# Patient Record
Sex: FEMALE | Race: BLACK OR AFRICAN AMERICAN | NOT HISPANIC OR LATINO | Employment: OTHER | URBAN - METROPOLITAN AREA
[De-identification: names, ages, dates, MRNs, and addresses within clinical notes are randomized per-mention and may not be internally consistent; named-entity substitution may affect disease eponyms.]

---

## 2017-01-05 ENCOUNTER — LAB VISIT (OUTPATIENT)
Dept: LAB | Facility: HOSPITAL | Age: 62
End: 2017-01-05
Attending: UROLOGY
Payer: MEDICARE

## 2017-01-05 ENCOUNTER — TELEPHONE (OUTPATIENT)
Dept: UROLOGY | Facility: CLINIC | Age: 62
End: 2017-01-05

## 2017-01-05 DIAGNOSIS — N31.9 NEUROGENIC BLADDER: ICD-10-CM

## 2017-01-05 DIAGNOSIS — R82.71 BACTERIURIA: ICD-10-CM

## 2017-01-05 LAB
BACTERIA #/AREA URNS HPF: ABNORMAL /HPF
BILIRUB UR QL STRIP: NEGATIVE
CLARITY UR: ABNORMAL
COLOR UR: ABNORMAL
GLUCOSE UR QL STRIP: NEGATIVE
HGB UR QL STRIP: ABNORMAL
HYALINE CASTS #/AREA URNS LPF: 0 /LPF
KETONES UR QL STRIP: NEGATIVE
LEUKOCYTE ESTERASE UR QL STRIP: ABNORMAL
MICROSCOPIC COMMENT: ABNORMAL
NITRITE UR QL STRIP: POSITIVE
PH UR STRIP: 7 [PH] (ref 5–8)
PROT UR QL STRIP: ABNORMAL
RBC #/AREA URNS HPF: 6 /HPF (ref 0–4)
SP GR UR STRIP: 1.02 (ref 1–1.03)
TRI-PHOS CRY URNS QL MICRO: ABNORMAL
URN SPEC COLLECT METH UR: ABNORMAL
UROBILINOGEN UR STRIP-ACNC: ABNORMAL EU/DL
WBC #/AREA URNS HPF: >100 /HPF (ref 0–5)

## 2017-01-05 PROCEDURE — 87077 CULTURE AEROBIC IDENTIFY: CPT | Mod: 59

## 2017-01-05 PROCEDURE — 87088 URINE BACTERIA CULTURE: CPT

## 2017-01-05 PROCEDURE — 81000 URINALYSIS NONAUTO W/SCOPE: CPT

## 2017-01-05 PROCEDURE — 87186 SC STD MICRODIL/AGAR DIL: CPT

## 2017-01-05 PROCEDURE — 87086 URINE CULTURE/COLONY COUNT: CPT

## 2017-01-05 RX ORDER — SULFAMETHOXAZOLE AND TRIMETHOPRIM 800; 160 MG/1; MG/1
1 TABLET ORAL 2 TIMES DAILY
Qty: 14 TABLET | Refills: 0 | Status: SHIPPED | OUTPATIENT
Start: 2017-01-05 | End: 2017-01-12

## 2017-01-05 NOTE — TELEPHONE ENCOUNTER
----- Message from Amol Quiroz sent at 1/5/2017  1:04 PM CST -----  Contact: self/[phone]  Patient is requesting her lab results. Thank you.

## 2017-01-05 NOTE — TELEPHONE ENCOUNTER
UA is concerning for UTI    Empiric abx (Bactrim) sent to pharmacy. May need to adjust when UCx returns - may not be until Monday.

## 2017-01-06 ENCOUNTER — TELEPHONE (OUTPATIENT)
Dept: UROLOGY | Facility: CLINIC | Age: 62
End: 2017-01-06

## 2017-01-09 ENCOUNTER — TELEPHONE (OUTPATIENT)
Dept: UROLOGY | Facility: CLINIC | Age: 62
End: 2017-01-09

## 2017-01-09 LAB
BACTERIA UR CULT: NORMAL

## 2017-01-09 RX ORDER — CIPROFLOXACIN 500 MG/1
500 TABLET ORAL 2 TIMES DAILY
Qty: 14 TABLET | Refills: 0 | Status: SHIPPED | OUTPATIENT
Start: 2017-01-09 | End: 2017-10-31

## 2017-01-09 NOTE — TELEPHONE ENCOUNTER
Please inform patient of urinary tract infection on recent urine culture. Appropriate antibiotics (Cipro) were sent in to the pharmacy.    May need to add or adjust abx as some aspects of the culture are still in process.

## 2017-02-13 ENCOUNTER — TELEPHONE (OUTPATIENT)
Dept: UROLOGY | Facility: CLINIC | Age: 62
End: 2017-02-13

## 2017-02-13 DIAGNOSIS — R30.0 DYSURIA: Primary | ICD-10-CM

## 2017-02-13 NOTE — TELEPHONE ENCOUNTER
Spoke to pt she states urine looks like it has slime in it. She not sure if its infection. Should pt bring a urine sample in for culture. Please advise/yanelis

## 2017-02-20 ENCOUNTER — OFFICE VISIT (OUTPATIENT)
Dept: UROLOGY | Facility: CLINIC | Age: 62
End: 2017-02-20
Payer: MEDICARE

## 2017-02-20 VITALS — BODY MASS INDEX: 41.3 KG/M2 | HEIGHT: 66 IN | WEIGHT: 257 LBS

## 2017-02-20 DIAGNOSIS — R33.9 INCOMPLETE EMPTYING OF BLADDER: ICD-10-CM

## 2017-02-20 DIAGNOSIS — N31.9 NEUROGENIC BLADDER: Primary | ICD-10-CM

## 2017-02-20 PROCEDURE — 99213 OFFICE O/P EST LOW 20 MIN: CPT | Mod: PBBFAC | Performed by: UROLOGY

## 2017-02-20 PROCEDURE — 99214 OFFICE O/P EST MOD 30 MIN: CPT | Mod: S$PBB,,, | Performed by: UROLOGY

## 2017-02-20 PROCEDURE — 99999 PR PBB SHADOW E&M-EST. PATIENT-LVL III: CPT | Mod: PBBFAC,,, | Performed by: UROLOGY

## 2017-02-20 NOTE — MR AVS SNAPSHOT
[facility]  [address]  [address]  [place] [address]  Phone: [phone]                  Mary Ellen Fajardo   2017 4:15 PM   Office Visit    Description:  Female : 1955   Provider:  RAMA Tinoco MD   Department:  [facility]           Reason for Visit     Follow-up           Diagnoses this Visit        Comments    Neurogenic bladder    -  Primary     Incomplete emptying of bladder                To Do List           Future Appointments        Provider Department Dept Phone    2017 4:15 PM RAMA Tinoco MD [facility] [phone]      Goals (5 Years of Data)     None      Follow-Up and Disposition     Return in about 6 months (around 2017) for Review X-ray, Follow up.      [facility]     [facility] -  Assistance  Registered nurses in the [facility] provide clinical advisement, health education, appointment booking, and other advisory services.  Call for this free service at [phone].             Medications           Message regarding Medications     Verify the changes and/or additions to your medication regime listed below are the same as discussed with your clinician today.  If any of these changes or additions are incorrect, please notify your healthcare provider.             Verify that the below list of medications is an accurate representation of the medications you are currently taking.  If none reported, the list may be blank. If incorrect, please contact your healthcare provider. Carry this list with you in case of emergency.           Current Medications     albuterol-ipratropium 2.5mg-0.5mg/3mL (DUO-NEB) 0.5 mg-3 mg(2.5 mg base)/3 mL nebulizer solution     amlodipine (NORVASC) 10 MG tablet Take 10 mg by mouth once daily.     baclofen (LIORESAL) 20 MG tablet 10 mg 4 (four) times daily.     bumetanide (BUMEX) 1 MG tablet 1 mg once daily.     catheter 18 Fr Misc Change every 20 days     "ciprofloxacin HCl (CIPRO) 500 MG tablet Take 1 tablet (500 mg total) by mouth 2 (two) times daily.    gabapentin (NEURONTIN) 300 MG capsule 600 mg 3 (three) times daily.     hydrocodone-acetaminophen 10-325mg (NORCO)  mg Tab TK 1 T PO  TID PRN    oxybutynin (DITROPAN-XL) 5 MG TR24 TAKE 1 TABLET(5 MG) BY MOUTH EVERY DAY    ranitidine (ZANTAC) 150 MG tablet     sertraline (ZOLOFT) 50 MG tablet TK 1 T PO QD    trazodone (DESYREL) 100 MG tablet 100 mg.            Clinical Reference Information           Your Vitals Were     Height Weight BMI          5' 6" (1.676 m) 116.6 kg (257 lb) 41.48 kg/m2        Allergies as of 2/20/2017     Rocephin [Ceftriaxone]      Immunizations Administered on Date of Encounter - 2/20/2017     None      Orders Placed During Today's Visit     Future Labs/Procedures Expected by Expires    US Retroperitoneal Complete (Kidney and  2/20/2017 2/20/2018      Language Assistance Services     ATTENTION: Language assistance services are available, free of charge. Please call [phone].      ATENCIÓN: Si habla español, tiene a torrez disposición servicios gratuitos de asistencia lingüística. Llame al [phone].     MIKE Ý: N?u b?n nói Ti?ng Vi?t, có các d?ch v? h? tr? ngôn ng? mi?n phí dành cho b?n. G?i s? [phone].         [facility] complies with applicable Federal civil rights laws and does not discriminate on the basis of race, color, national origin, age, disability, or sex.        "

## 2017-02-20 NOTE — PROGRESS NOTES
Subjective:       Patient ID: Mary Ellen Fajardo is a 61 y.o. female who was last seen in this office Visit date not found    Chief Complaint:   Chief Complaint   Patient presents with    Follow-up     6 month f/u pt states urine may have infection       History of Present Illness  Neurogenic Bladder  Her bladder is managed with a suprapubic catheter. She has had one since March 2011. Her  changes her 18 Fr catheter every 20 days. She has been having problems with sedimentation and urgency incontinence. She has also been having pain with catheter removal and insertion.    She noted some discoloration of her urine recently.    ACTIVE MEDICAL ISSUES:  Patient Active Problem List   Diagnosis    Paraparesis    Gait disorder    Neurogenic bladder    Knee pain, bilateral    S/P knee replacement    OA (osteoarthritis) of knee    Primary osteoarthritis of left knee    Poor motor control of trunk    Decreased range of motion of lower extremity    Bilateral leg weakness    Chronic knee pain       ALLERGIES AND MEDICATIONS: updated and reviewed.  Review of patient's allergies indicates:   Allergen Reactions    Rocephin [ceftriaxone] Rash     Current Outpatient Prescriptions   Medication Sig    albuterol-ipratropium 2.5mg-0.5mg/3mL (DUO-NEB) 0.5 mg-3 mg(2.5 mg base)/3 mL nebulizer solution     amlodipine (NORVASC) 10 MG tablet Take 10 mg by mouth once daily.     baclofen (LIORESAL) 20 MG tablet 10 mg 4 (four) times daily.     bumetanide (BUMEX) 1 MG tablet 1 mg once daily.     catheter 18 Fr Misc Change every 20 days    ciprofloxacin HCl (CIPRO) 500 MG tablet Take 1 tablet (500 mg total) by mouth 2 (two) times daily.    gabapentin (NEURONTIN) 300 MG capsule 600 mg 3 (three) times daily.     hydrocodone-acetaminophen 10-325mg (NORCO)  mg Tab TK 1 T PO  TID PRN    oxybutynin (DITROPAN-XL) 5 MG TR24 TAKE 1 TABLET(5 MG) BY MOUTH EVERY DAY    ranitidine (ZANTAC) 150 MG tablet     sertraline  "(ZOLOFT) 50 MG tablet TK 1 T PO QD    trazodone (DESYREL) 100 MG tablet 100 mg.      No current facility-administered medications for this visit.        Review of Systems   Constitutional: Negative for activity change, fatigue, fever and unexpected weight change.   Eyes: Negative for redness and visual disturbance.   Respiratory: Negative for chest tightness and shortness of breath.    Cardiovascular: Negative for chest pain and leg swelling.   Gastrointestinal: Negative for abdominal distention, abdominal pain, constipation, diarrhea, nausea and vomiting.   Genitourinary: Negative for difficulty urinating, dysuria, flank pain, frequency, hematuria, pelvic pain, urgency and vaginal bleeding.   Musculoskeletal: Negative for arthralgias and joint swelling.   Neurological: Negative for dizziness, weakness and headaches.   Psychiatric/Behavioral: Negative for confusion. The patient is not nervous/anxious.    All other systems reviewed and are negative.      Objective:      Vitals:    02/20/17 1553   Weight: 116.6 kg (257 lb)   Height: 5' 6" (1.676 m)     Physical Exam   Nursing note and vitals reviewed.  Constitutional: She is oriented to person, place, and time. She appears well-developed.   HENT:   Head: Normocephalic.   Eyes: Conjunctivae are normal.   Neck: Normal range of motion. No tracheal deviation present. No thyromegaly present.   Cardiovascular: Normal rate, normal heart sounds and normal pulses.    Pulmonary/Chest: Effort normal and breath sounds normal. No respiratory distress. She has no wheezes.   Abdominal: Soft. She exhibits no distension and no mass. There is no hepatosplenomegaly. There is no tenderness. There is no rebound, no guarding and no CVA tenderness. No hernia.   Genitourinary:   Genitourinary Comments: 18 Fr SP tube in good position  Tubing and Bag stained blue   Musculoskeletal: Normal range of motion. She exhibits no edema or tenderness.   Lymphadenopathy:     She has no cervical " adenopathy.   Neurological: She is alert and oriented to person, place, and time.   Skin: Skin is warm and dry. No rash noted. No erythema.     Psychiatric: She has a normal mood and affect. Her behavior is normal. Judgment and thought content normal.           Assessment:       1. Neurogenic bladder    2. Incomplete emptying of bladder          Plan:       1. Neurogenic bladder  Her  will bring in a urine sample for culture next week when he changes it.  - US Retroperitoneal Complete (Kidney and; Future    2. Incomplete emptying of bladder              Return in about 6 months (around 8/20/2017) for Review X-ray, Follow up.

## 2017-03-02 ENCOUNTER — LAB VISIT (OUTPATIENT)
Dept: LAB | Facility: HOSPITAL | Age: 62
End: 2017-03-02
Attending: UROLOGY
Payer: MEDICARE

## 2017-03-02 DIAGNOSIS — R30.0 DYSURIA: ICD-10-CM

## 2017-03-02 PROCEDURE — 87088 URINE BACTERIA CULTURE: CPT

## 2017-03-02 PROCEDURE — 87077 CULTURE AEROBIC IDENTIFY: CPT

## 2017-03-02 PROCEDURE — 87086 URINE CULTURE/COLONY COUNT: CPT

## 2017-03-02 PROCEDURE — 87186 SC STD MICRODIL/AGAR DIL: CPT

## 2017-03-04 DIAGNOSIS — N30.00 ACUTE CYSTITIS WITHOUT HEMATURIA: Primary | ICD-10-CM

## 2017-03-04 LAB — BACTERIA UR CULT: NORMAL

## 2017-03-04 RX ORDER — AMOXICILLIN AND CLAVULANATE POTASSIUM 875; 125 MG/1; MG/1
1 TABLET, FILM COATED ORAL 2 TIMES DAILY
Qty: 14 TABLET | Refills: 0 | Status: SHIPPED | OUTPATIENT
Start: 2017-03-04 | End: 2017-03-11

## 2017-03-06 ENCOUNTER — TELEPHONE (OUTPATIENT)
Dept: UROLOGY | Facility: CLINIC | Age: 62
End: 2017-03-06

## 2017-03-08 ENCOUNTER — TELEPHONE (OUTPATIENT)
Dept: UROLOGY | Facility: CLINIC | Age: 62
End: 2017-03-08

## 2017-03-08 NOTE — TELEPHONE ENCOUNTER
Patient would like to ask provider if there is any contraindication btwn the abt Augmentin and Hep C. Patient states that she has been recently dx.

## 2017-03-08 NOTE — TELEPHONE ENCOUNTER
----- Message from Bryan Perdue sent at 3/8/2017  1:19 PM CST -----  Contact: Self  Pt has questions regarding medication. Pt can be reached @ [phone].

## 2017-05-11 ENCOUNTER — TELEPHONE (OUTPATIENT)
Dept: UROLOGY | Facility: CLINIC | Age: 62
End: 2017-05-11

## 2017-05-11 NOTE — TELEPHONE ENCOUNTER
----- Message from Rashmi Colin sent at 5/11/2017  3:43 PM CDT -----  Contact: [phone] Micheline   [facility] providers sent over a request for more catheters and they are following up on the request Please call pt at your earliest convenience.  Thanks !

## 2017-06-30 DIAGNOSIS — N31.9 NEUROGENIC BLADDER: ICD-10-CM

## 2017-07-05 RX ORDER — OXYBUTYNIN CHLORIDE 5 MG/1
TABLET, EXTENDED RELEASE ORAL
Qty: 90 TABLET | Refills: 0 | Status: SHIPPED | OUTPATIENT
Start: 2017-07-05 | End: 2018-03-17 | Stop reason: SDUPTHER

## 2017-10-20 ENCOUNTER — HOSPITAL ENCOUNTER (OUTPATIENT)
Dept: RADIOLOGY | Facility: HOSPITAL | Age: 62
Discharge: HOME | End: 2017-10-20
Attending: UROLOGY
Payer: MEDICARE

## 2017-10-20 DIAGNOSIS — N31.9 NEUROGENIC BLADDER: ICD-10-CM

## 2017-10-20 PROCEDURE — 76770 US EXAM ABDO BACK WALL COMP: CPT | Mod: 26,,, | Performed by: RADIOLOGY

## 2017-10-20 PROCEDURE — 76770 US EXAM ABDO BACK WALL COMP: CPT | Mod: TC

## 2017-10-31 ENCOUNTER — OFFICE VISIT (OUTPATIENT)
Dept: UROLOGY | Facility: CLINIC | Age: 62
End: 2017-10-31
Payer: MEDICARE

## 2017-10-31 VITALS — HEIGHT: 66 IN | BODY MASS INDEX: 41.3 KG/M2 | WEIGHT: 257 LBS

## 2017-10-31 DIAGNOSIS — N31.9 NEUROGENIC BLADDER: Primary | ICD-10-CM

## 2017-10-31 DIAGNOSIS — R33.9 INCOMPLETE EMPTYING OF BLADDER: ICD-10-CM

## 2017-10-31 DIAGNOSIS — N20.0 KIDNEY STONES: ICD-10-CM

## 2017-10-31 PROCEDURE — 99214 OFFICE O/P EST MOD 30 MIN: CPT | Mod: S$PBB,,, | Performed by: UROLOGY

## 2017-10-31 PROCEDURE — 99213 OFFICE O/P EST LOW 20 MIN: CPT | Mod: PBBFAC | Performed by: UROLOGY

## 2017-10-31 PROCEDURE — 99999 PR PBB SHADOW E&M-EST. PATIENT-LVL III: CPT | Mod: PBBFAC,,, | Performed by: UROLOGY

## 2017-10-31 RX ORDER — POTASSIUM CHLORIDE 750 MG/1
TABLET, EXTENDED RELEASE ORAL
COMMUNITY
Start: 2017-09-27 | End: 2019-09-20 | Stop reason: SDUPTHER

## 2017-10-31 RX ORDER — ALBUTEROL SULFATE 90 UG/1
AEROSOL, METERED RESPIRATORY (INHALATION)
COMMUNITY
Start: 2017-10-23 | End: 2022-09-28

## 2017-10-31 NOTE — PROGRESS NOTES
Subjective:       Patient ID: Mary Ellen Fajardo is a 61 y.o. female who was last seen in this office Visit date not found    Chief Complaint:   Chief Complaint   Patient presents with    Follow-up     6 month f/u ultrasound        History of Present Illness  Neurogenic Bladder  Her bladder is managed with a suprapubic catheter. She has had one since March 2011. Her  changes her 18 Fr catheter every 20 days. She has been having problems with sedimentation and urgency incontinence. She has also been having pain with catheter removal and insertion.  She has noted a dark color and more sediment.    ACTIVE MEDICAL ISSUES:  Patient Active Problem List   Diagnosis    Paraparesis    Gait disorder    Neurogenic bladder    Knee pain, bilateral    S/P knee replacement    OA (osteoarthritis) of knee    Primary osteoarthritis of left knee    Poor motor control of trunk    Decreased range of motion of lower extremity    Bilateral leg weakness    Chronic knee pain       ALLERGIES AND MEDICATIONS: updated and reviewed.  Review of patient's allergies indicates:   Allergen Reactions    Rocephin [ceftriaxone] Rash     Current Outpatient Prescriptions   Medication Sig    albuterol-ipratropium 2.5mg-0.5mg/3mL (DUO-NEB) 0.5 mg-3 mg(2.5 mg base)/3 mL nebulizer solution     amlodipine (NORVASC) 10 MG tablet Take 10 mg by mouth once daily.     baclofen (LIORESAL) 20 MG tablet 10 mg 4 (four) times daily.     bumetanide (BUMEX) 1 MG tablet 1 mg once daily.     catheter 18 Fr Misc Change every 20 days    gabapentin (NEURONTIN) 300 MG capsule 600 mg 3 (three) times daily.     hydrocodone-acetaminophen 10-325mg (NORCO)  mg Tab TK 1 T PO  TID PRN    oxybutynin (DITROPAN-XL) 5 MG TR24 TAKE 1 TABLET BY MOUTH EVERY DAY    potassium chloride (KLOR-CON) 10 MEQ TbSR     ranitidine (ZANTAC) 150 MG tablet     sertraline (ZOLOFT) 50 MG tablet TK 1 T PO QD    trazodone (DESYREL) 100 MG tablet 100 mg.     VENTOLIN  "HFA 90 mcg/actuation inhaler      No current facility-administered medications for this visit.        Review of Systems   Constitutional: Negative for activity change, fatigue, fever and unexpected weight change.   Eyes: Negative for redness and visual disturbance.   Respiratory: Negative for chest tightness and shortness of breath.    Cardiovascular: Negative for chest pain and leg swelling.   Gastrointestinal: Negative for abdominal distention, abdominal pain, constipation, diarrhea, nausea and vomiting.   Genitourinary: Negative for difficulty urinating, dysuria, flank pain, frequency, hematuria, pelvic pain, urgency and vaginal bleeding.   Musculoskeletal: Negative for arthralgias and joint swelling.   Neurological: Negative for dizziness, weakness and headaches.   Psychiatric/Behavioral: Negative for confusion. The patient is not nervous/anxious.    All other systems reviewed and are negative.      Objective:      Vitals:    10/31/17 1459   Weight: 116.6 kg (257 lb)   Height: 5' 6" (1.676 m)     Physical Exam   Nursing note and vitals reviewed.  Constitutional: She is oriented to person, place, and time. She appears well-developed.   HENT:   Head: Normocephalic.   Eyes: Conjunctivae are normal.   Neck: Normal range of motion. No tracheal deviation present. No thyromegaly present.   Cardiovascular: Normal rate, normal heart sounds and normal pulses.    Pulmonary/Chest: Effort normal and breath sounds normal. No respiratory distress. She has no wheezes.   Abdominal: Soft. She exhibits no distension and no mass. There is no hepatosplenomegaly. There is no tenderness. There is no rebound, no guarding and no CVA tenderness. No hernia.   Musculoskeletal: Normal range of motion. She exhibits no edema or tenderness.   Wheel chair   Lymphadenopathy:     She has no cervical adenopathy.   Neurological: She is alert and oriented to person, place, and time.   Skin: Skin is warm and dry. No rash noted. No erythema. "     Psychiatric: She has a normal mood and affect. Her behavior is normal. Judgment and thought content normal.             US Retroperitoneal Complete (Kidney and   Status: Final result   MyChart Results Release     [email or web address] Status: Active Results Release   PACS Images     Show images for US Retroperitoneal Complete (Kidney and   Reviewed By Arnaud Tinoco MD on 10/24/2017 07:54   External Result Report     External Result Report   Narrative     Retroperitoneal ultrasound.    10/20/17 10:22:38    Accession# [id number]    CLINICAL INDICATION: 61 year old F with neurogenic bladder, urinary tract infection    TECHNIQUE: Renal ultrasound with duplex and color flow doppler analysis.    COMPARISON: No priors    FINDINGS:     Large shadowing echogenic structure within the right renal collecting system concerning for a staghorn calculus. Mild prominence of the calyces without overt hydronephrosis. No left renal stones. No solid renal mass identified on either kidney. The right kidney measures 10.7x 5.9 x 5.5 cm, the left kidney measures 11.0 x 4.7 x 5.2 cm..     The bladder is decompressed, with suprapubic catheter in place..   Impression           Prominence of right renal collecting system with probable staghorn calculus. Correlation with CT recommended.    Left kidney appears unremarkable.    Epic notification system activated.       Electronically signed by: ZEUS JASON MD  Date: 10/20/17  Time: 11:21           Assessment:       1. Neurogenic bladder    2. Kidney stones    3. Incomplete emptying of bladder          Plan:       1. Neurogenic bladder  Continue with catheter exchanges  Start daily irrigation with sterile water, she has the supplies    2. Kidney stones  CT first she will need either PCNL or ureteroscopy likely    3. Incomplete emptying of bladder  Stay with SP tube            Return in about 4 weeks (around 11/28/2017) for Follow up, Review X-ray.

## 2017-11-29 ENCOUNTER — HOSPITAL ENCOUNTER (OUTPATIENT)
Dept: RADIOLOGY | Facility: HOSPITAL | Age: 62
Discharge: HOME | End: 2017-11-29
Attending: UROLOGY
Payer: MEDICARE

## 2017-11-29 DIAGNOSIS — N20.0 KIDNEY STONES: ICD-10-CM

## 2017-11-29 PROCEDURE — 74176 CT ABD & PELVIS W/O CONTRAST: CPT | Mod: TC

## 2017-11-29 PROCEDURE — 74176 CT ABD & PELVIS W/O CONTRAST: CPT | Mod: 26,,, | Performed by: RADIOLOGY

## 2017-12-06 ENCOUNTER — OFFICE VISIT (OUTPATIENT)
Dept: UROLOGY | Facility: CLINIC | Age: 62
End: 2017-12-06
Payer: MEDICARE

## 2017-12-06 VITALS — WEIGHT: 257 LBS | BODY MASS INDEX: 41.3 KG/M2 | HEIGHT: 66 IN

## 2017-12-06 DIAGNOSIS — N21.0 BLADDER STONE: Primary | ICD-10-CM

## 2017-12-06 DIAGNOSIS — N31.9 NEUROGENIC BLADDER: ICD-10-CM

## 2017-12-06 DIAGNOSIS — N20.0 STAGHORN CALCULUS: ICD-10-CM

## 2017-12-06 DIAGNOSIS — R33.9 INCOMPLETE EMPTYING OF BLADDER: ICD-10-CM

## 2017-12-06 PROCEDURE — 87088 URINE BACTERIA CULTURE: CPT

## 2017-12-06 PROCEDURE — 87086 URINE CULTURE/COLONY COUNT: CPT

## 2017-12-06 PROCEDURE — 87186 SC STD MICRODIL/AGAR DIL: CPT

## 2017-12-06 PROCEDURE — 99213 OFFICE O/P EST LOW 20 MIN: CPT | Mod: PBBFAC | Performed by: UROLOGY

## 2017-12-06 PROCEDURE — 87077 CULTURE AEROBIC IDENTIFY: CPT | Mod: 59

## 2017-12-06 PROCEDURE — 99999 PR PBB SHADOW E&M-EST. PATIENT-LVL III: CPT | Mod: PBBFAC,,, | Performed by: UROLOGY

## 2017-12-06 PROCEDURE — 99214 OFFICE O/P EST MOD 30 MIN: CPT | Mod: S$PBB,,, | Performed by: UROLOGY

## 2017-12-06 RX ORDER — CIPROFLOXACIN 2 MG/ML
400 INJECTION, SOLUTION INTRAVENOUS
Status: CANCELLED | OUTPATIENT
Start: 2017-12-06

## 2017-12-06 NOTE — PROGRESS NOTES
Subjective:       Patient ID: Mary Ellen Fajardo is a 61 y.o. female who was referred by No ref. provider found    Chief Complaint:   Chief Complaint   Patient presents with    Follow-up     4 week f/u with ct scan        History of Present Illness  Neurogenic Bladder  Her bladder is managed with a suprapubic catheter. She has had one since March 2011. Her  changes her 18 Fr catheter every 20 days. She has been having problems with sedimentation and urgency incontinence. She has also been having pain with catheter removal and insertion.  She has noted a dark color and more sediment at times.  She had a routine ALBERTINA last visit suggesting a right kidney stone.  She is back with a CT scan.      ACTIVE MEDICAL ISSUES:  Patient Active Problem List   Diagnosis    Paraparesis    Gait disorder    Neurogenic bladder    Knee pain, bilateral    S/P knee replacement    OA (osteoarthritis) of knee    Primary osteoarthritis of left knee    Poor motor control of trunk    Decreased range of motion of lower extremity    Bilateral leg weakness    Chronic knee pain       PAST MEDICAL HISTORY  Past Medical History:   Diagnosis Date    Asthma     Hypertension     Paraplegia     Paraplegic spinal paralysis     SCI (spinal cord injury)        PAST SURGICAL HISTORY:  Past Surgical History:   Procedure Laterality Date    BACK SURGERY      bilateral knee replacement      BREAST BIOPSY      JOINT REPLACEMENT         SOCIAL HISTORY:  Social History   Substance Use Topics    Smoking status: Former Smoker    Smokeless tobacco: Never Used    Alcohol use No       FAMILY HISTORY:  History reviewed. No pertinent family history.    ALLERGIES AND MEDICATIONS: updated and reviewed.  Review of patient's allergies indicates:   Allergen Reactions    Rocephin [ceftriaxone] Rash     Current Outpatient Prescriptions   Medication Sig    albuterol-ipratropium 2.5mg-0.5mg/3mL (DUO-NEB) 0.5 mg-3 mg(2.5 mg base)/3 mL nebulizer  "solution     amlodipine (NORVASC) 10 MG tablet Take 10 mg by mouth once daily.     baclofen (LIORESAL) 20 MG tablet 10 mg 4 (four) times daily.     bumetanide (BUMEX) 1 MG tablet 1 mg once daily.     catheter 18 Fr Misc Change every 20 days    gabapentin (NEURONTIN) 300 MG capsule 600 mg 3 (three) times daily.     hydrocodone-acetaminophen 10-325mg (NORCO)  mg Tab TK 1 T PO  TID PRN    oxybutynin (DITROPAN-XL) 5 MG TR24 TAKE 1 TABLET BY MOUTH EVERY DAY    potassium chloride (KLOR-CON) 10 MEQ TbSR     ranitidine (ZANTAC) 150 MG tablet     sertraline (ZOLOFT) 50 MG tablet TK 1 T PO QD    trazodone (DESYREL) 100 MG tablet 100 mg.     VENTOLIN HFA 90 mcg/actuation inhaler      No current facility-administered medications for this visit.        Review of Systems   Constitutional: Negative for activity change, fatigue, fever and unexpected weight change.   Eyes: Negative for redness and visual disturbance.   Respiratory: Negative for chest tightness and shortness of breath.    Cardiovascular: Negative for chest pain and leg swelling.   Gastrointestinal: Negative for abdominal distention, abdominal pain, constipation, diarrhea, nausea and vomiting.   Genitourinary: Negative for difficulty urinating, dysuria, flank pain, frequency, hematuria, pelvic pain, urgency and vaginal bleeding.   Musculoskeletal: Negative for arthralgias and joint swelling.   Neurological: Negative for dizziness, weakness and headaches.   Psychiatric/Behavioral: Negative for confusion. The patient is not nervous/anxious.    All other systems reviewed and are negative.      Objective:      Vitals:    12/06/17 1531   Weight: 116.6 kg (257 lb)   Height: 5' 6" (1.676 m)     Physical Exam   Nursing note and vitals reviewed.  Constitutional: She is oriented to person, place, and time. She appears well-developed.   HENT:   Head: Normocephalic.   Eyes: Conjunctivae are normal.   Neck: Normal range of motion. No tracheal deviation present. " No thyromegaly present.   Cardiovascular: Normal rate, normal heart sounds and normal pulses.    Pulmonary/Chest: Effort normal and breath sounds normal. No respiratory distress. She has no wheezes.   Abdominal: Soft. She exhibits no distension and no mass. There is no hepatosplenomegaly. There is no tenderness. There is no rebound, no guarding and no CVA tenderness. No hernia.   Genitourinary:   Genitourinary Comments: SP tube in place   Musculoskeletal: Normal range of motion. She exhibits no edema or tenderness.   Lymphadenopathy:     She has no cervical adenopathy.   Neurological: She is alert and oriented to person, place, and time.   Skin: Skin is warm and dry. No rash noted. No erythema.     Psychiatric: She has a normal mood and affect. Her behavior is normal. Judgment and thought content normal.           Assessment:       1. Bladder stone    2. Staghorn calculus    3. Neurogenic bladder    4. Incomplete emptying of bladder          Plan:       1. Bladder stone  Plan for a cystolitholapaxy on Wednesday 12/20/2017    - Urine culture    2. Staghorn calculus  Plan for a right stent placement on 12/20/2017 in preparation for a future PCNL    3. Neurogenic bladder  SP tube    4. Incomplete emptying of bladder  Irrigate bladder with sterile water            Return in about 4 weeks (around 1/3/2018) for Follow up.

## 2017-12-11 ENCOUNTER — TELEPHONE (OUTPATIENT)
Dept: UROLOGY | Facility: CLINIC | Age: 62
End: 2017-12-11

## 2017-12-11 DIAGNOSIS — R30.0 DYSURIA: Primary | ICD-10-CM

## 2017-12-11 LAB
BACTERIA UR CULT: NORMAL
BACTERIA UR CULT: NORMAL

## 2017-12-11 RX ORDER — CIPROFLOXACIN 500 MG/1
500 TABLET ORAL 2 TIMES DAILY
Qty: 28 TABLET | Refills: 0 | Status: SHIPPED | OUTPATIENT
Start: 2017-12-11 | End: 2017-12-25

## 2017-12-15 ENCOUNTER — HOSPITAL ENCOUNTER (OUTPATIENT)
Dept: PREADMISSION TESTING | Facility: HOSPITAL | Age: 62
Discharge: HOME | End: 2017-12-15
Attending: UROLOGY
Payer: MEDICARE

## 2017-12-15 VITALS
HEART RATE: 50 BPM | HEIGHT: 65 IN | BODY MASS INDEX: 39.99 KG/M2 | WEIGHT: 240 LBS | DIASTOLIC BLOOD PRESSURE: 68 MMHG | RESPIRATION RATE: 17 BRPM | OXYGEN SATURATION: 96 % | SYSTOLIC BLOOD PRESSURE: 119 MMHG | TEMPERATURE: 97 F

## 2017-12-15 DIAGNOSIS — N20.0 STAGHORN CALCULUS: ICD-10-CM

## 2017-12-15 DIAGNOSIS — N21.0 BLADDER STONE: ICD-10-CM

## 2017-12-15 DIAGNOSIS — Z01.818 PRE-OP TESTING: Primary | ICD-10-CM

## 2017-12-15 LAB
ANION GAP SERPL CALC-SCNC: 7 MMOL/L
BASOPHILS # BLD AUTO: 0.02 K/UL
BASOPHILS NFR BLD: 0.4 %
BUN SERPL-MCNC: 9 MG/DL
CALCIUM SERPL-MCNC: 9.3 MG/DL
CHLORIDE SERPL-SCNC: 100 MMOL/L
CO2 SERPL-SCNC: 33 MMOL/L
CREAT SERPL-MCNC: 0.8 MG/DL
DIFFERENTIAL METHOD: ABNORMAL
EOSINOPHIL # BLD AUTO: 0.2 K/UL
EOSINOPHIL NFR BLD: 3.7 %
ERYTHROCYTE [DISTWIDTH] IN BLOOD BY AUTOMATED COUNT: 15 %
EST. GFR  (AFRICAN AMERICAN): >60 ML/MIN/1.73 M^2
EST. GFR  (NON AFRICAN AMERICAN): >60 ML/MIN/1.73 M^2
GLUCOSE SERPL-MCNC: 96 MG/DL
HCT VFR BLD AUTO: 36 %
HGB BLD-MCNC: 11.3 G/DL
LYMPHOCYTES # BLD AUTO: 1.7 K/UL
LYMPHOCYTES NFR BLD: 29.4 %
MCH RBC QN AUTO: 24.9 PG
MCHC RBC AUTO-ENTMCNC: 31.4 G/DL
MCV RBC AUTO: 80 FL
MONOCYTES # BLD AUTO: 0.4 K/UL
MONOCYTES NFR BLD: 6.5 %
NEUTROPHILS # BLD AUTO: 3.4 K/UL
NEUTROPHILS NFR BLD: 60 %
PLATELET # BLD AUTO: 292 K/UL
PMV BLD AUTO: 8.6 FL
POTASSIUM SERPL-SCNC: 3.5 MMOL/L
RBC # BLD AUTO: 4.53 M/UL
SODIUM SERPL-SCNC: 140 MMOL/L
WBC # BLD AUTO: 5.68 K/UL

## 2017-12-15 PROCEDURE — 80048 BASIC METABOLIC PNL TOTAL CA: CPT

## 2017-12-15 PROCEDURE — 36415 COLL VENOUS BLD VENIPUNCTURE: CPT

## 2017-12-15 PROCEDURE — 85025 COMPLETE CBC W/AUTO DIFF WBC: CPT

## 2017-12-15 PROCEDURE — 93010 ELECTROCARDIOGRAM REPORT: CPT | Mod: ,,, | Performed by: INTERNAL MEDICINE

## 2017-12-15 PROCEDURE — 93005 ELECTROCARDIOGRAM TRACING: CPT

## 2017-12-15 RX ORDER — OXYCODONE AND ACETAMINOPHEN 10; 325 MG/1; MG/1
1 TABLET ORAL EVERY 8 HOURS PRN
Status: ON HOLD | COMMUNITY
End: 2017-12-20

## 2017-12-20 ENCOUNTER — ANESTHESIA (OUTPATIENT)
Dept: SURGERY | Facility: HOSPITAL | Age: 62
End: 2017-12-20
Payer: MEDICARE

## 2017-12-20 ENCOUNTER — HOSPITAL ENCOUNTER (OUTPATIENT)
Facility: HOSPITAL | Age: 62
Discharge: HOME | End: 2017-12-20
Attending: UROLOGY | Admitting: UROLOGY
Payer: MEDICARE

## 2017-12-20 ENCOUNTER — ANESTHESIA EVENT (OUTPATIENT)
Dept: SURGERY | Facility: HOSPITAL | Age: 62
End: 2017-12-20
Payer: MEDICARE

## 2017-12-20 ENCOUNTER — SURGERY (OUTPATIENT)
Age: 62
End: 2017-12-20

## 2017-12-20 VITALS
TEMPERATURE: 98 F | DIASTOLIC BLOOD PRESSURE: 74 MMHG | RESPIRATION RATE: 16 BRPM | BODY MASS INDEX: 39.99 KG/M2 | HEIGHT: 65 IN | SYSTOLIC BLOOD PRESSURE: 113 MMHG | WEIGHT: 240 LBS | OXYGEN SATURATION: 95 % | HEART RATE: 58 BPM

## 2017-12-20 DIAGNOSIS — N21.0 BLADDER STONE: Primary | ICD-10-CM

## 2017-12-20 DIAGNOSIS — N20.0 STAGHORN CALCULUS: ICD-10-CM

## 2017-12-20 PROCEDURE — C1758 CATHETER, URETERAL: HCPCS | Performed by: UROLOGY

## 2017-12-20 PROCEDURE — 82365 CALCULUS SPECTROSCOPY: CPT

## 2017-12-20 PROCEDURE — 37000008 HC ANESTHESIA 1ST 15 MINUTES: Performed by: UROLOGY

## 2017-12-20 PROCEDURE — 63600175 PHARM REV CODE 636 W HCPCS: Performed by: NURSE ANESTHETIST, CERTIFIED REGISTERED

## 2017-12-20 PROCEDURE — 25000003 PHARM REV CODE 250: Performed by: ANESTHESIOLOGY

## 2017-12-20 PROCEDURE — 88300 SURGICAL PATH GROSS: CPT | Mod: 26,,, | Performed by: PATHOLOGY

## 2017-12-20 PROCEDURE — 36000707: Performed by: UROLOGY

## 2017-12-20 PROCEDURE — D9220A PRA ANESTHESIA: Mod: ANES,,, | Performed by: ANESTHESIOLOGY

## 2017-12-20 PROCEDURE — A4217 STERILE WATER/SALINE, 500 ML: HCPCS | Performed by: UROLOGY

## 2017-12-20 PROCEDURE — 76000 FLUOROSCOPY <1 HR PHYS/QHP: CPT | Mod: 26,59,, | Performed by: UROLOGY

## 2017-12-20 PROCEDURE — 25000003 PHARM REV CODE 250: Performed by: UROLOGY

## 2017-12-20 PROCEDURE — 71000016 HC POSTOP RECOV ADDL HR: Performed by: UROLOGY

## 2017-12-20 PROCEDURE — 71000033 HC RECOVERY, INTIAL HOUR: Performed by: UROLOGY

## 2017-12-20 PROCEDURE — 37000009 HC ANESTHESIA EA ADD 15 MINS: Performed by: UROLOGY

## 2017-12-20 PROCEDURE — 71000015 HC POSTOP RECOV 1ST HR: Performed by: UROLOGY

## 2017-12-20 PROCEDURE — C1769 GUIDE WIRE: HCPCS | Performed by: UROLOGY

## 2017-12-20 PROCEDURE — 52318 REMOVE BLADDER STONE: CPT | Mod: ,,, | Performed by: UROLOGY

## 2017-12-20 PROCEDURE — 36000706: Performed by: UROLOGY

## 2017-12-20 PROCEDURE — D9220A PRA ANESTHESIA: Mod: CRNA,,, | Performed by: NURSE ANESTHETIST, CERTIFIED REGISTERED

## 2017-12-20 PROCEDURE — 52332 CYSTOSCOPY AND TREATMENT: CPT | Mod: 51,RT,, | Performed by: UROLOGY

## 2017-12-20 PROCEDURE — 25000242 PHARM REV CODE 250 ALT 637 W/ HCPCS: Performed by: NURSE ANESTHETIST, CERTIFIED REGISTERED

## 2017-12-20 PROCEDURE — 88300 SURGICAL PATH GROSS: CPT | Performed by: PATHOLOGY

## 2017-12-20 PROCEDURE — 71000039 HC RECOVERY, EACH ADD'L HOUR: Performed by: UROLOGY

## 2017-12-20 PROCEDURE — 25000003 PHARM REV CODE 250: Performed by: NURSE ANESTHETIST, CERTIFIED REGISTERED

## 2017-12-20 PROCEDURE — 63600175 PHARM REV CODE 636 W HCPCS: Performed by: UROLOGY

## 2017-12-20 PROCEDURE — C2617 STENT, NON-COR, TEM W/O DEL: HCPCS | Performed by: UROLOGY

## 2017-12-20 PROCEDURE — 27201423 OPTIME MED/SURG SUP & DEVICES STERILE SUPPLY: Performed by: UROLOGY

## 2017-12-20 PROCEDURE — 51705 CHANGE OF BLADDER TUBE: CPT | Mod: 51,,, | Performed by: UROLOGY

## 2017-12-20 DEVICE — STENT URET PERCUFLEX 6FR 24CM: Type: IMPLANTABLE DEVICE | Site: URETER | Status: FUNCTIONAL

## 2017-12-20 RX ORDER — OXYCODONE HYDROCHLORIDE 5 MG/1
5 TABLET ORAL EVERY 4 HOURS PRN
Status: DISCONTINUED | OUTPATIENT
Start: 2017-12-20 | End: 2017-12-20 | Stop reason: HOSPADM

## 2017-12-20 RX ORDER — SODIUM CHLORIDE, SODIUM LACTATE, POTASSIUM CHLORIDE, CALCIUM CHLORIDE 600; 310; 30; 20 MG/100ML; MG/100ML; MG/100ML; MG/100ML
INJECTION, SOLUTION INTRAVENOUS CONTINUOUS
Status: DISCONTINUED | OUTPATIENT
Start: 2017-12-20 | End: 2017-12-20 | Stop reason: HOSPADM

## 2017-12-20 RX ORDER — MIDAZOLAM HYDROCHLORIDE 1 MG/ML
INJECTION, SOLUTION INTRAMUSCULAR; INTRAVENOUS
Status: DISCONTINUED | OUTPATIENT
Start: 2017-12-20 | End: 2017-12-20

## 2017-12-20 RX ORDER — GLYCOPYRROLATE 0.2 MG/ML
INJECTION INTRAMUSCULAR; INTRAVENOUS
Status: DISCONTINUED | OUTPATIENT
Start: 2017-12-20 | End: 2017-12-20

## 2017-12-20 RX ORDER — ALBUTEROL SULFATE 90 UG/1
AEROSOL, METERED RESPIRATORY (INHALATION)
Status: DISCONTINUED | OUTPATIENT
Start: 2017-12-20 | End: 2017-12-20

## 2017-12-20 RX ORDER — OXYCODONE AND ACETAMINOPHEN 10; 325 MG/1; MG/1
1 TABLET ORAL EVERY 8 HOURS PRN
Qty: 30 TABLET | Refills: 0 | Status: SHIPPED | OUTPATIENT
Start: 2017-12-20 | End: 2018-01-22 | Stop reason: SDUPTHER

## 2017-12-20 RX ORDER — LIDOCAINE HCL/PF 100 MG/5ML
SYRINGE (ML) INTRAVENOUS
Status: DISCONTINUED | OUTPATIENT
Start: 2017-12-20 | End: 2017-12-20

## 2017-12-20 RX ORDER — WATER 1 ML/ML
IRRIGANT IRRIGATION
Status: DISCONTINUED | OUTPATIENT
Start: 2017-12-20 | End: 2017-12-20 | Stop reason: HOSPADM

## 2017-12-20 RX ORDER — SODIUM CHLORIDE 0.9 G/100ML
IRRIGANT IRRIGATION
Status: DISCONTINUED | OUTPATIENT
Start: 2017-12-20 | End: 2017-12-20 | Stop reason: HOSPADM

## 2017-12-20 RX ORDER — PROPOFOL 10 MG/ML
VIAL (ML) INTRAVENOUS
Status: DISCONTINUED | OUTPATIENT
Start: 2017-12-20 | End: 2017-12-20

## 2017-12-20 RX ORDER — FENTANYL CITRATE 50 UG/ML
INJECTION, SOLUTION INTRAMUSCULAR; INTRAVENOUS
Status: DISCONTINUED | OUTPATIENT
Start: 2017-12-20 | End: 2017-12-20

## 2017-12-20 RX ORDER — ONDANSETRON 2 MG/ML
INJECTION INTRAMUSCULAR; INTRAVENOUS
Status: DISCONTINUED | OUTPATIENT
Start: 2017-12-20 | End: 2017-12-20

## 2017-12-20 RX ORDER — LIDOCAINE HYDROCHLORIDE 10 MG/ML
1 INJECTION, SOLUTION EPIDURAL; INFILTRATION; INTRACAUDAL; PERINEURAL ONCE
Status: DISCONTINUED | OUTPATIENT
Start: 2017-12-20 | End: 2017-12-20 | Stop reason: HOSPADM

## 2017-12-20 RX ORDER — CIPROFLOXACIN 2 MG/ML
400 INJECTION, SOLUTION INTRAVENOUS
Status: COMPLETED | OUTPATIENT
Start: 2017-12-20 | End: 2017-12-20

## 2017-12-20 RX ORDER — PHENAZOPYRIDINE HYDROCHLORIDE 100 MG/1
200 TABLET, FILM COATED ORAL ONCE
Status: COMPLETED | OUTPATIENT
Start: 2017-12-20 | End: 2017-12-20

## 2017-12-20 RX ADMIN — GLYCOPYRROLATE 0.2 MG: 0.2 INJECTION, SOLUTION INTRAMUSCULAR; INTRAVENOUS at 10:12

## 2017-12-20 RX ADMIN — SODIUM CHLORIDE, SODIUM LACTATE, POTASSIUM CHLORIDE, AND CALCIUM CHLORIDE 10 ML/HR: 600; 310; 30; 20 INJECTION, SOLUTION INTRAVENOUS at 09:12

## 2017-12-20 RX ADMIN — FENTANYL CITRATE 100 MCG: 50 INJECTION INTRAMUSCULAR; INTRAVENOUS at 10:12

## 2017-12-20 RX ADMIN — ALBUTEROL SULFATE 2 PUFF: 90 AEROSOL, METERED RESPIRATORY (INHALATION) at 10:12

## 2017-12-20 RX ADMIN — LIDOCAINE HYDROCHLORIDE 100 MG: 20 INJECTION, SOLUTION INTRAVENOUS at 10:12

## 2017-12-20 RX ADMIN — WATER 500 ML: 1 IRRIGANT IRRIGATION at 10:12

## 2017-12-20 RX ADMIN — ONDANSETRON 4 MG: 2 INJECTION, SOLUTION INTRAMUSCULAR; INTRAVENOUS at 10:12

## 2017-12-20 RX ADMIN — PROPOFOL 125 MG: 10 INJECTION, EMULSION INTRAVENOUS at 10:12

## 2017-12-20 RX ADMIN — ALBUTEROL SULFATE 2 PUFF: 90 AEROSOL, METERED RESPIRATORY (INHALATION) at 11:12

## 2017-12-20 RX ADMIN — SODIUM CHLORIDE 9000 ML: 0.9 IRRIGANT IRRIGATION at 10:12

## 2017-12-20 RX ADMIN — SODIUM CHLORIDE 2000 ML: 0.9 IRRIGANT IRRIGATION at 10:12

## 2017-12-20 RX ADMIN — PHENAZOPYRIDINE HYDROCHLORIDE 200 MG: 100 TABLET ORAL at 11:12

## 2017-12-20 RX ADMIN — CIPROFLOXACIN 400 MG: 2 INJECTION, SOLUTION INTRAVENOUS at 10:12

## 2017-12-20 RX ADMIN — MIDAZOLAM HYDROCHLORIDE 2 MG: 1 INJECTION, SOLUTION INTRAMUSCULAR; INTRAVENOUS at 10:12

## 2017-12-20 NOTE — TRANSFER OF CARE
"Anesthesia Transfer of Care Note    Patient: Mary Ellen Fajardo    Procedure(s) Performed: Procedure(s) (LRB):  CYSTOLITHOLOPAXY (REMOVE BLADDER STONE) (N/A)  CYSTOSCOPY/ RETROGRADE PYELOGRAM/ WITH JJ URETERAL STENT PLACEMENT RIGHT (Right)    Patient location: PACU    Anesthesia Type: general    Transport from OR: Transported from OR on room air with adequate spontaneous ventilation    Post pain: adequate analgesia    Post assessment: no apparent anesthetic complications and tolerated procedure well    Post vital signs: stable    Level of consciousness: awake, alert and oriented    Nausea/Vomiting: no nausea/vomiting    Complications: none    Transfer of care protocol was followed      Last vitals:   Visit Vitals  /68   Pulse 76   Temp 36.4 °C (97.5 °F) (Oral)   Resp 16   Ht 5' 5" (1.651 m)   Wt 108.9 kg (240 lb)   SpO2 95%   Breastfeeding? No   BMI 39.94 kg/m²     "

## 2017-12-20 NOTE — OP NOTE
DATE OF PROCEDURE:  12/20/2017.    PREOPERATIVE DIAGNOSES:  Bladder stone and right staghorn calculus.    POSTOPERATIVE DIAGNOSES:  Bladder stone and right staghorn calculus.    PROCEDURES PERFORMED:  Cystoscopy with right ureteral stent placement,   cystolitholapaxy greater than 2 cm, suprapubic tube exchange.    PRIMARY SURGEON:  Jacques Tinoco M.D.    ANESTHESIA:  General.    ESTIMATED BLOOD LOSS:  Minimal.    DRAINS:  An #18-Fijian suprapubic tube catheter and 6-Fijian 24 cm double-J   stent, no string.    SPECIMENS REMOVED:  Bladder stone.    COMPLICATIONS:  None.    INDICATIONS:  Mary Ellen Fajardo is a 62-year-old woman with a history of neurogenic   bladder and recurrent infections.  She has a large bladder stone as well as a   right-sided staghorn calculus.  She is here today for clearance of the bladder   stone and stent placement and preparation for future PCNL.    Mary Ellen Fajardo was taken to the operating room where she was positively   identified by sosa.  She was placed supine on the operating room table.    Following induction of adequate general anesthesia, she was placed in the dorsal   lithotomy position and her external genitalia and lower abdomen were prepped   and draped in the usual sterile fashion.    A Preoperative timeout was performed as well as confirmation of preoperative   antibiotics and preoperative marking on the right side.    I then passed a 21-Fijian rigid cystoscope per urethra into the bladder under   direct vision.  Her suprapubic tube catheter was clamped off.    The bladder was inspected.  A largest stone approximately 3-4 cm in size was   noted.    Both ureteral orifices were identified.  They were seen to be in their   orthotopic position.    I then passed a 0.035 inch Bentson wire through the scope, intubated the right   ureteral orifice.  The wire was passed up the ureter up to the level of the   right kidney where it was seen to go alongside the staghorn  calculus.    I then passed a 6-Lao 24 cm double-J stent over the wire, which passed up   easily up to the level of the renal collecting system and deploying a coil   within the right kidney and a coil within the bladder confirmed on fluoroscopy   and visually respectively.    I then passed a 550 micron holmium laser fiber into the scope.  I then began to   fragment the stone into smaller pieces.    Once the stone was completely fragmented, the stone pieces were evacuated using   an [employer] evacuator.    The bladder was inspected.  There were no other stone fragments seen within the   bladder.    The scope was then withdrawn.    Her suprapubic tube catheter balloon was then deflated and the suprapubic tube   was removed.  A new #18-Lao suprapubic tube catheter was then passed through   her SP tube site into the bladder.  The balloon was inflated and the catheter   was left to gravity drainage.    The scope was introduced one last time to confirm that there was no other stone   fragment seen.  The stent was seen to be in proper place and the suprapubic tube   was seen to be in the proper place.    The scope was then withdrawn.    Her anesthesia was reversed.  She was taken to Recovery Room in stable   condition.      ARIADNE  dd: 12/20/2017 11:30:54 (CST)  td: 12/20/2017 12:14:15 (CST)  Doc ID   #[id number]  Job ID #[id number]    CC:

## 2017-12-20 NOTE — BRIEF OP NOTE
[facility]  Brief Operative Note     SUMMARY     Surgery Date: 12/20/2017     Surgeon(s) and Role:     * RAMA Tinoco MD - Primary    Assisting Surgeon: None    Pre-op Diagnosis:  Bladder stone [N21.0]  Staghorn calculus [N20.0]    Post-op Diagnosis:  Post-Op Diagnosis Codes:     * Bladder stone [N21.0]     * Staghorn calculus [N20.0]    Procedure(s) (LRB):  CYSTOLITHOLOPAXY (REMOVE BLADDER STONE) (N/A)  CYSTOSCOPY/ RETROGRADE PYELOGRAM/ WITH JJ URETERAL STENT PLACEMENT RIGHT (Right)    Anesthesia: General    Description of the findings of the procedure: Right Stent placed.  Large Bladder Stone Fragmented.  New SP tube placed    Findings/Key Components: as above    Estimated Blood Loss: * No values recorded between 12/20/2017 10:38 AM and 12/20/2017 11:26 AM *         Specimens:   Specimen (12h ago through future)    Start     Ordered    12/20/17 1125  Specimen to Pathology - Surgery  Once     Comments:  Bladder stones      12/20/17 1125          Discharge Note    SUMMARY     Admit Date: 12/20/2017    Discharge Date and Time:  12/20/2017 11:26 AM    Hospital Course (synopsis of major diagnoses, care, treatment, and services provided during the course of the hospital stay): Patient was admitted for an outpatient procedure and tolerated the procedure well with no complications.       Final Diagnosis: Post-Op Diagnosis Codes:     * Bladder stone [N21.0]     * Staghorn calculus [N20.0]    Disposition: Home or Self Care    Follow Up/Patient Instructions:     Medications:  Reconciled Home Medications:   Current Discharge Medication List      CONTINUE these medications which have CHANGED    Details   oxyCODONE-acetaminophen (PERCOCET)  mg per tablet Take 1 tablet by mouth every 8 (eight) hours as needed for Pain.  Qty: 30 tablet, Refills: 0    Associated Diagnoses: Bladder stone; Staghorn calculus         CONTINUE these medications which have NOT CHANGED    Details   albuterol-ipratropium  2.5mg-0.5mg/3mL (DUO-NEB) 0.5 mg-3 mg(2.5 mg base)/3 mL nebulizer solution Refills: 0      amlodipine (NORVASC) 10 MG tablet Take 10 mg by mouth once daily.   Refills: 1      baclofen (LIORESAL) 20 MG tablet 10 mg 4 (four) times daily.   Refills: 5      bumetanide (BUMEX) 1 MG tablet 1 mg once daily.       catheter 18 Fr Misc Change every 20 days  Qty: 10 each, Refills: 1    Associated Diagnoses: Neurogenic bladder      ciprofloxacin HCl (CIPRO) 500 MG tablet Take 1 tablet (500 mg total) by mouth 2 (two) times daily.  Qty: 28 tablet, Refills: 0    Associated Diagnoses: Dysuria      gabapentin (NEURONTIN) 300 MG capsule 600 mg 3 (three) times daily.       oxybutynin (DITROPAN-XL) 5 MG TR24 TAKE 1 TABLET BY MOUTH EVERY DAY  Qty: 90 tablet, Refills: 0    Associated Diagnoses: Neurogenic bladder      potassium chloride (KLOR-CON) 10 MEQ TbSR       sertraline (ZOLOFT) 50 MG tablet TK 1 T PO QD  Refills: 5      trazodone (DESYREL) 100 MG tablet 100 mg.   Refills: 5      VENTOLIN HFA 90 mcg/actuation inhaler       ranitidine (ZANTAC) 150 MG tablet Refills: 0             Discharge Procedure Orders  Diet general     Activity as tolerated     Call MD for:   Order Comments: Significant Hematuria       Follow-up Information     W Carlos Tinoco MD. Schedule an appointment as soon as possible for a visit in 1 week.    Specialty:  Urology  Why:  Post op Check  Contact information:  [address] [place] [id number] [phone]

## 2017-12-20 NOTE — DISCHARGE INSTRUCTIONS
ACTIVITY LEVEL: If you have received sedation or an anesthetic, you may feel sleepy for several hours. Rest until you are more awake. Gradually resume your normal activities.       DIET: You may resume your home diet. If nausea is present, increase your diet gradually with fluids and bland foods.      Medications: Pain medication should be taken only if needed and as directed. If antibiotics are prescribed, the medication should be taken until completed. You will be given an updated list of you medications.  ? No driving, alcoholic beverages or signing legal documents for next 24 hours or while taking pain medication        CALL THE DOCTOR:       · Fever over 101°F  · Severe pain that doesnt go away with medication.  · Upset stomach and vomiting that is persistent.  · Problems urinating-unable to urinate or heavy bleeding (with or without clots)      Discharge Instructions: Caring for Your Suprapubic Catheter  You are going home with a suprapubic catheter in place. This tube is placed directly into the bladder through your abdomen to drain urine from your bladder. You were shown how to care for your catheter in the hospital. This sheet will help remind you of those steps and guidelines when you are at home.  Home care  · Shower as necessary.   · Change your dressing every day. Change the dressing more often if it falls off, becomes dirty, or has absorbed a lot of drainage.    Remove the dressing and check for problems  · Wash your hands thoroughly before and after all catheter care.  · Gently remove the old dressing if you have one.  ¨ Dont pull on the tube.  ¨ Check the dressing for drainage. Notice whether anything looks unusual or smells bad.  ¨ Place your dressing in the plastic bag and throw it away in the wastebasket.  · Now look at the place where the catheter leaves your body (exit site).  ¨ Note any swelling, bleeding, irritation, unusual or smelly drainage.  ¨ Also check for any sores next to the exit  "site. Sores form around the exit site if there is too much pressure from the tube on the skin.  Clean the area  · Wash around the shield gently with soap and water.  · Use a povidone-iodine swab stick to clean under the shield.  ¨ Clean around the exit site of the catheter.  ¨ Start at the exit site and clean outward in a circular motion, about 3 to 4 inches from the site.Dont clean back toward the tube.  ¨ Throw away the used swab stick and repeat the cleaning procedure with a new one.  ¨ Let your skin dry completely.  · Place a split 4" x 4" sponge around the catheter. Tape it in place.  · Smear a thin layer of povidone-iodine ointment around the catheter with a cotton swab.  Follow-up care  Make a follow-up appointment or as advised.  When to call your healthcare provider  Call your health care provider right away if you have any of the following:  · Catheter that falls out, or is clogged or feels clogged  · Stitches that fall out  · Urine leaking around catheter  · Urine that is cloudy, bloody, or smells bad  · No urine drainage  · Bladder that feels full or painful  · Rash, itching, redness, swelling, or drainage at the catheter site  · Fever above 100.4°F (38.°C) or shaking chills          Fall Prevention  Millions of people fall every year and injure themselves. You may have had anesthesia or sedation which may increase your risk of falling. You may have health issues that put you at an increased risk of falling.     Here are ways to reduce your risk of falling.  ·   · Make your home safe by keeping walkways clear of objects you may trip over.  · Use non-slip pads under rugs. Do not use area rugs or small throw rugs.  · Use non-slip mats in bathtubs and showers.  · Install handrails and lights on staircases.  · Do not walk in poorly lit areas.  · Do not stand on chairs or wobbly ladders.  · Use caution when reaching overhead or looking upward. This position can cause a loss of balance.  · Be sure your shoes " fit properly, have non-slip bottoms and are in good condition.   · Wear shoes both inside and out. Avoid going barefoot or wearing slippers.  · Be cautious when going up and down stairs, curbs, and when walking on uneven sidewalks.  · If your balance is poor, consider using a cane or walker.  · If your fall was related to alcohol use, stop or limit alcohol intake.   · If your fall was related to use of sleeping medicines, talk to your doctor about this. You may need to reduce your dosage at bedtime if you awaken during the night to go to the bathroom.    · To reduce the need for nighttime bathroom trips:  ¨ Avoid drinking fluids for several hours before going to bed  ¨ Empty your bladder before going to bed  ¨ Men can keep a urinal at the bedside  · Stay as active as you can. Balance, flexibility, strength, and endurance all come from exercise. They all play a role in preventing falls. Ask your healthcare provider which types of activity are right for you.  · Get your vision checked on a regular basis.  · If you have pets, know where they are before you stand up or walk so you don't trip over them.  Use night lights.

## 2017-12-20 NOTE — PLAN OF CARE
Problem: Patient Care Overview  Goal: Plan of Care Review  Outcome: Ongoing (interventions implemented as appropriate)   12/20/17 1259   Coping/Psychosocial   Plan Of Care Reviewed With patient     Teresita score 10/10. AAOX4. VSS per flow sheet. Denies pain. No n/v noted. Suprapubic catheter intact with leg bag. See chart for full assessment. Hand off report to TRUDY Cerda RN SDS.    Problem: Surgery Nonspecified (Adult)  Goal: Signs and Symptoms of Listed Potential Problems Will be Absent, Minimized or Managed (Surgery Nonspecified)  Signs and symptoms of listed potential problems will be absent, minimized or managed by discharge/transition of care (reference Surgery Nonspecified (Adult) CPG).   Outcome: Outcome(s) achieved Date Met: 12/20/17 12/20/17 1259   Surgery Nonspecified   Problems Assessed (Surgery) bleeding/anemia;pain;postoperative nausea and vomiting;respiratory compromise;situational response   Problems Present (Surgery) none     Goal: Anesthesia/Sedation Recovery  Outcome: Outcome(s) achieved Date Met: 12/20/17 12/20/17 1259   Goal/Outcome Evaluation   Anesthesia/Sedation Recovery recovered to baseline;criteria met for transfer

## 2017-12-21 NOTE — ANESTHESIA POSTPROCEDURE EVALUATION
"Anesthesia Post Evaluation    Patient: Mary Ellen Fajardo    Procedure(s) Performed: Procedure(s) (LRB):  CYSTOLITHOLOPAXY (REMOVE BLADDER STONE) (N/A)  CYSTOSCOPY/ RETROGRADE PYELOGRAM/ WITH JJ URETERAL STENT PLACEMENT RIGHT (Right)    Final Anesthesia Type: general  Patient location during evaluation: PACU  Patient participation: Yes- Able to Participate  Level of consciousness: awake and alert and oriented  Post-procedure vital signs: reviewed and stable  Pain management: adequate  Airway patency: patent  PONV status at discharge: No PONV  Anesthetic complications: no      Cardiovascular status: blood pressure returned to baseline and hemodynamically stable  Respiratory status: unassisted and spontaneous ventilation  Hydration status: euvolemic  Follow-up not needed.        Visit Vitals  /74   Pulse (!) 58   Temp 36.4 °C (97.5 °F) (Oral)   Resp 16   Ht 5' 5" (1.651 m)   Wt 108.9 kg (240 lb)   SpO2 95%   Breastfeeding? No   BMI 39.94 kg/m²       Pain/Teresita Score: Pain Assessment Performed: Yes (12/20/2017  1:04 PM)  Presence of Pain: denies (12/20/2017  2:00 PM)  Teresita Score: 9 (12/20/2017 12:59 PM)  Modified Teresita Score: 19 (12/20/2017  2:00 PM)      "

## 2017-12-21 NOTE — ANESTHESIA PREPROCEDURE EVALUATION
12/21/2017  Mary Ellen Fajardo is a 62 y.o., female.    Anesthesia Evaluation     I have reviewed the Nursing Notes.      Review of Systems  Anesthesia Hx:  No problems with previous Anesthesia   Social:  Former Smoker    Cardiovascular:   Exercise tolerance: poor Denies Pacemaker. Hypertension  Denies Valvular problems/Murmurs.  Denies MI.  Denies CAD.    Denies CABG/stent.  Denies Dysrhythmias.   Denies Angina.        Pulmonary:   Denies Pneumonia Denies COPD. Asthma  Denies Shortness of breath.  Denies Recent URI.    Renal/:   renal calculi Neurogenic bladder (suprapubic catheter)   Hepatic/GI:   Denies PUD. GERD Denies Hepatitis.    Musculoskeletal:   Arthritis     Neurological:   Denies CVA. Denies Seizures. 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 She is able to move both legs but not enough to walk   Endocrine:  Endocrine Normal        Physical Exam  General:  Morbid Obesity    Airway/Jaw/Neck:  AIRWAY FINDINGS: Normal           Mental Status:  Mental Status Findings: Normal        Anesthesia Plan  Type of Anesthesia, risks & benefits discussed:  Anesthesia Type:  general  Patient's Preference:   Intra-op Monitoring Plan: standard ASA monitors  Intra-op Monitoring Plan Comments:   Post Op Pain Control Plan:   Post Op Pain Control Plan Comments:   Induction:   IV  Beta Blocker:  Patient is not currently on a Beta-Blocker (No further documentation required).       Informed Consent: Patient understands risks and agrees with Anesthesia plan.  Questions answered. Anesthesia consent signed with patient.  ASA Score: 3     Day of Surgery Review of History & Physical:    H&P update referred to the surgeon.         Ready For Surgery From Anesthesia Perspective.

## 2017-12-26 LAB
ANNOTATION COMMENT IMP: NORMAL
COMPN STONE: NORMAL
SPECIMEN SOURCE: NORMAL
STONE ANALYSIS IR-IMP: NORMAL

## 2018-01-04 ENCOUNTER — OFFICE VISIT (OUTPATIENT)
Dept: UROLOGY | Facility: CLINIC | Age: 63
End: 2018-01-04
Payer: MEDICARE

## 2018-01-04 VITALS — BODY MASS INDEX: 39.99 KG/M2 | HEIGHT: 65 IN | WEIGHT: 240 LBS

## 2018-01-04 DIAGNOSIS — N21.0 BLADDER STONE: ICD-10-CM

## 2018-01-04 DIAGNOSIS — N20.0 KIDNEY STONES: Primary | ICD-10-CM

## 2018-01-04 DIAGNOSIS — N31.9 NEUROGENIC BLADDER: ICD-10-CM

## 2018-01-04 PROCEDURE — 99213 OFFICE O/P EST LOW 20 MIN: CPT | Mod: PBBFAC | Performed by: UROLOGY

## 2018-01-04 PROCEDURE — 87077 CULTURE AEROBIC IDENTIFY: CPT

## 2018-01-04 PROCEDURE — 87088 URINE BACTERIA CULTURE: CPT

## 2018-01-04 PROCEDURE — 87186 SC STD MICRODIL/AGAR DIL: CPT

## 2018-01-04 PROCEDURE — 99024 POSTOP FOLLOW-UP VISIT: CPT | Mod: ,,, | Performed by: UROLOGY

## 2018-01-04 PROCEDURE — 99999 PR PBB SHADOW E&M-EST. PATIENT-LVL III: CPT | Mod: PBBFAC,,, | Performed by: UROLOGY

## 2018-01-04 PROCEDURE — 87086 URINE CULTURE/COLONY COUNT: CPT

## 2018-01-04 RX ORDER — CIPROFLOXACIN 2 MG/ML
400 INJECTION, SOLUTION INTRAVENOUS
Status: CANCELLED | OUTPATIENT
Start: 2018-01-04

## 2018-01-04 NOTE — PROGRESS NOTES
Subjective:       Patient ID: Mary Ellen Fajardo is a 62 y.o. female who was referred by No ref. provider found    Chief Complaint:   Chief Complaint   Patient presents with    Post-op Evaluation     post op from procedure        History of Present Illness  Neurogenic Bladder  Her bladder is managed with a suprapubic catheter. She has had one since March 2011. Her  changes her 18 Fr catheter every 20 days. She has been having problems with sedimentation and urgency incontinence. She has also been having pain with catheter removal and insertion.  She has noted a dark color and more sediment at times.  She had a routine ALBERTINA suggesting a right kidney stone.  Follow up CT showed a right staghorn calculus and large bladder stone.    Stones  She had a cystolitholapaxy on 12/20/2017 with right stent placement.  She is doing well and denies flank pain, fever, or hematuria.  She is here to set up her PCNL.      ACTIVE MEDICAL ISSUES:  Patient Active Problem List   Diagnosis    Paraparesis    Gait disorder    Neurogenic bladder    Knee pain, bilateral    S/P knee replacement    OA (osteoarthritis) of knee    Primary osteoarthritis of left knee    Poor motor control of trunk    Decreased range of motion of lower extremity    Bilateral leg weakness    Chronic knee pain    Bladder stone    Staghorn calculus       PAST MEDICAL HISTORY  Past Medical History:   Diagnosis Date    Asthma     Bladder stones     Hypertension     Paraplegia     Paraplegic spinal paralysis     SCI (spinal cord injury)     incomplete       PAST SURGICAL HISTORY:  Past Surgical History:   Procedure Laterality Date    BACK SURGERY      bilateral knee replacement      BREAST BIOPSY      cysto/lithopaxy 2017      JOINT REPLACEMENT      bilateral knee       SOCIAL HISTORY:  Social History   Substance Use Topics    Smoking status: Former Smoker     Quit date: 2002    Smokeless tobacco: Never Used    Alcohol use No        FAMILY HISTORY:  History reviewed. No pertinent family history.    ALLERGIES AND MEDICATIONS: updated and reviewed.  Review of patient's allergies indicates:   Allergen Reactions    Rocephin [ceftriaxone] Rash     Current Outpatient Prescriptions   Medication Sig    albuterol-ipratropium 2.5mg-0.5mg/3mL (DUO-NEB) 0.5 mg-3 mg(2.5 mg base)/3 mL nebulizer solution     amlodipine (NORVASC) 10 MG tablet Take 10 mg by mouth once daily.     baclofen (LIORESAL) 20 MG tablet 10 mg 4 (four) times daily.     bumetanide (BUMEX) 1 MG tablet 1 mg once daily.     catheter 18 Fr Misc Change every 20 days    gabapentin (NEURONTIN) 300 MG capsule 600 mg 3 (three) times daily.     oxybutynin (DITROPAN-XL) 5 MG TR24 TAKE 1 TABLET BY MOUTH EVERY DAY    oxyCODONE-acetaminophen (PERCOCET)  mg per tablet Take 1 tablet by mouth every 8 (eight) hours as needed for Pain.    potassium chloride (KLOR-CON) 10 MEQ TbSR     ranitidine (ZANTAC) 150 MG tablet     sertraline (ZOLOFT) 50 MG tablet TK 1 T PO QD    trazodone (DESYREL) 100 MG tablet 100 mg.     VENTOLIN HFA 90 mcg/actuation inhaler      No current facility-administered medications for this visit.        Review of Systems   Constitutional: Negative for activity change, fatigue, fever and unexpected weight change.   Eyes: Negative for redness and visual disturbance.   Respiratory: Negative for chest tightness and shortness of breath.    Cardiovascular: Negative for chest pain and leg swelling.   Gastrointestinal: Negative for abdominal distention, abdominal pain, constipation, diarrhea, nausea and vomiting.   Genitourinary: Negative for difficulty urinating, dysuria, flank pain, frequency, hematuria, pelvic pain, urgency and vaginal bleeding.   Musculoskeletal: Negative for arthralgias and joint swelling.   Neurological: Negative for dizziness, weakness and headaches.   Psychiatric/Behavioral: Negative for confusion. The patient is not nervous/anxious.    All  "other systems reviewed and are negative.      Objective:      Vitals:    01/04/18 1114   Weight: 108.9 kg (240 lb)   Height: 5' 5" (1.651 m)     Physical Exam   Nursing note and vitals reviewed.  Constitutional: She is oriented to person, place, and time. She appears well-developed.   HENT:   Head: Normocephalic.   Eyes: Conjunctivae are normal.   Neck: Normal range of motion. No tracheal deviation present. No thyromegaly present.   Cardiovascular: Normal rate, normal heart sounds and normal pulses.    Pulmonary/Chest: Effort normal and breath sounds normal. No respiratory distress. She has no wheezes.   Abdominal: Soft. She exhibits no distension and no mass. There is no hepatosplenomegaly. There is no tenderness. There is no rebound, no guarding and no CVA tenderness. No hernia.   Musculoskeletal: Normal range of motion. She exhibits no edema or tenderness.   Lymphadenopathy:     She has no cervical adenopathy.   Neurological: She is alert and oriented to person, place, and time.   Skin: Skin is warm and dry. No rash noted. No erythema.     Psychiatric: She has a normal mood and affect. Her behavior is normal. Judgment and thought content normal.           CT reviewed  Right Staghorn calculus    Stone analysis: Struvite with Calcium Phos/Ox    Assessment:       1. Kidney stones    2. Bladder stone    3. Neurogenic bladder          Plan:       1. Kidney stones  Plan for Right PCNL on Friday 1/19/2018    - Urine culture    2. Bladder stone  S/p cystolitholapaxy    3. Neurogenic bladder  Stable  SP tube  - Urine culture            Return in about 4 weeks (around 2/1/2018) for Follow up.  "

## 2018-01-06 DIAGNOSIS — N20.0 KIDNEY STONE: Primary | ICD-10-CM

## 2018-01-06 LAB — BACTERIA UR CULT: NORMAL

## 2018-01-06 RX ORDER — AMOXICILLIN AND CLAVULANATE POTASSIUM 500; 125 MG/1; MG/1
1 TABLET, FILM COATED ORAL 2 TIMES DAILY
Qty: 28 TABLET | Refills: 0 | Status: ON HOLD | OUTPATIENT
Start: 2018-01-06 | End: 2018-01-20 | Stop reason: HOSPADM

## 2018-01-08 ENCOUNTER — TELEPHONE (OUTPATIENT)
Dept: UROLOGY | Facility: CLINIC | Age: 63
End: 2018-01-08

## 2018-01-15 ENCOUNTER — HOSPITAL ENCOUNTER (EMERGENCY)
Facility: HOSPITAL | Age: 63
Discharge: HOME | End: 2018-01-16
Attending: EMERGENCY MEDICINE
Payer: MEDICARE

## 2018-01-15 ENCOUNTER — TELEPHONE (OUTPATIENT)
Dept: UROLOGY | Facility: CLINIC | Age: 63
End: 2018-01-15

## 2018-01-15 DIAGNOSIS — R34 DECREASED URINE OUTPUT: Primary | ICD-10-CM

## 2018-01-15 DIAGNOSIS — M25.571 ANKLE PAIN, RIGHT: ICD-10-CM

## 2018-01-15 DIAGNOSIS — S90.01XA CONTUSION OF RIGHT ANKLE, INITIAL ENCOUNTER: ICD-10-CM

## 2018-01-15 LAB
ALBUMIN SERPL BCP-MCNC: 3.7 G/DL
ALP SERPL-CCNC: 100 U/L
ALT SERPL W/O P-5'-P-CCNC: 8 U/L
ANION GAP SERPL CALC-SCNC: 12 MMOL/L
AST SERPL-CCNC: 18 U/L
BACTERIA #/AREA URNS HPF: ABNORMAL /HPF
BASOPHILS # BLD AUTO: 0.03 K/UL
BASOPHILS NFR BLD: 0.5 %
BILIRUB SERPL-MCNC: 0.4 MG/DL
BILIRUB UR QL STRIP: NEGATIVE
BUN SERPL-MCNC: 12 MG/DL
CALCIUM SERPL-MCNC: 9.7 MG/DL
CAOX CRY URNS QL MICRO: ABNORMAL
CHLORIDE SERPL-SCNC: 98 MMOL/L
CLARITY UR: ABNORMAL
CO2 SERPL-SCNC: 30 MMOL/L
COLOR UR: YELLOW
CREAT SERPL-MCNC: 0.8 MG/DL
DIFFERENTIAL METHOD: ABNORMAL
EOSINOPHIL # BLD AUTO: 0.4 K/UL
EOSINOPHIL NFR BLD: 5.4 %
ERYTHROCYTE [DISTWIDTH] IN BLOOD BY AUTOMATED COUNT: 15.5 %
EST. GFR  (AFRICAN AMERICAN): >60 ML/MIN/1.73 M^2
EST. GFR  (NON AFRICAN AMERICAN): >60 ML/MIN/1.73 M^2
GLUCOSE SERPL-MCNC: 85 MG/DL
GLUCOSE UR QL STRIP: NEGATIVE
HCT VFR BLD AUTO: 37.3 %
HGB BLD-MCNC: 12.2 G/DL
HGB UR QL STRIP: ABNORMAL
HYALINE CASTS #/AREA URNS LPF: ABNORMAL /LPF
KETONES UR QL STRIP: NEGATIVE
LEUKOCYTE ESTERASE UR QL STRIP: ABNORMAL
LIPASE SERPL-CCNC: 8 U/L
LYMPHOCYTES # BLD AUTO: 2.6 K/UL
LYMPHOCYTES NFR BLD: 38.7 %
MCH RBC QN AUTO: 25.9 PG
MCHC RBC AUTO-ENTMCNC: 32.7 G/DL
MCV RBC AUTO: 79 FL
MICROSCOPIC COMMENT: ABNORMAL
MONOCYTES # BLD AUTO: 0.4 K/UL
MONOCYTES NFR BLD: 6 %
NEUTROPHILS # BLD AUTO: 3.3 K/UL
NEUTROPHILS NFR BLD: 49.4 %
NITRITE UR QL STRIP: NEGATIVE
PH UR STRIP: 5 [PH] (ref 5–8)
PLATELET # BLD AUTO: 213 K/UL
PMV BLD AUTO: 9.4 FL
POTASSIUM SERPL-SCNC: 3.9 MMOL/L
PROT SERPL-MCNC: 9.2 G/DL
PROT UR QL STRIP: ABNORMAL
RBC # BLD AUTO: 4.71 M/UL
RBC #/AREA URNS HPF: >100 /HPF (ref 0–4)
SODIUM SERPL-SCNC: 140 MMOL/L
SP GR UR STRIP: 1.02 (ref 1–1.03)
SQUAMOUS #/AREA URNS HPF: 6 /HPF
URN SPEC COLLECT METH UR: ABNORMAL
UROBILINOGEN UR STRIP-ACNC: NEGATIVE EU/DL
WBC # BLD AUTO: 6.66 K/UL
WBC #/AREA URNS HPF: >100 /HPF (ref 0–5)

## 2018-01-15 PROCEDURE — 96375 TX/PRO/DX INJ NEW DRUG ADDON: CPT

## 2018-01-15 PROCEDURE — 80053 COMPREHEN METABOLIC PANEL: CPT

## 2018-01-15 PROCEDURE — 83690 ASSAY OF LIPASE: CPT

## 2018-01-15 PROCEDURE — 99285 EMERGENCY DEPT VISIT HI MDM: CPT | Mod: 25

## 2018-01-15 PROCEDURE — 25000003 PHARM REV CODE 250: Performed by: EMERGENCY MEDICINE

## 2018-01-15 PROCEDURE — 63600175 PHARM REV CODE 636 W HCPCS: Performed by: EMERGENCY MEDICINE

## 2018-01-15 PROCEDURE — 93005 ELECTROCARDIOGRAM TRACING: CPT

## 2018-01-15 PROCEDURE — 96374 THER/PROPH/DIAG INJ IV PUSH: CPT

## 2018-01-15 PROCEDURE — 85025 COMPLETE CBC W/AUTO DIFF WBC: CPT

## 2018-01-15 PROCEDURE — 93010 ELECTROCARDIOGRAM REPORT: CPT | Mod: ,,, | Performed by: INTERNAL MEDICINE

## 2018-01-15 PROCEDURE — 81000 URINALYSIS NONAUTO W/SCOPE: CPT

## 2018-01-15 RX ORDER — MORPHINE SULFATE 10 MG/ML
5 INJECTION INTRAMUSCULAR; INTRAVENOUS; SUBCUTANEOUS
Status: COMPLETED | OUTPATIENT
Start: 2018-01-15 | End: 2018-01-15

## 2018-01-15 RX ORDER — ONDANSETRON 2 MG/ML
4 INJECTION INTRAMUSCULAR; INTRAVENOUS
Status: COMPLETED | OUTPATIENT
Start: 2018-01-15 | End: 2018-01-15

## 2018-01-15 RX ORDER — OXYCODONE AND ACETAMINOPHEN 10; 325 MG/1; MG/1
1 TABLET ORAL
Status: COMPLETED | OUTPATIENT
Start: 2018-01-15 | End: 2018-01-15

## 2018-01-15 RX ORDER — LEDIPASVIR AND SOFOSBUVIR 90; 400 MG/1; MG/1
TABLET, FILM COATED ORAL
COMMUNITY
End: 2018-01-15

## 2018-01-15 RX ORDER — ONDANSETRON 4 MG/1
8 TABLET, ORALLY DISINTEGRATING ORAL
COMMUNITY
End: 2018-01-18

## 2018-01-15 RX ADMIN — ONDANSETRON 4 MG: 2 INJECTION INTRAMUSCULAR; INTRAVENOUS at 08:01

## 2018-01-15 RX ADMIN — MORPHINE SULFATE 5 MG: 10 INJECTION INTRAVENOUS at 08:01

## 2018-01-15 RX ADMIN — SODIUM CHLORIDE 500 ML: 900 INJECTION, SOLUTION INTRAVENOUS at 08:01

## 2018-01-15 RX ADMIN — OXYCODONE HYDROCHLORIDE AND ACETAMINOPHEN 1 TABLET: 10; 325 TABLET ORAL at 10:01

## 2018-01-15 NOTE — TELEPHONE ENCOUNTER
Patient reports: Confusion/dizziness reported- decreased urine output w/appropriate urine intake - no fever- retaining fluid in bilateral legs. Pt has preop at 11AM- Advised to keep that apt as transportation has already be scheduled for her. Can be evaluated be ED if appropriate. Please advised on this. Thank you.

## 2018-01-15 NOTE — TELEPHONE ENCOUNTER
Patient advised that she should be evaluated in the ED for the s/s reported. Follow up in clinic as advised.

## 2018-01-15 NOTE — TELEPHONE ENCOUNTER
----- Message from Ce Saeed sent at 1/15/2018  8:31 AM CST -----  Contact: self  Patient called stating that after taking amoxicillin-clavulanate 500-125mg (AUGMENTIN) 500-125 mg Tab with potassium she is feeling weakness, confusion and decreased in urination with dark urine. Please contact her at [phone].    Thanks!

## 2018-01-16 VITALS
BODY MASS INDEX: 39.99 KG/M2 | TEMPERATURE: 98 F | RESPIRATION RATE: 18 BRPM | WEIGHT: 240 LBS | SYSTOLIC BLOOD PRESSURE: 123 MMHG | HEART RATE: 61 BPM | OXYGEN SATURATION: 96 % | DIASTOLIC BLOOD PRESSURE: 72 MMHG | HEIGHT: 65 IN

## 2018-01-16 NOTE — DISCHARGE INSTRUCTIONS
Return to emergency department if he develops fever higher than 100.4°, vomiting, severe pain, or for any new or worsening medical concerns.

## 2018-01-16 NOTE — ED TRIAGE NOTES
Confusion started this am; was supposed to come to get pre op for surgery Friday and got all times confused; yesterday started with blurred vision but states it is better with her glasses on today; patient states she is scheduled to have stone removed and have supra pubic cath changed on Friday by Dr. Tinoco; EMS brought patient in per patient, and her right foot got caught when being transferred to w/c; c/o severe pain to right foot and this is more of a problem than the reason she is here;

## 2018-01-16 NOTE — ED PROVIDER NOTES
Encounter Date: 1/15/2018    SCRIBE #1 NOTE: I, Damion Collier, am scribing for, and in the presence of,  Sundeep Guevara III, MD. I have scribed the following portions of the note - Other sections scribed: HPI, ROS.       History     Chief Complaint   Patient presents with    Concentrated urine     Pt reports increased urine concentration, has pre-op appointment today for procedure with Dr. Tinoco.     CC: Concentrated Urine    HPI: This 62 y.o. Female with hepatitis C, HTN and paraplegia presents to the ED c/o concentrated urine, double vision, blurry vision, dizziness, unable to stay awake, sinus problems, confusion, nausea and bilateral foot swelling which began yesterday morning at [employer]. Pt has bilateral foot swelling and pain from foot incident. She complies with Augmentin and potassium medications. Pt denies fever, chills or emesis. She had a pre-op appointment today for procedure with Dr. Tinoco but did not go. Pt is allergic to rocephin and surgical hx includes bilateral knee replacement.    PCP: Dr. Any Tran      The history is provided by the patient. No  was used.     Review of patient's allergies indicates:   Allergen Reactions    Rocephin [ceftriaxone] Rash     Past Medical History:   Diagnosis Date    Asthma     Bladder stones     Hepatitis C     Hypertension     Paraplegia     Paraplegic spinal paralysis     SCI (spinal cord injury)     incomplete    Suprapubic catheter      Past Surgical History:   Procedure Laterality Date    BACK SURGERY      bilateral knee replacement      BREAST BIOPSY      cysto/lithopaxy 2017      JOINT REPLACEMENT      bilateral knee     No family history on file.  Social History   Substance Use Topics    Smoking status: Former Smoker     Quit date: 2002    Smokeless tobacco: Never Used    Alcohol use Yes      Comment: occasional     Review of Systems   Constitutional: Negative for chills and fever.        (+) difficulty  staying awake   HENT: Positive for sinus pressure. Negative for ear pain, rhinorrhea and sore throat.    Eyes: Negative for redness.        (+) double vision, blurry vision   Respiratory: Negative for shortness of breath.    Cardiovascular: Positive for leg swelling (bilateral foot). Negative for chest pain.   Gastrointestinal: Positive for nausea. Negative for abdominal pain, diarrhea and vomiting.   Genitourinary: Negative for dysuria and hematuria.        (+) concentrated urine   Musculoskeletal: Negative for back pain and neck pain.   Skin: Negative for rash.   Neurological: Positive for dizziness. Negative for weakness, numbness and headaches.   Hematological: Does not bruise/bleed easily.   Psychiatric/Behavioral: Positive for confusion. The patient is not nervous/anxious.        Physical Exam     Initial Vitals [01/15/18 1213]   BP Pulse Resp Temp SpO2   (!) 166/88 88 18 97.9 °F (36.6 °C) 97 %      MAP       114         Physical Exam    Nursing note and vitals reviewed.  Constitutional: She appears well-developed and well-nourished. She is not diaphoretic. No distress.   HENT:   Head: Normocephalic and atraumatic.   Nose: Nose normal.   Mouth/Throat: Oropharynx is clear and moist. No oropharyngeal exudate.   Eyes: Conjunctivae and EOM are normal. Pupils are equal, round, and reactive to light. No scleral icterus.   Neck: Normal range of motion. Neck supple. No thyromegaly present. No tracheal deviation present.   Cardiovascular: Normal rate, regular rhythm and normal heart sounds. Exam reveals no gallop and no friction rub.    No murmur heard.  Pulmonary/Chest: Breath sounds normal. No respiratory distress. She has no wheezes. She has no rhonchi. She has no rales.   Abdominal: Soft. Bowel sounds are normal. She exhibits no distension and no mass. There is no tenderness. There is no rebound and no guarding.   Musculoskeletal: Normal range of motion. She exhibits edema (BLE). She exhibits no tenderness.  "  Lymphadenopathy:     She has no cervical adenopathy.   Neurological: She is alert and oriented to person, place, and time. No cranial nerve deficit.   Skin: Skin is warm and dry. No rash noted. No erythema. No pallor.   Psychiatric: She has a normal mood and affect. Her behavior is normal. Thought content normal.         ED Course   Procedures  Labs Reviewed   URINALYSIS - Abnormal; Notable for the following:        Result Value    Appearance, UA Cloudy (*)     Protein, UA 2+ (*)     Occult Blood UA 3+ (*)     Leukocytes, UA 3+ (*)     All other components within normal limits   URINALYSIS MICROSCOPIC - Abnormal; Notable for the following:     RBC, UA >100 (*)     WBC, UA >100 (*)     Bacteria, UA Few (*)     All other components within normal limits   CBC W/ AUTO DIFFERENTIAL   COMPREHENSIVE METABOLIC PANEL   LIPASE             Medical Decision Making:   Initial Assessment:   62-year-old female with a history of paraplegia and suprapubic catheter presents complaining of dark urine, difficulty concentrating, dizziness, nausea that began yesterday.  She does report that she is taking Augmentin for urinary tract infection and has also been "suffering with sinus" recently, but she denies any other significant associated symptoms or medication changes.  She has a secondary complaint of right ankle pain and swelling after her foot was temporarily caught on the wheelchair while being transferred in the emergency department.  Right ankle is somewhat swollen, though not more than the left, and with no erythema, deformity, or ecchymosis.  Differential Diagnosis:   Will screen for bony injury of the right ankle.  We'll also screen for renal insufficiency, electrolyte abnormality, dehydration, anemia.   Independently Interpreted Test(s):   I have ordered and independently interpreted X-rays - see summary below.       <> Summary of X-Ray Reading(s): Chest x-ray: No acute abnormality    Right ankle x-ray: No acute " abnormality  ED Management:  Patient's workup is unremarkable, although her urine does show chronic inflammatory changes, as expected.  She continues to be well-appearing, with stable vital signs.  Patient counseled regarding need for follow-up for repeat x-ray should her ankle pain not improve over the next week. Patient counseled regarding test results, recommendations for supportive care, and need for follow-up.  Return precautions given.              Scribe Attestation:   Scribe #1: I performed the above scribed service and the documentation accurately describes the services I performed. I attest to the accuracy of the note.    Attending Attestation:           Physician Attestation for Scribe:  Physician Attestation Statement for Scribe #1: I, Sundeep Guevara III, MD, reviewed documentation, as scribed by Damion Collier in my presence, and it is both accurate and complete.                 ED Course      Clinical Impression:   The primary encounter diagnosis was Decreased urine output. Diagnoses of Ankle pain, right and Contusion of right ankle, initial encounter were also pertinent to this visit.                           Sundeep Guevara III, MD  01/15/18 9824

## 2018-01-19 ENCOUNTER — ANESTHESIA EVENT (OUTPATIENT)
Dept: SURGERY | Facility: HOSPITAL | Age: 63
End: 2018-01-19
Payer: MEDICARE

## 2018-01-19 ENCOUNTER — ANESTHESIA (OUTPATIENT)
Dept: SURGERY | Facility: HOSPITAL | Age: 63
End: 2018-01-19
Payer: MEDICARE

## 2018-01-19 ENCOUNTER — HOSPITAL ENCOUNTER (OUTPATIENT)
Facility: HOSPITAL | Age: 63
Discharge: HOME | End: 2018-01-20
Attending: UROLOGY | Admitting: UROLOGY
Payer: MEDICARE

## 2018-01-19 DIAGNOSIS — N20.0 KIDNEY STONES: ICD-10-CM

## 2018-01-19 DIAGNOSIS — G82.20 PARAPARESIS: ICD-10-CM

## 2018-01-19 DIAGNOSIS — N20.0 STAGHORN CALCULUS: Primary | ICD-10-CM

## 2018-01-19 DIAGNOSIS — N21.0 BLADDER STONE: ICD-10-CM

## 2018-01-19 DIAGNOSIS — N31.9 NEUROGENIC BLADDER: ICD-10-CM

## 2018-01-19 PROBLEM — F33.41 RECURRENT MAJOR DEPRESSIVE DISORDER, IN PARTIAL REMISSION: Chronic | Status: ACTIVE | Noted: 2018-01-19

## 2018-01-19 PROBLEM — I10 ESSENTIAL HYPERTENSION: Chronic | Status: ACTIVE | Noted: 2018-01-19

## 2018-01-19 PROBLEM — G47.33 OBSTRUCTIVE SLEEP APNEA: Chronic | Status: ACTIVE | Noted: 2018-01-19

## 2018-01-19 PROBLEM — G62.9 NEUROPATHY: Chronic | Status: ACTIVE | Noted: 2018-01-19

## 2018-01-19 PROBLEM — F32.9 MAJOR DEPRESSIVE DISORDER WITH CURRENT ACTIVE EPISODE: Chronic | Status: ACTIVE | Noted: 2018-01-19

## 2018-01-19 PROBLEM — F51.01 PRIMARY INSOMNIA: Chronic | Status: ACTIVE | Noted: 2018-01-19

## 2018-01-19 LAB
ALLENS TEST: ABNORMAL
ALLENS TEST: ABNORMAL
ANION GAP SERPL CALC-SCNC: 13 MMOL/L
BUN SERPL-MCNC: 10 MG/DL
CALCIUM SERPL-MCNC: 8.6 MG/DL
CHLORIDE SERPL-SCNC: 105 MMOL/L
CO2 SERPL-SCNC: 21 MMOL/L
CREAT SERPL-MCNC: 0.8 MG/DL
DELSYS: ABNORMAL
DELSYS: ABNORMAL
EP: 5
EP: 5
EST. GFR  (AFRICAN AMERICAN): >60 ML/MIN/1.73 M^2
EST. GFR  (NON AFRICAN AMERICAN): >60 ML/MIN/1.73 M^2
FIO2: 70
GLUCOSE SERPL-MCNC: 82 MG/DL
HCO3 UR-SCNC: 26.8 MMOL/L (ref 24–28)
HCO3 UR-SCNC: 28.2 MMOL/L (ref 24–28)
HCT VFR BLD AUTO: 34.5 %
HGB BLD-MCNC: 10.6 G/DL
IP: 15
IP: 15
MODE: ABNORMAL
MODE: ABNORMAL
PCO2 BLDA: 57.6 MMHG (ref 35–45)
PCO2 BLDA: 61.4 MMHG (ref 35–45)
PH SMN: 7.25 [PH] (ref 7.35–7.45)
PH SMN: 7.3 [PH] (ref 7.35–7.45)
PO2 BLDA: 107 MMHG (ref 80–100)
PO2 BLDA: 119 MMHG (ref 80–100)
POC BE: -1 MMOL/L
POC BE: 1 MMOL/L
POC SATURATED O2: 97 % (ref 95–100)
POC SATURATED O2: 98 % (ref 95–100)
POC TCO2: 29 MMOL/L (ref 23–27)
POC TCO2: 30 MMOL/L (ref 23–27)
POTASSIUM SERPL-SCNC: 3.3 MMOL/L
SAMPLE: ABNORMAL
SAMPLE: ABNORMAL
SITE: ABNORMAL
SITE: ABNORMAL
SODIUM SERPL-SCNC: 139 MMOL/L

## 2018-01-19 PROCEDURE — 99900035 HC TECH TIME PER 15 MIN (STAT)

## 2018-01-19 PROCEDURE — 50433 PLMT NEPHROURETERAL CATHETER: CPT | Mod: 51,RT,, | Performed by: UROLOGY

## 2018-01-19 PROCEDURE — 63600175 PHARM REV CODE 636 W HCPCS: Performed by: ANESTHESIOLOGY

## 2018-01-19 PROCEDURE — 85018 HEMOGLOBIN: CPT

## 2018-01-19 PROCEDURE — 82803 BLOOD GASES ANY COMBINATION: CPT | Mod: 91

## 2018-01-19 PROCEDURE — C1773 RET DEV, INSERTABLE: HCPCS | Performed by: UROLOGY

## 2018-01-19 PROCEDURE — 88300 SURGICAL PATH GROSS: CPT | Mod: 59 | Performed by: PATHOLOGY

## 2018-01-19 PROCEDURE — C1726 CATH, BAL DIL, NON-VASCULAR: HCPCS | Performed by: UROLOGY

## 2018-01-19 PROCEDURE — 25000003 PHARM REV CODE 250: Performed by: UROLOGY

## 2018-01-19 PROCEDURE — 25000003 PHARM REV CODE 250: Performed by: REGISTERED NURSE

## 2018-01-19 PROCEDURE — 80048 BASIC METABOLIC PNL TOTAL CA: CPT

## 2018-01-19 PROCEDURE — 51705 CHANGE OF BLADDER TUBE: CPT | Mod: 51,,, | Performed by: UROLOGY

## 2018-01-19 PROCEDURE — 36600 WITHDRAWAL OF ARTERIAL BLOOD: CPT

## 2018-01-19 PROCEDURE — 37000009 HC ANESTHESIA EA ADD 15 MINS: Performed by: UROLOGY

## 2018-01-19 PROCEDURE — A4217 STERILE WATER/SALINE, 500 ML: HCPCS | Performed by: UROLOGY

## 2018-01-19 PROCEDURE — C2628 CATHETER, OCCLUSION: HCPCS | Performed by: UROLOGY

## 2018-01-19 PROCEDURE — 27201423 OPTIME MED/SURG SUP & DEVICES STERILE SUPPLY: Performed by: UROLOGY

## 2018-01-19 PROCEDURE — 36000709 HC OR TIME LEV III EA ADD 15 MIN: Performed by: UROLOGY

## 2018-01-19 PROCEDURE — 25000003 PHARM REV CODE 250: Performed by: HOSPITALIST

## 2018-01-19 PROCEDURE — 63600175 PHARM REV CODE 636 W HCPCS: Performed by: UROLOGY

## 2018-01-19 PROCEDURE — G0378 HOSPITAL OBSERVATION PER HR: HCPCS

## 2018-01-19 PROCEDURE — 71000033 HC RECOVERY, INTIAL HOUR: Performed by: UROLOGY

## 2018-01-19 PROCEDURE — 94660 CPAP INITIATION&MGMT: CPT

## 2018-01-19 PROCEDURE — C1769 GUIDE WIRE: HCPCS | Performed by: UROLOGY

## 2018-01-19 PROCEDURE — 94640 AIRWAY INHALATION TREATMENT: CPT

## 2018-01-19 PROCEDURE — 74420 UROGRAPHY RTRGR +-KUB: CPT | Mod: 26,,, | Performed by: UROLOGY

## 2018-01-19 PROCEDURE — D9220A PRA ANESTHESIA: Mod: CRNA,,, | Performed by: REGISTERED NURSE

## 2018-01-19 PROCEDURE — 88300 SURGICAL PATH GROSS: CPT | Mod: 26,,, | Performed by: PATHOLOGY

## 2018-01-19 PROCEDURE — 36000708 HC OR TIME LEV III 1ST 15 MIN: Performed by: UROLOGY

## 2018-01-19 PROCEDURE — 27100019 HC AMBU BAG ADULT/PED: Performed by: REGISTERED NURSE

## 2018-01-19 PROCEDURE — 63600175 PHARM REV CODE 636 W HCPCS: Performed by: REGISTERED NURSE

## 2018-01-19 PROCEDURE — 25000242 PHARM REV CODE 250 ALT 637 W/ HCPCS: Performed by: ANESTHESIOLOGY

## 2018-01-19 PROCEDURE — 94002 VENT MGMT INPAT INIT DAY: CPT

## 2018-01-19 PROCEDURE — 85014 HEMATOCRIT: CPT

## 2018-01-19 PROCEDURE — 25500020 PHARM REV CODE 255: Performed by: UROLOGY

## 2018-01-19 PROCEDURE — C1758 CATHETER, URETERAL: HCPCS | Performed by: UROLOGY

## 2018-01-19 PROCEDURE — C1894 INTRO/SHEATH, NON-LASER: HCPCS | Performed by: UROLOGY

## 2018-01-19 PROCEDURE — 71000039 HC RECOVERY, EACH ADD'L HOUR: Performed by: UROLOGY

## 2018-01-19 PROCEDURE — 50081 PERQ NL/PL LITHOTRP CPLX>2CM: CPT | Mod: RT,,, | Performed by: UROLOGY

## 2018-01-19 PROCEDURE — 37000008 HC ANESTHESIA 1ST 15 MINUTES: Performed by: UROLOGY

## 2018-01-19 PROCEDURE — 36415 COLL VENOUS BLD VENIPUNCTURE: CPT

## 2018-01-19 PROCEDURE — 27000221 HC OXYGEN, UP TO 24 HOURS

## 2018-01-19 PROCEDURE — C2627 CATH, SUPRAPUBIC/CYSTOSCOPIC: HCPCS | Performed by: UROLOGY

## 2018-01-19 PROCEDURE — 82365 CALCULUS SPECTROSCOPY: CPT

## 2018-01-19 PROCEDURE — D9220A PRA ANESTHESIA: Mod: ANES,,, | Performed by: ANESTHESIOLOGY

## 2018-01-19 PROCEDURE — 52005 CYSTO W/URTRL CATHJ: CPT | Mod: 51,,, | Performed by: UROLOGY

## 2018-01-19 RX ORDER — DEXTROSE, SODIUM CHLORIDE, SODIUM LACTATE, POTASSIUM CHLORIDE, AND CALCIUM CHLORIDE 5; .6; .31; .03; .02 G/100ML; G/100ML; G/100ML; G/100ML; G/100ML
INJECTION, SOLUTION INTRAVENOUS CONTINUOUS
Status: DISCONTINUED | OUTPATIENT
Start: 2018-01-19 | End: 2018-01-20

## 2018-01-19 RX ORDER — SODIUM CHLORIDE 0.9 % (FLUSH) 0.9 %
3 SYRINGE (ML) INJECTION
Status: DISCONTINUED | OUTPATIENT
Start: 2018-01-19 | End: 2018-01-19

## 2018-01-19 RX ORDER — GLYCOPYRROLATE 0.2 MG/ML
INJECTION INTRAMUSCULAR; INTRAVENOUS
Status: DISCONTINUED | OUTPATIENT
Start: 2018-01-19 | End: 2018-01-19

## 2018-01-19 RX ORDER — GENTAMICIN SULFATE 80 MG/100ML
INJECTION, SOLUTION INTRAVENOUS
Status: DISCONTINUED | OUTPATIENT
Start: 2018-01-19 | End: 2018-01-19

## 2018-01-19 RX ORDER — TRAZODONE HYDROCHLORIDE 50 MG/1
100 TABLET ORAL NIGHTLY PRN
Status: DISCONTINUED | OUTPATIENT
Start: 2018-01-20 | End: 2018-01-21 | Stop reason: HOSPADM

## 2018-01-19 RX ORDER — AMOXICILLIN AND CLAVULANATE POTASSIUM 500; 125 MG/1; MG/1
1 TABLET, FILM COATED ORAL 2 TIMES DAILY
Status: DISCONTINUED | OUTPATIENT
Start: 2018-01-19 | End: 2018-01-21 | Stop reason: HOSPADM

## 2018-01-19 RX ORDER — SODIUM CHLORIDE, SODIUM LACTATE, POTASSIUM CHLORIDE, CALCIUM CHLORIDE 600; 310; 30; 20 MG/100ML; MG/100ML; MG/100ML; MG/100ML
INJECTION, SOLUTION INTRAVENOUS CONTINUOUS PRN
Status: DISCONTINUED | OUTPATIENT
Start: 2018-01-19 | End: 2018-01-19

## 2018-01-19 RX ORDER — BUMETANIDE 1 MG/1
1 TABLET ORAL DAILY
Status: DISCONTINUED | OUTPATIENT
Start: 2018-01-20 | End: 2018-01-21 | Stop reason: HOSPADM

## 2018-01-19 RX ORDER — NEOSTIGMINE METHYLSULFATE 1 MG/ML
INJECTION, SOLUTION INTRAVENOUS
Status: DISCONTINUED | OUTPATIENT
Start: 2018-01-19 | End: 2018-01-19

## 2018-01-19 RX ORDER — TRAZODONE HYDROCHLORIDE 50 MG/1
100 TABLET ORAL NIGHTLY
Status: DISCONTINUED | OUTPATIENT
Start: 2018-01-20 | End: 2018-01-21 | Stop reason: HOSPADM

## 2018-01-19 RX ORDER — SODIUM CHLORIDE 0.9 G/100ML
IRRIGANT IRRIGATION
Status: DISCONTINUED | OUTPATIENT
Start: 2018-01-19 | End: 2018-01-19 | Stop reason: HOSPADM

## 2018-01-19 RX ORDER — MEPERIDINE HYDROCHLORIDE 50 MG/ML
12.5 INJECTION INTRAMUSCULAR; INTRAVENOUS; SUBCUTANEOUS ONCE AS NEEDED
Status: DISCONTINUED | OUTPATIENT
Start: 2018-01-19 | End: 2018-01-19 | Stop reason: HOSPADM

## 2018-01-19 RX ORDER — METOCLOPRAMIDE HYDROCHLORIDE 5 MG/ML
INJECTION INTRAMUSCULAR; INTRAVENOUS
Status: DISCONTINUED | OUTPATIENT
Start: 2018-01-19 | End: 2018-01-19

## 2018-01-19 RX ORDER — PROPOFOL 10 MG/ML
VIAL (ML) INTRAVENOUS
Status: DISCONTINUED | OUTPATIENT
Start: 2018-01-19 | End: 2018-01-19

## 2018-01-19 RX ORDER — ONDANSETRON 2 MG/ML
8 INJECTION INTRAMUSCULAR; INTRAVENOUS EVERY 8 HOURS PRN
Status: DISCONTINUED | OUTPATIENT
Start: 2018-01-19 | End: 2018-01-21 | Stop reason: HOSPADM

## 2018-01-19 RX ORDER — OXYCODONE AND ACETAMINOPHEN 5; 325 MG/1; MG/1
1 TABLET ORAL
Status: DISCONTINUED | OUTPATIENT
Start: 2018-01-19 | End: 2018-01-19 | Stop reason: HOSPADM

## 2018-01-19 RX ORDER — ROCURONIUM BROMIDE 10 MG/ML
INJECTION, SOLUTION INTRAVENOUS
Status: DISCONTINUED | OUTPATIENT
Start: 2018-01-19 | End: 2018-01-19

## 2018-01-19 RX ORDER — OXYCODONE AND ACETAMINOPHEN 10; 325 MG/1; MG/1
1 TABLET ORAL EVERY 8 HOURS PRN
Status: DISCONTINUED | OUTPATIENT
Start: 2018-01-19 | End: 2018-01-21 | Stop reason: HOSPADM

## 2018-01-19 RX ORDER — HYDROCODONE BITARTRATE AND ACETAMINOPHEN 5; 325 MG/1; MG/1
1 TABLET ORAL EVERY 4 HOURS PRN
Status: DISCONTINUED | OUTPATIENT
Start: 2018-01-19 | End: 2018-01-21 | Stop reason: HOSPADM

## 2018-01-19 RX ORDER — SERTRALINE HYDROCHLORIDE 50 MG/1
50 TABLET, FILM COATED ORAL DAILY
Status: DISCONTINUED | OUTPATIENT
Start: 2018-01-20 | End: 2018-01-21 | Stop reason: HOSPADM

## 2018-01-19 RX ORDER — HYDROMORPHONE HYDROCHLORIDE 2 MG/ML
0.2 INJECTION, SOLUTION INTRAMUSCULAR; INTRAVENOUS; SUBCUTANEOUS EVERY 5 MIN PRN
Status: DISCONTINUED | OUTPATIENT
Start: 2018-01-19 | End: 2018-01-19 | Stop reason: HOSPADM

## 2018-01-19 RX ORDER — PHENYLEPHRINE HYDROCHLORIDE 10 MG/ML
INJECTION INTRAVENOUS
Status: DISCONTINUED | OUTPATIENT
Start: 2018-01-19 | End: 2018-01-19

## 2018-01-19 RX ORDER — ONDANSETRON 2 MG/ML
4 INJECTION INTRAMUSCULAR; INTRAVENOUS EVERY 12 HOURS PRN
Status: DISCONTINUED | OUTPATIENT
Start: 2018-01-19 | End: 2018-01-19

## 2018-01-19 RX ORDER — RAMELTEON 8 MG/1
8 TABLET ORAL NIGHTLY PRN
Status: DISCONTINUED | OUTPATIENT
Start: 2018-01-19 | End: 2018-01-21 | Stop reason: HOSPADM

## 2018-01-19 RX ORDER — FENTANYL CITRATE 50 UG/ML
INJECTION, SOLUTION INTRAMUSCULAR; INTRAVENOUS
Status: DISCONTINUED | OUTPATIENT
Start: 2018-01-19 | End: 2018-01-19

## 2018-01-19 RX ORDER — ALBUTEROL SULFATE 90 UG/1
2 AEROSOL, METERED RESPIRATORY (INHALATION) EVERY 6 HOURS PRN
Status: DISCONTINUED | OUTPATIENT
Start: 2018-01-19 | End: 2018-01-21 | Stop reason: HOSPADM

## 2018-01-19 RX ORDER — GABAPENTIN 300 MG/1
600 CAPSULE ORAL 3 TIMES DAILY
Status: DISCONTINUED | OUTPATIENT
Start: 2018-01-19 | End: 2018-01-21 | Stop reason: HOSPADM

## 2018-01-19 RX ORDER — DEXTROSE MONOHYDRATE AND SODIUM CHLORIDE 5; .9 G/100ML; G/100ML
INJECTION, SOLUTION INTRAVENOUS CONTINUOUS
Status: DISCONTINUED | OUTPATIENT
Start: 2018-01-19 | End: 2018-01-19

## 2018-01-19 RX ORDER — TRAZODONE HYDROCHLORIDE 50 MG/1
100 TABLET ORAL NIGHTLY PRN
Status: DISCONTINUED | OUTPATIENT
Start: 2018-01-19 | End: 2018-01-19

## 2018-01-19 RX ORDER — ALBUTEROL SULFATE 2.5 MG/.5ML
2.5 SOLUTION RESPIRATORY (INHALATION) EVERY 4 HOURS
Status: DISCONTINUED | OUTPATIENT
Start: 2018-01-19 | End: 2018-01-21 | Stop reason: HOSPADM

## 2018-01-19 RX ORDER — WATER 1 ML/ML
IRRIGANT IRRIGATION
Status: DISCONTINUED | OUTPATIENT
Start: 2018-01-19 | End: 2018-01-19 | Stop reason: HOSPADM

## 2018-01-19 RX ORDER — SUCCINYLCHOLINE CHLORIDE 20 MG/ML
INJECTION INTRAMUSCULAR; INTRAVENOUS
Status: DISCONTINUED | OUTPATIENT
Start: 2018-01-19 | End: 2018-01-19

## 2018-01-19 RX ORDER — ALBUTEROL SULFATE 2.5 MG/.5ML
2.5 SOLUTION RESPIRATORY (INHALATION) ONCE
Status: COMPLETED | OUTPATIENT
Start: 2018-01-19 | End: 2018-01-19

## 2018-01-19 RX ORDER — LIDOCAINE HCL/PF 100 MG/5ML
SYRINGE (ML) INTRAVENOUS
Status: DISCONTINUED | OUTPATIENT
Start: 2018-01-19 | End: 2018-01-19

## 2018-01-19 RX ORDER — ONDANSETRON 2 MG/ML
INJECTION INTRAMUSCULAR; INTRAVENOUS
Status: DISCONTINUED | OUTPATIENT
Start: 2018-01-19 | End: 2018-01-19

## 2018-01-19 RX ORDER — CIPROFLOXACIN 2 MG/ML
400 INJECTION, SOLUTION INTRAVENOUS
Status: COMPLETED | OUTPATIENT
Start: 2018-01-19 | End: 2018-01-19

## 2018-01-19 RX ORDER — HYDROMORPHONE HYDROCHLORIDE 2 MG/ML
1 INJECTION, SOLUTION INTRAMUSCULAR; INTRAVENOUS; SUBCUTANEOUS EVERY 4 HOURS PRN
Status: DISCONTINUED | OUTPATIENT
Start: 2018-01-19 | End: 2018-01-21 | Stop reason: HOSPADM

## 2018-01-19 RX ORDER — MIDAZOLAM HYDROCHLORIDE 1 MG/ML
INJECTION, SOLUTION INTRAMUSCULAR; INTRAVENOUS
Status: DISCONTINUED | OUTPATIENT
Start: 2018-01-19 | End: 2018-01-19

## 2018-01-19 RX ORDER — METOCLOPRAMIDE HYDROCHLORIDE 5 MG/ML
10 INJECTION INTRAMUSCULAR; INTRAVENOUS EVERY 10 MIN PRN
Status: DISCONTINUED | OUTPATIENT
Start: 2018-01-19 | End: 2018-01-19 | Stop reason: HOSPADM

## 2018-01-19 RX ORDER — BACLOFEN 10 MG/1
20 TABLET ORAL 4 TIMES DAILY
Status: DISCONTINUED | OUTPATIENT
Start: 2018-01-20 | End: 2018-01-21 | Stop reason: HOSPADM

## 2018-01-19 RX ORDER — POTASSIUM CHLORIDE 750 MG/1
10 TABLET, EXTENDED RELEASE ORAL 2 TIMES DAILY
Status: DISCONTINUED | OUTPATIENT
Start: 2018-01-19 | End: 2018-01-21 | Stop reason: HOSPADM

## 2018-01-19 RX ORDER — AMLODIPINE BESYLATE 5 MG/1
10 TABLET ORAL DAILY
Status: DISCONTINUED | OUTPATIENT
Start: 2018-01-20 | End: 2018-01-20

## 2018-01-19 RX ORDER — OXYBUTYNIN CHLORIDE 5 MG/1
5 TABLET, EXTENDED RELEASE ORAL DAILY
Status: DISCONTINUED | OUTPATIENT
Start: 2018-01-20 | End: 2018-01-21 | Stop reason: HOSPADM

## 2018-01-19 RX ORDER — BUMETANIDE 1 MG/1
1 TABLET ORAL DAILY
Status: DISCONTINUED | OUTPATIENT
Start: 2018-01-20 | End: 2018-01-19

## 2018-01-19 RX ADMIN — ROCURONIUM BROMIDE 10 MG: 10 INJECTION, SOLUTION INTRAVENOUS at 10:01

## 2018-01-19 RX ADMIN — ROCURONIUM BROMIDE 10 MG: 10 INJECTION, SOLUTION INTRAVENOUS at 07:01

## 2018-01-19 RX ADMIN — LIDOCAINE HYDROCHLORIDE 100 MG: 20 INJECTION, SOLUTION INTRAVENOUS at 07:01

## 2018-01-19 RX ADMIN — PHENYLEPHRINE HYDROCHLORIDE 100 MCG: 10 INJECTION INTRAVENOUS at 07:01

## 2018-01-19 RX ADMIN — PHENYLEPHRINE HYDROCHLORIDE 100 MCG: 10 INJECTION INTRAVENOUS at 10:01

## 2018-01-19 RX ADMIN — ROCURONIUM BROMIDE 10 MG: 10 INJECTION, SOLUTION INTRAVENOUS at 11:01

## 2018-01-19 RX ADMIN — PROPOFOL 160 MG: 10 INJECTION, EMULSION INTRAVENOUS at 07:01

## 2018-01-19 RX ADMIN — EPHEDRINE SULFATE 10 MG: 50 INJECTION, SOLUTION INTRAMUSCULAR; INTRAVENOUS; SUBCUTANEOUS at 07:01

## 2018-01-19 RX ADMIN — METOCLOPRAMIDE 10 MG: 5 INJECTION, SOLUTION INTRAMUSCULAR; INTRAVENOUS at 09:01

## 2018-01-19 RX ADMIN — ALBUTEROL SULFATE 2.5 MG: 2.5 SOLUTION RESPIRATORY (INHALATION) at 08:01

## 2018-01-19 RX ADMIN — NEOSTIGMINE METHYLSULFATE 5 MG: 1 INJECTION INTRAVENOUS at 11:01

## 2018-01-19 RX ADMIN — RACEPINEPHRINE HYDROCHLORIDE 0.5 ML: 11.25 SOLUTION RESPIRATORY (INHALATION) at 12:01

## 2018-01-19 RX ADMIN — AMOXICILLIN AND CLAVULANATE POTASSIUM 500 MG: 500; 125 TABLET, FILM COATED ORAL at 09:01

## 2018-01-19 RX ADMIN — FENTANYL CITRATE 50 MCG: 50 INJECTION INTRAMUSCULAR; INTRAVENOUS at 10:01

## 2018-01-19 RX ADMIN — EPHEDRINE SULFATE 15 MG: 50 INJECTION, SOLUTION INTRAMUSCULAR; INTRAVENOUS; SUBCUTANEOUS at 08:01

## 2018-01-19 RX ADMIN — ONDANSETRON 4 MG: 2 INJECTION, SOLUTION INTRAMUSCULAR; INTRAVENOUS at 11:01

## 2018-01-19 RX ADMIN — SODIUM CHLORIDE, SODIUM LACTATE, POTASSIUM CHLORIDE, AND CALCIUM CHLORIDE: .6; .31; .03; .02 INJECTION, SOLUTION INTRAVENOUS at 07:01

## 2018-01-19 RX ADMIN — ROCURONIUM BROMIDE 10 MG: 10 INJECTION, SOLUTION INTRAVENOUS at 09:01

## 2018-01-19 RX ADMIN — ALBUTEROL SULFATE 2.5 MG: 2.5 SOLUTION RESPIRATORY (INHALATION) at 12:01

## 2018-01-19 RX ADMIN — EPHEDRINE SULFATE 5 MG: 50 INJECTION, SOLUTION INTRAMUSCULAR; INTRAVENOUS; SUBCUTANEOUS at 07:01

## 2018-01-19 RX ADMIN — GLYCOPYRROLATE 0.6 MG: 0.2 INJECTION, SOLUTION INTRAMUSCULAR; INTRAVENOUS at 11:01

## 2018-01-19 RX ADMIN — SUCCINYLCHOLINE CHLORIDE 120 MG: 20 INJECTION, SOLUTION INTRAMUSCULAR; INTRAVENOUS at 07:01

## 2018-01-19 RX ADMIN — GABAPENTIN 600 MG: 300 CAPSULE ORAL at 09:01

## 2018-01-19 RX ADMIN — SODIUM CHLORIDE, SODIUM LACTATE, POTASSIUM CHLORIDE, AND CALCIUM CHLORIDE: .6; .31; .03; .02 INJECTION, SOLUTION INTRAVENOUS at 11:01

## 2018-01-19 RX ADMIN — MIDAZOLAM HYDROCHLORIDE 2 MG: 1 INJECTION, SOLUTION INTRAMUSCULAR; INTRAVENOUS at 07:01

## 2018-01-19 RX ADMIN — ROCURONIUM BROMIDE 10 MG: 10 INJECTION, SOLUTION INTRAVENOUS at 08:01

## 2018-01-19 RX ADMIN — POTASSIUM CHLORIDE 10 MEQ: 750 TABLET, EXTENDED RELEASE ORAL at 09:01

## 2018-01-19 RX ADMIN — ALBUTEROL SULFATE 2.5 MG: 2.5 SOLUTION RESPIRATORY (INHALATION) at 02:01

## 2018-01-19 RX ADMIN — CIPROFLOXACIN 400 MG: 2 INJECTION, SOLUTION INTRAVENOUS at 07:01

## 2018-01-19 RX ADMIN — PROPOFOL 30 MG: 10 INJECTION, EMULSION INTRAVENOUS at 08:01

## 2018-01-19 RX ADMIN — GENTAMICIN SULFATE 80 MG: 80 INJECTION, SOLUTION INTRAVENOUS at 07:01

## 2018-01-19 RX ADMIN — HYDROMORPHONE HYDROCHLORIDE 0.2 MG: 2 INJECTION INTRAMUSCULAR; INTRAVENOUS; SUBCUTANEOUS at 05:01

## 2018-01-19 RX ADMIN — SODIUM CHLORIDE, SODIUM LACTATE, POTASSIUM CHLORIDE, AND CALCIUM CHLORIDE: .6; .31; .03; .02 INJECTION, SOLUTION INTRAVENOUS at 08:01

## 2018-01-19 RX ADMIN — SODIUM CHLORIDE 1000 ML: 0.9 INJECTION, SOLUTION INTRAVENOUS at 08:01

## 2018-01-19 RX ADMIN — FENTANYL CITRATE 100 MCG: 50 INJECTION INTRAMUSCULAR; INTRAVENOUS at 07:01

## 2018-01-19 NOTE — OR NURSING
Pt comfortable, respirations easy, full, Dr Dubon at bedside, released pt to go to med-surg from anesthesia standpoint

## 2018-01-19 NOTE — OR NURSING
1430 dr crenshaw at bedside, pt sleeps unless disturbed,  Respiratory does new abgs.  1435 pt off bipap on ventimask.. Tolerates change well

## 2018-01-19 NOTE — TRANSFER OF CARE
Anesthesia Transfer of Care Note    Patient: Mary Ellen Fajardo    Procedure(s) Performed: Procedure(s) (LRB):  NEPHROLITHOTOMY-PERCUTANEOUS (Right)  PLACEMENT  EXCHANGE CATHETER-SUPRAPUBIC  URETEROSCOPY (Right)  CYSTOSCOPY,  OCCLUSION BALLOON PLACEMENT, PERC ACCESS  PYELOGRAM-ANTEGRADE  PYELOGRAM-RETROGRADE  LITHOTRIPSY-LASER (Right)  PLACEMENT-RENAL ACCESS (Right)    Patient location: PACU    Anesthesia Type: general    Transport from OR: Transported from OR intubated on 100% O2 by AMBU with assisted ventilation    Post pain: adequate analgesia    Post assessment: no apparent anesthetic complications and tolerated procedure well    Post vital signs: stable    Level of consciousness: responds to stimulation    Nausea/Vomiting: no nausea/vomiting    Complications: none    Transfer of care protocol was followed      Last vitals:   Visit Vitals  /87   Pulse 96   Temp 36.5 °C (97.7 °F) (Oral)   Resp 18   SpO2 95%   Breastfeeding? No

## 2018-01-19 NOTE — INTERVAL H&P NOTE
The patient has been examined and the H&P has been reviewed:    I concur with the findings and no changes have occurred since H&P was written.    Anesthesia/Surgery risks, benefits and alternative options discussed and understood by patient/family.          Active Hospital Problems    Diagnosis  POA    Kidney stones [N20.0]  Yes      Resolved Hospital Problems    Diagnosis Date Resolved POA   No resolved problems to display.

## 2018-01-19 NOTE — OP NOTE
DATE OF PROCEDURE:  01/19/2018.    PREOPERATIVE DIAGNOSIS:  Right staghorn calculus.    POSTOPERATIVE DIAGNOSIS:  Right staghorn calculus.    PROCEDURES PERFORMED:  Cystoscopy with right retrograde pyelogram, right   ureteral occlusion balloon catheter placement, right ureteral stent removal,   right percutaneous nephrostomy tube access.  Right percutaneous antegrade   pyelogram, right percutaneous nephrostomy tract dilation, right percutaneous   nephrolithotomy greater than 2.5 cm, right percutaneous nephrostomy tube   placement, antegrade pyelogram, fluoroscopy.    PRIMARY SURGEON:  Jacques Tinoco M.D.    ANESTHESIA:  General.    ESTIMATED BLOOD LOSS:  150 mL    DRAINS:  24-Gambian ureteral reentry sheath, an #18-Gambian suprapubic tube   catheter.    COMPLICATIONS:  None.    INDICATIONS:  Mary Ellen Fajardo is a 62-year-old woman with history of a staghorn   calculus.  She did previously undergone treatment for a bladder stone.  She is   here today for treatment of her staghorn calculus.    Mary Ellen Fajardo was taken to the Operating Room where she was positively   identified by sosa.  She was placed supine on the operating room table.    Following induction of adequate general anesthesia, she was placed in the dorsal   lithotomy position and her external genitalia were prepped and draped in the   usual sterile fashion.    A Preoperative timeout was performed as well as confirmation of preoperative   antibiotics.    I then passed a 21-Gambian rigid cystoscope per urethra into the bladder under   direct vision.  Her previously placed right-sided stent was visualized, grasped   and brought urethral meatus.    I then passed a 0.035 inch Bentson wire through the stent up to the level of the   kidney and the stent was withdrawn.    I then passed an 8-Gambian occlusion balloon catheter over the wire up to the   proximal ureter, a retrograde pyelogram was performed and then the occlusion   balloon catheter balloon was  then inflated under live fluoroscopy 1 mL of   contrast was instilled into the balloon.    The Wolfe catheter was then placed per urethra and the occlusion balloon   catheter was then secured to the Wolfe catheter.    Her suprapubic tube was then prepped and then removed and then a new fresh   suprapubic tube 18-Luxembourger was then passed through this site without difficulty.        Next, she was then transferred to the open Operating Room where she was then   placed in the prone position.  Pressure points were padded.    Her right flank was then prepped and draped in the usual sterile fashion.    We then performed a repeat timeout.    I then used fluoroscopy to visualize the stone.  I then first attempted access   into an upper pole dwight.  I used an 11 blade scalpel and then 18-gauge spinal   needle to attempt to get access.  However, I could not be negotiated the wire   pass the stone.  Therefore, I then switched to attempt access to lower pole   dwight.    I then used an 11 blade scalpel to incise the skin.    I then used an 18-gauge spinal needle under live fluoroscopy in both the   sagittal and AP planes.  I then gained access into the lower pole dwight.    I then with some difficulty and negotiated pass the staghorn calculus and gain   access with a zip wire down the ureter.    I then passed a hockey stick shaped catheter over the wire down into the ureter.    I then exchanged the wire for a Super Stiff wire.  The Super Stiff wire was   passed into the bladder under live fluoroscopy.    I then dilated the nephrostomy tract with serial dilators from 6-Luxembourger to   12-Luxembourger.    I then advanced a 10-Luxembourger dual lumen catheter; however, would not negotiate   pass the bend in the lower pole, therefore, I switched to an 8/10 dilators set.        Next, the 8/10 dilator set was then able to go into the proximal ureter.  I then   withdrew the 8-Luxembourger catheter, leaving the 10-Luxembourger sheath in place.    I then advanced a  second wire.  The Bentson wire in through the sheath down into   the ureter.  This was passed down into the bladder, confirmed on fluoroscopy.    I then secured the sensor wire to the drapes.    I then passed a NephroMax balloon catheter over the Super Stiff wire up into the   lower pole dwight.    I then under live fluoroscopy dilated the nephrostomy tract.  I then passed the   sheath over the balloon into the lower pole.    I then switched the nephroscope.    I then began fragmenting and evacuated the stone using a lithoclast device.    The upper pole dwight was not able to be achieved without significant tension on   the kidney.  Therefore, I switched to a flexible cystoscope.      Next, the flexible cystoscope was then passed into the upper pole dwight where I   then used a 200 micron holmium laser fiber to fragment the stone into smaller   pieces.    The upper branch of the dwight which could not be accessed using the cystoscope   and there was another dwight in the mid pole, which could not be accessed with   the cystoscope with the laser fiber in place.  Therefore, I chose to switch an   ureteroscope.    I switched to the ureteroscope.  However, visualization was very poor due to   some bleeding noted.  At this point, the vast majority of stone had been   evacuated, I felt as though she would require a second look, therefore, I   decided to conclude this procedure at this point.    I then using fluoroscopy, I then passed a 24-Samoan ureteral re-entry sheath   over the Super Stiff Once we got into the lower pole dwight the nephrostomy tube   did go into the upper pole as opposed to down the ureter.  There was some   bleeding noted at this point removed the access sheath.  I then passed a   24-Samoan Te-Moak-tip catheter and inflated the balloon to 3 mL left this   tamponade for 5 minutes.    I deflated the balloon.  There was no evidence of any major bleeding at this   point, therefore, I then switched back to the  24-Sami reentry sheath and   passed this over the Super Stiff wire.  At this time, it passed with the   ureteral tail going down the ureter.  I deployed the phalanges within the renal   pelvis.  I performed an antegrade pyelogram to confirm proper placement of the   nephrostomy tube within the renal pelvis.    I then withdrew the wires and sheaths and secured the nephrostomy tube to the   skin with a 0 silk suture.      Next, the nephrostomy tube was left to gravity drainage.    The puncture sites from attempted access were then closed with Dermabond   solution.    Sponge counts, needle counts and instrument counts were reported as correct at   the end of the case anesthesia report that she remained hemodynamically stable   throughout the entire case.    She was then placed in the supine position.  She was then transferred to the   Recovery Room in stable condition.      RODERICK/IN  dd: 01/19/2018 12:04:41 (CST)  td: 01/19/2018 15:06:03 (CST)  Doc ID   #[id number]  Job ID #[id number]    CC:

## 2018-01-19 NOTE — PROGRESS NOTES
[facility]  Urology  Progress Note    Patient Name: Mary Ellen Fajardo  MRN: [id number]  Admission Date: 1/19/2018  Hospital Length of Stay: 0 days  Code Status: No Order   Attending Provider: RAMA Tinoco MD   Primary Care Physician: Any Tran MD    Subjective:     HPI:  No notes on file    Interval History: She had some breathing difficulties post op.   She was made ICU status, but is now improving.  She is only on a ventimask.    She denies flank pain.    Review of Systems   Constitutional: Negative.    HENT: Negative.    Eyes: Negative.    Respiratory: Negative for cough, chest tightness and shortness of breath.    Cardiovascular: Negative for chest pain.   Gastrointestinal: Negative.  Negative for constipation, diarrhea and nausea.   Genitourinary: Positive for hematuria. Negative for flank pain.   Musculoskeletal: Negative.    Neurological: Negative.    Psychiatric/Behavioral: Negative.      Objective:     Temp:  [97.7 °F (36.5 °C)-98.5 °F (36.9 °C)] 97.7 °F (36.5 °C)  Pulse:  [] 88  Resp:  [13-44] 19  SpO2:  [81 %-100 %] 100 %  BP: ()/(45-87) 111/65     There is no height or weight on file to calculate BMI.      Date 01/19/18 0700 - 01/20/18 0659   Shift 6066-8580 1780-8483 2403-7663 24 Hour Total   I  N  T  A  K  E   I.V. 2000 2000    Shift Total 2000 2000   O  U  T  P  U  T   Blood 150   150    Shift Total 150   150   Weight (kg)              Drains     Drain                 Ureteral Drain/Stent 12/20/17 1040 Right ureter 6 Fr. 30 days         Nephrostomy 01/19/18 1133 Right 24 Fr. less than 1 day         Suprapubic Catheter 01/19/18 0850 latex 18 Fr. less than 1 day         Urethral Catheter 01/19/18 0745 Non-latex 16 Fr. less than 1 day                Physical Exam   Nursing note and vitals reviewed.  Constitutional: She is oriented to person, place, and time. She appears well-developed.   HENT:   Head: Normocephalic.   Eyes: Conjunctivae are normal.    Neck: Normal range of motion. No tracheal deviation present. No thyromegaly present.   Cardiovascular: Normal rate, normal heart sounds and normal pulses.    Pulmonary/Chest: Effort normal and breath sounds normal. No respiratory distress. She has no wheezes.   Abdominal: Soft. She exhibits no distension and no mass. There is no hepatosplenomegaly. There is no tenderness. There is no rebound, no guarding and no CVA tenderness. No hernia.   Genitourinary:   Genitourinary Comments: PCN with minimal output  SP tube and Wolfe with thin red urine   Musculoskeletal: Normal range of motion. She exhibits no edema or tenderness.   Lymphadenopathy:     She has no cervical adenopathy.   Neurological: She is alert and oriented to person, place, and time.   Skin: Skin is warm and dry. No rash noted. No erythema.     Psychiatric: She has a normal mood and affect. Her behavior is normal. Judgment and thought content normal.       Significant Labs:    BMP:    Recent Labs  Lab 01/15/18  2000 01/19/18  1226    139   K 3.9 3.3*   CL 98 105   CO2 30* 21*   BUN 12 10   CREATININE 0.8 0.8   CALCIUM 9.7 8.6*       CBC:     Recent Labs  Lab 01/15/18  2000 01/19/18  1226   WBC 6.66  --    HGB 12.2 10.6*   HCT 37.3 34.5*     --        Blood Culture: No results for input(s): LABBLOO in the last 168 hours.  Urine Culture: No results for input(s): LABURIN in the last 168 hours.     CXR: no pneumothorax    Significant Imaging:                    Assessment/Plan:     Staghorn calculus    To Floor per anesthesia (not ICU)  Will consult Hospital Medicine for medical management  K Dur  Switch to D5 LR    Plan to d/c home tomorrow if stable  Follow up early next week to set up second look PCNL            VTE Risk Mitigation         Ordered     Medium Risk of VTE  Once      01/19/18 0620     Place sequential compression device  Until discontinued      01/19/18 0620          OMA Tinoco MD  Urology  [facility]

## 2018-01-19 NOTE — OR NURSING
1315 pt restless, entire chest heaves with each breath, breathing 25+ per miniute. Chest xray done Dr Lovell at bedside  1335 abgs drawn bipap rate increased to 26.    Emotional support provided, pt calms.   1340 dr weiner advised of status.   1415 pt sleeps, tolerating bi pap well Brother All andres at bedside.

## 2018-01-19 NOTE — ASSESSMENT & PLAN NOTE
To Floor per anesthesia (not ICU)  Will consult Hospital Medicine for medical management  K Dur  Switch to D5 LR    Plan to d/c home tomorrow if stable  Follow up early next week to set up second look PCNL

## 2018-01-19 NOTE — ANESTHESIA PREPROCEDURE EVALUATION
01/19/2018  Mary Ellen Fajardo is a 62 y.o., female.    Anesthesia Evaluation     I have reviewed the Nursing Notes.      Review of Systems  Anesthesia Hx:  No problems with previous Anesthesia   Social:  Former Smoker    Cardiovascular:   Exercise tolerance: poor Denies Pacemaker. Hypertension  Denies Valvular problems/Murmurs.  Denies MI.  Denies CAD.    Denies CABG/stent.  Denies Dysrhythmias.   Denies Angina.        Pulmonary:   Denies Pneumonia Denies COPD. Asthma  Denies Shortness of breath.  Denies Recent URI.    Renal/:   Chronic Renal Disease renal calculi Neurogenic bladder (suprapubic catheter)   Hepatic/GI:   Denies PUD. GERD Liver Disease, Denies Hepatitis.    Musculoskeletal:   Arthritis     Neurological:   Denies CVA. Denies Seizures. 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 She is able to move both legs but not enough to walk   Endocrine:  Endocrine Normal        Physical Exam  General:  Well nourished    Airway/Jaw/Neck:  Airway Findings: Mouth Opening: Normal Tongue: Normal  General Airway Assessment: Adult  Mallampati: II  TM Distance: Normal, at least 6 cm  Jaw/Neck Findings:  Neck ROM: Normal ROM      Dental:  Dental Findings: In tact   Chest/Lungs:  Chest/Lungs Findings: Clear to auscultation, Normal Respiratory Rate     Heart/Vascular:  Heart Findings: Rate: Normal        Mental Status:  Mental Status Findings:  Cooperative, Alert and Oriented         Anesthesia Plan  Type of Anesthesia, risks & benefits discussed:  Anesthesia Type:  general  Patient's Preference:   Intra-op Monitoring Plan: standard ASA monitors  Intra-op Monitoring Plan Comments:   Post Op Pain Control Plan: multimodal analgesia, IV/PO Opioids PRN and per primary service following discharge from PACU  Post Op Pain Control Plan Comments:   Induction:   IV  Beta Blocker:  Patient  is not currently on a Beta-Blocker (No further documentation required).       Informed Consent: Patient understands risks and agrees with Anesthesia plan.  Questions answered. Anesthesia consent signed with patient.  ASA Score: 3     Day of Surgery Review of History & Physical:    H&P update referred to the surgeon.         Ready For Surgery From Anesthesia Perspective.

## 2018-01-19 NOTE — PROGRESS NOTES
Patient was extubated to Simple face mask at 28%.  Patient work of breathing increase and saturation decrease into the 80's. Patient was given Albutero 2.5 mg and Racemic with no relief.  Patient was then placed on Bipap IPAP 15, EPAP 5, @70%.per Dr. Lovell orders. Will continue to monitor.

## 2018-01-19 NOTE — BRIEF OP NOTE
[facility]  Brief Operative Note    SUMMARY     Surgery Date: 1/19/2018     Surgeon(s) and Role:     * RAMA Tinoco MD - Primary    Assisting Surgeon: None    Pre-op Diagnosis:  Bladder stone [N21.0]  Kidney stones [N20.0]    Post-op Diagnosis:  Post-Op Diagnosis Codes:     * Bladder stone [N21.0]     * Kidney stones [N20.0]    Procedure(s) (LRB):  NEPHROLITHOTOMY-PERCUTANEOUS (Right)  PLACEMENT  EXCHANGE CATHETER-SUPRAPUBIC    Anesthesia: General    Description of Procedure: Staghorn calculus    Description of the findings of the procedure: Attempted upper pole access, successful lower pole access.  Bleeding noted upon PCN placement, subsided.      Estimated Blood Loss: 150 mL         Specimens:   Specimen (12h ago through future)    None

## 2018-01-19 NOTE — PROGRESS NOTES
Amber was taken off Bipap per Dr. Lovell orders and placed on Venti mask @50%. Another Albuterol treatment was given. Will continue to monitor .

## 2018-01-20 VITALS
BODY MASS INDEX: 39.67 KG/M2 | SYSTOLIC BLOOD PRESSURE: 103 MMHG | WEIGHT: 238.13 LBS | HEIGHT: 65 IN | HEART RATE: 88 BPM | DIASTOLIC BLOOD PRESSURE: 58 MMHG | OXYGEN SATURATION: 92 % | RESPIRATION RATE: 18 BRPM | TEMPERATURE: 99 F

## 2018-01-20 LAB
ALBUMIN SERPL BCP-MCNC: 2.4 G/DL
ALP SERPL-CCNC: 66 U/L
ALT SERPL W/O P-5'-P-CCNC: 8 U/L
ANION GAP SERPL CALC-SCNC: 6 MMOL/L
AST SERPL-CCNC: 18 U/L
BASOPHILS # BLD AUTO: 0.03 K/UL
BASOPHILS NFR BLD: 0.2 %
BILIRUB SERPL-MCNC: 0.3 MG/DL
BUN SERPL-MCNC: 13 MG/DL
CALCIUM SERPL-MCNC: 8.3 MG/DL
CHLORIDE SERPL-SCNC: 104 MMOL/L
CO2 SERPL-SCNC: 29 MMOL/L
CREAT SERPL-MCNC: 1 MG/DL
DIFFERENTIAL METHOD: ABNORMAL
EOSINOPHIL # BLD AUTO: 0 K/UL
EOSINOPHIL NFR BLD: 0.1 %
ERYTHROCYTE [DISTWIDTH] IN BLOOD BY AUTOMATED COUNT: 16.3 %
EST. GFR  (AFRICAN AMERICAN): >60 ML/MIN/1.73 M^2
EST. GFR  (NON AFRICAN AMERICAN): >60 ML/MIN/1.73 M^2
GLUCOSE SERPL-MCNC: 97 MG/DL
HCT VFR BLD AUTO: 28 %
HGB BLD-MCNC: 8.5 G/DL
LYMPHOCYTES # BLD AUTO: 1.2 K/UL
LYMPHOCYTES NFR BLD: 7.1 %
MCH RBC QN AUTO: 24.9 PG
MCHC RBC AUTO-ENTMCNC: 30.4 G/DL
MCV RBC AUTO: 82 FL
MONOCYTES # BLD AUTO: 1.4 K/UL
MONOCYTES NFR BLD: 8.7 %
NEUTROPHILS # BLD AUTO: 13.8 K/UL
NEUTROPHILS NFR BLD: 83.7 %
PLATELET # BLD AUTO: 208 K/UL
PMV BLD AUTO: 10.3 FL
POTASSIUM SERPL-SCNC: 3.9 MMOL/L
PROT SERPL-MCNC: 6.3 G/DL
RBC # BLD AUTO: 3.41 M/UL
SODIUM SERPL-SCNC: 139 MMOL/L
WBC # BLD AUTO: 16.47 K/UL

## 2018-01-20 PROCEDURE — G0378 HOSPITAL OBSERVATION PER HR: HCPCS

## 2018-01-20 PROCEDURE — 80053 COMPREHEN METABOLIC PANEL: CPT

## 2018-01-20 PROCEDURE — 25000242 PHARM REV CODE 250 ALT 637 W/ HCPCS: Performed by: ANESTHESIOLOGY

## 2018-01-20 PROCEDURE — 94640 AIRWAY INHALATION TREATMENT: CPT

## 2018-01-20 PROCEDURE — 25000003 PHARM REV CODE 250: Performed by: UROLOGY

## 2018-01-20 PROCEDURE — 36415 COLL VENOUS BLD VENIPUNCTURE: CPT

## 2018-01-20 PROCEDURE — 85025 COMPLETE CBC W/AUTO DIFF WBC: CPT

## 2018-01-20 PROCEDURE — 27000221 HC OXYGEN, UP TO 24 HOURS

## 2018-01-20 RX ORDER — AMOXICILLIN AND CLAVULANATE POTASSIUM 500; 125 MG/1; MG/1
1 TABLET, FILM COATED ORAL 2 TIMES DAILY
Qty: 14 TABLET | Refills: 1 | Status: SHIPPED | OUTPATIENT
Start: 2018-01-20 | End: 2018-02-28 | Stop reason: ALTCHOICE

## 2018-01-20 RX ADMIN — AMOXICILLIN AND CLAVULANATE POTASSIUM 500 MG: 500; 125 TABLET, FILM COATED ORAL at 09:01

## 2018-01-20 RX ADMIN — BACLOFEN 20 MG: 10 TABLET ORAL at 11:01

## 2018-01-20 RX ADMIN — GABAPENTIN 600 MG: 300 CAPSULE ORAL at 05:01

## 2018-01-20 RX ADMIN — POTASSIUM CHLORIDE 10 MEQ: 750 TABLET, EXTENDED RELEASE ORAL at 09:01

## 2018-01-20 RX ADMIN — ALBUTEROL SULFATE 2.5 MG: 2.5 SOLUTION RESPIRATORY (INHALATION) at 07:01

## 2018-01-20 RX ADMIN — BACLOFEN 20 MG: 10 TABLET ORAL at 05:01

## 2018-01-20 RX ADMIN — OXYCODONE HYDROCHLORIDE AND ACETAMINOPHEN 1 TABLET: 10; 325 TABLET ORAL at 09:01

## 2018-01-20 RX ADMIN — SERTRALINE HYDROCHLORIDE 50 MG: 50 TABLET ORAL at 09:01

## 2018-01-20 RX ADMIN — ALBUTEROL SULFATE 2.5 MG: 2.5 SOLUTION RESPIRATORY (INHALATION) at 03:01

## 2018-01-20 RX ADMIN — ALBUTEROL SULFATE 2.5 MG: 2.5 SOLUTION RESPIRATORY (INHALATION) at 04:01

## 2018-01-20 RX ADMIN — ALBUTEROL SULFATE 2.5 MG: 2.5 SOLUTION RESPIRATORY (INHALATION) at 12:01

## 2018-01-20 RX ADMIN — OXYBUTYNIN CHLORIDE 5 MG: 5 TABLET, EXTENDED RELEASE ORAL at 09:01

## 2018-01-20 RX ADMIN — ALBUTEROL SULFATE 2.5 MG: 2.5 SOLUTION RESPIRATORY (INHALATION) at 11:01

## 2018-01-20 RX ADMIN — GABAPENTIN 600 MG: 300 CAPSULE ORAL at 02:01

## 2018-01-20 RX ADMIN — BACLOFEN 20 MG: 10 TABLET ORAL at 12:01

## 2018-01-20 RX ADMIN — DEXTROSE, SODIUM CHLORIDE, SODIUM LACTATE, POTASSIUM CHLORIDE, AND CALCIUM CHLORIDE: 5; .6; .31; .03; .02 INJECTION, SOLUTION INTRAVENOUS at 06:01

## 2018-01-20 NOTE — NURSING
Spoke with Dr. Bass regarding O2 sat of 92-91% on RA. Ok to d/c home from hospitalist point of view.

## 2018-01-20 NOTE — PLAN OF CARE
01/20/18 1640   Final Note   Assessment Type Final Discharge Note   Discharge Disposition Home   What phone number can be called within the next 1-3 days to see how you are doing after discharge? [phone]   Hospital Follow Up  Appt(s) scheduled? Yes   Discharge plans and expectations educations in teach back method with documentation complete? Yes   Right Care Referral Info   Post Acute Recommendation No Care

## 2018-01-20 NOTE — ASSESSMENT & PLAN NOTE
She had a low blood pressure reading while on the floor and received a 1L bolus of normal saline, will hold home amlodipine and bumex and monitor blood pressures.  Adjust antihypertensives as needed.

## 2018-01-20 NOTE — DISCHARGE SUMMARY
[facility]  Urology  Discharge Summary      Patient Name: Mary Ellen Fajardo  MRN: [id number]  Admission Date: 1/19/2018  Hospital Length of Stay: 0 days  Discharge Date and Time:  01/20/2018 3:14 PM  Attending Physician: RAMA Tinoco MD   Discharging Provider: Tom Mccain MD  Primary Care Physician: Any Tran MD    HPI:   Feels great   Eager to go home  BP is stable; Dr Bass recommends cont same BP meds at home    Urine is clear light pink  No active bleeding    Procedure(s) (LRB):  NEPHROLITHOTOMY-PERCUTANEOUS (Right)  PLACEMENT  EXCHANGE CATHETER-SUPRAPUBIC  URETEROSCOPY (Right)  CYSTOSCOPY,  OCCLUSION BALLOON PLACEMENT, PERC ACCESS  PYELOGRAM-ANTEGRADE  PYELOGRAM-RETROGRADE  LITHOTRIPSY-LASER (Right)  PLACEMENT-RENAL ACCESS (Right)     Indwelling Lines/Drains at time of discharge:   Lines/Drains/Airways     Drain                 Ureteral Drain/Stent 12/20/17 1040 Right ureter 6 Fr. 31 days         Nephrostomy 01/19/18 1133 Right 24 Fr. 1 day         Suprapubic Catheter 01/19/18 0850 latex 18 Fr. 1 day                Hospital Course (synopsis of major diagnoses, care, treatment, and services provided during the course of the hospital stay):     See progress notes  pod1   Eager to go home  Ab benign  Urine light pink  BP stable    Consults:   Consults         Status Ordering Provider     Inpatient consult to Hospitalist  Once     Provider:  Indigo Simms MD    Acknowledged RAMA TINOCO          Significant Diagnostic Studies:   Appropriate decrease in HH post op  No bleeding at time of discharge    Pending Diagnostic Studies:     None          Final Active Diagnoses:    Diagnosis Date Noted POA    PRINCIPAL PROBLEM:  Staghorn calculus [N20.0] 12/20/2017 Yes    Kidney stones [N20.0] 01/19/2018 Yes    Essential hypertension [I10] 01/19/2018 Yes     Chronic    Recurrent major depressive disorder, in partial remission [F33.41] 01/19/2018 Yes     Chronic     Neuropathy [G62.9] 01/19/2018 Yes     Chronic    Primary insomnia [F51.01] 01/19/2018 Yes     Chronic    Obstructive sleep apnea [G47.33] 01/19/2018 Yes     Chronic    Primary osteoarthritis of left knee [M17.12] 05/27/2015 Yes    Paraparesis [G82.20] 10/03/2012 Yes    Gait disorder [R26.9] 10/03/2012 Yes      Problems Resolved During this Admission:    Diagnosis Date Noted Date Resolved POA         Discharged Condition: good    Disposition: home    Diet: regular  Activity: as tolerated    Follow Up:  Follow-up Information     W Carlos Tinoco MD On 1/22/2018.    Specialty:  Urology  Why:  PT WILL GO HOME WITH NEPHROSTOMY TUBE AND DORSEY  Contact information:  [address] [place], [place] [id number] [phone]                 Patient Instructions:   No discharge procedures on file.  Medications:  Reconciled Home Medications:   Current Discharge Medication List      CONTINUE these medications which have CHANGED    Details   amoxicillin-clavulanate 500-125mg (AUGMENTIN) 500-125 mg Tab Take 1 tablet (500 mg total) by mouth 2 (two) times daily.  Qty: 14 tablet, Refills: 1    Associated Diagnoses: Kidney stones; Paraparesis; Neurogenic bladder; Staghorn calculus         CONTINUE these medications which have NOT CHANGED    Details   amlodipine (NORVASC) 10 MG tablet Take 10 mg by mouth once daily.   Refills: 1      baclofen (LIORESAL) 20 MG tablet 10 mg 4 (four) times daily.   Refills: 5      bumetanide (BUMEX) 1 MG tablet 1 mg once daily.       catheter 18 Fr Misc Change every 20 days  Qty: 10 each, Refills: 1    Associated Diagnoses: Neurogenic bladder      gabapentin (NEURONTIN) 300 MG capsule 600 mg 3 (three) times daily.       oxybutynin (DITROPAN-XL) 5 MG TR24 TAKE 1 TABLET BY MOUTH EVERY DAY  Qty: 90 tablet, Refills: 0    Associated Diagnoses: Neurogenic bladder      oxyCODONE-acetaminophen (PERCOCET)  mg per tablet Take 1 tablet by mouth every 8 (eight) hours as needed for Pain.  Qty: 30  tablet, Refills: 0    Associated Diagnoses: Bladder stone; Staghorn calculus      potassium chloride (KLOR-CON) 10 MEQ TbSR       sertraline (ZOLOFT) 50 MG tablet TK 1 T PO QD  Refills: 5      trazodone (DESYREL) 100 MG tablet 100 mg.   Refills: 5      VENTOLIN HFA 90 mcg/actuation inhaler              Time spent on the discharge of patient: 45 minutes    Tom Mccain MD  Urology  [facility]

## 2018-01-20 NOTE — HPI
62 y.o. female with hypertension, neuropathy, depression, insomnia, ADEEL, neurogenic bladder with suprapubic catheter in place, right staghorn calculus, and chronic back and knee pain admitted following Urological procedure including right ureteral stent removal and right nephrostomy tube placement.  She became dyspneic, hypoxic, and tachypneic in recovery but improved quickly with BPAP support and has now been weaned to nasal canula.  She currently complains of her usual chronic back pain and some acute right flank discomfort in the area of the nephrostomy tube.  She otherwise feels well and denies fever, chills, cough, SOB, chest pain, abdominal pain, nausea, vomiting, or diarrhea.  Hospital medicine has been consulted for medical management.

## 2018-01-20 NOTE — SUBJECTIVE & OBJECTIVE
Interval History: No new issues. States no further SOB. At her baseline.     Review of Systems   Constitutional: Negative for activity change.   Respiratory: Negative for chest tightness and shortness of breath.    Cardiovascular: Negative for chest pain.   Gastrointestinal: Negative for abdominal pain.     Objective:     Vital Signs (Most Recent):  Temp: 100 °F (37.8 °C) (01/20/18 0737)  Pulse: 87 (01/20/18 0737)  Resp: 18 (01/20/18 0737)  BP: 108/63 (01/20/18 0737)  SpO2: 96 % (01/20/18 0737) Vital Signs (24h Range):  Temp:  [97.7 °F (36.5 °C)-100.1 °F (37.8 °C)] 100 °F (37.8 °C)  Pulse:  [] 87  Resp:  [13-44] 18  SpO2:  [81 %-100 %] 96 %  BP: ()/(45-87) 108/63     Weight: 108 kg (238 lb 1.6 oz)  Body mass index is 39.62 kg/m².    Intake/Output Summary (Last 24 hours) at 01/20/18 0744  Last data filed at 01/20/18 0610   Gross per 24 hour   Intake             2360 ml   Output             1175 ml   Net             1185 ml      Physical Exam   Constitutional: She is oriented to person, place, and time. She appears well-developed and well-nourished.   HENT:   Head: Normocephalic and atraumatic.   Cardiovascular: Normal rate.    Pulmonary/Chest: Effort normal and breath sounds normal. No respiratory distress. She has no wheezes. She has no rales.   Neurological: She is alert and oriented to person, place, and time.   Skin: Skin is warm and dry.       Significant Labs:   CBC:   Recent Labs  Lab 01/19/18  1226 01/20/18  0353   WBC  --  16.47*   HGB 10.6* 8.5*   HCT 34.5* 28.0*   PLT  --  208     CMP:   Recent Labs  Lab 01/19/18  1226      K 3.3*      CO2 21*   GLU 82   BUN 10   CREATININE 0.8   CALCIUM 8.6*   ANIONGAP 13   EGFRNONAA >60       Significant Imaging:

## 2018-01-20 NOTE — ASSESSMENT & PLAN NOTE
She had a low blood pressure reading while on the floor and received a 1L bolus of normal saline, will hold home amlodipine and bumex and monitor blood pressures.  Adjust antihypertensives as needed.  Resolved problem. Send home on home meds.

## 2018-01-20 NOTE — PLAN OF CARE
01/20/18 1540   Discharge Assessment   Assessment Type Discharge Planning Assessment   Confirmed/corrected address and phone number on facesheet? Yes   Assessment information obtained from? Patient   Prior to hospitilization cognitive status: Alert/Oriented   Prior to hospitalization functional status: Assistive Equipment;Needs Assistance   Current cognitive status: Alert/Oriented   Current Functional Status: Assistive Equipment;Needs Assistance   Lives With spouse   Able to Return to Prior Arrangements yes   Is patient able to care for self after discharge? Yes   Patient's perception of discharge disposition home or selfcare   Readmission Within The Last 30 Days no previous admission in last 30 days   Equipment Currently Used at Home wheelchair;shower chair   Do you have any problems affording any of your prescribed medications? No   Is the patient taking medications as prescribed? yes   Does the patient have transportation home? Yes   Transportation Available family or friend will provide   Discharge Plan A Home   Discharge Plan B Home   Patient/Family In Agreement With Plan yes

## 2018-01-20 NOTE — PROGRESS NOTES
[facility] Hospital Medicine  Progress Note    Patient Name: Mary Ellen Fajardo  MRN: [id number]  Patient Class: OP- Observation   Admission Date: 1/19/2018  Length of Stay: 0 days  Attending Physician: RAMA Tinoco MD  Primary Care Provider: Any Tran MD        Subjective:     Principal Problem:Staghorn calculus    HPI:  62 y.o. female with hypertension, neuropathy, depression, insomnia, ADEEL, neurogenic bladder with suprapubic catheter in place, right staghorn calculus, and chronic back and knee pain admitted following Urological procedure including right ureteral stent removal and right nephrostomy tube placement.  She became dyspneic, hypoxic, and tachypneic in recovery but improved quickly with BPAP support and has now been weaned to nasal canula.  She currently complains of her usual chronic back pain and some acute right flank discomfort in the area of the nephrostomy tube.  She otherwise feels well and denies fever, chills, cough, SOB, chest pain, abdominal pain, nausea, vomiting, or diarrhea.  Hospital medicine has been consulted for medical management.    Hospital Course:  No notes on file    Interval History: No new issues. States no further SOB. At her baseline.     Review of Systems   Constitutional: Negative for activity change.   Respiratory: Negative for chest tightness and shortness of breath.    Cardiovascular: Negative for chest pain.   Gastrointestinal: Negative for abdominal pain.     Objective:     Vital Signs (Most Recent):  Temp: 100 °F (37.8 °C) (01/20/18 0737)  Pulse: 87 (01/20/18 0737)  Resp: 18 (01/20/18 0737)  BP: 108/63 (01/20/18 0737)  SpO2: 96 % (01/20/18 0737) Vital Signs (24h Range):  Temp:  [97.7 °F (36.5 °C)-100.1 °F (37.8 °C)] 100 °F (37.8 °C)  Pulse:  [] 87  Resp:  [13-44] 18  SpO2:  [81 %-100 %] 96 %  BP: ()/(45-87) 108/63     Weight: 108 kg (238 lb 1.6 oz)  Body mass index is 39.62 kg/m².    Intake/Output Summary (Last 24 hours) at  "01/20/18 0744  Last data filed at 01/20/18 0610   Gross per 24 hour   Intake             2360 ml   Output             1175 ml   Net             1185 ml      Physical Exam   Constitutional: She is oriented to person, place, and time. She appears well-developed and well-nourished.   HENT:   Head: Normocephalic and atraumatic.   Cardiovascular: Normal rate.    Pulmonary/Chest: Effort normal and breath sounds normal. No respiratory distress. She has no wheezes. She has no rales.   Neurological: She is alert and oriented to person, place, and time.   Skin: Skin is warm and dry.       Significant Labs:   CBC:   Recent Labs  Lab 01/19/18  1226 01/20/18  0353   WBC  --  16.47*   HGB 10.6* 8.5*   HCT 34.5* 28.0*   PLT  --  208     CMP:   Recent Labs  Lab 01/19/18  1226      K 3.3*      CO2 21*   GLU 82   BUN 10   CREATININE 0.8   CALCIUM 8.6*   ANIONGAP 13   EGFRNONAA >60       Significant Imaging:    Assessment/Plan:      * Staghorn calculus    Per primary team        Obstructive sleep apnea    She reports seldom wearing her "mask" at home and states that her ADEEL has improved recently after losing some weight.  She does not recall her home settings.  -PRN BPAP 10/5  Resume Bipap at home.         Primary insomnia    Stable, no acute issues, continue home trazodone.        Neuropathy    Stable, no acute issues, continue home gabapentin.        Recurrent major depressive disorder, in partial remission    Stable, no acute issues, continue home sertraline.        Essential hypertension    She had a low blood pressure reading while on the floor and received a 1L bolus of normal saline, will hold home amlodipine and bumex and monitor blood pressures.  Adjust antihypertensives as needed.  Resolved problem. Send home on home meds.         Kidney stones    Per primary team- leukocytosis likely reactive from procedure.         Primary osteoarthritis of left knee    Stable, no acute issues.        Gait disorder    Stable, " no acute issues.        Paraparesis    Stable, no acute issues.        obesity Body mass index is 39.62 kg/m².  Weight loss as out patient. This is patient's primary medical issue     VTE Risk Mitigation         Ordered     Medium Risk of VTE  Once      01/19/18 1957     Place SARAH hose  Until discontinued      01/19/18 1957     Place sequential compression device  Until discontinued      01/19/18 1957        Will d/c 02.    No new med issues.   Likely will be discharged today.        Nicolás Hunt MD  Department of Hospital Medicine   [facility]

## 2018-01-20 NOTE — HPI
Feels great   Eager to go home  BP is stable; Dr Bass recommends cont same BP meds at home    Urine is clear light pink  No active bleeding

## 2018-01-20 NOTE — ANESTHESIA POSTPROCEDURE EVALUATION
"Anesthesia Post Evaluation    Patient: Mary Ellen Fajardo    Procedure(s) Performed: Procedure(s) (LRB):  NEPHROLITHOTOMY-PERCUTANEOUS (Right)  PLACEMENT  EXCHANGE CATHETER-SUPRAPUBIC  URETEROSCOPY (Right)  CYSTOSCOPY,  OCCLUSION BALLOON PLACEMENT, PERC ACCESS  PYELOGRAM-ANTEGRADE  PYELOGRAM-RETROGRADE  LITHOTRIPSY-LASER (Right)  PLACEMENT-RENAL ACCESS (Right)    Final Anesthesia Type: general  Patient location during evaluation: PACU  Patient participation: Yes- Able to Participate  Level of consciousness: awake and alert and oriented  Post-procedure vital signs: reviewed and stable  Pain management: adequate  Airway patency: patent  PONV status at discharge: No PONV  Anesthetic complications: no      Cardiovascular status: blood pressure returned to baseline and hemodynamically stable  Respiratory status: unassisted, spontaneous ventilation and room air  Hydration status: euvolemic  Follow-up not needed.        Visit Vitals  BP (!) 100/57   Pulse 85   Temp 37.5 °C (99.5 °F) (Oral)   Resp 18   Ht 5' 5" (1.651 m)   Wt 108 kg (238 lb 1.6 oz)   SpO2 (!) 94%   Breastfeeding? No   BMI 39.62 kg/m²       Pain/Teresita Score: Pain Assessment Performed: Yes (1/20/2018  8:00 AM)  Presence of Pain: complains of pain/discomfort (1/20/2018  8:00 AM)  Pain Rating Prior to Med Admin: 9 (1/20/2018  9:50 AM)  Pain Rating Post Med Admin: 2 (1/19/2018  5:30 PM)  Teresita Score: 9 (1/19/2018  5:30 PM)      "

## 2018-01-20 NOTE — SUBJECTIVE & OBJECTIVE
Past Medical History:   Diagnosis Date    Asthma     Bladder stones     Hepatitis C     Hypertension     Paraplegia     Paraplegic spinal paralysis     SCI (spinal cord injury)     incomplete    Suprapubic catheter        Past Surgical History:   Procedure Laterality Date    BACK SURGERY      bilateral knee replacement      BREAST BIOPSY      cysto/lithopaxy 2017      JOINT REPLACEMENT      bilateral knee       Review of patient's allergies indicates:   Allergen Reactions    Rocephin [ceftriaxone] Rash       No current facility-administered medications on file prior to encounter.      Current Outpatient Prescriptions on File Prior to Encounter   Medication Sig    amlodipine (NORVASC) 10 MG tablet Take 10 mg by mouth once daily.     baclofen (LIORESAL) 20 MG tablet 10 mg 4 (four) times daily.     bumetanide (BUMEX) 1 MG tablet 1 mg once daily.     catheter 18 Fr Misc Change every 20 days    gabapentin (NEURONTIN) 300 MG capsule 600 mg 3 (three) times daily.     oxybutynin (DITROPAN-XL) 5 MG TR24 TAKE 1 TABLET BY MOUTH EVERY DAY    oxyCODONE-acetaminophen (PERCOCET)  mg per tablet Take 1 tablet by mouth every 8 (eight) hours as needed for Pain.    potassium chloride (KLOR-CON) 10 MEQ TbSR     sertraline (ZOLOFT) 50 MG tablet TK 1 T PO QD    trazodone (DESYREL) 100 MG tablet 100 mg.     VENTOLIN HFA 90 mcg/actuation inhaler      Family History     None        Social History Main Topics    Smoking status: Former Smoker     Quit date: 2002    Smokeless tobacco: Never Used    Alcohol use Yes      Comment: occasional    Drug use: No    Sexual activity: No     Review of Systems   Constitutional: Negative for chills, fatigue and fever.   Eyes: Negative for photophobia and visual disturbance.   Respiratory: Negative for cough and shortness of breath.    Cardiovascular: Negative for chest pain, palpitations and leg swelling.   Gastrointestinal: Negative for abdominal pain, diarrhea, nausea  and vomiting.   Genitourinary: Positive for flank pain (right). Negative for dysuria, frequency and urgency.   Musculoskeletal: Positive for back pain (chronic) and gait problem (chronic).   Skin: Negative for pallor, rash and wound.   Neurological: Negative for light-headedness and headaches.   Psychiatric/Behavioral: Negative for confusion and decreased concentration.     Objective:     Vital Signs (Most Recent):  Temp: 100 °F (37.8 °C) (01/19/18 1940)  Pulse: 95 (01/19/18 2036)  Resp: 20 (01/19/18 2036)  BP: (!) 98/51 (01/19/18 2036)  SpO2: 99 % (01/19/18 2036) Vital Signs (24h Range):  Temp:  [97.7 °F (36.5 °C)-100 °F (37.8 °C)] 100 °F (37.8 °C)  Pulse:  [] 95  Resp:  [13-44] 20  SpO2:  [81 %-100 %] 99 %  BP: ()/(45-87) 98/51        There is no height or weight on file to calculate BMI.    Physical Exam   Constitutional: She is oriented to person, place, and time. She appears well-developed and well-nourished. No distress.   HENT:   Head: Normocephalic and atraumatic.   Right Ear: External ear normal.   Left Ear: External ear normal.   Nose: Nose normal.   Mouth/Throat: Oropharynx is clear and moist.   Eyes: Conjunctivae and EOM are normal. Pupils are equal, round, and reactive to light.   Neck: Normal range of motion. Neck supple.   Cardiovascular: Normal rate, regular rhythm and intact distal pulses.    Pulmonary/Chest: Effort normal. No respiratory distress. She has no wheezes. She has rhonchi.   Abdominal: Soft. Bowel sounds are normal. She exhibits no distension. There is no tenderness.   No palpable hepatomegaly or splenomegaly    Genitourinary:   Genitourinary Comments: Nephrostomy tube secured to right flank draining small amount of red urine, suprapubic catheter in place without obvious sign of complication or dysfunction.   Musculoskeletal: Normal range of motion. She exhibits no edema or tenderness.   Neurological: She is alert and oriented to person, place, and time.   Skin: Skin is warm  and dry.   Psychiatric: Thought content normal. Her mood appears anxious.   Nursing note and vitals reviewed.      Significant Labs: All pertinent labs within the past 24 hours have been reviewed.    Significant Imaging: I have reviewed and interpreted all pertinent imaging results/findings within the past 24 hours.

## 2018-01-20 NOTE — ASSESSMENT & PLAN NOTE
"She reports seldom wearing her "mask" at home and states that her ADEEL has improved recently after losing some weight.  She does not recall her home settings.  -PRN BPAP 10/5  Resume Bipap at home.   "

## 2018-01-20 NOTE — PLAN OF CARE
Problem: Patient Care Overview  Goal: Plan of Care Review  Outcome: Ongoing (interventions implemented as appropriate)  Pt free from falls, injury or any further trauma throughout shift. Pt AAOx4. Continued medications as ordered. Complaints of pain to back during shift, controlled with medications. Sundance boots applied due to foot drop. Suprapubic catheter and nephrostomy tube in place, monitoring output. Pt in no distress. Will cont to monitor.    01/20/18 2939   Coping/Psychosocial   Plan Of Care Reviewed With patient

## 2018-01-20 NOTE — PLAN OF CARE
Problem: Patient Care Overview  Goal: Plan of Care Review  Outcome: Ongoing (interventions implemented as appropriate)  Patient AAOX4.  Patient c/o pain; unable to administer due to low blood pressure.  Blood pressure low post-op; advised Dr. Rice and was provided with orders to give 1 liter bolus and re-evaluate.  Dr. Rice advised to hold b/p meds and not to administered trazodone until blood pressure is within normal limits.  Patient incision sites intact and both suprapubic catheter and nephro tube are in place draining bloody drainage.  Other than incision sites skin intact with dry scaly areas on areas surrounding the knee bilaterally.  Patient was turned every 2 hours.  Patient tolerated PO meds and IV fluids.  Labs were drawn.  Hourly rounding conducted to ensure safety and assist with personal care needs.  No acute distress noted throughout the night.  Will continue to monitor.

## 2018-01-20 NOTE — CONSULTS
[facility] Hospital Medicine  Consult Note    Patient Name: Mary Ellen Fajardo  MRN: [id number]  Admission Date: 1/19/2018  Hospital Length of Stay: 0 days  Attending Physician: RAMA Tinoco MD   Primary Care Provider: Any Tran MD           Patient information was obtained from patient, past medical records and ER records.     Consults  Subjective:     Principal Problem: Staghorn calculus    Chief Complaint: No chief complaint on file.       HPI: 62 y.o. female with hypertension, neuropathy, depression, insomnia, ADEEL, neurogenic bladder with suprapubic catheter in place, right staghorn calculus, and chronic back and knee pain admitted following Urological procedure including right ureteral stent removal and right nephrostomy tube placement.  She became dyspneic, hypoxic, and tachypneic in recovery but improved quickly with BPAP support and has now been weaned to nasal canula.  She currently complains of her usual chronic back pain and some acute right flank discomfort in the area of the nephrostomy tube.  She otherwise feels well and denies fever, chills, cough, SOB, chest pain, abdominal pain, nausea, vomiting, or diarrhea.  Hospital medicine has been consulted for medical management.    Past Medical History:   Diagnosis Date    Asthma     Bladder stones     Hepatitis C     Hypertension     Paraplegia     Paraplegic spinal paralysis     SCI (spinal cord injury)     incomplete    Suprapubic catheter        Past Surgical History:   Procedure Laterality Date    BACK SURGERY      bilateral knee replacement      BREAST BIOPSY      cysto/lithopaxy 2017      JOINT REPLACEMENT      bilateral knee       Review of patient's allergies indicates:   Allergen Reactions    Rocephin [ceftriaxone] Rash       No current facility-administered medications on file prior to encounter.      Current Outpatient Prescriptions on File Prior to Encounter   Medication Sig    amlodipine  (NORVASC) 10 MG tablet Take 10 mg by mouth once daily.     baclofen (LIORESAL) 20 MG tablet 10 mg 4 (four) times daily.     bumetanide (BUMEX) 1 MG tablet 1 mg once daily.     catheter 18 Fr Misc Change every 20 days    gabapentin (NEURONTIN) 300 MG capsule 600 mg 3 (three) times daily.     oxybutynin (DITROPAN-XL) 5 MG TR24 TAKE 1 TABLET BY MOUTH EVERY DAY    oxyCODONE-acetaminophen (PERCOCET)  mg per tablet Take 1 tablet by mouth every 8 (eight) hours as needed for Pain.    potassium chloride (KLOR-CON) 10 MEQ TbSR     sertraline (ZOLOFT) 50 MG tablet TK 1 T PO QD    trazodone (DESYREL) 100 MG tablet 100 mg.     VENTOLIN HFA 90 mcg/actuation inhaler      Family History     None        Social History Main Topics    Smoking status: Former Smoker     Quit date: 2002    Smokeless tobacco: Never Used    Alcohol use Yes      Comment: occasional    Drug use: No    Sexual activity: No     Review of Systems   Constitutional: Negative for chills, fatigue and fever.   Eyes: Negative for photophobia and visual disturbance.   Respiratory: Negative for cough and shortness of breath.    Cardiovascular: Negative for chest pain, palpitations and leg swelling.   Gastrointestinal: Negative for abdominal pain, diarrhea, nausea and vomiting.   Genitourinary: Positive for flank pain (right). Negative for dysuria, frequency and urgency.   Musculoskeletal: Positive for back pain (chronic) and gait problem (chronic).   Skin: Negative for pallor, rash and wound.   Neurological: Negative for light-headedness and headaches.   Psychiatric/Behavioral: Negative for confusion and decreased concentration.     Objective:     Vital Signs (Most Recent):  Temp: 100 °F (37.8 °C) (01/19/18 1940)  Pulse: 95 (01/19/18 2036)  Resp: 20 (01/19/18 2036)  BP: (!) 98/51 (01/19/18 2036)  SpO2: 99 % (01/19/18 2036) Vital Signs (24h Range):  Temp:  [97.7 °F (36.5 °C)-100 °F (37.8 °C)] 100 °F (37.8 °C)  Pulse:  [] 95  Resp:  [13-44]  "20  SpO2:  [81 %-100 %] 99 %  BP: ()/(45-87) 98/51        There is no height or weight on file to calculate BMI.    Physical Exam   Constitutional: She is oriented to person, place, and time. She appears well-developed and well-nourished. No distress.   HENT:   Head: Normocephalic and atraumatic.   Right Ear: External ear normal.   Left Ear: External ear normal.   Nose: Nose normal.   Mouth/Throat: Oropharynx is clear and moist.   Eyes: Conjunctivae and EOM are normal. Pupils are equal, round, and reactive to light.   Neck: Normal range of motion. Neck supple.   Cardiovascular: Normal rate, regular rhythm and intact distal pulses.    Pulmonary/Chest: Effort normal. No respiratory distress. She has no wheezes. She has rhonchi.   Abdominal: Soft. Bowel sounds are normal. She exhibits no distension. There is no tenderness.   No palpable hepatomegaly or splenomegaly    Genitourinary:   Genitourinary Comments: Nephrostomy tube secured to right flank draining small amount of red urine, suprapubic catheter in place without obvious sign of complication or dysfunction.   Musculoskeletal: Normal range of motion. She exhibits no edema or tenderness.   Neurological: She is alert and oriented to person, place, and time.   Skin: Skin is warm and dry.   Psychiatric: Thought content normal. Her mood appears anxious.   Nursing note and vitals reviewed.      Significant Labs: All pertinent labs within the past 24 hours have been reviewed.    Significant Imaging: I have reviewed and interpreted all pertinent imaging results/findings within the past 24 hours.    Assessment/Plan:     * Staghorn calculus    Per primary team        Kidney stones    Per primary team        Obstructive sleep apnea    She reports seldom wearing her "mask" at home and states that her ADEEL has improved recently after losing some weight.  She does not recall her home settings.  -PRN BPAP 10/5        Primary insomnia    Stable, no acute issues, continue " home trazodone.        Neuropathy    Stable, no acute issues, continue home gabapentin.        Recurrent major depressive disorder, in partial remission    Stable, no acute issues, continue home sertraline.        Essential hypertension    She had a low blood pressure reading while on the floor and received a 1L bolus of normal saline, will hold home amlodipine and bumex and monitor blood pressures.  Adjust antihypertensives as needed.        Primary osteoarthritis of left knee    Stable, no acute issues.        Gait disorder    Stable, no acute issues.        Paraparesis    Stable, no acute issues.          VTE Risk Mitigation         Ordered     Medium Risk of VTE  Once      01/19/18 1957     Place SARAH hose  Until discontinued      01/19/18 1957     Place sequential compression device  Until discontinued      01/19/18 1957              Thank you for your consult. We will follow-up with patient. Please contact us if you have any additional questions.    Maurice Calle Jr., APRN, AGACNP-BC  Hospitalist - Department of Hospital Medicine  [facility] [address] [place] [address]  Office #: [phone]; Pager #: [phone]

## 2018-01-20 NOTE — ASSESSMENT & PLAN NOTE
"She reports seldom wearing her "mask" at home and states that her ADEEL has improved recently after losing some weight.  She does not recall her home settings.  -PRN BPAP 10/5  "

## 2018-01-21 NOTE — NURSING
D/C instructions given to pt and so, stated understanding of follow up appt information and medications to take. Pt stated she has enough augmentin to get throughout to Monday and will  meds than. Pt in no distress. Wolfe and nephrostomy tube in place. Pt understands she is going home with tubes. IV d/c'd, cath tip intact, pt tolerated well. Son at bedside, is putting pt in wheelchair.

## 2018-01-22 ENCOUNTER — OFFICE VISIT (OUTPATIENT)
Dept: UROLOGY | Facility: CLINIC | Age: 63
End: 2018-01-22
Payer: MEDICARE

## 2018-01-22 VITALS — HEIGHT: 65 IN | WEIGHT: 238 LBS | BODY MASS INDEX: 39.65 KG/M2

## 2018-01-22 DIAGNOSIS — N21.0 BLADDER STONE: ICD-10-CM

## 2018-01-22 DIAGNOSIS — N20.0 STAGHORN CALCULUS: Primary | ICD-10-CM

## 2018-01-22 DIAGNOSIS — N31.9 NEUROGENIC BLADDER: ICD-10-CM

## 2018-01-22 PROCEDURE — 99213 OFFICE O/P EST LOW 20 MIN: CPT | Mod: PBBFAC | Performed by: UROLOGY

## 2018-01-22 PROCEDURE — 99024 POSTOP FOLLOW-UP VISIT: CPT | Mod: POP,,, | Performed by: UROLOGY

## 2018-01-22 PROCEDURE — 99999 PR PBB SHADOW E&M-EST. PATIENT-LVL III: CPT | Mod: PBBFAC,,, | Performed by: UROLOGY

## 2018-01-22 RX ORDER — OXYCODONE AND ACETAMINOPHEN 10; 325 MG/1; MG/1
1 TABLET ORAL EVERY 8 HOURS PRN
Qty: 30 TABLET | Refills: 0 | Status: SHIPPED | OUTPATIENT
Start: 2018-01-22 | End: 2018-04-05

## 2018-01-22 NOTE — PROGRESS NOTES
Subjective:       Patient ID: Mary Ellen Fajardo is a 62 y.o. female who was referred by No ref. provider found    Chief Complaint:   Chief Complaint   Patient presents with    Post-op Evaluation     post op from pcnl     History of Present Illness  Neurogenic Bladder  Her bladder is managed with a suprapubic catheter. She has had one since March 2011. Her  changes her 18 Fr catheter every 20 days. She has been having problems with sedimentation and urgency incontinence. She has also been having pain with catheter removal and insertion.  She has noted a dark color and more sediment at times.  She had a routine ALBERTINA suggesting a right kidney stone.  Follow up CT showed a right staghorn calculus and large bladder stone.    Stones  She had a cystolitholapaxy on 12/20/2017 with right stent placement.  She had a Right PCNL on Friday 1/19/2018.  She was discharged home the next day and is here to set up a second look.      ACTIVE MEDICAL ISSUES:  Patient Active Problem List   Diagnosis    Paraparesis    Gait disorder    Neurogenic bladder    Knee pain, bilateral    S/P knee replacement    OA (osteoarthritis) of knee    Primary osteoarthritis of left knee    Poor motor control of trunk    Decreased range of motion of lower extremity    Bilateral leg weakness    Chronic knee pain    Bladder stone    Staghorn calculus    Kidney stones    Essential hypertension    Recurrent major depressive disorder, in partial remission    Neuropathy    Primary insomnia    Obstructive sleep apnea       PAST MEDICAL HISTORY  Past Medical History:   Diagnosis Date    Asthma     Bladder stones     Hepatitis C     Hypertension     Paraplegia     Paraplegic spinal paralysis     SCI (spinal cord injury)     incomplete    Suprapubic catheter        PAST SURGICAL HISTORY:  Past Surgical History:   Procedure Laterality Date    BACK SURGERY      bilateral knee replacement      BREAST BIOPSY       cysto/lithopaxy 2017      JOINT REPLACEMENT      bilateral knee       SOCIAL HISTORY:  Social History   Substance Use Topics    Smoking status: Former Smoker     Quit date: 2002    Smokeless tobacco: Never Used    Alcohol use Yes      Comment: occasional       FAMILY HISTORY:  History reviewed. No pertinent family history.    ALLERGIES AND MEDICATIONS: updated and reviewed.  Review of patient's allergies indicates:   Allergen Reactions    Rocephin [ceftriaxone] Rash     Current Outpatient Prescriptions   Medication Sig    amlodipine (NORVASC) 10 MG tablet Take 10 mg by mouth once daily.     amoxicillin-clavulanate 500-125mg (AUGMENTIN) 500-125 mg Tab Take 1 tablet (500 mg total) by mouth 2 (two) times daily.    baclofen (LIORESAL) 20 MG tablet 10 mg 4 (four) times daily.     bumetanide (BUMEX) 1 MG tablet 1 mg once daily.     catheter 18 Fr Misc Change every 20 days    gabapentin (NEURONTIN) 300 MG capsule 600 mg 3 (three) times daily.     oxybutynin (DITROPAN-XL) 5 MG TR24 TAKE 1 TABLET BY MOUTH EVERY DAY    oxyCODONE-acetaminophen (PERCOCET)  mg per tablet Take 1 tablet by mouth every 8 (eight) hours as needed for Pain.    potassium chloride (KLOR-CON) 10 MEQ TbSR     sertraline (ZOLOFT) 50 MG tablet TK 1 T PO QD    trazodone (DESYREL) 100 MG tablet 100 mg.     VENTOLIN HFA 90 mcg/actuation inhaler      No current facility-administered medications for this visit.        Review of Systems   Constitutional: Negative for activity change, fatigue, fever and unexpected weight change.   Eyes: Negative for redness and visual disturbance.   Respiratory: Negative for chest tightness and shortness of breath.    Cardiovascular: Negative for chest pain and leg swelling.   Gastrointestinal: Negative for abdominal distention, abdominal pain, constipation, diarrhea, nausea and vomiting.   Genitourinary: Positive for hematuria. Negative for difficulty urinating, dysuria, flank pain, frequency, pelvic pain,  "urgency and vaginal bleeding.   Musculoskeletal: Negative for arthralgias and joint swelling.   Neurological: Negative for dizziness, weakness and headaches.   Psychiatric/Behavioral: Negative for confusion. The patient is not nervous/anxious.    All other systems reviewed and are negative.      Objective:      Vitals:    01/22/18 1133   Weight: 108 kg (238 lb)   Height: 5' 5" (1.651 m)     Physical Exam   Nursing note and vitals reviewed.  Constitutional: She is oriented to person, place, and time. She appears well-developed.   HENT:   Head: Normocephalic.   Eyes: Conjunctivae are normal.   Neck: Normal range of motion. No tracheal deviation present. No thyromegaly present.   Cardiovascular: Normal rate, normal heart sounds and normal pulses.    Pulmonary/Chest: Effort normal and breath sounds normal. No respiratory distress. She has no wheezes.   Abdominal: Soft. She exhibits no distension and no mass. There is no hepatosplenomegaly. There is no tenderness. There is no rebound, no guarding and no CVA tenderness. No hernia.   Genitourinary:   Genitourinary Comments: Right PCN in place with thin red urine   Musculoskeletal: Normal range of motion. She exhibits no edema or tenderness.   Lymphadenopathy:     She has no cervical adenopathy.   Neurological: She is alert and oriented to person, place, and time.   Skin: Skin is warm and dry. No rash noted. No erythema.     Psychiatric: She has a normal mood and affect. Her behavior is normal. Judgment and thought content normal.           Assessment:       1. Staghorn calculus    2. Neurogenic bladder    3. Bladder stone          Plan:       1. Staghorn calculus  Plan for a second look on Monday 1/29/2018  - oxyCODONE-acetaminophen (PERCOCET)  mg per tablet; Take 1 tablet by mouth every 8 (eight) hours as needed for Pain.  Dispense: 30 tablet; Refill: 0    2. Neurogenic bladder      3. Bladder stone    - oxyCODONE-acetaminophen (PERCOCET)  mg per tablet; Take " 1 tablet by mouth every 8 (eight) hours as needed for Pain.  Dispense: 30 tablet; Refill: 0            Follow-up in about 2 weeks (around 2/5/2018) for Follow up.

## 2018-01-25 ENCOUNTER — TELEPHONE (OUTPATIENT)
Dept: UROLOGY | Facility: CLINIC | Age: 63
End: 2018-01-25

## 2018-01-25 NOTE — TELEPHONE ENCOUNTER
Pt called stating her perc.tube was leaking per  have pt come in tomorrow to see  just to make sure everything is okay. I have tried several times to reach patient just got voice mail./yanelis

## 2018-01-26 NOTE — TELEPHONE ENCOUNTER
Spoke with pt this am she advised me that she did speak with  after hours to advise him her perc.tube was twisted which caused the leakage. Last night once perc.tube was untwisted the flow went back to normal an as of this morning  states still flowing great. Her procedure is on Monday 01/29/18./yanelis

## 2018-01-29 ENCOUNTER — ANESTHESIA EVENT (OUTPATIENT)
Dept: SURGERY | Facility: HOSPITAL | Age: 63
End: 2018-01-29
Payer: MEDICARE

## 2018-01-29 ENCOUNTER — SURGERY (OUTPATIENT)
Age: 63
End: 2018-01-29

## 2018-01-29 ENCOUNTER — HOSPITAL ENCOUNTER (OUTPATIENT)
Facility: HOSPITAL | Age: 63
Discharge: HOME | End: 2018-01-29
Attending: UROLOGY | Admitting: UROLOGY
Payer: MEDICARE

## 2018-01-29 ENCOUNTER — ANESTHESIA (OUTPATIENT)
Dept: SURGERY | Facility: HOSPITAL | Age: 63
End: 2018-01-29
Payer: MEDICARE

## 2018-01-29 VITALS
SYSTOLIC BLOOD PRESSURE: 122 MMHG | HEIGHT: 65 IN | OXYGEN SATURATION: 92 % | RESPIRATION RATE: 20 BRPM | HEART RATE: 69 BPM | BODY MASS INDEX: 39.65 KG/M2 | DIASTOLIC BLOOD PRESSURE: 63 MMHG | TEMPERATURE: 98 F | WEIGHT: 238 LBS

## 2018-01-29 DIAGNOSIS — N20.0 STAGHORN CALCULUS: Primary | ICD-10-CM

## 2018-01-29 LAB
BASOPHILS # BLD AUTO: 0.05 K/UL
BASOPHILS NFR BLD: 0.7 %
DIFFERENTIAL METHOD: ABNORMAL
EOSINOPHIL # BLD AUTO: 0.4 K/UL
EOSINOPHIL NFR BLD: 5.5 %
ERYTHROCYTE [DISTWIDTH] IN BLOOD BY AUTOMATED COUNT: 15.9 %
HCT VFR BLD AUTO: 30.7 %
HGB BLD-MCNC: 9.7 G/DL
LYMPHOCYTES # BLD AUTO: 2.4 K/UL
LYMPHOCYTES NFR BLD: 32.5 %
MCH RBC QN AUTO: 24.9 PG
MCHC RBC AUTO-ENTMCNC: 31.6 G/DL
MCV RBC AUTO: 79 FL
MONOCYTES # BLD AUTO: 0.5 K/UL
MONOCYTES NFR BLD: 6.6 %
NEUTROPHILS # BLD AUTO: 4 K/UL
NEUTROPHILS NFR BLD: 53.9 %
PLATELET # BLD AUTO: 476 K/UL
PMV BLD AUTO: 8.5 FL
RBC # BLD AUTO: 3.89 M/UL
WBC # BLD AUTO: 7.42 K/UL

## 2018-01-29 PROCEDURE — C1769 GUIDE WIRE: HCPCS | Performed by: UROLOGY

## 2018-01-29 PROCEDURE — 88300 SURGICAL PATH GROSS: CPT | Performed by: PATHOLOGY

## 2018-01-29 PROCEDURE — 36000709 HC OR TIME LEV III EA ADD 15 MIN: Performed by: UROLOGY

## 2018-01-29 PROCEDURE — 71000039 HC RECOVERY, EACH ADD'L HOUR: Performed by: UROLOGY

## 2018-01-29 PROCEDURE — 25000003 PHARM REV CODE 250: Performed by: REGISTERED NURSE

## 2018-01-29 PROCEDURE — D9220A PRA ANESTHESIA: Mod: ANES,,, | Performed by: ANESTHESIOLOGY

## 2018-01-29 PROCEDURE — 63600175 PHARM REV CODE 636 W HCPCS: Performed by: REGISTERED NURSE

## 2018-01-29 PROCEDURE — 25000003 PHARM REV CODE 250: Performed by: UROLOGY

## 2018-01-29 PROCEDURE — 85025 COMPLETE CBC W/AUTO DIFF WBC: CPT

## 2018-01-29 PROCEDURE — 36415 COLL VENOUS BLD VENIPUNCTURE: CPT

## 2018-01-29 PROCEDURE — 27100019 HC AMBU BAG ADULT/PED: Performed by: REGISTERED NURSE

## 2018-01-29 PROCEDURE — C2627 CATH, SUPRAPUBIC/CYSTOSCOPIC: HCPCS | Performed by: UROLOGY

## 2018-01-29 PROCEDURE — 94002 VENT MGMT INPAT INIT DAY: CPT

## 2018-01-29 PROCEDURE — 94640 AIRWAY INHALATION TREATMENT: CPT

## 2018-01-29 PROCEDURE — 25500020 PHARM REV CODE 255: Performed by: UROLOGY

## 2018-01-29 PROCEDURE — 37000008 HC ANESTHESIA 1ST 15 MINUTES: Performed by: UROLOGY

## 2018-01-29 PROCEDURE — 50080 PERQ NL/PL LITHOTRP SMPL<2CM: CPT | Mod: 58,RT,, | Performed by: UROLOGY

## 2018-01-29 PROCEDURE — C2617 STENT, NON-COR, TEM W/O DEL: HCPCS | Performed by: UROLOGY

## 2018-01-29 PROCEDURE — 25000242 PHARM REV CODE 250 ALT 637 W/ HCPCS: Performed by: ANESTHESIOLOGY

## 2018-01-29 PROCEDURE — 63600175 PHARM REV CODE 636 W HCPCS: Performed by: ANESTHESIOLOGY

## 2018-01-29 PROCEDURE — 37000009 HC ANESTHESIA EA ADD 15 MINS: Performed by: UROLOGY

## 2018-01-29 PROCEDURE — D9220A PRA ANESTHESIA: Mod: CRNA,,, | Performed by: REGISTERED NURSE

## 2018-01-29 PROCEDURE — 71000016 HC POSTOP RECOV ADDL HR: Performed by: UROLOGY

## 2018-01-29 PROCEDURE — C1758 CATHETER, URETERAL: HCPCS | Performed by: UROLOGY

## 2018-01-29 PROCEDURE — 36000708 HC OR TIME LEV III 1ST 15 MIN: Performed by: UROLOGY

## 2018-01-29 PROCEDURE — 71000015 HC POSTOP RECOV 1ST HR: Performed by: UROLOGY

## 2018-01-29 PROCEDURE — 63600175 PHARM REV CODE 636 W HCPCS: Performed by: UROLOGY

## 2018-01-29 PROCEDURE — 88300 SURGICAL PATH GROSS: CPT | Mod: 26,,, | Performed by: PATHOLOGY

## 2018-01-29 PROCEDURE — 71000033 HC RECOVERY, INTIAL HOUR: Performed by: UROLOGY

## 2018-01-29 PROCEDURE — 25000242 PHARM REV CODE 250 ALT 637 W/ HCPCS: Performed by: REGISTERED NURSE

## 2018-01-29 PROCEDURE — 50435 EXCHANGE NEPHROSTOMY CATH: CPT | Mod: 58,51,RT, | Performed by: UROLOGY

## 2018-01-29 PROCEDURE — 25000003 PHARM REV CODE 250: Performed by: ANESTHESIOLOGY

## 2018-01-29 PROCEDURE — 27201423 OPTIME MED/SURG SUP & DEVICES STERILE SUPPLY: Performed by: UROLOGY

## 2018-01-29 PROCEDURE — 82365 CALCULUS SPECTROSCOPY: CPT

## 2018-01-29 DEVICE — STENT URET PERCUFLEX 6FR 24CM: Type: IMPLANTABLE DEVICE | Site: KIDNEY | Status: FUNCTIONAL

## 2018-01-29 RX ORDER — PHENAZOPYRIDINE HYDROCHLORIDE 100 MG/1
200 TABLET, FILM COATED ORAL ONCE
Status: COMPLETED | OUTPATIENT
Start: 2018-01-29 | End: 2018-01-29

## 2018-01-29 RX ORDER — MIDAZOLAM HYDROCHLORIDE 1 MG/ML
INJECTION, SOLUTION INTRAMUSCULAR; INTRAVENOUS
Status: DISCONTINUED | OUTPATIENT
Start: 2018-01-29 | End: 2018-01-29

## 2018-01-29 RX ORDER — IPRATROPIUM BROMIDE AND ALBUTEROL SULFATE 2.5; .5 MG/3ML; MG/3ML
3 SOLUTION RESPIRATORY (INHALATION) ONCE
Status: COMPLETED | OUTPATIENT
Start: 2018-01-29 | End: 2018-01-29

## 2018-01-29 RX ORDER — FENTANYL CITRATE 50 UG/ML
INJECTION, SOLUTION INTRAMUSCULAR; INTRAVENOUS
Status: DISCONTINUED | OUTPATIENT
Start: 2018-01-29 | End: 2018-01-29

## 2018-01-29 RX ORDER — LIDOCAINE HYDROCHLORIDE 10 MG/ML
1 INJECTION, SOLUTION EPIDURAL; INFILTRATION; INTRACAUDAL; PERINEURAL ONCE
Status: DISCONTINUED | OUTPATIENT
Start: 2018-01-29 | End: 2018-01-29 | Stop reason: HOSPADM

## 2018-01-29 RX ORDER — LIDOCAINE HCL/PF 100 MG/5ML
SYRINGE (ML) INTRAVENOUS
Status: DISCONTINUED | OUTPATIENT
Start: 2018-01-29 | End: 2018-01-29

## 2018-01-29 RX ORDER — SODIUM CHLORIDE, SODIUM LACTATE, POTASSIUM CHLORIDE, CALCIUM CHLORIDE 600; 310; 30; 20 MG/100ML; MG/100ML; MG/100ML; MG/100ML
INJECTION, SOLUTION INTRAVENOUS CONTINUOUS
Status: DISCONTINUED | OUTPATIENT
Start: 2018-01-29 | End: 2018-01-29 | Stop reason: HOSPADM

## 2018-01-29 RX ORDER — HYDROCODONE BITARTRATE AND ACETAMINOPHEN 5; 325 MG/1; MG/1
1 TABLET ORAL EVERY 6 HOURS PRN
Qty: 30 TABLET | Refills: 0 | Status: SHIPPED | OUTPATIENT
Start: 2018-01-29 | End: 2018-02-28

## 2018-01-29 RX ORDER — ROCURONIUM BROMIDE 10 MG/ML
INJECTION, SOLUTION INTRAVENOUS
Status: DISCONTINUED | OUTPATIENT
Start: 2018-01-29 | End: 2018-01-29

## 2018-01-29 RX ORDER — PROPOFOL 10 MG/ML
VIAL (ML) INTRAVENOUS
Status: DISCONTINUED | OUTPATIENT
Start: 2018-01-29 | End: 2018-01-29

## 2018-01-29 RX ORDER — FENTANYL CITRATE 50 UG/ML
25 INJECTION, SOLUTION INTRAMUSCULAR; INTRAVENOUS EVERY 5 MIN PRN
Status: DISCONTINUED | OUTPATIENT
Start: 2018-01-29 | End: 2018-01-29 | Stop reason: HOSPADM

## 2018-01-29 RX ORDER — SODIUM CHLORIDE 0.9 G/100ML
IRRIGANT IRRIGATION
Status: DISCONTINUED | OUTPATIENT
Start: 2018-01-29 | End: 2018-01-29 | Stop reason: HOSPADM

## 2018-01-29 RX ORDER — NEOSTIGMINE METHYLSULFATE 1 MG/ML
INJECTION, SOLUTION INTRAVENOUS
Status: DISCONTINUED | OUTPATIENT
Start: 2018-01-29 | End: 2018-01-29

## 2018-01-29 RX ORDER — HYDROCODONE BITARTRATE AND ACETAMINOPHEN 5; 325 MG/1; MG/1
1 TABLET ORAL EVERY 4 HOURS PRN
Status: DISCONTINUED | OUTPATIENT
Start: 2018-01-29 | End: 2018-01-29 | Stop reason: HOSPADM

## 2018-01-29 RX ORDER — SODIUM CHLORIDE 0.9 % (FLUSH) 0.9 %
3 SYRINGE (ML) INJECTION
Status: DISCONTINUED | OUTPATIENT
Start: 2018-01-29 | End: 2018-01-29 | Stop reason: HOSPADM

## 2018-01-29 RX ORDER — SUCCINYLCHOLINE CHLORIDE 20 MG/ML
INJECTION INTRAMUSCULAR; INTRAVENOUS
Status: DISCONTINUED | OUTPATIENT
Start: 2018-01-29 | End: 2018-01-29

## 2018-01-29 RX ORDER — ALBUTEROL SULFATE 90 UG/1
AEROSOL, METERED RESPIRATORY (INHALATION)
Status: DISCONTINUED | OUTPATIENT
Start: 2018-01-29 | End: 2018-01-29

## 2018-01-29 RX ORDER — METOCLOPRAMIDE HYDROCHLORIDE 5 MG/ML
INJECTION INTRAMUSCULAR; INTRAVENOUS
Status: DISCONTINUED | OUTPATIENT
Start: 2018-01-29 | End: 2018-01-29

## 2018-01-29 RX ORDER — ONDANSETRON 2 MG/ML
4 INJECTION INTRAMUSCULAR; INTRAVENOUS ONCE AS NEEDED
Status: DISCONTINUED | OUTPATIENT
Start: 2018-01-29 | End: 2018-01-29 | Stop reason: HOSPADM

## 2018-01-29 RX ORDER — GLYCOPYRROLATE 0.2 MG/ML
INJECTION INTRAMUSCULAR; INTRAVENOUS
Status: DISCONTINUED | OUTPATIENT
Start: 2018-01-29 | End: 2018-01-29

## 2018-01-29 RX ORDER — ONDANSETRON 2 MG/ML
INJECTION INTRAMUSCULAR; INTRAVENOUS
Status: DISCONTINUED | OUTPATIENT
Start: 2018-01-29 | End: 2018-01-29

## 2018-01-29 RX ADMIN — MIDAZOLAM HYDROCHLORIDE 2 MG: 1 INJECTION, SOLUTION INTRAMUSCULAR; INTRAVENOUS at 08:01

## 2018-01-29 RX ADMIN — PROPOFOL 50 MG: 10 INJECTION, EMULSION INTRAVENOUS at 10:01

## 2018-01-29 RX ADMIN — PROPOFOL 150 MG: 10 INJECTION, EMULSION INTRAVENOUS at 08:01

## 2018-01-29 RX ADMIN — FENTANYL CITRATE 25 MCG: 50 INJECTION, SOLUTION INTRAMUSCULAR; INTRAVENOUS at 01:01

## 2018-01-29 RX ADMIN — SODIUM CHLORIDE, SODIUM LACTATE, POTASSIUM CHLORIDE, AND CALCIUM CHLORIDE: .6; .31; .03; .02 INJECTION, SOLUTION INTRAVENOUS at 07:01

## 2018-01-29 RX ADMIN — EPHEDRINE SULFATE 10 MG: 50 INJECTION, SOLUTION INTRAMUSCULAR; INTRAVENOUS; SUBCUTANEOUS at 10:01

## 2018-01-29 RX ADMIN — ROCURONIUM BROMIDE 10 MG: 10 INJECTION, SOLUTION INTRAVENOUS at 09:01

## 2018-01-29 RX ADMIN — GLYCOPYRROLATE 0.6 MG: 0.2 INJECTION, SOLUTION INTRAMUSCULAR; INTRAVENOUS at 10:01

## 2018-01-29 RX ADMIN — LIDOCAINE HYDROCHLORIDE 100 MG: 20 INJECTION, SOLUTION INTRAVENOUS at 08:01

## 2018-01-29 RX ADMIN — ALBUTEROL SULFATE 2 PUFF: 90 AEROSOL, METERED RESPIRATORY (INHALATION) at 10:01

## 2018-01-29 RX ADMIN — EPHEDRINE SULFATE 5 MG: 50 INJECTION, SOLUTION INTRAMUSCULAR; INTRAVENOUS; SUBCUTANEOUS at 08:01

## 2018-01-29 RX ADMIN — SODIUM CHLORIDE, SODIUM LACTATE, POTASSIUM CHLORIDE, AND CALCIUM CHLORIDE: .6; .31; .03; .02 INJECTION, SOLUTION INTRAVENOUS at 09:01

## 2018-01-29 RX ADMIN — EPHEDRINE SULFATE 10 MG: 50 INJECTION, SOLUTION INTRAMUSCULAR; INTRAVENOUS; SUBCUTANEOUS at 08:01

## 2018-01-29 RX ADMIN — EPHEDRINE SULFATE 20 MG: 50 INJECTION, SOLUTION INTRAMUSCULAR; INTRAVENOUS; SUBCUTANEOUS at 08:01

## 2018-01-29 RX ADMIN — EPHEDRINE SULFATE 15 MG: 50 INJECTION, SOLUTION INTRAMUSCULAR; INTRAVENOUS; SUBCUTANEOUS at 08:01

## 2018-01-29 RX ADMIN — PHENAZOPYRIDINE HYDROCHLORIDE 200 MG: 100 TABLET ORAL at 01:01

## 2018-01-29 RX ADMIN — EPHEDRINE SULFATE 5 MG: 50 INJECTION, SOLUTION INTRAMUSCULAR; INTRAVENOUS; SUBCUTANEOUS at 09:01

## 2018-01-29 RX ADMIN — MIDAZOLAM HYDROCHLORIDE 2 MG: 1 INJECTION, SOLUTION INTRAMUSCULAR; INTRAVENOUS at 10:01

## 2018-01-29 RX ADMIN — FENTANYL CITRATE 100 MCG: 50 INJECTION INTRAMUSCULAR; INTRAVENOUS at 10:01

## 2018-01-29 RX ADMIN — ROCURONIUM BROMIDE 10 MG: 10 INJECTION, SOLUTION INTRAVENOUS at 08:01

## 2018-01-29 RX ADMIN — GENTAMICIN SULFATE 116 MG: 40 INJECTION, SOLUTION INTRAMUSCULAR; INTRAVENOUS at 08:01

## 2018-01-29 RX ADMIN — SUCCINYLCHOLINE CHLORIDE 120 MG: 20 INJECTION, SOLUTION INTRAMUSCULAR; INTRAVENOUS at 08:01

## 2018-01-29 RX ADMIN — METOCLOPRAMIDE 10 MG: 5 INJECTION, SOLUTION INTRAMUSCULAR; INTRAVENOUS at 09:01

## 2018-01-29 RX ADMIN — EPHEDRINE SULFATE 5 MG: 50 INJECTION, SOLUTION INTRAMUSCULAR; INTRAVENOUS; SUBCUTANEOUS at 10:01

## 2018-01-29 RX ADMIN — ONDANSETRON 4 MG: 2 INJECTION, SOLUTION INTRAMUSCULAR; INTRAVENOUS at 10:01

## 2018-01-29 RX ADMIN — IOHEXOL 100 ML: 300 INJECTION, SOLUTION INTRAVENOUS at 09:01

## 2018-01-29 RX ADMIN — IPRATROPIUM BROMIDE AND ALBUTEROL SULFATE 3 ML: .5; 3 SOLUTION RESPIRATORY (INHALATION) at 07:01

## 2018-01-29 RX ADMIN — FENTANYL CITRATE 100 MCG: 50 INJECTION INTRAMUSCULAR; INTRAVENOUS at 08:01

## 2018-01-29 RX ADMIN — NEOSTIGMINE METHYLSULFATE 5 MG: 1 INJECTION INTRAVENOUS at 10:01

## 2018-01-29 RX ADMIN — SODIUM CHLORIDE 15000 ML: 0.9 IRRIGANT IRRIGATION at 09:01

## 2018-01-29 NOTE — DISCHARGE INSTRUCTIONS
BATHING/DRESSING:  ? Ok to shower tomorrow    ACTIVITY LEVEL: If you have received sedation or an anesthetic, you may feel sleepy for several hours. Rest until you are more awake. Gradually resume your normal activities.    No heavy lifting.  DIET: You may resume your home diet. If nausea is present, increase your diet gradually with fluids and bland foods.    Medications: Pain medication should be taken only if needed and as directed. If antibiotics are prescribed, the medication should be taken until completed. You will be given an updated list of you medications.    Last dose of Pyridium 1:02 pm    ? No driving, alcoholic beverages or signing legal documents for next 24 hours or while taking pain medication    CALL THE DOCTOR:    For any obvious bleeding (some dried blood over the incision is normal).    Some blood in your urine is normal.     Redness, swelling, foul smell around incision or fever over 101.   Shortness of breath, Coughing Up Bloody Sputum, or Pains or Swelling in your Calves..   Persistent pain or nausea not relieved by medication.   Problems urinating - unable to urinate or heavy bleeding (with our without clots) in urine.    If any unusual problems or difficulties occur contact your doctor. If you cannot contact your doctor but feel your signs and symptoms warrant a physicians attention return to the emergency room.    Fall Prevention  Millions of people fall every year and injure themselves. You may have had anesthesia or sedation which may increase your risk of falling. You may have health issues that put you at an increased risk of falling.     Here are ways to reduce your risk of falling.  ·   · Make your home safe by keeping walkways clear of objects you may trip over.  · Use non-slip pads under rugs. Do not use area rugs or small throw rugs.  · Use non-slip mats in bathtubs and showers.  · Install handrails and lights on staircases.  · Do not walk in poorly lit areas.  · Do not  stand on chairs or wobbly ladders.  · Use caution when reaching overhead or looking upward. This position can cause a loss of balance.  · Be sure your shoes fit properly, have non-slip bottoms and are in good condition.   · Wear shoes both inside and out. Avoid going barefoot or wearing slippers.  · Be cautious when going up and down stairs, curbs, and when walking on uneven sidewalks.  · If your balance is poor, consider using a cane or walker.  · If your fall was related to alcohol use, stop or limit alcohol intake.   · If your fall was related to use of sleeping medicines, talk to your doctor about this. You may need to reduce your dosage at bedtime if you awaken during the night to go to the bathroom.    · To reduce the need for nighttime bathroom trips:  ¨ Avoid drinking fluids for several hours before going to bed  ¨ Empty your bladder before going to bed  ¨ Men can keep a urinal at the bedside  · Stay as active as you can. Balance, flexibility, strength, and endurance all come from exercise. They all play a role in preventing falls. Ask your healthcare provider which types of activity are right for you.  · Get your vision checked on a regular basis.  · If you have pets, know where they are before you stand up or walk so you don't trip over them.  · Use night lights.

## 2018-01-29 NOTE — ANESTHESIA POSTPROCEDURE EVALUATION
"Anesthesia Post Evaluation    Patient: Mary Ellen Fajardo    Procedure(s) Performed: Procedure(s) (LRB):  NEPHROSTOLITHOTOMY-PERCUTANEOUS (Right)  LITHOTRIPSY-LASER (Right)  URETEROSCOPY (Right)  NEPHROSTOMY (Right)  PLACEMENT-STENT (Right)  PYELOGRAM-ANTEGRADE (Right)    Final Anesthesia Type: general  Patient location during evaluation: PACU  Patient participation: Yes- Able to Participate  Level of consciousness: awake and alert and oriented  Post-procedure vital signs: reviewed and stable  Pain management: adequate  Airway patency: patent  PONV status at discharge: No PONV  Anesthetic complications: yes  Perioperative Events: prolonged PACU stay - patient condition  Yasmine-operative Events Comments: Patient was difficult to wean from ventilator due to pre-existing pulmonary disease. We facilitated a slow wake up on the ventilator in PACU, then weaned to pressure support before extubating. She did well otherwise  Cardiovascular status: blood pressure returned to baseline and hemodynamically stable  Respiratory status: unassisted, spontaneous ventilation and room air  Hydration status: euvolemic  Follow-up not needed.        Visit Vitals  BP (!) 93/52   Pulse 70   Temp 36.4 °C (97.5 °F) (Oral)   Resp 14   Ht 5' 5" (1.651 m)   Wt 108 kg (238 lb)   SpO2 100%   Breastfeeding? No   BMI 39.61 kg/m²       Pain/Teresita Score: Pain Assessment Performed: Yes (1/29/2018 10:36 AM)  Presence of Pain: complains of pain/discomfort (1/29/2018  7:31 AM)  Pain Rating Prior to Med Admin: 0 (1/29/2018 10:36 AM)  Teresita Score: 8 (1/29/2018 11:06 AM)      "

## 2018-01-29 NOTE — OP NOTE
DATE OF PROCEDURE:  01/29/2018    PREOPERATIVE DIAGNOSIS:  Right staghorn calculus.    POSTOPERATIVE DIAGNOSIS:  Right staghorn calculus.    PROCEDURE PERFORMED:   Right percutaneous nephrolithotomy.  Right antegrade   pyelogram, right nephrostomy tube exchange, fluoroscopy.      PRIMARY SURGEON:  Jacques Tinoco M.D.    ANESTHESIA:  General.    ESTIMATED BLOOD LOSS:  Minimal.    DRAINS:  A 6-Zambian 24 cm double-J stent, no string and an 18-Zambian   percutaneous nephrostomy tube.    COMPLICATIONS:  None.    SPECIMENS REMOVED:  Right kidney stone.    INDICATIONS:  Mary Ellen Fajardo is a 62-year-old woman with history of right-sided   staghorn calculus.  She underwent a PCNL on 01/19/2018 she is here today for a   second look.      Scotty was taken to the Operating Room where she was positively identified by   sosa.  She was then administered general anesthesia and placed in the prone   position where her pressure points were padded.  She had a suprapubic tube   previously placed that was left to gravity drainage.    Her nephrostomy tube was then prepped and draped in the usual sterile fashion.     film was taken using fluoroscopy.      Next antegrade pyelogram was performed to delineate the pyelocalyceal structure.    There was a stone seen within the upper pole as well as the mid pole calices.    I then withdrew the nephrostomy tube under live fluoroscopy until the phalanges   were at the skin level.      Next, I then passed a 0.035 inch Bentson wire through the ureteral tail of the   access sheath and down the ureter to the level of the bladder, confirmed on   fluoroscopy.  The nephrostomy tube was then withdrawn, leaving the wire in   place.    I then advanced a 10-Zambian dual lumen catheter over the wire and into the   proximal ureter and then passed a Super Stiff wire in the second port down to   the level of bladder.  I then withdrew the dual lumen catheter.    I was then secured the Bentson wire to  the drapes for safety.    I then advanced a 15 cm NephroMax balloon catheter over the Super Stiff wire   into the lower pole dwight where the balloon was then inflated I then passed the   sheath over the balloon under live fluoroscopy into the lower pole dwight.      Next, I deflated the balloon and withdrew the balloon catheter, leaving the wire   in place.    I then advanced Nephroscope through the sheath into the lower pole of the   kidney.  I was able to advance this to the renal pelvis where there was no stone   seen.  However, I had to switch flexible cystoscope where I was able to see the   entry point of the mid pole dwight where a large segment stone was located.  In   the upper pole the flexible cystoscope, the edge of the stone in the upper pole;   however, due to deflection of the scope.  I was not able to get up to the upper   pole dwight.    I then switched to digital flexible ureteroscope using a 200 micron holmium   laser fiber, I was able to fragment the upper pole stone into smaller pieces.    I then attempted to gain access into the mid pole dwight where the larger portion   of stone was located.  Without the laser fiber, I was able to get into the mid   pole dwight; however, it was very difficult to negotiate the laser fiber and the   ureteroscope into the mid pole dwight due to deflection of the scope.  I was able   to do small portion of fragmentation of the stone.    I then determined that she would be best served by doing retrograde ureteroscopy   at a future date, this would be in order to gain access into the mid pole   dwight.    I then advanced the nephroscope over the wire and passed a 6-Cypriot 24 cm   double-J stent over the wire, deploying a coil within the bladder, confirmed on   fluoroscopy and a coil within the kidney confirmed visually.    I then withdrew the scope and sheath leaving the Bentson wire in place.      Next, I then attempted to pass 24 a Cypriot [place] tip catheter; however,  it did   not negotiate through the track very easily; therefore, I passed the NephroMax   balloon catheter over the Bentson wire to the lower pole dwight.  I then inflated   again passed the sheath one last time switched down to an 18-Libyan [place] tip   catheter I was able to pass this over the wire into the lower pole dwight.  A   small portion of contrast was placed in the balloon and total of 3 mL of sterile   water was passed into the balloon to inflate the balloon and then withdrew the   sheath, and wire leaving the catheter in place.    Sponge counts, needle counts and instrument counts were reported correct at the   end of the case.    The catheter was secured to the skin with 0 silk suture.    The catheter was left to gravity drainage.    Her anesthesia was then reversed.  She was placed in the supine position.  She   was then transferred to the Recovery Room in stable condition.      RODERICK/IN  dd: 01/29/2018 10:18:41 (CST)  td: 01/29/2018 14:23:49 (CST)  Doc ID   #[id number]  Job ID #[id number]    CC:

## 2018-01-29 NOTE — ANESTHESIA PREPROCEDURE EVALUATION
01/29/2018  Mary Ellen Fajardo is a 62 y.o., female.    Pre-op Assessment    I have reviewed the Patient Summary Reports.      I have reviewed the Medications.     Review of Systems  Anesthesia Hx:  No problems with previous Anesthesia  History of prior surgery of interest to airway management or planning: Personal Hx of Anesthesia complications Slow To Awaken/Delayed Emergence and significant; extubation delayed; prolonged PACU stay   Social:  Former Smoker    Cardiovascular:   Exercise tolerance: poor Denies Pacemaker. Hypertension  Denies Valvular problems/Murmurs.  Denies MI.  Denies CAD.    Denies CABG/stent.  Denies Dysrhythmias.   Denies Angina.        Pulmonary:   Denies Pneumonia Denies COPD. Asthma  Denies Shortness of breath.  Denies Recent URI. Sleep Apnea    Renal/:   Chronic Renal Disease renal calculi Neurogenic bladder (suprapubic catheter)   Hepatic/GI:   Denies PUD. GERD Liver Disease, Denies Hepatitis.    Musculoskeletal:   Arthritis     Neurological:   Denies CVA. Denies Seizures. 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 She is able to move both legs but not enough to walk   Endocrine:  Endocrine Normal    Psych:   depression          Physical Exam  General:  Well nourished    Airway/Jaw/Neck:  Airway Findings: Mouth Opening: Normal Tongue: Normal  General Airway Assessment: Adult  Mallampati: II  TM Distance: Normal, at least 6 cm  Jaw/Neck Findings:  Neck ROM: Normal ROM      Dental:  Dental Findings: In tact   Chest/Lungs:  Chest/Lungs Findings: Clear to auscultation, Normal Respiratory Rate     Heart/Vascular:  Heart Findings: Rate: Normal        Mental Status:  Mental Status Findings:  Cooperative, Alert and Oriented         Anesthesia Plan  Type of Anesthesia, risks & benefits discussed:  Anesthesia Type:  general  Patient's Preference:    Intra-op Monitoring Plan: standard ASA monitors  Intra-op Monitoring Plan Comments:   Post Op Pain Control Plan: multimodal analgesia, IV/PO Opioids PRN and per primary service following discharge from PACU  Post Op Pain Control Plan Comments:   Induction:   IV  Beta Blocker:  Patient is not currently on a Beta-Blocker (No further documentation required).       Informed Consent: Patient understands risks and agrees with Anesthesia plan.  Questions answered. Anesthesia consent signed with patient.  ASA Score: 3     Day of Surgery Review of History & Physical:    H&P update referred to the surgeon.         Ready For Surgery From Anesthesia Perspective.

## 2018-01-29 NOTE — BRIEF OP NOTE
[facility]  Brief Operative Note     SUMMARY     Surgery Date: 1/29/2018     Surgeon(s) and Role:     * RAMA Tinoco MD - Primary    Assisting Surgeon: None    Pre-op Diagnosis:  Staghorn calculus [N20.0]    Post-op Diagnosis:  Post-Op Diagnosis Codes:     * Staghorn calculus [N20.0]    Procedure(s) (LRB):  NEPHROSTOLITHOTOMY-PERCUTANEOUS (Right)    Anesthesia: General    Description of the findings of the procedure: Right 2nd look PCNL    Findings/Key Components: upper pole stone fragmented.  Difficult to access midpole stone    Estimated Blood Loss: * No values recorded between 1/29/2018  8:41 AM and 1/29/2018 10:10 AM *         Specimens:   Specimen (12h ago through future)    None          Discharge Note    SUMMARY     Admit Date: 1/29/2018    Discharge Date and Time:  01/29/2018 10:11 AM    Hospital Course (synopsis of major diagnoses, care, treatment, and services provided during the course of the hospital stay): Patient was admitted for an outpatient procedure and tolerated the procedure well with no complications.      Final Diagnosis: Post-Op Diagnosis Codes:     * Staghorn calculus [N20.0]    Disposition: Home or Self Care    Follow Up/Patient Instructions:     Medications:  Reconciled Home Medications:   Current Discharge Medication List      START taking these medications    Details   hydrocodone-acetaminophen 5-325mg (NORCO) 5-325 mg per tablet Take 1 tablet by mouth every 6 (six) hours as needed for Pain.  Qty: 30 tablet, Refills: 0    Associated Diagnoses: Staghorn calculus         CONTINUE these medications which have NOT CHANGED    Details   amlodipine (NORVASC) 10 MG tablet Take 10 mg by mouth once daily.   Refills: 1      amoxicillin-clavulanate 500-125mg (AUGMENTIN) 500-125 mg Tab Take 1 tablet (500 mg total) by mouth 2 (two) times daily.  Qty: 14 tablet, Refills: 1    Associated Diagnoses: Kidney stones; Paraparesis; Neurogenic bladder; Staghorn calculus      baclofen  (LIORESAL) 20 MG tablet 10 mg 4 (four) times daily.   Refills: 5      bumetanide (BUMEX) 1 MG tablet 1 mg once daily.       catheter 18 Fr Misc Change every 20 days  Qty: 10 each, Refills: 1    Associated Diagnoses: Neurogenic bladder      gabapentin (NEURONTIN) 300 MG capsule 600 mg 3 (three) times daily.       oxybutynin (DITROPAN-XL) 5 MG TR24 TAKE 1 TABLET BY MOUTH EVERY DAY  Qty: 90 tablet, Refills: 0    Associated Diagnoses: Neurogenic bladder      oxyCODONE-acetaminophen (PERCOCET)  mg per tablet Take 1 tablet by mouth every 8 (eight) hours as needed for Pain.  Qty: 30 tablet, Refills: 0    Associated Diagnoses: Bladder stone; Staghorn calculus      potassium chloride (KLOR-CON) 10 MEQ TbSR       sertraline (ZOLOFT) 50 MG tablet TK 1 T PO QD  Refills: 5      trazodone (DESYREL) 100 MG tablet 100 mg.   Refills: 5      VENTOLIN HFA 90 mcg/actuation inhaler              Discharge Procedure Orders  Diet general     Activity as tolerated     Call MD for:   Order Comments: Significant Hematuria       Follow-up Information     W Carlos Tinoco MD. Schedule an appointment as soon as possible for a visit on 2/2/2018.    Specialty:  Urology  Why:  Nephrostomy tube removal  Contact information:  [address] [place] [id number] [phone]

## 2018-01-29 NOTE — TRANSFER OF CARE
"Anesthesia Transfer of Care Note    Patient: Mary Ellen Fajardo    Procedure(s) Performed: Procedure(s) (LRB):  NEPHROSTOLITHOTOMY-PERCUTANEOUS (Right)  LITHOTRIPSY-LASER (Right)  URETEROSCOPY (Right)  NEPHROSTOMY (Right)  PLACEMENT-STENT (Right)  PYELOGRAM-ANTEGRADE (Right)    Patient location: PACU    Anesthesia Type: general    Transport from OR: Transported from OR intubated on 100% O2 by AMBU with assisted ventilation. Upon arrival to PACU/ICU, patient attached to ventilator and auscultated to confirm bilateral breath sounds and adequate TV    Post pain: adequate analgesia    Post assessment: no apparent anesthetic complications and tolerated procedure well    Post vital signs: stable    Level of consciousness: sedated    Nausea/Vomiting: no nausea/vomiting    Complications: none    Transfer of care protocol was followed      Last vitals:   Visit Vitals  BP (!) 91/51   Pulse 68   Temp 36.4 °C (97.5 °F) (Oral)   Resp 14   Ht 5' 5" (1.651 m)   Wt 108 kg (238 lb)   SpO2 100%   Breastfeeding? No   BMI 39.61 kg/m²     "

## 2018-01-29 NOTE — OR NURSING
"Patient wakes to name called, nods "no" when asked if hurting.  Curently on CPAP. Nephrostomy tube draining blood tinged. VSS.  "

## 2018-01-29 NOTE — OR NURSING
At  1054 pt awakens, looks around, coughing thru et tube, anxious. Dr rajan at bedside, resp therapist at bedside.1100 Vent changed to cpap. Emotional support provided pt settles

## 2018-02-21 ENCOUNTER — TELEPHONE (OUTPATIENT)
Dept: UROLOGY | Facility: CLINIC | Age: 63
End: 2018-02-21

## 2018-02-21 NOTE — TELEPHONE ENCOUNTER
----- Message from Micheline Mosley sent at 2/21/2018 12:34 PM CST -----  Contact: self  Pt calling to schedule Post Op. Soonest was 3/14 and pt states she has a tube to be removed. Please call [phone].

## 2018-02-21 NOTE — TELEPHONE ENCOUNTER
Patient is calling for apt- next available is mid-march- patient cancelled last two apts- can we schedule or place orders for PCNL exchange or do we need to bring patient in for an apt first. Please advise

## 2018-02-26 ENCOUNTER — OFFICE VISIT (OUTPATIENT)
Dept: UROLOGY | Facility: CLINIC | Age: 63
End: 2018-02-26
Payer: MEDICARE

## 2018-02-26 VITALS — BODY MASS INDEX: 39.65 KG/M2 | WEIGHT: 238 LBS | HEIGHT: 65 IN

## 2018-02-26 DIAGNOSIS — N31.9 NEUROGENIC BLADDER: ICD-10-CM

## 2018-02-26 DIAGNOSIS — R35.1 NOCTURIA MORE THAN TWICE PER NIGHT: ICD-10-CM

## 2018-02-26 DIAGNOSIS — N20.0 KIDNEY STONES: Primary | ICD-10-CM

## 2018-02-26 PROCEDURE — 99999 PR PBB SHADOW E&M-EST. PATIENT-LVL III: CPT | Mod: PBBFAC,,, | Performed by: UROLOGY

## 2018-02-26 PROCEDURE — 99024 POSTOP FOLLOW-UP VISIT: CPT | Mod: POP,,, | Performed by: UROLOGY

## 2018-02-26 PROCEDURE — 99213 OFFICE O/P EST LOW 20 MIN: CPT | Mod: PBBFAC | Performed by: UROLOGY

## 2018-02-26 NOTE — PROGRESS NOTES
Subjective:       Patient ID: Mary Ellen Fajardo is a 62 y.o. female who was referred by No ref. provider found    Chief Complaint:   Chief Complaint   Patient presents with    Nephrolithiasis     pt here to set up procedure       History of Present Illness  Neurogenic Bladder  Her bladder is managed with a suprapubic catheter. She has had one since March 2011. Her  changes her 18 Fr catheter every 20 days. She has been having problems with sedimentation and urgency incontinence. She has also been having pain with catheter removal and insertion.  She has noted a dark color and more sediment at times.  She had a routine ALBERTINA suggesting a right kidney stone.  Follow up CT showed a right staghorn calculus and large bladder stone.    Stones  She had a cystolitholapaxy on 12/20/2017 with right stent placement.  She had a Right PCNL on Friday 1/19/2018.  She then had a second look PCNL on Monday 1/29/2018.  She still has some stone but it cannot be accessed from that perc access.    ACTIVE MEDICAL ISSUES:  Patient Active Problem List   Diagnosis    Paraparesis    Gait disorder    Neurogenic bladder    Knee pain, bilateral    S/P knee replacement    OA (osteoarthritis) of knee    Primary osteoarthritis of left knee    Poor motor control of trunk    Decreased range of motion of lower extremity    Bilateral leg weakness    Chronic knee pain    Bladder stone    Staghorn calculus    Kidney stones    Essential hypertension    Recurrent major depressive disorder, in partial remission    Neuropathy    Primary insomnia    Obstructive sleep apnea       PAST MEDICAL HISTORY  Past Medical History:   Diagnosis Date    Asthma     Bladder stones     Hepatitis C     Hypertension     Paraplegia     Paraplegic spinal paralysis     SCI (spinal cord injury)     incomplete    Suprapubic catheter        PAST SURGICAL HISTORY:  Past Surgical History:   Procedure Laterality Date    BACK SURGERY       bilateral knee replacement      BREAST BIOPSY      cysto/lithopaxy 2017      CYSTOSCOPY W/ LASER LITHOTRIPSY      JOINT REPLACEMENT      bilateral knee       SOCIAL HISTORY:  Social History   Substance Use Topics    Smoking status: Former Smoker     Quit date: 2002    Smokeless tobacco: Never Used    Alcohol use Yes      Comment: occasional       FAMILY HISTORY:  History reviewed. No pertinent family history.    ALLERGIES AND MEDICATIONS: updated and reviewed.  Review of patient's allergies indicates:   Allergen Reactions    Rocephin [ceftriaxone] Rash     Current Outpatient Prescriptions   Medication Sig    amlodipine (NORVASC) 10 MG tablet Take 10 mg by mouth once daily.     amoxicillin-clavulanate 500-125mg (AUGMENTIN) 500-125 mg Tab Take 1 tablet (500 mg total) by mouth 2 (two) times daily.    baclofen (LIORESAL) 20 MG tablet 10 mg 4 (four) times daily.     bumetanide (BUMEX) 1 MG tablet 1 mg once daily.     catheter 18 Fr Misc Change every 20 days    gabapentin (NEURONTIN) 300 MG capsule 600 mg 3 (three) times daily.     hydrocodone-acetaminophen 5-325mg (NORCO) 5-325 mg per tablet Take 1 tablet by mouth every 6 (six) hours as needed for Pain.    oxybutynin (DITROPAN-XL) 5 MG TR24 TAKE 1 TABLET BY MOUTH EVERY DAY    oxyCODONE-acetaminophen (PERCOCET)  mg per tablet Take 1 tablet by mouth every 8 (eight) hours as needed for Pain.    potassium chloride (KLOR-CON) 10 MEQ TbSR     sertraline (ZOLOFT) 50 MG tablet TK 1 T PO QD    trazodone (DESYREL) 100 MG tablet 100 mg.     VENTOLIN HFA 90 mcg/actuation inhaler      No current facility-administered medications for this visit.        Review of Systems   Constitutional: Negative for activity change, fatigue, fever and unexpected weight change.   Eyes: Negative for redness and visual disturbance.   Respiratory: Negative for chest tightness and shortness of breath.    Cardiovascular: Negative for chest pain and leg swelling.  "  Gastrointestinal: Negative for abdominal distention, abdominal pain, constipation, diarrhea, nausea and vomiting.   Genitourinary: Negative for difficulty urinating, dysuria, flank pain, frequency, hematuria, pelvic pain, urgency and vaginal bleeding.   Musculoskeletal: Negative for arthralgias and joint swelling.   Neurological: Negative for dizziness, weakness and headaches.   Psychiatric/Behavioral: Negative for confusion. The patient is not nervous/anxious.    All other systems reviewed and are negative.      Objective:      Vitals:    02/26/18 1537   Weight: 108 kg (238 lb)   Height: 5' 5" (1.651 m)     Physical Exam   Nursing note and vitals reviewed.  Constitutional: She is oriented to person, place, and time. She appears well-developed.   HENT:   Head: Normocephalic.   Eyes: Conjunctivae are normal.   Neck: Normal range of motion. No tracheal deviation present. No thyromegaly present.   Cardiovascular: Normal rate, normal heart sounds and normal pulses.    Pulmonary/Chest: Effort normal and breath sounds normal. No respiratory distress. She has no wheezes.   Abdominal: Soft. She exhibits no distension and no mass. There is no hepatosplenomegaly. There is no tenderness. There is no rebound, no guarding and no CVA tenderness. No hernia.   Genitourinary:   Genitourinary Comments: Nephrostomy tube removed today intact without issues.   Musculoskeletal: Normal range of motion. She exhibits no edema or tenderness.   Lymphadenopathy:     She has no cervical adenopathy.   Neurological: She is alert and oriented to person, place, and time.   Skin: Skin is warm and dry. No rash noted. No erythema.     Psychiatric: She has a normal mood and affect. Her behavior is normal. Judgment and thought content normal.           Assessment:       1. Kidney stones    2. Neurogenic bladder    3. Nocturia more than twice per night          Plan:       1. Kidney stones  Right ureteroscopy on Monday 3/5/2018    2. Neurogenic " bladder  SP tube    3. Nocturia more than twice per night  stable            Follow-up in about 4 weeks (around 3/26/2018) for Follow up.

## 2018-02-28 NOTE — PRE ADMISSION SCREENING
RN Phone pre op done.  Patient instructed to remain NPO after midnight prior to surgery except to taken Gabapentin and pain pill (if needed) am of surgery with swallow of water.  Labs will be drawn on admit.  Expressed understanding of instructions.  Will call for arrival time prior to surgery.

## 2018-03-04 ENCOUNTER — ANESTHESIA EVENT (OUTPATIENT)
Dept: SURGERY | Facility: HOSPITAL | Age: 63
End: 2018-03-04
Payer: MEDICARE

## 2018-03-05 ENCOUNTER — TELEPHONE (OUTPATIENT)
Dept: UROLOGY | Facility: CLINIC | Age: 63
End: 2018-03-05

## 2018-03-05 ENCOUNTER — SURGERY (OUTPATIENT)
Age: 63
End: 2018-03-05

## 2018-03-05 ENCOUNTER — ANESTHESIA (OUTPATIENT)
Dept: SURGERY | Facility: HOSPITAL | Age: 63
End: 2018-03-05
Payer: MEDICARE

## 2018-03-05 ENCOUNTER — HOSPITAL ENCOUNTER (OUTPATIENT)
Facility: HOSPITAL | Age: 63
Discharge: HOME | End: 2018-03-05
Attending: UROLOGY | Admitting: UROLOGY
Payer: MEDICARE

## 2018-03-05 VITALS
RESPIRATION RATE: 18 BRPM | TEMPERATURE: 98 F | DIASTOLIC BLOOD PRESSURE: 71 MMHG | SYSTOLIC BLOOD PRESSURE: 141 MMHG | OXYGEN SATURATION: 94 % | WEIGHT: 238 LBS | HEIGHT: 65 IN | BODY MASS INDEX: 39.65 KG/M2 | HEART RATE: 76 BPM

## 2018-03-05 DIAGNOSIS — N20.0 KIDNEY STONE: Primary | ICD-10-CM

## 2018-03-05 DIAGNOSIS — N20.0 KIDNEY STONES: Primary | ICD-10-CM

## 2018-03-05 LAB
ANION GAP SERPL CALC-SCNC: 10 MMOL/L
BASOPHILS # BLD AUTO: 0.03 K/UL
BASOPHILS NFR BLD: 0.5 %
BUN SERPL-MCNC: 13 MG/DL
CALCIUM SERPL-MCNC: 9.5 MG/DL
CHLORIDE SERPL-SCNC: 102 MMOL/L
CO2 SERPL-SCNC: 29 MMOL/L
CREAT SERPL-MCNC: 0.9 MG/DL
DIFFERENTIAL METHOD: ABNORMAL
EOSINOPHIL # BLD AUTO: 0.3 K/UL
EOSINOPHIL NFR BLD: 5.1 %
ERYTHROCYTE [DISTWIDTH] IN BLOOD BY AUTOMATED COUNT: 16.5 %
EST. GFR  (AFRICAN AMERICAN): >60 ML/MIN/1.73 M^2
EST. GFR  (NON AFRICAN AMERICAN): >60 ML/MIN/1.73 M^2
GLUCOSE SERPL-MCNC: 92 MG/DL
HCT VFR BLD AUTO: 36.4 %
HGB BLD-MCNC: 11.6 G/DL
LYMPHOCYTES # BLD AUTO: 2.9 K/UL
LYMPHOCYTES NFR BLD: 44.9 %
MCH RBC QN AUTO: 25.4 PG
MCHC RBC AUTO-ENTMCNC: 31.9 G/DL
MCV RBC AUTO: 80 FL
MONOCYTES # BLD AUTO: 0.4 K/UL
MONOCYTES NFR BLD: 6.6 %
NEUTROPHILS # BLD AUTO: 2.8 K/UL
NEUTROPHILS NFR BLD: 42.6 %
PLATELET # BLD AUTO: 355 K/UL
PMV BLD AUTO: 9.1 FL
POTASSIUM SERPL-SCNC: 3.9 MMOL/L
RBC # BLD AUTO: 4.57 M/UL
SODIUM SERPL-SCNC: 141 MMOL/L
WBC # BLD AUTO: 6.48 K/UL

## 2018-03-05 PROCEDURE — 25000003 PHARM REV CODE 250: Performed by: UROLOGY

## 2018-03-05 PROCEDURE — 80048 BASIC METABOLIC PNL TOTAL CA: CPT

## 2018-03-05 PROCEDURE — 71000015 HC POSTOP RECOV 1ST HR: Performed by: UROLOGY

## 2018-03-05 PROCEDURE — D9220A PRA ANESTHESIA: Mod: ANES,,, | Performed by: ANESTHESIOLOGY

## 2018-03-05 PROCEDURE — C2617 STENT, NON-COR, TEM W/O DEL: HCPCS | Performed by: UROLOGY

## 2018-03-05 PROCEDURE — C1773 RET DEV, INSERTABLE: HCPCS | Performed by: UROLOGY

## 2018-03-05 PROCEDURE — 25000242 PHARM REV CODE 250 ALT 637 W/ HCPCS: Performed by: ANESTHESIOLOGY

## 2018-03-05 PROCEDURE — 88300 SURGICAL PATH GROSS: CPT | Mod: 26,,, | Performed by: PATHOLOGY

## 2018-03-05 PROCEDURE — 63600175 PHARM REV CODE 636 W HCPCS: Performed by: REGISTERED NURSE

## 2018-03-05 PROCEDURE — 25500020 PHARM REV CODE 255: Performed by: UROLOGY

## 2018-03-05 PROCEDURE — 71000033 HC RECOVERY, INTIAL HOUR: Performed by: UROLOGY

## 2018-03-05 PROCEDURE — 36000706: Performed by: UROLOGY

## 2018-03-05 PROCEDURE — 27201423 OPTIME MED/SURG SUP & DEVICES STERILE SUPPLY: Performed by: UROLOGY

## 2018-03-05 PROCEDURE — A4217 STERILE WATER/SALINE, 500 ML: HCPCS | Performed by: UROLOGY

## 2018-03-05 PROCEDURE — 36000707: Performed by: UROLOGY

## 2018-03-05 PROCEDURE — C1758 CATHETER, URETERAL: HCPCS | Performed by: UROLOGY

## 2018-03-05 PROCEDURE — 52356 CYSTO/URETERO W/LITHOTRIPSY: CPT | Mod: 58,RT,, | Performed by: UROLOGY

## 2018-03-05 PROCEDURE — 88300 SURGICAL PATH GROSS: CPT | Performed by: PATHOLOGY

## 2018-03-05 PROCEDURE — 25000003 PHARM REV CODE 250: Performed by: REGISTERED NURSE

## 2018-03-05 PROCEDURE — 85025 COMPLETE CBC W/AUTO DIFF WBC: CPT

## 2018-03-05 PROCEDURE — C1769 GUIDE WIRE: HCPCS | Performed by: UROLOGY

## 2018-03-05 PROCEDURE — 71000016 HC POSTOP RECOV ADDL HR: Performed by: UROLOGY

## 2018-03-05 PROCEDURE — 76000 FLUOROSCOPY <1 HR PHYS/QHP: CPT | Mod: 26,59,, | Performed by: UROLOGY

## 2018-03-05 PROCEDURE — 37000008 HC ANESTHESIA 1ST 15 MINUTES: Performed by: UROLOGY

## 2018-03-05 PROCEDURE — C1894 INTRO/SHEATH, NON-LASER: HCPCS | Performed by: UROLOGY

## 2018-03-05 PROCEDURE — 82365 CALCULUS SPECTROSCOPY: CPT

## 2018-03-05 PROCEDURE — 63600175 PHARM REV CODE 636 W HCPCS: Performed by: UROLOGY

## 2018-03-05 PROCEDURE — 25000003 PHARM REV CODE 250: Performed by: ANESTHESIOLOGY

## 2018-03-05 PROCEDURE — 74420 UROGRAPHY RTRGR +-KUB: CPT | Mod: 26,,, | Performed by: UROLOGY

## 2018-03-05 PROCEDURE — 63600175 PHARM REV CODE 636 W HCPCS: Performed by: ANESTHESIOLOGY

## 2018-03-05 PROCEDURE — 27200651 HC AIRWAY, LMA: Performed by: REGISTERED NURSE

## 2018-03-05 PROCEDURE — 51705 CHANGE OF BLADDER TUBE: CPT | Mod: 51,58,, | Performed by: UROLOGY

## 2018-03-05 PROCEDURE — 37000009 HC ANESTHESIA EA ADD 15 MINS: Performed by: UROLOGY

## 2018-03-05 PROCEDURE — 36415 COLL VENOUS BLD VENIPUNCTURE: CPT

## 2018-03-05 PROCEDURE — D9220A PRA ANESTHESIA: Mod: CRNA,,, | Performed by: REGISTERED NURSE

## 2018-03-05 DEVICE — STENT URET PERCUFLEX 6FR 24CM: Type: IMPLANTABLE DEVICE | Site: URETER | Status: FUNCTIONAL

## 2018-03-05 RX ORDER — SODIUM CHLORIDE, SODIUM LACTATE, POTASSIUM CHLORIDE, CALCIUM CHLORIDE 600; 310; 30; 20 MG/100ML; MG/100ML; MG/100ML; MG/100ML
INJECTION, SOLUTION INTRAVENOUS CONTINUOUS
Status: DISCONTINUED | OUTPATIENT
Start: 2018-03-05 | End: 2018-03-05 | Stop reason: HOSPADM

## 2018-03-05 RX ORDER — HYDROMORPHONE HYDROCHLORIDE 2 MG/ML
0.2 INJECTION, SOLUTION INTRAMUSCULAR; INTRAVENOUS; SUBCUTANEOUS EVERY 5 MIN PRN
Status: DISCONTINUED | OUTPATIENT
Start: 2018-03-05 | End: 2018-03-05 | Stop reason: HOSPADM

## 2018-03-05 RX ORDER — ACETAMINOPHEN 10 MG/ML
1000 INJECTION, SOLUTION INTRAVENOUS ONCE
Status: COMPLETED | OUTPATIENT
Start: 2018-03-05 | End: 2018-03-05

## 2018-03-05 RX ORDER — PROPOFOL 10 MG/ML
VIAL (ML) INTRAVENOUS
Status: DISCONTINUED | OUTPATIENT
Start: 2018-03-05 | End: 2018-03-05

## 2018-03-05 RX ORDER — MEPERIDINE HYDROCHLORIDE 50 MG/ML
12.5 INJECTION INTRAMUSCULAR; INTRAVENOUS; SUBCUTANEOUS ONCE AS NEEDED
Status: DISCONTINUED | OUTPATIENT
Start: 2018-03-05 | End: 2018-03-05 | Stop reason: HOSPADM

## 2018-03-05 RX ORDER — SODIUM CHLORIDE 0.9 G/100ML
IRRIGANT IRRIGATION
Status: DISCONTINUED | OUTPATIENT
Start: 2018-03-05 | End: 2018-03-05 | Stop reason: HOSPADM

## 2018-03-05 RX ORDER — LIDOCAINE HCL/PF 100 MG/5ML
SYRINGE (ML) INTRAVENOUS
Status: DISCONTINUED | OUTPATIENT
Start: 2018-03-05 | End: 2018-03-05

## 2018-03-05 RX ORDER — DIPHENHYDRAMINE HYDROCHLORIDE 50 MG/ML
25 INJECTION INTRAMUSCULAR; INTRAVENOUS EVERY 6 HOURS PRN
Status: DISCONTINUED | OUTPATIENT
Start: 2018-03-05 | End: 2018-03-05 | Stop reason: HOSPADM

## 2018-03-05 RX ORDER — FENTANYL CITRATE 50 UG/ML
INJECTION, SOLUTION INTRAMUSCULAR; INTRAVENOUS
Status: DISCONTINUED | OUTPATIENT
Start: 2018-03-05 | End: 2018-03-05

## 2018-03-05 RX ORDER — LIDOCAINE HYDROCHLORIDE 10 MG/ML
1 INJECTION, SOLUTION EPIDURAL; INFILTRATION; INTRACAUDAL; PERINEURAL ONCE
Status: DISCONTINUED | OUTPATIENT
Start: 2018-03-05 | End: 2018-03-05 | Stop reason: HOSPADM

## 2018-03-05 RX ORDER — IPRATROPIUM BROMIDE AND ALBUTEROL SULFATE 2.5; .5 MG/3ML; MG/3ML
3 SOLUTION RESPIRATORY (INHALATION)
Status: COMPLETED | OUTPATIENT
Start: 2018-03-05 | End: 2018-03-05

## 2018-03-05 RX ORDER — ONDANSETRON 2 MG/ML
INJECTION INTRAMUSCULAR; INTRAVENOUS
Status: DISCONTINUED | OUTPATIENT
Start: 2018-03-05 | End: 2018-03-05

## 2018-03-05 RX ORDER — METOCLOPRAMIDE HYDROCHLORIDE 5 MG/ML
10 INJECTION INTRAMUSCULAR; INTRAVENOUS EVERY 10 MIN PRN
Status: DISCONTINUED | OUTPATIENT
Start: 2018-03-05 | End: 2018-03-05 | Stop reason: HOSPADM

## 2018-03-05 RX ORDER — MIDAZOLAM HYDROCHLORIDE 1 MG/ML
INJECTION, SOLUTION INTRAMUSCULAR; INTRAVENOUS
Status: DISCONTINUED | OUTPATIENT
Start: 2018-03-05 | End: 2018-03-05

## 2018-03-05 RX ORDER — HYDROCODONE BITARTRATE AND ACETAMINOPHEN 5; 325 MG/1; MG/1
1 TABLET ORAL EVERY 4 HOURS PRN
Status: DISCONTINUED | OUTPATIENT
Start: 2018-03-05 | End: 2018-03-05 | Stop reason: HOSPADM

## 2018-03-05 RX ORDER — SODIUM CHLORIDE 0.9 % (FLUSH) 0.9 %
3 SYRINGE (ML) INJECTION
Status: DISCONTINUED | OUTPATIENT
Start: 2018-03-05 | End: 2018-03-05 | Stop reason: HOSPADM

## 2018-03-05 RX ORDER — HYDROCODONE BITARTRATE AND ACETAMINOPHEN 5; 325 MG/1; MG/1
1 TABLET ORAL EVERY 6 HOURS PRN
Qty: 30 TABLET | Refills: 0 | Status: SHIPPED | OUTPATIENT
Start: 2018-03-05 | End: 2018-09-27 | Stop reason: DRUGHIGH

## 2018-03-05 RX ORDER — PHENAZOPYRIDINE HYDROCHLORIDE 200 MG/1
200 TABLET, FILM COATED ORAL 3 TIMES DAILY PRN
Qty: 21 TABLET | Refills: 0 | Status: SHIPPED | OUTPATIENT
Start: 2018-03-05 | End: 2018-04-05

## 2018-03-05 RX ORDER — PHENAZOPYRIDINE HYDROCHLORIDE 100 MG/1
200 TABLET, FILM COATED ORAL ONCE
Status: COMPLETED | OUTPATIENT
Start: 2018-03-05 | End: 2018-03-05

## 2018-03-05 RX ORDER — GLYCOPYRROLATE 0.2 MG/ML
INJECTION INTRAMUSCULAR; INTRAVENOUS
Status: DISCONTINUED | OUTPATIENT
Start: 2018-03-05 | End: 2018-03-05

## 2018-03-05 RX ORDER — IPRATROPIUM BROMIDE AND ALBUTEROL SULFATE 2.5; .5 MG/3ML; MG/3ML
SOLUTION RESPIRATORY (INHALATION)
Status: DISCONTINUED
Start: 2018-03-05 | End: 2018-03-05 | Stop reason: HOSPADM

## 2018-03-05 RX ORDER — WATER 1 ML/ML
IRRIGANT IRRIGATION
Status: DISCONTINUED | OUTPATIENT
Start: 2018-03-05 | End: 2018-03-05 | Stop reason: HOSPADM

## 2018-03-05 RX ADMIN — MIDAZOLAM HYDROCHLORIDE 2 MG: 1 INJECTION, SOLUTION INTRAMUSCULAR; INTRAVENOUS at 07:03

## 2018-03-05 RX ADMIN — ONDANSETRON 4 MG: 2 INJECTION, SOLUTION INTRAMUSCULAR; INTRAVENOUS at 09:03

## 2018-03-05 RX ADMIN — HYDROCORTISONE SODIUM SUCCINATE 100 MG: 100 INJECTION, POWDER, FOR SOLUTION INTRAMUSCULAR; INTRAVENOUS at 07:03

## 2018-03-05 RX ADMIN — GENTAMICIN SULFATE 116 MG: 40 INJECTION, SOLUTION INTRAMUSCULAR; INTRAVENOUS at 07:03

## 2018-03-05 RX ADMIN — LIDOCAINE HYDROCHLORIDE 100 MG: 20 INJECTION, SOLUTION INTRAVENOUS at 07:03

## 2018-03-05 RX ADMIN — SODIUM CHLORIDE 6000 ML: 0.9 IRRIGANT IRRIGATION at 07:03

## 2018-03-05 RX ADMIN — HYDROCODONE BITARTRATE AND ACETAMINOPHEN 1 TABLET: 5; 325 TABLET ORAL at 10:03

## 2018-03-05 RX ADMIN — FENTANYL CITRATE 25 MCG: 50 INJECTION INTRAMUSCULAR; INTRAVENOUS at 08:03

## 2018-03-05 RX ADMIN — SODIUM CHLORIDE, SODIUM LACTATE, POTASSIUM CHLORIDE, AND CALCIUM CHLORIDE: .6; .31; .03; .02 INJECTION, SOLUTION INTRAVENOUS at 08:03

## 2018-03-05 RX ADMIN — IPRATROPIUM BROMIDE AND ALBUTEROL SULFATE 3 ML: .5; 3 SOLUTION RESPIRATORY (INHALATION) at 07:03

## 2018-03-05 RX ADMIN — FENTANYL CITRATE 25 MCG: 50 INJECTION INTRAMUSCULAR; INTRAVENOUS at 07:03

## 2018-03-05 RX ADMIN — WATER 500 ML: 1 IRRIGANT IRRIGATION at 08:03

## 2018-03-05 RX ADMIN — ACETAMINOPHEN 1000 MG: 10 INJECTION, SOLUTION INTRAVENOUS at 09:03

## 2018-03-05 RX ADMIN — GLYCOPYRROLATE 0.2 MG: 0.2 INJECTION, SOLUTION INTRAMUSCULAR; INTRAVENOUS at 07:03

## 2018-03-05 RX ADMIN — PROPOFOL 120 MG: 10 INJECTION, EMULSION INTRAVENOUS at 07:03

## 2018-03-05 RX ADMIN — SODIUM CHLORIDE, SODIUM LACTATE, POTASSIUM CHLORIDE, AND CALCIUM CHLORIDE: .6; .31; .03; .02 INJECTION, SOLUTION INTRAVENOUS at 06:03

## 2018-03-05 RX ADMIN — PHENAZOPYRIDINE HYDROCHLORIDE 200 MG: 100 TABLET ORAL at 09:03

## 2018-03-05 RX ADMIN — IOHEXOL 100 ML: 300 INJECTION, SOLUTION INTRAVENOUS at 07:03

## 2018-03-05 NOTE — ANESTHESIA POSTPROCEDURE EVALUATION
"Anesthesia Post Evaluation    Patient: Mary Ellen Fajardo    Procedure(s) Performed: Procedure(s) (LRB):  CYSTOSCOPY, RETROGRADE, URETEROSCOPY, STENT PLACEMENT (Right)  EXTRACTION-STONE-URETEROSCOPY (Right)  LITHOTRIPSY-LASER (Right)  REMOVAL-STENT-URETERAL    [facility] Anesthesia Post Op Evaluation    Visit Vitals  BP (!) 141/71 (BP Location: Right arm, Patient Position: Lying)   Pulse 76   Temp 36.4 °C (97.6 °F) (Oral)   Resp 18   Ht 5' 5" (1.651 m)   Wt 108 kg (238 lb)   SpO2 (!) 94%   Breastfeeding? No   BMI 39.61 kg/m²       Pain/Teresita Score: Pain Assessment Performed: Yes (3/5/2018 11:15 AM)  Presence of Pain: complains of pain/discomfort (3/5/2018 11:15 AM)  Pain Rating Prior to Med Admin: 6 (3/5/2018 11:15 AM)  Pain Rating Post Med Admin: 4 (3/5/2018 11:15 AM)  Teresita Score: 10 (3/5/2018 11:15 AM)  Modified Teresita Score: 19 (3/5/2018 11:15 AM)      "

## 2018-03-05 NOTE — TRANSFER OF CARE
"Anesthesia Transfer of Care Note    Patient: Mary Ellen Fajardo    Procedure(s) Performed: Procedure(s) (LRB):  CYSTOSCOPY, RETROGRADE, URETEROSCOPY, STENT PLACEMENT (Right)    Patient location: PACU    Anesthesia Type: general    Transport from OR: Transported from OR on room air with adequate spontaneous ventilation    Post pain: adequate analgesia    Post assessment: no apparent anesthetic complications and tolerated procedure well    Post vital signs: stable    Level of consciousness: awake and alert    Nausea/Vomiting: no nausea/vomiting    Complications: none    Transfer of care protocol was followed      Last vitals:   Visit Vitals  /84   Pulse 79   Temp 36.6 °C (97.9 °F) (Oral)   Resp 18   Ht 5' 5" (1.651 m)   Wt 108 kg (238 lb)   SpO2 99%   Breastfeeding? No   BMI 39.61 kg/m²     "

## 2018-03-05 NOTE — DISCHARGE SUMMARY
[facility]  Discharge Note  Short Stay    Admit Date: 3/5/2018    Discharge Date and Time: 03/05/2018 9:05 AM      Attending Physician: RAMA Tinoco MD     Discharge Provider: [facility] Tinoco    Diagnoses:  Active Hospital Problems    Diagnosis  POA    *Kidney stones [N20.0]  Yes      Resolved Hospital Problems    Diagnosis Date Resolved POA   No resolved problems to display.       Discharged Condition: stable    Hospital Course: Patient was admitted for an outpatient procedure and tolerated the procedure well with no complications.    Final Diagnoses: Same as principal problem.    Disposition: Home or Self Care    Follow up/Patient Instructions:    Medications:  Reconciled Home Medications:   Current Discharge Medication List      START taking these medications    Details   hydrocodone-acetaminophen 5-325mg (NORCO) 5-325 mg per tablet Take 1 tablet by mouth every 6 (six) hours as needed for Pain.  Qty: 30 tablet, Refills: 0    Associated Diagnoses: Kidney stones      phenazopyridine (PYRIDIUM) 200 MG tablet Take 1 tablet (200 mg total) by mouth 3 (three) times daily as needed for Pain (Burning).  Qty: 21 tablet, Refills: 0    Associated Diagnoses: Kidney stones         CONTINUE these medications which have NOT CHANGED    Details   amlodipine (NORVASC) 10 MG tablet Take 10 mg by mouth once daily.   Refills: 1      baclofen (LIORESAL) 20 MG tablet 10 mg 4 (four) times daily.   Refills: 5      bumetanide (BUMEX) 1 MG tablet 1 mg once daily.       catheter 18 Fr Misc Change every 20 days  Qty: 10 each, Refills: 1    Associated Diagnoses: Neurogenic bladder      gabapentin (NEURONTIN) 300 MG capsule 600 mg 3 (three) times daily.       oxybutynin (DITROPAN-XL) 5 MG TR24 TAKE 1 TABLET BY MOUTH EVERY DAY  Qty: 90 tablet, Refills: 0    Associated Diagnoses: Neurogenic bladder      oxyCODONE-acetaminophen (PERCOCET)  mg per tablet Take 1 tablet by mouth every 8 (eight) hours as needed for Pain.  Qty:  30 tablet, Refills: 0    Associated Diagnoses: Bladder stone; Staghorn calculus      potassium chloride (KLOR-CON) 10 MEQ TbSR       sertraline (ZOLOFT) 50 MG tablet TK 1 T PO QD  Refills: 5      trazodone (DESYREL) 100 MG tablet 100 mg.   Refills: 5      VENTOLIN HFA 90 mcg/actuation inhaler              Discharge Procedure Orders  Diet general     Activity as tolerated     Call MD for:   Order Comments: Significant Hematuria       Follow-up Information     W Carlos Tinoco MD In 3 weeks.    Specialty:  Urology  Why:  Post op Check  Contact information:  [address] [place] [address] [phone]                   Discharge Procedure Orders (must include Diet, Follow-up, Activity):    Discharge Procedure Orders (must include Diet, Follow-up, Activity)  Diet general     Activity as tolerated     Call MD for:   Order Comments: Significant Hematuria

## 2018-03-05 NOTE — ANESTHESIA POSTPROCEDURE EVALUATION
"Anesthesia Post Evaluation    Patient: Mary Ellen Fajardo    Procedure(s) Performed: Procedure(s) (LRB):  CYSTOSCOPY, RETROGRADE, URETEROSCOPY, STENT PLACEMENT (Right)  EXTRACTION-STONE-URETEROSCOPY (Right)  LITHOTRIPSY-LASER (Right)  REMOVAL-STENT-URETERAL    Final Anesthesia Type: general  Patient location during evaluation: PACU  Patient participation: Yes- Able to Participate  Level of consciousness: awake and alert, oriented and awake  Post-procedure vital signs: reviewed and stable  Pain management: adequate  Airway patency: patent  PONV status at discharge: No PONV  Anesthetic complications: no      Cardiovascular status: blood pressure returned to baseline  Respiratory status: unassisted and spontaneous ventilation  Hydration status: euvolemic  Follow-up not needed.        Visit Vitals  BP (!) 141/71 (BP Location: Right arm, Patient Position: Lying)   Pulse 76   Temp 36.4 °C (97.6 °F) (Oral)   Resp 18   Ht 5' 5" (1.651 m)   Wt 108 kg (238 lb)   SpO2 (!) 94%   Breastfeeding? No   BMI 39.61 kg/m²       Pain/Teresita Score: Pain Assessment Performed: Yes (3/5/2018 11:15 AM)  Presence of Pain: complains of pain/discomfort (3/5/2018 11:15 AM)  Pain Rating Prior to Med Admin: 6 (3/5/2018 11:15 AM)  Pain Rating Post Med Admin: 4 (3/5/2018 11:15 AM)  Teresita Score: 10 (3/5/2018 11:15 AM)  Modified Teresita Score: 19 (3/5/2018 11:15 AM)      "

## 2018-03-05 NOTE — OP NOTE
DATE OF PROCEDURE:  03/05/2018.    PREOPERATIVE DIAGNOSIS:  Right kidney stone.    POSTOPERATIVE DIAGNOSIS:  Right kidney stone.    PROCEDURE PERFORMED:  Cystoscopy with right retrograde pyelogram, right   ureteroscopy, laser lithotripsy, stone manipulation and extraction, right   ureteral stent removal, right ureteral stent placement, fluoroscopy and   suprapubic tube change.    PRIMARY SURGEON:  Jacques Tinoco M.D.    ANESTHESIA:  General.    ESTIMATED BLOOD LOSS:  Minimal.    DRAINS:  A 6-Nauruan 24 cm double-J stent, no string and an 18-Nauruan suprapubic   tube catheter.    SPECIMENS REMOVED:  Right kidney stone.    COMPLICATIONS:  None.    INDICATIONS:  Mary Ellen Fajardo is a 62-year-old woman with a history of a staghorn   calculus.  She has undergone a PCNL.  She had stone fragments remaining which   were inaccessible from her percutaneous access.  Therefore, she is here today   for ureteroscopy.    Mary Ellen Fajardo was taken to the Operating Room where she was positively   identified by armband.  She was placed supine on the operating room table.    Following induction of adequate general anesthesia, she was placed in the dorsal   lithotomy position and her external genitalia were prepped and draped in the   usual sterile fashion.    A preoperative timeout was performed as well as confirmation of preoperative   antibiotics and preoperative marking on the right side.    A  film was taken using fluoroscopy.  The stone was seen within the kidney   as well as the previously placed stent was seen on fluoroscopy.    I then passed a 21-Nauruan rigid cystoscope per urethra into the bladder under   direct vision.  The previously placed stent was visualized, grasped and brought   out to the urethral meatus.    I then passed a 0.035 inch Bentson wire through the stent up to the level of the   kidney.  The old stent was withdrawn and the wire was snapped to the drapes for   safety.    I then reintroduced the  cystoscope and passed the second Bentson wire alongside   the first up to the level of the kidney.    I then advanced a 12/14-Scottish 36 cm ureteral access sheath over the wire under   live fluoroscopy, which passed up easily up to the level of proximal ureter.    The wire and obturator were withdrawn leaving the sheath in place.    I then advanced a digital flexible ureteroscope through the sheath into the   proximal ureter.  I then performed a retrograde pyelogram to delineate the   pyelocalyceal structures.    I then passed the scope further into the kidney.  There were some stone noted   within the upper pole calices as well as the mid pole calices.    I then used a 200 micron holmium laser fiber, fragmented the stone into smaller   pieces.  The stones were turned to dust.    I then used a ZeroTip nitinol basket to retrieve a few stone pieces for   analysis.    At this point in the procedure, all major stone fragments had been fragmented.    There was a large amount of stone debris; therefore, I chose to leave the stent   with no string.    I then withdrew the scope under direct vision along with the sheath.  There is   no evidence of any injury to the ureter.    I then advanced a 6-Scottish 24 cm double-J stent over the wire, deploying a coil   within the right kidney and a coil within the bladder confirmed on fluoroscopy.    I then removed her all suprapubic tube and passed a new 18-Scottish suprapubic   tube easily into the bladder.  This is left to gravity drainage.    Her anesthesia was then reversed.  She was taken to Recovery Room in stable   condition.      ARIADNE  dd: 03/05/2018 09:09:53 (CST)  td: 03/05/2018 11:04:33 (CST)  Doc ID   #[id number]  Job ID #[id number]    CC:

## 2018-03-05 NOTE — DISCHARGE INSTRUCTIONS
ACTIVITY LEVEL: If you have received sedation or an anesthetic, you may feel sleepy for several hours. Rest until you are more awake. Gradually resume your normal activities.       DIET: You may resume your home diet. If nausea is present, increase your diet gradually with fluids and bland foods.      Medications: Pain medication should be taken only if needed and as directed. If antibiotics are prescribed, the medication should be taken until completed. You will be given an updated list of you medications.  ? No driving, alcoholic beverages or signing legal documents for next 24 hours or while taking pain medication        CALL THE DOCTOR:       · Fever over 101°F  · Severe pain that doesnt go away with medication.  · Upset stomach and vomiting that is persistent.  · Problems urinating-unable to urinate or heavy bleeding (with or without clots)    Fall Prevention  Millions of people fall every year and injure themselves. You may have had anesthesia or sedation which may increase your risk of falling. You may have health issues that put you at an increased risk of falling.     Here are ways to reduce your risk of falling.  ·   · Make your home safe by keeping walkways clear of objects you may trip over.  · Use non-slip pads under rugs. Do not use area rugs or small throw rugs.  · Use non-slip mats in bathtubs and showers.  · Install handrails and lights on staircases.  · Do not walk in poorly lit areas.  · Do not stand on chairs or wobbly ladders.  · Use caution when reaching overhead or looking upward. This position can cause a loss of balance.  · Be sure your shoes fit properly, have non-slip bottoms and are in good condition.   · Wear shoes both inside and out. Avoid going barefoot or wearing slippers.  · Be cautious when going up and down stairs, curbs, and when walking on uneven sidewalks.  · If your balance is poor, consider using a cane or walker.  · If your fall was related to alcohol use, stop or limit  alcohol intake.   · If your fall was related to use of sleeping medicines, talk to your doctor about this. You may need to reduce your dosage at bedtime if you awaken during the night to go to the bathroom.    · To reduce the need for nighttime bathroom trips:  ¨ Avoid drinking fluids for several hours before going to bed  ¨ Empty your bladder before going to bed  ¨ Men can keep a urinal at the bedside  · Stay as active as you can. Balance, flexibility, strength, and endurance all come from exercise. They all play a role in preventing falls. Ask your healthcare provider which types of activity are right for you.  · Get your vision checked on a regular basis.  · If you have pets, know where they are before you stand up or walk so you don't trip over them.  · Use night lights.

## 2018-03-05 NOTE — ANESTHESIA PREPROCEDURE EVALUATION
03/05/2018  Mary Ellen Fajardo is a 62 y.o., female.    Anesthesia Evaluation    I have reviewed the Patient Summary Reports.     I have reviewed the Medications.     Review of Systems  Anesthesia Hx:  No problems with previous Anesthesia   Denies Personal Hx of Anesthesia complications.   Hematology/Oncology:  Hematology Normal   Oncology Normal     EENT/Dental:EENT/Dental Normal   Cardiovascular:   Exercise tolerance: good Hypertension    Pulmonary:   Pneumonia Asthma Sleep Apnea    Renal/:   Chronic Renal Disease    Hepatic/GI:   Liver Disease, Hepatitis    Musculoskeletal:   Arthritis     Neurological:  Neurology Normal    Endocrine:  Endocrine Normal    Dermatological:  Skin Normal    Psych:   Psychiatric History          Physical Exam  General:  Well nourished, Obesity    Airway/Jaw/Neck:  Airway Findings: Mouth Opening: Normal Tongue: Normal  Mallampati: II      Dental:  Dental Findings: In tact   Chest/Lungs:  Chest/Lungs Clear    Heart/Vascular:  Heart Findings: Normal Heart murmur: negative       Mental Status:  Mental Status Findings:  Cooperative, Alert and Oriented         Anesthesia Plan  Type of Anesthesia, risks & benefits discussed:  Anesthesia Type:  general, MAC, regional  Patient's Preference:   Intra-op Monitoring Plan: standard ASA monitors  Intra-op Monitoring Plan Comments:   Post Op Pain Control Plan: multimodal analgesia  Post Op Pain Control Plan Comments:   Induction:   IV  Beta Blocker:  Patient is not currently on a Beta-Blocker (No further documentation required).       Informed Consent: Patient understands risks and agrees with Anesthesia plan.  Questions answered. Anesthesia consent signed with patient.  ASA Score: 3     Day of Surgery Review of History & Physical:            Ready For Surgery From Anesthesia Perspective.

## 2018-03-05 NOTE — BRIEF OP NOTE
[facility]  Brief Operative Note     SUMMARY     Surgery Date: 3/5/2018     Surgeon(s) and Role:     * RAMA Tinoco MD - Primary    Assisting Surgeon: None    Pre-op Diagnosis:  Kidney stones [N20.0]    Post-op Diagnosis:  Post-Op Diagnosis Codes:     * Kidney stones [N20.0]    Procedure(s) (LRB):  CYSTOSCOPY, RETROGRADE, URETEROSCOPY, STENT PLACEMENT (Right)    Anesthesia: General    Description of the findings of the procedure: Stone dusted    Findings/Key Components: as above    Estimated Blood Loss: * No values recorded between 3/5/2018  7:44 AM and 3/5/2018  9:04 AM *         Specimens:   Specimen (12h ago through future)    None          Discharge Note    SUMMARY     Admit Date: 3/5/2018    Discharge Date and Time:  03/05/2018 9:05 AM    Hospital Course (synopsis of major diagnoses, care, treatment, and services provided during the course of the hospital stay): Patient was admitted for an outpatient procedure and tolerated the procedure well with no complications.      Final Diagnosis: Post-Op Diagnosis Codes:     * Kidney stones [N20.0]    Disposition: Home or Self Care    Follow Up/Patient Instructions:     Medications:  Reconciled Home Medications:   Current Discharge Medication List      START taking these medications    Details   hydrocodone-acetaminophen 5-325mg (NORCO) 5-325 mg per tablet Take 1 tablet by mouth every 6 (six) hours as needed for Pain.  Qty: 30 tablet, Refills: 0    Associated Diagnoses: Kidney stones      phenazopyridine (PYRIDIUM) 200 MG tablet Take 1 tablet (200 mg total) by mouth 3 (three) times daily as needed for Pain (Burning).  Qty: 21 tablet, Refills: 0    Associated Diagnoses: Kidney stones         CONTINUE these medications which have NOT CHANGED    Details   amlodipine (NORVASC) 10 MG tablet Take 10 mg by mouth once daily.   Refills: 1      baclofen (LIORESAL) 20 MG tablet 10 mg 4 (four) times daily.   Refills: 5      bumetanide (BUMEX) 1 MG tablet 1 mg  once daily.       catheter 18 Fr Misc Change every 20 days  Qty: 10 each, Refills: 1    Associated Diagnoses: Neurogenic bladder      gabapentin (NEURONTIN) 300 MG capsule 600 mg 3 (three) times daily.       oxybutynin (DITROPAN-XL) 5 MG TR24 TAKE 1 TABLET BY MOUTH EVERY DAY  Qty: 90 tablet, Refills: 0    Associated Diagnoses: Neurogenic bladder      oxyCODONE-acetaminophen (PERCOCET)  mg per tablet Take 1 tablet by mouth every 8 (eight) hours as needed for Pain.  Qty: 30 tablet, Refills: 0    Associated Diagnoses: Bladder stone; Staghorn calculus      potassium chloride (KLOR-CON) 10 MEQ TbSR       sertraline (ZOLOFT) 50 MG tablet TK 1 T PO QD  Refills: 5      trazodone (DESYREL) 100 MG tablet 100 mg.   Refills: 5      VENTOLIN HFA 90 mcg/actuation inhaler              Discharge Procedure Orders  Diet general     Activity as tolerated     Call MD for:   Order Comments: Significant Hematuria       Follow-up Information     W Carlos Tinoco MD In 3 weeks.    Specialty:  Urology  Why:  Post op Check  Contact information:  [address] [place] [id number] [phone]

## 2018-03-14 LAB
HPV, HIGH-RISK: NOT DETECTED
PAP SMEAR: NORMAL

## 2018-03-17 DIAGNOSIS — N31.9 NEUROGENIC BLADDER: ICD-10-CM

## 2018-03-19 RX ORDER — OXYBUTYNIN CHLORIDE 5 MG/1
TABLET, EXTENDED RELEASE ORAL
Qty: 90 TABLET | Refills: 0 | Status: SHIPPED | OUTPATIENT
Start: 2018-03-19 | End: 2018-06-21 | Stop reason: SDUPTHER

## 2018-03-22 ENCOUNTER — TELEPHONE (OUTPATIENT)
Dept: UROLOGY | Facility: CLINIC | Age: 63
End: 2018-03-22

## 2018-03-22 NOTE — TELEPHONE ENCOUNTER
Spoke to pt about  having procedure in the office in what it details. pt states she doesn't have anyone to come with her and she thinks it will be easier to have in hospital. Pt will keep appt to see  on Monday to get date for hospital./yanelis

## 2018-03-26 ENCOUNTER — OFFICE VISIT (OUTPATIENT)
Dept: UROLOGY | Facility: CLINIC | Age: 63
End: 2018-03-26
Payer: MEDICARE

## 2018-03-26 VITALS
WEIGHT: 238 LBS | DIASTOLIC BLOOD PRESSURE: 78 MMHG | HEIGHT: 65 IN | HEART RATE: 68 BPM | SYSTOLIC BLOOD PRESSURE: 118 MMHG | BODY MASS INDEX: 39.65 KG/M2

## 2018-03-26 DIAGNOSIS — N20.0 STAGHORN CALCULUS: ICD-10-CM

## 2018-03-26 DIAGNOSIS — N20.0 KIDNEY STONE: Primary | ICD-10-CM

## 2018-03-26 PROCEDURE — 99999 PR PBB SHADOW E&M-EST. PATIENT-LVL III: CPT | Mod: PBBFAC,,, | Performed by: UROLOGY

## 2018-03-26 PROCEDURE — 99213 OFFICE O/P EST LOW 20 MIN: CPT | Mod: PBBFAC | Performed by: UROLOGY

## 2018-03-26 PROCEDURE — 99024 POSTOP FOLLOW-UP VISIT: CPT | Mod: S$PBB,,, | Performed by: UROLOGY

## 2018-03-26 RX ORDER — CIPROFLOXACIN 2 MG/ML
400 INJECTION, SOLUTION INTRAVENOUS
Status: CANCELLED | OUTPATIENT
Start: 2018-03-26

## 2018-03-26 NOTE — H&P
Subjective:       Patient ID: Mary Ellen Fajardo is a 62 y.o. female who was last seen in this office 2/26/2018    Chief Complaint:   Chief Complaint   Patient presents with    Nephrolithiasis     f/u pt here today to set up cysto/stent removal          History of Present Illness  Neurogenic Bladder  Her bladder is managed with a suprapubic catheter. She has had one since March 2011. Her  changes her 18 Fr catheter every 20 days. She has been having problems with sedimentation and urgency incontinence. She has also been having pain with catheter removal and insertion.  She has noted a dark color and more sediment at times.  She had a routine ALBERTINA suggesting a right kidney stone.  Follow up CT showed a right staghorn calculus and large bladder stone.    Stones  She had a cystolitholapaxy on 12/20/2017 with right stent placement.  She had a Right PCNL on Friday 1/19/2018.  She then had a second look PCNL on Monday 1/29/2018.  She then had a right ureteroscopy and laser lithotripsy on 3/5/2018.  She was to come in today for an office cystoscopy and stent removal, but she cannot safely get onto the procedure table.    ACTIVE MEDICAL ISSUES:  Patient Active Problem List   Diagnosis    Paraparesis    Gait disorder    Neurogenic bladder    Knee pain, bilateral    S/P knee replacement    OA (osteoarthritis) of knee    Primary osteoarthritis of left knee    Poor motor control of trunk    Decreased range of motion of lower extremity    Bilateral leg weakness    Chronic knee pain    Bladder stone    Staghorn calculus    Kidney stones    Essential hypertension    Recurrent major depressive disorder, in partial remission    Neuropathy    Primary insomnia    Obstructive sleep apnea       ALLERGIES AND MEDICATIONS: updated and reviewed.  Review of patient's allergies indicates:   Allergen Reactions    Rocephin [ceftriaxone] Rash     Current Outpatient Prescriptions   Medication Sig    amlodipine  "(NORVASC) 10 MG tablet Take 10 mg by mouth once daily.     baclofen (LIORESAL) 20 MG tablet 10 mg 4 (four) times daily.     bumetanide (BUMEX) 1 MG tablet 1 mg once daily.     catheter 18 Fr Misc Change every 20 days    gabapentin (NEURONTIN) 300 MG capsule 600 mg 3 (three) times daily.     hydrocodone-acetaminophen 5-325mg (NORCO) 5-325 mg per tablet Take 1 tablet by mouth every 6 (six) hours as needed for Pain.    oxybutynin (DITROPAN-XL) 5 MG TR24 TAKE 1 TABLET BY MOUTH EVERY DAY    oxyCODONE-acetaminophen (PERCOCET)  mg per tablet Take 1 tablet by mouth every 8 (eight) hours as needed for Pain.    phenazopyridine (PYRIDIUM) 200 MG tablet Take 1 tablet (200 mg total) by mouth 3 (three) times daily as needed for Pain (Burning).    potassium chloride (KLOR-CON) 10 MEQ TbSR     sertraline (ZOLOFT) 50 MG tablet TK 1 T PO QD    trazodone (DESYREL) 100 MG tablet 100 mg.     VENTOLIN HFA 90 mcg/actuation inhaler      No current facility-administered medications for this visit.        Review of Systems   Constitutional: Negative for activity change, fatigue, fever and unexpected weight change.   Eyes: Negative for redness and visual disturbance.   Respiratory: Negative for chest tightness and shortness of breath.    Cardiovascular: Negative for chest pain and leg swelling.   Gastrointestinal: Negative for abdominal distention, abdominal pain, constipation, diarrhea, nausea and vomiting.   Genitourinary: Negative for difficulty urinating, dysuria, flank pain, frequency, hematuria, pelvic pain, urgency and vaginal bleeding.   Musculoskeletal: Negative for arthralgias and joint swelling.   Neurological: Negative for dizziness, weakness and headaches.   Psychiatric/Behavioral: Negative for confusion. The patient is not nervous/anxious.    All other systems reviewed and are negative.      Objective:      Vitals:    03/26/18 1438   BP: 118/78   Pulse: 68   Weight: 108 kg (238 lb)   Height: 5' 5" (1.651 m) "     Physical Exam   Nursing note and vitals reviewed.  Constitutional: She is oriented to person, place, and time. She appears well-developed.   HENT:   Head: Normocephalic.   Eyes: Conjunctivae are normal.   Neck: Normal range of motion. No tracheal deviation present. No thyromegaly present.   Cardiovascular: Normal rate, normal heart sounds and normal pulses.    Pulmonary/Chest: Effort normal and breath sounds normal. No respiratory distress. She has no wheezes.   Abdominal: Soft. She exhibits no distension and no mass. There is no hepatosplenomegaly. There is no tenderness. There is no rebound, no guarding and no CVA tenderness. No hernia.   Genitourinary:   Genitourinary Comments: PCN site closed   Musculoskeletal: Normal range of motion. She exhibits no edema or tenderness.   Lymphadenopathy:     She has no cervical adenopathy.   Neurological: She is alert and oriented to person, place, and time.   Skin: Skin is warm and dry. No rash noted. No erythema.     Psychiatric: She has a normal mood and affect. Her behavior is normal. Judgment and thought content normal.           Assessment:       1. Staghorn calculus    2. Kidney stone          Plan:       1. Kidney stone  Plan for Cystoscopy with right stent removal on Monday 4/9/2018    2. Staghorn calculus  S/p PCNL and ureteroscopy            Follow-up in about 6 weeks (around 5/7/2018) for Follow up.

## 2018-04-05 ENCOUNTER — HOSPITAL ENCOUNTER (OUTPATIENT)
Dept: PREADMISSION TESTING | Facility: HOSPITAL | Age: 63
Discharge: HOME | End: 2018-04-05
Attending: UROLOGY
Payer: MEDICARE

## 2018-04-05 VITALS
HEART RATE: 53 BPM | OXYGEN SATURATION: 96 % | BODY MASS INDEX: 39.15 KG/M2 | WEIGHT: 235 LBS | TEMPERATURE: 97 F | RESPIRATION RATE: 18 BRPM | HEIGHT: 65 IN | SYSTOLIC BLOOD PRESSURE: 129 MMHG | DIASTOLIC BLOOD PRESSURE: 75 MMHG

## 2018-04-05 NOTE — DISCHARGE INSTRUCTIONS
Your surgery is scheduled for___4/9/2018______________.    Call [phone] between 2 pm and 5 pm ___4/6/2018_________ to find out your arrival time for the day of surgery.    Report to SAME DAY SURGERY UNIT at _______am on the 2nd floor of the hospital.  Use the front entrance of the hospital before 6 am.  If you need wheelchair assistance, call [phone] from your cell phone,  or call 0 from the courtesy phone in the hospital lobby.    Important instructions:   Do not eat or drink after 12 midnight, including water.  It is okay to brush your teeth.  Do not have gum, candy or mints.     Take only these medications with a small swallow of water on the morning of your surgery.__amlodipine, baclofen, gabapentin, sertraline, ventolion___________    Stop taking Aspirin, Ibuprofen, Motrin and Aleve , Fish oil, and Vitamin E for at least 7 days before your surgery. You may use Tylenol unless otherwise instructed by your doctor.           Please shower the night before and the morning of your surgery.       Do not wear make- up, including mascara.     You may wear deodorant only.      Do not wear powder, body lotion or cologne.     Do not wear any jewelry or have any metal on your body.     Please bring any documents given to you by your doctor.     If you are going home on the same day of surgery, you must have arrangements for a ride home.  You will not be able to drive home if you were given anesthesia or sedation.     Wear loose fitting clothes allowing for bandages.     Please leave money and valuables home.       You may bring your cell phone.     Call the doctor if fever or illness should occur before your surgery.    Call [phone] to contact us here at [facility] if needed.

## 2018-04-08 ENCOUNTER — ANESTHESIA EVENT (OUTPATIENT)
Dept: SURGERY | Facility: HOSPITAL | Age: 63
End: 2018-04-08
Payer: MEDICARE

## 2018-04-09 ENCOUNTER — HOSPITAL ENCOUNTER (OUTPATIENT)
Facility: HOSPITAL | Age: 63
Discharge: HOME | End: 2018-04-09
Attending: UROLOGY | Admitting: UROLOGY
Payer: MEDICARE

## 2018-04-09 ENCOUNTER — SURGERY (OUTPATIENT)
Age: 63
End: 2018-04-09

## 2018-04-09 ENCOUNTER — ANESTHESIA (OUTPATIENT)
Dept: SURGERY | Facility: HOSPITAL | Age: 63
End: 2018-04-09
Payer: MEDICARE

## 2018-04-09 VITALS
RESPIRATION RATE: 16 BRPM | HEART RATE: 58 BPM | HEIGHT: 65 IN | TEMPERATURE: 98 F | SYSTOLIC BLOOD PRESSURE: 159 MMHG | OXYGEN SATURATION: 94 % | BODY MASS INDEX: 39.12 KG/M2 | DIASTOLIC BLOOD PRESSURE: 76 MMHG | WEIGHT: 234.81 LBS

## 2018-04-09 DIAGNOSIS — N31.9 NEUROGENIC BLADDER: ICD-10-CM

## 2018-04-09 DIAGNOSIS — N20.0 STAGHORN CALCULUS: Primary | ICD-10-CM

## 2018-04-09 DIAGNOSIS — N20.0 KIDNEY STONE: ICD-10-CM

## 2018-04-09 PROCEDURE — 25000003 PHARM REV CODE 250: Performed by: ANESTHESIOLOGY

## 2018-04-09 PROCEDURE — 36000707: Performed by: UROLOGY

## 2018-04-09 PROCEDURE — D9220A PRA ANESTHESIA: Mod: CRNA,,, | Performed by: NURSE ANESTHETIST, CERTIFIED REGISTERED

## 2018-04-09 PROCEDURE — 71000015 HC POSTOP RECOV 1ST HR: Performed by: UROLOGY

## 2018-04-09 PROCEDURE — 36000706: Performed by: UROLOGY

## 2018-04-09 PROCEDURE — 51705 CHANGE OF BLADDER TUBE: CPT | Mod: 58,51,, | Performed by: UROLOGY

## 2018-04-09 PROCEDURE — C1758 CATHETER, URETERAL: HCPCS | Performed by: UROLOGY

## 2018-04-09 PROCEDURE — D9220A PRA ANESTHESIA: Mod: ANES,,, | Performed by: ANESTHESIOLOGY

## 2018-04-09 PROCEDURE — 52310 CYSTOSCOPY AND TREATMENT: CPT | Mod: 58,,, | Performed by: UROLOGY

## 2018-04-09 PROCEDURE — 88300 SURGICAL PATH GROSS: CPT | Mod: 26,,, | Performed by: PATHOLOGY

## 2018-04-09 PROCEDURE — 88300 SURGICAL PATH GROSS: CPT | Performed by: PATHOLOGY

## 2018-04-09 PROCEDURE — 63600175 PHARM REV CODE 636 W HCPCS: Performed by: UROLOGY

## 2018-04-09 PROCEDURE — 63600175 PHARM REV CODE 636 W HCPCS: Performed by: NURSE ANESTHETIST, CERTIFIED REGISTERED

## 2018-04-09 PROCEDURE — 37000009 HC ANESTHESIA EA ADD 15 MINS: Performed by: UROLOGY

## 2018-04-09 PROCEDURE — 37000008 HC ANESTHESIA 1ST 15 MINUTES: Performed by: UROLOGY

## 2018-04-09 PROCEDURE — 71000016 HC POSTOP RECOV ADDL HR: Performed by: UROLOGY

## 2018-04-09 RX ORDER — SODIUM CHLORIDE 0.9 % (FLUSH) 0.9 %
3 SYRINGE (ML) INJECTION
Status: DISCONTINUED | OUTPATIENT
Start: 2018-04-09 | End: 2018-04-09 | Stop reason: HOSPADM

## 2018-04-09 RX ORDER — SODIUM CHLORIDE, SODIUM LACTATE, POTASSIUM CHLORIDE, CALCIUM CHLORIDE 600; 310; 30; 20 MG/100ML; MG/100ML; MG/100ML; MG/100ML
INJECTION, SOLUTION INTRAVENOUS CONTINUOUS
Status: DISCONTINUED | OUTPATIENT
Start: 2018-04-09 | End: 2018-04-09 | Stop reason: HOSPADM

## 2018-04-09 RX ORDER — MIDAZOLAM HYDROCHLORIDE 1 MG/ML
INJECTION, SOLUTION INTRAMUSCULAR; INTRAVENOUS
Status: DISCONTINUED | OUTPATIENT
Start: 2018-04-09 | End: 2018-04-09

## 2018-04-09 RX ORDER — PHENAZOPYRIDINE HYDROCHLORIDE 100 MG/1
200 TABLET, FILM COATED ORAL ONCE
Status: DISCONTINUED | OUTPATIENT
Start: 2018-04-09 | End: 2018-04-09 | Stop reason: HOSPADM

## 2018-04-09 RX ORDER — LIDOCAINE HCL/PF 100 MG/5ML
SYRINGE (ML) INTRAVENOUS
Status: DISCONTINUED | OUTPATIENT
Start: 2018-04-09 | End: 2018-04-09

## 2018-04-09 RX ORDER — PROPOFOL 10 MG/ML
VIAL (ML) INTRAVENOUS
Status: DISCONTINUED | OUTPATIENT
Start: 2018-04-09 | End: 2018-04-09

## 2018-04-09 RX ORDER — LIDOCAINE HYDROCHLORIDE 10 MG/ML
1 INJECTION, SOLUTION EPIDURAL; INFILTRATION; INTRACAUDAL; PERINEURAL ONCE
Status: DISCONTINUED | OUTPATIENT
Start: 2018-04-09 | End: 2018-04-09 | Stop reason: HOSPADM

## 2018-04-09 RX ORDER — OXYCODONE AND ACETAMINOPHEN 5; 325 MG/1; MG/1
1 TABLET ORAL ONCE
Status: COMPLETED | OUTPATIENT
Start: 2018-04-09 | End: 2018-04-09

## 2018-04-09 RX ORDER — PROPOFOL 10 MG/ML
VIAL (ML) INTRAVENOUS CONTINUOUS PRN
Status: DISCONTINUED | OUTPATIENT
Start: 2018-04-09 | End: 2018-04-09

## 2018-04-09 RX ORDER — CIPROFLOXACIN 2 MG/ML
400 INJECTION, SOLUTION INTRAVENOUS
Status: COMPLETED | OUTPATIENT
Start: 2018-04-09 | End: 2018-04-09

## 2018-04-09 RX ORDER — MORPHINE SULFATE 10 MG/ML
2 INJECTION INTRAMUSCULAR; INTRAVENOUS; SUBCUTANEOUS EVERY 5 MIN PRN
Status: DISCONTINUED | OUTPATIENT
Start: 2018-04-09 | End: 2018-04-09 | Stop reason: HOSPADM

## 2018-04-09 RX ADMIN — PROPOFOL 140 MCG/KG/MIN: 10 INJECTION, EMULSION INTRAVENOUS at 10:04

## 2018-04-09 RX ADMIN — MIDAZOLAM HYDROCHLORIDE 2 MG: 1 INJECTION, SOLUTION INTRAMUSCULAR; INTRAVENOUS at 10:04

## 2018-04-09 RX ADMIN — OXYCODONE HYDROCHLORIDE AND ACETAMINOPHEN 1 TABLET: 5; 325 TABLET ORAL at 11:04

## 2018-04-09 RX ADMIN — LIDOCAINE HYDROCHLORIDE 100 MG: 20 INJECTION, SOLUTION INTRAVENOUS at 10:04

## 2018-04-09 RX ADMIN — PROPOFOL 100 MG: 10 INJECTION, EMULSION INTRAVENOUS at 10:04

## 2018-04-09 RX ADMIN — SODIUM CHLORIDE, SODIUM LACTATE, POTASSIUM CHLORIDE, AND CALCIUM CHLORIDE 10 ML/HR: .6; .31; .03; .02 INJECTION, SOLUTION INTRAVENOUS at 08:04

## 2018-04-09 RX ADMIN — CIPROFLOXACIN 400 MG: 2 INJECTION, SOLUTION INTRAVENOUS at 10:04

## 2018-04-09 NOTE — BRIEF OP NOTE
[facility]  Brief Operative Note     SUMMARY     Surgery Date: 4/9/2018     Surgeon(s) and Role:     * RAMA Tinoco MD - Primary    Assisting Surgeon: None    Pre-op Diagnosis:  Kidney stone [N20.0]  Staghorn calculus [N20.0]    Post-op Diagnosis:  Post-Op Diagnosis Codes:     * Kidney stone [N20.0]     * Staghorn calculus [N20.0]    Procedure(s) (LRB):  REMOVAL-STENT-URETERAL (Cystoscopy) (Right)    Anesthesia: General/MAC    Description of the findings of the procedure: cystoscopy, right stent removal.  SP tube change    Findings/Key Components: as above    Estimated Blood Loss: * No values recorded between 4/9/2018 11:07 AM and 4/9/2018 11:16 AM *         Specimens:   Specimen (12h ago through future)    Start     Ordered    04/09/18 1111  Specimen to Pathology - Surgery  Once     Comments:  STENT      04/09/18 1110    04/09/18 1042  Specimen to Pathology - Surgery  Once,   Status:  Canceled     Comments:  STENT      04/09/18 1042          Discharge Note    SUMMARY     Admit Date: 4/9/2018    Discharge Date and Time:  04/09/2018 11:16 AM    Hospital Course (synopsis of major diagnoses, care, treatment, and services provided during the course of the hospital stay): Patient was admitted for an outpatient procedure and tolerated the procedure well with no complications.      Final Diagnosis: Post-Op Diagnosis Codes:     * Kidney stone [N20.0]     * Staghorn calculus [N20.0]    Disposition: Home or Self Care    Follow Up/Patient Instructions:     Medications:  Reconciled Home Medications:      Medication List      CONTINUE taking these medications    amLODIPine 10 MG tablet  Commonly known as:  NORVASC     baclofen 20 MG tablet  Commonly known as:  LIORESAL     bumetanide 1 MG tablet  Commonly known as:  BUMEX     catheter 18 Fr Misc  Change every 20 days     gabapentin 300 MG capsule  Commonly known as:  NEURONTIN     hydrocodone-acetaminophen 5-325mg 5-325 mg per tablet  Commonly known as:   NORCO  Take 1 tablet by mouth every 6 (six) hours as needed for Pain.     oxybutynin 5 MG Tr24  Commonly known as:  DITROPAN-XL  TAKE 1 TABLET BY MOUTH EVERY DAY     potassium chloride 10 MEQ Tbsr  Commonly known as:  KLOR-CON     sertraline 50 MG tablet  Commonly known as:  ZOLOFT     traZODone 100 MG tablet  Commonly known as:  DESYREL     VENTOLIN HFA 90 mcg/actuation inhaler  Generic drug:  albuterol            Discharge Procedure Orders  Diet general     Activity as tolerated     Call MD for:   Order Comments: Significant Hematuria       Follow-up Information     W Carlos Tinoco MD. Schedule an appointment as soon as possible for a visit in 3 weeks.    Specialty:  Urology  Why:  Post op Check  Contact information:  [address] [place] [id number] [phone]

## 2018-04-09 NOTE — OP NOTE
DATE OF PROCEDURE:  04/09/2018.    PREOPERATIVE DIAGNOSIS:  Right staghorn calculus, neurogenic bladder.    POSTOPERATIVE DIAGNOSIS:  Right staghorn calculus, neurogenic bladder.    PROCEDURE PERFORMED:  Cystoscopy with right ureteral stent removal and   suprapubic tube change.    PRIMARY SURGEON:  Jacques Tinoco M.D.    ANESTHESIA:  MAC.    ESTIMATED BLOOD LOSS:  Minimal.    DRAINS:  An 18-Egyptian suprapubic tube catheter.    SPECIMENS REMOVED:  Right ureteral stent.    COMPLICATIONS:  None.    INDICATIONS:  Mary Ellen Fajardo is a 62-year-old woman with a history of a staghorn   calculus.  She has undergone PCNL.  She is here today for stent removal.  She   has a neurogenic bladder, managed with a suprapubic tube.  She agreed to have   her suprapubic tube change as well.    Mary Ellen Fajardo was taken to the Operating Room where she was positively   identified by sosa.  She was placed supine on the operating room table.    Following induction of adequate general anesthesia, she was placed in the dorsal   lithotomy position and her external genitalia were prepped and draped in the   usual sterile fashion.    A preoperative timeout was performed as well as confirmation of preoperative   antibiotics.    A 21-Egyptian rigid cystoscope was then passed per urethra into the bladder under   direct vision.  Her previously placed stent was visualized and grasped and   brought out to the urethral meatus.    The stent was then further withdrawn without difficulty.  There was mild   encrustation noted along the stent.    Her suprapubic tube had been prepped as well.  The balloon was taken down and   the catheter was withdrawn without difficulty.  A new 18-Egyptian suprapubic tube   catheter was then passed into the bladder without difficulty through the SP tube   site.  Efflux was seen draining from the catheter.  The catheter balloon was   then inflated and the catheter was then left to gravity drainage.    Her anesthesia was  then reversed.  She was taken to Recovery Room in stable   condition.      ARIADNE  dd: 04/09/2018 11:19:37 (CDT)  td: 04/09/2018 13:12:53 (CDT)  Doc ID   #[id number]  Job ID #[id number]    CC:

## 2018-04-09 NOTE — DISCHARGE SUMMARY
[facility]  Discharge Note  Short Stay    Admit Date: 4/9/2018    Discharge Date and Time: 04/09/2018 11:16 AM      Attending Physician: RAMA Tinoco MD     Discharge Provider: OMA Tinoco    Diagnoses:  Active Hospital Problems    Diagnosis  POA    *Staghorn calculus [N20.0]  Yes      Resolved Hospital Problems    Diagnosis Date Resolved POA   No resolved problems to display.       Discharged Condition: stable    Hospital Course: Patient was admitted for an outpatient procedure and tolerated the procedure well with no complications.    Final Diagnoses: Same as principal problem.    Disposition: Home or Self Care    Follow up/Patient Instructions:    Medications:  Reconciled Home Medications:      Medication List      CONTINUE taking these medications    amLODIPine 10 MG tablet  Commonly known as:  NORVASC     baclofen 20 MG tablet  Commonly known as:  LIORESAL     bumetanide 1 MG tablet  Commonly known as:  BUMEX     catheter 18 Fr Misc  Change every 20 days     gabapentin 300 MG capsule  Commonly known as:  NEURONTIN     hydrocodone-acetaminophen 5-325mg 5-325 mg per tablet  Commonly known as:  NORCO  Take 1 tablet by mouth every 6 (six) hours as needed for Pain.     oxybutynin 5 MG Tr24  Commonly known as:  DITROPAN-XL  TAKE 1 TABLET BY MOUTH EVERY DAY     potassium chloride 10 MEQ Tbsr  Commonly known as:  KLOR-CON     sertraline 50 MG tablet  Commonly known as:  ZOLOFT     traZODone 100 MG tablet  Commonly known as:  DESYREL     VENTOLIN HFA 90 mcg/actuation inhaler  Generic drug:  albuterol            Discharge Procedure Orders  Diet general     Activity as tolerated     Call MD for:   Order Comments: Significant Hematuria       Follow-up Information     OMA Tinoco MD. Schedule an appointment as soon as possible for a visit in 3 weeks.    Specialty:  Urology  Why:  Post op Check  Contact information:  [address] [place] [id number] [phone]                    Discharge Procedure Orders (must include Diet, Follow-up, Activity):    Discharge Procedure Orders (must include Diet, Follow-up, Activity)  Diet general     Activity as tolerated     Call MD for:   Order Comments: Significant Hematuria

## 2018-04-09 NOTE — ANESTHESIA POSTPROCEDURE EVALUATION
"Anesthesia Post Evaluation    Patient: Mary Ellen Fajardo    Procedure(s) Performed: Procedure(s) (LRB):  REMOVAL-STENT-URETERAL (Cystoscopy) (Right)    Final Anesthesia Type: general  Patient location during evaluation: PACU  Patient participation: Yes- Able to Participate  Level of consciousness: awake and alert, oriented and awake  Post-procedure vital signs: reviewed and stable  Pain management: adequate  Airway patency: patent  PONV status at discharge: No PONV  Anesthetic complications: no      Cardiovascular status: blood pressure returned to baseline, hemodynamically stable and stable  Respiratory status: unassisted and spontaneous ventilation  Hydration status: euvolemic  Follow-up not needed.        Visit Vitals  /77   Pulse 64   Temp 36.9 °C (98.4 °F) (Oral)   Resp 16   Ht 5' 5" (1.651 m)   Wt 106.5 kg (234 lb 12.6 oz)   SpO2 97%   Breastfeeding? No   BMI 39.07 kg/m²       Pain/Teresita Score: Pain Assessment Performed: Yes (4/9/2018  7:50 AM)  Presence of Pain: denies (4/9/2018  7:50 AM)  Pain Rating Prior to Med Admin: 6 (4/9/2018 11:47 AM)      "

## 2018-04-09 NOTE — ANESTHESIA PREPROCEDURE EVALUATION
04/09/2018  Mary Ellen Fajardo is a 62 y.o., female.    Anesthesia Evaluation    I have reviewed the Patient Summary Reports.    I have reviewed the Nursing Notes.   I have reviewed the Medications.     Review of Systems  Anesthesia Hx:  No previous Anesthesia  Denies Family Hx of Anesthesia complications.   Denies Personal Hx of Anesthesia complications.   Social:  Non-Smoker    Hematology/Oncology:  Hematology Normal   Oncology Normal     EENT/Dental:EENT/Dental Normal   Cardiovascular:   Hypertension    Pulmonary:  Pulmonary Normal    Renal/:   Chronic Renal Disease    Hepatic/GI:   Liver Disease, Hepatitis    Musculoskeletal:  Musculoskeletal Normal    Neurological:  Neurology Normal    Endocrine:  Endocrine Normal    Dermatological:  Skin Normal    Psych:  Psychiatric Normal           Physical Exam  General:  Well nourished    Airway/Jaw/Neck:  Airway Findings: Mouth Opening: Normal General Airway Assessment: Adult  Mallampati: II  TM Distance: 4 - 6 cm      Dental:  Dental Findings: Upper Dentures    Chest/Lungs:  Chest/Lungs Findings: Clear to auscultation     Heart/Vascular:  Heart Findings: Rate: Normal        Mental Status:  Mental Status Findings:  Cooperative, Alert and Oriented         Anesthesia Plan  Type of Anesthesia, risks & benefits discussed:  Anesthesia Type:  general  Patient's Preference:   Intra-op Monitoring Plan: standard ASA monitors  Intra-op Monitoring Plan Comments:   Post Op Pain Control Plan: multimodal analgesia, IV/PO Opioids PRN and per primary service following discharge from PACU  Post Op Pain Control Plan Comments:   Induction:    Beta Blocker:  Patient is not currently on a Beta-Blocker (No further documentation required).       Informed Consent: Patient understands risks and agrees with Anesthesia plan.  Questions answered. Anesthesia consent signed with  patient.  ASA Score: 2     Day of Surgery Review of History & Physical:    H&P update referred to the provider.  H&P completed by Anesthesiologist.   Anesthesia Plan Notes: Npo after mn        Ready For Surgery From Anesthesia Perspective.

## 2018-04-09 NOTE — TRANSFER OF CARE
"Anesthesia Transfer of Care Note    Patient: Mary Ellen Fajardo    Procedure(s) Performed: Procedure(s) (LRB):  REMOVAL-STENT-URETERAL (Cystoscopy) (Right)    Patient location: OPS    Anesthesia Type: MAC    Transport from OR: Transported from OR on room air with adequate spontaneous ventilation    Post pain: adequate analgesia    Post assessment: no apparent anesthetic complications    Post vital signs: stable    Level of consciousness: awake, alert and oriented    Nausea/Vomiting: no nausea/vomiting    Complications: none    Transfer of care protocol was followed      Last vitals:   Visit Vitals  /77   Pulse 64   Temp 36.9 °C (98.4 °F) (Oral)   Resp 16   Ht 5' 5" (1.651 m)   Wt 106.5 kg (234 lb 12.6 oz)   SpO2 97%   Breastfeeding? No   BMI 39.07 kg/m²     "

## 2018-05-07 ENCOUNTER — OFFICE VISIT (OUTPATIENT)
Dept: UROLOGY | Facility: CLINIC | Age: 63
End: 2018-05-07
Payer: MEDICARE

## 2018-05-07 VITALS
HEIGHT: 65 IN | HEART RATE: 62 BPM | WEIGHT: 234 LBS | DIASTOLIC BLOOD PRESSURE: 70 MMHG | SYSTOLIC BLOOD PRESSURE: 110 MMHG | BODY MASS INDEX: 38.99 KG/M2

## 2018-05-07 DIAGNOSIS — N20.0 KIDNEY STONES: Primary | ICD-10-CM

## 2018-05-07 DIAGNOSIS — R35.1 NOCTURIA MORE THAN TWICE PER NIGHT: ICD-10-CM

## 2018-05-07 DIAGNOSIS — R33.9 INCOMPLETE EMPTYING OF BLADDER: ICD-10-CM

## 2018-05-07 PROCEDURE — 99214 OFFICE O/P EST MOD 30 MIN: CPT | Mod: S$PBB,,, | Performed by: UROLOGY

## 2018-05-07 PROCEDURE — 99213 OFFICE O/P EST LOW 20 MIN: CPT | Mod: PBBFAC | Performed by: UROLOGY

## 2018-05-07 PROCEDURE — 99999 PR PBB SHADOW E&M-EST. PATIENT-LVL III: CPT | Mod: PBBFAC,,, | Performed by: UROLOGY

## 2018-05-07 NOTE — PROGRESS NOTES
Subjective:       Patient ID: Mary Ellen Fajardo is a 62 y.o. female who was last seen in this office 3/26/2018    Chief Complaint:   Chief Complaint   Patient presents with    Nephrolithiasis     pt coming in from procedure     History of Present Illness  Neurogenic Bladder  Her bladder is managed with a suprapubic catheter. She has had one since March 2011. Her  changes her 18 Fr catheter every 20 days. She has been having problems with sedimentation and urgency incontinence. She has also been having pain with catheter removal and insertion.  She has noted a dark color and more sediment at times.  She had a routine ALBERTINA suggesting a right kidney stone.  Follow up CT showed a right staghorn calculus and large bladder stone.    Stones  She had a cystolitholapaxy on 12/20/2017 with right stent placement.  She had a Right PCNL on Friday 1/19/2018.  She then had a second look PCNL on Monday 1/29/2018.  She then had a right ureteroscopy and laser lithotripsy on 3/5/2018.  Her stent was removed without difficulty on 4/9/2018.  She denies hematuria or flank pain.    ACTIVE MEDICAL ISSUES:  Patient Active Problem List   Diagnosis    Paraparesis    Gait disorder    Neurogenic bladder    Knee pain, bilateral    S/P knee replacement    OA (osteoarthritis) of knee    Primary osteoarthritis of left knee    Poor motor control of trunk    Decreased range of motion of lower extremity    Bilateral leg weakness    Chronic knee pain    Bladder stone    Staghorn calculus    Kidney stones    Essential hypertension    Recurrent major depressive disorder, in partial remission    Neuropathy    Primary insomnia    Obstructive sleep apnea       ALLERGIES AND MEDICATIONS: updated and reviewed.  Review of patient's allergies indicates:   Allergen Reactions    Rocephin [ceftriaxone] Rash     Current Outpatient Prescriptions   Medication Sig    amlodipine (NORVASC) 10 MG tablet Take 10 mg by mouth once daily.   "   baclofen (LIORESAL) 20 MG tablet 10 mg 4 (four) times daily.     bumetanide (BUMEX) 1 MG tablet 1 mg once daily.     catheter 18 Fr Misc Change every 20 days    gabapentin (NEURONTIN) 300 MG capsule 600 mg 3 (three) times daily.     hydrocodone-acetaminophen 5-325mg (NORCO) 5-325 mg per tablet Take 1 tablet by mouth every 6 (six) hours as needed for Pain.    oxybutynin (DITROPAN-XL) 5 MG TR24 TAKE 1 TABLET BY MOUTH EVERY DAY    potassium chloride (KLOR-CON) 10 MEQ TbSR     sertraline (ZOLOFT) 50 MG tablet TK 1 T PO QD    trazodone (DESYREL) 100 MG tablet 100 mg every evening.     VENTOLIN HFA 90 mcg/actuation inhaler      No current facility-administered medications for this visit.        Review of Systems   Constitutional: Negative for activity change, fatigue, fever and unexpected weight change.   Eyes: Negative for redness and visual disturbance.   Respiratory: Negative for chest tightness and shortness of breath.    Cardiovascular: Negative for chest pain and leg swelling.   Gastrointestinal: Negative for abdominal distention, abdominal pain, constipation, diarrhea, nausea and vomiting.   Genitourinary: Negative for difficulty urinating, dysuria, flank pain, frequency, hematuria, pelvic pain, urgency and vaginal bleeding.   Musculoskeletal: Negative for arthralgias and joint swelling.   Neurological: Negative for dizziness, weakness and headaches.   Psychiatric/Behavioral: Negative for confusion. The patient is not nervous/anxious.    All other systems reviewed and are negative.      Objective:      Vitals:    05/07/18 1349   BP: 110/70   Pulse: 62   Weight: 106.1 kg (234 lb)   Height: 5' 5" (1.651 m)     Physical Exam   Nursing note and vitals reviewed.  Constitutional: She is oriented to person, place, and time. She appears well-developed.   HENT:   Head: Normocephalic.   Eyes: Conjunctivae are normal.   Neck: Normal range of motion. No tracheal deviation present. No thyromegaly present. "   Cardiovascular: Normal rate, normal heart sounds and normal pulses.    Pulmonary/Chest: Effort normal and breath sounds normal. No respiratory distress. She has no wheezes.   Abdominal: Soft. She exhibits no distension and no mass. There is no hepatosplenomegaly. There is no tenderness. There is no rebound, no guarding and no CVA tenderness. No hernia.   Musculoskeletal: Normal range of motion. She exhibits no edema or tenderness.   Lymphadenopathy:     She has no cervical adenopathy.   Neurological: She is alert and oriented to person, place, and time.   Skin: Skin is warm and dry. No rash noted. No erythema.     Psychiatric: She has a normal mood and affect. Her behavior is normal. Judgment and thought content normal.           Assessment:       1. Kidney stones    2. Incomplete emptying of bladder    3. Nocturia more than twice per night          Plan:       1. Kidney stones    - US Retroperitoneal Complete (Kidney and; Future    2. Incomplete emptying of bladder  Stable, SP tube    3. Nocturia more than twice per night  stable            Follow-up in about 6 months (around 11/7/2018) for Follow up, Review X-ray.

## 2018-06-21 DIAGNOSIS — N31.9 NEUROGENIC BLADDER: ICD-10-CM

## 2018-06-21 RX ORDER — OXYBUTYNIN CHLORIDE 5 MG/1
TABLET, EXTENDED RELEASE ORAL
Qty: 90 TABLET | Refills: 0 | Status: SHIPPED | OUTPATIENT
Start: 2018-06-21 | End: 2018-09-22 | Stop reason: SDUPTHER

## 2018-08-15 ENCOUNTER — TELEPHONE (OUTPATIENT)
Dept: UROLOGY | Facility: CLINIC | Age: 63
End: 2018-08-15

## 2018-08-15 DIAGNOSIS — F33.9 RECURRENT MAJOR DEPRESSIVE DISORDER, REMISSION STATUS UNSPECIFIED: Primary | ICD-10-CM

## 2018-08-15 NOTE — TELEPHONE ENCOUNTER
----- Message from Josefina Kim sent at 8/15/2018  3:44 PM CDT -----  Contact: self  [phone]  Pt is requesting to you regarding a referral to DR. Young. Pls call pt [phone]. Thanks.......Ailyn

## 2018-08-15 NOTE — TELEPHONE ENCOUNTER
----- Message from Josefina Kim sent at 8/15/2018  3:44 PM CDT -----  Contact: self  [phone]  Pt is requesting to you regarding a referral to DR. Young. Pls call pt [phone]. Thanks.......Ailyn

## 2018-08-15 NOTE — TELEPHONE ENCOUNTER
Patient states she has had depression for a long time and feels like her medication is not helping her and she is requesting a referral. Patient states she does not feel that she would hurt herself. She states that she was diagnosed with bipolar in the past and may need a change in medication.

## 2018-09-13 ENCOUNTER — OFFICE VISIT (OUTPATIENT)
Dept: PSYCHIATRY | Facility: CLINIC | Age: 63
End: 2018-09-13
Payer: MEDICARE

## 2018-09-13 VITALS
HEIGHT: 65 IN | WEIGHT: 240 LBS | DIASTOLIC BLOOD PRESSURE: 80 MMHG | HEART RATE: 60 BPM | SYSTOLIC BLOOD PRESSURE: 126 MMHG | BODY MASS INDEX: 39.99 KG/M2

## 2018-09-13 DIAGNOSIS — Z79.899 DRUG THERAPY: ICD-10-CM

## 2018-09-13 DIAGNOSIS — F51.05 INSOMNIA RELATED TO ANOTHER MENTAL DISORDER: ICD-10-CM

## 2018-09-13 DIAGNOSIS — F31.5 BIPOLAR I DISORDER, CURRENT OR MOST RECENT EPISODE DEPRESSED, WITH PSYCHOTIC FEATURES WITH ANXIOUS DISTRESS: Primary | ICD-10-CM

## 2018-09-13 PROCEDURE — 99213 OFFICE O/P EST LOW 20 MIN: CPT | Mod: PBBFAC | Performed by: NURSE PRACTITIONER

## 2018-09-13 PROCEDURE — 99999 PR PBB SHADOW E&M-EST. PATIENT-LVL III: CPT | Mod: PBBFAC,,, | Performed by: NURSE PRACTITIONER

## 2018-09-13 PROCEDURE — 99204 OFFICE O/P NEW MOD 45 MIN: CPT | Mod: S$PBB,,, | Performed by: NURSE PRACTITIONER

## 2018-09-13 RX ORDER — OLANZAPINE 5 MG/1
5 TABLET ORAL NIGHTLY
Qty: 30 TABLET | Refills: 0 | Status: SHIPPED | OUTPATIENT
Start: 2018-09-13 | End: 2018-09-27 | Stop reason: SDUPTHER

## 2018-09-13 RX ORDER — FLUOXETINE 10 MG/1
10 CAPSULE ORAL DAILY
Qty: 30 CAPSULE | Refills: 0 | Status: SHIPPED | OUTPATIENT
Start: 2018-09-13 | End: 2018-09-27 | Stop reason: SDUPTHER
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
## 2018-09-13 NOTE — PATIENT INSTRUCTIONS
[facility] - DEPARTMENT OF PSYCHIATRY   NEW PATIENT ORIENTATION INFORMATION  OUTPATIENT SERVICES COUNSELING CONTRACT    We appreciate the opportunity to participate in your medical care and hope the following protocols will make it easier for you to receive quality treatment in our department.    1. PUNCTUALITY: Your appointment is scheduled for a fixed amount of time reserved especially for you.  To get the benefit of your appointment, please arrive early enough to allow time for parking and registration.  If you are late for your appointment, your clinician is not able to offer additional time.  Please make every effort to be on time.  You may be asked to reschedule.    2. PAYMENT FOR SERVICES:   Payments are expected at the time of service.  Please contact [phone] if you need to resolve issues involving your account at [facility] or to set up a payment plan.    3. CANCELLATION / MISSED APPOINTMENTS:   In order to receive quality care, all appointments must be kept.  Appointment may be cancelled, ONLY by talking with an  at phone number [phone], between 8:00 a.m. and 5:00 p.m., Monday through Friday, at least 24 hours before your appointment time.  Your clinician reserves this time specifically for you, and if you will be unable to use it, it is necessary that you cancel in a timely manner.  If you do not give at least 24-hour notice of cancellation a fee may be assessed.  Please note that insurance does not cover no-show charges, so you will be billed directly.  If you are consistently late, cancel, or do not show for your appointments, our department reserves the right to terminate treatment.    4. CALLING THE DEPARTMENT:   MESSAGES, SCHEDULE OR CANCEL APPOINTMENTS- In general you can reach the department by calling [phone], between 8:00 a.m. and 5:00 p.m., Monday through Friday, to schedule or cancel appointments or leave a message for your clinician.  It is  advisable to schedule your visits far in advance to obtain the most convenient times for your appointments.   AFTER HOURS, WEEKEND OR HOLIDAYS- For urgent questions after hours, weekends and holidays, calling the department number [phone] will connect you to the [facility] On Call nursing staff or the Psychiatry Inpatient Unit.  The [facility] On Call nursing staff will speak with you and direct your call/care as necessary.   EMERGENCY-  In case of a crisis when there is a concern of harm to self or others, call 911 or the office [phone] between 8:00 a.m. and 5:00 p.m., Monday through Friday.  After hours, weekends or holidays, please call 911 or go to the Emergency Department where you can be thoroughly evaluated by a physician.    5. TEAM APPROACH:  Most patients receive therapy through our team system.  In the team system, your primary therapist will be a , psychologist, psychiatry resident or psychiatrist.  If your therapist is a , psychologist or psychiatry resident, please contact your primary therapist first in matters other than medications or acute medical problems.    6. PRESCRIPTION REFILLS:  Prescription refills must be done at your physician office visit.  You will be given a sufficient number of refills to last one extra month beyond your next appointment.  No additional refills will be approved beyond the original treatment plan.  After hours, sufficient medication may be approved to last until the next scheduled appointment. After hours requests for refills on controlled substances will be declined by the psychiatrist on call, as he or she may not be familiar with your case.  Please work closely with your doctor so that you have sufficient medication until your next appointment.  Again, please note that no additional prescriptions will be approved per patient request over the phone.   No additional refills will be approved beyond the original treatment plan.   In  "certain exceptional situations, a phone consult appointment may be arranged, with appropriate charge, for the review and approval of prescription refills to last until the next scheduled appointment.    7. FOLLOW UP APPOINTMENTS:  Follow-up appointments can be made in person at the [facility] office, or by calling [phone], from 8am to 5pm, between Monday and Friday.  It is advisable to schedule your office visits far enough in advance to obtain the most convenient times for your appointments.    Revised Feb. 23, 2010 - F/OA1/Newpatient                You have been provided with a certain amount of medication with a specified number of refills.  Please follow up within an adequate time before you run out of medications.    REFILLS FOR CONTROLLED SUBSTANCES WILL NOT BE GIVEN WITHOUT AN APPOINTMENT.  I will not honor or fill automated refill requests from pharmacies.  You must come in for an appointment to get refills.        Please book your next appointment for myself or therapist by phone by calling our office at [phone].          PLEASE BE AT LEAST 15 MINUTES EARLY FOR YOUR NEXT APPOINTMENT.  PLEASE, DO NOT BE LATE OR YOU WILL BE TURNED AWAY AND ASKED TO RESCHEDULE.  YOU MUST COME EARLY TO ALLOW TIME FOR CHECK-IN AS WELL AS GET YOUR VITAL SIGNS AND GO OVER YOUR MEDICATIONS.  [employer] is not fair to the patients who present after you and are on time for their appointments.  It causes a delay in the appointments for patients and staff.  IF YOU ARE LATE, THERE IS A POSSIBILITY THAT YOU WILL BE CHARGED FOR THE APPOINTMENT TIME PERSONALLY AND IT WILL NOT GO TO YOUR INSURANCE.  YOU MAY ALSO BE DISCHARGED FROM CLINIC with multiple "No Show" appointments.       -----------------------------------------------------------------------------------------------------------------  IF YOU FEEL SUICIDAL OR HAVING THOUGHTS OR PLANS TO HURT YOURSELF OR OTHERS, CALL 911 OR REPORT TO THE NEAREST EMERGENCY ROOM.  " YOU CAN ALSO ACCESS THE FOLLOWING HOTLINE:    National Suicide Hotline Number 8-115-820-TALK (2441)      INSTRUCTIONS:    Taper off Zoloft: Take 1/2 a tablet a day for three days and then stop.

## 2018-09-22 DIAGNOSIS — N31.9 NEUROGENIC BLADDER: ICD-10-CM

## 2018-09-24 RX ORDER — OXYBUTYNIN CHLORIDE 5 MG/1
TABLET, EXTENDED RELEASE ORAL
Qty: 90 TABLET | Refills: 0 | Status: SHIPPED | OUTPATIENT
Start: 2018-09-24 | End: 2019-09-20

## 2018-09-25 ENCOUNTER — LAB VISIT (OUTPATIENT)
Dept: LAB | Facility: HOSPITAL | Age: 63
End: 2018-09-25
Attending: NURSE PRACTITIONER
Payer: MEDICARE

## 2018-09-25 DIAGNOSIS — F31.5 BIPOLAR I DISORDER, CURRENT OR MOST RECENT EPISODE DEPRESSED, WITH PSYCHOTIC FEATURES WITH ANXIOUS DISTRESS: ICD-10-CM

## 2018-09-25 DIAGNOSIS — Z79.899 DRUG THERAPY: ICD-10-CM

## 2018-09-25 DIAGNOSIS — F51.05 INSOMNIA RELATED TO ANOTHER MENTAL DISORDER: ICD-10-CM

## 2018-09-25 LAB
ALBUMIN SERPL BCP-MCNC: 3.3 G/DL
ALP SERPL-CCNC: 93 U/L
ALT SERPL W/O P-5'-P-CCNC: 12 U/L
ANION GAP SERPL CALC-SCNC: 8 MMOL/L
AST SERPL-CCNC: 18 U/L
BILIRUB SERPL-MCNC: 1 MG/DL
BUN SERPL-MCNC: 16 MG/DL
CALCIUM SERPL-MCNC: 9.5 MG/DL
CHLORIDE SERPL-SCNC: 98 MMOL/L
CHOLEST SERPL-MCNC: 173 MG/DL
CHOLEST/HDLC SERPL: 3.1 {RATIO}
CO2 SERPL-SCNC: 33 MMOL/L
CREAT SERPL-MCNC: 0.8 MG/DL
EST. GFR  (AFRICAN AMERICAN): >60 ML/MIN/1.73 M^2
EST. GFR  (NON AFRICAN AMERICAN): >60 ML/MIN/1.73 M^2
GLUCOSE SERPL-MCNC: 99 MG/DL
HDLC SERPL-MCNC: 55 MG/DL
HDLC SERPL: 31.8 %
LDLC SERPL CALC-MCNC: 104 MG/DL
NONHDLC SERPL-MCNC: 118 MG/DL
POTASSIUM SERPL-SCNC: 3.7 MMOL/L
PROT SERPL-MCNC: 8.7 G/DL
SODIUM SERPL-SCNC: 139 MMOL/L
T3FREE SERPL-MCNC: 1.7 PG/ML
T4 FREE SERPL-MCNC: 1.19 NG/DL
TRIGL SERPL-MCNC: 70 MG/DL
TSH SERPL DL<=0.005 MIU/L-ACNC: 0.45 UIU/ML

## 2018-09-25 PROCEDURE — 84443 ASSAY THYROID STIM HORMONE: CPT

## 2018-09-25 PROCEDURE — 84439 ASSAY OF FREE THYROXINE: CPT

## 2018-09-25 PROCEDURE — 84481 FREE ASSAY (FT-3): CPT

## 2018-09-25 PROCEDURE — 80053 COMPREHEN METABOLIC PANEL: CPT

## 2018-09-25 PROCEDURE — 36415 COLL VENOUS BLD VENIPUNCTURE: CPT | Mod: PO

## 2018-09-25 PROCEDURE — 80061 LIPID PANEL: CPT

## 2018-09-26 NOTE — PROGRESS NOTES
Outpatient Psychiatry Follow-Up Visit (MD/NP)    9/27/2018    Clinical Status of Patient:  Outpatient (Ambulatory)    Chief Complaint:  Mary Ellen Fajardo is a 62 y.o. female who presents today for follow-up of mood disorder, anxiety, psychosis and insomnia.  Met with patient.      Interval History and Content of Current Session:  Interim Events/Subjective Report/Content of Current Session: Mary Ellen  was last seen by me on 09/13/2018 for an initial psychiatric evaluation. Mary Ellen was diagnosed with Bipolar disorder, depressed, with psychotic features with anxious distress and insomnia and a plan of care was devised to include:    1. Safety: Call 911 or Crisis Line or go to ER for suicidal ideation, adverse effects of medication or any other emergency  2. Labs: CMP, CBC, Lipid profile, TSH, Free T3, Free T4, Urine Drug Screening.  3. Start Olanzapine 5 mg po qhs.  4. Start Prozac 10 mg po qd.  5. Taper off Zoloft: Take 1/2 tab po qd x 3 days, then stop  6. Return to clinic in 2 weeks ago.   7. Start Psychotherapy.  8. Continue Trazodone 100 mg po qhs prn sleep.     Today Mary Ellen is seen face to face.  She denies any problems with her medications.  She states that the medication is working very well for her. Her  has noticed that she is much better and even asked if he could take some of her medication too. Her sleep is good.  Her Appetite is decreased some and her energy level is up a little bit.  Her mood is better.  Her psychotic features have stopped.  Her anxious distress has decreased.  She did taper off of the Zoloft without any difficulty.    Her labs are reviewed today.  Lab Visit on 09/25/2018   Component Date Value Ref Range Status    Alcohol, Urine 09/25/2018 <10  <10 mg/dL Final    Benzodiazepines 09/25/2018 Negative   Final    Methadone metabolites 09/25/2018 Negative   Final    Cocaine (Metab.) 09/25/2018 Negative   Final    Opiate Scrn, Ur 09/25/2018 Presumptive Positive   Final     Barbiturate Screen, Ur 09/25/2018 Negative   Final    Amphetamine Screen, Ur 09/25/2018 Negative   Final    THC 09/25/2018 Negative   Final    Phencyclidine 09/25/2018 Negative   Final    Creatinine, Random Ur 09/25/2018 122.0  15.0 - 325.0 mg/dL Final    Comment: The random urine reference ranges provided were established   for 24 hour urine collections.  No reference ranges exist for  random urine specimens.  Correlate clinically.      Toxicology Information 09/25/2018 SEE COMMENT   Final    Comment: This screen includes the following classes of drugs at the   listed cut-off:  Benzodiazepines                  200 ng/ml  Methadone                        300 ng/ml  Cocaine metabolite               300 ng/ml  Opiates                          300 ng/ml  Barbiturates                     200 ng/ml  Amphetamines                    1000 ng/ml  Marijuana metabs (THC)            50 ng/ml  Phencyclidine (PCP)               25 ng/ml  High concentrations of Diphenhydramine may cross-react with  Phencyclidine PCP screening immunoassay giving a false   positive result.  High concentrations of Methylenedioxymethamphetamine (MDMA aka  Ectasy) and other structurally similar compounds may cross-   react with the Amphetamine/Methamphetamine screening   immunoassay giving a false positive result.  A metabolite of the anti-HIV drug Sustiva () may cause  false positive results in the Marijuana metabolite (THC)   screening assay.  Note: This exception list includes only more common   interferants i                           n toxicology screen testing.  Because of many   cross-reactantspositive results on toxicology drug screens   should be confirmed whenever results do not correlate with   clinical presentation.  This report is intended for use in clinical monitoring and  management of patients. It is not intended for use in   employment related drug testing.  Because of any cross-reactants, positive results on  toxicology  drug screens should be confirmed whenever results do not  correlate with clinical presentation.  Presumptive positive results are unconfirmed and may be used   only for medical purposes.     Lab Visit on 09/25/2018   Component Date Value Ref Range Status    Sodium 09/25/2018 139  136 - 145 mmol/L Final    Potassium 09/25/2018 3.7  3.5 - 5.1 mmol/L Final    Chloride 09/25/2018 98  95 - 110 mmol/L Final    CO2 09/25/2018 33* 23 - 29 mmol/L Final    Glucose 09/25/2018 99  70 - 110 mg/dL Final    BUN, Bld 09/25/2018 16  8 - 23 mg/dL Final    Creatinine 09/25/2018 0.8  0.5 - 1.4 mg/dL Final    Calcium 09/25/2018 9.5  8.7 - 10.5 mg/dL Final    Total Protein 09/25/2018 8.7* 6.0 - 8.4 g/dL Final    Albumin 09/25/2018 3.3* 3.5 - 5.2 g/dL Final    Total Bilirubin 09/25/2018 1.0  0.1 - 1.0 mg/dL Final    Comment: For infants and newborns, interpretation of results should be based  on gestational age, weight and in agreement with clinical  observations.  Premature Infant recommended reference ranges:  Up to 24 hours.............<8.0 mg/dL  Up to 48 hours............<12.0 mg/dL  3-5 days..................<15.0 mg/dL  6-29 days.................<15.0 mg/dL      Alkaline Phosphatase 09/25/2018 93  55 - 135 U/L Final    AST 09/25/2018 18  10 - 40 U/L Final    ALT 09/25/2018 12  10 - 44 U/L Final    Anion Gap 09/25/2018 8  8 - 16 mmol/L Final    eGFR if African American 09/25/2018 >60.0  >60 mL/min/1.73 m^2 Final    eGFR if non African American 09/25/2018 >60.0  >60 mL/min/1.73 m^2 Final    Comment: Calculation used to obtain the estimated glomerular filtration  rate (eGFR) is the CKD-EPI equation.       TSH 09/25/2018 0.452  0.400 - 4.000 uIU/mL Final    T3, Free 09/25/2018 1.7* 2.3 - 4.2 pg/mL Final    Free T4 09/25/2018 1.19  0.71 - 1.51 ng/dL Final    Cholesterol 09/25/2018 173  120 - 199 mg/dL Final    Comment: The National Cholesterol Education Program (NCEP) has set the  following guidelines  (reference ranges) for Cholesterol:  Optimal.....................<200 mg/dL  Borderline High.............200-239 mg/dL  High........................> or = 240 mg/dL      Triglycerides 09/25/2018 70  30 - 150 mg/dL Final    Comment: The National Cholesterol Education Program (NCEP) has set the  following guidelines (reference values) for triglycerides:  Normal......................<150 mg/dL  Borderline High.............150-199 mg/dL  High........................200-499 mg/dL      HDL 09/25/2018 55  40 - 75 mg/dL Final    Comment: The National Cholesterol Education Program (NCEP) has set the  following guidelines (reference values) for HDL Cholesterol:  Low...............<40 mg/dL  Optimal...........>60 mg/dL      LDL Cholesterol 09/25/2018 104.0  63.0 - 159.0 mg/dL Final    Comment: The National Cholesterol Education Program (NCEP) has set the  following guidelines (reference values) for LDL Cholesterol:  Optimal.......................<130 mg/dL  Borderline High...............130-159 mg/dL  High..........................160-189 mg/dL  Very High.....................>190 mg/dL      HDL/Chol Ratio 09/25/2018 31.8  20.0 - 50.0 % Final    Total Cholesterol/HDL Ratio 09/25/2018 3.1  2.0 - 5.0 Final    Non-HDL Cholesterol 09/25/2018 118  mg/dL Final    Comment: Risk category and Non-HDL cholesterol goals:  Coronary heart disease (CHD)or equivalent (10-year risk of CHD >20%):  Non-HDL cholesterol goal     <130 mg/dL  Two or more CHD risk factors and 10-year risk of CHD <= 20%:  Non-HDL cholesterol goal     <160 mg/dL  0 to 1 CHD risk factor:  Non-HDL cholesterol goal     <190 mg/dL     [    Psychotherapy:  · Target symptoms: anxiety , mood disorder, psychosis, insomnia  · Why chosen therapy is appropriate versus another modality: relevant to diagnosis, patient responds to this modality, evidence based practice  · Outcome monitoring methods: self-report, observation  · Therapeutic intervention type: supportive  "psychotherapy  · Topics discussed/themes: relationships difficulties  · The patient's response to the intervention is accepting. The patient's progress toward treatment goals is good.   · Duration of intervention: 16 minutes.    Review of Systems   PSYCHIATRIC: Pertinant items are noted in the narrative.  GENERAL:  Morbidly obese  SKIN:  No rashes or lacerations  HEAD:  No headaches  EYES:  No exophthalmos, jaundice or blindness, has cataracts and has an appointment scheduled on the 15th of next month to get her eye drops and surgery is scheduled for the 23rd of next month.   EARS:  No dizziness, tinnitus or hearing loss  CHEST:  No shortness of breath  CARDIOVASCULAR:  No tachycardia or chest pain  ABDOMEN:  No nausea, vomiting, pain, constipation or diarrhea  URINARY:  Has urinary catheter  NEUROLOGIC:  No abnormal movements    Past Medical, Family and Social History: The patient's past medical, family and social history have been reviewed and updated as appropriate within the electronic medical record - see encounter notes.    Compliance: yes    Side effects: None    Risk Parameters:  Patient reports no suicidal ideation  Patient reports no homicidal ideation  Patient reports no self-injurious behavior  Patient reports no violent behavior    Exam (detailed: at least 9 elements; comprehensive: all 15 elements)   Constitutional  Vitals:  Most recent vital signs, dated less than 90 days prior to this appointment, were reviewed.   Vitals:    09/27/18 0929   BP: 118/70   Pulse: 87   Weight: 108.9 kg (240 lb)   Height: 5' 5" (1.651 m)        General:  unremarkable, age appropriate     Musculoskeletal  Muscle Strength/Tone:  no tremor, no tic   Gait & Station:  non-ataxic     Psychiatric  Speech:  no latency; no press   Mood & Affect:  "an even kill, I'm not up and down like I use to be."  congruent and appropriate   Thought Process:  normal and logical   Associations:  intact   Thought Content:  normal, no suicidality, " no homicidality, delusions, or paranoia   Insight:  has awareness of illness   Judgement: behavior is adequate to circumstances   Orientation:  grossly intact, person, place, situation   Memory: intact for content of interview   Language: grossly intact   Attention Span & Concentration:  able to focus   Fund of Knowledge:  intact and appropriate to age and level of education     Assessment and Diagnosis   Status/Progress: Based on the examination today, the patient's problem(s) is/are improved.  New problems have not been presented today.   Lack of compliance are not complicating management of the primary condition.  There are no active rule-out diagnoses for this patient at this time.     General Impression: She has improved.      ICD-10-CM ICD-9-CM   1. Bipolar I disorder, current or most recent episode depressed, with psychotic features with anxious distress F31.5 296.54   2. Insomnia related to another mental disorder F51.05 300.9     327.02        Intervention/Counseling/Treatment Plan   · Medication Management: The risks and benefits of medication were discussed with the patient.  · The treatment plan and follow up plan were reviewed with the patient.   1. Safety: Call 911 or Crisis Line or go to ER for suicidal ideation, adverse effects of medication or any other emergency  2. Olanzapine 5 mg po qhs.  3. Prozac 10 mg po qd.  4. Return to clinic in 2 months or sooner prn.   5. Start Psychotherapy.  6. Trazodone 100 mg po qhs prn sleep (refill not needed today).  7. Redo Thyroid panel in approximately 2 months.     Patient agrees with POC.    INSTRUCTIONS  Instructed to call 911 or Crisis Line or go to ER for suicidal ideation, adverse effects of medication or any other emergency. Verbalizes understanding and plan to comply.      Return to Clinic: 2 months, as needed

## 2018-09-27 ENCOUNTER — OFFICE VISIT (OUTPATIENT)
Dept: PSYCHIATRY | Facility: CLINIC | Age: 63
End: 2018-09-27
Payer: MEDICARE

## 2018-09-27 VITALS
BODY MASS INDEX: 39.99 KG/M2 | SYSTOLIC BLOOD PRESSURE: 118 MMHG | HEIGHT: 65 IN | HEART RATE: 87 BPM | WEIGHT: 240 LBS | DIASTOLIC BLOOD PRESSURE: 70 MMHG

## 2018-09-27 DIAGNOSIS — F31.5 BIPOLAR I DISORDER, CURRENT OR MOST RECENT EPISODE DEPRESSED, WITH PSYCHOTIC FEATURES WITH ANXIOUS DISTRESS: Primary | ICD-10-CM

## 2018-09-27 DIAGNOSIS — F51.05 INSOMNIA RELATED TO ANOTHER MENTAL DISORDER: ICD-10-CM

## 2018-09-27 PROCEDURE — 99214 OFFICE O/P EST MOD 30 MIN: CPT | Mod: S$PBB,,, | Performed by: NURSE PRACTITIONER

## 2018-09-27 PROCEDURE — 99999 PR PBB SHADOW E&M-EST. PATIENT-LVL III: CPT | Mod: PBBFAC,,, | Performed by: NURSE PRACTITIONER

## 2018-09-27 PROCEDURE — 99213 OFFICE O/P EST LOW 20 MIN: CPT | Mod: PBBFAC | Performed by: NURSE PRACTITIONER

## 2018-09-27 PROCEDURE — 90833 PSYTX W PT W E/M 30 MIN: CPT | Mod: ,,, | Performed by: NURSE PRACTITIONER

## 2018-09-27 RX ORDER — OLANZAPINE 5 MG/1
5 TABLET ORAL NIGHTLY
Qty: 30 TABLET | Refills: 0 | Status: SHIPPED | OUTPATIENT
Start: 2018-09-27 | End: 2018-11-29 | Stop reason: SDUPTHER

## 2018-09-27 RX ORDER — HYDROCODONE BITARTRATE AND ACETAMINOPHEN 10; 325 MG/1; MG/1
1 TABLET ORAL EVERY 8 HOURS PRN
COMMUNITY
End: 2019-01-16

## 2018-09-27 RX ORDER — FLUOXETINE 10 MG/1
10 CAPSULE ORAL DAILY
Qty: 30 CAPSULE | Refills: 0 | Status: SHIPPED | OUTPATIENT
Start: 2018-09-27 | End: 2018-11-29 | Stop reason: SDUPTHER

## 2018-09-27 RX ORDER — PROMETHAZINE HYDROCHLORIDE 25 MG/1
TABLET ORAL
Refills: 0 | Status: ON HOLD | COMMUNITY
Start: 2018-09-22 | End: 2019-10-30 | Stop reason: CLARIF

## 2018-09-27 NOTE — PATIENT INSTRUCTIONS
"        You have been provided with a certain amount of medication with a specified number of refills.  Please follow up within an adequate time before you run out of medications.    REFILLS FOR CONTROLLED SUBSTANCES WILL NOT BE GIVEN WITHOUT AN APPOINTMENT.  I will not honor or fill automated refill requests from pharmacies.  You must come in for an appointment to get refills.        Please book your next appointment for myself or therapist by phone by calling our office at [phone].          PLEASE BE AT LEAST 15 MINUTES EARLY FOR YOUR NEXT APPOINTMENT.  PLEASE, DO NOT BE LATE OR YOU WILL BE TURNED AWAY AND ASKED TO RESCHEDULE.  YOU MUST COME EARLY TO ALLOW TIME FOR CHECK-IN AS WELL AS GET YOUR VITAL SIGNS AND GO OVER YOUR MEDICATIONS.  [employer] is not fair to the patients who present after you and are on time for their appointments.  It causes a delay in the appointments for patients and staff.  IF YOU ARE LATE, THERE IS A POSSIBILITY THAT YOU WILL BE CHARGED FOR THE APPOINTMENT TIME PERSONALLY AND IT WILL NOT GO TO YOUR INSURANCE.  YOU MAY ALSO BE DISCHARGED FROM CLINIC with multiple "No Show" appointments.       -----------------------------------------------------------------------------------------------------------------  IF YOU FEEL SUICIDAL OR HAVING THOUGHTS OR PLANS TO HURT YOURSELF OR OTHERS, CALL 911 OR REPORT TO THE NEAREST EMERGENCY ROOM.  YOU CAN ALSO ACCESS THE FOLLOWING HOTLINE:    National Suicide Hotline Number 6-827-131-TALK (5902)                  "

## 2018-11-05 ENCOUNTER — HOSPITAL ENCOUNTER (OUTPATIENT)
Dept: RADIOLOGY | Facility: HOSPITAL | Age: 63
Discharge: HOME | End: 2018-11-05
Attending: UROLOGY
Payer: MEDICARE

## 2018-11-05 DIAGNOSIS — N20.0 KIDNEY STONES: ICD-10-CM

## 2018-11-05 PROCEDURE — 76770 US EXAM ABDO BACK WALL COMP: CPT | Mod: TC

## 2018-11-05 PROCEDURE — 76770 US EXAM ABDO BACK WALL COMP: CPT | Mod: 26,,, | Performed by: RADIOLOGY

## 2018-11-12 ENCOUNTER — OFFICE VISIT (OUTPATIENT)
Dept: UROLOGY | Facility: CLINIC | Age: 63
End: 2018-11-12
Payer: MEDICARE

## 2018-11-12 VITALS — RESPIRATION RATE: 16 BRPM | WEIGHT: 235 LBS | BODY MASS INDEX: 39.15 KG/M2 | HEIGHT: 65 IN

## 2018-11-12 DIAGNOSIS — N20.0 KIDNEY STONES: Primary | ICD-10-CM

## 2018-11-12 DIAGNOSIS — N31.9 NEUROGENIC BLADDER: ICD-10-CM

## 2018-11-12 DIAGNOSIS — R35.1 NOCTURIA MORE THAN TWICE PER NIGHT: ICD-10-CM

## 2018-11-12 PROCEDURE — 99213 OFFICE O/P EST LOW 20 MIN: CPT | Mod: PBBFAC | Performed by: UROLOGY

## 2018-11-12 PROCEDURE — 99214 OFFICE O/P EST MOD 30 MIN: CPT | Mod: S$PBB,,, | Performed by: UROLOGY

## 2018-11-12 PROCEDURE — 99999 PR PBB SHADOW E&M-EST. PATIENT-LVL III: CPT | Mod: PBBFAC,,, | Performed by: UROLOGY

## 2018-11-12 NOTE — PROGRESS NOTES
Subjective:       Patient ID: Mary Ellen Fajardo is a 62 y.o. female who was last seen in this office Visit date not found    Chief Complaint:   Chief Complaint   Patient presents with    Follow-up     follow up with [facility]       History of Present Illness  Neurogenic Bladder  Her bladder is managed with a suprapubic catheter. She has had one since March 2011. Her  changes her 18 Fr catheter every 20 days. She has been having problems with sedimentation and urgency incontinence. She has also been having pain with catheter removal and insertion.  She has noted a dark color and more sediment at times.  She had a routine ALBERTINA suggesting a right kidney stone.  Follow up CT showed a right staghorn calculus and large bladder stone.    Stones  She had a cystolitholapaxy on 12/20/2017 with right stent placement.  She had a Right PCNL on Friday 1/19/2018.  She then had a second look PCNL on Monday 1/29/2018.  She then had a right ureteroscopy and laser lithotripsy on 3/5/2018.  Her stent was removed without difficulty on 4/9/2018.  She denies hematuria or flank pain.    ACTIVE MEDICAL ISSUES:  Patient Active Problem List   Diagnosis    Paraparesis    Gait disorder    Neurogenic bladder    Knee pain, bilateral    S/P knee replacement    OA (osteoarthritis) of knee    Primary osteoarthritis of left knee    Poor motor control of trunk    Decreased range of motion of lower extremity    Bilateral leg weakness    Chronic knee pain    Bladder stone    Staghorn calculus    Kidney stones    Essential hypertension    Neuropathy    Primary insomnia    Obstructive sleep apnea       ALLERGIES AND MEDICATIONS: updated and reviewed.  Review of patient's allergies indicates:   Allergen Reactions    Rocephin [ceftriaxone] Rash     Current Outpatient Medications   Medication Sig    baclofen (LIORESAL) 20 MG tablet 10 mg 4 (four) times daily.     bumetanide (BUMEX) 1 MG tablet 1 mg once daily.     catheter 18 Fr  "Misc Change every 20 days    FLUoxetine (PROZAC) 10 MG capsule Take 1 capsule (10 mg total) by mouth once daily.    gabapentin (NEURONTIN) 300 MG capsule 600 mg 3 (three) times daily.     HYDROcodone-acetaminophen (NORCO)  mg per tablet Take 1 tablet by mouth every 8 (eight) hours as needed for Pain.    OLANZapine (ZYPREXA) 5 MG tablet Take 1 tablet (5 mg total) by mouth every evening.    oxybutynin (DITROPAN-XL) 5 MG TR24 TAKE 1 TABLET BY MOUTH EVERY DAY    potassium chloride (KLOR-CON) 10 MEQ TbSR     promethazine (PHENERGAN) 25 MG tablet TAKE 1 TABLET BY MOUTH EVERY 8 HOURS AS NEEDED FOR NAUSEA AND VOMITING    trazodone (DESYREL) 100 MG tablet 100 mg every evening.     VENTOLIN HFA 90 mcg/actuation inhaler     amlodipine (NORVASC) 10 MG tablet Take 10 mg by mouth once daily.      No current facility-administered medications for this visit.        Review of Systems   Constitutional: Negative for activity change, fatigue, fever and unexpected weight change.   Eyes: Negative for redness and visual disturbance.   Respiratory: Negative for chest tightness and shortness of breath.    Cardiovascular: Negative for chest pain and leg swelling.   Gastrointestinal: Negative for abdominal distention, abdominal pain, constipation, diarrhea, nausea and vomiting.   Genitourinary: Negative for difficulty urinating, dysuria, flank pain, frequency, hematuria, pelvic pain, urgency and vaginal bleeding.   Musculoskeletal: Negative for arthralgias and joint swelling.   Neurological: Negative for dizziness, weakness and headaches.   Psychiatric/Behavioral: Negative for confusion. The patient is not nervous/anxious.    All other systems reviewed and are negative.      Objective:      Vitals:    11/12/18 1307   Resp: 16   Weight: 106.6 kg (235 lb)   Height: 5' 5" (1.651 m)     Physical Exam   Nursing note and vitals reviewed.  Constitutional: She is oriented to person, place, and time. She appears well-developed.   HENT: "   Head: Normocephalic.   Eyes: Conjunctivae are normal.   Neck: Normal range of motion. No tracheal deviation present. No thyromegaly present.   Cardiovascular: Normal rate, normal heart sounds and normal pulses.    Pulmonary/Chest: Effort normal and breath sounds normal. No respiratory distress. She has no wheezes.   Abdominal: Soft. She exhibits no distension and no mass. There is no hepatosplenomegaly. There is no tenderness. There is no rebound, no guarding and no CVA tenderness. No hernia.   Musculoskeletal: Normal range of motion. She exhibits no edema or tenderness.   Lymphadenopathy:     She has no cervical adenopathy.   Neurological: She is alert and oriented to person, place, and time.   Skin: Skin is warm and dry. No rash noted. No erythema.     Psychiatric: She has a normal mood and affect. Her behavior is normal. Judgment and thought content normal.       US Retroperitoneal Complete (Kidney and   Order: [id number]   Status:  Final result   Visible to patient:  Yes (Patient Portal)   Next appt:  11/29/2018 at 10:30 AM in Psychiatry (Marilee Vargas NP)   Dx:  Kidney stones   Details     Reading Physician Reading Date Result Priority   Nicolás Isabel MD 11/5/2018       Narrative     EXAMINATION:  US RETROPERITONEAL COMPLETE    CLINICAL HISTORY:  Calculus of kidney    TECHNIQUE:  Ultrasound of the kidneys and urinary bladder was performed including color flow and Doppler evaluation of the kidneys.    COMPARISON:  CT 11/29/2017.    FINDINGS:  Right kidney: The right kidney measures 9.8 cm. No cortical thinning. No loss of corticomedullary distinction. Resistive index measures 0.7.  No mass. Numerous echogenic foci throughout the right-sided collecting system.  No hydronephrosis.    Left kidney: The left kidney measures 10.1 cm. No cortical thinning. No loss of corticomedullary distinction. Resistive index measures 0.7.  No mass. No renal stone. No hydronephrosis.    The bladder is decompressed by  Wolfe catheter.      Impression       Extensive right-sided nephrolithiasis without evidence of hydronephrosis.      Electronically signed by: Nicolás Isabel MD  Date: 11/05/2018  Time: 12:59            Last Resulted: 11/05/18 12:59               Assessment:       1. Kidney stones    2. Neurogenic bladder    3. Nocturia more than twice per night          Plan:       1. Kidney stones  She may need ureteroscopy    - CT Renal Stone Study ABD Pelvis WO; Future    2. Neurogenic bladder  SP tube    3. Nocturia more than twice per night  SP tube            Follow-up in about 4 weeks (around 12/10/2018) for Follow up, Review X-ray.

## 2018-11-20 ENCOUNTER — NURSE TRIAGE (OUTPATIENT)
Dept: ADMINISTRATIVE | Facility: CLINIC | Age: 63
End: 2018-11-20

## 2018-11-20 NOTE — TELEPHONE ENCOUNTER
Reason for Disposition   Caller requesting a NON-URGENT new prescription or refill and triager unable to refill per unit policy    Protocols used: ST MEDICATION QUESTION CALL-A-    Ms. Fajardo is requesting a refill on OLANZapine (ZYPREXA) 5 MG tablet and FLUoxetine (PROZAC) 10 MG capsule.

## 2018-11-26 ENCOUNTER — HOSPITAL ENCOUNTER (OUTPATIENT)
Dept: RADIOLOGY | Facility: HOSPITAL | Age: 63
Discharge: HOME | End: 2018-11-26
Attending: UROLOGY
Payer: MEDICARE

## 2018-11-26 DIAGNOSIS — N20.0 KIDNEY STONES: ICD-10-CM

## 2018-11-26 PROCEDURE — 74176 CT ABD & PELVIS W/O CONTRAST: CPT | Mod: TC

## 2018-11-26 PROCEDURE — 74176 CT ABD & PELVIS W/O CONTRAST: CPT | Mod: 26,,, | Performed by: RADIOLOGY

## 2018-11-29 ENCOUNTER — OFFICE VISIT (OUTPATIENT)
Dept: PSYCHIATRY | Facility: CLINIC | Age: 63
End: 2018-11-29
Payer: MEDICARE

## 2018-11-29 VITALS
SYSTOLIC BLOOD PRESSURE: 154 MMHG | HEART RATE: 88 BPM | BODY MASS INDEX: 38.32 KG/M2 | WEIGHT: 230 LBS | DIASTOLIC BLOOD PRESSURE: 96 MMHG | HEIGHT: 65 IN

## 2018-11-29 DIAGNOSIS — F51.05 INSOMNIA RELATED TO ANOTHER MENTAL DISORDER: ICD-10-CM

## 2018-11-29 DIAGNOSIS — F31.5 BIPOLAR I DISORDER, CURRENT OR MOST RECENT EPISODE DEPRESSED, WITH PSYCHOTIC FEATURES WITH ANXIOUS DISTRESS: Primary | ICD-10-CM

## 2018-11-29 PROCEDURE — 99999 PR PBB SHADOW E&M-EST. PATIENT-LVL III: CPT | Mod: PBBFAC,,, | Performed by: NURSE PRACTITIONER

## 2018-11-29 PROCEDURE — 90833 PSYTX W PT W E/M 30 MIN: CPT | Mod: PBBFAC | Performed by: NURSE PRACTITIONER

## 2018-11-29 PROCEDURE — 99213 OFFICE O/P EST LOW 20 MIN: CPT | Mod: S$PBB,,, | Performed by: NURSE PRACTITIONER

## 2018-11-29 PROCEDURE — 99213 OFFICE O/P EST LOW 20 MIN: CPT | Mod: PBBFAC | Performed by: NURSE PRACTITIONER

## 2018-11-29 PROCEDURE — 90833 PSYTX W PT W E/M 30 MIN: CPT | Mod: ,,, | Performed by: NURSE PRACTITIONER

## 2018-11-29 RX ORDER — OLANZAPINE 5 MG/1
5 TABLET ORAL NIGHTLY
Qty: 90 TABLET | Refills: 0 | Status: SHIPPED | OUTPATIENT
Start: 2018-11-29 | End: 2019-02-28 | Stop reason: SDUPTHER

## 2018-11-29 RX ORDER — FLUOXETINE 10 MG/1
10 CAPSULE ORAL DAILY
Qty: 90 CAPSULE | Refills: 0 | Status: SHIPPED | OUTPATIENT
Start: 2018-11-29 | End: 2019-02-28 | Stop reason: SDUPTHER

## 2018-11-29 NOTE — PROGRESS NOTES
How are you see the sleep bed symptoms better Outpatient Psychiatry Follow-Up Visit (MD/NP)    11/29/2018    Clinical Status of Patient:  Outpatient (Ambulatory)    Chief Complaint:  Mary Ellen Fajardo is a 62 y.o. female who presents today for follow-up of mood disorder, anxiety, psychosis and insomnia.  Met with patient.      Interval History and Content of Current Session:  Interim Events/Subjective Report/Content of Current Session: Mary Ellen  was last seen by me on 09/27/2018 for a follow up visit. Mary Ellen was diagnosed with Bipolar disorder, depressed, with psychotic features with anxious distress and insomnia. She was doing well and a plan of care was devised to include:    1. Safety: Call 911 or Crisis Line or go to ER for suicidal ideation, adverse effects of medication or any other emergency  2. Olanzapine 5 mg po qhs.  3. Prozac 10 mg po qd.  4. Return to clinic in 2 months or sooner prn.   5. Start Psychotherapy.  6. Trazodone 100 mg po qhs prn sleep (refill not needed today).  7. Redo Thyroid panel in approximately 2 months.     Today Mary Ellen is seen face to face.  She denies any problems with her medications.  Her sleep is good.  Her appetite is good and her energy level is good. Her mood is fine for the most part but she gets down about problems with her . Her memory is better and she has not cried in a while.  Her psychotic features have stopped.  Her anxious distress has decreased a lot. She states she is much better able to deal with her psychosocial issues now. Her blood pressure is elevated today. She states she normally takes her blood pressure medication at night and forgot last night. Will take as soon as she gets home today. Instructed on dangers of high blood pressure with emphasis on stroke, paralysis and death. Verbalizes understanding.      Psychotherapy:  · Target symptoms: anxiety , mood disorder, psychosis, insomnia  · Why chosen therapy is appropriate versus another modality:  "relevant to diagnosis, patient responds to this modality, evidence based practice  · Outcome monitoring methods: self-report, observation  · Therapeutic intervention type: supportive psychotherapy  · Topics discussed/themes: relationships difficulties, believes  is on drugs and no longer has truck, pleasant Thanksgiving with family  · The patient's response to the intervention is accepting. The patient's progress toward treatment goals is good.   · Duration of intervention: 17 minutes.    Review of Systems   PSYCHIATRIC: Pertinant items are noted in the narrative.  GENERAL:  Morbidly obese  SKIN:  No rashes or lacerations  HEAD:  No headaches  EYES:  No exophthalmos, jaundice or blindness, has cataractto left eye only now and has consultation on December 10th.   EARS:  No dizziness, tinnitus or hearing loss  CHEST:  No shortness of breath  CARDIOVASCULAR:  No tachycardia or chest pain  ABDOMEN:  No nausea, vomiting, pain, constipation or diarrhea  URINARY:  Has urinary catheter  NEUROLOGIC:  No abnormal movements    Past Medical, Family and Social History: The patient's past medical, family and social history have been reviewed and updated as appropriate within the electronic medical record - see encounter notes.    Compliance: yes    Side effects: None  Risk Parameters:  Patient reports no suicidal ideation  Patient reports no homicidal ideation  Patient reports no self-injurious behavior  Patient reports no violent behavior    Exam (detailed: at least 9 elements; comprehensive: all 15 elements)   Constitutional  Vitals:  Most recent vital signs, dated less than 90 days prior to this appointment, were reviewed.   Vitals:    11/29/18 1014   BP: (!) 154/96   Pulse: 88   Weight: 104.3 kg (230 lb)   Height: 5' 5" (1.651 m)        General:  unremarkable, age appropriate     Musculoskeletal  Muscle Strength/Tone:  no tremor, no tic   Gait & Station:  in wheelchair     Psychiatric  Speech:  no latency; no press " "  Mood & Affect:  " Good."  congruent and appropriate   Thought Process:  normal and logical   Associations:  intact   Thought Content:  normal, no suicidality, no homicidality, delusions, or paranoia   Insight:  has awareness of illness   Judgement: behavior is adequate to circumstances   Orientation:  grossly intact, person, place, situation   Memory: intact for content of interview   Language: grossly intact   Attention Span & Concentration:  able to focus   Fund of Knowledge:  intact and appropriate to age and level of education     Assessment and Diagnosis   Status/Progress: Based on the examination today, the patient's problem(s) is/are improved.  New problems have not been presented today.   Lack of compliance are not complicating management of the primary condition.  There are no active rule-out diagnoses for this patient at this time.     General Impression: She is doing well.      ICD-10-CM ICD-9-CM   1. Bipolar I disorder, current or most recent episode depressed, with psychotic features with anxious distress F31.5 296.54   2. Insomnia related to another mental disorder F51.05 300.9     327.02       Intervention/Counseling/Treatment Plan   · Medication Management: The risks and benefits of medication were discussed with the patient.  · The treatment plan and follow up plan were reviewed with the patient.   1. Safety: Call 911 or Crisis Line or go to ER for suicidal ideation, adverse effects of medication or any other emergency  2. Olanzapine 5 mg po qhs.  3. Prozac 10 mg po qd.  4. Return to clinic in 3 months or sooner prn.   5. Start Psychotherapy. Appointment made today for December 14, 2019 at 0800 with Cassie Rockweiler, LCSW.  6. Continue Trazodone 100 mg po qhs prn sleep (refill not needed getting refills from pain management).    Patient agrees with POC.    INSTRUCTIONS  Instructed to call 911 or Crisis Line or go to ER for suicidal ideation, adverse effects of medication or any other emergency. " Verbalizes understanding and plan to comply.      Return to Clinic: 3 months, as needed

## 2018-11-29 NOTE — PATIENT INSTRUCTIONS
"You have been provided with a certain amount of medication with a specified number of refills.  Please follow up within an adequate time before you run out of medications.    REFILLS FOR CONTROLLED SUBSTANCES WILL NOT BE GIVEN WITHOUT AN APPOINTMENT.  I will not honor or fill automated refill requests from pharmacies.  You must come in for an appointment to get refills.    Please book your next appointment for myself or therapist by phone by calling our office at [phone].      PLEASE BE AT LEAST 15 MINUTES EARLY FOR YOUR NEXT APPOINTMENT.  PLEASE, DO NOT BE LATE OR YOU WILL BE TURNED AWAY AND ASKED TO RESCHEDULE.  YOU MUST COME EARLY TO ALLOW TIME FOR CHECK-IN AS WELL AS GET YOUR VITAL SIGNS AND GO OVER YOUR MEDICATIONS.  Tardiness is not fair to the patients who present after you and are on time for their appointments.  It causes a delay in the appointments for patients and staff.  IF YOU ARE LATE, THERE IS A POSSIBILITY THAT YOU WILL BE CHARGED FOR THE APPOINTMENT TIME PERSONALLY AND IT WILL NOT GO TO YOUR INSURANCE.  YOU MAY ALSO BE DISCHARGED FROM CLINIC with multiple "No Show" appointments.  -----------------------------------------------------------------------------------------------------------------  IF YOU FEEL SUICIDAL OR HAVING THOUGHTS OR PLANS TO HURT YOURSELF OR OTHERS, CALL 911 OR REPORT TO THE NEAREST EMERGENCY ROOM.  YOU CAN ALSO ACCESS THE FOLLOWING HOTLINE(S):    National Suicide Hotline Number 9-394-964-TALK (3874)     ([facility] Mobile Crisis, [phone]'   [facility], [phone]; [facility], 24 hours / 7 days, [phone] (1606), [phone]-COPE (4061))     "

## 2018-12-14 ENCOUNTER — OFFICE VISIT (OUTPATIENT)
Dept: PSYCHIATRY | Facility: CLINIC | Age: 63
End: 2018-12-14
Payer: MEDICARE

## 2018-12-14 DIAGNOSIS — F31.5 BIPOLAR I DISORDER, CURRENT OR MOST RECENT EPISODE DEPRESSED, WITH PSYCHOTIC FEATURES WITH ANXIOUS DISTRESS: Primary | ICD-10-CM

## 2018-12-14 PROCEDURE — 90791 PSYCH DIAGNOSTIC EVALUATION: CPT | Mod: S$PBB,,, | Performed by: SOCIAL WORKER

## 2018-12-14 PROCEDURE — 99999 PR PBB SHADOW E&M-EST. PATIENT-LVL I: CPT | Mod: PBBFAC,,, | Performed by: SOCIAL WORKER

## 2018-12-14 PROCEDURE — 90791 PSYCH DIAGNOSTIC EVALUATION: CPT | Mod: PBBFAC | Performed by: SOCIAL WORKER

## 2018-12-14 PROCEDURE — 99211 OFF/OP EST MAY X REQ PHY/QHP: CPT | Mod: PBBFAC | Performed by: SOCIAL WORKER

## 2018-12-14 NOTE — PATIENT INSTRUCTIONS
[facility] - DEPARTMENT OF PSYCHIATRY   NEW PATIENT ORIENTATION INFORMATION  OUTPATIENT SERVICES COUNSELING CONTRACT    We appreciate the opportunity to participate in your medical care and hope the following protocols will make it easier for you to receive quality treatment in our department.    1. PUNCTUALITY: Your appointment is scheduled for a fixed amount of time reserved especially for you.  To get the benefit of your appointment, please arrive early enough to allow time for parking and registration.  If you are late for your appointment, your clinician is not able to offer additional time.  Please make every effort to be on time.  You may be asked to reschedule.    2. PAYMENT FOR SERVICES:   Payments are expected at the time of service.  Please contact [phone] if you need to resolve issues involving your account at [facility] or to set up a payment plan.    3. CANCELLATION / MISSED APPOINTMENTS:   In order to receive quality care, all appointments must be kept.  Appointment may be cancelled, ONLY by talking with an  at phone number [phone], between 8:00 a.m. and 5:00 p.m., Monday through Friday, at least 24 hours before your appointment time.  Your clinician reserves this time specifically for you, and if you will be unable to use it, it is necessary that you cancel in a timely manner.  If you do not give at least 24-hour notice of cancellation a fee may be assessed.  Please note that insurance does not cover no-show charges, so you will be billed directly.  If you are consistently late, cancel, or do not show for your appointments, our department reserves the right to terminate treatment.    4. CALLING THE DEPARTMENT:   MESSAGES, SCHEDULE OR CANCEL APPOINTMENTS- In general you can reach the department by calling [phone], between 8:00 a.m. and 5:00 p.m., Monday through Friday, to schedule or cancel appointments or leave a message for your clinician.  It is advisable  to schedule your visits far in advance to obtain the most convenient times for your appointments.   AFTER HOURS, WEEKEND OR HOLIDAYS- For urgent questions after hours, weekends and holidays, calling the department number [phone] will connect you to the [facility] On Call nursing staff or the Psychiatry Inpatient Unit.  The [facility] On Call nursing staff will speak with you and direct your call/care as necessary.   EMERGENCY-  In case of a crisis when there is a concern of harm to self or others, call 911 or the office [phone] between 8:00 a.m. and 5:00 p.m., Monday through Friday.  After hours, weekends or holidays, please call 911 or go to the Emergency Department where you can be thoroughly evaluated by a physician.    5. TEAM APPROACH:  Most patients receive medication management through our team system.  In the team system, your primary physician will be a primary care physician, psychiatrist, nurse practitioner, or psychiatry resident. Please contact your primary physician first in matters regarding medications or acute medical problems.      6. FOLLOW UP APPOINTMENTS:  Follow-up appointments can be made in person at the [facility] office, or by calling [phone], from 8am to 5pm, between Monday and Friday. Appointment cannot be made for psychiatry appointments at the [facility] location due to confidentiality.  It is advisable to schedule your office visits far enough in advance to obtain the most convenient times for your appointments.    7. [facility] PORTAL: The fastest way to get in touch with your provider is through the [facility] Portal, if you have not signed up for it you can request an access code and sign up today. [facility] messages are answered typically answer within 24 hours but no later than 2 business day. Please be advised that the portal is for Non-Urgent messages. If you have something urgent arise please call the office at the number provided above. If you are  in crisis please call 911.     8. CONFIDENTIALITY:  As a therapy patient, all information you share about yourself will be kept confidential within the [facility] system. Information can only be shared with your written permission. Legal exceptions to confidentiality are clear and imminent danger to you or others, any child/elderly abuse or neglect, or a court order.      Revised December 5, 2018
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
## 2019-01-05 ENCOUNTER — HOSPITAL ENCOUNTER (INPATIENT)
Facility: HOSPITAL | Age: 64
LOS: 2 days | Discharge: HOME HEALTH | DRG: 291 | End: 2019-01-07
Attending: EMERGENCY MEDICINE | Admitting: EMERGENCY MEDICINE
Payer: MEDICARE

## 2019-01-05 DIAGNOSIS — J44.1 COPD EXACERBATION: ICD-10-CM

## 2019-01-05 DIAGNOSIS — Z93.59 SUPRAPUBIC CATHETER: Chronic | ICD-10-CM

## 2019-01-05 DIAGNOSIS — R07.9 CHEST PAIN: ICD-10-CM

## 2019-01-05 DIAGNOSIS — I10 ESSENTIAL HYPERTENSION: Chronic | ICD-10-CM

## 2019-01-05 DIAGNOSIS — T50.901A MEDICATION OVERDOSE, ACCIDENTAL OR UNINTENTIONAL, INITIAL ENCOUNTER: ICD-10-CM

## 2019-01-05 DIAGNOSIS — J96.22 ACUTE ON CHRONIC RESPIRATORY FAILURE WITH HYPOXIA AND HYPERCAPNIA: Primary | ICD-10-CM

## 2019-01-05 DIAGNOSIS — J96.21 ACUTE ON CHRONIC RESPIRATORY FAILURE WITH HYPOXIA AND HYPERCAPNIA: Primary | ICD-10-CM

## 2019-01-05 DIAGNOSIS — I50.30 (HFPEF) HEART FAILURE WITH PRESERVED EJECTION FRACTION: ICD-10-CM

## 2019-01-05 LAB
ALBUMIN SERPL BCP-MCNC: 3.9 G/DL
ALLENS TEST: ABNORMAL
ALP SERPL-CCNC: 80 U/L
ALT SERPL W/O P-5'-P-CCNC: 5 U/L
ANION GAP SERPL CALC-SCNC: 12 MMOL/L
AST SERPL-CCNC: 21 U/L
BASOPHILS # BLD AUTO: 0.02 K/UL
BASOPHILS NFR BLD: 0.2 %
BILIRUB SERPL-MCNC: 0.6 MG/DL
BNP SERPL-MCNC: 137 PG/ML
BUN SERPL-MCNC: 16 MG/DL
CALCIUM SERPL-MCNC: 9.4 MG/DL
CHLORIDE SERPL-SCNC: 101 MMOL/L
CO2 SERPL-SCNC: 30 MMOL/L
CREAT SERPL-MCNC: 0.7 MG/DL
D DIMER PPP IA.FEU-MCNC: 4.23 MG/L FEU
DELSYS: ABNORMAL
DIFFERENTIAL METHOD: ABNORMAL
EOSINOPHIL # BLD AUTO: 0.1 K/UL
EOSINOPHIL NFR BLD: 1 %
EP: 5
EP: 5
ERYTHROCYTE [DISTWIDTH] IN BLOOD BY AUTOMATED COUNT: 16.7 %
ERYTHROCYTE [SEDIMENTATION RATE] IN BLOOD BY WESTERGREN METHOD: 12 MM/H
ERYTHROCYTE [SEDIMENTATION RATE] IN BLOOD BY WESTERGREN METHOD: 12 MM/H
EST. GFR  (AFRICAN AMERICAN): >60 ML/MIN/1.73 M^2
EST. GFR  (NON AFRICAN AMERICAN): >60 ML/MIN/1.73 M^2
FIO2: 35
FIO2: 35
GLUCOSE SERPL-MCNC: 97 MG/DL
HCO3 UR-SCNC: 33 MMOL/L (ref 24–28)
HCO3 UR-SCNC: 39.6 MMOL/L (ref 24–28)
HCO3 UR-SCNC: 41.1 MMOL/L (ref 24–28)
HCT VFR BLD AUTO: 39.3 %
HGB BLD-MCNC: 11.5 G/DL
IP: 10
IP: 10
LYMPHOCYTES # BLD AUTO: 1.8 K/UL
LYMPHOCYTES NFR BLD: 18.3 %
MAGNESIUM SERPL-MCNC: 2.5 MG/DL
MCH RBC QN AUTO: 24.1 PG
MCHC RBC AUTO-ENTMCNC: 29.3 G/DL
MCV RBC AUTO: 82 FL
MIN VOL: 8.5
MODE: ABNORMAL
MODE: ABNORMAL
MONOCYTES # BLD AUTO: 0.6 K/UL
MONOCYTES NFR BLD: 6.2 %
NEUTROPHILS # BLD AUTO: 7.1 K/UL
NEUTROPHILS NFR BLD: 74.3 %
PCO2 BLDA: 110.7 MMHG (ref 35–45)
PCO2 BLDA: 84.6 MMHG (ref 35–45)
PCO2 BLDA: 99.3 MMHG (ref 35–45)
PH SMN: 7.16 [PH] (ref 7.35–7.45)
PH SMN: 7.2 [PH] (ref 7.35–7.45)
PH SMN: 7.22 [PH] (ref 7.35–7.45)
PLATELET # BLD AUTO: 200 K/UL
PMV BLD AUTO: 9.5 FL
PO2 BLDA: 40 MMHG (ref 40–60)
PO2 BLDA: 66 MMHG (ref 40–60)
PO2 BLDA: 74 MMHG (ref 80–100)
POC BE: 10 MMOL/L
POC BE: 2 MMOL/L
POC BE: 7 MMOL/L
POC SATURATED O2: 57 % (ref 95–100)
POC SATURATED O2: 86 % (ref 95–100)
POC SATURATED O2: 90 % (ref 95–100)
POC TCO2: 36 MMOL/L (ref 23–27)
POC TCO2: 43 MMOL/L (ref 24–29)
POC TCO2: 44 MMOL/L (ref 24–29)
POTASSIUM SERPL-SCNC: 4.4 MMOL/L
PROT SERPL-MCNC: 9 G/DL
RBC # BLD AUTO: 4.78 M/UL
SAMPLE: ABNORMAL
SITE: ABNORMAL
SODIUM SERPL-SCNC: 143 MMOL/L
SP02: 94
SPONT RATE: 23
TROPONIN I SERPL DL<=0.01 NG/ML-MCNC: 0.03 NG/ML
WBC # BLD AUTO: 9.57 K/UL

## 2019-01-05 PROCEDURE — 12000002 HC ACUTE/MED SURGE SEMI-PRIVATE ROOM

## 2019-01-05 PROCEDURE — 82803 BLOOD GASES ANY COMBINATION: CPT

## 2019-01-05 PROCEDURE — 85379 FIBRIN DEGRADATION QUANT: CPT

## 2019-01-05 PROCEDURE — 93010 EKG 12-LEAD: ICD-10-PCS | Mod: ,,, | Performed by: INTERNAL MEDICINE

## 2019-01-05 PROCEDURE — 27000190 HC CPAP FULL FACE MASK W/VALVE

## 2019-01-05 PROCEDURE — 63600175 PHARM REV CODE 636 W HCPCS: Performed by: EMERGENCY MEDICINE

## 2019-01-05 PROCEDURE — 80053 COMPREHEN METABOLIC PANEL: CPT

## 2019-01-05 PROCEDURE — 25000242 PHARM REV CODE 250 ALT 637 W/ HCPCS: Performed by: EMERGENCY MEDICINE

## 2019-01-05 PROCEDURE — 93010 ELECTROCARDIOGRAM REPORT: CPT | Mod: ,,, | Performed by: INTERNAL MEDICINE

## 2019-01-05 PROCEDURE — 83735 ASSAY OF MAGNESIUM: CPT

## 2019-01-05 PROCEDURE — 94660 CPAP INITIATION&MGMT: CPT

## 2019-01-05 PROCEDURE — 83880 ASSAY OF NATRIURETIC PEPTIDE: CPT

## 2019-01-05 PROCEDURE — 85025 COMPLETE CBC W/AUTO DIFF WBC: CPT

## 2019-01-05 PROCEDURE — 96367 TX/PROPH/DG ADDL SEQ IV INF: CPT

## 2019-01-05 PROCEDURE — 99291 CRITICAL CARE FIRST HOUR: CPT | Mod: 25

## 2019-01-05 PROCEDURE — 96375 TX/PRO/DX INJ NEW DRUG ADDON: CPT

## 2019-01-05 PROCEDURE — 27000221 HC OXYGEN, UP TO 24 HOURS

## 2019-01-05 PROCEDURE — 94640 AIRWAY INHALATION TREATMENT: CPT

## 2019-01-05 PROCEDURE — 94761 N-INVAS EAR/PLS OXIMETRY MLT: CPT

## 2019-01-05 PROCEDURE — 96365 THER/PROPH/DIAG IV INF INIT: CPT

## 2019-01-05 PROCEDURE — 84484 ASSAY OF TROPONIN QUANT: CPT

## 2019-01-05 PROCEDURE — 93005 ELECTROCARDIOGRAM TRACING: CPT

## 2019-01-05 PROCEDURE — 36600 WITHDRAWAL OF ARTERIAL BLOOD: CPT

## 2019-01-05 PROCEDURE — 99900035 HC TECH TIME PER 15 MIN (STAT)

## 2019-01-05 RX ORDER — IPRATROPIUM BROMIDE AND ALBUTEROL SULFATE 2.5; .5 MG/3ML; MG/3ML
3 SOLUTION RESPIRATORY (INHALATION)
Status: COMPLETED | OUTPATIENT
Start: 2019-01-05 | End: 2019-01-05

## 2019-01-05 RX ORDER — PREDNISONE 20 MG/1
60 TABLET ORAL
Status: COMPLETED | OUTPATIENT
Start: 2019-01-05 | End: 2019-01-05

## 2019-01-05 RX ORDER — ETOMIDATE 2 MG/ML
INJECTION INTRAVENOUS
Status: DISCONTINUED
Start: 2019-01-05 | End: 2019-01-05 | Stop reason: WASHOUT

## 2019-01-05 RX ORDER — ROCURONIUM BROMIDE 10 MG/ML
INJECTION, SOLUTION INTRAVENOUS
Status: DISCONTINUED
Start: 2019-01-05 | End: 2019-01-05 | Stop reason: WASHOUT

## 2019-01-05 RX ORDER — NALOXONE HCL 0.4 MG/ML
0.4 VIAL (ML) INJECTION
Status: COMPLETED | OUTPATIENT
Start: 2019-01-05 | End: 2019-01-05

## 2019-01-05 RX ORDER — MAGNESIUM SULFATE 1 G/100ML
1 INJECTION INTRAVENOUS
Status: COMPLETED | OUTPATIENT
Start: 2019-01-05 | End: 2019-01-05

## 2019-01-05 RX ADMIN — MAGNESIUM SULFATE 1 G: 1 INJECTION INTRAVENOUS at 10:01

## 2019-01-05 RX ADMIN — IPRATROPIUM BROMIDE AND ALBUTEROL SULFATE 3 ML: .5; 3 SOLUTION RESPIRATORY (INHALATION) at 08:01

## 2019-01-05 RX ADMIN — NALOXONE HYDROCHLORIDE 0.4 MG: 0.4 INJECTION, SOLUTION INTRAMUSCULAR; INTRAVENOUS; SUBCUTANEOUS at 10:01

## 2019-01-05 RX ADMIN — PREDNISONE 60 MG: 20 TABLET ORAL at 09:01

## 2019-01-06 PROBLEM — B18.2 CHRONIC HEPATITIS C: Chronic | Status: ACTIVE | Noted: 2019-01-06

## 2019-01-06 PROBLEM — F31.9 BIPOLAR 1 DISORDER: Chronic | Status: ACTIVE | Noted: 2019-01-06

## 2019-01-06 PROBLEM — J44.1 COPD WITH ACUTE EXACERBATION: Status: ACTIVE | Noted: 2019-01-06

## 2019-01-06 PROBLEM — E66.9 OBESITY, CLASS II, BMI 35-39.9: Chronic | Status: ACTIVE | Noted: 2019-01-06

## 2019-01-06 PROBLEM — J96.11 CHRONIC RESPIRATORY FAILURE WITH HYPOXIA: Chronic | Status: ACTIVE | Noted: 2019-01-06

## 2019-01-06 PROBLEM — T40.2X1A OPIOID OVERDOSE: Status: ACTIVE | Noted: 2019-01-06

## 2019-01-06 PROBLEM — J44.9 COPD (CHRONIC OBSTRUCTIVE PULMONARY DISEASE): Chronic | Status: ACTIVE | Noted: 2019-01-06

## 2019-01-06 PROBLEM — Z86.73 HISTORY OF CVA (CEREBROVASCULAR ACCIDENT): Chronic | Status: ACTIVE | Noted: 2019-01-06

## 2019-01-06 PROBLEM — Z93.59 SUPRAPUBIC CATHETER: Chronic | Status: ACTIVE | Noted: 2019-01-06

## 2019-01-06 LAB
ALBUMIN SERPL BCP-MCNC: 3.5 G/DL
ALLENS TEST: ABNORMAL
ALP SERPL-CCNC: 76 U/L
ALT SERPL W/O P-5'-P-CCNC: 6 U/L
ANION GAP SERPL CALC-SCNC: 7 MMOL/L
AST SERPL-CCNC: 14 U/L
BASOPHILS # BLD AUTO: 0.01 K/UL
BASOPHILS NFR BLD: 0.1 %
BILIRUB SERPL-MCNC: 0.5 MG/DL
BILIRUB UR QL STRIP: NEGATIVE
BUN SERPL-MCNC: 15 MG/DL
CALCIUM SERPL-MCNC: 9.1 MG/DL
CHLORIDE SERPL-SCNC: 101 MMOL/L
CLARITY UR: CLEAR
CO2 SERPL-SCNC: 34 MMOL/L
COLOR UR: YELLOW
CREAT SERPL-MCNC: 0.7 MG/DL
DELSYS: ABNORMAL
DIFFERENTIAL METHOD: ABNORMAL
EOSINOPHIL # BLD AUTO: 0 K/UL
EOSINOPHIL NFR BLD: 0.1 %
EP: 5
ERYTHROCYTE [DISTWIDTH] IN BLOOD BY AUTOMATED COUNT: 16.9 %
ERYTHROCYTE [SEDIMENTATION RATE] IN BLOOD BY WESTERGREN METHOD: 12 MM/H
EST. GFR  (AFRICAN AMERICAN): >60 ML/MIN/1.73 M^2
EST. GFR  (NON AFRICAN AMERICAN): >60 ML/MIN/1.73 M^2
FIO2: 35
GLUCOSE SERPL-MCNC: 131 MG/DL
GLUCOSE UR QL STRIP: NEGATIVE
HCO3 UR-SCNC: 34.6 MMOL/L (ref 24–28)
HCT VFR BLD AUTO: 38.5 %
HGB BLD-MCNC: 11 G/DL
HGB UR QL STRIP: ABNORMAL
HYPOCHROMIA BLD QL SMEAR: ABNORMAL
IP: 10
KETONES UR QL STRIP: NEGATIVE
LEUKOCYTE ESTERASE UR QL STRIP: ABNORMAL
LYMPHOCYTES # BLD AUTO: 0.5 K/UL
LYMPHOCYTES NFR BLD: 6.4 %
MAGNESIUM SERPL-MCNC: 2.4 MG/DL
MCH RBC QN AUTO: 23.7 PG
MCHC RBC AUTO-ENTMCNC: 28.6 G/DL
MCV RBC AUTO: 83 FL
MICROSCOPIC COMMENT: ABNORMAL
MODE: ABNORMAL
MONOCYTES # BLD AUTO: 0.1 K/UL
MONOCYTES NFR BLD: 1.2 %
NEUTROPHILS # BLD AUTO: 6.9 K/UL
NEUTROPHILS NFR BLD: 92.2 %
NITRITE UR QL STRIP: NEGATIVE
PCO2 BLDA: 74.4 MMHG (ref 35–45)
PH SMN: 7.28 [PH] (ref 7.35–7.45)
PH UR STRIP: 6 [PH] (ref 5–8)
PHOSPHATE SERPL-MCNC: 3.5 MG/DL
PLATELET # BLD AUTO: 172 K/UL
PLATELET BLD QL SMEAR: ABNORMAL
PMV BLD AUTO: 9.8 FL
PO2 BLDA: 116 MMHG (ref 80–100)
POC BE: 6 MMOL/L
POC SATURATED O2: 98 % (ref 95–100)
POC TCO2: 37 MMOL/L (ref 23–27)
POTASSIUM SERPL-SCNC: 4.5 MMOL/L
PROT SERPL-MCNC: 8.3 G/DL
PROT UR QL STRIP: NEGATIVE
RBC # BLD AUTO: 4.65 M/UL
RBC #/AREA URNS HPF: 3 /HPF (ref 0–4)
SAMPLE: ABNORMAL
SITE: ABNORMAL
SODIUM SERPL-SCNC: 142 MMOL/L
SP GR UR STRIP: >1.03 (ref 1–1.03)
T3FREE SERPL-MCNC: 1.7 PG/ML
T4 FREE SERPL-MCNC: 1.02 NG/DL
TSH SERPL DL<=0.005 MIU/L-ACNC: 0.3 UIU/ML
URN SPEC COLLECT METH UR: ABNORMAL
UROBILINOGEN UR STRIP-ACNC: ABNORMAL EU/DL
WBC # BLD AUTO: 7.51 K/UL
WBC #/AREA URNS HPF: >100 /HPF (ref 0–5)
WBC CLUMPS URNS QL MICRO: ABNORMAL

## 2019-01-06 PROCEDURE — 63600175 PHARM REV CODE 636 W HCPCS: Performed by: INTERNAL MEDICINE

## 2019-01-06 PROCEDURE — 36600 WITHDRAWAL OF ARTERIAL BLOOD: CPT

## 2019-01-06 PROCEDURE — 99223 1ST HOSP IP/OBS HIGH 75: CPT | Mod: ,,, | Performed by: INTERNAL MEDICINE

## 2019-01-06 PROCEDURE — 81000 URINALYSIS NONAUTO W/SCOPE: CPT

## 2019-01-06 PROCEDURE — 84443 ASSAY THYROID STIM HORMONE: CPT

## 2019-01-06 PROCEDURE — 25000003 PHARM REV CODE 250: Performed by: EMERGENCY MEDICINE

## 2019-01-06 PROCEDURE — 94640 AIRWAY INHALATION TREATMENT: CPT

## 2019-01-06 PROCEDURE — 87086 URINE CULTURE/COLONY COUNT: CPT

## 2019-01-06 PROCEDURE — 99223 PR INITIAL HOSPITAL CARE,LEVL III: ICD-10-PCS | Mod: ,,, | Performed by: INTERNAL MEDICINE

## 2019-01-06 PROCEDURE — 25000242 PHARM REV CODE 250 ALT 637 W/ HCPCS: Performed by: EMERGENCY MEDICINE

## 2019-01-06 PROCEDURE — 99900035 HC TECH TIME PER 15 MIN (STAT)

## 2019-01-06 PROCEDURE — 94761 N-INVAS EAR/PLS OXIMETRY MLT: CPT

## 2019-01-06 PROCEDURE — 87088 URINE BACTERIA CULTURE: CPT

## 2019-01-06 PROCEDURE — 63600175 PHARM REV CODE 636 W HCPCS: Performed by: EMERGENCY MEDICINE

## 2019-01-06 PROCEDURE — 25000242 PHARM REV CODE 250 ALT 637 W/ HCPCS: Performed by: INTERNAL MEDICINE

## 2019-01-06 PROCEDURE — 20000000 HC ICU ROOM

## 2019-01-06 PROCEDURE — 87186 SC STD MICRODIL/AGAR DIL: CPT | Mod: 59

## 2019-01-06 PROCEDURE — 36415 COLL VENOUS BLD VENIPUNCTURE: CPT

## 2019-01-06 PROCEDURE — 25500020 PHARM REV CODE 255: Performed by: EMERGENCY MEDICINE

## 2019-01-06 PROCEDURE — 94660 CPAP INITIATION&MGMT: CPT

## 2019-01-06 PROCEDURE — 83735 ASSAY OF MAGNESIUM: CPT

## 2019-01-06 PROCEDURE — 87077 CULTURE AEROBIC IDENTIFY: CPT | Mod: 59

## 2019-01-06 PROCEDURE — 82803 BLOOD GASES ANY COMBINATION: CPT

## 2019-01-06 PROCEDURE — 80053 COMPREHEN METABOLIC PANEL: CPT

## 2019-01-06 PROCEDURE — 27000221 HC OXYGEN, UP TO 24 HOURS

## 2019-01-06 PROCEDURE — 84100 ASSAY OF PHOSPHORUS: CPT

## 2019-01-06 PROCEDURE — 84439 ASSAY OF FREE THYROXINE: CPT

## 2019-01-06 PROCEDURE — 25000003 PHARM REV CODE 250: Performed by: INTERNAL MEDICINE

## 2019-01-06 PROCEDURE — 85025 COMPLETE CBC W/AUTO DIFF WBC: CPT

## 2019-01-06 PROCEDURE — 84481 FREE ASSAY (FT-3): CPT

## 2019-01-06 RX ORDER — METHYLPREDNISOLONE SOD SUCC 125 MG
125 VIAL (EA) INJECTION EVERY 8 HOURS
Status: DISCONTINUED | OUTPATIENT
Start: 2019-01-06 | End: 2019-01-06

## 2019-01-06 RX ORDER — AMOXICILLIN 250 MG
1 CAPSULE ORAL 2 TIMES DAILY
Status: DISCONTINUED | OUTPATIENT
Start: 2019-01-06 | End: 2019-01-06

## 2019-01-06 RX ORDER — RAMELTEON 8 MG/1
8 TABLET ORAL NIGHTLY PRN
Status: DISCONTINUED | OUTPATIENT
Start: 2019-01-06 | End: 2019-01-07 | Stop reason: HOSPADM

## 2019-01-06 RX ORDER — FAMOTIDINE 20 MG/1
20 TABLET, FILM COATED ORAL 2 TIMES DAILY
Status: DISCONTINUED | OUTPATIENT
Start: 2019-01-06 | End: 2019-01-06

## 2019-01-06 RX ORDER — FUROSEMIDE 10 MG/ML
80 INJECTION INTRAMUSCULAR; INTRAVENOUS 2 TIMES DAILY
Status: DISCONTINUED | OUTPATIENT
Start: 2019-01-06 | End: 2019-01-07

## 2019-01-06 RX ORDER — PREDNISONE 20 MG/1
60 TABLET ORAL DAILY
Status: DISCONTINUED | OUTPATIENT
Start: 2019-01-06 | End: 2019-01-07 | Stop reason: HOSPADM

## 2019-01-06 RX ORDER — OLANZAPINE 2.5 MG/1
5 TABLET ORAL NIGHTLY
Status: DISCONTINUED | OUTPATIENT
Start: 2019-01-06 | End: 2019-01-07 | Stop reason: HOSPADM

## 2019-01-06 RX ORDER — BUMETANIDE 1 MG/1
2 TABLET ORAL DAILY
Status: DISCONTINUED | OUTPATIENT
Start: 2019-01-07 | End: 2019-01-06

## 2019-01-06 RX ORDER — NALOXONE HCL 0.4 MG/ML
0.4 VIAL (ML) INJECTION
Status: DISCONTINUED | OUTPATIENT
Start: 2019-01-06 | End: 2019-01-07 | Stop reason: HOSPADM

## 2019-01-06 RX ORDER — CLONIDINE HYDROCHLORIDE 0.1 MG/1
0.1 TABLET ORAL 3 TIMES DAILY PRN
Status: DISCONTINUED | OUTPATIENT
Start: 2019-01-06 | End: 2019-01-07 | Stop reason: HOSPADM

## 2019-01-06 RX ORDER — ONDANSETRON 2 MG/ML
8 INJECTION INTRAMUSCULAR; INTRAVENOUS EVERY 8 HOURS PRN
Status: DISCONTINUED | OUTPATIENT
Start: 2019-01-06 | End: 2019-01-07 | Stop reason: HOSPADM

## 2019-01-06 RX ORDER — AMLODIPINE BESYLATE 5 MG/1
10 TABLET ORAL DAILY
Status: DISCONTINUED | OUTPATIENT
Start: 2019-01-06 | End: 2019-01-07 | Stop reason: HOSPADM

## 2019-01-06 RX ORDER — BUMETANIDE 1 MG/1
1 TABLET ORAL DAILY
Status: DISCONTINUED | OUTPATIENT
Start: 2019-01-06 | End: 2019-01-06

## 2019-01-06 RX ORDER — FLUOXETINE 10 MG/1
10 CAPSULE ORAL DAILY
Status: DISCONTINUED | OUTPATIENT
Start: 2019-01-06 | End: 2019-01-07 | Stop reason: HOSPADM

## 2019-01-06 RX ORDER — ACETAMINOPHEN 325 MG/1
650 TABLET ORAL EVERY 4 HOURS PRN
Status: DISCONTINUED | OUTPATIENT
Start: 2019-01-06 | End: 2019-01-06

## 2019-01-06 RX ORDER — ENOXAPARIN SODIUM 100 MG/ML
40 INJECTION SUBCUTANEOUS EVERY 24 HOURS
Status: DISCONTINUED | OUTPATIENT
Start: 2019-01-06 | End: 2019-01-07 | Stop reason: HOSPADM

## 2019-01-06 RX ORDER — AMOXICILLIN 250 MG
1 CAPSULE ORAL 2 TIMES DAILY PRN
Status: DISCONTINUED | OUTPATIENT
Start: 2019-01-06 | End: 2019-01-07 | Stop reason: HOSPADM

## 2019-01-06 RX ORDER — IPRATROPIUM BROMIDE AND ALBUTEROL SULFATE 2.5; .5 MG/3ML; MG/3ML
3 SOLUTION RESPIRATORY (INHALATION) EVERY 4 HOURS
Status: DISCONTINUED | OUTPATIENT
Start: 2019-01-06 | End: 2019-01-06

## 2019-01-06 RX ORDER — IPRATROPIUM BROMIDE AND ALBUTEROL SULFATE 2.5; .5 MG/3ML; MG/3ML
3 SOLUTION RESPIRATORY (INHALATION) EVERY 4 HOURS PRN
Status: DISCONTINUED | OUTPATIENT
Start: 2019-01-06 | End: 2019-01-07 | Stop reason: HOSPADM

## 2019-01-06 RX ORDER — ACETAMINOPHEN 500 MG
500 TABLET ORAL EVERY 6 HOURS PRN
Status: DISCONTINUED | OUTPATIENT
Start: 2019-01-06 | End: 2019-01-07 | Stop reason: HOSPADM

## 2019-01-06 RX ORDER — IPRATROPIUM BROMIDE AND ALBUTEROL SULFATE 2.5; .5 MG/3ML; MG/3ML
3 SOLUTION RESPIRATORY (INHALATION)
Status: DISCONTINUED | OUTPATIENT
Start: 2019-01-06 | End: 2019-01-07 | Stop reason: HOSPADM

## 2019-01-06 RX ORDER — BUMETANIDE 1 MG/1
1 TABLET ORAL DAILY
Status: DISCONTINUED | OUTPATIENT
Start: 2019-01-07 | End: 2019-01-07 | Stop reason: HOSPADM

## 2019-01-06 RX ORDER — SODIUM CHLORIDE 0.9 % (FLUSH) 0.9 %
3 SYRINGE (ML) INJECTION
Status: DISCONTINUED | OUTPATIENT
Start: 2019-01-06 | End: 2019-01-06

## 2019-01-06 RX ORDER — GABAPENTIN 300 MG/1
600 CAPSULE ORAL 3 TIMES DAILY
Status: DISCONTINUED | OUTPATIENT
Start: 2019-01-06 | End: 2019-01-07 | Stop reason: HOSPADM

## 2019-01-06 RX ORDER — BUMETANIDE 1 MG/1
2 TABLET ORAL DAILY
Status: DISCONTINUED | OUTPATIENT
Start: 2019-01-06 | End: 2019-01-06

## 2019-01-06 RX ADMIN — OLANZAPINE 5 MG: 2.5 TABLET, FILM COATED ORAL at 08:01

## 2019-01-06 RX ADMIN — AZITHROMYCIN MONOHYDRATE 500 MG: 500 INJECTION, POWDER, LYOPHILIZED, FOR SOLUTION INTRAVENOUS at 11:01

## 2019-01-06 RX ADMIN — FUROSEMIDE 80 MG: 10 INJECTION, SOLUTION INTRAVENOUS at 11:01

## 2019-01-06 RX ADMIN — ENOXAPARIN SODIUM 40 MG: 100 INJECTION SUBCUTANEOUS at 05:01

## 2019-01-06 RX ADMIN — OLANZAPINE 5 MG: 2.5 TABLET, FILM COATED ORAL at 03:01

## 2019-01-06 RX ADMIN — IPRATROPIUM BROMIDE AND ALBUTEROL SULFATE 3 ML: .5; 3 SOLUTION RESPIRATORY (INHALATION) at 11:01

## 2019-01-06 RX ADMIN — AMLODIPINE BESYLATE 10 MG: 5 TABLET ORAL at 08:01

## 2019-01-06 RX ADMIN — AZITHROMYCIN MONOHYDRATE 500 MG: 500 INJECTION, POWDER, LYOPHILIZED, FOR SOLUTION INTRAVENOUS at 01:01

## 2019-01-06 RX ADMIN — METHYLPREDNISOLONE SODIUM SUCCINATE 125 MG: 125 INJECTION, POWDER, FOR SOLUTION INTRAMUSCULAR; INTRAVENOUS at 05:01

## 2019-01-06 RX ADMIN — GABAPENTIN 600 MG: 300 CAPSULE ORAL at 08:01

## 2019-01-06 RX ADMIN — IPRATROPIUM BROMIDE AND ALBUTEROL SULFATE 3 ML: .5; 3 SOLUTION RESPIRATORY (INHALATION) at 01:01

## 2019-01-06 RX ADMIN — IOHEXOL 70 ML: 350 INJECTION, SOLUTION INTRAVENOUS at 12:01

## 2019-01-06 RX ADMIN — IPRATROPIUM BROMIDE AND ALBUTEROL SULFATE 3 ML: .5; 3 SOLUTION RESPIRATORY (INHALATION) at 07:01

## 2019-01-06 RX ADMIN — IPRATROPIUM BROMIDE AND ALBUTEROL SULFATE 3 ML: .5; 3 SOLUTION RESPIRATORY (INHALATION) at 08:01

## 2019-01-06 RX ADMIN — FLUOXETINE 10 MG: 10 CAPSULE ORAL at 08:01

## 2019-01-06 RX ADMIN — GABAPENTIN 600 MG: 300 CAPSULE ORAL at 02:01

## 2019-01-06 RX ADMIN — IPRATROPIUM BROMIDE AND ALBUTEROL SULFATE 3 ML: .5; 3 SOLUTION RESPIRATORY (INHALATION) at 03:01

## 2019-01-06 RX ADMIN — FUROSEMIDE 80 MG: 10 INJECTION, SOLUTION INTRAVENOUS at 05:01

## 2019-01-06 RX ADMIN — BUMETANIDE 1 MG: 1 TABLET ORAL at 08:01

## 2019-01-06 RX ADMIN — PREDNISONE 60 MG: 20 TABLET ORAL at 11:01

## 2019-01-06 NOTE — ED PROVIDER NOTES
Encounter Date: 1/5/2019    SCRIBE #1 NOTE: I, Scott Bray, am scribing for, and in the presence of,  Paul Lockhart MD. I have scribed the following portions of the note - Other sections scribed: HPI, ROS, PE.       History     Chief Complaint   Patient presents with    Shortness of Breath     pt called ems for SOB that got worse tonight, per ems pt O2 was at 76% on arrival. Given duo neb     CC: Shortness of Breath    HPI: This 63 y.o female with medical hx of HTN, SCI, Paraplegic spinal paralysis, Asthma, ADEEL treated with BiPAP, CVA (2012), CHF, COPD, Bilateral knee replacement presents to the ED via EMS for an emergent evaluation of acute onset, SOB and lethargy that began just PTA. Pt reports she was normally talking tonight until she began experiencing her sx. EMS reports upon their arrival to pt's residence, pt's O2 sats was at 76% while on 2L O2 tank at home. They had given pt duoneb tx while en route to ED with no relief. Pt also c/o chronic, mild back pain secondary to hx of back surgery and mild suprapubic abdominal pain, noting she had a suprapubic catheter recently placed a couple days ago. She reports compliance with her medications. She denies smoking, alcoholic consumption, or drug use. Pt mentions she was recently admitted to [facility] on 12/21/2018 for similar sx of SOB.      The history is provided by the patient. No  was used.     Review of patient's allergies indicates:   Allergen Reactions    Rocephin [ceftriaxone] Rash     Past Medical History:   Diagnosis Date    Addiction to drug     Alcohol abuse     Anxiety     Arthritis     Asthma     Bipolar disorder     Bladder stones     CHF (congestive heart failure)     COPD (chronic obstructive pulmonary disease)     CVA (cerebral vascular accident)     2012    Hepatitis C     treated and cured    History of blood clots     Hx of psychiatric care     Hypertension     Belen     ADEEL treated with BiPAP      Paraplegia     Paraplegic spinal paralysis     Psychiatric problem     Psychosis     AV[facility]; Paranoia    Renal disorder     SCI (spinal cord injury)     incomplete    Sleep difficulties     has sleep apnea and uses a Bi-PAP machine.    Substance abuse     Suprapubic catheter     Therapy     Vaginal delivery     x1     Past Surgical History:   Procedure Laterality Date    BACK SURGERY      bilateral knee replacement      BREAST BIOPSY      cysto/lithopaxy 2017      CYSTOLITHOLOPAXY (REMOVE BLADDER STONE) N/A 12/20/2017    Performed by [facility] Tinoco [facility] at [facility] OR    CYSTOSCOPY W/ LASER LITHOTRIPSY      CYSTOSCOPY,  OCCLUSION BALLOON PLACEMENT, PERC ACCESS  1/19/2018    Performed by [facility] at [facility] OR    CYSTOSCOPY, RETROGRADE, URETEROSCOPY, STENT PLACEMENT Right 3/5/2018    Performed by [facility]oco [facility] at [facility] OR    CYSTOSCOPY/ RETROGRADE PYELOGRAM/ WITH JJ URETERAL STENT PLACEMENT RIGHT Right 12/20/2017    Performed by [facility] at [facility] OR    EXCHANGE CATHETER-SUPRAPUBIC  1/19/2018    Performed by [facility]oco MD at [facility] OR    EXTRACTION-STONE-URETEROSCOPY Right 3/5/2018    Performed by [facility] Tinoco MD at [facility] OR    JOINT REPLACEMENT      bilateral knee    LITHOTRIPSY-LASER Right 3/5/2018    Performed by [facility] at [facility]    LITHOTRIPSY-LASER Right 1/29/2018    Performed by [facility]oco [facility] at [facility] OR    LITHOTRIPSY-LASER Right 1/19/2018    Performed by [facility]oco [facility] at [facility] OR    NEPHROLITHOTOMY-PERCUTANEOUS Right 1/19/2018    Performed by [facility] at [facility] OR    NEPHROSTOLITHOTOMY-PERCUTANEOUS Right 1/29/2018    Performed by [facility]oco [facility] at [facility] OR    NEPHROSTOMY Right 1/29/2018    Performed by [facility] at [facility] OR    PLACEMENT  1/19/2018    Performed by [facility] at [facility] OR    PLACEMENT-RENAL ACCESS Right 1/19/2018    Performed by [facility] at [facility] OR     PLACEMENT-STENT Right 2018    [facility]    Procedure is a Left Genicular Nerve Block ** cpt code 99107** Left 2015    Performed by [facility] Castle [facility] at [facility]    PYELOGRAM-ANTEGRADE Right 2018    [facility]    PYELOGRAM-ANTEGRADE  2018    [facility]    PYELOGRAM-RETROGRADE  2018    [facility]    RADIOFREQUENCY THERMOCOAGULATION** Left genicular RFA ** Left 2016    Performed by [facility] Castle [facility] at [facility]    REMOVAL-STENT-URETERAL  3/5/2018    [facility] OR    REMOVAL-STENT-URETERAL (Cystoscopy) Right 2018    [facility] OR    URETEROSCOPY Right 2018    [facility] OR    URETEROSCOPY Right 2018    [facility] OR     Family History   Problem Relation Age of Onset    Dementia Mother     Anxiety disorder Brother     Depression Brother      Social History     Tobacco Use    Smoking status: Former Smoker     Last attempt to quit:      Years since quittin.0    Smokeless tobacco: Never Used   Substance Use Topics    Alcohol use: Yes     Comment: occasional    Drug use: Yes     Comment: prescribed opioids at present for pain     Review of Systems   Constitutional: Positive for fatigue. Negative for chills and fever.   HENT: Negative for congestion, ear pain, rhinorrhea and sore throat.    Eyes: Negative for pain and visual disturbance.   Respiratory: Positive for shortness of breath. Negative for cough.    Cardiovascular: Negative for chest pain.   Gastrointestinal: Positive for abdominal pain (mild, suprapubic). Negative for diarrhea, nausea and vomiting.   Genitourinary: Negative for dysuria.   Musculoskeletal: Positive for back pain (chronic, mild). Negative for neck pain.   Skin:  "Negative for rash.   Neurological: Negative for headaches.   All other systems reviewed and are negative.      Physical Exam     Initial Vitals [01/05/19 2017]   BP Pulse Resp Temp SpO2   123/62 87 (!) 26 99.1 °F (37.3 °C) (!) 87 %      MAP       --         Vitals:    01/06/19 0400 01/06/19 0500 01/06/19 0518 01/06/19 0600   BP: 130/64 (!) 163/74 130/64 124/62   BP Location:       Patient Position:       Pulse: 67 74 67 (!) 52   Resp: (!) 46 (!) 47 (!) 35 (!) 24   Temp:   98.6 °F (37 °C)    TempSrc:       SpO2: 99% 100%  100%   Weight:   109.6 kg (241 lb 10 oz)    Height:   5' 5" (1.651 m)        Physical Exam    Nursing note and vitals reviewed.  Constitutional: She appears well-developed and well-nourished. She is not diaphoretic. No distress.   HENT:   Head: Normocephalic and atraumatic.   Right Ear: External ear normal.   Left Ear: External ear normal.   Nose: Nose normal.   Eyes: Conjunctivae and EOM are normal. Pupils are equal, round, and reactive to light. No scleral icterus.   Neck: Normal range of motion. Neck supple.   Cardiovascular: Normal rate, regular rhythm, normal heart sounds and intact distal pulses. Exam reveals no gallop and no friction rub.    No murmur heard.  Pulmonary/Chest: Breath sounds normal. No stridor. No respiratory distress. She has no wheezes. She has no rales.   Crackles bilaterally at lung bases   Abdominal: Soft. Normal appearance and bowel sounds are normal. She exhibits no distension. There is no tenderness. There is no rebound and no guarding.   Genitourinary:   Genitourinary Comments: Mild fungal growth to the anus, sediment in urine of catheter tube   Musculoskeletal: Normal range of motion. She exhibits no edema or tenderness.   Neurological: She is alert and oriented to person, place, and time. She has normal strength. No cranial nerve deficit. GCS score is 15. GCS eye subscore is 4. GCS verbal subscore is 5. GCS motor subscore is 6.   Skin: Skin is warm and dry. No rash " noted.   Psychiatric: She has a normal mood and affect. Her behavior is normal. Thought content normal.         ED Course   Critical Care  Date/Time: 1/6/2019 7:05 AM  Performed by: Paul Lockhart MD  Authorized by: Cora Briggs MD   Direct patient critical care time: 45 minutes  Total critical care time (exclusive of procedural time) : 45 minutes  Critical care was necessary to treat or prevent imminent or life-threatening deterioration of the following conditions: toxidrome and respiratory failure.  Critical care was time spent personally by me on the following activities: evaluation of patient's response to treatment, obtaining history from patient or surrogate, ordering and review of laboratory studies, pulse oximetry, review of old charts, interpretation of cardiac output measurements, examination of patient, ordering and performing treatments and interventions, ordering and review of radiographic studies, re-evaluation of patient's condition and ventilator management.        Labs Reviewed   CBC W/ AUTO DIFFERENTIAL - Abnormal; Notable for the following components:       Result Value    Hemoglobin 11.5 (*)     MCH 24.1 (*)     MCHC 29.3 (*)     RDW 16.7 (*)     Gran% 74.3 (*)     All other components within normal limits   COMPREHENSIVE METABOLIC PANEL - Abnormal; Notable for the following components:    CO2 30 (*)     Total Protein 9.0 (*)     ALT 5 (*)     All other components within normal limits   TROPONIN I - Abnormal; Notable for the following components:    Troponin I 0.029 (*)     All other components within normal limits   B-TYPE NATRIURETIC PEPTIDE - Abnormal; Notable for the following components:     (*)     All other components within normal limits   D DIMER, QUANTITATIVE - Abnormal; Notable for the following components:    D-Dimer 4.23 (*)     All other components within normal limits   ISTAT PROCEDURE - Abnormal; Notable for the following components:    POC PH 7.225 (*)     POC  PCO2 99.3 (*)     POC PO2 66 (*)     POC HCO3 41.1 (*)     POC SATURATED O2 86 (*)     POC TCO2 44 (*)     All other components within normal limits   ISTAT PROCEDURE - Abnormal; Notable for the following components:    POC PH 7.161 (*)     POC PCO2 110.7 (*)     POC HCO3 39.6 (*)     POC SATURATED O2 57 (*)     POC TCO2 43 (*)     All other components within normal limits   ISTAT PROCEDURE - Abnormal; Notable for the following components:    POC PH 7.199 (*)     POC PCO2 84.6 (*)     POC PO2 74 (*)     POC HCO3 33.0 (*)     POC SATURATED O2 90 (*)     POC TCO2 36 (*)     All other components within normal limits   ISTAT PROCEDURE - Abnormal; Notable for the following components:    POC PH 7.275 (*)     POC PCO2 74.4 (*)     POC PO2 116 (*)     POC HCO3 34.6 (*)     POC TCO2 37 (*)     All other components within normal limits   MAGNESIUM          Imaging Results          CTA Chest Non-Coronary (PE Study) (Final result)  Result time 01/06/19 01:23:33    Final result by Nellie Rod MD (01/06/19 01:23:33)                 Impression:      1. No evidence of PE.  2. Cardiomegaly with suspected mild pulmonary edema and small left-sided effusion.  3. Bibasilar atelectatic changes.  4. Enlarged pretracheal mediastinal lymph node of uncertain etiology or significance.      Electronically signed by: Nellie Rod MD  Date:    01/06/2019  Time:    01:23             Narrative:    EXAMINATION:  CTA CHEST NON CORONARY    CLINICAL HISTORY:  shortness of breath, hypoxia, elevated dimer, paraplegia;    TECHNIQUE:  Low dose axial images, sagittal and coronal reformations were obtained from the thoracic inlet to the lung bases following the IV administration of 75 mL of Omnipaque 350.  Contrast timing was optimized to evaluate the pulmonary arteries.  MIP images were performed.    COMPARISON:  CTA chest from July 2012.    FINDINGS:  Structures at the base of the neck are unremarkable.  Aorta is non-aneurysmal.  The heart is  enlarged without pericardial effusion.  No intraluminal filling defects within the pulmonary arteries to suggest pulmonary thromboembolism through the proximal segmental branches.  Pulmonary trunk is dilated which may be seen in setting of pulmonary artery hypertension.  Enlarged pretracheal lymph node is seen measuring 1.8 cm.  The esophagus maintains a normal course and caliber.    The trachea and bronchi are patent.  The lungs are symmetrically expanded.  Small left-sided pleural effusion is seen.  Atelectatic changes are seen within the bilateral lower lobes, left greater than right.  No significant right-sided effusion.  Scattered ground-glass attenuation is seen throughout the lungs.    The visualized abdominal structures are unremarkable.  Prominent multilevel degenerative changes are seen in the spine.  There is focal kyphosis in the midthoracic spine secondary to remote fused compression fractures, unchanged from 2012.  Prominent degenerative changes are also seen at the right glenohumeral joint.  Extrathoracic soft tissues are unremarkable.                               X-Ray Chest AP Portable (Final result)  Result time 01/05/19 20:42:02    Final result by Nellie Rod MD (01/05/19 20:42:02)                 Impression:      Cardiomegaly with suspected mild pulmonary interstitial edema.      Electronically signed by: Nellie Rod MD  Date:    01/05/2019  Time:    20:42             Narrative:    EXAMINATION:  XR CHEST AP PORTABLE    CLINICAL HISTORY:  Chest Pain;    TECHNIQUE:  Single frontal view of the chest was performed.    COMPARISON:  January 2018.    FINDINGS:  There is stable prominent cardiomegaly.  Diffuse increased interstitial attenuation is seen which may reflect mild pulmonary interstitial edema.  No evidence of focal consolidation.  No evidence of pneumothorax or large effusion.  No acute osseous abnormality identified.                                 Medical Decision Making:   Initial  Assessment:   62 yo female w/ Hx of paraplegia, CHF, p/w shortness of breath and mild lower abdominal aches. Patient on 2L O2 at home. Currently on 4L NC here, satting mid 80s. Placed on non rebreather and came up to near 100%. Pt has crackles in bilateral lower lungs. No severe pitting edema in LE. Recently admitted to  for SOB. DDx includes PNA, CHF, less likley PE. Will get labs, EKG, xray, and reassess.  ED Management:  Patient placed on bipap with good response. Awaiting labs. CHF vs PNA higher on differential. /62, does not otherwise seem highly volume overloaded.    TTE at  without significant systolic or diastolic dysfunction at that time    Patient id become increasingly more somnolent.  Repeat VBG with worsening CO2 NPH.  Patient's relative by coincidence is 1 of our nurses.  Some back story was provided that indicates the patient has a history of medication overdose, particularly on pain medication.  0.4 of Narcan given.  Substantial response with substantially increased alertness, increased respiratory rate.  Patient is a former respiratory tech.  She would like to avoid intubation if at all possible.  We have agreed to recheck her ABG in a few minutes.  Will hold off on intubation for now.    Repeat ABG shows improved CO2.  Patient remains substantially alert and animated with good respiratory rate.  O2 sat approximately 89% on 35% FiO2.  Will increase to 40%, target rate low 90s for SpO2.  We will admit to the ICU for close monitoring.  Patient in agreement, intubation okay if needed, but avoid if at all possible.  Patient remains widely alert and interactive.            Scribe Attestation:   Scribe #1: I performed the above scribed service and the documentation accurately describes the services I performed. I attest to the accuracy of the note.    Attending Attestation:           Physician Attestation for Scribe:  Physician Attestation Statement for Scribe #1: I, Paul Lockhart MD,  reviewed documentation, as scribed by Scott Bray in my presence, and it is both accurate and complete.                    Clinical Impression:   The primary encounter diagnosis was Acute on chronic respiratory failure with hypoxia and hypercapnia. Diagnoses of Chest pain, COPD exacerbation, and Medication overdose, accidental or unintentional, initial encounter were also pertinent to this visit.      Disposition:   Disposition: Admitted  Condition: Serious                        Paul Lockhart MD  01/06/19 0707

## 2019-01-06 NOTE — ASSESSMENT & PLAN NOTE
I think there this presentation is multifactorial with COPD/hypercapnia and opioid overdose as manifested by response to naloxone.  She had two VBGs while on and off BPAP.  An ABG on BPAP was significant for 7.199  84.6  74  33.0 on BPAP 10  5   35%.  I have reviewed the chest X-ray and it reveals some pulmonary edema but without pleural effusions or infiltrates.  She appears to be doing well on BPAP and hopefully we can avoid endotracheal intubation and mechanical ventilation.  She appears much more awake and doesn't need a continuous naloxone infusion at this time.

## 2019-01-06 NOTE — ASSESSMENT & PLAN NOTE
She is reported by family member, who is an ED staff nurse here, that she does use opioids at home and was symptomatically improved with naloxone.  Will monitor her respiratory status closely in the event that she needs additional doses.

## 2019-01-06 NOTE — H&P
"[facility] Medicine  History & Physical    Patient Name: Mary Ellen Fajardo  MRN: [id number]  Admission Date: 1/5/2019  Attending Physician: Cora Briggs MD   Primary Care Provider: Any Tran MD         Patient information was obtained from patient.     Subjective:     Principal Problem:Acute on chronic respiratory failure with hypoxia and hypercapnia    Chief Complaint: Shortness of breath for two days.    HPI: Mrs. Mary Ellen Fajardo is a 63 y.o. female with essential hypertension, COPD, chronic respiratory failure with hypoxia, obesity (BMI 38.4), ADEEL on CPAP, chronic hepatitis C, chronic indwelling suprapubic catheter in place, Bipolar 1 disorder, and history of CVA who presents to [facility] ED with complaints of dyspnea for two days.  She was recently admitted to [facility] from Dec 21-24, 2018 for treatment of COPD exacerbation and was feeling well when she was discharged.  In the last two days, however, the dyspnea returned and has been worsening since.  She reports some slight wheezing and a cough that is occasionally productive of "off-white" sputum.  She thought she had some fevers but when she checked with a thermometer it was normal.  She has not had any sick contacts or recent travel since leaving the hospital and denies any chest pain, pleurisy, palpitations, diaphoresis, nausea, vomiting, nor any hemoptysis, she has chronic pains in her legs which occasional swelling but that has not changed.  She does not smoke.    Chart Review:  Previous Hospitalizations  Date Hospital Diagnosis   Apr 2018 [facility]- Right ureteral stent removal and suprapubic tube change   Mar 2018 [facility] Laser lithotripsy, stone extraction, stent exchange, suprapubic change   Jan 29, 2018 [facility]- Right percutaneous nephrolithotomy   Jan 19, 2018 [facility]- Right laser lithotripsy and suprapubic tube exchange    Dec 2017 [facility]- Right ureteral stent placement, " cystolitholapaxy, suprapubic exchange     Outpatient Follow-Up  Date of Visit Physician Service   Dec 2018 C. Rockweiler, LCSW Psychology    Nov 2018 Carlos Tinoco MD Urology    Jan 2016 Sara Castle MD Pain Medicine    Oct 2012 Ramu Siddiqui MD PM&R     Past Medical History:   Diagnosis Date    Addiction to drug     Alcohol abuse     Anxiety     Arthritis     Asthma     Bipolar disorder     Bladder stones     CHF (congestive heart failure)     COPD (chronic obstructive pulmonary disease)     CVA (cerebral vascular accident)     2012    Hepatitis C     treated and cured    History of blood clots     Hx of psychiatric care     Hypertension     Belen     ADEEL treated with BiPAP     Paraplegia     Paraplegic spinal paralysis     Psychiatric problem     Psychosis     AVH; Paranoia    Renal disorder     SCI (spinal cord injury)     incomplete    Sleep difficulties     has sleep apnea and uses a Bi-PAP machine.    Substance abuse     Suprapubic catheter     Therapy     Vaginal delivery     x1       Past Surgical History:   Procedure Laterality Date    BACK SURGERY      bilateral knee replacement      BREAST BIOPSY      cysto/lithopaxy 2017      CYSTOLITHOLOPAXY (REMOVE BLADDER STONE) N/A 12/20/2017    [facility]    CYSTOSCOPY W/ LASER LITHOTRIPSY      CYSTOSCOPY,  OCCLUSION BALLOON PLACEMENT, PERC ACCESS  1/19/2018    [facility] by RAM[facility] at [facility] OR    CYSTOSCOPY, RETROGRADE, URETEROSCOPY, STENT PLACEMENT Right 3/5/2018    Performed by RAM[facility] at [facility] OR    CYSTOSCOPY/ RETROGRADE PYELOGRAM/ WITH JJ URETERAL STENT PLACEMENT RIGHT Right 12/20/2017    Performed by RAMA [facility]oco [facility] at [facility] OR    EXCHANGE CATHETER-SUPRAPUBIC  1/19/2018    Performed by [facility] at [facility]    EXTRACTION-STONE-URETEROSCOPY Right 3/5/2018    Performed by [facility]oco [facility] at [facility] OR    JOINT REPLACEMENT      bilateral knee     LITHOTRIPSY-LASER Right 3/5/2018    [facility] at [facility]    LITHOTRIPSY-LASER Right 1/29/2018    [facility]formed by [facility] at [facility]    LITHOTRIPSY-LASER Right 1/19/2018    [facility]formed by [facility] at [facility] OR    NEPHROLITHOTOMY-PERCUTANEOUS Right 1/19/2018    [facility]formed by [facility] at [facility]    NEPHROSTOLITHOTOMY-PERCUTANEOUS Right 1/29/2018    [facility]formed by [facility] at [facility] OR    NEPHROSTOMY Right 1/29/2018    [facility]formed by [facility] at [facility]    PLACEMENT  1/19/2018    [facility]formed by [facility] at [facility]    PLACEMENT-RENAL ACCESS Right 1/19/2018    [facility]formed by [facility] at [facility]    PLACEMENT-STENT Right 1/29/2018    [facility]formed by [facility] at [facility]    Procedure is a Left Genicular Nerve Block ** cpt code 04600** Left 9/16/2015    [facility]    PYELOGRAM-ANTEGRADE Right 1/29/2018    [facility] by [facility] at [facility]    PYELOGRAM-ANTEGRADE  1/19/2018    [facility] by [facility] at [facility]    PYELOGRAM-RETROGRADE  1/19/2018    [facility] by [facility] at [facility]    RADIOFREQUENCY THERMOCOAGULATION** Left genicular RFA ** Left 1/20/2016    [facility]formed [facility]    REMOVAL-STENT-URETERAL  3/5/2018    [facility] by [facility] at [facility] OR    REMOVAL-STENT-URETERAL (Cystoscopy) Right 4/9/2018    [facility]formed by [facility] at [facility] OR    URETEROSCOPY Right 1/29/2018    [facility]formed by [facility] at [facility]    URETEROSCOPY Right 1/19/2018    Performed by [facility]oco [facility] at [facility] OR       Review of patient's allergies indicates:   Allergen Reactions    Rocephin [ceftriaxone] Rash       No current facility-administered medications on file prior to encounter.      Current Outpatient Medications on File Prior to Encounter   Medication Sig    amlodipine (NORVASC) 10 MG tablet Take 10 mg  by mouth once daily.     baclofen (LIORESAL) 20 MG tablet 10 mg 4 (four) times daily.     bumetanide (BUMEX) 1 MG tablet 1 mg once daily.     FLUoxetine (PROZAC) 10 MG capsule Take 1 capsule (10 mg total) by mouth once daily.    gabapentin (NEURONTIN) 300 MG capsule 600 mg 3 (three) times daily.     HYDROcodone-acetaminophen (NORCO)  mg per tablet Take 1 tablet by mouth every 8 (eight) hours as needed for Pain.    OLANZapine (ZYPREXA) 5 MG tablet Take 1 tablet (5 mg total) by mouth every evening.    oxybutynin (DITROPAN-XL) 5 MG TR24 TAKE 1 TABLET BY MOUTH EVERY DAY    potassium chloride (KLOR-CON) 10 MEQ TbSR     promethazine (PHENERGAN) 25 MG tablet TAKE 1 TABLET BY MOUTH EVERY 8 HOURS AS NEEDED FOR NAUSEA AND VOMITING    trazodone (DESYREL) 100 MG tablet 100 mg every evening.     VENTOLIN HFA 90 mcg/actuation inhaler     catheter 18 Fr Misc Change every 20 days     Family History     Problem Relation (Age of Onset)    Anxiety disorder Brother    Dementia Mother    Depression Brother        Tobacco Use    Smoking status: Former Smoker     Last attempt to quit: 2002     Years since quittin.0    Smokeless tobacco: Never Used   Substance and Sexual Activity    Alcohol use: Yes     Comment: occasional    Drug use: Yes     Comment: prescribed opioids at present for pain    Sexual activity: No     Partners: Male     Comment: since      Review of Systems   Constitutional: Positive for appetite change. Negative for activity change, chills, diaphoresis, fatigue and fever.   HENT: Negative.    Eyes: Negative.    Respiratory: Positive for cough, shortness of breath and wheezing. Negative for chest tightness.    Cardiovascular: Negative for chest pain, palpitations and leg swelling.   Gastrointestinal: Negative for abdominal distention, abdominal pain, blood in stool, constipation, diarrhea, nausea and vomiting.   Genitourinary: Negative for dysuria and hematuria.   Musculoskeletal: Negative.     Skin: Negative.    Neurological: Negative for dizziness, seizures, syncope, weakness and light-headedness.   Psychiatric/Behavioral: Negative.      Objective:     Vital Signs (Most Recent):  Temp: 98.6 °F (37 °C) (01/06/19 0300)  Pulse: 72 (01/06/19 0300)  Resp: (!) 35 (01/06/19 0300)  BP: 136/83 (01/06/19 0300)  SpO2: (!) 94 % (01/06/19 0300) Vital Signs (24h Range):  Temp:  [98.2 °F (36.8 °C)-99.1 °F (37.3 °C)] 98.6 °F (37 °C)  Pulse:  [55-87] 72  Resp:  [15-37] 35  SpO2:  [87 %-100 %] 94 %  BP: ()/(32-89) 136/83     Weight: 104.3 kg (230 lb)  Body mass index is 38.27 kg/m².    Physical Exam   Constitutional: She appears well-developed and well-nourished. No distress.   obese   HENT:   Head: Normocephalic and atraumatic.   Right Ear: External ear normal.   Left Ear: External ear normal.   Nose: Nose normal.   Eyes: Right eye exhibits no discharge. Left eye exhibits no discharge.   Neck: Normal range of motion.   Cardiovascular:   Bradycardic but otherwise without murmurs or gallops   Pulmonary/Chest:   Normal respiratory effort with no appreciable wheezing or crackles    Abdominal: Soft. Bowel sounds are normal. She exhibits no distension. There is no tenderness. There is no rebound and no guarding.   Musculoskeletal: Normal range of motion. She exhibits no edema.   Neurological:   Appears very comfortable and easily arousable   Skin: Skin is warm and dry. She is not diaphoretic. No erythema.   Psychiatric: She has a normal mood and affect. Her behavior is normal. Judgment and thought content normal.   Nursing note and vitals reviewed.          Significant Labs: All pertinent labs within the past 24 hours have been reviewed.    Significant Imaging: I have reviewed and interpreted all pertinent imaging results/findings within the past 24 hours.    Assessment/Plan:     * Acute on chronic respiratory failure with hypoxia and hypercapnia    I think there this presentation is multifactorial with  COPD/hypercapnia and opioid overdose as manifested by response to naloxone.  She had two VBGs while on and off BPAP.  An ABG on BPAP was significant for 7.199  84.6  74  33.0 on BPAP 10  5   35%.  I have reviewed the chest X-ray and it reveals some pulmonary edema but without pleural effusions or infiltrates.  She appears to be doing well on BPAP and hopefully we can avoid endotracheal intubation and mechanical ventilation.  She appears much more awake and doesn't need a continuous naloxone infusion at this time.       Opioid overdose    She is reported by family member, who is an ED staff nurse here, that she does use opioids at home and was symptomatically improved with naloxone.  Will monitor her respiratory status closely in the event that she needs additional doses.     COPD with acute exacerbation    As addressed above.  Will provide frequent nebulized GETACHEW/LAMA; intravenous corticosteroids; and empiric antibiotics.     Essential hypertension    Patient's blood pressure is poorly-controlled; will continue home regimen of amlodipine and bumetanide, and provide as-needed clonidine.     COPD (chronic obstructive pulmonary disease)    As addressed above.     Chronic respiratory failure with hypoxia    As addressed above.     Obesity, Class II, BMI 35-39.9    The patient has been counseled on the negative impact that obesity imparts on her health and was encouraged to make lifestyle changes in order to lose weight and decrease her modifiable risk factors.     ADEEL on CPAP    As addressed above.  Will restart CPAP once she is stable.     Chronic hepatitis C    Stable; there are no acute issues.     Chronic indwelling suprapubic catheter in place    Stable; there are no acute issues.     Bipolar 1 disorder    Stable; will continue her home regimen of fluoxetine and olanzapine.     History of CVA (cerebrovascular accident)    Stable; there are no acute issues.       VTE Risk Mitigation (From admission, onward)         Ordered     enoxaparin injection 40 mg  Daily      01/06/19 0110     IP VTE HIGH RISK PATIENT  Once      01/06/19 0110           Critical care time spent on the evaluation and treatment of severe organ dysfunction, review of pertinent labs and imaging studies, discussions with consulting providers and discussions with patient/family: 60 minutes.            Ivan Billings M.D.  [facility]ist  Department of Hospital Medicine  [facility]  Pager: [phone]

## 2019-01-06 NOTE — ASSESSMENT & PLAN NOTE
Steroid, antibitics and nebs. Feeling better.  May consider de-escalation pending culture result.

## 2019-01-06 NOTE — SUBJECTIVE & OBJECTIVE
Past Medical History:   Diagnosis Date    Addiction to drug     Alcohol abuse     Anxiety     Arthritis     Asthma     Bipolar disorder     Bladder stones     CHF (congestive heart failure)     COPD (chronic obstructive pulmonary disease)     CVA (cerebral vascular accident)     2012    Hepatitis C     treated and cured    History of blood clots     Hx of psychiatric care     Hypertension     Belen     ADEEL treated with BiPAP     Paraplegia     Paraplegic spinal paralysis     Psychiatric problem     Psychosis     AVH; Paranoia    Renal disorder     SCI (spinal cord injury)     incomplete    Sleep difficulties     has sleep apnea and uses a Bi-PAP machine.    Substance abuse     Suprapubic catheter     Therapy     Vaginal delivery     x1       Past Surgical History:   Procedure Laterality Date    BACK SURGERY      bilateral knee replacement      BREAST BIOPSY      cysto/lithopaxy 2017      CYSTOLITHOLOPAXY (REMOVE BLADDER STONE) N/A 12/20/2017    [facility]    CYSTOSCOPY W/ LASER LITHOTRIPSY      CYSTOSCOPY,  OCCLUSION BALLOON PLACEMENT, PERC ACCESS  1/19/2018    [facility] at [facility] OR    CYSTOSCOPY, RETROGRADE, URETEROSCOPY, STENT PLACEMENT Right 3/5/2018    [facility] at [facility]    CYSTOSCOPY/ RETROGRADE PYELOGRAM/ WITH JJ URETERAL STENT PLACEMENT RIGHT Right 12/20/2017    [facility] by [facility] at [facility] OR    EXCHANGE CATHETER-SUPRAPUBIC  1/19/2018    [facility] at [facility]    EXTRACTION-STONE-URETEROSCOPY Right 3/5/2018    [facility] at [facility]    JOINT REPLACEMENT      bilateral knee    LITHOTRIPSY-LASER Right 3/5/2018    [facility] at [facility]    LITHOTRIPSY-LASER Right 1/29/2018    [facility] at [facility]    LITHOTRIPSY-LASER Right 1/19/2018    [facility] at [facility]     NEPHROLITHOTOMY-PERCUTANEOUS Right 2018    [facility]    NEPHROSTOLITHOTOMY-PERCUTANEOUS Right 2018    [facility] at [facility]    NEPHROSTOMY Right 2018    [facility] at [facility]    PLACEMENT  2018    [facility] at [facility]    PLACEMENT-RENAL ACCESS Right 2018    [facility] at [facility]    PLACEMENT-STENT Right 2018    [facility] at [facility]    Procedure is a Left Genicular Nerve Block ** cpt code 40678** Left 2015    [facility]    PYELOGRAM-ANTEGRADE Right 2018    [facility] at [facility]    PYELOGRAM-ANTEGRADE  2018    [facility] at [facility]    PYELOGRAM-RETROGRADE  2018    [facility]formed [facility] at [facility]    RADIOFREQUENCY THERMOCOAGULATION** Left genicular RFA ** Left 2016    [facility]    REMOVAL-STENT-URETERAL  3/5/2018    [facility] at [facility]    REMOVAL-STENT-URETERAL (Cystoscopy) Right 2018    [facility]formed [facility] at [facility]    URETEROSCOPY Right 2018    [facility] at [facility]    URETEROSCOPY Right 2018    [facility]formed by [facility] at [facility]       Review of patient's allergies indicates:   Allergen Reactions    Rocephin [ceftriaxone] Rash       Family History     Problem Relation (Age of Onset)    Anxiety disorder Brother    Dementia Mother    Depression Brother        Tobacco Use    Smoking status: Former Smoker     Last attempt to quit: 2002     Years since quittin.0    Smokeless tobacco: Never Used   Substance and Sexual Activity    Alcohol use: Yes     Comment: occasional    Drug use: Yes     Comment: prescribed opioids at present for pain    Sexual activity: No     Partners: Male      Comment: since 2011         Review of Systems   Constitutional: Positive for appetite change. Negative for activity change, chills, diaphoresis, fatigue and fever.   HENT: Negative.    Eyes: Negative.    Respiratory: Positive for cough, shortness of breath and wheezing. Negative for chest tightness.    Cardiovascular: Negative for chest pain, palpitations and leg swelling.   Gastrointestinal: Negative for abdominal distention, abdominal pain, blood in stool, constipation, diarrhea, nausea and vomiting.   Genitourinary: Negative for dysuria and hematuria.   Musculoskeletal: Negative.    Skin: Negative.    Neurological: Negative for dizziness, seizures, syncope, weakness and light-headedness.   Psychiatric/Behavioral: Negative.      Objective:     Vital Signs (Most Recent):  Temp: 98.2 °F (36.8 °C) (01/06/19 1115)  Pulse: 74 (01/06/19 1300)  Resp: (!) 40 (01/06/19 1300)  BP: (!) 113/58 (01/06/19 1300)  SpO2: 98 % (01/06/19 1300) Vital Signs (24h Range):  Temp:  [98.1 °F (36.7 °C)-99.1 °F (37.3 °C)] 98.2 °F (36.8 °C)  Pulse:  [52-99] 74  Resp:  [15-51] 40  SpO2:  [87 %-100 %] 98 %  BP: ()/() 113/58     Weight: 109.6 kg (241 lb 10 oz)  Body mass index is 40.21 kg/m².      Intake/Output Summary (Last 24 hours) at 1/6/2019 1332  Last data filed at 1/6/2019 1200  Gross per 24 hour   Intake 550 ml   Output 3300 ml   Net -2750 ml       Physical Exam   Constitutional: She appears well-developed and well-nourished. No distress.   obese   HENT:   Head: Normocephalic and atraumatic.   Right Ear: External ear normal.   Left Ear: External ear normal.   Nose: Nose normal.   Eyes: Right eye exhibits no discharge. Left eye exhibits no discharge.   Neck: Normal range of motion.   Cardiovascular:   Bradycardic but otherwise without murmurs or gallops   Pulmonary/Chest:   Normal respiratory effort with no appreciable wheezing or crackles    Abdominal: Soft. Bowel sounds are normal. She exhibits no distension. There is no  tenderness. There is no rebound and no guarding.   Musculoskeletal: Normal range of motion. She exhibits no edema.   Neurological:   Appears very comfortable and easily arousable   Skin: Skin is warm and dry. She is not diaphoretic. No erythema.   Psychiatric: She has a normal mood and affect. Her behavior is normal. Judgment and thought content normal.   Nursing note and vitals reviewed.      Vents:  Oxygen Concentration (%): 35 (01/06/19 0500)    Lines/Drains/Airways     Drain                 Suprapubic Catheter -- days         Suprapubic Catheter 03/05/18 0900 latex 18 Fr. 307 days          Peripheral Intravenous Line                 Peripheral IV - Single Lumen 01/05/19 2035 Right Antecubital less than 1 day         Peripheral IV - Single Lumen 01/05/19 2338 Left Antecubital less than 1 day                Significant Labs:    CBC/Anemia Profile:  Recent Labs   Lab 01/05/19 2042 01/06/19  0430   WBC 9.57 7.51   HGB 11.5* 11.0*   HCT 39.3 38.5    172   MCV 82 83   RDW 16.7* 16.9*        Chemistries:  Recent Labs   Lab 01/05/19 2042 01/05/19  2300 01/06/19  0430     --  142   K 4.4  --  4.5     --  101   CO2 30*  --  34*   BUN 16  --  15   CREATININE 0.7  --  0.7   CALCIUM 9.4  --  9.1   ALBUMIN 3.9  --  3.5   PROT 9.0*  --  8.3   BILITOT 0.6  --  0.5   ALKPHOS 80  --  76   ALT 5*  --  6*   AST 21  --  14   MG  --  2.5 2.4   PHOS  --   --  3.5       ABGs:   Recent Labs   Lab 01/06/19  0131   PH 7.275*   PCO2 74.4*   HCO3 34.6*   POCSATURATED 98   BE 6       Significant Imaging:   CXR: I have reviewed all pertinent results/findings within the past 24 hours and my personal findings are:  no effusion or consolidation   Ct with bilateral mosaic attenuation bilaterally.  Small effusion

## 2019-01-06 NOTE — PLAN OF CARE
To patient's room to discuss patient managing her care at home.      TN Role Explained.  Patient identified by using 2 identifiers:  Name and date of birth    Patient stated that her  and grandchildren WILL HELP AT HOME WITH her RECOVERY.      Preferred Pharmacy:       01/06/19 7767   Discharge Assessment   Assessment Type Discharge Planning Assessment   Confirmed/corrected address and phone number on facesheet? Yes   Assessment information obtained from? Patient   Expected Length of Stay (days) 3   Communicated expected length of stay with patient/caregiver yes   Prior to hospitilization cognitive status: Alert/Oriented   Prior to hospitalization functional status: Assistive Equipment;Wheelchair Bound   Current cognitive status: Alert/Oriented   Current Functional Status: Assistive Equipment;Wheelchair Bound   Lives With grandchild(kamran);spouse   Able to Return to Prior Arrangements yes   Is patient able to care for self after discharge? Unable to determine at this time (comments)   Patient's perception of discharge disposition home health   Readmission Within the Last 30 Days current reason for admission unrelated to previous admission   If yes, most recent facility name: [facility]   Patient currently being followed by outpatient case management? No   Patient currently receives any other outside agency services? Yes   Name and contact number of agency or person providing outside services [facility]   Is it the patient/care giver preference to resume care with the current outside agency? Yes   Equipment Currently Used at Home BIPAP;wheelchair;oxygen;bedside commode;hospital bed   Do you have any problems affording any of your prescribed medications? No   Is the patient taking medications as prescribed? yes   Does the patient have transportation home? Yes   Transportation Anticipated family or friend will provide   Does the patient receive services at the [facility]? No   Discharge Plan A Home  "with family;Home Health   Discharge Plan B (tbd)   Patient/Family in Agreement with Plan yes   Readmission Questionnaire   At the time of your discharge, did someone talk to you about what your health problems were? Yes   At the time of discharge, did someone talk to you about what to watch out for regarding worsening of your health problem? Yes   At the time of discharge, did someone talk to you about what to do if you experienced worsening of your health problem? Yes   At the time of discharge, did someone talk to you about which medication to take when you left the hospital and which ones to stop taking? Yes   At the time of discharge, did someone talk to you about when and where to follow up with a doctor after you left the hospital? Yes   What do you believe caused you to be sick enough to be re-admitted? "I don't know"   How often do you need to have someone help you when you read instructions, pamphlets, or other written material from your doctor or pharmacy? Sometimes   Do you have problems taking your medications as prescribed? No   Do you have any problems affording any of  your prescribed medications? No   Do you have problems obtaining/receiving your medications? No   Does the patient have transportation to healthcare appointments? Yes   Living Arrangements house   Does the patient have family/friends to help with healtcare needs after discharge? yes   Does your caregiver provide all the help you need? Yes   Are you currently feeling confused? No   Are you currently having problems thinking? No   Are you currently having memory problems? No   In the last 7 days, my sleep quality was: fair     "

## 2019-01-06 NOTE — ED TRIAGE NOTES
Patient reports being lethargic. Per EMS patient reports SOB, on arrival patient oxygen saturation was in 70's on 2L NC. Patient received duoneb tx with no relief. Patient denies chest pain. Currently patient is 78 on 4L NC. Patient placed on nonrebreather. Oxygen Saturation increased to 100 %. Patient does report hx of CHF and COPD.

## 2019-01-06 NOTE — ED NOTES
Patient receiving 1L NS patient awake. Md informed patient she will have to be intubated. Patient asked about what happens if she refuses tx. Patient informed of her rights. Patient verbalized understanding. Patient asked for time before being intubated.

## 2019-01-06 NOTE — ED NOTES
Patient alert and oriented X4. bp stable. Patient surrounded by family at this time. Bipap on at this time.

## 2019-01-06 NOTE — ED NOTES
MD at bedside. Patient blood pressure 66/32. Patient hard to arouse. Md gave order to Narcan patient.

## 2019-01-06 NOTE — CARE UPDATE
Ms. Fajardo is a pleasant 62 yo woman with BLE weakness due to previous spinal cord infection at the level of T6, neurogenic bladder with suprapubic catheter, COPD, chronic respiratory failure with hypoxia, obesity (BMI 38.4), ADEEL on BiPAP QHS (IPAP/EPAP 22/18), chronic hepatitis C, h/o CVA, HTN, bipolar disorder, and h/o substance abuse who presented with shortness of breath.  She was recently admitted to [facility] from Dec 21-24, 2018 for treatment of COPD exacerbation and was feeling well when she was discharged.  She was using her home BiPAP but feels her mask was not adequately fit.  She became very short of breath which prompted her to activate EMS.  She was in respiratory distress in the ER and was felt to need ICU level care.  The following morning she was much more comfortable, but she did appear volume overloaded.  She never required intubation.  She was started on IV Lasix (in addition to home diuretics).  She is stable for the floor.  Diurese and monitor overnight.

## 2019-01-06 NOTE — CONSULTS
[facility]  Pulmonology  Consult Note    Patient Name: Mary Ellen Fajardo  MRN: [id number]  Admission Date: 1/5/2019  Hospital Length of Stay: 1 days  Code Status: Full Code  Attending Physician: Cora Briggs MD  Primary Care Provider: Any Tran MD   Principal Problem: Acute on chronic respiratory failure with hypoxia and hypercapnia    Consult to Pulmonology  Consult performed by: Spenser Chaidez MD  Consult ordered by: Ivan Billings MD        Subjective:     HPI:  Patient is 63 y.o. female  has a past medical history of Addiction to drug, Alcohol abuse, Anxiety, Arthritis, Asthma, Bipolar disorder, Bladder stones, CHF (congestive heart failure), COPD (chronic obstructive pulmonary disease), CVA (cerebral vascular accident), Hepatitis C, History of blood clots, psychiatric care, Hypertension, Belen, ADEEL treated with BiPAP, Paraplegia, Paraplegic spinal paralysis, Psychiatric problem, Psychosis, Renal disorder, SCI (spinal cord injury), Sleep difficulties, Substance abuse, Suprapubic catheter, Therapy, and Vaginal delivery. presented to [facility] on 1/5/19 with worsening sob x 2 days.   Patient endorse wheezing along with productive cough.  abg during admission revealed acute on chronic respiratory failure.  Patient was treated with steroid, antibiotic, and bipap.     Upon my evaluation, patient is feeling better and comfortable on nasal canula.  Of note, patient was recently discharged from [facility] with similar complaints.  Currently, patient denied chest pain.  No orthopnea.  No pnd.  Patient is lifelong smoker at 1/2 ppd    Past Medical History:   Diagnosis Date    Addiction to drug     Alcohol abuse     Anxiety     Arthritis     Asthma     Bipolar disorder     Bladder stones     CHF (congestive heart failure)     COPD (chronic obstructive pulmonary disease)     CVA (cerebral vascular accident)     2012    Hepatitis C     treated and cured    History of blood  clots     Hx of psychiatric care     Hypertension     Belen     ADEEL treated with BiPAP     Paraplegia     Paraplegic spinal paralysis     Psychiatric problem     Psychosis     AVH; Paranoia    Renal disorder     SCI (spinal cord injury)     incomplete    Sleep difficulties     has sleep apnea and uses a Bi-PAP machine.    Substance abuse     Suprapubic catheter     Therapy     Vaginal delivery     x1       Past Surgical History:   Procedure Laterality Date    BACK SURGERY      bilateral knee replacement      BREAST BIOPSY      cysto/lithopaxy 2017      CYSTOLITHOLOPAXY (REMOVE BLADDER STONE) N/A 12/20/2017    [facility]oco [facility] at [facility]    CYSTOSCOPY W/ LASER LITHOTRIPSY      CYSTOSCOPY,  OCCLUSION BALLOON PLACEMENT, PERC ACCESS  1/19/2018    [facility]    CYSTOSCOPY, RETROGRADE, URETEROSCOPY, STENT PLACEMENT Right 3/5/2018    [facility]    CYSTOSCOPY/ RETROGRADE PYELOGRAM/ WITH JJ URETERAL STENT PLACEMENT RIGHT Right 12/20/2017    [facility]    EXCHANGE CATHETER-SUPRAPUBIC  1/19/2018    [facility]    EXTRACTION-STONE-URETEROSCOPY Right 3/5/2018    [facility]    JOINT REPLACEMENT      bilateral knee    LITHOTRIPSY-LASER Right 3/5/2018    [facility]    LITHOTRIPSY-LASER Right 1/29/2018    [facility] at [facility]    LITHOTRIPSY-LASER Right 1/19/2018    [facility]    NEPHROLITHOTOMY-PERCUTANEOUS Right 1/19/2018    [facility]    NEPHROSTOLITHOTOMY-PERCUTANEOUS Right 1/29/2018    [facility]    NEPHROSTOMY Right 1/29/2018    Performed by [facility]oco [facility] at [facility]    PLACEMENT  1/19/2018    [facility] at [facility]     PLACEMENT-RENAL ACCESS Right 2018    [facility] at [facility] OR    PLACEMENT-STENT Right 2018    [facility] at [facility] OR    Procedure is a Left Genicular Nerve Block ** cpt code 73062** Left 2015    Performed by Sara Castle [facility] at [facility]    PYELOGRAM-ANTEGRADE Right 2018    [facility]formed by [facility] at [facility]    PYELOGRAM-ANTEGRADE  2018    [facility] by [facility] at [facility]    PYELOGRAM-RETROGRADE  2018    [facility] by [facility]oco [facility] at [facility]    RADIOFREQUENCY THERMOCOAGULATION** Left genicular RFA ** Left 2016    Performed by Sara Castle [facility] at [facility]    REMOVAL-STENT-URETERAL  3/5/2018    [facility]formed by [facility]oco [facility] at [facility] OR    REMOVAL-STENT-URETERAL (Cystoscopy) Right 2018    [facility] by [facility] at [facility]    URETEROSCOPY Right 2018    [facility] by [facility]oco [facility] at [facility]    URETEROSCOPY Right 2018    Performed by [facility]oco [facility] at [facility] OR       Review of patient's allergies indicates:   Allergen Reactions    Rocephin [ceftriaxone] Rash       Family History     Problem Relation (Age of Onset)    Anxiety disorder Brother    Dementia Mother    Depression Brother        Tobacco Use    Smoking status: Former Smoker     Last attempt to quit: 2002     Years since quittin.0    Smokeless tobacco: Never Used   Substance and Sexual Activity    Alcohol use: Yes     Comment: occasional    Drug use: Yes     Comment: prescribed opioids at present for pain    Sexual activity: No     Partners: Male     Comment: since          Review of Systems   Constitutional: Positive for appetite change. Negative for activity change, chills, diaphoresis, fatigue and fever.   HENT: Negative.    Eyes: Negative.    Respiratory: Positive for cough, shortness of breath and wheezing. Negative for chest tightness.    Cardiovascular: Negative for chest pain,  palpitations and leg swelling.   Gastrointestinal: Negative for abdominal distention, abdominal pain, blood in stool, constipation, diarrhea, nausea and vomiting.   Genitourinary: Negative for dysuria and hematuria.   Musculoskeletal: Negative.    Skin: Negative.    Neurological: Negative for dizziness, seizures, syncope, weakness and light-headedness.   Psychiatric/Behavioral: Negative.      Objective:     Vital Signs (Most Recent):  Temp: 98.2 °F (36.8 °C) (01/06/19 1115)  Pulse: 74 (01/06/19 1300)  Resp: (!) 40 (01/06/19 1300)  BP: (!) 113/58 (01/06/19 1300)  SpO2: 98 % (01/06/19 1300) Vital Signs (24h Range):  Temp:  [98.1 °F (36.7 °C)-99.1 °F (37.3 °C)] 98.2 °F (36.8 °C)  Pulse:  [52-99] 74  Resp:  [15-51] 40  SpO2:  [87 %-100 %] 98 %  BP: ()/() 113/58     Weight: 109.6 kg (241 lb 10 oz)  Body mass index is 40.21 kg/m².      Intake/Output Summary (Last 24 hours) at 1/6/2019 1332  Last data filed at 1/6/2019 1200  Gross per 24 hour   Intake 550 ml   Output 3300 ml   Net -2750 ml       Physical Exam   Constitutional: She appears well-developed and well-nourished. No distress.   obese   HENT:   Head: Normocephalic and atraumatic.   Right Ear: External ear normal.   Left Ear: External ear normal.   Nose: Nose normal.   Eyes: Right eye exhibits no discharge. Left eye exhibits no discharge.   Neck: Normal range of motion.   Cardiovascular:   Bradycardic but otherwise without murmurs or gallops   Pulmonary/Chest:   Normal respiratory effort with no appreciable wheezing or crackles    Abdominal: Soft. Bowel sounds are normal. She exhibits no distension. There is no tenderness. There is no rebound and no guarding.   Musculoskeletal: Normal range of motion. She exhibits no edema.   Neurological:   Appears very comfortable and easily arousable   Skin: Skin is warm and dry. She is not diaphoretic. No erythema.   Psychiatric: She has a normal mood and affect. Her behavior is normal. Judgment and thought content  normal.   Nursing note and vitals reviewed.      Vents:  Oxygen Concentration (%): 35 (01/06/19 0500)    Lines/Drains/Airways     Drain                 Suprapubic Catheter -- days         Suprapubic Catheter 03/05/18 0900 latex 18 Fr. 307 days          Peripheral Intravenous Line                 Peripheral IV - Single Lumen 01/05/19 2035 Right Antecubital less than 1 day         Peripheral IV - Single Lumen 01/05/19 2338 Left Antecubital less than 1 day                Significant Labs:    CBC/Anemia Profile:  Recent Labs   Lab 01/05/19 2042 01/06/19  0430   WBC 9.57 7.51   HGB 11.5* 11.0*   HCT 39.3 38.5    172   MCV 82 83   RDW 16.7* 16.9*        Chemistries:  Recent Labs   Lab 01/05/19 2042 01/05/19  2300 01/06/19  0430     --  142   K 4.4  --  4.5     --  101   CO2 30*  --  34*   BUN 16  --  15   CREATININE 0.7  --  0.7   CALCIUM 9.4  --  9.1   ALBUMIN 3.9  --  3.5   PROT 9.0*  --  8.3   BILITOT 0.6  --  0.5   ALKPHOS 80  --  76   ALT 5*  --  6*   AST 21  --  14   MG  --  2.5 2.4   PHOS  --   --  3.5       ABGs:   Recent Labs   Lab 01/06/19  0131   PH 7.275*   PCO2 74.4*   HCO3 34.6*   POCSATURATED 98   BE 6       Significant Imaging:   CXR: I have reviewed all pertinent results/findings within the past 24 hours and my personal findings are:  no effusion or consolidation   Ct with bilateral mosaic attenuation bilaterally.  Small effusion    Echo [facility] 12/22/18   SB 60 bpm     mild enlarged right heart     TR mild     PAsys ~ 50 mmHg from this study     normal LA size     normal MV     mild LVH with vigorous systolic function      EF ~ 60%     probably normal diastolic function (indeterminate parameters)     inadequate visualization of the aortic valve     no AS or AR noted     Assessment/Plan:     * Acute on chronic respiratory failure with hypoxia and hypercapnia    Most likely due to volume overload.  Agree with escalation of diuresis.  Will consider weaning steroid and antibiotic in am if  continue to improve.       COPD (chronic obstructive pulmonary disease)    Steroid, antibitics and nebs. Feeling better.  May consider de-escalation pending culture result.       ADEEL on CPAP    Per patient, she is on bipap at home.  Will change           Thank you for your consult. I will follow-up with patient. Please contact us if you have any additional questions.     Spenser Chaidez MD  Pulmonology  [facility]

## 2019-01-06 NOTE — ASSESSMENT & PLAN NOTE
The patient has been counseled on the negative impact that obesity imparts on her health and was encouraged to make lifestyle changes in order to lose weight and decrease her modifiable risk factors.

## 2019-01-06 NOTE — ASSESSMENT & PLAN NOTE
Patient's blood pressure is poorly-controlled; will continue home regimen of amlodipine and bumetanide, and provide as-needed clonidine.

## 2019-01-06 NOTE — PLAN OF CARE
Problem: Adult Inpatient Plan of Care  Goal: Plan of Care Review  Outcome: Ongoing (interventions implemented as appropriate)  Pt remains in the ICU with Med-tele orders. AAOX4, VSS. C/O SOB at times, easily relieved with rest and placement of BIPAP. Oxygen weaned to 2LNC. Started on cardiac diet, tolerating well. Suprapubic cath in place with adequate UOP. IV steroids changed to 60 mg Prednisone PO. 80 mg of lasix given today. Possible D/C to home tomorrow. No falls, injuries or new skin breakdown, precautions maintained for all.

## 2019-01-06 NOTE — HPI
Patient is 63 y.o. female  has a past medical history of Addiction to drug, Alcohol abuse, Anxiety, Arthritis, Asthma, Bipolar disorder, Bladder stones, CHF (congestive heart failure), COPD (chronic obstructive pulmonary disease), CVA (cerebral vascular accident), Hepatitis C, History of blood clots, psychiatric care, Hypertension, Belen, ADEEL treated with BiPAP, Paraplegia, Paraplegic spinal paralysis, Psychiatric problem, Psychosis, Renal disorder, SCI (spinal cord injury), Sleep difficulties, Substance abuse, Suprapubic catheter, Therapy, and Vaginal delivery. presented to [facility] on 1/5/19 with worsening sob x 2 days.   Patient endorse wheezing along with productive cough.  abg during admission revealed acute on chronic respiratory failure.  Patient was treated with steroid, antibiotic, and bipap.     Upon my evaluation, patient is feeling better and comfortable on nasal canula.  Of note, patient was recently discharged from [facility] with similar complaints.  Currently, patient denied chest pain.  No orthopnea.  No pnd.  Patient is lifelong smoker at 1/2 ppd

## 2019-01-06 NOTE — PLAN OF CARE
"Problem: Adult Inpatient Plan of Care  Goal: Plan of Care Review  Outcome: Ongoing (interventions implemented as appropriate)  Pt came to ICU from ED on BiPAP 35%. AAOx4. VSS. Pt has suprapubic cath in place, UO NAEEM. Patient says she is "very anxious" Zyprexa given per order. Pt remained free of falls, injuries, or skin break down during this shift.       "

## 2019-01-06 NOTE — ASSESSMENT & PLAN NOTE
Most likely due to volume overload.  Agree with escalation of diuresis.  Will consider weaning steroid and antibiotic in am if continue to improve.

## 2019-01-06 NOTE — ASSESSMENT & PLAN NOTE
As addressed above.  Will provide frequent nebulized GETACHEW/LAMA; intravenous corticosteroids; and empiric antibiotics.

## 2019-01-06 NOTE — SUBJECTIVE & OBJECTIVE
Past Medical History:   Diagnosis Date    Addiction to drug     Alcohol abuse     Anxiety     Arthritis     Asthma     Bipolar disorder     Bladder stones     CHF (congestive heart failure)     COPD (chronic obstructive pulmonary disease)     CVA (cerebral vascular accident)     2012    Hepatitis C     treated and cured    History of blood clots     Hx of psychiatric care     Hypertension     Belen     ADEEL treated with BiPAP     Paraplegia     Paraplegic spinal paralysis     Psychiatric problem     Psychosis     AVH; Paranoia    Renal disorder     SCI (spinal cord injury)     incomplete    Sleep difficulties     has sleep apnea and uses a Bi-PAP machine.    Substance abuse     Suprapubic catheter     Therapy     Vaginal delivery     x1       Past Surgical History:   Procedure Laterality Date    BACK SURGERY      bilateral knee replacement      BREAST BIOPSY      cysto/lithopaxy 2017      CYSTOLITHOLOPAXY (REMOVE BLADDER STONE) N/A 12/20/2017    [facility]formed by [facility]oco [facility] at [facility] OR    CYSTOSCOPY W/ LASER LITHOTRIPSY      CYSTOSCOPY,  OCCLUSION BALLOON PLACEMENT, PERC ACCESS  1/19/2018    [facility] at [facility] OR    CYSTOSCOPY, RETROGRADE, URETEROSCOPY, STENT PLACEMENT Right 3/5/2018    [facility] by [facility]oco [facility] at [facility] OR    CYSTOSCOPY/ RETROGRADE PYELOGRAM/ WITH JJ URETERAL STENT PLACEMENT RIGHT Right 12/20/2017    [facility]formed by [facility]oco [facility] at [facility] OR    EXCHANGE CATHETER-SUPRAPUBIC  1/19/2018    Per[facility] by [facility]oco [facility] at [facility] OR    EXTRACTION-STONE-URETEROSCOPY Right 3/5/2018    [facility] by [facility]oco [facility] at [facility] OR    JOINT REPLACEMENT      bilateral knee    LITHOTRIPSY-LASER Right 3/5/2018    Performed by [facility]oco [facility] at [facility] OR    LITHOTRIPSY-LASER Right 1/29/2018    Performed by RAMA Tinooc MD at [facility] OR    LITHOTRIPSY-LASER Right 1/19/2018    Performed by RAMA Tionco MD at [facility] OR     NEPHROLITHOTOMY-PERCUTANEOUS Right 1/19/2018    [facility]formed [facility] at [facility] OR    NEPHROSTOLITHOTOMY-PERCUTANEOUS Right 1/29/2018    [facility] by [facility] at [facility] OR    NEPHROSTOMY Right 1/29/2018    Performed by [facility]oco [facility] at [facility] OR    PLACEMENT  1/19/2018    Performed by [facility] at [facility] OR    PLACEMENT-RENAL ACCESS Right 1/19/2018    Performed by [facility]oco [facility] at [facility] OR    PLACEMENT-STENT Right 1/29/2018    Performed by [facility]oco [facility] at [facility] OR    Procedure is a Left Genicular Nerve Block ** cpt code 97642** Left 9/16/2015    Performed by Sara Castle [facility] at [facility]    PYELOGRAM-ANTEGRADE Right 1/29/2018    Performed by [facility]oco [facility] at [facility] OR    PYELOGRAM-ANTEGRADE  1/19/2018    Performed by [facility]oco [facility] at [facility] OR    PYELOGRAM-RETROGRADE  1/19/2018    [facility]formed by [facility]oco [facility] at [facility] OR    RADIOFREQUENCY THERMOCOAGULATION** Left genicular RFA ** Left 1/20/2016    Performed by [facility] Castle [facility] at [facility]    REMOVAL-STENT-URETERAL  3/5/2018    [facility]formed by [facility]oco [facility] at [facility] OR    REMOVAL-STENT-URETERAL (Cystoscopy) Right 4/9/2018    Performed by [facility]oco [facility] at [facility] OR    URETEROSCOPY Right 1/29/2018    Performed by [facility]oco [facility] at [facility] OR    URETEROSCOPY Right 1/19/2018    [facility]formed by [facility]oco [facility] at [facility] OR       Review of patient's allergies indicates:   Allergen Reactions    Rocephin [ceftriaxone] Rash       No current facility-administered medications on file prior to encounter.      Current Outpatient Medications on File Prior to Encounter   Medication Sig    amlodipine (NORVASC) 10 MG tablet Take 10 mg by mouth once daily.     baclofen (LIORESAL) 20 MG tablet 10 mg 4 (four) times daily.     bumetanide (BUMEX) 1 MG tablet 1 mg once daily.     FLUoxetine (PROZAC) 10 MG capsule Take 1 capsule (10 mg total) by mouth once daily.    gabapentin (NEURONTIN)  300 MG capsule 600 mg 3 (three) times daily.     HYDROcodone-acetaminophen (NORCO)  mg per tablet Take 1 tablet by mouth every 8 (eight) hours as needed for Pain.    OLANZapine (ZYPREXA) 5 MG tablet Take 1 tablet (5 mg total) by mouth every evening.    oxybutynin (DITROPAN-XL) 5 MG TR24 TAKE 1 TABLET BY MOUTH EVERY DAY    potassium chloride (KLOR-CON) 10 MEQ TbSR     promethazine (PHENERGAN) 25 MG tablet TAKE 1 TABLET BY MOUTH EVERY 8 HOURS AS NEEDED FOR NAUSEA AND VOMITING    trazodone (DESYREL) 100 MG tablet 100 mg every evening.     VENTOLIN HFA 90 mcg/actuation inhaler     catheter 18 Fr Misc Change every 20 days     Family History     Problem Relation (Age of Onset)    Anxiety disorder Brother    Dementia Mother    Depression Brother        Tobacco Use    Smoking status: Former Smoker     Last attempt to quit:      Years since quittin.0    Smokeless tobacco: Never Used   Substance and Sexual Activity    Alcohol use: Yes     Comment: occasional    Drug use: Yes     Comment: prescribed opioids at present for pain    Sexual activity: No     Partners: Male     Comment: since      Review of Systems   Constitutional: Positive for appetite change. Negative for activity change, chills, diaphoresis, fatigue and fever.   HENT: Negative.    Eyes: Negative.    Respiratory: Positive for cough, shortness of breath and wheezing. Negative for chest tightness.    Cardiovascular: Negative for chest pain, palpitations and leg swelling.   Gastrointestinal: Negative for abdominal distention, abdominal pain, blood in stool, constipation, diarrhea, nausea and vomiting.   Genitourinary: Negative for dysuria and hematuria.   Musculoskeletal: Negative.    Skin: Negative.    Neurological: Negative for dizziness, seizures, syncope, weakness and light-headedness.   Psychiatric/Behavioral: Negative.      Objective:     Vital Signs (Most Recent):  Temp: 98.6 °F (37 °C) (19 0300)  Pulse: 72 (19  0300)  Resp: (!) 35 (01/06/19 0300)  BP: 136/83 (01/06/19 0300)  SpO2: (!) 94 % (01/06/19 0300) Vital Signs (24h Range):  Temp:  [98.2 °F (36.8 °C)-99.1 °F (37.3 °C)] 98.6 °F (37 °C)  Pulse:  [55-87] 72  Resp:  [15-37] 35  SpO2:  [87 %-100 %] 94 %  BP: ()/(32-89) 136/83     Weight: 104.3 kg (230 lb)  Body mass index is 38.27 kg/m².    Physical Exam   Constitutional: She appears well-developed and well-nourished. No distress.   obese   HENT:   Head: Normocephalic and atraumatic.   Right Ear: External ear normal.   Left Ear: External ear normal.   Nose: Nose normal.   Eyes: Right eye exhibits no discharge. Left eye exhibits no discharge.   Neck: Normal range of motion.   Cardiovascular:   Bradycardic but otherwise without murmurs or gallops   Pulmonary/Chest:   Normal respiratory effort with no appreciable wheezing or crackles    Abdominal: Soft. Bowel sounds are normal. She exhibits no distension. There is no tenderness. There is no rebound and no guarding.   Musculoskeletal: Normal range of motion. She exhibits no edema.   Neurological:   Appears very comfortable and easily arousable   Skin: Skin is warm and dry. She is not diaphoretic. No erythema.   Psychiatric: She has a normal mood and affect. Her behavior is normal. Judgment and thought content normal.   Nursing note and vitals reviewed.          Significant Labs: All pertinent labs within the past 24 hours have been reviewed.    Significant Imaging: I have reviewed and interpreted all pertinent imaging results/findings within the past 24 hours.

## 2019-01-06 NOTE — PROGRESS NOTES
Results reported to Dr Lockhart.   FIO2 increased to 40% per MD.   Results for GEENA GREGORY (MRN [id number]) as of 1/5/2019 23:33   Ref. Range 1/5/2019 23:23   POC PH Latest Ref Range: 7.35 - 7.45  7.199 (LL)   POC PCO2 Latest Ref Range: 35 - 45 mmHg 84.6 (HH)   POC PO2 Latest Ref Range: 80 - 100 mmHg 74 (L)   POC BE Latest Ref Range: -2 to 2 mmol/L 2   POC HCO3 Latest Ref Range: 24 - 28 mmol/L 33.0 (H)   POC SATURATED O2 Latest Ref Range: 95 - 100 % 90 (L)   POC TCO2 Latest Ref Range: 23 - 27 mmol/L 36 (H)   FiO2 Unknown 35   Sample Unknown ARTERIAL   DelSys Unknown CPAP/BiPAP   Allens Test Unknown Pass   Site Unknown RR   Mode Unknown BiPAP   Rate Unknown 12

## 2019-01-06 NOTE — HPI
"Mrs. Mary Ellen Fajardo is a 63 y.o. female with essential hypertension, COPD, chronic respiratory failure with hypoxia, obesity (BMI 38.4), ADEEL on CPAP, chronic hepatitis C, chronic indwelling suprapubic catheter in place, Bipolar 1 disorder, and history of CVA who presents to [facility] ED with complaints of dyspnea for two days.  She was recently admitted to [place] from Dec 21-24, 2018 for treatment of COPD exacerbation and was feeling well when she was discharged.  In the last two days, however, the dyspnea returned and has been worsening since.  She reports some slight wheezing and a cough that is occasionally productive of "off-white" sputum.  She thought she had some fevers but when she checked with a thermometer it was normal.  She has not had any sick contacts or recent travel since leaving the hospital and denies any chest pain, pleurisy, palpitations, diaphoresis, nausea, vomiting, nor any hemoptysis, she has chronic pains in her legs which occasional swelling but that has not changed.  She does not smoke.  "

## 2019-01-07 VITALS
WEIGHT: 241.63 LBS | DIASTOLIC BLOOD PRESSURE: 73 MMHG | SYSTOLIC BLOOD PRESSURE: 144 MMHG | OXYGEN SATURATION: 95 % | HEIGHT: 65 IN | TEMPERATURE: 97 F | RESPIRATION RATE: 18 BRPM | HEART RATE: 73 BPM | BODY MASS INDEX: 40.26 KG/M2

## 2019-01-07 LAB
ALBUMIN SERPL BCP-MCNC: 3.6 G/DL
ALP SERPL-CCNC: 79 U/L
ALT SERPL W/O P-5'-P-CCNC: 5 U/L
ANION GAP SERPL CALC-SCNC: 8 MMOL/L
AST SERPL-CCNC: 14 U/L
BASOPHILS # BLD AUTO: 0.02 K/UL
BASOPHILS NFR BLD: 0.3 %
BILIRUB SERPL-MCNC: 0.5 MG/DL
BUN SERPL-MCNC: 20 MG/DL
CALCIUM SERPL-MCNC: 9.4 MG/DL
CHLORIDE SERPL-SCNC: 95 MMOL/L
CO2 SERPL-SCNC: 40 MMOL/L
CREAT SERPL-MCNC: 0.8 MG/DL
DIFFERENTIAL METHOD: ABNORMAL
EOSINOPHIL # BLD AUTO: 0 K/UL
EOSINOPHIL NFR BLD: 0.1 %
ERYTHROCYTE [DISTWIDTH] IN BLOOD BY AUTOMATED COUNT: 17.2 %
EST. GFR  (AFRICAN AMERICAN): >60 ML/MIN/1.73 M^2
EST. GFR  (NON AFRICAN AMERICAN): >60 ML/MIN/1.73 M^2
GLUCOSE SERPL-MCNC: 119 MG/DL
HCT VFR BLD AUTO: 39.6 %
HGB BLD-MCNC: 11.6 G/DL
LYMPHOCYTES # BLD AUTO: 1.9 K/UL
LYMPHOCYTES NFR BLD: 27 %
MCH RBC QN AUTO: 23.4 PG
MCHC RBC AUTO-ENTMCNC: 29.3 G/DL
MCV RBC AUTO: 80 FL
MONOCYTES # BLD AUTO: 0.5 K/UL
MONOCYTES NFR BLD: 7.5 %
NEUTROPHILS # BLD AUTO: 4.6 K/UL
NEUTROPHILS NFR BLD: 64.8 %
PLATELET # BLD AUTO: 257 K/UL
PMV BLD AUTO: 9.6 FL
POTASSIUM SERPL-SCNC: 2.8 MMOL/L
PROT SERPL-MCNC: 8.5 G/DL
RBC # BLD AUTO: 4.95 M/UL
SODIUM SERPL-SCNC: 143 MMOL/L
WBC # BLD AUTO: 7.05 K/UL

## 2019-01-07 PROCEDURE — 25000003 PHARM REV CODE 250: Performed by: EMERGENCY MEDICINE

## 2019-01-07 PROCEDURE — 25000242 PHARM REV CODE 250 ALT 637 W/ HCPCS: Performed by: INTERNAL MEDICINE

## 2019-01-07 PROCEDURE — 85025 COMPLETE CBC W/AUTO DIFF WBC: CPT

## 2019-01-07 PROCEDURE — 80053 COMPREHEN METABOLIC PANEL: CPT

## 2019-01-07 PROCEDURE — 94761 N-INVAS EAR/PLS OXIMETRY MLT: CPT

## 2019-01-07 PROCEDURE — 25000003 PHARM REV CODE 250: Performed by: INTERNAL MEDICINE

## 2019-01-07 PROCEDURE — 99232 PR SUBSEQUENT HOSPITAL CARE,LEVL II: ICD-10-PCS | Mod: ,,, | Performed by: EMERGENCY MEDICINE

## 2019-01-07 PROCEDURE — 63600175 PHARM REV CODE 636 W HCPCS: Performed by: INTERNAL MEDICINE

## 2019-01-07 PROCEDURE — 27000221 HC OXYGEN, UP TO 24 HOURS

## 2019-01-07 PROCEDURE — 99900037 HC PT THERAPY SCREENING (STAT)

## 2019-01-07 PROCEDURE — 25000003 PHARM REV CODE 250: Performed by: HOSPITALIST

## 2019-01-07 PROCEDURE — 94640 AIRWAY INHALATION TREATMENT: CPT

## 2019-01-07 PROCEDURE — 36415 COLL VENOUS BLD VENIPUNCTURE: CPT

## 2019-01-07 PROCEDURE — 63600175 PHARM REV CODE 636 W HCPCS: Performed by: HOSPITALIST

## 2019-01-07 PROCEDURE — 94660 CPAP INITIATION&MGMT: CPT

## 2019-01-07 PROCEDURE — 99900035 HC TECH TIME PER 15 MIN (STAT)

## 2019-01-07 PROCEDURE — 99232 SBSQ HOSP IP/OBS MODERATE 35: CPT | Mod: ,,, | Performed by: EMERGENCY MEDICINE

## 2019-01-07 RX ORDER — FUROSEMIDE 10 MG/ML
20 INJECTION INTRAMUSCULAR; INTRAVENOUS 2 TIMES DAILY
Status: DISCONTINUED | OUTPATIENT
Start: 2019-01-07 | End: 2019-01-07 | Stop reason: HOSPADM

## 2019-01-07 RX ORDER — LISINOPRIL 20 MG/1
20 TABLET ORAL DAILY
Status: DISCONTINUED | OUTPATIENT
Start: 2019-01-07 | End: 2019-01-07 | Stop reason: HOSPADM

## 2019-01-07 RX ORDER — MEROPENEM AND SODIUM CHLORIDE 1 G/50ML
1 INJECTION, SOLUTION INTRAVENOUS ONCE
Status: COMPLETED | OUTPATIENT
Start: 2019-01-07 | End: 2019-01-07

## 2019-01-07 RX ORDER — LISINOPRIL 20 MG/1
20 TABLET ORAL DAILY
Qty: 30 TABLET | Refills: 0 | Status: SHIPPED | OUTPATIENT
Start: 2019-01-07 | End: 2019-04-01

## 2019-01-07 RX ORDER — BUMETANIDE 1 MG/1
2 TABLET ORAL DAILY
Qty: 60 TABLET | Refills: 0 | Status: SHIPPED | OUTPATIENT
Start: 2019-01-07 | End: 2019-04-01 | Stop reason: DRUGHIGH

## 2019-01-07 RX ADMIN — FUROSEMIDE 20 MG: 10 INJECTION, SOLUTION INTRAMUSCULAR; INTRAVENOUS at 09:01

## 2019-01-07 RX ADMIN — GABAPENTIN 600 MG: 300 CAPSULE ORAL at 10:01

## 2019-01-07 RX ADMIN — MEROPENEM AND SODIUM CHLORIDE 1 G: 1 INJECTION, SOLUTION INTRAVENOUS at 09:01

## 2019-01-07 RX ADMIN — PREDNISONE 60 MG: 20 TABLET ORAL at 09:01

## 2019-01-07 RX ADMIN — BUMETANIDE 1 MG: 1 TABLET ORAL at 09:01

## 2019-01-07 RX ADMIN — IPRATROPIUM BROMIDE AND ALBUTEROL SULFATE 3 ML: .5; 3 SOLUTION RESPIRATORY (INHALATION) at 11:01

## 2019-01-07 RX ADMIN — LISINOPRIL 20 MG: 20 TABLET ORAL at 09:01

## 2019-01-07 RX ADMIN — IPRATROPIUM BROMIDE AND ALBUTEROL SULFATE 3 ML: .5; 3 SOLUTION RESPIRATORY (INHALATION) at 07:01

## 2019-01-07 RX ADMIN — AMLODIPINE BESYLATE 10 MG: 5 TABLET ORAL at 09:01

## 2019-01-07 NOTE — NURSING TRANSFER
Nursing Transfer Note      1/7/2019     Transfer From: ICU 275A    Transfer via bed    Transfer with 2L NC to O2    Transported by transporter and RN    Medicines sent: none    Chart send with patient: Yes    Notified: none, pt will call as needed     Patient reassessed at: 1/7/19 @ 0355    Upon arrival to floor: cardiac monitor applied, patient oriented to room, call bell in reach and bed in lowest position

## 2019-01-07 NOTE — ASSESSMENT & PLAN NOTE
Patient with baseline obstructive sleep apnea and likely obesity hypoventilation syndrome.  Has underlying heart failure by history..  Review of echocardiogram from [facility] demonstrates likely diastolic dysfunction and RV dysfunction with elevated pulmonary artery pressures (WHO2/3).  States history of COPD with remote tobacco use of about 20 pack years and cessation 2002..  Obstructive lung disease is a bit unclear as no prior PFTs for review..   Overall her respiratory failure appears to be more multifactorial related to a combination of cardiogenic pulmonary edema +/- exacerbation of COPD..  Event seems to be precipitated by ingestion of narcotic analgesics prior to admission..  She is clinically improved.  On baseline supplemental oxygen.  With use of her noninvasive positive-pressure ventilation at night..     -- Continue supplemental O2 to maintain SpO2 greater than 88%.   -- noninvasive positive pressure ventilation q.h.s. and p.r.n.  -- continue with inhaled bronchodilators  -- continue prednisone 40 mg for 5 days  -- azithromycin for total duration of 5 days  -- Lasix to optimize volume status  -- follow-up repeat transthoracic echocardiogram   -- start LAMA or LABA/ICS to optimize obstructive lung disease component..  -- would minimize all narcotic and sedating medication     Will need to establish with Pulmonary Medicine on discharge..  Needs full PFTs on discharge

## 2019-01-07 NOTE — PLAN OF CARE
Problem: Adult Inpatient Plan of Care  Goal: Plan of Care Review  Outcome: Ongoing (interventions implemented as appropriate)  New admission. NSR on telemetry. 2L NC, respirations even and unlabored, diminished throughout. AAOx4. LBM 1/5/19. Paralysis to BLE, full sensation but minimal movement. Suprapubic cath site CDI, draining clear yellow urine. BLE edema +2. IVs x 2 CDI, no s/s of infection noted. VSSAF.

## 2019-01-07 NOTE — PT/OT/SLP PROGRESS
Occupational Therapy  Screen      Patient Name:  Mary Ellen Fajardo   MRN:  [id number]    Orders received, chart reviewed.  Patient is a paraplegic who uses wheelchair for ambulation. Patient prior level of functional is modified independent for ADL's and t/f in her home situation. Patient with good family support, plant to return home to prior activities. No need for skilled OT services.     ABDIEL Markham, MS  1/7/2019

## 2019-01-07 NOTE — NURSING
Pt.for discharge. F/U arrangements per case management. Saline locks & telemetry monitor d/c by Jaylen GARCIA RN. discharge instr. Given ride at bedside. Discharged home.

## 2019-01-07 NOTE — PROGRESS NOTES
TN informed med surg nurse that patient is ready for discharge from [facility]...Cindy Stern RN, BSN, STN [facility]  1/7/2019

## 2019-01-07 NOTE — DISCHARGE SUMMARY
"[facility] Medicine  Discharge Summary      Patient Name: Mary Ellen Fajardo  MRN: [id number]  Admission Date: 1/5/2019  Hospital Length of Stay: 2 days  Discharge Date and Time:  01/07/2019 10:41 AM  Attending Physician: Belle Villeda MD   Discharging Provider: Belle Villeda MD  Primary Care Provider: Any Tran MD      HPI:   Mrs. Mary Ellen Fajardo is a 63 y.o. female with essential hypertension, COPD, chronic respiratory failure with hypoxia, obesity (BMI 38.4), ADEEL on CPAP, chronic hepatitis C, chronic indwelling suprapubic catheter in place, Bipolar 1 disorder, and history of CVA who presents to [facility] ED with complaints of dyspnea for two days.  She was recently admitted to [facility] from Dec 21-24, 2018 for treatment of COPD exacerbation and was feeling well when she was discharged.  In the last two days, however, the dyspnea returned and has been worsening since.  She reports some slight wheezing and a cough that is occasionally productive of "off-white" sputum.  She thought she had some fevers but when she checked with a thermometer it was normal.  She has not had any sick contacts or recent travel since leaving the hospital and denies any chest pain, pleurisy, palpitations, diaphoresis, nausea, vomiting, nor any hemoptysis, she has chronic pains in her legs which occasional swelling but that has not changed.  She does not smoke.    * No surgery found *      Hospital Course:   Mrs. Mary Ellen Fajardo is a 63 y.o. female with,CHF, essential hypertension, COPD, chronic respiratory failure with hypoxia, obesity (BMI 38.4), ADEEL on CPAP, chronic hepatitis C, chronic indwelling suprapubic catheter in place, Bipolar 1 disorder, and history of CVA who presents to [facility] ED with complaints of dyspnea for two days.  She was recently admitted to [facility] from Dec 21-24, 2018 for treatment of COPD exacerbation and " "was feeling well when she was discharged.  In the last two days, however, the dyspnea returned and has been worsening since.  She reports some slight wheezing and a cough that is occasionally productive of "off-white" sputum.  She thought she had some fevers but when she checked with a thermometer it was normal.  She has not had any sick contacts or recent travel since leaving the hospital and denies any chest pain, pleurisy, palpitations, diaphoresis, nausea, vomiting, nor any hemoptysis, she has chronic pains in her legs which occasional swelling but that has not changed..  She was using her home BiPAP but feels her mask was not adequately fit.  She became very short of breath which prompted her to activate EMS.  She was in respiratory distress in the ER and was felt to need ICU level care.she was started on IV lasix,nebuzlier, The following morning she was much more comfortable, but she did appear volume overloaded.  She never required intubation.  She was started on IV Lasix (in addition to home diuretics).  She  was stable for the floor.her respiratory status is much improved and she was back to her baseline on 2 liter NC O 2,she has already home oxygen,nebulzier,CPAP,her home Bumex has been increased and patient has been discharged home with HH and  follow up with PCP in next few days.         Consults:   Consults (From admission, onward)        Status Ordering Provider     Consult to Pulmonology  Once     Provider:  Jeff Taveras MD    Completed ANNABELLA COOPER     Inpatient consult to Social Work  Once     Provider:  (Not yet assigned)    Acknowledged LUCIANO REDMAN consult to case management  Once     Provider:  (Not yet assigned)    Acknowledged SENG CALZADA          No new Assessment & Plan notes have been filed under this hospital service since the last note was generated.  Service: Hospital Medicine    Final Active Diagnoses:    Diagnosis Date Noted POA    PRINCIPAL " PROBLEM:  Acute on chronic respiratory failure with hypoxia and hypercapnia [J96.21, J96.22] 01/05/2019 Yes    Opioid overdose [T40.2X1A] 01/06/2019 Yes    COPD (chronic obstructive pulmonary disease) [J44.9] 01/06/2019 Yes     Chronic    Chronic respiratory failure with hypoxia [J96.11] 01/06/2019 Yes     Chronic    Obesity, Class II, BMI 35-39.9 [E66.9] 01/06/2019 Yes     Chronic    Chronic hepatitis C [B18.2] 01/06/2019 Yes     Chronic    Chronic indwelling suprapubic catheter in place [Z93.59] 01/06/2019 Not Applicable     Chronic    Bipolar 1 disorder [F31.9] 01/06/2019 Yes     Chronic    History of CVA (cerebrovascular accident) [Z86.73] 01/06/2019 Not Applicable     Chronic    COPD with acute exacerbation [J44.1] 01/06/2019 Yes    Essential hypertension [I10] 01/19/2018 Yes     Chronic    ADEEL on CPAP [G47.33, Z99.89] 01/19/2018 Not Applicable     Chronic      Problems Resolved During this Admission:       Discharged Condition: stable    Disposition: Home or Self Care    Follow Up:  Follow-up Information     Any Tran MD In 1 week.    Specialty:  Internal Medicine  Contact information:  [address] [facility]  [address]  [facility]  [place] [address] [phone]                 Patient Instructions:      Activity as tolerated       Significant Diagnostic Studies: Labs:   BMP:   Recent Labs   Lab 01/05/19 2042 01/05/19  2300 01/06/19  0430   GLU 97  --  131*     --  142   K 4.4  --  4.5     --  101   CO2 30*  --  34*   BUN 16  --  15   CREATININE 0.7  --  0.7   CALCIUM 9.4  --  9.1   MG  --  2.5 2.4   , CBC   Recent Labs   Lab 01/05/19 2042 01/06/19  0430 01/07/19  0908   WBC 9.57 7.51 7.05   HGB 11.5* 11.0* 11.6*   HCT 39.3 38.5 39.6    172 257    and Troponin   Recent Labs   Lab 01/05/19 2042   TROPONINI 0.029*     Radiology: X-Ray: CXR: X-Ray Chest 1 View (CXR): No results found for this visit on 01/05/19. and X-Ray Chest PA and Lateral (CXR): No results found for  this visit on 01/05/19.  Cardiac Graphics: Echocardiogram: 2D echo with color flow doppler: No results found for this or any previous visit. and Transthoracic echo (TTE) complete (Cupid Only): No results found for this or any previous visit.    Pending Diagnostic Studies:     Procedure Component Value Units Date/Time    Comprehensive metabolic panel [[id number]] Collected:  01/07/19 0908    Order Status:  Sent Lab Status:  In process Updated:  01/07/19 1013    Specimen:  Blood     Transthoracic echo (TTE) complete (Cupid Only) [[id number]]     Order Status:  Sent Lab Status:  No result          Medications:  Reconciled Home Medications:      Medication List      START taking these medications    lisinopril 20 MG tablet  Commonly known as:  PRINIVIL,ZESTRIL  Take 1 tablet (20 mg total) by mouth once daily.        CHANGE how you take these medications    bumetanide 1 MG tablet  Commonly known as:  BUMEX  Take 2 tablets (2 mg total) by mouth once daily.  What changed:    · how much to take  · how to take this        CONTINUE taking these medications    amLODIPine 10 MG tablet  Commonly known as:  NORVASC  Take 10 mg by mouth once daily.     baclofen 20 MG tablet  Commonly known as:  LIORESAL  10 mg 4 (four) times daily.     catheter 18 Fr Misc  Change every 20 days     FLUoxetine 10 MG capsule  Take 1 capsule (10 mg total) by mouth once daily.     gabapentin 300 MG capsule  Commonly known as:  NEURONTIN  600 mg 3 (three) times daily.     HYDROcodone-acetaminophen  mg per tablet  Commonly known as:  NORCO  Take 1 tablet by mouth every 8 (eight) hours as needed for Pain.     OLANZapine 5 MG tablet  Commonly known as:  ZyPREXA  Take 1 tablet (5 mg total) by mouth every evening.     oxybutynin 5 MG Tr24  Commonly known as:  DITROPAN-XL  TAKE 1 TABLET BY MOUTH EVERY DAY     potassium chloride 10 MEQ Tbsr  Commonly known as:  KLOR-CON     promethazine 25 MG tablet  Commonly known as:  PHENERGAN  TAKE 1 TABLET BY MOUTH  EVERY 8 HOURS AS NEEDED FOR NAUSEA AND VOMITING     traZODone 100 MG tablet  Commonly known as:  DESYREL  100 mg every evening.     VENTOLIN HFA 90 mcg/actuation inhaler  Generic drug:  albuterol            Indwelling Lines/Drains at time of discharge:   Lines/Drains/Airways     Drain                 Suprapubic Catheter -- days         Suprapubic Catheter 03/05/18 0900 latex 18 Fr. 308 days                Time spent on the discharge of patient: over 30  minutes  Patient was seen and examined on the date of discharge and determined to be suitable for discharge.         Belle Villeda MD  Department of Hospital Medicine  [facility]

## 2019-01-07 NOTE — PT/OT/SLP PROGRESS
Physical Therapy Screen    14:39-14:47  Patient Name:  Mary Ellen Fajardo   MRN:  [id number]    Patient Interviewed at bedside prior to D/C home.  Pt confirms that she is W/C bound and requires assist from family for sliding board t/f's PTA.  Pt states that she has all necessary DME at home already, and has no skilled PT needs.  PT to sign off    Yuli Galo, PT

## 2019-01-07 NOTE — PLAN OF CARE
01/07/19 1423   Final Note   Assessment Type Final Discharge Note   Anticipated Discharge Disposition Home-Health   What phone number can be called within the next 1-3 days to see how you are doing after discharge? (see chart)   Hospital Follow Up  Appt(s) scheduled? Yes   Discharge plans and expectations educations in teach back method with documentation complete? Yes   Right Care Referral Info   Referral Type HOME CARE   Facility Name [email or web address]   [address] [place] [address]

## 2019-01-07 NOTE — SUBJECTIVE & OBJECTIVE
Interval History:  Patient seen and examined this a.m..  No acute events in the last 24 hr noted. States she feels improved.  Near baseline.  She is overall concern about her frequent readmission to the hospital stating she was just admitted to [facility] about 3 weeks prior.  No established pulmonary.  Has home noninvasive positive pressure ventilation and supplemental O2 arranged.     Denies chest pain. Shortness of breath is at baseline.  Feels a little bloated at this point..     Additional details regarding patient's current admission reviewed..  She has been followed by Dr. Chaidez since admission..  New to me      Review of Systems:     Constitutional: Negative for fever, chills, diaphoresis, activity change, appetite change. Positive mild fatigue  HENT: Negative for congestion, drooling, facial swelling, hearing loss, rhinorrhea, sinus pressure, tinnitus, trouble swallowing and voice change.    Eyes: Negative for photophobia, pain and visual disturbance.   Respiratory: Negative for cough, chest tightness and shortness of breath.    Cardiovascular: Negative for chest pain, palpitations and leg swelling.   Gastrointestinal: Negative for nausea, emesis, abdominal pain. Positive mild abdominal distention.   Genitourinary: Negative for dysuria, frequency and hematuria.   Musculoskeletal: Negative for myalgias, back pain, arthralgias, neck pain and neck stiffness.   Skin: Negative for color change, pallor, rash and wound.     Additional 12 point review of systems is negative except as stated    Objective:     Vital Signs (Most Recent):  Temp: 97.7 °F (36.5 °C) (01/07/19 0744)  Pulse: 65 (01/07/19 0744)  Resp: 16 (01/07/19 0744)  BP: (!) 158/72 (01/07/19 0744)  SpO2: 96 % (01/07/19 0744) Vital Signs (24h Range):  Temp:  [97.7 °F (36.5 °C)-98.9 °F (37.2 °C)] 97.7 °F (36.5 °C)  Pulse:  [63-99] 65  Resp:  [16-66] 16  SpO2:  [92 %-100 %] 96 %  BP: (107-159)/(53-81) 158/72     Weight: 109.6 kg (241 lb 10 oz)  Body mass  index is 40.21 kg/m².      Intake/Output Summary (Last 24 hours) at 1/7/2019 0941  Last data filed at 1/7/2019 0459  Gross per 24 hour   Intake 450 ml   Output 5960 ml   Net -5510 ml       Physical Exam   Constitutional: She is oriented to person, place, and time. She appears well-developed. No distress.   Obese, short neck.    HENT:   Head: Normocephalic and atraumatic.   Right Ear: External ear normal.   Left Ear: External ear normal.   Nose: Nose normal.   Mouth/Throat: Oropharynx is clear and moist. No oropharyngeal exudate.   Eyes: Conjunctivae and EOM are normal. Pupils are equal, round, and reactive to light. Right eye exhibits no discharge. Left eye exhibits no discharge. No scleral icterus.   Neck: Normal range of motion. Neck supple. No tracheal deviation present. No thyromegaly present.   JVP is difficult to appreciate secondary to body habitus   Cardiovascular: Normal rate, regular rhythm and normal heart sounds. Exam reveals no gallop and no friction rub.   No murmur heard.  Pulmonary/Chest: Effort normal. No stridor. No respiratory distress. She has wheezes (Few scattered end expiratory wheezing). She has no rales. She exhibits no tenderness.   Abdominal: Soft. Bowel sounds are normal. She exhibits distension ( mild distension, tympanic to percussion. ). She exhibits no mass. There is no tenderness. There is no rebound and no guarding. No hernia.   Musculoskeletal: Normal range of motion. She exhibits edema (trace edema bilateral lower extremities). She exhibits no tenderness or deformity.   Lymphadenopathy:     She has no cervical adenopathy.   Neurological: She is alert and oriented to person, place, and time. No cranial nerve deficit or sensory deficit. She exhibits normal muscle tone.   Skin: Skin is warm and dry. Capillary refill takes less than 2 seconds. No rash noted. She is not diaphoretic. No erythema. No pallor.       Vents:  Oxygen Concentration (%): 35 (01/07/19  0013)    Lines/Drains/Airways     Drain                 Suprapubic Catheter -- days         Suprapubic Catheter 03/05/18 0900 latex 18 Fr. 308 days          Peripheral Intravenous Line                 Peripheral IV - Single Lumen 01/05/19 2035 Right Antecubital 1 day         Peripheral IV - Single Lumen 01/05/19 2338 Left Antecubital 1 day                Significant Labs:    CBC/Anemia Profile:  Recent Labs   Lab 01/05/19 2042 01/06/19  0430   WBC 9.57 7.51   HGB 11.5* 11.0*   HCT 39.3 38.5    172   MCV 82 83   RDW 16.7* 16.9*        Chemistries:  Recent Labs   Lab 01/05/19 2042 01/05/19  2300 01/06/19  0430     --  142   K 4.4  --  4.5     --  101   CO2 30*  --  34*   BUN 16  --  15   CREATININE 0.7  --  0.7   CALCIUM 9.4  --  9.1   ALBUMIN 3.9  --  3.5   PROT 9.0*  --  8.3   BILITOT 0.6  --  0.5   ALKPHOS 80  --  76   ALT 5*  --  6*   AST 21  --  14   MG  --  2.5 2.4   PHOS  --   --  3.5     Microbiology:      Urine culture-   Klebsiella pneumoniae     CULTURE, URINE     Amox/K Clav'ate <=8/4  Sensitive     Amp/Sulbactam <=8/4  Sensitive     Cefazolin <=8  Sensitive     Cefepime <=8  Sensitive     Ceftriaxone <=8  Sensitive     Ciprofloxacin >2  Resistant     Ertapenem <=2  Sensitive     Gentamicin <=4  Sensitive     Meropenem <=4  Sensitive     Nitrofurantoin >64  Resistant     Piperacillin/Tazo <=16  Sensitive     Tetracycline <=4  Sensitive     Tobramycin <=4  Sensitive     Trimeth/Sulfa >2/38  Resistant            Significant Imaging:  I have reviewed and interpreted all pertinent imaging results/findings within the past 24 hours.     CT Chest- 1. No evidence of PE.  2. Cardiomegaly with suspected mild pulmonary edema and small left-sided effusion.  3. Bibasilar atelectatic changes.  4. Enlarged pretracheal mediastinal lymph node of uncertain etiology or significance.    TTE- pending     ECHO ([facility]):     CONCLUSIONS     SB 60 bpm     mild enlarged right heart     TR mild      PAsys ~ 50 mmHg from this study     normal LA size     normal MV     mild LVH with vigorous systolic function      EF ~ 60%     probably normal diastolic function (indeterminate parameters)     inadequate visualization of the aortic valve     no AS or AR noted     PFT- None

## 2019-01-07 NOTE — PROGRESS NOTES
WRITTEN DISCHARGE INFORMATION:   ..  Follow-up Information     [facility] On 1/8/2019.    Specialties:  DME Provider, Home Health Services  Why:  Home Health  Contact information:  [address] [place] [address]  [phone]             Corrina Salomon On 1/14/2019.    Why:  out patient services:  11:00AM  Contact information:  [facility]  [address]  [address]  [facility]  [phone]             Things that YOU are responsible for to Manage Your Care At Home:  1. Getting your prescriptions filled.  2. Taking you medications as directed. DO NOT MISS ANY DOSES!  3. Going to your follow-up doctor appointments. This is important because it allows the doctor to monitor your progress and to determine if any changes need to be made to your treatment plan.         Help at Home  After discharge for assistance [facility] On Call Nurse Care Line 24/7 assistance  [phone]   Thank you for choosing [facility] for your care.  manage your healthcare needs.  Sincerely, Your [facility] Healthcare Manager is,  Cindy Stern RN [facility] [phone]

## 2019-01-07 NOTE — ASSESSMENT & PLAN NOTE
Steroid, antibitics and nebs. Feeling better.  Again no prior PFTs on file..  Certainly has evidence of small airway disease on examination and at the time of admission..  Would initiate LAMA or LABA/ICS to optimize..  Plan for outpatient pulmonary follow-up with PFTs on discharge

## 2019-01-07 NOTE — PLAN OF CARE
[facility]    HOME HEALTH ORDERS  FACE TO FACE ENCOUNTER    Patient Name: Mary Ellen Fajardo  YOB: 1955    PCP: Any Tran MD   PCP Address: [address] [facility] [address] [place] [address]  PCP Phone Number: [phone]  PCP Fax: [phone]    Encounter Date: 01/07/2019    Admit to Home Health    Diagnoses:  Active Hospital Problems    Diagnosis  POA    *Acute on chronic respiratory failure with hypoxia and hypercapnia [J96.21, J96.22]  Yes    Opioid overdose [T40.2X1A]  Yes    COPD (chronic obstructive pulmonary disease) [J44.9]  Yes     Chronic    Chronic respiratory failure with hypoxia [J96.11]  Yes     Chronic    Obesity, Class II, BMI 35-39.9 [E66.9]  Yes     Chronic    Chronic hepatitis C [B18.2]  Yes     Chronic    Chronic indwelling suprapubic catheter in place [Z93.59]  Not Applicable     Chronic    Bipolar 1 disorder [F31.9]  Yes     Chronic    History of CVA (cerebrovascular accident) [Z86.73]  Not Applicable     Chronic    COPD with acute exacerbation [J44.1]  Yes    Essential hypertension [I10]  Yes     Chronic    ADEEL on CPAP [G47.33, Z99.89]  Not Applicable     Chronic      Resolved Hospital Problems   No resolved problems to display.       Future Appointments   Date Time Provider Department Center   1/11/2019 10:30 AM Cassandra Rockweiler, LCSW [facility]   1/16/2019  3:45 PM RAMA Tinoco MD [facility]   2/28/2019 10:30 AM Marilee Vargas NP [facility]     Follow-up Information     Any Tran MD In 1 week.    Specialty:  Internal Medicine  Contact information:  [address]  [facility]  [place] [id number] [phone]                     I have seen and examined this patient face to face today. My clinical findings that support the need for the home health skilled services and home bound status are the following:  Weakness/numbness causing balance and  gait disturbance due to Heart Failure, COPD Exacerbation and Weakness/Debility making it taxing to leave home.    Allergies:  Review of patient's allergies indicates:   Allergen Reactions    Rocephin [ceftriaxone] Rash       Diet: 2 gram sodium diet    Activities: activity as tolerated    Nursing:   SN to complete comprehensive assessment including routine vital signs. Instruct on disease process and s/s of complications to report to MD. Review/verify medication list sent home with the patient at time of discharge  and instruct patient/caregiver as needed. Frequency may be adjusted depending on start of care date.    Notify MD if SBP > 160 or < 90; DBP > 90 or < 50; HR > 120 or < 50; Temp > 101; Other:         CONSULTS:    Physical Therapy to evaluate and treat. Evaluate for home safety and equipment needs; Establish/upgrade home exercise program. Perform / instruct on therapeutic exercises, gait training, transfer training, and Range of Motion.  Occupational Therapy to evaluate and treat. Evaluate home environment for safety and equipment needs. Perform/Instruct on transfers, ADL training, ROM, and therapeutic exercises.    MISCELLANEOUS CARE:  Routine Skin for Bedridden Patients: Instruct patient/caregiver to apply moisture barrier cream to all skin folds and wet areas in perineal area daily and after baths and all bowel movements.    WOUND CARE ORDERS  n/a      Medications: Review discharge medications with patient and family and provide education.      Current Discharge Medication List      START taking these medications    Details   lisinopril (PRINIVIL,ZESTRIL) 20 MG tablet Take 1 tablet (20 mg total) by mouth once daily.  Qty: 30 tablet, Refills: 0         CONTINUE these medications which have CHANGED    Details   bumetanide (BUMEX) 1 MG tablet Take 2 tablets (2 mg total) by mouth once daily.  Qty: 60 tablet, Refills: 0         CONTINUE these medications which have NOT CHANGED    Details   amlodipine  (NORVASC) 10 MG tablet Take 10 mg by mouth once daily.   Refills: 1      baclofen (LIORESAL) 20 MG tablet 10 mg 4 (four) times daily.   Refills: 5      FLUoxetine (PROZAC) 10 MG capsule Take 1 capsule (10 mg total) by mouth once daily.  Qty: 90 capsule, Refills: 0    Associated Diagnoses: Bipolar I disorder, current or most recent episode depressed, with psychotic features with anxious distress      gabapentin (NEURONTIN) 300 MG capsule 600 mg 3 (three) times daily.       HYDROcodone-acetaminophen (NORCO)  mg per tablet Take 1 tablet by mouth every 8 (eight) hours as needed for Pain.      OLANZapine (ZYPREXA) 5 MG tablet Take 1 tablet (5 mg total) by mouth every evening.  Qty: 90 tablet, Refills: 0    Associated Diagnoses: Bipolar I disorder, current or most recent episode depressed, with psychotic features with anxious distress; Insomnia related to another mental disorder      oxybutynin (DITROPAN-XL) 5 MG TR24 TAKE 1 TABLET BY MOUTH EVERY DAY  Qty: 90 tablet, Refills: 0    Associated Diagnoses: Neurogenic bladder      potassium chloride (KLOR-CON) 10 MEQ TbSR       promethazine (PHENERGAN) 25 MG tablet TAKE 1 TABLET BY MOUTH EVERY 8 HOURS AS NEEDED FOR NAUSEA AND VOMITING  Refills: 0      trazodone (DESYREL) 100 MG tablet 100 mg every evening.   Refills: 5      VENTOLIN HFA 90 mcg/actuation inhaler       catheter 18 Fr Misc Change every 20 days  Qty: 10 each, Refills: 1    Associated Diagnoses: Neurogenic bladder             I certify that this patient is confined to her home and needs intermittent skilled nursing care, physical therapy and occupational therapy.

## 2019-01-07 NOTE — NURSING TRANSFER
Nursing Transfer Note      1/7/2019     Transfer To: 410      Transfer via stretcher    Transfer with cardiac monitoring    Transported by transport & nurse    Medicines sent: n/a    Chart send with patient: Yes    Notified: patient    Patient reassessed at: 1/7/19 0300     Upon arrival to floor: cardiac monitor applied, patient oriented to room, call bell in reach and bed in lowest position

## 2019-01-07 NOTE — PROGRESS NOTES
OUT PATIENT CM REFERRAL FORM COMPLETED in epic..Cindy Stern RN, BSN, [facility] CCM  1/7/2019    .

## 2019-01-07 NOTE — PROGRESS NOTES
[facility]  Pulmonology  Progress Note    Patient Name: Mary Ellen Fajardo  MRN: [id number]  Admission Date: 1/5/2019  Hospital Length of Stay: 2 days  Code Status: Full Code  Attending Provider: Belle Villeda MD  Primary Care Provider: Any Tran MD   Principal Problem: Acute on chronic respiratory failure with hypoxia and hypercapnia    Subjective:     Interval History:  Patient seen and examined this a.m..  No acute events in the last 24 hr noted. States she feels improved.  Near baseline.  She is overall concern about her frequent readmission to the hospital stating she was just admitted to [facility] about 3 weeks prior.  No established pulmonary.  Has home noninvasive positive pressure ventilation and supplemental O2 arranged.     Denies chest pain. Shortness of breath is at baseline.  Feels a little bloated at this point..     Additional details regarding patient's current admission reviewed..  She has been followed by Dr. Chaidez since admission..  New to me      Review of Systems:     Constitutional: Negative for fever, chills, diaphoresis, activity change, appetite change. Positive mild fatigue  HENT: Negative for congestion, drooling, facial swelling, hearing loss, rhinorrhea, sinus pressure, tinnitus, trouble swallowing and voice change.    Eyes: Negative for photophobia, pain and visual disturbance.   Respiratory: Negative for cough, chest tightness and shortness of breath.    Cardiovascular: Negative for chest pain, palpitations and leg swelling.   Gastrointestinal: Negative for nausea, emesis, abdominal pain. Positive mild abdominal distention.   Genitourinary: Negative for dysuria, frequency and hematuria.   Musculoskeletal: Negative for myalgias, back pain, arthralgias, neck pain and neck stiffness.   Skin: Negative for color change, pallor, rash and wound.     Additional 12 point review of systems is negative except as stated    Objective:     Vital Signs (Most  Recent):  Temp: 97.7 °F (36.5 °C) (01/07/19 0744)  Pulse: 65 (01/07/19 0744)  Resp: 16 (01/07/19 0744)  BP: (!) 158/72 (01/07/19 0744)  SpO2: 96 % (01/07/19 0744) Vital Signs (24h Range):  Temp:  [97.7 °F (36.5 °C)-98.9 °F (37.2 °C)] 97.7 °F (36.5 °C)  Pulse:  [63-99] 65  Resp:  [16-66] 16  SpO2:  [92 %-100 %] 96 %  BP: (107-159)/(53-81) 158/72     Weight: 109.6 kg (241 lb 10 oz)  Body mass index is 40.21 kg/m².      Intake/Output Summary (Last 24 hours) at 1/7/2019 0941  Last data filed at 1/7/2019 0459  Gross per 24 hour   Intake 450 ml   Output 5960 ml   Net -5510 ml       Physical Exam   Constitutional: She is oriented to person, place, and time. She appears well-developed. No distress.   Obese, short neck.    HENT:   Head: Normocephalic and atraumatic.   Right Ear: External ear normal.   Left Ear: External ear normal.   Nose: Nose normal.   Mouth/Throat: Oropharynx is clear and moist. No oropharyngeal exudate.   Eyes: Conjunctivae and EOM are normal. Pupils are equal, round, and reactive to light. Right eye exhibits no discharge. Left eye exhibits no discharge. No scleral icterus.   Neck: Normal range of motion. Neck supple. No tracheal deviation present. No thyromegaly present.   JVP is difficult to appreciate secondary to body habitus   Cardiovascular: Normal rate, regular rhythm and normal heart sounds. Exam reveals no gallop and no friction rub.   No murmur heard.  Pulmonary/Chest: Effort normal. No stridor. No respiratory distress. She has wheezes (Few scattered end expiratory wheezing). She has no rales. She exhibits no tenderness.   Abdominal: Soft. Bowel sounds are normal. She exhibits distension ( mild distension, tympanic to percussion. ). She exhibits no mass. There is no tenderness. There is no rebound and no guarding. No hernia.   Musculoskeletal: Normal range of motion. She exhibits edema (trace edema bilateral lower extremities). She exhibits no tenderness or deformity.   Lymphadenopathy:     She  has no cervical adenopathy.   Neurological: She is alert and oriented to person, place, and time. No cranial nerve deficit or sensory deficit. She exhibits normal muscle tone.   Skin: Skin is warm and dry. Capillary refill takes less than 2 seconds. No rash noted. She is not diaphoretic. No erythema. No pallor.       Vents:  Oxygen Concentration (%): 35 (01/07/19 0013)    Lines/Drains/Airways     Drain                 Suprapubic Catheter -- days         Suprapubic Catheter 03/05/18 0900 latex 18 Fr. 308 days          Peripheral Intravenous Line                 Peripheral IV - Single Lumen 01/05/19 2035 Right Antecubital 1 day         Peripheral IV - Single Lumen 01/05/19 2338 Left Antecubital 1 day                Significant Labs:    CBC/Anemia Profile:  Recent Labs   Lab 01/05/19 2042 01/06/19  0430   WBC 9.57 7.51   HGB 11.5* 11.0*   HCT 39.3 38.5    172   MCV 82 83   RDW 16.7* 16.9*        Chemistries:  Recent Labs   Lab 01/05/19 2042 01/05/19  2300 01/06/19  0430     --  142   K 4.4  --  4.5     --  101   CO2 30*  --  34*   BUN 16  --  15   CREATININE 0.7  --  0.7   CALCIUM 9.4  --  9.1   ALBUMIN 3.9  --  3.5   PROT 9.0*  --  8.3   BILITOT 0.6  --  0.5   ALKPHOS 80  --  76   ALT 5*  --  6*   AST 21  --  14   MG  --  2.5 2.4   PHOS  --   --  3.5     Microbiology:      Urine culture-   Klebsiella pneumoniae     CULTURE, URINE     Amox/K Clav'ate <=8/4  Sensitive     Amp/Sulbactam <=8/4  Sensitive     Cefazolin <=8  Sensitive     Cefepime <=8  Sensitive     Ceftriaxone <=8  Sensitive     Ciprofloxacin >2  Resistant     Ertapenem <=2  Sensitive     Gentamicin <=4  Sensitive     Meropenem <=4  Sensitive     Nitrofurantoin >64  Resistant     Piperacillin/Tazo <=16  Sensitive     Tetracycline <=4  Sensitive     Tobramycin <=4  Sensitive     Trimeth/Sulfa >2/38  Resistant            Significant Imaging:  I have reviewed and interpreted all pertinent imaging results/findings within the past 24 hours.      CT Chest- 1. No evidence of PE.  2. Cardiomegaly with suspected mild pulmonary edema and small left-sided effusion.  3. Bibasilar atelectatic changes.  4. Enlarged pretracheal mediastinal lymph node of uncertain etiology or significance.    TTE- pending     ECHO ([place]):     CONCLUSIONS     SB 60 bpm     mild enlarged right heart     TR mild     PAsys ~ 50 mmHg from this study     normal LA size     normal MV     mild LVH with vigorous systolic function      EF ~ 60%     probably normal diastolic function (indeterminate parameters)     inadequate visualization of the aortic valve     no AS or AR noted     PFT- None     Assessment/Plan:     * Acute on chronic respiratory failure with hypoxia and hypercapnia    Patient with baseline obstructive sleep apnea and likely obesity hypoventilation syndrome.  Has underlying heart failure by history..  Review of echocardiogram from [facility] demonstrates likely diastolic dysfunction and RV dysfunction with elevated pulmonary artery pressures (WHO2/3).  States history of COPD with remote tobacco use of about 20 pack years and cessation 2002..  Obstructive lung disease is a bit unclear as no prior PFTs for review..   Overall her respiratory failure appears to be more multifactorial related to a combination of cardiogenic pulmonary edema +/- exacerbation of COPD..  Event seems to be precipitated by ingestion of narcotic analgesics prior to admission..  She is clinically improved.  On baseline supplemental oxygen.  With use of her noninvasive positive-pressure ventilation at night..     -- Continue supplemental O2 to maintain SpO2 greater than 88%.   -- noninvasive positive pressure ventilation q.h.s. and p.r.n.  -- continue with inhaled bronchodilators  -- continue prednisone 40 mg for 5 days  -- azithromycin for total duration of 5 days  -- Lasix to optimize volume status  -- follow-up repeat transthoracic echocardiogram   -- start LAMA or  LABA/ICS to optimize obstructive lung disease component..  -- would minimize all narcotic and sedating medication     Will need to establish with Pulmonary Medicine on discharge..  Needs full PFTs on discharge     Obesity, Class II, BMI 35-39.9    Weight loss.      COPD (chronic obstructive pulmonary disease)    Steroid, antibitics and nebs. Feeling better.  Again no prior PFTs on file..  Certainly has evidence of small airway disease on examination and at the time of admission..  Would initiate LAMA or LABA/ICS to optimize..  Plan for outpatient pulmonary follow-up with PFTs on discharge     ADEEL on CPAP    Continue BiPAP.  Weight loss.  Suspect likely hyperventilation component..          DVT PPX- Lovenox      Will sign off.  Please call if any concerns or change in patient's clinical status.  Consult appreciated       Jeff Taveras MD  Pulmonary/Critical Care Medicine   [facility]

## 2019-01-07 NOTE — HOSPITAL COURSE
"Mrs. Mary Ellen Fajardo is a 63 y.o. female with,CHF, essential hypertension, COPD, chronic respiratory failure with hypoxia, obesity (BMI 38.4), ADEEL on CPAP, chronic hepatitis C, chronic indwelling suprapubic catheter in place, Bipolar 1 disorder, and history of CVA who presents to [facility] ED with complaints of dyspnea for two days.  She was recently admitted to [facility] from Dec 21-24, 2018 for treatment of COPD exacerbation and was feeling well when she was discharged.  In the last two days, however, the dyspnea returned and has been worsening since.  She reports some slight wheezing and a cough that is occasionally productive of "off-white" sputum.  She thought she had some fevers but when she checked with a thermometer it was normal.  She has not had any sick contacts or recent travel since leaving the hospital and denies any chest pain, pleurisy, palpitations, diaphoresis, nausea, vomiting, nor any hemoptysis, she has chronic pains in her legs which occasional swelling but that has not changed..  She was using her home BiPAP but feels her mask was not adequately fit.  She became very short of breath which prompted her to activate EMS.  She was in respiratory distress in the ER and was felt to need ICU level care.she was started on IV lasix,nebuzlier, The following morning she was much more comfortable, but she did appear volume overloaded.  She never required intubation.  She was started on IV Lasix (in addition to home diuretics).  She was stable for the floor.her respiratory status is much improved and she was back to her baseline on 2 liter NC O 2,she has already home oxygen,nebulzier,CPAP,her home Bumex has been increased and patient has been discharged home with HH and  follow up with PCP in next few days.      "

## 2019-01-08 LAB — BACTERIA UR CULT: NORMAL

## 2019-01-09 ENCOUNTER — PATIENT OUTREACH (OUTPATIENT)
Dept: ADMINISTRATIVE | Facility: CLINIC | Age: 64
End: 2019-01-09

## 2019-01-09 NOTE — PATIENT INSTRUCTIONS
Left- or Right- Side Congestive Heart Failure (CHF)    The heart is a large muscle. It is a pump that circulates blood throughout the body. Blood carries oxygen to all of the organs, including the brain, muscles, and skin. After your body takes the oxygen out of the blood, the blood returns to the heart. The right side of the heart collects the blood from the body and pumps it to the lungs. In the lungs, it gets fresh oxygen and gives up carbon dioxide. The oxygen-rich blood from the lungs then returns to the left side of the heart, where it is pumped back out to the rest of your body, starting the process all over.  Congestive heart failure (CHF) occurs when the heart muscle is weakened. This affects the pumping action of the heart. Heart failure can affect the right side of the heart or the left side. But heart failure may affect not only the right side of the heart or only the left side. Although it may have started on one side, it can and often eventually does affect both sides.  Right-side heart failure  When the right side of the heart is weakened, it cant handle the blood it is getting from the rest of the body. This blood returns to the heart through veins. When too much pressure builds up in the veins, fluid leaks out into the tissues. Gravity then causes that fluid to move to those parts of the body that are the lowest. So one of the first symptoms of right-side CHF can include swelling in the feet and ankles. If the condition gets worse, the swelling can even go up past the knees. Sometimes it gets so severe, the liver can get congested as well.  Left-side heart failure  When the left side of the heart is weakened, it cant handle the blood it gets from the lungs. Pressure then builds up in the veins of the lungs, causing fluid to leak into the lung tissues. This may cause CHF and pulmonary edema. This causes you to feel short of breath, weak, or dizzy. These symptoms are often worse with exertion,  such as when climbing stairs or walking up hills. Lying with your head flat is uncomfortable and can make your breathing worse. This may make sleeping difficult. You may need to use extra pillows to elevate your upper body to sleep well. The same is true when just resting during the daytime.  There are many causes of heart failure including:  · Coronary artery disease  · Past heart attack (also known as acute myocardial infarction, or AMI)  · High blood pressure  · Damaged heart valve  · Diabetes  · Obesity  · Cigarette smoking  · Alcohol abuse  Heart failure is a chronic condition. There is no cure. The purpose of medical treatment is to improve the pumping action of the heart. The main way to do this is to remove excess water from the body. A number of medicines can help reach this goal, improve symptoms, and prevent the heart from becoming weaker. Sometimes, heart failure can become so severe that a device is placed in the heart to help with pumping. Another major goal is to better treat the causes of heart failure, such as diabetes and high blood pressure, by making changes in your lifestyle and maximizing medical control when needed.  Home care  Follow these guidelines when caring for yourself at home:  · Check your weight every day. This is very important because a sudden increase in weight gain could mean worsening heart failure. Keep these things in mind:  ¨ Use the same scale every day.  ¨ Weigh yourself at the same time every day.  ¨ Make sure the scale is on a hard floor surface, not on a rug or carpet.  ¨ Keep a record of your weight every day so your healthcare provider can see it. If you are not given a log sheet for this, keep a separate journal for this purpose.   · Cut back on the amount of salt (sodium) you eat. Follow your healthcare provider's recommendation on how much salt or sodium you should have each day.  ¨ Avoid high-salt foods. These include olives, pickles, smoked meats, salted potato  chips, and most prepared foods.  ¨ Don't add salt to your food at the table. Use only small amounts of salt when cooking.  ¨ Read the labels carefully on food packages to learn how much salt or sodium is in each serving in the package. Remember, a can or package of food may contain more than 1 serving. So if you eat all the food in the package, you may be getting more salt than you think.  · Follow your healthcare provider's recommendations about how much fluid you should have. Be aware that some foods, such as soup, pudding, and juicy fruits like oranges or melons, contain liquid. You'll need to count the liquid in those foods as part of your daily fluid intake. Your provider can help you with this.  · Stop smoking.  · Cut back on how much alcohol you drink.  · Lose weight if you are overweight. The excess weight adds a lot of stress on the workload of the heart.  · Stay active. Talk with your provider about an exercise program that is safe for your heart.  · Keep your feet elevated to reduce swelling. Ask your provider about support hose as a preventive treatment for daytime leg swelling.  Besides taking your medicine as instructed, an important part of treatment is lifestyle changes. These include diet, physical activity, stopping smoking, and weight control.  Improve your diet by including more fresh foods, cutting back on how much sugar and saturated fat you eat, and eating fewer processed foods and less salt.  Follow-up care  Follow up with your healthcare provider, or as advised.  Make sure to keep any appointments that were made for you. These can help better control your congestive heart failure. You will need to follow up with your provider on a routine basis to make sure your heart failure is well managed.  If an X-ray, ECG, or other tests were done, you will be told of any new findings that may affect your care.  Call 911  Call 911 if you:  · Become severely short of breath  · Feel lightheaded, or feel  like you might pass out or faint  · Have chest pain or discomfort that is different than usual, the medicines your doctor told you to use for this don't help, or the pain lasts longer than 10 to 15 minutes  · You suddenly develop a rapid heart rate  When to seek medical advice  The following may be signs that your heart failure is getting worse. Call your healthcare provider right away if any of these happen:  · Sudden weight gain. This means 3 or more pounds in one day, or 5 or more pounds in 1 week.  · Trouble breathing not related to being active  · New or increased swelling of your legs or ankles  · Swelling or pain in your abdomen  · Breathing trouble at night. This means waking up short of breath or needing more pillows to breathe.  · Frequent coughing that doesnt go away  · Feeling much more tired than usual  Date Last Reviewed: 1/4/2016  © 1750-1103 [employer]. [address] [place] [address]. All rights reserved. This information is not intended as a substitute for professional medical care. Always follow your healthcare professional's instructions.        Taking a Diuretic  Your healthcare provider has prescribed a diuretic, or water pill, to help your body get rid of excess water and salt and maintain appropriate fluid balance. Taking your diuretic can help you feel better, breathe better, move more easily, and have more energy.     Take your medication early in the day at the same time each day.      The name of my diuretic is:     ________________________________________   Medicine tips  · Read the fact sheet that comes with your medicine. It tells you when and how to take it. Ask for a medicine sheet if you dont get one.  · If you take 2 or more doses each day, take the last one before dinner if you can. That way youll get up fewer times during the night to go to the bathroom. However, make sure you have enough time between doses during the day.   · If you miss a dose,  take it as soon as you remember. If it is almost time for the next dose, skip the missed dose. Do not take a double dose.  · If you miss 2 or more consecutive doses, call your healthcare provider. You may be at risk for fluid buildup.  For your safety  · Follow your doctors guidelines for potassium intake. You may need to take a potassium supplement. Or, you may need to avoid potassium supplements, salt substitutes with potassium, or large amounts of high-potassium foods (such as bananas, potatoes, broccoli, and milk). Recommendations for potassium will depend on the type of diuretic you are prescribed along with your kidney function and other factors. Your healthcare provider will likely want to check your potassium level regularly while you are taking this medicine.  · Talk to your healthcare provider about whether it is safe for you to drink alcohol while taking this medicine.  · Get up slowly when you are sitting or lying down. This helps prevent dizziness and falls due to dizziness.  · Ask your healthcare provider or pharmacist before you take any other prescription or nonprescription medicine or herbal supplements. Some of them may interact with your diuretic and keep it from working correctly.  · Limit exposure to sunlight. A diuretic may increase your sensitivity to the sun. Even brief sun exposure may cause skin rash, itching, redness, or other discoloration. It may also lead to severe sunburn. To protect your skin do the following:  ¨ Avoid direct sunlight, especially between 10 a.m. and 2 p.m., whenever possible.  ¨ Apply a daily sunblock of at least SPF 15 (or higher) to any exposed skin, including your lips.  ¨ Wear protective clothing, such as a hat and sunglasses, when you are outdoors.  ¨ Avoid sunlamps and tanning booths.  ¨ Long-sleeve shirts and pants in the summer can help protect your skin.        When to seek medical advice  Call your healthcare provider right away if any of these  occur:  · Have diarrhea, constipation, nausea or vomiting.  · Lose your appetite or notice a rapid or excessive weight gain.  · Feel extremely tired or weak.  · Have shortness of breath or difficulty breathing or swallowing.  · Have numbness or tingling in your hands, feet, or lips or a ringing in your ears.  · Feel lightheaded when getting up after sitting or lying down.  · Have headaches, blurred vision, or feel a sense of confusion.  · Have muscle cramps or joint pain.  · Have chest pains or changes in your heartbeat.  · Have an excessive thirst or a dry mouth.  · Notice a skin rash.  · Gain more than 2 pounds in 1 day or 4 pounds in 1 week (ask your healthcare provider for his or her specific direction).  · Have any other unusual symptoms.   Date Last Reviewed: 6/1/2016  © 7349-1431 [employer]. [address] [place] [address], [place] [address]. All rights reserved. This information is not intended as a substitute for professional medical care. Always follow your healthcare professional's instructions.

## 2019-01-11 ENCOUNTER — OFFICE VISIT (OUTPATIENT)
Dept: PSYCHIATRY | Facility: CLINIC | Age: 64
End: 2019-01-11
Payer: MEDICARE

## 2019-01-11 DIAGNOSIS — F31.5 BIPOLAR I DISORDER, CURRENT OR MOST RECENT EPISODE DEPRESSED, WITH PSYCHOTIC FEATURES WITH ANXIOUS DISTRESS: Primary | ICD-10-CM

## 2019-01-11 PROCEDURE — 90834 PR PSYCHOTHERAPY W/PATIENT, 45 MIN: ICD-10-PCS | Mod: S$GLB,,, | Performed by: SOCIAL WORKER

## 2019-01-11 PROCEDURE — 90834 PSYTX W PT 45 MINUTES: CPT | Mod: S$GLB,,, | Performed by: SOCIAL WORKER

## 2019-01-11 NOTE — PROGRESS NOTES
Individual Psychotherapy (PhD/LCSW)    1/11/2019    Site:  [facility]     Therapeutic Intervention: Met with patient.  Outpatient - Insight oriented psychotherapy 45 min - CPT code 95263 and Outpatient - Supportive psychotherapy 45 min - CPT Code 04248    Chief complaint/reason for encounter: depression and anxiety     Interval history and content of current session: Patient returned to clinic for follow up psychotherapy. Patient reports that she has had a rough couple of months. States that she has been hospitalized twice for medical problems since last visit. First time was diagnosed with pneumonia and second time COPD had flared up and was on ICU. States that while she was there she did a lot of fluid taken off. Reports that  has been in the hospital since she went in the second time. Reports that he is going to have to get a stint put in. States that he is not in good health and is getting more frail as time goes by. States when she came home she noticed that her wedding rings were missing. Knows that he took them to the pawn shop to get money from drugs. States that this stresses her out because she can't keep anything nice. Has to take medication with her everywhere because he will take them if she leaves them. Reports that he is main caregiver so she is unable to leave him. Discussed that she uses prayer as coping skill. Discussed meditation as coping skill. Practice progressive muscle relaxation. Homework is to practice a couple times before next session.     Treatment plan:  · Target symptoms: depression, anxiety   · Why chosen therapy is appropriate versus another modality: relevant to diagnosis, evidence based practice  · Outcome monitoring methods: self-report, observation  · Therapeutic intervention type: insight oriented psychotherapy, supportive psychotherapy    Risk parameters:  Patient reports no suicidal ideation  Patient reports no homicidal ideation  Patient reports  no self-injurious behavior  Patient reports no violent behavior    Verbal deficits: None    Patient's response to intervention:  The patient's response to intervention is accepting.    Progress toward goals and other mental status changes:  The patient's progress toward goals is fair .    Diagnosis:     ICD-10-CM ICD-9-CM   1. Bipolar I disorder, current or most recent episode depressed, with psychotic features with anxious distress F31.5 296.54       Plan:  individual psychotherapy and medication management by physician    Return to clinic: 2 weeks, 1 month    Length of Service (minutes): 45     Cassandra Rockweiler, LCSW

## 2019-01-14 NOTE — PHYSICIAN QUERY
"PT Name: Mary Ellen Fajardo  MR #: [id number]    Physician Query Form - Heart  Condition Clarification     CDS/: Rosario Vaz               Contact information:[email or web address]    This form is a permanent document in the medical record.     Query Date: January 14, 2019    By submitting this query, we are merely seeking further clarification of documentation. Please utilize your independent clinical judgment when addressing the question(s) below.    The medical record contains the following   Indicators     Supporting Clinical Findings Location in Medical Record   x BNP BNP = 137   Labs 1/5   x EF EF ~ 60%    Pulm CN    x Radiology findings Cardiomegaly with suspected mild pulmonary interstitial edema.   CXR 1/5   x Echo Results Echo [facility] 12/22/18   SB 60 bpm   mild enlarged right heart   TR mild   PAsys ~ 50 mmHg from this study   normal LA size   normal MV   mild LVH with vigorous systolic function   EF ~ 60%   probably normal diastolic function (indeterminate parameters)   inadequate visualization of the aortic valve   no AS or AR noted    Pulm CN     "Ascites" documented     x "SOB" or "VEGA" documented worsening sob x 2 days   Pulm CN    x "Hypoxia" documented Acute on chronic respiratory failure with hypoxia and hypercapnia    H&P   x Heart Failure documented CHF (congestive heart failure)   H&P (PMH)   x "Edema" documented BLE edema +2   POC note 1/7 0526   x Diuretics/Meds Bumetanide PO  Furosemide IV MAR    Treatment:     x Other:  Most likely due to volume overload.  Agree with escalation of diuresis      The following morning she was much more comfortable, but she did appear volume overloaded   Pulm CN           MD Care update 1/6   Heart failure (HF) can be acute, chronic or both. It is generally further specificed as systolic, diastolic, or combined. Lastly, it is important to identify an underlying etiology if known or suspected.     Common clues to acute exacerbation:  Rapidly " progressive symptoms (w/in 2 weeks of presentation), using IV diuretics to treat, using supplemental O2, pulmonary edema on Xray, MI w/in 4 weeks, and/or BNP >500    Systolic Heart Failure: is defined as chart documentation of a left ventricular ejection fraction (LVEF) less than 40%     Diastolic Heart Failure: is defined as a left ventricular ejection fraction (LVEF) greater than 40%   +      Evidence of diastolic dysfunction on echocardiography OR    Right heart catheterization wedge pressure above 12 mm Hg OR    Left heart catheterization left ventricular end diastolic pressure 18 mm Hg or above.    References: *American Heart Association    The clinical guidelines noted below are only system guidelines, and do not replace the providers clinical judgment.     Provider, please specify the diagnosis associated with above clinical findings    [  x ] Acute on Chronic Diastolic Heart Failure -    Pre-existing diastoic HF diagnosis.  EF > 40%  and acute HF symptoms documented                                 [   ] Chronic Diastolic Heart Failure - Pre-existing diastolic HF diagnosis.  EF > 40%  without  acute HF symptoms documented  [   ] Other Type of Heart Failure (please specify type): _________________________  [   ] Other (please specify): ___________________________________  [   ] Clinically Undetermined                          Please document in your progress notes daily for the duration of treatment until resolved and include in your discharge summary.

## 2019-01-16 ENCOUNTER — OFFICE VISIT (OUTPATIENT)
Dept: UROLOGY | Facility: CLINIC | Age: 64
End: 2019-01-16
Payer: MEDICARE

## 2019-01-16 ENCOUNTER — OUTPATIENT CASE MANAGEMENT (OUTPATIENT)
Dept: ADMINISTRATIVE | Facility: OTHER | Age: 64
End: 2019-01-16

## 2019-01-16 VITALS — HEIGHT: 65 IN | WEIGHT: 241 LBS | BODY MASS INDEX: 40.15 KG/M2

## 2019-01-16 DIAGNOSIS — R33.9 INCOMPLETE EMPTYING OF BLADDER: ICD-10-CM

## 2019-01-16 DIAGNOSIS — R35.1 NOCTURIA MORE THAN TWICE PER NIGHT: ICD-10-CM

## 2019-01-16 DIAGNOSIS — N20.0 KIDNEY STONE: Primary | ICD-10-CM

## 2019-01-16 DIAGNOSIS — N31.9 NEUROGENIC BLADDER: ICD-10-CM

## 2019-01-16 PROCEDURE — 3008F PR BODY MASS INDEX (BMI) DOCUMENTED: ICD-10-PCS | Mod: S$GLB,,, | Performed by: UROLOGY

## 2019-01-16 PROCEDURE — 99999 PR PBB SHADOW E&M-EST. PATIENT-LVL III: ICD-10-PCS | Mod: PBBFAC,,, | Performed by: UROLOGY

## 2019-01-16 PROCEDURE — 3008F BODY MASS INDEX DOCD: CPT | Mod: S$GLB,,, | Performed by: UROLOGY

## 2019-01-16 PROCEDURE — 99213 PR OFFICE/OUTPT VISIT, EST, LEVL III, 20-29 MIN: ICD-10-PCS | Mod: S$GLB,,, | Performed by: UROLOGY

## 2019-01-16 PROCEDURE — 99999 PR PBB SHADOW E&M-EST. PATIENT-LVL III: CPT | Mod: PBBFAC,,, | Performed by: UROLOGY

## 2019-01-16 PROCEDURE — 99213 OFFICE O/P EST LOW 20 MIN: CPT | Mod: S$GLB,,, | Performed by: UROLOGY

## 2019-01-16 RX ORDER — OXYCODONE AND ACETAMINOPHEN 10; 325 MG/1; MG/1
1 TABLET ORAL EVERY 4 HOURS PRN
Status: ON HOLD | COMMUNITY
End: 2019-07-26 | Stop reason: HOSPADM

## 2019-01-16 NOTE — PROGRESS NOTES
Subjective:       Patient ID: Mary Ellen Fajardo is a 63 y.o. female who was last seen in this office 11/12/2018    Chief Complaint:   Chief Complaint   Patient presents with    Nephrolithiasis     follow up with ct scan        History of Present Illness  Neurogenic Bladder  Her bladder is managed with a suprapubic catheter. She has had one since March 2011. Her  changes her 18 Fr catheter every 20 days. She has been having problems with sedimentation and urgency incontinence. She has also been having pain with catheter removal and insertion.  She has noted a dark color and more sediment at times.  She had a routine ALBERTINA suggesting a right kidney stone.  Follow up CT showed a right staghorn calculus and large bladder stone.    Stones  She had a cystolitholapaxy on 12/20/2017 with right stent placement.  She had a Right PCNL on Friday 1/19/2018.  She then had a second look PCNL on Monday 1/29/2018.  She then had a right ureteroscopy and laser lithotripsy on 3/5/2018.  Her stent was removed without difficulty on 4/9/2018.  She denies hematuria or flank pain.    She is back with a CT scan from November 2018.  She has not  complaints.  She has had some COPD issues and is now on Oxygen.  ACTIVE MEDICAL ISSUES:  Patient Active Problem List   Diagnosis    Paraparesis    Gait disorder    Neurogenic bladder    Knee pain, bilateral    S/P knee replacement    OA (osteoarthritis) of knee    Primary osteoarthritis of left knee    Poor motor control of trunk    Decreased range of motion of lower extremity    Bilateral leg weakness    Chronic knee pain    Bladder stone    Staghorn calculus    Kidney stones    Essential hypertension    Neuropathy    Primary insomnia    ADEEL on CPAP    Acute on chronic respiratory failure with hypoxia and hypercapnia    Opioid overdose    COPD (chronic obstructive pulmonary disease)    Chronic respiratory failure with hypoxia    Obesity, Class II, BMI 35-39.9     Chronic hepatitis C    Chronic indwelling suprapubic catheter in place    Bipolar 1 disorder    History of CVA (cerebrovascular accident)    COPD with acute exacerbation       ALLERGIES AND MEDICATIONS: updated and reviewed.  Review of patient's allergies indicates:   Allergen Reactions    Rocephin [ceftriaxone] Rash     Current Outpatient Medications   Medication Sig    amlodipine (NORVASC) 10 MG tablet Take 10 mg by mouth once daily.     baclofen (LIORESAL) 20 MG tablet 10 mg 4 (four) times daily.     bumetanide (BUMEX) 1 MG tablet Take 2 tablets (2 mg total) by mouth once daily.    catheter 18 Fr Misc Change every 20 days    FLUoxetine (PROZAC) 10 MG capsule Take 1 capsule (10 mg total) by mouth once daily.    gabapentin (NEURONTIN) 300 MG capsule 600 mg 3 (three) times daily.     lisinopril (PRINIVIL,ZESTRIL) 20 MG tablet Take 1 tablet (20 mg total) by mouth once daily.    OLANZapine (ZYPREXA) 5 MG tablet Take 1 tablet (5 mg total) by mouth every evening.    oxybutynin (DITROPAN-XL) 5 MG TR24 TAKE 1 TABLET BY MOUTH EVERY DAY    oxyCODONE-acetaminophen (PERCOCET)  mg per tablet Take 1 tablet by mouth every 4 (four) hours as needed for Pain.    potassium chloride (KLOR-CON) 10 MEQ TbSR     promethazine (PHENERGAN) 25 MG tablet TAKE 1 TABLET BY MOUTH EVERY 8 HOURS AS NEEDED FOR NAUSEA AND VOMITING    trazodone (DESYREL) 100 MG tablet 100 mg every evening.     VENTOLIN HFA 90 mcg/actuation inhaler      No current facility-administered medications for this visit.        Review of Systems   Constitutional: Negative for activity change, fatigue, fever and unexpected weight change.   Eyes: Negative for redness and visual disturbance.   Respiratory: Negative for chest tightness and shortness of breath.    Cardiovascular: Negative for chest pain and leg swelling.   Gastrointestinal: Negative for abdominal distention, abdominal pain, constipation, diarrhea, nausea and vomiting.   Genitourinary:  "Negative for difficulty urinating, dysuria, flank pain, frequency, hematuria, pelvic pain, urgency and vaginal bleeding.   Musculoskeletal: Negative for arthralgias and joint swelling.   Neurological: Negative for dizziness, weakness and headaches.   Psychiatric/Behavioral: Negative for confusion. The patient is not nervous/anxious.    All other systems reviewed and are negative.      Objective:      Vitals:    01/16/19 1500   Weight: 109.3 kg (241 lb)   Height: 5' 5" (1.651 m)     Physical Exam   Nursing note and vitals reviewed.  Constitutional: She is oriented to person, place, and time. She appears well-developed.   HENT:   Head: Normocephalic.   Eyes: Conjunctivae are normal.   Neck: Normal range of motion. No tracheal deviation present. No thyromegaly present.   Cardiovascular: Normal rate, normal heart sounds and normal pulses.    Pulmonary/Chest: Effort normal and breath sounds normal. No respiratory distress. She has no wheezes.   Abdominal: Soft. She exhibits no distension and no mass. There is no hepatosplenomegaly. There is no tenderness. There is no rebound, no guarding and no CVA tenderness. No hernia.   Musculoskeletal: Normal range of motion. She exhibits no edema or tenderness.   Lymphadenopathy:     She has no cervical adenopathy.   Neurological: She is alert and oriented to person, place, and time.   Skin: Skin is warm and dry. No rash noted. No erythema.     Psychiatric: She has a normal mood and affect. Her behavior is normal. Judgment and thought content normal.         CT Renal Stone Study ABD Pelvis WO   Order: [id number]   Status:  Final result   Visible to patient:  Yes (Patient Portal)   Next appt:  02/12/2019 at 10:00 AM in Psychiatry (Cassandra R Rockweiler, LCS)   Dx:  Kidney stones   Details     Reading Physician Reading Date Result Priority   Gordo Ramírez Jr., MD 11/26/2018       Narrative     EXAMINATION:  CT RENAL STONE STUDY ABD PELVIS WO    CLINICAL HISTORY:  Kidney stone, " known, follow up; Calculus of kidney    TECHNIQUE:  Low dose axial images, sagittal and coronal reformations were obtained from the lung bases to the pubic symphysis.  Contrast was not administered.    COMPARISON:  No bony September 2017.    FINDINGS:  The small amount of pleural fluid or thickening at the left similar.  No significant pericardial fluid.  Lungs demonstrate some atelectasis though no significant mass lesion.    In the abdomen liver is normal in size.  No focal mass on this noncontrast study.  Gallbladder is unremarkable.  No pancreatic mass or spleen is normal in size.  9 mm left adrenal nodule similar.    Kidneys demonstrate multiple stones in the right kidney largest in the upper pole measures about 9 mm.  This does represent a decreased zone burden compared to prior.  No dilatation of the collecting system.  No ureteral calculi right.  On the left no intrarenal stones.  Collecting system is normal in caliber.  Ureters not dilated.  Suprapubic catheter in a nondistended bladder.    Aorta tapers normally.  Mild amount of calcified plaque noted.  No significant para-aortic or pelvic adenopathy.  IUD remains in the uterus.  Calcified fibroid noted.  No convincing adnexal masses.    Visualized loops of bowel are not dilated.  Abundant feces in the colon.  Small hiatal hernia noted.    Bone windows demonstrate degenerative change.  No convincing lytic or blastic lesions.      Impression       Multi intrarenal  nonobstructing stones the largest in the upper pole measuring 9 mm.  Overall this represents a decrease in the stone burden to the CT of November 2017.    Retained IUD and calcified fibroid.    Suprapubic bladder catheter.    Stable 9 mm left adrenal nodule.      Electronically signed by: Gordo Ramírez MD  Date: 11/26/2018  Time: 10:34            Last Resulted: 11/26/18 10:34             Assessment:       1. Kidney stone    2. Neurogenic bladder    3. Incomplete emptying of bladder    4. Nocturia  more than twice per night          Plan:       1. Kidney stone  We discussed ureteroscopy, but she wants to wait.  She is worried about having anesthesia.    - US Retroperitoneal Complete (Kidney and; Future    2. Neurogenic bladder  SP tube    3. Incomplete emptying of bladder  As above    4. Nocturia more than twice per night  Limit evening fluids            Follow-up in about 6 months (around 7/16/2019) for Follow up, Review X-ray.

## 2019-01-16 NOTE — PROGRESS NOTES
Please note the following patients information has been forwarded to [facility] for Case Management and/or .    Please see the media section in patient's chart for additional details.    Please contact Outpatient Complex care Management at ext [phone] with any questions.    Thank you,    Leslie Ramos, [facility]  Outpatient Care Mgmt.  [phone]

## 2019-01-22 ENCOUNTER — TELEPHONE (OUTPATIENT)
Dept: PSYCHIATRY | Facility: HOSPITAL | Age: 64
End: 2019-01-22

## 2019-01-22 NOTE — TELEPHONE ENCOUNTER
Call made to patient to discuss missing Olanzapine. Voicemail picked up identifying phone number.  Practitioner's name and phone number left on voicemail advising that she return call.     ----- Message from Hali Arevalo sent at 1/22/2019 10:12 AM CST -----  Contact: Patient  Patient stated that her olanzapine is missing. She also said that she had multiple family members over at her house and she believes that it may have been thrown out. Patient said that it was two bottles of medication. She mentioned that she did not know if anything can be done about it.     [phone]

## 2019-01-23 ENCOUNTER — TELEPHONE (OUTPATIENT)
Dept: PSYCHIATRY | Facility: HOSPITAL | Age: 64
End: 2019-01-23

## 2019-01-23 NOTE — TELEPHONE ENCOUNTER
Call made to patient. [employer] picks up identifying phone number only. Left message that this provider is returning her call from [facility] and to please call back. Phone number left on [employer].     ----- Message from Deanna Lira MA sent at 1/23/2019 10:22 AM CST -----  Contact: Patient  Patient left voice mail that she would like to speak with you.

## 2019-02-03 DIAGNOSIS — N31.9 NEUROGENIC BLADDER: ICD-10-CM

## 2019-02-04 RX ORDER — OXYBUTYNIN CHLORIDE 5 MG/1
TABLET, EXTENDED RELEASE ORAL
Qty: 90 TABLET | Refills: 0 | OUTPATIENT
Start: 2019-02-04

## 2019-02-28 ENCOUNTER — OFFICE VISIT (OUTPATIENT)
Dept: PSYCHIATRY | Facility: CLINIC | Age: 64
End: 2019-02-28
Payer: MEDICARE

## 2019-02-28 VITALS
WEIGHT: 220 LBS | HEART RATE: 72 BPM | BODY MASS INDEX: 36.65 KG/M2 | HEIGHT: 65 IN | SYSTOLIC BLOOD PRESSURE: 100 MMHG | DIASTOLIC BLOOD PRESSURE: 60 MMHG

## 2019-02-28 DIAGNOSIS — F31.5 BIPOLAR I DISORDER, CURRENT OR MOST RECENT EPISODE DEPRESSED, WITH PSYCHOTIC FEATURES WITH ANXIOUS DISTRESS: Primary | ICD-10-CM

## 2019-02-28 DIAGNOSIS — F51.05 INSOMNIA RELATED TO ANOTHER MENTAL DISORDER: ICD-10-CM

## 2019-02-28 PROCEDURE — 3008F BODY MASS INDEX DOCD: CPT | Mod: S$GLB,,, | Performed by: NURSE PRACTITIONER

## 2019-02-28 PROCEDURE — 90833 PSYTX W PT W E/M 30 MIN: CPT | Mod: S$GLB,,, | Performed by: NURSE PRACTITIONER

## 2019-02-28 PROCEDURE — 3078F DIAST BP <80 MM HG: CPT | Mod: S$GLB,,, | Performed by: NURSE PRACTITIONER

## 2019-02-28 PROCEDURE — 99999 PR PBB SHADOW E&M-EST. PATIENT-LVL III: ICD-10-PCS | Mod: PBBFAC,,, | Performed by: NURSE PRACTITIONER

## 2019-02-28 PROCEDURE — 3074F SYST BP LT 130 MM HG: CPT | Mod: S$GLB,,, | Performed by: NURSE PRACTITIONER

## 2019-02-28 PROCEDURE — 99999 PR PBB SHADOW E&M-EST. PATIENT-LVL III: CPT | Mod: PBBFAC,,, | Performed by: NURSE PRACTITIONER

## 2019-02-28 PROCEDURE — 99213 OFFICE O/P EST LOW 20 MIN: CPT | Mod: S$GLB,,, | Performed by: NURSE PRACTITIONER

## 2019-02-28 PROCEDURE — 3074F PR MOST RECENT SYSTOLIC BLOOD PRESSURE < 130 MM HG: ICD-10-PCS | Mod: S$GLB,,, | Performed by: NURSE PRACTITIONER

## 2019-02-28 PROCEDURE — 3078F PR MOST RECENT DIASTOLIC BLOOD PRESSURE < 80 MM HG: ICD-10-PCS | Mod: S$GLB,,, | Performed by: NURSE PRACTITIONER

## 2019-02-28 PROCEDURE — 90833 PR PSYCHOTHERAPY W/PATIENT W/E&M, 30 MIN (ADD ON): ICD-10-PCS | Mod: S$GLB,,, | Performed by: NURSE PRACTITIONER

## 2019-02-28 PROCEDURE — 3008F PR BODY MASS INDEX (BMI) DOCUMENTED: ICD-10-PCS | Mod: S$GLB,,, | Performed by: NURSE PRACTITIONER

## 2019-02-28 PROCEDURE — 99213 PR OFFICE/OUTPT VISIT, EST, LEVL III, 20-29 MIN: ICD-10-PCS | Mod: S$GLB,,, | Performed by: NURSE PRACTITIONER

## 2019-02-28 RX ORDER — OLANZAPINE 5 MG/1
5 TABLET ORAL NIGHTLY
Qty: 90 TABLET | Refills: 1 | Status: SHIPPED | OUTPATIENT
Start: 2019-02-28 | End: 2019-10-17 | Stop reason: SDUPTHER

## 2019-02-28 RX ORDER — FLUOXETINE 10 MG/1
10 CAPSULE ORAL DAILY
Qty: 90 CAPSULE | Refills: 1 | Status: ON HOLD | OUTPATIENT
Start: 2019-02-28 | End: 2019-09-24 | Stop reason: HOSPADM

## 2019-02-28 NOTE — PROGRESS NOTES
"How are you see the sleep bed symptoms better Outpatient Psychiatry Follow-Up Visit (MD/NP)    2/28/2019    Clinical Status of Patient:  Outpatient (Ambulatory)    Chief Complaint:  Mary Ellen Fajardo is a 63 y.o. female who presents today for follow-up of mood disorder, anxiety, psychosis and insomnia.  Met with patient.      Interval History and Content of Current Session:  Interim Events/Subjective Report/Content of Current Session: Mary Ellen  was last seen by me on 11/29/2018 for a follow up visit. Mary Ellen was diagnosed with Bipolar disorder, depressed, with psychotic features with anxious distress and insomnia. She was doing well and a plan of care was devised to include:    1. Safety: Call 911 or Crisis Line or go to ER for suicidal ideation, adverse effects of medication or any other emergency  2. Olanzapine 5 mg po qhs.  3. Prozac 10 mg po qd.  4. Return to clinic in 3 months or sooner prn.   5. Start Psychotherapy. Appointment made today for December 14, 2019 at 0800 with Cassie Rockweiler, LCSW.  6. Continue Trazodone 100 mg po qhs prn sleep (refill not needed getting refills from pain management).     Today Mary Ellen is seen face to face.  Since her last visit she was hospitalized for pneumonia and has had problems with her COPD. Her sleep is good.  Her appetite is good and her energy level is good. Her mood is good. She states she has been "swinging" some and relates this to her grandson and his wife and children moving into the home. Her 3 year old great granddaughter is creating stress for her because her parents don't discipline her.She also has not seen her  since February 4th of this year. He left the house to go to [place] and did not return. A week later he texted a picture of himself and he was in the hospital. [facility] yesterday and told her that he is being d/c'd from ICU and will be admitted to the floor.  She states that he did have a heart attack and was on the " "ventilator and this is why he didn't call. However, she relates his heart attack and disappearance to illicit drug use. She sates he is now gong to be living in a nursing facility ([facility] . Her memory is good now, "its sharp.". Her psychotic features are gone.  Her anxious distress is gone for the most part and when she does feel anxious, it passes. She feels that she has more control over her emotions and can respond appropriately. She is also not crying easily anymore.    What is scheduled as a 30 minute visit actually takes 45 minutes with greater than 50% of time spent in psychotherapy.     Psychotherapy:  · Target symptoms: anxiety , mood disorder, psychosis, insomnia  · Why chosen therapy is appropriate versus another modality: relevant to diagnosis, patient responds to this modality, evidence based practice  · Outcome monitoring methods: self-report, observation  · Therapeutic intervention type: supportive psychotherapy  · Topics discussed/themes: relationships difficulties, family moving into home, 's hospitalization, drug use and going to a nursing facility  · The patient's response to the intervention is accepting. The patient's progress toward treatment goals is good.   · Duration of intervention: 23 minutes.    Review of Systems   PSYCHIATRIC: Pertinant items are noted in the narrative.  GENERAL:  Morbidly obese  SKIN:  No rashes or lacerations  HEAD:  No headaches  EYES:  No exophthalmos, jaundice or blindness, had right cataract removed  EARS:  No dizziness, tinnitus or hearing loss  CHEST:  oxygen via nasal cannula/inogen (has COPD)  CARDIOVASCULAR:  No tachycardia or chest pain  ABDOMEN:  No nausea, vomiting, pain, constipation or diarrhea  URINARY:  Has urinary catheter  NEUROLOGIC:  No abnormal movements    Past Medical, Family and Social History: The patient's past medical, family and social history have been reviewed and updated as appropriate within the electronic medical " "record - see encounter notes.    Compliance: yes    Side effects: None  Risk Parameters:  Patient reports no suicidal ideation  Patient reports no homicidal ideation  Patient reports no self-injurious behavior  Patient reports no violent behavior    Exam (detailed: at least 9 elements; comprehensive: all 15 elements)   Constitutional  Vitals:  Most recent vital signs, dated less than 90 days prior to this appointment, were reviewed.   Vitals:    02/28/19 1003   BP: 100/60   Pulse: 72   Weight: 99.8 kg (220 lb)   Height: 5' 5" (1.651 m)        General:  unremarkable, age appropriate     Musculoskeletal  Muscle Strength/Tone:  no tremor, no tic   Gait & Station:  in wheelchair     Psychiatric  Speech:  no latency; no press   Mood & Affect:  " I'm good."  congruent and appropriate   Thought Process:  normal and logical   Associations:  intact   Thought Content:  normal, no suicidality, no homicidality, delusions, or paranoia   Insight:  has awareness of illness   Judgement: behavior is adequate to circumstances   Orientation:  grossly intact, person, place, situation   Memory: intact for content of interview   Language: grossly intact   Attention Span & Concentration:  able to focus   Fund of Knowledge:  intact and appropriate to age and level of education     Assessment and Diagnosis   Status/Progress: Based on the examination today, the patient's problem(s) is/are well controlled.  New problems have not been presented today.   Lack of compliance are not complicating management of the primary condition.  There are no active rule-out diagnoses for this patient at this time.     General Impression: She is doing well.       ICD-10-CM ICD-9-CM   1. Bipolar I disorder, current or most recent episode depressed, with psychotic features with anxious distress F31.5 296.54   2. Insomnia related to another mental disorder F51.05 300.9     327.02       Intervention/Counseling/Treatment Plan   · Medication Management: The risks " and benefits of medication were discussed with the patient.  · The treatment plan and follow up plan were reviewed with the patient.   1. Safety: Call 911 or Crisis Line or go to ER for suicidal ideation, adverse effects of medication or any other emergency  2. Olanzapine 5 mg po qhs.  3. Prozac 10 mg po qd.  4. Return to clinic in 6 months or sooner prn.   5. Continue Psychotherapy.  6. Continue Trazodone 100 mg po qhs prn sleep (refill not needed getting refills from pain management).    Patient agrees with POC.    INSTRUCTIONS  Instructed to call 911 or Crisis Line or go to ER for suicidal ideation, adverse effects of medication or any other emergency. Verbalizes understanding and plan to comply.      Return to Clinic: 6 months, as needed

## 2019-02-28 NOTE — PATIENT INSTRUCTIONS
"You have been provided with a certain amount of medication with a specified number of refills.  Please follow up within an adequate time before you run out of medications.    REFILLS FOR CONTROLLED SUBSTANCES WILL NOT BE GIVEN WITHOUT AN APPOINTMENT.  I will not honor or fill automated refill requests from pharmacies.  You must come in for an appointment to get refills.    Please book your next appointment for myself or therapist by phone by calling our office at [phone].      PLEASE BE AT LEAST 15 MINUTES EARLY FOR YOUR NEXT APPOINTMENT.  PLEASE, DO NOT BE LATE OR YOU WILL BE TURNED AWAY AND ASKED TO RESCHEDULE.  YOU MUST COME EARLY TO ALLOW TIME FOR CHECK-IN AS WELL AS GET YOUR VITAL SIGNS AND GO OVER YOUR MEDICATIONS.  [employer] is not fair to the patients who present after you and are on time for their appointments.  It causes a delay in the appointments for patients and staff.  IF YOU ARE LATE, THERE IS A POSSIBILITY THAT YOU WILL BE CHARGED FOR THE APPOINTMENT TIME PERSONALLY AND IT WILL NOT GO TO YOUR INSURANCE.  YOU MAY ALSO BE DISCHARGED FROM CLINIC with multiple "No Show" appointments.  -----------------------------------------------------------------------------------------------------------------  IF YOU FEEL SUICIDAL OR HAVING THOUGHTS OR PLANS TO HURT YOURSELF OR OTHERS, CALL 911 OR REPORT TO THE NEAREST EMERGENCY ROOM.  YOU CAN ALSO ACCESS THE FOLLOWING HOTLINE(S):    National Suicide Hotline Number 9-341-030-TALK (2156)     ([facility] Mobile Crisis, 741.440.8976'   [facility] Crisis Line, (930) 178-7324; [facility], 24 hours / 7 days, (005) 811-COPE (6448), 9-387-618-COPE (0770))     "

## 2019-03-13 ENCOUNTER — OFFICE VISIT (OUTPATIENT)
Dept: PSYCHIATRY | Facility: CLINIC | Age: 64
End: 2019-03-13
Payer: MEDICARE

## 2019-03-13 DIAGNOSIS — F31.5 BIPOLAR I DISORDER, CURRENT OR MOST RECENT EPISODE DEPRESSED, WITH PSYCHOTIC FEATURES WITH ANXIOUS DISTRESS: Primary | ICD-10-CM

## 2019-03-13 PROCEDURE — 90834 PSYTX W PT 45 MINUTES: CPT | Mod: S$GLB,,, | Performed by: SOCIAL WORKER

## 2019-03-13 PROCEDURE — 90834 PR PSYCHOTHERAPY W/PATIENT, 45 MIN: ICD-10-PCS | Mod: S$GLB,,, | Performed by: SOCIAL WORKER

## 2019-03-13 NOTE — PROGRESS NOTES
"Individual Psychotherapy (PhD/LCSW)    3/13/2019    Site:  [facility]     Therapeutic Intervention: Met with patient.  Outpatient - Insight oriented psychotherapy 45 min - CPT code 93464 and Outpatient - Supportive psychotherapy 45 min - CPT Code 38318    Chief complaint/reason for encounter: depression and anxiety     Interval history and content of current session: Patient returned to clinic for follow up psychotherapy. Patient reports that she is doing "okay". States that she has been struggling with house guests and . States that grandson and family have been living with her since they were kicked out of her daughter in law's home. States that they have been with patient for last three months. States that different things are causing her frustration. States that family has been telling her that their home is going to be ready at the beginning of each month but for some other reason they are unable to get into the home. States that they are lazy at times and don't clean. Reports that she is conflicted because she needs their help but they also cause her stress. States that she has had home health in the past but doesn't qualify anymore. Discussed other options for help moving forward. Discussed talking with family about helping out and contributing more to house. Discussed that she is also having problems with her . States that one day he was going out to the dollar store and didn't come back. States that she knew that he was going out to use drugs. Later found out that he had a heart attack and was in the hospital in ICU. States that he came home on hospice and continues to go out and use drugs. States that she is unsure of how things will go when he passes. States that they really aren't in a romantic relationship more, more of roommate or friends. Discussed that she is excited that she is going to be starting physical therapy.     Treatment plan:  · Target symptoms: " depression, anxiety   · Why chosen therapy is appropriate versus another modality: relevant to diagnosis, evidence based practice  · Outcome monitoring methods: self-report, observation  · Therapeutic intervention type: insight oriented psychotherapy, supportive psychotherapy    Risk parameters:  Patient reports no suicidal ideation  Patient reports no homicidal ideation  Patient reports no self-injurious behavior  Patient reports no violent behavior    Verbal deficits: None    Patient's response to intervention:  The patient's response to intervention is accepting.    Progress toward goals and other mental status changes:  The patient's progress toward goals is fair .    Diagnosis:     ICD-10-CM ICD-9-CM   1. Bipolar I disorder, current or most recent episode depressed, with psychotic features with anxious distress F31.5 296.54       Plan:  individual psychotherapy and medication management by physician    Return to clinic: 2 weeks, 1 month    Length of Service (minutes): 45     Cassandra Rockweiler, LCSW

## 2019-03-20 ENCOUNTER — TELEPHONE (OUTPATIENT)
Dept: PSYCHIATRY | Facility: HOSPITAL | Age: 64
End: 2019-03-20

## 2019-03-20 RX ORDER — HYDROXYZINE HYDROCHLORIDE 25 MG/1
25 TABLET, FILM COATED ORAL 3 TIMES DAILY
Qty: 90 TABLET | Refills: 0 | Status: SHIPPED | OUTPATIENT
Start: 2019-03-20 | End: 2020-01-15 | Stop reason: SDUPTHER

## 2019-03-20 NOTE — TELEPHONE ENCOUNTER
Call made to patient. She states that she is anxious because of a lot of psychosocial issues. She states there is a lot of things going on in the home and this is making her anxious and getting on her nerves. She is unable to come to an earlier appointment because she has to use transportation services. Advised that Hydroxyzine HCL 25 mg po TID for anxiety will be sent to [facility]. Instructed on uses, effects, side effects, adverse reactions and benefits vs risks of hydroxyzine pamoate with emphasis on sleepiness and avoidance of driving and heavy machinery until effects are known and instructed on when to seek 911/ER. Verbalizes understanding and plans to comply.      ----- Message from Hali Arevalo sent at 3/20/2019 11:02 AM CDT -----  Contact: Patient   Patient called for an earlier appointment. I offered her an appointment time for tomorrow and it did not work because of transportation. Patient scheduled an appointment for 04/1/2019. Patient stated that she is having stomach issues (diarrhea). Patient asked if Dr. Vargas could please give her a call back. She stated she was having some other things going on, but did not go into detail.     [phone]

## 2019-04-01 ENCOUNTER — OFFICE VISIT (OUTPATIENT)
Dept: PSYCHIATRY | Facility: CLINIC | Age: 64
End: 2019-04-01
Payer: MEDICARE

## 2019-04-01 VITALS
WEIGHT: 220 LBS | HEART RATE: 84 BPM | DIASTOLIC BLOOD PRESSURE: 70 MMHG | SYSTOLIC BLOOD PRESSURE: 126 MMHG | HEIGHT: 65 IN | BODY MASS INDEX: 36.65 KG/M2

## 2019-04-01 DIAGNOSIS — F31.5 BIPOLAR I DISORDER, CURRENT OR MOST RECENT EPISODE DEPRESSED, WITH PSYCHOTIC FEATURES WITH ANXIOUS DISTRESS: Primary | ICD-10-CM

## 2019-04-01 DIAGNOSIS — F51.05 INSOMNIA RELATED TO ANOTHER MENTAL DISORDER: ICD-10-CM

## 2019-04-01 PROCEDURE — 3074F SYST BP LT 130 MM HG: CPT | Mod: S$GLB,,, | Performed by: NURSE PRACTITIONER

## 2019-04-01 PROCEDURE — 99999 PR PBB SHADOW E&M-EST. PATIENT-LVL III: ICD-10-PCS | Mod: PBBFAC,,, | Performed by: NURSE PRACTITIONER

## 2019-04-01 PROCEDURE — 3078F PR MOST RECENT DIASTOLIC BLOOD PRESSURE < 80 MM HG: ICD-10-PCS | Mod: S$GLB,,, | Performed by: NURSE PRACTITIONER

## 2019-04-01 PROCEDURE — 3078F DIAST BP <80 MM HG: CPT | Mod: S$GLB,,, | Performed by: NURSE PRACTITIONER

## 2019-04-01 PROCEDURE — 3074F PR MOST RECENT SYSTOLIC BLOOD PRESSURE < 130 MM HG: ICD-10-PCS | Mod: S$GLB,,, | Performed by: NURSE PRACTITIONER

## 2019-04-01 PROCEDURE — 99213 OFFICE O/P EST LOW 20 MIN: CPT | Mod: S$GLB,,, | Performed by: NURSE PRACTITIONER

## 2019-04-01 PROCEDURE — 3008F BODY MASS INDEX DOCD: CPT | Mod: S$GLB,,, | Performed by: NURSE PRACTITIONER

## 2019-04-01 PROCEDURE — 3008F PR BODY MASS INDEX (BMI) DOCUMENTED: ICD-10-PCS | Mod: S$GLB,,, | Performed by: NURSE PRACTITIONER

## 2019-04-01 PROCEDURE — 99213 PR OFFICE/OUTPT VISIT, EST, LEVL III, 20-29 MIN: ICD-10-PCS | Mod: S$GLB,,, | Performed by: NURSE PRACTITIONER

## 2019-04-01 PROCEDURE — 99999 PR PBB SHADOW E&M-EST. PATIENT-LVL III: CPT | Mod: PBBFAC,,, | Performed by: NURSE PRACTITIONER

## 2019-04-01 RX ORDER — BUMETANIDE 2 MG/1
2 TABLET ORAL
COMMUNITY
Start: 2019-03-22 | End: 2019-07-17

## 2019-04-01 RX ORDER — IPRATROPIUM BROMIDE 0.5 MG/2.5ML
SOLUTION RESPIRATORY (INHALATION)
Refills: 12 | COMMUNITY
Start: 2019-02-23 | End: 2020-07-29 | Stop reason: ALTCHOICE

## 2019-04-01 NOTE — PROGRESS NOTES
How are you see the sleep bed symptoms better Outpatient Psychiatry Follow-Up Visit (MD/NP)    4/1/2019    Clinical Status of Patient:  Outpatient (Ambulatory)    Chief Complaint:  Mary Ellen Fajardo is a 63 y.o. female who presents today for follow-up of mood disorder, anxiety, psychosis and insomnia.  Met with patient.      Interval History and Content of Current Session:  Interim Events/Subjective Report/Content of Current Session: Mary Ellen  was last seen by me on 02/28//2019 for a follow up visit. Mary Ellen was diagnosed with Bipolar disorder, depressed, with psychotic features with anxious distress and insomnia. She was doing well and a plan of care was devised to include:    1. Safety: Call 911 or Crisis Line or go to ER for suicidal ideation, adverse effects of medication or any other emergency  2. Olanzapine 5 mg po qhs.  3. Prozac 10 mg po qd.  4. Return to clinic in 6 months or sooner prn.   5. Continue Psychotherapy.  6. Continue Trazodone 100 mg po qhs prn sleep (refill not needed getting refills from pain management)..     Today Mary Ellen is seen face to face.  On 03/9/2019, Mary Ellen called and stated that she was anxious because of a lot of psychosocial issues. She stated that there were a lot of things going on in the home and this was making her anxious and getting on her nerves. She was offered an appointment for the following day but was unable to come in because of transportation problems. She was advised that Hydroxyzine HCL 25 mg po TID for anxiety would be sent to [facility] as per her preference. Today Mary Ellen states that her anxiety is under control and she does not need Hydroxyzine HCL TID but usually takes it BID and sometimes once a day. Today she is happy to report that her house guests are moving out and this has helped her anxiety a lot. Her grandson is going to get a stipend to help take care of her.  Her husand is livinig in the house with her and is on hospice.  However, she states he is still making runs to get drugs. Her mood has improved. A couple of times she has seen some things from the corner of her eye and is not sure if it is an hallucination. Her sleep is good. Her appetite is good but it is no longer excessvie. Her energy level is okay. She started physical therapy and that is going great.     Psychotherapy:  · Target symptoms: anxiety , mood disorder, psychosis, insomnia  · Why chosen therapy is appropriate versus another modality: relevant to diagnosis, patient responds to this modality, evidence based practice  · Outcome monitoring methods: self-report, observation  · Therapeutic intervention type: supportive psychotherapy  · Topics discussed/themes: relationships difficulties  · The patient's response to the intervention is accepting. The patient's progress toward treatment goals is good.   · Duration of intervention: 18 minutes.    Review of Systems   PSYCHIATRIC: Pertinant items are noted in the narrative.  GENERAL:  Morbidly obese  SKIN:  No rashes or lacerations  HEAD:  No headaches  EYES:  No exophthalmos, jaundice or blindness, had right cataract removed  EARS:  No dizziness, tinnitus or hearing loss  CHEST:  oxygen via nasal cannula/inogen (has COPD)  CARDIOVASCULAR:  No tachycardia or chest pain  ABDOMEN:  No nausea, vomiting, pain, constipation or diarrhea  URINARY:  Has urinary catheter  NEUROLOGIC:  No abnormal movements    Past Medical, Family and Social History: The patient's past medical, family and social history have been reviewed and updated as appropriate within the electronic medical record - see encounter notes.    Compliance: yes    Side effects: None  Risk Parameters:  Patient reports no suicidal ideation  Patient reports no homicidal ideation  Patient reports no self-injurious behavior  Patient reports no violent behavior    Exam (detailed: at least 9 elements; comprehensive: all 15 elements)   Constitutional  Vitals:  Most recent vital  "signs, dated less than 90 days prior to this appointment, were reviewed.   Vitals:    04/01/19 1029   BP: 126/70   Pulse: 84   Weight: 99.8 kg (220 lb)   Height: 5' 5" (1.651 m)        General:  unremarkable, age appropriate     Musculoskeletal  Muscle Strength/Tone:  no tremor, no tic   Gait & Station:  in wheelchair     Psychiatric  Speech:  no latency; no press   Mood & Affect:  " better."  congruent and appropriate   Thought Process:  normal and logical   Associations:  intact   Thought Content:  normal, no suicidality, no homicidality, delusions, or paranoia   Insight:  has awareness of illness   Judgement: behavior is adequate to circumstances   Orientation:  grossly intact, person, place, situation   Memory: intact for content of interview   Language: grossly intact   Attention Span & Concentration:  able to focus   Fund of Knowledge:  intact and appropriate to age and level of education     Assessment and Diagnosis   Status/Progress: Based on the examination today, the patient's problem(s) is/are well controlled.  New problems have not been presented today.   Lack of compliance are not complicating management of the primary condition.  There are no active rule-out diagnoses for this patient at this time.     General Impression: She is doing well.       ICD-10-CM ICD-9-CM   1. Bipolar I disorder, current or most recent episode depressed, with psychotic features with anxious distress F31.5 296.54   2. Insomnia related to another mental disorder F51.05 300.9     327.02       Intervention/Counseling/Treatment Plan   · Medication Management: The risks and benefits of medication were discussed with the patient.  · The treatment plan and follow up plan were reviewed with the patient.   1. Safety: Call 911 or Crisis Line or go to ER for suicidal ideation, adverse effects of medication or any other emergency  2. Olanzapine 5 mg po qhs.  3. Prozac 10 mg po qd.  4. Return to clinic in 5 months or sooner prn.   5. " Continue Psychotherapy.  6. Trazodone 100 mg po qhs prn sleep (refill not needed getting refills from pain management).    Patient agrees with POC.    INSTRUCTIONS  Instructed to call 911 or Crisis Line or go to ER for suicidal ideation, adverse effects of medication or any other emergency. Verbalizes understanding and plan to comply.      Return to Clinic: as needed, 5 months

## 2019-04-01 NOTE — PATIENT INSTRUCTIONS
"You have been provided with a certain amount of medication with a specified number of refills.  Please follow up within an adequate time before you run out of medications.    REFILLS FOR CONTROLLED SUBSTANCES WILL NOT BE GIVEN WITHOUT AN APPOINTMENT.  I will not honor or fill automated refill requests from pharmacies.  You must come in for an appointment to get refills.    Please book your next appointment for myself or therapist by phone by calling our office at [phone].      PLEASE BE AT LEAST 15 MINUTES EARLY FOR YOUR NEXT APPOINTMENT.  PLEASE, DO NOT BE LATE OR YOU WILL BE TURNED AWAY AND ASKED TO RESCHEDULE.  YOU MUST COME EARLY TO ALLOW TIME FOR CHECK-IN AS WELL AS GET YOUR VITAL SIGNS AND GO OVER YOUR MEDICATIONS.  [employer] is not fair to the patients who present after you and are on time for their appointments.  It causes a delay in the appointments for patients and staff.  IF YOU ARE LATE, THERE IS A POSSIBILITY THAT YOU WILL BE CHARGED FOR THE APPOINTMENT TIME PERSONALLY AND IT WILL NOT GO TO YOUR INSURANCE.  YOU MAY ALSO BE DISCHARGED FROM CLINIC with multiple "No Show" appointments.  -----------------------------------------------------------------------------------------------------------------  IF YOU FEEL SUICIDAL OR HAVING THOUGHTS OR PLANS TO HURT YOURSELF OR OTHERS, CALL 911 OR REPORT TO THE NEAREST EMERGENCY ROOM.  YOU CAN ALSO ACCESS THE FOLLOWING HOTLINE(S):    National Suicide Hotline Number 6-322-388-TALK (5092)     ([facility] Mobile Crisis, [phone]'   [facility], [phone]; [facility], 24 hours / 7 days, [phone]-COPE (9699), [phone]-COPE ([phone]))     "

## 2019-07-16 ENCOUNTER — HOSPITAL ENCOUNTER (INPATIENT)
Facility: HOSPITAL | Age: 64
LOS: 10 days | Discharge: NURSING, REHAB OR LONG-TERM CARE | DRG: 474 | End: 2019-07-26
Attending: EMERGENCY MEDICINE | Admitting: EMERGENCY MEDICINE
Payer: MEDICARE

## 2019-07-16 DIAGNOSIS — R09.02 HYPOXEMIA: ICD-10-CM

## 2019-07-16 DIAGNOSIS — F51.05 MOOD INSOMNIA: ICD-10-CM

## 2019-07-16 DIAGNOSIS — Z00.8 EVALUATION BY PSYCHIATRIC SERVICE REQUIRED: ICD-10-CM

## 2019-07-16 DIAGNOSIS — L02.416 ABSCESS OF LEFT THIGH: Primary | ICD-10-CM

## 2019-07-16 DIAGNOSIS — N39.0 URINARY TRACT INFECTION ASSOCIATED WITH CATHETERIZATION OF URINARY TRACT, UNSPECIFIED INDWELLING URINARY CATHETER TYPE, INITIAL ENCOUNTER: ICD-10-CM

## 2019-07-16 DIAGNOSIS — F39 MOOD INSOMNIA: ICD-10-CM

## 2019-07-16 DIAGNOSIS — T83.511A URINARY TRACT INFECTION ASSOCIATED WITH CATHETERIZATION OF URINARY TRACT, UNSPECIFIED INDWELLING URINARY CATHETER TYPE, INITIAL ENCOUNTER: ICD-10-CM

## 2019-07-16 DIAGNOSIS — F31.5 BIPOLAR I DISORDER, CURRENT OR MOST RECENT EPISODE DEPRESSED, WITH PSYCHOTIC FEATURES: ICD-10-CM

## 2019-07-16 DIAGNOSIS — M00.9 PYOGENIC ARTHRITIS OF LEFT KNEE JOINT, DUE TO UNSPECIFIED ORGANISM: ICD-10-CM

## 2019-07-16 DIAGNOSIS — E87.70 VOLUME OVERLOAD: ICD-10-CM

## 2019-07-16 DIAGNOSIS — L03.116 CELLULITIS OF LEFT THIGH: ICD-10-CM

## 2019-07-16 LAB
ALBUMIN SERPL BCP-MCNC: 3.5 G/DL (ref 3.5–5.2)
ALP SERPL-CCNC: 85 U/L (ref 55–135)
ALT SERPL W/O P-5'-P-CCNC: 10 U/L (ref 10–44)
AMORPH CRY URNS QL MICRO: ABNORMAL
ANION GAP SERPL CALC-SCNC: 8 MMOL/L (ref 8–16)
AST SERPL-CCNC: 18 U/L (ref 10–40)
BACTERIA #/AREA URNS HPF: ABNORMAL /HPF
BASOPHILS # BLD AUTO: 0.01 K/UL (ref 0–0.2)
BASOPHILS NFR BLD: 0.1 % (ref 0–1.9)
BILIRUB SERPL-MCNC: 0.6 MG/DL (ref 0.1–1)
BILIRUB UR QL STRIP: NEGATIVE
BUN SERPL-MCNC: 21 MG/DL (ref 8–23)
CALCIUM SERPL-MCNC: 9.4 MG/DL (ref 8.7–10.5)
CHLORIDE SERPL-SCNC: 94 MMOL/L (ref 95–110)
CLARITY UR: ABNORMAL
CO2 SERPL-SCNC: 37 MMOL/L (ref 23–29)
COLOR UR: YELLOW
CREAT SERPL-MCNC: 1.2 MG/DL (ref 0.5–1.4)
DIFFERENTIAL METHOD: ABNORMAL
EOSINOPHIL # BLD AUTO: 0.1 K/UL (ref 0–0.5)
EOSINOPHIL NFR BLD: 1.4 % (ref 0–8)
ERYTHROCYTE [DISTWIDTH] IN BLOOD BY AUTOMATED COUNT: 15.1 % (ref 11.5–14.5)
EST. GFR  (AFRICAN AMERICAN): 56 ML/MIN/1.73 M^2
EST. GFR  (NON AFRICAN AMERICAN): 48 ML/MIN/1.73 M^2
GLUCOSE SERPL-MCNC: 93 MG/DL (ref 70–110)
GLUCOSE UR QL STRIP: NEGATIVE
HCT VFR BLD AUTO: 40.4 % (ref 37–48.5)
HGB BLD-MCNC: 12 G/DL (ref 12–16)
HGB UR QL STRIP: ABNORMAL
INR PPP: 1 (ref 0.8–1.2)
KETONES UR QL STRIP: NEGATIVE
LACTATE SERPL-SCNC: 0.8 MMOL/L (ref 0.5–2.2)
LEUKOCYTE ESTERASE UR QL STRIP: ABNORMAL
LYMPHOCYTES # BLD AUTO: 1.8 K/UL (ref 1–4.8)
LYMPHOCYTES NFR BLD: 25.7 % (ref 18–48)
MCH RBC QN AUTO: 25.8 PG (ref 27–31)
MCHC RBC AUTO-ENTMCNC: 29.7 G/DL (ref 32–36)
MCV RBC AUTO: 87 FL (ref 82–98)
MICROSCOPIC COMMENT: ABNORMAL
MONOCYTES # BLD AUTO: 0.6 K/UL (ref 0.3–1)
MONOCYTES NFR BLD: 7.8 % (ref 4–15)
NEUTROPHILS # BLD AUTO: 4.6 K/UL (ref 1.8–7.7)
NEUTROPHILS NFR BLD: 65.3 % (ref 38–73)
NITRITE UR QL STRIP: NEGATIVE
NON-SQ EPI CELLS #/AREA URNS HPF: 1 /HPF
PH UR STRIP: 5 [PH] (ref 5–8)
PLATELET # BLD AUTO: 231 K/UL (ref 150–350)
PMV BLD AUTO: 9.8 FL (ref 9.2–12.9)
POTASSIUM SERPL-SCNC: 3.6 MMOL/L (ref 3.5–5.1)
PROT SERPL-MCNC: 8.7 G/DL (ref 6–8.4)
PROT UR QL STRIP: NEGATIVE
PROTHROMBIN TIME: 10.9 SEC (ref 9–12.5)
RBC # BLD AUTO: 4.65 M/UL (ref 4–5.4)
RBC #/AREA URNS HPF: 10 /HPF (ref 0–4)
SODIUM SERPL-SCNC: 139 MMOL/L (ref 136–145)
SP GR UR STRIP: 1.01 (ref 1–1.03)
SQUAMOUS #/AREA URNS HPF: 40 /HPF
URN SPEC COLLECT METH UR: ABNORMAL
UROBILINOGEN UR STRIP-ACNC: NEGATIVE EU/DL
WBC # BLD AUTO: 7.04 K/UL (ref 3.9–12.7)
WBC #/AREA URNS HPF: 75 /HPF (ref 0–5)
WBC CLUMPS URNS QL MICRO: ABNORMAL

## 2019-07-16 PROCEDURE — 96375 TX/PRO/DX INJ NEW DRUG ADDON: CPT

## 2019-07-16 PROCEDURE — 63600175 PHARM REV CODE 636 W HCPCS: Performed by: EMERGENCY MEDICINE

## 2019-07-16 PROCEDURE — 27000221 HC OXYGEN, UP TO 24 HOURS

## 2019-07-16 PROCEDURE — 83605 ASSAY OF LACTIC ACID: CPT

## 2019-07-16 PROCEDURE — 99285 EMERGENCY DEPT VISIT HI MDM: CPT | Mod: 25

## 2019-07-16 PROCEDURE — 80053 COMPREHEN METABOLIC PANEL: CPT

## 2019-07-16 PROCEDURE — 81000 URINALYSIS NONAUTO W/SCOPE: CPT

## 2019-07-16 PROCEDURE — 85025 COMPLETE CBC W/AUTO DIFF WBC: CPT

## 2019-07-16 PROCEDURE — 85610 PROTHROMBIN TIME: CPT

## 2019-07-16 PROCEDURE — 87040 BLOOD CULTURE FOR BACTERIA: CPT | Mod: 59

## 2019-07-16 PROCEDURE — 94761 N-INVAS EAR/PLS OXIMETRY MLT: CPT

## 2019-07-16 PROCEDURE — 86140 C-REACTIVE PROTEIN: CPT

## 2019-07-16 PROCEDURE — 85652 RBC SED RATE AUTOMATED: CPT

## 2019-07-16 PROCEDURE — 51705 CHANGE OF BLADDER TUBE: CPT

## 2019-07-16 PROCEDURE — 12000002 HC ACUTE/MED SURGE SEMI-PRIVATE ROOM

## 2019-07-16 RX ORDER — HYDROMORPHONE HYDROCHLORIDE 2 MG/ML
1 INJECTION, SOLUTION INTRAMUSCULAR; INTRAVENOUS; SUBCUTANEOUS
Status: COMPLETED | OUTPATIENT
Start: 2019-07-16 | End: 2019-07-16

## 2019-07-16 RX ADMIN — HYDROMORPHONE HYDROCHLORIDE 1 MG: 2 INJECTION, SOLUTION INTRAMUSCULAR; INTRAVENOUS; SUBCUTANEOUS at 11:07

## 2019-07-17 PROBLEM — M00.9 SEPTIC ARTHRITIS OF KNEE, LEFT: Status: ACTIVE | Noted: 2019-07-17

## 2019-07-17 PROBLEM — N39.0 INFECTION DUE TO URETHRAL CATHETER: Status: ACTIVE | Noted: 2019-07-17

## 2019-07-17 PROBLEM — T83.518A INFECTION DUE TO URETHRAL CATHETER: Status: ACTIVE | Noted: 2019-07-17

## 2019-07-17 PROBLEM — L02.416 ABSCESS OF LEFT THIGH: Status: ACTIVE | Noted: 2019-07-17

## 2019-07-17 LAB
CRP SERPL-MCNC: 58.9 MG/L (ref 0–8.2)
ERYTHROCYTE [SEDIMENTATION RATE] IN BLOOD BY WESTERGREN METHOD: 47 MM/HR (ref 0–20)

## 2019-07-17 PROCEDURE — 27000190 HC CPAP FULL FACE MASK W/VALVE

## 2019-07-17 PROCEDURE — 87086 URINE CULTURE/COLONY COUNT: CPT

## 2019-07-17 PROCEDURE — 87205 SMEAR GRAM STAIN: CPT

## 2019-07-17 PROCEDURE — 25000003 PHARM REV CODE 250: Performed by: EMERGENCY MEDICINE

## 2019-07-17 PROCEDURE — 27000221 HC OXYGEN, UP TO 24 HOURS

## 2019-07-17 PROCEDURE — 21400001 HC TELEMETRY ROOM

## 2019-07-17 PROCEDURE — 25000242 PHARM REV CODE 250 ALT 637 W/ HCPCS: Performed by: EMERGENCY MEDICINE

## 2019-07-17 PROCEDURE — 25000003 PHARM REV CODE 250: Performed by: INTERNAL MEDICINE

## 2019-07-17 PROCEDURE — 87088 URINE BACTERIA CULTURE: CPT

## 2019-07-17 PROCEDURE — 63600175 PHARM REV CODE 636 W HCPCS: Performed by: EMERGENCY MEDICINE

## 2019-07-17 PROCEDURE — 87077 CULTURE AEROBIC IDENTIFY: CPT | Mod: 59

## 2019-07-17 PROCEDURE — 94660 CPAP INITIATION&MGMT: CPT

## 2019-07-17 PROCEDURE — 87070 CULTURE OTHR SPECIMN AEROBIC: CPT

## 2019-07-17 PROCEDURE — 96365 THER/PROPH/DIAG IV INF INIT: CPT

## 2019-07-17 PROCEDURE — 87186 SC STD MICRODIL/AGAR DIL: CPT | Mod: 59

## 2019-07-17 PROCEDURE — 94640 AIRWAY INHALATION TREATMENT: CPT

## 2019-07-17 PROCEDURE — 94761 N-INVAS EAR/PLS OXIMETRY MLT: CPT

## 2019-07-17 PROCEDURE — 96375 TX/PRO/DX INJ NEW DRUG ADDON: CPT

## 2019-07-17 PROCEDURE — 94760 N-INVAS EAR/PLS OXIMETRY 1: CPT

## 2019-07-17 PROCEDURE — 99900035 HC TECH TIME PER 15 MIN (STAT)

## 2019-07-17 RX ORDER — FLUOXETINE 10 MG/1
10 CAPSULE ORAL DAILY
Status: DISCONTINUED | OUTPATIENT
Start: 2019-07-17 | End: 2019-07-18

## 2019-07-17 RX ORDER — OLANZAPINE 2.5 MG/1
5 TABLET ORAL NIGHTLY
Status: DISCONTINUED | OUTPATIENT
Start: 2019-07-17 | End: 2019-07-18

## 2019-07-17 RX ORDER — OXYCODONE HYDROCHLORIDE 5 MG/1
5 TABLET ORAL EVERY 4 HOURS PRN
Status: DISCONTINUED | OUTPATIENT
Start: 2019-07-17 | End: 2019-07-18

## 2019-07-17 RX ORDER — ONDANSETRON 2 MG/ML
4 INJECTION INTRAMUSCULAR; INTRAVENOUS EVERY 8 HOURS PRN
Status: DISCONTINUED | OUTPATIENT
Start: 2019-07-17 | End: 2019-07-26 | Stop reason: HOSPADM

## 2019-07-17 RX ORDER — OLANZAPINE 2.5 MG/1
5 TABLET ORAL NIGHTLY
Status: DISCONTINUED | OUTPATIENT
Start: 2019-07-17 | End: 2019-07-17 | Stop reason: SDUPTHER

## 2019-07-17 RX ORDER — OLANZAPINE 2.5 MG/1
5 TABLET ORAL NIGHTLY
Status: DISCONTINUED | OUTPATIENT
Start: 2019-07-17 | End: 2019-07-17

## 2019-07-17 RX ORDER — NALOXONE HCL 0.4 MG/ML
0.4 VIAL (ML) INJECTION
Status: COMPLETED | OUTPATIENT
Start: 2019-07-17 | End: 2019-07-17

## 2019-07-17 RX ORDER — TRAZODONE HYDROCHLORIDE 50 MG/1
50 TABLET ORAL NIGHTLY PRN
Status: DISCONTINUED | OUTPATIENT
Start: 2019-07-17 | End: 2019-07-18

## 2019-07-17 RX ORDER — BUMETANIDE 1 MG/1
2 TABLET ORAL DAILY
Status: DISCONTINUED | OUTPATIENT
Start: 2019-07-17 | End: 2019-07-17

## 2019-07-17 RX ORDER — IPRATROPIUM BROMIDE AND ALBUTEROL SULFATE 2.5; .5 MG/3ML; MG/3ML
3 SOLUTION RESPIRATORY (INHALATION) EVERY 4 HOURS PRN
Status: DISCONTINUED | OUTPATIENT
Start: 2019-07-17 | End: 2019-07-18

## 2019-07-17 RX ORDER — ENOXAPARIN SODIUM 100 MG/ML
40 INJECTION SUBCUTANEOUS EVERY 24 HOURS
Status: DISCONTINUED | OUTPATIENT
Start: 2019-07-17 | End: 2019-07-18

## 2019-07-17 RX ORDER — NALOXONE HCL 0.4 MG/ML
0.4 VIAL (ML) INJECTION
Status: DISCONTINUED | OUTPATIENT
Start: 2019-07-17 | End: 2019-07-19

## 2019-07-17 RX ORDER — IBUPROFEN 600 MG/1
600 TABLET ORAL EVERY 6 HOURS PRN
Status: DISCONTINUED | OUTPATIENT
Start: 2019-07-17 | End: 2019-07-18

## 2019-07-17 RX ORDER — LIDOCAINE HYDROCHLORIDE 10 MG/ML
1 INJECTION, SOLUTION EPIDURAL; INFILTRATION; INTRACAUDAL; PERINEURAL ONCE
Status: DISCONTINUED | OUTPATIENT
Start: 2019-07-17 | End: 2019-07-19

## 2019-07-17 RX ORDER — SODIUM CHLORIDE 0.9 % (FLUSH) 0.9 %
10 SYRINGE (ML) INJECTION
Status: DISCONTINUED | OUTPATIENT
Start: 2019-07-17 | End: 2019-07-26 | Stop reason: HOSPADM

## 2019-07-17 RX ORDER — GABAPENTIN 300 MG/1
600 CAPSULE ORAL 3 TIMES DAILY
Status: DISCONTINUED | OUTPATIENT
Start: 2019-07-17 | End: 2019-07-18

## 2019-07-17 RX ORDER — BACLOFEN 10 MG/1
10 TABLET ORAL 4 TIMES DAILY PRN
Status: DISCONTINUED | OUTPATIENT
Start: 2019-07-17 | End: 2019-07-18

## 2019-07-17 RX ORDER — BUMETANIDE 1 MG/1
2 TABLET ORAL DAILY
Status: DISCONTINUED | OUTPATIENT
Start: 2019-07-18 | End: 2019-07-18

## 2019-07-17 RX ORDER — HYDROXYZINE PAMOATE 25 MG/1
25 CAPSULE ORAL EVERY 8 HOURS PRN
Status: DISCONTINUED | OUTPATIENT
Start: 2019-07-17 | End: 2019-07-18

## 2019-07-17 RX ADMIN — PIPERACILLIN, TAZOBACTAM 4.5 G: 4; .5 INJECTION, POWDER, LYOPHILIZED, FOR SOLUTION INTRAVENOUS at 04:07

## 2019-07-17 RX ADMIN — PIPERACILLIN, TAZOBACTAM 4.5 G: 4; .5 INJECTION, POWDER, LYOPHILIZED, FOR SOLUTION INTRAVENOUS at 11:07

## 2019-07-17 RX ADMIN — TRAZODONE HYDROCHLORIDE 50 MG: 50 TABLET ORAL at 09:07

## 2019-07-17 RX ADMIN — IPRATROPIUM BROMIDE AND ALBUTEROL SULFATE 3 ML: .5; 3 SOLUTION RESPIRATORY (INHALATION) at 08:07

## 2019-07-17 RX ADMIN — GABAPENTIN 600 MG: 300 CAPSULE ORAL at 09:07

## 2019-07-17 RX ADMIN — NALOXONE HYDROCHLORIDE 0.4 MG: 0.4 INJECTION, SOLUTION INTRAMUSCULAR; INTRAVENOUS; SUBCUTANEOUS at 02:07

## 2019-07-17 RX ADMIN — GABAPENTIN 600 MG: 300 CAPSULE ORAL at 04:07

## 2019-07-17 RX ADMIN — SODIUM CHLORIDE 1000 ML: 0.9 INJECTION, SOLUTION INTRAVENOUS at 03:07

## 2019-07-17 RX ADMIN — PIPERACILLIN AND TAZOBACTAM 4.5 G: 4; .5 INJECTION, POWDER, LYOPHILIZED, FOR SOLUTION INTRAVENOUS; PARENTERAL at 12:07

## 2019-07-17 RX ADMIN — VANCOMYCIN HYDROCHLORIDE 2000 MG: 1 INJECTION, POWDER, LYOPHILIZED, FOR SOLUTION INTRAVENOUS at 01:07

## 2019-07-17 RX ADMIN — ENOXAPARIN SODIUM 40 MG: 100 INJECTION SUBCUTANEOUS at 04:07

## 2019-07-17 RX ADMIN — NALOXONE HYDROCHLORIDE 0.4 MG: 0.4 INJECTION, SOLUTION INTRAMUSCULAR; INTRAVENOUS; SUBCUTANEOUS at 12:07

## 2019-07-17 RX ADMIN — OXYCODONE HYDROCHLORIDE 5 MG: 5 TABLET ORAL at 12:07

## 2019-07-17 RX ADMIN — PIPERACILLIN, TAZOBACTAM 4.5 G: 4; .5 INJECTION, POWDER, LYOPHILIZED, FOR SOLUTION INTRAVENOUS at 08:07

## 2019-07-17 RX ADMIN — BACLOFEN 10 MG: 10 TABLET ORAL at 04:07

## 2019-07-17 RX ADMIN — OLANZAPINE 5 MG: 2.5 TABLET, FILM COATED ORAL at 09:07

## 2019-07-17 RX ADMIN — GABAPENTIN 600 MG: 300 CAPSULE ORAL at 08:07

## 2019-07-17 RX ADMIN — FLUOXETINE 10 MG: 10 CAPSULE ORAL at 04:07

## 2019-07-17 NOTE — ED NOTES
After receiving IV Dilaudid, pt began having periods of apnea and had a drop in HR, BP, & SpO2 sats; pt arouses to gentle touch for a few seconds and states that she feels much better but then quickly falls back asleep with apnea periods; MD aware and states to give pt Narcan; pt's daughter in law at bedside;

## 2019-07-17 NOTE — HPI
Ms. 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 She also noted decreased ROM of the left knee. The pain is exacerbated with attempt to move the knee or her left ankle. She has not had issues with the joint infection since 2011. ROS was also notable for foul smelling urine.

## 2019-07-17 NOTE — H&P
[facility] Hospital Medicine  History & Physical    Patient Name: Mary Ellen Fajardo  MRN: [id number]  Admission Date: 7/16/2019  Attending Physician: Cora Briggs MD  Primary Care Provider: Any Tran MD         Patient information was obtained from patient, relative(s), past medical records and ER records.     Subjective:     Principal Problem:Septic arthritis of knee, left    Chief Complaint:   Chief Complaint   Patient presents with    Knee Pain     Left knee, drainage noted to site, redness and swelling, no relief with tylenol 5 hours         HPI: Ms. 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 She also noted decreased ROM of the left knee. The pain is exacerbated with attempt to move the knee or her left ankle. She has not had issues with the joint infection since 2011. ROS was also notable for foul smelling urine.   In the ER she was afebrile and had no leukocytosis. There was concern for septic arthritis. She was admitted to internal medicine and orthopedic surgery was consulted. She was started on broad spectrum antibiotics in the ER after blood cultures were collected. UA was concerning for UTI which would also be covered by broad spectrum antibiotics. UCx sent. Orthopedic surgery performed a bedside joint aspiration on 7/17 revealing bloody fluid. A sample was sent for culture.    Past Medical History:   Diagnosis Date    Addiction to drug     Alcohol abuse     Anxiety     Arthritis     Asthma     Bipolar disorder     Bladder stones     CHF (congestive heart failure)     COPD (chronic obstructive pulmonary disease)     on home o2    CVA (cerebral vascular accident)     2012    Hepatitis C     treated and  cured    History of blood clots     Hx of psychiatric care     Hypertension     Belen     ADEEL treated with BiPAP     Paraplegia     Paraplegic spinal paralysis     Psychiatric problem     Psychosis     AVH; Paranoia    Renal disorder     SCI (spinal cord injury)     incomplete    Sleep difficulties     has sleep apnea and uses a Bi-PAP machine.    Substance abuse     Suprapubic catheter     Therapy     Vaginal delivery     x1       Past Surgical History:   Procedure Laterality Date    BACK SURGERY      bilateral knee replacement      BREAST BIOPSY      cysto/lithopaxy 2017      CYSTOLITHOLOPAXY (REMOVE BLADDER STONE) N/A 12/20/2017    [facility]    CYSTOSCOPY W/ LASER LITHOTRIPSY      CYSTOSCOPY,  OCCLUSION BALLOON PLACEMENT, PERC ACCESS  1/19/2018    [facility]    CYSTOSCOPY, RETROGRADE, URETEROSCOPY, STENT PLACEMENT Right 3/5/2018    Per[facility]oco [facility]    CYSTOSCOPY/ RETROGRADE PYELOGRAM/ WITH JJ URETERAL STENT PLACEMENT RIGHT Right 12/20/2017    Performed by [facility]    EXCHANGE CATHETER-SUPRAPUBIC  1/19/2018    Per[facility]oco [facility]    EXTRACTION-STONE-URETEROSCOPY Right 3/5/2018    Per[facility]    JOINT REPLACEMENT      bilateral knee    LITHOTRIPSY-LASER Right 3/5/2018    Per[facility]oco [facility] at [facility]    LITHOTRIPSY-LASER Right 1/29/2018    Performed by RAMA [facility]    LITHOTRIPSY-LASER Right 1/19/2018    [facility]    NEPHROLITHOTOMY-PERCUTANEOUS Right 1/19/2018    Per[facility]    NEPHROSTOLITHOTOMY-PERCUTANEOUS Right 1/29/2018    Per[facility]oco [facility] at [facility]    NEPHROSTOMY Right 1/29/2018    Performed by [facility]oco [facility] at [facility]    PLACEMENT  1/19/2018    Performed by RAMA Gonzalez  MD Alejandrina at [facility]    PLACEMENT-RENAL ACCESS Right 1/19/2018    Performed by [facility]oco [facility] at [facility]    PLACEMENT-STENT Right 1/29/2018    Performed by [facility]oco [facility] at [facility]    Procedure is a Left Genicular Nerve Block ** cpt code 39648** Left 9/16/2015    [facility]    PYELOGRAM-ANTEGRADE Right 1/29/2018    Performed by [facility]oco [facility] at [facility]    PYELOGRAM-ANTEGRADE  1/19/2018    Performed by [facility]oco [facility] at [facility]    PYELOGRAM-RETROGRADE  1/19/2018    Performed by [facility]oco [facility] at [facility]    RADIOFREQUENCY THERMOCOAGULATION** Left genicular RFA ** Left 1/20/2016    Performed by Sara Castle [facility] at [facility]    REMOVAL-STENT-URETERAL  3/5/2018    Performed by [facility]oco MD at [facility]    REMOVAL-STENT-URETERAL (Cystoscopy) Right 4/9/2018    Performed by [facility]oco [facility] at [facility]    URETEROSCOPY Right 1/29/2018    Performed by [facility]oco [facility] at [facility]    URETEROSCOPY Right 1/19/2018    Performed by RAMA Tinoco MD at [facility]       Review of patient's allergies indicates:   Allergen Reactions    Rocephin [ceftriaxone] Rash       No current facility-administered medications on file prior to encounter.      Current Outpatient Medications on File Prior to Encounter   Medication Sig    baclofen (LIORESAL) 20 MG tablet 10 mg 4 (four) times daily.     bumetanide (BUMEX) 2 MG tablet Take 2 mg by mouth.    FLUoxetine 10 MG capsule Take 1 capsule (10 mg total) by mouth once daily.    gabapentin (NEURONTIN) 300 MG capsule 600 mg 3 (three) times daily.     ipratropium (ATROVENT) 0.02 % nebulizer solution U 1 VIAL VIA NEBULIZER Q 4 H WHILE AWAKE    OLANZapine (ZYPREXA) 5 MG tablet Take 1 tablet (5 mg total) by mouth every evening.    oxybutynin (DITROPAN-XL) 5 MG TR24 TAKE 1 TABLET BY MOUTH EVERY DAY    oxyCODONE-acetaminophen (PERCOCET)  mg per tablet Take 1 tablet by mouth every 4 (four)  hours as needed for Pain.    potassium chloride (KLOR-CON) 10 MEQ TbSR     trazodone (DESYREL) 100 MG tablet 100 mg every evening. 1/2 tablet by mouth every evening     catheter 18 Fr Misc Change every 20 days    hydrOXYzine HCl (ATARAX) 25 MG tablet Take 1 tablet (25 mg total) by mouth 3 (three) times daily.    promethazine (PHENERGAN) 25 MG tablet TAKE 1 TABLET BY MOUTH EVERY 8 HOURS AS NEEDED FOR NAUSEA AND VOMITING    VENTOLIN HFA 90 mcg/actuation inhaler      Family History     Problem Relation (Age of Onset)    Anxiety disorder Brother    Dementia Mother    Depression Brother        Tobacco Use    Smoking status: Former Smoker     Last attempt to quit:      Years since quittin.5    Smokeless tobacco: Never Used   Substance and Sexual Activity    Alcohol use: Yes     Comment: occasional    Drug use: Yes     Comment: prescribed opioids at present for pain    Sexual activity: Never     Partners: Male     Comment: since      Review of Systems   Constitutional: Positive for fever. Negative for chills.   HENT: Negative for congestion and rhinorrhea.    Eyes: Negative for photophobia and visual disturbance.   Respiratory: Negative for cough and shortness of breath.    Cardiovascular: Negative for chest pain and leg swelling.   Gastrointestinal: Negative for abdominal pain, nausea and vomiting.   Genitourinary: Negative for hematuria and pelvic pain.   Musculoskeletal: Positive for arthralgias and joint swelling. Negative for neck stiffness.   Skin: Positive for color change. Negative for wound.   Neurological: Negative for dizziness and light-headedness.   Psychiatric/Behavioral: Negative for agitation and behavioral problems.     Objective:     Vital Signs (Most Recent):  Temp: 97.8 °F (36.6 °C) (19 1110)  Pulse: 66 (19 1110)  Resp: 18 (19 1110)  BP: 133/86 (19 1110)  SpO2: 96 % (19 111) Vital Signs (24h Range):  Temp:  [97.8 °F (36.6 °C)-98.8 °F (37.1 °C)] 97.8  °F (36.6 °C)  Pulse:  [54-84] 66  Resp:  [8-18] 18  SpO2:  [87 %-100 %] 96 %  BP: ()/(46-98) 133/86     Weight: 107.1 kg (236 lb 1.8 oz)  Body mass index is 39.29 kg/m².    Physical Exam   Constitutional: She is oriented to person, place, and time. She appears well-developed and well-nourished. No distress.   HENT:   Right Ear: External ear normal.   Left Ear: External ear normal.   Nose: Nose normal.   Eyes: Pupils are equal, round, and reactive to light. EOM are normal. No scleral icterus.   Neck: Normal range of motion. Neck supple. No thyromegaly present.   Cardiovascular: Normal rate and normal heart sounds. Exam reveals no friction rub.   No murmur heard.  Pulmonary/Chest: Effort normal and breath sounds normal. No respiratory distress. She has no wheezes. She has no rales.   Abdominal: Soft. Bowel sounds are normal. She exhibits no mass. There is no tenderness.   Morbidly obese   Genitourinary:   Genitourinary Comments: Chronic indwelling Wolfe. Cloudy urine.   Musculoskeletal:   Leg knee slightly more edematous than right with limited ROM due to pain. She has a well healed surgical incision that she claims if more edematous than usual. No calor noted.   Neurological: She is alert and oriented to person, place, and time. No cranial nerve deficit.   Skin: Skin is warm and dry. No pallor.   Psychiatric: She has a normal mood and affect. Thought content normal.         CRANIAL NERVES     CN III, IV, VI   Pupils are equal, round, and reactive to light.  Extraocular motions are normal.        Significant Labs: All pertinent labs within the past 24 hours have been reviewed.    Significant Imaging: I have reviewed and interpreted all pertinent imaging results/findings within the past 24 hours.    Assessment/Plan:     * Septic arthritis of knee, left  Concern for left knee septic arthritis. Appreciate orthopedic surgery recs. Started on broad spectrum antibiotics. Follow up joint aspirate  culture.      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 Past resistance patterns noted. Possible colonization? Foul smelling urine but no systemic symptoms.     Bipolar 1 disorder  Continued home meds (Prozac, Zyprexa, Atarax, trazodone). No acute issue.    Chronic indwelling suprapubic catheter in place  Exchange given UA concerning for UTI. Follow up with Dr. Tinoco in clinic.    Chronic respiratory failure with hypoxia  Stable on home supplemental O2.    COPD (chronic obstructive pulmonary disease)  PRN duonebs. No acute issues.    ADEEL on CPAP  CPAP QHS per home settings and patient tolerance.    Essential hypertension  Does not appear to be on medications at home? Monitor.      VTE Risk Mitigation (From admission, onward)        Ordered     enoxaparin injection 40 mg  Daily      07/17/19 0531     IP VTE HIGH RISK PATIENT  Once      07/17/19 0531             Cora Briggs MD  Department of Hospital Medicine   [facility]

## 2019-07-17 NOTE — ASSESSMENT & PLAN NOTE
Concern for left knee septic arthritis. Appreciate orthopedic surgery recs. Started on broad spectrum antibiotics. Follow up joint aspirate culture.

## 2019-07-17 NOTE — ED TRIAGE NOTES
Pt reports to ED via EMS with c/o redness, warmth, swelling, & pain 5/10 to old incision site to left upper knee starting 2 days ago; pt reports that she is paralyzed and bedbound since 2011; pt reports having left knee replacement in Feb 2011 but had complications due to an infection after the sx; pt wears 2L NC home oxygen; pt also has indwelling urinary catheter; pt AAOx4; pt's daughter in law at bedside

## 2019-07-17 NOTE — ED NOTES
Pt began having periods of excessive drowsiness with drop in BP; pt arouses to gentle touch but then quickly falls back asleep within a few seconds; MD at bedside and states to give pt another dose of Narcan.

## 2019-07-17 NOTE — PLAN OF CARE
07/17/19 1113   Discharge Assessment   Assessment Type Discharge Planning Assessment   Confirmed/corrected address and phone number on facesheet? Yes   Assessment information obtained from? Patient   Expected Length of Stay (days) 3   Communicated expected length of stay with patient/caregiver yes   Prior to hospitilization cognitive status: Alert/Oriented   Prior to hospitalization functional status: Assistive Equipment  (Wheelchair, O2, Nebulizer; family assists as needed )   Current cognitive status: Alert/Oriented   Current Functional Status: Needs Assistance;Assistive Equipment   Facility Arrived From: Home   Lives With spouse   Able to Return to Prior Arrangements   (TBD: HH vs. higher level)   Is patient able to care for self after discharge? Unable to determine at this time (comments)  (TBD: HH vs. higher level)   Patient's perception of discharge disposition home or selfcare;home health  (Home with HH vs. higher level of care)   Readmission Within the Last 30 Days no previous admission in last 30 days   Patient currently being followed by outpatient case management? No   Patient currently receives any other outside agency services? No   Equipment Currently Used at Home wheelchair;oxygen  ([employer])   Do you have any problems affording any of your prescribed medications? No   Is the patient taking medications as prescribed? yes   Does the patient have transportation home? Yes   Transportation Anticipated family or friend will provide;car, drives self   Does the patient receive services at the [facility]mad[facility] Clinic? No   Discharge Plan A Home with family;Home Health   Discharge Plan B Other  (TBD for higher level of care)   DME Needed Upon Discharge  other (see comments)  (TBD)   Patient/Family in Agreement with Plan yes   SW Role explained to patient; two patient identifiers recognized; SW contact information placed on Communication board. Discussed patient managing health care at home; determined who would be  helping patient at home with recovery: family will help with recovery at home post-discharge    PCP: Ayn Tran MD Prefers mid-morning appointments    Extended Emergency Contact Information  Primary Emergency Contact: Swapnil Christianson  Address: [address] [place] [address] [facility]  Home Phone: [phone]  Mobile Phone: [phone]  Relation: Son  Secondary Emergency Contact: SammyAll   [facility]  Home Phone: [phone]  Relation: Brother [facility] [id number] [address] [place] [address] AT [facility] [address]  [address]  [place] [address]  Phone: [phone] Fax: [phone]    Payor: BCBS [place] MEDICARE / Plan: [place] / Product Type: Medicare Advantage /

## 2019-07-17 NOTE — ED NOTES
"Pt awake and more responsive; pt states "I'm awake now, I feel good"; pt's vital signs have improved; MD aware and states to place pt on bipap at this time.  "

## 2019-07-17 NOTE — ED PROVIDER NOTES
Encounter Date: 7/16/2019    SCRIBE #1 NOTE: I, Price Chapman, am scribing for, and in the presence of,  Dr. Spivey. I have scribed the entire note.       History     Chief Complaint   Patient presents with    Knee Pain     Left knee, drainage noted to site, redness and swelling, no relief with tylenol 5 hours      This is a 63 y.o. female with history of knee replacement and COPD who presents to the ED complaining of left knee swelling, warmth, and pain (rated 5/10) for 2 days. The patient reports fever. She used neosporin and guaze with no alleviation. The patient denies draining and trauma to that leg. Her daughter in law reports darkening of the skin around the surgical wound. The patient had a left knee replacement in February 2011, which became infected and underwent kneecap removal surgery the same year. She then had a epidural infection which left legs paralyzed. The patient is on 2 liters of home oxygen.       The history is provided by the patient. No  was used.     Review of patient's allergies indicates:   Allergen Reactions    Rocephin [ceftriaxone] Rash     Past Medical History:   Diagnosis Date    Addiction to drug     Alcohol abuse     Anxiety     Arthritis     Asthma     Bipolar disorder     Bladder stones     CHF (congestive heart failure)     COPD (chronic obstructive pulmonary disease)     CVA (cerebral vascular accident)     2012    Hepatitis C     treated and cured    History of blood clots     Hx of psychiatric care     Hypertension     Belen     ADEEL treated with BiPAP     Paraplegia     Paraplegic spinal paralysis     Psychiatric problem     Psychosis     AVH; Paranoia    Renal disorder     SCI (spinal cord injury)     incomplete    Sleep difficulties     has sleep apnea and uses a Bi-PAP machine.    Substance abuse     Suprapubic catheter     Therapy     Vaginal delivery     x1     Past Surgical History:   Procedure Laterality Date    BACK  SURGERY      bilateral knee replacement      BREAST BIOPSY      cysto/lithopaxy 2017      CYSTOLITHOLOPAXY (REMOVE BLADDER STONE) N/A 12/20/2017    [facility]    CYSTOSCOPY W/ LASER LITHOTRIPSY      CYSTOSCOPY,  OCCLUSION BALLOON PLACEMENT, PERC ACCESS  1/19/2018    [facility]    CYSTOSCOPY, RETROGRADE, URETEROSCOPY, STENT PLACEMENT Right 3/5/2018    [facility]    CYSTOSCOPY/ RETROGRADE PYELOGRAM/ WITH JJ URETERAL STENT PLACEMENT RIGHT Right 12/20/2017    [facility]    EXCHANGE CATHETER-SUPRAPUBIC  1/19/2018    [facility]    EXTRACTION-STONE-URETEROSCOPY Right 3/5/2018    [facility]    JOINT REPLACEMENT      bilateral knee    LITHOTRIPSY-LASER Right 3/5/2018    [facility]    LITHOTRIPSY-LASER Right 1/29/2018    [facility]    LITHOTRIPSY-LASER Right 1/19/2018    [facility]    NEPHROLITHOTOMY-PERCUTANEOUS Right 1/19/2018    [facility]    NEPHROSTOLITHOTOMY-PERCUTANEOUS Right 1/29/2018    [facility]    NEPHROSTOMY Right 1/29/2018    [facility]    PLACEMENT  1/19/2018    [facility]    PLACEMENT-RENAL ACCESS Right 1/19/2018    [facility]    PLACEMENT-STENT Right 1/29/2018    [facility]    Procedure is a Left Genicular Nerve Block ** cpt code 61367** Left 9/16/2015    [facility]    PYELOGRAM-ANTEGRADE Right 1/29/2018    [facility]    PYELOGRAM-ANTEGRADE  1/19/2018    [facility]     PYELOGRAM-RETROGRADE  2018    [facility]    RADIOFREQUENCY THERMOCOAGULATION** Left genicular RFA ** Left 2016    [facility]    REMOVAL-STENT-URETERAL  3/5/2018    Performed by [facility] at [facility] OR    REMOVAL-STENT-URETERAL (Cystoscopy) Right 2018    [facility]    URETEROSCOPY Right 2018    [facility]    URETEROSCOPY Right 2018    [facility] at [facility] OR     Family History   Problem Relation Age of Onset    Dementia Mother     Anxiety disorder Brother     Depression Brother      Social History     Tobacco Use    Smoking status: Former Smoker     Last attempt to quit:      Years since quittin.5    Smokeless tobacco: Never Used   Substance Use Topics    Alcohol use: Yes     Comment: occasional    Drug use: Yes     Comment: prescribed opioids at present for pain     Review of Systems   Constitutional: Negative for fever.   HENT: Negative for sore throat.    Eyes: Negative for visual disturbance.   Respiratory: Negative for shortness of breath.    Cardiovascular: Negative for chest pain.   Gastrointestinal: Negative for abdominal pain.   Genitourinary: Negative for dysuria.   Musculoskeletal: Negative for back pain.   Skin: Negative for rash.        Positive for swelling, warmth, and pain to left knee   Neurological: Negative for headaches.   Psychiatric/Behavioral: Negative for confusion.       Physical Exam     Initial Vitals [19]   BP Pulse Resp Temp SpO2   120/78 74 18 98.2 °F (36.8 °C) (!) 94 %      MAP       --         Physical Exam    Nursing note and vitals reviewed.  Constitutional: She appears well-developed and well-nourished.   HENT:   Head: Normocephalic and atraumatic.   Eyes: EOM are normal. Pupils are equal, round, and reactive to light.   Neck: Neck supple. No thyromegaly present.  No JVD present.   Cardiovascular: Normal rate and regular rhythm. Exam reveals no gallop and no friction rub.    No murmur heard.  Pulmonary/Chest: Breath sounds normal. No respiratory distress.   Abdominal: Soft. Bowel sounds are normal. There is no tenderness.   Musculoskeletal: She exhibits no edema or tenderness.   Bilateral paraplegia     Neurological: She is alert and oriented to person, place, and time. She has normal strength. GCS score is 15. GCS eye subscore is 4. GCS verbal subscore is 5. GCS motor subscore is 6.   Skin: Skin is warm and dry. Capillary refill takes less than 2 seconds. Abscess noted.   Abscess with cellulitis to distal aspect of anterior left thigh involving proximal surgical wound from prior remote knee replacement.          ED Course   Procedures  Labs Reviewed   CBC W/ AUTO DIFFERENTIAL - Abnormal; Notable for the following components:       Result Value    Mean Corpuscular Hemoglobin 25.8 (*)     Mean Corpuscular Hemoglobin Conc 29.7 (*)     RDW 15.1 (*)     All other components within normal limits   COMPREHENSIVE METABOLIC PANEL - Abnormal; Notable for the following components:    Chloride 94 (*)     CO2 37 (*)     Total Protein 8.7 (*)     eGFR if  56 (*)     eGFR if non  48 (*)     All other components within normal limits   URINALYSIS - Abnormal; Notable for the following components:    Appearance, UA Cloudy (*)     Occult Blood UA 3+ (*)     Leukocytes, UA 3+ (*)     All other components within normal limits   URINALYSIS MICROSCOPIC - Abnormal; Notable for the following components:    RBC, UA 10 (*)     WBC, UA 75 (*)     WBC Clumps, UA Moderate (*)     Bacteria Many (*)     Non-Squam Epith 1 (*)     All other components within normal limits   SEDIMENTATION RATE - Abnormal; Notable for the following components:    Sed Rate 47 (*)     All other components within normal limits   C-REACTIVE PROTEIN - Abnormal; Notable for the following components:     CRP 58.9 (*)     All other components within normal limits   CULTURE, BLOOD   CULTURE, BLOOD   LACTIC ACID, PLASMA   PROTIME-INR          Imaging Results          X-Ray Knee 1 or 2 View Left (Final result)  Result time 07/16/19 23:54:23    Final result by Johnnie Lopez MD (07/16/19 23:54:23)                 Impression:      Increased 15 degrees valgus across the long-stemmed constrained knee arthroplasty.  No loosening or fracture or migration.      Electronically signed by: Johnnie Lopez  Date:    07/16/2019  Time:    23:54             Narrative:    EXAMINATION:  XR KNEE 1 OR 2 VIEW LEFT    CLINICAL HISTORY:  Knee pain;    TECHNIQUE:  04/06/2015 plain films    COMPARISON:  Frontal, oblique and lateral views    FINDINGS:  There is a long-stemmed total knee arthroplasty with constrained component.  There is about 15 degrees of valgus somewhat increased compared with 04/16/2015.  The tibial component is cemented.  The patella is diminutive probably related to revisions.  No loosening or fracture or migration.  The subjective severe decreased bone density diffusely.  There are overlying hands.                                 Medical Decision Making:   Initial Assessment:   This is a 63 y.o. female with history of knee replacement and COPD who presents to the ED complaining of left knee swelling, warmth, and pain (rated 5/10) for 2 days. The patient reports fever. She used neosporin and guaze with no alleviation. The patient denies draining and trauma to that leg. Her daughter in law reports darkening of the skin around the surgical wound.  Differential Diagnosis:   Differential diagnosis includes but is not limited to:   Cellulitis, abscess, infected hardware, osteomyelitis,       Patient presents with appears to be an abscess with surrounding cellulitis involving the proximal well-healed surgical wound to her left knee replacement.  I discussed the case with Dr. Phoenix with Orthopedics who advised me not to  perform an incision and drainage into this region.  Recommends I admit the patient for IV antibiotics and consultation to him.  Patient despite being on 10 mg Percocets at home had an adverse reaction to IV Dilaudid 1 mg.  She became lethargic and apneic and hypotensive.  Reversed with Narcan twice.  Has maintained alertness and respiratory rate and blood pressure since.  Will treat with p.o. analgesics from this point on.  Discussed with Dr. Billings who accepted the patient.          Scribe Attestation:   Scribe #1: I performed the above scribed service and the documentation accurately describes the services I performed. I attest to the accuracy of the note.               Clinical Impression:       ICD-10-CM ICD-9-CM   1. Abscess of left thigh L02.416 682.6   2. Cellulitis of left thigh L03.116 682.6   3. Urinary tract infection associated with catheterization of urinary tract, unspecified indwelling urinary catheter type, initial encounter T83.511A 996.64    N39.0 599.0                           Scribe attestation: I, Jeff Spivey, personally performed the services described in this documentation. All medical record entries made by the scribe were at my direction and in my presence. I have reviewed the chart and agree that the record reflects my personal performance and is accurate and complete         Jeff Spivey MD  07/17/19 [id number]

## 2019-07-17 NOTE — SUBJECTIVE & OBJECTIVE
Past Medical History:   Diagnosis Date    Addiction to drug     Alcohol abuse     Anxiety     Arthritis     Asthma     Bipolar disorder     Bladder stones     CHF (congestive heart failure)     COPD (chronic obstructive pulmonary disease)     on home o2    CVA (cerebral vascular accident)     2012    Hepatitis C     treated and cured    History of blood clots     Hx of psychiatric care     Hypertension     Belen     ADEEL treated with BiPAP     Paraplegia     Paraplegic spinal paralysis     Psychiatric problem     Psychosis     AVH; Paranoia    Renal disorder     SCI (spinal cord injury)     incomplete    Sleep difficulties     has sleep apnea and uses a Bi-PAP machine.    Substance abuse     Suprapubic catheter     Therapy     Vaginal delivery     x1       Past Surgical History:   Procedure Laterality Date    BACK SURGERY      bilateral knee replacement      BREAST BIOPSY      cysto/lithopaxy 2017      CYSTOLITHOLOPAXY (REMOVE BLADDER STONE) N/A 12/20/2017    [facility]    CYSTOSCOPY W/ LASER LITHOTRIPSY      CYSTOSCOPY,  OCCLUSION BALLOON PLACEMENT, PERC ACCESS  1/19/2018    [facility]    CYSTOSCOPY, RETROGRADE, URETEROSCOPY, STENT PLACEMENT Right 3/5/2018    [facility]    CYSTOSCOPY/ RETROGRADE PYELOGRAM/ WITH JJ URETERAL STENT PLACEMENT RIGHT Right 12/20/2017    [facility] OR    EXCHANGE CATHETER-SUPRAPUBIC  1/19/2018    [facility]    EXTRACTION-STONE-URETEROSCOPY Right 3/5/2018    [facility]    JOINT REPLACEMENT      bilateral knee    LITHOTRIPSY-LASER Right 3/5/2018    [facility]    LITHOTRIPSY-LASER Right 1/29/2018    [facility]    LITHOTRIPSY-LASER Right 1/19/2018    [facility]    NEPHROLITHOTOMY-PERCUTANEOUS Right 1/19/2018    [facility]    NEPHROSTOLITHOTOMY-PERCUTANEOUS Right 1/29/2018    [facility]    NEPHROSTOMY Right 1/29/2018    [facility]    PLACEMENT  1/19/2018    [facility]    PLACEMENT-RENAL ACCESS Right 1/19/2018    [facility]    PLACEMENT-STENT Right 1/29/2018    [facility]    Procedure is a Left Genicular Nerve Block ** cpt code 77087** Left 9/16/2015    [facility]    PYELOGRAM-ANTEGRADE Right 1/29/2018    [facility]    PYELOGRAM-ANTEGRADE  1/19/2018    [facility]    PYELOGRAM-RETROGRADE  1/19/2018    [facility]    RADIOFREQUENCY THERMOCOAGULATION** Left genicular RFA ** Left 1/20/2016    [facility]    REMOVAL-STENT-URETERAL  3/5/2018    [facility]    REMOVAL-STENT-URETERAL (Cystoscopy) Right 4/9/2018    [facility]    URETEROSCOPY Right 1/29/2018    [facility]    URETEROSCOPY Right 1/19/2018    [facility]       Review of patient's allergies indicates:   Allergen Reactions    Rocephin [ceftriaxone] Rash       No current facility-administered medications on file prior to encounter.      Current Outpatient Medications on File Prior to Encounter   Medication Sig    baclofen (LIORESAL) 20 MG tablet 10 mg 4 (four) times daily.     bumetanide (BUMEX) 2 MG tablet Take 2 mg by mouth.    FLUoxetine 10 MG capsule Take 1 capsule (10 mg total) by mouth once daily.    gabapentin (NEURONTIN) 300 MG capsule 600 mg 3 (three) times daily.     ipratropium  (ATROVENT) 0.02 % nebulizer solution U 1 VIAL VIA NEBULIZER Q 4 H WHILE AWAKE    OLANZapine (ZYPREXA) 5 MG tablet Take 1 tablet (5 mg total) by mouth every evening.    oxybutynin (DITROPAN-XL) 5 MG TR24 TAKE 1 TABLET BY MOUTH EVERY DAY    oxyCODONE-acetaminophen (PERCOCET)  mg per tablet Take 1 tablet by mouth every 4 (four) hours as needed for Pain.    potassium chloride (KLOR-CON) 10 MEQ TbSR     trazodone (DESYREL) 100 MG tablet 100 mg every evening. 1/2 tablet by mouth every evening     catheter 18 Fr Misc Change every 20 days    hydrOXYzine HCl (ATARAX) 25 MG tablet Take 1 tablet (25 mg total) by mouth 3 (three) times daily.    promethazine (PHENERGAN) 25 MG tablet TAKE 1 TABLET BY MOUTH EVERY 8 HOURS AS NEEDED FOR NAUSEA AND VOMITING    VENTOLIN HFA 90 mcg/actuation inhaler      Family History     Problem Relation (Age of Onset)    Anxiety disorder Brother    Dementia Mother    Depression Brother        Tobacco Use    Smoking status: Former Smoker     Last attempt to quit: 2002     Years since quittin.5    Smokeless tobacco: Never Used   Substance and Sexual Activity    Alcohol use: Yes     Comment: occasional    Drug use: Yes     Comment: prescribed opioids at present for pain    Sexual activity: Never     Partners: Male     Comment: since      Review of Systems   Constitutional: Positive for fever. Negative for chills.   HENT: Negative for congestion and rhinorrhea.    Eyes: Negative for photophobia and visual disturbance.   Respiratory: Negative for cough and shortness of breath.    Cardiovascular: Negative for chest pain and leg swelling.   Gastrointestinal: Negative for abdominal pain, nausea and vomiting.   Genitourinary: Negative for hematuria and pelvic pain.   Musculoskeletal: Positive for arthralgias and joint swelling. Negative for neck stiffness.   Skin: Positive for color change. Negative for wound.   Neurological: Negative for dizziness and light-headedness.    Psychiatric/Behavioral: Negative for agitation and behavioral problems.     Objective:     Vital Signs (Most Recent):  Temp: 97.8 °F (36.6 °C) (07/17/19 1110)  Pulse: 66 (07/17/19 1110)  Resp: 18 (07/17/19 1110)  BP: 133/86 (07/17/19 1110)  SpO2: 96 % (07/17/19 1110) Vital Signs (24h Range):  Temp:  [97.8 °F (36.6 °C)-98.8 °F (37.1 °C)] 97.8 °F (36.6 °C)  Pulse:  [54-84] 66  Resp:  [8-18] 18  SpO2:  [87 %-100 %] 96 %  BP: ()/(46-98) 133/86     Weight: 107.1 kg (236 lb 1.8 oz)  Body mass index is 39.29 kg/m².    Physical Exam   Constitutional: She is oriented to person, place, and time. She appears well-developed and well-nourished. No distress.   HENT:   Right Ear: External ear normal.   Left Ear: External ear normal.   Nose: Nose normal.   Eyes: Pupils are equal, round, and reactive to light. EOM are normal. No scleral icterus.   Neck: Normal range of motion. Neck supple. No thyromegaly present.   Cardiovascular: Normal rate and normal heart sounds. Exam reveals no friction rub.   No murmur heard.  Pulmonary/Chest: Effort normal and breath sounds normal. No respiratory distress. She has no wheezes. She has no rales.   Abdominal: Soft. Bowel sounds are normal. She exhibits no mass. There is no tenderness.   Morbidly obese   Genitourinary:   Genitourinary Comments: Chronic indwelling Wolfe. Cloudy urine.   Musculoskeletal:   Leg knee slightly more edematous than right with limited ROM due to pain. She has a well healed surgical incision that she claims if more edematous than usual. No calor noted.   Neurological: She is alert and oriented to person, place, and time. No cranial nerve deficit.   Skin: Skin is warm and dry. No pallor.   Psychiatric: She has a normal mood and affect. Thought content normal.         CRANIAL NERVES     CN III, IV, VI   Pupils are equal, round, and reactive to light.  Extraocular motions are normal.        Significant Labs: All pertinent labs within the past 24 hours have been  reviewed.    Significant Imaging: I have reviewed and interpreted all pertinent imaging results/findings within the past 24 hours.

## 2019-07-17 NOTE — ED NOTES
Pt took off bipap and stating that she does not want to wear it at this time; pt informed on need for bipap due to pt's sleep apnea but still refusing to wear it at this time; pt placed on 4L nasal canula and tolerates well.

## 2019-07-17 NOTE — NURSING TRANSFER
Nursing Transfer Note      7/17/2019     Transfer From: ED; From: Obs room 317    Transfer via stretcher    Transfer with Portable O2 to Wall O2 at 4LNC    Transported by ED tech    Medicines sent: No    Chart send with patient: Yes    Notified: son & daughter in law    Patient reassessed at: 0545 on 7/17/2019    Upon arrival to floor: Pt assisted from stretcher to bed with 3 assist. Cardiac monitor applied, patient oriented to room, call bell in reach and bed in lowest position and SR up x3. Pt reported pain to left knee during movement, otherwise pt denied pain. Suprapubic catheter is draining yellow urine. Skin is dirty, moist, with excoriation under left breast. Assessment completed per Doc Flow sheet. Will continue to monitor pt closely.

## 2019-07-17 NOTE — ED NOTES
"Pt now awake and alert stating "I'm waking up now, i'm back"; no distress noted; MD at bedside assessing pt and speaking with pt and daughter in law about results and admission  "

## 2019-07-17 NOTE — ASSESSMENT & PLAN NOTE
Started on broad spectrum antibiotics. Past resistance patterns noted. Possible colonization? Foul smelling urine but no systemic symptoms.

## 2019-07-18 PROBLEM — J96.01 ACUTE RESPIRATORY FAILURE WITH HYPOXIA AND HYPERCARBIA: Status: ACTIVE | Noted: 2019-07-18

## 2019-07-18 PROBLEM — E87.70 VOLUME OVERLOAD: Status: ACTIVE | Noted: 2019-07-18

## 2019-07-18 PROBLEM — J96.02 ACUTE RESPIRATORY FAILURE WITH HYPOXIA AND HYPERCARBIA: Status: ACTIVE | Noted: 2019-07-18

## 2019-07-18 LAB
ALLENS TEST: ABNORMAL
ANION GAP SERPL CALC-SCNC: 7 MMOL/L (ref 8–16)
AORTIC ROOT ANNULUS: 3.45 CM
AORTIC VALVE CUSP SEPERATION: 2.25 CM
ASCENDING AORTA: 2.97 CM
AV INDEX (PROSTH): 1.01
AV MEAN GRADIENT: 8 MMHG
AV PEAK GRADIENT: 14 MMHG
AV VALVE AREA: 3.69 CM2
AV VELOCITY RATIO: 0.83
BNP SERPL-MCNC: 428 PG/ML (ref 0–99)
BSA FOR ECHO PROCEDURE: 2.14 M2
BUN SERPL-MCNC: 24 MG/DL (ref 8–23)
CALCIUM SERPL-MCNC: 9.2 MG/DL (ref 8.7–10.5)
CHLORIDE SERPL-SCNC: 94 MMOL/L (ref 95–110)
CO2 SERPL-SCNC: 36 MMOL/L (ref 23–29)
CREAT SERPL-MCNC: 1.5 MG/DL (ref 0.5–1.4)
CV ECHO LV RWT: 0.5 CM
DELSYS: ABNORMAL
DOP CALC AO PEAK VEL: 1.89 M/S
DOP CALC AO VTI: 29.75 CM
DOP CALC LVOT AREA: 3.7 CM2
DOP CALC LVOT DIAMETER: 2.16 CM
DOP CALC LVOT PEAK VEL: 1.57 M/S
DOP CALC LVOT STROKE VOLUME: 109.91 CM3
DOP CALCLVOT PEAK VEL VTI: 30.01 CM
E WAVE DECELERATION TIME: 242.05 MSEC
E/A RATIO: 0.93
E/E' RATIO: 9.38 M/S
ECHO LV POSTERIOR WALL: 1.06 CM (ref 0.6–1.1)
EP: 5
ERYTHROCYTE [SEDIMENTATION RATE] IN BLOOD BY WESTERGREN METHOD: 14 MM/H
ERYTHROCYTE [SEDIMENTATION RATE] IN BLOOD BY WESTERGREN METHOD: 20 MM/H
EST. GFR  (AFRICAN AMERICAN): 42 ML/MIN/1.73 M^2
EST. GFR  (NON AFRICAN AMERICAN): 37 ML/MIN/1.73 M^2
FIO2: 25
FIO2: 35
FRACTIONAL SHORTENING: 45 % (ref 28–44)
GLUCOSE SERPL-MCNC: 153 MG/DL (ref 70–110)
GRAM STN SPEC: NORMAL
GRAM STN SPEC: NORMAL
HCO3 UR-SCNC: 38.6 MMOL/L (ref 24–28)
HCO3 UR-SCNC: 38.9 MMOL/L (ref 24–28)
HCO3 UR-SCNC: 39.3 MMOL/L (ref 24–28)
HCO3 UR-SCNC: 40.6 MMOL/L (ref 24–28)
INTERVENTRICULAR SEPTUM: 1.25 CM (ref 0.6–1.1)
IP: 14
IP: 20
IVRT: 0.08 MSEC
LA MAJOR: 5.22 CM
LA MINOR: 5.49 CM
LA WIDTH: 2.82 CM
LEFT ATRIUM SIZE: 3.35 CM
LEFT ATRIUM VOLUME INDEX: 20 ML/M2
LEFT ATRIUM VOLUME: 42.97 CM3
LEFT INTERNAL DIMENSION IN SYSTOLE: 2.33 CM (ref 2.1–4)
LEFT VENTRICLE DIASTOLIC VOLUME INDEX: 36.84 ML/M2
LEFT VENTRICLE DIASTOLIC VOLUME: 79.03 ML
LEFT VENTRICLE MASS INDEX: 79 G/M2
LEFT VENTRICLE SYSTOLIC VOLUME INDEX: 8.7 ML/M2
LEFT VENTRICLE SYSTOLIC VOLUME: 18.71 ML
LEFT VENTRICULAR INTERNAL DIMENSION IN DIASTOLE: 4.21 CM (ref 3.5–6)
LEFT VENTRICULAR MASS: 169.12 G
LV LATERAL E/E' RATIO: 6.25 M/S
LV SEPTAL E/E' RATIO: 18.75 M/S
MIN VOL: 5
MIN VOL: 9
MODE: ABNORMAL
MV PEAK A VEL: 0.81 M/S
MV PEAK E VEL: 0.75 M/S
PCO2 BLDA: 116.2 MMHG (ref 35–45)
PCO2 BLDA: 88.7 MMHG (ref 35–45)
PCO2 BLDA: 90.4 MMHG (ref 35–45)
PCO2 BLDA: 94.6 MMHG (ref 35–45)
PH SMN: 7.13 [PH] (ref 7.35–7.45)
PH SMN: 7.22 [PH] (ref 7.35–7.45)
PH SMN: 7.25 [PH] (ref 7.35–7.45)
PH SMN: 7.26 [PH] (ref 7.35–7.45)
PISA TR MAX VEL: 3.41 M/S
PO2 BLDA: 50 MMHG (ref 80–100)
PO2 BLDA: 51 MMHG (ref 80–100)
PO2 BLDA: 62 MMHG (ref 80–100)
PO2 BLDA: 89 MMHG (ref 80–100)
POC BE: 10 MMOL/L
POC BE: 6 MMOL/L
POC BE: 7 MMOL/L
POC BE: 9 MMOL/L
POC SATURATED O2: 76 % (ref 95–100)
POC SATURATED O2: 77 % (ref 95–100)
POC SATURATED O2: 84 % (ref 95–100)
POC SATURATED O2: 92 % (ref 95–100)
POC TCO2: 41 MMOL/L (ref 23–27)
POC TCO2: 42 MMOL/L (ref 23–27)
POC TCO2: 42 MMOL/L (ref 23–27)
POC TCO2: 43 MMOL/L (ref 23–27)
POTASSIUM SERPL-SCNC: 3.8 MMOL/L (ref 3.5–5.1)
PV PEAK VELOCITY: 1.47 CM/S
RA MAJOR: 6.19 CM
RA PRESSURE: 8 MMHG
RA WIDTH: 5.49 CM
RIGHT VENTRICULAR END-DIASTOLIC DIMENSION: 4.65 CM
RV TISSUE DOPPLER FREE WALL SYSTOLIC VELOCITY 1 (APICAL 4 CHAMBER VIEW): 14.79 CM/S
SAMPLE: ABNORMAL
SINUS: 3.45 CM
SITE: ABNORMAL
SODIUM SERPL-SCNC: 137 MMOL/L (ref 136–145)
SP02: 91
SP02: 91
SP02: 97
SP02: 97
SPONT RATE: 20
SPONT RATE: 20
SPONT RATE: 24
STJ: 2.98 CM
TDI LATERAL: 0.12 M/S
TDI SEPTAL: 0.04 M/S
TDI: 0.08 M/S
TR MAX PG: 47 MMHG
TRICUSPID ANNULAR PLANE SYSTOLIC EXCURSION: 2.34 CM
TROPONIN I SERPL DL<=0.01 NG/ML-MCNC: 0.16 NG/ML (ref 0–0.03)
TV REST PULMONARY ARTERY PRESSURE: 55 MMHG

## 2019-07-18 PROCEDURE — 93005 ELECTROCARDIOGRAM TRACING: CPT

## 2019-07-18 PROCEDURE — 25000242 PHARM REV CODE 250 ALT 637 W/ HCPCS: Performed by: INTERNAL MEDICINE

## 2019-07-18 PROCEDURE — 20000000 HC ICU ROOM

## 2019-07-18 PROCEDURE — 80048 BASIC METABOLIC PNL TOTAL CA: CPT

## 2019-07-18 PROCEDURE — 82803 BLOOD GASES ANY COMBINATION: CPT

## 2019-07-18 PROCEDURE — 63600175 PHARM REV CODE 636 W HCPCS: Performed by: EMERGENCY MEDICINE

## 2019-07-18 PROCEDURE — 84484 ASSAY OF TROPONIN QUANT: CPT

## 2019-07-18 PROCEDURE — 63600175 PHARM REV CODE 636 W HCPCS: Performed by: INTERNAL MEDICINE

## 2019-07-18 PROCEDURE — 99291 CRITICAL CARE FIRST HOUR: CPT | Mod: ,,, | Performed by: INTERNAL MEDICINE

## 2019-07-18 PROCEDURE — 36415 COLL VENOUS BLD VENIPUNCTURE: CPT

## 2019-07-18 PROCEDURE — 94761 N-INVAS EAR/PLS OXIMETRY MLT: CPT

## 2019-07-18 PROCEDURE — 94660 CPAP INITIATION&MGMT: CPT

## 2019-07-18 PROCEDURE — 25000003 PHARM REV CODE 250: Performed by: EMERGENCY MEDICINE

## 2019-07-18 PROCEDURE — 93010 ELECTROCARDIOGRAM REPORT: CPT | Mod: ,,, | Performed by: INTERNAL MEDICINE

## 2019-07-18 PROCEDURE — 93010 EKG 12-LEAD: ICD-10-PCS | Mod: ,,, | Performed by: INTERNAL MEDICINE

## 2019-07-18 PROCEDURE — 27000221 HC OXYGEN, UP TO 24 HOURS

## 2019-07-18 PROCEDURE — 99900035 HC TECH TIME PER 15 MIN (STAT)

## 2019-07-18 PROCEDURE — 83880 ASSAY OF NATRIURETIC PEPTIDE: CPT

## 2019-07-18 PROCEDURE — 94640 AIRWAY INHALATION TREATMENT: CPT

## 2019-07-18 PROCEDURE — 99291 PR CRITICAL CARE, E/M 30-74 MINUTES: ICD-10-PCS | Mod: ,,, | Performed by: INTERNAL MEDICINE

## 2019-07-18 PROCEDURE — 36600 WITHDRAWAL OF ARTERIAL BLOOD: CPT

## 2019-07-18 PROCEDURE — 25000003 PHARM REV CODE 250: Performed by: INTERNAL MEDICINE

## 2019-07-18 RX ORDER — FUROSEMIDE 10 MG/ML
80 INJECTION INTRAMUSCULAR; INTRAVENOUS 2 TIMES DAILY
Status: DISCONTINUED | OUTPATIENT
Start: 2019-07-18 | End: 2019-07-19

## 2019-07-18 RX ORDER — HEPARIN SODIUM 5000 [USP'U]/ML
5000 INJECTION, SOLUTION INTRAVENOUS; SUBCUTANEOUS EVERY 8 HOURS
Status: DISCONTINUED | OUTPATIENT
Start: 2019-07-18 | End: 2019-07-26 | Stop reason: HOSPADM

## 2019-07-18 RX ORDER — IPRATROPIUM BROMIDE AND ALBUTEROL SULFATE 2.5; .5 MG/3ML; MG/3ML
3 SOLUTION RESPIRATORY (INHALATION) EVERY 6 HOURS
Status: DISCONTINUED | OUTPATIENT
Start: 2019-07-18 | End: 2019-07-26 | Stop reason: HOSPADM

## 2019-07-18 RX ORDER — NALOXONE HCL 0.4 MG/ML
0.4 VIAL (ML) INJECTION ONCE
Status: COMPLETED | OUTPATIENT
Start: 2019-07-18 | End: 2019-07-18

## 2019-07-18 RX ADMIN — NALOXONE HYDROCHLORIDE 0.4 MG: 0.4 INJECTION, SOLUTION INTRAMUSCULAR; INTRAVENOUS; SUBCUTANEOUS at 10:07

## 2019-07-18 RX ADMIN — NALOXONE HYDROCHLORIDE 0.4 MG: 0.4 INJECTION, SOLUTION INTRAMUSCULAR; INTRAVENOUS; SUBCUTANEOUS at 02:07

## 2019-07-18 RX ADMIN — NALOXONE HYDROCHLORIDE 0.4 MG: 0.4 INJECTION, SOLUTION INTRAMUSCULAR; INTRAVENOUS; SUBCUTANEOUS at 05:07

## 2019-07-18 RX ADMIN — PIPERACILLIN, TAZOBACTAM 4.5 G: 4; .5 INJECTION, POWDER, LYOPHILIZED, FOR SOLUTION INTRAVENOUS at 04:07

## 2019-07-18 RX ADMIN — HEPARIN SODIUM 5000 UNITS: 5000 INJECTION, SOLUTION INTRAVENOUS; SUBCUTANEOUS at 09:07

## 2019-07-18 RX ADMIN — FUROSEMIDE 80 MG: 10 INJECTION, SOLUTION INTRAVENOUS at 12:07

## 2019-07-18 RX ADMIN — ONDANSETRON 4 MG: 2 INJECTION INTRAMUSCULAR; INTRAVENOUS at 12:07

## 2019-07-18 RX ADMIN — PIPERACILLIN, TAZOBACTAM 4.5 G: 4; .5 INJECTION, POWDER, LYOPHILIZED, FOR SOLUTION INTRAVENOUS at 09:07

## 2019-07-18 RX ADMIN — VANCOMYCIN HYDROCHLORIDE 2250 MG: 750 INJECTION, POWDER, LYOPHILIZED, FOR SOLUTION INTRAVENOUS at 02:07

## 2019-07-18 RX ADMIN — IPRATROPIUM BROMIDE AND ALBUTEROL SULFATE 3 ML: .5; 3 SOLUTION RESPIRATORY (INHALATION) at 07:07

## 2019-07-18 NOTE — PROGRESS NOTES
Pt was received on BiPAP 10/5@35% sats 93%. Pt was really sleepy put pt on 2L NC sats 86-88%. Put pt back on BiPAP increased IPAP to 14. Pt on 14/5@ 35% sats 97% RN aware.

## 2019-07-18 NOTE — PROGRESS NOTES
[facility]  Pulmonology  Progress Note    Patient Name: Mary Ellen Fajardo  MRN: [id number]  Admission Date: 7/16/2019  Hospital Length of Stay: 1 days  Code Status: Full Code  Attending Provider: Cora Briggs MD  Primary Care Provider: Any Tran MD   Principal Problem: Septic arthritis of knee, left     Reason For Consult: Acute on Chronic Hypercarbic Respiratory Failure    Subjective:     HPI:  Mary Ellen Fajardo is a 63 y.o. female PMH ADEEL/OHS, HFpEF, COPD (no PFTs on file), spinal cord injury, supra-pubic catheter presented to ED 7/17 with swelling, erythema, and drainage from left. She was admitted with concern for septic arthritis with attempted tapping of left knee by ortho, but not enough fluid to send for analysis.  Patient was started on broad spectrum abx and admitted to the floor.  Of note she received 1 mg of diluadid followed by several dose of narcan.  Then she subsequently received some oxy and was started back on her gabapentin.  She was found to more more somnolent this am. FSBG was wnl. Abg was significant for acute on chronic hypercarbia.  At that time, she denied chest pain/SOB/Fever/chills.  Just noted some left knee pain.    Past Medical History:   Diagnosis Date    Addiction to drug     Alcohol abuse     Anxiety     Arthritis     Asthma     Bipolar disorder     Bladder stones     CHF (congestive heart failure)     COPD (chronic obstructive pulmonary disease)     on home o2    CVA (cerebral vascular accident)     2012    Hepatitis C     treated and cured    History of blood clots     Hx of psychiatric care     Hypertension     Belen     ADEEL treated with BiPAP     Paraplegia     Paraplegic spinal paralysis     Psychiatric problem     Psychosis     AVH; Paranoia    Renal disorder     SCI (spinal cord injury)     incomplete    Sleep difficulties     has sleep apnea and uses a Bi-PAP machine.    Substance abuse     Suprapubic  catheter     Therapy     Vaginal delivery     x1       Past Surgical History:   Procedure Laterality Date    BACK SURGERY      bilateral knee replacement      BREAST BIOPSY      cysto/lithopaxy 2017      CYSTOLITHOLOPAXY (REMOVE BLADDER STONE) N/A 12/20/2017    Performed by RAMA Tinoco MD at [facility] OR    CYSTOSCOPY W/ LASER LITHOTRIPSY      CYSTOSCOPY,  OCCLUSION BALLOON PLACEMENT, PERC ACCESS  1/19/2018    [facility]formed by [facility] at [facility] OR    CYSTOSCOPY, RETROGRADE, URETEROSCOPY, STENT PLACEMENT Right 3/5/2018    [facility]formed by [facility] at [facility]    CYSTOSCOPY/ RETROGRADE PYELOGRAM/ WITH JJ URETERAL STENT PLACEMENT RIGHT Right 12/20/2017    [facility]formed by [facility] at [facility] OR    EXCHANGE CATHETER-SUPRAPUBIC  1/19/2018    [facility]formed by [facility] at [facility]    EXTRACTION-STONE-URETEROSCOPY Right 3/5/2018    [facility] by [facility] at [facility] OR    JOINT REPLACEMENT      bilateral knee    LITHOTRIPSY-LASER Right 3/5/2018    [facility]formed by [facility] at [facility]    LITHOTRIPSY-LASER Right 1/29/2018    [facility]formed by [facility] at [facility]    LITHOTRIPSY-LASER Right 1/19/2018    Performed by [facility] at [facility] OR    NEPHROLITHOTOMY-PERCUTANEOUS Right 1/19/2018    [facility]formed by [facility] at [facility]    NEPHROSTOLITHOTOMY-PERCUTANEOUS Right 1/29/2018    Performed by [facility] at [facility]    NEPHROSTOMY Right 1/29/2018    Performed by [facility] at [facility]    PLACEMENT  1/19/2018    [facility]formed by [facility] at [facility]    PLACEMENT-RENAL ACCESS Right 1/19/2018    Performed by [facility]oco [facility] at [facility]    PLACEMENT-STENT Right 1/29/2018    Performed by [facility] at [facility]    Procedure is a Left Genicular Nerve Block ** cpt code 52882** Left 9/16/2015    [facility] by [facility] at [facility]    PYELOGRAM-ANTEGRADE Right 1/29/2018    Performed by RAMA Gonzalez  MD Alejandrina at [facility] OR    PYELOGRAM-ANTEGRADE  2018    Performed by [facility]oco [facility] at [facility] OR    PYELOGRAM-RETROGRADE  2018    Performed by [facility]oco MD at [facility] OR    RADIOFREQUENCY THERMOCOAGULATION** Left genicular RFA ** Left 2016    Performed by [facility] Castle [facility] at [facility]    REMOVAL-STENT-URETERAL  3/5/2018    Performed by [facility]oco MD at [facility] OR    REMOVAL-STENT-URETERAL (Cystoscopy) Right 2018    Performed by [facility]oco MD at [facility] OR    URETEROSCOPY Right 2018    Performed by [facility]oco MD at [facility] OR    URETEROSCOPY Right 2018    Performed by RAMA Tinoco MD at [facility] OR       Review of patient's allergies indicates:   Allergen Reactions    Rocephin [ceftriaxone] Rash       Family History     Problem Relation (Age of Onset)    Anxiety disorder Brother    Dementia Mother    Depression Brother        Tobacco Use    Smoking status: Former Smoker     Last attempt to quit: 2002     Years since quittin.5    Smokeless tobacco: Never Used   Substance and Sexual Activity    Alcohol use: Yes     Comment: occasional    Drug use: Yes     Comment: prescribed opioids at present for pain    Sexual activity: Never     Partners: Male     Comment: since          Review of Systems   Unable to perform ROS: Mental status change     Objective:     Vital Signs (Most Recent):  Temp: 98 °F (36.7 °C) (19 1340)  Pulse: 68 (19 1440)  Resp: (!) 21 (19 1440)  BP: (!) 92/54 (19 1440)  SpO2: 100 % (19 1440) Vital Signs (24h Range):  Temp:  [97 °F (36.1 °C)-98.3 °F (36.8 °C)] 98 °F (36.7 °C)  Pulse:  [57-77] 68  Resp:  [18-29] 21  SpO2:  [90 %-100 %] 100 %  BP: ()/(49-73) 92/54     Weight: 109.8 kg (242 lb 1 oz)  Body mass index is 40.28 kg/m².      Intake/Output Summary (Last 24 hours) at 2019 1456  Last data filed at 2019 1451  Gross per 24 hour   Intake 320 ml   Output 2525 ml   Net -2205 ml        Physical Exam   Constitutional: No distress.   HENT:   Head: Normocephalic.   Right Ear: External ear normal.   Left Ear: External ear normal.   Eyes: Pupils are equal, round, and reactive to light. Right eye exhibits no discharge. Left eye exhibits no discharge.   Neck: Neck supple. No tracheal deviation present.   Cardiovascular: Normal rate and regular rhythm.   Pulmonary/Chest: No respiratory distress. She has no wheezes.   Decreased throughout   Abdominal: Soft. She exhibits no distension and no mass. There is no tenderness. There is no guarding.   Musculoskeletal: She exhibits edema.   Some calor and tenderness of left knee   Neurological:   Awakens, follows commands, able to wiggle toes and give a thumbs up on both sides   Skin: She is not diaphoretic.       Vents:  Oxygen Concentration (%): 35 (07/18/19 1220)    Lines/Drains/Airways     Drain                 Suprapubic Catheter 07/16/19 2240 18 Fr. 1 day          Peripheral Intravenous Line                 Peripheral IV - Single Lumen 07/16/19 2005 20 G Left Hand 1 day                Significant Labs:    CBC/Anemia Profile:  Recent Labs   Lab 07/16/19 2250   WBC 7.04   HGB 12.0   HCT 40.4      MCV 87   RDW 15.1*        Chemistries:  Recent Labs   Lab 07/16/19  2250 07/18/19  0527    137   K 3.6 3.8   CL 94* 94*   CO2 37* 36*   BUN 21 24*   CREATININE 1.2 1.5*   CALCIUM 9.4 9.2   ALBUMIN 3.5  --    PROT 8.7*  --    BILITOT 0.6  --    ALKPHOS 85  --    ALT 10  --    AST 18  --        BNP elevated   EKG without acute ST changes    Significant Imaging:   CXR-BL airspace dz    Assessment/Plan:     Acute respiratory failure with hypoxia and hypercarbia  Assessment:  1. Acute on Chronic Hypercarbic Respiratory Failure-multifactorial: cardiogenic pulmonary edema and narcotic SE on baseline ADEEL/OHS.  No evidence of COPD exacerbation at this time.  2. ADEEL/OHS  3. HFpEF  4. PHTN-group II/III  5. COPD-no PFTs on file, no evidence of exacerbation  6.  Septic Joint    PLAN:  1. Hold narcotics and neurontin, dose of narcan  2. NIV, Repeat abg, nebs  3. Diuresis  4. Abx  5. Heparin          I spent 45 minutes of critical care time    Ac Levy MD  Department of Pulmonary & Critical Care  Cell: [phone]

## 2019-07-18 NOTE — SUBJECTIVE & OBJECTIVE
Past Medical History:   Diagnosis Date    Addiction to drug     Alcohol abuse     Anxiety     Arthritis     Asthma     Bipolar disorder     Bladder stones     CHF (congestive heart failure)     COPD (chronic obstructive pulmonary disease)     on home o2    CVA (cerebral vascular accident)     2012    Hepatitis C     treated and cured    History of blood clots     Hx of psychiatric care     Hypertension     Belen     ADEEL treated with BiPAP     Paraplegia     Paraplegic spinal paralysis     Psychiatric problem     Psychosis     AVH; Paranoia    Renal disorder     SCI (spinal cord injury)     incomplete    Sleep difficulties     has sleep apnea and uses a Bi-PAP machine.    Substance abuse     Suprapubic catheter     Therapy     Vaginal delivery     x1       Past Surgical History:   Procedure Laterality Date    BACK SURGERY      bilateral knee replacement      BREAST BIOPSY      cysto/lithopaxy 2017      CYSTOLITHOLOPAXY (REMOVE BLADDER STONE) N/A 12/20/2017    [facility]formed by [facility] at [facility] OR    CYSTOSCOPY W/ LASER LITHOTRIPSY      CYSTOSCOPY,  OCCLUSION BALLOON PLACEMENT, PERC ACCESS  1/19/2018    [facility] by [facility] at [facility] OR    CYSTOSCOPY, RETROGRADE, URETEROSCOPY, STENT PLACEMENT Right 3/5/2018    [facility] by [facility] at [facility] OR    CYSTOSCOPY/ RETROGRADE PYELOGRAM/ WITH JJ URETERAL STENT PLACEMENT RIGHT Right 12/20/2017    [facility]formed by [facility] at [facility] OR    EXCHANGE CATHETER-SUPRAPUBIC  1/19/2018    [facility]formed by [facility] at [facility]    EXTRACTION-STONE-URETEROSCOPY Right 3/5/2018    [facility] by [facility] at [facility]    JOINT REPLACEMENT      bilateral knee    LITHOTRIPSY-LASER Right 3/5/2018    [facility]formed by [facility] at [facility]    LITHOTRIPSY-LASER Right 1/29/2018    [facility]formed by [facility] at [facility]    LITHOTRIPSY-LASER Right 1/19/2018    [facility] at  [facility]    NEPHROLITHOTOMY-PERCUTANEOUS Right 2018    [facility]formed by [facility] at [facility]    NEPHROSTOLITHOTOMY-PERCUTANEOUS Right 2018    [facility]formed by [facility] at [facility]    NEPHROSTOMY Right 2018    [facility]formed by [facility] at [facility]    PLACEMENT  2018    Per[facility] by [facility] at [facility]    PLACEMENT-RENAL ACCESS Right 2018    [facility]formed by [facility] at [facility]    PLACEMENT-STENT Right 2018    [facility]formed by [facility] at [facility]    Procedure is a Left Genicular Nerve Block ** cpt code 53169** Left 2015    [facility]    PYELOGRAM-ANTEGRADE Right 2018    [facility] by [facility] at [facility]    PYELOGRAM-ANTEGRADE  2018    [facility]formed by [facility] at [facility]    PYELOGRAM-RETROGRADE  2018    [facility]formed by [facility]oco [facility] at [facility]    RADIOFREQUENCY THERMOCOAGULATION** Left genicular RFA ** Left 2016    [facility]    REMOVAL-STENT-URETERAL  3/5/2018    [facility] by [facility] at [facility]    REMOVAL-STENT-URETERAL (Cystoscopy) Right 2018    [facility]formed by [facility]oco [facility] at [facility]    URETEROSCOPY Right 2018    [facility] by [facility] at [facility]    URETEROSCOPY Right 2018    Performed by [facility] Tinoco MD at [facility] OR       Review of patient's allergies indicates:   Allergen Reactions    Rocephin [ceftriaxone] Rash       Family History     Problem Relation (Age of Onset)    Anxiety disorder Brother    Dementia Mother    Depression Brother        Tobacco Use    Smoking status: Former Smoker     Last attempt to quit: 2002     Years since quittin.5    Smokeless tobacco: Never Used   Substance and Sexual Activity    Alcohol use: Yes     Comment: occasional    Drug use: Yes     Comment: prescribed opioids at present for pain    Sexual activity: Never      Partners: Male     Comment: since 2011         Review of Systems   Unable to perform ROS: Mental status change     Objective:     Vital Signs (Most Recent):  Temp: 98 °F (36.7 °C) (07/18/19 1340)  Pulse: 68 (07/18/19 1440)  Resp: (!) 21 (07/18/19 1440)  BP: (!) 92/54 (07/18/19 1440)  SpO2: 100 % (07/18/19 1440) Vital Signs (24h Range):  Temp:  [97 °F (36.1 °C)-98.3 °F (36.8 °C)] 98 °F (36.7 °C)  Pulse:  [57-77] 68  Resp:  [18-29] 21  SpO2:  [90 %-100 %] 100 %  BP: ()/(49-73) 92/54     Weight: 109.8 kg (242 lb 1 oz)  Body mass index is 40.28 kg/m².      Intake/Output Summary (Last 24 hours) at 7/18/2019 1456  Last data filed at 7/18/2019 1451  Gross per 24 hour   Intake 320 ml   Output 2525 ml   Net -2205 ml       Physical Exam   Constitutional: No distress.   HENT:   Head: Normocephalic.   Right Ear: External ear normal.   Left Ear: External ear normal.   Eyes: Pupils are equal, round, and reactive to light. Right eye exhibits no discharge. Left eye exhibits no discharge.   Neck: Neck supple. No tracheal deviation present.   Cardiovascular: Normal rate and regular rhythm.   Pulmonary/Chest: No respiratory distress. She has no wheezes.   Decreased throughout   Abdominal: Soft. She exhibits no distension and no mass. There is no tenderness. There is no guarding.   Musculoskeletal: She exhibits edema.   Some calor and tenderness of left knee   Neurological:   Awakens, follows commands, able to wiggle toes and give a thumbs up on both sides   Skin: She is not diaphoretic.       Vents:  Oxygen Concentration (%): 35 (07/18/19 1220)    Lines/Drains/Airways     Drain                 Suprapubic Catheter 07/16/19 2240 18 Fr. 1 day          Peripheral Intravenous Line                 Peripheral IV - Single Lumen 07/16/19 2005 20 G Left Hand 1 day                Significant Labs:    CBC/Anemia Profile:  Recent Labs   Lab 07/16/19  2250   WBC 7.04   HGB 12.0   HCT 40.4      MCV 87   RDW 15.1*         Chemistries:  Recent Labs   Lab 07/16/19  2250 07/18/19  0527    137   K 3.6 3.8   CL 94* 94*   CO2 37* 36*   BUN 21 24*   CREATININE 1.2 1.5*   CALCIUM 9.4 9.2   ALBUMIN 3.5  --    PROT 8.7*  --    BILITOT 0.6  --    ALKPHOS 85  --    ALT 10  --    AST 18  --        BNP elevated   EKG without acute ST changes    Significant Imaging:   CXR-BL airspace dz

## 2019-07-18 NOTE — ASSESSMENT & PLAN NOTE
Concern for left knee septic arthritis. Appreciate orthopedic surgery recs. Started on broad spectrum antibiotics. Joint aspirate culture NGTD.

## 2019-07-18 NOTE — NURSING
Attempted to call pt's brother All at [phone] to notify him of pt's transfer to ICU. No answer. Will f/u.

## 2019-07-18 NOTE — PROGRESS NOTES
Small amount of bloody drainage present. I suspect that this a Staph infection that has not become fluid enough to drain. She is very sleepy or sedated. She will open her eyes and then go back to sleep.

## 2019-07-18 NOTE — PROGRESS NOTES
Pt was on BiPAP 14/5@35% sats 96%. ABG was done and reported to Dr Briggs. Per MD Increased IPAP 18/ EPAP 8. Dr Levy changed BiPAP settings to 20/5@ 35% sats 97%.

## 2019-07-18 NOTE — HPI
Mary Ellen Fajardo is a 63 y.o. female PMH ADEEL/OHS, HFpEF, COPD (no PFTs on file), spinal cord injury, supra-pubic catheter presented to ED 7/17 with swelling, erythema, and drainage from left. She was admitted with concern for septic arthritis with attempted tapping of left knee by ortho, but not enough fluid to send for analysis.  Patient was started on broad spectrum abx and admitted to the floor.  Of note she received 1 mg of diluadid followed by several dose of narcan.  Then she subsequently received some oxy and was started back on her gabapentin.  She was found to more more somnolent this am. FSBG was wnl. Abg was significant for acute on chronic hypercarbia.  At that time, she denied chest pain/SOB/Fever/chills.  Just noted some left knee pain.

## 2019-07-18 NOTE — HOSPITAL COURSE
In the ER she was afebrile and had no leukocytosis. There was concern for septic arthritis. She was admitted to internal medicine and orthopedic surgery was consulted. She was started on broad spectrum antibiotics in the ER after blood cultures were collected. UA was concerning for UTI which would also be covered by broad spectrum antibiotics. UCx sent. Orthopedic surgery performed a bedside joint aspiration on 7/17 revealing bloody fluid. A sample was sent for culture. Cultures NGTD. She had worsening renal function and respiratory function on 7/18/19. CXR concerning for volume overload and diuretics were increased. ABG showed acute on chronic hypercapnic respiratory failure. She was requiring BiPAP to maintain O2 sat and was transferred to the ICU. Pulmonoogy was consulted. BiPAP settings adjusted and she was diuresed. Echo show preserved EF but indeterminate diastolic function and PAP 55. She was complaining of left knee pain and was started on low dose po narcotics. Her vanc through was elevated so vanc was held 7/19/19. She was transferred back to the floor 7/19 and doing well. Blood and joint aspirate cultures remain negative. ID consulted for empiric abx regimen. ID recommended amputation. Orthopedic surgery agreed. Patient also agreed as she believes this would allow her to mobilize better and possibly walk again. Seems like prosthesis was not allowing this to happen. She still has full function of her right leg. Underwent AKA on 7/23/2019. Did well afterwards. Antibiotics discontinued. Pain managed and able to have BM prior to discharge. Required amitizia for this. Patient discharged to [facility] in stable condition. Is on low flow NC (which she uses continuously) and suprapubic catheter. Follow up with orthopedic surgery for dressing change and wound care. Cardiac diet. Activity as tolerated.

## 2019-07-18 NOTE — NURSING
Pt spoke with her brother All and informed him that she will be transferred to ICU.     EKG being completed.

## 2019-07-18 NOTE — PROGRESS NOTES
Patient Bp low again and O2 sats between 85-90% on bipap. Resp in room to do ABG's. Narcan 0.4mg given. Patient is drowsy but awakens to voice.

## 2019-07-18 NOTE — PLAN OF CARE
Problem: Adult Inpatient Plan of Care  Goal: Plan of Care Review  Outcome: Ongoing (interventions implemented as appropriate)  Pt is on bipap and resting comfortably.

## 2019-07-18 NOTE — ASSESSMENT & PLAN NOTE
Assessment:  1. Acute on Chronic Hypercarbic Respiratory Failure-multifactorial: cardiogenic pulmonary edema and narcotic SE on baseline ADEEL/OHS.  No evidence of COPD exacerbation at this time.  2. ADEEL/OHS  3. HFpEF  4. PHTN-group II/III  5. COPD-no PFTs on file, no evidence of exacerbation  6. Septic Joint    PLAN:  1. Hold narcotics and neurontin, dose of narcan  2. NIV, Repeat abg, nebs  3. Diuresis  4. Abx  5. Heparin

## 2019-07-18 NOTE — PLAN OF CARE
Problem: Fall Injury Risk  Goal: Absence of Fall and Fall-Related Injury    Intervention: Promote Injury-Free Environment     07/18/19 1140   Optimize Balance and Safe Activity   Safety Promotion/Fall Prevention bed alarm set;commode/urinal/bedpan at bedside;Fall Risk reviewed with patient/family;high risk medications identified;lighting adjusted;medications reviewed         Problem: Skin Injury Risk Increased  Goal: Skin Health and Integrity    Intervention: Promote and Optimize Oral Intake     07/18/19 1159   Monitor and Manage Anemia   Oral Nutrition Promotion calorie dense foods provided;calorie dense liquids provided;rest periods promoted         Problem: Adult Inpatient Plan of Care  Goal: Plan of Care Review  Outcome: Ongoing (interventions implemented as appropriate)     07/18/19 1159   Plan of Care Review   Plan of Care Reviewed With patient     Goal: Absence of Hospital-Acquired Illness or Injury    Intervention: Prevent VTE (venous thromboembolism)     07/18/19 1159   Prevent or Manage Embolism   VTE Prevention/Management bleeding precautions maintained;bleeding risk assessed

## 2019-07-18 NOTE — PROGRESS NOTES
Pharmacokinetic Initial Assessment: IV Vancomycin    Assessment/Plan:      Desired empiric serum trough concentration is 10 to 20 mcg/mL.  Draw vancomycin trough level 30 min prior to third dose on 7/19 at approximately 0200   Pharmacy will continue to follow and monitor vancomycin.      Please contact pharmacy at extension [phone] with any questions regarding this assessment.     Thank you for the consult,   Nehemias Christianson     Patient brief summary:  Mary Ellen Fajardo is a 63 y.o. female initiated on antimicrobial therapy with IV Vancomycin for treatment of suspected bacteremia    Drug Allergies:   Review of patient's allergies indicates:   Allergen Reactions    Rocephin [ceftriaxone] Rash       Actual Body Weight:   109.8 kg    Renal Function:   Estimated Creatinine Clearance: 47.3 mL/min (A) (based on SCr of 1.5 mg/dL (H)).,     Dialysis Method (if applicable):      CBC (last 72 hours):  Recent Labs   Lab Result Units 07/16/19  2250   WBC K/uL 7.04   Hemoglobin g/dL 12.0   Hematocrit % 40.4   Platelets K/uL 231   Gran% % 65.3   Lymph% % 25.7   Mono% % 7.8   Eosinophil% % 1.4   Basophil% % 0.1   Differential Method  Automated       Metabolic Panel (last 72 hours):  Recent Labs   Lab Result Units 07/16/19  2240 07/16/19  2250 07/18/19  0527   Sodium mmol/L  --  139 137   Potassium mmol/L  --  3.6 3.8   Chloride mmol/L  --  94* 94*   CO2 mmol/L  --  37* 36*   Glucose mg/dL  --  93 153*   Glucose, UA  Negative  --   --    BUN, Bld mg/dL  --  21 24*   Creatinine mg/dL  --  1.2 1.5*   Albumin g/dL  --  3.5  --    Total Bilirubin mg/dL  --  0.6  --    Alkaline Phosphatase U/L  --  85  --    AST U/L  --  18  --    ALT U/L  --  10  --        Drug levels (last 3 results):  No results for input(s): VANCOMYCINRA, VANCOMYCINPE, VANCOMYCINTR in the last 72 hours.    Microbiologic Results:  Microbiology Results (last 7 days)       Procedure Component Value Units Date/Time    Blood culture #1 [[id number]] Collected:   07/16/19 2250    Order Status:  Completed Specimen:  Blood from Peripheral, Antecubital, Left Updated:  07/18/19 0303     Blood Culture, Routine No Growth to date      No Growth to date    Narrative:       Blood Culture #1    Blood culture #2 [[id number]] Collected:  07/16/19 2255    Order Status:  Completed Specimen:  Blood from Peripheral, Hand, Right Updated:  07/18/19 0303     Blood Culture, Routine No Growth to date      No Growth to date    Narrative:       Blood Culture #2    Urine Culture High Risk [[id number]] Collected:  07/17/19 1700    Order Status:  Sent Specimen:  Urine, Catheterized Updated:  07/17/19 1754    Culture, Body Fluid (Aerobic) w/ GS [[id number]] Collected:  07/17/19 1253    Order Status:  Completed Specimen:  Body Fluid from Knee, Left Updated:  07/17/19 1529    Narrative:       Aspiration left knee    Aerobic culture [[id number]] Collected:  07/17/19 1253    Order Status:  Sent Specimen:  Body Fluid from Knee, Left Updated:  07/17/19 1400

## 2019-07-18 NOTE — PLAN OF CARE
Problem: Skin Injury Risk Increased  Goal: Skin Health and Integrity  Outcome: Ongoing (interventions implemented as appropriate)  Intervention: Optimize Skin Protection     07/18/19 0408   Prevent Additional Skin Injury   Head of Bed (HOB) HOB elevated   Pressure Reduction Devices pressure-redistributing mattress utilized   Pressure Reduction Techniques frequent weight shift encouraged     Intervention: Promote and Optimize Oral Intake     07/18/19 0408   Monitor and Manage Anemia   Oral Nutrition Promotion rest periods promoted         Problem: Infection  Goal: Infection Symptom Resolution  Outcome: Ongoing (interventions implemented as appropriate)  Intervention: Prevent or Manage Infection     07/18/19 0408   Prevent or Manage Infection   Fever Reduction/Comfort Measures lightweight clothing   Infection Management aseptic technique maintained   Manage Diarrhea   Isolation Precautions protective environment maintained         Problem: Adult Inpatient Plan of Care  Goal: Plan of Care Review  Outcome: Ongoing (interventions implemented as appropriate)     07/18/19 0408   Plan of Care Review   Plan of Care Reviewed With patient

## 2019-07-18 NOTE — PROGRESS NOTES
Patient received from telemetry via bed. She is awake, alert and oriented x3. Complaints of left knee pain she rates a 6. Lungs diminished. Bipap applied by respiratory. Patient requesting water. Superpubic cath draining clear yellow urine.

## 2019-07-18 NOTE — PROGRESS NOTES
[facility] Medicine  Progress Note    Patient Name: Mary Ellen Fajardo  MRN: [id number]  Patient Class: IP- Inpatient   Admission Date: 7/16/2019  Length of Stay: 1 days  Attending Physician: Cora Briggs MD  Primary Care Provider: Any Tran MD        Subjective:     Principal Problem:Septic arthritis of knee, left      HPI:  Ms. 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 She also noted decreased ROM of the left knee. The pain is exacerbated with attempt to move the knee or her left ankle. She has not had issues with the joint infection since 2011. ROS was also notable for foul smelling urine.     Overview/Hospital Course:  In the ER she was afebrile and had no leukocytosis. There was concern for septic arthritis. She was admitted to internal medicine and orthopedic surgery was consulted. She was started on broad spectrum antibiotics in the ER after blood cultures were collected. UA was concerning for UTI which would also be covered by broad spectrum antibiotics. UCx sent. Orthopedic surgery performed a bedside joint aspiration on 7/17 revealing bloody fluid. A sample was sent for culture. Cultures NGTD. She had worsening renal function and respiratory function on 7/18/19. CXR concerning for volume overload and diuretics were increased. ABG pending.      Interval history:  Cultures negative. She is more short of breath and somnolent. CXR c/w volume overload. Diuretics increased. ABG pending. Continue BiPAP.    Review of Systems   Constitutional: Positive for fatigue. Negative for fever.   HENT: Negative for trouble swallowing and voice change.    Respiratory: Positive for shortness of breath. Negative for cough and  wheezing.    Cardiovascular: Negative for chest pain and leg swelling.   Gastrointestinal: Negative for nausea and vomiting.   Genitourinary: Negative for hematuria and pelvic pain.   Musculoskeletal: Positive for arthralgias and joint swelling. Negative for neck stiffness.   Skin: Positive for color change. Negative for pallor.   Psychiatric/Behavioral: Negative for agitation and behavioral problems.     Objective:     Vital Signs (Most Recent):  Temp: 98 °F (36.7 °C) (07/18/19 1058)  Pulse: 67 (07/18/19 1058)  Resp: 19 (07/18/19 1058)  BP: 117/67 (07/18/19 1058)  SpO2: (!) 90 % (07/18/19 1058) Vital Signs (24h Range):  Temp:  [97 °F (36.1 °C)-98.3 °F (36.8 °C)] 98 °F (36.7 °C)  Pulse:  [66-77] 67  Resp:  [18-20] 19  SpO2:  [90 %-95 %] 90 %  BP: (112-132)/(59-73) 117/67     Weight: 109.8 kg (242 lb 1 oz)  Body mass index is 40.28 kg/m².    Physical Exam   Constitutional: She is oriented to person, place, and time. She appears well-developed and well-nourished. No distress.   HENT:   Right Ear: External ear normal.   Left Ear: External ear normal.   Nose: Nose normal.   Eyes: Pupils are equal, round, and reactive to light. EOM are normal. No scleral icterus.   Neck: Normal range of motion. Neck supple. No thyromegaly present.   Cardiovascular: Normal rate and normal heart sounds. Exam reveals no friction rub.   No murmur heard.  Distant   Pulmonary/Chest: She is in respiratory distress. She has no wheezes. She has no rales.   Decreased at bases, distant due to body habitus. BiPAP in place satting 91%.   Abdominal: Soft. Bowel sounds are normal. She exhibits no mass. There is no tenderness.   Morbidly obese   Genitourinary:   Genitourinary Comments: Chronic indwelling Wolfe. Cloudy urine.   Musculoskeletal:   Leg knee slightly more edematous than right with limited ROM due to pain. She has a well healed surgical incision that she claims if more edematous than usual. No calor noted.   Neurological: She is alert and  oriented to person, place, and time. No cranial nerve deficit.   Sleepy but wakes to voice and light touch   Skin: Skin is warm and dry. No pallor.   Psychiatric: She has a normal mood and affect. Thought content normal.         CRANIAL NERVES     CN III, IV, VI   Pupils are equal, round, and reactive to light.  Extraocular motions are normal.        Significant Labs: All pertinent labs within the past 24 hours have been reviewed.    Significant Imaging: I have reviewed and interpreted all pertinent imaging results/findings within the past 24 hours.      Assessment/Plan:      * Septic arthritis of knee, left  Concern for left knee septic arthritis. Appreciate orthopedic surgery recs. Started on broad spectrum antibiotics. Joint aspirate culture NGTD.    Volume overload  CXR with pulmonary edema. Echo pending. Increased diuretics. She was on her home Bumex.    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 Past resistance patterns noted. Possible colonization? Foul smelling urine but no systemic symptoms.     Bipolar 1 disorder  Continued home meds (Prozac, Zyprexa, Atarax, trazodone). No acute issue.    Chronic indwelling suprapubic catheter in place  Exchange given UA concerning for UTI. Follow up with Dr. Tinoco in clinic.    Chronic respiratory failure with hypoxia  Was stable on home supplemental O2 at discharge. Deteriorated the morning of 7/18/19. Diuretics increased as she appeared volume overloaded.    COPD (chronic obstructive pulmonary disease)  PRN duonebs. No acute issues.    ADEEL on CPAP  CPAP QHS per home settings and patient tolerance.    Essential hypertension  Does not appear to be on medications at home? Monitor.    VTE Risk Mitigation (From admission, onward)        Ordered     enoxaparin injection 40 mg  Daily      07/17/19 0531     IP VTE HIGH RISK PATIENT  Once      07/17/19 0531                Cora Briggs MD  Department of Hospital Medicine   [facility]

## 2019-07-18 NOTE — NURSING
Informed pt that she will going to ICU for closer monitoring; understanding verbalized. Pt AAO, communicating without any problems. Pt requests that I call her brother and inform him of the transfer.

## 2019-07-18 NOTE — SUBJECTIVE & OBJECTIVE
Interval history:  Cultures negative. She is more short of breath and somnolent. CXR c/w volume overload. Diuretics increased. ABG pending. Continue BiPAP.    Review of Systems   Constitutional: Positive for fatigue. Negative for fever.   HENT: Negative for trouble swallowing and voice change.    Respiratory: Positive for shortness of breath. Negative for cough and wheezing.    Cardiovascular: Negative for chest pain and leg swelling.   Gastrointestinal: Negative for nausea and vomiting.   Genitourinary: Negative for hematuria and pelvic pain.   Musculoskeletal: Positive for arthralgias and joint swelling. Negative for neck stiffness.   Skin: Positive for color change. Negative for pallor.   Psychiatric/Behavioral: Negative for agitation and behavioral problems.     Objective:     Vital Signs (Most Recent):  Temp: 98 °F (36.7 °C) (07/18/19 1058)  Pulse: 67 (07/18/19 1058)  Resp: 19 (07/18/19 1058)  BP: 117/67 (07/18/19 1058)  SpO2: (!) 90 % (07/18/19 1058) Vital Signs (24h Range):  Temp:  [97 °F (36.1 °C)-98.3 °F (36.8 °C)] 98 °F (36.7 °C)  Pulse:  [66-77] 67  Resp:  [18-20] 19  SpO2:  [90 %-95 %] 90 %  BP: (112-132)/(59-73) 117/67     Weight: 109.8 kg (242 lb 1 oz)  Body mass index is 40.28 kg/m².    Physical Exam   Constitutional: She is oriented to person, place, and time. She appears well-developed and well-nourished. No distress.   HENT:   Right Ear: External ear normal.   Left Ear: External ear normal.   Nose: Nose normal.   Eyes: Pupils are equal, round, and reactive to light. EOM are normal. No scleral icterus.   Neck: Normal range of motion. Neck supple. No thyromegaly present.   Cardiovascular: Normal rate and normal heart sounds. Exam reveals no friction rub.   No murmur heard.  Distant   Pulmonary/Chest: She is in respiratory distress. She has no wheezes. She has no rales.   Decreased at bases, distant due to body habitus. BiPAP in place satting 91%.   Abdominal: Soft. Bowel sounds are normal. She  exhibits no mass. There is no tenderness.   Morbidly obese   Genitourinary:   Genitourinary Comments: Chronic indwelling Wolfe. Cloudy urine.   Musculoskeletal:   Leg knee slightly more edematous than right with limited ROM due to pain. She has a well healed surgical incision that she claims if more edematous than usual. No calor noted.   Neurological: She is alert and oriented to person, place, and time. No cranial nerve deficit.   Sleepy but wakes to voice and light touch   Skin: Skin is warm and dry. No pallor.   Psychiatric: She has a normal mood and affect. Thought content normal.         CRANIAL NERVES     CN III, IV, VI   Pupils are equal, round, and reactive to light.  Extraocular motions are normal.        Significant Labs: All pertinent labs within the past 24 hours have been reviewed.    Significant Imaging: I have reviewed and interpreted all pertinent imaging results/findings within the past 24 hours.

## 2019-07-18 NOTE — ASSESSMENT & PLAN NOTE
Was stable on home supplemental O2 at discharge. Deteriorated the morning of 7/18/19. Diuretics increased as she appeared volume overloaded.

## 2019-07-18 NOTE — NURSING TRANSFER
Nursing Transfer Note      7/18/2019     Transfer To: ICU    Transfer via bed    Transfer with cardiac monitoring and oxygen    Transported by transport personnel, charge nurse, and respiratory therapist    Medicines sent: Yes    Chart send with patient: Yes    Notified: Brother    Patient reassessed at: 7/18/19 9479

## 2019-07-18 NOTE — PLAN OF CARE
Problem: Adult Inpatient Plan of Care  Goal: Patient-Specific Goal (Individualization)  Outcome: Ongoing (interventions implemented as appropriate)  Pt received from floor on bipap settings 20/5/35%. Will continue monitor and do scheduled respiratory treatments.

## 2019-07-19 PROBLEM — J96.12 CHRONIC RESPIRATORY FAILURE WITH HYPERCAPNIA: Status: ACTIVE | Noted: 2019-01-06

## 2019-07-19 PROBLEM — J96.22 ACUTE ON CHRONIC RESPIRATORY FAILURE WITH HYPERCAPNIA: Status: ACTIVE | Noted: 2019-07-18

## 2019-07-19 PROBLEM — I50.33 ACUTE ON CHRONIC DIASTOLIC HEART FAILURE: Status: ACTIVE | Noted: 2019-07-19

## 2019-07-19 LAB
ALLENS TEST: ABNORMAL
ANION GAP SERPL CALC-SCNC: 10 MMOL/L (ref 8–16)
BUN SERPL-MCNC: 24 MG/DL (ref 8–23)
CALCIUM SERPL-MCNC: 8.9 MG/DL (ref 8.7–10.5)
CHLORIDE SERPL-SCNC: 99 MMOL/L (ref 95–110)
CO2 SERPL-SCNC: 33 MMOL/L (ref 23–29)
CREAT SERPL-MCNC: 1.2 MG/DL (ref 0.5–1.4)
DELSYS: ABNORMAL
EP: 5
EST. GFR  (AFRICAN AMERICAN): 56 ML/MIN/1.73 M^2
EST. GFR  (NON AFRICAN AMERICAN): 48 ML/MIN/1.73 M^2
FIO2: 30
GLUCOSE SERPL-MCNC: 84 MG/DL (ref 70–110)
HCO3 UR-SCNC: 40.5 MMOL/L (ref 24–28)
IP: 20
MIN VOL: 6
MODE: ABNORMAL
PCO2 BLDA: 64.8 MMHG (ref 35–45)
PH SMN: 7.4 [PH] (ref 7.35–7.45)
PO2 BLDA: 104 MMHG (ref 80–100)
POC BE: 13 MMOL/L
POC SATURATED O2: 98 % (ref 95–100)
POC TCO2: 42 MMOL/L (ref 23–27)
POTASSIUM SERPL-SCNC: 3.3 MMOL/L (ref 3.5–5.1)
SAMPLE: ABNORMAL
SITE: ABNORMAL
SODIUM SERPL-SCNC: 142 MMOL/L (ref 136–145)
SP02: 99
SPONT RATE: 20
VANCOMYCIN TROUGH SERPL-MCNC: 21.9 UG/ML (ref 10–22)

## 2019-07-19 PROCEDURE — 63600175 PHARM REV CODE 636 W HCPCS: Performed by: HOSPITALIST

## 2019-07-19 PROCEDURE — 94761 N-INVAS EAR/PLS OXIMETRY MLT: CPT

## 2019-07-19 PROCEDURE — 25000003 PHARM REV CODE 250: Performed by: INTERNAL MEDICINE

## 2019-07-19 PROCEDURE — 99232 PR SUBSEQUENT HOSPITAL CARE,LEVL II: ICD-10-PCS | Mod: ,,, | Performed by: INTERNAL MEDICINE

## 2019-07-19 PROCEDURE — 63600175 PHARM REV CODE 636 W HCPCS: Performed by: INTERNAL MEDICINE

## 2019-07-19 PROCEDURE — 94660 CPAP INITIATION&MGMT: CPT

## 2019-07-19 PROCEDURE — 25000242 PHARM REV CODE 250 ALT 637 W/ HCPCS: Performed by: HOSPITALIST

## 2019-07-19 PROCEDURE — 27000221 HC OXYGEN, UP TO 24 HOURS

## 2019-07-19 PROCEDURE — 25000003 PHARM REV CODE 250: Performed by: EMERGENCY MEDICINE

## 2019-07-19 PROCEDURE — 94640 AIRWAY INHALATION TREATMENT: CPT

## 2019-07-19 PROCEDURE — 63600175 PHARM REV CODE 636 W HCPCS: Performed by: EMERGENCY MEDICINE

## 2019-07-19 PROCEDURE — 25000003 PHARM REV CODE 250: Performed by: HOSPITALIST

## 2019-07-19 PROCEDURE — 80202 ASSAY OF VANCOMYCIN: CPT

## 2019-07-19 PROCEDURE — 99900035 HC TECH TIME PER 15 MIN (STAT)

## 2019-07-19 PROCEDURE — 99232 SBSQ HOSP IP/OBS MODERATE 35: CPT | Mod: ,,, | Performed by: INTERNAL MEDICINE

## 2019-07-19 PROCEDURE — 25000242 PHARM REV CODE 250 ALT 637 W/ HCPCS: Performed by: INTERNAL MEDICINE

## 2019-07-19 PROCEDURE — 80048 BASIC METABOLIC PNL TOTAL CA: CPT

## 2019-07-19 PROCEDURE — 11000001 HC ACUTE MED/SURG PRIVATE ROOM

## 2019-07-19 RX ORDER — POTASSIUM CHLORIDE 7.45 MG/ML
10 INJECTION INTRAVENOUS
Status: COMPLETED | OUTPATIENT
Start: 2019-07-19 | End: 2019-07-19

## 2019-07-19 RX ORDER — OLANZAPINE 2.5 MG/1
5 TABLET ORAL NIGHTLY
Status: DISCONTINUED | OUTPATIENT
Start: 2019-07-19 | End: 2019-07-26 | Stop reason: HOSPADM

## 2019-07-19 RX ORDER — CARVEDILOL 3.12 MG/1
3.12 TABLET ORAL 2 TIMES DAILY
Status: DISCONTINUED | OUTPATIENT
Start: 2019-07-19 | End: 2019-07-24

## 2019-07-19 RX ORDER — OLANZAPINE 2.5 MG/1
5 TABLET ORAL DAILY
Status: DISCONTINUED | OUTPATIENT
Start: 2019-07-19 | End: 2019-07-19

## 2019-07-19 RX ORDER — OXYCODONE AND ACETAMINOPHEN 5; 325 MG/1; MG/1
1 TABLET ORAL EVERY 6 HOURS PRN
Status: DISCONTINUED | OUTPATIENT
Start: 2019-07-19 | End: 2019-07-24

## 2019-07-19 RX ORDER — FUROSEMIDE 10 MG/ML
40 INJECTION INTRAMUSCULAR; INTRAVENOUS 2 TIMES DAILY
Status: DISCONTINUED | OUTPATIENT
Start: 2019-07-19 | End: 2019-07-22

## 2019-07-19 RX ORDER — GABAPENTIN 300 MG/1
300 CAPSULE ORAL 3 TIMES DAILY
Status: DISCONTINUED | OUTPATIENT
Start: 2019-07-19 | End: 2019-07-20

## 2019-07-19 RX ADMIN — OLANZAPINE 5 MG: 2.5 TABLET, FILM COATED ORAL at 09:07

## 2019-07-19 RX ADMIN — POTASSIUM CHLORIDE 10 MEQ: 10 INJECTION, SOLUTION INTRAVENOUS at 09:07

## 2019-07-19 RX ADMIN — IPRATROPIUM BROMIDE AND ALBUTEROL SULFATE 3 ML: .5; 3 SOLUTION RESPIRATORY (INHALATION) at 01:07

## 2019-07-19 RX ADMIN — CARVEDILOL 3.12 MG: 3.12 TABLET, FILM COATED ORAL at 09:07

## 2019-07-19 RX ADMIN — HEPARIN SODIUM 5000 UNITS: 5000 INJECTION, SOLUTION INTRAVENOUS; SUBCUTANEOUS at 05:07

## 2019-07-19 RX ADMIN — POTASSIUM CHLORIDE 10 MEQ: 10 INJECTION, SOLUTION INTRAVENOUS at 08:07

## 2019-07-19 RX ADMIN — FUROSEMIDE 40 MG: 10 INJECTION, SOLUTION INTRAVENOUS at 05:07

## 2019-07-19 RX ADMIN — PIPERACILLIN, TAZOBACTAM 4.5 G: 4; .5 INJECTION, POWDER, LYOPHILIZED, FOR SOLUTION INTRAVENOUS at 12:07

## 2019-07-19 RX ADMIN — POTASSIUM CHLORIDE 10 MEQ: 10 INJECTION, SOLUTION INTRAVENOUS at 07:07

## 2019-07-19 RX ADMIN — PIPERACILLIN, TAZOBACTAM 4.5 G: 4; .5 INJECTION, POWDER, LYOPHILIZED, FOR SOLUTION INTRAVENOUS at 08:07

## 2019-07-19 RX ADMIN — GABAPENTIN 300 MG: 300 CAPSULE ORAL at 09:07

## 2019-07-19 RX ADMIN — POTASSIUM CHLORIDE 10 MEQ: 10 INJECTION, SOLUTION INTRAVENOUS at 04:07

## 2019-07-19 RX ADMIN — PIPERACILLIN, TAZOBACTAM 4.5 G: 4; .5 INJECTION, POWDER, LYOPHILIZED, FOR SOLUTION INTRAVENOUS at 04:07

## 2019-07-19 RX ADMIN — HEPARIN SODIUM 5000 UNITS: 5000 INJECTION, SOLUTION INTRAVENOUS; SUBCUTANEOUS at 04:07

## 2019-07-19 RX ADMIN — HEPARIN SODIUM 5000 UNITS: 5000 INJECTION, SOLUTION INTRAVENOUS; SUBCUTANEOUS at 09:07

## 2019-07-19 RX ADMIN — IPRATROPIUM BROMIDE AND ALBUTEROL SULFATE 3 ML: .5; 3 SOLUTION RESPIRATORY (INHALATION) at 07:07

## 2019-07-19 RX ADMIN — POTASSIUM CHLORIDE 10 MEQ: 10 INJECTION, SOLUTION INTRAVENOUS at 05:07

## 2019-07-19 RX ADMIN — OXYCODONE HYDROCHLORIDE AND ACETAMINOPHEN 1 TABLET: 5; 325 TABLET ORAL at 01:07

## 2019-07-19 RX ADMIN — FUROSEMIDE 80 MG: 10 INJECTION, SOLUTION INTRAVENOUS at 08:07

## 2019-07-19 RX ADMIN — GABAPENTIN 300 MG: 300 CAPSULE ORAL at 04:07

## 2019-07-19 NOTE — ASSESSMENT & PLAN NOTE
Assessment:  1. Acute on Chronic Hypercarbic Respiratory Failure-multifactorial: cardiogenic pulmonary edema and narcotic SE on baseline ADEEL/OHS.  No evidence of COPD exacerbation at this time.  2. ADEEL/OHS  3. HFpEF  4. PHTN-group II/III  5. COPD-no PFTs on file, no evidence of exacerbation  6. Septic Joint    PLAN:  1. Judicious use of sedatives  2. BIPAP at night, okay to use home machine  3. Diuresis as tolerates  4. Abx  5. Heparin  6. Please have patient f/u in sleep clinic upon discharge with PFTs for BIPAP titration

## 2019-07-19 NOTE — PLAN OF CARE
Problem: Adult Inpatient Plan of Care  Goal: Plan of Care Review  Outcome: Ongoing (interventions implemented as appropriate)  Remains in ICU on BIPAP. Patient alert and oriented and responding appropriately. Episodes of decreased heart rate and blood pressure throughout night while asleep. MD notified and ABG down w/ improvement. K= 3.3, orders received for k-riders. Vanc trough 21.9, per pharmacy this is out of range for their parameters so Vanc was not given. Suprapubic delcid in place w/ adequate urine output. Remains free of falls and injuries.

## 2019-07-19 NOTE — PROGRESS NOTES
[facility] Medicine  Progress Note    Patient Name: Mary Ellen Fajardo  MRN: [id number]  Patient Class: IP- Inpatient   Admission Date: 7/16/2019  Length of Stay: 2 days  Attending Physician: Belle Villeda MD  Primary Care Provider: Any Tran MD        Subjective:     Principal Problem:Septic arthritis of knee, left      HPI:  Ms. 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 She also noted decreased ROM of the left knee. The pain is exacerbated with attempt to move the knee or her left ankle. She has not had issues with the joint infection since 2011. ROS was also notable for foul smelling urine.     Overview/Hospital Course:  In the ER she was afebrile and had no leukocytosis. There was concern for septic arthritis. She was admitted to internal medicine and orthopedic surgery was consulted. She was started on broad spectrum antibiotics in the ER after blood cultures were collected. UA was concerning for UTI which would also be covered by broad spectrum antibiotics. UCx sent. Orthopedic surgery performed a bedside joint aspiration on 7/17 revealing bloody fluid. A sample was sent for culture. Cultures NGTD. She had worsening renal function and respiratory function on 7/18/19. CXR concerning for volume overload and diuretics were increased. ABG show acute on chronic hypercapnic respiratory failure,was started on bipap,was transferred to ICU,pulmonoogy was consulted,bipap  setting is adjusted,was started also on IV lasix for A/C diastolic CHF exacerbation with fluid overload,Echo show preserved EF,all sedation medication was hold,recieved narcan,she is alert time 3 at this time,denies SOB,complain of left knee  pain,started low dose po narcotics,will repeat ABG and if stable,transfer to floor with bipap nightly and prn.  vanc through is elevated,hold vanc.,check daily random level,no growth ion cultures.      Interval History:stable,alert on bipap.    Review of Systems   Constitutional: Positive for activity change and appetite change.   HENT: Negative for congestion and dental problem.    Eyes: Negative for discharge and itching.   Respiratory: Negative for apnea and chest tightness.    Gastrointestinal: Negative for abdominal distention and abdominal pain.   Endocrine: Negative for cold intolerance.   Musculoskeletal: Positive for arthralgias.   Neurological: Positive for weakness.     Objective:     Vital Signs (Most Recent):  Temp: 96.5 °F (35.8 °C) (07/19/19 0315)  Pulse: (!) 53 (07/19/19 0800)  Resp: (!) 21 (07/19/19 0800)  BP: 114/69 (07/19/19 0800)  SpO2: 95 % (07/19/19 0800) Vital Signs (24h Range):  Temp:  [96.5 °F (35.8 °C)-98 °F (36.7 °C)] 96.5 °F (35.8 °C)  Pulse:  [48-71] 53  Resp:  [16-37] 21  SpO2:  [77 %-100 %] 95 %  BP: ()/() 114/69     Weight: 107.1 kg (236 lb 1.8 oz)  Body mass index is 39.29 kg/m².    Intake/Output Summary (Last 24 hours) at 7/19/2019 0840  Last data filed at 7/19/2019 0724  Gross per 24 hour   Intake 600 ml   Output 1975 ml   Net -1375 ml      Physical Exam   HENT:   Head: Atraumatic.   Eyes: EOM are normal.   Neck: Neck supple.   Pulmonary/Chest: Effort normal.   Abdominal: Soft.   Musculoskeletal: She exhibits tenderness.   Skin: Skin is warm and dry.       Significant Labs:   ABGs:   Recent Labs   Lab 07/18/19  2357   PH 7.403   PCO2 64.8*   HCO3 40.5*   POCSATURATED 98   BE 13     Blood Culture: No results for input(s): LABBLOO in the last 48 hours.  CBC: No results for input(s): WBC, HGB, HCT, PLT in the last 48 hours.    Significant Imaging:reviewed.      Assessment/Plan:      * Septic arthritis of knee, left  Concern for left knee septic arthritis. Appreciate  orthopedic surgery recs. Started on broad spectrum antibiotics. Joint aspirate culture NGTD.    vanc through is elevated,hold vanc.,check daily random level,no growth ion cultures.    Acute on chronic respiratory failure with hypercapnia  ABG show acute on chronic hypercapnic respiratory failure,was started on bipap,was transferred to ICU,pulmonoogy was consulted,bipap  setting is adjusted,was started also on IV lasix for A/C diastolic CHF exacerbation with fluid overload,Echo show preserved EF,all sedation medication was hold,recieved narcan,she is alert time 3 at this time,denies SOB,complain of left knee pain,started low dose po narcotics,will repeat ABG and if stable,transfer to floor with bipap nightly and prn.        Acute on chronic diastolic heart failure  Per echo,started on IV lasix,not on ACE or ARB at this time,duo to PATRICIA dysmetria.      Volume overload  As above,    Infection due to urethral catheter  Started on broad spectrum antibiotics. Past resistance patterns noted. Possible colonization? Foul smelling urine but no systemic symptoms.     Bipolar 1 disorder  Continued home meds zyprexa.    Chronic indwelling suprapubic catheter in place  Exchange given UA concerning for UTI. Follow up with Dr. Tinoco in clinic.    Chronic respiratory failure with hypercapnia  As above,    COPD (chronic obstructive pulmonary disease)  PRN duonebs. No acute issues.    ADEEL on CPAP  BiPAP QHS,    Essential hypertension  Does not appear to be on medications at home? Monitor.      VTE Risk Mitigation (From admission, onward)        Ordered     heparin (porcine) injection 5,000 Units  Every 8 hours      07/18/19 1444     IP VTE HIGH RISK PATIENT  Once      07/17/19 0531          Critical care time spent on the evaluation and treatment of severe organ dysfunction, review of pertinent labs and imaging studies, discussions with consulting providers and discussions with patient/family: over 45  minutes.      Belle  MD Christiana  Department of Hospital Medicine   [facility]

## 2019-07-19 NOTE — PROGRESS NOTES
[facility]  Pulmonology  Progress Note    Patient Name: Mary Ellen Fajardo  MRN: [id number]  Admission Date: 7/16/2019  Hospital Length of Stay: 2 days  Code Status: Full Code  Attending Provider: Belle Villeda MD  Primary Care Provider: Any Tran MD   Principal Problem: Septic arthritis of knee, left    Subjective:     Interval History: Diuresed well, denies chest pain/cough/fever/chills/SOB    Objective:     Vital Signs (Most Recent):  Temp: 98.6 °F (37 °C) (07/19/19 1130)  Pulse: 63 (07/19/19 1130)  Resp: 18 (07/19/19 1130)  BP: 116/69 (07/19/19 1130)  SpO2: 95 % (07/19/19 1130) Vital Signs (24h Range):  Temp:  [96.5 °F (35.8 °C)-98.6 °F (37 °C)] 98.6 °F (37 °C)  Pulse:  [48-78] 63  Resp:  [16-37] 18  SpO2:  [77 %-100 %] 95 %  BP: ()/() 116/69     Weight: 107.1 kg (236 lb 1.8 oz)  Body mass index is 39.29 kg/m².      Intake/Output Summary (Last 24 hours) at 7/19/2019 1239  Last data filed at 7/19/2019 1100  Gross per 24 hour   Intake 800 ml   Output 2675 ml   Net -1875 ml       Physical Exam   Constitutional: She is oriented to person, place, and time. No distress.   HENT:   Head: Normocephalic.   Eyes: Right eye exhibits no discharge. Left eye exhibits no discharge. No scleral icterus.   Neck: Neck supple. No tracheal deviation present.   Cardiovascular: Normal rate and regular rhythm.   Pulmonary/Chest: She has no wheezes. She has no rales.   Diminished throughout    Abdominal: She exhibits no distension. There is no tenderness. There is no guarding.   Musculoskeletal: She exhibits no edema or deformity.   Some tenderness around left knee   Neurological: She is alert and oriented to person, place, and time.   Skin: Skin is warm. She is not diaphoretic.       Vents:  Oxygen Concentration (%): 30 (07/19/19 0800)    Lines/Drains/Airways     Drain                 Suprapubic Catheter 07/16/19 9160 18 Fr. 2 days          Peripheral Intravenous Line                  Peripheral IV - Single Lumen 07/16/19 2005 20 G Left Hand 2 days         Peripheral IV - Single Lumen 07/18/19 1606 22 G Posterior;Right Wrist less than 1 day         Peripheral IV - Single Lumen 07/19/19 0545 20 G Anterior;Left;Proximal Antecubital less than 1 day                Significant Labs:    CBC/Anemia Profile:  No results for input(s): WBC, HGB, HCT, PLT, MCV, RDW, IRON, FERRITIN, RETIC, FOLATE, [id number], OCCULTBLOOD in the last 48 hours.     Chemistries:  Recent Labs   Lab 07/18/19  0527 07/19/19  0215    142   K 3.8 3.3*   CL 94* 99   CO2 36* 33*   BUN 24* 24*   CREATININE 1.5* 1.2   CALCIUM 9.2 8.9           Significant Imaging:  Reviewed    Assessment/Plan:     Acute on chronic respiratory failure with hypercapnia  Assessment:  1. Acute on Chronic Hypercarbic Respiratory Failure-multifactorial: cardiogenic pulmonary edema and narcotic SE on baseline ADEEL/OHS.  No evidence of COPD exacerbation at this time.  2. ADEEL/OHS  3. HFpEF  4. PHTN-group II/III  5. COPD-no PFTs on file, no evidence of exacerbation  6. Septic Joint    PLAN:  1. Judicious use of sedatives  2. BIPAP at night, okay to use home machine  3. Diuresis as tolerates  4. Abx  5. Heparin  6. Please have patient f/u in sleep clinic upon discharge with PFTs for BIPAP titration                Thank you for the consult. Please call with questions     Ac Levy MD  Department of Pulmonary & Critical Care  Cell: [phone]

## 2019-07-19 NOTE — PROGRESS NOTES
Pharmacokinetic Assessment Follow Up: IV Vancomycin    Vancomycin serum concentration assessment(s):    The trough level was drawned correctly and can be used to guide therapy at this time.  Result= 21.9  Vancomycin Regimen Plan:    Initiate regimen Vancomycin 1750 mg IV every 24 hours once level falls below 20 mcg/ml  Random level due 7/19/19 at 14:00    Pharmacy will continue to follow and monitor vancomycin.    Please contact pharmacy at extension [phone] for questions regarding this assessment.    Thank you for the consult,   Bernadine Nair     Patient brief summary:  Mary Ellen Fajardo is a 63 y.o. female initiated on antimicrobial therapy with IV Vancomycin for treatment of suspected bacteremia      Drug Allergies:   Review of patient's allergies indicates:   Allergen Reactions    Rocephin [ceftriaxone] Rash       Actual Body Weight:   107.1 kg    Renal Function:   Estimated Creatinine Clearance: 58.3 mL/min (based on SCr of 1.2 mg/dL).,     Dialysis Method (if applicable):  none     CBC (last 72 hours):  Recent Labs   Lab Result Units 07/16/19  2250   WBC K/uL 7.04   Hemoglobin g/dL 12.0   Hematocrit % 40.4   Platelets K/uL 231   Gran% % 65.3   Lymph% % 25.7   Mono% % 7.8   Eosinophil% % 1.4   Basophil% % 0.1   Differential Method  Automated       Metabolic Panel (last 72 hours):  Recent Labs   Lab Result Units 07/16/19  2240 07/16/19  2250 07/18/19  0527 07/19/19  0215   Sodium mmol/L  --  139 137 142   Potassium mmol/L  --  3.6 3.8 3.3*   Chloride mmol/L  --  94* 94* 99   CO2 mmol/L  --  37* 36* 33*   Glucose mg/dL  --  93 153* 84   Glucose, UA  Negative  --   --   --    BUN, Bld mg/dL  --  21 24* 24*   Creatinine mg/dL  --  1.2 1.5* 1.2   Albumin g/dL  --  3.5  --   --    Total Bilirubin mg/dL  --  0.6  --   --    Alkaline Phosphatase U/L  --  85  --   --    AST U/L  --  18  --   --    ALT U/L  --  10  --   --        Vancomycin Administrations:  vancomycin given in the last 96 hours                      vancomycin (VANCOCIN) 2,250 mg in dextrose 5 % 500 mL IVPB (mg) 2,250 mg New Bag 07/18/19 0221                      Drug levels (last 3 results):  Recent Labs   Lab Result Units 07/19/19  0215   Vancomycin-Trough ug/mL 21.9       Microbiologic Results:  Microbiology Results (last 7 days)       Procedure Component Value Units Date/Time    Blood culture #2 [[id number]] Collected:  07/16/19 2255    Order Status:  Completed Specimen:  Blood from Peripheral, Hand, Right Updated:  07/19/19 0303     Blood Culture, Routine No Growth to date      No Growth to date      No Growth to date    Narrative:       Blood Culture #2    Blood culture #1 [[id number]] Collected:  07/16/19 2250    Order Status:  Completed Specimen:  Blood from Peripheral, Antecubital, Left Updated:  07/19/19 0303     Blood Culture, Routine No Growth to date      No Growth to date      No Growth to date    Narrative:       Blood Culture #1    Urine Culture High Risk [[id number]] Collected:  07/17/19 1700    Order Status:  Completed Specimen:  Urine, Catheterized Updated:  07/18/19 1129     Urine Culture, Routine No growth to date    Narrative:       Indicated criteria for high risk culture:->Prior to urologic  procedures    Aerobic culture [[id number]] Collected:  07/17/19 1253    Order Status:  Completed Specimen:  Body Fluid from Knee, Left Updated:  07/18/19 1050     Aerobic Bacterial Culture No growth    Narrative:       Aspiration left knee    Culture, Body Fluid (Aerobic) w/ GS [[id number]] Collected:  07/17/19 1253    Order Status:  Completed Specimen:  Body Fluid from Knee, Left Updated:  07/18/19 1050     Gram Stain Result Moderate WBC's      No organisms seen    Narrative:       Aspiration left knee

## 2019-07-19 NOTE — ASSESSMENT & PLAN NOTE
ABG show acute on chronic hypercapnic respiratory failure,was started on bipap,was transferred to ICU,pulmonoogy was consulted,bipap  setting is adjusted,was started also on IV lasix for A/C diastolic CHF exacerbation with fluid overload,Echo show preserved EF,all sedation medication was hold,recieved narcan,she is alert time 3 at this time,denies SOB,complain of left knee pain,started low dose po narcotics,will repeat ABG and if stable,transfer to floor with bipap nightly and prn.

## 2019-07-19 NOTE — PLAN OF CARE
Problem: Adult Inpatient Plan of Care  Goal: Plan of Care Review  Pt is on bipap 20/5 30% and is resting comfortably.

## 2019-07-19 NOTE — CARE UPDATE
[facility]  ICU Multidisciplinary Bedside Rounds   SUMMARY     Date: 7/19/2019    Prehospitalization: Home  Admit Date / LOS : 7/16/2019/ 2 days    Diagnosis: Septic arthritis of knee, left    Consults:        Active: Ortho and Pulm CC       Needed: N/A     Code Status: Full Code  Advanced Directive: <no information>    LDA: PIV       Central Lines/Site/Justification:Patient Does Not Have Central Line       Urinary Cath/Order/Justification:suprapubic delcid due to neurogenic bladder    Infusions: KVO       GOALS: Volume/ Hemodynamic: N/A                     RASS: N/A    CAM ICU: Negative  Pain Management: none       Pain Controlled: no     Rhythm: SB    Respiratory Device: Bipap           VTE Prophylaxis: Pharm  Mobility: Bedrest  Stress Ulcer Prophylaxis: Yes    Dietary: NPO  Tolerance: not applicable      Isolation: No active isolations    Restraints: No    Significant Dates:  Post Op Date: N/A  Rescue Date: 7/18/19  Imaging/ Diagnostics: CXR- mild pulmonary edema    Noteworthy Labs:  K+ 3.3, VANC TROUGH 21.9    Needs from Care Team: none     ICU LOS 14h  Level of Care: Critical Care

## 2019-07-19 NOTE — ASSESSMENT & PLAN NOTE
Concern for left knee septic arthritis. Appreciate orthopedic surgery recs. Started on broad spectrum antibiotics. Joint aspirate culture NGTD.    vanc through is elevated,hold vanc.,check daily random level,no growth ion cultures.

## 2019-07-19 NOTE — NURSING TRANSFER
Nursing Transfer Note      7/19/2019     Transfer To: 407A    Transfer via bed    Transfer with  to O2 and cardiac monitoring    Transported by transport, this RN    Medicines sent: potassium cl    Chart send with patient: Yes    Notified: patient called family    Patient reassessed at: 7/19/19 1030    Upon arrival to floor: cardiac monitor applied, patient oriented to room, call bell in reach and bed in lowest position    Report called to Nicole MESSINA, and patient transported to 4th floor.  Pt. On continuous cardiac monitoring and O2.  Transported with all belongings and in stable condition

## 2019-07-19 NOTE — SUBJECTIVE & OBJECTIVE
Interval History: Diuresed well, denies chest pain/cough/fever/chills/SOB    Objective:     Vital Signs (Most Recent):  Temp: 98.6 °F (37 °C) (07/19/19 1130)  Pulse: 63 (07/19/19 1130)  Resp: 18 (07/19/19 1130)  BP: 116/69 (07/19/19 1130)  SpO2: 95 % (07/19/19 1130) Vital Signs (24h Range):  Temp:  [96.5 °F (35.8 °C)-98.6 °F (37 °C)] 98.6 °F (37 °C)  Pulse:  [48-78] 63  Resp:  [16-37] 18  SpO2:  [77 %-100 %] 95 %  BP: ()/() 116/69     Weight: 107.1 kg (236 lb 1.8 oz)  Body mass index is 39.29 kg/m².      Intake/Output Summary (Last 24 hours) at 7/19/2019 1239  Last data filed at 7/19/2019 1100  Gross per 24 hour   Intake 800 ml   Output 2675 ml   Net -1875 ml       Physical Exam   Constitutional: She is oriented to person, place, and time. No distress.   HENT:   Head: Normocephalic.   Eyes: Right eye exhibits no discharge. Left eye exhibits no discharge. No scleral icterus.   Neck: Neck supple. No tracheal deviation present.   Cardiovascular: Normal rate and regular rhythm.   Pulmonary/Chest: She has no wheezes. She has no rales.   Diminished throughout    Abdominal: She exhibits no distension. There is no tenderness. There is no guarding.   Musculoskeletal: She exhibits no edema or deformity.   Some tenderness around left knee   Neurological: She is alert and oriented to person, place, and time.   Skin: Skin is warm. She is not diaphoretic.       Vents:  Oxygen Concentration (%): 30 (07/19/19 0800)    Lines/Drains/Airways     Drain                 Suprapubic Catheter 07/16/19 2240 18 Fr. 2 days          Peripheral Intravenous Line                 Peripheral IV - Single Lumen 07/16/19 2005 20 G Left Hand 2 days         Peripheral IV - Single Lumen 07/18/19 1606 22 G Posterior;Right Wrist less than 1 day         Peripheral IV - Single Lumen 07/19/19 0545 20 G Anterior;Left;Proximal Antecubital less than 1 day                Significant Labs:    CBC/Anemia Profile:  No results for input(s): WBC, HGB, HCT,  PLT, MCV, RDW, IRON, FERRITIN, RETIC, FOLATE, BXOVQUQO19, OCCULTBLOOD in the last 48 hours.     Chemistries:  Recent Labs   Lab 07/18/19  0527 07/19/19  0215    142   K 3.8 3.3*   CL 94* 99   CO2 36* 33*   BUN 24* 24*   CREATININE 1.5* 1.2   CALCIUM 9.2 8.9           Significant Imaging:  Reviewed

## 2019-07-20 PROBLEM — E87.70 VOLUME OVERLOAD: Status: RESOLVED | Noted: 2019-07-18 | Resolved: 2019-07-20

## 2019-07-20 LAB
ANION GAP SERPL CALC-SCNC: 7 MMOL/L (ref 8–16)
BUN SERPL-MCNC: 27 MG/DL (ref 8–23)
CALCIUM SERPL-MCNC: 8.7 MG/DL (ref 8.7–10.5)
CHLORIDE SERPL-SCNC: 95 MMOL/L (ref 95–110)
CO2 SERPL-SCNC: 41 MMOL/L (ref 23–29)
CREAT SERPL-MCNC: 1 MG/DL (ref 0.5–1.4)
EST. GFR  (AFRICAN AMERICAN): >60 ML/MIN/1.73 M^2
EST. GFR  (NON AFRICAN AMERICAN): >60 ML/MIN/1.73 M^2
GLUCOSE SERPL-MCNC: 78 MG/DL (ref 70–110)
POTASSIUM SERPL-SCNC: 3.1 MMOL/L (ref 3.5–5.1)
SODIUM SERPL-SCNC: 143 MMOL/L (ref 136–145)
VANCOMYCIN SERPL-MCNC: 10.5 UG/ML

## 2019-07-20 PROCEDURE — 97161 PT EVAL LOW COMPLEX 20 MIN: CPT

## 2019-07-20 PROCEDURE — 99900035 HC TECH TIME PER 15 MIN (STAT)

## 2019-07-20 PROCEDURE — 97166 OT EVAL MOD COMPLEX 45 MIN: CPT

## 2019-07-20 PROCEDURE — 63600175 PHARM REV CODE 636 W HCPCS: Performed by: HOSPITALIST

## 2019-07-20 PROCEDURE — 27000221 HC OXYGEN, UP TO 24 HOURS

## 2019-07-20 PROCEDURE — 25000242 PHARM REV CODE 250 ALT 637 W/ HCPCS: Performed by: HOSPITALIST

## 2019-07-20 PROCEDURE — 25000003 PHARM REV CODE 250: Performed by: HOSPITALIST

## 2019-07-20 PROCEDURE — 94640 AIRWAY INHALATION TREATMENT: CPT

## 2019-07-20 PROCEDURE — 80048 BASIC METABOLIC PNL TOTAL CA: CPT

## 2019-07-20 PROCEDURE — 80202 ASSAY OF VANCOMYCIN: CPT

## 2019-07-20 PROCEDURE — 11000001 HC ACUTE MED/SURG PRIVATE ROOM

## 2019-07-20 PROCEDURE — 97535 SELF CARE MNGMENT TRAINING: CPT

## 2019-07-20 PROCEDURE — 94761 N-INVAS EAR/PLS OXIMETRY MLT: CPT

## 2019-07-20 PROCEDURE — 25000003 PHARM REV CODE 250: Performed by: INTERNAL MEDICINE

## 2019-07-20 PROCEDURE — 97530 THERAPEUTIC ACTIVITIES: CPT

## 2019-07-20 RX ORDER — POTASSIUM CHLORIDE 20 MEQ/1
40 TABLET, EXTENDED RELEASE ORAL EVERY 4 HOURS
Status: COMPLETED | OUTPATIENT
Start: 2019-07-20 | End: 2019-07-20

## 2019-07-20 RX ORDER — BACLOFEN 10 MG/1
20 TABLET ORAL EVERY 4 HOURS PRN
Status: DISCONTINUED | OUTPATIENT
Start: 2019-07-20 | End: 2019-07-26 | Stop reason: HOSPADM

## 2019-07-20 RX ORDER — FLUOXETINE 10 MG/1
10 CAPSULE ORAL DAILY
Status: DISCONTINUED | OUTPATIENT
Start: 2019-07-20 | End: 2019-07-26 | Stop reason: HOSPADM

## 2019-07-20 RX ORDER — GABAPENTIN 300 MG/1
600 CAPSULE ORAL 3 TIMES DAILY
Status: DISCONTINUED | OUTPATIENT
Start: 2019-07-20 | End: 2019-07-26 | Stop reason: HOSPADM

## 2019-07-20 RX ADMIN — OXYCODONE HYDROCHLORIDE AND ACETAMINOPHEN 1 TABLET: 5; 325 TABLET ORAL at 02:07

## 2019-07-20 RX ADMIN — IPRATROPIUM BROMIDE AND ALBUTEROL SULFATE 3 ML: .5; 3 SOLUTION RESPIRATORY (INHALATION) at 08:07

## 2019-07-20 RX ADMIN — PIPERACILLIN, TAZOBACTAM 4.5 G: 4; .5 INJECTION, POWDER, LYOPHILIZED, FOR SOLUTION INTRAVENOUS at 11:07

## 2019-07-20 RX ADMIN — PIPERACILLIN, TAZOBACTAM 4.5 G: 4; .5 INJECTION, POWDER, LYOPHILIZED, FOR SOLUTION INTRAVENOUS at 09:07

## 2019-07-20 RX ADMIN — FLUOXETINE 10 MG: 10 CAPSULE ORAL at 11:07

## 2019-07-20 RX ADMIN — IPRATROPIUM BROMIDE AND ALBUTEROL SULFATE 3 ML: .5; 3 SOLUTION RESPIRATORY (INHALATION) at 01:07

## 2019-07-20 RX ADMIN — GABAPENTIN 600 MG: 300 CAPSULE ORAL at 02:07

## 2019-07-20 RX ADMIN — HEPARIN SODIUM 5000 UNITS: 5000 INJECTION, SOLUTION INTRAVENOUS; SUBCUTANEOUS at 02:07

## 2019-07-20 RX ADMIN — POTASSIUM CHLORIDE 40 MEQ: 1500 TABLET, EXTENDED RELEASE ORAL at 02:07

## 2019-07-20 RX ADMIN — PIPERACILLIN, TAZOBACTAM 4.5 G: 4; .5 INJECTION, POWDER, LYOPHILIZED, FOR SOLUTION INTRAVENOUS at 01:07

## 2019-07-20 RX ADMIN — IPRATROPIUM BROMIDE AND ALBUTEROL SULFATE 3 ML: .5; 3 SOLUTION RESPIRATORY (INHALATION) at 07:07

## 2019-07-20 RX ADMIN — OXYCODONE HYDROCHLORIDE AND ACETAMINOPHEN 1 TABLET: 5; 325 TABLET ORAL at 05:07

## 2019-07-20 RX ADMIN — HEPARIN SODIUM 5000 UNITS: 5000 INJECTION, SOLUTION INTRAVENOUS; SUBCUTANEOUS at 09:07

## 2019-07-20 RX ADMIN — GABAPENTIN 600 MG: 300 CAPSULE ORAL at 09:07

## 2019-07-20 RX ADMIN — PIPERACILLIN, TAZOBACTAM 4.5 G: 4; .5 INJECTION, POWDER, LYOPHILIZED, FOR SOLUTION INTRAVENOUS at 04:07

## 2019-07-20 RX ADMIN — GABAPENTIN 300 MG: 300 CAPSULE ORAL at 09:07

## 2019-07-20 RX ADMIN — FUROSEMIDE 40 MG: 10 INJECTION, SOLUTION INTRAVENOUS at 05:07

## 2019-07-20 RX ADMIN — HEPARIN SODIUM 5000 UNITS: 5000 INJECTION, SOLUTION INTRAVENOUS; SUBCUTANEOUS at 05:07

## 2019-07-20 RX ADMIN — POTASSIUM CHLORIDE 40 MEQ: 1500 TABLET, EXTENDED RELEASE ORAL at 11:07

## 2019-07-20 RX ADMIN — OLANZAPINE 5 MG: 2.5 TABLET, FILM COATED ORAL at 09:07

## 2019-07-20 RX ADMIN — OXYCODONE HYDROCHLORIDE AND ACETAMINOPHEN 1 TABLET: 5; 325 TABLET ORAL at 10:07

## 2019-07-20 RX ADMIN — FUROSEMIDE 40 MG: 10 INJECTION, SOLUTION INTRAVENOUS at 09:07

## 2019-07-20 NOTE — SUBJECTIVE & OBJECTIVE
Interval history: No acute issues. Feeling better. Persistent knee pain.    Review of Systems   Constitutional: Negative for chills and fever.   HENT: Negative for congestion and rhinorrhea.    Eyes: Negative for photophobia and visual disturbance.   Respiratory: Negative for cough and shortness of breath.    Cardiovascular: Negative for chest pain and leg swelling.   Gastrointestinal: Negative for abdominal pain, nausea and vomiting.   Genitourinary: Negative for hematuria and pelvic pain.   Musculoskeletal: Positive for arthralgias and joint swelling. Negative for neck stiffness.   Skin: Negative for rash and wound.   Neurological: Negative for dizziness and light-headedness.   Psychiatric/Behavioral: Negative for agitation and behavioral problems.     Objective:     Vital Signs (Most Recent):  Temp: 98.8 °F (37.1 °C) (07/20/19 1126)  Pulse: 68 (07/20/19 1126)  Resp: 18 (07/20/19 1126)  BP: (!) 124/58 (07/20/19 1126)  SpO2: 96 % (07/20/19 1126) Vital Signs (24h Range):  Temp:  [97.6 °F (36.4 °C)-99 °F (37.2 °C)] 98.8 °F (37.1 °C)  Pulse:  [52-76] 68  Resp:  [12-19] 18  SpO2:  [96 %-100 %] 96 %  BP: ()/(51-74) 124/58     Weight: 107.1 kg (236 lb 1.8 oz)  Body mass index is 39.29 kg/m².    Physical Exam   Constitutional: She is oriented to person, place, and time. She appears well-developed and well-nourished. No distress.   HENT:   Right Ear: External ear normal.   Left Ear: External ear normal.   Nose: Nose normal.   Eyes: Pupils are equal, round, and reactive to light. EOM are normal. No scleral icterus.   Neck: Normal range of motion. Neck supple. No thyromegaly present.   Cardiovascular: Normal rate and normal heart sounds. Exam reveals no friction rub.   No murmur heard.  Pulmonary/Chest: Effort normal and breath sounds normal. No respiratory distress. She has no wheezes. She has no rales.   Abdominal: Soft. Bowel sounds are normal. She exhibits no mass. There is no tenderness.   Morbidly obese    Genitourinary:   Genitourinary Comments: Chronic indwelling Wolfe. Cloudy urine.   Musculoskeletal:   Leg knee with limited ROM due to pain. She has a well healed surgical incision. The scar is hyperpigmented but no longer erythematous.    Neurological: She is alert and oriented to person, place, and time. No cranial nerve deficit.   Skin: Skin is warm and dry. No pallor.   Psychiatric: She has a normal mood and affect. Thought content normal.         CRANIAL NERVES     CN III, IV, VI   Pupils are equal, round, and reactive to light.  Extraocular motions are normal.        Significant Labs: All pertinent labs within the past 24 hours have been reviewed.    Significant Imaging: I have reviewed and interpreted all pertinent imaging results/findings within the past 24 hours.

## 2019-07-20 NOTE — PLAN OF CARE
Problem: Occupational Therapy Goal  Goal: Occupational Therapy Goal  Goals to be met by: 8/3/19      Patient will increase functional independence with ADLs by performing:    UE Dressing with Modified [place].  Grooming while seated at sink with Modified [place].  Sitting at edge of bed x15 minutes with Supervision.  Rolling to Bilateral with Contact Guard Assistance.   Supine to sit with Contact Guard Assistance.  Sliding board W/C transfers with Minimal Assistance.  Toilet transfer to bedside commode with Minimal Assistance.  Upper extremity exercise program x15 reps per handout, with assistance as needed.    Outcome: Ongoing (interventions implemented as appropriate)  The patient will benefit from OT to address functional deficits.     Comments: W/C transfers will be assessed as patient's shoes become available.

## 2019-07-20 NOTE — PT/OT/SLP EVAL
Physical Therapy Evaluation    Patient Name:  Mary Ellen Fajardo   MRN:  [id number]    Recommendations:     Discharge Recommendations:  nursing facility, skilled   Discharge Equipment Recommendations: none  Barriers to discharge: Inaccessible home and Decreased caregiver support    Assessment:     Mary Ellen Fajardo is a 63 y.o. female admitted with a medical diagnosis of Septic arthritis of knee, left.  She presents with the following impairments/functional limitations:  weakness, impaired endurance, impaired self care skills, impaired functional mobilty, gait instability, impaired balance, decreased lower extremity function, pain, decreased ROM, impaired cardiopulmonary response to activity D/t impairments, pt is unable to perform transfers, propel w/c, and is at greater risk for skin breakdown and falls.    Rehab Prognosis: Good; patient would benefit from acute skilled PT services to address these deficits and reach maximum level of function.    Recent Surgery: * No surgery found *      Plan:     During this hospitalization, patient to be seen 6 x/week to address the identified rehab impairments via therapeutic activities, therapeutic exercises, wheelchair management/training and progress toward the following goals:    · Plan of Care Expires:  08/03/19    Subjective     Chief Complaint: L knee pain  Patient/Family Comments/goals: Pt contemplating L AKA, would like to maximize independence w/ mobility and transfers  Pain/Comfort:  · Pain Rating 1: 3/10  · Location - Side 1: Left  · Location - Orientation 1: anterior  · Location 1: knee  · Pain Addressed 1: Pre-medicate for activity, Cessation of Activity, Nurse notified, Reposition  · Pain Rating Post-Intervention 1: 3/10    Patients cultural, spiritual, Yarsanism conflicts given the current situation: no    Living Environment:  Pt lives in a [facility] w/ her . Pt has a portable ramp to clear her threshold w/ her w/c. Pt reports her  is a disabled  ", is always home to assist with transfers and ADLs   Prior to admission, patients level of function was, pt used slide board transfer from hospital bed to w/c. Pt reports she tolerates 12-13 hours sitting up in chair, pt understands pressure relief strategies, reports she does chair push ups and weight shifts throughout the day. Pt reports as recently as April 2019 she was able to stand w/ RW. Pt reports she is independent with dressing/bathing from the waist up, her  helps w/ lower body bathing/ dressing (pt takes sponge baths).  Equipment used at home: bedside commode, oxygen, slide board, wheelchair, CPAP, hospital bed, urinary catheter supplies.  DME owned (not currently used): rolling walker.  Upon discharge, patient will have assistance from her .    Objective:     Communicated with nurse Inna prior to session.  Patient found HOB elevated with peripheral IV, oxygen  upon PT entry to room.    General Precautions: Standard, fall   Orthopedic Precautions:N/A   Braces: N/A     Exams:  · Cognitive Exam:  Patient is oriented to Person, Place, Time and Situation  · Gross Motor Coordination:  WFL  · Postural Exam:  Patient presented with the following abnormalities:    · -       Rounded shoulders  · -       Forward head  · Sensation:    · -       Intact  reports "R LE is more sensitive than L LE" denies numbness/tingling bilat  · Skin Integrity/Edema:      · -       Skin integrity: Visible skin intact  · RLE ROM: WFL  · RLE Strength: WFL  · LLE ROM: Deficits: restricted knee flexion/extension  · LLE Strength: Deficits: decreased knee flexion/extension strength, ankle WFL    Functional Mobility:  · Bed Mobility:     · Rolling Left:  moderate assistance  · Rolling Right: minimum assistance  · Scooting: maximal assistance  · Supine to Sit: maximal assistance  · Sit to Supine: moderate assistance  · Transfers: bed to chair transfer to be assessed once pt has her shoes  · Balance: Fair in " sitting  · Wheelchair Propulsion:  To be assessed      Therapeutic Activities and Exercises:   Pt sat at EOB for dangle protocol, instructed through ankle pumps for light headedness and LAQ for AROM to the L knee. Pt scooted toward the head of bed to develop UE strength for slide board transfer (to be assessed)    AM-PAC 6 CLICK MOBILITY  Total Score:8     Patient left HOB elevated with call button in reach and nsg notified.    GOALS:   Multidisciplinary Problems     Physical Therapy Goals        Problem: Physical Therapy Goal    Goal Priority Disciplines Outcome Goal Variances Interventions   Physical Therapy Goal     PT, PT/OT Ongoing (interventions implemented as appropriate)     Description:  Goals to be met by: 8/3/19     Patient will increase functional independence with mobility by performin. Supine to sit with Set-up [place]  2. Sit to supine with Stand-by Assistance  3. Rolling to Left and Right with Modified [place].  4. Bed to chair transfer to be assessed  5. Wheelchair propulsion to be assessed  6. Sitting at edge of bed x 15 minutes with Supervision                      History:     Past Medical History:   Diagnosis Date    Addiction to drug     Alcohol abuse     Anxiety     Arthritis     Asthma     Bipolar disorder     Bladder stones     CHF (congestive heart failure)     COPD (chronic obstructive pulmonary disease)     on home o2    CVA (cerebral vascular accident)         Hepatitis C     treated and cured    History of blood clots     Hx of psychiatric care     Hypertension     Belen     ADEEL treated with BiPAP     Paraplegia     Paraplegic spinal paralysis     Psychiatric problem     Psychosis     AVH; Paranoia    Renal disorder     SCI (spinal cord injury)     incomplete    Sleep difficulties     has sleep apnea and uses a Bi-PAP machine.    Substance abuse     Suprapubic catheter     Therapy     Vaginal delivery     x1       Past Surgical History:    Procedure Laterality Date    BACK SURGERY      bilateral knee replacement      BREAST BIOPSY      cysto/lithopaxy 2017      CYSTOLITHOLOPAXY (REMOVE BLADDER STONE) N/A 12/20/2017    [facility]    CYSTOSCOPY W/ LASER LITHOTRIPSY      CYSTOSCOPY,  OCCLUSION BALLOON PLACEMENT, PERC ACCESS  1/19/2018    [facility] at [facility]    CYSTOSCOPY, RETROGRADE, URETEROSCOPY, STENT PLACEMENT Right 3/5/2018    [facility] at [facility]    CYSTOSCOPY/ RETROGRADE PYELOGRAM/ WITH JJ URETERAL STENT PLACEMENT RIGHT Right 12/20/2017    [facility]    EXCHANGE CATHETER-SUPRAPUBIC  1/19/2018    [facility]    EXTRACTION-STONE-URETEROSCOPY Right 3/5/2018    [facility] by [facility] at [facility]    JOINT REPLACEMENT      bilateral knee    LITHOTRIPSY-LASER Right 3/5/2018    [facility]    LITHOTRIPSY-LASER Right 1/29/2018    Per[facility] at [facility]    LITHOTRIPSY-LASER Right 1/19/2018    [facility]    NEPHROLITHOTOMY-PERCUTANEOUS Right 1/19/2018    [facility]    NEPHROSTOLITHOTOMY-PERCUTANEOUS Right 1/29/2018    [facility]    NEPHROSTOMY Right 1/29/2018    Performed [facility] iTnoco [facility] at [facility]    PLACEMENT  1/19/2018    [facility] by [facility]    PLACEMENT-RENAL ACCESS Right 1/19/2018    Performed by [facility]    PLACEMENT-STENT Right 1/29/2018    [facility] at [facility]    Procedure is a Left Genicular Nerve Block ** cpt code 82726** Left 9/16/2015    [facility] at [facility]    PYELOGRAM-ANTEGRADE Right 1/29/2018    Performed by [facility] at [facility] OR    PYELOGRAM-ANTEGRADE  1/19/2018    Performed by RAMA Gonzalez  MD [facility] OR    PYELOGRAM-RETROGRADE  1/19/2018    Performed by [facility] at [facility] OR    RADIOFREQUENCY THERMOCOAGULATION** Left genicular RFA ** Left 1/20/2016    [facility] at [facility]    REMOVAL-STENT-URETERAL  3/5/2018    Performed by [facility] at [facility] OR    REMOVAL-STENT-URETERAL (Cystoscopy) Right 4/9/2018    Performed by [facility]oco [facility] at [facility] OR    URETEROSCOPY Right 1/29/2018    Performed by [facility]oco [facility] at [facility] OR    URETEROSCOPY Right 1/19/2018    Performed by [facility] Tinoco [facility] at [facility] OR       Time Tracking:     PT Received On: 07/20/19  PT Start Time: 1418     PT Stop Time: 1455  PT Total Time (min): 37 min     Billable Minutes: Evaluation 15 and Therapeutic Activity 10  (Co-treat w/ OT)    Neil Bailon, PT  07/20/2019

## 2019-07-20 NOTE — ASSESSMENT & PLAN NOTE
ABG showed acute on chronic hypercapnic respiratory failure on 7/18. See hospital course. Resolved.

## 2019-07-20 NOTE — NURSING
Pt aaox4, turned every 2 hrs with minimal mobility to BLE. Pain med given once. Oxygen @ 2lpm NC. Dressing to L knee with dry drainage. Foam dressing applied to sacrum for protection. Suprapubic cath draining clear yellow urine. Bilateral Off loading boots on. Call light w/i reach, bed in lowest position

## 2019-07-20 NOTE — ASSESSMENT & PLAN NOTE
Concern for left knee septic arthritis. Appreciate orthopedic surgery recs. Started on broad spectrum antibiotics. Joint aspirate culture NGTD.  Vanc through elevated 7/19/19; held vanc. Check random levels and redose as needed.

## 2019-07-20 NOTE — PT/OT/SLP EVAL
Occupational Therapy   Evaluation/Treatment    Name: Mary Ellen Fajardo  MRN: [id number]  Admitting Diagnosis:  Septic arthritis of knee, left      Recommendations:     Discharge Recommendations: nursing facility, skilled  Discharge Equipment Recommendations:  none  Barriers to discharge:  None    Assessment:     Mary Ellen Fajardo is a 63 y.o. female with a medical diagnosis of Septic arthritis of knee, left. The patient presents with self care and FUNCTIONAL MOBILITY DEFICITS. The patient c/o increased pain and stiffness in her left knee and is considering an amputation to improve her mobility.  Performance deficits affecting function: weakness, impaired endurance, impaired self care skills, impaired functional mobilty, impaired balance, decreased coordination, decreased upper extremity function, decreased lower extremity function, pain, decreased ROM, edema, impaired skin, decreased safety awareness.      Rehab Prognosis: Good; patient would benefit from acute skilled OT services to address these deficits and reach maximum level of function.       Plan:     Patient to be seen 5 x/week to address the above listed problems via self-care/home management, therapeutic activities, therapeutic exercises  · Plan of Care Expires: 08/03/19  · Plan of Care Reviewed with: patient    Subjective     Chief Complaint: left knee pain  Patient/Family Comments/goals: wants to be able to transfer to her W/C without left leg pain    Occupational Profile:  Living Environment: The patient lives with her spouse in a SS house with a portable ramp.  Previous level of function: The patient was able to bathe and dress her UB but received assist from her spouse for LB bathe/dress. The patient performs grooming while seated on the EOB.  Roles and Routines: The patient states she sits in her W/C ~12*/day. The patient is aware of pressure relief needs.  Equipment Used at Home:  bedside commode, oxygen, slide board, walker, rolling,  hospital bed, urinary catheter supplies, wheelchair(BSC)  Assistance upon Discharge: spouse    Pain/Comfort:  · Pain Rating 1: 3/10  · Location - Side 1: Left  · Location 1: knee  · Pain Addressed 1: Pre-medicate for activity, Cessation of Activity, Reposition, Nurse notified  · Pain Rating Post-Intervention 1: 3/10    Patients cultural, spiritual, Jainism conflicts given the current situation: no    Objective:     Communicated with: Venkat chance prior to session.  Patient found right sidelying with peripheral IV, oxygen and pressure relif bootsupon OT entry to room.    General Precautions: Standard, fall   Orthopedic Precautions:N/A   Braces: N/A     Occupational Performance:    Bed Mobility:    · Patient completed Rolling/Turning to Left with  minimum assistance  · Patient completed Rolling/Turning to Right with moderate assistance  · Patient completed Scooting/Bridging with maximal assistance  · Patient completed Supine to Sit with maximal assistance  · Patient completed Sit to Supine with moderate assistance    Functional Mobility/Transfers:  · Functional Mobility: The patient tolerated sitting on the EOB ~20 min with CGA/SBA. The patient was unable to transfer to her W/C 2* not having her shoes.    Activities of Daily Living:  · Upper Body Dressing: maximal assistance to don/doff back gown  · Lower Body Dressing: dependence    · Toileting: chronic indwelling catheter      Cognitive/Visual Perceptual:  Cognitive/Psychosocial Skills:     -       Oriented to: Person, Place, Time and Situation   -       Follows Commands/attention:Follows two-step commands and Follows multistep  commands  -       Communication: clear/fluent  -       Memory: No Deficits noted  -       Safety awareness/insight to disability: intact   -       Mood/Affect/Coping skills/emotional control: Appropriate to situation    Physical Exam:  Balance: -       fair sitting  Postural examination/scapula alignment:    -       No postural  abnormalities identified  Skin integrity: left knee covered by dressing  Edema:  left knee  Sensation:    -       Intact  Dominant hand: -       right hand  Upper Extremity Range of Motion:  -       Right Upper Extremity: WFL  -       Left Upper Extremity: WFL  Upper Extremity Strength:    -       Right Upper Extremity: WFL  -       Left Upper Extremity: WFL   Strength:    -       Right Upper Extremity: WFL The patient reports having carpal tunnel surgury in June with return of sensation and strength  -       Left Upper Extremity: WFL    AMPA 6 Click ADL:  AMPAC Total Score: 15    Treatment & Education:  The patient participated in OT eval and tolerated sitting on the EOB ~20 min. The patient was educated re: OT role and POC. The patient states she plans to talk to her doctor about having her left leg amputated to have improved function and pain control. The patient is agreeable to plans to attempt W/C transfer when her shoes are available.   Education:    Patient left HOB elevated and wedge to left side with all lines intact, call button in reach and nurseVenkat notified    GOALS:   Multidisciplinary Problems     Occupational Therapy Goals        Problem: Occupational Therapy Goal    Goal Priority Disciplines Outcome Interventions   Occupational Therapy Goal     OT, PT/OT Ongoing (interventions implemented as appropriate)    Description:  Goals to be met by: 8/3/19      Patient will increase functional independence with ADLs by performing:    UE Dressing with Modified [place].  Grooming while seated at sink with Modified [place].  Sitting at edge of bed x15 minutes with Supervision.  Rolling to Bilateral with Contact Guard Assistance.   Supine to sit with Contact Guard Assistance.  Sliding board W/C transfers with Minimal Assistance.  Toilet transfer to bedside commode with Minimal Assistance.  Upper extremity exercise program x15 reps per handout, with assistance as needed.                       History:     Past Medical History:   Diagnosis Date    Addiction to drug     Alcohol abuse     Anxiety     Arthritis     Asthma     Bipolar disorder     Bladder stones     CHF (congestive heart failure)     COPD (chronic obstructive pulmonary disease)     on home o2    CVA (cerebral vascular accident)     2012    Hepatitis C     treated and cured    History of blood clots     Hx of psychiatric care     Hypertension     Belen     ADEEL treated with BiPAP     Paraplegia     Paraplegic spinal paralysis     Psychiatric problem     Psychosis     [facility]; Paranoia    Renal disorder     SCI (spinal cord injury)     incomplete    Sleep difficulties     has sleep apnea and uses a Bi-PAP machine.    Substance abuse     Suprapubic catheter     Therapy     Vaginal delivery     x1       Past Surgical History:   Procedure Laterality Date    BACK SURGERY      bilateral knee replacement      BREAST BIOPSY      cysto/lithopaxy 2017      CYSTOLITHOLOPAXY (REMOVE BLADDER STONE) N/A 12/20/2017    [facility]    CYSTOSCOPY W/ LASER LITHOTRIPSY      CYSTOSCOPY,  OCCLUSION BALLOON PLACEMENT, PERC ACCESS  1/19/2018    [facility]    CYSTOSCOPY, RETROGRADE, URETEROSCOPY, STENT PLACEMENT Right 3/5/2018    [facility]    CYSTOSCOPY/ RETROGRADE PYELOGRAM/ WITH JJ URETERAL STENT PLACEMENT RIGHT Right 12/20/2017    [facility]    EXCHANGE CATHETER-SUPRAPUBIC  1/19/2018    [facility]    EXTRACTION-STONE-URETEROSCOPY Right 3/5/2018    [facility]    JOINT REPLACEMENT      bilateral knee    LITHOTRIPSY-LASER Right 3/5/2018    [facility]    LITHOTRIPSY-LASER Right 1/29/2018    [facility]    LITHOTRIPSY-LASER Right 1/19/2018    Performed by RAMA  Carlos [facility]    NEPHROLITHOTOMY-PERCUTANEOUS Right 1/19/2018    [facility]    NEPHROSTOLITHOTOMY-PERCUTANEOUS Right 1/29/2018    [facility]    NEPHROSTOMY Right 1/29/2018    [facility]    PLACEMENT  1/19/2018    [facility]formed [facility]    PLACEMENT-RENAL ACCESS Right 1/19/2018    [facility]formed [facility]    PLACEMENT-STENT Right 1/29/2018    [facility] at [facility]    Procedure is a Left Genicular Nerve Block ** cpt code 59899** Left 9/16/2015    [facility]    PYELOGRAM-ANTEGRADE Right 1/29/2018    [facility]    PYELOGRAM-ANTEGRADE  1/19/2018    [facility]    PYELOGRAM-RETROGRADE  1/19/2018    Performed by [facility]A Tionco [facility] at [facility]    RADIOFREQUENCY THERMOCOAGULATION** Left genicular RFA ** Left 1/20/2016    [facility]    REMOVAL-STENT-URETERAL  3/5/2018    [facility]    REMOVAL-STENT-URETERAL (Cystoscopy) Right 4/9/2018    [facility]formed [facility] at [facility]    URETEROSCOPY Right 1/29/2018    [facility] at [facility]    URETEROSCOPY Right 1/19/2018    Performed by [facility] at [facility] OR       Time Tracking:     OT Date of Treatment: 07/20/19  OT Start Time: 1418  OT Stop Time: 1456  OT Total Time (min): 38 min    Billable Minutes:Evaluation 15 (with PT)  Self Care/Home Management 13  Total Time 38    Preethi Glass OT  7/20/2019

## 2019-07-20 NOTE — PLAN OF CARE
Problem: Physical Therapy Goal  Goal: Physical Therapy Goal  Goals to be met by: 8/3/19     Patient will increase functional independence with mobility by performin. Supine to sit with Set-up [place]  2. Sit to supine with Stand-by Assistance  3. Rolling to Left and Right with Modified [place].  4. Bed to chair transfer to be assessed  5. Wheelchair propulsion to be assessed  6. Sitting at edge of bed x 15 minutes with Supervision    Outcome: Ongoing (interventions implemented as appropriate)  PT eval completed today. Pt would benefit from skilled PT to maximize function and reduce caretaker burden. Recommend SNF at d/c

## 2019-07-20 NOTE — PROGRESS NOTES
[facility]  Progress Note    Patient Name: Mary Ellen Fajardo  MRN: [id number]  Patient Class: IP- Inpatient   Admission Date: 7/16/2019  Length of Stay: 3 days  Attending Physician: Cora Briggs MD  Primary Care Provider: Any Tran MD        Subjective:     Principal Problem:Septic arthritis of knee, left      HPI:  Ms. 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 She also noted decreased ROM of the left knee. The pain is exacerbated with attempt to move the knee or her left ankle. She has not had issues with the joint infection since 2011. ROS was also notable for foul smelling urine.     Overview/Hospital Course:  In the ER she was afebrile and had no leukocytosis. There was concern for septic arthritis. She was admitted to internal medicine and orthopedic surgery was consulted. She was started on broad spectrum antibiotics in the ER after blood cultures were collected. UA was concerning for UTI which would also be covered by broad spectrum antibiotics. UCx sent. Orthopedic surgery performed a bedside joint aspiration on 7/17 revealing bloody fluid. A sample was sent for culture. Cultures NGTD. She had worsening renal function and respiratory function on 7/18/19. CXR concerning for volume overload and diuretics were increased. ABG showed acute on chronic hypercapnic respiratory failure. She was requiring BiPAP to maintain O2 sat and was transferred to the ICU. Pulmonoogy was consulted. BiPAP settings adjusted and she was diuresed. Echo show preserved EF but indeterminate diastolic function and PAP 55. She was complaining of left knee pain and was started on low dose po narcotics. Her vanc through was  elevated so vanc was held 7/19/19. She was transferred back to the floor 7/19 and doing well. Blood and joint aspirate cultures remain negative. Continue broad spectrum IV antibiotics for now and consult ID for recs. Ortho not planning on surgery as she is a poor candidate for revision. She is bedbound at baseline. The patient is requesting SNF.    Interval history: No acute issues. Feeling better. Persistent knee pain.    Review of Systems   Constitutional: Negative for chills and fever.   HENT: Negative for congestion and rhinorrhea.    Eyes: Negative for photophobia and visual disturbance.   Respiratory: Negative for cough and shortness of breath.    Cardiovascular: Negative for chest pain and leg swelling.   Gastrointestinal: Negative for abdominal pain, nausea and vomiting.   Genitourinary: Negative for hematuria and pelvic pain.   Musculoskeletal: Positive for arthralgias and joint swelling. Negative for neck stiffness.   Skin: Negative for rash and wound.   Neurological: Negative for dizziness and light-headedness.   Psychiatric/Behavioral: Negative for agitation and behavioral problems.     Objective:     Vital Signs (Most Recent):  Temp: 98.8 °F (37.1 °C) (07/20/19 1126)  Pulse: 68 (07/20/19 1126)  Resp: 18 (07/20/19 1126)  BP: (!) 124/58 (07/20/19 1126)  SpO2: 96 % (07/20/19 1126) Vital Signs (24h Range):  Temp:  [97.6 °F (36.4 °C)-99 °F (37.2 °C)] 98.8 °F (37.1 °C)  Pulse:  [52-76] 68  Resp:  [12-19] 18  SpO2:  [96 %-100 %] 96 %  BP: ()/(51-74) 124/58     Weight: 107.1 kg (236 lb 1.8 oz)  Body mass index is 39.29 kg/m².    Physical Exam   Constitutional: She is oriented to person, place, and time. She appears well-developed and well-nourished. No distress.   HENT:   Right Ear: External ear normal.   Left Ear: External ear normal.   Nose: Nose normal.   Eyes: Pupils are equal, round, and reactive to light. EOM are normal. No scleral icterus.   Neck: Normal range of motion. Neck supple. No thyromegaly  present.   Cardiovascular: Normal rate and normal heart sounds. Exam reveals no friction rub.   No murmur heard.  Pulmonary/Chest: Effort normal and breath sounds normal. No respiratory distress. She has no wheezes. She has no rales.   Abdominal: Soft. Bowel sounds are normal. She exhibits no mass. There is no tenderness.   Morbidly obese   Genitourinary:   Genitourinary Comments: Chronic indwelling Wolfe. Cloudy urine.   Musculoskeletal:   Leg knee with limited ROM due to pain. She has a well healed surgical incision. The scar is hyperpigmented but no longer erythematous.    Neurological: She is alert and oriented to person, place, and time. No cranial nerve deficit.   Skin: Skin is warm and dry. No pallor.   Psychiatric: She has a normal mood and affect. Thought content normal.         CRANIAL NERVES     CN III, IV, VI   Pupils are equal, round, and reactive to light.  Extraocular motions are normal.        Significant Labs: All pertinent labs within the past 24 hours have been reviewed.    Significant Imaging: I have reviewed and interpreted all pertinent imaging results/findings within the past 24 hours.      Assessment/Plan:      * Septic arthritis of knee, left  Concern for left knee septic arthritis. Appreciate orthopedic surgery recs. Started on broad spectrum antibiotics. Joint aspirate culture NGTD.  Vanc through elevated 7/19/19; held vanc. Check random levels and redose as needed.    Acute on chronic diastolic heart failure  EF preserved. PAP 55. Indeterminate diastolic function on echo. Started on IV lasix. Not on ACE or ARB at this time due to ARF. Renal function improved with diuresis.    Acute on chronic respiratory failure with hypercapnia  ABG showed acute on chronic hypercapnic respiratory failure on 7/18. See hospital course. Resolved.    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 Past resistance patterns noted. Possible colonization? Foul smelling urine but no  systemic symptoms.     Bipolar 1 disorder  Continued home meds zyprexa and fluoxetine    Chronic indwelling suprapubic catheter in place  Exchanged given UA concerning for UTI. Follow up with Dr. Tinoco in clinic.    Chronic respiratory failure with hypercapnia  As above. Home 2L.    COPD (chronic obstructive pulmonary disease)  PRN duonebs. No acute issues.    ADEEL on CPAP  BiPAP QHS,    Essential hypertension  Does not appear to be on medications at home? Monitor.      VTE Risk Mitigation (From admission, onward)        Ordered     heparin (porcine) injection 5,000 Units  Every 8 hours      07/18/19 1444     IP VTE HIGH RISK PATIENT  Once      07/17/19 0564                Cora Briggs MD  Department of Hospital Medicine   [facility]

## 2019-07-20 NOTE — PLAN OF CARE
Problem: Adult Inpatient Plan of Care  Goal: Plan of Care Review  Outcome: Ongoing (interventions implemented as appropriate)  Patient remained free from falls and/or injuries. Pain controlled with PRN pain medication. Continues to wear 02 @ 2L per nasal cannula. Vital signs stable. Will continue to monitor.

## 2019-07-20 NOTE — ASSESSMENT & PLAN NOTE
EF preserved. PAP 55. Indeterminate diastolic function on echo. Started on IV lasix. Not on ACE or ARB at this time due to ARF. Renal function improved with diuresis.

## 2019-07-21 PROBLEM — J96.12 CHRONIC RESPIRATORY FAILURE WITH HYPERCAPNIA: Chronic | Status: ACTIVE | Noted: 2019-01-06

## 2019-07-21 LAB
ANION GAP SERPL CALC-SCNC: 8 MMOL/L (ref 8–16)
BACTERIA SPEC AEROBE CULT: NO GROWTH
BACTERIA UR CULT: ABNORMAL
BUN SERPL-MCNC: 22 MG/DL (ref 8–23)
CALCIUM SERPL-MCNC: 9 MG/DL (ref 8.7–10.5)
CHLORIDE SERPL-SCNC: 92 MMOL/L (ref 95–110)
CO2 SERPL-SCNC: 43 MMOL/L (ref 23–29)
CREAT SERPL-MCNC: 0.8 MG/DL (ref 0.5–1.4)
EST. GFR  (AFRICAN AMERICAN): >60 ML/MIN/1.73 M^2
EST. GFR  (NON AFRICAN AMERICAN): >60 ML/MIN/1.73 M^2
GLUCOSE SERPL-MCNC: 80 MG/DL (ref 70–110)
POTASSIUM SERPL-SCNC: 3.6 MMOL/L (ref 3.5–5.1)
SODIUM SERPL-SCNC: 143 MMOL/L (ref 136–145)
VANCOMYCIN SERPL-MCNC: 5.7 UG/ML

## 2019-07-21 PROCEDURE — 94640 AIRWAY INHALATION TREATMENT: CPT

## 2019-07-21 PROCEDURE — 25000242 PHARM REV CODE 250 ALT 637 W/ HCPCS: Performed by: HOSPITALIST

## 2019-07-21 PROCEDURE — 94761 N-INVAS EAR/PLS OXIMETRY MLT: CPT

## 2019-07-21 PROCEDURE — 36415 COLL VENOUS BLD VENIPUNCTURE: CPT

## 2019-07-21 PROCEDURE — 11000001 HC ACUTE MED/SURG PRIVATE ROOM

## 2019-07-21 PROCEDURE — 27000221 HC OXYGEN, UP TO 24 HOURS

## 2019-07-21 PROCEDURE — 63600175 PHARM REV CODE 636 W HCPCS: Performed by: INTERNAL MEDICINE

## 2019-07-21 PROCEDURE — 25000003 PHARM REV CODE 250: Performed by: INTERNAL MEDICINE

## 2019-07-21 PROCEDURE — 63600175 PHARM REV CODE 636 W HCPCS: Performed by: HOSPITALIST

## 2019-07-21 PROCEDURE — 80202 ASSAY OF VANCOMYCIN: CPT

## 2019-07-21 PROCEDURE — 25000003 PHARM REV CODE 250: Performed by: HOSPITALIST

## 2019-07-21 PROCEDURE — 80048 BASIC METABOLIC PNL TOTAL CA: CPT

## 2019-07-21 PROCEDURE — 99900035 HC TECH TIME PER 15 MIN (STAT)

## 2019-07-21 RX ORDER — POLYETHYLENE GLYCOL 3350 17 G/17G
17 POWDER, FOR SOLUTION ORAL DAILY
Status: DISCONTINUED | OUTPATIENT
Start: 2019-07-21 | End: 2019-07-24

## 2019-07-21 RX ORDER — AMOXICILLIN 250 MG
2 CAPSULE ORAL 2 TIMES DAILY
Status: DISCONTINUED | OUTPATIENT
Start: 2019-07-21 | End: 2019-07-26 | Stop reason: HOSPADM

## 2019-07-21 RX ORDER — POTASSIUM CHLORIDE 20 MEQ/1
40 TABLET, EXTENDED RELEASE ORAL ONCE
Status: COMPLETED | OUTPATIENT
Start: 2019-07-21 | End: 2019-07-21

## 2019-07-21 RX ADMIN — POTASSIUM CHLORIDE 40 MEQ: 1500 TABLET, EXTENDED RELEASE ORAL at 03:07

## 2019-07-21 RX ADMIN — HEPARIN SODIUM 5000 UNITS: 5000 INJECTION, SOLUTION INTRAVENOUS; SUBCUTANEOUS at 09:07

## 2019-07-21 RX ADMIN — OLANZAPINE 5 MG: 2.5 TABLET, FILM COATED ORAL at 09:07

## 2019-07-21 RX ADMIN — SENNOSIDES AND DOCUSATE SODIUM 2 TABLET: 8.6; 5 TABLET ORAL at 11:07

## 2019-07-21 RX ADMIN — BACLOFEN 20 MG: 10 TABLET ORAL at 08:07

## 2019-07-21 RX ADMIN — VANCOMYCIN HYDROCHLORIDE 1500 MG: 1.5 INJECTION, POWDER, LYOPHILIZED, FOR SOLUTION INTRAVENOUS at 11:07

## 2019-07-21 RX ADMIN — HEPARIN SODIUM 5000 UNITS: 5000 INJECTION, SOLUTION INTRAVENOUS; SUBCUTANEOUS at 03:07

## 2019-07-21 RX ADMIN — IPRATROPIUM BROMIDE AND ALBUTEROL SULFATE 3 ML: .5; 3 SOLUTION RESPIRATORY (INHALATION) at 08:07

## 2019-07-21 RX ADMIN — SENNOSIDES AND DOCUSATE SODIUM 2 TABLET: 8.6; 5 TABLET ORAL at 09:07

## 2019-07-21 RX ADMIN — FLUOXETINE 10 MG: 10 CAPSULE ORAL at 08:07

## 2019-07-21 RX ADMIN — PIPERACILLIN, TAZOBACTAM 4.5 G: 4; .5 INJECTION, POWDER, LYOPHILIZED, FOR SOLUTION INTRAVENOUS at 03:07

## 2019-07-21 RX ADMIN — GABAPENTIN 600 MG: 300 CAPSULE ORAL at 09:07

## 2019-07-21 RX ADMIN — IPRATROPIUM BROMIDE AND ALBUTEROL SULFATE 3 ML: .5; 3 SOLUTION RESPIRATORY (INHALATION) at 01:07

## 2019-07-21 RX ADMIN — HEPARIN SODIUM 5000 UNITS: 5000 INJECTION, SOLUTION INTRAVENOUS; SUBCUTANEOUS at 05:07

## 2019-07-21 RX ADMIN — POLYETHYLENE GLYCOL 3350 17 G: 17 POWDER, FOR SOLUTION ORAL at 11:07

## 2019-07-21 RX ADMIN — ONDANSETRON 4 MG: 2 INJECTION INTRAMUSCULAR; INTRAVENOUS at 09:07

## 2019-07-21 RX ADMIN — OXYCODONE HYDROCHLORIDE AND ACETAMINOPHEN 1 TABLET: 5; 325 TABLET ORAL at 08:07

## 2019-07-21 RX ADMIN — PIPERACILLIN, TAZOBACTAM 4.5 G: 4; .5 INJECTION, POWDER, LYOPHILIZED, FOR SOLUTION INTRAVENOUS at 08:07

## 2019-07-21 RX ADMIN — FUROSEMIDE 40 MG: 10 INJECTION, SOLUTION INTRAVENOUS at 08:07

## 2019-07-21 RX ADMIN — GABAPENTIN 600 MG: 300 CAPSULE ORAL at 08:07

## 2019-07-21 RX ADMIN — FUROSEMIDE 40 MG: 10 INJECTION, SOLUTION INTRAVENOUS at 05:07

## 2019-07-21 RX ADMIN — IPRATROPIUM BROMIDE AND ALBUTEROL SULFATE 3 ML: .5; 3 SOLUTION RESPIRATORY (INHALATION) at 07:07

## 2019-07-21 RX ADMIN — GABAPENTIN 600 MG: 300 CAPSULE ORAL at 03:07

## 2019-07-21 NOTE — PROGRESS NOTES
Pharmacokinetic Assessment Follow Up: IV Vancomycin    Vancomycin serum concentration assessment(s):    The random level was drawned correctly and can be used to guide therapy at this time.    Vancomycin Regimen Plan:    Change regimen to Vancomycin 1500 mg IV every 12 hour with next serum trough concentration measured at 15-20 prior to 4th dose on 7/22/19 @ 2300      Pharmacy will continue to follow and monitor vancomycin.    Please contact pharmacy at extension [phone] for questions regarding this assessment.    Thank you for the consult,   Holly Rudd     Patient brief summary:  Mary Ellen Fajardo is a 63 y.o. female initiated on antimicrobial therapy with IV Vancomycin for treatment of suspected bacteremia    The patient received a loading dose, followed by the current treatment regimen: vancomycin 1500 mg IV every 12 hours.   Vanc trough resulted 21.9 on 7/19/19 @ 0215. Dose was held due to supratherapeutic trough level.  Vanc random resulted 5.7 on 7/21/19 @ 0609. Blood cultures shows no growth to date but consulted with MD Briggs, will re-start patient back on maintenance therapy. ID will follow up and decide if she can stop vancomycin.     Drug Allergies:   Review of patient's allergies indicates:   Allergen Reactions    Rocephin [ceftriaxone] Rash       Actual Body Weight:   107.1 kg     Renal Function:   Estimated Creatinine Clearance: 87.5 mL/min (based on SCr of 0.8 mg/dL).,     Dialysis Method (if applicable):  N/a     CBC (last 72 hours):  No results for input(s): WHITE BLOOD CELL COUNT, HEMOGLOBIN, HEMATOCRIT, PLATELETS, GRAN%, LYMPH%, MONO%, EOSINOPHIL%, BASOPHIL%, DIFFERENTIAL METHOD in the last 72 hours.    Metabolic Panel (last 72 hours):  Recent Labs   Lab Result Units 07/19/19  0215 07/20/19  0521 07/21/19  0609   Sodium mmol/L 142 143 143   Potassium mmol/L 3.3* 3.1* 3.6   Chloride mmol/L 99 95 92*   CO2 mmol/L 33* 41* 43*   Glucose mg/dL 84 78 80   BUN, Bld mg/dL 24* 27* 22    Creatinine mg/dL 1.2 1.0 0.8       Vancomycin Administrations:  vancomycin given in the last 96 hours        No antibiotic orders with administrations found.                      Drug levels (last 3 results):  Recent Labs   Lab Result Units 07/19/19  0215 07/20/19  0521 07/21/19  0609   Vancomycin, Random ug/mL  --  10.5 5.7   Vancomycin-Trough ug/mL 21.9  --   --        Microbiologic Results:  Microbiology Results (last 7 days)       Procedure Component Value Units Date/Time    Aerobic culture [[id number]] Collected:  07/17/19 1253    Order Status:  Completed Specimen:  Body Fluid from Knee, Left Updated:  07/21/19 0753     Aerobic Bacterial Culture No growth    Narrative:       Aspiration left knee    Urine Culture High Risk [[id number]]  (Abnormal)  (Susceptibility) Collected:  07/17/19 1700    Order Status:  Completed Specimen:  Urine, Catheterized Updated:  07/21/19 0748     Urine Culture, Routine ESCHERICHIA COLI  10,000 - 49,999 cfu/ml      Narrative:       Indicated criteria for high risk culture:->Prior to urologic  procedures    Blood culture #2 [[id number]] Collected:  07/16/19 2255    Order Status:  Completed Specimen:  Blood from Peripheral, Hand, Right Updated:  07/21/19 0303     Blood Culture, Routine No Growth to date      No Growth to date      No Growth to date      No Growth to date      No Growth to date    Narrative:       Blood Culture #2    Blood culture #1 [[id number]] Collected:  07/16/19 2250    Order Status:  Completed Specimen:  Blood from Peripheral, Antecubital, Left Updated:  07/21/19 0303     Blood Culture, Routine No Growth to date      No Growth to date      No Growth to date      No Growth to date      No Growth to date    Narrative:       Blood Culture #1    Culture, Body Fluid (Aerobic) w/ GS [[id number]] Collected:  07/17/19 1253    Order Status:  Completed Specimen:  Body Fluid from Knee, Left Updated:  07/18/19 1050     Gram Stain Result Moderate WBC's      No organisms seen     Narrative:       Aspiration left knee

## 2019-07-21 NOTE — CARE UPDATE
Attempted to place pt on BIPAP.  Pt. States that she is not ready at this time.  She further states that she would like to be placed on BIPAP at 12pm.  RN notified.

## 2019-07-21 NOTE — PROGRESS NOTES
[facility] Medicine  Progress Note    Patient Name: Mary Ellen Fajardo  MRN: [id number]  Patient Class: IP- Inpatient   Admission Date: 7/16/2019  Length of Stay: 4 days  Attending Physician: Cora Briggs MD  Primary Care Provider: Any Tran MD        Subjective:     Principal Problem:Septic arthritis of knee, left      HPI:  Ms. 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 She also noted decreased ROM of the left knee. The pain is exacerbated with attempt to move the knee or her left ankle. She has not had issues with the joint infection since 2011. ROS was also notable for foul smelling urine.     Overview/Hospital Course:  In the ER she was afebrile and had no leukocytosis. There was concern for septic arthritis. She was admitted to internal medicine and orthopedic surgery was consulted. She was started on broad spectrum antibiotics in the ER after blood cultures were collected. UA was concerning for UTI which would also be covered by broad spectrum antibiotics. UCx sent. Orthopedic surgery performed a bedside joint aspiration on 7/17 revealing bloody fluid. A sample was sent for culture. Cultures NGTD. She had worsening renal function and respiratory function on 7/18/19. CXR concerning for volume overload and diuretics were increased. ABG showed acute on chronic hypercapnic respiratory failure. She was requiring BiPAP to maintain O2 sat and was transferred to the ICU. Pulmonoogy was consulted. BiPAP settings adjusted and she was diuresed. Echo show preserved EF but indeterminate diastolic function and PAP 55. She was complaining of left knee pain and was started on low dose po narcotics. Her vanc through was  elevated so vanc was held 7/19/19. She was transferred back to the floor 7/19 and doing well. Blood and joint aspirate cultures remain negative. Continue broad spectrum IV antibiotics for now and consult ID for recs. Ortho not planning on surgery as she is a poor candidate for revision. In the event that she has severe sepsis from an infected right knee joint, amputation would likely be needed. PT/OT consulted. The patient would like SNF and this was recommended. SW consulted. She reports scarring from PPD in the past; quantiferon gold is pending.    Interval history:  No acute issues.     Review of Systems   Constitutional: Negative for chills and fever.   HENT: Negative for congestion and rhinorrhea.    Eyes: Negative for photophobia and visual disturbance.   Respiratory: Negative for cough and shortness of breath.    Cardiovascular: Negative for chest pain and leg swelling.   Gastrointestinal: Negative for abdominal pain, nausea and vomiting.   Genitourinary: Negative for hematuria and pelvic pain.   Musculoskeletal: Positive for arthralgias and joint swelling. Negative for neck stiffness.   Skin: Negative for rash and wound.   Neurological: Negative for dizziness and light-headedness.   Psychiatric/Behavioral: Negative for agitation and behavioral problems.     Objective:     Vital Signs (Most Recent):  Temp: 98 °F (36.7 °C) (07/21/19 1113)  Pulse: 72 (07/21/19 1354)  Resp: 18 (07/21/19 1354)  BP: (!) 102/51 (07/21/19 1113)  SpO2: 98 % (07/21/19 1354) Vital Signs (24h Range):  Temp:  [97.5 °F (36.4 °C)-98.7 °F (37.1 °C)] 98 °F (36.7 °C)  Pulse:  [53-72] 72  Resp:  [16-19] 18  SpO2:  [96 %-99 %] 98 %  BP: (102-136)/(51-73) 102/51     Weight: 107.1 kg (236 lb 1.8 oz)  Body mass index is 39.29 kg/m².    Physical Exam   Constitutional: She is oriented to person, place, and time. She appears well-developed and well-nourished. No distress.   HENT:   Right Ear: External ear normal.   Left Ear: External ear normal.    Nose: Nose normal.   Eyes: Pupils are equal, round, and reactive to light. EOM are normal. No scleral icterus.   Neck: Normal range of motion. Neck supple. No thyromegaly present.   Cardiovascular: Normal rate and normal heart sounds. Exam reveals no friction rub.   No murmur heard.  Pulmonary/Chest: Effort normal and breath sounds normal. No respiratory distress. She has no wheezes. She has no rales.   Abdominal: Soft. Bowel sounds are normal. She exhibits no mass. There is no tenderness.   Morbidly obese   Genitourinary:   Genitourinary Comments: Chronic indwelling Wolfe. Clear urine.   Musculoskeletal:   Leg knee with limited ROM due to pain. She has a well healed surgical incision. The scar is hyperpigmented but no longer erythematous.    Neurological: She is alert and oriented to person, place, and time. No cranial nerve deficit.   Skin: Skin is warm and dry. No pallor.   Psychiatric: She has a normal mood and affect. Thought content normal.         CRANIAL NERVES     CN III, IV, VI   Pupils are equal, round, and reactive to light.  Extraocular motions are normal.        Significant Labs: All pertinent labs within the past 24 hours have been reviewed.    Significant Imaging: I have reviewed and interpreted all pertinent imaging results/findings within the past 24 hours.      Assessment/Plan:      * Septic arthritis of knee, left  Concern for left knee septic arthritis. Appreciate orthopedic surgery recs. Started on broad spectrum antibiotics. Joint aspirate culture NGTD. ID consulted.    Acute on chronic diastolic heart failure  EF preserved. PAP 55. Indeterminate diastolic function on echo. Started on IV lasix. Not on ACE or ARB at this time due to ARF. Renal function improved with diuresis.    Acute on chronic respiratory failure with hypercapnia  ABG showed acute on chronic hypercapnic respiratory failure on 7/18. See hospital course. Resolved.    Infection due to urethral catheter  Started on broad  spectrum antibiotics. Past resistance patterns noted. Possible colonization? Foul smelling urine but no systemic symptoms.     Bipolar 1 disorder  Continued home meds zyprexa and fluoxetine    Chronic indwelling suprapubic catheter in place  Exchanged given UA concerning for UTI. Follow up with Dr. Tinoco in clinic.    Chronic respiratory failure with hypercapnia  As above. Home 2L.    COPD (chronic obstructive pulmonary disease)  PRN duonebs. No acute issues.    ADEEL on CPAP  BiPAP QHS,    Essential hypertension  Does not appear to be on medications at home? Monitor.      VTE Risk Mitigation (From admission, onward)        Ordered     heparin (porcine) injection 5,000 Units  Every 8 hours      07/18/19 1444     IP VTE HIGH RISK PATIENT  Once      07/17/19 0099                Cora Briggs MD  Department of Hospital Medicine   [facility]

## 2019-07-21 NOTE — ASSESSMENT & PLAN NOTE
Concern for left knee septic arthritis. Appreciate orthopedic surgery recs. Started on broad spectrum antibiotics. Joint aspirate culture NGTD. ID consulted.

## 2019-07-21 NOTE — NURSING
Pt aaox4, turned frequently at patient's request. percocet and baclofen given once with full relief. Iv abxs infusing as ordered. Suprapubic cath draining clear yellow urine. Pt also c/o nausea, zofran given with full relief. Bilateral Offloading boots on. Foam dressing to sacrum c/d/i, no skin breakdown noted. No bm during am shift. Call light w/i reach, bed alarm on.

## 2019-07-21 NOTE — PLAN OF CARE
Problem: Adult Inpatient Plan of Care  Goal: Plan of Care Review  Outcome: Ongoing (interventions implemented as appropriate)     07/21/19 0519   Plan of Care Review   Plan of Care Reviewed With patient   Patient AAOX4, free from fall or injury. C/o pain X1 this shift. Remains on IV antibiotic. Weight shift assistance provided throughout the shift. Dressing to left clean with minimal dried drainage noted to dressing. Suprapubic cath remains and draining clear yellow urine. Bipap qhs. Bed in low locked position, bed alarm armed and audible with call light in reach. Will continue to monitor.

## 2019-07-21 NOTE — SUBJECTIVE & OBJECTIVE
Interval history:  No acute issues.     Review of Systems   Constitutional: Negative for chills and fever.   HENT: Negative for congestion and rhinorrhea.    Eyes: Negative for photophobia and visual disturbance.   Respiratory: Negative for cough and shortness of breath.    Cardiovascular: Negative for chest pain and leg swelling.   Gastrointestinal: Negative for abdominal pain, nausea and vomiting.   Genitourinary: Negative for hematuria and pelvic pain.   Musculoskeletal: Positive for arthralgias and joint swelling. Negative for neck stiffness.   Skin: Negative for rash and wound.   Neurological: Negative for dizziness and light-headedness.   Psychiatric/Behavioral: Negative for agitation and behavioral problems.     Objective:     Vital Signs (Most Recent):  Temp: 98 °F (36.7 °C) (07/21/19 1113)  Pulse: 72 (07/21/19 1354)  Resp: 18 (07/21/19 1354)  BP: (!) 102/51 (07/21/19 1113)  SpO2: 98 % (07/21/19 1354) Vital Signs (24h Range):  Temp:  [97.5 °F (36.4 °C)-98.7 °F (37.1 °C)] 98 °F (36.7 °C)  Pulse:  [53-72] 72  Resp:  [16-19] 18  SpO2:  [96 %-99 %] 98 %  BP: (102-136)/(51-73) 102/51     Weight: 107.1 kg (236 lb 1.8 oz)  Body mass index is 39.29 kg/m².    Physical Exam   Constitutional: She is oriented to person, place, and time. She appears well-developed and well-nourished. No distress.   HENT:   Right Ear: External ear normal.   Left Ear: External ear normal.   Nose: Nose normal.   Eyes: Pupils are equal, round, and reactive to light. EOM are normal. No scleral icterus.   Neck: Normal range of motion. Neck supple. No thyromegaly present.   Cardiovascular: Normal rate and normal heart sounds. Exam reveals no friction rub.   No murmur heard.  Pulmonary/Chest: Effort normal and breath sounds normal. No respiratory distress. She has no wheezes. She has no rales.   Abdominal: Soft. Bowel sounds are normal. She exhibits no mass. There is no tenderness.   Morbidly obese   Genitourinary:   Genitourinary Comments:  Chronic indwelling Wolfe. Clear urine.   Musculoskeletal:   Leg knee with limited ROM due to pain. She has a well healed surgical incision. The scar is hyperpigmented but no longer erythematous.    Neurological: She is alert and oriented to person, place, and time. No cranial nerve deficit.   Skin: Skin is warm and dry. No pallor.   Psychiatric: She has a normal mood and affect. Thought content normal.         CRANIAL NERVES     CN III, IV, VI   Pupils are equal, round, and reactive to light.  Extraocular motions are normal.        Significant Labs: All pertinent labs within the past 24 hours have been reviewed.    Significant Imaging: I have reviewed and interpreted all pertinent imaging results/findings within the past 24 hours.

## 2019-07-22 LAB
ANION GAP SERPL CALC-SCNC: 9 MMOL/L (ref 8–16)
BACTERIA BLD CULT: NORMAL
BACTERIA BLD CULT: NORMAL
BUN SERPL-MCNC: 21 MG/DL (ref 8–23)
CALCIUM SERPL-MCNC: 9.1 MG/DL (ref 8.7–10.5)
CHLORIDE SERPL-SCNC: 92 MMOL/L (ref 95–110)
CO2 SERPL-SCNC: 41 MMOL/L (ref 23–29)
CREAT SERPL-MCNC: 1 MG/DL (ref 0.5–1.4)
EST. GFR  (AFRICAN AMERICAN): >60 ML/MIN/1.73 M^2
EST. GFR  (NON AFRICAN AMERICAN): >60 ML/MIN/1.73 M^2
GLUCOSE SERPL-MCNC: 87 MG/DL (ref 70–110)
POTASSIUM SERPL-SCNC: 4.1 MMOL/L (ref 3.5–5.1)
SODIUM SERPL-SCNC: 142 MMOL/L (ref 136–145)
VANCOMYCIN SERPL-MCNC: 39.7 UG/ML

## 2019-07-22 PROCEDURE — 99223 1ST HOSP IP/OBS HIGH 75: CPT | Mod: ,,, | Performed by: INTERNAL MEDICINE

## 2019-07-22 PROCEDURE — 25000003 PHARM REV CODE 250: Performed by: HOSPITALIST

## 2019-07-22 PROCEDURE — 80048 BASIC METABOLIC PNL TOTAL CA: CPT

## 2019-07-22 PROCEDURE — 97535 SELF CARE MNGMENT TRAINING: CPT

## 2019-07-22 PROCEDURE — 97110 THERAPEUTIC EXERCISES: CPT

## 2019-07-22 PROCEDURE — 25000003 PHARM REV CODE 250: Performed by: INTERNAL MEDICINE

## 2019-07-22 PROCEDURE — 63600175 PHARM REV CODE 636 W HCPCS: Performed by: INTERNAL MEDICINE

## 2019-07-22 PROCEDURE — 63600175 PHARM REV CODE 636 W HCPCS: Performed by: HOSPITALIST

## 2019-07-22 PROCEDURE — 94640 AIRWAY INHALATION TREATMENT: CPT

## 2019-07-22 PROCEDURE — 99223 PR INITIAL HOSPITAL CARE,LEVL III: ICD-10-PCS | Mod: ,,, | Performed by: INTERNAL MEDICINE

## 2019-07-22 PROCEDURE — 80202 ASSAY OF VANCOMYCIN: CPT | Mod: 91

## 2019-07-22 PROCEDURE — 25000242 PHARM REV CODE 250 ALT 637 W/ HCPCS: Performed by: HOSPITALIST

## 2019-07-22 PROCEDURE — 86480 TB TEST CELL IMMUN MEASURE: CPT

## 2019-07-22 PROCEDURE — 94761 N-INVAS EAR/PLS OXIMETRY MLT: CPT

## 2019-07-22 PROCEDURE — 80202 ASSAY OF VANCOMYCIN: CPT

## 2019-07-22 PROCEDURE — 11000001 HC ACUTE MED/SURG PRIVATE ROOM

## 2019-07-22 PROCEDURE — 27000221 HC OXYGEN, UP TO 24 HOURS

## 2019-07-22 PROCEDURE — 36415 COLL VENOUS BLD VENIPUNCTURE: CPT

## 2019-07-22 RX ORDER — BUMETANIDE 1 MG/1
2 TABLET ORAL DAILY
Status: DISCONTINUED | OUTPATIENT
Start: 2019-07-23 | End: 2019-07-24

## 2019-07-22 RX ORDER — LACTULOSE 10 G/15ML
30 SOLUTION ORAL EVERY 6 HOURS PRN
Status: DISCONTINUED | OUTPATIENT
Start: 2019-07-22 | End: 2019-07-24

## 2019-07-22 RX ADMIN — PIPERACILLIN, TAZOBACTAM 4.5 G: 4; .5 INJECTION, POWDER, LYOPHILIZED, FOR SOLUTION INTRAVENOUS at 04:07

## 2019-07-22 RX ADMIN — IPRATROPIUM BROMIDE AND ALBUTEROL SULFATE 3 ML: .5; 3 SOLUTION RESPIRATORY (INHALATION) at 01:07

## 2019-07-22 RX ADMIN — FLUOXETINE 10 MG: 10 CAPSULE ORAL at 08:07

## 2019-07-22 RX ADMIN — SENNOSIDES AND DOCUSATE SODIUM 2 TABLET: 8.6; 5 TABLET ORAL at 08:07

## 2019-07-22 RX ADMIN — IPRATROPIUM BROMIDE AND ALBUTEROL SULFATE 3 ML: .5; 3 SOLUTION RESPIRATORY (INHALATION) at 07:07

## 2019-07-22 RX ADMIN — IPRATROPIUM BROMIDE AND ALBUTEROL SULFATE 3 ML: .5; 3 SOLUTION RESPIRATORY (INHALATION) at 12:07

## 2019-07-22 RX ADMIN — ONDANSETRON 4 MG: 2 INJECTION INTRAMUSCULAR; INTRAVENOUS at 09:07

## 2019-07-22 RX ADMIN — POLYETHYLENE GLYCOL 3350 17 G: 17 POWDER, FOR SOLUTION ORAL at 08:07

## 2019-07-22 RX ADMIN — PIPERACILLIN, TAZOBACTAM 4.5 G: 4; .5 INJECTION, POWDER, LYOPHILIZED, FOR SOLUTION INTRAVENOUS at 12:07

## 2019-07-22 RX ADMIN — FUROSEMIDE 40 MG: 10 INJECTION, SOLUTION INTRAVENOUS at 10:07

## 2019-07-22 RX ADMIN — GABAPENTIN 600 MG: 300 CAPSULE ORAL at 08:07

## 2019-07-22 RX ADMIN — LACTULOSE 30 G: 20 SOLUTION ORAL at 06:07

## 2019-07-22 RX ADMIN — HEPARIN SODIUM 5000 UNITS: 5000 INJECTION, SOLUTION INTRAVENOUS; SUBCUTANEOUS at 06:07

## 2019-07-22 RX ADMIN — ONDANSETRON 4 MG: 2 INJECTION INTRAMUSCULAR; INTRAVENOUS at 12:07

## 2019-07-22 RX ADMIN — OLANZAPINE 5 MG: 2.5 TABLET, FILM COATED ORAL at 08:07

## 2019-07-22 RX ADMIN — GABAPENTIN 600 MG: 300 CAPSULE ORAL at 02:07

## 2019-07-22 RX ADMIN — OXYCODONE HYDROCHLORIDE AND ACETAMINOPHEN 1 TABLET: 5; 325 TABLET ORAL at 04:07

## 2019-07-22 RX ADMIN — OXYCODONE HYDROCHLORIDE AND ACETAMINOPHEN 1 TABLET: 5; 325 TABLET ORAL at 12:07

## 2019-07-22 RX ADMIN — VANCOMYCIN HYDROCHLORIDE 1500 MG: 1.5 INJECTION, POWDER, LYOPHILIZED, FOR SOLUTION INTRAVENOUS at 12:07

## 2019-07-22 RX ADMIN — HEPARIN SODIUM 5000 UNITS: 5000 INJECTION, SOLUTION INTRAVENOUS; SUBCUTANEOUS at 09:07

## 2019-07-22 RX ADMIN — PIPERACILLIN, TAZOBACTAM 4.5 G: 4; .5 INJECTION, POWDER, LYOPHILIZED, FOR SOLUTION INTRAVENOUS at 08:07

## 2019-07-22 RX ADMIN — HEPARIN SODIUM 5000 UNITS: 5000 INJECTION, SOLUTION INTRAVENOUS; SUBCUTANEOUS at 01:07

## 2019-07-22 NOTE — ASSESSMENT & PLAN NOTE
EF preserved. PAP 55. Indeterminate diastolic function on echo. Started on IV lasix. Not on ACE or ARB at this time due to ARF. Renal function improved with diuresis. IV Lasix stopped and she was placed back on her home Bumex on 7/22. Monitor.

## 2019-07-22 NOTE — PLAN OF CARE
07/22/19 1240   Discharge Reassessment   Assessment Type Discharge Planning Reassessment   Provided patient/caregiver education on the expected discharge date and the discharge plan No   Do you have any problems affording any of your prescribed medications? No   Discharge Plan A Skilled Nursing Facility   Discharge Plan B Home with family;Home Health   DME Needed Upon Discharge  other (see comments)  (TBD )   Anticipated Discharge Disposition SNF   Can the patient answer the patient profile reliably? Yes, cognitively intact   How does the patient rate their overall health at the present time? Good   Describe the patient's ability to walk at the present time. Major restrictions/daily assistance from another person   How often would a person be available to care for the patient? Often   Number of comorbid conditions (as recorded on the chart) Four   During the past month, has the patient often been bothered by feeling down, depressed or hopeless? No   During the past month, has the patient often been bothered by little interest or pleasure in doing things? No   Post-Acute Status   Post-Acute Authorization Placement   Post-Acute Placement Status Pending State Certification  (Pt will need a level 2 142)   Discharge Delays (!) Other  (Level 2 142)     SW to pt's room to discuss d/c planning. Pt agreeable with SNF. Pasrr completed.  [employer] called in to ELISA @ [phone] with Amanda.  PASSR faxed to ELISA at:  [phone].   Awaiting level 2 142.

## 2019-07-22 NOTE — SUBJECTIVE & OBJECTIVE
Interval history:  No acute issues.     Review of Systems   Constitutional: Negative for chills and fever.   HENT: Negative for congestion and rhinorrhea.    Eyes: Negative for photophobia and visual disturbance.   Respiratory: Negative for cough and shortness of breath.    Cardiovascular: Negative for chest pain and leg swelling.   Gastrointestinal: Negative for abdominal pain, nausea and vomiting.   Genitourinary: Negative for hematuria and pelvic pain.   Musculoskeletal: Positive for arthralgias and joint swelling. Negative for neck stiffness.   Skin: Negative for rash and wound.   Neurological: Negative for dizziness and light-headedness.   Psychiatric/Behavioral: Negative for agitation and behavioral problems.     Objective:     Vital Signs (Most Recent):  Temp: 98.9 °F (37.2 °C) (07/22/19 1643)  Pulse: 65 (07/22/19 1643)  Resp: 18 (07/22/19 1643)  BP: (!) 142/73 (07/22/19 1643)  SpO2: 96 % (07/22/19 1643) Vital Signs (24h Range):  Temp:  [97.2 °F (36.2 °C)-98.9 °F (37.2 °C)] 98.9 °F (37.2 °C)  Pulse:  [54-70] 65  Resp:  [18-20] 18  SpO2:  [94 %-100 %] 96 %  BP: (104-142)/(56-73) 142/73     Weight: 107.1 kg (236 lb 1.8 oz)  Body mass index is 39.29 kg/m².    Physical Exam   Constitutional: She is oriented to person, place, and time. She appears well-developed and well-nourished. No distress.   HENT:   Right Ear: External ear normal.   Left Ear: External ear normal.   Nose: Nose normal.   Eyes: Pupils are equal, round, and reactive to light. EOM are normal. No scleral icterus.   Neck: Normal range of motion. Neck supple. No thyromegaly present.   Cardiovascular: Normal rate and normal heart sounds. Exam reveals no friction rub.   No murmur heard.  Pulmonary/Chest: Effort normal and breath sounds normal. No respiratory distress. She has no wheezes. She has no rales.   Abdominal: Soft. Bowel sounds are normal. She exhibits no mass. There is no tenderness.   Morbidly obese   Genitourinary:   Genitourinary Comments:  Chronic indwelling Wolfe. Clear urine.   Musculoskeletal:   Leg knee with limited ROM due to pain. She has a well healed surgical incision. The scar is hyperpigmented but no longer erythematous.    Neurological: She is alert and oriented to person, place, and time. No cranial nerve deficit.   Skin: Skin is warm and dry. No pallor.   Psychiatric: She has a normal mood and affect. Thought content normal.         CRANIAL NERVES     CN III, IV, VI   Pupils are equal, round, and reactive to light.  Extraocular motions are normal.        Significant Labs: All pertinent labs within the past 24 hours have been reviewed.

## 2019-07-22 NOTE — PLAN OF CARE
Problem: Occupational Therapy Goal  Goal: Occupational Therapy Goal  Goals to be met by: 8/3/19      Patient will increase functional independence with ADLs by performing:    UE Dressing with Modified [place].  Grooming while seated at sink with Modified [place].  Sitting at edge of bed x15 minutes with Supervision.  Rolling to Bilateral with Contact Guard Assistance.   Supine to sit with Contact Guard Assistance.  Sliding board W/C transfers with Minimal Assistance.  Toilet transfer to bedside commode with Minimal Assistance.  Upper extremity exercise program x15 reps per handout, with assistance as needed.     Outcome: Ongoing (interventions implemented as appropriate)  The patient demo improved bed mobility and sitting balance.The patient tolerated sitting EOB ~20 min with SBA.

## 2019-07-22 NOTE — PLAN OF CARE
Problem: Physical Therapy Goal  Goal: Physical Therapy Goal  Goals to be met by: 8/3/19     Patient will increase functional independence with mobility by performin. Supine to sit with Set-up [place]  2. Sit to supine with Stand-by Assistance  3. Rolling to Left and Right with Modified [place].  4. Bed to chair transfer to be assessed  5. Wheelchair propulsion to be assessed  6. Sitting at edge of bed x 15 minutes with Supervision     Outcome: Ongoing (interventions implemented as appropriate)  Pt progressing, continue with POC

## 2019-07-22 NOTE — PT/OT/SLP PROGRESS
Occupational Therapy   Treatment    Name: aMry Ellen Fajardo  MRN: [id number]  Admitting Diagnosis:  Septic arthritis of knee, left       Recommendations:     Discharge Recommendations: nursing facility, skilled  Discharge Equipment Recommendations:  none  Barriers to discharge:  None    Assessment:     Mary Ellen Fajardo is a 63 y.o. female with a medical diagnosis of Septic arthritis of knee, left.  She presents with improved sitting balance. Performance deficits affecting function are weakness, impaired endurance, impaired functional mobilty, impaired self care skills, impaired balance, decreased upper extremity function, decreased lower extremity function, pain, decreased ROM, edema, impaired skin, impaired cardiopulmonary response to activity.     Rehab Prognosis:  Good; patient would benefit from acute skilled OT services to address these deficits and reach maximum level of function.       Plan:     Patient to be seen 5 x/week to address the above listed problems via self-care/home management, therapeutic activities, therapeutic exercises  · Plan of Care Expires: 08/03/19  · Plan of Care Reviewed with: patient    Subjective     Pain/Comfort:  · Pain Rating 1: 3/10  · Location - Side 1: Left  · Location 1: knee  · Pain Addressed 1: Pre-medicate for activity, Reposition, Cessation of Activity  · Pain Rating Post-Intervention 1: 3/10    Objective:     Communicated with: nurseAugustina prior to session.  Patient found right sidelying with telemetry, pressure relief boots, oxygen(suprapubic catheter) upon OT entry to room.    General Precautions: Standard, fall   Orthopedic Precautions:N/A   Braces: N/A     Occupational Performance:     Bed Mobility:    · Patient completed Rolling/Turning to Left with  minimum assistance and with side rail  · Patient completed Rolling/Turning to Right with minimum assistance and with side rail  · Patient completed Scooting/Bridging with maximal assistance  · Patient completed  Supine to Sit with moderate assistance  · Patient completed Sit to Supine with moderate assistance     Functional Mobility/Transfers:  · Functional Mobility: The patient is able to scoot but unable to stand.    Activities of Daily Living:  · Grooming: modified independence to brush her teeth and wash her face while seated on the EOB afetr set up.  · Upper Body Dressing: minimum assistance to don back gown  · Lower Body Dressing: dependence        [facility] 6 Click ADL: 16    Treatment & Education:  The patient participated in grooming and rolling in bed to adjust pads.     Patient left with bed in chair position with all lines intact, call button in reach and nurseAugustina notifiedEducation:      GOALS:   Multidisciplinary Problems     Occupational Therapy Goals        Problem: Occupational Therapy Goal    Goal Priority Disciplines Outcome Interventions   Occupational Therapy Goal     OT, PT/OT Ongoing (interventions implemented as appropriate)    Description:  Goals to be met by: 8/3/19      Patient will increase functional independence with ADLs by performing:    UE Dressing with Modified [place].  Grooming while seated at sink with Modified [place].  Sitting at edge of bed x15 minutes with Supervision.  Rolling to Bilateral with Contact Guard Assistance.   Supine to sit with Contact Guard Assistance.  Sliding board W/C transfers with Minimal Assistance.  Toilet transfer to bedside commode with Minimal Assistance.  Upper extremity exercise program x15 reps per handout, with assistance as needed.                      Time Tracking:     OT Date of Treatment: 07/22/19  OT Start Time: 1605  OT Stop Time: 1635  OT Total Time (min): 30 min    Billable Minutes:Self Care/Home Management 15  Total Time 30 (with PT present)    Preethi Glass OT  7/22/2019

## 2019-07-22 NOTE — PLAN OF CARE
Problem: Adult Inpatient Plan of Care  Goal: Plan of Care Review  Outcome: Ongoing (interventions implemented as appropriate)  Monitored freq.throughout the shift. Pt.resting at present with no complaints and no acute distress noted. Med.for pain as needed & med.helpful. Iv antibiotics cont.as ordered. Pt. Last bm 7/16/19 & dr. Leander barrios new orders noted. Pt. med.for constipation. Call light in reach.

## 2019-07-22 NOTE — HPI
63F who is chronically bed bound 2/2 issues with L knee (replaced 2/2011 for ostearthritis and c/b infection in 2011). Says he infection was treated at [facility] in 2011 with hardware explant and 6 weeks of iv antibiotics followed by hardware replacement (no culture results available for review). Denies issues with L knee in many years, but is non-ambulatory since her last surgery. Admitted 7/16 with new L knee pain swelling and erythema. Thinks she had some low grade fevers as well. Denies other symptoms including dysuria. Knee was aspirated by ortho and sent for culture. Sounds like not enough fluid avilable to send for cell count. Spoke with lab and they do not have any fluid (other than swab) and so cell count can't be added on. Given degree of immobility, pt strongly considering AKA with ortho (with goal of being fitted for prosthesis). ID consulted for abx recommendations. Currently on V/Z.     Plain film knee-- No loosening or fracture or migration.    Specimen Information: Knee, Left; Body Fluid        Component 5d ago   Aerobic Bacterial Culture No growth          Small amount of bloody drainage present    No cell count available for review-- called lab. No aspirate sent (only swab)      Hospital course c/b brif step up to ICU for acute on Chronic Hypercarbic Respiratory Failure-multifactorial: cardiogenic pulmonary edema and narcotic SE on baseline ADEEL/OHS

## 2019-07-22 NOTE — PLAN OF CARE
Problem: Adult Inpatient Plan of Care  Goal: Plan of Care Review  Recommendations     1. Continue current diet order; encourage continued adequate intake  Goals: Maintain meal intake >50% daily  Nutrition Goal Status: new

## 2019-07-22 NOTE — PROGRESS NOTES
[facility] [place] Medicine  Progress Note    Patient Name: Mary Ellen Fajardo  MRN: [id number]  Patient Class: IP- Inpatient   Admission Date: 7/16/2019  Length of Stay: 5 days  Attending Physician: Cora Briggs MD  Primary Care Provider: Any Tran MD        Subjective:     Principal Problem:Septic arthritis of knee, left      HPI:  Ms. 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 She also noted decreased ROM of the left knee. The pain is exacerbated with attempt to move the knee or her left ankle. She has not had issues with the joint infection since 2011. ROS was also notable for foul smelling urine.     Overview/Hospital Course:  In the ER she was afebrile and had no leukocytosis. There was concern for septic arthritis. She was admitted to internal medicine and orthopedic surgery was consulted. She was started on broad spectrum antibiotics in the ER after blood cultures were collected. UA was concerning for UTI which would also be covered by broad spectrum antibiotics. UCx sent. Orthopedic surgery performed a bedside joint aspiration on 7/17 revealing bloody fluid. A sample was sent for culture. Cultures NGTD. She had worsening renal function and respiratory function on 7/18/19. CXR concerning for volume overload and diuretics were increased. ABG showed acute on chronic hypercapnic respiratory failure. She was requiring BiPAP to maintain O2 sat and was transferred to the ICU. Pulmonoogy was consulted. BiPAP settings adjusted and she was diuresed. Echo show preserved EF but indeterminate diastolic function and PAP 55. She was complaining of left knee pain and was started on low dose po narcotics. Her vanc through was  elevated so vanc was held 7/19/19. She was transferred back to the floor 7/19 and doing well. Blood and joint aspirate cultures remain negative. Continue broad spectrum IV antibiotics for now and consult ID for recs. Ortho not planning on surgery as she is a poor candidate for revision. In the event that she has severe sepsis from an infected right knee joint, amputation would likely be needed. PT/OT consulted. The patient would like SNF and this was recommended. SW consulted. She reports scarring from PPD in the past; quantiferon gold is pending.  Patient considering amputation per ID recs. Discuss with ID and orthopedic surgery.    Interval history:  No acute issues.     Review of Systems   Constitutional: Negative for chills and fever.   HENT: Negative for congestion and rhinorrhea.    Eyes: Negative for photophobia and visual disturbance.   Respiratory: Negative for cough and shortness of breath.    Cardiovascular: Negative for chest pain and leg swelling.   Gastrointestinal: Negative for abdominal pain, nausea and vomiting.   Genitourinary: Negative for hematuria and pelvic pain.   Musculoskeletal: Positive for arthralgias and joint swelling. Negative for neck stiffness.   Skin: Negative for rash and wound.   Neurological: Negative for dizziness and light-headedness.   Psychiatric/Behavioral: Negative for agitation and behavioral problems.     Objective:     Vital Signs (Most Recent):  Temp: 98.9 °F (37.2 °C) (07/22/19 1643)  Pulse: 65 (07/22/19 1643)  Resp: 18 (07/22/19 1643)  BP: (!) 142/73 (07/22/19 1643)  SpO2: 96 % (07/22/19 1643) Vital Signs (24h Range):  Temp:  [97.2 °F (36.2 °C)-98.9 °F (37.2 °C)] 98.9 °F (37.2 °C)  Pulse:  [54-70] 65  Resp:  [18-20] 18  SpO2:  [94 %-100 %] 96 %  BP: (104-142)/(56-73) 142/73     Weight: 107.1 kg (236 lb 1.8 oz)  Body mass index is 39.29 kg/m².    Physical Exam   Constitutional: She is oriented to person, place, and time. She appears well-developed and well-nourished. No  distress.   HENT:   Right Ear: External ear normal.   Left Ear: External ear normal.   Nose: Nose normal.   Eyes: Pupils are equal, round, and reactive to light. EOM are normal. No scleral icterus.   Neck: Normal range of motion. Neck supple. No thyromegaly present.   Cardiovascular: Normal rate and normal heart sounds. Exam reveals no friction rub.   No murmur heard.  Pulmonary/Chest: Effort normal and breath sounds normal. No respiratory distress. She has no wheezes. She has no rales.   Abdominal: Soft. Bowel sounds are normal. She exhibits no mass. There is no tenderness.   Morbidly obese   Genitourinary:   Genitourinary Comments: Chronic indwelling Wolfe. Clear urine.   Musculoskeletal:   Leg knee with limited ROM due to pain. She has a well healed surgical incision. The scar is hyperpigmented but no longer erythematous.    Neurological: She is alert and oriented to person, place, and time. No cranial nerve deficit.   Skin: Skin is warm and dry. No pallor.   Psychiatric: She has a normal mood and affect. Thought content normal.         CRANIAL NERVES     CN III, IV, VI   Pupils are equal, round, and reactive to light.  Extraocular motions are normal.        Significant Labs: All pertinent labs within the past 24 hours have been reviewed.        Assessment/Plan:      * Septic arthritis of knee, left  Concern for left knee septic arthritis. Appreciate orthopedic surgery recs. Started on broad spectrum antibiotics. Joint aspirate culture NGTD. ID consulted. Patient considering amputation. Discuss with ortho and ID. Continue vancomycin and ceftriaxone per ID recs. Considered rifampin but there are multiple drug interactions with her home meds.    Acute on chronic diastolic heart failure  EF preserved. PAP 55. Indeterminate diastolic function on echo. Started on IV lasix. Not on ACE or ARB at this time due to ARF. Renal function improved with diuresis. IV Lasix stopped and she was placed back on her home Bumex on  7/22. Monitor.    Acute on chronic respiratory failure with hypercapnia  ABG showed acute on chronic hypercapnic respiratory failure on 7/18. See hospital course. Resolved.    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 Past resistance patterns noted. Possible colonization? Foul smelling urine but no systemic symptoms.     Bipolar 1 disorder  Continued home meds zyprexa and fluoxetine    Chronic indwelling suprapubic catheter in place  Exchanged given UA concerning for UTI. Follow up with Dr. Tinoco in clinic.    Chronic respiratory failure with hypercapnia  As above. Home 2L.    COPD (chronic obstructive pulmonary disease)  PRN duonebs. No acute issues.    ADEEL on CPAP  BiPAP QHS,    Essential hypertension  Does not appear to be on medications at home? Monitor.      VTE Risk Mitigation (From admission, onward)        Ordered     heparin (porcine) injection 5,000 Units  Every 8 hours      07/18/19 1444     IP VTE HIGH RISK PATIENT  Once      07/17/19 0538                Cora Briggs MD  Department of Hospital Medicine   [facility]

## 2019-07-22 NOTE — PLAN OF CARE
Problem: Adult Inpatient Plan of Care  Goal: Plan of Care Review  Outcome: Ongoing (interventions implemented as appropriate)     07/22/19 0504   Plan of Care Review   Plan of Care Reviewed With patient   AAOX4, free from fall or injury. Weight shift assistance provided throughout the shift. Medicated for pain X1 this shift and for nausea X1 with relief. Suprapubic cath draining clear yellow urine. Bilateral offloading boats in place. Safety measures in place. Call light in reach.

## 2019-07-22 NOTE — PROGRESS NOTES
"[facility] [place]  Adult Nutrition  Progress Note    SUMMARY       Recommendations    1. Continue current diet order; encourage continued adequate intake  Goals: Maintain meal intake >50% daily  Nutrition Goal Status: new  Communication of GLENDA Recs: (plan of care)    Reason for Assessment    Reason For Assessment: identified at risk by screening criteria(MST)  Diagnosis: infection/sepsis(L)knee septic arthritis )  Relevant Medical History: paraplegia, SCI, drug addiction, Bipolar, psychiatric care, CHF, CAD, CVA, HTN    General Information Comments: Pt admitted w/ septic L)knee; h/o knee replacement noted. Per MD notes, she is not a candidate for another replacement. Pt seen this afternoon; denies any appetite issues/intolerance. She did have a poor appetite x a few days pta. Denies any significant wt changes. NFPE completed today - pt w/ excess fat stores & some expected loss of LBM in LE's 2/2 paraplegia. She does not meet criteria for a dx of malnutrition. SNF placement ongoing.     Nutrition Discharge Planning: Adequate intake of meals to meet nutr needs.    Nutrition Risk Screen    Nutrition Risk Screen: no indicators present    Nutrition/Diet History    Patient Reported Diet/Restrictions/Preferences: general  Spiritual, Cultural Beliefs, Catholic Practices, Values that Affect Care: no  Food Allergies: NKFA  Factors Affecting Nutritional Intake: None identified at this time    Anthropometrics    Temp: 98.3 °F (36.8 °C)  Height Method: Stated  Height: 5' 5" (165.1 cm)  Height (inches): 65 in  Weight Method: Bed Scale  Weight: 107.1 kg (236 lb 1.8 oz)  Weight (lb): 236.12 lb  Ideal Body Weight (IBW), Female: 125 lb  % Ideal Body Weight, Female (lb): 188.9 lb  BMI (Calculated): 39.4  BMI Grade: 35 - 39.9 - obesity - grade II       Lab/Procedures/Meds    Pertinent Labs Reviewed: reviewed  Pertinent Medications Reviewed: reviewed    Estimated/Assessed Needs    Weight Used For Calorie Calculations: 107.1 " kg (236 lb 1.8 oz)  Energy Calorie Requirements (kcal): 1627  Energy Need Method: Norton-St Yun     Protein Requirements: 86 g (.8 g/kg)  Weight Used For Protein Calculations: 107.1 kg (236 lb 1.8 oz)    Estimated Fluid Requirement Method: RDA Method  RDA Method (mL): 1627         Nutrition Prescription Ordered    Current Diet Order: Cardiac    Evaluation of Received Nutrient/Fluid Intake    Energy Calories Required: meeting needs  Protein Required: meeting needs  Fluid Required: meeting needs  Comments: LBM 7/16  Tolerance: tolerating  % Intake of Estimated Energy Needs: 75 - 100 %  % Meal Intake: 75 - 100 %    Nutrition Risk    Level of Risk/Frequency of Follow-up: (F/u 1 x weekly)     Assessment and Plan    Nutrition Problem  Obesity    Related to (etiology):   Excess caloric consumption; immobility    Signs and Symptoms (as evidenced by):   BMI of 39      Interventions:  Collaboration w/ other providers    Nutrition Diagnosis Status:   New         Monitor and Evaluation    Food and Nutrient Intake: energy intake, food and beverage intake  Food and Nutrient Adminstration: diet order  Physical Activity and Function: nutrition-related ADLs and IADLs  Anthropometric Measurements: weight, weight change  Biochemical Data, Medical Tests and Procedures: electrolyte and renal panel, glucose/endocrine profile  Nutrition-Focused Physical Findings: overall appearance     Malnutrition Assessment           Edema (Fluid Accumulation): 1-->trace   Subcutaneous Fat Loss (Final Summary): well nourished  Muscle Loss Evaluation (Final Summary): well nourished         Nutrition Follow-Up    RD Follow-up?: Yes

## 2019-07-22 NOTE — CONSULTS
[facility]  Infectious Disease  Consult Note    Patient Name: Mary Ellen Fajardo  MRN: [id number]  Admission Date: 7/16/2019  Hospital Length of Stay: 5 days  Attending Physician: Cora Briggs MD  Primary Care Provider: Any Tran MD     Isolation Status: No active isolations    Patient information was obtained from patient and ER records.      Inpatient consult to Infectious Diseases  Consult performed by: Tiffanie Shearer MD  Consult ordered by: Cora Briggs MD        Assessment/Plan:     * Septic arthritis of knee, left  Patient clinically with infected knee prosthesis. Personally spoke with ortho surgery and washout and/or explant of stemmed hardware is not an option. Patient decided on amputation. Please send CLEAN MARGIN for pathology and culture. Recommend de-escalation to vancomycin and ceftriaxone 2gm iv daily. Plan on stopping abx 2-3 days after amputation if margins clear of infection. Considered adding rifampin, but she has numerous drug interactions including psych meds and opiates.        Thank you for your consult. I will follow-up with patient. Please contact us if you have any additional questions.    Tiffanie Shearer MD  Infectious Disease  [facility]    Subjective:     Principal Problem: Septic arthritis of knee, left    HPI:   63F who is chronically bed bound 2/2 issues with L knee (replaced 2/2011 for ostearthritis and c/b infection in 2011). Says he infection was treated at [facility] in 2011 with hardware explant and 6 weeks of iv antibiotics followed by hardware replacement (no culture results available for review). Denies issues with L knee in many years, but is non-ambulatory since her last surgery. Admitted 7/16 with new L knee pain swelling and erythema. Thinks she had some low grade fevers as well. Denies other symptoms including dysuria. Knee was aspirated by ortho and sent for culture. Sounds like not enough fluid avilable to send  for cell count. Spoke with lab and they do not have any fluid (other than swab) and so cell count can't be added on. Given degree of immobility, pt strongly considering AKA with ortho (with goal of being fitted for prosthesis). ID consulted for abx recommendations. Currently on V/Z.     Plain film knee-- No loosening or fracture or migration.    Specimen Information: Knee, Left; Body Fluid        Component 5d ago   Aerobic Bacterial Culture No growth          Small amount of bloody drainage present    No cell count available for review-- called lab. No aspirate sent (only swab)      Hospital course c/b brif step up to ICU for acute on Chronic Hypercarbic Respiratory Failure-multifactorial: cardiogenic pulmonary edema and narcotic SE on baseline ADELE/OHS            Review of Systems   Constitutional: Negative for chills and fever.   HENT: Negative for congestion and rhinorrhea.    Eyes: Negative for photophobia and visual disturbance.   Respiratory: Negative for cough and shortness of breath.    Cardiovascular: Negative for chest pain and leg swelling.   Gastrointestinal: Negative for abdominal pain, nausea and vomiting.   Genitourinary: Negative for hematuria and pelvic pain.   Musculoskeletal: Positive for arthralgias and joint swelling. Negative for neck stiffness.   Skin: Negative for rash and wound.   Neurological: Negative for dizziness and light-headedness.   Psychiatric/Behavioral: Negative for agitation and behavioral problems.     Objective:     Vital Signs (Most Recent):  Temp: 98.3 °F (36.8 °C) (07/22/19 1130)  Pulse: 64 (07/22/19 1342)  Resp: 18 (07/22/19 1342)  BP: (!) 115/56 (07/22/19 1130)  SpO2: 98 % (07/22/19 1342) Vital Signs (24h Range):  Temp:  [97.2 °F (36.2 °C)-98.5 °F (36.9 °C)] 98.3 °F (36.8 °C)  Pulse:  [54-70] 64  Resp:  [18-20] 18  SpO2:  [94 %-100 %] 98 %  BP: (104-133)/(51-69) 115/56     Weight: 107.1 kg (236 lb 1.8 oz)  Body mass index is 39.29 kg/m².    Physical Exam   Constitutional:  She is oriented to person, place, and time. She appears well-developed and well-nourished. No distress.   HENT:   Right Ear: External ear normal.   Left Ear: External ear normal.   Nose: Nose normal.   Eyes: Pupils are equal, round, and reactive to light. EOM are normal. No scleral icterus.   Neck: Normal range of motion. Neck supple. No thyromegaly present.   Cardiovascular: Normal rate and normal heart sounds. Exam reveals no friction rub.   No murmur heard.  Pulmonary/Chest: Effort normal and breath sounds normal. No respiratory distress. She has no wheezes. She has no rales.   Abdominal: Soft. Bowel sounds are normal. She exhibits no mass. There is no tenderness.   Morbidly obese   Genitourinary:   Genitourinary Comments: Chronic indwelling Wolfe. Clear urine.   Musculoskeletal:   Leg knee with limited ROM due to pain. She has a well healed surgical incision. The scar is hyperpigmented but no longer erythematous.    Neurological: She is alert and oriented to person, place, and time. No cranial nerve deficit.   Skin: Skin is warm and dry. No pallor.   Psychiatric: She has a normal mood and affect. Thought content normal.         CRANIAL NERVES     CN III, IV, VI   Pupils are equal, round, and reactive to light.  Extraocular motions are normal.        Significant Labs: All pertinent labs within the past 24 hours have been reviewed.    Significant Imaging: I have reviewed and interpreted all pertinent imaging results/findings within the past 24 hours.

## 2019-07-22 NOTE — ASSESSMENT & PLAN NOTE
Concern for left knee septic arthritis. Appreciate orthopedic surgery recs. Started on broad spectrum antibiotics. Joint aspirate culture NGTD. ID consulted. Patient considering amputation. Discuss with ortho and ID. Continue vancomycin and ceftriaxone per ID recs. Considered rifampin but there are multiple drug interactions with her home meds.

## 2019-07-22 NOTE — ASSESSMENT & PLAN NOTE
Patient clinically with infected knee prosthesis. Personally spoke with ortho surgery and washout and/or explant of stemmed hardware is not an option. Patient decided on amputation. Please send CLEAN MARGIN for pathology and culture. Recommend de-escalation to vancomycin and ceftriaxone 2gm iv daily. Plan on stopping abx 2-3 days after amputation if margins clear of infection. Considered adding rifampin, but she has numerous drug interactions including psych meds and opiates.

## 2019-07-22 NOTE — SUBJECTIVE & OBJECTIVE
Review of Systems   Constitutional: Negative for chills and fever.   HENT: Negative for congestion and rhinorrhea.    Eyes: Negative for photophobia and visual disturbance.   Respiratory: Negative for cough and shortness of breath.    Cardiovascular: Negative for chest pain and leg swelling.   Gastrointestinal: Negative for abdominal pain, nausea and vomiting.   Genitourinary: Negative for hematuria and pelvic pain.   Musculoskeletal: Positive for arthralgias and joint swelling. Negative for neck stiffness.   Skin: Negative for rash and wound.   Neurological: Negative for dizziness and light-headedness.   Psychiatric/Behavioral: Negative for agitation and behavioral problems.     Objective:     Vital Signs (Most Recent):  Temp: 98.3 °F (36.8 °C) (07/22/19 1130)  Pulse: 64 (07/22/19 1342)  Resp: 18 (07/22/19 1342)  BP: (!) 115/56 (07/22/19 1130)  SpO2: 98 % (07/22/19 1342) Vital Signs (24h Range):  Temp:  [97.2 °F (36.2 °C)-98.5 °F (36.9 °C)] 98.3 °F (36.8 °C)  Pulse:  [54-70] 64  Resp:  [18-20] 18  SpO2:  [94 %-100 %] 98 %  BP: (104-133)/(51-69) 115/56     Weight: 107.1 kg (236 lb 1.8 oz)  Body mass index is 39.29 kg/m².    Physical Exam   Constitutional: She is oriented to person, place, and time. She appears well-developed and well-nourished. No distress.   HENT:   Right Ear: External ear normal.   Left Ear: External ear normal.   Nose: Nose normal.   Eyes: Pupils are equal, round, and reactive to light. EOM are normal. No scleral icterus.   Neck: Normal range of motion. Neck supple. No thyromegaly present.   Cardiovascular: Normal rate and normal heart sounds. Exam reveals no friction rub.   No murmur heard.  Pulmonary/Chest: Effort normal and breath sounds normal. No respiratory distress. She has no wheezes. She has no rales.   Abdominal: Soft. Bowel sounds are normal. She exhibits no mass. There is no tenderness.   Morbidly obese   Genitourinary:   Genitourinary Comments: Chronic indwelling Wolfe. Clear  urine.   Musculoskeletal:   Leg knee with limited ROM due to pain. She has a well healed surgical incision. The scar is hyperpigmented but no longer erythematous.    Neurological: She is alert and oriented to person, place, and time. No cranial nerve deficit.   Skin: Skin is warm and dry. No pallor.   Psychiatric: She has a normal mood and affect. Thought content normal.         CRANIAL NERVES     CN III, IV, VI   Pupils are equal, round, and reactive to light.  Extraocular motions are normal.        Significant Labs: All pertinent labs within the past 24 hours have been reviewed.    Significant Imaging: I have reviewed and interpreted all pertinent imaging results/findings within the past 24 hours.

## 2019-07-22 NOTE — PT/OT/SLP PROGRESS
Physical Therapy Treatment    Patient Name:  Mary Ellen Fajardo   MRN:  [id number]    Recommendations:     Discharge Recommendations:  rehabilitation facility   Discharge Equipment Recommendations: none   Barriers to discharge: Decreased caregiver support    Assessment:     Mary Ellen Fajardo is a 63 y.o. female admitted with a medical diagnosis of Septic arthritis of knee, left.  She presents with the following impairments/functional limitations:  weakness, impaired endurance, impaired functional mobilty, impaired self care skills, impaired balance, decreased upper extremity function, decreased lower extremity function, decreased safety awareness, decreased ROM, impaired coordination, impaired joint extensibility .Pt has decided to get L AKA    Rehab Prognosis: Good; patient would benefit from acute skilled PT services to address these deficits and reach maximum level of function.    Recent Surgery: * No surgery found *      Plan:     During this hospitalization, patient to be seen 6 x/week to address the identified rehab impairments via therapeutic activities, therapeutic exercises, neuromuscular re-education, wheelchair management/training and progress toward the following goals:    · Plan of Care Expires:  08/03/19    Subjective     Chief Complaint: pain  Patient/Family Comments/goals: L AKA, SNF  Pain/Comfort:  · Pain Rating 1: 3/10  · Location 1: (L LE)  · Pain Addressed 1: Pre-medicate for activity, Reposition, Distraction, Cessation of Activity  · Pain Rating Post-Intervention 1: 3/10      Objective:     Communicated with nsg prior to session.  Patient found HOB elevated with oxygen, bed alarm upon PT entry to room.     General Precautions: Standard, fall   Orthopedic Precautions:N/A   Braces: N/A     Functional Mobility:  · Bed Mobility:     · Rolling Left:  minimum assistance and vc for reaching for BR  · Rolling Right: minimum assistance and VC for reaching for BR  · Transfers:   N/T  · Gait:  N/A  · Balance: FAir+/Fair sit      AM-PAC 6 CLICK MOBILITY  Turning over in bed (including adjusting bedclothes, sheets and blankets)?: 3  Sitting down on and standing up from a chair with arms (e.g., wheelchair, bedside commode, etc.): 1  Moving from lying on back to sitting on the side of the bed?: 3  Moving to and from a bed to a chair (including a wheelchair)?: 1  Need to walk in hospital room?: 1  Climbing 3-5 steps with a railing?: 1  Basic Mobility Total Score: 10       Therapeutic Activities and Exercises:   pt received R HCS 4x30 sec and sat at EOB with SBA and performed Adductor squeezes 2x10 reps, and R HS, and FAQ's x 10 reps    Patient left with bed in chair position with all lines intact, call button in reach, bed alarm on and nsg notified..    GOALS:   Multidisciplinary Problems     Physical Therapy Goals        Problem: Physical Therapy Goal    Goal Priority Disciplines Outcome Goal Variances Interventions   Physical Therapy Goal     PT, PT/OT Ongoing (interventions implemented as appropriate)     Description:  Goals to be met by: 8/3/19     Patient will increase functional independence with mobility by performin. Supine to sit with Set-up [place]  2. Sit to supine with Stand-by Assistance  3. Rolling to Left and Right with Modified [place].  4. Bed to chair transfer to be assessed  5. Wheelchair propulsion to be assessed  6. Sitting at edge of bed x 15 minutes with Supervision                      Time Tracking:     PT Received On: 19  PT Start Time: 1605     PT Stop Time: 1620  PT Total Time (min): 15 min     Billable Minutes: Therapeutic Exercise 15 OT present    Treatment Type: Treatment  PT/PTA: PT     PTA Visit Number: 0     Yuli Galo, PT  2019

## 2019-07-23 ENCOUNTER — ANESTHESIA (OUTPATIENT)
Dept: SURGERY | Facility: HOSPITAL | Age: 64
DRG: 474 | End: 2019-07-23
Payer: MEDICARE

## 2019-07-23 ENCOUNTER — ANESTHESIA EVENT (OUTPATIENT)
Dept: SURGERY | Facility: HOSPITAL | Age: 64
DRG: 474 | End: 2019-07-23
Payer: MEDICARE

## 2019-07-23 LAB
ANION GAP SERPL CALC-SCNC: 9 MMOL/L (ref 8–16)
BASOPHILS # BLD AUTO: 0.02 K/UL (ref 0–0.2)
BASOPHILS NFR BLD: 0.2 % (ref 0–1.9)
BUN SERPL-MCNC: 21 MG/DL (ref 8–23)
CALCIUM SERPL-MCNC: 9.6 MG/DL (ref 8.7–10.5)
CHLORIDE SERPL-SCNC: 91 MMOL/L (ref 95–110)
CO2 SERPL-SCNC: 39 MMOL/L (ref 23–29)
CREAT SERPL-MCNC: 1.1 MG/DL (ref 0.5–1.4)
DIFFERENTIAL METHOD: ABNORMAL
EOSINOPHIL # BLD AUTO: 0.1 K/UL (ref 0–0.5)
EOSINOPHIL NFR BLD: 1 % (ref 0–8)
ERYTHROCYTE [DISTWIDTH] IN BLOOD BY AUTOMATED COUNT: 15.1 % (ref 11.5–14.5)
EST. GFR  (AFRICAN AMERICAN): >60 ML/MIN/1.73 M^2
EST. GFR  (NON AFRICAN AMERICAN): 54 ML/MIN/1.73 M^2
GLUCOSE SERPL-MCNC: 90 MG/DL (ref 70–110)
HCT VFR BLD AUTO: 35.8 % (ref 37–48.5)
HGB BLD-MCNC: 10.3 G/DL (ref 12–16)
LYMPHOCYTES # BLD AUTO: 0.7 K/UL (ref 1–4.8)
LYMPHOCYTES NFR BLD: 8 % (ref 18–48)
M TB IFN-G CD4+ BCKGRND COR BLD-ACNC: 8.86 IU/ML
MCH RBC QN AUTO: 25.7 PG (ref 27–31)
MCHC RBC AUTO-ENTMCNC: 28.8 G/DL (ref 32–36)
MCV RBC AUTO: 89 FL (ref 82–98)
MITOGEN IGNF BCKGRD COR BLD-ACNC: 1.09 IU/ML
MITOGEN IGNF BCKGRD COR BLD-ACNC: POSITIVE [IU]/ML
MONOCYTES # BLD AUTO: 0.4 K/UL (ref 0.3–1)
MONOCYTES NFR BLD: 4.2 % (ref 4–15)
NEUTROPHILS # BLD AUTO: 7.6 K/UL (ref 1.8–7.7)
NEUTROPHILS NFR BLD: 86.7 % (ref 38–73)
NIL: 0.58 IU/ML
PLATELET # BLD AUTO: 195 K/UL (ref 150–350)
PMV BLD AUTO: 9.5 FL (ref 9.2–12.9)
POCT GLUCOSE: 106 MG/DL (ref 70–110)
POTASSIUM SERPL-SCNC: 3.9 MMOL/L (ref 3.5–5.1)
RBC # BLD AUTO: 4.01 M/UL (ref 4–5.4)
SODIUM SERPL-SCNC: 139 MMOL/L (ref 136–145)
TB2 - NIL: 8.55 IU/ML
VANCOMYCIN TROUGH SERPL-MCNC: 22.6 UG/ML (ref 10–22)
VANCOMYCIN TROUGH SERPL-MCNC: 37.1 UG/ML (ref 10–22)
WBC # BLD AUTO: 8.75 K/UL (ref 3.9–12.7)

## 2019-07-23 PROCEDURE — 25000003 PHARM REV CODE 250: Performed by: HOSPITALIST

## 2019-07-23 PROCEDURE — 88307 TISSUE EXAM BY PATHOLOGIST: CPT | Mod: 26,,, | Performed by: PATHOLOGY

## 2019-07-23 PROCEDURE — 97530 THERAPEUTIC ACTIVITIES: CPT

## 2019-07-23 PROCEDURE — 36415 COLL VENOUS BLD VENIPUNCTURE: CPT

## 2019-07-23 PROCEDURE — 94640 AIRWAY INHALATION TREATMENT: CPT

## 2019-07-23 PROCEDURE — 63600175 PHARM REV CODE 636 W HCPCS: Performed by: HOSPITALIST

## 2019-07-23 PROCEDURE — 11000001 HC ACUTE MED/SURG PRIVATE ROOM

## 2019-07-23 PROCEDURE — 80202 ASSAY OF VANCOMYCIN: CPT

## 2019-07-23 PROCEDURE — 37000008 HC ANESTHESIA 1ST 15 MINUTES: Performed by: ORTHOPAEDIC SURGERY

## 2019-07-23 PROCEDURE — 27000221 HC OXYGEN, UP TO 24 HOURS

## 2019-07-23 PROCEDURE — 63600175 PHARM REV CODE 636 W HCPCS: Performed by: NURSE ANESTHETIST, CERTIFIED REGISTERED

## 2019-07-23 PROCEDURE — 25000003 PHARM REV CODE 250: Performed by: INTERNAL MEDICINE

## 2019-07-23 PROCEDURE — 99900035 HC TECH TIME PER 15 MIN (STAT)

## 2019-07-23 PROCEDURE — D9220A PRA ANESTHESIA: ICD-10-PCS | Mod: ANES,,, | Performed by: ANESTHESIOLOGY

## 2019-07-23 PROCEDURE — 25000003 PHARM REV CODE 250: Performed by: ORTHOPAEDIC SURGERY

## 2019-07-23 PROCEDURE — 94761 N-INVAS EAR/PLS OXIMETRY MLT: CPT

## 2019-07-23 PROCEDURE — 63600175 PHARM REV CODE 636 W HCPCS: Performed by: ORTHOPAEDIC SURGERY

## 2019-07-23 PROCEDURE — 63600175 PHARM REV CODE 636 W HCPCS: Performed by: ANESTHESIOLOGY

## 2019-07-23 PROCEDURE — D9220A PRA ANESTHESIA: Mod: CRNA,,, | Performed by: NURSE ANESTHETIST, CERTIFIED REGISTERED

## 2019-07-23 PROCEDURE — 25000242 PHARM REV CODE 250 ALT 637 W/ HCPCS: Performed by: NURSE ANESTHETIST, CERTIFIED REGISTERED

## 2019-07-23 PROCEDURE — 25000242 PHARM REV CODE 250 ALT 637 W/ HCPCS: Performed by: HOSPITALIST

## 2019-07-23 PROCEDURE — 27201423 OPTIME MED/SURG SUP & DEVICES STERILE SUPPLY: Performed by: ORTHOPAEDIC SURGERY

## 2019-07-23 PROCEDURE — 25000003 PHARM REV CODE 250: Performed by: NURSE ANESTHETIST, CERTIFIED REGISTERED

## 2019-07-23 PROCEDURE — D9220A PRA ANESTHESIA: Mod: ANES,,, | Performed by: ANESTHESIOLOGY

## 2019-07-23 PROCEDURE — 94660 CPAP INITIATION&MGMT: CPT

## 2019-07-23 PROCEDURE — 88307 TISSUE EXAM BY PATHOLOGIST: CPT | Performed by: PATHOLOGY

## 2019-07-23 PROCEDURE — 71000039 HC RECOVERY, EACH ADD'L HOUR: Performed by: ORTHOPAEDIC SURGERY

## 2019-07-23 PROCEDURE — 97110 THERAPEUTIC EXERCISES: CPT

## 2019-07-23 PROCEDURE — 71000033 HC RECOVERY, INTIAL HOUR: Performed by: ORTHOPAEDIC SURGERY

## 2019-07-23 PROCEDURE — 36000710: Performed by: ORTHOPAEDIC SURGERY

## 2019-07-23 PROCEDURE — 37000009 HC ANESTHESIA EA ADD 15 MINS: Performed by: ORTHOPAEDIC SURGERY

## 2019-07-23 PROCEDURE — 99233 PR SUBSEQUENT HOSPITAL CARE,LEVL III: ICD-10-PCS | Mod: ,,, | Performed by: PHYSICIAN ASSISTANT

## 2019-07-23 PROCEDURE — 88307 TISSUE SPECIMEN TO PATHOLOGY - SURGERY: ICD-10-PCS | Mod: 26,,, | Performed by: PATHOLOGY

## 2019-07-23 PROCEDURE — 80048 BASIC METABOLIC PNL TOTAL CA: CPT

## 2019-07-23 PROCEDURE — 36000711: Performed by: ORTHOPAEDIC SURGERY

## 2019-07-23 PROCEDURE — D9220A PRA ANESTHESIA: ICD-10-PCS | Mod: CRNA,,, | Performed by: NURSE ANESTHETIST, CERTIFIED REGISTERED

## 2019-07-23 PROCEDURE — 99233 SBSQ HOSP IP/OBS HIGH 50: CPT | Mod: ,,, | Performed by: PHYSICIAN ASSISTANT

## 2019-07-23 PROCEDURE — 25000242 PHARM REV CODE 250 ALT 637 W/ HCPCS: Performed by: ANESTHESIOLOGY

## 2019-07-23 PROCEDURE — 27000190 HC CPAP FULL FACE MASK W/VALVE

## 2019-07-23 PROCEDURE — 85025 COMPLETE CBC W/AUTO DIFF WBC: CPT

## 2019-07-23 RX ORDER — FENTANYL CITRATE 50 UG/ML
INJECTION, SOLUTION INTRAMUSCULAR; INTRAVENOUS
Status: DISCONTINUED | OUTPATIENT
Start: 2019-07-23 | End: 2019-07-23

## 2019-07-23 RX ORDER — LIDOCAINE HCL/PF 100 MG/5ML
SYRINGE (ML) INTRAVENOUS
Status: DISCONTINUED | OUTPATIENT
Start: 2019-07-23 | End: 2019-07-23

## 2019-07-23 RX ORDER — FENTANYL CITRATE 50 UG/ML
25 INJECTION, SOLUTION INTRAMUSCULAR; INTRAVENOUS EVERY 5 MIN PRN
Status: DISCONTINUED | OUTPATIENT
Start: 2019-07-23 | End: 2019-07-23

## 2019-07-23 RX ORDER — PHENYLEPHRINE HYDROCHLORIDE 10 MG/ML
INJECTION INTRAVENOUS
Status: DISCONTINUED | OUTPATIENT
Start: 2019-07-23 | End: 2019-07-23

## 2019-07-23 RX ORDER — PROPOFOL 10 MG/ML
VIAL (ML) INTRAVENOUS
Status: DISCONTINUED | OUTPATIENT
Start: 2019-07-23 | End: 2019-07-23

## 2019-07-23 RX ORDER — GLYCOPYRROLATE 0.2 MG/ML
INJECTION INTRAMUSCULAR; INTRAVENOUS
Status: DISCONTINUED | OUTPATIENT
Start: 2019-07-23 | End: 2019-07-23

## 2019-07-23 RX ORDER — ACETAMINOPHEN 10 MG/ML
1000 INJECTION, SOLUTION INTRAVENOUS ONCE
Status: COMPLETED | OUTPATIENT
Start: 2019-07-23 | End: 2019-07-23

## 2019-07-23 RX ORDER — SODIUM CHLORIDE 0.9 % (FLUSH) 0.9 %
3 SYRINGE (ML) INJECTION
Status: DISCONTINUED | OUTPATIENT
Start: 2019-07-23 | End: 2019-07-23

## 2019-07-23 RX ORDER — ALBUTEROL SULFATE 90 UG/1
AEROSOL, METERED RESPIRATORY (INHALATION)
Status: DISCONTINUED | OUTPATIENT
Start: 2019-07-23 | End: 2019-07-23

## 2019-07-23 RX ORDER — HYDROMORPHONE HYDROCHLORIDE 2 MG/ML
0.2 INJECTION, SOLUTION INTRAMUSCULAR; INTRAVENOUS; SUBCUTANEOUS EVERY 5 MIN PRN
Status: DISCONTINUED | OUTPATIENT
Start: 2019-07-23 | End: 2019-07-23

## 2019-07-23 RX ORDER — IPRATROPIUM BROMIDE AND ALBUTEROL SULFATE 2.5; .5 MG/3ML; MG/3ML
3 SOLUTION RESPIRATORY (INHALATION) ONCE
Status: COMPLETED | OUTPATIENT
Start: 2019-07-23 | End: 2019-07-23

## 2019-07-23 RX ORDER — SODIUM CHLORIDE, SODIUM LACTATE, POTASSIUM CHLORIDE, CALCIUM CHLORIDE 600; 310; 30; 20 MG/100ML; MG/100ML; MG/100ML; MG/100ML
INJECTION, SOLUTION INTRAVENOUS CONTINUOUS PRN
Status: DISCONTINUED | OUTPATIENT
Start: 2019-07-23 | End: 2019-07-23

## 2019-07-23 RX ORDER — MIDAZOLAM HYDROCHLORIDE 1 MG/ML
INJECTION, SOLUTION INTRAMUSCULAR; INTRAVENOUS
Status: DISCONTINUED | OUTPATIENT
Start: 2019-07-23 | End: 2019-07-23

## 2019-07-23 RX ADMIN — PHENYLEPHRINE HYDROCHLORIDE 100 MCG: 10 INJECTION INTRAVENOUS at 05:07

## 2019-07-23 RX ADMIN — ONDANSETRON 4 MG: 2 INJECTION INTRAMUSCULAR; INTRAVENOUS at 04:07

## 2019-07-23 RX ADMIN — OLANZAPINE 5 MG: 2.5 TABLET, FILM COATED ORAL at 10:07

## 2019-07-23 RX ADMIN — PIPERACILLIN, TAZOBACTAM 4.5 G: 4; .5 INJECTION, POWDER, LYOPHILIZED, FOR SOLUTION INTRAVENOUS at 04:07

## 2019-07-23 RX ADMIN — SENNOSIDES AND DOCUSATE SODIUM 2 TABLET: 8.6; 5 TABLET ORAL at 09:07

## 2019-07-23 RX ADMIN — IPRATROPIUM BROMIDE AND ALBUTEROL SULFATE 3 ML: .5; 3 SOLUTION RESPIRATORY (INHALATION) at 07:07

## 2019-07-23 RX ADMIN — HEPARIN SODIUM 5000 UNITS: 5000 INJECTION, SOLUTION INTRAVENOUS; SUBCUTANEOUS at 09:07

## 2019-07-23 RX ADMIN — FENTANYL CITRATE 50 MCG: 50 INJECTION INTRAMUSCULAR; INTRAVENOUS at 04:07

## 2019-07-23 RX ADMIN — HYDROMORPHONE HYDROCHLORIDE 0.2 MG: 2 INJECTION, SOLUTION INTRAMUSCULAR; INTRAVENOUS; SUBCUTANEOUS at 07:07

## 2019-07-23 RX ADMIN — BACLOFEN 20 MG: 10 TABLET ORAL at 12:07

## 2019-07-23 RX ADMIN — PROPOFOL 50 MG: 10 INJECTION, EMULSION INTRAVENOUS at 04:07

## 2019-07-23 RX ADMIN — IPRATROPIUM BROMIDE AND ALBUTEROL SULFATE 3 ML: .5; 3 SOLUTION RESPIRATORY (INHALATION) at 01:07

## 2019-07-23 RX ADMIN — PROPOFOL 150 MG: 10 INJECTION, EMULSION INTRAVENOUS at 04:07

## 2019-07-23 RX ADMIN — OXYCODONE HYDROCHLORIDE AND ACETAMINOPHEN 1 TABLET: 5; 325 TABLET ORAL at 05:07

## 2019-07-23 RX ADMIN — ACETAMINOPHEN 1000 MG: 10 INJECTION, SOLUTION INTRAVENOUS at 06:07

## 2019-07-23 RX ADMIN — FENTANYL CITRATE 100 MCG: 50 INJECTION INTRAMUSCULAR; INTRAVENOUS at 04:07

## 2019-07-23 RX ADMIN — MIDAZOLAM HYDROCHLORIDE 0.5 MG: 1 INJECTION, SOLUTION INTRAMUSCULAR; INTRAVENOUS at 06:07

## 2019-07-23 RX ADMIN — PHENYLEPHRINE HYDROCHLORIDE 100 MCG: 10 INJECTION INTRAVENOUS at 04:07

## 2019-07-23 RX ADMIN — SODIUM CHLORIDE, SODIUM LACTATE, POTASSIUM CHLORIDE, AND CALCIUM CHLORIDE: .6; .31; .03; .02 INJECTION, SOLUTION INTRAVENOUS at 04:07

## 2019-07-23 RX ADMIN — MIDAZOLAM HYDROCHLORIDE 2 MG: 1 INJECTION, SOLUTION INTRAMUSCULAR; INTRAVENOUS at 04:07

## 2019-07-23 RX ADMIN — OXYCODONE HYDROCHLORIDE AND ACETAMINOPHEN 1 TABLET: 5; 325 TABLET ORAL at 09:07

## 2019-07-23 RX ADMIN — FENTANYL CITRATE 25 MCG: 50 INJECTION INTRAMUSCULAR; INTRAVENOUS at 06:07

## 2019-07-23 RX ADMIN — IPRATROPIUM BROMIDE AND ALBUTEROL SULFATE 3 ML: .5; 3 SOLUTION RESPIRATORY (INHALATION) at 12:07

## 2019-07-23 RX ADMIN — GLYCOPYRROLATE 0.2 MG: 0.2 INJECTION, SOLUTION INTRAMUSCULAR; INTRAVENOUS at 04:07

## 2019-07-23 RX ADMIN — HYDROMORPHONE HYDROCHLORIDE 0.2 MG: 2 INJECTION, SOLUTION INTRAMUSCULAR; INTRAVENOUS; SUBCUTANEOUS at 06:07

## 2019-07-23 RX ADMIN — ALBUTEROL SULFATE 2 PUFF: 90 AEROSOL, METERED RESPIRATORY (INHALATION) at 06:07

## 2019-07-23 RX ADMIN — LIDOCAINE HYDROCHLORIDE 100 MG: 20 INJECTION, SOLUTION INTRAVENOUS at 04:07

## 2019-07-23 RX ADMIN — PIPERACILLIN, TAZOBACTAM 4.5 G: 4; .5 INJECTION, POWDER, LYOPHILIZED, FOR SOLUTION INTRAVENOUS at 09:07

## 2019-07-23 RX ADMIN — HEPARIN SODIUM 5000 UNITS: 5000 INJECTION, SOLUTION INTRAVENOUS; SUBCUTANEOUS at 05:07

## 2019-07-23 RX ADMIN — IPRATROPIUM BROMIDE AND ALBUTEROL SULFATE 3 ML: .5; 3 SOLUTION RESPIRATORY (INHALATION) at 06:07

## 2019-07-23 RX ADMIN — GABAPENTIN 600 MG: 300 CAPSULE ORAL at 09:07

## 2019-07-23 RX ADMIN — PIPERACILLIN, TAZOBACTAM 4.5 G: 4; .5 INJECTION, POWDER, LYOPHILIZED, FOR SOLUTION INTRAVENOUS at 12:07

## 2019-07-23 NOTE — SUBJECTIVE & OBJECTIVE
Interval History: patient in OR prior to my evaluation during late rounds today. Vitals stable. Labs reviewed    Review of Systems   Reason unable to perform ROS: patient in the OR.     Objective:     Vital Signs (Most Recent):  Temp: 98.4 °F (36.9 °C) (07/23/19 1137)  Pulse: 61 (07/23/19 1348)  Resp: 16 (07/23/19 1348)  BP: (!) 142/75 (07/23/19 1137)  SpO2: 98 % (07/23/19 1348) Vital Signs (24h Range):  Temp:  [96.2 °F (35.7 °C)-98.9 °F (37.2 °C)] 98.4 °F (36.9 °C)  Pulse:  [54-71] 61  Resp:  [16-18] 16  SpO2:  [95 %-98 %] 98 %  BP: (102-166)/(59-75) 142/75     Weight: 107.1 kg (236 lb 1.8 oz)  Body mass index is 39.29 kg/m².    Intake/Output Summary (Last 24 hours) at 7/23/2019 1631  Last data filed at 7/23/2019 1414  Gross per 24 hour   Intake 460 ml   Output 2825 ml   Net -2365 ml      Physical Exam   Constitutional:   Patient in the OR       Significant Labs: All pertinent labs within the past 24 hours have been reviewed.    Significant Imaging: I have reviewed all pertinent imaging results/findings within the past 24 hours.  I have reviewed and interpreted all pertinent imaging results/findings within the past 24 hours.

## 2019-07-23 NOTE — PT/OT/SLP PROGRESS
Occupational Therapy      Patient Name:  Mary Ellen Fajardo   MRN:  [id number]    Patient not seen today secondary to Other (Comment)(The patient off the unit for LLE amputation). Will follow-up tomorrow pending orders from ortho.    Preethi Glass OT  7/23/2019

## 2019-07-23 NOTE — ANESTHESIA PREPROCEDURE EVALUATION
07/23/2019  Mary Ellen Fajardo is a 63 y.o., female.    Anesthesia Evaluation    I have reviewed the Patient Summary Reports.    I have reviewed the Nursing Notes.   I have reviewed the Medications.     Review of Systems  Anesthesia Hx:  No problems with previous Anesthesia Denies Hx of Anesthetic complications  Neg history of prior surgery. Denies Family Hx of Anesthesia complications.   Denies Personal Hx of Anesthesia complications.   Social:  Hx of polysubstance abuse   Hematology/Oncology:  Hematology Normal   Oncology Normal     EENT/Dental:EENT/Dental Normal   Cardiovascular:   Exercise tolerance: good Hypertension CHF · Normal left ventricular systolic function. The estimated ejection fraction is 60%  · Concentric left ventricular hypertrophy.  · Indeterminate left ventricular diastolic function.  · Moderate right ventricular enlargement.  · Mildly reduced right ventricular systolic function.  · Moderate right atrial enlargement.  · Mild to moderate tricuspid regurgitation.  · The estimated PA systolic pressure is 55 mm Hg  · Pulmonary hypertension present.   Pulmonary:   COPD Asthma Sleep Apnea    Renal/:   Chronic Renal Disease, CRI    Hepatic/GI:   Hepatitis, C    Musculoskeletal:   Arthritis     Neurological:   CVA    Endocrine:  Endocrine Normal    Dermatological:  Skin Normal    Psych:   anxiety depression Bipolar disorder         Physical Exam  General:  Well nourished    Airway/Jaw/Neck:  Airway Findings: Mouth Opening: Normal General Airway Assessment: Adult  Mallampati: II  TM Distance: Normal, at least 6 cm         Dental:  DENTAL FINDINGS: Normal   Chest/Lungs:  Chest/Lungs Clear    Heart/Vascular:  Heart Findings: Normal Heart murmur: negative       Mental Status:  Mental Status Findings:  Cooperative, Alert and Oriented        Lab Results   Component Value Date    WBC 7.04  07/16/2019    HGB 12.0 07/16/2019    HCT 40.4 07/16/2019    MCV 87 07/16/2019     07/16/2019     BMP  Lab Results   Component Value Date     07/23/2019    K 3.9 07/23/2019    CL 91 (L) 07/23/2019    CO2 39 (H) 07/23/2019    BUN 21 07/23/2019    CREATININE 1.1 07/23/2019    CALCIUM 9.6 07/23/2019    ANIONGAP 9 07/23/2019    ESTGFRAFRICA >60 07/23/2019    EGFRNONAA 54 (A) 07/23/2019         Anesthesia Plan  Type of Anesthesia, risks & benefits discussed:  Anesthesia Type:  general, spinal, regional  Patient's Preference:   Intra-op Monitoring Plan: standard ASA monitors  Intra-op Monitoring Plan Comments:   Post Op Pain Control Plan:   Post Op Pain Control Plan Comments:   Induction:   IV  Beta Blocker:  Patient is not currently on a Beta-Blocker (No further documentation required).       Informed Consent: Patient understands risks and agrees with Anesthesia plan.  Questions answered. Anesthesia consent signed with patient.  ASA Score: 3     Day of Surgery Review of History & Physical:  There are no significant changes.  H&P update referred to the provider.         Ready For Surgery From Anesthesia Perspective.

## 2019-07-23 NOTE — PT/OT/SLP PROGRESS
Physical Therapy Treatment    Patient Name:  Mary Ellen Fajardo   MRN:  [id number]    Recommendations:     Discharge Recommendations:  rehabilitation facility   Discharge Equipment Recommendations: none   Barriers to discharge: Decreased caregiver support    Assessment:     Mary Ellen Fajardo is a 63 y.o. female admitted with a medical diagnosis of Septic arthritis of knee, left.  She presents with the following impairments/functional limitations:  weakness, impaired functional mobilty, impaired balance, decreased upper extremity function, decreased safety awareness, impaired cardiopulmonary response to activity, impaired endurance, impaired self care skills, decreased lower extremity function, pain, impaired joint extensibility, decreased ROM Pt to go to  for AKA this afternoon..    Rehab Prognosis: Good; patient would benefit from acute skilled PT services to address these deficits and reach maximum level of function.    Recent Surgery: Procedure(s) (LRB):  AMPUTATION, ABOVE KNEE (Left)      Plan:     During this hospitalization, patient to be seen 6 x/week to address the identified rehab impairments via therapeutic activities, therapeutic exercises, wheelchair management/training and progress toward the following goals:    · Plan of Care Expires:  09/03/19    Subjective     Chief Complaint: pain  Patient/Family Comments/goals: PLOF, decreased pain  Pain/Comfort:  · Pain Rating 1: 3/10  · Location 1: (L LE)  · Pain Addressed 1: Pre-medicate for activity, Reposition, Distraction, Cessation of Activity, Nurse notified  · Pain Rating Post-Intervention 1: 3/10      Objective:     Communicated with nsg prior to session.  Patient found HOB elevated with oxygen upon PT entry to room.     General Precautions: Standard, fall   Orthopedic Precautions:N/A   Braces: N/A       AM-PAC 6 CLICK MOBILITY  Turning over in bed (including adjusting bedclothes, sheets and blankets)?: 3  Sitting down on and standing up from a  chair with arms (e.g., wheelchair, bedside commode, etc.): 1  Moving from lying on back to sitting on the side of the bed?: 3  Moving to and from a bed to a chair (including a wheelchair)?: 1  Need to walk in hospital room?: 1  Climbing 3-5 steps with a railing?: 1  Basic Mobility Total Score: 10       Therapeutic Activities and Exercises:   Overhead trapeze installed to assist patient with bed mobility and pressure relief.  Pt verbalized/demonstrated understanding of use.  Pt performed pull-ups with trapeze 2x10 reps along with GS and Hip IR x 10 reps with 5 sec hold.  Pt received R HCS 3x30 sec     Patient left HOB elevated with all lines intact, call button in reach, bed alarm on and nsg notified..    GOALS:   Multidisciplinary Problems     Physical Therapy Goals        Problem: Physical Therapy Goal    Goal Priority Disciplines Outcome Goal Variances Interventions   Physical Therapy Goal     PT, PT/OT Ongoing (interventions implemented as appropriate)     Description:  Goals to be met by: 8/3/19     Patient will increase functional independence with mobility by performin. Supine to sit with Set-up [place]  2. Sit to supine with Stand-by Assistance  3. Rolling to Left and Right with Modified [place].  4. Bed to chair transfer to be assessed  5. Wheelchair propulsion to be assessed  6. Sitting at edge of bed x 15 minutes with Supervision                      Time Tracking:     PT Received On: 19  PT Start Time: 1050     PT Stop Time: 1113  PT Total Time (min): 23 min     Billable Minutes: Therapeutic Activity 15 and Therapeutic Exercise 8    Treatment Type: Treatment  PT/PTA: PT     PTA Visit Number: 0     Yuli Galo, PT  2019

## 2019-07-23 NOTE — SUBJECTIVE & OBJECTIVE
Interval History:   NAEON  Plans for AKA today     Review of Systems   Constitutional: Negative for chills and fever.   HENT: Negative for congestion and rhinorrhea.    Eyes: Negative for photophobia and visual disturbance.   Respiratory: Negative for cough and shortness of breath.    Cardiovascular: Negative for chest pain and leg swelling.   Gastrointestinal: Negative for abdominal pain, nausea and vomiting.   Genitourinary: Negative for hematuria and pelvic pain.   Musculoskeletal: Positive for arthralgias and joint swelling. Negative for neck stiffness.   Skin: Negative for rash and wound.   Neurological: Negative for dizziness and light-headedness.   Psychiatric/Behavioral: Negative for agitation and behavioral problems.     Objective:     Vital Signs (Most Recent):  Temp: 98.3 °F (36.8 °C) (07/23/19 0738)  Pulse: 67 (07/23/19 0744)  Resp: 16 (07/23/19 0744)  BP: (!) 166/68 (07/23/19 0738)  SpO2: 96 % (07/23/19 0744) Vital Signs (24h Range):  Temp:  [96.2 °F (35.7 °C)-98.9 °F (37.2 °C)] 98.3 °F (36.8 °C)  Pulse:  [54-71] 67  Resp:  [16-18] 16  SpO2:  [95 %-99 %] 96 %  BP: (102-166)/(56-74) 166/68     Weight: 107.1 kg (236 lb 1.8 oz)  Body mass index is 39.29 kg/m².    Estimated Creatinine Clearance: 63.6 mL/min (based on SCr of 1.1 mg/dL).    Physical Exam   Constitutional: She is oriented to person, place, and time. She appears well-developed and well-nourished. No distress.   Morbidly obese   Eyes: Pupils are equal, round, and reactive to light.   Neck: Normal range of motion.   Cardiovascular: Normal rate and normal heart sounds. Exam reveals no friction rub.   No murmur heard.  Pulmonary/Chest: Effort normal and breath sounds normal. No stridor. No respiratory distress.   Abdominal: Soft. Bowel sounds are normal. She exhibits no distension. There is no tenderness.   Wolfe with clear urine output   Musculoskeletal: She exhibits no edema.    She has a well healed surgical incision. The scar is hyperpigmented  but no longer erythematous.    Neurological: She is alert and oriented to person, place, and time. No cranial nerve deficit.   Skin: Skin is warm and dry. She is not diaphoretic. No pallor.   Psychiatric: She has a normal mood and affect.       Significant Labs:   Blood Culture:   Recent Labs   Lab 07/16/19  2250 07/16/19  2255   LABBLOO No growth after 5 days. No growth after 5 days.     CBC: No results for input(s): WBC, HGB, HCT, PLT in the last 48 hours.  CMP:   Recent Labs   Lab 07/22/19  0328 07/23/19  0321    139   K 4.1 3.9   CL 92* 91*   CO2 41* 39*   GLU 87 90   BUN 21 21   CREATININE 1.0 1.1   CALCIUM 9.1 9.6   ANIONGAP 9 9   EGFRNONAA >60 54*     Urine Culture:   Recent Labs   Lab 07/17/19  1700   LABURIN ESCHERICHIA COLI  10,000 - 49,999 cfu/ml  *     Urine Studies:   Recent Labs   Lab 07/16/19  2240   COLORU Yellow   APPEARANCEUA Cloudy*   PHUR 5.0   SPECGRAV 1.010   PROTEINUA Negative   GLUCUA Negative   KETONESU Negative   BILIRUBINUA Negative   OCCULTUA 3+*   NITRITE Negative   UROBILINOGEN Negative   LEUKOCYTESUR 3+*   RBCUA 10*   WBCUA 75*   BACTERIA Many*   SQUAMEPITHEL 40     Wound Culture:   Recent Labs   Lab 07/17/19  1253   LABAERO No growth     All pertinent labs within the past 24 hours have been reviewed.    Significant Imaging: I have reviewed all pertinent imaging results/findings within the past 24 hours.

## 2019-07-23 NOTE — NURSING
"AAOX4, free from fall or injury. Scheduled meds administered and tolerated well. Patient continues to refuse carvedilol "I haven't taken any blood pressure medicine since 12/2018, I don't need that and my blood pressure has been good." Weight shift assistance provided throughout the shift. Medicated nausea X1 with relief. Suprapubic cath draining clear yellow urine. Bilateral offloading boats in place. Safety measures in place. Call light in reach.          "

## 2019-07-23 NOTE — BRIEF OP NOTE
Operative Note         SUMMARY     Surgery Date: 7/23/2019     Surgeon(s) and Role:     * Gordo Rodriguez MD - Primary    Pre-op Diagnosis:  Abscess of left thigh [L02.416]    Post-op Diagnosis: same    Procedure(s) (LRB):  AMPUTATION, ABOVE KNEE (Left)    Anesthesia: Choice    Findings/Key Components:    Infected knee    Specimen:    No specimen to Pathology orders placed during procedure(s).     Specimen (12h ago, onward)    Start     Ordered    07/23/19 1645  Specimen to Pathology - Surgery  Once     Comments:  Pre-op Diagnosis: Abscess of left thigh [L02.416]Procedure(s):AMPUTATION, ABOVE KNEE Number of specimens: 1Name of specimens: left AKA     Start Status     07/23/19 1645 Collected (07/23/19 1650) Order ID: [id number]       07/23/19 1652            Estimated Blood Loss: 100 mL

## 2019-07-23 NOTE — PROGRESS NOTES
[facility]  Infectious Disease  Progress Note    Patient Name: Mary Ellen Fajardo  MRN: [id number]  Admission Date: 7/16/2019  Length of Stay: 6 days  Attending Physician: Micheline Estrada MD  Primary Care Provider: Any Tran MD    Isolation Status: No active isolations  Assessment/Plan:      * Septic arthritis of knee, left  62 y/o female with left knee TKA c/b septic arthritis 2011, presents with acute left knee pain concerning for infected prosthesis. She is seen by orthopedics and plans for left AKA today.     1. Vancomycin levels supratherpeutic, continue to monitor levels. Trough goal 15-20.  2.  Please send CLEAN MARGIN for pathology and culture. Plan on stopping abx 2-3 days after amputation if margins clear of infection. May consider adding rifampin, but she has numerous drug interactions including psych meds and opiates.    3. discuss with patient abx allergies. Reports ceftriaxone allergy in 2012? With rash. No anaphylaxis. Denies taking keflex or cefazolin. Will continue zosyn, discuss with patient if would like to try ceftriaxone with pre medication with benadryl. Pt agreeable. Consider trial after surgery with close monitoring. All questions answered.          Thank you for your consult. I will follow-up with patient. Please contact us if you have any additional questions.    Arnulfo Rudd PA-C  Infectious Disease  [facility]    Subjective:     Principal Problem:Septic arthritis of knee, left    HPI:   63F who is chronically bed bound 2/2 issues with L knee (replaced 2/2011 for ostearthritis and c/b infection in 2011). Says he infection was treated at [facility] in 2011 with hardware explant and 6 weeks of iv antibiotics followed by hardware replacement (no culture results available for review). Denies issues with L knee in many years, but is non-ambulatory since her last surgery. Admitted 7/16 with new L knee pain swelling and erythema. Thinks she had some  low grade fevers as well. Denies other symptoms including dysuria. Knee was aspirated by ortho and sent for culture. Sounds like not enough fluid avilable to send for cell count. Spoke with lab and they do not have any fluid (other than swab) and so cell count can't be added on. Given degree of immobility, pt strongly considering AKA with ortho (with goal of being fitted for prosthesis). ID consulted for abx recommendations. Currently on V/Z.     Plain film knee-- No loosening or fracture or migration.    Specimen Information: Knee, Left; Body Fluid        Component 5d ago   Aerobic Bacterial Culture No growth          Small amount of bloody drainage present    No cell count available for review-- called lab. No aspirate sent (only swab)      Hospital course c/b brif step up to ICU for acute on Chronic Hypercarbic Respiratory Failure-multifactorial: cardiogenic pulmonary edema and narcotic SE on baseline [facility]      Interval History:   NAEON  Plans for AKA today     Review of Systems   Constitutional: Negative for chills and fever.   HENT: Negative for congestion and rhinorrhea.    Eyes: Negative for photophobia and visual disturbance.   Respiratory: Negative for cough and shortness of breath.    Cardiovascular: Negative for chest pain and leg swelling.   Gastrointestinal: Negative for abdominal pain, nausea and vomiting.   Genitourinary: Negative for hematuria and pelvic pain.   Musculoskeletal: Positive for arthralgias and joint swelling. Negative for neck stiffness.   Skin: Negative for rash and wound.   Neurological: Negative for dizziness and light-headedness.   Psychiatric/Behavioral: Negative for agitation and behavioral problems.     Objective:     Vital Signs (Most Recent):  Temp: 98.3 °F (36.8 °C) (07/23/19 0738)  Pulse: 67 (07/23/19 0744)  Resp: 16 (07/23/19 0744)  BP: (!) 166/68 (07/23/19 0738)  SpO2: 96 % (07/23/19 0744) Vital Signs (24h Range):  Temp:  [96.2 °F (35.7 °C)-98.9 °F (37.2 °C)] 98.3 °F  (36.8 °C)  Pulse:  [54-71] 67  Resp:  [16-18] 16  SpO2:  [95 %-99 %] 96 %  BP: (102-166)/(56-74) 166/68     Weight: 107.1 kg (236 lb 1.8 oz)  Body mass index is 39.29 kg/m².    Estimated Creatinine Clearance: 63.6 mL/min (based on SCr of 1.1 mg/dL).    Physical Exam   Constitutional: She is oriented to person, place, and time. She appears well-developed and well-nourished. No distress.   Morbidly obese   Eyes: Pupils are equal, round, and reactive to light.   Neck: Normal range of motion.   Cardiovascular: Normal rate and normal heart sounds. Exam reveals no friction rub.   No murmur heard.  Pulmonary/Chest: Effort normal and breath sounds normal. No stridor. No respiratory distress.   Abdominal: Soft. Bowel sounds are normal. She exhibits no distension. There is no tenderness.   Wolfe with clear urine output   Musculoskeletal: She exhibits no edema.    She has a well healed surgical incision. The scar is hyperpigmented but no longer erythematous.    Neurological: She is alert and oriented to person, place, and time. No cranial nerve deficit.   Skin: Skin is warm and dry. She is not diaphoretic. No pallor.   Psychiatric: She has a normal mood and affect.       Significant Labs:   Blood Culture:   Recent Labs   Lab 07/16/19  2250 07/16/19  2255   LABBLOO No growth after 5 days. No growth after 5 days.     CBC: No results for input(s): WBC, HGB, HCT, PLT in the last 48 hours.  CMP:   Recent Labs   Lab 07/22/19  0328 07/23/19  0321    139   K 4.1 3.9   CL 92* 91*   CO2 41* 39*   GLU 87 90   BUN 21 21   CREATININE 1.0 1.1   CALCIUM 9.1 9.6   ANIONGAP 9 9   EGFRNONAA >60 54*     Urine Culture:   Recent Labs   Lab 07/17/19  1700   LABURIN ESCHERICHIA COLI  10,000 - 49,999 cfu/ml  *     Urine Studies:   Recent Labs   Lab 07/16/19  2240   COLORU Yellow   APPEARANCEUA Cloudy*   PHUR 5.0   SPECGRAV 1.010   PROTEINUA Negative   GLUCUA Negative   KETONESU Negative   BILIRUBINUA Negative   OCCULTUA 3+*   NITRITE Negative    UROBILINOGEN Negative   LEUKOCYTESUR 3+*   RBCUA 10*   WBCUA 75*   BACTERIA Many*   SQUAMEPITHEL 40     Wound Culture:   Recent Labs   Lab 07/17/19  1253   LABAERO No growth     All pertinent labs within the past 24 hours have been reviewed.    Significant Imaging: I have reviewed all pertinent imaging results/findings within the past 24 hours.

## 2019-07-23 NOTE — PROGRESS NOTES
Pharmacokinetic Assessment Follow Up: IV Vancomycin    Vancomycin serum concentration assessment(s):    The trough level was drawned correctly and can be used to guide therapy at this time.  Result= 37.1    Vancomycin Regimen Plan:    Recheck level at 1100 on 7/23/2019.    Discontinue the scheduled vancomycin regimen and re-dose when the random level is less than 20 mcg/ml.    Pharmacy will continue to follow and monitor vancomycin.    Please contact pharmacy at extension [phone] for questions regarding this assessment.    Thank you for the consult,   Bernadine Nair     Patient brief summary:  Mary Ellen Fajardo is a 63 y.o. female initiated on antimicrobial therapy with IV Vancomycin for treatment of suspected bacteremia    The patient received a loading dose, followed by the current treatment regimen: vancomycin 1500 mg IV every 12 hours    Drug Allergies:   Review of patient's allergies indicates:   Allergen Reactions    Rocephin [ceftriaxone] Rash       Actual Body Weight:   107.1 kg    Renal Function:   Estimated Creatinine Clearance: 70 mL/min (based on SCr of 1 mg/dL).,     Dialysis Method (if applicable):  none     CBC (last 72 hours):  No results for input(s): WHITE BLOOD CELL COUNT, HEMOGLOBIN, HEMATOCRIT, PLATELETS, GRAN%, LYMPH%, MONO%, EOSINOPHIL%, BASOPHIL%, DIFFERENTIAL METHOD in the last 72 hours.    Metabolic Panel (last 72 hours):  Recent Labs   Lab Result Units 07/20/19  0521 07/21/19  0609 07/22/19  0328   Sodium mmol/L 143 143 142   Potassium mmol/L 3.1* 3.6 4.1   Chloride mmol/L 95 92* 92*   CO2 mmol/L 41* 43* 41*   Glucose mg/dL 78 80 87   BUN, Bld mg/dL 27* 22 21   Creatinine mg/dL 1.0 0.8 1.0       Vancomycin Administrations:  vancomycin given in the last 96 hours                     vancomycin 1.5 g in dextrose 5 % 250 mL IVPB (ready to mix) (mg) 1,500 mg New Bag 07/22/19 1230     1,500 mg New Bag 07/21/19 2304     1,500 mg New Bag  1132                      Drug levels (last 3  results):  Recent Labs   Lab Result Units 07/20/19  0521 07/21/19  0609 07/22/19  0328 07/22/19  2305   Vancomycin, Random ug/mL 10.5 5.7 39.7  --    Vancomycin-Trough ug/mL  --   --   --  37.1*       Microbiologic Results:  Microbiology Results (last 7 days)       Procedure Component Value Units Date/Time    Blood culture #2 [[id number]] Collected:  07/16/19 2255    Order Status:  Completed Specimen:  Blood from Peripheral, Hand, Right Updated:  07/22/19 0303     Blood Culture, Routine No growth after 5 days.    Narrative:       Blood Culture #2    Blood culture #1 [[id number]] Collected:  07/16/19 2250    Order Status:  Completed Specimen:  Blood from Peripheral, Antecubital, Left Updated:  07/22/19 0303     Blood Culture, Routine No growth after 5 days.    Narrative:       Blood Culture #1    Aerobic culture [[id number]] Collected:  07/17/19 1253    Order Status:  Completed Specimen:  Body Fluid from Knee, Left Updated:  07/21/19 0753     Aerobic Bacterial Culture No growth    Narrative:       Aspiration left knee    Urine Culture High Risk [[id number]]  (Abnormal)  (Susceptibility) Collected:  07/17/19 1700    Order Status:  Completed Specimen:  Urine, Catheterized Updated:  07/21/19 0748     Urine Culture, Routine ESCHERICHIA COLI  10,000 - 49,999 cfu/ml      Narrative:       Indicated criteria for high risk culture:->Prior to urologic  procedures    Culture, Body Fluid (Aerobic) w/ GS [[id number]] Collected:  07/17/19 1253    Order Status:  Completed Specimen:  Body Fluid from Knee, Left Updated:  07/18/19 1050     Gram Stain Result Moderate WBC's      No organisms seen    Narrative:       Aspiration left knee

## 2019-07-23 NOTE — ASSESSMENT & PLAN NOTE
64 y/o female with left knee TKA c/b septic arthritis 2011, presents with acute left knee pain concerning for infected prosthesis. She is seen by orthopedics and plans for left AKA today.     1. Vancomycin levels supratherpeutic, continue to monitor levels. Trough goal 15-20.  2.  Please send CLEAN MARGIN for pathology and culture. Plan on stopping abx 2-3 days after amputation if margins clear of infection. May consider adding rifampin, but she has numerous drug interactions including psych meds and opiates.    3. discuss with patient abx allergies. Reports ceftriaxone allergy in 2012? With rash. No anaphylaxis. Denies taking keflex or cefazolin. Will continue zosyn, discuss with patient if would like to try ceftriaxone with pre medication with benadryl. Pt agreeable. Consider trial after surgery with close monitoring. All questions answered.

## 2019-07-23 NOTE — PROGRESS NOTES
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 Multiple surgeries over 10y.  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    Pt asking to have left AKA. She wants to walk again and I explained she will probably not have the strenghth or endurance to ambulate with a AKA prosthesis. Knee fusion briefly discussed but Pt does not want to risk need for multiple surgeries and again ask for AKA. Family in room and understands risk and low chance she will ambulate again after several years not walking.  Plan AKA today.

## 2019-07-23 NOTE — TRANSFER OF CARE
"Anesthesia Transfer of Care Note    Patient: Mary Ellen Fajardo    Procedure(s) Performed: Procedure(s) (LRB):  AMPUTATION, ABOVE KNEE (Left)    Patient location: PACU    Anesthesia Type: general    Transport from OR: Transported from OR on 100% O2 by closed face mask with adequate spontaneous ventilation    Post pain: adequate analgesia    Post assessment: no apparent anesthetic complications and tolerated procedure well    Post vital signs: stable    Level of consciousness: awake, alert and agitated    Nausea/Vomiting: no nausea/vomiting    Complications: none    Transfer of care protocol was followed      Last vitals:   Visit Vitals  BP (!) 142/75 (BP Location: Right arm, Patient Position: Sitting)   Pulse 81   Temp 37 °C (98.6 °F) (Tympanic)   Resp (!) 22   Ht 5' 5" (1.651 m)   Wt 107.1 kg (236 lb 1.8 oz)   SpO2 100%   Breastfeeding? No   BMI 39.29 kg/m²     "

## 2019-07-23 NOTE — PLAN OF CARE
Problem: Physical Therapy Goal  Goal: Physical Therapy Goal  Goals to be met by: 8/3/19     Patient will increase functional independence with mobility by performin. Supine to sit with Set-up [place]  2. Sit to supine with Stand-by Assistance  3. Rolling to Left and Right with Modified [place].  4. Bed to chair transfer to be assessed  5. Wheelchair propulsion to be assessed  6. Sitting at edge of bed x 15 minutes with Supervision     Outcome: Ongoing (interventions implemented as appropriate)  Overhead trapeze installed to assist pr with bed mobility and pressure relief.  Pt to sx for AKA this afternoon.

## 2019-07-23 NOTE — NURSING
Critical lab Vanc trough of 37.1. MD notified. New orders to hold vanc for this shift. Decrease to 1250 mg q 12h and vanc trough prior to next dose.

## 2019-07-23 NOTE — CARE UPDATE
Pt received to pacu on nrb. Placed on bipap with charted settings per Dr. Sanchez orders @ bedside

## 2019-07-23 NOTE — OP NOTE
DATE OF PROCEDURE:  07/23/2019.    SURGEON:  Gordo Rodriguez M.D.    PREOPERATIVE DIAGNOSES:  Chronic infection, left knee, osteomyelitis.    POSTOPERATIVE DIAGNOSES:  Chronic infection, left knee, osteomyelitis.    PROCEDURE:  Right above-knee amputation.    ANESTHESIA:  General.    COMPLICATIONS:  None.    BLOOD LOSS:  200 mL    BLOOD GIVEN:  None.    INDICATIONS FOR PROCEDURE:  Ms. Mary Ellen Fajardo is a 63-year-old female with   multiple medical problems.  She has had bilateral total knee replacements with   Dr. Nelson with multiple infections and multiple surgeries to undergo revision.    She has a chronic infection left knee with a draining sinus.  She was admitted   with infection and chronic pain.  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    She desires to ambulate again.  She is morbidly obese.  She has multiple   medical problems.  I have discussed with her probability that she will not   ambulate due to her size, comorbidities and poor overall condition.  She   understands it takes a great deal of energy and endurance to ambulate with an   above-knee prosthesis.  Option of fusion of the knee was also discussed and the   patient wanted to have her leg removed.    PROCEDURE IN DETAIL:  Following obtaining proper informed consent from the   patient, she was taken to the Operating Room and given general anesthesia by   endotracheal tube.  The patient was already on Zosyn IV antibiotics.  Left leg   was prepped and draped in the usual sterile fashion.  A sterile tourniquet was   applied and inflated to 300 mmHg for 40 minutes during the case.  The incision   for the revision knee replacement was draining proximally through a draining   sinus.  The transverse incision was made above the knee revision previous   incision and a broad long posterior flap was made down below the knee to bring   up anteriorly.  The dissection was  carried out with sharp dissection and   electrocautery.  The femur was exposed and an elevator passed behind it to   protect the vessels and the cut was made and finished with an osteotome around   the previous femoral stem.  The cut was completed on the femur.  The remainder   of the amputation was done broad post posteriorly.  The femur was pulled and   removed from the canal.  There was purulent material in the joint, but none in   the canal.  Pulsavac lavage was used.  All vessels were identified and tied with   0 silk ties and stick ties.  Once all of the vessels were tied, the tourniquet   was released and several more vessels were tied with a hemostat and a size 0   silk ties.  Hemostasis was used to obtain electrocautery.  The posterior flap   was then brought anteriorly and held in place with retention sutures.  The   closure was somewhat tight medially due to the obesity and bulk of her leg.  #2   nylon retention sutures and 2-0 nylon retention sutures were used.  #1 running   Vicryl sutures used deep, 2-0 Vicryl sutures subcutaneously and staples to the   skin.  A complete closure was obtained.  Sterile dressing was applied and the   patient was taken to Recovery Room in stable condition.      BETSY  dd: 07/23/2019 18:06:20 (CDT)  td: 07/23/2019 18:58:52 (CDT)  Doc ID   #[id number]  Job ID #[id number]    CC:

## 2019-07-23 NOTE — PROGRESS NOTES
Ortho Daily Progress Note    Mary Ellen Fajardo is a 63 y.o. female admitted on 7/16/2019      Chief Complaint/Reason for admission: Knee Pain (Left knee, drainage noted to site, redness and swelling, no relief with tylenol 5 hours )       Hospital Day: 6  Post Op Day: * No surgery date entered *     The patient was seen and examined this morning at the bedside. Patient reports no acute issues overnight.  Patient reports that pain is adequately controlled.    _______________    Vitals:    07/23/19 0007 07/23/19 0037 07/23/19 0440 07/23/19 0738   BP:  (!) 113/59 102/61 (!) 166/68   Pulse: 60 64 (!) 54 69   Resp: 18 18 18 18   Temp:  96.2 °F (35.7 °C) 97.5 °F (36.4 °C) 98.3 °F (36.8 °C)   TempSrc:  Axillary Axillary Oral   SpO2: 98% 95% 97% 98%   Weight:       Height:           Vital Signs (Most Recent)  Temp: 98.3 °F (36.8 °C) (07/23/19 0738)  Pulse: 69 (07/23/19 0738)  Resp: 18 (07/23/19 0738)  BP: (!) 166/68 (07/23/19 0738)  SpO2: 98 % (07/23/19 0738)    Vital Signs Range (Last 24H):  Temp:  [96.2 °F (35.7 °C)-98.9 °F (37.2 °C)]   Pulse:  [54-71]   Resp:  [18]   BP: (102-166)/(56-74)   SpO2:  [95 %-99 %]       Physical:    AAOx3  Erythema to LLE  Palpable distal pulses  CR<3sec      Recent Labs     07/21/19  0609 07/22/19  0328 07/23/19  0321   K 3.6 4.1 3.9   CALCIUM 9.0 9.1 9.6       I/O last 3 completed shifts:  In: 1430 [P.O.:1080; IV Piggyback:350]  Out: 4128 [Urine:4128]          Assessment:  A/PLeft failed TKA for infection with stemmed prosthesis          Non ambulatory  Paralysis     Plan:    Plan for left AKA for source control        Steve L. Duplantier MD  [facility]

## 2019-07-24 PROBLEM — Z00.8 EVALUATION BY PSYCHIATRIC SERVICE REQUIRED: Status: ACTIVE | Noted: 2019-07-24

## 2019-07-24 PROBLEM — F39 MOOD INSOMNIA: Status: ACTIVE | Noted: 2019-07-24

## 2019-07-24 PROBLEM — F31.5 BIPOLAR I DISORDER, CURRENT OR MOST RECENT EPISODE DEPRESSED, WITH PSYCHOTIC FEATURES: Status: ACTIVE | Noted: 2019-01-06

## 2019-07-24 PROBLEM — F51.05 MOOD INSOMNIA: Status: ACTIVE | Noted: 2019-07-24

## 2019-07-24 LAB
ANION GAP SERPL CALC-SCNC: 9 MMOL/L (ref 8–16)
BASOPHILS # BLD AUTO: 0.03 K/UL (ref 0–0.2)
BASOPHILS NFR BLD: 0.3 % (ref 0–1.9)
BUN SERPL-MCNC: 16 MG/DL (ref 8–23)
CALCIUM SERPL-MCNC: 9.7 MG/DL (ref 8.7–10.5)
CHLORIDE SERPL-SCNC: 94 MMOL/L (ref 95–110)
CO2 SERPL-SCNC: 36 MMOL/L (ref 23–29)
CREAT SERPL-MCNC: 1.4 MG/DL (ref 0.5–1.4)
DIFFERENTIAL METHOD: ABNORMAL
EOSINOPHIL # BLD AUTO: 0.1 K/UL (ref 0–0.5)
EOSINOPHIL NFR BLD: 1 % (ref 0–8)
ERYTHROCYTE [DISTWIDTH] IN BLOOD BY AUTOMATED COUNT: 15.2 % (ref 11.5–14.5)
EST. GFR  (AFRICAN AMERICAN): 46 ML/MIN/1.73 M^2
EST. GFR  (NON AFRICAN AMERICAN): 40 ML/MIN/1.73 M^2
GLUCOSE SERPL-MCNC: 112 MG/DL (ref 70–110)
HCT VFR BLD AUTO: 34.6 % (ref 37–48.5)
HGB BLD-MCNC: 10.2 G/DL (ref 12–16)
LYMPHOCYTES # BLD AUTO: 1.1 K/UL (ref 1–4.8)
LYMPHOCYTES NFR BLD: 11.8 % (ref 18–48)
MCH RBC QN AUTO: 25.5 PG (ref 27–31)
MCHC RBC AUTO-ENTMCNC: 29.5 G/DL (ref 32–36)
MCV RBC AUTO: 87 FL (ref 82–98)
MONOCYTES # BLD AUTO: 0.6 K/UL (ref 0.3–1)
MONOCYTES NFR BLD: 6.1 % (ref 4–15)
NEUTROPHILS # BLD AUTO: 7.5 K/UL (ref 1.8–7.7)
NEUTROPHILS NFR BLD: 81.4 % (ref 38–73)
PLATELET # BLD AUTO: 204 K/UL (ref 150–350)
PMV BLD AUTO: 10.4 FL (ref 9.2–12.9)
POTASSIUM SERPL-SCNC: 5.1 MMOL/L (ref 3.5–5.1)
RBC # BLD AUTO: 4 M/UL (ref 4–5.4)
SODIUM SERPL-SCNC: 139 MMOL/L (ref 136–145)
VANCOMYCIN SERPL-MCNC: 14.3 UG/ML
WBC # BLD AUTO: 9.33 K/UL (ref 3.9–12.7)

## 2019-07-24 PROCEDURE — 85025 COMPLETE CBC W/AUTO DIFF WBC: CPT

## 2019-07-24 PROCEDURE — 36415 COLL VENOUS BLD VENIPUNCTURE: CPT

## 2019-07-24 PROCEDURE — 25000003 PHARM REV CODE 250: Performed by: INTERNAL MEDICINE

## 2019-07-24 PROCEDURE — 97168 OT RE-EVAL EST PLAN CARE: CPT

## 2019-07-24 PROCEDURE — 25000003 PHARM REV CODE 250: Performed by: ORTHOPAEDIC SURGERY

## 2019-07-24 PROCEDURE — 94640 AIRWAY INHALATION TREATMENT: CPT

## 2019-07-24 PROCEDURE — 11000001 HC ACUTE MED/SURG PRIVATE ROOM

## 2019-07-24 PROCEDURE — 80202 ASSAY OF VANCOMYCIN: CPT

## 2019-07-24 PROCEDURE — 25000242 PHARM REV CODE 250 ALT 637 W/ HCPCS: Performed by: ORTHOPAEDIC SURGERY

## 2019-07-24 PROCEDURE — 63600175 PHARM REV CODE 636 W HCPCS: Performed by: ORTHOPAEDIC SURGERY

## 2019-07-24 PROCEDURE — 97164 PT RE-EVAL EST PLAN CARE: CPT

## 2019-07-24 PROCEDURE — 27000221 HC OXYGEN, UP TO 24 HOURS

## 2019-07-24 PROCEDURE — 63600175 PHARM REV CODE 636 W HCPCS: Performed by: INTERNAL MEDICINE

## 2019-07-24 PROCEDURE — 94761 N-INVAS EAR/PLS OXIMETRY MLT: CPT

## 2019-07-24 PROCEDURE — 99900035 HC TECH TIME PER 15 MIN (STAT)

## 2019-07-24 PROCEDURE — 80048 BASIC METABOLIC PNL TOTAL CA: CPT

## 2019-07-24 PROCEDURE — 90792 PSYCH DIAG EVAL W/MED SRVCS: CPT | Mod: ,,, | Performed by: NURSE PRACTITIONER

## 2019-07-24 PROCEDURE — 90792 PR PSYCHIATRIC DIAGNOSTIC EVALUATION W/MEDICAL SERVICES: ICD-10-PCS | Mod: ,,, | Performed by: NURSE PRACTITIONER

## 2019-07-24 RX ORDER — NALOXONE HCL 0.4 MG/ML
0.4 VIAL (ML) INJECTION ONCE
Status: COMPLETED | OUTPATIENT
Start: 2019-07-24 | End: 2019-07-24

## 2019-07-24 RX ORDER — BUMETANIDE 1 MG/1
2 TABLET ORAL DAILY
Status: DISCONTINUED | OUTPATIENT
Start: 2019-07-25 | End: 2019-07-26 | Stop reason: HOSPADM

## 2019-07-24 RX ORDER — OXYCODONE AND ACETAMINOPHEN 7.5; 325 MG/1; MG/1
1 TABLET ORAL EVERY 4 HOURS PRN
Status: DISCONTINUED | OUTPATIENT
Start: 2019-07-24 | End: 2019-07-24

## 2019-07-24 RX ORDER — POLYETHYLENE GLYCOL 3350 17 G/17G
17 POWDER, FOR SOLUTION ORAL 2 TIMES DAILY
Status: DISCONTINUED | OUTPATIENT
Start: 2019-07-24 | End: 2019-07-26 | Stop reason: HOSPADM

## 2019-07-24 RX ORDER — VANCOMYCIN HCL IN 5 % DEXTROSE 1G/250ML
1000 PLASTIC BAG, INJECTION (ML) INTRAVENOUS ONCE
Status: COMPLETED | OUTPATIENT
Start: 2019-07-24 | End: 2019-07-24

## 2019-07-24 RX ORDER — VANCOMYCIN HCL IN 5 % DEXTROSE 1G/250ML
1000 PLASTIC BAG, INJECTION (ML) INTRAVENOUS ONCE
Status: DISCONTINUED | OUTPATIENT
Start: 2019-07-24 | End: 2019-07-24

## 2019-07-24 RX ORDER — CARVEDILOL 3.12 MG/1
3.12 TABLET ORAL 2 TIMES DAILY
Status: DISCONTINUED | OUTPATIENT
Start: 2019-07-25 | End: 2019-07-26 | Stop reason: HOSPADM

## 2019-07-24 RX ORDER — HYDROMORPHONE HYDROCHLORIDE 2 MG/ML
1 INJECTION, SOLUTION INTRAMUSCULAR; INTRAVENOUS; SUBCUTANEOUS EVERY 6 HOURS PRN
Status: DISCONTINUED | OUTPATIENT
Start: 2019-07-24 | End: 2019-07-24

## 2019-07-24 RX ORDER — HYDROMORPHONE HYDROCHLORIDE 2 MG/ML
0.2 INJECTION, SOLUTION INTRAMUSCULAR; INTRAVENOUS; SUBCUTANEOUS EVERY 8 HOURS PRN
Status: DISCONTINUED | OUTPATIENT
Start: 2019-07-24 | End: 2019-07-25

## 2019-07-24 RX ORDER — DIPHENHYDRAMINE HCL 25 MG
25 CAPSULE ORAL EVERY 6 HOURS PRN
Status: DISCONTINUED | OUTPATIENT
Start: 2019-07-24 | End: 2019-07-26 | Stop reason: HOSPADM

## 2019-07-24 RX ORDER — HYDROCODONE BITARTRATE AND ACETAMINOPHEN 5; 325 MG/1; MG/1
1 TABLET ORAL EVERY 4 HOURS PRN
Status: DISCONTINUED | OUTPATIENT
Start: 2019-07-24 | End: 2019-07-25

## 2019-07-24 RX ADMIN — NALOXONE HYDROCHLORIDE 0.4 MG: 0.4 INJECTION, SOLUTION INTRAMUSCULAR; INTRAVENOUS; SUBCUTANEOUS at 06:07

## 2019-07-24 RX ADMIN — IPRATROPIUM BROMIDE AND ALBUTEROL SULFATE 3 ML: .5; 3 SOLUTION RESPIRATORY (INHALATION) at 12:07

## 2019-07-24 RX ADMIN — GABAPENTIN 600 MG: 300 CAPSULE ORAL at 08:07

## 2019-07-24 RX ADMIN — SENNOSIDES AND DOCUSATE SODIUM 2 TABLET: 8.6; 5 TABLET ORAL at 08:07

## 2019-07-24 RX ADMIN — OXYCODONE HYDROCHLORIDE AND ACETAMINOPHEN 1 TABLET: 5; 325 TABLET ORAL at 04:07

## 2019-07-24 RX ADMIN — HEPARIN SODIUM 5000 UNITS: 5000 INJECTION, SOLUTION INTRAVENOUS; SUBCUTANEOUS at 05:07

## 2019-07-24 RX ADMIN — OXYCODONE HYDROCHLORIDE AND ACETAMINOPHEN 1 TABLET: 7.5; 325 TABLET ORAL at 11:07

## 2019-07-24 RX ADMIN — IPRATROPIUM BROMIDE AND ALBUTEROL SULFATE 3 ML: .5; 3 SOLUTION RESPIRATORY (INHALATION) at 08:07

## 2019-07-24 RX ADMIN — HYDROMORPHONE HYDROCHLORIDE 0.2 MG: 2 INJECTION, SOLUTION INTRAMUSCULAR; INTRAVENOUS; SUBCUTANEOUS at 11:07

## 2019-07-24 RX ADMIN — IPRATROPIUM BROMIDE AND ALBUTEROL SULFATE 3 ML: .5; 3 SOLUTION RESPIRATORY (INHALATION) at 07:07

## 2019-07-24 RX ADMIN — HYDROMORPHONE HYDROCHLORIDE 1 MG: 2 INJECTION, SOLUTION INTRAMUSCULAR; INTRAVENOUS; SUBCUTANEOUS at 08:07

## 2019-07-24 RX ADMIN — VANCOMYCIN HYDROCHLORIDE 1000 MG: 1 INJECTION, POWDER, LYOPHILIZED, FOR SOLUTION INTRAVENOUS at 11:07

## 2019-07-24 RX ADMIN — GABAPENTIN 600 MG: 300 CAPSULE ORAL at 02:07

## 2019-07-24 RX ADMIN — PIPERACILLIN, TAZOBACTAM 4.5 G: 4; .5 INJECTION, POWDER, LYOPHILIZED, FOR SOLUTION INTRAVENOUS at 04:07

## 2019-07-24 RX ADMIN — OLANZAPINE 5 MG: 2.5 TABLET, FILM COATED ORAL at 08:07

## 2019-07-24 RX ADMIN — POLYETHYLENE GLYCOL 3350 17 G: 17 POWDER, FOR SOLUTION ORAL at 08:07

## 2019-07-24 RX ADMIN — BACLOFEN 20 MG: 10 TABLET ORAL at 05:07

## 2019-07-24 RX ADMIN — PIPERACILLIN, TAZOBACTAM 4.5 G: 4; .5 INJECTION, POWDER, LYOPHILIZED, FOR SOLUTION INTRAVENOUS at 08:07

## 2019-07-24 RX ADMIN — HEPARIN SODIUM 5000 UNITS: 5000 INJECTION, SOLUTION INTRAVENOUS; SUBCUTANEOUS at 02:07

## 2019-07-24 RX ADMIN — HYDROMORPHONE HYDROCHLORIDE 1 MG: 2 INJECTION, SOLUTION INTRAMUSCULAR; INTRAVENOUS; SUBCUTANEOUS at 03:07

## 2019-07-24 RX ADMIN — HEPARIN SODIUM 5000 UNITS: 5000 INJECTION, SOLUTION INTRAVENOUS; SUBCUTANEOUS at 10:07

## 2019-07-24 RX ADMIN — ONDANSETRON 4 MG: 2 INJECTION INTRAMUSCULAR; INTRAVENOUS at 11:07

## 2019-07-24 RX ADMIN — FLUOXETINE 10 MG: 10 CAPSULE ORAL at 08:07

## 2019-07-24 RX ADMIN — DIPHENHYDRAMINE HYDROCHLORIDE 25 MG: 25 CAPSULE ORAL at 11:07

## 2019-07-24 RX ADMIN — HYDROCODONE BITARTRATE AND ACETAMINOPHEN 1 TABLET: 5; 325 TABLET ORAL at 08:07

## 2019-07-24 RX ADMIN — IPRATROPIUM BROMIDE AND ALBUTEROL SULFATE 3 ML: .5; 3 SOLUTION RESPIRATORY (INHALATION) at 01:07

## 2019-07-24 RX ADMIN — PIPERACILLIN, TAZOBACTAM 4.5 G: 4; .5 INJECTION, POWDER, LYOPHILIZED, FOR SOLUTION INTRAVENOUS at 12:07

## 2019-07-24 NOTE — ASSESSMENT & PLAN NOTE
Her overall symptom complex includes: poor sleep, feels depressed, AVHs, poor health, chronic illness, history of: diagnosis of Bipolar I disorder, anger, psychosocial distress (marital problems), irritability, paranoia, mood swings, anhedonia, excessive shopping, energy without sleep for 4-5 days, suicidal ideation in the past, history of extreme happiness history of feeling overly confident.  Mary Ellen Fajardo is not suicidal, homicidal or gravely disabled from a mental health prospective and therefore, does not meet the criteria for a PEC.    Recommendation: Mary Ellen Fajardo is appropriate for Nursing facility/long-term care placement from a mental health prospective.
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
## 2019-07-24 NOTE — PLAN OF CARE
Problem: Adult Inpatient Plan of Care  Goal: Plan of Care Review  Outcome: Ongoing (interventions implemented as appropriate)     07/24/19 1571   Plan of Care Review   Plan of Care Reviewed With patient   Patient remains free from fall or injury. Pain managed with PRN pain medication. Scheduled meds administered as ordered. Remains on IV antibiotics. Dressing to left AKA remains c/d/i. Cpap on qhs as ordered. Mother at bedside throughout the shift. Assisted with repositioning. Bed in low locked position, bed alarm armed and audible with call light in reach.

## 2019-07-24 NOTE — PLAN OF CARE
Problem: Occupational Therapy Goal  Goal: Occupational Therapy Goal  Goals to be met by: 8/7/19      Patient will increase functional independence with ADLs by performing:    UE Dressing with Modified [place].  Grooming while seated at sink with Modified [place].  Sitting at edge of bed x15 minutes with Supervision.  Rolling to Bilateral with Contact Guard Assistance.   Supine to sit with Contact Guard Assistance.  Sliding board W/C transfers with Minimal Assistance.  Toilet transfer to bedside commode with Minimal Assistance.  Upper extremity exercise program x15 reps per handout, with assistance as needed.     Outcome: Ongoing (interventions implemented as appropriate)  OT re-eval is complete. The patient was limited by lethargy with maximum verbal and tactile cues needed for the patient to remain alert. Patient will benefit from OT to address functional deficits.    Comments: The patient's OT goals remain appropriate.

## 2019-07-24 NOTE — NURSING
Pt remains really drowsy and confused following pain medication.  Spoke to Dr Lujan, new orders written

## 2019-07-24 NOTE — PT/OT/SLP RE-EVAL
Occupational Therapy   Re-evaluation    Name: Mary Ellen Fajardo  MRN: [id number]  Admitting Diagnosis:  Bipolar I disorder, current or most recent episode depressed, with psychotic features 1 Day Post-Op    Recommendations:     Discharge Recommendations: rehabilitation facility  Discharge Equipment Recommendations:  none  Barriers to discharge:  None    Assessment:     Mary Ellen Fajardo is a 63 y.o. female with a medical diagnosis of Bipolar I disorder, current or most recent episode depressed, with psychotic features.  She presents with self care and functional mobility deficits. The patient was lethargic and difficult to arouse with max verbal and tactile cues need to remain alert.  The patient is alert and and motivated to participate in OT at her baseline. The patient is motivated to improve her functional independence. Performance deficits affecting function are weakness, impaired endurance, impaired self care skills, impaired balance, impaired functional mobilty, decreased coordination, decreased upper extremity function, decreased lower extremity function, decreased ROM, edema, impaired skin, pain, decreased safety awareness, impaired cardiopulmonary response to activity, orthopedic precautions.      Rehab Prognosis:  good; patient would benefit from acute skilled OT services to address these deficits and reach maximum level of function.       Plan:     Patient to be seen 6 x/week, 5 x/week to address the above listed problems via self-care/home management, therapeutic activities, therapeutic exercises  · Plan of Care Expires: 08/07/19  · Plan of Care Reviewed with: patient, mother    Subjective     Chief Complaint: The patient was lethargic  Patient/Family stated goals: Per chart, the patient wants to go to rehab.    Pain/Comfort:  · Pain Rating 1: 5/10  · Location - Side 1: Left  · Location 1: (AKA site)  · Pain Addressed 1: Pre-medicate for activity, Nurse notified  · Pain Rating Post-Intervention  "1: 5/10    Objective:     Communicated with: nurseJaylen prior to session.  Patient found left sidelying with: oxygen, telemetry, peripheral IV(suprapubic catheter) and trapeze upon OT entry to room.    General Precautions: Standard, fall   Orthopedic Precautions:(s/p left AKA)   Braces: (left AKA wrapped with surgicaal dressing and Ace wrap)     Occupational Performance:    Bed Mobility:    · Patient completed Rolling/Turning to Left with  dependent and 2 persons  · Patient completed Rolling/Turning to Right with dependent and 2 persons  · Patient completed Scooting/Bridging with dependent and 2 persons for draw sheet lift to the top of the bed. The patient attempted to assist with hands on the bed rail with max verbal cues and assist to place hands on bedrail.  · Patient completed Supine to Sit with maximal assistance and 2 persons  · Patient completed Sit to Supine with dependent and 2 persons    Functional Mobility/Transfers:  · Functional Mobility: The patient sat on the EOB ~15-20 min with min assist/moderate to brief periods with CGA with support of the left bedrail for balance. The patient was able to wipe her face and hands while seated on the EOB.    Activities of Daily Living:  · Grooming: The patient wiped/dried her her face and hands but perseverated on the task with verbal cues needed to stop.    · Upper Body Dressing: maximal assistance and total assistance    · Lower Body Dressing: total assistance    · Toileting: suprapubic catheter      Cognitive/Visual Perceptual:  Cognitive/Psychosocial Skills:     -       Oriented to: Person and able to state "[facility]" with cues. The patient stated her mother's name with cues.   -       Follows Commands/attention:The patient patient was lethargic with max verbal and tactile cues to follow one-step command.  -       Communication: slurred speech and difficult to understand  -       Memory: intact at baseline but currently   -       Safety awareness/insight to " disability: impaired   -       Mood/Affect/Coping skills/emotional control: Lethargic  Visual/Perceptual:      -The patient required tactile cues to remain alert and open eyes      Physical Exam:  Balance:    -       currently poor 2* alertness  Postural examination/scapula alignment:    -       Rounded shoulders  -       Forward head  Skin integrity: Visible skin intact and left AKA incision wrapped (no drainage noted)  Edema:  left stump  Sensation:    -       Intact  Upper Extremity Range of Motion:     -       Right Upper Extremity: WFL  -       Left Upper Extremity: WFL  Upper Extremity Strength:    -       Right Upper Extremity: WFL  -       Left Upper Extremity: WFL   Strength:    -       Right Upper Extremity: WFL  -       Left Upper Extremity: WFL    AMPAC 6 Click:  AMPAC Total Score: 13    Treatment & Education:  Education:  The patient sat on the EOB ~15-20 minutes with max verbal and tactile cues. The patient's mother was present and was educated re: OT role and POC. The patient's nurseJaylen was present and aware of the patient's lethary.    Patient left HOB elevated with all lines intact, call button in reach, bed alarm on and nurseJaylen and mother present    GOALS:   Multidisciplinary Problems     Occupational Therapy Goals        Problem: Occupational Therapy Goal    Goal Priority Disciplines Outcome Interventions   Occupational Therapy Goal     OT, PT/OT Ongoing (interventions implemented as appropriate)    Description:  Goals to be met by: 8/7/19      Patient will increase functional independence with ADLs by performing:    UE Dressing with Modified [place].  Grooming while seated at sink with Modified [place].  Sitting at edge of bed x15 minutes with Supervision.  Rolling to Bilateral with Contact Guard Assistance.   Supine to sit with Contact Guard Assistance.  Sliding board W/C transfers with Minimal Assistance.  Toilet transfer to bedside commode with Minimal  Assistance.  Upper extremity exercise program x15 reps per handout, with assistance as needed.                       History:     Past Medical History:   Diagnosis Date    Addiction to drug     Alcohol abuse     Anxiety     Arthritis     Asthma     Bipolar disorder     Bladder stones     CHF (congestive heart failure)     COPD (chronic obstructive pulmonary disease)     on home o2    CVA (cerebral vascular accident)     2012    Hallucination     Hepatitis C     treated and cured    History of blood clots     Hx of psychiatric care     Hypertension     Belen     ADEEL treated with BiPAP     Paraplegia     Paraplegic spinal paralysis     Psychiatric problem     Psychosis     AVH; Paranoia    Renal disorder     SCI (spinal cord injury)     incomplete    Sleep difficulties     has sleep apnea and uses a Bi-PAP machine.    Substance abuse     Suprapubic catheter     Therapy     Vaginal delivery     x1       Past Surgical History:   Procedure Laterality Date    AMPUTATION, ABOVE KNEE Left 7/23/2019    [facility]    BACK SURGERY      bilateral knee replacement      BREAST BIOPSY      cysto/lithopaxy 2017      CYSTOLITHOLOPAXY (REMOVE BLADDER STONE) N/A 12/20/2017    [facility]oco [facility] at [facility] OR    CYSTOSCOPY W/ LASER LITHOTRIPSY      CYSTOSCOPY,  OCCLUSION BALLOON PLACEMENT, PERC ACCESS  1/19/2018    [facility] at [facility] OR    CYSTOSCOPY, RETROGRADE, URETEROSCOPY, STENT PLACEMENT Right 3/5/2018    [facility] at [facility] OR    CYSTOSCOPY/ RETROGRADE PYELOGRAM/ WITH JJ URETERAL STENT PLACEMENT RIGHT Right 12/20/2017    [facility] at [facility] OR    EXCHANGE CATHETER-SUPRAPUBIC  1/19/2018    [facility] at [facility] OR    EXTRACTION-STONE-URETEROSCOPY Right 3/5/2018    [facility] at [facility] OR    JOINT REPLACEMENT      bilateral knee     LITHOTRIPSY-LASER Right 3/5/2018    [facility]    LITHOTRIPSY-LASER Right 1/29/2018    [facility]    LITHOTRIPSY-LASER Right 1/19/2018    [facility]    NEPHROLITHOTOMY-PERCUTANEOUS Right 1/19/2018    [facility]    NEPHROSTOLITHOTOMY-PERCUTANEOUS Right 1/29/2018    [facility]    NEPHROSTOMY Right 1/29/2018    [facility]    PLACEMENT  1/19/2018    [facility] at [facility]    PLACEMENT-RENAL ACCESS Right 1/19/2018    [facility] at [facility]    PLACEMENT-STENT Right 1/29/2018    [facility] OR    Procedure is a Left Genicular Nerve Block ** cpt code 68350** Left 9/16/2015    [facility]    PYELOGRAM-ANTEGRADE Right 1/29/2018    [facility]    PYELOGRAM-ANTEGRADE  1/19/2018    [facility] at [facility]    PYELOGRAM-RETROGRADE  1/19/2018    [facility]    RADIOFREQUENCY THERMOCOAGULATION** Left genicular RFA ** Left 1/20/2016    [facility]    REMOVAL-STENT-URETERAL  3/5/2018    [facility] at [facility] OR    REMOVAL-STENT-URETERAL (Cystoscopy) Right 4/9/2018    [facility] at [facility] OR    URETEROSCOPY Right 1/29/2018    [facility] at [facility]    URETEROSCOPY Right 1/19/2018    [facility] at [facility] OR       Time Tracking:     OT Date of Treatment: 07/24/19  OT Start Time: 1634  OT Stop Time: 1702  OT Total Time (min): 28 min    Billable Minutes:Re-eval 28 (with PT)    Preethi Glass, OT  7/24/2019

## 2019-07-24 NOTE — ASSESSMENT & PLAN NOTE
Concern for left knee septic arthritis in setting of prosthesis. Appreciate orthopedic surgery recs. Started on broad spectrum antibiotics. Joint aspirate culture NGTD. ID consulted. Patient in agreement with amputation. Discussed with ortho and ID. Abx per ID. In OR as we speak. Will follow in AM

## 2019-07-24 NOTE — ASSESSMENT & PLAN NOTE
Her overall symptom complex includes: poor sleep, feels depressed, AVHs, poor health, chronic illness, history of: diagnosis of Bipolar I disorder, anger, psychosocial distress (marital problems), irritability, paranoia, mood swings, anhedonia, excessive shopping, energy without sleep for 4-5 days, suicidal ideation in the past, history of extreme happiness history of feeling overly confident.  Mary Ellen Fajardo is not suicidal, homicidal or gravely disabled from a mental health prospective and therefore, does not meet the criteria for a PEC.    Recommendation: Obtain Thyroid function tests. Increase Olanzapine to 10 mg po qhs.Continue Prozac 10 mg po qd. Start Trazodone 50 mg po qhs sleep. Utilize Zyprexa 5 mg PO/IM  q 8 hours prn agitation or uncontrollable threatening behavior. Do NOT exceed 30 mg/day of Zyprexa. Mary Ellen Fajardo is appropriate for Nursing facility/long-term care placement from a mental health prospective.

## 2019-07-24 NOTE — NURSING
Patient arrived back to unit from PACU, AAOX4 pain medication rec'vd prior to coming to floor, no c/o pain from patient. Respiratory called to come and place patient on her CPAP machine due to her low oxygen level of 88%. Rec'vd in report that she had to have the BiPAP put on in PACU because her SAT was 88% on 2L but went back up to 96% on 2L via NC prior to coming up.  Will continue to monitor.

## 2019-07-24 NOTE — ASSESSMENT & PLAN NOTE
EF preserved. PAP 55. Indeterminate diastolic function on echo. Started on IV lasix. Not on ACE or ARB at this time due to ARF. Renal function improved with diuresis. IV Lasix stopped and she was placed back on her home Bumex on 7/22. Will hold in AM as she will be post op and I want to ensure PO intake is adequate and that renal function is stable.

## 2019-07-24 NOTE — NURSING
Dr Lujan saw the patient, reported that the patient had unresolved the pain meds. Mother at the bedside.

## 2019-07-24 NOTE — SUBJECTIVE & OBJECTIVE
Interval History: with pain in stump but otherwise feeling well. In good spirits. Hgb stable at 10 g/dL. CR slightly increased post op. vanc random 14.     Review of Systems   Constitutional: Negative.    Respiratory: Negative.    Cardiovascular: Negative.    Gastrointestinal: Negative.    Musculoskeletal:        Pain left stump   Neurological: Negative.      Objective:     Vital Signs (Most Recent):  Temp: 98.4 °F (36.9 °C) (07/24/19 0754)  Pulse: 79 (07/24/19 0754)  Resp: 17 (07/24/19 0754)  BP: 115/61 (07/24/19 0754)  SpO2: (!) 94 % (07/24/19 0754) Vital Signs (24h Range):  Temp:  [98.2 °F (36.8 °C)-98.8 °F (37.1 °C)] 98.4 °F (36.9 °C)  Pulse:  [56-82] 79  Resp:  [16-25] 17  SpO2:  [91 %-100 %] 94 %  BP: (114-172)/(57-84) 115/61     Weight: 107.1 kg (236 lb 1.8 oz)  Body mass index is 39.29 kg/m².    Intake/Output Summary (Last 24 hours) at 7/24/2019 0841  Last data filed at 7/23/2019 1821  Gross per 24 hour   Intake 900 ml   Output 1825 ml   Net -925 ml      Physical Exam   Constitutional: She is oriented to person, place, and time. She appears well-developed. No distress.   Well appearing   Pulmonary/Chest: No respiratory distress. She has no wheezes. She has no rales.   Breathing comfortably on low flow NC   Abdominal: Soft. She exhibits no distension. There is no tenderness.   Hypoactive bowel sounds   Musculoskeletal:   Left above knee stump. Fresh, dry dressing in place   Neurological: She is alert and oriented to person, place, and time.   Skin: She is not diaphoretic.   Nursing note and vitals reviewed.      Significant Labs: All pertinent labs within the past 24 hours have been reviewed.    Significant Imaging: I have reviewed all pertinent imaging results/findings within the past 24 hours.  I have reviewed and interpreted all pertinent imaging results/findings within the past 24 hours.

## 2019-07-24 NOTE — PROGRESS NOTES
Pharmacokinetic Assessment Follow Up: IV Vancomycin    Vancomycin serum concentration assessment(s):    The random level was drawned correctly and can be used to guide therapy at this time. The measurement is below the desired definitive target range of 15 to 20 mcg/mL.    Vancomycin Regimen Plan:    Give 1000 mg today, and check daily random levels at 0400 for pulse dosing.  Re-dose when the random level is less than 20 mcg/mL, next level to be drawn at 0400 on 7/25/2019     Pharmacy will continue to follow and monitor vancomycin.    Please contact pharmacy at extension [phone] for questions regarding this assessment.    Thank you for the consult,   Jamarcus Dubon Jr     Patient brief summary:  Mary Ellen Fajardo is a 63 y.o. female initiated on antimicrobial therapy with IV Vancomycin for treatment of suspected bone/joint    The patient received a loading dose, followed by the current treatment regimen: vancomycin 1500 mg IV every 12 hours    Drug Allergies:   Review of patient's allergies indicates:   Allergen Reactions    Rocephin [ceftriaxone] Rash       Actual Body Weight:   107.1 kg    Renal Function:   Estimated Creatinine Clearance: 50 mL/min (based on SCr of 1.4 mg/dL).,     Dialysis Method (if applicable):  N/A    CBC (last 72 hours):  Recent Labs   Lab Result Units 07/23/19  2141 07/24/19  0350   WBC K/uL 8.75 9.33   Hemoglobin g/dL 10.3* 10.2*   Hematocrit % 35.8* 34.6*   Platelets K/uL 195 204   Gran% % 86.7* 81.4*   Lymph% % 8.0* 11.8*   Mono% % 4.2 6.1   Eosinophil% % 1.0 1.0   Basophil% % 0.2 0.3   Differential Method  Automated Automated       Metabolic Panel (last 72 hours):  Recent Labs   Lab Result Units 07/22/19  0328 07/23/19  0321 07/24/19  0350   Sodium mmol/L 142 139 139   Potassium mmol/L 4.1 3.9 5.1   Chloride mmol/L 92* 91* 94*   CO2 mmol/L 41* 39* 36*   Glucose mg/dL 87 90 112*   BUN, Bld mg/dL 21 21 16   Creatinine mg/dL 1.0 1.1 1.4       Vancomycin Administrations:  vancomycin  given in the last 96 hours                     vancomycin 1.5 g in dextrose 5 % 250 mL IVPB (ready to mix) (mg) 1,500 mg New Bag 07/22/19 1230     1,500 mg New Bag 07/21/19 2304     1,500 mg New Bag  1132                      Drug levels (last 3 results):  Recent Labs   Lab Result Units 07/22/19  0328 07/22/19  2305 07/23/19  1453 07/24/19  0350   Vancomycin, Random ug/mL 39.7  --   --  14.3   Vancomycin-Trough ug/mL  --  37.1* 22.6*  --        Microbiologic Results:  Microbiology Results (last 7 days)       Procedure Component Value Units Date/Time    Blood culture #2 [[id number]] Collected:  07/16/19 2255    Order Status:  Completed Specimen:  Blood from Peripheral, Hand, Right Updated:  07/22/19 0303     Blood Culture, Routine No growth after 5 days.    Narrative:       Blood Culture #2    Blood culture #1 [[id number]] Collected:  07/16/19 2250    Order Status:  Completed Specimen:  Blood from Peripheral, Antecubital, Left Updated:  07/22/19 0303     Blood Culture, Routine No growth after 5 days.    Narrative:       Blood Culture #1    Aerobic culture [[id number]] Collected:  07/17/19 1253    Order Status:  Completed Specimen:  Body Fluid from Knee, Left Updated:  07/21/19 0753     Aerobic Bacterial Culture No growth    Narrative:       Aspiration left knee    Urine Culture High Risk [[id number]]  (Abnormal)  (Susceptibility) Collected:  07/17/19 1700    Order Status:  Completed Specimen:  Urine, Catheterized Updated:  07/21/19 0748     Urine Culture, Routine ESCHERICHIA COLI  10,000 - 49,999 cfu/ml      Narrative:       Indicated criteria for high risk culture:->Prior to urologic  procedures    Culture, Body Fluid (Aerobic) w/ GS [[id number]] Collected:  07/17/19 1253    Order Status:  Completed Specimen:  Body Fluid from Knee, Left Updated:  07/18/19 1050     Gram Stain Result Moderate WBC's      No organisms seen    Narrative:       Aspiration left knee

## 2019-07-24 NOTE — PROGRESS NOTES
[facility] Hospital Medicine  Progress Note    Patient Name: Mary Ellen Fajardo  MRN: [id number]  Patient Class: IP- Inpatient   Admission Date: 7/16/2019  Length of Stay: 7 days  Attending Physician: Micheline Estrada MD  Primary Care Provider: Any Tran MD        Subjective:     Principal Problem:Septic arthritis of knee, left      HPI:  Ms. 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 She also noted decreased ROM of the left knee. The pain is exacerbated with attempt to move the knee or her left ankle. She has not had issues with the joint infection since 2011. ROS was also notable for foul smelling urine.     Overview/Hospital Course:  In the ER she was afebrile and had no leukocytosis. There was concern for septic arthritis. She was admitted to internal medicine and orthopedic surgery was consulted. She was started on broad spectrum antibiotics in the ER after blood cultures were collected. UA was concerning for UTI which would also be covered by broad spectrum antibiotics. UCx sent. Orthopedic surgery performed a bedside joint aspiration on 7/17 revealing bloody fluid. A sample was sent for culture. Cultures NGTD. She had worsening renal function and respiratory function on 7/18/19. CXR concerning for volume overload and diuretics were increased. ABG showed acute on chronic hypercapnic respiratory failure. She was requiring BiPAP to maintain O2 sat and was transferred to the ICU. Pulmonoogy was consulted. BiPAP settings adjusted and she was diuresed. Echo show preserved EF but indeterminate diastolic function and PAP 55. She was complaining of left knee pain and was started on low dose po narcotics. Her vanc through was  elevated so vanc was held 7/19/19. She was transferred back to the floor 7/19 and doing well. Blood and joint aspirate cultures remain negative. Continue broad spectrum IV antibiotics for now and consult ID for recs. Ortho not planning on surgery as she is a poor candidate for revision. In the event that she has severe sepsis from an infected right knee joint, amputation would likely be needed. PT/OT consulted. The patient would like SNF and this was recommended. SW consulted. She reports scarring from PPD in the past; quantiferon gold is pending.  Patient considering amputation per ID recs. Discussed with ID and orthopedic surgery.    Interval History: patient in OR prior to my evaluation during late rounds today. Vitals stable. Labs reviewed    Review of Systems   Reason unable to perform ROS: patient in the OR.     Objective:     Vital Signs (Most Recent):  Temp: 98.4 °F (36.9 °C) (07/23/19 1137)  Pulse: 61 (07/23/19 1348)  Resp: 16 (07/23/19 1348)  BP: (!) 142/75 (07/23/19 1137)  SpO2: 98 % (07/23/19 1348) Vital Signs (24h Range):  Temp:  [96.2 °F (35.7 °C)-98.9 °F (37.2 °C)] 98.4 °F (36.9 °C)  Pulse:  [54-71] 61  Resp:  [16-18] 16  SpO2:  [95 %-98 %] 98 %  BP: (102-166)/(59-75) 142/75     Weight: 107.1 kg (236 lb 1.8 oz)  Body mass index is 39.29 kg/m².    Intake/Output Summary (Last 24 hours) at 7/23/2019 1631  Last data filed at 7/23/2019 1414  Gross per 24 hour   Intake 460 ml   Output 2825 ml   Net -2365 ml      Physical Exam   Constitutional:   Patient in the OR       Significant Labs: All pertinent labs within the past 24 hours have been reviewed.    Significant Imaging: I have reviewed all pertinent imaging results/findings within the past 24 hours.  I have reviewed and interpreted all pertinent imaging results/findings within the past 24 hours.      Assessment/Plan:      * Septic arthritis of knee, left  Concern for left knee septic arthritis in setting of prosthesis. Appreciate orthopedic surgery recs.  Started on broad spectrum antibiotics. Joint aspirate culture NGTD. ID consulted. Patient in agreement with amputation. Discussed with ortho and ID. Abx per ID. In OR as we speak. Will follow in AM    Acute on chronic diastolic heart failure  EF preserved. PAP 55. Indeterminate diastolic function on echo. Started on IV lasix. Not on ACE or ARB at this time due to ARF. Renal function improved with diuresis. IV Lasix stopped and she was placed back on her home Bumex on 7/22. Will hold in AM as she will be post op and I want to ensure PO intake is adequate and that renal function is stable.     Acute on chronic respiratory failure with hypercapnia  ABG showed acute on chronic hypercapnic respiratory failure on 7/18. See hospital course. Resolved.    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 Past resistance patterns noted. Possible colonization? Foul smelling urine but no systemic symptoms.     Bipolar 1 disorder  Continued home meds zyprexa and fluoxetine    Chronic indwelling suprapubic catheter in place  Exchanged given UA concerning for UTI. Follow up with Dr. Tinoco in clinic.    Chronic respiratory failure with hypercapnia  As above. Home 2L.    COPD (chronic obstructive pulmonary disease)  PRN duonebs. No acute issues.    ADEEL on CPAP  BiPAP QHS,    Essential hypertension  Does not appear to be on medications at home? Monitor.    VTE Risk Mitigation (From admission, onward)        Ordered     heparin (porcine) injection 5,000 Units  Every 8 hours      07/18/19 1444     IP VTE HIGH RISK PATIENT  Once      07/17/19 0531          Dispo: SHIMON Lujan MD  Department of Hospital Medicine   [facility]
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
## 2019-07-24 NOTE — NURSING
Narcan given, patient completely AAOX3.  Family at the bedside.  Sat up in the bed to eat dinner, reported no pain at this time

## 2019-07-24 NOTE — SUBJECTIVE & OBJECTIVE
Interval History:   Left aKA  On vancomycin and zosyn  vanc trough 14 today  No fever chills or night sweats  Resting comfortably    Review of Systems   Constitutional: Negative for chills and fever.   HENT: Negative for congestion and rhinorrhea.    Eyes: Negative for photophobia and visual disturbance.   Respiratory: Negative for cough and shortness of breath.    Cardiovascular: Negative for chest pain and leg swelling.   Gastrointestinal: Negative for abdominal pain, nausea and vomiting.   Genitourinary: Negative for hematuria and pelvic pain.   Musculoskeletal: Positive for arthralgias and joint swelling. Negative for neck stiffness.   Skin: Negative for rash and wound.   Neurological: Negative for dizziness and light-headedness.   Psychiatric/Behavioral: Negative for agitation and behavioral problems.     Objective:     Vital Signs (Most Recent):  Temp: 98.4 °F (36.9 °C) (07/24/19 0754)  Pulse: 79 (07/24/19 0754)  Resp: 17 (07/24/19 0754)  BP: 115/61 (07/24/19 0754)  SpO2: (!) 94 % (07/24/19 0754) Vital Signs (24h Range):  Temp:  [98.2 °F (36.8 °C)-98.8 °F (37.1 °C)] 98.4 °F (36.9 °C)  Pulse:  [56-82] 79  Resp:  [16-25] 17  SpO2:  [91 %-100 %] 94 %  BP: (114-172)/(57-84) 115/61     Weight: 107.1 kg (236 lb 1.8 oz)  Body mass index is 39.29 kg/m².    Estimated Creatinine Clearance: 50 mL/min (based on SCr of 1.4 mg/dL).    Physical Exam   Constitutional: She is oriented to person, place, and time. She appears well-developed and well-nourished. No distress.   Morbidly obese   Eyes: Pupils are equal, round, and reactive to light.   Neck: Normal range of motion.   Cardiovascular: Normal rate and normal heart sounds. Exam reveals no friction rub.   No murmur heard.  Pulmonary/Chest: Effort normal and breath sounds normal. No stridor. No respiratory distress.   Abdominal: Soft. Bowel sounds are normal. She exhibits no distension. There is no tenderness.   Wolfe with clear urine output   Musculoskeletal: She exhibits  no edema.   Left AKA dressed.    Neurological: She is alert and oriented to person, place, and time. No cranial nerve deficit.   Skin: Skin is warm and dry. She is not diaphoretic. No pallor.   Psychiatric: She has a normal mood and affect.       Significant Labs:   Blood Culture:   Recent Labs   Lab 07/16/19  2250 07/16/19  2255   LABBLOO No growth after 5 days. No growth after 5 days.     CBC:   Recent Labs   Lab 07/23/19  2141 07/24/19  0350   WBC 8.75 9.33   HGB 10.3* 10.2*   HCT 35.8* 34.6*    204     CMP:   Recent Labs   Lab 07/23/19  0321 07/24/19  0350    139   K 3.9 5.1   CL 91* 94*   CO2 39* 36*   GLU 90 112*   BUN 21 16   CREATININE 1.1 1.4   CALCIUM 9.6 9.7   ANIONGAP 9 9   EGFRNONAA 54* 40*     Urine Culture:   Recent Labs   Lab 07/17/19  1700   LABURIN ESCHERICHIA COLI  10,000 - 49,999 cfu/ml  *     Urine Studies:   Recent Labs   Lab 07/16/19  2240   COLORU Yellow   APPEARANCEUA Cloudy*   PHUR 5.0   SPECGRAV 1.010   PROTEINUA Negative   GLUCUA Negative   KETONESU Negative   BILIRUBINUA Negative   OCCULTUA 3+*   NITRITE Negative   UROBILINOGEN Negative   LEUKOCYTESUR 3+*   RBCUA 10*   WBCUA 75*   BACTERIA Many*   SQUAMEPITHEL 40     Wound Culture:   Recent Labs   Lab 07/17/19  1253   LABAERO No growth     All pertinent labs within the past 24 hours have been reviewed.    Significant Imaging: I have reviewed all pertinent imaging results/findings within the past 24 hours.

## 2019-07-24 NOTE — ASSESSMENT & PLAN NOTE
64 y/o female with left knee TKA c/b septic arthritis 2011, presents with acute left knee pain concerning for infected prosthesis. She is seen by orthopedics and plans for left AKA yesterday. Per op note, there was purulent material in the joint, none in canal.      1. Vancomycin restarted today. Pharmacy to dose. Level this AM 14.   2. Continue zosyn.  3. Continue abx 48-72 hours post surgery  4. Remained stable, afebrile. Will sign off. Please call if with questions.

## 2019-07-24 NOTE — HOSPITAL COURSE
Mary Ellen Fajardo presents lying supine in bed with the head of bed up. She is wearing hospital attire with oxygen in place via nasal cannula. Her mother in law is at the bedside. Mary Ellen Fajardo states that she is glad to see this practitioner that she knows from the Psychiatry clinic. She states that her mood has been down and states that it started to decline a little prior to coming into the hospital. She denies any suicidal ideation but does endorse auditory and visual hallucinations. She looks over at her mother in law who is across the room and speaks in a low voice tone. She states that the AVHs are like they were in the past. Her affect is calm and pleasant but a little down. Her speech is clear with good articulation without latency or pressure. She is oriented to person, place, time and situation. She states that she is having problems with sleep that also started a little while prior to coming into the hospital. She states that her estranged  has moved back  Into the home and she feels that her symptoms started to resurface with this change.  Mary Ellen Fajardo is not suicidal, homicidal or gravely disabled from a mental health prospective and therefore, does not meet the criteria for a PEC.    Her overall symptom complex includes: poor sleep, feels depressed, AVHs, poor health, chronic illness, history of: diagnosis of Bipolar I disorder, anger, psychosocial distress (marital problems), irritability, paranoia, mood swings, anhedonia, excessive shopping, energy without sleep for 4-5 days, suicidal ideation in the past, history of extreme happiness history of feeling overly confident.    07/25/2019 -Mary Ellen Fajardo presents lying supine in bed with the head of bed up wearing hospital attire with oxygen in place via nasal cannula. She is awake and alert and oriented to person, place, time and situation. Her breathing is mildly labored as she talks for a long period of time.  Her speech is clear with no latency or pressure. She states that she feels better today. She states that she feels less depressed. Dr. Estrada reports that she had a change in mental status yesterday and was given a dose of narcan and returned to her baseline mental status. The patient reports that she is no longer having hallucinations. She however reports poor sleep which she does not relate to disruption by hospital staff. This is most likely due to the narcan and pain she was experiencing last night.

## 2019-07-24 NOTE — HPI
Mary Ellen Fajardo is a 64 yo woman with a history of polysubstance abuse and bipolar disorder who presented to the hospital after two days of fevers associated with knee pain, swelling, and erythema. She also noted decreased ROM of the left knee. She was also noted to have foul smelling urine.

## 2019-07-24 NOTE — PROGRESS NOTES
Progress Note  Orthopedics    Admit Date: 7/16/2019  Post-operative Day: 1 Day Post-Op  Hospital Day: 9    Follow-up For: Procedure(s) (LRB):  AMPUTATION, ABOVE KNEE (Left)    SUBJECTIVE:     Mary Ellen Fajardo is 63 y.o. and is now 1 day post surgery. Pain is controlled with current analgesics.. She  She is not ambulating.  Weight Bearing: WBAT      OBJECTIVE:     Vital Signs (Most Recent)  Temp: 97.8 °F (36.6 °C) (07/24/19 1143)  Pulse: 72 (07/24/19 1143)  Resp: 17 (07/24/19 1143)  BP: 138/73 (07/24/19 1143)  SpO2: (!) 93 % (07/24/19 1143)      Physical Exam:  Dressing: clean, dry and intact  Incision: healing well, no significant drainage  DVT Evaluation: No evidence of DVT seen on physical exam.  Negative Evelio's sign.  Neurovascular: 5/5 motor strength, sensation intact, palpable pulses    Laboratory:  Recent Results (from the past 24 hour(s))   VANCOMYCIN, TROUGH before next dose    Collection Time: 07/23/19  2:53 PM   Result Value Ref Range    Vancomycin-Trough 22.6 (H) 10.0 - 22.0 ug/mL   POCT glucose    Collection Time: 07/23/19  7:52 PM   Result Value Ref Range    POCT Glucose 106 70 - 110 mg/dL   CBC auto differential    Collection Time: 07/23/19  9:41 PM   Result Value Ref Range    WBC 8.75 3.90 - 12.70 K/uL    RBC 4.01 4.00 - 5.40 M/uL    Hemoglobin 10.3 (L) 12.0 - 16.0 g/dL    Hematocrit 35.8 (L) 37.0 - 48.5 %    Mean Corpuscular Volume 89 82 - 98 fL    Mean Corpuscular Hemoglobin 25.7 (L) 27.0 - 31.0 pg    Mean Corpuscular Hemoglobin Conc 28.8 (L) 32.0 - 36.0 g/dL    RDW 15.1 (H) 11.5 - 14.5 %    Platelets 195 150 - 350 K/uL    MPV 9.5 9.2 - 12.9 fL    Gran # (ANC) 7.6 1.8 - 7.7 K/uL    Lymph # 0.7 (L) 1.0 - 4.8 K/uL    Mono # 0.4 0.3 - 1.0 K/uL    Eos # 0.1 0.0 - 0.5 K/uL    Baso # 0.02 0.00 - 0.20 K/uL    Gran% 86.7 (H) 38.0 - 73.0 %    Lymph% 8.0 (L) 18.0 - 48.0 %    Mono% 4.2 4.0 - 15.0 %    Eosinophil% 1.0 0.0 - 8.0 %    Basophil% 0.2 0.0 - 1.9 %    Differential Method Automated    Basic  metabolic panel    Collection Time: 07/24/19  3:50 AM   Result Value Ref Range    Sodium 139 136 - 145 mmol/L    Potassium 5.1 3.5 - 5.1 mmol/L    Chloride 94 (L) 95 - 110 mmol/L    CO2 36 (H) 23 - 29 mmol/L    Glucose 112 (H) 70 - 110 mg/dL    BUN, Bld 16 8 - 23 mg/dL    Creatinine 1.4 0.5 - 1.4 mg/dL    Calcium 9.7 8.7 - 10.5 mg/dL    Anion Gap 9 8 - 16 mmol/L    eGFR if African American 46 (A) >60 mL/min/1.73 m^2    eGFR if non African American 40 (A) >60 mL/min/1.73 m^2   Vancomycin, random    Collection Time: 07/24/19  3:50 AM   Result Value Ref Range    Vancomycin, Random 14.3 Not established ug/mL   CBC auto differential    Collection Time: 07/24/19  3:50 AM   Result Value Ref Range    WBC 9.33 3.90 - 12.70 K/uL    RBC 4.00 4.00 - 5.40 M/uL    Hemoglobin 10.2 (L) 12.0 - 16.0 g/dL    Hematocrit 34.6 (L) 37.0 - 48.5 %    Mean Corpuscular Volume 87 82 - 98 fL    Mean Corpuscular Hemoglobin 25.5 (L) 27.0 - 31.0 pg    Mean Corpuscular Hemoglobin Conc 29.5 (L) 32.0 - 36.0 g/dL    RDW 15.2 (H) 11.5 - 14.5 %    Platelets 204 150 - 350 K/uL    MPV 10.4 9.2 - 12.9 fL    Gran # (ANC) 7.5 1.8 - 7.7 K/uL    Lymph # 1.1 1.0 - 4.8 K/uL    Mono # 0.6 0.3 - 1.0 K/uL    Eos # 0.1 0.0 - 0.5 K/uL    Baso # 0.03 0.00 - 0.20 K/uL    Gran% 81.4 (H) 38.0 - 73.0 %    Lymph% 11.8 (L) 18.0 - 48.0 %    Mono% 6.1 4.0 - 15.0 %    Eosinophil% 1.0 0.0 - 8.0 %    Basophil% 0.3 0.0 - 1.9 %    Differential Method Automated          ASSESSMENT/PLAN:     Assessment: orthopedic stable  Status Post: AKA    Plan: continue Ab for 5 days.  Leave dressing in place for 5 days.  PT to mobilize Pt

## 2019-07-24 NOTE — PROGRESS NOTES
[facility]  Infectious Disease  Progress Note    Patient Name: Mary Ellen Fajardo  MRN: [id number]  Admission Date: 7/16/2019  Length of Stay: 7 days  Attending Physician: Micheline Estrada MD  Primary Care Provider: Any Tran MD    Isolation Status: No active isolations  Assessment/Plan:      * Septic arthritis of knee, left  64 y/o female with left knee TKA c/b septic arthritis 2011, presents with acute left knee pain concerning for infected prosthesis. She is seen by orthopedics and plans for left AKA yesterday. Per op note, there was purulent material in the joint, none in canal.      1. Vancomycin restarted today. Pharmacy to dose. Level this AM 14.   2. Continue zosyn.  3. Continue abx 48-72 hours post surgery  4. Remained stable, afebrile. Will sign off. Please call if with questions.     Discuss with patient abx allergies. Reports ceftriaxone allergy in 2012? With rash. No anaphylaxis. Denies taking keflex or cefazolin. Will continue zosyn, discuss with patient if would like to try ceftriaxone with pre medication with benadryl. Pt agreeable. Consider doing a trial as patient agreeable if in need for future use. All questions answered.    Anticipated Disposition:     Thank you for your consult. I will sign off. Please contact us if you have any additional questions.    Arnulfo Rudd PA-C  Infectious Disease  [facility]    Subjective:     Principal Problem:Septic arthritis of knee, left    HPI:   63F who is chronically bed bound 2/2 issues with L knee (replaced 2/2011 for ostearthritis and c/b infection in 2011). Says he infection was treated at [facility] in 2011 with hardware explant and 6 weeks of iv antibiotics followed by hardware replacement (no culture results available for review). Denies issues with L knee in many years, but is non-ambulatory since her last surgery. Admitted 7/16 with new L knee pain swelling and erythema. Thinks she had some low grade fevers  as well. Denies other symptoms including dysuria. Knee was aspirated by ortho and sent for culture. Sounds like not enough fluid avilable to send for cell count. Spoke with lab and they do not have any fluid (other than swab) and so cell count can't be added on. Given degree of immobility, pt strongly considering AKA with ortho (with goal of being fitted for prosthesis). ID consulted for abx recommendations. Currently on V/Z.     Plain film knee-- No loosening or fracture or migration.    Specimen Information: Knee, Left; Body Fluid        Component 5d ago   Aerobic Bacterial Culture No growth          Small amount of bloody drainage present    No cell count available for review-- called lab. No aspirate sent (only swab)      Hospital course c/b brif step up to ICU for acute on Chronic Hypercarbic Respiratory Failure-multifactorial: cardiogenic pulmonary edema and narcotic SE on baseline [facility]      Interval History:   Left aKA  On vancomycin and zosyn  vanc trough 14 today  No fever chills or night sweats  Resting comfortably    Review of Systems   Constitutional: Negative for chills and fever.   HENT: Negative for congestion and rhinorrhea.    Eyes: Negative for photophobia and visual disturbance.   Respiratory: Negative for cough and shortness of breath.    Cardiovascular: Negative for chest pain and leg swelling.   Gastrointestinal: Negative for abdominal pain, nausea and vomiting.   Genitourinary: Negative for hematuria and pelvic pain.   Musculoskeletal: Positive for arthralgias and joint swelling. Negative for neck stiffness.   Skin: Negative for rash and wound.   Neurological: Negative for dizziness and light-headedness.   Psychiatric/Behavioral: Negative for agitation and behavioral problems.     Objective:     Vital Signs (Most Recent):  Temp: 98.4 °F (36.9 °C) (07/24/19 0754)  Pulse: 79 (07/24/19 0754)  Resp: 17 (07/24/19 0754)  BP: 115/61 (07/24/19 0754)  SpO2: (!) 94 % (07/24/19 0754) Vital Signs (24h  Range):  Temp:  [98.2 °F (36.8 °C)-98.8 °F (37.1 °C)] 98.4 °F (36.9 °C)  Pulse:  [56-82] 79  Resp:  [16-25] 17  SpO2:  [91 %-100 %] 94 %  BP: (114-172)/(57-84) 115/61     Weight: 107.1 kg (236 lb 1.8 oz)  Body mass index is 39.29 kg/m².    Estimated Creatinine Clearance: 50 mL/min (based on SCr of 1.4 mg/dL).    Physical Exam   Constitutional: She is oriented to person, place, and time. She appears well-developed and well-nourished. No distress.   Morbidly obese   Eyes: Pupils are equal, round, and reactive to light.   Neck: Normal range of motion.   Cardiovascular: Normal rate and normal heart sounds. Exam reveals no friction rub.   No murmur heard.  Pulmonary/Chest: Effort normal and breath sounds normal. No stridor. No respiratory distress.   Abdominal: Soft. Bowel sounds are normal. She exhibits no distension. There is no tenderness.   Wolfe with clear urine output   Musculoskeletal: She exhibits no edema.   Left AKA dressed.    Neurological: She is alert and oriented to person, place, and time. No cranial nerve deficit.   Skin: Skin is warm and dry. She is not diaphoretic. No pallor.   Psychiatric: She has a normal mood and affect.       Significant Labs:   Blood Culture:   Recent Labs   Lab 07/16/19  2250 07/16/19  2255   LABBLOO No growth after 5 days. No growth after 5 days.     CBC:   Recent Labs   Lab 07/23/19  2141 07/24/19  0350   WBC 8.75 9.33   HGB 10.3* 10.2*   HCT 35.8* 34.6*    204     CMP:   Recent Labs   Lab 07/23/19  0321 07/24/19  0350    139   K 3.9 5.1   CL 91* 94*   CO2 39* 36*   GLU 90 112*   BUN 21 16   CREATININE 1.1 1.4   CALCIUM 9.6 9.7   ANIONGAP 9 9   EGFRNONAA 54* 40*     Urine Culture:   Recent Labs   Lab 07/17/19  1700   LABURIN ESCHERICHIA COLI  10,000 - 49,999 cfu/ml  *     Urine Studies:   Recent Labs   Lab 07/16/19  2240   COLORU Yellow   APPEARANCEUA Cloudy*   PHUR 5.0   SPECGRAV 1.010   PROTEINUA Negative   GLUCUA Negative   KETONESU Negative   BILIRUBINUA  Negative   OCCULTUA 3+*   NITRITE Negative   UROBILINOGEN Negative   LEUKOCYTESUR 3+*   RBCUA 10*   WBCUA 75*   BACTERIA Many*   SQUAMEPITHEL 40     Wound Culture:   Recent Labs   Lab 07/17/19  1253   LABAERO No growth     All pertinent labs within the past 24 hours have been reviewed.    Significant Imaging: I have reviewed all pertinent imaging results/findings within the past 24 hours.

## 2019-07-24 NOTE — PLAN OF CARE
07/24/19 1048   Post-Acute Status   Post-Acute Authorization Placement   Post-Acute Placement Status Discharge Plan Changed  (PT/OT changed recs to rehab.)   Discharge Delays None known at this time     Pt's first choice for rehab is [facility]. SW faxed 3 day report, labs, MAR, PT/OT notes, and ortho notes to [facility] via [facility]. SW waiting to determine if pt will be accepted.

## 2019-07-24 NOTE — ASSESSMENT & PLAN NOTE
Concern for left knee septic arthritis in setting of prosthesis. Appreciate orthopedic surgery recs. Started on broad spectrum antibiotics. Joint aspirate culture NGTD. ID consulted. Underwent AKA 7/23. Did very well and stable thus far. Hgb stable. Cr a bit increased. Hold bumex today. Encourage PO intake. Will ask ID if abx can be stopped. PT/OT. Has a fully functioning right leg which she used for transfer. I believe she'll benefit from further skilled therapy.

## 2019-07-25 LAB
ANION GAP SERPL CALC-SCNC: 5 MMOL/L (ref 8–16)
BASOPHILS # BLD AUTO: 0.02 K/UL (ref 0–0.2)
BASOPHILS NFR BLD: 0.3 % (ref 0–1.9)
BUN SERPL-MCNC: 14 MG/DL (ref 8–23)
CALCIUM SERPL-MCNC: 9.5 MG/DL (ref 8.7–10.5)
CHLORIDE SERPL-SCNC: 95 MMOL/L (ref 95–110)
CO2 SERPL-SCNC: 38 MMOL/L (ref 23–29)
CREAT SERPL-MCNC: 0.8 MG/DL (ref 0.5–1.4)
DIFFERENTIAL METHOD: ABNORMAL
EOSINOPHIL # BLD AUTO: 0.2 K/UL (ref 0–0.5)
EOSINOPHIL NFR BLD: 3.2 % (ref 0–8)
ERYTHROCYTE [DISTWIDTH] IN BLOOD BY AUTOMATED COUNT: 15.6 % (ref 11.5–14.5)
EST. GFR  (AFRICAN AMERICAN): >60 ML/MIN/1.73 M^2
EST. GFR  (NON AFRICAN AMERICAN): >60 ML/MIN/1.73 M^2
GLUCOSE SERPL-MCNC: 91 MG/DL (ref 70–110)
HCT VFR BLD AUTO: 34.3 % (ref 37–48.5)
HGB BLD-MCNC: 9.5 G/DL (ref 12–16)
LYMPHOCYTES # BLD AUTO: 0.9 K/UL (ref 1–4.8)
LYMPHOCYTES NFR BLD: 13.9 % (ref 18–48)
MCH RBC QN AUTO: 25.2 PG (ref 27–31)
MCHC RBC AUTO-ENTMCNC: 27.7 G/DL (ref 32–36)
MCV RBC AUTO: 91 FL (ref 82–98)
MONOCYTES # BLD AUTO: 0.5 K/UL (ref 0.3–1)
MONOCYTES NFR BLD: 8.3 % (ref 4–15)
NEUTROPHILS # BLD AUTO: 4.8 K/UL (ref 1.8–7.7)
NEUTROPHILS NFR BLD: 74.6 % (ref 38–73)
PLATELET # BLD AUTO: 192 K/UL (ref 150–350)
PMV BLD AUTO: 10.4 FL (ref 9.2–12.9)
POTASSIUM SERPL-SCNC: 4 MMOL/L (ref 3.5–5.1)
RBC # BLD AUTO: 3.77 M/UL (ref 4–5.4)
SODIUM SERPL-SCNC: 138 MMOL/L (ref 136–145)
VANCOMYCIN SERPL-MCNC: 13.1 UG/ML
WBC # BLD AUTO: 6.48 K/UL (ref 3.9–12.7)

## 2019-07-25 PROCEDURE — 97110 THERAPEUTIC EXERCISES: CPT

## 2019-07-25 PROCEDURE — 63600175 PHARM REV CODE 636 W HCPCS: Performed by: INTERNAL MEDICINE

## 2019-07-25 PROCEDURE — 25000003 PHARM REV CODE 250

## 2019-07-25 PROCEDURE — 94761 N-INVAS EAR/PLS OXIMETRY MLT: CPT

## 2019-07-25 PROCEDURE — 63600175 PHARM REV CODE 636 W HCPCS: Performed by: ORTHOPAEDIC SURGERY

## 2019-07-25 PROCEDURE — 25000242 PHARM REV CODE 250 ALT 637 W/ HCPCS: Performed by: ORTHOPAEDIC SURGERY

## 2019-07-25 PROCEDURE — 25000003 PHARM REV CODE 250: Performed by: ORTHOPAEDIC SURGERY

## 2019-07-25 PROCEDURE — 80048 BASIC METABOLIC PNL TOTAL CA: CPT

## 2019-07-25 PROCEDURE — 11000001 HC ACUTE MED/SURG PRIVATE ROOM

## 2019-07-25 PROCEDURE — 25000003 PHARM REV CODE 250: Performed by: INTERNAL MEDICINE

## 2019-07-25 PROCEDURE — 80202 ASSAY OF VANCOMYCIN: CPT

## 2019-07-25 PROCEDURE — 97535 SELF CARE MNGMENT TRAINING: CPT

## 2019-07-25 PROCEDURE — 27000221 HC OXYGEN, UP TO 24 HOURS

## 2019-07-25 PROCEDURE — 85025 COMPLETE CBC W/AUTO DIFF WBC: CPT

## 2019-07-25 PROCEDURE — 94640 AIRWAY INHALATION TREATMENT: CPT

## 2019-07-25 PROCEDURE — 99900035 HC TECH TIME PER 15 MIN (STAT)

## 2019-07-25 PROCEDURE — 99231 PR SUBSEQUENT HOSPITAL CARE,LEVL I: ICD-10-PCS | Mod: ,,, | Performed by: NURSE PRACTITIONER

## 2019-07-25 PROCEDURE — 99231 SBSQ HOSP IP/OBS SF/LOW 25: CPT | Mod: ,,, | Performed by: NURSE PRACTITIONER

## 2019-07-25 RX ORDER — SYRING-NEEDL,DISP,INSUL,0.3 ML 29 G X1/2"
SYRINGE, EMPTY DISPOSABLE MISCELLANEOUS
Status: COMPLETED
Start: 2019-07-25 | End: 2019-07-25

## 2019-07-25 RX ORDER — HYDROMORPHONE HYDROCHLORIDE 2 MG/ML
0.5 INJECTION, SOLUTION INTRAMUSCULAR; INTRAVENOUS; SUBCUTANEOUS EVERY 6 HOURS PRN
Status: DISCONTINUED | OUTPATIENT
Start: 2019-07-25 | End: 2019-07-26 | Stop reason: HOSPADM

## 2019-07-25 RX ORDER — HYDROCODONE BITARTRATE AND ACETAMINOPHEN 7.5; 325 MG/1; MG/1
1 TABLET ORAL EVERY 4 HOURS PRN
Status: DISCONTINUED | OUTPATIENT
Start: 2019-07-25 | End: 2019-07-26 | Stop reason: HOSPADM

## 2019-07-25 RX ORDER — LUBIPROSTONE 24 UG/1
24 CAPSULE ORAL 2 TIMES DAILY WITH MEALS
Status: COMPLETED | OUTPATIENT
Start: 2019-07-25 | End: 2019-07-26

## 2019-07-25 RX ORDER — SYRING-NEEDL,DISP,INSUL,0.3 ML 29 G X1/2"
296 SYRINGE, EMPTY DISPOSABLE MISCELLANEOUS ONCE
Status: COMPLETED | OUTPATIENT
Start: 2019-07-25 | End: 2019-07-25

## 2019-07-25 RX ADMIN — GABAPENTIN 600 MG: 300 CAPSULE ORAL at 08:07

## 2019-07-25 RX ADMIN — POLYETHYLENE GLYCOL 3350 17 G: 17 POWDER, FOR SOLUTION ORAL at 09:07

## 2019-07-25 RX ADMIN — LUBIPROSTONE 24 MCG: 24 CAPSULE, GELATIN COATED ORAL at 04:07

## 2019-07-25 RX ADMIN — FLUOXETINE 10 MG: 10 CAPSULE ORAL at 08:07

## 2019-07-25 RX ADMIN — HYDROCODONE BITARTRATE AND ACETAMINOPHEN 1 TABLET: 7.5; 325 TABLET ORAL at 01:07

## 2019-07-25 RX ADMIN — PIPERACILLIN, TAZOBACTAM 4.5 G: 4; .5 INJECTION, POWDER, LYOPHILIZED, FOR SOLUTION INTRAVENOUS at 11:07

## 2019-07-25 RX ADMIN — PIPERACILLIN, TAZOBACTAM 4.5 G: 4; .5 INJECTION, POWDER, LYOPHILIZED, FOR SOLUTION INTRAVENOUS at 01:07

## 2019-07-25 RX ADMIN — HYDROMORPHONE HYDROCHLORIDE 0.5 MG: 2 INJECTION, SOLUTION INTRAMUSCULAR; INTRAVENOUS; SUBCUTANEOUS at 06:07

## 2019-07-25 RX ADMIN — HYDROCODONE BITARTRATE AND ACETAMINOPHEN 1 TABLET: 5; 325 TABLET ORAL at 07:07

## 2019-07-25 RX ADMIN — IPRATROPIUM BROMIDE AND ALBUTEROL SULFATE 3 ML: .5; 3 SOLUTION RESPIRATORY (INHALATION) at 01:07

## 2019-07-25 RX ADMIN — HYDROCODONE BITARTRATE AND ACETAMINOPHEN 1 TABLET: 7.5; 325 TABLET ORAL at 09:07

## 2019-07-25 RX ADMIN — HEPARIN SODIUM 5000 UNITS: 5000 INJECTION, SOLUTION INTRAVENOUS; SUBCUTANEOUS at 09:07

## 2019-07-25 RX ADMIN — BUMETANIDE 2 MG: 1 TABLET ORAL at 08:07

## 2019-07-25 RX ADMIN — HYDROCODONE BITARTRATE AND ACETAMINOPHEN 1 TABLET: 5; 325 TABLET ORAL at 03:07

## 2019-07-25 RX ADMIN — GABAPENTIN 600 MG: 300 CAPSULE ORAL at 09:07

## 2019-07-25 RX ADMIN — ONDANSETRON 4 MG: 2 INJECTION INTRAMUSCULAR; INTRAVENOUS at 11:07

## 2019-07-25 RX ADMIN — HEPARIN SODIUM 5000 UNITS: 5000 INJECTION, SOLUTION INTRAVENOUS; SUBCUTANEOUS at 02:07

## 2019-07-25 RX ADMIN — IPRATROPIUM BROMIDE AND ALBUTEROL SULFATE 3 ML: .5; 3 SOLUTION RESPIRATORY (INHALATION) at 07:07

## 2019-07-25 RX ADMIN — HEPARIN SODIUM 5000 UNITS: 5000 INJECTION, SOLUTION INTRAVENOUS; SUBCUTANEOUS at 07:07

## 2019-07-25 RX ADMIN — Medication 296 ML: at 11:07

## 2019-07-25 RX ADMIN — POLYETHYLENE GLYCOL 3350 17 G: 17 POWDER, FOR SOLUTION ORAL at 08:07

## 2019-07-25 RX ADMIN — SENNOSIDES AND DOCUSATE SODIUM 2 TABLET: 8.6; 5 TABLET ORAL at 09:07

## 2019-07-25 RX ADMIN — MAGNESIUM CITRATE 296 ML: 1.75 LIQUID ORAL at 11:07

## 2019-07-25 RX ADMIN — GABAPENTIN 600 MG: 300 CAPSULE ORAL at 02:07

## 2019-07-25 RX ADMIN — HYDROMORPHONE HYDROCHLORIDE 0.2 MG: 2 INJECTION, SOLUTION INTRAMUSCULAR; INTRAVENOUS; SUBCUTANEOUS at 08:07

## 2019-07-25 RX ADMIN — VANCOMYCIN HYDROCHLORIDE 1250 MG: 1.25 INJECTION, POWDER, LYOPHILIZED, FOR SOLUTION INTRAVENOUS at 08:07

## 2019-07-25 RX ADMIN — SENNOSIDES AND DOCUSATE SODIUM 2 TABLET: 8.6; 5 TABLET ORAL at 08:07

## 2019-07-25 RX ADMIN — OLANZAPINE 5 MG: 2.5 TABLET, FILM COATED ORAL at 09:07

## 2019-07-25 RX ADMIN — IPRATROPIUM BROMIDE AND ALBUTEROL SULFATE 3 ML: .5; 3 SOLUTION RESPIRATORY (INHALATION) at 12:07

## 2019-07-25 RX ADMIN — HYDROCODONE BITARTRATE AND ACETAMINOPHEN 1 TABLET: 7.5; 325 TABLET ORAL at 05:07

## 2019-07-25 NOTE — PLAN OF CARE
Problem: Occupational Therapy Goal  Goal: Occupational Therapy Goal  Goals to be met by: 8/7/19      Patient will increase functional independence with ADLs by performing:    UE Dressing with Modified [place].  Grooming while seated at sink with Modified [place].  Sitting at edge of bed x15 minutes with Supervision.  Rolling to Bilateral with Contact Guard Assistance.   Supine to sit with Contact Guard Assistance.  Sliding board W/C transfers with Minimal Assistance.  Toilet transfer to bedside commode with Minimal Assistance.  Upper extremity exercise program x15 reps per handout, with assistance as needed.      Outcome: Ongoing (interventions implemented as appropriate)  The patient was alert and able to actively participate in OT today. The patient tolerated sitting on the EOB ~30 with SBA. The patient was able to perform self care with set up.    Comments: The patient will benefit from In Patient Rehab.

## 2019-07-25 NOTE — PLAN OF CARE
07/25/19 1134   Medicare Message   Important Message from Medicare regarding Discharge Appeal Rights Given to patient/caregiver;Explained to patient/caregiver;Signed/date by patient/caregiver   Date IMM was signed 07/25/19   Time IMM was signed 1137

## 2019-07-25 NOTE — ASSESSMENT & PLAN NOTE
Her overall symptom complex includes: poor sleep, feels depressed, AVHs, poor health, chronic illness, history of: diagnosis of Bipolar I disorder, anger, psychosocial distress (marital problems), irritability, paranoia, mood swings, anhedonia, excessive shopping, energy without sleep for 4-5 days, suicidal ideation in the past, history of extreme happiness history of feeling overly confident.  Mary Ellen Fajardo is not suicidal, homicidal or gravely disabled from a mental health prospective and therefore, does not meet the criteria for a PEC.    Recommendation: Obtain Thyroid function tests. Increase Olanzapine to 10 mg po qhs.Continue Prozac 10 mg po qd. Start Trazodone 50 mg po qhs sleep. Utilize Zyprexa 5 mg PO/IM  q 8 hours prn agitation or uncontrollable threatening behavior. Do NOT exceed 30 mg/day of Zyprexa. Mary Ellen Fajardo is appropriate for Nursing facility/long-term care placement from a mental health prospective.     07/25/2019 Start Trazodone 50 mg po qhs prn insomnia.

## 2019-07-25 NOTE — CARE UPDATE
Aerosol treatment given. Patient on home cpap +14  with 3 lpm oxygen bled in. Resting well. sp02 100%

## 2019-07-25 NOTE — PLAN OF CARE
Problem: Adult Inpatient Plan of Care  Goal: Plan of Care Review  Outcome: Ongoing (interventions implemented as appropriate)  Pt free from falls, injury or any further trauma throughout shift. Pt AAOx4. Continued medications as ordered. Complaints of pain during shift, uncontrolled. Pt needing frequent reorienting to pain management. PT/OT. Pt in no distress, will cont to monitor.   07/25/19 1700   Plan of Care Review   Plan of Care Reviewed With patient

## 2019-07-25 NOTE — PROGRESS NOTES
Therapy with Vancomycin complete and/or consult discontinued by provider.  Pharmacy will sign off, please re-consult as needed.

## 2019-07-25 NOTE — ASSESSMENT & PLAN NOTE
Concern for left knee septic arthritis in setting of prosthesis. Appreciate orthopedic surgery recs. Started on broad spectrum antibiotics. Joint aspirate culture NGTD. ID consulted. Underwent AKA 7/23. Did very well and stable thus far. Hgb stable. Encourage PO intake. Abx stopped. PT/OT. Has a fully functioning right leg which she used for transfer. I believe she'll benefit from further skilled therapy. [facility] accepted patient. Working on her constipation.
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
## 2019-07-25 NOTE — PT/OT/SLP RE-EVAL
Physical Therapy Re-evaluation    Patient Name:  Mary Ellen Fajardo   MRN:  [id number]    Recommendations:     Discharge Recommendations:  rehabilitation facility   Discharge Equipment Recommendations: none   Barriers to discharge home: Decreased caregiver support and Pt with decreased mobility s/p L AKA.    Assessment:     Mary Ellen Fajardo is a 63 y.o. female admitted with a medical diagnosis of Bipolar I disorder, current or most recent episode depressed, with psychotic features.  She presents with the following impairments/functional limitations:  weakness, impaired endurance, impaired self care skills, impaired functional mobilty, impaired balance, impaired cognition, decreased coordination, decreased lower extremity function, decreased safety awareness, pain, decreased ROM, impaired skin, edema, impaired cardiopulmonary response to activity, orthopedic precautions.    Rehab Prognosis: fair; patient would benefit from acute skilled PT services to address these deficits and reach maximum level of function.      Recent Surgery: Procedure(s) (LRB):  AMPUTATION, ABOVE KNEE (Left) 1 Days Post-Op    Plan:     During this hospitalization, patient to be seen 6 x/week(M-F; S or S) to address the above listed problems via therapeutic activities, therapeutic exercises, wheelchair management/training  · Plan of Care Expires:  08/03/19   Plan of Care Reviewed with: patient, mother    Subjective     Communicated with nurse York prior to session.  Patient found left sidelying with oxygen 2L, suprapubic catheter, peripheral IV, telemetry, bed alarm, pressure relief boots(trapeze) upon PT entry to room, agreeable to evaluation.      Chief Complaint: N/A  Patient comments/goals: Pt reported feeling fine.   Pain/Comfort:  · Pain Rating 1: 5/10  · Location - Side 1: Left  · Location 1: leg  · Pain Addressed 1: Pre-medicate for activity    Patients cultural, spiritual, Scientologist conflicts given the current situation:  no      Objective:     Patient found with: oxygen, suprapubic catheter, peripheral IV, telemetry, bed alarm, pressure relief boots(trapeze)     General Precautions: Standard, fall, respiratory   Orthopedic Precautions:LLE non weight bearing(s/p L AKA)   Braces: N/A     Exams:  · Cognitive Exam:  Patient is oriented to Person and Place.    · Gross Motor Coordination:  impaired  · Postural Exam:  Patient presented with the following abnormalities:    · -       Rounded shoulders  · -       Obesity  · Skin Integrity/Edema:      · -       Skin integrity: L stump dressings intact/dry  · -       Edema: Mild LLE  · RLE ROM: WFL except decreased knee flex and ankle DF  · RLE Strength: unable to formally assess 2* MS  · LLE ROM: PROM hip flex and hip abd/add WFL  · LLE Strength: unable to formally assess 2* MS    Functional Mobility:  Pt required max VC's/TC's to arouse, appeared to be very drowsy from pain meds.  Pt able to maintain alertness with max VC's/TC's to minimally participate in EOB activities.  Pt with minimal verbal communication, able to answer a few simple questions and minimally follow simple commands.  Nurse York notified concerning pt's level of alertness.     · Bed Mobility:     · Rolling Left:  dependence and of 2 persons  · Rolling Right: dependence and of 2 persons  · Scooting: maximal assistance and of 2 persons  · Bridging: maximal assistance and of 2 persons; Pt able to use BUE on bedrails to assist with repositioning in the bed.    · Supine to Sit: maximal assistance and of 2 persons; Pt able to use trapeze to assist with supine>sit.    · Sit to Supine: dependence and of 2 persons  · Balance: Pt with fair- static sit balance.  Pt tolerated ~15-20 min sitting EOB with min-mod A for support.    AM-PAC 6 CLICK MOBILITY  Total Score:8       Therapeutic Activities and Exercises:  PROM BLE in supine in all available planes    Patient left HOB elevated with all lines intact, call button in reach, bed  alarm on and nurse Fallyn present.  Pressure relief boot placed to RLE.      GOALS:   Multidisciplinary Problems     Physical Therapy Goals        Problem: Physical Therapy Goal    Goal Priority Disciplines Outcome Goal Variances Interventions   Physical Therapy Goal     PT, PT/OT Ongoing (interventions implemented as appropriate)     Description:  Goals to be met by: 8/3/19     Patient will increase functional independence with mobility by performin. Supine to sit with Set-up [place]  2. Sit to supine with Stand-by Assistance  3. Rolling to Left and Right with Modified [place].  4. Bed to chair transfer to be assessed  5. Wheelchair propulsion to be assessed  6. Sitting at edge of bed x 15 minutes with Supervision                      History:     Past Medical History:   Diagnosis Date    Addiction to drug     Alcohol abuse     Anxiety     Arthritis     Asthma     Bipolar disorder     Bladder stones     CHF (congestive heart failure)     COPD (chronic obstructive pulmonary disease)     on home o2    CVA (cerebral vascular accident)     2012    Hallucination     Hepatitis C     treated and cured    History of blood clots     Hx of psychiatric care     Hypertension     Belen     ADEEL treated with BiPAP     Paraplegia     Paraplegic spinal paralysis     Psychiatric problem     Psychosis     AVH; Paranoia    Renal disorder     SCI (spinal cord injury)     incomplete    Sleep difficulties     has sleep apnea and uses a Bi-PAP machine.    Substance abuse     Suprapubic catheter     Therapy     Vaginal delivery     x1       Past Surgical History:   Procedure Laterality Date    AMPUTATION, ABOVE KNEE Left 2019    [facility]    BACK SURGERY      bilateral knee replacement      BREAST BIOPSY      cysto/lithopaxy       CYSTOLITHOLOPAXY (REMOVE BLADDER STONE) N/A 2017    [facility] at [facility]     CYSTOSCOPY W/ LASER LITHOTRIPSY      CYSTOSCOPY,  OCCLUSION BALLOON PLACEMENT, PERC ACCESS  1/19/2018    [facility]    CYSTOSCOPY, RETROGRADE, URETEROSCOPY, STENT PLACEMENT Right 3/5/2018    [facility]    CYSTOSCOPY/ RETROGRADE PYELOGRAM/ WITH JJ URETERAL STENT PLACEMENT RIGHT Right 12/20/2017    [facility] by [facility] at [facility]    EXCHANGE CATHETER-SUPRAPUBIC  1/19/2018    [facility] at [facility]    EXTRACTION-STONE-URETEROSCOPY Right 3/5/2018    [facility] at [facility]    JOINT REPLACEMENT      bilateral knee    LITHOTRIPSY-LASER Right 3/5/2018    [facility]    LITHOTRIPSY-LASER Right 1/29/2018    [facility]    LITHOTRIPSY-LASER Right 1/19/2018    [facility] by [facility]    NEPHROLITHOTOMY-PERCUTANEOUS Right 1/19/2018    [facility]    NEPHROSTOLITHOTOMY-PERCUTANEOUS Right 1/29/2018    Performed by [facility] at [facility]    NEPHROSTOMY Right 1/29/2018    [facility]formed by [facility] at [facility]    PLACEMENT  1/19/2018    [facility] by [facility] at [facility]    PLACEMENT-RENAL ACCESS Right 1/19/2018    [facility]formed by [facility] at [facility]    PLACEMENT-STENT Right 1/29/2018    [facility] by [facility] at [facility]    Procedure is a Left Genicular Nerve Block ** cpt code 66784** Left 9/16/2015    [facility]    PYELOGRAM-ANTEGRADE Right 1/29/2018    [facility] by [facility] at [facility]    PYELOGRAM-ANTEGRADE  1/19/2018    [facility] by [facility] at [facility]    PYELOGRAM-RETROGRADE  1/19/2018    [facility] by [facility] at [facility]    RADIOFREQUENCY THERMOCOAGULATION** Left genicular RFA ** Left 1/20/2016    [facility] at [facility]     REMOVAL-STENT-URETERAL  3/5/2018    [facility]    REMOVAL-STENT-URETERAL (Cystoscopy) Right 4/9/2018    [facility] at [facility]    URETEROSCOPY Right 1/29/2018    [facility]    URETEROSCOPY Right 1/19/2018    [facility]formed by [facility]oco [facility] at [facility] OR       Time Tracking:     PT Received On: 07/24/19  PT Start Time: 1634     PT Stop Time: 1702  PT Total Time (min): 28 min     Billable Minutes: Re-eval 28 min co-eval with OT      Henrietta Inman, PT  07/24/2019
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
## 2019-07-25 NOTE — PLAN OF CARE
Problem: Adult Inpatient Plan of Care  Goal: Plan of Care Review  Patient received on nasal cannula 2 lpm. Aerosol treatment given as order. Patient has home cpap in which she states she place on at 2300. [address].

## 2019-07-25 NOTE — PLAN OF CARE
Problem: Physical Therapy Goal  Goal: Physical Therapy Goal  Goals to be met by: 8/3/19     Patient will increase functional independence with mobility by performin. Supine to sit with Set-up Gibbs  2. Sit to supine with Stand-by Assistance  3. Rolling to Left and Right with Modified Gibbs.  4. Bed to chair transfer to be assessed  5. Wheelchair propulsion to be assessed  6. Sitting at edge of bed x 15 minutes with Supervision     Outcome: Ongoing (interventions implemented as appropriate)  Pt re-evaluated s/p L AKA, minimal participation with EOB activities 2* lethargy from pain meds.

## 2019-07-25 NOTE — PROGRESS NOTES
[facility] Medicine  Progress Note    Patient Name: Mary Ellen Fajardo  MRN: [id number]  Patient Class: IP- Inpatient   Admission Date: 7/16/2019  Length of Stay: 8 days  Attending Physician: Micheline Estrada MD  Primary Care Provider: Any Tran MD        Subjective:     Principal Problem:Septic arthritis of knee, left      HPI:  Ms. 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 She also noted decreased ROM of the left knee. The pain is exacerbated with attempt to move the knee or her left ankle. She has not had issues with the joint infection since 2011. ROS was also notable for foul smelling urine.     Overview/Hospital Course:  In the ER she was afebrile and had no leukocytosis. There was concern for septic arthritis. She was admitted to internal medicine and orthopedic surgery was consulted. She was started on broad spectrum antibiotics in the ER after blood cultures were collected. UA was concerning for UTI which would also be covered by broad spectrum antibiotics. UCx sent. Orthopedic surgery performed a bedside joint aspiration on 7/17 revealing bloody fluid. A sample was sent for culture. Cultures NGTD. She had worsening renal function and respiratory function on 7/18/19. CXR concerning for volume overload and diuretics were increased. ABG showed acute on chronic hypercapnic respiratory failure. She was requiring BiPAP to maintain O2 sat and was transferred to the ICU. Pulmonoogy was consulted. BiPAP settings adjusted and she was diuresed. Echo show preserved EF but indeterminate diastolic function and PAP 55. She was complaining of left knee pain and was started on low dose po narcotics. Her vanc through was  elevated so vanc was held 7/19/19. She was transferred back to the floor 7/19 and doing well. Blood and joint aspirate cultures remain negative. ID consulted for empiric abx regimen. ID recommended amputation. Orthopedic surgery agreed. Patient also agreed as she believes this would allow her to mobilize better and possibly walk again. Seems like prosthesis was not allowing this to happen. She still has full function of her right leg. Underwent AKA on 7/23/2019.     Interval History: with pain in stump but otherwise feeling well. Constipation.     Review of Systems   Constitutional: Negative.    Respiratory: Negative.    Cardiovascular: Negative.    Gastrointestinal: Positive for constipation.   Musculoskeletal:        Pain left stump   Neurological: Negative.      Objective:     Vital Signs (Most Recent):  Temp: 98 °F (36.7 °C) (07/25/19 1638)  Pulse: 75 (07/25/19 1638)  Resp: 18 (07/25/19 1638)  BP: 138/67 (07/25/19 1638)  SpO2: 98 % (07/25/19 1638) Vital Signs (24h Range):  Temp:  [97.5 °F (36.4 °C)-98.5 °F (36.9 °C)] 98 °F (36.7 °C)  Pulse:  [64-79] 75  Resp:  [18-20] 18  SpO2:  [92 %-100 %] 98 %  BP: (111-163)/(56-84) 138/67     Weight: 107.1 kg (236 lb 1.8 oz)  Body mass index is 39.29 kg/m².    Intake/Output Summary (Last 24 hours) at 7/25/2019 1735  Last data filed at 7/25/2019 1237  Gross per 24 hour   Intake 840 ml   Output 400 ml   Net 440 ml      Physical Exam   Constitutional: She is oriented to person, place, and time. She appears well-developed. No distress.   Well appearing   Pulmonary/Chest: No respiratory distress. She has no wheezes. She has no rales.   Breathing comfortably on low flow NC   Abdominal: Soft. She exhibits no distension. There is no tenderness.   Hypoactive bowel sounds   Musculoskeletal:   Left above knee stump. Fresh, dry dressing in place   Neurological: She is alert and oriented to person, place, and time.   Skin: She is not diaphoretic.   Nursing note and vitals  reviewed.      Significant Labs: All pertinent labs within the past 24 hours have been reviewed.    Significant Imaging: I have reviewed all pertinent imaging results/findings within the past 24 hours.  I have reviewed and interpreted all pertinent imaging results/findings within the past 24 hours.      Assessment/Plan:      * Septic arthritis of knee, left  Concern for left knee septic arthritis in setting of prosthesis. Appreciate orthopedic surgery recs. Started on broad spectrum antibiotics. Joint aspirate culture NGTD. ID consulted. Underwent AKA 7/23. Did very well and stable thus far. Hgb stable. Encourage PO intake. Abx stopped. PT/OT. Has a fully functioning right leg which she used for transfer. I believe she'll benefit from further skilled therapy. [facility] accepted patient. Working on her constipation.     Acute on chronic diastolic heart failure  Encourage PO intake  resumed Bumex    Acute on chronic respiratory failure with hypercapnia  ABG showed acute on chronic hypercapnic respiratory failure on 7/18. See hospital course. Resolved.    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 Past resistance patterns noted. Possible colonization? Foul smelling urine but no systemic symptoms.     Bipolar I disorder, current or most recent episode depressed, with psychotic features  Continued home meds zyprexa and fluoxetine    Chronic indwelling suprapubic catheter in place  Exchanged given UA concerning for UTI. Follow up with Dr. Tinoco in clinic.    Chronic respiratory failure with hypercapnia  As above. Home 2L.    COPD (chronic obstructive pulmonary disease)  PRN duonebs. No acute issues.    ADEEL on CPAP  BiPAP QHS,    Essential hypertension  Does not appear to be on medications at home? Monitor.      VTE Risk Mitigation (From admission, onward)        Ordered     heparin (porcine) injection 5,000 Units  Every 8 hours      07/18/19 1444     IP VTE HIGH RISK PATIENT  Once       07/17/19 0531          Dispo: to [facility] in AM but addressing constipation first.       Micheline Lujan MD  Department of Hospital Medicine   [facility]

## 2019-07-25 NOTE — SUBJECTIVE & OBJECTIVE
Interval History: with pain in stump but otherwise feeling well. Constipation.     Review of Systems   Constitutional: Negative.    Respiratory: Negative.    Cardiovascular: Negative.    Gastrointestinal: Positive for constipation.   Musculoskeletal:        Pain left stump   Neurological: Negative.      Objective:     Vital Signs (Most Recent):  Temp: 98 °F (36.7 °C) (07/25/19 1638)  Pulse: 75 (07/25/19 1638)  Resp: 18 (07/25/19 1638)  BP: 138/67 (07/25/19 1638)  SpO2: 98 % (07/25/19 1638) Vital Signs (24h Range):  Temp:  [97.5 °F (36.4 °C)-98.5 °F (36.9 °C)] 98 °F (36.7 °C)  Pulse:  [64-79] 75  Resp:  [18-20] 18  SpO2:  [92 %-100 %] 98 %  BP: (111-163)/(56-84) 138/67     Weight: 107.1 kg (236 lb 1.8 oz)  Body mass index is 39.29 kg/m².    Intake/Output Summary (Last 24 hours) at 7/25/2019 1735  Last data filed at 7/25/2019 1237  Gross per 24 hour   Intake 840 ml   Output 400 ml   Net 440 ml      Physical Exam   Constitutional: She is oriented to person, place, and time. She appears well-developed. No distress.   Well appearing   Pulmonary/Chest: No respiratory distress. She has no wheezes. She has no rales.   Breathing comfortably on low flow NC   Abdominal: Soft. She exhibits no distension. There is no tenderness.   Hypoactive bowel sounds   Musculoskeletal:   Left above knee stump. Fresh, dry dressing in place   Neurological: She is alert and oriented to person, place, and time.   Skin: She is not diaphoretic.   Nursing note and vitals reviewed.      Significant Labs: All pertinent labs within the past 24 hours have been reviewed.    Significant Imaging: I have reviewed all pertinent imaging results/findings within the past 24 hours.  I have reviewed and interpreted all pertinent imaging results/findings within the past 24 hours.

## 2019-07-25 NOTE — PT/OT/SLP PROGRESS
Physical Therapy Treatment    Patient Name:  Mary Ellen Fajardo   MRN:  [id number]    Recommendations:     Discharge Recommendations:  rehabilitation facility   Discharge Equipment Recommendations: none   Barriers to discharge home: Decreased caregiver support and Pt with decreased mobility s/p L AKA.    Assessment:     Mary Ellen Fajardo is a 63 y.o. female admitted with a medical diagnosis of Bipolar I disorder, current or most recent episode depressed, with psychotic features.  She presents with the following impairments/functional limitations:  weakness, impaired endurance, impaired self care skills, impaired functional mobilty, impaired balance, impaired cognition, decreased coordination, decreased lower extremity function, decreased safety awareness, pain, decreased ROM, impaired skin, edema, impaired cardiopulmonary response to activity, orthopedic precautions.    Rehab Prognosis: Fair; patient would benefit from acute skilled PT services to address these deficits and reach maximum level of function.    Recent Surgery: Procedure(s) (LRB):  AMPUTATION, ABOVE KNEE (Left) 2 Days Post-Op    Plan:     During this hospitalization, patient to be seen 6 x/week(M-F; S or S) to address the identified rehab impairments via therapeutic activities, therapeutic exercises, wheelchair management/training and progress toward the following goals:    · Plan of Care Expires:  08/03/19    Subjective     Chief Complaint: pain  Patient/Family Comments/goals: to be independent  Pain/Comfort:  · Pain Rating 1: 8/10  · Location - Side 1: Left  · Location 1: leg  · Pain Addressed 1: Pre-medicate for activity  · Pain Rating Post-Intervention 1: 5/10      Objective:     Communicated with nurse Padilla prior to session.  Patient found HOB elevated with oxygen 3L, suprapubic catheter, peripheral IV, telemetry, bed alarm, RLE pressure relief boot (trapeze) upon PT entry to room.     General Precautions: Standard, fall, respiratory    Orthopedic Precautions:LLE non weight bearing(s/p L AKA)   Braces: N/A     Functional Mobility:  Pt much more alert today, able to communicate and follow multiple commands.  Pt with increased participation with EOB activities and fair sit balance.  Pt c/o SOB with supine to sit, required extra time and effort to perform that task.  Pt feeling much better today and motivated to continue to work with therapy to regain her independence.      · Bed Mobility:     · Rolling Left:  minimum assistance   · Rolling Right: minimum assistance  · Scooting: maximal assistance and of 2 persons to scoot EOB  · Bridging: minimum assistance to reposition HOB; Pt required assistance with RLE placement.    · Supine to Sit: moderate assistance and of 2 persons with HOB elevated and using trapeze  · Sit to Supine: minimum assistance  · Balance: Pt with fair sit balance.  Pt tolerated ~30 min sitting EOB.        AM-PAC 6 CLICK MOBILITY  Turning over in bed (including adjusting bedclothes, sheets and blankets)?: 3  Sitting down on and standing up from a chair with arms (e.g., wheelchair, bedside commode, etc.): 2  Moving from lying on back to sitting on the side of the bed?: 2  Moving to and from a bed to a chair (including a wheelchair)?: 1  Need to walk in hospital room?: 1  Climbing 3-5 steps with a railing?: 1  Basic Mobility Total Score: 10       Therapeutic Activities and Exercises:  LE seated therex 10 reps: BLE hip flex, BLE hip abd/add, RLE LAQ, and RLE AP's.  Pt required AAROM with LLE.    LE supine therex 10 reps: RLE QS, BLE hip abd/add (AAROM), BLE hip IR/ER, and BLE SLR (AAROM)    Seated push ups using siderails x 10 reps    Supine modified sit ups using trapeze x 10 reps    Patient left HOB elevated with all lines intact, call button in reach, bed alarm on and nurse Valerie notified. RLE pressure relief boot placed.      GOALS:   Multidisciplinary Problems     Physical Therapy Goals        Problem: Physical Therapy Goal     Goal Priority Disciplines Outcome Goal Variances Interventions   Physical Therapy Goal     PT, PT/OT Ongoing (interventions implemented as appropriate)     Description:  Goals to be met by: 8/3/19     Patient will increase functional independence with mobility by performin. Supine to sit with Set-up [place]  2. Sit to supine with Stand-by Assistance  3. Rolling to Left and Right with Modified [place].  4. Bed to chair transfer to be assessed  5. Wheelchair propulsion to be assessed  6. Sitting at edge of bed x 15 minutes with Supervision                      Time Tracking:     PT Received On: 19  PT Start Time: 1012     PT Stop Time: 1051  PT Total Time (min): 39 min     Billable Minutes: Therapeutic Exercise 23 min co-tx with OT             PTA Visit Number: 0     Henrietta CAT Inman, PT  2019

## 2019-07-25 NOTE — PT/OT/SLP PROGRESS
Occupational Therapy   Treatment    Name: Mary Ellen Fajardo  MRN: [id number]  Admitting Diagnosis:  Bipolar I disorder, current or most recent episode depressed, with psychotic features  2 Days Post-Op    Recommendations:     Discharge Recommendations: rehabilitation facility  Discharge Equipment Recommendations:  none  Barriers to discharge:  None    Assessment:     Mary Ellen Fajardo is a 63 y.o. female with a medical diagnosis of Bipolar I disorder, current or most recent episode depressed, with psychotic features.  She presents with improved alertness and ability to participate self care on the EOB  . Performance deficits affecting function are weakness, impaired endurance, impaired self care skills, impaired functional mobilty, impaired balance, decreased upper extremity function, decreased lower extremity function, decreased coordination, pain, decreased ROM, decreased safety awareness, orthopedic precautions, edema, impaired skin, impaired cardiopulmonary response to activity.     Rehab Prognosis:  Good; patient would benefit from acute skilled OT services to address these deficits and reach maximum level of function.       Plan:     Patient to be seen 5 x/week, 6 x/week to address the above listed problems via self-care/home management, therapeutic activities, therapeutic exercises  · Plan of Care Expires: 08/07/19  · Plan of Care Reviewed with: patient    Subjective     Pain/Comfort:  · Pain Rating 1: 8/10  · Location - Side 1: Left  · Pain Addressed 1: Pre-medicate for activity  · Pain Rating Post-Intervention 1: 5/10    Objective:     Communicated with: Valerie chance prior to session.  Patient found HOB elevated with oxygen, peripheral IV, bed alarm, pressure relief boots, delcid catheter(trapeze) upon OT entry to room.    General Precautions: Standard, fall, respiratory   Orthopedic Precautions:LLE non weight bearing(LLE AKA)   Braces: N/A     Occupational Performance:     Bed Mobility:    · Patient  completed Rolling/Turning to Left with  contact guard assistance  · Patient completed Rolling/Turning to Right with contact guard assistance and with side rail  · Patient completed Scooting/Bridging with maximal assistance and 2 persons  · Patient completed Supine to Sit with moderate assistance, 2 persons, with side rail and with trapeze  · Patient completed Sit to Supine with minimum assistance     Functional Mobility/Transfers:  · Functional Mobility: The patient tolerated sitting EOB ~30 min while performing self care tasks.    Activities of Daily Living:  · Grooming: stand by assistance and set up assist to perform denture care and to wash face and hands  · Bathing: stand by assistance to bathe upper baody with wipes while seated on the EOB. The patient was dependent to wipe back.  · Upper Body Dressing: minimum assistance to don/doff gown while seated on the EOB      [facility] 6 Click ADL: 18    Treatment & Education:  The patient participated in self care tasks while seated on the EOB. The patient was oriented x3 and able to follow commands. The patient was educated re: OT POC and recommendations for In Patient Rehab.    Patient left HOB elevated with all lines intact, call button in reach and bed alarm onEducation:      GOALS:   Multidisciplinary Problems     Occupational Therapy Goals        Problem: Occupational Therapy Goal    Goal Priority Disciplines Outcome Interventions   Occupational Therapy Goal     OT, PT/OT Ongoing (interventions implemented as appropriate)    Description:  Goals to be met by: 8/7/19      Patient will increase functional independence with ADLs by performing:    UE Dressing with Modified [place].  Grooming while seated at sink with Modified [place].  Sitting at edge of bed x15 minutes with Supervision.  Rolling to Bilateral with Contact Guard Assistance.   Supine to sit with Contact Guard Assistance.  Sliding board W/C transfers with Minimal Assistance.  Toilet transfer to  bedside commode with Minimal Assistance.  Upper extremity exercise program x15 reps per handout, with assistance as needed.                       Time Tracking:     OT Date of Treatment: 07/25/19  OT Start Time: 1012  OT Stop Time: 1045  OT Total Time (min): 33 min    Billable Minutes:Self Care/Home Management 16  Total Time 39 (with PT)    Preethi Glass, OT  7/25/2019

## 2019-07-25 NOTE — PROGRESS NOTES
Pharmacokinetic Assessment Follow Up: IV Vancomycin    Vancomycin serum concentration assessment(s):    The random level was drawned correctly and can be used to guide therapy at this time. The measurement is below the desired definitive target range of 15 to 20 mcg/mL.    Vancomycin Regimen Plan:    Give Vancomycin 1250 mg today for random this morning of 13.1.  Re-dose when the random level is less than 20 mcg/mL, next level to be drawn at 0400 on 7/26/2019     Pharmacy will continue to follow and monitor vancomycin.    Please contact pharmacy at extension [phone] for questions regarding this assessment.    Thank you for the consult,   Jamarcus Dubon Jr     Patient brief summary:  Mary Ellen Fajardo is a 63 y.o. female initiated on antimicrobial therapy with IV Vancomycin for treatment of suspected bone/joint    The patient received a loading dose, followed by the current treatment regimen: random levels with plan to redose when level is less than 20 mcg/mL    Drug Allergies:   Review of patient's allergies indicates:   Allergen Reactions    Rocephin [ceftriaxone] Rash     Rash 2012? Pt agreeable to try with pre mediation with benadryl.        Actual Body Weight:   107.1 kg    Renal Function:   Estimated Creatinine Clearance: 50 mL/min (based on SCr of 1.4 mg/dL).,     Dialysis Method (if applicable):  N/A    CBC (last 72 hours):  Recent Labs   Lab Result Units 07/23/19  2141 07/24/19  0350   WBC K/uL 8.75 9.33   Hemoglobin g/dL 10.3* 10.2*   Hematocrit % 35.8* 34.6*   Platelets K/uL 195 204   Gran% % 86.7* 81.4*   Lymph% % 8.0* 11.8*   Mono% % 4.2 6.1   Eosinophil% % 1.0 1.0   Basophil% % 0.2 0.3   Differential Method  Automated Automated       Metabolic Panel (last 72 hours):  Recent Labs   Lab Result Units 07/23/19  0321 07/24/19  0350   Sodium mmol/L 139 139   Potassium mmol/L 3.9 5.1   Chloride mmol/L 91* 94*   CO2 mmol/L 39* 36*   Glucose mg/dL 90 112*   BUN, Bld mg/dL 21 16   Creatinine mg/dL 1.1 1.4        Vancomycin Administrations:  vancomycin given in the last 96 hours                     vancomycin in dextrose 5 % 1 gram/250 mL IVPB 1,000 mg (mg) 1,000 mg New Bag 07/24/19 1143                      Drug levels (last 3 results):  Recent Labs   Lab Result Units 07/22/19  2305 07/23/19  1453 07/24/19  0350 07/25/19  0629   Vancomycin, Random ug/mL  --   --  14.3 13.1   Vancomycin-Trough ug/mL 37.1* 22.6*  --   --        Microbiologic Results:  Microbiology Results (last 7 days)       Procedure Component Value Units Date/Time    Blood culture #2 [[id number]] Collected:  07/16/19 2255    Order Status:  Completed Specimen:  Blood from Peripheral, Hand, Right Updated:  07/22/19 0303     Blood Culture, Routine No growth after 5 days.    Narrative:       Blood Culture #2    Blood culture #1 [[id number]] Collected:  07/16/19 2250    Order Status:  Completed Specimen:  Blood from Peripheral, Antecubital, Left Updated:  07/22/19 0303     Blood Culture, Routine No growth after 5 days.    Narrative:       Blood Culture #1    Aerobic culture [[id number]] Collected:  07/17/19 1253    Order Status:  Completed Specimen:  Body Fluid from Knee, Left Updated:  07/21/19 0753     Aerobic Bacterial Culture No growth    Narrative:       Aspiration left knee    Urine Culture High Risk [[id number]]  (Abnormal)  (Susceptibility) Collected:  07/17/19 1700    Order Status:  Completed Specimen:  Urine, Catheterized Updated:  07/21/19 0748     Urine Culture, Routine ESCHERICHIA COLI  10,000 - 49,999 cfu/ml      Narrative:       Indicated criteria for high risk culture:->Prior to urologic  procedures    Culture, Body Fluid (Aerobic) w/ GS [[id number]] Collected:  07/17/19 1253    Order Status:  Completed Specimen:  Body Fluid from Knee, Left Updated:  07/18/19 1050     Gram Stain Result Moderate WBC's      No organisms seen    Narrative:       Aspiration left knee

## 2019-07-25 NOTE — PLAN OF CARE
Problem: Physical Therapy Goal  Goal: Physical Therapy Goal  Goals to be met by: 8/3/19     Patient will increase functional independence with mobility by performin. Supine to sit with Set-up [place]  2. Sit to supine with Stand-by Assistance  3. Rolling to Left and Right with Modified [place].  4. Bed to chair transfer to be assessed  5. Wheelchair propulsion to be assessed  6. Sitting at edge of bed x 15 minutes with Supervision     Outcome: Ongoing (interventions implemented as appropriate)  Pt much more alert with increased participation in EOB activities today.

## 2019-07-25 NOTE — PROGRESS NOTES
9:05AM  SW received a message fromLoan ([facility]) informing, SW, [facility] is requesting for more PT/OT notes to determine how pt is doing with transfer.     9:14AM  SW stopped by therapy office to determine if they can see pt this morning and chart information in order to get pt out as soon as possible.     11:00AM  SW received a message fromLoan, informing SW she has received auth from [facility] with a start date of tomorrow.     11:02AM  SW received a call from Loni ([facility]) explaining pt will be approved for rehab services.     11:18AM  SW received a call message from [facility] explaining MD will not accept pt without pt having a bowel movement. Per [facility], pt just receiving mag citrate today and has not had a bowel movement since the 16th.

## 2019-07-25 NOTE — PLAN OF CARE
Problem: Adult Inpatient Plan of Care  Goal: Plan of Care Review  Outcome: Ongoing (interventions implemented as appropriate)     07/25/19 0611   Plan of Care Review   Plan of Care Reviewed With patient;family   AAOx4. Home CPAP at bedside. NC @3L. LLE dressing dry and intact. Afebrile throughout night. Medications administered per MD orders. No falls or injuries throughout shift. Bed locked and low. Call light within reach. Will continue to monitor for patient safety.

## 2019-07-26 VITALS
HEART RATE: 75 BPM | WEIGHT: 236.13 LBS | BODY MASS INDEX: 39.34 KG/M2 | OXYGEN SATURATION: 97 % | DIASTOLIC BLOOD PRESSURE: 59 MMHG | RESPIRATION RATE: 18 BRPM | TEMPERATURE: 98 F | HEIGHT: 65 IN | SYSTOLIC BLOOD PRESSURE: 97 MMHG

## 2019-07-26 PROCEDURE — 94640 AIRWAY INHALATION TREATMENT: CPT

## 2019-07-26 PROCEDURE — 99900035 HC TECH TIME PER 15 MIN (STAT)

## 2019-07-26 PROCEDURE — 94761 N-INVAS EAR/PLS OXIMETRY MLT: CPT

## 2019-07-26 PROCEDURE — 99222 1ST HOSP IP/OBS MODERATE 55: CPT | Mod: ,,, | Performed by: PHYSICAL MEDICINE & REHABILITATION

## 2019-07-26 PROCEDURE — 63600175 PHARM REV CODE 636 W HCPCS: Performed by: ORTHOPAEDIC SURGERY

## 2019-07-26 PROCEDURE — 25000003 PHARM REV CODE 250: Performed by: ORTHOPAEDIC SURGERY

## 2019-07-26 PROCEDURE — 25000242 PHARM REV CODE 250 ALT 637 W/ HCPCS: Performed by: ORTHOPAEDIC SURGERY

## 2019-07-26 PROCEDURE — 27000221 HC OXYGEN, UP TO 24 HOURS

## 2019-07-26 PROCEDURE — 99222 PR INITIAL HOSPITAL CARE,LEVL II: ICD-10-PCS | Mod: ,,, | Performed by: PHYSICAL MEDICINE & REHABILITATION

## 2019-07-26 PROCEDURE — 25000003 PHARM REV CODE 250: Performed by: INTERNAL MEDICINE

## 2019-07-26 RX ORDER — BACLOFEN 20 MG/1
20 TABLET ORAL 3 TIMES DAILY PRN
Qty: 20 TABLET | Refills: 0 | Status: ON HOLD | OUTPATIENT
Start: 2019-07-26 | End: 2019-09-24 | Stop reason: HOSPADM

## 2019-07-26 RX ORDER — HYDROCODONE BITARTRATE AND ACETAMINOPHEN 7.5; 325 MG/1; MG/1
1 TABLET ORAL EVERY 4 HOURS PRN
Qty: 20 TABLET | Refills: 0 | Status: SHIPPED | OUTPATIENT
Start: 2019-07-26 | End: 2019-09-20

## 2019-07-26 RX ORDER — LUBIPROSTONE 24 UG/1
24 CAPSULE ORAL
Qty: 20 CAPSULE | Refills: 0 | Status: SHIPPED | OUTPATIENT
Start: 2019-07-26 | End: 2019-08-15

## 2019-07-26 RX ADMIN — BACLOFEN 20 MG: 10 TABLET ORAL at 12:07

## 2019-07-26 RX ADMIN — HYDROCODONE BITARTRATE AND ACETAMINOPHEN 1 TABLET: 7.5; 325 TABLET ORAL at 01:07

## 2019-07-26 RX ADMIN — GABAPENTIN 600 MG: 300 CAPSULE ORAL at 08:07

## 2019-07-26 RX ADMIN — SENNOSIDES AND DOCUSATE SODIUM 2 TABLET: 8.6; 5 TABLET ORAL at 08:07

## 2019-07-26 RX ADMIN — HYDROCODONE BITARTRATE AND ACETAMINOPHEN 1 TABLET: 7.5; 325 TABLET ORAL at 08:07

## 2019-07-26 RX ADMIN — IPRATROPIUM BROMIDE AND ALBUTEROL SULFATE 3 ML: .5; 3 SOLUTION RESPIRATORY (INHALATION) at 01:07

## 2019-07-26 RX ADMIN — HEPARIN SODIUM 5000 UNITS: 5000 INJECTION, SOLUTION INTRAVENOUS; SUBCUTANEOUS at 07:07

## 2019-07-26 RX ADMIN — LUBIPROSTONE 24 MCG: 24 CAPSULE, GELATIN COATED ORAL at 08:07

## 2019-07-26 RX ADMIN — FLUOXETINE 10 MG: 10 CAPSULE ORAL at 08:07

## 2019-07-26 RX ADMIN — HEPARIN SODIUM 5000 UNITS: 5000 INJECTION, SOLUTION INTRAVENOUS; SUBCUTANEOUS at 01:07

## 2019-07-26 RX ADMIN — IPRATROPIUM BROMIDE AND ALBUTEROL SULFATE 3 ML: .5; 3 SOLUTION RESPIRATORY (INHALATION) at 07:07

## 2019-07-26 RX ADMIN — POLYETHYLENE GLYCOL 3350 17 G: 17 POWDER, FOR SOLUTION ORAL at 08:07

## 2019-07-26 RX ADMIN — BACLOFEN 20 MG: 10 TABLET ORAL at 04:07

## 2019-07-26 RX ADMIN — IPRATROPIUM BROMIDE AND ALBUTEROL SULFATE 3 ML: .5; 3 SOLUTION RESPIRATORY (INHALATION) at 12:07

## 2019-07-26 RX ADMIN — BUMETANIDE 2 MG: 1 TABLET ORAL at 08:07

## 2019-07-26 NOTE — PT/OT/SLP DISCHARGE
Occupational Therapy Discharge Summary    Mary Ellen Fajardo  MRN: [id number]   Principal Problem: Septic arthritis of knee, left      Patient Discharged from acute Occupational Therapy on 7/26/19.  Please refer to prior OT notes for functional status.    Assessment:      Patient appropriate for care in another setting.    Objective:     GOALS:   Multidisciplinary Problems     Occupational Therapy Goals        Problem: Occupational Therapy Goal    Goal Priority Disciplines Outcome Interventions   Occupational Therapy Goal     OT, PT/OT Ongoing (interventions implemented as appropriate)    Description:  Goals to be met by: 8/7/19      Patient will increase functional independence with ADLs by performing:    UE Dressing with Modified [place].  Grooming while seated at sink with Modified [place].  Sitting at edge of bed x15 minutes with Supervision.  Rolling to Bilateral with Contact Guard Assistance.   Supine to sit with Contact Guard Assistance.  Sliding board W/C transfers with Minimal Assistance.  Toilet transfer to bedside commode with Minimal Assistance.  Upper extremity exercise program x15 reps per handout, with assistance as needed.                       Reasons for Discontinuation of Therapy Services  Transfer to alternate level of care.      Plan:     Patient Discharged to: Inpatient Rehab    Preethi Glass OT  7/26/2019

## 2019-07-26 NOTE — PLAN OF CARE
Problem: Adult Inpatient Plan of Care  Goal: Plan of Care Review  Outcome: Ongoing (interventions implemented as appropriate)     07/26/19 0617   Plan of Care Review   Plan of Care Reviewed With patient   Progress no change   AOx4. 2L O2 per NC continued. BiPAP on during night. Dressing to LLE CDI. Suprapubic catheter intact, draining yellow urine. Pain meds given per orders. Pt reports muscle spasms, baclofen given. Remains free from falls. Large BM noted. Able to make needs known. Call light within reach. No distress noted.

## 2019-07-26 NOTE — DISCHARGE SUMMARY
[facility] Hospital Medicine  Discharge Summary      Patient Name: Mary Ellen Fajardo  MRN: [id number]  Admission Date: 7/16/2019  Hospital Length of Stay: 9 days  Discharge Date and Time:  07/26/2019 12:53 PM  Attending Physician: Micheline Estrada MD   Discharging Provider: Micheline Lujan MD  Primary Care Provider: Any Tran MD      HPI:   Ms. 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 She also noted decreased ROM of the left knee. The pain is exacerbated with attempt to move the knee or her left ankle. She has not had issues with the joint infection since 2011. ROS was also notable for foul smelling urine.     Procedure(s) (LRB):  AMPUTATION, ABOVE KNEE (Left)      Hospital Course:   In the ER she was afebrile and had no leukocytosis. There was concern for septic arthritis. She was admitted to internal medicine and orthopedic surgery was consulted. She was started on broad spectrum antibiotics in the ER after blood cultures were collected. UA was concerning for UTI which would also be covered by broad spectrum antibiotics. UCx sent. Orthopedic surgery performed a bedside joint aspiration on 7/17 revealing bloody fluid. A sample was sent for culture. Cultures NGTD. She had worsening renal function and respiratory function on 7/18/19. CXR concerning for volume overload and diuretics were increased. ABG showed acute on chronic hypercapnic respiratory failure. She was requiring BiPAP to maintain O2 sat and was transferred to the ICU. Pulmonoogy was consulted. BiPAP settings adjusted and she was diuresed. Echo show preserved EF but indeterminate diastolic function and PAP 55. She was complaining of left knee pain and was started on low  dose po narcotics. Her vanc through was elevated so vanc was held 7/19/19. She was transferred back to the floor 7/19 and doing well. Blood and joint aspirate cultures remain negative. ID consulted for empiric abx regimen. ID recommended amputation. Orthopedic surgery agreed. Patient also agreed as she believes this would allow her to mobilize better and possibly walk again. Seems like prosthesis was not allowing this to happen. She still has full function of her right leg. Underwent AKA on 7/23/2019. Did well afterwards. Antibiotics discontinued. Pain managed and able to have BM prior to discharge. Required amitizia for this. Patient discharged to [facility] in stable condition. Is on low flow NC (which she uses continuously) and suprapubic catheter. Follow up with orthopedic surgery for dressing change and wound care at date shown below. Cardiac diet. Activity as tolerated.       Addendum: pathology results intra-op sample left above knee amputation:  -Status-post total knee replacement with intense acute cellulitis and abscess formation within the knee  joint space and surrounding muscle and soft tissues  -Prior implant is intact and free of inflammation  -Skin, subcutaneous tissues and bone at the resection margin are viable and free of inflammation    Consults:   Consults (From admission, onward)        Status Ordering Provider     Inpatient consult to Infectious Diseases  Once     Provider:  Abdoulaye Carson MD    Completed SENG CALZADA     Inpatient consult to Orthopedic Surgery  Once     Provider:  Steve Phoenix MD    Completed SERA TERRY     Inpatient consult to Psychiatry  Once     Provider:  Driss Young MD    Completed ELVIA SCHMID     Inpatient consult to Pulmonology  Once     Provider:  Ac Levy MD    Completed LUCINAO REDMAN     Inpatient consult to Social Work  Once     Provider:  (Not yet assigned)    Completed ZHENG  SENG CLARK consult to case management  Once     Provider:  (Not yet assigned)    Completed LUCIANO REDMAN        Final Active Diagnoses:    Diagnosis Date Noted POA    PRINCIPAL PROBLEM:  Septic arthritis of knee, left [M00.9] 07/17/2019 Yes    Mood insomnia [F51.05, F39] 07/24/2019 Unknown    Evaluation by psychiatric service required [Z00.8] 07/24/2019 Not Applicable    Acute on chronic diastolic heart failure [I50.33] 07/19/2019 Yes    Acute on chronic respiratory failure with hypercapnia [J96.22] 07/18/2019 Yes    Infection due to urethral catheter [T83.511A, N39.0] 07/17/2019 Yes    Bipolar I disorder, current or most recent episode depressed, with psychotic features [F31.5] 01/06/2019 Yes    Chronic respiratory failure with hypercapnia [J96.12] 01/06/2019 Yes     Chronic    Obesity, Class II, BMI 35-39.9 [E66.9] 01/06/2019 Yes     Chronic    Chronic indwelling suprapubic catheter in place [Z93.59] 01/06/2019 Not Applicable     Chronic    COPD (chronic obstructive pulmonary disease) [J44.9] 01/06/2019 Yes     Chronic    ADEEL on CPAP [G47.33, Z99.89] 01/19/2018 Not Applicable     Chronic    Essential hypertension [I10] 01/19/2018 Yes     Chronic      Problems Resolved During this Admission:    Diagnosis Date Noted Date Resolved POA    Volume overload [E87.70] 07/18/2019 07/20/2019 No       Discharged Condition: stable    Disposition: Rehab Facility    Follow Up: Orthopedic surgery    Medications:  Reconciled Home Medications:      Medication List      START taking these medications    HYDROcodone-acetaminophen 7.5-325 mg per tablet  Commonly known as:  NORCO  Take 1 tablet by mouth every 4 (four) hours as needed (severe pain).     lubiprostone 24 MCG Cap  Commonly known as:  AMITIZA  Take 1 capsule (24 mcg total) by mouth daily with breakfast. for 20 days           * baclofen 20 MG tablet  Commonly known as:  LIORESAL  10 mg 4 (four) times daily.     CONTINUE taking these medications     bumetanide 2 MG tablet  Commonly known as:  BUMEX  Take 2 mg by mouth.     catheter 18 Fr Misc  Change every 20 days     FLUoxetine 10 MG capsule  Take 1 capsule (10 mg total) by mouth once daily.     gabapentin 300 MG capsule  Commonly known as:  NEURONTIN  600 mg 3 (three) times daily.     hydrOXYzine HCl 25 MG tablet  Commonly known as:  ATARAX  Take 1 tablet (25 mg total) by mouth 3 (three) times daily.     ipratropium 0.02 % nebulizer solution  Commonly known as:  ATROVENT  U 1 VIAL VIA NEBULIZER Q 4 H WHILE AWAKE     OLANZapine 5 MG tablet  Commonly known as:  ZyPREXA  Take 1 tablet (5 mg total) by mouth every evening.     oxybutynin 5 MG Tr24  Commonly known as:  DITROPAN-XL  TAKE 1 TABLET BY MOUTH EVERY DAY     potassium chloride 10 MEQ Tbsr  Commonly known as:  KLOR-CON     promethazine 25 MG tablet  Commonly known as:  PHENERGAN  TAKE 1 TABLET BY MOUTH EVERY 8 HOURS AS NEEDED FOR NAUSEA AND VOMITING     traZODone 100 MG tablet  Commonly known as:  DESYREL  100 mg every evening. 1/2 tablet by mouth every evening         Indwelling Lines/Drains at time of discharge:   Lines/Drains/Airways     Drain                 Suprapubic Catheter 07/16/19 2240 18 Fr. 9 days                Time spent on the discharge of patient: > 35 minutes  Patient was seen and examined on the date of discharge and determined to be suitable for discharge.         Micheline Lujan MD  Department of Hospital Medicine  [facility]

## 2019-07-26 NOTE — PROGRESS NOTES
TN contacted [facility], spoke with Beck, to inquire if they can accommodate patient's wheelchair for transport to [facility]. Beck stated they can.

## 2019-07-26 NOTE — NURSING
Report called to [facility], spoke with SIDDHARTH White. Transport set up for 1:15 possibly 2pm. Will cont to monitor.

## 2019-07-26 NOTE — NURSING
Pt transferred to [facility] via stretcher. Pt belongings (wheelchair and Cpap included) transferred with patient. Pt in no distress.

## 2019-07-26 NOTE — ANESTHESIA POSTPROCEDURE EVALUATION
Anesthesia Post Evaluation    Patient: Mary Ellen Fajardo    Procedure(s) Performed: Procedure(s) (LRB):  AMPUTATION, ABOVE KNEE (Left)    Final Anesthesia Type: general  Patient location during evaluation: GI PACU  Patient participation: Yes- Able to Participate  Level of consciousness: awake and alert  Post-procedure vital signs: reviewed and stable  Pain management: adequate  Airway patency: patent  PONV status at discharge: No PONV  Anesthetic complications: no      Cardiovascular status: blood pressure returned to baseline and hemodynamically stable  Respiratory status: unassisted and spontaneous ventilation  Hydration status: euvolemic  Follow-up not needed.          Vitals Value Taken Time   /56 7/26/2019  5:29 AM   Temp 36.6 °C (97.9 °F) 7/26/2019  5:29 AM   Pulse 74 7/26/2019  5:29 AM   Resp 19 7/26/2019  5:29 AM   SpO2 92 % 7/26/2019  5:29 AM         Event Time     Out of Recovery 07/23/2019 19:24:08          Pain/Teresita Score: Pain Rating Prior to Med Admin: 8 (7/26/2019  1:49 AM)  Pain Rating Post Med Admin: 7 (7/25/2019  6:00 PM)

## 2019-07-26 NOTE — PROGRESS NOTES
TN contacted [facility] to inform that patient's updated Ortho appt was sent via [facility]. Spoke with Sumaya at [facility].

## 2019-07-26 NOTE — PT/OT/SLP DISCHARGE
Physical Therapy Discharge Summary    Name: Mary Ellen Fajardo  MRN: [id number]   Principal Problem: Septic arthritis of knee, left     Patient Discharged from acute Physical Therapy on 19.  Please refer to prior PT notes for functional status.     Assessment:     Patient appropriate for care in another setting.    Objective:     GOALS:   Multidisciplinary Problems     Physical Therapy Goals        Problem: Physical Therapy Goal    Goal Priority Disciplines Outcome Goal Variances Interventions   Physical Therapy Goal     PT, PT/OT Ongoing (interventions implemented as appropriate)     Description:  Goals to be met by: 8/3/19     Patient will increase functional independence with mobility by performin. Supine to sit with Set-up [place]  2. Sit to supine with Stand-by Assistance  3. Rolling to Left and Right with Modified [place].  4. Bed to chair transfer to be assessed  5. Wheelchair propulsion to be assessed  6. Sitting at edge of bed x 15 minutes with Supervision                      Reasons for Discontinuation of Therapy Services  Transfer to alternate level of care.      Plan:     Patient Discharged to: Inpatient Rehab.    Henrietta Inman, PT  2019

## 2019-07-26 NOTE — PLAN OF CARE
07/26/19 1120   Final Note   Assessment Type Final Discharge Note   Anticipated Discharge Disposition Rehab   What phone number can be called within the next 1-3 days to see how you are doing after discharge? [phone]   [facility] Referral Info   Post Acute Recommendation IRF   Referral Type IRF   Facility Name [facility]    [address] [place] [address]     Loan ([facility]) informed SW she retreive pt's orders via right care. Loan provided SW with call report information. According to Loan, pt will transport to room 407, and the nurse can call report to [phone].      12:10PM  ADT 30 order placed for  Transportation.  ETA: 1:15pm.   If transportation does not arrive at ETA time nurse will be instructed to follow protocol for transportation below:  How can I get in touch directly with dispatch, if needed?                 Non-emergent dispatch: [phone]                                    Emergent dispatch: [phone]  Non-emergent (stretcher): [phone]  Escalation Needs (PFC Lead): [phone]    [facility] provided pt's nurse, Valerie, with travel packet, call report information, and transport ETA.

## 2019-07-26 NOTE — PROGRESS NOTES
Ortho appt scheduled.    Follow-up Information     Phil Wilson PA-C On 7/29/2019.    Specialty:  Orthopedic Surgery  Why:  For wound re-check on Monday @ 10:00am, outpatient services  Contact information:  [address]  [address]  [place] [address]  [phone]

## 2019-07-26 NOTE — PLAN OF CARE
[facility]    FACILITY TRANSFER ORDERS      Patient Name: Mary Ellen Fajardo  YOB: 1955    PCP: Any Tran MD   PCP Address: [address] [place] [address] [facility] [employer] [facility]*  PCP Phone Number: [phone]  PCP Fax: [phone]    Encounter Date: 07/26/2019    Admit to: [facility]    Vital Signs:  Routine    Diagnoses:   Active Hospital Problems    Diagnosis  POA    *Septic arthritis of knee, left [M00.9]  Yes    Mood insomnia [F51.05, F39]  Unknown    Evaluation by psychiatric service required [Z00.8]  Not Applicable    Acute on chronic diastolic heart failure [I50.33]  Yes    Acute on chronic respiratory failure with hypercapnia [J96.22]  Yes    Infection due to urethral catheter [T83.511A, N39.0]  Yes    Bipolar I disorder, current or most recent episode depressed, with psychotic features [F31.5]  Yes    Chronic respiratory failure with hypercapnia [J96.12]  Yes     Chronic    Obesity, Class II, BMI 35-39.9 [E66.9]  Yes     Chronic    Chronic indwelling suprapubic catheter in place [Z93.59]  Not Applicable     Chronic    COPD (chronic obstructive pulmonary disease) [J44.9]  Yes     Chronic    ADEEL on CPAP [G47.33, Z99.89]  Not Applicable     Chronic    Essential hypertension [I10]  Yes     Chronic      Resolved Hospital Problems    Diagnosis Date Resolved POA    Volume overload [E87.70] 07/20/2019 No       Allergies:  Review of patient's allergies indicates:   Allergen Reactions    Rocephin [ceftriaxone] Rash     Rash 2012? Pt agreeable to try with pre mediation with benadryl.        Diet: 2 gram sodium diet    Activities: Activity as tolerated, Up with assistance and Weight bearing as tolerated    Nursing:   SN to complete comprehensive assessment including routine vital signs. Instruct on disease process and s/s of complications to report to MD. Review/verify medication list sent home with the patient at time of discharge  and  instruct patient/caregiver as needed. Frequency may be adjusted depending on start of care date.    Notify MD if SBP > 160 or < 90; DBP > 90 or < 50; HR > 120 or < 50; Temp > 101    CONSULTS:    Physical Therapy to evaluate and treat.  and Occupational Therapy to evaluate and treat.  Oxygen: low flow nasal cannula continuously 2 liters  MISCELLANEOUS CARE:  Wolfe Care: Empty Wolfe bag every shift. Change Wolfe every month.    DVT prophylaxis: heparin 5,000 units SQ every 8 hours    WOUND CARE ORDERS  Patient has dressing to left stump applied by orthopedic surgery. Please do not change unless orthopedic surgeon indicates. She will follow up next week for wound check.     Medications: Review discharge medications with patient and family and provide education.      Current Discharge Medication List      START taking these medications    Details   HYDROcodone-acetaminophen (NORCO) 7.5-325 mg per tablet Take 1 tablet by mouth every 4 (four) hours as needed (severe pain).  Qty: 20 tablet, Refills: 0      lubiprostone (AMITIZA) 24 MCG Cap Take 1 capsule (24 mcg total) by mouth daily with breakfast. for 20 days  Qty: 20 capsule, Refills: 0      CONTINUE these medications which have NOT CHANGED    Details   baclofen (LIORESAL) 20 MG tablet 10 mg 4 (four) times daily.   Refills: 5      bumetanide (BUMEX) 2 MG tablet Take 2 mg by mouth.      FLUoxetine 10 MG capsule Take 1 capsule (10 mg total) by mouth once daily.  Qty: 90 capsule, Refills: 1    Associated Diagnoses: Bipolar I disorder, current or most recent episode depressed, with psychotic features with anxious distress      gabapentin (NEURONTIN) 300 MG capsule 600 mg 3 (three) times daily.       ipratropium (ATROVENT) 0.02 % nebulizer solution U 1 VIAL VIA NEBULIZER Q 4 H WHILE AWAKE  Refills: 12      OLANZapine (ZYPREXA) 5 MG tablet Take 1 tablet (5 mg total) by mouth every evening.  Qty: 90 tablet, Refills: 1    Associated Diagnoses: Bipolar I disorder, current or  most recent episode depressed, with psychotic features with anxious distress; Insomnia related to another mental disorder      potassium chloride (KLOR-CON) 10 MEQ TbSR Take one tablet by mouth daily      trazodone (DESYREL) 100 MG tablet 100 mg every evening. 1/2 tablet by mouth every evening   Refills: 5      catheter 18 Fr Misc Change every 20 days  Qty: 10 each, Refills: 1    Associated Diagnoses: Neurogenic bladder      hydrOXYzine HCl (ATARAX) 25 MG tablet Take 1 tablet (25 mg total) by mouth 3 (three) times daily for anxiety.  Qty: 90 tablet, Refills: 0      promethazine (PHENERGAN) 25 MG tablet TAKE 1 TABLET BY MOUTH EVERY 8 HOURS AS NEEDED FOR NAUSEA AND VOMITING  Refills: 0        Electronically signed  _________________________________  Micheline Lujan MD  07/26/2019

## 2019-08-23 PROCEDURE — G0180 MD CERTIFICATION HHA PATIENT: HCPCS | Mod: ,,, | Performed by: PHYSICAL MEDICINE & REHABILITATION

## 2019-08-23 PROCEDURE — G0180 PR HOME HEALTH MD CERTIFICATION: ICD-10-PCS | Mod: ,,, | Performed by: PHYSICAL MEDICINE & REHABILITATION

## 2019-09-13 ENCOUNTER — TELEPHONE (OUTPATIENT)
Dept: HOME HEALTH SERVICES | Facility: HOSPITAL | Age: 64
End: 2019-09-13

## 2019-09-17 PROBLEM — Z80.0 FAMILY HISTORY OF COLON CANCER IN FATHER: Status: ACTIVE | Noted: 2019-09-17

## 2019-09-17 PROBLEM — Z86.010 HX OF COLONIC POLYP: Status: ACTIVE | Noted: 2019-09-17

## 2019-09-20 ENCOUNTER — HOSPITAL ENCOUNTER (INPATIENT)
Facility: HOSPITAL | Age: 64
LOS: 4 days | Discharge: HOME HEALTH | DRG: 208 | End: 2019-09-24
Attending: EMERGENCY MEDICINE | Admitting: EMERGENCY MEDICINE
Payer: MEDICARE

## 2019-09-20 DIAGNOSIS — R50.9 FEVER, UNSPECIFIED FEVER CAUSE: ICD-10-CM

## 2019-09-20 DIAGNOSIS — N39.0 URINARY TRACT INFECTION WITHOUT HEMATURIA, SITE UNSPECIFIED: ICD-10-CM

## 2019-09-20 DIAGNOSIS — R06.02 SHORTNESS OF BREATH: ICD-10-CM

## 2019-09-20 DIAGNOSIS — J44.1 COPD EXACERBATION: ICD-10-CM

## 2019-09-20 DIAGNOSIS — R09.02 HYPOXIA: ICD-10-CM

## 2019-09-20 DIAGNOSIS — I50.9 CONGESTIVE HEART FAILURE, UNSPECIFIED HF CHRONICITY, UNSPECIFIED HEART FAILURE TYPE: ICD-10-CM

## 2019-09-20 DIAGNOSIS — J96.22 ACUTE AND CHRONIC RESPIRATORY FAILURE WITH HYPERCAPNIA: Primary | ICD-10-CM

## 2019-09-20 PROBLEM — E66.01 CLASS 3 SEVERE OBESITY IN ADULT: Status: ACTIVE | Noted: 2019-01-06

## 2019-09-20 PROBLEM — I50.32 CHRONIC DIASTOLIC HEART FAILURE: Status: ACTIVE | Noted: 2019-09-20

## 2019-09-20 PROBLEM — G93.41 ENCEPHALOPATHY, METABOLIC: Status: ACTIVE | Noted: 2019-09-20

## 2019-09-20 PROBLEM — J96.02 ACUTE ON CHRONIC RESPIRATORY ACIDOSIS: Status: ACTIVE | Noted: 2019-09-20

## 2019-09-20 PROBLEM — J96.12 ACUTE ON CHRONIC RESPIRATORY ACIDOSIS: Status: ACTIVE | Noted: 2019-09-20

## 2019-09-20 LAB
ALBUMIN SERPL BCP-MCNC: 3.5 G/DL (ref 3.5–5.2)
ALLENS TEST: ABNORMAL
ALP SERPL-CCNC: 83 U/L (ref 55–135)
ALT SERPL W/O P-5'-P-CCNC: 8 U/L (ref 10–44)
AMORPH CRY URNS QL MICRO: ABNORMAL
ANION GAP SERPL CALC-SCNC: 14 MMOL/L (ref 8–16)
AST SERPL-CCNC: 18 U/L (ref 10–40)
BACTERIA #/AREA URNS HPF: ABNORMAL /HPF
BASOPHILS # BLD AUTO: 0.01 K/UL (ref 0–0.2)
BASOPHILS NFR BLD: 0.1 % (ref 0–1.9)
BILIRUB SERPL-MCNC: 0.6 MG/DL (ref 0.1–1)
BILIRUB UR QL STRIP: NEGATIVE
BNP SERPL-MCNC: 389 PG/ML (ref 0–99)
BUN SERPL-MCNC: 12 MG/DL (ref 8–23)
CALCIUM SERPL-MCNC: 9 MG/DL (ref 8.7–10.5)
CAOX CRY URNS QL MICRO: ABNORMAL
CHLORIDE SERPL-SCNC: 93 MMOL/L (ref 95–110)
CLARITY UR: ABNORMAL
CO2 SERPL-SCNC: 33 MMOL/L (ref 23–29)
COLOR UR: YELLOW
CREAT SERPL-MCNC: 1 MG/DL (ref 0.5–1.4)
CTP QC/QA: YES
DELSYS: ABNORMAL
DIFFERENTIAL METHOD: ABNORMAL
EOSINOPHIL # BLD AUTO: 0 K/UL (ref 0–0.5)
EOSINOPHIL NFR BLD: 0 % (ref 0–8)
EP: 10
ERYTHROCYTE [DISTWIDTH] IN BLOOD BY AUTOMATED COUNT: 14.9 % (ref 11.5–14.5)
ERYTHROCYTE [SEDIMENTATION RATE] IN BLOOD BY WESTERGREN METHOD: 20 MM/H
ERYTHROCYTE [SEDIMENTATION RATE] IN BLOOD BY WESTERGREN METHOD: 8 MM/H
EST. GFR  (AFRICAN AMERICAN): >60 ML/MIN/1.73 M^2
EST. GFR  (NON AFRICAN AMERICAN): >60 ML/MIN/1.73 M^2
FIO2: 30
FLOW: 2
FLOW: 3
FLOW: 3
GLUCOSE SERPL-MCNC: 124 MG/DL (ref 70–110)
GLUCOSE UR QL STRIP: NEGATIVE
HCO3 UR-SCNC: 34.7 MMOL/L (ref 24–28)
HCO3 UR-SCNC: 35.7 MMOL/L (ref 24–28)
HCO3 UR-SCNC: 38.1 MMOL/L (ref 24–28)
HCO3 UR-SCNC: 39.3 MMOL/L (ref 24–28)
HCT VFR BLD AUTO: 42.7 % (ref 37–48.5)
HGB BLD-MCNC: 12.6 G/DL (ref 12–16)
HGB UR QL STRIP: ABNORMAL
HYALINE CASTS #/AREA URNS LPF: 0 /LPF
IP: 20
KETONES UR QL STRIP: NEGATIVE
LACTATE SERPL-SCNC: 1.1 MMOL/L (ref 0.5–2.2)
LEUKOCYTE ESTERASE UR QL STRIP: ABNORMAL
LYMPHOCYTES # BLD AUTO: 2.5 K/UL (ref 1–4.8)
LYMPHOCYTES NFR BLD: 26.8 % (ref 18–48)
MCH RBC QN AUTO: 25.6 PG (ref 27–31)
MCHC RBC AUTO-ENTMCNC: 29.5 G/DL (ref 32–36)
MCV RBC AUTO: 87 FL (ref 82–98)
MICROSCOPIC COMMENT: ABNORMAL
MIN VOL: 6.4
MODE: ABNORMAL
MONOCYTES # BLD AUTO: 0.8 K/UL (ref 0.3–1)
MONOCYTES NFR BLD: 8 % (ref 4–15)
NEUTROPHILS # BLD AUTO: 6.1 K/UL (ref 1.8–7.7)
NEUTROPHILS NFR BLD: 65.2 % (ref 38–73)
NITRITE UR QL STRIP: POSITIVE
PCO2 BLDA: 66.8 MMHG (ref 35–45)
PCO2 BLDA: 74 MMHG (ref 35–45)
PCO2 BLDA: 74.7 MMHG (ref 35–45)
PCO2 BLDA: 78.3 MMHG (ref 35–45)
PH SMN: 7.29 [PH] (ref 7.35–7.45)
PH SMN: 7.3 [PH] (ref 7.35–7.45)
PH SMN: 7.32 [PH] (ref 7.35–7.45)
PH SMN: 7.33 [PH] (ref 7.35–7.45)
PH UR STRIP: 7 [PH] (ref 5–8)
PLATELET # BLD AUTO: 211 K/UL (ref 150–350)
PMV BLD AUTO: 9.8 FL (ref 9.2–12.9)
PO2 BLDA: 102 MMHG (ref 80–100)
PO2 BLDA: 64 MMHG (ref 80–100)
PO2 BLDA: 67 MMHG (ref 80–100)
PO2 BLDA: 78 MMHG (ref 80–100)
POC BE: 10 MMOL/L
POC BE: 6 MMOL/L
POC BE: 6 MMOL/L
POC BE: 8 MMOL/L
POC MOLECULAR INFLUENZA A AGN: NEGATIVE
POC MOLECULAR INFLUENZA B AGN: NEGATIVE
POC SATURATED O2: 89 % (ref 95–100)
POC SATURATED O2: 90 % (ref 95–100)
POC SATURATED O2: 94 % (ref 95–100)
POC SATURATED O2: 97 % (ref 95–100)
POC TCO2: 37 MMOL/L (ref 23–27)
POC TCO2: 38 MMOL/L (ref 23–27)
POC TCO2: 40 MMOL/L (ref 23–27)
POC TCO2: 42 MMOL/L (ref 23–27)
POTASSIUM SERPL-SCNC: 3.4 MMOL/L (ref 3.5–5.1)
PROCALCITONIN SERPL IA-MCNC: 1.06 NG/ML
PROT SERPL-MCNC: 8.9 G/DL (ref 6–8.4)
PROT UR QL STRIP: ABNORMAL
RBC # BLD AUTO: 4.93 M/UL (ref 4–5.4)
RBC #/AREA URNS HPF: 4 /HPF (ref 0–4)
SAMPLE: ABNORMAL
SITE: ABNORMAL
SODIUM SERPL-SCNC: 140 MMOL/L (ref 136–145)
SP GR UR STRIP: 1.01 (ref 1–1.03)
SP02: 91
SP02: 93
SP02: 94
SP02: 98
SPONT RATE: 13
SQUAMOUS #/AREA URNS HPF: ABNORMAL /HPF
TROPONIN I SERPL DL<=0.01 NG/ML-MCNC: 0.04 NG/ML (ref 0–0.03)
TSH SERPL DL<=0.005 MIU/L-ACNC: 0.62 UIU/ML (ref 0.4–4)
URN SPEC COLLECT METH UR: ABNORMAL
UROBILINOGEN UR STRIP-ACNC: NEGATIVE EU/DL
WBC # BLD AUTO: 9.32 K/UL (ref 3.9–12.7)
WBC #/AREA URNS HPF: 50 /HPF (ref 0–5)

## 2019-09-20 PROCEDURE — 36569 INSJ PICC 5 YR+ W/O IMAGING: CPT

## 2019-09-20 PROCEDURE — 31500 INSERT EMERGENCY AIRWAY: CPT | Mod: ,,, | Performed by: INTERNAL MEDICINE

## 2019-09-20 PROCEDURE — 80053 COMPREHEN METABOLIC PANEL: CPT

## 2019-09-20 PROCEDURE — 99900035 HC TECH TIME PER 15 MIN (STAT)

## 2019-09-20 PROCEDURE — 25000003 PHARM REV CODE 250: Performed by: INTERNAL MEDICINE

## 2019-09-20 PROCEDURE — 63600175 PHARM REV CODE 636 W HCPCS: Performed by: EMERGENCY MEDICINE

## 2019-09-20 PROCEDURE — 36600 WITHDRAWAL OF ARTERIAL BLOOD: CPT

## 2019-09-20 PROCEDURE — 93010 ELECTROCARDIOGRAM REPORT: CPT | Mod: ,,, | Performed by: INTERNAL MEDICINE

## 2019-09-20 PROCEDURE — 87088 URINE BACTERIA CULTURE: CPT

## 2019-09-20 PROCEDURE — 93005 ELECTROCARDIOGRAM TRACING: CPT

## 2019-09-20 PROCEDURE — 87086 URINE CULTURE/COLONY COUNT: CPT

## 2019-09-20 PROCEDURE — 20000000 HC ICU ROOM

## 2019-09-20 PROCEDURE — 31500 INTUBATION: ICD-10-PCS | Mod: ,,, | Performed by: INTERNAL MEDICINE

## 2019-09-20 PROCEDURE — 82803 BLOOD GASES ANY COMBINATION: CPT

## 2019-09-20 PROCEDURE — 87077 CULTURE AEROBIC IDENTIFY: CPT | Mod: 59

## 2019-09-20 PROCEDURE — 81000 URINALYSIS NONAUTO W/SCOPE: CPT

## 2019-09-20 PROCEDURE — 96375 TX/PRO/DX INJ NEW DRUG ADDON: CPT

## 2019-09-20 PROCEDURE — 94660 CPAP INITIATION&MGMT: CPT

## 2019-09-20 PROCEDURE — 36415 COLL VENOUS BLD VENIPUNCTURE: CPT

## 2019-09-20 PROCEDURE — 63600175 PHARM REV CODE 636 W HCPCS: Performed by: INTERNAL MEDICINE

## 2019-09-20 PROCEDURE — 87186 SC STD MICRODIL/AGAR DIL: CPT | Mod: 59

## 2019-09-20 PROCEDURE — 84443 ASSAY THYROID STIM HORMONE: CPT

## 2019-09-20 PROCEDURE — 84145 PROCALCITONIN (PCT): CPT

## 2019-09-20 PROCEDURE — 87632 RESP VIRUS 6-11 TARGETS: CPT

## 2019-09-20 PROCEDURE — 87040 BLOOD CULTURE FOR BACTERIA: CPT | Mod: 59

## 2019-09-20 PROCEDURE — 99291 PR CRITICAL CARE, E/M 30-74 MINUTES: ICD-10-PCS | Mod: 25,,, | Performed by: GENERAL ACUTE CARE HOSPITAL

## 2019-09-20 PROCEDURE — 94640 AIRWAY INHALATION TREATMENT: CPT

## 2019-09-20 PROCEDURE — 99291 CRITICAL CARE FIRST HOUR: CPT | Mod: 25,,, | Performed by: GENERAL ACUTE CARE HOSPITAL

## 2019-09-20 PROCEDURE — 83605 ASSAY OF LACTIC ACID: CPT

## 2019-09-20 PROCEDURE — 94761 N-INVAS EAR/PLS OXIMETRY MLT: CPT

## 2019-09-20 PROCEDURE — 84484 ASSAY OF TROPONIN QUANT: CPT

## 2019-09-20 PROCEDURE — 93010 EKG 12-LEAD: ICD-10-PCS | Mod: ,,, | Performed by: INTERNAL MEDICINE

## 2019-09-20 PROCEDURE — 27000221 HC OXYGEN, UP TO 24 HOURS

## 2019-09-20 PROCEDURE — 25000003 PHARM REV CODE 250: Performed by: EMERGENCY MEDICINE

## 2019-09-20 PROCEDURE — 25000242 PHARM REV CODE 250 ALT 637 W/ HCPCS: Performed by: EMERGENCY MEDICINE

## 2019-09-20 PROCEDURE — 83880 ASSAY OF NATRIURETIC PEPTIDE: CPT

## 2019-09-20 PROCEDURE — 96365 THER/PROPH/DIAG IV INF INIT: CPT

## 2019-09-20 PROCEDURE — 99291 CRITICAL CARE FIRST HOUR: CPT | Mod: 25

## 2019-09-20 PROCEDURE — 96366 THER/PROPH/DIAG IV INF ADDON: CPT

## 2019-09-20 PROCEDURE — 85025 COMPLETE CBC W/AUTO DIFF WBC: CPT

## 2019-09-20 PROCEDURE — 27000190 HC CPAP FULL FACE MASK W/VALVE

## 2019-09-20 RX ORDER — BACLOFEN 10 MG/1
20 TABLET ORAL 3 TIMES DAILY PRN
Status: DISCONTINUED | OUTPATIENT
Start: 2019-09-20 | End: 2019-09-24 | Stop reason: HOSPADM

## 2019-09-20 RX ORDER — BUMETANIDE 1 MG/1
1 TABLET ORAL DAILY
Status: DISCONTINUED | OUTPATIENT
Start: 2019-09-20 | End: 2019-09-24 | Stop reason: HOSPADM

## 2019-09-20 RX ORDER — METHYLPREDNISOLONE SOD SUCC 125 MG
125 VIAL (EA) INJECTION
Status: COMPLETED | OUTPATIENT
Start: 2019-09-20 | End: 2019-09-20

## 2019-09-20 RX ORDER — ACETAMINOPHEN 325 MG/1
650 TABLET ORAL EVERY 6 HOURS PRN
Status: DISCONTINUED | OUTPATIENT
Start: 2019-09-20 | End: 2019-09-22

## 2019-09-20 RX ORDER — DICLOFENAC SODIUM 10 MG/G
2 GEL TOPICAL 2 TIMES DAILY
Status: DISCONTINUED | OUTPATIENT
Start: 2019-09-20 | End: 2019-09-24 | Stop reason: HOSPADM

## 2019-09-20 RX ORDER — GABAPENTIN 300 MG/1
600 CAPSULE ORAL 3 TIMES DAILY
Status: DISCONTINUED | OUTPATIENT
Start: 2019-09-20 | End: 2019-09-24 | Stop reason: HOSPADM

## 2019-09-20 RX ORDER — ALBUTEROL SULFATE 2.5 MG/.5ML
10 SOLUTION RESPIRATORY (INHALATION)
Status: COMPLETED | OUTPATIENT
Start: 2019-09-20 | End: 2019-09-20

## 2019-09-20 RX ORDER — ACETAMINOPHEN 500 MG
1000 TABLET ORAL
Status: COMPLETED | OUTPATIENT
Start: 2019-09-20 | End: 2019-09-20

## 2019-09-20 RX ORDER — POTASSIUM CHLORIDE 20 MEQ/15ML
40 SOLUTION ORAL ONCE
Status: COMPLETED | OUTPATIENT
Start: 2019-09-20 | End: 2019-09-20

## 2019-09-20 RX ORDER — METHYLPREDNISOLONE SOD SUCC 125 MG
80 VIAL (EA) INJECTION EVERY 6 HOURS
Status: DISCONTINUED | OUTPATIENT
Start: 2019-09-20 | End: 2019-09-21

## 2019-09-20 RX ORDER — SODIUM CHLORIDE 0.9 % (FLUSH) 0.9 %
10 SYRINGE (ML) INJECTION
Status: DISCONTINUED | OUTPATIENT
Start: 2019-09-20 | End: 2019-09-24 | Stop reason: HOSPADM

## 2019-09-20 RX ORDER — IPRATROPIUM BROMIDE AND ALBUTEROL SULFATE 2.5; .5 MG/3ML; MG/3ML
3 SOLUTION RESPIRATORY (INHALATION) EVERY 4 HOURS
Status: DISCONTINUED | OUTPATIENT
Start: 2019-09-20 | End: 2019-09-20

## 2019-09-20 RX ORDER — SODIUM CHLORIDE 0.9 % (FLUSH) 0.9 %
10 SYRINGE (ML) INJECTION EVERY 6 HOURS
Status: DISCONTINUED | OUTPATIENT
Start: 2019-09-21 | End: 2019-09-24 | Stop reason: HOSPADM

## 2019-09-20 RX ORDER — IPRATROPIUM BROMIDE 0.5 MG/2.5ML
500 SOLUTION RESPIRATORY (INHALATION)
Status: COMPLETED | OUTPATIENT
Start: 2019-09-20 | End: 2019-09-20

## 2019-09-20 RX ORDER — ACETAMINOPHEN 325 MG/1
650 TABLET ORAL EVERY 8 HOURS PRN
Status: DISCONTINUED | OUTPATIENT
Start: 2019-09-20 | End: 2019-09-22

## 2019-09-20 RX ORDER — ENOXAPARIN SODIUM 100 MG/ML
40 INJECTION SUBCUTANEOUS EVERY 24 HOURS
Status: DISCONTINUED | OUTPATIENT
Start: 2019-09-20 | End: 2019-09-24 | Stop reason: HOSPADM

## 2019-09-20 RX ORDER — ONDANSETRON 2 MG/ML
4 INJECTION INTRAMUSCULAR; INTRAVENOUS EVERY 8 HOURS PRN
Status: DISCONTINUED | OUTPATIENT
Start: 2019-09-20 | End: 2019-09-24 | Stop reason: HOSPADM

## 2019-09-20 RX ORDER — IPRATROPIUM BROMIDE AND ALBUTEROL SULFATE 2.5; .5 MG/3ML; MG/3ML
3 SOLUTION RESPIRATORY (INHALATION) EVERY 4 HOURS
Status: DISCONTINUED | OUTPATIENT
Start: 2019-09-20 | End: 2019-09-24 | Stop reason: HOSPADM

## 2019-09-20 RX ADMIN — IPRATROPIUM BROMIDE 500 MCG: 0.5 SOLUTION RESPIRATORY (INHALATION) at 07:09

## 2019-09-20 RX ADMIN — PIPERACILLIN AND TAZOBACTAM 4.5 G: 4; .5 INJECTION, POWDER, LYOPHILIZED, FOR SOLUTION INTRAVENOUS; PARENTERAL at 07:09

## 2019-09-20 RX ADMIN — GABAPENTIN 600 MG: 300 CAPSULE ORAL at 03:09

## 2019-09-20 RX ADMIN — METHYLPREDNISOLONE SODIUM SUCCINATE 125 MG: 125 INJECTION, POWDER, FOR SOLUTION INTRAMUSCULAR; INTRAVENOUS at 06:09

## 2019-09-20 RX ADMIN — IPRATROPIUM BROMIDE AND ALBUTEROL SULFATE 3 ML: .5; 3 SOLUTION RESPIRATORY (INHALATION) at 08:09

## 2019-09-20 RX ADMIN — IPRATROPIUM BROMIDE AND ALBUTEROL SULFATE 3 ML: .5; 3 SOLUTION RESPIRATORY (INHALATION) at 03:09

## 2019-09-20 RX ADMIN — ENOXAPARIN SODIUM 40 MG: 100 INJECTION SUBCUTANEOUS at 05:09

## 2019-09-20 RX ADMIN — VANCOMYCIN HYDROCHLORIDE 1250 MG: 1.25 INJECTION, POWDER, LYOPHILIZED, FOR SOLUTION INTRAVENOUS at 09:09

## 2019-09-20 RX ADMIN — ACETAMINOPHEN 1000 MG: 500 TABLET ORAL at 06:09

## 2019-09-20 RX ADMIN — METHYLPREDNISOLONE SODIUM SUCCINATE 80 MG: 125 INJECTION, POWDER, FOR SOLUTION INTRAMUSCULAR; INTRAVENOUS at 03:09

## 2019-09-20 RX ADMIN — BUMETANIDE 1 MG: 1 TABLET ORAL at 03:09

## 2019-09-20 RX ADMIN — ALBUTEROL SULFATE 10 MG: 2.5 SOLUTION RESPIRATORY (INHALATION) at 07:09

## 2019-09-20 RX ADMIN — PIPERACILLIN AND TAZOBACTAM 4.5 G: 4; .5 INJECTION, POWDER, LYOPHILIZED, FOR SOLUTION INTRAVENOUS; PARENTERAL at 03:09

## 2019-09-20 RX ADMIN — GABAPENTIN 600 MG: 300 CAPSULE ORAL at 09:09

## 2019-09-20 RX ADMIN — POTASSIUM CHLORIDE 40 MEQ: 20 SOLUTION ORAL at 03:09

## 2019-09-20 RX ADMIN — VANCOMYCIN HYDROCHLORIDE 1750 MG: 100 INJECTION, POWDER, LYOPHILIZED, FOR SOLUTION INTRAVENOUS at 10:09

## 2019-09-20 RX ADMIN — DICLOFENAC SODIUM 2 G: 10 GEL TOPICAL at 05:09

## 2019-09-20 RX ADMIN — ACETAMINOPHEN 650 MG: 325 TABLET, FILM COATED ORAL at 05:09

## 2019-09-20 NOTE — PLAN OF CARE
Problem: Adult Inpatient Plan of Care  Goal: Plan of Care Review  Outcome: Ongoing (interventions implemented as appropriate)  Pt remains on 2 L/M NC with bipap at the [facility] on standby. Continue duoneb svn tx and bipap therapy as ordered. Will continue to monitor. DEYANIRA Willis, RRT

## 2019-09-20 NOTE — SUBJECTIVE & OBJECTIVE
Past Medical History:   Diagnosis Date    Addiction to drug     Alcohol abuse     Anxiety     Arthritis     Asthma     Bipolar disorder     Bladder stones     CHF (congestive heart failure)     COPD (chronic obstructive pulmonary disease)     on home o2    CVA (cerebral vascular accident)     2012    Hallucination     Hepatitis C     treated and cured    History of blood clots     Hx of psychiatric care     Hypertension     Belen     ADEEL treated with BiPAP     Paraplegia     Paraplegic spinal paralysis     Psychiatric problem     Psychosis     AVH; Paranoia    Renal disorder     SCI (spinal cord injury)     incomplete    Sleep difficulties     has sleep apnea and uses a Bi-PAP machine.    Substance abuse     Suprapubic catheter     Therapy     Vaginal delivery     x1       Past Surgical History:   Procedure Laterality Date    AMPUTATION, ABOVE KNEE Left 7/23/2019    Performed by Gordo Rodriguez MD at [facility] OR    BACK SURGERY      bilateral knee replacement      BREAST BIOPSY      cysto/lithopaxy 2017      CYSTOLITHOLOPAXY (REMOVE BLADDER STONE) N/A 12/20/2017    Performed by RAMA Tinoco MD at [facility] OR    CYSTOSCOPY W/ LASER LITHOTRIPSY      CYSTOSCOPY,  OCCLUSION BALLOON PLACEMENT, PERC ACCESS  1/19/2018    Performed by [facility]oco MD at [facility] OR    CYSTOSCOPY, RETROGRADE, URETEROSCOPY, STENT PLACEMENT Right 3/5/2018    Performed by [facility]oco [facility] at [facility] OR    CYSTOSCOPY/ RETROGRADE PYELOGRAM/ WITH JJ URETERAL STENT PLACEMENT RIGHT Right 12/20/2017    Performed by [facility]A [facility]oco MD at [facility] OR    EXCHANGE CATHETER-SUPRAPUBIC  1/19/2018    Performed by [facility]oco [facility] at [facility] OR    EXTRACTION-STONE-URETEROSCOPY Right 3/5/2018    Performed by [facility]oco [facility] at [facility] OR    JOINT REPLACEMENT      bilateral knee    LITHOTRIPSY-LASER Right 3/5/2018    Performed by [facility]oco [facility] at [facility] OR    LITHOTRIPSY-LASER Right 1/29/2018    Performed by  [facility] at [facility]    LITHOTRIPSY-LASER Right 2018    [facility] at [facility]    NEPHROLITHOTOMY-PERCUTANEOUS Right 2018    [facility]formed by [facility] at [facility]    NEPHROSTOLITHOTOMY-PERCUTANEOUS Right 2018    [facility]formed by [facility] at [facility]    NEPHROSTOMY Right 2018    [facility] by [facility] at [facility]    PLACEMENT  2018    Per[facility] by [facility] at [facility]    PLACEMENT-RENAL ACCESS Right 2018    [facility]formed by [facility] at [facility]    PLACEMENT-STENT Right 2018    [facility] by [facility] at [facility]    Procedure is a Left Genicular Nerve Block ** cpt code 60447** Left 2015    Performed by Sara Castle [facility] at [facility]    PYELOGRAM-ANTEGRADE Right 2018    [facility]formed by [facility] at [facility]    PYELOGRAM-ANTEGRADE  2018    [facility]formed by [facility] at [facility]    PYELOGRAM-RETROGRADE  2018    [facility] by [facility] at [facility]    RADIOFREQUENCY THERMOCOAGULATION** Left genicular RFA ** Left 2016    Performed by Sara Castle MD at [facility]    REMOVAL-STENT-URETERAL  3/5/2018    [facility]formed by [facility] at [facility]    REMOVAL-STENT-URETERAL (Cystoscopy) Right 2018    [facility]formed by [facility] at [facility]    URETEROSCOPY Right 2018    [facility] by [facility] at [facility]    URETEROSCOPY Right 2018    Performed by [facility] at [facility]       Review of patient's allergies indicates:   Allergen Reactions    Rocephin [ceftriaxone] Rash     Rash ? Pt agreeable to try with pre mediation with benadryl.        Family History     Problem Relation (Age of Onset)    Anxiety disorder Brother    Dementia Mother    Depression Brother        Tobacco Use    Smoking status: Former Smoker     Last attempt to quit:      Years since quittin.7    Smokeless tobacco: Never  Used   Substance and Sexual Activity    Alcohol use: Not Currently     Comment: occasional    Drug use: Never     Comment: prescribed opioids at present for pain    Sexual activity: Not Currently     Partners: Male     Comment: since 2011         Review of Systems   Constitutional: Positive for fever. Negative for chills and diaphoresis.   HENT: Negative for postnasal drip.    Eyes: Negative for visual disturbance.   Respiratory: Positive for cough and shortness of breath.    Cardiovascular: Negative for chest pain.   Gastrointestinal: Negative for abdominal pain and constipation.   Genitourinary: Negative for hematuria.   Musculoskeletal: Negative for myalgias.   Skin: Negative for rash.   Neurological: Negative for headaches.   Hematological: Negative for adenopathy.     Objective:     Vital Signs (Most Recent):  Temp: 98 °F (36.7 °C) (09/20/19 1313)  Pulse: 62 (09/20/19 1313)  Resp: (!) 29 (09/20/19 1313)  BP: 111/67 (09/20/19 1303)  SpO2: 97 % (09/20/19 1313) Vital Signs (24h Range):  Temp:  [97.9 °F (36.6 °C)-102.5 °F (39.2 °C)] 98 °F (36.7 °C)  Pulse:  [56-96] 62  Resp:  [14-41] 29  SpO2:  [89 %-100 %] 97 %  BP: (105-136)/(53-76) 111/67     Weight: 113.4 kg (250 lb)  Body mass index is 42.91 kg/m².      Intake/Output Summary (Last 24 hours) at 9/20/2019 1341  Last data filed at 9/20/2019 1201  Gross per 24 hour   Intake 600 ml   Output --   Net 600 ml       Physical Exam   Constitutional: She appears well-developed and well-nourished.   HENT:   Head: Normocephalic and atraumatic.   Mouth/Throat: Oropharynx is clear and moist.   Eyes: Pupils are equal, round, and reactive to light. Conjunctivae are normal. Right eye exhibits no discharge. Left eye exhibits no discharge. No scleral icterus.   Neck: Trachea normal, normal range of motion and full passive range of motion without pain. Neck supple. No JVD present. No tracheal deviation present. No thyromegaly present.   Cardiovascular: Normal rate, regular  rhythm, S1 normal, S2 normal, normal heart sounds and intact distal pulses. Exam reveals no gallop and no friction rub.   No murmur heard.  Pulmonary/Chest: She is in respiratory distress. She has no wheezes. She has rales. She exhibits no tenderness.   Abdominal: Soft. Bowel sounds are normal. She exhibits no distension and no mass. There is no tenderness. There is no rebound and no guarding.   Musculoskeletal: Normal range of motion. She exhibits no tenderness or deformity.   Lymphadenopathy:     She has no cervical adenopathy.   Neurological: No cranial nerve deficit. Coordination normal.   Skin: Skin is warm and dry. No abrasion and no bruising noted.   Psychiatric: She has a normal mood and affect.   Vitals reviewed.      Vents:  Oxygen Concentration (%): 30 (09/20/19 1330)    Lines/Drains/Airways     Drain                 Suprapubic Catheter 09/20/19 1002 18 Fr. less than 1 day          Peripheral Intravenous Line                 Peripheral IV - Single Lumen Right Antecubital -- days         Peripheral IV - Single Lumen 09/20/19 0514 18 G Right Hand less than 1 day         Peripheral IV - Single Lumen 09/20/19 0540 20 G Left Hand less than 1 day         Peripheral IV - Single Lumen 09/20/19 1020 20 G Left Antecubital less than 1 day                Significant Labs:    CBC/Anemia Profile:  Recent Labs   Lab 09/20/19 0514   WBC 9.32   HGB 12.6   HCT 42.7      MCV 87   RDW 14.9*        Chemistries:  Recent Labs   Lab 09/20/19  0514      K 3.4*   CL 93*   CO2 33*   BUN 12   CREATININE 1.0   CALCIUM 9.0   ALBUMIN 3.5   PROT 8.9*   BILITOT 0.6   ALKPHOS 83   ALT 8*   AST 18     Significant Imaging:   I have reviewed and interpreted all pertinent imaging results/findings within the past 24 hours.

## 2019-09-20 NOTE — HPI
62 yo morbidly obese female with hx of COPD, CHF, ADEEL, chronic hypoxic respiratory failure on 2L HOT, h/o knee replaced complicated by infection in 2011, left leg amputation, chronic indwelling Delcid catheter, ho stroke, h/o polysubstance abuse and bipolar disorder presenting via EMS for progressively worsening dyspnea since yesterday with associated dry cough, fevers and wheeze. Patient encephalopathy with limited hx. She denied any falls, dizziness, syncope, chest pain, hemoptysis, pleurisy, abdominal pain, diarrhea, constipation or bleeds. Has been having URI/cold like symptoms since yesterday with decreased po intake. Tired her home nebs yesterday and today but didn't help prompting EMS. No recent travel or sick contacts.     In the ED patient was tachyenic, febrile Tm 103, normotensive. Labs significant for UA dirty but has chronic delcid, , LA wnl, ABG noting acute on chronic respiratory acidosis with pH 7.3 and PCO2 of 74. CXR fairly unremakable. Repeat ABG with worsening pH and acidosis. ICU requested for admission.

## 2019-09-20 NOTE — PROCEDURES
"Mary Ellen Fajardo is a 63 y.o. female patient.    Temp: 97.9 °F (36.6 °C) (09/20/19 1600)  Pulse: 67 (09/20/19 1800)  Resp: (!) 31 (09/20/19 1800)  BP: 121/64 (09/20/19 1800)  SpO2: (!) 94 % (09/20/19 1800)  Weight: 112.5 kg (248 lb 0.3 oz) (09/20/19 1345)  Height: 5' 4" (162.6 cm) (09/20/19 0512)       Intubation  Date/Time: 9/20/2019 6:51 PM  Performed by: Pepito Godfrey MD  Authorized by: Pepito Godfrey MD   Pre-operative diagnosis: Acute respiratory failure  Consent Done: Yes  Consent: Verbal consent obtained.  Indications: respiratory distress  Intubation method: video-assisted  Patient status: paralyzed (RSI)  Preoxygenation: bag valve mask  Sedatives: etomidate  Paralytic: rocuronium  Laryngoscope size: Mac 3  Tube size: 8.0 mm  Tube type: cuffed  Number of attempts: 1  Cricoid pressure: no  Cords visualized: yes  Post-procedure assessment: CO2 detector and chest rise  Breath sounds: clear  Cuff inflated: yes  Complications: No          Pepito Godfrey  9/20/2019  "

## 2019-09-20 NOTE — ED TRIAGE NOTES
Came in via EMS for SOB since 4pm yesterday. Patient verbalized she used her nebulizer but did not get better.

## 2019-09-20 NOTE — EICU
New patient admitted.  Video rounds completed.   BiPAP in place. Pt resting quietly with eyes closed.  NAD noted at present.

## 2019-09-20 NOTE — HPI
"Ms. Fajardo is a 62 y/o female with a history significant for COPD (no PFTs available), diastolic heart failure, and urinary retention requiring suprapubic catheter who presents to [facility] on 09/20/19 with respiratory distress. Ms. Fajardo notes that yesterday she began to experience a dry cough, a subjective fever, and "not feeling myself." In the ED she was found to be hypercapnic (baseline CO2 appears to be around 60) and was started on bipap and admitted to the ICU. She was given bronchodilators, steroids, and was started on broad spectrum antibiotics.   "

## 2019-09-20 NOTE — SUBJECTIVE & OBJECTIVE
Past Medical History:   Diagnosis Date    Addiction to drug     Alcohol abuse     Anxiety     Arthritis     Asthma     Bipolar disorder     Bladder stones     CHF (congestive heart failure)     COPD (chronic obstructive pulmonary disease)     on home o2    CVA (cerebral vascular accident)     2012    Hallucination     Hepatitis C     treated and cured    History of blood clots     Hx of psychiatric care     Hypertension     Belen     ADEEL treated with BiPAP     Paraplegia     Paraplegic spinal paralysis     Psychiatric problem     Psychosis     AVH; Paranoia    Renal disorder     SCI (spinal cord injury)     incomplete    Sleep difficulties     has sleep apnea and uses a Bi-PAP machine.    Substance abuse     Suprapubic catheter     Therapy     Vaginal delivery     x1       Past Surgical History:   Procedure Laterality Date    AMPUTATION, ABOVE KNEE Left 7/23/2019    Performed by [facility] at [facility] OR    BACK SURGERY      bilateral knee replacement      BREAST BIOPSY      cysto/lithopaxy 2017      CYSTOLITHOLOPAXY (REMOVE BLADDER STONE) N/A 12/20/2017    Performed by RAMA [facility]oco [facility] at [facility] OR    CYSTOSCOPY W/ LASER LITHOTRIPSY      CYSTOSCOPY,  OCCLUSION BALLOON PLACEMENT, PERC ACCESS  1/19/2018    Performed by [facility] at [facility] OR    CYSTOSCOPY, RETROGRADE, URETEROSCOPY, STENT PLACEMENT Right 3/5/2018    Performed by [facility] at [facility] OR    CYSTOSCOPY/ RETROGRADE PYELOGRAM/ WITH JJ URETERAL STENT PLACEMENT RIGHT Right 12/20/2017    Performed by [facility]oco [facility] at [facility] OR    EXCHANGE CATHETER-SUPRAPUBIC  1/19/2018    Performed by [facility]oco MD at [facility] OR    EXTRACTION-STONE-URETEROSCOPY Right 3/5/2018    Performed by [facility] at [facility] OR    JOINT REPLACEMENT      bilateral knee    LITHOTRIPSY-LASER Right 3/5/2018    Performed by [facility]oco [facility] at [facility] OR    LITHOTRIPSY-LASER Right 1/29/2018    Performed by  [facility] at [facility] OR    LITHOTRIPSY-LASER Right 1/19/2018    Performed by [facility] at [facility] OR    NEPHROLITHOTOMY-PERCUTANEOUS Right 1/19/2018    Performed by [facility] at [facility] OR    NEPHROSTOLITHOTOMY-PERCUTANEOUS Right 1/29/2018    Performed by [facility] at [facility] OR    NEPHROSTOMY Right 1/29/2018    Performed by [facility]oco [facility] at [facility] OR    PLACEMENT  1/19/2018    Performed by [facility] at [facility] OR    PLACEMENT-RENAL ACCESS Right 1/19/2018    Performed by [facility]A [facility]oco MD at [facility] OR    PLACEMENT-STENT Right 1/29/2018    Performed by [facility]oco [facility] at [facility] OR    Procedure is a Left Genicular Nerve Block ** cpt code 48341** Left 9/16/2015    Performed by [facility] Castle [facility] at [facility]    PYELOGRAM-ANTEGRADE Right 1/29/2018    Performed by [facility]oco [facility] at [facility] OR    PYELOGRAM-ANTEGRADE  1/19/2018    Performed by [facility]oco [facility] at [facility] OR    PYELOGRAM-RETROGRADE  1/19/2018    Performed by [facility]oco [facility] at [facility] OR    RADIOFREQUENCY THERMOCOAGULATION** Left genicular RFA ** Left 1/20/2016    Performed by Sara Castle MD at [facility]    REMOVAL-STENT-URETERAL  3/5/2018    Performed by [facility]oco [facility] at [facility] OR    REMOVAL-STENT-URETERAL (Cystoscopy) Right 4/9/2018    Performed by [facility]oco MD at [facility] OR    URETEROSCOPY Right 1/29/2018    Performed by [facility] at [facility] OR    URETEROSCOPY Right 1/19/2018    Performed by RAM[facility]oco MD at [facility] OR       Review of patient's allergies indicates:   Allergen Reactions    Rocephin [ceftriaxone] Rash     Rash 2012? Pt agreeable to try with pre mediation with benadryl.        No current facility-administered medications on file prior to encounter.      Current Outpatient Medications on File Prior to Encounter   Medication Sig    LINZESS 290 mcg Cap capsule TK 1 C PO D    miscellaneous medical supply (C-TUB) Misc NEEDS TO SWITCH DME  [facility] FOR OXYGEN AT 2L. LAST OXYGEN SATURATION WAS 83% ON Room Air. She uses  BIPAP and  Needs  New mask, tubings and    OLANZapine (ZYPREXA) 10 MG tablet Take 10 mg by mouth.    OLANZapine (ZYPREXA) 5 MG tablet Take 1 tablet (5 mg total) by mouth every evening.    oxyCODONE-acetaminophen (PERCOCET)  mg per tablet Take 1 tablet by mouth every 4 (four) hours as needed.    potassium chloride (K-TAB) 10 MEQ TbSR Take 10 mEq by mouth.    promethazine (PHENERGAN) 25 MG tablet TAKE 1 TABLET BY MOUTH EVERY 8 HOURS AS NEEDED FOR NAUSEA AND VOMITING    traZODone (DESYREL) 50 MG tablet Take 50 mg by mouth.    albuterol-ipratropium (DUO-NEB) 2.5 mg-0.5 mg/3 mL nebulizer solution Inhale 3 mLs into the lungs.    baclofen (LIORESAL) 20 MG tablet 10 mg 4 (four) times daily.     baclofen (LIORESAL) 20 MG tablet Take 1 tablet (20 mg total) by mouth 3 (three) times daily as needed (for muscle spasms).    bumetanide (BUMEX) 1 MG tablet TK 1 T PO D    catheter 18 Fr Misc Change every 20 days    FLUoxetine 10 MG capsule Take 1 capsule (10 mg total) by mouth once daily.    FLUoxetine 10 MG capsule Take 10 mg by mouth.    gabapentin (NEURONTIN) 300 MG capsule 600 mg 3 (three) times daily.     hydrOXYzine HCl (ATARAX) 25 MG tablet Take 1 tablet (25 mg total) by mouth 3 (three) times daily.    hydrOXYzine HCl (ATARAX) 25 MG tablet Take 25 mg by mouth 3 (three) times daily as needed.    ipratropium (ATROVENT) 0.02 % nebulizer solution U 1 VIAL VIA NEBULIZER Q 4 H WHILE AWAKE    VENTOLIN HFA 90 mcg/actuation inhaler     [DISCONTINUED] baclofen (LIORESAL) 10 MG tablet Take 10 mg by mouth.    [DISCONTINUED] bumetanide (BUMEX) 2 MG tablet Take 2 mg by mouth.    [DISCONTINUED] gabapentin (NEURONTIN) 600 MG tablet Take 600 mg by mouth.    [DISCONTINUED] HYDROcodone-acetaminophen (NORCO) 7.5-325 mg per tablet Take 1 tablet by mouth every 4 (four) hours as needed (severe pain).    [DISCONTINUED] lidocaine (LIDODERM) 5  % Place 1 patch onto the skin.    [DISCONTINUED] linaCLOtide (LINZESS) 290 mcg Cap capsule Take 290 mcg by mouth.    [DISCONTINUED] OLANZapine (ZYPREXA) 5 MG tablet TAKE 1 TABLET BY MOUTH EVERY NIGHT    [DISCONTINUED] oxybutynin (DITROPAN-XL) 5 MG TR24 TAKE 1 TABLET BY MOUTH EVERY DAY    [DISCONTINUED] potassium chloride (KLOR-CON) 10 MEQ TbSR     [DISCONTINUED] promethazine (PHENERGAN) 25 MG tablet Take 25 mg by mouth every 8 (eight) hours as needed.    [DISCONTINUED] trazodone (DESYREL) 100 MG tablet 100 mg every evening. 1/2 tablet by mouth every evening      Family History     Problem Relation (Age of Onset)    Anxiety disorder Brother    Dementia Mother    Depression Brother        Tobacco Use    Smoking status: Former Smoker     Last attempt to quit:      Years since quittin.7    Smokeless tobacco: Never Used   Substance and Sexual Activity    Alcohol use: Not Currently     Comment: occasional    Drug use: Never     Comment: prescribed opioids at present for pain    Sexual activity: Not Currently     Partners: Male     Comment: since      Review of Systems   Unable to perform ROS: Mental status change     Objective:     Vital Signs (Most Recent):  Temp: 98 °F (36.7 °C) (19 1313)  Pulse: 60 (19 1345)  Resp: 18 (19 1345)  BP: 130/70 (19 1330)  SpO2: 95 % (19 1345) Vital Signs (24h Range):  Temp:  [97.9 °F (36.6 °C)-102.5 °F (39.2 °C)] 98 °F (36.7 °C)  Pulse:  [56-96] 60  Resp:  [14-41] 18  SpO2:  [89 %-100 %] 95 %  BP: (105-136)/(53-76) 130/70     Weight: 112.5 kg (248 lb 0.3 oz)  Body mass index is 42.57 kg/m².    Physical Exam   Constitutional: No distress.   Obese female   HENT:   Head: Normocephalic and atraumatic.   Bipap   Eyes: Pupils are equal, round, and reactive to light. EOM are normal. No scleral icterus.   Neck: Normal range of motion.   Thick neck   Cardiovascular: Normal rate and regular rhythm.   Pulmonary/Chest: Effort normal and breath  sounds normal. No respiratory distress. She has no wheezes. She has no rales.   Abdominal: Soft. Bowel sounds are normal.   Musculoskeletal: Normal range of motion. She exhibits edema (+1 LE edema).   Neurological:   Easily arousable but falling asleep quickly mid conversation   Skin: Skin is warm and dry. Capillary refill takes less than 2 seconds. She is not diaphoretic.   Nursing note and vitals reviewed.        CRANIAL NERVES     CN III, IV, VI   Pupils are equal, round, and reactive to light.  Extraocular motions are normal.        Significant Labs: All pertinent labs within the past 24 hours have been reviewed.    Significant Imaging: I have reviewed and interpreted all pertinent imaging results/findings within the past 24 hours.

## 2019-09-20 NOTE — ED NOTES
Report received from Night shift. Pt to ED for increasing SOB. Pt markedly immobile. Left AKA noted. Pt uses BiPAP intermittently at home. Suprapubic in place.   Patient identifiers verified and correct for Mary Ellen Fajardo.    LOC: The patient is awake, alert and aware of environment with an appropriate affect, the patient is oriented x 3 and speaking appropriately.  APPEARANCE: Patient resting comfortably and in no acute distress, patient is clean and well groomed, patient's clothing is properly fastened. Moving well in bed by self from side to side.   SKIN: The skin is warm and dry, color consistent with ethnicity, patient has normal skin turgor and moist mucus membranes, skin intact, no breakdown or bruising noted. Healing burst blister noted to right posterior buttocks. No drainage or s/s of infection noted.   MUSCULOSKELETAL: Patient moving all extremities spontaneously per normal for pt, no obvious swelling or deformities noted.   RESPIRATORY: Airway is open and patent, respirations are spontaneous, patient has a normal effort and rate, no accessory muscle use noted, bilateral breath sounds diminished.  CARDIAC: Patient has a normal rate and regular rhythm, no periphreal edema noted, capillary refill < 3 seconds.  ABDOMEN: Soft and non tender to palpation, no distention noted, normoactive bowel sounds present in all four quadrants.  NEUROLOGIC: eyes open spontaneously, behavior appropriate to situation, follows commands, facial expression symmetrical, bilateral hand grasp equal and even, purposeful motor response noted.   Safety measures in place. Plan of care discussed.

## 2019-09-20 NOTE — ED PROVIDER NOTES
Encounter Date: 9/20/2019    SCRIBE #1 NOTE: I, Andreea Bustos, am scribing for, and in the presence of,  Jeff Spivey MD. I have scribed the following portions of the note - Other sections scribed: HPI, ROS, PE, EKG, ED Management.       History     Chief Complaint   Patient presents with    Shortness of Breath     pt complains of sob, states awoke this am sob. ems stated pt was down in bed with head tilted down     CC: SOB    HPI: The patient is a 63 y.o female who presents to the ED, per EMS, complaining of SOB that began this morning. Patient reports of associated wheezes, cough w/o sputum, and feels febrile. Patient denies CP or any other pain. She states that she has a cold that began yesterday. Patient admits that her appetite has been abnormal for her recently. No known sick contact. PMHx of COPD, CHF, and sleep apnea. Patient is on 2L of O2 at home. No Hx of DM. PSHx of left, below the knee amputation. 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 Patient has a suprapubic catheter that she states was last changed 08/13/2019.     The history is provided by the patient. No  was used.     Review of patient's allergies indicates:   Allergen Reactions    Rocephin [ceftriaxone] Rash     Rash 2012? Pt agreeable to try with pre mediation with benadryl.      Past Medical History:   Diagnosis Date    Addiction to drug     Alcohol abuse     Anxiety     Arthritis     Asthma     Bipolar disorder     Bladder stones     CHF (congestive heart failure)     COPD (chronic obstructive pulmonary disease)     on home o2    CVA (cerebral vascular accident)     2012    Hallucination     Hepatitis C     treated and cured    History of blood clots     Hx of psychiatric care     Hypertension     Belen     ADEEL treated with BiPAP     Paraplegia     Paraplegic spinal paralysis     Psychiatric problem     Psychosis      [facility]; Paranoia    Renal disorder     SCI (spinal cord injury)     incomplete    Sleep difficulties     has sleep apnea and uses a Bi-PAP machine.    Substance abuse     Suprapubic catheter     Therapy     Vaginal delivery     x1     Past Surgical History:   Procedure Laterality Date    AMPUTATION, ABOVE KNEE Left 7/23/2019    [facility]    BACK SURGERY      bilateral knee replacement      BREAST BIOPSY      cysto/lithopaxy 2017      CYSTOLITHOLOPAXY (REMOVE BLADDER STONE) N/A 12/20/2017    [facility]    CYSTOSCOPY W/ LASER LITHOTRIPSY      CYSTOSCOPY,  OCCLUSION BALLOON PLACEMENT, PERC ACCESS  1/19/2018    [facility]    CYSTOSCOPY, RETROGRADE, URETEROSCOPY, STENT PLACEMENT Right 3/5/2018    [facility]    CYSTOSCOPY/ RETROGRADE PYELOGRAM/ WITH JJ URETERAL STENT PLACEMENT RIGHT Right 12/20/2017    [facility]    EXCHANGE CATHETER-SUPRAPUBIC  1/19/2018    [facility]    EXTRACTION-STONE-URETEROSCOPY Right 3/5/2018    [facility]    JOINT REPLACEMENT      bilateral knee    LITHOTRIPSY-LASER Right 3/5/2018    [facility]    LITHOTRIPSY-LASER Right 1/29/2018    [facility]    LITHOTRIPSY-LASER Right 1/19/2018    [facility]    NEPHROLITHOTOMY-PERCUTANEOUS Right 1/19/2018    [facility]    NEPHROSTOLITHOTOMY-PERCUTANEOUS Right 1/29/2018    [facility]    NEPHROSTOMY Right 1/29/2018    [facility]    PLACEMENT  1/19/2018    [facility]    PLACEMENT-RENAL ACCESS Right 1/19/2018    [facility]     PLACEMENT-STENT Right 2018    [facility] at [facility]    Procedure is a Left Genicular Nerve Block ** cpt code 20886** Left 2015    [facility]    PYELOGRAM-ANTEGRADE Right 2018    [facility] at [facility]    PYELOGRAM-ANTEGRADE  2018    [facility]formed by [facility]    PYELOGRAM-RETROGRADE  2018    [facility]formed by [facility]    RADIOFREQUENCY THERMOCOAGULATION** Left genicular RFA ** Left 2016    [facility]formed [facility] at [facility]    REMOVAL-STENT-URETERAL  3/5/2018    [facility] at [facility]    REMOVAL-STENT-URETERAL (Cystoscopy) Right 2018    [facility] at [facility]    URETEROSCOPY Right 2018    [facility] at [facility]    URETEROSCOPY Right 2018    [facility] at [facility]     Family History   Problem Relation Age of Onset    Dementia Mother     Anxiety disorder Brother     Depression Brother      Social History     Tobacco Use    Smoking status: Former Smoker     Last attempt to quit: 2002     Years since quittin.7    Smokeless tobacco: Never Used   Substance Use Topics    Alcohol use: Not Currently     Comment: occasional    Drug use: Never     Comment: prescribed opioids at present for pain     Review of Systems   Constitutional: Positive for fever (Subjective).   HENT: Negative for sore throat.    Respiratory: Positive for cough, shortness of breath and wheezing.    Cardiovascular: Negative for chest pain.   Gastrointestinal: Negative for nausea.   Genitourinary: Negative for dysuria.   Musculoskeletal: Negative for back pain.   Skin: Negative for rash.   Neurological: Negative for weakness.   Psychiatric/Behavioral: Negative for confusion.       Physical Exam     Initial Vitals   BP Pulse Resp Temp SpO2   19 0512 19 0505 19  0505 09/20/19 0512 09/20/19 0505   136/76 96 20 100.2 °F (37.9 °C) (!) 91 %      MAP       --                Physical Exam    Nursing note and vitals reviewed.  Constitutional: She appears well-developed and well-nourished. She is not diaphoretic. No distress.   HENT:   Head: Normocephalic and atraumatic.   Eyes: Conjunctivae and EOM are normal. Pupils are equal, round, and reactive to light.   Neck: Normal range of motion. Neck supple.   Cardiovascular: Normal rate and regular rhythm.   Pulmonary/Chest: She has wheezes.   Patient has wheezes in the right lung.    Abdominal: Soft. There is no tenderness.   Patient has a suprapubic catheter.    Musculoskeletal: Normal range of motion. She exhibits no edema.   Left leg amputation well healed.    Neurological: She is alert and oriented to person, place, and time.   Skin: Skin is warm and dry.   Psychiatric: She has a normal mood and affect. Her behavior is normal. Judgment and thought content normal.         ED Course   Critical Care  Date/Time: 9/20/2019 1:00 PM  Performed by: Jeff Spivey MD  Authorized by: Jeff Spivey MD   Direct patient critical care time: 20 minutes  Additional history critical care time: 10 minutes  Ordering / reviewing critical care time: 10 minutes  Documentation critical care time: 10 minutes  Consulting other physicians critical care time: 10 minutes  Other critical care time: 10 (admission) minutes  Total critical care time (exclusive of procedural time) : 70 minutes  Critical care time was exclusive of separately billable procedures and treating other patients and teaching time.  Critical care was necessary to treat or prevent imminent or life-threatening deterioration of the following conditions: respiratory failure.  Critical care was time spent personally by me on the following activities: development of treatment plan with patient or surrogate, discussions with consultants, interpretation of cardiac output measurements,  evaluation of patient's response to treatment, examination of patient, obtaining history from patient or surrogate, ordering and performing treatments and interventions, ordering and review of laboratory studies, ordering and review of radiographic studies, pulse oximetry, re-evaluation of patient's condition and review of old charts.        Labs Reviewed   CBC W/ AUTO DIFFERENTIAL - Abnormal; Notable for the following components:       Result Value    Mean Corpuscular Hemoglobin 25.6 (*)     Mean Corpuscular Hemoglobin Conc 29.5 (*)     RDW 14.9 (*)     All other components within normal limits   COMPREHENSIVE METABOLIC PANEL - Abnormal; Notable for the following components:    Potassium 3.4 (*)     Chloride 93 (*)     CO2 33 (*)     Glucose 124 (*)     Total Protein 8.9 (*)     ALT 8 (*)     All other components within normal limits   B-TYPE NATRIURETIC PEPTIDE - Abnormal; Notable for the following components:     (*)     All other components within normal limits   TROPONIN I - Abnormal; Notable for the following components:    Troponin I 0.036 (*)     All other components within normal limits   URINALYSIS, REFLEX TO URINE CULTURE - Abnormal; Notable for the following components:    Appearance, UA Cloudy (*)     Protein, UA 1+ (*)     Occult Blood UA 2+ (*)     Nitrite, UA Positive (*)     Leukocytes, UA 3+ (*)     All other components within normal limits    Narrative:     Preferred Collection Type->Urine, Clean Catch   URINALYSIS MICROSCOPIC - Abnormal; Notable for the following components:    WBC, UA 50 (*)     Bacteria Many (*)     All other components within normal limits    Narrative:     Preferred Collection Type->Urine, Clean Catch   ISTAT PROCEDURE - Abnormal; Notable for the following components:    POC PH 7.329 (*)     POC PCO2 74.7 (*)     POC PO2 78 (*)     POC HCO3 39.3 (*)     POC SATURATED O2 94 (*)     POC TCO2 42 (*)     All other components within normal limits   ISTAT PROCEDURE -  Abnormal; Notable for the following components:    POC PH 7.296 (*)     POC PCO2 78.3 (*)     POC PO2 67 (*)     POC HCO3 38.1 (*)     POC SATURATED O2 90 (*)     POC TCO2 40 (*)     All other components within normal limits   CULTURE, BLOOD   CULTURE, BLOOD   CULTURE, URINE   LACTIC ACID, PLASMA   TSH   POCT INFLUENZA A/B MOLECULAR     EKG Readings: (Independently Interpreted)   Initial Reading: No STEMI. Rhythm: Normal Sinus Rhythm. Heart Rate: 92 bpm. Other Findings: Prolonged QT Interval.   Diffuse inferior lateral and Nonspecific ST and T wave abnormality.        Imaging Results          X-Ray Chest AP Portable (Final result)  Result time 09/20/19 07:54:21    Final result by Nicolas Lynn MD (09/20/19 07:54:21)                 Impression:      Cardiomegaly with increased interstitial attenuation bilaterally, similar to prior examination, which could relate to interstitial edema.      Electronically signed by: Nicolas Lynn MD  Date:    09/20/2019  Time:    07:54             Narrative:    EXAMINATION:  XR CHEST AP PORTABLE    CLINICAL HISTORY:  shortness of breath;    TECHNIQUE:  Single frontal view of the chest was performed.    COMPARISON:  07/18/2019    FINDINGS:  Cardiac monitoring leads overlie the chest.  The cardiomediastinal silhouette is enlarged.  There is tortuosity of the thoracic aorta.  The lungs are symmetrically expanded.  There is increased interstitial attenuation bilaterally, similar to prior examination which may relate to interstitial edema.  No new large confluent airspace consolidation identified.  No evidence of large pleural effusion or pneumothorax.  Visualized osseous structures demonstrate stable degenerative change.                              X-Rays:   Independently Interpreted Readings:   Chest X-Ray: Cardiomegaly present.  Increased vascular markings consistent with CHF are present. No changes from prior     Medical Decision Making:   History:   Old Medical Records: I  decided to obtain old medical records.  Clinical Tests:   Lab Tests: Ordered and Reviewed  Radiological Study: Reviewed and Ordered  ED Management:  06:32 Patient reports that she feels better following breathing Tx given in ED upon arrival. Will order basic labs, cardiac function tests, and CXR. Will give another breathing Tx in ED.     08:45 Discussed patient's case with Dr. Yovany MD.     09:32 Updated Dr. Yovany MD. on patient's stats.       patient with COPD and CHF with hypercapnic respiratory failure presents with shortness of breath. BiPAP is not improving patient but CO2 levels holding steady.  Will admit to the ICU.  Patient's oxygen saturations of 90% on BiPAP.  Will consult pulmonology.       Scribe Attestation:   Scribe #1: I performed the above scribed service and the documentation accurately describes the services I performed. I attest to the accuracy of the note.           I, Jeff Spivey, personally performed the services described in this documentation. All medical record entries made by the scribe were at my direction and in my presence.  I have reviewed the chart and agree that the record reflects my personal performance and is accurate and complete.       Clinical Impression:       ICD-10-CM ICD-9-CM   1. Acute and chronic respiratory failure with hypercapnia J96.22 518.84   2. Shortness of breath R06.02 786.05   3. COPD exacerbation J44.1 491.21   4. Congestive heart failure, unspecified HF chronicity, unspecified heart failure type I50.9 428.0   5. Fever, unspecified fever cause R50.9 780.60   6. Hypoxia R09.02 799.02   7. Urinary tract infection without hematuria, site unspecified N39.0 599.0                                Jeff Spivey MD  09/20/19 [id number]

## 2019-09-20 NOTE — PROGRESS NOTES
Pharmacokinetic Initial Assessment: IV Vancomycin    Assessment/Plan:    Initiate intravenous vancomycin with loading dose of 1750  mg once followed by a maintenance dose of vancomycin 1250 mg IV every 12 hours  Desired empiric serum trough concentration is 15 to 20 mcg/mL  Draw vancomycin trough level 30 min prior to fourth dose on 9/22/19 at approximately 21:30   Pharmacy will continue to follow and monitor vancomycin.      Please contact pharmacy at extension [phone] with any questions regarding this assessment.     Thank you for the consult,   Priscilla Rudd       Patient brief summary:  Mary Ellen Fajardo is a 63 y.o. female initiated on antimicrobial therapy with IV Vancomycin for treatment of suspected sepsis    Drug Allergies:   Review of patient's allergies indicates:   Allergen Reactions    Rocephin [ceftriaxone] Rash     Rash 2012? Pt agreeable to try with pre mediation with benadryl.        Actual Body Weight:   113.4 kg    Renal Function:   Estimated Creatinine Clearance: 71.1 mL/min (based on SCr of 1 mg/dL).,     Dialysis Method (if applicable):      CBC (last 72 hours):  Recent Labs   Lab Result Units 09/20/19  0514   WBC K/uL 9.32   Hemoglobin g/dL 12.6   Hematocrit % 42.7   Platelets K/uL 211   Gran% % 65.2   Lymph% % 26.8   Mono% % 8.0   Eosinophil% % 0.0   Basophil% % 0.1   Differential Method  Automated       Metabolic Panel (last 72 hours):  Recent Labs   Lab Result Units 09/20/19  0514 09/20/19  0622   Sodium mmol/L 140  --    Potassium mmol/L 3.4*  --    Chloride mmol/L 93*  --    CO2 mmol/L 33*  --    Glucose mg/dL 124*  --    Glucose, UA   --  Negative   BUN, Bld mg/dL 12  --    Creatinine mg/dL 1.0  --    Albumin g/dL 3.5  --    Total Bilirubin mg/dL 0.6  --    Alkaline Phosphatase U/L 83  --    AST U/L 18  --    ALT U/L 8*  --        Drug levels (last 3 results):  No results for input(s): VANCOMYCINRA, VANCOMYCINPE, VANCOMYCINTR in the last 72 hours.    Microbiologic  Results:  Microbiology Results (last 7 days)       Procedure Component Value Units Date/Time    Urine culture [[id number]] Collected:  09/20/19 0622    Order Status:  No result Specimen:  Urine Updated:  09/20/19 0646    Blood culture #2 **CANNOT BE ORDERED STAT** [[id number]] Collected:  09/20/19 0539    Order Status:  Sent Specimen:  Blood from Peripheral, Hand, Left Updated:  09/20/19 0548    Blood culture #1 **CANNOT BE ORDERED STAT** [[id number]] Collected:  09/20/19 0514    Order Status:  Sent Specimen:  Blood from Peripheral, Lower Arm, Right Updated:  09/20/19 0530

## 2019-09-20 NOTE — ASSESSMENT & PLAN NOTE
Suspecting secondary to COPD exacerbation secondary to possible viral illness. Abg with pH 7.3 and CO2 74 not improving on repeat ABG. ICU requested for admission. Popeye a fever of 102.5 in the ED. Started on abx post cultures taken. LA and leukocytosis wnl. Has chronic suprapubic catheter, suspecting dirty urine might be colonizer. Procal pending.  - Pulmonary consulted, appreciate recs  - Continue bipap and repeat ABG in few hrs  - If not improving may need intubation  - IV steroids and scheduled inhalers ordered  - Continue abx for now, de-escalate soon

## 2019-09-20 NOTE — ED NOTES
Pt cleaned and dried from sweating out fever. Pt AAOX3. Respirations even and unlabored. Pt report feeling significantly better.

## 2019-09-20 NOTE — H&P
"[facility] [place]  Pulmonology  Consult Note    Patient Name: Mary Ellen Fajardo  MRN: [id number]  Admission Date: 9/20/2019  Code Status: Full Code  Primary Care Provider: Any Tran MD   Principal Problem: Acute and chronic respiratory failure with hypercapnia    Subjective:     HPI:  Ms. Fajardo is a 64 y/o female with a history significant for COPD (no PFTs available), diastolic heart failure, and urinary retention requiring suprapubic catheter who presents to [facility] on 09/20/19 with respiratory distress. Ms. Fajardo notes that yesterday she began to experience a dry cough, a subjective fever, and "not feeling myself." In the ED she was found to be hypercapnic (baseline CO2 appears to be around 60) and was started on bipap and admitted to the ICU. She was given bronchodilators, steroids, and was started on broad spectrum antibiotics.     Past Medical History:   Diagnosis Date    Addiction to drug     Alcohol abuse     Anxiety     Arthritis     Asthma     Bipolar disorder     Bladder stones     CHF (congestive heart failure)     COPD (chronic obstructive pulmonary disease)     on home o2    CVA (cerebral vascular accident)     2012    Hallucination     Hepatitis C     treated and cured    History of blood clots     Hx of psychiatric care     Hypertension     Belen     ADEEL treated with BiPAP     Paraplegia     Paraplegic spinal paralysis     Psychiatric problem     Psychosis     AVH; Paranoia    Renal disorder     SCI (spinal cord injury)     incomplete    Sleep difficulties     has sleep apnea and uses a Bi-PAP machine.    Substance abuse     Suprapubic catheter     Therapy     Vaginal delivery     x1       Past Surgical History:   Procedure Laterality Date    AMPUTATION, ABOVE KNEE Left 7/23/2019    [facility]    BACK SURGERY      bilateral knee replacement      BREAST BIOPSY      cysto/lithopaxy 2017      " CYSTOLITHOLOPAXY (REMOVE BLADDER STONE) N/A 12/20/2017    [facility]    CYSTOSCOPY W/ LASER LITHOTRIPSY      CYSTOSCOPY,  OCCLUSION BALLOON PLACEMENT, PERC ACCESS  1/19/2018    [facility] at [facility] OR    CYSTOSCOPY, RETROGRADE, URETEROSCOPY, STENT PLACEMENT Right 3/5/2018    [facility] at [facility] OR    CYSTOSCOPY/ RETROGRADE PYELOGRAM/ WITH JJ URETERAL STENT PLACEMENT RIGHT Right 12/20/2017    [facility] at [facility] OR    EXCHANGE CATHETER-SUPRAPUBIC  1/19/2018    [facility] at [facility] OR    EXTRACTION-STONE-URETEROSCOPY Right 3/5/2018    [facility] at [facility] OR    JOINT REPLACEMENT      bilateral knee    LITHOTRIPSY-LASER Right 3/5/2018    [facility] at [facility] OR    LITHOTRIPSY-LASER Right 1/29/2018    [facility] at [facility] OR    LITHOTRIPSY-LASER Right 1/19/2018    [facility] at [facility] OR    NEPHROLITHOTOMY-PERCUTANEOUS Right 1/19/2018    [facility] at [facility] OR    NEPHROSTOLITHOTOMY-PERCUTANEOUS Right 1/29/2018    Performed by RAMA Tinooc MD at [facility] OR    NEPHROSTOMY Right 1/29/2018    [facility] at [facility] OR    PLACEMENT  1/19/2018    [facility] at [facility] OR    PLACEMENT-RENAL ACCESS Right 1/19/2018    [facility] at [facility] OR    PLACEMENT-STENT Right 1/29/2018    [facility] at [facility] OR    Procedure is a Left Genicular Nerve Block ** cpt code 19888** Left 9/16/2015    [facility]    PYELOGRAM-ANTEGRADE Right 1/29/2018    [facility] at [facility] OR    PYELOGRAM-ANTEGRADE  1/19/2018    [facility] at [facility] OR    PYELOGRAM-RETROGRADE  1/19/2018    [facility] at [facility] OR    RADIOFREQUENCY  THERMOCOAGULATION** Left genicular RFA ** Left 2016    Performed [facility] at [facility]GT    REMOVAL-STENT-URETERAL  3/5/2018    [facility] OR    REMOVAL-STENT-URETERAL (Cystoscopy) Right 2018    [facility] at [facility] OR    URETEROSCOPY Right 2018    [facility] at [facility] OR    URETEROSCOPY Right 2018    [facility] at [facility] OR       Review of patient's allergies indicates:   Allergen Reactions    Rocephin [ceftriaxone] Rash     Rash ? Pt agreeable to try with pre mediation with benadryl.        Family History     Problem Relation (Age of Onset)    Anxiety disorder Brother    Dementia Mother    Depression Brother        Tobacco Use    Smoking status: Former Smoker     Last attempt to quit:      Years since quittin.7    Smokeless tobacco: Never Used   Substance and Sexual Activity    Alcohol use: Not Currently     Comment: occasional    Drug use: Never     Comment: prescribed opioids at present for pain    Sexual activity: Not Currently     Partners: Male     Comment: since          Review of Systems   Constitutional: Positive for fever. Negative for chills and diaphoresis.   HENT: Negative for postnasal drip.    Eyes: Negative for visual disturbance.   Respiratory: Positive for cough and shortness of breath.    Cardiovascular: Negative for chest pain.   Gastrointestinal: Negative for abdominal pain and constipation.   Genitourinary: Negative for hematuria.   Musculoskeletal: Negative for myalgias.   Skin: Negative for rash.   Neurological: Negative for headaches.   Hematological: Negative for adenopathy.     Objective:     Vital Signs (Most Recent):  Temp: 98 °F (36.7 °C) (19 1313)  Pulse: 62 (19 1313)  Resp: (!) 29 (19 1313)  BP: 111/67 (19 1303)  SpO2: 97 % (19 1313) Vital Signs (24h Range):  Temp:  [97.9 °F (36.6 °C)-102.5 °F (39.2  °C)] 98 °F (36.7 °C)  Pulse:  [56-96] 62  Resp:  [14-41] 29  SpO2:  [89 %-100 %] 97 %  BP: (105-136)/(53-76) 111/67     Weight: 113.4 kg (250 lb)  Body mass index is 42.91 kg/m².      Intake/Output Summary (Last 24 hours) at 9/20/2019 1341  Last data filed at 9/20/2019 1201  Gross per 24 hour   Intake 600 ml   Output --   Net 600 ml       Physical Exam   Constitutional: She appears well-developed and well-nourished.   HENT:   Head: Normocephalic and atraumatic.   Mouth/Throat: Oropharynx is clear and moist.   Eyes: Pupils are equal, round, and reactive to light. Conjunctivae are normal. Right eye exhibits no discharge. Left eye exhibits no discharge. No scleral icterus.   Neck: Trachea normal, normal range of motion and full passive range of motion without pain. Neck supple. No JVD present. No tracheal deviation present. No thyromegaly present.   Cardiovascular: Normal rate, regular rhythm, S1 normal, S2 normal, normal heart sounds and intact distal pulses. Exam reveals no gallop and no friction rub.   No murmur heard.  Pulmonary/Chest: She is in respiratory distress. She has no wheezes. She has rales. She exhibits no tenderness.   Abdominal: Soft. Bowel sounds are normal. She exhibits no distension and no mass. There is no tenderness. There is no rebound and no guarding.   Musculoskeletal: Normal range of motion. She exhibits no tenderness or deformity.   Lymphadenopathy:     She has no cervical adenopathy.   Neurological: No cranial nerve deficit. Coordination normal.   Skin: Skin is warm and dry. No abrasion and no bruising noted.   Psychiatric: She has a normal mood and affect.   Vitals reviewed.      Vents:  Oxygen Concentration (%): 30 (09/20/19 1330)    Lines/Drains/Airways     Drain                 Suprapubic Catheter 09/20/19 1002 18 Fr. less than 1 day          Peripheral Intravenous Line                 Peripheral IV - Single Lumen Right Antecubital -- days         Peripheral IV - Single Lumen 09/20/19  0514 18 G Right Hand less than 1 day         Peripheral IV - Single Lumen 09/20/19 0540 20 G Left Hand less than 1 day         Peripheral IV - Single Lumen 09/20/19 1020 20 G Left Antecubital less than 1 day                Significant Labs:    CBC/Anemia Profile:  Recent Labs   Lab 09/20/19  0514   WBC 9.32   HGB 12.6   HCT 42.7      MCV 87   RDW 14.9*        Chemistries:  Recent Labs   Lab 09/20/19  0514      K 3.4*   CL 93*   CO2 33*   BUN 12   CREATININE 1.0   CALCIUM 9.0   ALBUMIN 3.5   PROT 8.9*   BILITOT 0.6   ALKPHOS 83   ALT 8*   AST 18     Significant Imaging:   I have reviewed and interpreted all pertinent imaging results/findings within the past 24 hours.    Assessment/Plan:     * Acute and chronic respiratory failure with hypercapnia  --Restart bipap. Check VBG in next 4-6 hours.   --Agree with bronchodilators. Consider adding PO prednisone.   --Agree on broad spectrum antibiotics. Procalcitonin ordered--consider deescalating if within normal limits and no further signs of infection.        Critical Care Time: 50 minutes  Critical care was time spent personally by me on the following activities: evaluating this patient's organ dysfunction, development of treatment plan, discussing treatment plan with patient or surrogate and bedside caregivers, discussions with consultants, evaluation of patient's response to treatment, examination of patient, ordering and performing treatments and interventions, ordering and review of laboratory studies, ordering and review of radiographic studies, re-evaluation of patient's condition. This critical care time did not overlap with that of any other provider or involve time for any procedures.      Jacques Nevarez PA-C  Pulmonology  [facility] [place]

## 2019-09-20 NOTE — ASSESSMENT & PLAN NOTE
--Restart bipap. Check VBG in next 4-6 hours.   --Agree with bronchodilators. Consider adding PO prednisone.   --Agree on broad spectrum antibiotics. Procalcitonin ordered--consider deescalating if within normal limits and no further signs of infection.

## 2019-09-20 NOTE — CARE UPDATE
Results for GEENA GREGORY (MRN [id number]) as of 9/20/2019 07:18   Ref. Range 9/20/2019 07:02   POC PH Latest Ref Range: 7.35 - 7.45  7.329 (L)   POC PCO2 Latest Ref Range: 35 - 45 mmHg 74.7 (HH)   POC PO2 Latest Ref Range: 80 - 100 mmHg 78 (L)   POC BE Latest Ref Range: -2 to 2 mmol/L 10   POC HCO3 Latest Ref Range: 24 - 28 mmol/L 39.3 (H)   POC SATURATED O2 Latest Ref Range: 95 - 100 % 94 (L)   POC TCO2 Latest Ref Range: 23 - 27 mmol/L 42 (H)   Flow Unknown 3   Sample Unknown ARTERIAL   DelSys Unknown Nasal Can   Allens Test Unknown Pass   Site Unknown RR   Mode Unknown SPONT   Rate Unknown 20     ABG results reported to Dr ELEANOR Spivey MD.

## 2019-09-20 NOTE — H&P
[facility] Medicine  History & Physical    Patient Name: Mary Ellen Fajardo  MRN: [id number]  Admission Date: 9/20/2019  Attending Physician: Edgardo Pedroza MD   Primary Care Provider: Any Tran MD         Patient information was obtained from parent, EMS personnel, past medical records and ER records.     Subjective:     Principal Problem:Acute and chronic respiratory failure with hypercapnia    Chief Complaint:   Chief Complaint   Patient presents with    Shortness of Breath     pt complains of sob, states awoke this am sob. ems stated pt was down in bed with head tilted down        HPI: 62 yo morbidly obese female with hx of COPD, CHF, ADEEL, chronic hypoxic respiratory failure on 2L HOT, paraplegia, h/o knee replaced complicated by infection in 2011, chronic indwelling Delcid catheter, ho stroke, h/o polysubstance abuse and bipolar disorder presenting via EMS for progressively worsening dyspnea since yesterday with associated dry cough, fevers and wheeze. Patient encephalopathy with limited hx. She denied any falls, dizziness, syncope, chest pain, hemoptysis, pleurisy, abdominal pain, diarrhea, constipation or bleeds. Has been having URI/cold like symptoms since yesterday with decreased po intake. Tired her home nebs yesterday and today but didn't help prompting EMS. No recent travel or sick contacts.     In the ED patient was tachyenic, febrile Tm 103, normotensive. Labs significant for UA dirty but has chronic delcid, , LA wnl, ABG noting acute on chronic respiratory acidosis with pH 7.3 and PCO2 of 74. CXR fairly unremakable. Repeat ABG with worsening pH and acidosis. ICU requested for admission.        Past Medical History:   Diagnosis Date    Addiction to drug     Alcohol abuse     Anxiety     Arthritis     Asthma     Bipolar disorder     Bladder stones     CHF (congestive heart failure)     COPD (chronic obstructive pulmonary disease)     on home o2     CVA (cerebral vascular accident)     2012    Hallucination     Hepatitis C     treated and cured    History of blood clots     Hx of psychiatric care     Hypertension     Belen     ADEEL treated with BiPAP     Paraplegia     Paraplegic spinal paralysis     Psychiatric problem     Psychosis     AVH; Paranoia    Renal disorder     SCI (spinal cord injury)     incomplete    Sleep difficulties     has sleep apnea and uses a Bi-PAP machine.    Substance abuse     Suprapubic catheter     Therapy     Vaginal delivery     x1       Past Surgical History:   Procedure Laterality Date    AMPUTATION, ABOVE KNEE Left 7/23/2019    [facility] OR    BACK SURGERY      bilateral knee replacement      BREAST BIOPSY      cysto/lithopaxy 2017      CYSTOLITHOLOPAXY (REMOVE BLADDER STONE) N/A 12/20/2017    Performed by RAMA Tinoco [facility] at [facility] OR    CYSTOSCOPY W/ LASER LITHOTRIPSY      CYSTOSCOPY,  OCCLUSION BALLOON PLACEMENT, PERC ACCESS  1/19/2018    [facility] at [facility] OR    CYSTOSCOPY, RETROGRADE, URETEROSCOPY, STENT PLACEMENT Right 3/5/2018    [facility]formed by [facility] at [facility] OR    CYSTOSCOPY/ RETROGRADE PYELOGRAM/ WITH JJ URETERAL STENT PLACEMENT RIGHT Right 12/20/2017    Performed by [facility] at [facility] OR    EXCHANGE CATHETER-SUPRAPUBIC  1/19/2018    Performed by [facility] at [facility] OR    EXTRACTION-STONE-URETEROSCOPY Right 3/5/2018    Performed by [facility] at [facility] OR    JOINT REPLACEMENT      bilateral knee    LITHOTRIPSY-LASER Right 3/5/2018    [facility]formed by [facility] at [facility] OR    LITHOTRIPSY-LASER Right 1/29/2018    Performed by [facility]oco [facility] at [facility] OR    LITHOTRIPSY-LASER Right 1/19/2018    Performed by [facility] at [facility] OR    NEPHROLITHOTOMY-PERCUTANEOUS Right 1/19/2018    Performed by [facility] at [facility] OR    NEPHROSTOLITHOTOMY-PERCUTANEOUS Right 1/29/2018     Performed by [facility] at [facility] OR    NEPHROSTOMY Right 1/29/2018    Performed by [facility]oco [facility] at [facility] OR    PLACEMENT  1/19/2018    Performed by [facility] at [facility] OR    PLACEMENT-RENAL ACCESS Right 1/19/2018    Performed by [facility]oco [facility] at [facility] OR    PLACEMENT-STENT Right 1/29/2018    Performed by [facility] at [facility] OR    Procedure is a Left Genicular Nerve Block ** cpt code 52937** Left 9/16/2015    [facility]formed by [facility] Castle [facility] at [facility] PAIN [facility]GT    PYELOGRAM-ANTEGRADE Right 1/29/2018    Performed by [facility]oco [facility] at [facility] OR    PYELOGRAM-ANTEGRADE  1/19/2018    Performed by [facility] at [facility] OR    PYELOGRAM-RETROGRADE  1/19/2018    Performed by [facility]oco [facility] at [facility] OR    RADIOFREQUENCY THERMOCOAGULATION** Left genicular RFA ** Left 1/20/2016    Performed by [facility] Castle [facility] at [facility]    REMOVAL-STENT-URETERAL  3/5/2018    Performed by [facility] at [facility] OR    REMOVAL-STENT-URETERAL (Cystoscopy) Right 4/9/2018    Performed by [facility]oco [facility] at [facility] OR    URETEROSCOPY Right 1/29/2018    Performed by [facility]oco [facility] at [facility] OR    URETEROSCOPY Right 1/19/2018    Performed by [facility]oco [facility] at [facility] OR       Review of patient's allergies indicates:   Allergen Reactions    Rocephin [ceftriaxone] Rash     Rash 2012? Pt agreeable to try with pre mediation with benadryl.        No current facility-administered medications on file prior to encounter.      Current Outpatient Medications on File Prior to Encounter   Medication Sig    LINZESS 290 mcg Cap capsule TK 1 C PO D    "[employer]cellaneous medical supply (C-TUB) [facility] NEEDS TO SWITCH DME COMPANY FOR OXYGEN AT 2L. LAST OXYGEN SATURATION WAS 83% ON Room Air. She uses  BIPAP and  Needs  New mask, tubings and    OLANZapine (ZYPREXA) 10 MG tablet Take 10 mg by mouth.    OLANZapine (ZYPREXA) 5 MG tablet Take 1 tablet (5 mg total) by mouth every evening.     oxyCODONE-acetaminophen (PERCOCET)  mg per tablet Take 1 tablet by mouth every 4 (four) hours as needed.    potassium chloride (K-TAB) 10 MEQ TbSR Take 10 mEq by mouth.    promethazine (PHENERGAN) 25 MG tablet TAKE 1 TABLET BY MOUTH EVERY 8 HOURS AS NEEDED FOR NAUSEA AND VOMITING    traZODone (DESYREL) 50 MG tablet Take 50 mg by mouth.    albuterol-ipratropium (DUO-NEB) 2.5 mg-0.5 mg/3 mL nebulizer solution Inhale 3 mLs into the lungs.    baclofen (LIORESAL) 20 MG tablet 10 mg 4 (four) times daily.     baclofen (LIORESAL) 20 MG tablet Take 1 tablet (20 mg total) by mouth 3 (three) times daily as needed (for muscle spasms).    bumetanide (BUMEX) 1 MG tablet TK 1 T PO D    catheter 18 Fr Misc Change every 20 days    FLUoxetine 10 MG capsule Take 1 capsule (10 mg total) by mouth once daily.    FLUoxetine 10 MG capsule Take 10 mg by mouth.    gabapentin (NEURONTIN) 300 MG capsule 600 mg 3 (three) times daily.     hydrOXYzine HCl (ATARAX) 25 MG tablet Take 1 tablet (25 mg total) by mouth 3 (three) times daily.    hydrOXYzine HCl (ATARAX) 25 MG tablet Take 25 mg by mouth 3 (three) times daily as needed.    ipratropium (ATROVENT) 0.02 % nebulizer solution U 1 VIAL VIA NEBULIZER Q 4 H WHILE AWAKE    VENTOLIN HFA 90 mcg/actuation inhaler     [DISCONTINUED] baclofen (LIORESAL) 10 MG tablet Take 10 mg by mouth.    [DISCONTINUED] bumetanide (BUMEX) 2 MG tablet Take 2 mg by mouth.    [DISCONTINUED] gabapentin (NEURONTIN) 600 MG tablet Take 600 mg by mouth.    [DISCONTINUED] HYDROcodone-acetaminophen (NORCO) 7.5-325 mg per tablet Take 1 tablet by mouth every 4 (four) hours as needed (severe pain).    [DISCONTINUED] lidocaine (LIDODERM) 5 % Place 1 patch onto the skin.    [DISCONTINUED] linaCLOtide (LINZESS) 290 mcg Cap capsule Take 290 mcg by mouth.    [DISCONTINUED] OLANZapine (ZYPREXA) 5 MG tablet TAKE 1 TABLET BY MOUTH EVERY NIGHT    [DISCONTINUED] oxybutynin (DITROPAN-XL) 5 MG TR24 TAKE 1 TABLET  BY MOUTH EVERY DAY    [DISCONTINUED] potassium chloride (KLOR-CON) 10 MEQ TbSR     [DISCONTINUED] promethazine (PHENERGAN) 25 MG tablet Take 25 mg by mouth every 8 (eight) hours as needed.    [DISCONTINUED] trazodone (DESYREL) 100 MG tablet 100 mg every evening. 1/2 tablet by mouth every evening      Family History     Problem Relation (Age of Onset)    Anxiety disorder Brother    Dementia Mother    Depression Brother        Tobacco Use    Smoking status: Former Smoker     Last attempt to quit: 2002     Years since quittin.7    Smokeless tobacco: Never Used   Substance and Sexual Activity    Alcohol use: Not Currently     Comment: occasional    Drug use: Never     Comment: prescribed opioids at present for pain    Sexual activity: Not Currently     Partners: Male     Comment: since      Review of Systems   Unable to perform ROS: Mental status change     Objective:     Vital Signs (Most Recent):  Temp: 98 °F (36.7 °C) (19 1313)  Pulse: 60 (19 1345)  Resp: 18 (19 1345)  BP: 130/70 (19 1330)  SpO2: 95 % (19 1345) Vital Signs (24h Range):  Temp:  [97.9 °F (36.6 °C)-102.5 °F (39.2 °C)] 98 °F (36.7 °C)  Pulse:  [56-96] 60  Resp:  [14-41] 18  SpO2:  [89 %-100 %] 95 %  BP: (105-136)/(53-76) 130/70     Weight: 112.5 kg (248 lb 0.3 oz)  Body mass index is 42.57 kg/m².    Physical Exam   Constitutional: No distress.   Obese female   HENT:   Head: Normocephalic and atraumatic.   Bipap   Eyes: Pupils are equal, round, and reactive to light. EOM are normal. No scleral icterus.   Neck: Normal range of motion.   Thick neck   Cardiovascular: Normal rate and regular rhythm.   Pulmonary/Chest: Effort normal and breath sounds normal. No respiratory distress. She has no wheezes. She has no rales.   Abdominal: Soft. Bowel sounds are normal.   Musculoskeletal: Normal range of motion. She exhibits edema (+1 LE edema).   Neurological:   Easily arousable but falling asleep quickly mid  conversation   Skin: Skin is warm and dry. Capillary refill takes less than 2 seconds. She is not diaphoretic.   Nursing note and vitals reviewed.        CRANIAL NERVES     CN III, IV, VI   Pupils are equal, round, and reactive to light.  Extraocular motions are normal.        Significant Labs: All pertinent labs within the past 24 hours have been reviewed.    Significant Imaging: I have reviewed and interpreted all pertinent imaging results/findings within the past 24 hours.    Assessment/Plan:     * Acute and chronic respiratory failure with hypercapnia  Suspecting secondary to COPD exacerbation secondary to possible viral illness. Abg with pH 7.3 and CO2 74 not improving on repeat ABG. ICU requested for admission. Popeye a fever of 102.5 in the ED. Started on abx post cultures taken. LA and leukocytosis wnl. Has chronic suprapubic catheter, suspecting dirty urine might be colonizer. Procal pending.  - Pulmonary consulted, appreciate recs  - Continue bipap and repeat ABG in few hrs  - If not improving may need intubation  - IV steroids and scheduled inhalers ordered  - Continue abx for now, de-escalate soon    Acute on chronic respiratory acidosis  As above      Encephalopathy, metabolic  Secondary to hypercapnia    Chronic diastolic heart failure  Appears compensated at this time.    History of CVA (cerebrovascular accident)  Resume home meds as able    Bipolar I disorder, current or most recent episode depressed, with psychotic features  Chronic issue    Chronic indwelling suprapubic catheter in place  Noted.    Chronic hepatitis C  OP fu with PCP    Class 3 severe obesity in adult  Will need to discuss lifestyle modifications when more lucid     COPD (chronic obstructive pulmonary disease)  As above    ADEEL on CPAP  Unsure about compliance.  Currently on continuous bipap    Essential hypertension  Holding meds for now      VTE Risk Mitigation (From admission, onward)        Ordered     enoxaparin injection 40 mg   Daily      09/20/19 1237     IP VTE HIGH RISK PATIENT  Once      09/20/19 1237        Critical care time spent on the evaluation and treatment of severe organ dysfunction, review of pertinent labs and imaging studies, discussions with consulting providers and discussions with patient/family: > 70 minutes.     Edgardo Pedroza MD  Department of Hospital Medicine   [facility]

## 2019-09-20 NOTE — PLAN OF CARE
Problem: Adult Inpatient Plan of Care  Goal: Plan of Care Review  Outcome: Ongoing (interventions implemented as appropriate)  Admitted to ICU today for resp distress on BiPAP. Patient weaned to home O2 of 2L with improved ABG result. Patient more awake later in shift. Tolerating diet. Skin tear to right glute/outer hip. Patient aware of reasoning for all medications. Unable to maintain IV access, PICC team consulted. House supervisor aware. Afebrile. Tylenol given for phantom limb pain. Encouraged compliance with BiPAP.

## 2019-09-21 LAB
ALBUMIN SERPL BCP-MCNC: 3 G/DL (ref 3.5–5.2)
ALLENS TEST: ABNORMAL
ALP SERPL-CCNC: 65 U/L (ref 55–135)
ALT SERPL W/O P-5'-P-CCNC: 11 U/L (ref 10–44)
ANION GAP SERPL CALC-SCNC: 7 MMOL/L (ref 8–16)
AST SERPL-CCNC: 19 U/L (ref 10–40)
BASOPHILS # BLD AUTO: 0 K/UL (ref 0–0.2)
BASOPHILS NFR BLD: 0 % (ref 0–1.9)
BILIRUB SERPL-MCNC: 0.4 MG/DL (ref 0.1–1)
BUN SERPL-MCNC: 20 MG/DL (ref 8–23)
CALCIUM SERPL-MCNC: 9 MG/DL (ref 8.7–10.5)
CHLORIDE SERPL-SCNC: 94 MMOL/L (ref 95–110)
CO2 SERPL-SCNC: 38 MMOL/L (ref 23–29)
CREAT SERPL-MCNC: 0.9 MG/DL (ref 0.5–1.4)
DELSYS: ABNORMAL
DIFFERENTIAL METHOD: ABNORMAL
EOSINOPHIL # BLD AUTO: 0 K/UL (ref 0–0.5)
EOSINOPHIL NFR BLD: 0 % (ref 0–8)
ERYTHROCYTE [DISTWIDTH] IN BLOOD BY AUTOMATED COUNT: 14.7 % (ref 11.5–14.5)
EST. GFR  (AFRICAN AMERICAN): >60 ML/MIN/1.73 M^2
EST. GFR  (NON AFRICAN AMERICAN): >60 ML/MIN/1.73 M^2
FLOW: 2
GLUCOSE SERPL-MCNC: 146 MG/DL (ref 70–110)
HCO3 UR-SCNC: 35.1 MMOL/L (ref 24–28)
HCT VFR BLD AUTO: 36.7 % (ref 37–48.5)
HGB BLD-MCNC: 10.9 G/DL (ref 12–16)
LYMPHOCYTES # BLD AUTO: 0.8 K/UL (ref 1–4.8)
LYMPHOCYTES NFR BLD: 12.3 % (ref 18–48)
MCH RBC QN AUTO: 24.9 PG (ref 27–31)
MCHC RBC AUTO-ENTMCNC: 29.7 G/DL (ref 32–36)
MCV RBC AUTO: 84 FL (ref 82–98)
MODE: ABNORMAL
MONOCYTES # BLD AUTO: 0.3 K/UL (ref 0.3–1)
MONOCYTES NFR BLD: 4.2 % (ref 4–15)
NEUTROPHILS # BLD AUTO: 5.2 K/UL (ref 1.8–7.7)
NEUTROPHILS NFR BLD: 83.5 % (ref 38–73)
PCO2 BLDA: 56.4 MMHG (ref 35–45)
PH SMN: 7.4 [PH] (ref 7.35–7.45)
PLATELET # BLD AUTO: 210 K/UL (ref 150–350)
PMV BLD AUTO: 10.3 FL (ref 9.2–12.9)
PO2 BLDA: 59 MMHG (ref 80–100)
POC BE: 8 MMOL/L
POC SATURATED O2: 89 % (ref 95–100)
POC TCO2: 37 MMOL/L (ref 23–27)
POTASSIUM SERPL-SCNC: 4 MMOL/L (ref 3.5–5.1)
PROT SERPL-MCNC: 7.8 G/DL (ref 6–8.4)
RBC # BLD AUTO: 4.38 M/UL (ref 4–5.4)
SAMPLE: ABNORMAL
SITE: ABNORMAL
SODIUM SERPL-SCNC: 139 MMOL/L (ref 136–145)
SP02: 94
WBC # BLD AUTO: 6.19 K/UL (ref 3.9–12.7)

## 2019-09-21 PROCEDURE — 25000242 PHARM REV CODE 250 ALT 637 W/ HCPCS: Performed by: EMERGENCY MEDICINE

## 2019-09-21 PROCEDURE — 25000003 PHARM REV CODE 250: Performed by: INTERNAL MEDICINE

## 2019-09-21 PROCEDURE — 25000003 PHARM REV CODE 250: Performed by: EMERGENCY MEDICINE

## 2019-09-21 PROCEDURE — 36600 WITHDRAWAL OF ARTERIAL BLOOD: CPT

## 2019-09-21 PROCEDURE — A4216 STERILE WATER/SALINE, 10 ML: HCPCS | Performed by: INTERNAL MEDICINE

## 2019-09-21 PROCEDURE — 82803 BLOOD GASES ANY COMBINATION: CPT

## 2019-09-21 PROCEDURE — 63600175 PHARM REV CODE 636 W HCPCS: Performed by: EMERGENCY MEDICINE

## 2019-09-21 PROCEDURE — 94660 CPAP INITIATION&MGMT: CPT

## 2019-09-21 PROCEDURE — 80053 COMPREHEN METABOLIC PANEL: CPT

## 2019-09-21 PROCEDURE — 87206 SMEAR FLUORESCENT/ACID STAI: CPT

## 2019-09-21 PROCEDURE — 94640 AIRWAY INHALATION TREATMENT: CPT

## 2019-09-21 PROCEDURE — 63600175 PHARM REV CODE 636 W HCPCS: Performed by: INTERNAL MEDICINE

## 2019-09-21 PROCEDURE — 11000001 HC ACUTE MED/SURG PRIVATE ROOM

## 2019-09-21 PROCEDURE — 87015 SPECIMEN INFECT AGNT CONCNTJ: CPT

## 2019-09-21 PROCEDURE — 94761 N-INVAS EAR/PLS OXIMETRY MLT: CPT

## 2019-09-21 PROCEDURE — 99233 PR SUBSEQUENT HOSPITAL CARE,LEVL III: ICD-10-PCS | Mod: ,,, | Performed by: INTERNAL MEDICINE

## 2019-09-21 PROCEDURE — 87116 MYCOBACTERIA CULTURE: CPT

## 2019-09-21 PROCEDURE — 27000221 HC OXYGEN, UP TO 24 HOURS

## 2019-09-21 PROCEDURE — 36415 COLL VENOUS BLD VENIPUNCTURE: CPT

## 2019-09-21 PROCEDURE — 99233 SBSQ HOSP IP/OBS HIGH 50: CPT | Mod: ,,, | Performed by: INTERNAL MEDICINE

## 2019-09-21 PROCEDURE — 99900035 HC TECH TIME PER 15 MIN (STAT)

## 2019-09-21 PROCEDURE — 85025 COMPLETE CBC W/AUTO DIFF WBC: CPT

## 2019-09-21 RX ORDER — FLUOXETINE 10 MG/1
10 CAPSULE ORAL DAILY
Status: DISCONTINUED | OUTPATIENT
Start: 2019-09-21 | End: 2019-09-24 | Stop reason: HOSPADM

## 2019-09-21 RX ORDER — TRAZODONE HYDROCHLORIDE 50 MG/1
50 TABLET ORAL NIGHTLY
Status: DISCONTINUED | OUTPATIENT
Start: 2019-09-21 | End: 2019-09-24 | Stop reason: HOSPADM

## 2019-09-21 RX ORDER — PREDNISONE 20 MG/1
40 TABLET ORAL DAILY
Status: DISCONTINUED | OUTPATIENT
Start: 2019-09-21 | End: 2019-09-24 | Stop reason: HOSPADM

## 2019-09-21 RX ADMIN — FLUOXETINE 10 MG: 10 CAPSULE ORAL at 09:09

## 2019-09-21 RX ADMIN — DICLOFENAC SODIUM 2 G: 10 GEL TOPICAL at 08:09

## 2019-09-21 RX ADMIN — IPRATROPIUM BROMIDE AND ALBUTEROL SULFATE 3 ML: .5; 3 SOLUTION RESPIRATORY (INHALATION) at 12:09

## 2019-09-21 RX ADMIN — ACETAMINOPHEN 650 MG: 325 TABLET, FILM COATED ORAL at 02:09

## 2019-09-21 RX ADMIN — GABAPENTIN 600 MG: 300 CAPSULE ORAL at 02:09

## 2019-09-21 RX ADMIN — PREDNISONE 40 MG: 20 TABLET ORAL at 01:09

## 2019-09-21 RX ADMIN — GABAPENTIN 600 MG: 300 CAPSULE ORAL at 10:09

## 2019-09-21 RX ADMIN — BUMETANIDE 1 MG: 1 TABLET ORAL at 08:09

## 2019-09-21 RX ADMIN — PIPERACILLIN AND TAZOBACTAM 4.5 G: 4; .5 INJECTION, POWDER, LYOPHILIZED, FOR SOLUTION INTRAVENOUS; PARENTERAL at 12:09

## 2019-09-21 RX ADMIN — IPRATROPIUM BROMIDE AND ALBUTEROL SULFATE 3 ML: .5; 3 SOLUTION RESPIRATORY (INHALATION) at 11:09

## 2019-09-21 RX ADMIN — IPRATROPIUM BROMIDE AND ALBUTEROL SULFATE 3 ML: .5; 3 SOLUTION RESPIRATORY (INHALATION) at 08:09

## 2019-09-21 RX ADMIN — DICLOFENAC SODIUM 2 G: 10 GEL TOPICAL at 10:09

## 2019-09-21 RX ADMIN — ENOXAPARIN SODIUM 40 MG: 100 INJECTION SUBCUTANEOUS at 06:09

## 2019-09-21 RX ADMIN — IPRATROPIUM BROMIDE AND ALBUTEROL SULFATE 3 ML: .5; 3 SOLUTION RESPIRATORY (INHALATION) at 03:09

## 2019-09-21 RX ADMIN — PIPERACILLIN AND TAZOBACTAM 4.5 G: 4; .5 INJECTION, POWDER, LYOPHILIZED, FOR SOLUTION INTRAVENOUS; PARENTERAL at 06:09

## 2019-09-21 RX ADMIN — TRAZODONE HYDROCHLORIDE 50 MG: 50 TABLET ORAL at 10:09

## 2019-09-21 RX ADMIN — Medication 10 ML: at 11:09

## 2019-09-21 RX ADMIN — IPRATROPIUM BROMIDE AND ALBUTEROL SULFATE 3 ML: .5; 3 SOLUTION RESPIRATORY (INHALATION) at 04:09

## 2019-09-21 RX ADMIN — METHYLPREDNISOLONE SODIUM SUCCINATE 80 MG: 125 INJECTION, POWDER, FOR SOLUTION INTRAMUSCULAR; INTRAVENOUS at 05:09

## 2019-09-21 RX ADMIN — Medication 10 ML: at 05:09

## 2019-09-21 RX ADMIN — GABAPENTIN 600 MG: 300 CAPSULE ORAL at 08:09

## 2019-09-21 RX ADMIN — ONDANSETRON 4 MG: 2 INJECTION, SOLUTION INTRAMUSCULAR; INTRAVENOUS at 11:09

## 2019-09-21 RX ADMIN — METHYLPREDNISOLONE SODIUM SUCCINATE 80 MG: 125 INJECTION, POWDER, FOR SOLUTION INTRAMUSCULAR; INTRAVENOUS at 12:09

## 2019-09-21 RX ADMIN — Medication 10 ML: at 12:09

## 2019-09-21 NOTE — PLAN OF CARE
Problem: Adult Inpatient Plan of Care  Goal: Plan of Care Review  Outcome: Ongoing (interventions implemented as appropriate)     09/21/19 7886   Plan of Care Review   Plan of Care Reviewed With patient   Progress no change     Pt AAOx3, pt on 2LNC, medication administered per MD order, PICC line flushed with blood return noted. Pt stated she had a blister on right buttocks, dry skin noted in area place foam dressing over it. Maintained Airborne precautions, pt remained free from falls and injury during shift will continue to monitor.

## 2019-09-21 NOTE — PROVIDER TRANSFER
62 yo morbidly obese female with hx of COPD, CHF, ADEEL, chronic hypoxic respiratory failure on 2L HOT, Left leg amputation, chronic indwelling Wolfe catheter, ho stroke, h/o polysubstance abuse and bipolar dmitted to ICU with continuous bipap for COPD exacerbation with hypercapnic metabolic encephalopathy secondary to suspected viral bronchitis. CXR stable. Pulmonary consulted. Initially started on board spectrum abx at admission after obtaining cultures, and placed in ICU with bipap. Has hx of positive quantiferon, thus initially placed on airborne precautions. Unable to obtain TB exposure history. Low suspicion for TB but will repeat AFB, PPD and quantiferon as per pulmonary recs. AFB pending.  PICC placed due to poor IV access. Respiratory status and ABG improving with bipap overnight. Resp sputum unremarkable. No leukocytosis or sputum, stopped abx. Stable for floors today. Follow up blood cultures and AFB. Continue steroids, duonebs, bipap qhs. Need appointment w/ Dr. Chaidez on d/c. PT OT. Possible d/c tomorrow vs Monday if stable.

## 2019-09-21 NOTE — CARE UPDATE
[facility]  ICU Multidisciplinary Bedside Rounds     UPDATE     Date: 9/21/2019      Plan of care reviewed with the following, Nurse, Charge Nurse and Physician.       Needs/ Goals for the day: PT/OT eval, start home meds      Level of Care: OK to Transfer

## 2019-09-21 NOTE — CARE UPDATE
[facility]  ICU Multidisciplinary Bedside Rounds   SUMMARY     Date: 9/21/2019    Prehospitalization: Home  Admit Date / LOS : 9/20/2019/ 1 days    Diagnosis: Acute and chronic respiratory failure with hypercapnia    Consults:        Active: Pulm CC       Needed: N/A     Code Status: Full Code   Advanced Directive: <no information>    LDA: BIPAP, Wolfe, PICC and PIV       Central Lines/Site/Justification:Unable to Obtain/Maintain PIV       Urinary Cath/Order/Justification:Chronic Indwelling Urinary Cathether on Admission    Vasopressors/Infusions:        GOALS: Volume/ Hemodynamic: N/A                     RASS: N/A    CAM ICU: Negative  Pain Management: none       Pain Controlled: not applicable     Rhythm: SB    Respiratory Device: Nasal Cannula and Bipap    Oxygen Concentration (%):  [30] 30             Most Recent SBT/ SAT: N/A       MOVE Screen: PASS    VTE Prophylaxis: Pharm  Mobility: Bedrest  Stress Ulcer Prophylaxis: No    Dietary: PO  Tolerance: yes      Isolation: No active isolations    Restraints: No      Noteworthy Labs:  none    CBC/Anemia Labs: Coags:    Recent Labs   Lab 09/20/19  0514 09/21/19  0410   WBC 9.32 6.19   HGB 12.6 10.9*   HCT 42.7 36.7*    210   MCV 87 84   RDW 14.9* 14.7*    No results for input(s): PT, INR, APTT in the last 168 hours.     Chemistries:   Recent Labs   Lab 09/20/19  0514 09/21/19  0410    139   K 3.4* 4.0   CL 93* 94*   CO2 33* 38*   BUN 12 20   CREATININE 1.0 0.9   CALCIUM 9.0 9.0   PROT 8.9* 7.8   BILITOT 0.6 0.4   ALKPHOS 83 65   ALT 8* 11   AST 18 19        Cardiac Enzymes: Ejection Fractions:    Recent Labs     09/20/19 0514   TROPONINI 0.036*    No results found for: EF     POCT Glucose: HbA1c:    No results for input(s): POCTGLUCOSE in the last 168 hours. No results found for: HGBA1C     Needs from Care Team: none     ICU LOS 17h  Level of Care: OK to Transfer

## 2019-09-21 NOTE — PLAN OF CARE
Problem: Adult Inpatient Plan of Care  Goal: Plan of Care Review  Outcome: Ongoing (interventions implemented as appropriate)  Pt remains on 2 L/M NC with bipap at the [facility] on standby. Continue duoneb svn tx and bipap therapy as ordered. Will continue to monitor. DEYANIRA Willis, RRT

## 2019-09-21 NOTE — SUBJECTIVE & OBJECTIVE
Interval History: MELISSA overnight. Wore the bipap and tolerated that well. Feels closer to her baseline this am    Objective:     Vital Signs (Most Recent):  Temp: 97.7 °F (36.5 °C) (09/21/19 0800)  Pulse: 74 (09/21/19 0935)  Resp: (!) 23 (09/21/19 0935)  BP: (!) 145/78 (09/21/19 0800)  SpO2: (!) 92 % (09/21/19 0935) Vital Signs (24h Range):  Temp:  [97.6 °F (36.4 °C)-98.1 °F (36.7 °C)] 97.7 °F (36.5 °C)  Pulse:  [46-78] 74  Resp:  [15-40] 23  SpO2:  [90 %-100 %] 92 %  BP: ()/(55-92) 145/78     Weight: 112.5 kg (248 lb 0.3 oz)  Body mass index is 42.57 kg/m².      Intake/Output Summary (Last 24 hours) at 9/21/2019 1119  Last data filed at 9/21/2019 0700  Gross per 24 hour   Intake 1630 ml   Output 2080 ml   Net -450 ml       Physical Exam   Constitutional: She is oriented to person, place, and time. She appears well-developed and well-nourished.   HENT:   Head: Normocephalic and atraumatic.   Mouth/Throat: Oropharynx is clear and moist.   Eyes: Conjunctivae and EOM are normal.   Neck: Trachea normal and full passive range of motion without pain.   Cardiovascular: Normal rate, regular rhythm, S1 normal, S2 normal, normal heart sounds and intact distal pulses. Exam reveals no gallop and no friction rub.   No murmur heard.  Pulmonary/Chest: No respiratory distress. She has no wheezes. She has no rales. She exhibits no tenderness.   Abdominal: Soft. Bowel sounds are normal. She exhibits no distension. There is no tenderness.   Neurological: She is alert and oriented to person, place, and time.   Skin: Skin is warm and dry. No abrasion and no bruising noted.   Vitals reviewed.      Vents:  Oxygen Concentration (%): 30 (09/21/19 0315)    Lines/Drains/Airways     Peripherally Inserted Central Catheter Line                 PICC Double Lumen 09/20/19 2120 right basilic less than 1 day          Drain                 Suprapubic Catheter 09/20/19 1002 18 Fr. 1 day          Peripheral Intravenous Line                  Peripheral IV - Single Lumen Right Antecubital -- days         Peripheral IV - Single Lumen 09/20/19 0514 18 G Right Hand 1 day                Significant Labs:    CBC/Anemia Profile:  Recent Labs   Lab 09/20/19  0514 09/21/19  0410   WBC 9.32 6.19   HGB 12.6 10.9*   HCT 42.7 36.7*    210   MCV 87 84   RDW 14.9* 14.7*        Chemistries:  Recent Labs   Lab 09/20/19  0514 09/21/19  0410    139   K 3.4* 4.0   CL 93* 94*   CO2 33* 38*   BUN 12 20   CREATININE 1.0 0.9   CALCIUM 9.0 9.0   ALBUMIN 3.5 3.0*   PROT 8.9* 7.8   BILITOT 0.6 0.4   ALKPHOS 83 65   ALT 8* 11   AST 18 19       All pertinent labs within the past 24 hours have been reviewed.    Significant Imaging:  I have reviewed and interpreted all pertinent imaging results/findings within the past 24 hours.

## 2019-09-21 NOTE — PLAN OF CARE
Problem: Adult Inpatient Plan of Care  Goal: Plan of Care Review  Outcome: Ongoing (interventions implemented as appropriate)  Pt is on AVAPS 500 VT/ EPAP 5/ RR 18/ MIN P 15/MAX P 25/ FIO2 30%. Will continue to give tx as scheduled. Will continue to monitor.

## 2019-09-21 NOTE — ASSESSMENT & PLAN NOTE
2/2 COPD/OHS exacerbation possible 2/2 viral illness in addition to noncompliance w/ home bipap  - Improving  - Off abx 2/2 lack of leukocytosis and sputum production  - Resp cx NGTD  - Steroids and duonebs  - Home nightly bipap  - Needs an appointment w/ Dr. Chaidez as he does sleep medicine as well as pulmonary  - PT/OT

## 2019-09-21 NOTE — PLAN OF CARE
Problem: Adult Inpatient Plan of Care  Goal: Plan of Care Review  Outcome: Ongoing (interventions implemented as appropriate)  AAOx4, follows commands.  Wore bipap throughout the night while asleep, tolerating 2L nasal cannula when awake.  Denies SOB, denies pain.  Vital signs stable.  Good urine output via suprapubic catheter.  No new skin breakdown or injury obtained, safety precautions maintained.

## 2019-09-21 NOTE — SUBJECTIVE & OBJECTIVE
Interval Hx: No acute events overnight. Feels much better since admission. No complaints.    Review of Systems   HENT: Negative.    Respiratory: Negative.    Cardiovascular: Negative.    Gastrointestinal: Negative.    Genitourinary: Negative.    Musculoskeletal: Negative.         Objective:     Vital Signs (Most Recent):  Temp: 97.7 °F (36.5 °C) (09/21/19 0800)  Pulse: 67 (09/21/19 1203)  Resp: 20 (09/21/19 1203)  BP: (!) 145/78 (09/21/19 0800)  SpO2: 96 % (09/21/19 1203) Vital Signs (24h Range):  Temp:  [97.6 °F (36.4 °C)-98.1 °F (36.7 °C)] 97.7 °F (36.5 °C)  Pulse:  [46-78] 67  Resp:  [15-40] 20  SpO2:  [90 %-100 %] 96 %  BP: ()/(55-92) 145/78     Weight: 112.5 kg (248 lb 0.3 oz)  Body mass index is 42.57 kg/m².    Physical Exam   Constitutional: She is oriented to person, place, and time. No distress.   Obese female   HENT:   Head: Normocephalic and atraumatic.   Eyes: Pupils are equal, round, and reactive to light. EOM are normal. No scleral icterus.   Neck: Normal range of motion.   Thick neck   Cardiovascular: Normal rate and regular rhythm.   Pulmonary/Chest: Effort normal and breath sounds normal. No respiratory distress. She has no wheezes. She has no rales.   NC   Abdominal: Soft. Bowel sounds are normal.   Musculoskeletal: Normal range of motion. She exhibits edema (trace edema).   Neurological: She is alert and oriented to person, place, and time.   Skin: Skin is warm and dry. Capillary refill takes less than 2 seconds. She is not diaphoretic.   Nursing note and vitals reviewed.       Significant Labs: All pertinent labs within the past 24 hours have been reviewed.    Significant Imaging: I have reviewed and interpreted all pertinent imaging results/findings within the past 24 hours.

## 2019-09-21 NOTE — ASSESSMENT & PLAN NOTE
Suspecting secondary to COPD exacerbation secondary to possible viral illness. Abg with pH 7.3 and CO2 74 not improving on repeat ABG. ICU requested for admission. Popeye a fever of 102.5 in the ED. Started on abx post cultures taken. LA and leukocytosis wnl. Has chronic suprapubic catheter, suspecting dirty urine might be colonizer.  - Placed on airborne due to hx of positive quantiferon, low suspicion for active TB at this time, Pulm rec airborne, with repeat AFB and quantiferon, pending  - Pulmonary consulted, appreciate recs  - Significantly improved with bipap, now on NC  - D/c abx, low suspicion for bacterial infection  - Continue steroids, inhalers and bipap prn/qhs  - Stable for floors today  - Fu with Dr. Kaylynn DOWNING as per pulmonary  - Resolved

## 2019-09-21 NOTE — PROCEDURES
"Mary Ellen Fajardo is a 63 y.o. female patient.    Temp: 97.8 °F (36.6 °C) (09/20/19 1930)  Pulse: 73 (09/20/19 2100)  Resp: (!) 29 (09/20/19 2100)  BP: (!) 161/83 (09/20/19 2100)  SpO2: (!) 94 % (09/20/19 2100)  Weight: 112.5 kg (248 lb 0.3 oz) (09/20/19 1345)  Height: 5' 4" (162.6 cm) (09/20/19 0512)    PICC  Date/Time: 9/20/2019 9:20 PM  Performed by: Olivier Elias RN  Consent Done: Yes  Time out: Immediately prior to procedure a time out was called to verify the correct patient, procedure, equipment, support staff and site/side marked as required  Indications: med administration and vascular access  Anesthesia: local infiltration  Local anesthetic: lidocaine 1% without epinephrine  Anesthetic Total (mL): 2  Preparation: skin prepped with ChloraPrep  Skin prep agent dried: skin prep agent completely dried prior to procedure  Sterile barriers: all five maximum sterile barriers used - cap, mask, sterile gown, sterile gloves, and large sterile sheet  Hand hygiene: hand hygiene performed prior to central venous catheter insertion  Location details: right basilic  Catheter type: double lumen  Catheter size: 5 Fr  Catheter Length: 38cm    Ultrasound guidance: yes  Vessel Caliber: medium, compressibility normal  Needle advanced into vessel with real time Ultrasound guidance.  Guidewire confirmed in vessel.  Sterile sheath used.  Number of attempts: 1  Post-procedure: blood return through all ports, chlorhexidine patch and sterile dressing applied            Olivier Elias  9/20/2019  "

## 2019-09-21 NOTE — NURSING TRANSFER
Nursing Transfer Note      9/21/2019     Transfer To: [id number]    Transfer via bed    Transfer with O2, cardiac monitoring    Transported by transport service and RN    Medicines sent: n/a    Chart send with patient: Yes    Notified: family notified by patient prior to transfer    Patient reassessed at: 9/21/19, 1400    Upon arrival to floor: cardiac monitor applied, patient oriented to room, call bell in reach and bed in lowest position

## 2019-09-21 NOTE — HOSPITAL COURSE
Admitted to ICU for COPD exacerbation with hypercapnic metabolic encephalopathy secondary to suspected viral bronchitis. CXR stable. Pulmonary consulted. Initially started on board spectrum abx at admission after obtaining cultures, and placed in ICU with bipap. Has hx of positive quantiferon, thus initially placed on airborne precautions. Unable to obtain TB exposure history. Low suspicion for TB but will repeat AFB, PPD and quantiferon as per pulmonary recs. AFB pending.  PICC placed due to poor IV access. Respiratory status and ABG improving with bipap overnight. Resp sputum unremarkable. No leukocytosis or sputum, stopped abx. Stable for floors. No AFB and taken off precautions.

## 2019-09-21 NOTE — PROGRESS NOTES
[facility]  Progress Note    Patient Name: Mary Ellen Fajardo  MRN: [id number]  Patient Class: IP- Inpatient   Admission Date: 9/20/2019  Length of Stay: 1 days  Attending Physician: Edgardo Pedroza MD  Primary Care Provider: Any Tran MD      Subjective:     Principal Problem:Acute and chronic respiratory failure with hypercapnia    HPI:  64 yo morbidly obese female with hx of COPD, CHF, ADEEL, chronic hypoxic respiratory failure on 2L HOT, h/o knee replaced complicated by infection in 2011, left leg amputation, chronic indwelling Delcid catheter, ho stroke, h/o polysubstance abuse and bipolar disorder presenting via EMS for progressively worsening dyspnea since yesterday with associated dry cough, fevers and wheeze. Patient encephalopathy with limited hx. She denied any falls, dizziness, syncope, chest pain, hemoptysis, pleurisy, abdominal pain, diarrhea, constipation or bleeds. Has been having URI/cold like symptoms since yesterday with decreased po intake. Tired her home nebs yesterday and today but didn't help prompting EMS. No recent travel or sick contacts.     In the ED patient was tachyenic, febrile Tm 103, normotensive. Labs significant for UA dirty but has chronic delcid, , LA wnl, ABG noting acute on chronic respiratory acidosis with pH 7.3 and PCO2 of 74. CXR fairly unremakable. Repeat ABG with worsening pH and acidosis. ICU requested for admission.    Overview/Hospital Course:  Admitted to ICU for COPD exacerbation with hypercapnic metabolic encephalopathy secondary to suspected viral bronchitis. CXR stable. Pulmonary consulted. Initially started on board spectrum abx at admission after obtaining cultures, and placed in ICU with bipap. Has hx of positive quantiferon, thus initially placed on airborne precautions. Unable to obtain TB exposure history. Low suspicion for TB but will repeat AFB, PPD and quantiferon as per pulmonary recs. AFB pending.  PICC  placed due to poor IV access. Respiratory status and ABG improving with bipap overnight. Resp sputum unremarkable. No leukocytosis or sputum, stopped abx. Stable for floors today. Follow up blood cultures and AFB. Continue steroids, duonebs, bipap qhs. Need appointment w/ Dr. Chaidez on d/c. PT OT. Possible d/c tomorrow vs Monday if stable.    Interval Hx: No acute events overnight. Feels much better since admission. No complaints.    Review of Systems   HENT: Negative.    Respiratory: Negative.    Cardiovascular: Negative.    Gastrointestinal: Negative.    Genitourinary: Negative.    Musculoskeletal: Negative.         Objective:     Vital Signs (Most Recent):  Temp: 97.7 °F (36.5 °C) (09/21/19 0800)  Pulse: 67 (09/21/19 1203)  Resp: 20 (09/21/19 1203)  BP: (!) 145/78 (09/21/19 0800)  SpO2: 96 % (09/21/19 1203) Vital Signs (24h Range):  Temp:  [97.6 °F (36.4 °C)-98.1 °F (36.7 °C)] 97.7 °F (36.5 °C)  Pulse:  [46-78] 67  Resp:  [15-40] 20  SpO2:  [90 %-100 %] 96 %  BP: ()/(55-92) 145/78     Weight: 112.5 kg (248 lb 0.3 oz)  Body mass index is 42.57 kg/m².    Physical Exam   Constitutional: She is oriented to person, place, and time. No distress.   Obese female   HENT:   Head: Normocephalic and atraumatic.   Eyes: Pupils are equal, round, and reactive to light. EOM are normal. No scleral icterus.   Neck: Normal range of motion.   Thick neck   Cardiovascular: Normal rate and regular rhythm.   Pulmonary/Chest: Effort normal and breath sounds normal. No respiratory distress. She has no wheezes. She has no rales.   NC   Abdominal: Soft. Bowel sounds are normal.   Musculoskeletal: Normal range of motion. She exhibits edema (trace edema).   Neurological: She is alert and oriented to person, place, and time.   Skin: Skin is warm and dry. Capillary refill takes less than 2 seconds. She is not diaphoretic.   Nursing note and vitals reviewed.       Significant Labs: All pertinent labs within the past 24 hours have been  reviewed.    Significant Imaging: I have reviewed and interpreted all pertinent imaging results/findings within the past 24 hours.      Assessment/Plan:      * Acute and chronic respiratory failure with hypercapnia  Suspecting secondary to COPD exacerbation secondary to possible viral illness. Abg with pH 7.3 and CO2 74 not improving on repeat ABG. ICU requested for admission. Popeye a fever of 102.5 in the ED. Started on abx post cultures taken. LA and leukocytosis wnl. Has chronic suprapubic catheter, suspecting dirty urine might be colonizer.  - Placed on airborne due to hx of positive quantiferon, low suspicion for active TB at this time, Pulm rec airborne, with repeat AFB and quantiferon, pending  - Pulmonary consulted, appreciate recs  - Significantly improved with bipap, now on NC  - D/c abx, low suspicion for bacterial infection  - Continue steroids, inhalers and bipap prn/qhs  - Stable for floors today  - Fu with Dr. Chaidez OP as per pulmonary  - Resolved    Acute on chronic respiratory acidosis  As above. Resolved.    Encephalopathy, metabolic  Secondary to hypercapnia. Resolved.    Chronic diastolic heart failure  Appears compensated at this time.    History of CVA (cerebrovascular accident)  Not on statin or asa at home. Unknown. Fu OP    Bipolar I disorder, current or most recent episode depressed, with psychotic features  Chronic issue, resumed home meds    Chronic indwelling suprapubic catheter in place  Noted.    Chronic hepatitis C  OP fu with PCP    Class 3 severe obesity in adult  Discuss lifestyle modifications and weight loss    COPD (chronic obstructive pulmonary disease)  As above    ADEEL on CPAP  Bipap qhs and prn    Essential hypertension  Resume home bumex      VTE Risk Mitigation (From admission, onward)        Ordered     enoxaparin injection 40 mg  Daily      09/20/19 1237     IP VTE HIGH RISK PATIENT  Once      09/20/19 1237          Critical care time spent on the evaluation and treatment  of severe organ dysfunction, review of pertinent labs and imaging studies, discussions with consulting providers and discussions with patient/family: > 70 minutes.      Edgardo Pedroza MD  Department of Hospital Medicine   [facility]

## 2019-09-22 LAB
ALBUMIN SERPL BCP-MCNC: 3.3 G/DL (ref 3.5–5.2)
ALP SERPL-CCNC: 72 U/L (ref 55–135)
ALT SERPL W/O P-5'-P-CCNC: 17 U/L (ref 10–44)
ANION GAP SERPL CALC-SCNC: 8 MMOL/L (ref 8–16)
AST SERPL-CCNC: 28 U/L (ref 10–40)
BASOPHILS # BLD AUTO: 0 K/UL (ref 0–0.2)
BASOPHILS NFR BLD: 0 % (ref 0–1.9)
BILIRUB SERPL-MCNC: 0.3 MG/DL (ref 0.1–1)
BUN SERPL-MCNC: 22 MG/DL (ref 8–23)
CALCIUM SERPL-MCNC: 9.1 MG/DL (ref 8.7–10.5)
CHLORIDE SERPL-SCNC: 93 MMOL/L (ref 95–110)
CO2 SERPL-SCNC: 37 MMOL/L (ref 23–29)
CREAT SERPL-MCNC: 0.8 MG/DL (ref 0.5–1.4)
DIFFERENTIAL METHOD: ABNORMAL
EOSINOPHIL # BLD AUTO: 0 K/UL (ref 0–0.5)
EOSINOPHIL NFR BLD: 0 % (ref 0–8)
ERYTHROCYTE [DISTWIDTH] IN BLOOD BY AUTOMATED COUNT: 14.6 % (ref 11.5–14.5)
EST. GFR  (AFRICAN AMERICAN): >60 ML/MIN/1.73 M^2
EST. GFR  (NON AFRICAN AMERICAN): >60 ML/MIN/1.73 M^2
GLUCOSE SERPL-MCNC: 100 MG/DL (ref 70–110)
HCT VFR BLD AUTO: 36.8 % (ref 37–48.5)
HGB BLD-MCNC: 10.8 G/DL (ref 12–16)
LYMPHOCYTES # BLD AUTO: 1.5 K/UL (ref 1–4.8)
LYMPHOCYTES NFR BLD: 18.3 % (ref 18–48)
MCH RBC QN AUTO: 24.9 PG (ref 27–31)
MCHC RBC AUTO-ENTMCNC: 29.3 G/DL (ref 32–36)
MCV RBC AUTO: 85 FL (ref 82–98)
MONOCYTES # BLD AUTO: 0.5 K/UL (ref 0.3–1)
MONOCYTES NFR BLD: 6.3 % (ref 4–15)
NEUTROPHILS # BLD AUTO: 6.2 K/UL (ref 1.8–7.7)
NEUTROPHILS NFR BLD: 75.6 % (ref 38–73)
PLATELET # BLD AUTO: 253 K/UL (ref 150–350)
PMV BLD AUTO: 10.6 FL (ref 9.2–12.9)
POTASSIUM SERPL-SCNC: 3.7 MMOL/L (ref 3.5–5.1)
PROT SERPL-MCNC: 8.1 G/DL (ref 6–8.4)
RBC # BLD AUTO: 4.33 M/UL (ref 4–5.4)
SODIUM SERPL-SCNC: 138 MMOL/L (ref 136–145)
WBC # BLD AUTO: 8.16 K/UL (ref 3.9–12.7)

## 2019-09-22 PROCEDURE — 63600175 PHARM REV CODE 636 W HCPCS: Performed by: EMERGENCY MEDICINE

## 2019-09-22 PROCEDURE — 36415 COLL VENOUS BLD VENIPUNCTURE: CPT

## 2019-09-22 PROCEDURE — 63600175 PHARM REV CODE 636 W HCPCS: Performed by: INTERNAL MEDICINE

## 2019-09-22 PROCEDURE — 27000221 HC OXYGEN, UP TO 24 HOURS

## 2019-09-22 PROCEDURE — 97535 SELF CARE MNGMENT TRAINING: CPT

## 2019-09-22 PROCEDURE — 80053 COMPREHEN METABOLIC PANEL: CPT

## 2019-09-22 PROCEDURE — 25000242 PHARM REV CODE 250 ALT 637 W/ HCPCS: Performed by: EMERGENCY MEDICINE

## 2019-09-22 PROCEDURE — 97161 PT EVAL LOW COMPLEX 20 MIN: CPT | Performed by: PHYSICAL THERAPIST

## 2019-09-22 PROCEDURE — 11000001 HC ACUTE MED/SURG PRIVATE ROOM

## 2019-09-22 PROCEDURE — A4216 STERILE WATER/SALINE, 10 ML: HCPCS | Performed by: INTERNAL MEDICINE

## 2019-09-22 PROCEDURE — 25000003 PHARM REV CODE 250: Performed by: HOSPITALIST

## 2019-09-22 PROCEDURE — 97165 OT EVAL LOW COMPLEX 30 MIN: CPT

## 2019-09-22 PROCEDURE — 25000003 PHARM REV CODE 250: Performed by: INTERNAL MEDICINE

## 2019-09-22 PROCEDURE — 94660 CPAP INITIATION&MGMT: CPT

## 2019-09-22 PROCEDURE — 94640 AIRWAY INHALATION TREATMENT: CPT

## 2019-09-22 PROCEDURE — 94761 N-INVAS EAR/PLS OXIMETRY MLT: CPT

## 2019-09-22 PROCEDURE — 85025 COMPLETE CBC W/AUTO DIFF WBC: CPT

## 2019-09-22 PROCEDURE — 99900035 HC TECH TIME PER 15 MIN (STAT)

## 2019-09-22 PROCEDURE — 25000003 PHARM REV CODE 250: Performed by: EMERGENCY MEDICINE

## 2019-09-22 RX ORDER — OXYCODONE AND ACETAMINOPHEN 5; 325 MG/1; MG/1
1 TABLET ORAL EVERY 4 HOURS PRN
Status: DISCONTINUED | OUTPATIENT
Start: 2019-09-22 | End: 2019-09-24 | Stop reason: HOSPADM

## 2019-09-22 RX ORDER — OLANZAPINE 2.5 MG/1
5 TABLET ORAL NIGHTLY
Status: DISCONTINUED | OUTPATIENT
Start: 2019-09-22 | End: 2019-09-24 | Stop reason: HOSPADM

## 2019-09-22 RX ORDER — TRAZODONE HYDROCHLORIDE 50 MG/1
50 TABLET ORAL NIGHTLY PRN
Status: DISCONTINUED | OUTPATIENT
Start: 2019-09-22 | End: 2019-09-24 | Stop reason: HOSPADM

## 2019-09-22 RX ORDER — ACETAMINOPHEN 325 MG/1
650 TABLET ORAL EVERY 4 HOURS PRN
Status: DISCONTINUED | OUTPATIENT
Start: 2019-09-22 | End: 2019-09-24 | Stop reason: HOSPADM

## 2019-09-22 RX ADMIN — FLUOXETINE 10 MG: 10 CAPSULE ORAL at 08:09

## 2019-09-22 RX ADMIN — IPRATROPIUM BROMIDE AND ALBUTEROL SULFATE 3 ML: .5; 3 SOLUTION RESPIRATORY (INHALATION) at 12:09

## 2019-09-22 RX ADMIN — IPRATROPIUM BROMIDE AND ALBUTEROL SULFATE 3 ML: .5; 3 SOLUTION RESPIRATORY (INHALATION) at 08:09

## 2019-09-22 RX ADMIN — Medication 10 ML: at 12:09

## 2019-09-22 RX ADMIN — OLANZAPINE 5 MG: 2.5 TABLET, FILM COATED ORAL at 08:09

## 2019-09-22 RX ADMIN — DICLOFENAC SODIUM 2 G: 10 GEL TOPICAL at 08:09

## 2019-09-22 RX ADMIN — GABAPENTIN 600 MG: 300 CAPSULE ORAL at 08:09

## 2019-09-22 RX ADMIN — ENOXAPARIN SODIUM 40 MG: 100 INJECTION SUBCUTANEOUS at 05:09

## 2019-09-22 RX ADMIN — GABAPENTIN 600 MG: 300 CAPSULE ORAL at 03:09

## 2019-09-22 RX ADMIN — PREDNISONE 40 MG: 20 TABLET ORAL at 08:09

## 2019-09-22 RX ADMIN — BUMETANIDE 1 MG: 1 TABLET ORAL at 08:09

## 2019-09-22 RX ADMIN — Medication 10 ML: at 05:09

## 2019-09-22 RX ADMIN — IPRATROPIUM BROMIDE AND ALBUTEROL SULFATE 3 ML: .5; 3 SOLUTION RESPIRATORY (INHALATION) at 03:09

## 2019-09-22 RX ADMIN — BACLOFEN 20 MG: 10 TABLET ORAL at 10:09

## 2019-09-22 RX ADMIN — TRAZODONE HYDROCHLORIDE 50 MG: 50 TABLET ORAL at 08:09

## 2019-09-22 RX ADMIN — Medication 10 ML: at 11:09

## 2019-09-22 RX ADMIN — IPRATROPIUM BROMIDE AND ALBUTEROL SULFATE 3 ML: .5; 3 SOLUTION RESPIRATORY (INHALATION) at 04:09

## 2019-09-22 RX ADMIN — ACETAMINOPHEN 650 MG: 325 TABLET, FILM COATED ORAL at 05:09

## 2019-09-22 NOTE — PROGRESS NOTES
[facility]  Progress Note    Patient Name: Mary Ellen Fajardo  MRN: [id number]  Patient Class: IP- Inpatient   Admission Date: 9/20/2019  Length of Stay: 2 days  Attending Physician: Slvaa Abreu MD  Primary Care Provider: Any Tran MD        Subjective:     Principal Problem:Acute and chronic respiratory failure with hypercapnia        HPI:  64 yo morbidly obese female with hx of COPD, CHF, ADEEL, chronic hypoxic respiratory failure on 2L HOT, h/o knee replaced complicated by infection in 2011, left leg amputation, chronic indwelling Delcid catheter, ho stroke, h/o polysubstance abuse and bipolar disorder presenting via EMS for progressively worsening dyspnea since yesterday with associated dry cough, fevers and wheeze. Patient encephalopathy with limited hx. She denied any falls, dizziness, syncope, chest pain, hemoptysis, pleurisy, abdominal pain, diarrhea, constipation or bleeds. Has been having URI/cold like symptoms since yesterday with decreased po intake. Tired her home nebs yesterday and today but didn't help prompting EMS. No recent travel or sick contacts.     In the ED patient was tachyenic, febrile Tm 103, normotensive. Labs significant for UA dirty but has chronic delcid, , LA wnl, ABG noting acute on chronic respiratory acidosis with pH 7.3 and PCO2 of 74. CXR fairly unremakable. Repeat ABG with worsening pH and acidosis. ICU requested for admission.    Overview/Hospital Course:  Admitted to ICU for COPD exacerbation with hypercapnic metabolic encephalopathy secondary to suspected viral bronchitis. CXR stable. Pulmonary consulted. Initially started on board spectrum abx at admission after obtaining cultures, and placed in ICU with bipap. Has hx of positive quantiferon, thus initially placed on airborne precautions. Unable to obtain TB exposure history. Low suspicion for TB but will repeat AFB, PPD and quantiferon as per pulmonary recs. AFB  pending.  PICC placed due to poor IV access. Respiratory status and ABG improving with bipap overnight. Resp sputum unremarkable. No leukocytosis or sputum, stopped abx. Stable for floors.    Interval History: Feeling better.    Review of Systems   HENT: Negative for ear discharge and ear pain.    Eyes: Negative for pain and itching.   Endocrine: Negative for polyphagia and polyuria.   Neurological: Negative for seizures and syncope.     Objective:     Vital Signs (Most Recent):  Temp: 98.7 °F (37.1 °C) (09/22/19 1105)  Pulse: (!) 55 (09/22/19 1210)  Resp: 16 (09/22/19 1210)  BP: 118/67 (09/22/19 1105)  SpO2: 96 % (09/22/19 1210) Vital Signs (24h Range):  Temp:  [97.3 °F (36.3 °C)-98.7 °F (37.1 °C)] 98.7 °F (37.1 °C)  Pulse:  [53-79] 55  Resp:  [16-23] 16  SpO2:  [92 %-98 %] 96 %  BP: (118-145)/(59-78) 118/67     Weight: 112.5 kg (248 lb 0.3 oz)  Body mass index is 42.57 kg/m².    Intake/Output Summary (Last 24 hours) at 9/22/2019 1437  Last data filed at 9/22/2019 1203  Gross per 24 hour   Intake 480 ml   Output 910 ml   Net -430 ml      Physical Exam   Constitutional: She is oriented to person, place, and time. No distress.   Obese female   HENT:   Head: Normocephalic and atraumatic.   Eyes: Pupils are equal, round, and reactive to light. EOM are normal. No scleral icterus.   Neck: Normal range of motion.   Thick neck   Cardiovascular: Normal rate and regular rhythm.   Pulmonary/Chest: Effort normal and breath sounds normal. No respiratory distress. She has no wheezes. She has no rales.   NC   Abdominal: Soft. Bowel sounds are normal.   Musculoskeletal: Normal range of motion. She exhibits edema (trace edema).   Neurological: She is alert and oriented to person, place, and time.   Skin: Skin is warm and dry. Capillary refill takes less than 2 seconds. She is not diaphoretic.   Nursing note and vitals reviewed.      Significant Labs:   BMP:   Recent Labs   Lab 09/22/19  0429         K 3.7   CL 93*    CO2 37*   BUN 22   CREATININE 0.8   CALCIUM 9.1     CBC:   Recent Labs   Lab 09/21/19  0410 09/22/19  0429   WBC 6.19 8.16   HGB 10.9* 10.8*   HCT 36.7* 36.8*    253       Significant Imaging: I have reviewed all pertinent imaging results/findings within the past 24 hours.      Assessment/Plan:      * Acute and chronic respiratory failure with hypercapnia  Suspecting secondary to COPD exacerbation secondary to possible viral illness. Abg with pH 7.3 and CO2 74 not improving on repeat ABG. ICU requested for admission. Popeye a fever of 102.5 in the ED. Started on abx post cultures taken. LA and leukocytosis wnl. Has chronic suprapubic catheter, suspecting dirty urine might be colonizer.  - Placed on airborne due to hx of positive quantiferon, low suspicion for active TB at this time, Pulm rec airborne, with repeat AFB and quantiferon, pending  - Pulmonary consulted, appreciate recs  - Significantly improved with bipap, now on NC  - D/c abx, low suspicion for bacterial infection  - Continue steroids, inhalers and bipap prn/qhs  - Fu with Dr. Chaidez OP as per pulmonary  - Resolved    Encephalopathy, metabolic  Secondary to hypercapnia. Resolved.    Chronic diastolic heart failure  Appears compensated at this time.    Acute on chronic respiratory acidosis  As above. Resolved.    History of CVA (cerebrovascular accident)  Not on statin or asa at home. Unknown. Fu OP    Bipolar I disorder, current or most recent episode depressed, with psychotic features  Chronic issue, resumed home meds    Chronic indwelling suprapubic catheter in place  Noted.    Chronic hepatitis C  OP fu with PCP    Class 3 severe obesity in adult  Discuss lifestyle modifications and weight loss    COPD (chronic obstructive pulmonary disease)  As above    ADEEL on CPAP  Bipap qhs and prn    Essential hypertension  Resume home bumex      VTE Risk Mitigation (From admission, onward)         Ordered     enoxaparin injection 40 mg  Daily      09/20/19  1237     IP VTE HIGH RISK PATIENT  Once      09/20/19 1237                      Slava Abreu MD  Department of Hospital Medicine   [facility]

## 2019-09-22 NOTE — PLAN OF CARE
Pt remained free from falls and injury during shift, medication administered per MD order, 3LNC with humidification, Isolation precautions maintained, safety maintained during shift will continue to monitor.

## 2019-09-22 NOTE — PLAN OF CARE
Patient remained free from injuries throughout shift.  Patient continue on airborne precautions pending TB results.  Skin assessments done to keep patient from skin breakdown.  Analgesics on patient's POC.  The patient continues on BIPAP, steroids, inhalers and Duonebs. No acute distress noted.       Problem: Infection  Goal: Infection Symptom Resolution  Outcome: Ongoing, Progressing     Problem: Fall Injury Risk  Goal: Absence of Fall and Fall-Related Injury  Outcome: Ongoing, Progressing     Problem: Skin Injury Risk Increased  Goal: Skin Health and Integrity  Outcome: Ongoing, Progressing     Problem: Adult Inpatient Plan of Care  Goal: Plan of Care Review  Outcome: Ongoing, Progressing  Goal: Patient-Specific Goal (Individualization)  Outcome: Ongoing, Progressing  Goal: Absence of Hospital-Acquired Illness or Injury  Outcome: Ongoing, Progressing  Goal: Optimal Comfort and Wellbeing  Outcome: Ongoing, Progressing

## 2019-09-22 NOTE — PT/OT/SLP EVAL
Occupational Therapy   Evaluation    Name: Mary Ellen Fajardo  MRN: [id number]  Admitting Diagnosis:  Acute and chronic respiratory failure with hypercapnia      Recommendations:     Discharge Recommendations: home health OT  Discharge Equipment Recommendations:  none  Barriers to discharge:  None    Assessment:     Mary Ellen Fajardo is a 63 y.o. female with a medical diagnosis of Acute and chronic respiratory failure with hypercapnia.  She presents with decreased I with ADLs, and functional transfers. Performance deficits affecting function: weakness, impaired self care skills, impaired functional mobilty, impaired balance, decreased ROM, impaired coordination, impaired cardiopulmonary response to activity, impaired joint extensibility.      Rehab Prognosis: Good; patient would benefit from acute skilled OT services to address these deficits and reach maximum level of function.       Plan:     Patient to be seen 3 x/week to address the above listed problems via self-care/home management, therapeutic exercises, therapeutic activities, neuromuscular re-education  · Plan of Care Expires: 10/06/19  · Plan of Care Reviewed with: patient    Subjective     Chief Complaint: None  Patient/Family Comments/goals: To return to [facility]    Occupational Profile:  Living Environment: The pt lives with her spouse in a [facility] with a ramp.    Previous level of function: The pt states that she is Mod I with dressing using adaptive equipment as needed. She takes a sponge bath, because her wheelchair can't fit into her bathroom. She is also able to transfer herself from the bed to the w/c using a sliding board  Roles and Routines: Disabled  Equipment Used at Home:  hospital bed, slide board, BIPAP, walker, rolling, bedside commode, respiratory supplies, urinary catheter supplies, oxygen, dressing device  Assistance upon Discharge: The pt will have assistance from her  upon discharge.    Pain/Comfort:  Pain Rating 1:  0/10    Patients cultural, spiritual, Druze conflicts given the current situation: no    Objective:     Communicated with: Nursing prior to session.  Patient found HOB elevated with peripheral IV, telemetry, delcid catheter upon OT entry to room.    General Precautions: Standard, fall   Orthopedic Precautions:N/A   Braces: N/A     Occupational Performance:    Bed Mobility:    · Patient completed Rolling/Turning to Left with  supervision  · Patient completed Rolling/Turning to Right with supervision  · Patient completed Scooting/Bridging with supervision  · Patient completed Supine to Sit with supervision  · Patient completed Sit to Supine with supervision    Functional Mobility/Transfers:  · Not tested.  The pt is a left BKA, and said she would not be able to transfer to the / until one of her relatives brings her shoe.    Activities of Daily Living:  · Feeding:  independence seated at the EOB  · Grooming: stand by assistance seated at the EOB  · Upper Body Dressing: supervision seated at the EOB    Cognitive/Visual Perceptual:  Cognitive/Psychosocial Skills:     -       Oriented to: Person, Place, Time and Situation     Physical Exam:  Upper Extremity Range of Motion:     -       Right Upper Extremity: WFL  -       Left Upper Extremity: WFL  Upper Extremity Strength:    -       Right Upper Extremity: WFL  -       Left Upper Extremity: WFL    AMPAC 6 Click ADL:  AMPAC Total Score: 20    Treatment & Education:  The pt was educated on the role of OT.  Education:    Patient left HOB elevated with all lines intact and call button in reach    GOALS:   Multidisciplinary Problems     Occupational Therapy Goals        Problem: Occupational Therapy Goal    Goal Priority Disciplines Outcome Interventions   Occupational Therapy Goal     OT, PT/OT Ongoing, Progressing                    History:     Past Medical History:   Diagnosis Date    Addiction to drug     Alcohol abuse     Anxiety     Arthritis     Asthma      Bipolar disorder     Bladder stones     CHF (congestive heart failure)     COPD (chronic obstructive pulmonary disease)     on home o2    CVA (cerebral vascular accident)     2012    Hallucination     Hepatitis C     treated and cured    History of blood clots     Hx of psychiatric care     Hypertension     Belen     ADEEL treated with BiPAP     Paraplegia     Paraplegic spinal paralysis     Psychiatric problem     Psychosis     AVH; Paranoia    Renal disorder     SCI (spinal cord injury)     incomplete    Sleep difficulties     has sleep apnea and uses a Bi-PAP machine.    Substance abuse     Suprapubic catheter     Therapy     Vaginal delivery     x1         Past Surgical History:   Procedure Laterality Date    AMPUTATION, ABOVE KNEE Left 7/23/2019    [facility]    BACK SURGERY      bilateral knee replacement      BREAST BIOPSY      cysto/lithopaxy 2017      CYSTOLITHOLOPAXY (REMOVE BLADDER STONE) N/A 12/20/2017    [facility]formed by [facility] Tinoco [facility] at [facility]    CYSTOSCOPY W/ LASER LITHOTRIPSY      CYSTOSCOPY,  OCCLUSION BALLOON PLACEMENT, PERC ACCESS  1/19/2018    [facility] at [facility]    CYSTOSCOPY, RETROGRADE, URETEROSCOPY, STENT PLACEMENT Right 3/5/2018    [facility] at [facility]    CYSTOSCOPY/ RETROGRADE PYELOGRAM/ WITH JJ URETERAL STENT PLACEMENT RIGHT Right 12/20/2017    Performed by [facility]oco [facility]    EXCHANGE CATHETER-SUPRAPUBIC  1/19/2018    [facility]oco [facility] at [facility]    EXTRACTION-STONE-URETEROSCOPY Right 3/5/2018    [facility] at [facility]    JOINT REPLACEMENT      bilateral knee    LITHOTRIPSY-LASER Right 3/5/2018    [facility]oco [facility] at [facility]    LITHOTRIPSY-LASER Right 1/29/2018    [facility]oco [facility] at [facility]    LITHOTRIPSY-LASER Right 1/19/2018    [facility]formed [facility]oco [facility] at [facility]     NEPHROLITHOTOMY-PERCUTANEOUS Right 1/19/2018    [facility]formed [facility] at [facility] OR    NEPHROSTOLITHOTOMY-PERCUTANEOUS Right 1/29/2018    Performed by [facility] at [facility] OR    NEPHROSTOMY Right 1/29/2018    Performed by [facility]oco [facility] at [facility] OR    PLACEMENT  1/19/2018    Performed by [facility]oco [facility] at [facility]    PLACEMENT-RENAL ACCESS Right 1/19/2018    Performed by [facility]oco [facility] at [facility] OR    PLACEMENT-STENT Right 1/29/2018    Performed by [facility]oco [facility] at [facility] OR    Procedure is a Left Genicular Nerve Block ** cpt code 65811** Left 9/16/2015    [facility]    PYELOGRAM-ANTEGRADE Right 1/29/2018    [facility]formed by [facility]oco [facility] at [facility]    PYELOGRAM-ANTEGRADE  1/19/2018    Performed by [facility] at [facility]    PYELOGRAM-RETROGRADE  1/19/2018    [facility]formed by [facility]oco [facility] at [facility]    RADIOFREQUENCY THERMOCOAGULATION** Left genicular RFA ** Left 1/20/2016    Performed [facility] Sara Castle [facility] at [facility]    REMOVAL-STENT-URETERAL  3/5/2018    [facility]formed by [facility]oco [facility] at [facility] OR    REMOVAL-STENT-URETERAL (Cystoscopy) Right 4/9/2018    Performed by [facility]oco [facility] at [facility]    URETEROSCOPY Right 1/29/2018    Performed by [facility]oco [facility] at [facility]    URETEROSCOPY Right 1/19/2018    Performed by [facility]oco [facility] at [facility] OR       Time Tracking:     OT Date of Treatment: 09/22/19  OT Start Time: 1155  OT Stop Time: 1243  OT Total Time (min): 48 min    Billable Minutes:Evaluation 10 minutes  Self Care/Home Management 38 minutes    Aleyda Vargas OT  9/22/2019

## 2019-09-22 NOTE — PLAN OF CARE
Goals to be met by: 10/6/2019    Patient will increase functional independence with ADLs by performing:    LE Dressing with Modified Sebastian and Assistive Devices as needed.  Grooming while seated at sink with Modified Sebastian.  Toileting from bedside commode with Modified Sebastian for hygiene and clothing management.   Toilet transfer to bedside commode with Modified Sebastian.  Upper extremity exercise program x 2 sets of 10 reps per handout, with independence.

## 2019-09-22 NOTE — PROGRESS NOTES
Nursing Notes  19:06 Report received from morning nurse.  Patient continue of airborne precautions to rule out Tb.  Subpubic catheter intact and draining daljit urine.  Lab called at stated Quantiferon Gold cant be collected till Monday.      05:18 Tylenol given for back discomfort.  Patient repositioned.  Will continue to monitor.      07:09 Report given to morning nurse.  Patient resting in bed in low and locked position.       Huddle Comments

## 2019-09-22 NOTE — PLAN OF CARE
Patient remained free from injuries throughout shift.  Patient continue on airborne precautions pending TB results.  Skin assessments done to keep patient from skin breakdown.  Analgesics on patient's POC.  The patient continues on BIPAP, steroids, inhalers and Duonebs. No acute distress noted.

## 2019-09-22 NOTE — PT/OT/SLP EVAL
Physical Therapy Evaluation    Patient Name:  Mary Ellen Fajardo   MRN:  [id number]    Recommendations:     Discharge Recommendations:  home health PT   Discharge Equipment Recommendations: none   Barriers to discharge: None    Assessment:     Mary Ellen Fajardo is a 63 y.o. female admitted with a medical diagnosis of Acute and chronic respiratory failure with hypercapnia.  She presents with the following impairments/functional limitations:  weakness, impaired endurance, impaired functional mobilty, gait instability, impaired balance, decreased coordination, decreased safety awareness, decreased ROM, impaired joint extensibility, decreased lower extremity function, pain, impaired coordination, impaired cardiopulmonary response to activity, impaired muscle length.    Rehab Prognosis: Fair; patient would benefit from acute skilled PT services to address these deficits and reach maximum level of function.    Recent Surgery: * No surgery found *      Plan:     During this hospitalization, patient to be seen 5 x/week to address the identified rehab impairments via therapeutic activities, therapeutic exercises, wheelchair management/training and progress toward the following goals:    · Plan of Care Expires:  10/05/19    Subjective     Chief Complaint: I can't get out of the bed without my shoe.    Patient/Family Comments/goals: to return to PLOF  Pain/Comfort:  · Pain Rating 1: 6/10  · Location - Side 1: Bilateral  · Location - Orientation 1: lower  · Location 1: back  · Pain Addressed 1: Cessation of Activity    Patients cultural, spiritual, Confucianism conflicts given the current situation:      Living Environment:  Pt lives with spouse in a [facility] with ramp. Pt reports having HHPT prior to hospitalization.  Pt reports transferring OOB to wheelchair, but requires a shoe to facilitate task.  Pt refused to attempt EOB<->Wheelchair transfer without shoe. Pt reports that niece with bring shoe to attempt task tomorrow.    Prior to admission, patients level of function was Mod I for bed mobility and OOB transfers to wheelchair.  Pt is NON-ambulatory 2* L AKA and R Knee ROM deficit.  Equipment used at home: walker, rolling, slide board, wheelchair, bedside commode, BIPAP, oxygen, respiratory supplies, urinary catheter supplies(- patient reports taking bed baths. ).  DME owned (not currently used): none.  Upon discharge, patient will have assistance from spouse and self.    Objective:     Communicated with Nurse Goldberg prior to session.  Patient found supine with peripheral IV, telemetry, delcid catheter  upon PT entry to room.    General Precautions: Standard, fall   Orthopedic Precautions:Full weight bearing   Braces: N/A     Exams:  · Cognitive Exam:  Patient is oriented to Person, Place and Situation  · Gross Motor Coordination:  WFL except for R Knee Quad contracture and L AKA.   · Postural Exam:  Patient presented with the following abnormalities:    · -       Rounded shoulders  · -       Forward head  · Skin Integrity/Edema:      · -       Skin integrity: Visible skin intact  · -       Edema: None noted .  · RLE ROM: Deficits: Knee (-10*,80*)  · RLE Strength: Deficits: Quad = 3/5  · LLE ROM: WNL through hip only 2* AKA  · LLE Strength: WFL through hip only 2* AKA    Functional Mobility:  · Bed Mobility:     · Rolling Left:  minimum assistance  · Rolling Right: minimum assistance  · Supine to Sit: moderate assistance  · Sit to Supine: moderate assistance  · Transfers:     · Wheelchair Transfer: TBD with R LE shoe (from home) and using Slide Board - pt refused to attempt without shoe from home.   · Balance: Fair + for sitting balance for 60 sec.     AM-PAC 6 CLICK MOBILITY  Total Score:8     Patient left supine with all lines intact, call button in reach and Nurse Ashlyn notified.    GOALS:   Multidisciplinary Problems     Physical Therapy Goals        Problem: Physical Therapy Goal    Goal Priority Disciplines Outcome Goal  Variances Interventions   Physical Therapy Goal     PT, PT/OT Ongoing, Progressing     Description:  Goals to be met by: 10/05/2019     Patient will increase functional independence with mobility by performin. Supine to sit with Modified [place]  2. Sit to supine with Modified [place]  3. Bed to wheelchair transfer with Modified [place] using Slideboard  4. Sitting at edge of bed x20 minutes with [place]  5. Lower extremity exercise program x25 reps per handout, with supervision.    Recommend: Resume HHPT at time of discharge to home with family.                      History:     Past Medical History:   Diagnosis Date    Addiction to drug     Alcohol abuse     Anxiety     Arthritis     Asthma     Bipolar disorder     Bladder stones     CHF (congestive heart failure)     COPD (chronic obstructive pulmonary disease)     on home o2    CVA (cerebral vascular accident)         Hallucination     Hepatitis C     treated and cured    History of blood clots     Hx of psychiatric care     Hypertension     Belen     ADEEL treated with BiPAP     Paraplegia     Paraplegic spinal paralysis     Psychiatric problem     Psychosis     AVH; Paranoia    Renal disorder     SCI (spinal cord injury)     incomplete    Sleep difficulties     has sleep apnea and uses a Bi-PAP machine.    Substance abuse     Suprapubic catheter     Therapy     Vaginal delivery     x1       Past Surgical History:   Procedure Laterality Date    AMPUTATION, ABOVE KNEE Left 2019    [facility]    BACK SURGERY      bilateral knee replacement      BREAST BIOPSY      cysto/lithopaxy 2017      CYSTOLITHOLOPAXY (REMOVE BLADDER STONE) N/A 2017    [facility] Tinoco [facility] at [facility] OR    CYSTOSCOPY W/ LASER LITHOTRIPSY      CYSTOSCOPY,  OCCLUSION BALLOON PLACEMENT, PERC ACCESS  2018    [facility] at [facility] OR    CYSTOSCOPY,  RETROGRADE, URETEROSCOPY, STENT PLACEMENT Right 3/5/2018    [facility] at [facility]    CYSTOSCOPY/ RETROGRADE PYELOGRAM/ WITH JJ URETERAL STENT PLACEMENT RIGHT Right 12/20/2017    [facility] at [facility]    EXCHANGE CATHETER-SUPRAPUBIC  1/19/2018    Per[facility]oco [facility] at [facility] OR    EXTRACTION-STONE-URETEROSCOPY Right 3/5/2018    [facility]oco [facility] at [facility] OR    JOINT REPLACEMENT      bilateral knee    LITHOTRIPSY-LASER Right 3/5/2018    Performed by [facility] Tinoco [facility] at [facility]    LITHOTRIPSY-LASER Right 1/29/2018    Performed by RAMA Tinoco [facility] at [facility]    LITHOTRIPSY-LASER Right 1/19/2018    [facility] at [facility] OR    NEPHROLITHOTOMY-PERCUTANEOUS Right 1/19/2018    Performed by [facility]oco [facility] at [facility] OR    NEPHROSTOLITHOTOMY-PERCUTANEOUS Right 1/29/2018    Performed by [facility] Tinoco [facility] at [facility] OR    NEPHROSTOMY Right 1/29/2018    Performed by [facility] Tinoco [facility] at [facility] OR    PLACEMENT  1/19/2018    Per[facility] by [facility]oco [facility] at [facility] OR    PLACEMENT-RENAL ACCESS Right 1/19/2018    Performed by RAMA Tinoco MD at [facility] OR    PLACEMENT-STENT Right 1/29/2018    Performed by [facility]oco [facility] at [facility] OR    Procedure is a Left Genicular Nerve Block ** cpt code 51093** Left 9/16/2015    Performed by Sara Castle MD at [facility]    PYELOGRAM-ANTEGRADE Right 1/29/2018    Per[facility] by [facility] Tinoco [facility] at [facility]    PYELOGRAM-ANTEGRADE  1/19/2018    Per[facility]oco [facility] at [facility] OR    PYELOGRAM-RETROGRADE  1/19/2018    Performed by [facility]oco [facility] at [facility] OR    RADIOFREQUENCY THERMOCOAGULATION** Left genicular RFA ** Left 1/20/2016    Performed by Sara Castle MD at [facility]    REMOVAL-STENT-URETERAL  3/5/2018    Per[facility] by [facility]oco [facility] at [facility] OR    REMOVAL-STENT-URETERAL (Cystoscopy) Right 4/9/2018    Performed by RAMA Tinoco,  MD at [facility] OR    URETEROSCOPY Right 1/29/2018    [facility] at [facility] OR    URETEROSCOPY Right 1/19/2018    Performed by [facility]oco [facility] at [facility] OR       Time Tracking:     PT Received On: 09/22/19  PT Start Time: 1030     PT Stop Time: 1100  PT Total Time (min): 30 min     Billable Minutes: Evaluation 30 min      Ac Markham, PT  09/22/2019

## 2019-09-22 NOTE — ASSESSMENT & PLAN NOTE
Suspecting secondary to COPD exacerbation secondary to possible viral illness. Abg with pH 7.3 and CO2 74 not improving on repeat ABG. ICU requested for admission. Popeye a fever of 102.5 in the ED. Started on abx post cultures taken. LA and leukocytosis wnl. Has chronic suprapubic catheter, suspecting dirty urine might be colonizer.  - Placed on airborne due to hx of positive quantiferon, low suspicion for active TB at this time, Pulm rec airborne, with repeat AFB and quantiferon, pending  - Pulmonary consulted, appreciate recs  - Significantly improved with bipap, now on NC  - D/c abx, low suspicion for bacterial infection  - Continue steroids, inhalers and bipap prn/qhs  - Fu with Dr. Chaidez OP as per pulmonary  - Resolved

## 2019-09-22 NOTE — PLAN OF CARE
Problem: Physical Therapy Goal  Goal: Physical Therapy Goal  Description  Goals to be met by: 10/05/2019     Patient will increase functional independence with mobility by performin. Supine to sit with Modified [place]  2. Sit to supine with Modified [place]  3. Bed to wheelchair transfer with Modified [place] using Slideboard  4. Sitting at edge of bed x20 minutes with [place]  5. Lower extremity exercise program x25 reps per handout, with supervision.    Recommend: Resume HHPT at time of discharge to home with family.     Outcome: Ongoing, Progressing    Ac Markham, PT  2019

## 2019-09-22 NOTE — SUBJECTIVE & OBJECTIVE
Interval History: Feeling better.    Review of Systems   HENT: Negative for ear discharge and ear pain.    Eyes: Negative for pain and itching.   Endocrine: Negative for polyphagia and polyuria.   Neurological: Negative for seizures and syncope.     Objective:     Vital Signs (Most Recent):  Temp: 98.7 °F (37.1 °C) (09/22/19 1105)  Pulse: (!) 55 (09/22/19 1210)  Resp: 16 (09/22/19 1210)  BP: 118/67 (09/22/19 1105)  SpO2: 96 % (09/22/19 1210) Vital Signs (24h Range):  Temp:  [97.3 °F (36.3 °C)-98.7 °F (37.1 °C)] 98.7 °F (37.1 °C)  Pulse:  [53-79] 55  Resp:  [16-23] 16  SpO2:  [92 %-98 %] 96 %  BP: (118-145)/(59-78) 118/67     Weight: 112.5 kg (248 lb 0.3 oz)  Body mass index is 42.57 kg/m².    Intake/Output Summary (Last 24 hours) at 9/22/2019 1437  Last data filed at 9/22/2019 1203  Gross per 24 hour   Intake 480 ml   Output 910 ml   Net -430 ml      Physical Exam   Constitutional: She is oriented to person, place, and time. No distress.   Obese female   HENT:   Head: Normocephalic and atraumatic.   Eyes: Pupils are equal, round, and reactive to light. EOM are normal. No scleral icterus.   Neck: Normal range of motion.   Thick neck   Cardiovascular: Normal rate and regular rhythm.   Pulmonary/Chest: Effort normal and breath sounds normal. No respiratory distress. She has no wheezes. She has no rales.   NC   Abdominal: Soft. Bowel sounds are normal.   Musculoskeletal: Normal range of motion. She exhibits edema (trace edema).   Neurological: She is alert and oriented to person, place, and time.   Skin: Skin is warm and dry. Capillary refill takes less than 2 seconds. She is not diaphoretic.   Nursing note and vitals reviewed.      Significant Labs:   BMP:   Recent Labs   Lab 09/22/19  0429         K 3.7   CL 93*   CO2 37*   BUN 22   CREATININE 0.8   CALCIUM 9.1     CBC:   Recent Labs   Lab 09/21/19  0410 09/22/19  0429   WBC 6.19 8.16   HGB 10.9* 10.8*   HCT 36.7* 36.8*    253       Significant  Imaging: I have reviewed all pertinent imaging results/findings within the past 24 hours.

## 2019-09-23 LAB
ALBUMIN SERPL BCP-MCNC: 3.1 G/DL (ref 3.5–5.2)
ALP SERPL-CCNC: 62 U/L (ref 55–135)
ALT SERPL W/O P-5'-P-CCNC: 15 U/L (ref 10–44)
ANION GAP SERPL CALC-SCNC: 6 MMOL/L (ref 8–16)
AST SERPL-CCNC: 22 U/L (ref 10–40)
BACTERIA UR CULT: ABNORMAL
BACTERIA UR CULT: ABNORMAL
BASOPHILS # BLD AUTO: 0 K/UL (ref 0–0.2)
BASOPHILS NFR BLD: 0 % (ref 0–1.9)
BILIRUB SERPL-MCNC: 0.2 MG/DL (ref 0.1–1)
BUN SERPL-MCNC: 26 MG/DL (ref 8–23)
CALCIUM SERPL-MCNC: 8.9 MG/DL (ref 8.7–10.5)
CHLORIDE SERPL-SCNC: 96 MMOL/L (ref 95–110)
CO2 SERPL-SCNC: 39 MMOL/L (ref 23–29)
CREAT SERPL-MCNC: 0.8 MG/DL (ref 0.5–1.4)
DIFFERENTIAL METHOD: ABNORMAL
EOSINOPHIL # BLD AUTO: 0 K/UL (ref 0–0.5)
EOSINOPHIL NFR BLD: 0 % (ref 0–8)
ERYTHROCYTE [DISTWIDTH] IN BLOOD BY AUTOMATED COUNT: 14.7 % (ref 11.5–14.5)
EST. GFR  (AFRICAN AMERICAN): >60 ML/MIN/1.73 M^2
EST. GFR  (NON AFRICAN AMERICAN): >60 ML/MIN/1.73 M^2
GLUCOSE SERPL-MCNC: 85 MG/DL (ref 70–110)
HCT VFR BLD AUTO: 34.2 % (ref 37–48.5)
HGB BLD-MCNC: 10.3 G/DL (ref 12–16)
LYMPHOCYTES # BLD AUTO: 2.2 K/UL (ref 1–4.8)
LYMPHOCYTES NFR BLD: 33.1 % (ref 18–48)
MCH RBC QN AUTO: 26 PG (ref 27–31)
MCHC RBC AUTO-ENTMCNC: 30.1 G/DL (ref 32–36)
MCV RBC AUTO: 86 FL (ref 82–98)
MONOCYTES # BLD AUTO: 0.6 K/UL (ref 0.3–1)
MONOCYTES NFR BLD: 9.1 % (ref 4–15)
NEUTROPHILS # BLD AUTO: 3.8 K/UL (ref 1.8–7.7)
NEUTROPHILS NFR BLD: 58.1 % (ref 38–73)
PLATELET # BLD AUTO: 233 K/UL (ref 150–350)
PMV BLD AUTO: 10.1 FL (ref 9.2–12.9)
POTASSIUM SERPL-SCNC: 3.6 MMOL/L (ref 3.5–5.1)
PROT SERPL-MCNC: 7.4 G/DL (ref 6–8.4)
RBC # BLD AUTO: 3.96 M/UL (ref 4–5.4)
SODIUM SERPL-SCNC: 141 MMOL/L (ref 136–145)
WBC # BLD AUTO: 6.58 K/UL (ref 3.9–12.7)

## 2019-09-23 PROCEDURE — 94660 CPAP INITIATION&MGMT: CPT

## 2019-09-23 PROCEDURE — 94761 N-INVAS EAR/PLS OXIMETRY MLT: CPT

## 2019-09-23 PROCEDURE — 99900035 HC TECH TIME PER 15 MIN (STAT)

## 2019-09-23 PROCEDURE — 25000003 PHARM REV CODE 250: Performed by: INTERNAL MEDICINE

## 2019-09-23 PROCEDURE — 80053 COMPREHEN METABOLIC PANEL: CPT

## 2019-09-23 PROCEDURE — 36415 COLL VENOUS BLD VENIPUNCTURE: CPT

## 2019-09-23 PROCEDURE — 94640 AIRWAY INHALATION TREATMENT: CPT

## 2019-09-23 PROCEDURE — 63600175 PHARM REV CODE 636 W HCPCS: Performed by: INTERNAL MEDICINE

## 2019-09-23 PROCEDURE — 11000001 HC ACUTE MED/SURG PRIVATE ROOM

## 2019-09-23 PROCEDURE — 97535 SELF CARE MNGMENT TRAINING: CPT

## 2019-09-23 PROCEDURE — A4216 STERILE WATER/SALINE, 10 ML: HCPCS | Performed by: INTERNAL MEDICINE

## 2019-09-23 PROCEDURE — 97530 THERAPEUTIC ACTIVITIES: CPT

## 2019-09-23 PROCEDURE — 63600175 PHARM REV CODE 636 W HCPCS: Performed by: EMERGENCY MEDICINE

## 2019-09-23 PROCEDURE — 85025 COMPLETE CBC W/AUTO DIFF WBC: CPT

## 2019-09-23 PROCEDURE — 25000003 PHARM REV CODE 250: Performed by: EMERGENCY MEDICINE

## 2019-09-23 PROCEDURE — 25000003 PHARM REV CODE 250: Performed by: HOSPITALIST

## 2019-09-23 PROCEDURE — 86480 TB TEST CELL IMMUN MEASURE: CPT

## 2019-09-23 PROCEDURE — 27000221 HC OXYGEN, UP TO 24 HOURS

## 2019-09-23 PROCEDURE — 25000242 PHARM REV CODE 250 ALT 637 W/ HCPCS: Performed by: EMERGENCY MEDICINE

## 2019-09-23 RX ADMIN — TRAZODONE HYDROCHLORIDE 50 MG: 50 TABLET ORAL at 09:09

## 2019-09-23 RX ADMIN — DICLOFENAC SODIUM 2 G: 10 GEL TOPICAL at 09:09

## 2019-09-23 RX ADMIN — BACLOFEN 20 MG: 10 TABLET ORAL at 11:09

## 2019-09-23 RX ADMIN — IPRATROPIUM BROMIDE AND ALBUTEROL SULFATE 3 ML: .5; 3 SOLUTION RESPIRATORY (INHALATION) at 03:09

## 2019-09-23 RX ADMIN — GABAPENTIN 600 MG: 300 CAPSULE ORAL at 03:09

## 2019-09-23 RX ADMIN — OXYCODONE HYDROCHLORIDE AND ACETAMINOPHEN 1 TABLET: 5; 325 TABLET ORAL at 12:09

## 2019-09-23 RX ADMIN — Medication 10 ML: at 11:09

## 2019-09-23 RX ADMIN — IPRATROPIUM BROMIDE AND ALBUTEROL SULFATE 3 ML: .5; 3 SOLUTION RESPIRATORY (INHALATION) at 11:09

## 2019-09-23 RX ADMIN — ENOXAPARIN SODIUM 40 MG: 100 INJECTION SUBCUTANEOUS at 04:09

## 2019-09-23 RX ADMIN — Medication 10 ML: at 05:09

## 2019-09-23 RX ADMIN — GABAPENTIN 600 MG: 300 CAPSULE ORAL at 09:09

## 2019-09-23 RX ADMIN — IPRATROPIUM BROMIDE AND ALBUTEROL SULFATE 3 ML: .5; 3 SOLUTION RESPIRATORY (INHALATION) at 04:09

## 2019-09-23 RX ADMIN — FLUOXETINE 10 MG: 10 CAPSULE ORAL at 09:09

## 2019-09-23 RX ADMIN — BUMETANIDE 1 MG: 1 TABLET ORAL at 09:09

## 2019-09-23 RX ADMIN — OXYCODONE HYDROCHLORIDE AND ACETAMINOPHEN 1 TABLET: 5; 325 TABLET ORAL at 10:09

## 2019-09-23 RX ADMIN — IPRATROPIUM BROMIDE AND ALBUTEROL SULFATE 3 ML: .5; 3 SOLUTION RESPIRATORY (INHALATION) at 07:09

## 2019-09-23 RX ADMIN — OLANZAPINE 5 MG: 2.5 TABLET, FILM COATED ORAL at 09:09

## 2019-09-23 RX ADMIN — IPRATROPIUM BROMIDE AND ALBUTEROL SULFATE 3 ML: .5; 3 SOLUTION RESPIRATORY (INHALATION) at 08:09

## 2019-09-23 RX ADMIN — PREDNISONE 40 MG: 20 TABLET ORAL at 09:09

## 2019-09-23 RX ADMIN — Medication 10 ML: at 12:09

## 2019-09-23 RX ADMIN — IPRATROPIUM BROMIDE AND ALBUTEROL SULFATE 3 ML: .5; 3 SOLUTION RESPIRATORY (INHALATION) at 12:09

## 2019-09-23 NOTE — ASSESSMENT & PLAN NOTE
Suspecting secondary to COPD exacerbation secondary to possible viral illness. Abg with pH 7.3 and CO2 74 not improving on repeat ABG. ICU requested for admission. Popeye a fever of 102.5 in the ED. Started on abx post cultures taken. LA and leukocytosis wnl. Has chronic suprapubic catheter, suspecting dirty urine might be colonizer.  - Placed on airborne due to hx of positive quantiferon, low suspicion for active TB at this time, Pulm rec airborne, with repeat AFB and quantiferon, pending.  No AFB seen.  Stop precautions.  - Pulmonary consulted, appreciate recs  - Significantly improved with bipap, now on NC  - D/c abx, low suspicion for bacterial infection  - Continue steroids, inhalers and bipap prn/qhs  - Fu with Dr. Chaidez OP as per pulmonary  - Resolved  PT/OT consulted.  Home tomorrow.

## 2019-09-23 NOTE — PLAN OF CARE
Goals to be met by: 10/6/2019     Patient will increase functional independence with ADLs by performing:     LE Dressing with Modified [place] and Assistive Devices as needed. (Met 9/23/19)  Grooming while seated at sink with Modified [place]. (Met 9/23/19)  Toileting from bedside commode with Modified [place] for hygiene and clothing management.  (D/C 9/23/19)  Toilet transfer to bedside commode with Modified [place]. (D/C 9/23/19)  Upper extremity exercise program x 2 sets of 10 reps per handout, with independence. (Met 9/23/19)      Problem: Occupational Therapy Goal  Goal: Occupational Therapy Goal  Outcome: Met

## 2019-09-23 NOTE — PLAN OF CARE
Pt remained free from falls and injury during shift, medication given per MD orders, Isolation precautions maintained, pt up to wheelchair with PT, pt tolerated PT well. On 3LNC with humidification, will continue to monitor

## 2019-09-23 NOTE — PROGRESS NOTES
Nursing Notes  19:10 Report given to morning nurse.  Patient resting in bed in low and locked position.  Call light within reach.  Suprapubic catheter intact and draining properly. Patient continues on airborne precautions pending TB results.  No   acute distress noted.      20:43 Patient medications given.  Patient provided wedge for repositioning however, patient she stated she was waiting to take a bath.      7:23 Report given to morning nurse.       Huddle Comments

## 2019-09-23 NOTE — PT/OT/SLP PROGRESS
Occupational Therapy   Treatment    Name: Mary Ellen Fajardo  MRN: [id number]  Admitting Diagnosis:  Acute and chronic respiratory failure with hypercapnia       Recommendations:     Discharge Recommendations: home health OT  Discharge Equipment Recommendations:  none  Barriers to discharge:  None    Assessment:     Mary Ellen Fajardo is a 63 y.o. female with a medical diagnosis of Acute and chronic respiratory failure with hypercapnia.  She presents with decreased I with ADLs, and functional transfers. Performance deficits affecting function are weakness, impaired endurance, impaired self care skills, impaired balance, impaired functional mobilty, gait instability, decreased ROM, impaired joint extensibility.     Rehab Prognosis:  Good; patient would benefit from acute skilled OT services to address these deficits and reach maximum level of function.       Plan:     Patient to be seen 3 x/week to address the above listed problems via self-care/home management, therapeutic exercises, therapeutic activities, neuromuscular re-education  · Plan of Care Expires: 10/06/19  · Plan of Care Reviewed with: patient    Subjective     Pain/Comfort:  · Pain Rating 1: 0/10    Objective:     Communicated with: Nursing prior to session.  Patient found HOB elevated with peripheral IV, telemetry, delcid catheter upon OT entry to room.    General Precautions: Standard, fall   Orthopedic Precautions:N/A   Braces: N/A     Occupational Performance:     Bed Mobility:    · Patient completed Rolling/Turning to Left with  supervision  · Patient completed Rolling/Turning to Right with supervision  · Patient completed Scooting/Bridging with supervision  · Patient completed Supine to Sit with supervision     Functional Mobility/Transfers:  · Patient completed Bed <> Chair Transfer using Slide Board technique with stand by assistance with slide board      Activities of Daily Living:  · Grooming: Mod I seated at sink  · Upper extremity  dressing: Setup A seated at the EOB      [facility] 6 Click ADL:      Treatment & Education:  The pt performed the above listed activities, and was educated on BUE theraband exercises, and provided with a handout.    Patient left up in chair with all lines intact, call button in reach and nursing notifiedEducation:      GOALS: Goals to be met by: 10/6/2019     Patient will increase functional independence with ADLs by performing:     LE Dressing with Modified [place] and Assistive Devices as needed. Met 9/23/19  Grooming while seated at sink with Modified [place]. Met 9/23/19  Toileting from bedside commode with Modified [place] for hygiene and clothing management. D/C goal 9/23/19  Toilet transfer to bedside commode with Modified [place]. D/C goal 9/23/19  Upper extremity exercise program x 2 sets of 10 reps per handout, with independence. Met 9/23/19    Time Tracking:     OT Date of Treatment: 09/23/19  OT Start Time: 1415  OT Stop Time: 1445  OT Total Time (min): 30 min PT present    Billable Minutes:Self Care/Home Management 23 minutes    Aleyda Vargas, OT  9/23/2019

## 2019-09-23 NOTE — SUBJECTIVE & OBJECTIVE
Interval History: Doing well.    Review of Systems   HENT: Negative for ear discharge and ear pain.    Eyes: Negative for pain and itching.   Endocrine: Negative for polyphagia and polyuria.   Neurological: Negative for seizures and syncope.     Objective:     Vital Signs (Most Recent):  Temp: 97.3 °F (36.3 °C) (09/23/19 1151)  Pulse: 63 (09/23/19 1551)  Resp: 19 (09/23/19 1551)  BP: 112/68 (09/23/19 1151)  SpO2: 96 % (09/23/19 1551) Vital Signs (24h Range):  Temp:  [96.6 °F (35.9 °C)-98.4 °F (36.9 °C)] 97.3 °F (36.3 °C)  Pulse:  [48-85] 63  Resp:  [16-20] 19  SpO2:  [94 %-100 %] 96 %  BP: (107-133)/(56-68) 112/68     Weight: 112.5 kg (248 lb 0.3 oz)  Body mass index is 42.57 kg/m².    Intake/Output Summary (Last 24 hours) at 9/23/2019 1609  Last data filed at 9/23/2019 1225  Gross per 24 hour   Intake 360 ml   Output 1600 ml   Net -1240 ml      Physical Exam   Constitutional: She is oriented to person, place, and time. No distress.   Obese female   HENT:   Head: Normocephalic and atraumatic.   Eyes: Pupils are equal, round, and reactive to light. EOM are normal. No scleral icterus.   Neck: Normal range of motion.   Thick neck   Cardiovascular: Normal rate and regular rhythm.   Pulmonary/Chest: Effort normal and breath sounds normal. No respiratory distress. She has no wheezes. She has no rales.   NC   Abdominal: Soft. Bowel sounds are normal.   Musculoskeletal: Normal range of motion. She exhibits edema (trace edema).   Neurological: She is alert and oriented to person, place, and time.   Skin: Skin is warm and dry. Capillary refill takes less than 2 seconds. She is not diaphoretic.   Nursing note and vitals reviewed.      Significant Labs:   BMP:   Recent Labs   Lab 09/23/19  0520   GLU 85      K 3.6   CL 96   CO2 39*   BUN 26*   CREATININE 0.8   CALCIUM 8.9     CBC:   Recent Labs   Lab 09/22/19  0429 09/23/19  0520   WBC 8.16 6.58   HGB 10.8* 10.3*   HCT 36.8* 34.2*    233       Significant Imaging:  I have reviewed all pertinent imaging results/findings within the past 24 hours.

## 2019-09-23 NOTE — PROGRESS NOTES
[facility] Medicine  Progress Note    Patient Name: Mary Ellen Fajardo  MRN: [id number]  Patient Class: IP- Inpatient   Admission Date: 9/20/2019  Length of Stay: 3 days  Attending Physician: Slava Abreu MD  Primary Care Provider: Any Tran MD        Subjective:     Principal Problem:Acute and chronic respiratory failure with hypercapnia        HPI:  62 yo morbidly obese female with hx of COPD, CHF, ADEEL, chronic hypoxic respiratory failure on 2L HOT, h/o knee replaced complicated by infection in 2011, left leg amputation, chronic indwelling Delcid catheter, ho stroke, h/o polysubstance abuse and bipolar disorder presenting via EMS for progressively worsening dyspnea since yesterday with associated dry cough, fevers and wheeze. Patient encephalopathy with limited hx. She denied any falls, dizziness, syncope, chest pain, hemoptysis, pleurisy, abdominal pain, diarrhea, constipation or bleeds. Has been having URI/cold like symptoms since yesterday with decreased po intake. Tired her home nebs yesterday and today but didn't help prompting EMS. No recent travel or sick contacts.     In the ED patient was tachyenic, febrile Tm 103, normotensive. Labs significant for UA dirty but has chronic delcid, , LA wnl, ABG noting acute on chronic respiratory acidosis with pH 7.3 and PCO2 of 74. CXR fairly unremakable. Repeat ABG with worsening pH and acidosis. ICU requested for admission.    Overview/Hospital Course:  Admitted to ICU for COPD exacerbation with hypercapnic metabolic encephalopathy secondary to suspected viral bronchitis. CXR stable. Pulmonary consulted. Initially started on board spectrum abx at admission after obtaining cultures, and placed in ICU with bipap. Has hx of positive quantiferon, thus initially placed on airborne precautions. Unable to obtain TB exposure history. Low suspicion for TB but will repeat AFB, PPD and quantiferon as per pulmonary recs. AFB  pending.  PICC placed due to poor IV access. Respiratory status and ABG improving with bipap overnight. Resp sputum unremarkable. No leukocytosis or sputum, stopped abx. Stable for floors. No AFB and taken off precautions.    Interval History: Doing well.    Review of Systems   HENT: Negative for ear discharge and ear pain.    Eyes: Negative for pain and itching.   Endocrine: Negative for polyphagia and polyuria.   Neurological: Negative for seizures and syncope.     Objective:     Vital Signs (Most Recent):  Temp: 97.3 °F (36.3 °C) (09/23/19 1151)  Pulse: 63 (09/23/19 1551)  Resp: 19 (09/23/19 1551)  BP: 112/68 (09/23/19 1151)  SpO2: 96 % (09/23/19 1551) Vital Signs (24h Range):  Temp:  [96.6 °F (35.9 °C)-98.4 °F (36.9 °C)] 97.3 °F (36.3 °C)  Pulse:  [48-85] 63  Resp:  [16-20] 19  SpO2:  [94 %-100 %] 96 %  BP: (107-133)/(56-68) 112/68     Weight: 112.5 kg (248 lb 0.3 oz)  Body mass index is 42.57 kg/m².    Intake/Output Summary (Last 24 hours) at 9/23/2019 1609  Last data filed at 9/23/2019 1225  Gross per 24 hour   Intake 360 ml   Output 1600 ml   Net -1240 ml      Physical Exam   Constitutional: She is oriented to person, place, and time. No distress.   Obese female   HENT:   Head: Normocephalic and atraumatic.   Eyes: Pupils are equal, round, and reactive to light. EOM are normal. No scleral icterus.   Neck: Normal range of motion.   Thick neck   Cardiovascular: Normal rate and regular rhythm.   Pulmonary/Chest: Effort normal and breath sounds normal. No respiratory distress. She has no wheezes. She has no rales.   NC   Abdominal: Soft. Bowel sounds are normal.   Musculoskeletal: Normal range of motion. She exhibits edema (trace edema).   Neurological: She is alert and oriented to person, place, and time.   Skin: Skin is warm and dry. Capillary refill takes less than 2 seconds. She is not diaphoretic.   Nursing note and vitals reviewed.      Significant Labs:   BMP:   Recent Labs   Lab 09/23/19  0520   GLU 85       K 3.6   CL 96   CO2 39*   BUN 26*   CREATININE 0.8   CALCIUM 8.9     CBC:   Recent Labs   Lab 09/22/19  0429 09/23/19  0520   WBC 8.16 6.58   HGB 10.8* 10.3*   HCT 36.8* 34.2*    233       Significant Imaging: I have reviewed all pertinent imaging results/findings within the past 24 hours.      Assessment/Plan:      * Acute and chronic respiratory failure with hypercapnia  Suspecting secondary to COPD exacerbation secondary to possible viral illness. Abg with pH 7.3 and CO2 74 not improving on repeat ABG. ICU requested for admission. Popeye a fever of 102.5 in the ED. Started on abx post cultures taken. LA and leukocytosis wnl. Has chronic suprapubic catheter, suspecting dirty urine might be colonizer.  - Placed on airborne due to hx of positive quantiferon, low suspicion for active TB at this time, Pulm rec airborne, with repeat AFB and quantiferon, pending.  No AFB seen.  Stop precautions.  - Pulmonary consulted, appreciate recs  - Significantly improved with bipap, now on NC  - D/c abx, low suspicion for bacterial infection  - Continue steroids, inhalers and bipap prn/qhs  - Fu with Dr. Chaidez OP as per pulmonary  - Resolved  PT/OT consulted.  Home tomorrow.    Encephalopathy, metabolic  Secondary to hypercapnia. Resolved.    Chronic diastolic heart failure  Appears compensated at this time.    Acute on chronic respiratory acidosis  As above. Resolved.    History of CVA (cerebrovascular accident)  Not on statin or asa at home. Unknown. Fu OP    Bipolar I disorder, current or most recent episode depressed, with psychotic features  Chronic issue, resumed home meds    Chronic indwelling suprapubic catheter in place  Noted.    Chronic hepatitis C  OP fu with PCP    Class 3 severe obesity in adult  Discuss lifestyle modifications and weight loss    COPD (chronic obstructive pulmonary disease)  As above    ADEEL on CPAP  Bipap qhs and prn    Essential hypertension  Resume home bumex      VTE Risk  Mitigation (From admission, onward)         Ordered     enoxaparin injection 40 mg  Daily      09/20/19 1237     IP VTE HIGH RISK PATIENT  Once      09/20/19 1237                      Slava Abreu MD  Department of Hospital Medicine   [facility]

## 2019-09-23 NOTE — PLAN OF CARE
Patient remained free from injuries throughout shift.  Patient continues on airborne precautions pending results from AFB and quaniferon.  Patient utilizes the BIPAP at night.  Continues on steroids and inhaler.  PICC line remains intact and flushes properly.  Analgesic on POC. Patient is alert and oriented x 3.  Skin precautions maintained by turning every two hours.      Problem: Infection  Goal: Infection Symptom Resolution  Outcome: Ongoing, Progressing  Intervention: Prevent or Manage Infection  Flowsheets (Taken 9/23/2019 0204)  Isolation Precautions: airborne precautions maintained     Problem: Fall Injury Risk  Goal: Absence of Fall and Fall-Related Injury  Outcome: Ongoing, Progressing     Problem: Skin Injury Risk Increased  Goal: Skin Health and Integrity  Outcome: Ongoing, Progressing  Intervention: Optimize Skin Protection  Flowsheets (Taken 9/23/2019 0204)  Pressure Reduction Techniques: weight shift assistance provided  Pressure Reduction Devices: foam padding utilized  Skin Protection: protective footwear used     Problem: Adult Inpatient Plan of Care  Goal: Plan of Care Review  Outcome: Ongoing, Progressing

## 2019-09-23 NOTE — PT/OT/SLP PROGRESS
Physical Therapy  Treatment    Mary Ellen Fajardo   MRN: [id number]   Admitting Diagnosis: Acute and chronic respiratory failure with hypercapnia    First encounter:  PT Received On: 09/23/19  PT Start Time: 1415     PT Stop Time: 1445    PT Total Time (min): 30 min       Second encounter:  PT Start time: 16:11  PT Stop time: 16:30  PT Total time: 19 min    (Co-treat w/ OT)    Billable Minutes:  Therapeutic Activity 30    Treatment Type: Treatment  PT/PTA: PT     PTA Visit Number: 0       General Precautions: Standard, fall  Orthopedic Precautions: N/A   Braces: N/A    Spiritual, Cultural Beliefs, Yarsani Practices, Values that Affect Care: no    Subjective:  Communicated with nsg prior to session.  Pt would like to practice slide board transfer now that she has AFO and shoe    Pain/Comfort  Pain Rating 1: 0/10    Objective:   Patient found with: peripheral IV, telemetry, delcid catheter    Functional Mobility:  Bed Mobility:     Pt performs all bed mobility w/ set up assist and supervision. Pt able to roll bilat and scoot w/ bed in trendelenburg    Transfers:   Pt performs supine to sit transfer w/ set up assist and supervision. Pt performs slide board transfer from bed to w/c w/ set up assist and supervision.    Pt performs w/c to bed transfer with min Ax1 for R LE management and additional support d/t inability to lower bed at a shorter height than w/c.        Balance:   Static Sit: GOOD: Takes MODERATE challenges from all directions  Dynamic Sit: GOOD-: Incosistently Maintains balance through MODERATE excursions of active trunk movement,       Therapeutic Activities and Exercises:  Pt given exercise handout to be performed while in bed     AM-PAC 6 CLICK MOBILITY  How much help from another person does this patient currently need?   1 = Unable, Total/Dependent Assistance  2 = A lot, Maximum/Moderate Assistance  3 = A little, Minimum/Contact Guard/Supervision  4 = None, Modified  [place]/Independent    Turning over in bed (including adjusting bedclothes, sheets and blankets)?: 3  Sitting down on and standing up from a chair with arms (e.g., wheelchair, bedside commode, etc.): 1  Moving from lying on back to sitting on the side of the bed?: 4  Moving to and from a bed to a chair (including a wheelchair)?: 3  Need to walk in hospital room?: 1  Climbing 3-5 steps with a railing?: 1  Basic Mobility Total Score: 13    AM-PAC Raw Score CMS G-Code Modifier Level of Impairment Assistance   6 % Total / Unable   7 - 9 CM 80 - 100% Maximal Assist   10 - 14 CL 60 - 80% Moderate Assist   15 - 19 CK 40 - 60% Moderate Assist   20 - 22 CJ 20 - 40% Minimal Assist   23 CI 1-20% SBA / CGA   24 CH 0% Independent/ Mod I     Patient left up in wheelchair with all lines intact, call button in reach and nsg notified.    Assessment:  Mary Ellen Fajardo is a 63 y.o. female with a medical diagnosis of Acute and chronic respiratory failure with hypercapnia and presents with good tolerance to bed mobility and transfer training. Requires set up assistance to initiate transfer, then able to perform with little to no verbal cuing or assistance. Pt educated on fall prevention strategies.    Upon returning from w/c to bed, pt requires additional assistance for R LE management and assist with sliding up slide board. Pt demonstrates good understanding of transfer sequencing and good safety awareness throughout.    Rehab identified problem list/impairments: Rehab identified problem list/impairments: weakness, impaired endurance, impaired balance, impaired functional mobilty, impaired self care skills, decreased ROM, impaired joint extensibility    Rehab potential is good.    Activity tolerance: Good    Discharge recommendations: Discharge Facility/Level of Care Needs: home health PT     Barriers to discharge:  n/a    Equipment recommendations: Equipment Needed After Discharge: none     GOALS:    Multidisciplinary Problems     Physical Therapy Goals     Not on file          Multidisciplinary Problems (Resolved)        Problem: Physical Therapy Goal    Goal Priority Disciplines Outcome Goal Variances Interventions   Physical Therapy Goal   (Resolved)     PT, PT/OT Met     Description:  Goals to be met by: 10/05/2019     Patient will increase functional independence with mobility by performin. Supine to sit with Modified [place]  2. Sit to supine with Modified [place]  3. Bed to wheelchair transfer with Modified [place] using Slideboard  4. Sitting at edge of bed x20 minutes with [place]  5. Lower extremity exercise program x25 reps per handout, with supervision.    Recommend: Resume HHPT at time of discharge to home with family.                      PLAN:    Patient to be seen 5 x/week  to address the above listed problems via therapeutic exercises, therapeutic activities, gait training, wheelchair management/training  Plan of Care expires: 10/05/19  Plan of Care reviewed with: patient         Neil Bailon, PT  2019

## 2019-09-24 VITALS
BODY MASS INDEX: 42.34 KG/M2 | WEIGHT: 248 LBS | OXYGEN SATURATION: 98 % | HEART RATE: 61 BPM | HEIGHT: 64 IN | SYSTOLIC BLOOD PRESSURE: 120 MMHG | RESPIRATION RATE: 18 BRPM | DIASTOLIC BLOOD PRESSURE: 65 MMHG | TEMPERATURE: 98 F

## 2019-09-24 LAB
ALBUMIN SERPL BCP-MCNC: 3.1 G/DL (ref 3.5–5.2)
ALP SERPL-CCNC: 61 U/L (ref 55–135)
ALT SERPL W/O P-5'-P-CCNC: 17 U/L (ref 10–44)
ANION GAP SERPL CALC-SCNC: 2 MMOL/L (ref 8–16)
AST SERPL-CCNC: 24 U/L (ref 10–40)
BASOPHILS # BLD AUTO: 0 K/UL (ref 0–0.2)
BASOPHILS NFR BLD: 0 % (ref 0–1.9)
BILIRUB SERPL-MCNC: 0.2 MG/DL (ref 0.1–1)
BUN SERPL-MCNC: 24 MG/DL (ref 8–23)
CALCIUM SERPL-MCNC: 9 MG/DL (ref 8.7–10.5)
CHLORIDE SERPL-SCNC: 98 MMOL/L (ref 95–110)
CO2 SERPL-SCNC: 41 MMOL/L (ref 23–29)
CREAT SERPL-MCNC: 0.8 MG/DL (ref 0.5–1.4)
DIFFERENTIAL METHOD: ABNORMAL
EOSINOPHIL # BLD AUTO: 0 K/UL (ref 0–0.5)
EOSINOPHIL NFR BLD: 0.5 % (ref 0–8)
ERYTHROCYTE [DISTWIDTH] IN BLOOD BY AUTOMATED COUNT: 14.7 % (ref 11.5–14.5)
EST. GFR  (AFRICAN AMERICAN): >60 ML/MIN/1.73 M^2
EST. GFR  (NON AFRICAN AMERICAN): >60 ML/MIN/1.73 M^2
GLUCOSE SERPL-MCNC: 93 MG/DL (ref 70–110)
HCT VFR BLD AUTO: 35 % (ref 37–48.5)
HGB BLD-MCNC: 10.1 G/DL (ref 12–16)
LYMPHOCYTES # BLD AUTO: 2.2 K/UL (ref 1–4.8)
LYMPHOCYTES NFR BLD: 37.3 % (ref 18–48)
MCH RBC QN AUTO: 25.1 PG (ref 27–31)
MCHC RBC AUTO-ENTMCNC: 28.9 G/DL (ref 32–36)
MCV RBC AUTO: 87 FL (ref 82–98)
MONOCYTES # BLD AUTO: 0.5 K/UL (ref 0.3–1)
MONOCYTES NFR BLD: 8.8 % (ref 4–15)
NEUTROPHILS # BLD AUTO: 3.1 K/UL (ref 1.8–7.7)
NEUTROPHILS NFR BLD: 53.6 % (ref 38–73)
PLATELET # BLD AUTO: 252 K/UL (ref 150–350)
PMV BLD AUTO: 10 FL (ref 9.2–12.9)
POTASSIUM SERPL-SCNC: 3.9 MMOL/L (ref 3.5–5.1)
PROT SERPL-MCNC: 7.4 G/DL (ref 6–8.4)
RBC # BLD AUTO: 4.02 M/UL (ref 4–5.4)
SODIUM SERPL-SCNC: 141 MMOL/L (ref 136–145)
WBC # BLD AUTO: 5.77 K/UL (ref 3.9–12.7)

## 2019-09-24 PROCEDURE — G0008 ADMIN INFLUENZA VIRUS VAC: HCPCS | Performed by: INTERNAL MEDICINE

## 2019-09-24 PROCEDURE — G0009 ADMIN PNEUMOCOCCAL VACCINE: HCPCS | Performed by: INTERNAL MEDICINE

## 2019-09-24 PROCEDURE — A4216 STERILE WATER/SALINE, 10 ML: HCPCS | Performed by: INTERNAL MEDICINE

## 2019-09-24 PROCEDURE — 85025 COMPLETE CBC W/AUTO DIFF WBC: CPT

## 2019-09-24 PROCEDURE — 80053 COMPREHEN METABOLIC PANEL: CPT

## 2019-09-24 PROCEDURE — 27000221 HC OXYGEN, UP TO 24 HOURS

## 2019-09-24 PROCEDURE — 94640 AIRWAY INHALATION TREATMENT: CPT

## 2019-09-24 PROCEDURE — 36415 COLL VENOUS BLD VENIPUNCTURE: CPT

## 2019-09-24 PROCEDURE — 99900035 HC TECH TIME PER 15 MIN (STAT)

## 2019-09-24 PROCEDURE — 25000003 PHARM REV CODE 250: Performed by: EMERGENCY MEDICINE

## 2019-09-24 PROCEDURE — 90686 IIV4 VACC NO PRSV 0.5 ML IM: CPT | Performed by: INTERNAL MEDICINE

## 2019-09-24 PROCEDURE — 94660 CPAP INITIATION&MGMT: CPT

## 2019-09-24 PROCEDURE — 25000242 PHARM REV CODE 250 ALT 637 W/ HCPCS: Performed by: EMERGENCY MEDICINE

## 2019-09-24 PROCEDURE — 94761 N-INVAS EAR/PLS OXIMETRY MLT: CPT

## 2019-09-24 PROCEDURE — 90670 PCV13 VACCINE IM: CPT | Performed by: INTERNAL MEDICINE

## 2019-09-24 PROCEDURE — 90472 IMMUNIZATION ADMIN EACH ADD: CPT | Performed by: INTERNAL MEDICINE

## 2019-09-24 PROCEDURE — 90471 IMMUNIZATION ADMIN: CPT | Performed by: INTERNAL MEDICINE

## 2019-09-24 PROCEDURE — 25000003 PHARM REV CODE 250: Performed by: INTERNAL MEDICINE

## 2019-09-24 PROCEDURE — 63600175 PHARM REV CODE 636 W HCPCS: Performed by: INTERNAL MEDICINE

## 2019-09-24 RX ORDER — IPRATROPIUM BROMIDE AND ALBUTEROL SULFATE 2.5; .5 MG/3ML; MG/3ML
3 SOLUTION RESPIRATORY (INHALATION) EVERY 4 HOURS PRN
Qty: 1 BOX | Refills: 0 | Status: ON HOLD | OUTPATIENT
Start: 2019-09-24 | End: 2019-10-30

## 2019-09-24 RX ORDER — IPRATROPIUM BROMIDE AND ALBUTEROL SULFATE 2.5; .5 MG/3ML; MG/3ML
SOLUTION RESPIRATORY (INHALATION)
Qty: 1620 ML | Refills: 0 | OUTPATIENT
Start: 2019-09-24

## 2019-09-24 RX ADMIN — Medication 10 ML: at 05:09

## 2019-09-24 RX ADMIN — IPRATROPIUM BROMIDE AND ALBUTEROL SULFATE 3 ML: .5; 3 SOLUTION RESPIRATORY (INHALATION) at 03:09

## 2019-09-24 RX ADMIN — PNEUMOCOCCAL 13-VALENT CONJUGATE VACCINE 0.5 ML: 2.2; 2.2; 2.2; 2.2; 2.2; 4.4; 2.2; 2.2; 2.2; 2.2; 2.2; 2.2; 2.2 INJECTION, SUSPENSION INTRAMUSCULAR at 04:09

## 2019-09-24 RX ADMIN — FLUOXETINE 10 MG: 10 CAPSULE ORAL at 09:09

## 2019-09-24 RX ADMIN — GABAPENTIN 600 MG: 300 CAPSULE ORAL at 09:09

## 2019-09-24 RX ADMIN — INFLUENZA VIRUS VACCINE 0.5 ML: 15; 15; 15; 15 SUSPENSION INTRAMUSCULAR at 04:09

## 2019-09-24 RX ADMIN — GABAPENTIN 600 MG: 300 CAPSULE ORAL at 03:09

## 2019-09-24 RX ADMIN — DICLOFENAC SODIUM 2 G: 10 GEL TOPICAL at 09:09

## 2019-09-24 RX ADMIN — IPRATROPIUM BROMIDE AND ALBUTEROL SULFATE 3 ML: .5; 3 SOLUTION RESPIRATORY (INHALATION) at 04:09

## 2019-09-24 RX ADMIN — IPRATROPIUM BROMIDE AND ALBUTEROL SULFATE 3 ML: .5; 3 SOLUTION RESPIRATORY (INHALATION) at 11:09

## 2019-09-24 RX ADMIN — IPRATROPIUM BROMIDE AND ALBUTEROL SULFATE 3 ML: .5; 3 SOLUTION RESPIRATORY (INHALATION) at 12:09

## 2019-09-24 RX ADMIN — PREDNISONE 40 MG: 20 TABLET ORAL at 09:09

## 2019-09-24 RX ADMIN — IPRATROPIUM BROMIDE AND ALBUTEROL SULFATE 3 ML: .5; 3 SOLUTION RESPIRATORY (INHALATION) at 07:09

## 2019-09-24 RX ADMIN — BUMETANIDE 1 MG: 1 TABLET ORAL at 09:09

## 2019-09-24 RX ADMIN — Medication 10 ML: at 12:09

## 2019-09-24 NOTE — PLAN OF CARE
No injuries noted throughout shift.  Patient medicated for back discomfort and muscle spasms.  Patient is no longer on airborne precautions.  Alert and oriented x 4.  BIPAP at night.  Skin prevention by q two turns.      Problem: Fall Injury Risk  Goal: Absence of Fall and Fall-Related Injury  Outcome: Ongoing, Progressing  Intervention: Promote Injury-Free Environment  Flowsheets (Taken 9/24/2019 8796)  Safety Promotion/Fall Prevention: lighting adjusted; medications reviewed; nonskid shoes/socks when out of bed  Environmental Safety Modification: clutter free environment maintained     Problem: Adult Inpatient Plan of Care  Goal: Plan of Care Review  Outcome: Ongoing, Progressing  Flowsheets (Taken 9/24/2019 1393)  Plan of Care Reviewed With: patient  Goal: Patient-Specific Goal (Individualization)  Outcome: Ongoing, Progressing     Problem: Noninvasive Ventilation Acute  Goal: Effective Unassisted Ventilation and Oxygenation  Outcome: Ongoing, Progressing

## 2019-09-24 NOTE — PROGRESS NOTES
[facility] WRITTEN HEALTHCARE AND DISCHARGE INFORMATION:.  Follow-up Information     Any Tran MD On 9/30/2019.    Specialty:  Internal Medicine  Why:  out patient services: 9:00 a.m follow up from the hospital  Contact information:  [address]  [address]  [facility]  [place] [id number] [phone]             [facility] Angel Chaidez On 9/24/2019.    Why:  Home Health  Contact information:  [address] [place] [address]  [phone]                   Help at Home           [phone]  After discharge for assistance [facility] On Call Nurse Care Line 24/7  Assistance   Things You are responsible For To Manage Your Care At Home:  1.    Getting your prescriptions filled   2.    Taking your medications as directed, DO NOT MISS ANY DOSES!  3.    Going to your follow-up doctor appointment. This is important because it  allow the doctor to monitor your progress and determine if  any changes need to made to your treatment plan.   Thank you for choosing [facility] for your care.  Please answer any calls you may receive from [facility] we want to continue to support you as you manage your healthcare needs. [facility] is happy to have the opportunity to serve you.    Sincerely,  Your [facility] Team,  Cindy Stern RN, BSN, [facility]  9/24/2019  [phone]

## 2019-09-24 NOTE — PROGRESS NOTES
Nursing Notes  21:23 Patient medications given.  Patient resting in bed in low and locked position.      22:00 Patient given pain medications for 9/10 for back discomfort and left knee discomfort.    23:23 Baclofen given for spasms in the patient's left leg.  Will continue to monitor.     05:33 Patient mediations given.  Patient cleaned and changed.      07:29 Report given to morning nurse.  Patient resting in bed in low and locked position.        Huddle Comments

## 2019-09-24 NOTE — PLAN OF CARE
[facility]  HOME  HEALTH ORDERS     09/24/2019    Admit to Home Health    Diagnoses:  Active Hospital Problems    Diagnosis  POA    *Acute and chronic respiratory failure with hypercapnia [J96.22]  Yes    Acute on chronic respiratory acidosis [E87.2]  Yes    Chronic diastolic heart failure [I50.32]  Yes    Encephalopathy, metabolic [G93.41]  Yes    COPD (chronic obstructive pulmonary disease) [J44.9]  Yes     Chronic    Class 3 severe obesity in adult [E66.01]  Yes    Chronic hepatitis C [B18.2]  Yes     Chronic    History of CVA (cerebrovascular accident) [Z86.73]  Not Applicable     Chronic    Bipolar I disorder, current or most recent episode depressed, with psychotic features [F31.5]  Yes    Chronic indwelling suprapubic catheter in place [Z93.59]  Not Applicable     Chronic    Essential hypertension [I10]  Yes     Chronic    ADEEL on CPAP [G47.33, Z99.89]  Not Applicable     Chronic      Resolved Hospital Problems   No resolved problems to display.       Patient is homebound due to:  Acute and chronic respiratory failure with hypercapnia    Allergies:  Review of patient's allergies indicates:   Allergen Reactions    Tuberculin ppd Itching and Dermatitis     Patient has old scare that she claims is from TB PPD    Rocephin [ceftriaxone] Rash     Rash 2012? Pt agreeable to try with pre mediation with benadryl.        Diet: Regular    Acitivities: As tolerated    Nursing:   SN to complete comprehensive assessment including routine vital signs. Instruct on disease process and s/s of complications to report to MD. Review/verify medication list sent home with the patient at time of discharge  and instruct patient/caregiver as needed. Frequency may be adjusted depending on start of care date.    Notify MD if SBP > 160 or < 90; DBP > 90 or < 50; HR > 120 or < 50; Temp > 101    CONSULTS:     PT to evaluate and treat. Evaluate for home safety and equipment needs; Establish/upgrade home  exercise program. Perform / instruct on therapeutic exercises, gait training, transfer training, and Range of Motion.    OT to evaluate and treat. Evaluate home environment for safety and equipment needs. Perform/Instruct on transfers, ADL training, ROM, and therapeutic exercises.    Aide to provide assistance with personal care, ADLs, and vital signs      Medications: Review discharge medications with patient and family and provide education.        Mary Ellen Fajardo Sammy   Home Medication Instructions CARMELO:[id number]    Printed on:09/24/19 7322   Medication Information                      baclofen (LIORESAL) 20 MG tablet  10 mg 4 (four) times daily.              baclofen (LIORESAL) 20 MG tablet  Take 1 tablet (20 mg total) by mouth 3 (three) times daily as needed (for muscle spasms).             bumetanide (BUMEX) 1 MG tablet  TK 1 T PO D             catheter 18 Fr Misc  Change every 20 days             FLUoxetine 10 MG capsule  Take 1 capsule (10 mg total) by mouth once daily.             FLUoxetine 10 MG capsule  Take 10 mg by mouth.             gabapentin (NEURONTIN) 300 MG capsule  600 mg 3 (three) times daily.              hydrOXYzine HCl (ATARAX) 25 MG tablet  Take 1 tablet (25 mg total) by mouth 3 (three) times daily.             ipratropium (ATROVENT) 0.02 % nebulizer solution  U 1 VIAL VIA NEBULIZER Q 4 H WHILE AWAKE             LINZESS 290 mcg Cap capsule  TK 1 C PO D             miscellaneous medical supply (C-TUB) [facility]  NEEDS TO SWITCH DME COMPANY FOR OXYGEN AT 2L. LAST OXYGEN SATURATION WAS 83% ON Room Air. She uses  BIPAP and  Needs  New mask, tubings and             OLANZapine (ZYPREXA) 10 MG tablet  Take 10 mg by mouth in the morning.             OLANZapine (ZYPREXA) 5 MG tablet  Take 1 tablet (5 mg total) by mouth every evening.             promethazine (PHENERGAN) 25 MG tablet  TAKE 1 TABLET BY MOUTH EVERY 8 HOURS AS NEEDED FOR NAUSEA AND VOMITING             traZODone (DESYREL) 50 MG  tablet  Take 50 mg by mouth.             VENTOLIN HFA 90 mcg/actuation inhaler                         _________________________________  Cora Briggs MD  09/24/2019

## 2019-09-24 NOTE — PLAN OF CARE
09/24/19 1518   Post-Acute Status   Post-Acute Authorization Home Health/Hospice   Post-Acute Placement Status Set-up Complete   Discharge Delays None known at this time

## 2019-09-24 NOTE — PLAN OF CARE
HOW TO MANAGE YOUR CARE  AT HOME:  TN taught Symptoms and Problems for CHF home care with pt and with teach back:  1. SOB, 2.Foot swelling. TN placed education sheet in [employer] Packet..     HELP AT HOME TO ASSIST WITH PATIENT'S RECOVERY IS spouse, Radha.      TN taught patient about things she is responsible for when discharged TO HELP WITH HER RECOVERY:   How to Manage Her Care At Home:  1. Getting her prescriptions filled.  2. Taking her medications as directed. DO NOT MISS ANY DOSES!  3. Going to her follow-up doctor appointments.   .    TN informed med surg nurse Huma that patient is cleared for discharge from case management viewpoint.       09/24/19 1711   Final Note   Assessment Type Final Discharge Note   Anticipated Discharge Disposition Home-Health   What phone number can be called within the next 1-3 days to see how you are doing after discharge?   (see chart)   Hospital Follow Up  Appt(s) scheduled? Yes   Discharge plans and expectations educations in teach back method with documentation complete? Yes   Right Care Referral Info   Referral Type HOME CARE   Facility Name [facility]

## 2019-09-24 NOTE — PLAN OF CARE
09/23/19 1223   Discharge Assessment   Assessment Type Discharge Planning Assessment   Assessment information obtained from? Medical Record   Communicated expected length of stay with patient/caregiver no   Lives With spouse   Able to Return to Prior Arrangements yes   Who are your caregiver(s) and their phone number(s)?   (son, Godwin Christianson [phone])   Patient's perception of discharge disposition admitted as an inpatient   Readmission Within the Last 30 Days no previous admission in last 30 days   Patient currently being followed by outpatient case management? Unable to determine (comments)   Patient currently receives any other outside agency services? No   Equipment Currently Used at Home hospital bed;bedside commode;BIPAP;oxygen;dressing device;urinary catheter supplies   Do you have any problems affording any of your prescribed medications? No   Is the patient taking medications as prescribed? yes   Does the patient have transportation home? Yes   Transportation Anticipated family or friend will provide   Does the patient receive services at the [facility]madin Clinic? No   Discharge Plan A Home with family   Discharge Plan B Home with family   DME Needed Upon Discharge  none   Patient/Family in Agreement with Plan yes     [id number] - [place] [address] AT [facility] [place] [address] [place] [address]  Phone: [phone] Fax: [phone]

## 2019-09-25 ENCOUNTER — PATIENT OUTREACH (OUTPATIENT)
Dept: ADMINISTRATIVE | Facility: CLINIC | Age: 64
End: 2019-09-25

## 2019-09-25 LAB
BACTERIA BLD CULT: NORMAL
BACTERIA BLD CULT: NORMAL
ENTEROVIRUS: NOT DETECTED
HUMAN BOCAVIRUS: NOT DETECTED
HUMAN CORONAVIRUS, COMMON COLD VIRUS: NOT DETECTED
INFLUENZA A - H1N1-09: NOT DETECTED
M TB IFN-G CD4+ BCKGRND COR BLD-ACNC: 0.35 IU/ML
MITOGEN IGNF BCKGRD COR BLD-ACNC: 0.52 IU/ML
MITOGEN IGNF BCKGRD COR BLD-ACNC: POSITIVE [IU]/ML
NIL: 0.03 IU/ML
PARAINFLUENZA: NOT DETECTED
RVP - ADENOVIRUS: NOT DETECTED
RVP - HUMAN METAPNEUMOVIRUS (HMPV): NOT DETECTED
RVP - INFLUENZA A: NOT DETECTED
RVP - INFLUENZA B: NOT DETECTED
RVP - RESPIRATORY SYNCTIAL VIRUS (RSV) A: NOT DETECTED
RVP - RESPIRATORY VIRAL PANEL, SOURCE: NORMAL
RVP - RHINOVIRUS: NOT DETECTED
TB2 - NIL: 0.22 IU/ML

## 2019-09-25 NOTE — DISCHARGE SUMMARY
[facility]  Discharge Summary      Patient Name: Mary Ellen Fajardo  MRN: [id number]  Admission Date: 9/20/2019  Hospital Length of Stay: 4 days  Discharge Date and Time: 9/24/2019  6:40 PM  Attending Physician: No att. providers found   Discharging Provider: Cora Briggs MD  Primary Care Provider: Any Tran MD      HPI:   64 yo morbidly obese female with hx of COPD, CHF, ADEEL, chronic hypoxic respiratory failure on 2L HOT, h/o knee replaced complicated by infection in 2011, left leg amputation, chronic indwelling Delcid catheter, ho stroke, h/o polysubstance abuse and bipolar disorder presenting via EMS for progressively worsening dyspnea since yesterday with associated dry cough, fevers and wheeze. Patient encephalopathy with limited hx. She denied any falls, dizziness, syncope, chest pain, hemoptysis, pleurisy, abdominal pain, diarrhea, constipation or bleeds. Has been having URI/cold like symptoms since yesterday with decreased po intake. Tired her home nebs yesterday and today but didn't help prompting EMS. No recent travel or sick contacts.     In the ED patient was tachyenic, febrile Tm 103, normotensive. Labs significant for UA dirty but has chronic delcid, , LA wnl, ABG noting acute on chronic respiratory acidosis with pH 7.3 and PCO2 of 74. CXR fairly unremakable. Repeat ABG with worsening pH and acidosis. ICU requested for admission.    * No surgery found *      Hospital Course:   Admitted to ICU for COPD exacerbation with hypercapnic metabolic encephalopathy secondary to suspected viral bronchitis. CXR stable. Pulmonary consulted. Initially started on board spectrum abx at admission after obtaining cultures, and placed in ICU with bipap. Has hx of positive quantiferon, thus initially placed on airborne precautions. Unable to obtain TB exposure history. Low suspicion for TB but will repeat AFB, PPD and quantiferon as per pulmonary recs. AFB  pending.  PICC placed due to poor IV access. Respiratory status and ABG improving with bipap overnight. Resp sputum unremarkable. No leukocytosis or sputum, stopped abx. Stable for floors. No AFB and taken off precautions.     Consults:   Consults (From admission, onward)        Status Ordering Provider     Inpatient consult to PICC team (VASU)  Once     Provider:  (Not yet assigned)    Completed ARSEN DOMINGUEZ     Inpatient consult to Pulmonology  Once     Provider:  Pepito Godfrey MD    Completed ARSEN DOMINGUEZ     IP consult to case management  Once     Provider:  (Not yet assigned)    Completed ARSEN DOMINGUEZ          No new Assessment & Plan notes have been filed under this hospital service since the last note was generated.  Service: Hospital Medicine    Final Active Diagnoses:    Diagnosis Date Noted POA    PRINCIPAL PROBLEM:  Acute and chronic respiratory failure with hypercapnia [J96.22] 09/20/2019 Yes    Acute on chronic respiratory acidosis [E87.2] 09/20/2019 Yes    Chronic diastolic heart failure [I50.32] 09/20/2019 Yes    Encephalopathy, metabolic [G93.41] 09/20/2019 Yes    COPD (chronic obstructive pulmonary disease) [J44.9] 01/06/2019 Yes     Chronic    Class 3 severe obesity in adult [E66.01] 01/06/2019 Yes    Chronic hepatitis C [B18.2] 01/06/2019 Yes     Chronic    History of CVA (cerebrovascular accident) [Z86.73] 01/06/2019 Not Applicable     Chronic    Bipolar I disorder, current or most recent episode depressed, with psychotic features [F31.5] 01/06/2019 Yes    Chronic indwelling suprapubic catheter in place [Z93.59] 01/06/2019 Not Applicable     Chronic    Essential hypertension [I10] 01/19/2018 Yes     Chronic    ADEEL on CPAP [G47.33, Z99.89] 01/19/2018 Not Applicable     Chronic      Problems Resolved During this Admission:       Discharged Condition: stable    Disposition: Home-Health Care [facility]    Follow Up:  Follow-up Information     Any Tran MD On 9/30/2019.     Specialty:  Internal Medicine  Why:  out patient services: 9:00 a.m follow up from the hospital  Contact information:  [address]  [address]  [facility]  [place] [address] [phone]             [facility] [place] On 9/24/2019.    Why:  Home Health  Contact information:  [address] [place] [id number] [phone]                 Patient Instructions:      Diet Adult Regular     Notify your health care provider if you experience any of the following:  temperature >100.4     Notify your health care provider if you experience any of the following:  persistent nausea and vomiting or diarrhea     Notify your health care provider if you experience any of the following:  severe uncontrolled pain     Notify your health care provider if you experience any of the following:  redness, tenderness, or signs of infection (pain, swelling, redness, odor or green/yellow discharge around incision site)     Notify your health care provider if you experience any of the following:  difficulty breathing or increased cough     Notify your health care provider if you experience any of the following:  severe persistent headache     Notify your health care provider if you experience any of the following:  worsening rash     Notify your health care provider if you experience any of the following:  persistent dizziness, light-headedness, or visual disturbances     Notify your health care provider if you experience any of the following:  increased confusion or weakness     Activity as tolerated       Pending Diagnostic Studies:     Procedure Component Value Units Date/Time    Quantiferon Gold TB [[id number]] Collected:  09/23/19 1220    Order Status:  Sent Lab Status:  In process Updated:  09/23/19 1540    Specimen:  Blood          Medications:  Reconciled Home Medications:      Medication List      CHANGE how you take these medications    albuterol-ipratropium 2.5 mg-0.5 mg/3 mL nebulizer solution  Commonly known as:   DUO-NEB  Take 3 mLs by nebulization every 4 (four) hours as needed for Wheezing.  What changed:    · how to take this  · when to take this  · reasons to take this     baclofen 20 MG tablet  Commonly known as:  LIORESAL  10 mg 4 (four) times daily.  What changed:  Another medication with the same name was removed. Continue taking this medication, and follow the directions you see here.     hydrOXYzine HCl 25 MG tablet  Commonly known as:  ATARAX  Take 1 tablet (25 mg total) by mouth 3 (three) times daily.  What changed:  Another medication with the same name was removed. Continue taking this medication, and follow the directions you see here.     OLANZapine 5 MG tablet  Commonly known as:  ZyPREXA  Take 1 tablet (5 mg total) by mouth every evening.  What changed:  Another medication with the same name was removed. Continue taking this medication, and follow the directions you see here.        CONTINUE taking these medications    bumetanide 1 MG tablet  Commonly known as:  BUMEX  TK 1 T PO D     C-TUB Misc  Generic drug:  miscellaneous medical supply  NEEDS TO SWITCH [facility] [place] FOR OXYGEN AT 2L. LAST OXYGEN SATURATION WAS 83% ON Room Air. She uses  BIPAP and  Needs  New mask, tubings and     catheter 18 Fr Misc  Change every 20 days     gabapentin 300 MG capsule  Commonly known as:  NEURONTIN  600 mg 3 (three) times daily.     ipratropium 0.02 % nebulizer solution  Commonly known as:  ATROVENT  U 1 VIAL VIA NEBULIZER Q 4 H WHILE AWAKE     LINZESS 290 mcg Cap capsule  Generic drug:  linaCLOtide  TK 1 C PO D     promethazine 25 MG tablet  Commonly known as:  PHENERGAN  TAKE 1 TABLET BY MOUTH EVERY 8 HOURS AS NEEDED FOR NAUSEA AND VOMITING     traZODone 50 MG tablet  Commonly known as:  DESYREL  Take 50 mg by mouth.     VENTOLIN HFA 90 mcg/actuation inhaler  Generic drug:  albuterol        STOP taking these medications    FLUoxetine 10 MG capsule     K-TAB 10 MEQ Tbsr  Generic drug:  potassium chloride      oxyCODONE-acetaminophen  mg per tablet  Commonly known as:  PERCOCET        ASK your doctor about these medications    FLUoxetine 10 MG capsule  Take 10 mg by mouth.  Ask about: Should I take this medication?            Indwelling Lines/Drains at time of discharge:   Lines/Drains/Airways     Drain                 Suprapubic Catheter 09/20/19 1002 18 Fr. 4 days                Time spent on the discharge of patient: 35 minutes  Patient was seen and examined on the date of discharge and determined to be suitable for discharge.         Cora Briggs MD  Department of Hospital Medicine  [facility]

## 2019-09-25 NOTE — PATIENT INSTRUCTIONS
"Dyspnea (Shortness Of Breath)  Shortness of Breath (also known as "Dyspnea") is the sense that you can't catch your breath or can't get enough air.  Dyspnea can be caused by many different conditions such as:  Acute asthma attack  Worsening of emphysema (also called "COPD") -- a lung disease that is caused by smoking  A mucus plug blocks a large air passage in the lung -- this can occur with emphysema or chronic bronchitis  Congestive Heart Failure ("CHF") -- when a weak heart muscle allows excess fluid to collect in the lungs  Panic attacks, anxiety -- fear can cause rapid breathing ("hyperventilation")  Pneumonia -- infection in the lung tissue  Exposure to toxic fumes or smoke  Pulmonary embolus (blood clot to the lung)  Based on your visit today, the exact cause of your shortness of breath is not certain. Your tests do not show any of the serious causes of dyspnea. Sometimes, further testing is needed to find out if a serious problem exists. Therefore, it is important for you to watch for any new symptoms or worsening of your condition and follow up with your doctor as directed.  Home Care:  When your symptoms are better, resume your usual activities.  If you smoke, you need to stop. Join a stop-smoking program or ask your doctor for help.  Follow Up  with your doctor or as advised by our staff.  Get Prompt Medical Attention  if any of the following occur:  Increasing shortness of breath or wheezing  Redness, pain or swelling in one leg  Swelling in both legs or ankles  Unexpected weight gain  Chest, arm, shoulder, neck or upper back pain  Dizziness, weakness or fainting  Palpitations (the sense that your heart is fluttering, beating fast or hard)  Fever of 100.4ºF (38ºC) or higher, or as directed by your healthcare provider  Cough with dark colored or bloody sputum (mucus)  © 0994-3609 [employer], [address] [facility], [place] [address]. All rights reserved. This information is not intended as a " substitute for professional medical care. Always follow your healthcare professional's instructions.

## 2019-09-26 ENCOUNTER — EXTERNAL HOME HEALTH (OUTPATIENT)
Dept: HOME HEALTH SERVICES | Facility: HOSPITAL | Age: 64
End: 2019-09-26
Payer: MEDICARE

## 2019-09-30 ENCOUNTER — TELEPHONE (OUTPATIENT)
Dept: HOME HEALTH SERVICES | Facility: HOSPITAL | Age: 64
End: 2019-09-30

## 2019-10-04 ENCOUNTER — TELEPHONE (OUTPATIENT)
Dept: HOME HEALTH SERVICES | Facility: HOSPITAL | Age: 64
End: 2019-10-04

## 2019-10-07 ENCOUNTER — TELEPHONE (OUTPATIENT)
Dept: PSYCHIATRY | Facility: CLINIC | Age: 64
End: 2019-10-07

## 2019-10-07 RX ORDER — POTASSIUM CHLORIDE 750 MG/1
TABLET, EXTENDED RELEASE ORAL
Qty: 30 TABLET | Refills: 0 | Status: ON HOLD | OUTPATIENT
Start: 2019-10-07 | End: 2019-10-30 | Stop reason: SDUPTHER

## 2019-10-07 RX ORDER — LINACLOTIDE 290 UG/1
CAPSULE, GELATIN COATED ORAL
Qty: 30 CAPSULE | Refills: 0 | Status: ON HOLD | OUTPATIENT
Start: 2019-10-07 | End: 2019-10-30 | Stop reason: SDUPTHER

## 2019-10-07 NOTE — TELEPHONE ENCOUNTER
Returned patient's call that was left on our voicemail requesting a change in appointment time for upcoming appointment with Dr Vargas.  LVM to ask that she please call us back. If we are with patients and unable to answer she should leave message with a good time for us to call her back.

## 2019-10-09 ENCOUNTER — TELEPHONE (OUTPATIENT)
Dept: PSYCHIATRY | Facility: CLINIC | Age: 64
End: 2019-10-09

## 2019-10-09 NOTE — TELEPHONE ENCOUNTER
Spoke to patient to reschedule appointment that she cancelled for tomorrow. She agreed to October 17th at 7:30.

## 2019-10-10 ENCOUNTER — TELEPHONE (OUTPATIENT)
Dept: HOME HEALTH SERVICES | Facility: HOSPITAL | Age: 64
End: 2019-10-10

## 2019-10-15 ENCOUNTER — HOSPITAL ENCOUNTER (OUTPATIENT)
Dept: RADIOLOGY | Facility: HOSPITAL | Age: 64
Discharge: HOME | End: 2019-10-15
Attending: UROLOGY
Payer: MEDICARE

## 2019-10-15 DIAGNOSIS — N20.0 KIDNEY STONE: ICD-10-CM

## 2019-10-15 PROCEDURE — 76770 US EXAM ABDO BACK WALL COMP: CPT | Mod: 26,,, | Performed by: RADIOLOGY

## 2019-10-15 PROCEDURE — 76770 US RETROPERITONEAL COMPLETE: ICD-10-PCS | Mod: 26,,, | Performed by: RADIOLOGY

## 2019-10-15 PROCEDURE — 76770 US EXAM ABDO BACK WALL COMP: CPT | Mod: TC

## 2019-10-16 NOTE — PROGRESS NOTES
How are you see the sleep bed symptoms better Outpatient Psychiatry Follow-Up Visit (MD/NP)    10/17/2019    Clinical Status of Patient:  Outpatient (Ambulatory)    Chief Complaint:  Mary Ellen Fajardo is a 63 y.o. female who presents today for follow-up of mood disorder, anxiety, psychosis and insomnia.  Met with patient.      Interval History and Content of Current Session:  Interim Events/Subjective Report/Content of Current Session: Mary Ellen  was last seen by me on 04/01//2019 for a follow up visit. Mary Ellen was diagnosed with Bipolar disorder, depressed, with psychotic features with anxious distress and insomnia. She was doing well and a plan of care was devised to include:    1. Safety: Call 911 or Crisis Line or go to ER for suicidal ideation, adverse effects of medication or any other emergency  2. Olanzapine 5 mg po qhs.  3. Prozac 10 mg po qd.  4. Return to clinic in 6 months or sooner prn.   5. Continue Psychotherapy.  6. Continue Trazodone 100 mg po qhs prn sleep (refill not needed getting refills from pain management)..     Today Mary Ellen is seen face to face.  Since her last visit, she was hospitalized and an amputation was done. This practitioner did see her in the hospital on 07/24/2019 as a consult for a level 2 screening. She was understandably stressed and her symptoms had exacerbated. Recommendations were made to Obtain Thyroid function tests, increase Olanzapine to 10 mg po qhs, continue Prozac 10 mg po qd and start Trazodone 50 mg po qhs sleep. She was discharged on Prozac 10 mgs, Olanzapine 5 mgs, Atarax 25 mg po TID PRN and Trazodone 100 mg po qhs.     Today she states she is much better. She states her sleep is good because she is using the bipap machine every night and taking the Trazodone every night. She is still having problems with her  running in and out on drugs while on hospice. She states she is on a waiver program for home health services. She states she qualifies because  she has a need. She states she had limited visits approved and she is looking into an outpatient program for physical therapy. Her mood fluctuates. She doesn't let stuff worry her anymore. She accepts the things she cannot change. She uses the serenity prayer. She states she has a good appetite. She states she only eats when she is hungry now as opposed to gobbling food all day. She states her stomach growls now from hunger and she had forgotten how that felt. Her energy level is alright but could be better. She is no longer having hallucinations. She states she has an appt on November 1st to be evaluated for her prosthesis.     Her labs are reviewed.    Contains critical data Comprehensive metabolic panel   Order: [id number]   Status:  Final result   Visible to patient:  Yes (Patient Portal)   Next appt:  10/18/2019 at 10:30 AM in Pulmonology (Spenser Chaidez MD)    Ref Range & Units 3wk ago   Sodium 136 - 145 mmol/L 141    Potassium 3.5 - 5.1 mmol/L 3.9    Chloride 95 - 110 mmol/L 98    CO2 23 - 29 mmol/L 41High Panic     Comment: CO2 critical result(s) called and verbal readback obtained from   Latasha Martin, 09/24/2019 05:38    Glucose 70 - 110 mg/dL 93    BUN, Bld 8 - 23 mg/dL 24High     Creatinine 0.5 - 1.4 mg/dL 0.8    Calcium 8.7 - 10.5 mg/dL 9.0    Total Protein 6.0 - 8.4 g/dL 7.4    Albumin 3.5 - 5.2 g/dL 3.1Low     Total Bilirubin 0.1 - 1.0 mg/dL 0.2    Comment: For infants and newborns, interpretation of results should be based   on gestational age, weight and in agreement with clinical   observations.   Premature Infant recommended reference ranges:   Up to 24 hours.............<8.0 mg/dL   Up to 48 hours............<12.0 mg/dL   3-5 days..................<15.0 mg/dL   6-29 days.................<15.0 mg/dL    Alkaline Phosphatase 55 - 135 U/L 61    AST 10 - 40 U/L 24    ALT 10 - 44 U/L 17    Anion Gap 8 - 16 mmol/L 2Low     eGFR if African American >60 mL/min/1.73 m^2 >60    eGFR if non   >60 mL/min/1.73 m^2 >60    Comment: Calculation used to obtain the estimated glomerular filtration   rate (eGFR) is the CKD-EPI equation.    Resulting Agency  [facility]      Narrative   Performed by: [facility]   CO2 critical result(s) called and verbal readback obtained from   Latasha Martin, 09/24/2019 05:38      Specimen Collected: 09/24/19 04:22 Last Resulted: 09/24/19 05:38 Lab Flowsheet Order Details View Encounter Lab and Collection Details Routing Result History           CBC auto differential   Order: [id number]   Status:  Final result   Visible to patient:  Yes (Patient Portal)   Next appt:  10/18/2019 at 10:30 AM in Pulmonology (Spenser Chaidez MD)    Ref Range & Units 3wk ago   WBC 3.90 - 12.70 K/uL 5.77    RBC 4.00 - 5.40 M/uL 4.02    Hemoglobin 12.0 - 16.0 g/dL 10.1Low     Hematocrit 37.0 - 48.5 % 35.0Low     Mean Corpuscular Volume 82 - 98 fL 87    Mean Corpuscular Hemoglobin 27.0 - 31.0 pg 25.1Low     Mean Corpuscular Hemoglobin Conc 32.0 - 36.0 g/dL 28.9Low     RDW 11.5 - 14.5 % 14.7High     Platelets 150 - 350 K/uL 252    MPV 9.2 - 12.9 fL 10.0    Gran # (ANC) 1.8 - 7.7 K/uL 3.1    Lymph # 1.0 - 4.8 K/uL 2.2    Mono # 0.3 - 1.0 K/uL 0.5    Eos # 0.0 - 0.5 K/uL 0.0    Baso # 0.00 - 0.20 K/uL 0.00    Gran% 38.0 - 73.0 % 53.6    Lymph% 18.0 - 48.0 % 37.3    Mono% 4.0 - 15.0 % 8.8    Eosinophil% 0.0 - 8.0 % 0.5    Basophil% 0.0 - 1.9 % 0.0    Differential Method  Automated    Resulting Agency  [facility]         Specimen Collected: 09/24/19 04:22 Last Resulted: 09/24/19 05:29 Lab Flowsheet Order Details View Encounter Lab and Collection Details Routing Result History           TSH   Order: [id number]   Status:  Final result   Visible to patient:  Yes (Patient Portal)   Next appt:  10/18/2019 at 10:30 AM in Pulmonology (Spenser Chaidez MD)    Ref Range & Units 3wk ago   TSH 0.400 - 4.000 uIU/mL 0.617    Resulting Agency  [facility]         Specimen Collected: 09/20/19 05:14 Last Resulted: 09/20/19 06:30 Lab Flowsheet Order Details  "View Encounter Lab and Collection Details Routing Result History             Psychotherapy:  · Target symptoms: anxiety , mood disorder, psychosis, insomnia  · Why chosen therapy is appropriate versus another modality: relevant to diagnosis, patient responds to this modality, evidence based practice  · Outcome monitoring methods: self-report, observation  · Therapeutic intervention type: supportive psychotherapy  · Topics discussed/themes: relationships difficulties, building skills sets for symptom management  · The patient's response to the intervention is accepting. The patient's progress toward treatment goals is good.   · Duration of intervention: 16 minutes.    Review of Systems   PSYCHIATRIC: Pertinant items are noted in the narrative.  GENERAL:  Morbidly obese  SKIN:  No rashes or lacerations  HEAD:  No headaches  EYES:  No exophthalmos, jaundice or blindness, had right cataract removed  EARS:  No dizziness, tinnitus or hearing loss  CHEST:  oxygen via nasal cannula/inogen (has COPD)  CARDIOVASCULAR:  No tachycardia or chest pain  ABDOMEN:  No nausea, vomiting, pain, constipation or diarrhea  URINARY:  Has urinary catheter  NEUROLOGIC:  No abnormal movements    Past Medical, Family and Social History: The patient's past medical, family and social history have been reviewed and updated as appropriate within the electronic medical record - see encounter notes.    Compliance: yes    Side effects: None  Risk Parameters:  Patient reports no suicidal ideation  Patient reports no homicidal ideation  Patient reports no self-injurious behavior  Patient reports no violent behavior    Exam (detailed: at least 9 elements; comprehensive: all 15 elements)   Constitutional  Vitals:  Most recent vital signs, dated less than 90 days prior to this appointment, were reviewed.   Vitals:    10/17/19 0725   BP: 128/68   Pulse: (!) 58   Weight: 111.1 kg (245 lb)   Height: 5' 4" (1.626 m)        General:  unremarkable, age " "appropriate     Musculoskeletal  Muscle Strength/Tone:  no tremor, no tic   Gait & Station:  in wheelchair     Psychiatric  Speech:  no latency; no press   Mood & Affect:  "Good.".  congruent and appropriate   Thought Process:  normal and logical   Associations:  intact   Thought Content:  normal, no suicidality, no homicidality, delusions, or paranoia   Insight:  has awareness of illness   Judgement: behavior is adequate to circumstances   Orientation:  grossly intact, person, place, situation   Memory: intact for content of interview   Language: grossly intact   Attention Span & Concentration:  able to focus   Fund of Knowledge:  intact and appropriate to age and level of education     Assessment and Diagnosis   Status/Progress: Based on the examination today, the patient's problem(s) is/are well controlled.  New problems have not been presented today.   Lack of compliance are not complicating management of the primary condition.  There are no active rule-out diagnoses for this patient at this time.     General Impression: She is doing very well at this time. She is pleased with her current state and does not want any changes to her treatment regime. She states her medications are effective for management of her symptoms.       ICD-10-CM ICD-9-CM   1. Bipolar I disorder, current or most recent episode depressed, with psychotic features with anxious distress F31.5 296.54   2. Mood insomnia F51.05 [id number]    F39    3. STANLEY (generalized anxiety disorder) F41.1 300.02       Intervention/Counseling/Treatment Plan   · Medication Management: The risks and benefits of medication were discussed with the patient.  · The treatment plan and follow up plan were reviewed with the patient.   1. Safety: Call 911 or Crisis Line or go to ER for suicidal ideation, adverse effects of medication or any other emergency  2. Continue Olanzapine 5 mg po qhs to target mood.  3. Continue Prozac 10 mg po qd to target symptoms of anxiety and " depression/mood. States does not need refill because done by another provider.  4. Continue Atarax 25 mg po TID prn to target anxiety/sleep as needed. States does not need refill because done by another provider.  5. Continue Trazodone 100 mg po qhs prn sleep. States does not need refill because done by another provider.  6. Continue psychotherapy.   7. Return to clinic in 3 months or sooner prn.     Patient agrees with POC.    INSTRUCTIONS  Instructed to call 911 or Crisis Line or go to ER for suicidal ideation, adverse effects of medication or any other emergency. Verbalizes understanding and plan to comply.      Return to Clinic: 3 months, as needed

## 2019-10-17 ENCOUNTER — OFFICE VISIT (OUTPATIENT)
Dept: PSYCHIATRY | Facility: CLINIC | Age: 64
End: 2019-10-17
Payer: MEDICARE

## 2019-10-17 VITALS
BODY MASS INDEX: 41.83 KG/M2 | SYSTOLIC BLOOD PRESSURE: 128 MMHG | WEIGHT: 245 LBS | DIASTOLIC BLOOD PRESSURE: 68 MMHG | HEART RATE: 58 BPM | HEIGHT: 64 IN

## 2019-10-17 DIAGNOSIS — F39 MOOD INSOMNIA: ICD-10-CM

## 2019-10-17 DIAGNOSIS — F41.1 GAD (GENERALIZED ANXIETY DISORDER): ICD-10-CM

## 2019-10-17 DIAGNOSIS — F51.05 MOOD INSOMNIA: ICD-10-CM

## 2019-10-17 DIAGNOSIS — F31.5 BIPOLAR I DISORDER, CURRENT OR MOST RECENT EPISODE DEPRESSED, WITH PSYCHOTIC FEATURES WITH ANXIOUS DISTRESS: Primary | ICD-10-CM

## 2019-10-17 PROCEDURE — 3074F PR MOST RECENT SYSTOLIC BLOOD PRESSURE < 130 MM HG: ICD-10-PCS | Mod: S$GLB,,, | Performed by: NURSE PRACTITIONER

## 2019-10-17 PROCEDURE — 99213 PR OFFICE/OUTPT VISIT, EST, LEVL III, 20-29 MIN: ICD-10-PCS | Mod: S$GLB,,, | Performed by: NURSE PRACTITIONER

## 2019-10-17 PROCEDURE — 3078F PR MOST RECENT DIASTOLIC BLOOD PRESSURE < 80 MM HG: ICD-10-PCS | Mod: S$GLB,,, | Performed by: NURSE PRACTITIONER

## 2019-10-17 PROCEDURE — 99999 PR PBB SHADOW E&M-EST. PATIENT-LVL III: ICD-10-PCS | Mod: PBBFAC,,, | Performed by: NURSE PRACTITIONER

## 2019-10-17 PROCEDURE — 3078F DIAST BP <80 MM HG: CPT | Mod: S$GLB,,, | Performed by: NURSE PRACTITIONER

## 2019-10-17 PROCEDURE — 99213 OFFICE O/P EST LOW 20 MIN: CPT | Mod: S$GLB,,, | Performed by: NURSE PRACTITIONER

## 2019-10-17 PROCEDURE — 3074F SYST BP LT 130 MM HG: CPT | Mod: S$GLB,,, | Performed by: NURSE PRACTITIONER

## 2019-10-17 PROCEDURE — 3008F BODY MASS INDEX DOCD: CPT | Mod: S$GLB,,, | Performed by: NURSE PRACTITIONER

## 2019-10-17 PROCEDURE — 99999 PR PBB SHADOW E&M-EST. PATIENT-LVL III: CPT | Mod: PBBFAC,,, | Performed by: NURSE PRACTITIONER

## 2019-10-17 PROCEDURE — 3008F PR BODY MASS INDEX (BMI) DOCUMENTED: ICD-10-PCS | Mod: S$GLB,,, | Performed by: NURSE PRACTITIONER

## 2019-10-17 RX ORDER — FLUOXETINE 10 MG/1
CAPSULE ORAL
COMMUNITY
Start: 2018-11-21 | End: 2019-12-07 | Stop reason: SDUPTHER

## 2019-10-17 RX ORDER — OXYBUTYNIN CHLORIDE 5 MG/1
TABLET, EXTENDED RELEASE ORAL
COMMUNITY
Start: 2018-09-24 | End: 2019-11-25 | Stop reason: SDUPTHER

## 2019-10-17 RX ORDER — OLANZAPINE 5 MG/1
5 TABLET ORAL NIGHTLY
Qty: 90 TABLET | Refills: 0 | Status: SHIPPED | OUTPATIENT
Start: 2019-10-17 | End: 2020-01-15 | Stop reason: SDUPTHER

## 2019-10-17 RX ORDER — OXYCODONE AND ACETAMINOPHEN 10; 325 MG/1; MG/1
TABLET ORAL
Status: ON HOLD | COMMUNITY
Start: 2019-01-22 | End: 2019-10-30 | Stop reason: CLARIF

## 2019-10-17 NOTE — PATIENT INSTRUCTIONS
"You have been provided with a certain amount of medication with a specified number of refills.  Please follow up within an adequate time before you run out of medications.    REFILLS FOR CONTROLLED SUBSTANCES WILL NOT BE GIVEN WITHOUT AN APPOINTMENT.  I will not honor or fill automated refill requests from pharmacies.  You must come in for an appointment to get refills.    Please book your next appointment for myself or therapist by phone by calling our office at [phone].      PLEASE BE AT LEAST 15 MINUTES EARLY FOR YOUR NEXT APPOINTMENT.  PLEASE, DO NOT BE LATE OR YOU WILL BE TURNED AWAY AND ASKED TO RESCHEDULE.  YOU MUST COME EARLY TO ALLOW TIME FOR CHECK-IN AS WELL AS GET YOUR VITAL SIGNS AND GO OVER YOUR MEDICATIONS.  [employer] is not fair to the patients who present after you and are on time for their appointments.  It causes a delay in the appointments for patients and staff.  IF YOU ARE LATE, THERE IS A POSSIBILITY THAT YOU WILL BE CHARGED FOR THE APPOINTMENT TIME PERSONALLY AND IT WILL NOT GO TO YOUR INSURANCE.  YOU MAY ALSO BE DISCHARGED FROM CLINIC with multiple "No Show" appointments.  -----------------------------------------------------------------------------------------------------------------  IF YOU FEEL SUICIDAL OR HAVING THOUGHTS OR PLANS TO HURT YOURSELF OR OTHERS, CALL 911 OR REPORT TO THE NEAREST EMERGENCY ROOM.  YOU CAN ALSO ACCESS THE FOLLOWING HOTLINE(S):    National Suicide Hotline Number 6-522-752-TALK (4644)     ([facility] Mobile Crisis, 963.587.8960'   [place] Crisis Line, (550) 174-6320; [facility], 24 hours / 7 days, (884) 650-COPE (6150), 1-990-491-COPE (0737))     TIPS FOR GETTING YOUR PRESCRIPTIONS:    You can always ask your pharmacist the cost of your medications without the use of your insurance. Sometimes the medication will be cheaper if you do not use your insurance.     If you decide you want to have your prescriptions filled at " a different pharmacy, you can always go to the new pharmacy of your choice and have them call the pharmacy where your prescription was sent and they can have your prescription transferred to the new pharmacy.

## 2019-10-18 ENCOUNTER — TELEPHONE (OUTPATIENT)
Dept: PULMONOLOGY | Facility: CLINIC | Age: 64
End: 2019-10-18

## 2019-10-18 NOTE — TELEPHONE ENCOUNTER
----- Message from Lorraine Ann sent at 10/18/2019 10:05 AM CDT -----  Contact: Self  Type: Patient Call Back    Who called:Self    What is the request in detail: She arrived at the [facility] location and her transportation left. She needs to reschedule for as soon as possible.    Can the clinic reply by [facility]?No    Would the patient rather a call back or a response via [facility]? Call    Best call back number:[phone]

## 2019-10-22 ENCOUNTER — PATIENT OUTREACH (OUTPATIENT)
Dept: ADMINISTRATIVE | Facility: HOSPITAL | Age: 64
End: 2019-10-22

## 2019-10-22 PROCEDURE — G0179 MD RECERTIFICATION HHA PT: HCPCS | Mod: ,,, | Performed by: PHYSICAL MEDICINE & REHABILITATION

## 2019-10-22 PROCEDURE — G0179 PR HOME HEALTH MD RECERTIFICATION: ICD-10-PCS | Mod: ,,, | Performed by: PHYSICAL MEDICINE & REHABILITATION

## 2019-10-23 ENCOUNTER — TELEPHONE (OUTPATIENT)
Dept: HOME HEALTH SERVICES | Facility: HOSPITAL | Age: 64
End: 2019-10-23

## 2019-10-24 ENCOUNTER — TELEPHONE (OUTPATIENT)
Dept: HOME HEALTH SERVICES | Facility: HOSPITAL | Age: 64
End: 2019-10-24

## 2019-10-28 PROBLEM — Z00.8 EVALUATION BY PSYCHIATRIC SERVICE REQUIRED: Status: RESOLVED | Noted: 2019-07-24 | Resolved: 2019-10-28

## 2019-10-29 ENCOUNTER — HOSPITAL ENCOUNTER (INPATIENT)
Facility: HOSPITAL | Age: 64
LOS: 2 days | Discharge: HOME | DRG: 189 | End: 2019-10-31
Attending: EMERGENCY MEDICINE | Admitting: EMERGENCY MEDICINE
Payer: MEDICARE

## 2019-10-29 DIAGNOSIS — N39.0 URINARY TRACT INFECTION ASSOCIATED WITH CATHETERIZATION OF URINARY TRACT, UNSPECIFIED INDWELLING URINARY CATHETER TYPE, INITIAL ENCOUNTER: ICD-10-CM

## 2019-10-29 DIAGNOSIS — T83.511A URINARY TRACT INFECTION ASSOCIATED WITH CATHETERIZATION OF URINARY TRACT, UNSPECIFIED INDWELLING URINARY CATHETER TYPE, INITIAL ENCOUNTER: ICD-10-CM

## 2019-10-29 DIAGNOSIS — J96.92 HYPERCAPNIC RESPIRATORY FAILURE: Primary | ICD-10-CM

## 2019-10-29 DIAGNOSIS — R41.82 ALTERED MENTAL STATUS: ICD-10-CM

## 2019-10-29 DIAGNOSIS — M79.606 LEG PAIN: ICD-10-CM

## 2019-10-29 PROBLEM — G93.41 ENCEPHALOPATHY, METABOLIC: Status: RESOLVED | Noted: 2019-09-20 | Resolved: 2019-10-29

## 2019-10-29 PROBLEM — D64.9 ANEMIA: Status: ACTIVE | Noted: 2019-10-29

## 2019-10-29 LAB
ALBUMIN SERPL BCP-MCNC: 3.5 G/DL (ref 3.5–5.2)
ALLENS TEST: ABNORMAL
ALLENS TEST: ABNORMAL
ALP SERPL-CCNC: 93 U/L (ref 55–135)
ALT SERPL W/O P-5'-P-CCNC: 7 U/L (ref 10–44)
AMPHET+METHAMPHET UR QL: NEGATIVE
ANION GAP SERPL CALC-SCNC: 8 MMOL/L (ref 8–16)
AST SERPL-CCNC: 18 U/L (ref 10–40)
BACTERIA #/AREA URNS HPF: ABNORMAL /HPF
BARBITURATES UR QL SCN>200 NG/ML: NEGATIVE
BASOPHILS # BLD AUTO: 0.02 K/UL (ref 0–0.2)
BASOPHILS NFR BLD: 0.4 % (ref 0–1.9)
BENZODIAZ UR QL SCN>200 NG/ML: NEGATIVE
BILIRUB SERPL-MCNC: 0.2 MG/DL (ref 0.1–1)
BILIRUB UR QL STRIP: NEGATIVE
BNP SERPL-MCNC: 83 PG/ML (ref 0–99)
BUN SERPL-MCNC: 20 MG/DL (ref 8–23)
BZE UR QL SCN: NEGATIVE
CALCIUM SERPL-MCNC: 9.1 MG/DL (ref 8.7–10.5)
CANNABINOIDS UR QL SCN: NEGATIVE
CHLORIDE SERPL-SCNC: 102 MMOL/L (ref 95–110)
CLARITY UR: CLEAR
CO2 SERPL-SCNC: 35 MMOL/L (ref 23–29)
COLOR UR: ABNORMAL
CREAT SERPL-MCNC: 1 MG/DL (ref 0.5–1.4)
CREAT UR-MCNC: 32 MG/DL (ref 15–325)
DELSYS: ABNORMAL
DELSYS: ABNORMAL
DIFFERENTIAL METHOD: ABNORMAL
EOSINOPHIL # BLD AUTO: 0.1 K/UL (ref 0–0.5)
EOSINOPHIL NFR BLD: 2.5 % (ref 0–8)
EP: 5
ERYTHROCYTE [DISTWIDTH] IN BLOOD BY AUTOMATED COUNT: 14.6 % (ref 11.5–14.5)
EST. GFR  (AFRICAN AMERICAN): >60 ML/MIN/1.73 M^2
EST. GFR  (NON AFRICAN AMERICAN): >60 ML/MIN/1.73 M^2
ETHANOL SERPL-MCNC: <10 MG/DL
FIO2: 35
FLOW: 2
GLUCOSE SERPL-MCNC: 109 MG/DL (ref 70–110)
GLUCOSE SERPL-MCNC: 92 MG/DL (ref 70–110)
GLUCOSE UR QL STRIP: NEGATIVE
HCO3 UR-SCNC: 38.6 MMOL/L (ref 24–28)
HCO3 UR-SCNC: 39.3 MMOL/L (ref 24–28)
HCT VFR BLD AUTO: 41.1 % (ref 37–48.5)
HGB BLD-MCNC: 11.6 G/DL (ref 12–16)
HGB UR QL STRIP: ABNORMAL
IMM GRANULOCYTES # BLD AUTO: 0.02 K/UL (ref 0–0.04)
IMM GRANULOCYTES NFR BLD AUTO: 0.4 % (ref 0–0.5)
IP: 10
KETONES UR QL STRIP: NEGATIVE
LEUKOCYTE ESTERASE UR QL STRIP: ABNORMAL
LYMPHOCYTES # BLD AUTO: 1.8 K/UL (ref 1–4.8)
LYMPHOCYTES NFR BLD: 35.6 % (ref 18–48)
MCH RBC QN AUTO: 24.6 PG (ref 27–31)
MCHC RBC AUTO-ENTMCNC: 28.2 G/DL (ref 32–36)
MCV RBC AUTO: 87 FL (ref 82–98)
METHADONE UR QL SCN>300 NG/ML: NEGATIVE
MICROSCOPIC COMMENT: ABNORMAL
MODE: ABNORMAL
MODE: ABNORMAL
MONOCYTES # BLD AUTO: 0.5 K/UL (ref 0.3–1)
MONOCYTES NFR BLD: 8.8 % (ref 4–15)
NEUTROPHILS # BLD AUTO: 2.7 K/UL (ref 1.8–7.7)
NEUTROPHILS NFR BLD: 52.3 % (ref 38–73)
NITRITE UR QL STRIP: POSITIVE
NRBC BLD-RTO: 0 /100 WBC
OPIATES UR QL SCN: NEGATIVE
PCO2 BLDA: 82.6 MMHG (ref 35–45)
PCO2 BLDA: 90 MMHG (ref 35–45)
PCP UR QL SCN>25 NG/ML: NEGATIVE
PH SMN: 7.25 [PH] (ref 7.35–7.45)
PH SMN: 7.28 [PH] (ref 7.35–7.45)
PH UR STRIP: 5 [PH] (ref 5–8)
PLATELET # BLD AUTO: 197 K/UL (ref 150–350)
PMV BLD AUTO: 10.8 FL (ref 9.2–12.9)
PO2 BLDA: 108 MMHG (ref 80–100)
PO2 BLDA: 89 MMHG (ref 80–100)
POC BE: 9 MMOL/L
POC BE: 9 MMOL/L
POC SATURATED O2: 95 % (ref 95–100)
POC SATURATED O2: 97 % (ref 95–100)
POC TCO2: 41 MMOL/L (ref 23–27)
POC TCO2: 42 MMOL/L (ref 23–27)
POCT GLUCOSE: 109 MG/DL (ref 70–110)
POCT GLUCOSE: 86 MG/DL (ref 70–110)
POTASSIUM SERPL-SCNC: 4.2 MMOL/L (ref 3.5–5.1)
PROT SERPL-MCNC: 7.9 G/DL (ref 6–8.4)
PROT UR QL STRIP: NEGATIVE
RBC # BLD AUTO: 4.71 M/UL (ref 4–5.4)
RBC #/AREA URNS HPF: 5 /HPF (ref 0–4)
SAMPLE: ABNORMAL
SAMPLE: ABNORMAL
SITE: ABNORMAL
SITE: ABNORMAL
SODIUM SERPL-SCNC: 145 MMOL/L (ref 136–145)
SP GR UR STRIP: 1.01 (ref 1–1.03)
TOXICOLOGY INFORMATION: NORMAL
TROPONIN I SERPL DL<=0.01 NG/ML-MCNC: 0.01 NG/ML (ref 0–0.03)
URN SPEC COLLECT METH UR: ABNORMAL
UROBILINOGEN UR STRIP-ACNC: NEGATIVE EU/DL
WBC # BLD AUTO: 5.11 K/UL (ref 3.9–12.7)
WBC #/AREA URNS HPF: 25 /HPF (ref 0–5)

## 2019-10-29 PROCEDURE — 87077 CULTURE AEROBIC IDENTIFY: CPT

## 2019-10-29 PROCEDURE — 87186 SC STD MICRODIL/AGAR DIL: CPT

## 2019-10-29 PROCEDURE — 99291 CRITICAL CARE FIRST HOUR: CPT | Mod: 25

## 2019-10-29 PROCEDURE — 27000190 HC CPAP FULL FACE MASK W/VALVE

## 2019-10-29 PROCEDURE — 80307 DRUG TEST PRSMV CHEM ANLYZR: CPT

## 2019-10-29 PROCEDURE — 63600175 PHARM REV CODE 636 W HCPCS: Performed by: EMERGENCY MEDICINE

## 2019-10-29 PROCEDURE — 96374 THER/PROPH/DIAG INJ IV PUSH: CPT

## 2019-10-29 PROCEDURE — 25000242 PHARM REV CODE 250 ALT 637 W/ HCPCS: Performed by: EMERGENCY MEDICINE

## 2019-10-29 PROCEDURE — 36600 WITHDRAWAL OF ARTERIAL BLOOD: CPT

## 2019-10-29 PROCEDURE — 25000003 PHARM REV CODE 250: Performed by: HOSPITALIST

## 2019-10-29 PROCEDURE — 82962 GLUCOSE BLOOD TEST: CPT

## 2019-10-29 PROCEDURE — 87086 URINE CULTURE/COLONY COUNT: CPT

## 2019-10-29 PROCEDURE — 84484 ASSAY OF TROPONIN QUANT: CPT

## 2019-10-29 PROCEDURE — 82803 BLOOD GASES ANY COMBINATION: CPT

## 2019-10-29 PROCEDURE — 99900035 HC TECH TIME PER 15 MIN (STAT)

## 2019-10-29 PROCEDURE — 85025 COMPLETE CBC W/AUTO DIFF WBC: CPT

## 2019-10-29 PROCEDURE — 80320 DRUG SCREEN QUANTALCOHOLS: CPT

## 2019-10-29 PROCEDURE — 93010 ELECTROCARDIOGRAM REPORT: CPT | Mod: ,,, | Performed by: INTERNAL MEDICINE

## 2019-10-29 PROCEDURE — 93005 ELECTROCARDIOGRAM TRACING: CPT

## 2019-10-29 PROCEDURE — 27000221 HC OXYGEN, UP TO 24 HOURS

## 2019-10-29 PROCEDURE — 94640 AIRWAY INHALATION TREATMENT: CPT

## 2019-10-29 PROCEDURE — 80053 COMPREHEN METABOLIC PANEL: CPT

## 2019-10-29 PROCEDURE — 81000 URINALYSIS NONAUTO W/SCOPE: CPT | Mod: 59

## 2019-10-29 PROCEDURE — 87088 URINE BACTERIA CULTURE: CPT

## 2019-10-29 PROCEDURE — 83880 ASSAY OF NATRIURETIC PEPTIDE: CPT

## 2019-10-29 PROCEDURE — 93010 EKG 12-LEAD: ICD-10-PCS | Mod: ,,, | Performed by: INTERNAL MEDICINE

## 2019-10-29 PROCEDURE — 20000000 HC ICU ROOM

## 2019-10-29 PROCEDURE — 94760 N-INVAS EAR/PLS OXIMETRY 1: CPT

## 2019-10-29 PROCEDURE — 63600175 PHARM REV CODE 636 W HCPCS: Performed by: HOSPITALIST

## 2019-10-29 RX ORDER — BUMETANIDE 1 MG/1
2 TABLET ORAL DAILY
Status: DISCONTINUED | OUTPATIENT
Start: 2019-10-30 | End: 2019-10-31 | Stop reason: HOSPADM

## 2019-10-29 RX ORDER — GLUCAGON 1 MG
1 KIT INJECTION
Status: DISCONTINUED | OUTPATIENT
Start: 2019-10-29 | End: 2019-10-30

## 2019-10-29 RX ORDER — MEROPENEM AND SODIUM CHLORIDE 1 G/50ML
1 INJECTION, SOLUTION INTRAVENOUS
Status: DISCONTINUED | OUTPATIENT
Start: 2019-10-29 | End: 2019-10-29

## 2019-10-29 RX ORDER — SODIUM CHLORIDE 0.9 % (FLUSH) 0.9 %
10 SYRINGE (ML) INJECTION
Status: DISCONTINUED | OUTPATIENT
Start: 2019-10-29 | End: 2019-10-31 | Stop reason: HOSPADM

## 2019-10-29 RX ORDER — OLANZAPINE 2.5 MG/1
5 TABLET ORAL NIGHTLY
Status: DISCONTINUED | OUTPATIENT
Start: 2019-10-29 | End: 2019-10-31 | Stop reason: HOSPADM

## 2019-10-29 RX ORDER — ENOXAPARIN SODIUM 100 MG/ML
40 INJECTION SUBCUTANEOUS EVERY 24 HOURS
Status: DISCONTINUED | OUTPATIENT
Start: 2019-10-29 | End: 2019-10-31 | Stop reason: HOSPADM

## 2019-10-29 RX ORDER — IBUPROFEN 200 MG
16 TABLET ORAL
Status: DISCONTINUED | OUTPATIENT
Start: 2019-10-29 | End: 2019-10-30

## 2019-10-29 RX ORDER — MORPHINE SULFATE 10 MG/ML
4 INJECTION INTRAMUSCULAR; INTRAVENOUS; SUBCUTANEOUS
Status: COMPLETED | OUTPATIENT
Start: 2019-10-29 | End: 2019-10-29

## 2019-10-29 RX ORDER — INSULIN ASPART 100 [IU]/ML
1-10 INJECTION, SOLUTION INTRAVENOUS; SUBCUTANEOUS
Status: DISCONTINUED | OUTPATIENT
Start: 2019-10-29 | End: 2019-10-30

## 2019-10-29 RX ORDER — POLYETHYLENE GLYCOL 3350 17 G/17G
17 POWDER, FOR SOLUTION ORAL 2 TIMES DAILY
Status: DISCONTINUED | OUTPATIENT
Start: 2019-10-29 | End: 2019-10-31 | Stop reason: HOSPADM

## 2019-10-29 RX ORDER — SODIUM CHLORIDE 9 MG/ML
INJECTION, SOLUTION INTRAVENOUS CONTINUOUS
Status: DISCONTINUED | OUTPATIENT
Start: 2019-10-29 | End: 2019-10-29

## 2019-10-29 RX ORDER — FLUOXETINE 10 MG/1
10 CAPSULE ORAL DAILY
Status: DISCONTINUED | OUTPATIENT
Start: 2019-10-30 | End: 2019-10-31 | Stop reason: HOSPADM

## 2019-10-29 RX ORDER — GABAPENTIN 300 MG/1
600 CAPSULE ORAL 3 TIMES DAILY
Status: DISCONTINUED | OUTPATIENT
Start: 2019-10-29 | End: 2019-10-31 | Stop reason: HOSPADM

## 2019-10-29 RX ORDER — GABAPENTIN 300 MG/1
300 CAPSULE ORAL 3 TIMES DAILY
Status: DISCONTINUED | OUTPATIENT
Start: 2019-10-29 | End: 2019-10-29

## 2019-10-29 RX ORDER — IPRATROPIUM BROMIDE AND ALBUTEROL SULFATE 2.5; .5 MG/3ML; MG/3ML
3 SOLUTION RESPIRATORY (INHALATION) EVERY 4 HOURS
Status: DISCONTINUED | OUTPATIENT
Start: 2019-10-29 | End: 2019-10-31 | Stop reason: HOSPADM

## 2019-10-29 RX ORDER — IBUPROFEN 200 MG
24 TABLET ORAL
Status: DISCONTINUED | OUTPATIENT
Start: 2019-10-29 | End: 2019-10-30

## 2019-10-29 RX ORDER — OXYBUTYNIN CHLORIDE 5 MG/1
5 TABLET ORAL 3 TIMES DAILY
Status: DISCONTINUED | OUTPATIENT
Start: 2019-10-29 | End: 2019-10-30

## 2019-10-29 RX ORDER — OXYCODONE AND ACETAMINOPHEN 10; 325 MG/1; MG/1
1 TABLET ORAL EVERY 6 HOURS PRN
Status: DISCONTINUED | OUTPATIENT
Start: 2019-10-29 | End: 2019-10-29

## 2019-10-29 RX ORDER — TRAZODONE HYDROCHLORIDE 50 MG/1
50 TABLET ORAL NIGHTLY
Status: DISCONTINUED | OUTPATIENT
Start: 2019-10-29 | End: 2019-10-29

## 2019-10-29 RX ORDER — TRAZODONE HYDROCHLORIDE 50 MG/1
100 TABLET ORAL NIGHTLY PRN
Status: DISCONTINUED | OUTPATIENT
Start: 2019-10-29 | End: 2019-10-30

## 2019-10-29 RX ADMIN — ENOXAPARIN SODIUM 40 MG: 100 INJECTION SUBCUTANEOUS at 07:10

## 2019-10-29 RX ADMIN — IPRATROPIUM BROMIDE AND ALBUTEROL SULFATE 3 ML: .5; 3 SOLUTION RESPIRATORY (INHALATION) at 11:10

## 2019-10-29 RX ADMIN — MEROPENEM AND SODIUM CHLORIDE 1 G: 1 INJECTION, SOLUTION INTRAVENOUS at 02:10

## 2019-10-29 RX ADMIN — GABAPENTIN 600 MG: 300 CAPSULE ORAL at 08:10

## 2019-10-29 RX ADMIN — OXYBUTYNIN CHLORIDE 5 MG: 5 TABLET ORAL at 08:10

## 2019-10-29 RX ADMIN — IPRATROPIUM BROMIDE AND ALBUTEROL SULFATE 3 ML: .5; 3 SOLUTION RESPIRATORY (INHALATION) at 07:10

## 2019-10-29 RX ADMIN — OLANZAPINE 5 MG: 2.5 TABLET, FILM COATED ORAL at 08:10

## 2019-10-29 RX ADMIN — MORPHINE SULFATE 4 MG: 10 INJECTION INTRAVENOUS at 10:10

## 2019-10-29 NOTE — ASSESSMENT & PLAN NOTE
she has been also started on IV Abx for UTI,she say her suprapubic cath is changed 3 days ago.will follow up with cultures.

## 2019-10-29 NOTE — ED TRIAGE NOTES
Pt presents to ED via EMS with c/o headache 8/10 that originated as phantom pain of the left stump. Pt rosario dizziness, n/v or blurred vision.

## 2019-10-29 NOTE — SUBJECTIVE & OBJECTIVE
Past Medical History:   Diagnosis Date    Addiction to drug     Alcohol abuse     Anxiety     Arthritis     Asthma     Bipolar disorder     Bladder stones     CHF (congestive heart failure)     COPD (chronic obstructive pulmonary disease)     on home o2    CVA (cerebral vascular accident)     2012    Hallucination     Hepatitis C     treated and cured    History of blood clots     Hx of psychiatric care     Hypertension     Belen     ADEEL treated with BiPAP     Paraplegia     Paraplegic spinal paralysis     Psychiatric problem     Psychosis     AVH; Paranoia    Renal disorder     SCI (spinal cord injury)     incomplete    Sleep difficulties     has sleep apnea and uses a Bi-PAP machine.    Substance abuse     Suprapubic catheter     Therapy     Vaginal delivery     x1       Past Surgical History:   Procedure Laterality Date    ABOVE-KNEE AMPUTATION Left 7/23/2019    Procedure: AMPUTATION, ABOVE KNEE;  Surgeon: Gordo Rodriguez MD;  Location: [facility] OR;  Service: Orthopedics;  Laterality: Left;    BACK SURGERY      bilateral knee replacement      BREAST BIOPSY      cysto/lithopaxy 2017      CYSTOSCOPY W/ LASER LITHOTRIPSY      JOINT REPLACEMENT      bilateral knee       Review of patient's allergies indicates:   Allergen Reactions    Tuberculin ppd Itching and Dermatitis     Patient has old scare that she claims is from TB PPD    Rocephin [ceftriaxone] Rash     Rash 2012? Pt agreeable to try with pre mediation with benadryl.        No current facility-administered medications on file prior to encounter.      Current Outpatient Medications on File Prior to Encounter   Medication Sig    albuterol-ipratropium (DUO-NEB) 2.5 mg-0.5 mg/3 mL nebulizer solution Take 3 mLs by nebulization every 4 (four) hours as needed for Wheezing.    baclofen (LIORESAL) 20 MG tablet 10 mg 4 (four) times daily.     bumetanide (BUMEX) 1 MG tablet TK 1 T PO D    catheter 18 Fr Misc Change every 20 days     FLUoxetine 10 MG capsule TAKE 1 CAPSULE(10 MG) BY MOUTH DAILY    gabapentin (NEURONTIN) 300 MG capsule 600 mg 3 (three) times daily.     hydrOXYzine HCl (ATARAX) 25 MG tablet Take 1 tablet (25 mg total) by mouth 3 (three) times daily.    ipratropium (ATROVENT) 0.02 % nebulizer solution U 1 VIAL VIA NEBULIZER Q 4 H WHILE AWAKE    LINZESS 290 mcg Cap capsule TAKE 1 CAPSULE BY MOUTH DAILY    miscellaneous medical supply (C-TUB) [facility] NEEDS TO SWITCH DME COMPANY FOR OXYGEN AT 2L. LAST OXYGEN SATURATION WAS 83% ON Room Air. She uses  BIPAP and  Needs  New mask, tubings and    OLANZapine (ZYPREXA) 5 MG tablet Take 1 tablet (5 mg total) by mouth every evening.    oxybutynin (DITROPAN-XL) 5 MG TR24 TAKE 1 TABLET BY MOUTH EVERY DAY    oxyCODONE-acetaminophen (PERCOCET)  mg per tablet     potassium chloride (KLOR-CON) 10 MEQ TbSR TAKE 1 TABLET BY MOUTH DAILY    promethazine (PHENERGAN) 25 MG tablet TAKE 1 TABLET BY MOUTH EVERY 8 HOURS AS NEEDED FOR NAUSEA AND VOMITING    traZODone (DESYREL) 50 MG tablet Take 50 mg by mouth every evening.     VENTOLIN HFA 90 mcg/actuation inhaler      Family History     Problem Relation (Age of Onset)    Anxiety disorder Brother    Dementia Mother    Depression Brother        Tobacco Use    Smoking status: Former Smoker     Last attempt to quit: 2002     Years since quittin.8    Smokeless tobacco: Never Used   Substance and Sexual Activity    Alcohol use: Not Currently     Comment: occasional    Drug use: Never     Comment: prescribed opioids at present for pain    Sexual activity: Not Currently     Partners: Male     Comment: since 2011     Review of Systems   Constitutional: Positive for activity change and appetite change.   HENT: Negative for congestion and dental problem.    Eyes: Negative for discharge and itching.   Respiratory: Negative for apnea and chest tightness.    Cardiovascular: Negative for chest pain and leg swelling.   Gastrointestinal: Negative  for abdominal distention and abdominal pain.   Endocrine: Negative for cold intolerance.   Genitourinary: Negative for difficulty urinating and dyspareunia.   Musculoskeletal: Positive for arthralgias. Negative for back pain.   Skin: Negative for color change and pallor.   Allergic/Immunologic: Negative for environmental allergies and food allergies.   Neurological: Negative for dizziness and facial asymmetry.   Hematological: Negative for adenopathy. Does not bruise/bleed easily.   Psychiatric/Behavioral: Negative for agitation and behavioral problems.     Objective:     Vital Signs (Most Recent):  Temp: 98.6 °F (37 °C) (10/29/19 0903)  Pulse: 61 (10/29/19 1011)  Resp: 19 (10/29/19 1011)  BP: (!) 110/59 (10/29/19 0931)  SpO2: 100 % (10/29/19 1011) Vital Signs (24h Range):  Temp:  [98.2 °F (36.8 °C)-98.6 °F (37 °C)] 98.6 °F (37 °C)  Pulse:  [50-71] 61  Resp:  [13-20] 19  SpO2:  [94 %-100 %] 100 %  BP: (110-168)/(59-90) 110/59     Weight: 115.7 kg (255 lb)  Body mass index is 41.16 kg/m².    Physical Exam   Constitutional: She is oriented to person, place, and time. No distress.   HENT:   Head: Atraumatic.   Eyes: EOM are normal.   Neck: Normal range of motion. Neck supple.   Cardiovascular: Regular rhythm.   Pulmonary/Chest: Effort normal and breath sounds normal.   Abdominal: Soft. Bowel sounds are normal.   Musculoskeletal: Normal range of motion. She exhibits no edema or deformity.   Left AKA stumpt is clean,   Neurological: She is oriented to person, place, and time. Coordination normal.   Skin: Skin is warm.   Psychiatric: She has a normal mood and affect. Her behavior is normal.         CRANIAL NERVES     CN III, IV, VI   Extraocular motions are normal.        Significant Labs:   ABGs:   Recent Labs   Lab 10/29/19  0955   PH 7.277*   PCO2 82.6*   HCO3 38.6*   POCSATURATED 95   BE 9     BMP:   Recent Labs   Lab 10/29/19  0642   GLU 92      K 4.2      CO2 35*   BUN 20   CREATININE 1.0   CALCIUM 9.1      CBC:   Recent Labs   Lab 10/29/19  0642   WBC 5.11   HGB 11.6*   HCT 41.1        CMP:   Recent Labs   Lab 10/29/19  0642      K 4.2      CO2 35*   GLU 92   BUN 20   CREATININE 1.0   CALCIUM 9.1   PROT 7.9   ALBUMIN 3.5   BILITOT 0.2   ALKPHOS 93   AST 18   ALT 7*   ANIONGAP 8   EGFRNONAA >60       Significant Imaging: reviewed.

## 2019-10-29 NOTE — ED PROVIDER NOTES
"Encounter Date: 10/29/2019    SCRIBE #1 NOTE: I, Jason Lee, am scribing for, and in the presence of,  Jeff Spivey MD. I have scribed the following portions of the note - Other sections scribed: HPI, ROS, PE.       History     Chief Complaint   Patient presents with    Leg Pain     left leg pain that radiates to her head x 1 hour; pt with hx of AKA to left leg in July     CC: Leg Pain    HPI: The patient is a 63 y.o. F who has Arthritis, Asthma, CHF, COPD, HTN, Renal disorder, and Hx of CVA who presents to the ED via EMS transportation for emergent evaluation of acute pain to residual left limb that began PTA. Pt reports above knee amputation performed 7/23/2019. She notes no redness or infection to the affected area since having the amputation. No OTC treatment attempted PTA. Pt also complains of headache that began as a generalized headache during initial onset that is currently a frontal headache. Pt does not have a Hx of headache. She also reports weakness in the right upper extremity. Additionally, pt states that she has a "delcid catheter in place since having back surgery." Pt also states that current drowsiness is due to taking Benzodiazepine and Trazodone. Pt denies fever, cough, abdominal pain, fall, or trauma.    The history is provided by the patient. No  was used.     Review of patient's allergies indicates:   Allergen Reactions    Tuberculin ppd Itching and Dermatitis     Patient has old scare that she claims is from TB PPD    Rocephin [ceftriaxone] Rash     Rash 2012? Pt agreeable to try with pre mediation with benadryl.      Past Medical History:   Diagnosis Date    Addiction to drug     Alcohol abuse     Anxiety     Arthritis     Asthma     Bipolar disorder     Bladder stones     CHF (congestive heart failure)     COPD (chronic obstructive pulmonary disease)     on home o2    CVA (cerebral vascular accident)     2012    Hallucination     Hepatitis C     " treated and cured    History of blood clots     Hx of psychiatric care     Hypertension     Belen     ADEEL treated with BiPAP     Paraplegia     Paraplegic spinal paralysis     Psychiatric problem     Psychosis     AVH; Paranoia    Renal disorder     SCI (spinal cord injury)     incomplete    Sleep difficulties     has sleep apnea and uses a Bi-PAP machine.    Substance abuse     Suprapubic catheter     Therapy     Vaginal delivery     x1     Past Surgical History:   Procedure Laterality Date    ABOVE-KNEE AMPUTATION Left 2019    Procedure: AMPUTATION, ABOVE KNEE;  Surgeon: Gordo Rodriguez MD;  Location: [facility] OR;  Service: Orthopedics;  Laterality: Left;    BACK SURGERY      bilateral knee replacement      BREAST BIOPSY      cysto/lithopaxy 2017      CYSTOSCOPY W/ LASER LITHOTRIPSY      JOINT REPLACEMENT      bilateral knee     Family History   Problem Relation Age of Onset    Dementia Mother     Anxiety disorder Brother     Depression Brother      Social History     Tobacco Use    Smoking status: Former Smoker     Last attempt to quit:      Years since quittin.8    Smokeless tobacco: Never Used   Substance Use Topics    Alcohol use: Not Currently     Comment: occasional    Drug use: Never     Comment: prescribed opioids at present for pain     Review of Systems   Constitutional: Negative for fever.   HENT: Negative for sore throat.    Respiratory: Negative for cough and shortness of breath.    Cardiovascular: Negative for chest pain.   Gastrointestinal: Negative for abdominal pain and nausea.   Genitourinary: Negative for dysuria.   Musculoskeletal: Positive for myalgias (in the residual left limb). Negative for back pain.   Skin: Negative for rash.   Neurological: Positive for weakness (in the right upper extremity) and headaches.   Hematological: Does not bruise/bleed easily.       Physical Exam     Initial Vitals [10/29/19 0517]   BP Pulse Resp Temp SpO2   (!)  160/90 (!) 54 18 98.2 °F (36.8 °C) 97 %      MAP       --         Physical Exam    Nursing note and vitals reviewed.  Constitutional: She appears well-developed and well-nourished. She appears lethargic. She is not diaphoretic. She appears distressed.   HENT:   Head: Normocephalic and atraumatic.   Eyes: Conjunctivae and EOM are normal. Pupils are equal, round, and reactive to light.   Neck: Normal range of motion.   Cardiovascular: Normal rate, regular rhythm, normal heart sounds and intact distal pulses. Exam reveals no gallop and no friction rub.    No murmur heard.  Pulmonary/Chest: Breath sounds normal. No respiratory distress. She has no wheezes. She has no rhonchi. She has no rales.   Abdominal: Soft. Bowel sounds are normal. There is no tenderness.   Genitourinary:   Genitourinary Comments: Indwelling delcid catheter in place.   Musculoskeletal:   Left AKA   Neurological: She appears lethargic.   Slurred speech secondary to lethargy.  Neurology exam difficult to assess secondary to lethargy.    GCS 13   Skin: Skin is warm and dry. No rash noted. No pallor.   Psychiatric: She has a normal mood and affect.         ED Course   Critical Care  Date/Time: 10/29/2019 10:19 AM  Performed by: Jeff Spivey MD  Authorized by: Jeff Spivey MD   Direct patient critical care time: 15 minutes  Additional history critical care time: 10 minutes  Ordering / reviewing critical care time: 10 minutes  Documentation critical care time: 10 minutes  Consulting other physicians critical care time: 5 minutes  Other critical care time: 10 (admit orders) minutes  Total critical care time (exclusive of procedural time) : 60 minutes  Critical care time was exclusive of separately billable procedures and treating other patients and teaching time.  Critical care was necessary to treat or prevent imminent or life-threatening deterioration of the following conditions: respiratory failure.  Critical care was time spent  personally by me on the following activities: development of treatment plan with patient or surrogate, discussions with consultants, evaluation of patient's response to treatment, examination of patient, obtaining history from patient or surrogate, ordering and performing treatments and interventions, ordering and review of laboratory studies, ordering and review of radiographic studies, re-evaluation of patient's condition and review of old charts.        Labs Reviewed   CBC W/ AUTO DIFFERENTIAL - Abnormal; Notable for the following components:       Result Value    Hemoglobin 11.6 (*)     Mean Corpuscular Hemoglobin 24.6 (*)     Mean Corpuscular Hemoglobin Conc 28.2 (*)     RDW 14.6 (*)     All other components within normal limits   COMPREHENSIVE METABOLIC PANEL - Abnormal; Notable for the following components:    CO2 35 (*)     ALT 7 (*)     All other components within normal limits   URINALYSIS, REFLEX TO URINE CULTURE - Abnormal; Notable for the following components:    Occult Blood UA 2+ (*)     Nitrite, UA Positive (*)     Leukocytes, UA 3+ (*)     All other components within normal limits    Narrative:     Preferred Collection Type->Urine, Clean Catch   URINALYSIS MICROSCOPIC - Abnormal; Notable for the following components:    RBC, UA 5 (*)     WBC, UA 25 (*)     All other components within normal limits    Narrative:     Preferred Collection Type->Urine, Clean Catch   ISTAT PROCEDURE - Abnormal; Notable for the following components:    POC PH 7.277 (*)     POC PCO2 82.6 (*)     POC HCO3 38.6 (*)     POC TCO2 41 (*)     All other components within normal limits   CULTURE, URINE   DRUG SCREEN PANEL, URINE EMERGENCY   ALCOHOL,MEDICAL (ETHANOL)   B-TYPE NATRIURETIC PEPTIDE   TROPONIN I   POCT GLUCOSE   POCT GLUCOSE MONITORING CONTINUOUS     EKG Readings: (Independently Interpreted)   Initial Reading: No STEMI. Rhythm: Sinus Bradycardia. Heart Rate: 50. Ectopy: No Ectopy. Conduction: Normal. ST Segments:  Normal ST Segments. T Waves: Normal.       Imaging Results          X-Ray Chest 1 View (Final result)  Result time 10/29/19 10:37:30    Final result by Irving Law MD (10/29/19 10:37:30)                 Impression:      No acute abnormality.      Electronically signed by: Irving Law MD  Date:    10/29/2019  Time:    10:37             Narrative:    EXAMINATION:  XR CHEST 1 VIEW    CLINICAL HISTORY:  Respiratory failure, unspecified with hypercapnia    TECHNIQUE:  Single frontal view of the chest was performed.    COMPARISON:  September 20, 2019    FINDINGS:  Lungs are clear.  No focal consolidation.  No effusion or pneumothorax.    No acute bone abnormality.                               CT Head Without Contrast (Final result)  Result time 10/29/19 08:31:55    Final result by Keith Adams MD (10/29/19 08:31:55)                 Impression:      1. No acute intracranial processes.  2. Periventricular white matter changes likely from chronic microvascular ischemic disease.      Electronically signed by: Keith Adams MD  Date:    10/29/2019  Time:    08:31             Narrative:    EXAMINATION:  CT HEAD WITHOUT CONTRAST    CLINICAL HISTORY:  Confusion/delirium, altered LOC, unexplained;    TECHNIQUE:  Low dose axial images were obtained through the head.  Coronal and sagittal reformations were also performed. Contrast was not administered.    COMPARISON:  None.    FINDINGS:  Images degraded due to patient motion artifacts.    Grossly there is age-appropriate generalized volume loss.  Within the supratentorial cerebral hemispheres bilaterally no acute hemorrhages or midline shift or herniations.  Scattered hypodensities in the periventricular white matter likely from chronic microvascular ischemic disease.    No definite signs for major vessel territory infarctions or neoplasms.  In the posterior fossa no definite signs for hemorrhages or major vessel territory infarctions.    There is normal cranial vault.   Visualized paranasal air sinuses are clear.  There is normal pneumatization of the mastoids.                               X-Ray Femur Ap/Lat Left (Final result)  Result time 10/29/19 07:39:17    Final result by Hunter Broderick MD (10/29/19 07:39:17)                 Impression:      Chronic changes are noted and postoperative changes of left lower extremity above the knee amputation is noted.  There is mild irregular appearance along the distal aspect of the remaining femoral shaft, the appearance of which is thought likely chronic however correlation for signs or symptoms of infection is needed, clinical historical correlation otherwise needed to determine need for additional follow-up.    There is no evidence for acute fracture or dislocation.      Electronically signed by: Hunter Broderick  Date:    10/29/2019  Time:    07:39             Narrative:    EXAMINATION:  XR FEMUR 2 VIEW LEFT    CLINICAL HISTORY:  Pain in leg, unspecified    TECHNIQUE:  AP and lateral views of the left femur were performed.    COMPARISON:  None}    FINDINGS:  Radiographic examination of the left femur was performed, 3 radiographs are submitted.  There is appearance consistent with postoperative change of left above the knee amputation.  There is mild irregular appearance of the distal aspect of the remaining femur this may relate to chronic and postoperative change however close clinical and historical correlation is needed, correlation for signs or symptoms of infection is needed.  There is no evidence for acute fracture deformity of the visualized osseous structures.  Chronic changes are noted at the left hip joint there is no hip dislocation.  There is appearance thought to represent calcification of the pelvis this may relate to calcified fibroids.                              X-Rays:   Independently Interpreted Readings:   Chest X-Ray: Normal heart size.  No infiltrates.  No acute abnormalities.                    Jeff YUNG  Patric personally performed the services described in this documentation. All medical record entries made by the scribe were at my direction and in my presence. I have reviewed the chart and agree that the record reflects my personal performance and is accurate and complete.        Clinical Impression:       ICD-10-CM ICD-9-CM   1. Hypercapnic respiratory failure J96.92 518.81   2. Leg pain M79.606 729.5   3. Altered mental status R41.82 780.97   4. Urinary tract infection associated with catheterization of urinary tract, unspecified indwelling urinary catheter type, initial encounter T83.511A 996.64    N39.0 599.0                                Jeff Spivey MD  10/29/19 1222

## 2019-10-29 NOTE — H&P
"[facility] Medicine  History & Physical    Patient Name: Mary Ellen Fajardo  MRN: [id number]  Admission Date: 10/29/2019  Attending Physician: Belle Villeda    Primary Care Provider: Any Tran MD         Patient information was obtained from patient and ER records.     Subjective:     Principal Problem:Acute on chronic respiratory failure with hypercapnia    Chief Complaint:   Chief Complaint   Patient presents with    Leg Pain     left leg pain that radiates to her head x 1 hour; pt with hx of AKA to left leg in July        HPI:  63 y.o. F who has Arthritis,diastolic CHF, COPD, HTN, Renal disorder,bipolar disorder,S/P left AKA ,ADEEL on CPAP at home,,chronic respiratory failure on home oxygen, and Hx of CVA who presents to the ED via EMS transportation for emergent evaluation of acute pain to residual left limb that began PTA. Pt reports above knee amputation performed 7/23/2019. She notes no redness or infection to the affected area since having the amputation. No OTC treatment attempted PTA. Pt also complains of headache that began as a generalized headache during initial onset that is currently a frontal headache. He as negative head CT,Pt does not have a Hx of headache. She also reports weakness in the right upper extremity. Additionally, pt states that she has a "delcid catheter in place since having back surgery." Pt also states that current drowsiness is due to taking Benzodiazepine and Trazodone.but U Tox is negative, Pt denies fever, cough, abdominal pain, fall, or trauma.ABG show CO2 retention 82.6,,patient say she is complaint with her CPAP!!!,she has been started on Bipap,she is alert time 3 at this time,she has been also started on IV Abx for UTI,she say her suprapubic cath is changed 3 days ago.    Past Medical History:   Diagnosis Date    Addiction to drug     Alcohol abuse     Anxiety     Arthritis     Asthma     Bipolar disorder     Bladder stones "     CHF (congestive heart failure)     COPD (chronic obstructive pulmonary disease)     on home o2    CVA (cerebral vascular accident)     2012    Hallucination     Hepatitis C     treated and cured    History of blood clots     Hx of psychiatric care     Hypertension     Belen     ADEEL treated with BiPAP     Paraplegia     Paraplegic spinal paralysis     Psychiatric problem     Psychosis     AVH; Paranoia    Renal disorder     SCI (spinal cord injury)     incomplete    Sleep difficulties     has sleep apnea and uses a Bi-PAP machine.    Substance abuse     Suprapubic catheter     Therapy     Vaginal delivery     x1       Past Surgical History:   Procedure Laterality Date    ABOVE-KNEE AMPUTATION Left 7/23/2019    Procedure: AMPUTATION, ABOVE KNEE;  Surgeon: Gordo Rodriguez MD;  Location: [facility] OR;  Service: Orthopedics;  Laterality: Left;    BACK SURGERY      bilateral knee replacement      BREAST BIOPSY      cysto/lithopaxy 2017      CYSTOSCOPY W/ LASER LITHOTRIPSY      JOINT REPLACEMENT      bilateral knee       Review of patient's allergies indicates:   Allergen Reactions    Tuberculin ppd Itching and Dermatitis     Patient has old scare that she claims is from TB PPD    Rocephin [ceftriaxone] Rash     Rash 2012? Pt agreeable to try with pre mediation with benadryl.        No current facility-administered medications on file prior to encounter.      Current Outpatient Medications on File Prior to Encounter   Medication Sig    albuterol-ipratropium (DUO-NEB) 2.5 mg-0.5 mg/3 mL nebulizer solution Take 3 mLs by nebulization every 4 (four) hours as needed for Wheezing.    baclofen (LIORESAL) 20 MG tablet 10 mg 4 (four) times daily.     bumetanide (BUMEX) 1 MG tablet TK 1 T PO D    catheter 18 Fr Misc Change every 20 days    FLUoxetine 10 MG capsule TAKE 1 CAPSULE(10 MG) BY MOUTH DAILY    gabapentin (NEURONTIN) 300 MG capsule 600 mg 3 (three) times daily.     hydrOXYzine HCl  (ATARAX) 25 MG tablet Take 1 tablet (25 mg total) by mouth 3 (three) times daily.    ipratropium (ATROVENT) 0.02 % nebulizer solution U 1 VIAL VIA NEBULIZER Q 4 H WHILE AWAKE    LINZESS 290 mcg Cap capsule TAKE 1 CAPSULE BY MOUTH DAILY    miscellaneous medical supply (C-TUB) [facility] NEEDS TO SWITCH DME COMPANY FOR OXYGEN AT 2L. LAST OXYGEN SATURATION WAS 83% ON Room Air. She uses  BIPAP and  Needs  New mask, tubings and    OLANZapine (ZYPREXA) 5 MG tablet Take 1 tablet (5 mg total) by mouth every evening.    oxybutynin (DITROPAN-XL) 5 MG TR24 TAKE 1 TABLET BY MOUTH EVERY DAY    oxyCODONE-acetaminophen (PERCOCET)  mg per tablet     potassium chloride (KLOR-CON) 10 MEQ TbSR TAKE 1 TABLET BY MOUTH DAILY    promethazine (PHENERGAN) 25 MG tablet TAKE 1 TABLET BY MOUTH EVERY 8 HOURS AS NEEDED FOR NAUSEA AND VOMITING    traZODone (DESYREL) 50 MG tablet Take 50 mg by mouth every evening.     VENTOLIN HFA 90 mcg/actuation inhaler      Family History     Problem Relation (Age of Onset)    Anxiety disorder Brother    Dementia Mother    Depression Brother        Tobacco Use    Smoking status: Former Smoker     Last attempt to quit: 2002     Years since quittin.8    Smokeless tobacco: Never Used   Substance and Sexual Activity    Alcohol use: Not Currently     Comment: occasional    Drug use: Never     Comment: prescribed opioids at present for pain    Sexual activity: Not Currently     Partners: Male     Comment: since      Review of Systems   Constitutional: Positive for activity change and appetite change.   HENT: Negative for congestion and dental problem.    Eyes: Negative for discharge and itching.   Respiratory: Negative for apnea and chest tightness.    Cardiovascular: Negative for chest pain and leg swelling.   Gastrointestinal: Negative for abdominal distention and abdominal pain.   Endocrine: Negative for cold intolerance.   Genitourinary: Negative for difficulty urinating and dyspareunia.    Musculoskeletal: Positive for arthralgias. Negative for back pain.   Skin: Negative for color change and pallor.   Allergic/Immunologic: Negative for environmental allergies and food allergies.   Neurological: Negative for dizziness and facial asymmetry.   Hematological: Negative for adenopathy. Does not bruise/bleed easily.   Psychiatric/Behavioral: Negative for agitation and behavioral problems.     Objective:     Vital Signs (Most Recent):  Temp: 98.6 °F (37 °C) (10/29/19 0903)  Pulse: 61 (10/29/19 1011)  Resp: 19 (10/29/19 1011)  BP: (!) 110/59 (10/29/19 0931)  SpO2: 100 % (10/29/19 1011) Vital Signs (24h Range):  Temp:  [98.2 °F (36.8 °C)-98.6 °F (37 °C)] 98.6 °F (37 °C)  Pulse:  [50-71] 61  Resp:  [13-20] 19  SpO2:  [94 %-100 %] 100 %  BP: (110-168)/(59-90) 110/59     Weight: 115.7 kg (255 lb)  Body mass index is 41.16 kg/m².    Physical Exam   Constitutional: She is oriented to person, place, and time. No distress.   HENT:   Head: Atraumatic.   Eyes: EOM are normal.   Neck: Normal range of motion. Neck supple.   Cardiovascular: Regular rhythm.   Pulmonary/Chest: Effort normal and breath sounds normal.   Abdominal: Soft. Bowel sounds are normal.   Musculoskeletal: Normal range of motion. She exhibits no edema or deformity.   Left AKA stumpt is clean,   Neurological: She is oriented to person, place, and time. Coordination normal.   Skin: Skin is warm.   Psychiatric: She has a normal mood and affect. Her behavior is normal.         CRANIAL NERVES     CN III, IV, VI   Extraocular motions are normal.        Significant Labs:   ABGs:   Recent Labs   Lab 10/29/19  0955   PH 7.277*   PCO2 82.6*   HCO3 38.6*   POCSATURATED 95   BE 9     BMP:   Recent Labs   Lab 10/29/19  0642   GLU 92      K 4.2      CO2 35*   BUN 20   CREATININE 1.0   CALCIUM 9.1     CBC:   Recent Labs   Lab 10/29/19  0642   WBC 5.11   HGB 11.6*   HCT 41.1        CMP:   Recent Labs   Lab 10/29/19  0642      K 4.2       CO2 35*   GLU 92   BUN 20   CREATININE 1.0   CALCIUM 9.1   PROT 7.9   ALBUMIN 3.5   BILITOT 0.2   ALKPHOS 93   AST 18   ALT 7*   ANIONGAP 8   EGFRNONAA >60       Significant Imaging: reviewed.    Assessment/Plan:     * Acute on chronic respiratory failure with hypercapnia  .ABG show CO2 retention 82.6,,patient say she is complaint with her CPAP!!!,she has been started on Bipap,will repeat ABG in 2-3 hours,she is alert currently.      Urinary tract infection associated with indwelling urethral catheter  she has been also started on IV Abx for UTI,she say her suprapubic cath is changed 3 days ago.will follow up with cultures.      Chronic diastolic heart failure  Stable,no sign of fluid overload,on lasix.      Bipolar I disorder, current or most recent episode depressed, with psychotic features  On Nightly ,zyprexa,      Chronic indwelling suprapubic catheter in place  She say has been changed 3 days ago,delcid is also replaced in ER.      Chronic hepatitis C  Will monitor.      Class 3 severe obesity in adult  Weight lose as out patient.      Chronic respiratory failure with hypercapnia  On Home oxygen.      COPD (chronic obstructive pulmonary disease)  Will continue  with nebulizer.      ADEEL on CPAP  Will continue with bipap  at this time.      Essential hypertension  Stable on Home medications,resumed.        VTE Risk Mitigation (From admission, onward)    None             Belle Villeda MD  Department of Hospital Medicine   [facility]

## 2019-10-29 NOTE — ASSESSMENT & PLAN NOTE
.ABG show CO2 retention 82.6,,patient say she is complaint with her CPAP!!!,she has been started on Bipap,will repeat ABG in 2-3 hours,she is alert currently.

## 2019-10-29 NOTE — HPI
" 63 y.o. F who has Arthritis,diastolic CHF, COPD, HTN, Renal disorder,bipolar disorder,S/P left AKA ,ADEEL on CPAP at home,,chronic respiratory failure on home oxygen, and Hx of CVA who presents to the ED via EMS transportation for emergent evaluation of acute pain to residual left limb that began PTA. Pt reports above knee amputation performed 7/23/2019. She notes no redness or infection to the affected area since having the amputation. No OTC treatment attempted PTA. Pt also complains of headache that began as a generalized headache during initial onset that is currently a frontal headache.     He as negative head CT,Pt does not have a Hx of headache. She also reports weakness in the right upper extremity. Additionally, pt states that she has a "delcid catheter in place since having back surgery." Pt also states that current drowsiness is due to taking Benzodiazepine and Trazodone.but U Tox is negative, Pt denies fever, cough, abdominal pain, fall, or trauma.ABG show CO2 retention 82.6,,patient say she is complaint with her CPAP!!!,she has been started on Bipap,she is alert time 3 at this time,she has been also started on IV Abx for UTI,she say her suprapubic cath is changed 3 days ago.  "

## 2019-10-30 ENCOUNTER — EXTERNAL HOME HEALTH (OUTPATIENT)
Dept: HOME HEALTH SERVICES | Facility: HOSPITAL | Age: 64
End: 2019-10-30
Payer: MEDICARE

## 2019-10-30 PROBLEM — G47.33 OSA TREATED WITH BIPAP: Status: ACTIVE | Noted: 2018-01-19

## 2019-10-30 PROBLEM — J96.92 HYPERCAPNIC RESPIRATORY FAILURE: Status: ACTIVE | Noted: 2019-10-30

## 2019-10-30 LAB
ALBUMIN SERPL BCP-MCNC: 3.1 G/DL (ref 3.5–5.2)
ALLENS TEST: ABNORMAL
ALP SERPL-CCNC: 73 U/L (ref 55–135)
ALT SERPL W/O P-5'-P-CCNC: 7 U/L (ref 10–44)
ANION GAP SERPL CALC-SCNC: 4 MMOL/L (ref 8–16)
ANION GAP SERPL CALC-SCNC: 4 MMOL/L (ref 8–16)
AST SERPL-CCNC: 12 U/L (ref 10–40)
BASOPHILS # BLD AUTO: 0.02 K/UL (ref 0–0.2)
BASOPHILS # BLD AUTO: 0.02 K/UL (ref 0–0.2)
BASOPHILS NFR BLD: 0.5 % (ref 0–1.9)
BASOPHILS NFR BLD: 0.5 % (ref 0–1.9)
BILIRUB SERPL-MCNC: 0.4 MG/DL (ref 0.1–1)
BUN SERPL-MCNC: 12 MG/DL (ref 8–23)
BUN SERPL-MCNC: 12 MG/DL (ref 8–23)
CALCIUM SERPL-MCNC: 9.3 MG/DL (ref 8.7–10.5)
CALCIUM SERPL-MCNC: 9.3 MG/DL (ref 8.7–10.5)
CHLORIDE SERPL-SCNC: 101 MMOL/L (ref 95–110)
CHLORIDE SERPL-SCNC: 101 MMOL/L (ref 95–110)
CO2 SERPL-SCNC: 38 MMOL/L (ref 23–29)
CO2 SERPL-SCNC: 38 MMOL/L (ref 23–29)
CREAT SERPL-MCNC: 0.7 MG/DL (ref 0.5–1.4)
CREAT SERPL-MCNC: 0.7 MG/DL (ref 0.5–1.4)
DELSYS: ABNORMAL
DIFFERENTIAL METHOD: ABNORMAL
DIFFERENTIAL METHOD: ABNORMAL
EOSINOPHIL # BLD AUTO: 0.1 K/UL (ref 0–0.5)
EOSINOPHIL # BLD AUTO: 0.1 K/UL (ref 0–0.5)
EOSINOPHIL NFR BLD: 2.4 % (ref 0–8)
EOSINOPHIL NFR BLD: 2.4 % (ref 0–8)
EP: 8
ERYTHROCYTE [DISTWIDTH] IN BLOOD BY AUTOMATED COUNT: 14.3 % (ref 11.5–14.5)
ERYTHROCYTE [DISTWIDTH] IN BLOOD BY AUTOMATED COUNT: 14.3 % (ref 11.5–14.5)
EST. GFR  (AFRICAN AMERICAN): >60 ML/MIN/1.73 M^2
EST. GFR  (AFRICAN AMERICAN): >60 ML/MIN/1.73 M^2
EST. GFR  (NON AFRICAN AMERICAN): >60 ML/MIN/1.73 M^2
EST. GFR  (NON AFRICAN AMERICAN): >60 ML/MIN/1.73 M^2
FIO2: 35
GLUCOSE SERPL-MCNC: 88 MG/DL (ref 70–110)
GLUCOSE SERPL-MCNC: 88 MG/DL (ref 70–110)
HCO3 UR-SCNC: 38.4 MMOL/L (ref 24–28)
HCT VFR BLD AUTO: 37.1 % (ref 37–48.5)
HCT VFR BLD AUTO: 37.1 % (ref 37–48.5)
HGB BLD-MCNC: 10.4 G/DL (ref 12–16)
HGB BLD-MCNC: 10.4 G/DL (ref 12–16)
IMM GRANULOCYTES # BLD AUTO: 0 K/UL (ref 0–0.04)
IMM GRANULOCYTES # BLD AUTO: 0 K/UL (ref 0–0.04)
IMM GRANULOCYTES NFR BLD AUTO: 0 % (ref 0–0.5)
IMM GRANULOCYTES NFR BLD AUTO: 0 % (ref 0–0.5)
IP: 22
LYMPHOCYTES # BLD AUTO: 1.5 K/UL (ref 1–4.8)
LYMPHOCYTES # BLD AUTO: 1.5 K/UL (ref 1–4.8)
LYMPHOCYTES NFR BLD: 37 % (ref 18–48)
LYMPHOCYTES NFR BLD: 37 % (ref 18–48)
MCH RBC QN AUTO: 24.6 PG (ref 27–31)
MCH RBC QN AUTO: 24.6 PG (ref 27–31)
MCHC RBC AUTO-ENTMCNC: 28 G/DL (ref 32–36)
MCHC RBC AUTO-ENTMCNC: 28 G/DL (ref 32–36)
MCV RBC AUTO: 88 FL (ref 82–98)
MCV RBC AUTO: 88 FL (ref 82–98)
MIN VOL: 5.7
MODE: ABNORMAL
MONOCYTES # BLD AUTO: 0.5 K/UL (ref 0.3–1)
MONOCYTES # BLD AUTO: 0.5 K/UL (ref 0.3–1)
MONOCYTES NFR BLD: 11.4 % (ref 4–15)
MONOCYTES NFR BLD: 11.4 % (ref 4–15)
NEUTROPHILS # BLD AUTO: 2 K/UL (ref 1.8–7.7)
NEUTROPHILS # BLD AUTO: 2 K/UL (ref 1.8–7.7)
NEUTROPHILS NFR BLD: 48.7 % (ref 38–73)
NEUTROPHILS NFR BLD: 48.7 % (ref 38–73)
NRBC BLD-RTO: 0 /100 WBC
NRBC BLD-RTO: 0 /100 WBC
PCO2 BLDA: 70.7 MMHG (ref 35–45)
PH SMN: 7.34 [PH] (ref 7.35–7.45)
PLATELET # BLD AUTO: 173 K/UL (ref 150–350)
PLATELET # BLD AUTO: 173 K/UL (ref 150–350)
PMV BLD AUTO: 10 FL (ref 9.2–12.9)
PMV BLD AUTO: 10 FL (ref 9.2–12.9)
PO2 BLDA: 91 MMHG (ref 80–100)
POC BE: 10 MMOL/L
POC SATURATED O2: 96 % (ref 95–100)
POC TCO2: 40 MMOL/L (ref 23–27)
POCT GLUCOSE: 88 MG/DL (ref 70–110)
POTASSIUM SERPL-SCNC: 3.9 MMOL/L (ref 3.5–5.1)
POTASSIUM SERPL-SCNC: 3.9 MMOL/L (ref 3.5–5.1)
PROT SERPL-MCNC: 6.5 G/DL (ref 6–8.4)
RBC # BLD AUTO: 4.22 M/UL (ref 4–5.4)
RBC # BLD AUTO: 4.22 M/UL (ref 4–5.4)
SAMPLE: ABNORMAL
SITE: ABNORMAL
SODIUM SERPL-SCNC: 143 MMOL/L (ref 136–145)
SODIUM SERPL-SCNC: 143 MMOL/L (ref 136–145)
SP02: 97
WBC # BLD AUTO: 4.13 K/UL (ref 3.9–12.7)
WBC # BLD AUTO: 4.13 K/UL (ref 3.9–12.7)

## 2019-10-30 PROCEDURE — 94761 N-INVAS EAR/PLS OXIMETRY MLT: CPT

## 2019-10-30 PROCEDURE — 82803 BLOOD GASES ANY COMBINATION: CPT

## 2019-10-30 PROCEDURE — 25000003 PHARM REV CODE 250: Performed by: HOSPITALIST

## 2019-10-30 PROCEDURE — 27000221 HC OXYGEN, UP TO 24 HOURS

## 2019-10-30 PROCEDURE — 94660 CPAP INITIATION&MGMT: CPT

## 2019-10-30 PROCEDURE — 36415 COLL VENOUS BLD VENIPUNCTURE: CPT

## 2019-10-30 PROCEDURE — 63600175 PHARM REV CODE 636 W HCPCS: Performed by: EMERGENCY MEDICINE

## 2019-10-30 PROCEDURE — 99223 1ST HOSP IP/OBS HIGH 75: CPT | Mod: ,,, | Performed by: PHYSICIAN ASSISTANT

## 2019-10-30 PROCEDURE — 25000242 PHARM REV CODE 250 ALT 637 W/ HCPCS: Performed by: EMERGENCY MEDICINE

## 2019-10-30 PROCEDURE — 11000001 HC ACUTE MED/SURG PRIVATE ROOM

## 2019-10-30 PROCEDURE — 80053 COMPREHEN METABOLIC PANEL: CPT

## 2019-10-30 PROCEDURE — 25000242 PHARM REV CODE 250 ALT 637 W/ HCPCS: Performed by: HOSPITALIST

## 2019-10-30 PROCEDURE — 99223 PR INITIAL HOSPITAL CARE,LEVL III: ICD-10-PCS | Mod: ,,, | Performed by: PHYSICIAN ASSISTANT

## 2019-10-30 PROCEDURE — 94640 AIRWAY INHALATION TREATMENT: CPT

## 2019-10-30 PROCEDURE — 85025 COMPLETE CBC W/AUTO DIFF WBC: CPT

## 2019-10-30 PROCEDURE — 36600 WITHDRAWAL OF ARTERIAL BLOOD: CPT

## 2019-10-30 PROCEDURE — 99900035 HC TECH TIME PER 15 MIN (STAT)

## 2019-10-30 RX ORDER — HYDROXYZINE HYDROCHLORIDE 25 MG/1
25 TABLET, FILM COATED ORAL 3 TIMES DAILY PRN
Status: DISCONTINUED | OUTPATIENT
Start: 2019-10-30 | End: 2019-10-31 | Stop reason: HOSPADM

## 2019-10-30 RX ORDER — IPRATROPIUM BROMIDE AND ALBUTEROL SULFATE 2.5; .5 MG/3ML; MG/3ML
3 SOLUTION RESPIRATORY (INHALATION) EVERY 4 HOURS PRN
Qty: 1 BOX | Refills: 3 | Status: SHIPPED | OUTPATIENT
Start: 2019-10-30 | End: 2021-01-08

## 2019-10-30 RX ORDER — OXYBUTYNIN CHLORIDE 5 MG/1
5 TABLET ORAL DAILY
Status: DISCONTINUED | OUTPATIENT
Start: 2019-10-30 | End: 2019-10-31 | Stop reason: HOSPADM

## 2019-10-30 RX ORDER — TRAZODONE HYDROCHLORIDE 50 MG/1
50 TABLET ORAL NIGHTLY PRN
Status: DISCONTINUED | OUTPATIENT
Start: 2019-10-30 | End: 2019-10-31 | Stop reason: HOSPADM

## 2019-10-30 RX ORDER — BACLOFEN 10 MG/1
20 TABLET ORAL 2 TIMES DAILY
Status: DISCONTINUED | OUTPATIENT
Start: 2019-10-30 | End: 2019-10-31 | Stop reason: HOSPADM

## 2019-10-30 RX ORDER — POTASSIUM CHLORIDE 750 MG/1
10 TABLET, EXTENDED RELEASE ORAL DAILY
Qty: 30 TABLET | Refills: 3 | Status: ON HOLD | OUTPATIENT
Start: 2019-10-30 | End: 2020-03-04 | Stop reason: HOSPADM

## 2019-10-30 RX ADMIN — BUMETANIDE 2 MG: 1 TABLET ORAL at 09:10

## 2019-10-30 RX ADMIN — OLANZAPINE 5 MG: 2.5 TABLET, FILM COATED ORAL at 09:10

## 2019-10-30 RX ADMIN — BACLOFEN 20 MG: 10 TABLET ORAL at 09:10

## 2019-10-30 RX ADMIN — GABAPENTIN 600 MG: 300 CAPSULE ORAL at 09:10

## 2019-10-30 RX ADMIN — ENOXAPARIN SODIUM 40 MG: 100 INJECTION SUBCUTANEOUS at 04:10

## 2019-10-30 RX ADMIN — IPRATROPIUM BROMIDE AND ALBUTEROL SULFATE 3 ML: .5; 3 SOLUTION RESPIRATORY (INHALATION) at 08:10

## 2019-10-30 RX ADMIN — IPRATROPIUM BROMIDE AND ALBUTEROL SULFATE 3 ML: .5; 3 SOLUTION RESPIRATORY (INHALATION) at 07:10

## 2019-10-30 RX ADMIN — GABAPENTIN 600 MG: 300 CAPSULE ORAL at 04:10

## 2019-10-30 RX ADMIN — IPRATROPIUM BROMIDE AND ALBUTEROL SULFATE 3 ML: .5; 3 SOLUTION RESPIRATORY (INHALATION) at 12:10

## 2019-10-30 RX ADMIN — FLUOXETINE 10 MG: 10 CAPSULE ORAL at 09:10

## 2019-10-30 RX ADMIN — IPRATROPIUM BROMIDE AND ALBUTEROL SULFATE 3 ML: .5; 3 SOLUTION RESPIRATORY (INHALATION) at 11:10

## 2019-10-30 RX ADMIN — IPRATROPIUM BROMIDE AND ALBUTEROL SULFATE 3 ML: .5; 3 SOLUTION RESPIRATORY (INHALATION) at 03:10

## 2019-10-30 RX ADMIN — IPRATROPIUM BROMIDE AND ALBUTEROL SULFATE 3 ML: .5; 3 SOLUTION RESPIRATORY (INHALATION) at 04:10

## 2019-10-30 RX ADMIN — POLYETHYLENE GLYCOL 3350 17 G: 17 POWDER, FOR SOLUTION ORAL at 09:10

## 2019-10-30 NOTE — HPI
Mary Ellen Fajardo is a 63 year old female with recent AKA (7/2019) due to septic arthritis, morbid obesity, ADEEL (noncompliant with CPAP), asthma, and chronic hypercapnic and hypoxemic respiratory failure requiring home O2 who presented to [facility] on 10/29/2019 with complaints of phantom LLE pains that radiated to up to her head. Endorses HA. Denies dizziness, N/V, vision changes. No respiratory complaints at that time.     In the ED, CT head obtained without acute intracranial abnormalities. Received 4 mg of morphine with improvement in HA. ABG checked at the time with pH 2.77  pCO2 82.6  pO2 89. Patient started on continuous bipap with minimal improvement on blood gas and was ultimately admitted for acute on chronic hypercapnic respiratory failure.     Pulmonary consulted for assistance with hypercapnia.

## 2019-10-30 NOTE — SUBJECTIVE & OBJECTIVE
Interval History:  No complaints.  Pain at her left AKA site has improved.  No more headache.    Review of Systems   Constitutional: Negative for chills and fever.   Respiratory: Negative for cough, shortness of breath and wheezing.    Cardiovascular: Negative for chest pain, palpitations and leg swelling.   Gastrointestinal: Negative for abdominal pain, constipation, diarrhea, nausea and vomiting.   Neurological: Negative for headaches.   Psychiatric/Behavioral: Negative for confusion.     Objective:     Vital Signs (Most Recent):  Temp: 98.3 °F (36.8 °C) (10/30/19 0800)  Pulse: (!) 48 (10/30/19 0800)  Resp: 14 (10/30/19 0800)  BP: 133/62 (10/30/19 0800)  SpO2: 100 % (10/30/19 0800) Vital Signs (24h Range):  Temp:  [97.8 °F (36.6 °C)-98.6 °F (37 °C)] 98.3 °F (36.8 °C)  Pulse:  [] 48  Resp:  [12-36] 14  SpO2:  [94 %-100 %] 100 %  BP: ()/(45-71) 133/62     Weight: 105.2 kg (231 lb 14.8 oz)  Body mass index is 37.43 kg/m².    Intake/Output Summary (Last 24 hours) at 10/30/2019 1128  Last data filed at 10/30/2019 0600  Gross per 24 hour   Intake 50 ml   Output 1270 ml   Net -1220 ml      Physical Exam   Constitutional: She appears well-developed and well-nourished. No distress.   Obese   HENT:   Head: Normocephalic and atraumatic.   Mouth/Throat: Oropharynx is clear and moist.   Cardiovascular: Normal rate, regular rhythm, normal heart sounds and intact distal pulses. Exam reveals no gallop and no friction rub.   No murmur heard.  Pulmonary/Chest: Effort normal and breath sounds normal. No stridor. No respiratory distress. She has no wheezes. She has no rales.   O2 by nasal cannula   Abdominal: Soft. Bowel sounds are normal. She exhibits distension (Not tense). She exhibits no mass. There is no tenderness. There is no guarding.   Musculoskeletal: She exhibits no edema.   Left AKA   Neurological: She is alert.   Skin: Skin is warm and dry. She is not diaphoretic.   Nursing note and vitals  reviewed.      Significant Labs: All pertinent labs within the past 24 hours have been reviewed.    Significant Imaging: I have reviewed and interpreted all pertinent imaging results/findings within the past 24 hours.

## 2019-10-30 NOTE — ASSESSMENT & PLAN NOTE
She has had a chronic indwelling Wolfe from many years since a back surgery  She denies any suprapubic or flank pain or change in her urine  UA likely represents colonization rather than infection.  No fever, tachycardia, or leukocytosis consistent with infection.  Discontinue antibiotics  Wolfe changed in the ED  Continue home oxybutynin daily

## 2019-10-30 NOTE — PLAN OF CARE
Pt resting well in bed awake and alert. Pt aware of pending transfer to Med Surgical floor. Report given to Ernie MESSINA. Pt denies discomforts. Pt free of injury. Active range of motion encouraged to improve mobility. O2 on at 2L via nasal cannula. Pt O2 sat 95%.

## 2019-10-30 NOTE — HOSPITAL COURSE
In the ICU, bipap settings adjusted from 20/10 to 22/8. Repeat ABG this morning with improvement in pH. Patient transitioned to NC with oxygen saturations >95%.

## 2019-10-30 NOTE — ASSESSMENT & PLAN NOTE
No evidence of acute exacerbation  No prior PFTs available for review  Continue home nebulizers p.r.n.

## 2019-10-30 NOTE — ASSESSMENT & PLAN NOTE
Secondary to noncompliance with home CPAP  Initially had low ABG 7.27/82/89 that worsened to 7.24/90/108. Placed on continuous BiPAP  Improved to 7.34/70/91  Encouraged compliance

## 2019-10-30 NOTE — PLAN OF CARE
10/30/19 1435   Discharge Assessment   Assessment Type Discharge Planning Assessment   Confirmed/corrected address and phone number on facesheet? Yes   Assessment information obtained from? Patient   Prior to hospitilization cognitive status: Alert/Oriented   Prior to hospitalization functional status: Needs Assistance   Current cognitive status: Alert/Oriented   Current Functional Status: Needs Assistance   Facility Arrived From: home   Lives With spouse   Is patient able to care for self after discharge? Unable to determine at this time (comments)   Who are your caregiver(s) and their phone number(s)? Sundeep [phone]   Patient's perception of discharge disposition home or selfcare   Readmission Within the Last 30 Days no previous admission in last 30 days   Patient currently being followed by outpatient case management? No   Patient currently receives any other outside agency services? No   Equipment Currently Used at Home wheelchair;oxygen;hospital bed;BIPAP;nebulizer   Do you have any problems affording any of your prescribed medications? No   Is the patient taking medications as prescribed? yes   Does the patient have transportation home? Yes   Transportation Anticipated public transportation;health plan transportation   Dialysis Name and Scheduled days N/A   Does the patient receive services at the [facility]? No   Discharge Plan A Home with family   Discharge Plan B Home with family   DME Needed Upon Discharge  none   Patient/Family in Agreement with Plan yes         [id number] - [place] - [address] [id number]  Phone: [phone] Fax: [phone]

## 2019-10-30 NOTE — CONSULTS
[facility]  Pulmonology  Consult Note    Patient Name: Mary Ellen Fajardo  MRN: [id number]  Admission Date: 10/29/2019  Hospital Length of Stay: 1 days  Code Status: Full Code  Attending Physician: Addie Vital MD  Primary Care Provider: Any Tran MD   Principal Problem: Acute on chronic respiratory failure with hypercapnia    Inpatient consult to Pulmonology  Consult performed by: Marisol Berry PA-C  Consult ordered by: Jeff Spivey MD  Reason for consult: hypercapnia        Subjective:     HPI:  Mary Ellen Fajardo is a 63 year old female with recent AKA (7/2019) due to septic arthritis, morbid obesity, ADEEL (noncompliant with CPAP), asthma, and chronic hypercapnic and hypoxemic respiratory failure requiring home O2 who presented to [facility] on 10/29/2019 with complaints of phantom LLE pains that radiated to up to her head. Endorses HA. Denies dizziness, N/V, vision changes. No respiratory complaints at that time.     In the ED, CT head obtained without acute intracranial abnormalities. Received 4 mg of morphine with improvement in HA. ABG checked at the time with pH 2.77  pCO2 82.6  pO2 89. Patient started on continuous bipap with minimal improvement on blood gas and was ultimately admitted for acute on chronic hypercapnic respiratory failure.     Pulmonary consulted for assistance with hypercapnia.      Past Medical History:   Diagnosis Date    Abdominal hernia     Addiction to drug     Alcohol abuse     Anxiety     Arthritis     Asthma     Bipolar disorder     Bladder stones     CHF (congestive heart failure)     COPD (chronic obstructive pulmonary disease)     on home o2    CVA (cerebral vascular accident)     2012    Hallucination     Hepatitis C     treated and cured    History of blood clots     Hx of psychiatric care     Hypertension     Belen     Obese body habitus     On home oxygen therapy     ADEEL treated with BiPAP     Paraplegia      Paraplegic spinal paralysis     Psychiatric problem     Psychosis     AVH; Paranoia    Renal disorder     Requires assistance with activities of daily living (ADL)     SCI (spinal cord injury)     incomplete    Sleep difficulties     has sleep apnea and uses a Bi-PAP machine.    Status post amputation of leg 2019    Substance abuse     Suprapubic catheter 03/15/2011    Therapy     Vaginal delivery     x1    Wheelchair dependence        Past Surgical History:   Procedure Laterality Date    ABOVE-KNEE AMPUTATION Left 2019    Procedure: AMPUTATION, ABOVE KNEE;  Surgeon: Gordo Rodriguez MD;  Location: [facility];  Service: Orthopedics;  Laterality: Left;    amputation Left 2019    above the knee    BACK SURGERY      bilateral knee replacement      BREAST BIOPSY      cysto/lithopaxy 2017      CYSTOSCOPY W/ LASER LITHOTRIPSY      JOINT REPLACEMENT      bilateral knee    SUPRAPUBIC CYSTOSTOMY  03/15/2011    patient reports  replaced tubing on 10/27/19, at home       Review of patient's allergies indicates:   Allergen Reactions    Tuberculin ppd Itching and Dermatitis     Patient has old scare that she claims is from TB PPD    Rocephin [ceftriaxone] Rash     Rash ? Pt agreeable to try with pre mediation with benadryl.        Family History     Problem Relation (Age of Onset)    Anxiety disorder Brother    Dementia Mother    Depression Brother        Tobacco Use    Smoking status: Former Smoker     Last attempt to quit: 2002     Years since quittin.8    Smokeless tobacco: Never Used   Substance and Sexual Activity    Alcohol use: Not Currently     Comment: occasional    Drug use: Never     Comment: prescribed opioids at present for pain    Sexual activity: Not Currently     Partners: Male     Comment: since          Review of Systems   Constitutional: Negative for appetite change and unexpected weight change.   HENT: Positive for congestion. Negative for  sneezing and voice change.    Eyes: Negative for discharge and itching.   Respiratory: Negative for choking, chest tightness and shortness of breath.    Cardiovascular: Negative for chest pain, palpitations and leg swelling.   Gastrointestinal: Negative for abdominal pain and nausea.   Endocrine: Negative for cold intolerance and heat intolerance.   Genitourinary: Negative for dysuria, frequency and urgency.   Musculoskeletal: Negative for arthralgias and neck stiffness.   Skin: Negative for pallor and rash.   Neurological: Negative for tremors, light-headedness and headaches.   Hematological: Does not bruise/bleed easily.   Psychiatric/Behavioral: Negative for decreased concentration and dysphoric mood.     Objective:     Vital Signs (Most Recent):  Temp: 98.4 °F (36.9 °C) (10/30/19 1200)  Pulse: 76 (10/30/19 1400)  Resp: 17 (10/30/19 1400)  BP: (!) 115/56 (10/30/19 1400)  SpO2: 95 % (10/30/19 1400) Vital Signs (24h Range):  Temp:  [97.8 °F (36.6 °C)-98.5 °F (36.9 °C)] 98.4 °F (36.9 °C)  Pulse:  [] 76  Resp:  [12-36] 17  SpO2:  [94 %-100 %] 95 %  BP: ()/(45-76) 115/56     Weight: 105.2 kg (231 lb 14.8 oz)  Body mass index is 37.43 kg/m².      Intake/Output Summary (Last 24 hours) at 10/30/2019 1558  Last data filed at 10/30/2019 1400  Gross per 24 hour   Intake 480 ml   Output 4330 ml   Net -3850 ml       Physical Exam   Constitutional: She appears well-developed and well-nourished. Nasal cannula in place.   Morbidly obese   HENT:   Head: Normocephalic and atraumatic.   Eyes: Pupils are equal, round, and reactive to light. EOM are normal.   Neck: Neck supple. No tracheal deviation present. No thyromegaly present.   Cardiovascular: Normal rate and regular rhythm. Exam reveals no gallop and no friction rub.   No murmur heard.  Pulmonary/Chest: Effort normal. No accessory muscle usage or stridor. No tachypnea and no bradypnea. She has decreased breath sounds in the right middle field, the right lower  field, the left middle field and the left lower field. She has no wheezes. She has no rhonchi. She has no rales.   Abdominal: Soft. Bowel sounds are normal. There is no tenderness.   Genitourinary:   Genitourinary Comments: Wolfe in place.    Musculoskeletal: Normal range of motion.   Feet:   Left Foot: amputated  Neurological: No sensory deficit.   Skin: Skin is warm and dry.   Psychiatric: She has a normal mood and affect. Her speech is normal and behavior is normal.     Vents:  Oxygen Concentration (%): 35 (10/30/19 0357)    Lines/Drains/Airways     Drain                 Suprapubic Catheter 03/15/11 3151 days          Peripheral Intravenous Line                 Peripheral IV - Single Lumen 10/29/19 0640 20 G Left Antecubital 1 day                Significant Labs:    CBC/Anemia Profile:  Recent Labs   Lab 10/29/19  0642 10/30/19  0432   WBC 5.11 4.13  4.13   HGB 11.6* 10.4*  10.4*   HCT 41.1 37.1  37.1    173  173   MCV 87 88  88   RDW 14.6* 14.3  14.3        Chemistries:  Recent Labs   Lab 10/29/19  0642 10/30/19  0432    143  143   K 4.2 3.9  3.9    101  101   CO2 35* 38*  38*   BUN 20 12  12   CREATININE 1.0 0.7  0.7   CALCIUM 9.1 9.3  9.3   ALBUMIN 3.5 3.1*   PROT 7.9 6.5   BILITOT 0.2 0.4   ALKPHOS 93 73   ALT 7* 7*   AST 18 12       All pertinent labs within the past 24 hours have been reviewed.    Significant Imaging:   I have reviewed and interpreted all pertinent imaging results/findings within the past 24 hours.    Assessment/Plan:     Acute and chronic respiratory failure with hypercapnia  Uses home oxygen 2L NC and bipap at night for ADEEL. Patient with history of non-compliance and reports did not use her bipap the night before her admission. Patient denies any respiratory complaints on admit however had an ABG with pH 7.27 and pCO2 89. CXR without signs of focal consolidation.     Plan:  --Adjusted bipap settings 22/8. Continue qhs and for respiratory  distress  --Recommended weight loss  --Has follow up with Dr. Chaidez this week  --Wean supplemental O2 for goal saturations >88%    Discussed with Dr. Heath.     Patient updated at the bedside.       Thank you for your consult. I will sign off. Please contact us if you have any additional questions.     I have spent 35 min with this patient, with over 50% of this time spent coordinating care and speaking with the family    Marisol Berry PA-C  Pulmonology  [facility]

## 2019-10-30 NOTE — PLAN OF CARE
Ms Mary Ellen Fajardo is a 63 y.o. woman with chronic hypoxic and hypercapnic respiratory failure due to COPD/ADEEL on home BiPAP and NC, HFpEF who presented with pain at L AKA site (possibly phantom pain) and confusion due to acute on chronic hypercapnic and hypoxic respiratory failure. Admitted to ICU on continuous BiPAP. Weaned to NC. Pulmonary consulted- encouraged BiPAP compliance and weight loss. Stable for step down to floor. Note there was some concern about UTI, but patient denies symptoms. She has chronic delcid, so bacteria on UA are likely just colonization.     To do  - monitor overnight. If stable, DC in AM. Patient wants to DC tomorrow. Med rec already done. Has Pulm follow up on 11/1 with Dr Chaidez already scheduled.     Addie Vital MD  10/30/2019 3:43 PM

## 2019-10-30 NOTE — SUBJECTIVE & OBJECTIVE
Past Medical History:   Diagnosis Date    Abdominal hernia     Addiction to drug     Alcohol abuse     Anxiety     Arthritis     Asthma     Bipolar disorder     Bladder stones     CHF (congestive heart failure)     COPD (chronic obstructive pulmonary disease)     on home o2    CVA (cerebral vascular accident)     2012    Hallucination     Hepatitis C     treated and cured    History of blood clots     Hx of psychiatric care     Hypertension     Belen     Obese body habitus     On home oxygen therapy     ADEEL treated with BiPAP     Paraplegia     Paraplegic spinal paralysis     Psychiatric problem     Psychosis     AVH; Paranoia    Renal disorder     Requires assistance with activities of daily living (ADL)     SCI (spinal cord injury)     incomplete    Sleep difficulties     has sleep apnea and uses a Bi-PAP machine.    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    Substance abuse     Suprapubic catheter 03/15/2011    Therapy     Vaginal delivery     x1    Wheelchair dependence        Past Surgical History:   Procedure Laterality Date    ABOVE-KNEE AMPUTATION Left 7/23/2019    Procedure: AMPUTATION, ABOVE KNEE;  Surgeon: Gordo Rodriguez MD;  Location: [facility];  Service: Orthopedics;  Laterality: Left;    amputation Left 07/23/2019    above the knee    BACK SURGERY      bilateral knee replacement      BREAST BIOPSY      cysto/lithopaxy 2017      CYSTOSCOPY W/ LASER LITHOTRIPSY      JOINT REPLACEMENT      bilateral knee    SUPRAPUBIC CYSTOSTOMY  03/15/2011    patient reports  replaced tubing on 10/27/19, at home       Review of patient's allergies indicates:   Allergen Reactions    Tuberculin ppd Itching and Dermatitis     Patient has old scare that she claims is from TB PPD    Rocephin [ceftriaxone] Rash     Rash 2012? Pt agreeable to try with pre mediation with benadryl.        Family History     Problem Relation (Age of Onset)    Anxiety disorder Brother     Dementia Mother    Depression Brother        Tobacco Use    Smoking status: Former Smoker     Last attempt to quit: 2002     Years since quittin.8    Smokeless tobacco: Never Used   Substance and Sexual Activity    Alcohol use: Not Currently     Comment: occasional    Drug use: Never     Comment: prescribed opioids at present for pain    Sexual activity: Not Currently     Partners: Male     Comment: since          Review of Systems   Constitutional: Negative for appetite change and unexpected weight change.   HENT: Positive for congestion. Negative for sneezing and voice change.    Eyes: Negative for discharge and itching.   Respiratory: Negative for choking, chest tightness and shortness of breath.    Cardiovascular: Negative for chest pain, palpitations and leg swelling.   Gastrointestinal: Negative for abdominal pain and nausea.   Endocrine: Negative for cold intolerance and heat intolerance.   Genitourinary: Negative for dysuria, frequency and urgency.   Musculoskeletal: Negative for arthralgias and neck stiffness.   Skin: Negative for pallor and rash.   Neurological: Negative for tremors, light-headedness and headaches.   Hematological: Does not bruise/bleed easily.   Psychiatric/Behavioral: Negative for decreased concentration and dysphoric mood.     Objective:     Vital Signs (Most Recent):  Temp: 98.4 °F (36.9 °C) (10/30/19 1200)  Pulse: 76 (10/30/19 1400)  Resp: 17 (10/30/19 1400)  BP: (!) 115/56 (10/30/19 1400)  SpO2: 95 % (10/30/19 1400) Vital Signs (24h Range):  Temp:  [97.8 °F (36.6 °C)-98.5 °F (36.9 °C)] 98.4 °F (36.9 °C)  Pulse:  [] 76  Resp:  [12-36] 17  SpO2:  [94 %-100 %] 95 %  BP: ()/(45-76) 115/56     Weight: 105.2 kg (231 lb 14.8 oz)  Body mass index is 37.43 kg/m².      Intake/Output Summary (Last 24 hours) at 10/30/2019 1558  Last data filed at 10/30/2019 1400  Gross per 24 hour   Intake 480 ml   Output 4330 ml   Net -3850 ml       Physical Exam   Constitutional: She  appears well-developed and well-nourished. Nasal cannula in place.   Morbidly obese   HENT:   Head: Normocephalic and atraumatic.   Eyes: Pupils are equal, round, and reactive to light. EOM are normal.   Neck: Neck supple. No tracheal deviation present. No thyromegaly present.   Cardiovascular: Normal rate and regular rhythm. Exam reveals no gallop and no friction rub.   No murmur heard.  Pulmonary/Chest: Effort normal. No accessory muscle usage or stridor. No tachypnea and no bradypnea. She has decreased breath sounds in the right middle field, the right lower field, the left middle field and the left lower field. She has no wheezes. She has no rhonchi. She has no rales.   Abdominal: Soft. Bowel sounds are normal. There is no tenderness.   Genitourinary:   Genitourinary Comments: Wolfe in place.    Musculoskeletal: Normal range of motion.   Feet:   Left Foot: amputated  Neurological: No sensory deficit.   Skin: Skin is warm and dry.   Psychiatric: She has a normal mood and affect. Her speech is normal and behavior is normal.     Vents:  Oxygen Concentration (%): 35 (10/30/19 0357)    Lines/Drains/Airways     Drain                 Suprapubic Catheter 03/15/11 3151 days          Peripheral Intravenous Line                 Peripheral IV - Single Lumen 10/29/19 0640 20 G Left Antecubital 1 day                Significant Labs:    CBC/Anemia Profile:  Recent Labs   Lab 10/29/19  0642 10/30/19  0432   WBC 5.11 4.13  4.13   HGB 11.6* 10.4*  10.4*   HCT 41.1 37.1  37.1    173  173   MCV 87 88  88   RDW 14.6* 14.3  14.3        Chemistries:  Recent Labs   Lab 10/29/19  0642 10/30/19  0432    143  143   K 4.2 3.9  3.9    101  101   CO2 35* 38*  38*   BUN 20 12  12   CREATININE 1.0 0.7  0.7   CALCIUM 9.1 9.3  9.3   ALBUMIN 3.5 3.1*   PROT 7.9 6.5   BILITOT 0.2 0.4   ALKPHOS 93 73   ALT 7* 7*   AST 18 12       All pertinent labs within the past 24 hours have been reviewed.    Significant Imaging:    I have reviewed and interpreted all pertinent imaging results/findings within the past 24 hours.

## 2019-10-30 NOTE — NURSING
Patient lying in bed with no complaints. Patient tolerating BIPAP with no complaints of SOB. See flow sheet for further assessment.

## 2019-10-30 NOTE — ASSESSMENT & PLAN NOTE
Uses home oxygen 2L NC and bipap at night for ADEEL. Patient with history of non-compliance and reports did not use her bipap the night before her admission. Patient denies any respiratory complaints on admit however had an ABG with pH 7.27 and pCO2 89. CXR without signs of focal consolidation.     Plan:  --Adjusted bipap settings 22/8. Continue qhs and for respiratory distress  --Recommended weight loss  --Has follow up with Dr. Chaidez this week  --Wean supplemental O2 for goal saturations >88%

## 2019-10-30 NOTE — PLAN OF CARE
Problem: Adult Inpatient Plan of Care  Goal: Plan of Care Review  Outcome: Ongoing, Progressing

## 2019-10-30 NOTE — ASSESSMENT & PLAN NOTE
Last TTE 7/2019 showed EF 60%, indeterminate LV diastolic function, and mild RV systolic dysfunction  BNP 83 on admit  Appears euvolemic on exam  No signs of CHF exacerbation  Continue home bumex 2mg daily

## 2019-10-30 NOTE — ASSESSMENT & PLAN NOTE
Worsening hypercapnic respiratory failure is due to noncompliance at home.  Discussed that she needs to be compliant with her CPAP.

## 2019-10-30 NOTE — ASSESSMENT & PLAN NOTE
Worsening hypercapnic respiratory failure is due to noncompliance at home.  Discussed that she needs to be compliant with her BiPAP.

## 2019-10-30 NOTE — PROGRESS NOTES
"[facility] Medicine  Progress Note    Patient Name: Mary Ellen Fajardo  MRN: [id number]  Patient Class: IP- Inpatient   Admission Date: 10/29/2019  Length of Stay: 1 days  Attending Physician: Addie Vital MD  Primary Care Provider: Any Tran MD        Subjective:     Principal Problem:Acute on chronic respiratory failure with hypercapnia        HPI:   63 y.o. F who has Arthritis,diastolic CHF, COPD, HTN, Renal disorder,bipolar disorder,S/P left AKA ,ADEEL on CPAP at home,,chronic respiratory failure on home oxygen, and Hx of CVA who presents to the ED via EMS transportation for emergent evaluation of acute pain to residual left limb that began PTA. Pt reports above knee amputation performed 7/23/2019. She notes no redness or infection to the affected area since having the amputation. No OTC treatment attempted PTA. Pt also complains of headache that began as a generalized headache during initial onset that is currently a frontal headache.     He as negative head CT,Pt does not have a Hx of headache. She also reports weakness in the right upper extremity. Additionally, pt states that she has a "delcid catheter in place since having back surgery." Pt also states that current drowsiness is due to taking Benzodiazepine and Trazodone.but U Tox is negative, Pt denies fever, cough, abdominal pain, fall, or trauma.ABG show CO2 retention 82.6,,patient say she is complaint with her CPAP!!!,she has been started on Bipap,she is alert time 3 at this time,she has been also started on IV Abx for UTI,she say her suprapubic cath is changed 3 days ago.    Overview/Hospital Course:  Admitted to ICU for acute on chronic hypercapnic respiratory failure.  Started on continuous BiPAP.  Improved.  Wean to nasal cannula.  Discussed with patient why she developed worsening hypercapnia.  Discussed that her multiple sedating medications used for pain control may contribute.  Patient says that she has " been on stable doses of medications with no changes.  She does admit that she was not using her BiPAP at night.  She now feels like her normal self.    Interval History:  No complaints.  Pain at her left AKA site has improved.  No more headache.    Review of Systems   Constitutional: Negative for chills and fever.   Respiratory: Negative for cough, shortness of breath and wheezing.    Cardiovascular: Negative for chest pain, palpitations and leg swelling.   Gastrointestinal: Negative for abdominal pain, constipation, diarrhea, nausea and vomiting.   Neurological: Negative for headaches.   Psychiatric/Behavioral: Negative for confusion.     Objective:     Vital Signs (Most Recent):  Temp: 98.3 °F (36.8 °C) (10/30/19 0800)  Pulse: (!) 48 (10/30/19 0800)  Resp: 14 (10/30/19 0800)  BP: 133/62 (10/30/19 0800)  SpO2: 100 % (10/30/19 0800) Vital Signs (24h Range):  Temp:  [97.8 °F (36.6 °C)-98.6 °F (37 °C)] 98.3 °F (36.8 °C)  Pulse:  [] 48  Resp:  [12-36] 14  SpO2:  [94 %-100 %] 100 %  BP: ()/(45-71) 133/62     Weight: 105.2 kg (231 lb 14.8 oz)  Body mass index is 37.43 kg/m².    Intake/Output Summary (Last 24 hours) at 10/30/2019 1128  Last data filed at 10/30/2019 0600  Gross per 24 hour   Intake 50 ml   Output 1270 ml   Net -1220 ml      Physical Exam   Constitutional: She appears well-developed and well-nourished. No distress.   Obese   HENT:   Head: Normocephalic and atraumatic.   Mouth/Throat: Oropharynx is clear and moist.   Cardiovascular: Normal rate, regular rhythm, normal heart sounds and intact distal pulses. Exam reveals no gallop and no friction rub.   No murmur heard.  Pulmonary/Chest: Effort normal and breath sounds normal. No stridor. No respiratory distress. She has no wheezes. She has no rales.   O2 by nasal cannula   Abdominal: Soft. Bowel sounds are normal. She exhibits distension (Not tense). She exhibits no mass. There is no tenderness. There is no guarding.   Musculoskeletal: She  exhibits no edema.   Left AKA   Neurological: She is alert.   Skin: Skin is warm and dry. She is not diaphoretic.   Nursing note and vitals reviewed.      Significant Labs: All pertinent labs within the past 24 hours have been reviewed.    Significant Imaging: I have reviewed and interpreted all pertinent imaging results/findings within the past 24 hours.      Assessment/Plan:      * Acute on chronic respiratory failure with hypercapnia  Secondary to noncompliance with home CPAP  Initially had low ABG 7.27/82/89 that worsened to 7.24/90/108. Placed on continuous BiPAP  Improved to 7.34/70/91  Encouraged compliance      Anemia  Hgb stable 10-11  No acute issues    Chronic diastolic heart failure  Last TTE 7/2019 showed EF 60%, indeterminate LV diastolic function, and mild RV systolic dysfunction  BNP 83 on admit  Appears euvolemic on exam  No signs of CHF exacerbation  Continue home bumex 2mg daily      Bipolar I disorder, current or most recent episode depressed, with psychotic features  Reviewed prior psychiatry note and medication list with patient  Continue fluoxetine 10 mg daily, Zyprexa 5 mg q.h.s., hydroxyzine 25 mg t.i.d. p.r.n. anxiety      Chronic indwelling suprapubic catheter in place  She has had a chronic indwelling Wolfe from many years since a back surgery  She denies any suprapubic or flank pain or change in her urine  UA likely represents colonization rather than infection.  No fever, tachycardia, or leukocytosis consistent with infection.  Discontinue antibiotics  Wolfe changed in the ED  Continue home oxybutynin daily      Chronic hepatitis C  No acute issues  Needs follow-up for treatment      Class 3 severe obesity in adult  Body mass index is 37.43 kg/m².  Encourage weight loss as outpatient      COPD (chronic obstructive pulmonary disease)  No evidence of acute exacerbation  No prior PFTs available for review  Continue home nebulizers p.r.n.      ADEEL on CPAP  Worsening hypercapnic respiratory  failure is due to noncompliance at home.  Discussed that she needs to be compliant with her CPAP.      Essential hypertension  BP is well controlled  Not on any medications at home      VTE Risk Mitigation (From admission, onward)         Ordered     enoxaparin injection 40 mg  Daily      10/29/19 1651     IP VTE HIGH RISK PATIENT  Once      10/29/19 1651                Critical care time spent on the evaluation and treatment of severe organ dysfunction, review of pertinent labs and imaging studies, discussions with consulting providers and discussions with patient/family: 20 minutes.      Addie Vital MD  Department of Hospital Medicine   [facility]

## 2019-10-30 NOTE — ASSESSMENT & PLAN NOTE
Reviewed prior psychiatry note and medication list with patient  Continue fluoxetine 10 mg daily, Zyprexa 5 mg q.h.s., hydroxyzine 25 mg t.i.d. p.r.n. anxiety

## 2019-10-30 NOTE — ASSESSMENT & PLAN NOTE
Secondary to noncompliance with home BiPAP  Initially had low ABG 7.27/82/89 that worsened to 7.24/90/108. Placed on continuous BiPAP  Improved to 7.34/70/91  Encouraged compliance

## 2019-10-30 NOTE — HOSPITAL COURSE
Admitted to ICU for acute on chronic hypercapnic respiratory failure.  Started on continuous BiPAP.  Improved.  Wean to nasal cannula.  Discussed with patient why she developed worsening hypercapnia.  Discussed that her multiple sedating medications used for pain control may contribute.  Patient says that she has been on stable doses of medications with no changes.  She does admit that she was not using her BiPAP at night prior to admit.  She now feels like her normal self. Re-educated prior to discharge about nightly use of BiPAP as her condition is potentially fatal if non compliant. Verbalized understanding and motivated to do so. Has nebulizer, BiPAP and portable O2 available at home. Has follow up scheduled with Dr Chaidez on 11/1 (tomorrow). Low cholesterol diet. Activity as tolerated.

## 2019-10-31 VITALS
HEART RATE: 67 BPM | OXYGEN SATURATION: 99 % | RESPIRATION RATE: 20 BRPM | BODY MASS INDEX: 37.28 KG/M2 | DIASTOLIC BLOOD PRESSURE: 68 MMHG | HEIGHT: 66 IN | SYSTOLIC BLOOD PRESSURE: 143 MMHG | TEMPERATURE: 98 F | WEIGHT: 231.94 LBS

## 2019-10-31 LAB
ANION GAP SERPL CALC-SCNC: 8 MMOL/L (ref 8–16)
BUN SERPL-MCNC: 17 MG/DL (ref 8–23)
CALCIUM SERPL-MCNC: 9.3 MG/DL (ref 8.7–10.5)
CHLORIDE SERPL-SCNC: 97 MMOL/L (ref 95–110)
CO2 SERPL-SCNC: 35 MMOL/L (ref 23–29)
CREAT SERPL-MCNC: 0.8 MG/DL (ref 0.5–1.4)
EST. GFR  (AFRICAN AMERICAN): >60 ML/MIN/1.73 M^2
EST. GFR  (NON AFRICAN AMERICAN): >60 ML/MIN/1.73 M^2
GLUCOSE SERPL-MCNC: 110 MG/DL (ref 70–110)
POTASSIUM SERPL-SCNC: 3.5 MMOL/L (ref 3.5–5.1)
SODIUM SERPL-SCNC: 140 MMOL/L (ref 136–145)

## 2019-10-31 PROCEDURE — 80048 BASIC METABOLIC PNL TOTAL CA: CPT

## 2019-10-31 PROCEDURE — 94640 AIRWAY INHALATION TREATMENT: CPT

## 2019-10-31 PROCEDURE — 25000003 PHARM REV CODE 250: Performed by: HOSPITALIST

## 2019-10-31 PROCEDURE — 94660 CPAP INITIATION&MGMT: CPT

## 2019-10-31 PROCEDURE — 94761 N-INVAS EAR/PLS OXIMETRY MLT: CPT

## 2019-10-31 PROCEDURE — 25000242 PHARM REV CODE 250 ALT 637 W/ HCPCS: Performed by: HOSPITALIST

## 2019-10-31 PROCEDURE — 27000221 HC OXYGEN, UP TO 24 HOURS

## 2019-10-31 PROCEDURE — 36415 COLL VENOUS BLD VENIPUNCTURE: CPT

## 2019-10-31 PROCEDURE — 99900035 HC TECH TIME PER 15 MIN (STAT)

## 2019-10-31 RX ADMIN — BUMETANIDE 2 MG: 1 TABLET ORAL at 08:10

## 2019-10-31 RX ADMIN — IPRATROPIUM BROMIDE AND ALBUTEROL SULFATE 3 ML: .5; 3 SOLUTION RESPIRATORY (INHALATION) at 07:10

## 2019-10-31 RX ADMIN — GABAPENTIN 600 MG: 300 CAPSULE ORAL at 08:10

## 2019-10-31 RX ADMIN — IPRATROPIUM BROMIDE AND ALBUTEROL SULFATE 3 ML: .5; 3 SOLUTION RESPIRATORY (INHALATION) at 12:10

## 2019-10-31 RX ADMIN — IPRATROPIUM BROMIDE AND ALBUTEROL SULFATE 3 ML: .5; 3 SOLUTION RESPIRATORY (INHALATION) at 04:10

## 2019-10-31 RX ADMIN — BACLOFEN 20 MG: 10 TABLET ORAL at 08:10

## 2019-10-31 RX ADMIN — FLUOXETINE 10 MG: 10 CAPSULE ORAL at 08:10

## 2019-10-31 RX ADMIN — OXYBUTYNIN CHLORIDE 5 MG: 5 TABLET ORAL at 08:10

## 2019-10-31 NOTE — DISCHARGE SUMMARY
"[facility]  Discharge Summary      Patient Name: Mary Ellen Fajardo  MRN: [id number]  Admission Date: 10/29/2019  Hospital Length of Stay: 2 days  Discharge Date and Time:  10/31/2019 10:30 AM  Attending Physician: Micheline Estrada MD   Discharging Provider: Micheline Lujan MD  Primary Care Provider: Any Tran MD      HPI:    63 y.o. F who has Arthritis,diastolic CHF, COPD, HTN, Renal disorder,bipolar disorder,S/P left AKA ,ADEEL on CPAP at home,,chronic respiratory failure on home oxygen, and Hx of CVA who presents to the ED via EMS transportation for emergent evaluation of acute pain to residual left limb that began PTA. Pt reports above knee amputation performed 7/23/2019. She notes no redness or infection to the affected area since having the amputation. No OTC treatment attempted PTA. Pt also complains of headache that began as a generalized headache during initial onset that is currently a frontal headache.     He as negative head CT,Pt does not have a Hx of headache. She also reports weakness in the right upper extremity. Additionally, pt states that she has a "delcid catheter in place since having back surgery." Pt also states that current drowsiness is due to taking Benzodiazepine and Trazodone.but U Tox is negative, Pt denies fever, cough, abdominal pain, fall, or trauma.ABG show CO2 retention 82.6,,patient say she is complaint with her CPAP!!!,she has been started on Bipap,she is alert time 3 at this time,she has been also started on IV Abx for UTI,she say her suprapubic cath is changed 3 days ago.    * No surgery found *      Hospital Course:   Admitted to ICU for acute on chronic hypercapnic respiratory failure.  Started on continuous BiPAP.  Improved.  Wean to nasal cannula.  Discussed with patient why she developed worsening hypercapnia.  Discussed that her multiple sedating medications used for pain control may contribute.  Patient says that she has been on " stable doses of medications with no changes.  She does admit that she was not using her BiPAP at night prior to admit.  She now feels like her normal self. Re-educated prior to discharge about nightly use of BiPAP as her condition is potentially fatal if non compliant. Verbalized understanding and motivated to do so. Has nebulizer, BiPAP and portable O2 available at home. Patient was at baseline functional status prior to discharge. Has follow up scheduled with Dr Chaidez on 11/1 (tomorrow). Low cholesterol diet. Activity as tolerated.      Consults:   Consults (From admission, onward)        Status Ordering Provider     Inpatient consult to Pulmonology  Once     Provider:  Joe Heath MD    Completed SERA TERRY consult to case management  Once     Provider:  (Not yet assigned)    YADIRA Montiel        Final Active Diagnoses:    Diagnosis Date Noted POA    PRINCIPAL PROBLEM:  Acute on chronic respiratory failure with hypercapnia [J96.22] 07/18/2019 Yes    Hypercapnic respiratory failure [J96.92] 10/30/2019 Yes    Anemia [D64.9] 10/29/2019 Yes    Chronic diastolic heart failure [I50.32] 09/20/2019 Yes    Acute and chronic respiratory failure with hypercapnia [J96.22] 09/20/2019 Yes    COPD (chronic obstructive pulmonary disease) [J44.9] 01/06/2019 Yes     Chronic    Chronic indwelling suprapubic catheter in place [Z93.59] 01/06/2019 Not Applicable     Chronic    Chronic hepatitis C [B18.2] 01/06/2019 Yes     Chronic    Class 3 severe obesity in adult [E66.01] 01/06/2019 Yes    Bipolar I disorder, current or most recent episode depressed, with psychotic features [F31.5] 01/06/2019 Yes    ADEEL treated with BiPAP [G47.33] 01/19/2018 Yes    Essential hypertension [I10] 01/19/2018 Yes     Chronic      Problems Resolved During this Admission:       Discharged Condition: good    Disposition: Home or Self Care    Follow Up:  Follow-up Information     Spenser Chaidez MD On  11/1/2019.    Specialties:  Pulmonary Disease, Sleep Medicine  Why:  Outpatient Services, Pulmonology, follow-up appointment @ 1:00PM  Contact information:  [address] [facility] [address]  [address]  [place] [phone]                 Medications:  Reconciled Home Medications:      Medication List      CHANGE how you take these medications    linaCLOtide 290 mcg Cap capsule  Commonly known as:  LINZESS  Take 1 capsule (290 mcg total) by mouth once daily.  What changed:  how much to take     potassium chloride 10 MEQ Tbsr  Commonly known as:  KLOR-CON  Take 1 tablet (10 mEq total) by mouth once daily.  What changed:  additional instructions        CONTINUE taking these medications    albuterol-ipratropium 2.5 mg-0.5 mg/3 mL nebulizer solution  Commonly known as:  DUO-NEB  Take 3 mLs by nebulization every 4 (four) hours as needed for Wheezing.     baclofen 20 MG tablet  Commonly known as:  LIORESAL  20 mg 2 (two) times daily.     bumetanide 2 MG tablet  Commonly known as:  BUMEX  Take 2 mg by mouth once daily.     C-TUB Misc  Generic drug:  miscellaneous medical supply  NEEDS TO SWITCH [employer] FOR OXYGEN AT 2L. LAST OXYGEN SATURATION WAS 83% ON Room Air. She uses  BIPAP and  Needs  New mask, tubings and     catheter 18 Fr Misc  Change every 20 days     FLUoxetine 10 MG capsule  TAKE 1 CAPSULE(10 MG) BY MOUTH DAILY     gabapentin 300 MG capsule  Commonly known as:  NEURONTIN  600 mg 3 (three) times daily.     hydrOXYzine HCl 25 MG tablet  Commonly known as:  ATARAX  Take 1 tablet (25 mg total) by mouth 3 (three) times daily.     ipratropium 0.02 % nebulizer solution  Commonly known as:  ATROVENT  U 1 VIAL VIA NEBULIZER Q 4 H WHILE AWAKE     OLANZapine 5 MG tablet  Commonly known as:  ZyPREXA  Take 1 tablet (5 mg total) by mouth every evening.     oxybutynin 5 MG Tr24  Commonly known as:  DITROPAN-XL  TAKE 1 TABLET BY MOUTH EVERY DAY     traZODone 50 MG tablet  Commonly known as:  DESYREL  Take 50 mg by mouth every  evening.     VENTOLIN HFA 90 mcg/actuation inhaler  Generic drug:  albuterol            Indwelling Lines/Drains at time of discharge:   Lines/Drains/Airways     Drain                 Suprapubic Catheter 03/15/11 3152 days                Time spent on the discharge of patient: > 35 minutes  Patient was seen and examined on the date of discharge and determined to be suitable for discharge.         Micheline Lujan MD  Department of Hospital Medicine  [facility]

## 2019-10-31 NOTE — PROGRESS NOTES
WRITTEN HEALTHCARE DISCHARGE INFORMATION     Things that YOU are RESPONSIBLE for to Manage Your Care At Home:    1. Getting your prescriptions filled.  2. Taking you medications as directed. DO NOT MISS ANY DOSES!  3. Going to your follow-up doctor appointments. This is important because it allows the doctor to monitor your progress and to determine if any changes need to be made to your treatment plan.    If you are unable to make your follow up appointments, please call the number listed and reschedule this appointment.     ____________HELP AT HOME____________________    Experiencing any SIGNS or SYMPTOMS: YOU CAN    Schedule a same day appopintment with your Primary Care Doctor or  you can call [facility] Line for 24/7 assistance at [phone]    If you are experience any signs or symptoms that have become severe, Call 911 and come to your nearest Emergency Room.    Thank you for choosing [facility] and allowing us to care for you.   From your care management team: Judy BRYAN [facility] [phone]    You should receive a call from [facility] within 48-72 hours to help manage your care after discharge. Please try to make sure that you answer your phone for this important phone call.  Follow-up Information     Spenser Chaidez MD On 11/1/2019.    Specialties:  Pulmonary Disease, Sleep Medicine  Why:  Outpatient Services, Pulmonology, follow-up appointment @ 1:00PM  Contact information:  [address]  [address] [place] [id number]  [phone]

## 2019-10-31 NOTE — NURSING
9531 Transportation service came to  patient via wheelchair that did not have one with a removable arm that pt needed to slide over due to amputee. Charge nurse Jaylen and Judy KESSLER were notified, offered to allow transportation to [facility]'s wheelchair but he says his supervisor would not allow him to return.  Transporter left.   Jduy/CHECO called for [facility] wheelchair transporter

## 2019-10-31 NOTE — PLAN OF CARE
Patient AAOx4. No complaints of SOB. Patient wore BIPAP all night. Very cooperative and eager for care. Will continue to monitor for patient safety.

## 2019-10-31 NOTE — PLAN OF CARE
10/31/19 1014   Medicare Message   Important Message from Medicare regarding Discharge Appeal Rights Given to patient/caregiver;Explained to patient/caregiver;Signed/date by patient/caregiver   Date IMM was signed 10/31/19   Time IMM was signed 1011

## 2019-10-31 NOTE — PLAN OF CARE
"   10/31/19 1024   Post-Acute Status   Post-Acute Authorization Other  (Other )   Other Status No Post-Acute Service Needs   Discharge Delays (!) Other  (Waiting for transportation)     EDUCATION:  Pt provided with educational information on COPD.  Information reviewed and placed in :My Healthcare Packet" to be brought home for  use as resource after discharge.  Information included:  signs and symptoms to look for at discharge. [facility]CO instructed pt to call the doctor if experiencing symptoms that may indicate a medical emergency: CALL 911 if signs and symptoms worsen. CHECO asked pt to provide SW with two signs and symptoms recently discussed.  Pt stated, " shortness of breath". Reminded pt things she will be responsible for to manage her healthcare at home: getting Rx filled, attending follow up appointments, and taking medication as prescribed were discussed.   Teach back method used.  All questions answered.  Patient verbalized understanding of all information.      [facility]CO provided pt with a copy of follow-up appointments. SW explained/highlighted date, time, and location of each appointment. CHECO provided pt with a blue folder and instructed pt to place all medical documents in blue folder. [facility]CO explained to pt the nurse will provide an AVS with diagnosis and instructed pt to place in blue folder and bring to follow-up appointment. [facility] notified pt's nurse, Joan, all CM needs have been met.     10:21AM  ADT 30 order placed for  Transportation.  Requested  time: 11:30AM   If transportation does not arrive at ETA time nurse will be instructed to follow protocol for transportation below:   How can I get in touch directly with dispatch, if needed?                 Non-emergent dispatch: [phone]                                    Emergent dispatch: [phone]  Non-emergent (stretcher): [phone]  Escalation Needs (PFC Lead): [phone]    12:30PM  [facility] contacted transportation @ [phone] to inform transport left due to " improper wheelchair, and SW need new transportation. SW spoke with Alan and was informed she will get the first available WC.

## 2019-10-31 NOTE — PLAN OF CARE
10/31/19 1026   Final Note   Assessment Type Final Discharge Note   Anticipated Discharge Disposition Home   What phone number can be called within the next 1-3 days to see how you are doing after discharge? [phone]   Hospital Follow Up  Appt(s) scheduled? Yes   Discharge plans and expectations educations in teach back method with documentation complete? Yes   Right Care Referral Info   Post Acute Recommendation No Care

## 2019-11-01 ENCOUNTER — OFFICE VISIT (OUTPATIENT)
Dept: PULMONOLOGY | Facility: CLINIC | Age: 64
End: 2019-11-01
Payer: MEDICARE

## 2019-11-01 VITALS
HEART RATE: 75 BPM | HEIGHT: 66 IN | OXYGEN SATURATION: 95 % | BODY MASS INDEX: 37.43 KG/M2 | DIASTOLIC BLOOD PRESSURE: 84 MMHG | SYSTOLIC BLOOD PRESSURE: 118 MMHG

## 2019-11-01 DIAGNOSIS — I27.20 PULMONARY HTN: Primary | ICD-10-CM

## 2019-11-01 DIAGNOSIS — Z71.89 ADVANCE CARE PLANNING: ICD-10-CM

## 2019-11-01 DIAGNOSIS — J96.12 CHRONIC RESPIRATORY FAILURE WITH HYPOXIA AND HYPERCAPNIA: ICD-10-CM

## 2019-11-01 DIAGNOSIS — G47.33 OSA (OBSTRUCTIVE SLEEP APNEA): ICD-10-CM

## 2019-11-01 DIAGNOSIS — J96.11 CHRONIC RESPIRATORY FAILURE WITH HYPOXIA AND HYPERCAPNIA: ICD-10-CM

## 2019-11-01 PROBLEM — J96.10 CHRONIC RESPIRATORY FAILURE: Status: ACTIVE | Noted: 2019-09-20

## 2019-11-01 LAB — BACTERIA UR CULT: ABNORMAL

## 2019-11-01 PROCEDURE — 99999 PR PBB SHADOW E&M-EST. PATIENT-LVL IV: CPT | Mod: PBBFAC,,, | Performed by: INTERNAL MEDICINE

## 2019-11-01 PROCEDURE — 3074F SYST BP LT 130 MM HG: CPT | Mod: S$GLB,,, | Performed by: INTERNAL MEDICINE

## 2019-11-01 PROCEDURE — 3079F PR MOST RECENT DIASTOLIC BLOOD PRESSURE 80-89 MM HG: ICD-10-PCS | Mod: S$GLB,,, | Performed by: INTERNAL MEDICINE

## 2019-11-01 PROCEDURE — 99215 PR OFFICE/OUTPT VISIT, EST, LEVL V, 40-54 MIN: ICD-10-PCS | Mod: S$GLB,,, | Performed by: INTERNAL MEDICINE

## 2019-11-01 PROCEDURE — 99999 PR PBB SHADOW E&M-EST. PATIENT-LVL IV: ICD-10-PCS | Mod: PBBFAC,,, | Performed by: INTERNAL MEDICINE

## 2019-11-01 PROCEDURE — 3008F BODY MASS INDEX DOCD: CPT | Mod: S$GLB,,, | Performed by: INTERNAL MEDICINE

## 2019-11-01 PROCEDURE — 99215 OFFICE O/P EST HI 40 MIN: CPT | Mod: S$GLB,,, | Performed by: INTERNAL MEDICINE

## 2019-11-01 PROCEDURE — 3074F PR MOST RECENT SYSTOLIC BLOOD PRESSURE < 130 MM HG: ICD-10-PCS | Mod: S$GLB,,, | Performed by: INTERNAL MEDICINE

## 2019-11-01 PROCEDURE — 3079F DIAST BP 80-89 MM HG: CPT | Mod: S$GLB,,, | Performed by: INTERNAL MEDICINE

## 2019-11-01 PROCEDURE — 3008F PR BODY MASS INDEX (BMI) DOCUMENTED: ICD-10-PCS | Mod: S$GLB,,, | Performed by: INTERNAL MEDICINE

## 2019-11-01 NOTE — PROGRESS NOTES
Mary Ellen Fajardo  was seen as a new patient to me for the evaluation of  copd.    CHIEF COMPLAINT:  COPD and Consult      HISTORY OF PRESENT ILLNESS: Mary Ellen Fajardo is a 63 y.o. female  has a past medical history of Abdominal hernia, Addiction to drug, Alcohol abuse, Anxiety, Arthritis, Asthma, Bipolar disorder, Bladder stones, CHF (congestive heart failure), COPD (chronic obstructive pulmonary disease), CVA (cerebral vascular accident), Hallucination, Hepatitis C, History of blood clots, psychiatric care, Hypertension, Belen, Obese body habitus, On home oxygen therapy, ADEEL (obstructive sleep apnea), ADEEL treated with BiPAP, Paraplegia, Paraplegic spinal paralysis, Psychiatric problem, Psychosis, Renal disorder, Requires assistance with activities of daily living (ADL), SCI (spinal cord injury), Sleep difficulties, Status post amputation of leg (07/23/2019), Substance abuse, Suprapubic catheter (03/15/2011), Therapy, Vaginal delivery, and Wheelchair dependence.  Our first encounter was 1/5/19.  At that time, patient was hospitalized for hypercapnic respiratory failure.  Patient was treated with steroid, antibiotics, nebs, bipap and diuresis.  Patient was discharged with oxygen.  Patient with 2 hospitalizations on 9/20/19 and 10/29/19 for hypercapnic respiratory failure since 1/19.  On both occasions, patient was treated with nebs, steroid, and nippv.      Today, patient is doing well.  Breathing at baseline.  Currently on 2 lpm.  Patient is requesting home oxygen concentrator.  Currently patient is only take albuterol nebs.  Patient has home bipap 22/8.  Patient is using bpap nightly.  No chest pain.  No orthopnea.  No pnd with bipap.      PAST MEDICAL HISTORY:    Active Ambulatory Problems     Diagnosis Date Noted    Paraparesis 10/03/2012    Gait disorder 10/03/2012    Neurogenic bladder 10/03/2012    Knee pain, bilateral 04/06/2015    S/P knee replacement 04/06/2015    OA (osteoarthritis) of knee  04/15/2015    Primary osteoarthritis of left knee 05/27/2015    Poor motor control of trunk 05/27/2015    Decreased range of motion of lower extremity 05/27/2015    Bilateral leg weakness 05/27/2015    Chronic knee pain 01/20/2016    Bladder stone 12/20/2017    Staghorn calculus 12/20/2017    Kidney stones 01/19/2018    Essential hypertension 01/19/2018    Neuropathy 01/19/2018    Primary insomnia 01/19/2018    ADEEL (obstructive sleep apnea) 01/19/2018    Acute on chronic respiratory failure with hypoxia and hypercapnia 01/05/2019    Opioid overdose 01/06/2019    COPD (chronic obstructive pulmonary disease) 01/06/2019    Class 3 severe obesity in adult 01/06/2019    Chronic hepatitis C 01/06/2019    Chronic indwelling suprapubic catheter in place 01/06/2019    Bipolar I disorder, current or most recent episode depressed, with psychotic features 01/06/2019    History of CVA (cerebrovascular accident) 01/06/2019    COPD with acute exacerbation 01/06/2019    Infection due to urethral catheter 07/17/2019    Septic arthritis of knee, left 07/17/2019    Acute on chronic respiratory failure with hypercapnia 07/18/2019    Acute on chronic diastolic heart failure 07/19/2019    Mood insomnia 07/24/2019    Hx of colonic polyp 09/17/2019    Family history of colon cancer in father 09/17/2019    Chronic respiratory failure 09/20/2019    Acute on chronic respiratory acidosis 09/20/2019    Chronic diastolic heart failure 09/20/2019    Anemia 10/29/2019    Hypercapnic respiratory failure 10/30/2019    Pulmonary HTN 11/01/2019    Advance care planning 11/01/2019     Resolved Ambulatory Problems     Diagnosis Date Noted    Volume overload 07/18/2019    Evaluation by psychiatric service required 07/24/2019    Encephalopathy, metabolic 09/20/2019     Past Medical History:   Diagnosis Date    Abdominal hernia     Addiction to drug     Alcohol abuse     Anxiety     Arthritis     Asthma      Bipolar disorder     Bladder stones     CHF (congestive heart failure)     CVA (cerebral vascular accident)     Hallucination     Hepatitis C     History of blood clots     Hx of psychiatric care     Hypertension     Belen     Obese body habitus     On home oxygen therapy     ADEEL treated with BiPAP     Paraplegia     Paraplegic spinal paralysis     Psychiatric problem     Psychosis     Renal disorder     Requires assistance with activities of daily living (ADL)     SCI (spinal cord injury)     Sleep difficulties     Status post amputation of leg 2019    Substance abuse     Therapy     Vaginal delivery     Wheelchair dependence                 PAST SURGICAL HISTORY:    Past Surgical History:   Procedure Laterality Date    ABOVE-KNEE AMPUTATION Left 2019    Procedure: AMPUTATION, ABOVE KNEE;  Surgeon: Gordo Rodriguez MD;  Location: [facility];  Service: Orthopedics;  Laterality: Left;    amputation Left 2019    above the knee    BACK SURGERY      bilateral knee replacement      BREAST BIOPSY      cysto/lithopaxy 2017      CYSTOSCOPY W/ LASER LITHOTRIPSY      JOINT REPLACEMENT      bilateral knee    SUPRAPUBIC CYSTOSTOMY  03/15/2011    patient reports  replaced tubing on 10/27/19, at home         FAMILY HISTORY:                Family History   Problem Relation Age of Onset    Dementia Mother     Anxiety disorder Brother     Depression Brother        SOCIAL HISTORY:          Tobacco:   Social History     Tobacco Use   Smoking Status Former Smoker    Packs/day: 0.50    Years: 25.00    Pack years: 12.50    Last attempt to quit:     Years since quittin.8   Smokeless Tobacco Never Used     alcohol use:    Social History     Substance and Sexual Activity   Alcohol Use Not Currently    Comment: occasional               Occupation:disable    ALLERGIES:    Review of patient's allergies indicates:   Allergen Reactions    Tuberculin ppd Itching and  Dermatitis     Patient has old scare that she claims is from TB PPD    Rocephin [ceftriaxone] Rash     Rash 2012? Pt agreeable to try with pre mediation with benadryl.        CURRENT MEDICATIONS:    Current Outpatient Medications   Medication Sig Dispense Refill    albuterol-ipratropium (DUO-NEB) 2.5 mg-0.5 mg/3 mL nebulizer solution Take 3 mLs by nebulization every 4 (four) hours as needed for Wheezing. 1 Box 3    baclofen (LIORESAL) 20 MG tablet 20 mg 2 (two) times daily.   5    bumetanide (BUMEX) 2 MG tablet Take 2 mg by mouth once daily.   1    catheter 18 Fr Misc Change every 20 days 10 each 1    FLUoxetine 10 MG capsule TAKE 1 CAPSULE(10 MG) BY MOUTH DAILY      gabapentin (NEURONTIN) 300 MG capsule 600 mg 3 (three) times daily.       hydrOXYzine HCl (ATARAX) 25 MG tablet Take 1 tablet (25 mg total) by mouth 3 (three) times daily. 90 tablet 0    ipratropium (ATROVENT) 0.02 % nebulizer solution U 1 VIAL VIA NEBULIZER Q 4 H WHILE AWAKE  12    linaCLOtide (LINZESS) 290 mcg Cap capsule Take 1 capsule (290 mcg total) by mouth once daily. 30 capsule 3    miscellaneous medical supply (C-TUB) [facility] NEEDS TO SWITCH DME COMPANY FOR OXYGEN AT 2L. LAST OXYGEN SATURATION WAS 83% ON Room Air. She uses  BIPAP and  Needs  New mask, tubings and      OLANZapine (ZYPREXA) 5 MG tablet Take 1 tablet (5 mg total) by mouth every evening. 90 tablet 0    oxybutynin (DITROPAN-XL) 5 MG TR24 TAKE 1 TABLET BY MOUTH EVERY DAY      potassium chloride (KLOR-CON) 10 MEQ TbSR Take 1 tablet (10 mEq total) by mouth once daily. 30 tablet 3    VENTOLIN HFA 90 mcg/actuation inhaler       traZODone (DESYREL) 50 MG tablet Take 50 mg by mouth every evening.        No current facility-administered medications for this visit.                   REVIEW OF SYSTEMS:     Pulmonary related symptoms as per HPI.  Gen:  no weight loss, no fever, no night sweat  HEENT:  no visual changes, no sore throat, no hearing loss  CV:  No chest pain, no  "orthopnea, no PND  GI:  no melena, no hematochezia, no diarhea, no constipation.  :  no dysuria, no hematuria, no hesistancy, no dribbling  Neuro:  no syncope, no vertigo, no tinitus  Psych:  No homocide or suicide ideation; no depression.  Endocrine:  No heat or cold intolerance.  Sleep:  No snoring; no witnessed apnea.  Otherwise, a balance of systems reviewed is negative.          PHYSICAL EXAM:  Vitals:    11/01/19 1313   BP: 118/84   Pulse: 75   SpO2: 95%   Height: 5' 6" (1.676 m)   PainSc: 0-No pain     Body mass index is 37.43 kg/m².     GENERAL:  well develop; no apparent distress  HEENT:  no nasal congestion; no discharge noted; class 3 modified mallampatti.  Denture on top   NECK:  supple; no palpable masses.  CARDIO: regular rate and rhythm  PULM:  clear to auscultation bilaterally; no intercostals retractions; no accessory muscle usage   ABDOMEN:  soft nontender/nondistended.  +bowel sound  EXTREMITIES no cce; left aka  NEURO:  CN II-XII intact.  5/5 motor in all extremities.  sensation grossly intact   to light touch.  PSYCH:  normal affect.  Alert and oriented x 4    LABS  Pulmonary Functions Testing Results(personally reviewed):  None.  ABG (personally reviewed):  7.27/83/88/29 on 2 l nasal canula.  (10/29/19)  10/30/19 7.32/70/91/38     CT CHEST(personally reviewed):  1/6/19 Ct with bilateral mosaic attenuation bilaterally.   trace effusion bilaterally.  Enlarged pa at 46 mm.      bnp 9/20/19 389    Echo 7/18/19  · Normal left ventricular systolic function. The estimated ejection fraction is 60%  · Concentric left ventricular hypertrophy.  · Indeterminate left ventricular diastolic function.  · Moderate right ventricular enlargement.  · Mildly reduced right ventricular systolic function.  · Moderate right atrial enlargement.  · Mild to moderate tricuspid regurgitation.  · The estimated PA systolic pressure is 55 mm Hg  · Pulmonary hypertension present.    ASSESSMENT/PLAN  Problem List Items " Addressed This Visit     Advance care planning    Current Assessment & Plan     patient lives with .  Patient wish for intubation and cpr in the case of cardiopulmonary arrest.  Advance care brochure given to patient.           Chronic respiratory failure    Overview      pulmonary htn + volume overload.  No overt evidence of copd per ct.           Current Assessment & Plan     patient need home concetrator.  85% on room air without oxygen at rest.  Baseline pft.          Relevant Orders    Complete PFT with bronchodilator    ADEEL (obstructive sleep apnea)    Current Assessment & Plan     s/p sleep study at [facility].  Will try to obtain record.  Per patient, she has bipap at home 22/8.  Advise patient to bring bipap for next visit.          Pulmonary HTN - Primary    Overview     Group 2         Current Assessment & Plan     bumex 2 mg daily.           Relevant Orders    OXYGEN FOR HOME USE    Complete PFT with bronchodilator        Patient will Follow up in about 3 months (around 2/1/2020). with md/np.    This is 35 minutes visit, over 50% of time spent in direct consultation with patient.

## 2019-11-01 NOTE — ASSESSMENT & PLAN NOTE
s/p sleep study at [facility].  Will try to obtain record.  Per patient, she has bipap at home 22/8.  Advise patient to bring bipap for next visit.

## 2019-11-01 NOTE — ASSESSMENT & PLAN NOTE
patient lives with .  Patient wish for intubation and cpr in the case of cardiopulmonary arrest.  Advance care brochure given to patient.

## 2019-11-04 ENCOUNTER — PATIENT OUTREACH (OUTPATIENT)
Dept: ADMINISTRATIVE | Facility: CLINIC | Age: 64
End: 2019-11-04

## 2019-11-04 NOTE — PATIENT INSTRUCTIONS
COPD Flare    You have had a flare-up of your COPD.  COPD, or chronic obstructive pulmonary disease, is a common lung disease. It causes your airways to become irritated and narrower. This makes it harder for you to breathe. Emphysema and chronic bronchitis are both types of COPD. This is a chronic condition, which means you always have it. Sometimes it gets worse. When this happens, it is called a flare-up.  Symptoms of COPD  People with COPD may have symptoms most of the time. In a flare-up, your symptoms get worse. These symptoms may mean you are having a flare-up:  · Shortness of breath, shallow or rapid breathing, or wheezing that gets worse  · Lung infection  · Cough that gets worse  · More mucus, thicker mucus or mucus of a different color  · Tiredness, decreased energy, or trouble doing your usual activities  · Fever  · Chest tightness  · Your symptoms dont get better even when you use your usual medicines, inhalers, and nebulizer  · Trouble talking  · You feel confused  Causes of flare-ups  Unfortunately, a flare-up can happen even though you did everything right, and you followed your doctors instructions. Some causes of flare-ups are:  · Smoking or secondhand smoke  · Colds, the flu, or respiratory infections  · Air pollution  · Sudden change in the weather  · Dust, irritating chemicals, or strong fumes  · Not taking your medicines as prescribed  Home care  Here are some things you can do at home to treat a flare-up:  · Try not to panic. This makes it harder to breathe, and keeps you from doing the right things.  · Dont smoke or be around others who are smoking.  · Try to drink more fluids than usual during a flare-up, unless your doctor has told you not to because of heart and kidney problems. More fluids can help loosen the mucus.  · Use your inhalers and nebulizer, if you have one, as you have been told to.  · If you were given antibiotics, take them until they are used up or your doctor tells you  to stop. Its important to finish the antibiotics, even though you feel better. This will make sure the infection has cleared.  · If you were given prednisone or another steroid, finish it even if you feel better.  Preventing a flare-up  Even though flare-ups happen, the best way to treat one is to prevent it before it starts. Here are some pointers:  · Dont smoke or be around others who are smoking.  · Take your medicines as you have been told.  · Talk with your doctor about getting a flu shot every year. Also find out if you need a pneumonia shot.  · If there is a weather advisory warning to stay indoors, try to stay inside when possible.  · Try to eat healthy and get plenty of sleep.  · Try to avoid things that usually set you off, like dust, chemical fumes, hairsprays, or strong perfumes.  Follow-up care  Follow up with your healthcare provider, or as advised.  If a culture was done, you will be told if your treatment needs to be changed. You can call as directed for the results.  If X-rays were done, you will be notified of any new findings that may affect your care.  Call 911  Call 911 if any of these occur:  · You have trouble breathing  · You feel confused or its difficult to wake you up  · You faint or lose consciousness  · You have a rapid heart rate  · You have new pain in your chest, arm, shoulder, neck or upper back  When to seek medical advice  Call your healthcare provider right away if any of these occur:  · Wheezing or shortness of breath gets worse  · You need to use your inhalers more often than usual without relief  · Fever of 100.4°F (38ºC) or higher, or as directed by your healthcare provider  · Coughing up lots of dark-colored or bloody mucus (sputum)  · Chest pain with each breath  · You do not start to get better within 24 hours  · Swelling of your ankles gets worse  · Dizziness or weakness  Date Last Reviewed: 9/1/2016  © 5937-2017 [employer]. [address] [facility],  [place] [address]. All rights reserved. This information is not intended as a substitute for professional medical care. Always follow your healthcare professional's instructions.

## 2019-11-06 ENCOUNTER — TELEPHONE (OUTPATIENT)
Dept: HOME HEALTH SERVICES | Facility: HOSPITAL | Age: 64
End: 2019-11-06

## 2019-11-12 ENCOUNTER — TELEPHONE (OUTPATIENT)
Dept: HOME HEALTH SERVICES | Facility: HOSPITAL | Age: 64
End: 2019-11-12

## 2019-11-23 LAB
ACID FAST MOD KINY STN SPEC: NORMAL
MYCOBACTERIUM SPEC QL CULT: NORMAL

## 2019-11-25 ENCOUNTER — OFFICE VISIT (OUTPATIENT)
Dept: UROLOGY | Facility: CLINIC | Age: 64
End: 2019-11-25
Payer: MEDICARE

## 2019-11-25 VITALS
SYSTOLIC BLOOD PRESSURE: 138 MMHG | HEIGHT: 66 IN | WEIGHT: 235 LBS | DIASTOLIC BLOOD PRESSURE: 84 MMHG | BODY MASS INDEX: 37.77 KG/M2

## 2019-11-25 DIAGNOSIS — N31.9 NEUROGENIC BLADDER: ICD-10-CM

## 2019-11-25 DIAGNOSIS — N20.0 KIDNEY STONES: Primary | ICD-10-CM

## 2019-11-25 DIAGNOSIS — R33.9 INCOMPLETE BLADDER EMPTYING: ICD-10-CM

## 2019-11-25 PROCEDURE — 3075F SYST BP GE 130 - 139MM HG: CPT | Mod: S$GLB,,, | Performed by: NURSE PRACTITIONER

## 2019-11-25 PROCEDURE — 99999 PR PBB SHADOW E&M-EST. PATIENT-LVL IV: ICD-10-PCS | Mod: PBBFAC,,, | Performed by: NURSE PRACTITIONER

## 2019-11-25 PROCEDURE — 3075F PR MOST RECENT SYSTOLIC BLOOD PRESS GE 130-139MM HG: ICD-10-PCS | Mod: S$GLB,,, | Performed by: NURSE PRACTITIONER

## 2019-11-25 PROCEDURE — 3008F PR BODY MASS INDEX (BMI) DOCUMENTED: ICD-10-PCS | Mod: S$GLB,,, | Performed by: NURSE PRACTITIONER

## 2019-11-25 PROCEDURE — 99214 OFFICE O/P EST MOD 30 MIN: CPT | Mod: S$GLB,,, | Performed by: NURSE PRACTITIONER

## 2019-11-25 PROCEDURE — 99214 PR OFFICE/OUTPT VISIT, EST, LEVL IV, 30-39 MIN: ICD-10-PCS | Mod: S$GLB,,, | Performed by: NURSE PRACTITIONER

## 2019-11-25 PROCEDURE — 3008F BODY MASS INDEX DOCD: CPT | Mod: S$GLB,,, | Performed by: NURSE PRACTITIONER

## 2019-11-25 PROCEDURE — 99999 PR PBB SHADOW E&M-EST. PATIENT-LVL IV: CPT | Mod: PBBFAC,,, | Performed by: NURSE PRACTITIONER

## 2019-11-25 PROCEDURE — 3079F PR MOST RECENT DIASTOLIC BLOOD PRESSURE 80-89 MM HG: ICD-10-PCS | Mod: S$GLB,,, | Performed by: NURSE PRACTITIONER

## 2019-11-25 PROCEDURE — 3079F DIAST BP 80-89 MM HG: CPT | Mod: S$GLB,,, | Performed by: NURSE PRACTITIONER

## 2019-11-25 RX ORDER — OXYCODONE AND ACETAMINOPHEN 5; 325 MG/1; MG/1
TABLET ORAL
Refills: 0 | COMMUNITY
Start: 2019-11-19 | End: 2020-01-15 | Stop reason: DRUGHIGH

## 2019-11-25 RX ORDER — OXYBUTYNIN CHLORIDE 5 MG/1
5 TABLET, EXTENDED RELEASE ORAL DAILY
Qty: 90 TABLET | Refills: 3 | Status: SHIPPED | OUTPATIENT
Start: 2019-11-25 | End: 2021-02-03 | Stop reason: SDUPTHER

## 2019-11-25 NOTE — PROGRESS NOTES
Subjective:       Patient ID: Mary Ellen Fajardo is a 63 y.o. female who is an established patient of Dr. Tinoco though new to me was last seen in this office 1/16/19    Chief Complaint:   Chief Complaint   Patient presents with    Follow-up     Follow up bladder infection with Cathether and request refills on medication     Neurogenic Bladder  Her bladder is managed with a suprapubic catheter. She has had one since March 2011. Her  changes her 18 Fr catheter every 20 days.  She had a routine ALBERTINA suggesting a right kidney stone.  Follow up CT showed a right staghorn calculus and large bladder stone.    Stones  She had a cystolitholapaxy on 12/20/2017 with right stent placement.  She had a Right PCNL on Friday 1/19/2018.  She then had a second look PCNL on Monday 1/29/2018.  She then had a right ureteroscopy and laser lithotripsy on 3/5/2018.  Her stent was removed without difficulty on 4/9/2018.  She denies hematuria or flank pain.    CT scan 11/18--non obstructing right renal stones. She was admitted in 10/2019 due to respiratory failure. UCx--+ E coli at that time. Of note, UTI treated with IV abx at that time    She returns today with a renal ultrasound. Denies flank pain, gross hematuria or fever. Reports her suprapubic catheter was changed last week by her . She is also requesting refills on Ditropan XL 5 mg. Overall she feels well. No new complaints    ACTIVE MEDICAL ISSUES:  Patient Active Problem List   Diagnosis    Paraparesis    Gait disorder    Neurogenic bladder    Knee pain, bilateral    S/P knee replacement    OA (osteoarthritis) of knee    Primary osteoarthritis of left knee    Poor motor control of trunk    Decreased range of motion of lower extremity    Bilateral leg weakness    Chronic knee pain    Bladder stone    Staghorn calculus    Kidney stones    Essential hypertension    Neuropathy    Primary insomnia    ADEEL (obstructive sleep apnea)    Acute on chronic  respiratory failure with hypoxia and hypercapnia    Opioid overdose    COPD (chronic obstructive pulmonary disease)    Class 3 severe obesity in adult    Chronic hepatitis C    Chronic indwelling suprapubic catheter in place    Bipolar I disorder, current or most recent episode depressed, with psychotic features    History of CVA (cerebrovascular accident)    COPD with acute exacerbation    Infection due to urethral catheter    Septic arthritis of knee, left    Acute on chronic respiratory failure with hypercapnia    Acute on chronic diastolic heart failure    Mood insomnia    Hx of colonic polyp    Family history of colon cancer in father    Chronic respiratory failure    Acute on chronic respiratory acidosis    Chronic diastolic heart failure    Anemia    Hypercapnic respiratory failure    Pulmonary HTN    Advance care planning       ALLERGIES AND MEDICATIONS: updated and reviewed.  Review of patient's allergies indicates:   Allergen Reactions    Tuberculin ppd Itching and Dermatitis     Patient has old scare that she claims is from TB PPD    Rocephin [ceftriaxone] Rash     Rash 2012? Pt agreeable to try with pre mediation with benadryl.      Current Outpatient Medications   Medication Sig    albuterol-ipratropium (DUO-NEB) 2.5 mg-0.5 mg/3 mL nebulizer solution Take 3 mLs by nebulization every 4 (four) hours as needed for Wheezing.    baclofen (LIORESAL) 20 MG tablet 20 mg 2 (two) times daily.     bumetanide (BUMEX) 2 MG tablet Take 2 mg by mouth once daily.     catheter 18 Fr Misc Change every 20 days    FLUoxetine 10 MG capsule TAKE 1 CAPSULE(10 MG) BY MOUTH DAILY    gabapentin (NEURONTIN) 300 MG capsule 600 mg 3 (three) times daily.     hydrOXYzine HCl (ATARAX) 25 MG tablet Take 1 tablet (25 mg total) by mouth 3 (three) times daily.    ipratropium (ATROVENT) 0.02 % nebulizer solution U 1 VIAL VIA NEBULIZER Q 4 H WHILE AWAKE    miscellaneous medical supply (C-TUB) Misc NEEDS TO  "SW[employer] FOR OXYGEN AT 2L. LAST OXYGEN SATURATION WAS 83% ON Room Air. She uses  BIPAP and  Needs  New mask, tubings and    OLANZapine (ZYPREXA) 5 MG tablet Take 1 tablet (5 mg total) by mouth every evening.    oxybutynin (DITROPAN-XL) 5 MG TR24 Take 1 tablet (5 mg total) by mouth once daily.    oxyCODONE-acetaminophen (PERCOCET) 5-325 mg per tablet TK ONE T PO BID PRN P    potassium chloride (KLOR-CON) 10 MEQ TbSR Take 1 tablet (10 mEq total) by mouth once daily.    VENTOLIN HFA 90 mcg/actuation inhaler     traZODone (DESYREL) 50 MG tablet Take 50 mg by mouth every evening.      No current facility-administered medications for this visit.        Review of Systems   Constitutional: Negative for activity change, chills, fatigue, fever and unexpected weight change.   Eyes: Negative for discharge, redness and visual disturbance.   Respiratory: Negative for cough, shortness of breath and wheezing.    Cardiovascular: Negative for chest pain and leg swelling.   Gastrointestinal: Negative for abdominal distention, abdominal pain, constipation, diarrhea, nausea and vomiting.   Genitourinary: Negative for decreased urine volume, difficulty urinating, dysuria, flank pain, frequency, hematuria, pelvic pain and urgency.   Musculoskeletal: Negative for arthralgias, joint swelling and myalgias.   Skin: Negative for color change and rash.   Neurological: Negative for dizziness and light-headedness.   Psychiatric/Behavioral: Negative for behavioral problems and confusion. The patient is not nervous/anxious.        Objective:      Vitals:    11/25/19 1048   BP: 138/84   BP Location: Right arm   Patient Position: Sitting   BP Method: Large (Manual)   Weight: 106.6 kg (235 lb)   Height: 5' 6" (1.676 m)     Physical Exam   Constitutional: She is oriented to person, place, and time. She appears well-developed.   HENT:   Head: Normocephalic and atraumatic.   Nose: Nose normal.   Eyes: Conjunctivae are normal. Right eye " exhibits no discharge. Left eye exhibits no discharge.   Neck: Normal range of motion. Neck supple. No tracheal deviation present. No thyromegaly present.   Cardiovascular: Normal rate and regular rhythm.    Pulmonary/Chest: Effort normal. No respiratory distress. She has no wheezes.   Abdominal: Soft. She exhibits no distension. There is no hepatosplenomegaly. There is no tenderness. There is no CVA tenderness. No hernia.   Genitourinary:   Genitourinary Comments: SPT draining yellow urine   Musculoskeletal: She exhibits no edema.   L AKA. Wheelchair   Neurological: She is alert and oriented to person, place, and time.   Skin: Skin is warm and dry. No rash noted. No erythema.     Psychiatric: She has a normal mood and affect. Her behavior is normal. Judgment normal.       Urine dipstick shows not done.     Narrative     EXAMINATION:  US RETROPERITONEAL COMPLETE    CLINICAL HISTORY:  Calculus of kidney    TECHNIQUE:  Ultrasound of the kidneys and urinary bladder was performed including color flow and Doppler evaluation of the kidneys.    COMPARISON:  Renal ultrasound 11/05/2018    FINDINGS:  Right kidney: The right kidney measures 9.8 x 4.6 x 5.1 cm with a resistive index of 0.59.  There is no hydronephrosis.  There is a approximately 0.6 x 0.4 x 0.8 cm echogenic mildly shadowing midpole right renal calculus.  There is no hydronephrosis.  The rest of the renal parenchyma appears to be within normal limits.  No abnormal perinephric fluid collections.    Left kidney: The left kidney measures approximately 11.6 x 4.4 x 4.7 cm with a resistive index of 0.54.  No renal masses or renal calculi or abnormal perinephric fluid collections.    The urinary bladder is incompletely distended.   Impression       1. Nonobstructing right midpole renal calculus.  2. Renal ultrasound otherwise is unremarkable.      Electronically signed by: Keith Adams MD  Date: 10/15/2019  Time: 15:51     Reviewed with patient    Assessment:       1.  Kidney stones    2. Neurogenic bladder    3. Incomplete bladder emptying          Plan:       1. Kidney stones  -ALBERTINA 10/19--non obstructing right renal stone  -She is not inclined to having any procedures at this time  - X-Ray Abdomen AP 1 View; Future    2. Neurogenic bladder  -Stable  -SPT  - oxybutynin (DITROPAN-XL) 5 MG TR24; Take 1 tablet (5 mg total) by mouth once daily.  Dispense: 90 tablet; Refill: 3    3. Incomplete bladder emptying  -Managed with SPT            Follow up in about 6 months (around 5/25/2020) for Review X-ray.

## 2019-12-07 DIAGNOSIS — F31.5 BIPOLAR I DISORDER, CURRENT OR MOST RECENT EPISODE DEPRESSED, WITH PSYCHOTIC FEATURES WITH ANXIOUS DISTRESS: ICD-10-CM

## 2019-12-08 RX ORDER — FLUOXETINE 10 MG/1
CAPSULE ORAL
Qty: 90 CAPSULE | Refills: 0 | OUTPATIENT
Start: 2019-12-08 | End: 2020-01-15 | Stop reason: SDUPTHER

## 2019-12-09 RX ORDER — OLANZAPINE 5 MG/1
TABLET ORAL
Qty: 90 TABLET | Refills: 0 | OUTPATIENT
Start: 2019-12-09

## 2019-12-10 ENCOUNTER — TELEPHONE (OUTPATIENT)
Dept: HOME HEALTH SERVICES | Facility: HOSPITAL | Age: 64
End: 2019-12-10

## 2019-12-20 RX ORDER — GABAPENTIN 600 MG/1
TABLET ORAL
Qty: 90 TABLET | Refills: 5 | Status: ON HOLD | OUTPATIENT
Start: 2019-12-20 | End: 2023-08-11 | Stop reason: SDUPTHER

## 2020-01-03 ENCOUNTER — HOSPITAL ENCOUNTER (OUTPATIENT)
Dept: RESPIRATORY THERAPY | Facility: HOSPITAL | Age: 65
Discharge: HOME | End: 2020-01-03
Attending: INTERNAL MEDICINE
Payer: MEDICARE

## 2020-01-03 VITALS — HEART RATE: 56 BPM | RESPIRATION RATE: 18 BRPM

## 2020-01-03 DIAGNOSIS — J96.12 CHRONIC RESPIRATORY FAILURE WITH HYPOXIA AND HYPERCAPNIA: ICD-10-CM

## 2020-01-03 DIAGNOSIS — J96.11 CHRONIC RESPIRATORY FAILURE WITH HYPOXIA AND HYPERCAPNIA: ICD-10-CM

## 2020-01-03 DIAGNOSIS — I27.20 PULMONARY HTN: ICD-10-CM

## 2020-01-03 LAB
BRPFT: ABNORMAL
DLCO ADJ PRE: 10.91 ML/(MIN*MMHG) (ref 17.51–28.98)
DLCO SINGLE BREATH LLN: 17.51
DLCO SINGLE BREATH PRE REF: 46.9 %
DLCO SINGLE BREATH REF: 23.24
DLCOC SBVA LLN: 3.06
DLCOC SBVA PRE REF: 116.3 %
DLCOC SBVA REF: 4.44
DLCOC SINGLE BREATH LLN: 17.51
DLCOC SINGLE BREATH PRE REF: 46.9 %
DLCOC SINGLE BREATH REF: 23.24
DLCOVA LLN: 3.06
DLCOVA PRE REF: 116.3 %
DLCOVA PRE: 5.17 ML/(MIN*MMHG*L) (ref 3.06–5.82)
DLCOVA REF: 4.44
DLVAADJ PRE: 5.17 ML/(MIN*MMHG*L) (ref 3.06–5.82)
ERVN2 LLN: 0.76
ERVN2 PRE REF: 14.5 %
ERVN2 PRE: 0.11 L (ref 0.76–0.76)
ERVN2 REF: 0.76
FEF 25 75 CHG: 64.1 %
FEF 25 75 LLN: 0.79
FEF 25 75 POST REF: 35.3 %
FEF 25 75 PRE REF: 21.5 %
FEF 25 75 REF: 1.93
FET100 CHG: -9.8 %
FEV1 CHG: 16.1 %
FEV1 FVC CHG: 6.1 %
FEV1 FVC LLN: 67
FEV1 FVC POST REF: 94.5 %
FEV1 FVC PRE REF: 89 %
FEV1 FVC REF: 79
FEV1 LLN: 1.56
FEV1 POST REF: 42.2 %
FEV1 PRE REF: 36.4 %
FEV1 REF: 2.17
FRCN2 LLN: 1.98
FRCN2 PRE REF: 58.1 %
FRCN2 REF: 2.8
FVC CHG: 9.3 %
FVC LLN: 2.01
FVC POST REF: 44.4 %
FVC PRE REF: 40.6 %
FVC REF: 2.77
IVC PRE: 1.12 L (ref 2.01–3.52)
IVC SINGLE BREATH LLN: 2.01
IVC SINGLE BREATH PRE REF: 40.6 %
IVC SINGLE BREATH REF: 2.77
PEF CHG: 17.7 %
PEF LLN: 3.49
PEF POST REF: 75.6 %
PEF PRE REF: 64.2 %
PEF REF: 5.61
POST FEF 25 75: 0.68 L/S (ref 0.79–3.06)
POST FET 100: 7.51 SEC
POST FEV1 FVC: 74.69 % (ref 66.95–91.18)
POST FEV1: 0.92 L (ref 1.56–2.79)
POST FVC: 1.23 L (ref 2.01–3.52)
POST PEF: 4.24 L/S (ref 3.49–7.73)
PRE DLCO: 10.91 ML/(MIN*MMHG) (ref 17.51–28.98)
PRE FEF 25 75: 0.41 L/S (ref 0.79–3.06)
PRE FET 100: 8.33 SEC
PRE FEV1 FVC: 70.37 % (ref 66.95–91.18)
PRE FEV1: 0.79 L (ref 1.56–2.79)
PRE FRC N2: 1.63 L
PRE FVC: 1.12 L (ref 2.01–3.52)
PRE PEF: 3.6 L/S (ref 3.49–7.73)
RVN2 LLN: 1.47
RVN2 PRE REF: 64 %
RVN2 PRE: 1.31 L (ref 1.47–2.62)
RVN2 REF: 2.05
RVN2TLCN2 LLN: 31.13
RVN2TLCN2 PRE REF: 132.4 %
RVN2TLCN2 PRE: 53.91 % (ref 31.13–50.31)
RVN2TLCN2 REF: 40.72
TLCN2 LLN: 4.25
TLCN2 PRE REF: 46.4 %
TLCN2 PRE: 2.43 L (ref 4.25–6.22)
TLCN2 REF: 5.23
VA PRE: 2.11 L (ref 5.08–5.08)
VA SINGLE BREATH LLN: 5.08
VA SINGLE BREATH PRE REF: 41.6 %
VA SINGLE BREATH REF: 5.08
VCMAXN2 LLN: 2.01
VCMAXN2 PRE REF: 40.5 %
VCMAXN2 PRE: 1.12 L (ref 2.01–3.52)
VCMAXN2 REF: 2.77

## 2020-01-03 PROCEDURE — 94060 EVALUATION OF WHEEZING: CPT | Mod: 26,,, | Performed by: INTERNAL MEDICINE

## 2020-01-03 PROCEDURE — 94727 PR PULM FUNCTION TEST BY GAS: ICD-10-PCS | Mod: 26,,, | Performed by: INTERNAL MEDICINE

## 2020-01-03 PROCEDURE — 94060 PR EVAL OF BRONCHOSPASM: ICD-10-PCS | Mod: 26,,, | Performed by: INTERNAL MEDICINE

## 2020-01-03 PROCEDURE — 94729 DIFFUSING CAPACITY: CPT | Mod: 26,,, | Performed by: INTERNAL MEDICINE

## 2020-01-03 PROCEDURE — 94729 PR C02/MEMBANE DIFFUSE CAPACITY: ICD-10-PCS | Mod: 26,,, | Performed by: INTERNAL MEDICINE

## 2020-01-03 PROCEDURE — 25000242 PHARM REV CODE 250 ALT 637 W/ HCPCS: Performed by: INTERNAL MEDICINE

## 2020-01-03 PROCEDURE — 94727 GAS DIL/WSHOT DETER LNG VOL: CPT | Mod: 26,,, | Performed by: INTERNAL MEDICINE

## 2020-01-03 RX ORDER — ALBUTEROL SULFATE 2.5 MG/.5ML
2.5 SOLUTION RESPIRATORY (INHALATION) ONCE
Status: COMPLETED | OUTPATIENT
Start: 2020-01-03 | End: 2020-01-03

## 2020-01-03 RX ADMIN — ALBUTEROL SULFATE 2.5 MG: 2.5 SOLUTION RESPIRATORY (INHALATION) at 01:01

## 2020-01-14 NOTE — PROGRESS NOTES
Outpatient Psychiatry Follow-Up Visit (MD/NP)    1/15/2020    Clinical Status of Patient:  Outpatient (Ambulatory)    Chief Complaint:  Mary Ellen Fajardo is a 64 y.o. female who presents today for follow-up of mood disorder, anxiety, psychosis and insomnia.  Met with patient.      Interval History and Content of Current Session:  Interim Events/Subjective Report/Content of Current Session: Mary Ellen  was last seen by me on 10/17/2019 for a follow up visit. Mary Ellen was diagnosed with Bipolar disorder, depressed, with psychotic features with anxious distress, mood insomnia and STANLEY. She was doing well and a plan of care was devised to include:    1. Safety: Call 911 or Crisis Line or go to ER for suicidal ideation, adverse effects of medication or any other emergency  2. Continue Olanzapine 5 mg po qhs to target mood.  3. Continue Prozac 10 mg po qd to target symptoms of anxiety and depression/mood. States does not need refill because done by another provider.  4. Continue Atarax 25 mg po TID prn to target anxiety/sleep as needed. States does not need refill because done by another provider.  5. Continue Trazodone 100 mg po qhs prn sleep. States does not need refill because done by another provider.  6. Continue psychotherapy.   7. Return to clinic in 3 months or sooner prn     Today Mary Ellen is seen face to face.  Her mood is good. She states that on rare occassions she sees something out of the corner of her eye but this is not bothering her. Her anxiety is under control. Her sleep is poor with Trazodone 50 mg po qhs. She states she sleeps about 4 hours at a time and this does not feel restful. She states she has not been taking 100 mgs of Trazodone. She states she thought she was suppose to be breaking these tablets in half. She is advised to start 100 mgs of Trazodone each night. She verbalizes understanding and her plan to comply. Her appetite is good. Her energy level is good. She is no longer in psychotherapy  and states she does not need it now and does not want to go. Her  is still on hospice for COPD and CHF. However, the leaves the house regularly.     Hospital Outpatient Visit on 01/03/2020   Component Date Value Ref Range Status    Interpretation 01/03/2020    Final                    Value:Spirometry suggests significant restriction, although the reduced FEF 25 - 75 suggests mild obstruction may also be present. Spirometry shows borderline improvement following bronchodilator. Lung volumes show severe restriction. DLCO is moderately   decreased.     Notes:    The failure to demonstrate improvement in spirometry does not preclude a clinical response to a trial of bronchodilators. No recent hemoglobin value available.  DLCO interpretation assumes a normal hemoglobin value.       Post FVC 01/03/2020 1.23* 2.01 - 3.52 L Final    Post FEV1 01/03/2020 0.92* 1.56 - 2.79 L Final    Post FEV1 FVC 01/03/2020 74.69  66.95 - 91.18 % Final    Post FEF 25 75 01/03/2020 0.68* 0.79 - 3.06 L/s Final    Post PEF 01/03/2020 4.24  3.49 - 7.73 L/s Final    Post  01/03/2020 7.51  sec Final    Pre DLCO 01/03/2020 10.91* 17.51 - 28.98 ml/(min*mmHg) Final    DLCO ADJ PRE 01/03/2020 10.91* 17.51 - 28.98 ml/(min*mmHg) Final    DLCOVA PRE 01/03/2020 5.17  3.06 - 5.82 ml/(min*mmHg*L) Final    DLVAAdj PRE 01/03/2020 5.17  3.06 - 5.82 ml/(min*mmHg*L) Final    VA PRE 01/03/2020 2.11* 5.08 - 5.08 L Final    IVC PRE 01/03/2020 1.12* 2.01 - 3.52 L Final    TLCN2 PRE 01/03/2020 2.43* 4.25 - 6.22 L Final    VCMAXN2 PRE 01/03/2020 1.12* 2.01 - 3.52 L Final    Pre FRC N2 01/03/2020 1.63  L Final    ERVN2 PRE 01/03/2020 0.11* 0.76 - 0.76 L Final    RVN2 PRE 01/03/2020 1.31* 1.47 - 2.62 L Final    [id number] PRE 01/03/2020 53.91* 31.13 - 50.31 % Final    Pre FVC 01/03/2020 1.12* 2.01 - 3.52 L Final    Pre FEV1 01/03/2020 0.79* 1.56 - 2.79 L Final    Pre FEV1 FVC 01/03/2020 70.37  66.95 - 91.18 % Final    Pre FEF 25 75  01/03/2020 0.41* 0.79 - 3.06 L/s Final    Pre PEF 01/03/2020 3.60  3.49 - 7.73 L/s Final    Pre  01/03/2020 8.33  sec Final    FVC Ref 01/03/2020 2.77   Final    FVC LLN 01/03/2020 2.01   Final    FVC Pre Ref 01/03/2020 40.6  % Final    FVC Post Ref 01/03/2020 44.4  % Final    FVC Chg 01/03/2020 9.3  % Final    FEV1 Ref 01/03/2020 2.17   Final    FEV1 LLN 01/03/2020 1.56   Final    FEV1 Pre Ref 01/03/2020 36.4  % Final    FEV1 Post Ref 01/03/2020 42.2  % Final    FEV1 Chg 01/03/2020 16.1  % Final    FEV1 FVC Ref 01/03/2020 79   Final    FEV1 FVC LLN 01/03/2020 67   Final    FEV1 FVC Pre Ref 01/03/2020 89.0  % Final    FEV1 FVC Post Ref 01/03/2020 94.5  % Final    FEV1 FVC Chg 01/03/2020 6.1  % Final    FEF 25 75 Ref 01/03/2020 1.93   Final    FEF 25 75 LLN 01/03/2020 0.79   Final    FEF 25 75 Pre Ref 01/03/2020 21.5  % Final    FEF 25 75 Post Ref 01/03/2020 35.3  % Final    FEF 25 75 Chg 01/03/2020 64.1  % Final    PEF Ref 01/03/2020 5.61   Final    PEF LLN 01/03/2020 3.49   Final    PEF Pre Ref 01/03/2020 64.2  % Final    PEF Post Ref 01/03/2020 75.6  % Final    PEF Chg 01/03/2020 17.7  % Final    [id number] Chg 01/03/2020 -9.8  % Final    TLCN2 Ref 01/03/2020 5.23   Final    TLCN2 LLN 01/03/2020 4.25   Final    TLCN2 Pre Ref 01/03/2020 46.4  % Final    VCMAXN2 Ref 01/03/2020 2.77   Final    VCMAXN2 LLN 01/03/2020 2.01   Final    VCMAXN2 Pre Ref 01/03/2020 40.5  % Final    FRCN2 Ref 01/03/2020 2.80   Final    FRCN2 LLN 01/03/2020 1.98   Final    FRCN2 Pre Ref 01/03/2020 58.1  % Final    ERVN2 Ref 01/03/2020 0.76   Final    ERVN2 LLN 01/03/2020 0.76   Final    ERVN2 Pre Ref 01/03/2020 14.5  % Final    RVN2 Ref 01/03/2020 2.05   Final    RVN2 LLN 01/03/2020 1.47   Final    RVN2 Pre Ref 01/03/2020 64.0  % Final    [id number] Ref 01/03/2020 40.72   Final    [id number] LLN 01/03/2020 31.13   Final    [id number] Pre Ref 01/03/2020 132.4  % Final    DLCO Single Breath Ref  01/03/2020 23.24   Final    DLCO Single Breath LLN 01/03/2020 17.51   Final    DLCO Single Breath Pre Ref 01/03/2020 46.9  % Final    DLCOc Single Breath Ref 01/03/2020 23.24   Final    DLCOc Single Breath LLN 01/03/2020 17.51   Final    DLCOc Single Breath Pre Ref 01/03/2020 46.9  % Final    DLCOVA Ref 01/03/2020 4.44   Final    DLCOVA LLN 01/03/2020 3.06   Final    DLCOVA Pre Ref 01/03/2020 116.3  % Final    DLCOc SBVA Ref 01/03/2020 4.44   Final    DLCOc SBVA LLN 01/03/2020 3.06   Final    DLCOc SBVA Pre Ref 01/03/2020 116.3  % Final    VA Single Breath Ref 01/03/2020 5.08   Final    VA Single Breath LLN 01/03/2020 5.08   Final    VA Single Breath Pre Ref 01/03/2020 41.6  % Final    IVC Single Breath Ref 01/03/2020 2.77   Final    IVC Single Breath LLN 01/03/2020 2.01   Final    IVC Single Breath Pre Ref 01/03/2020 40.6  % Final   No results displayed because visit has over 200 results.      Patient Outreach on 10/22/2019   Component Date Value Ref Range Status    Pap 03/14/2018 Negative for intraephithelial lesion or malignancy  Negative for intraephithelial lesion or malignancy, Other Final    Dr. Cora Gar ( [facility] )    HPV, high-risk 03/14/2018 not detected   Final    Dr. Cora Gar ( [facility] )   ]    Psychotherapy:  · Target symptoms: anxiety , mood disorder, psychosis, insomnia  · Why chosen therapy is appropriate versus another modality: relevant to diagnosis, patient responds to this modality, evidence based practice  · Outcome monitoring methods: self-report, observation  · Therapeutic intervention type: supportive psychotherapy  · Topics discussed/themes: relationships difficulties, illness/death of a loved one, sleep, symptoms, medications  · The patient's response to the intervention is accepting. The patient's progress toward treatment goals is good.   · Duration of intervention: 18 minutes.    Review of Systems   PSYCHIATRIC:  "Pertinant items are noted in the narrative.  GENERAL:  Morbidly obese  SKIN:  No rashes or lacerations  HEAD:  No headaches  EYES:  No exophthalmos, jaundice or blindness, had right cataract removed  EARS:  No dizziness, tinnitus or hearing loss  CHEST:  oxygen via nasal cannula/inogen (has COPD)  CARDIOVASCULAR:  No tachycardia or chest pain  ABDOMEN:  No nausea, vomiting, pain, constipation or diarrhea  URINARY:  Has urinary catheter  NEUROLOGIC:  No abnormal movements    Past Medical, Family and Social History: The patient's past medical, family and social history have been reviewed and updated as appropriate within the electronic medical record - see encounter notes.    Compliance: yes    Side effects: None    Risk Parameters:  Patient reports no suicidal ideation  Patient reports no homicidal ideation  Patient reports no self-injurious behavior  Patient reports no violent behavior    Exam (detailed: at least 9 elements; comprehensive: all 15 elements)   Constitutional  Vitals:  Most recent vital signs, dated less than 90 days prior to this appointment, were reviewed.   Vitals:    01/15/20 0730   BP: 128/78   Pulse: 62   Weight: 106.6 kg (235 lb)   Height: 5' 6" (1.676 m)        General:  unremarkable, age appropriate     Musculoskeletal  Muscle Strength/Tone:  no tremor, no tic   Gait & Station:  in wheelchair     Psychiatric  Speech:  no latency; no press   Mood & Affect:  " pretty good".  congruent and appropriate   Thought Process:  normal and logical   Associations:  intact   Thought Content:  normal, no suicidality, no homicidality, delusions, or paranoia, infrequent mild visual hallucination   Insight:  has awareness of illness   Judgement: behavior is adequate to circumstances   Orientation:  grossly intact, person, place, situation   Memory: intact for content of interview   Language: grossly intact   Attention Span & Concentration:  able to focus   Fund of Knowledge:  intact and appropriate to age and " level of education     Assessment and Diagnosis   Status/Progress: Based on the examination today, the patient's problem(s) is/are resolving.  New problems have not been presented today.   Lack of compliance are not complicating management of the primary condition.  There are no active rule-out diagnoses for this patient at this time.    General Impression: she is doing well but is having problems with sleep.       ICD-10-CM ICD-9-CM   1. Bipolar I disorder, current or most recent episode depressed, with psychotic features with anxious distress F31.5 296.54   2. Mood insomnia F51.05 [id number]    F39    3. STANLEY (generalized anxiety disorder) F41.1 300.02       Intervention/Counseling/Treatment Plan   · Medication Management: The risks and benefits of medication were discussed with the patient.  · The treatment plan and follow up plan were reviewed with the patient.   1. Safety: Call 911 or Crisis Line or go to ER for suicidal ideation, adverse effects of medication or any other emergency  2. Continue Olanzapine 5 mg po qhs to target mood, anxiety and to help with sleep.  3. Continue Prozac 10 mg po qd to target symptoms of anxiety and depression/mood.   4. Continue Atarax 25 mg po TID prn to target anxiety/sleep as needed.   5. Start Trazodone 100 mg po qhs prn sleep.   6. Return to clinic in 3 months or sooner prn.     Patient agrees with POC.    INSTRUCTIONS  Instructed to call 911 or Crisis Line or go to ER for suicidal ideation, adverse effects of medication or any other emergency. Verbalizes understanding and plan to comply.      Return to Clinic: 3 months, as needed

## 2020-01-15 ENCOUNTER — OFFICE VISIT (OUTPATIENT)
Dept: PSYCHIATRY | Facility: CLINIC | Age: 65
End: 2020-01-15
Payer: MEDICARE

## 2020-01-15 VITALS
SYSTOLIC BLOOD PRESSURE: 128 MMHG | HEART RATE: 62 BPM | DIASTOLIC BLOOD PRESSURE: 78 MMHG | HEIGHT: 66 IN | BODY MASS INDEX: 37.77 KG/M2 | WEIGHT: 235 LBS

## 2020-01-15 DIAGNOSIS — F41.1 GAD (GENERALIZED ANXIETY DISORDER): ICD-10-CM

## 2020-01-15 DIAGNOSIS — F31.5 BIPOLAR I DISORDER, CURRENT OR MOST RECENT EPISODE DEPRESSED, WITH PSYCHOTIC FEATURES WITH ANXIOUS DISTRESS: Primary | ICD-10-CM

## 2020-01-15 DIAGNOSIS — F39 MOOD INSOMNIA: ICD-10-CM

## 2020-01-15 DIAGNOSIS — F51.05 MOOD INSOMNIA: ICD-10-CM

## 2020-01-15 PROCEDURE — 3078F PR MOST RECENT DIASTOLIC BLOOD PRESSURE < 80 MM HG: ICD-10-PCS | Mod: S$GLB,,, | Performed by: NURSE PRACTITIONER

## 2020-01-15 PROCEDURE — 99214 PR OFFICE/OUTPT VISIT, EST, LEVL IV, 30-39 MIN: ICD-10-PCS | Mod: S$GLB,,, | Performed by: NURSE PRACTITIONER

## 2020-01-15 PROCEDURE — 3074F PR MOST RECENT SYSTOLIC BLOOD PRESSURE < 130 MM HG: ICD-10-PCS | Mod: S$GLB,,, | Performed by: NURSE PRACTITIONER

## 2020-01-15 PROCEDURE — 3008F PR BODY MASS INDEX (BMI) DOCUMENTED: ICD-10-PCS | Mod: S$GLB,,, | Performed by: NURSE PRACTITIONER

## 2020-01-15 PROCEDURE — 99999 PR PBB SHADOW E&M-EST. PATIENT-LVL III: CPT | Mod: PBBFAC,,, | Performed by: NURSE PRACTITIONER

## 2020-01-15 PROCEDURE — 3008F BODY MASS INDEX DOCD: CPT | Mod: S$GLB,,, | Performed by: NURSE PRACTITIONER

## 2020-01-15 PROCEDURE — 99214 OFFICE O/P EST MOD 30 MIN: CPT | Mod: S$GLB,,, | Performed by: NURSE PRACTITIONER

## 2020-01-15 PROCEDURE — 3078F DIAST BP <80 MM HG: CPT | Mod: S$GLB,,, | Performed by: NURSE PRACTITIONER

## 2020-01-15 PROCEDURE — 3074F SYST BP LT 130 MM HG: CPT | Mod: S$GLB,,, | Performed by: NURSE PRACTITIONER

## 2020-01-15 PROCEDURE — 99999 PR PBB SHADOW E&M-EST. PATIENT-LVL III: ICD-10-PCS | Mod: PBBFAC,,, | Performed by: NURSE PRACTITIONER

## 2020-01-15 RX ORDER — HYDROXYZINE HYDROCHLORIDE 25 MG/1
25 TABLET, FILM COATED ORAL 3 TIMES DAILY
Qty: 180 TABLET | Refills: 0 | Status: SHIPPED | OUTPATIENT
Start: 2020-01-15 | End: 2020-06-18 | Stop reason: SDUPTHER

## 2020-01-15 RX ORDER — FLUOXETINE 10 MG/1
10 CAPSULE ORAL DAILY
Qty: 90 CAPSULE | Refills: 0 | Status: SHIPPED | OUTPATIENT
Start: 2020-01-15 | End: 2020-01-15 | Stop reason: SDUPTHER

## 2020-01-15 RX ORDER — TRAZODONE HYDROCHLORIDE 100 MG/1
100 TABLET ORAL NIGHTLY
Qty: 90 TABLET | Refills: 0 | Status: SHIPPED | OUTPATIENT
Start: 2020-01-15 | End: 2020-06-18

## 2020-01-15 RX ORDER — FLUOXETINE 10 MG/1
10 CAPSULE ORAL DAILY
Qty: 90 CAPSULE | Refills: 0 | Status: SHIPPED | OUTPATIENT
Start: 2020-01-15 | End: 2020-06-18

## 2020-01-15 RX ORDER — OXYCODONE AND ACETAMINOPHEN 7.5; 325 MG/1; MG/1
TABLET ORAL
COMMUNITY
Start: 2019-12-27 | End: 2023-05-15

## 2020-01-15 RX ORDER — OLANZAPINE 5 MG/1
5 TABLET ORAL NIGHTLY
Qty: 90 TABLET | Refills: 0 | Status: SHIPPED | OUTPATIENT
Start: 2020-01-15 | End: 2020-06-18

## 2020-01-15 NOTE — PATIENT INSTRUCTIONS
"You have been provided with a certain amount of medication with a specified number of refills.  Please follow up within an adequate time before you run out of medications.    REFILLS FOR CONTROLLED SUBSTANCES WILL NOT BE GIVEN WITHOUT AN APPOINTMENT.  I will not honor or fill automated refill requests from pharmacies.  You must come in for an appointment to get refills.    Please book your next appointment for myself or therapist by phone by calling our office at [phone].      PLEASE BE AT LEAST 15 MINUTES EARLY FOR YOUR NEXT APPOINTMENT.  PLEASE, DO NOT BE LATE OR YOU WILL BE TURNED AWAY AND ASKED TO RESCHEDULE.  YOU MUST COME EARLY TO ALLOW TIME FOR CHECK-IN AS WELL AS GET YOUR VITAL SIGNS AND GO OVER YOUR MEDICATIONS.  [facility] is not fair to the patients who present after you and are on time for their appointments.  It causes a delay in the appointments for patients and staff.  IF YOU ARE LATE, THERE IS A POSSIBILITY THAT YOU WILL BE CHARGED FOR THE APPOINTMENT TIME PERSONALLY AND IT WILL NOT GO TO YOUR INSURANCE.  YOU MAY ALSO BE DISCHARGED FROM CLINIC with multiple "No Show" appointments.  -----------------------------------------------------------------------------------------------------------------  IF YOU FEEL SUICIDAL OR HAVING THOUGHTS OR PLANS TO HURT YOURSELF OR OTHERS, CALL 911 OR REPORT TO THE NEAREST EMERGENCY ROOM.  YOU CAN ALSO ACCESS THE FOLLOWING HOTLINE(S):    National Suicide Hotline Number 6-261-803-TALK (6447)     ([facility], [phone]'   [place] [facility], (174) 486-2501; [place], 24 hours / 7 days, (873) 254-COPE (4733), 7-797-548-COPE (8401))     TIPS FOR GETTING YOUR PRESCRIPTIONS:    You can always ask your pharmacist the cost of your medications without the use of your insurance. Sometimes the medication will be cheaper if you do not use your insurance.     If you decide you want to have your prescriptions filled at " a different pharmacy, you can always go to the new pharmacy of your choice and have them call the pharmacy where your prescription was sent and they can have your prescription transferred to the new pharmacy.

## 2020-02-05 ENCOUNTER — TELEPHONE (OUTPATIENT)
Dept: PULMONOLOGY | Facility: CLINIC | Age: 65
End: 2020-02-05

## 2020-02-05 NOTE — TELEPHONE ENCOUNTER
----- Message from Andreea Truong MA sent at 2/4/2020  4:06 PM CST -----  Contact: GEENA GREGORY [[id number]]      ----- Message -----  From: Gloria Mathias  Sent: 2/4/2020   2:16 PM CST  To: Kaylynn Dueñas V Staff    Name of Who is Calling: GEENA GREGORY [[id number]]    What is the request in detail: Patient states she received a letter stating she needs to have a sleep study done. Please contact to further discuss and advise      Can the clinic reply by [facility]: yes    What Number to Call Back if not in [facility]: [phone]

## 2020-02-11 ENCOUNTER — TELEPHONE (OUTPATIENT)
Dept: PHYSICAL MEDICINE AND REHAB | Facility: CLINIC | Age: 65
End: 2020-02-11

## 2020-02-11 NOTE — TELEPHONE ENCOUNTER
----- Message from Marycruz Marroquin sent at 2/11/2020  1:26 PM CST -----  Contact: Jeremias calling from [facility]   Pt is getting a prosthetic leg.  Forms will be faxed over that needs to be signed. Also please Fax over primary notes from last visits (4) at [phone]

## 2020-02-12 ENCOUNTER — HOSPITAL ENCOUNTER (EMERGENCY)
Facility: HOSPITAL | Age: 65
Discharge: HOME | End: 2020-02-12
Attending: EMERGENCY MEDICINE
Payer: MEDICARE

## 2020-02-12 VITALS
OXYGEN SATURATION: 100 % | BODY MASS INDEX: 41.65 KG/M2 | TEMPERATURE: 98 F | SYSTOLIC BLOOD PRESSURE: 126 MMHG | RESPIRATION RATE: 26 BRPM | HEART RATE: 74 BPM | DIASTOLIC BLOOD PRESSURE: 67 MMHG | WEIGHT: 250 LBS | HEIGHT: 65 IN

## 2020-02-12 DIAGNOSIS — E87.70 HYPERVOLEMIA, UNSPECIFIED HYPERVOLEMIA TYPE: ICD-10-CM

## 2020-02-12 DIAGNOSIS — R06.02 SOB (SHORTNESS OF BREATH): Primary | ICD-10-CM

## 2020-02-12 LAB
ALBUMIN SERPL BCP-MCNC: 3.5 G/DL (ref 3.5–5.2)
ALP SERPL-CCNC: 102 U/L (ref 55–135)
ALT SERPL W/O P-5'-P-CCNC: 7 U/L (ref 10–44)
ANION GAP SERPL CALC-SCNC: 8 MMOL/L (ref 8–16)
AST SERPL-CCNC: 19 U/L (ref 10–40)
BACTERIA #/AREA URNS HPF: ABNORMAL /HPF
BASOPHILS # BLD AUTO: 0.02 K/UL (ref 0–0.2)
BASOPHILS NFR BLD: 0.4 % (ref 0–1.9)
BILIRUB SERPL-MCNC: 0.4 MG/DL (ref 0.1–1)
BILIRUB UR QL STRIP: NEGATIVE
BNP SERPL-MCNC: 51 PG/ML (ref 0–99)
BUN SERPL-MCNC: 11 MG/DL (ref 8–23)
CALCIUM SERPL-MCNC: 9.2 MG/DL (ref 8.7–10.5)
CHLORIDE SERPL-SCNC: 98 MMOL/L (ref 95–110)
CLARITY UR: CLEAR
CO2 SERPL-SCNC: 36 MMOL/L (ref 23–29)
COLOR UR: COLORLESS
CREAT SERPL-MCNC: 0.7 MG/DL (ref 0.5–1.4)
DIFFERENTIAL METHOD: ABNORMAL
EOSINOPHIL # BLD AUTO: 0.2 K/UL (ref 0–0.5)
EOSINOPHIL NFR BLD: 3.5 % (ref 0–8)
ERYTHROCYTE [DISTWIDTH] IN BLOOD BY AUTOMATED COUNT: 14.5 % (ref 11.5–14.5)
EST. GFR  (AFRICAN AMERICAN): >60 ML/MIN/1.73 M^2
EST. GFR  (NON AFRICAN AMERICAN): >60 ML/MIN/1.73 M^2
GLUCOSE SERPL-MCNC: 81 MG/DL (ref 70–110)
GLUCOSE UR QL STRIP: NEGATIVE
HCT VFR BLD AUTO: 37.7 % (ref 37–48.5)
HGB BLD-MCNC: 11.4 G/DL (ref 12–16)
HGB UR QL STRIP: ABNORMAL
IMM GRANULOCYTES # BLD AUTO: 0.01 K/UL (ref 0–0.04)
IMM GRANULOCYTES NFR BLD AUTO: 0.2 % (ref 0–0.5)
KETONES UR QL STRIP: NEGATIVE
LEUKOCYTE ESTERASE UR QL STRIP: ABNORMAL
LYMPHOCYTES # BLD AUTO: 1.2 K/UL (ref 1–4.8)
LYMPHOCYTES NFR BLD: 24.9 % (ref 18–48)
MCH RBC QN AUTO: 26.2 PG (ref 27–31)
MCHC RBC AUTO-ENTMCNC: 30.2 G/DL (ref 32–36)
MCV RBC AUTO: 87 FL (ref 82–98)
MICROSCOPIC COMMENT: ABNORMAL
MONOCYTES # BLD AUTO: 0.5 K/UL (ref 0.3–1)
MONOCYTES NFR BLD: 9.5 % (ref 4–15)
NEUTROPHILS # BLD AUTO: 3 K/UL (ref 1.8–7.7)
NEUTROPHILS NFR BLD: 61.5 % (ref 38–73)
NITRITE UR QL STRIP: NEGATIVE
NRBC BLD-RTO: 0 /100 WBC
PH UR STRIP: 7 [PH] (ref 5–8)
PLATELET # BLD AUTO: 181 K/UL (ref 150–350)
PMV BLD AUTO: 9.8 FL (ref 9.2–12.9)
POTASSIUM SERPL-SCNC: 4.3 MMOL/L (ref 3.5–5.1)
PROT SERPL-MCNC: 7.7 G/DL (ref 6–8.4)
PROT UR QL STRIP: NEGATIVE
RBC # BLD AUTO: 4.35 M/UL (ref 4–5.4)
RBC #/AREA URNS HPF: 1 /HPF (ref 0–4)
SODIUM SERPL-SCNC: 142 MMOL/L (ref 136–145)
SP GR UR STRIP: 1 (ref 1–1.03)
TROPONIN I SERPL DL<=0.01 NG/ML-MCNC: 0.01 NG/ML (ref 0–0.03)
URN SPEC COLLECT METH UR: ABNORMAL
UROBILINOGEN UR STRIP-ACNC: NEGATIVE EU/DL
WBC # BLD AUTO: 4.86 K/UL (ref 3.9–12.7)
WBC #/AREA URNS HPF: 6 /HPF (ref 0–5)

## 2020-02-12 PROCEDURE — 83880 ASSAY OF NATRIURETIC PEPTIDE: CPT

## 2020-02-12 PROCEDURE — 81000 URINALYSIS NONAUTO W/SCOPE: CPT

## 2020-02-12 PROCEDURE — 96375 TX/PRO/DX INJ NEW DRUG ADDON: CPT

## 2020-02-12 PROCEDURE — 80053 COMPREHEN METABOLIC PANEL: CPT

## 2020-02-12 PROCEDURE — 84484 ASSAY OF TROPONIN QUANT: CPT

## 2020-02-12 PROCEDURE — 93005 ELECTROCARDIOGRAM TRACING: CPT

## 2020-02-12 PROCEDURE — 96374 THER/PROPH/DIAG INJ IV PUSH: CPT

## 2020-02-12 PROCEDURE — 93010 EKG 12-LEAD: ICD-10-PCS | Mod: ,,, | Performed by: INTERNAL MEDICINE

## 2020-02-12 PROCEDURE — 25000242 PHARM REV CODE 250 ALT 637 W/ HCPCS: Performed by: EMERGENCY MEDICINE

## 2020-02-12 PROCEDURE — 94640 AIRWAY INHALATION TREATMENT: CPT

## 2020-02-12 PROCEDURE — 85025 COMPLETE CBC W/AUTO DIFF WBC: CPT

## 2020-02-12 PROCEDURE — 93010 ELECTROCARDIOGRAM REPORT: CPT | Mod: ,,, | Performed by: INTERNAL MEDICINE

## 2020-02-12 PROCEDURE — 27000221 HC OXYGEN, UP TO 24 HOURS

## 2020-02-12 PROCEDURE — 63600175 PHARM REV CODE 636 W HCPCS: Performed by: EMERGENCY MEDICINE

## 2020-02-12 PROCEDURE — 25000003 PHARM REV CODE 250: Performed by: EMERGENCY MEDICINE

## 2020-02-12 PROCEDURE — 99285 EMERGENCY DEPT VISIT HI MDM: CPT | Mod: 25

## 2020-02-12 RX ORDER — AZITHROMYCIN 250 MG/1
500 TABLET, FILM COATED ORAL DAILY
Qty: 6 TABLET | Refills: 0 | Status: ON HOLD | OUTPATIENT
Start: 2020-02-12 | End: 2020-03-04 | Stop reason: HOSPADM

## 2020-02-12 RX ORDER — FENTANYL CITRATE 50 UG/ML
50 INJECTION, SOLUTION INTRAMUSCULAR; INTRAVENOUS
Status: COMPLETED | OUTPATIENT
Start: 2020-02-12 | End: 2020-02-12

## 2020-02-12 RX ORDER — LIDOCAINE 50 MG/G
1 PATCH TOPICAL DAILY
Qty: 15 PATCH | Refills: 0 | Status: SHIPPED | OUTPATIENT
Start: 2020-02-12 | End: 2020-06-18

## 2020-02-12 RX ORDER — IPRATROPIUM BROMIDE AND ALBUTEROL SULFATE 2.5; .5 MG/3ML; MG/3ML
3 SOLUTION RESPIRATORY (INHALATION)
Status: COMPLETED | OUTPATIENT
Start: 2020-02-12 | End: 2020-02-12

## 2020-02-12 RX ORDER — ONDANSETRON 2 MG/ML
4 INJECTION INTRAMUSCULAR; INTRAVENOUS
Status: COMPLETED | OUTPATIENT
Start: 2020-02-12 | End: 2020-02-12

## 2020-02-12 RX ORDER — FUROSEMIDE 10 MG/ML
80 INJECTION INTRAMUSCULAR; INTRAVENOUS
Status: COMPLETED | OUTPATIENT
Start: 2020-02-12 | End: 2020-02-12

## 2020-02-12 RX ORDER — LIDOCAINE 50 MG/G
1 PATCH TOPICAL
Status: DISCONTINUED | OUTPATIENT
Start: 2020-02-12 | End: 2020-02-13 | Stop reason: HOSPADM

## 2020-02-12 RX ADMIN — FUROSEMIDE 80 MG: 10 INJECTION, SOLUTION INTRAVENOUS at 07:02

## 2020-02-12 RX ADMIN — LIDOCAINE 1 PATCH: 50 PATCH TOPICAL at 05:02

## 2020-02-12 RX ADMIN — ONDANSETRON HYDROCHLORIDE 4 MG: 2 SOLUTION INTRAMUSCULAR; INTRAVENOUS at 05:02

## 2020-02-12 RX ADMIN — IPRATROPIUM BROMIDE AND ALBUTEROL SULFATE 3 ML: .5; 3 SOLUTION RESPIRATORY (INHALATION) at 05:02

## 2020-02-12 RX ADMIN — FENTANYL CITRATE 50 MCG: 50 INJECTION, SOLUTION INTRAMUSCULAR; INTRAVENOUS at 05:02

## 2020-02-12 NOTE — ED TRIAGE NOTES
Pt presents to ED by ems c/o generalized fluid in her limbs and abd area. Also states she has chest tightness.  Denies n/v/d

## 2020-02-12 NOTE — ED PROVIDER NOTES
Encounter Date: 2/12/2020    SCRIBE #1 NOTE: I, Prema Cummins, am scribing for, and in the presence of,  Kevin Saucedo MD. I have scribed the following portions of the note - Other sections scribed: HPI, ROS, Initial Assessment.       History     Chief Complaint   Patient presents with    Multiple complaints     pt reports chest tightness and warmth to the left stump starting on monday, also c/o edema to the stomach, hands and generalized edema to the left side of the body.     This is a 64 y.o. female with a PMHx of CHF, CVA, COPD, Asthma, and Sleep Apnea who presents to the ED complaining of left-sided facial swelling, LUE swelling, and LLE swelling that started 2 days ago. She states that she feels like she is retaining fluid. She reports that the swelling is worse in the morning and gets better throughout the day. She adds that she may be sleeping on her left side. She notes having generalized weakness and lower back pain that started last 1 week. She states that this feels more severe than her normal back pain. She reports that she takes 7.5 mg Percocet as needed, but denies taking any today. She adds that the Percocet relieves the back pain. She notes that she uses 2 L of at home oxygen daily and adds that she uses a BIPAP at night. She states that she has an inhaler and nebulizer at home, but she denies using it today. She reports taking 2 mg Bumex daily and states that her last dose was this morning. She notes that she has a suprapubic catheter and her  changes it. No other associated symptoms.     The history is provided by the patient. No  was used.     Review of patient's allergies indicates:   Allergen Reactions    Tuberculin ppd Itching and Dermatitis     Patient has old scare that she claims is from TB PPD    Rocephin [ceftriaxone] Rash     Rash 2012? Pt agreeable to try with pre mediation with benadryl.      Past Medical History:   Diagnosis Date    Abdominal hernia      Addiction to drug     Alcohol abuse     Anxiety     Arthritis     Asthma     Bipolar disorder     Bladder stones     CHF (congestive heart failure)     COPD (chronic obstructive pulmonary disease)     on home o2    CVA (cerebral vascular accident)     2012    Hallucination     Hepatitis C     treated and cured    History of blood clots     Hx of psychiatric care     Hypertension     Belen     Obese body habitus     On home oxygen therapy     ADEEL (obstructive sleep apnea)     ADEEL treated with BiPAP     Paraplegia     Paraplegic spinal paralysis     Psychiatric problem     Psychosis     AVH; Paranoia    Renal disorder     Requires assistance with activities of daily living (ADL)     SCI (spinal cord injury)     incomplete    Sleep difficulties     has sleep apnea and uses a Bi-PAP machine.    Status post amputation of leg 2019    Substance abuse     Suprapubic catheter 03/15/2011    Therapy     Vaginal delivery     x1    Wheelchair dependence      Past Surgical History:   Procedure Laterality Date    ABOVE-KNEE AMPUTATION Left 2019    Procedure: AMPUTATION, ABOVE KNEE;  Surgeon: Gordo Rodriguez MD;  Location: [facility];  Service: Orthopedics;  Laterality: Left;    amputation Left 2019    above the knee    BACK SURGERY      bilateral knee replacement      BREAST BIOPSY      cysto/lithopaxy 2017      CYSTOSCOPY W/ LASER LITHOTRIPSY      JOINT REPLACEMENT      bilateral knee    SUPRAPUBIC CYSTOSTOMY  03/15/2011    patient reports  replaced tubing on 10/27/19, at home     Family History   Problem Relation Age of Onset    Dementia Mother     Anxiety disorder Brother     Depression Brother      Social History     Tobacco Use    Smoking status: Former Smoker     Packs/day: 0.50     Years: 25.00     Pack years: 12.50     Last attempt to quit:      Years since quittin.1    Smokeless tobacco: Never Used   Substance Use Topics    Alcohol use:  Not Currently     Comment: occasional    Drug use: Never     Comment: prescribed opioids at present for pain     Review of Systems   Constitutional: Negative.    HENT: Positive for facial swelling (left-sided).    Eyes: Negative.    Respiratory: Negative.    Cardiovascular: Positive for leg swelling (LLE).   Gastrointestinal: Negative.    Genitourinary: Negative.    Musculoskeletal: Positive for back pain.        (+) Swelling (LUE, LLE)   Skin: Negative.    Neurological: Positive for weakness (generalized).   Psychiatric/Behavioral: Negative.        Physical Exam     Initial Vitals [02/12/20 1644]   BP Pulse Resp Temp SpO2   136/74 (!) 57 20 98 °F (36.7 °C) 98 %      MAP       --         Physical Exam    Nursing note and vitals reviewed.  Constitutional: She appears well-developed and well-nourished. No distress.   HENT:   Head: Normocephalic.   Right Ear: Tympanic membrane normal.   Left Ear: Tympanic membrane normal.   Nose: Nose normal.   Mouth/Throat: Uvula is midline, oropharynx is clear and moist and mucous membranes are normal.   Eyes: Pupils are equal, round, and reactive to light.   Neck: Trachea normal, normal range of motion, full passive range of motion without pain and phonation normal. No stridor present. No spinous process tenderness and no muscular tenderness present. Normal range of motion present. No neck rigidity.   Cardiovascular: Regular rhythm and normal heart sounds. Exam reveals no gallop and no friction rub.    No murmur heard.  Pulmonary/Chest: Effort normal. No respiratory distress. She has wheezes. She has no rhonchi. She has no rales.   Mild bilateral lower lobe wheezing, not in acute respiratory distress on 2 L via nasal cannula.   Abdominal: Soft. Bowel sounds are normal. There is no tenderness. There is no rebound and no guarding.   Suprapubic catheter present, no drainage noted.   Musculoskeletal: She exhibits edema (Mild edema noted in right lower extremity). She exhibits no  tenderness.   Left AKA   Neurological: She is alert and oriented to person, place, and time. She has normal strength. No cranial nerve deficit or sensory deficit.   Skin: Skin is warm and dry. Capillary refill takes less than 2 seconds.   Psychiatric: She has a normal mood and affect.         ED Course   Procedures  Labs Reviewed   CBC W/ AUTO DIFFERENTIAL - Abnormal; Notable for the following components:       Result Value    Hemoglobin 11.4 (*)     Mean Corpuscular Hemoglobin 26.2 (*)     Mean Corpuscular Hemoglobin Conc 30.2 (*)     All other components within normal limits   COMPREHENSIVE METABOLIC PANEL - Abnormal; Notable for the following components:    CO2 36 (*)     ALT 7 (*)     All other components within normal limits   URINALYSIS, REFLEX TO URINE CULTURE - Abnormal; Notable for the following components:    Color, UA Colorless (*)     Occult Blood UA 1+ (*)     Leukocytes, UA 2+ (*)     All other components within normal limits    Narrative:     Preferred Collection Type->Urine, Clean Catch   URINALYSIS MICROSCOPIC - Abnormal; Notable for the following components:    WBC, UA 6 (*)     Bacteria Few (*)     All other components within normal limits    Narrative:     Preferred Collection Type->Urine, Clean Catch   TROPONIN I   B-TYPE NATRIURETIC PEPTIDE        ECG Results          EKG 12-lead (In process)  Result time 02/13/20 05:51:11    In process by Interface, Lab In [facility] (02/13/20 05:51:11)                 Narrative:    Test Reason : R06.02,    Vent. Rate : 066 BPM     Atrial Rate : 066 BPM     P-R Int : 130 ms          QRS Dur : 090 ms      QT Int : 418 ms       P-R-T Axes : 040 037 060 degrees     QTc Int : 438 ms    Normal sinus rhythm  Normal ECG  When compared with ECG of 29-OCT-2019 06:35,  No significant change was found    Referred By: [email or web address]   SELF           Confirmed By:                   In process by Interface, Lab In [facility] (02/13/20 05:44:44)                 Narrative:    Test  Reason : R06.02,    Vent. Rate : 066 BPM     Atrial Rate : 066 BPM     P-R Int : 130 ms          QRS Dur : 090 ms      QT Int : 418 ms       P-R-T Axes : 040 037 060 degrees     QTc Int : 438 ms    Normal sinus rhythm  Normal ECG  When compared with ECG of 29-OCT-2019 06:35,  No significant change was found    Referred By: [email or web address]   SELF           Confirmed By:                             Imaging Results          X-Ray Chest AP Portable (Final result)  Result time 02/12/20 18:52:42    Final result by Nellie Rod MD (02/12/20 18:52:42)                 Impression:      Mild cardiomegaly with increased interstitial attenuation which could reflect mild interstitial edema.  Asymmetric opacity is seen within the right lower lung zone.  Findings could reflect asymmetric edema in this region versus potential aspiration or developing pneumonia in the right clinical setting.      Electronically signed by: Nellie Rod MD  Date:    02/12/2020  Time:    18:52             Narrative:    EXAMINATION:  XR CHEST AP PORTABLE    CLINICAL HISTORY:  Shortness of breath    TECHNIQUE:  Single frontal view of the chest was performed.    COMPARISON:  October 2019.    FINDINGS:  Cardiac silhouette is enlarged but stable in size.  Lungs are symmetrically expanded.  There is mild increased interstitial attenuation.  Asymmetric opacity is seen within the right lower lung zone.  No evidence of pneumothorax or significant pleural effusion.  No acute osseous abnormality identified.                                 Medical Decision Making:   Initial Assessment:   64 y.o. female with a PMHx of CHF, CVA, COPD, Asthma, and Sleep Apnea who presents to the ED complaining of left-sided facial swelling, LUE swelling, and LLE swelling that started 2 days ago. I will order lab work and a chest x-ray. I will reassess the patient once I have reviewed the results.  Clinical Tests:   Lab Tests: Ordered and Reviewed  Radiological Study: Ordered and  Reviewed  Medical Tests: Ordered and Reviewed            Scribe Attestation:   Scribe #1: I performed the above scribed service and the documentation accurately describes the services I performed. I attest to the accuracy of the note.      Patient presentation concerning for left upper extremity swelling and left stump swelling after sleeping on her left side overnight, with mild evidence of overload on chest x-ray today, given IV 80 mg Lasix here, with improved diuresis, suprapubic cath replaced at home per patient.  Urine culture sent for further sensitivities, without an elevated white count and with out systemic complaints or signs of sepsis will pend urine culture with treatment, and advised her to follow up this with her primary care physician.  Evidence of possible infiltrate on right lower lobe on chest x-ray, given azithromycin for further coverage given significant history of respiratory depression on CPAP at night.  At this time based on evaluation, physical exam, ED workup do not suspect acute respiratory failure, CHF exacerbation, COPD exacerbation, PE, pneumothorax, intoxication, sepsis, pyelonephritis, or any further malignant etiology.  Discussed diagnosis and further treatment with patient, including f/u with PCP in the next week.  Return precautions given and all questions answered.  Patient in understanding of plan.  Pt discharged to home improved and stable.                        Clinical Impression:       ICD-10-CM ICD-9-CM   1. SOB (shortness of breath) R06.02 786.05   2. Hypervolemia, unspecified hypervolemia type E87.70 276.69            Scribe attestation: I, Kevin Saucedo M.D., personally performed the services described in this documentation. All medical record entries made by the scribe were at my direction and in my presence. I have reviewed the chart and agree that the record reflects my personal performance and is accurate and complete.                 Kevin Saucedo  MD  02/13/20 0753

## 2020-02-16 RX ORDER — BUMETANIDE 2 MG/1
TABLET ORAL
Qty: 30 TABLET | OUTPATIENT
Start: 2020-02-16

## 2020-03-01 ENCOUNTER — HOSPITAL ENCOUNTER (INPATIENT)
Facility: HOSPITAL | Age: 65
LOS: 3 days | Discharge: HOME | DRG: 189 | End: 2020-03-04
Attending: EMERGENCY MEDICINE | Admitting: INTERNAL MEDICINE
Payer: MEDICARE

## 2020-03-01 DIAGNOSIS — R06.02 SHORTNESS OF BREATH: ICD-10-CM

## 2020-03-01 DIAGNOSIS — R07.9 CHEST PAIN: ICD-10-CM

## 2020-03-01 DIAGNOSIS — J44.9 CHRONIC OBSTRUCTIVE PULMONARY DISEASE WITH BRONCHOSPASM: ICD-10-CM

## 2020-03-01 DIAGNOSIS — J96.22 ACUTE ON CHRONIC RESPIRATORY FAILURE WITH HYPERCAPNIA: Primary | ICD-10-CM

## 2020-03-01 PROBLEM — Z89.512 STATUS POST BELOW KNEE AMPUTATION, LEFT: Status: ACTIVE | Noted: 2020-03-01

## 2020-03-01 LAB
ALBUMIN SERPL BCP-MCNC: 3.7 G/DL (ref 3.5–5.2)
ALLENS TEST: ABNORMAL
ALLENS TEST: ABNORMAL
ALP SERPL-CCNC: 84 U/L (ref 55–135)
ALT SERPL W/O P-5'-P-CCNC: 11 U/L (ref 10–44)
ANION GAP SERPL CALC-SCNC: 13 MMOL/L (ref 8–16)
ANION GAP SERPL CALC-SCNC: 15 MMOL/L (ref 8–16)
APTT BLDCRRT: 26.7 SEC (ref 21–32)
AST SERPL-CCNC: 26 U/L (ref 10–40)
BASOPHILS # BLD AUTO: 0.02 K/UL (ref 0–0.2)
BASOPHILS NFR BLD: 0.3 % (ref 0–1.9)
BILIRUB SERPL-MCNC: 0.6 MG/DL (ref 0.1–1)
BNP SERPL-MCNC: 84 PG/ML (ref 0–99)
BUN SERPL-MCNC: 20 MG/DL (ref 8–23)
BUN SERPL-MCNC: 26 MG/DL (ref 8–23)
CALCIUM SERPL-MCNC: 10 MG/DL (ref 8.7–10.5)
CALCIUM SERPL-MCNC: 9.7 MG/DL (ref 8.7–10.5)
CHLORIDE SERPL-SCNC: 94 MMOL/L (ref 95–110)
CHLORIDE SERPL-SCNC: 97 MMOL/L (ref 95–110)
CO2 SERPL-SCNC: 32 MMOL/L (ref 23–29)
CO2 SERPL-SCNC: 33 MMOL/L (ref 23–29)
CREAT SERPL-MCNC: 0.9 MG/DL (ref 0.5–1.4)
CREAT SERPL-MCNC: 1 MG/DL (ref 0.5–1.4)
DELSYS: ABNORMAL
DELSYS: ABNORMAL
DIFFERENTIAL METHOD: ABNORMAL
EOSINOPHIL # BLD AUTO: 0.1 K/UL (ref 0–0.5)
EOSINOPHIL NFR BLD: 1.8 % (ref 0–8)
EP: 8
ERYTHROCYTE [DISTWIDTH] IN BLOOD BY AUTOMATED COUNT: 13.5 % (ref 11.5–14.5)
ERYTHROCYTE [SEDIMENTATION RATE] IN BLOOD BY WESTERGREN METHOD: 8 MM/H
EST. GFR  (AFRICAN AMERICAN): >60 ML/MIN/1.73 M^2
EST. GFR  (AFRICAN AMERICAN): >60 ML/MIN/1.73 M^2
EST. GFR  (NON AFRICAN AMERICAN): 60 ML/MIN/1.73 M^2
EST. GFR  (NON AFRICAN AMERICAN): >60 ML/MIN/1.73 M^2
FIO2: 28
FLOW: 2
GLUCOSE SERPL-MCNC: 122 MG/DL (ref 70–110)
GLUCOSE SERPL-MCNC: 217 MG/DL (ref 70–110)
HCO3 UR-SCNC: 38.1 MMOL/L (ref 24–28)
HCO3 UR-SCNC: 38.1 MMOL/L (ref 24–28)
HCT VFR BLD AUTO: 40.1 % (ref 37–48.5)
HGB BLD-MCNC: 12.1 G/DL (ref 12–16)
IMM GRANULOCYTES # BLD AUTO: 0.03 K/UL (ref 0–0.04)
IMM GRANULOCYTES NFR BLD AUTO: 0.4 % (ref 0–0.5)
INR PPP: 1 (ref 0.8–1.2)
IP: 22
LYMPHOCYTES # BLD AUTO: 1.7 K/UL (ref 1–4.8)
LYMPHOCYTES NFR BLD: 24 % (ref 18–48)
MAGNESIUM SERPL-MCNC: 2.1 MG/DL (ref 1.6–2.6)
MCH RBC QN AUTO: 26.1 PG (ref 27–31)
MCHC RBC AUTO-ENTMCNC: 30.2 G/DL (ref 32–36)
MCV RBC AUTO: 87 FL (ref 82–98)
MODE: ABNORMAL
MODE: ABNORMAL
MONOCYTES # BLD AUTO: 0.7 K/UL (ref 0.3–1)
MONOCYTES NFR BLD: 9.6 % (ref 4–15)
NEUTROPHILS # BLD AUTO: 4.6 K/UL (ref 1.8–7.7)
NEUTROPHILS NFR BLD: 63.9 % (ref 38–73)
NRBC BLD-RTO: 0 /100 WBC
PCO2 BLDA: 72.3 MMHG (ref 35–45)
PCO2 BLDA: 79.5 MMHG (ref 35–45)
PH SMN: 7.29 [PH] (ref 7.35–7.45)
PH SMN: 7.33 [PH] (ref 7.35–7.45)
PLATELET # BLD AUTO: 203 K/UL (ref 150–350)
PMV BLD AUTO: 10.5 FL (ref 9.2–12.9)
PO2 BLDA: 63 MMHG (ref 80–100)
PO2 BLDA: 67 MMHG (ref 80–100)
POC BE: 10 MMOL/L
POC BE: 9 MMOL/L
POC SATURATED O2: 87 % (ref 95–100)
POC SATURATED O2: 90 % (ref 95–100)
POC TCO2: 40 MMOL/L (ref 23–27)
POC TCO2: 40 MMOL/L (ref 23–27)
POTASSIUM SERPL-SCNC: 3 MMOL/L (ref 3.5–5.1)
POTASSIUM SERPL-SCNC: 4.1 MMOL/L (ref 3.5–5.1)
PROT SERPL-MCNC: 8.7 G/DL (ref 6–8.4)
PROTHROMBIN TIME: 10.7 SEC (ref 9–12.5)
RBC # BLD AUTO: 4.63 M/UL (ref 4–5.4)
SAMPLE: ABNORMAL
SAMPLE: ABNORMAL
SITE: ABNORMAL
SITE: ABNORMAL
SODIUM SERPL-SCNC: 141 MMOL/L (ref 136–145)
SODIUM SERPL-SCNC: 143 MMOL/L (ref 136–145)
TROPONIN I SERPL DL<=0.01 NG/ML-MCNC: 0.01 NG/ML (ref 0–0.03)
TROPONIN I SERPL DL<=0.01 NG/ML-MCNC: 0.01 NG/ML (ref 0–0.03)
TROPONIN I SERPL DL<=0.01 NG/ML-MCNC: <0.006 NG/ML (ref 0–0.03)
WBC # BLD AUTO: 7.2 K/UL (ref 3.9–12.7)

## 2020-03-01 PROCEDURE — 94761 N-INVAS EAR/PLS OXIMETRY MLT: CPT

## 2020-03-01 PROCEDURE — 82803 BLOOD GASES ANY COMBINATION: CPT

## 2020-03-01 PROCEDURE — 94640 AIRWAY INHALATION TREATMENT: CPT

## 2020-03-01 PROCEDURE — 93010 ELECTROCARDIOGRAM REPORT: CPT | Mod: ,,, | Performed by: INTERNAL MEDICINE

## 2020-03-01 PROCEDURE — 36600 WITHDRAWAL OF ARTERIAL BLOOD: CPT

## 2020-03-01 PROCEDURE — 27000221 HC OXYGEN, UP TO 24 HOURS

## 2020-03-01 PROCEDURE — 99900035 HC TECH TIME PER 15 MIN (STAT)

## 2020-03-01 PROCEDURE — 85610 PROTHROMBIN TIME: CPT

## 2020-03-01 PROCEDURE — 20000000 HC ICU ROOM

## 2020-03-01 PROCEDURE — 94660 CPAP INITIATION&MGMT: CPT

## 2020-03-01 PROCEDURE — 93010 EKG 12-LEAD: ICD-10-PCS | Mod: 76,,, | Performed by: INTERNAL MEDICINE

## 2020-03-01 PROCEDURE — 25500020 PHARM REV CODE 255: Performed by: EMERGENCY MEDICINE

## 2020-03-01 PROCEDURE — 96372 THER/PROPH/DIAG INJ SC/IM: CPT | Mod: 59

## 2020-03-01 PROCEDURE — 27000190 HC CPAP FULL FACE MASK W/VALVE

## 2020-03-01 PROCEDURE — 80053 COMPREHEN METABOLIC PANEL: CPT

## 2020-03-01 PROCEDURE — 85025 COMPLETE CBC W/AUTO DIFF WBC: CPT

## 2020-03-01 PROCEDURE — 63600175 PHARM REV CODE 636 W HCPCS: Performed by: EMERGENCY MEDICINE

## 2020-03-01 PROCEDURE — 25000003 PHARM REV CODE 250: Performed by: EMERGENCY MEDICINE

## 2020-03-01 PROCEDURE — 63600175 PHARM REV CODE 636 W HCPCS: Performed by: HOSPITALIST

## 2020-03-01 PROCEDURE — 85730 THROMBOPLASTIN TIME PARTIAL: CPT

## 2020-03-01 PROCEDURE — 83735 ASSAY OF MAGNESIUM: CPT

## 2020-03-01 PROCEDURE — 25000242 PHARM REV CODE 250 ALT 637 W/ HCPCS: Performed by: INTERNAL MEDICINE

## 2020-03-01 PROCEDURE — 84484 ASSAY OF TROPONIN QUANT: CPT | Mod: 91

## 2020-03-01 PROCEDURE — 83880 ASSAY OF NATRIURETIC PEPTIDE: CPT

## 2020-03-01 PROCEDURE — 93005 ELECTROCARDIOGRAM TRACING: CPT

## 2020-03-01 PROCEDURE — 99291 CRITICAL CARE FIRST HOUR: CPT | Mod: 25

## 2020-03-01 PROCEDURE — 36415 COLL VENOUS BLD VENIPUNCTURE: CPT

## 2020-03-01 PROCEDURE — 80048 BASIC METABOLIC PNL TOTAL CA: CPT

## 2020-03-01 PROCEDURE — 25000003 PHARM REV CODE 250: Performed by: HOSPITALIST

## 2020-03-01 PROCEDURE — 25000242 PHARM REV CODE 250 ALT 637 W/ HCPCS: Performed by: EMERGENCY MEDICINE

## 2020-03-01 RX ORDER — POTASSIUM CHLORIDE 20 MEQ/15ML
40 SOLUTION ORAL
Status: COMPLETED | OUTPATIENT
Start: 2020-03-01 | End: 2020-03-01

## 2020-03-01 RX ORDER — ALBUTEROL SULFATE 2.5 MG/.5ML
5 SOLUTION RESPIRATORY (INHALATION)
Status: COMPLETED | OUTPATIENT
Start: 2020-03-01 | End: 2020-03-01

## 2020-03-01 RX ORDER — METHYLPREDNISOLONE SOD SUCC 125 MG
125 VIAL (EA) INJECTION
Status: COMPLETED | OUTPATIENT
Start: 2020-03-01 | End: 2020-03-01

## 2020-03-01 RX ORDER — FAMOTIDINE 20 MG/1
20 TABLET, FILM COATED ORAL 2 TIMES DAILY
Status: DISCONTINUED | OUTPATIENT
Start: 2020-03-01 | End: 2020-03-04 | Stop reason: HOSPADM

## 2020-03-01 RX ORDER — IPRATROPIUM BROMIDE AND ALBUTEROL SULFATE 2.5; .5 MG/3ML; MG/3ML
3 SOLUTION RESPIRATORY (INHALATION) EVERY 4 HOURS
Status: DISCONTINUED | OUTPATIENT
Start: 2020-03-01 | End: 2020-03-04 | Stop reason: HOSPADM

## 2020-03-01 RX ORDER — HYDROCODONE BITARTRATE AND ACETAMINOPHEN 5; 325 MG/1; MG/1
1 TABLET ORAL EVERY 4 HOURS PRN
Status: DISCONTINUED | OUTPATIENT
Start: 2020-03-01 | End: 2020-03-04 | Stop reason: HOSPADM

## 2020-03-01 RX ORDER — BACLOFEN 10 MG/1
20 TABLET ORAL 2 TIMES DAILY
Status: DISCONTINUED | OUTPATIENT
Start: 2020-03-01 | End: 2020-03-04 | Stop reason: HOSPADM

## 2020-03-01 RX ORDER — IPRATROPIUM BROMIDE AND ALBUTEROL SULFATE 2.5; .5 MG/3ML; MG/3ML
3 SOLUTION RESPIRATORY (INHALATION) EVERY 4 HOURS
Status: DISCONTINUED | OUTPATIENT
Start: 2020-03-01 | End: 2020-03-01

## 2020-03-01 RX ORDER — IPRATROPIUM BROMIDE AND ALBUTEROL SULFATE 2.5; .5 MG/3ML; MG/3ML
3 SOLUTION RESPIRATORY (INHALATION)
Status: COMPLETED | OUTPATIENT
Start: 2020-03-01 | End: 2020-03-01

## 2020-03-01 RX ORDER — BUMETANIDE 1 MG/1
2 TABLET ORAL DAILY
Status: DISCONTINUED | OUTPATIENT
Start: 2020-03-02 | End: 2020-03-01

## 2020-03-01 RX ORDER — OLANZAPINE 2.5 MG/1
5 TABLET ORAL NIGHTLY
Status: DISCONTINUED | OUTPATIENT
Start: 2020-03-01 | End: 2020-03-04 | Stop reason: HOSPADM

## 2020-03-01 RX ORDER — METHYLPREDNISOLONE SOD SUCC 125 MG
125 VIAL (EA) INJECTION EVERY 6 HOURS
Status: DISCONTINUED | OUTPATIENT
Start: 2020-03-02 | End: 2020-03-02

## 2020-03-01 RX ORDER — POTASSIUM CHLORIDE 750 MG/1
10 TABLET, EXTENDED RELEASE ORAL DAILY
Status: DISCONTINUED | OUTPATIENT
Start: 2020-03-02 | End: 2020-03-03

## 2020-03-01 RX ORDER — POLYETHYLENE GLYCOL 3350 17 G/17G
17 POWDER, FOR SOLUTION ORAL DAILY
Status: DISCONTINUED | OUTPATIENT
Start: 2020-03-02 | End: 2020-03-04 | Stop reason: HOSPADM

## 2020-03-01 RX ORDER — TRAZODONE HYDROCHLORIDE 50 MG/1
100 TABLET ORAL NIGHTLY
Status: DISCONTINUED | OUTPATIENT
Start: 2020-03-01 | End: 2020-03-04 | Stop reason: HOSPADM

## 2020-03-01 RX ORDER — ENOXAPARIN SODIUM 100 MG/ML
40 INJECTION SUBCUTANEOUS EVERY 24 HOURS
Status: DISCONTINUED | OUTPATIENT
Start: 2020-03-01 | End: 2020-03-04 | Stop reason: HOSPADM

## 2020-03-01 RX ORDER — ENOXAPARIN SODIUM 100 MG/ML
40 INJECTION SUBCUTANEOUS EVERY 24 HOURS
Status: DISCONTINUED | OUTPATIENT
Start: 2020-03-01 | End: 2020-03-01

## 2020-03-01 RX ORDER — FLUOXETINE 10 MG/1
10 CAPSULE ORAL DAILY
Status: DISCONTINUED | OUTPATIENT
Start: 2020-03-02 | End: 2020-03-04 | Stop reason: HOSPADM

## 2020-03-01 RX ORDER — SODIUM CHLORIDE 0.9 % (FLUSH) 0.9 %
10 SYRINGE (ML) INJECTION
Status: DISCONTINUED | OUTPATIENT
Start: 2020-03-01 | End: 2020-03-04 | Stop reason: HOSPADM

## 2020-03-01 RX ORDER — GABAPENTIN 300 MG/1
600 CAPSULE ORAL 3 TIMES DAILY
Status: DISCONTINUED | OUTPATIENT
Start: 2020-03-01 | End: 2020-03-04 | Stop reason: HOSPADM

## 2020-03-01 RX ORDER — FUROSEMIDE 40 MG/1
40 TABLET ORAL DAILY
Status: DISCONTINUED | OUTPATIENT
Start: 2020-03-02 | End: 2020-03-04 | Stop reason: HOSPADM

## 2020-03-01 RX ADMIN — OLANZAPINE 5 MG: 2.5 TABLET, FILM COATED ORAL at 08:03

## 2020-03-01 RX ADMIN — METHYLPREDNISOLONE SODIUM SUCCINATE 125 MG: 125 INJECTION, POWDER, FOR SOLUTION INTRAMUSCULAR; INTRAVENOUS at 08:03

## 2020-03-01 RX ADMIN — IOHEXOL 75 ML: 350 INJECTION, SOLUTION INTRAVENOUS at 12:03

## 2020-03-01 RX ADMIN — TRAZODONE HYDROCHLORIDE 100 MG: 50 TABLET ORAL at 08:03

## 2020-03-01 RX ADMIN — ENOXAPARIN SODIUM 40 MG: 100 INJECTION SUBCUTANEOUS at 08:03

## 2020-03-01 RX ADMIN — ALBUTEROL SULFATE 5 MG: 2.5 SOLUTION RESPIRATORY (INHALATION) at 08:03

## 2020-03-01 RX ADMIN — IPRATROPIUM BROMIDE AND ALBUTEROL SULFATE 3 ML: .5; 3 SOLUTION RESPIRATORY (INHALATION) at 11:03

## 2020-03-01 RX ADMIN — IPRATROPIUM BROMIDE AND ALBUTEROL SULFATE 3 ML: .5; 3 SOLUTION RESPIRATORY (INHALATION) at 08:03

## 2020-03-01 RX ADMIN — GABAPENTIN 600 MG: 300 CAPSULE ORAL at 08:03

## 2020-03-01 RX ADMIN — BACLOFEN 20 MG: 10 TABLET ORAL at 08:03

## 2020-03-01 RX ADMIN — POTASSIUM CHLORIDE 40 MEQ: 20 SOLUTION ORAL at 12:03

## 2020-03-01 RX ADMIN — FAMOTIDINE 20 MG: 20 TABLET ORAL at 08:03

## 2020-03-01 RX ADMIN — IPRATROPIUM BROMIDE AND ALBUTEROL SULFATE 3 ML: .5; 3 SOLUTION RESPIRATORY (INHALATION) at 07:03

## 2020-03-01 NOTE — ED NOTES
Pt c/o new onset diffuse chest pain that radiates to both arms and to ears bilaterally with ringing in her ears.Dr lewis informed.

## 2020-03-01 NOTE — SUBJECTIVE & OBJECTIVE
Past Medical History:   Diagnosis Date    Abdominal hernia     Addiction to drug     Alcohol abuse     Anxiety     Arthritis     Asthma     Bipolar disorder     Bladder stones     CHF (congestive heart failure)     COPD (chronic obstructive pulmonary disease)     on home o2    CVA (cerebral vascular accident)     2012    Hallucination     Hepatitis C     treated and cured    History of blood clots     Hx of psychiatric care     Hypertension     Belen     Obese body habitus     On home oxygen therapy     ADEEL (obstructive sleep apnea)     ADEEL treated with BiPAP     Paraplegia     Paraplegic spinal paralysis     Psychiatric problem     Psychosis     AVH; Paranoia    Renal disorder     Requires assistance with activities of daily living (ADL)     SCI (spinal cord injury)     incomplete    Sleep difficulties     has sleep apnea and uses a Bi-PAP machine.    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    Substance abuse     Suprapubic catheter 03/15/2011    Therapy     Vaginal delivery     x1    Wheelchair dependence        Past Surgical History:   Procedure Laterality Date    ABOVE-KNEE AMPUTATION Left 7/23/2019    Procedure: AMPUTATION, ABOVE KNEE;  Surgeon: Gordo Rodriguez MD;  Location: [facility];  Service: Orthopedics;  Laterality: Left;    amputation Left 07/23/2019    above the knee    BACK SURGERY      bilateral knee replacement      BREAST BIOPSY      cysto/lithopaxy 2017      CYSTOSCOPY W/ LASER LITHOTRIPSY      JOINT REPLACEMENT      bilateral knee    SUPRAPUBIC CYSTOSTOMY  03/15/2011    patient reports  replaced tubing on 10/27/19, at home       Review of patient's allergies indicates:   Allergen Reactions    Tuberculin ppd Itching and Dermatitis     Patient has old scare that she claims is from TB PPD    Rocephin [ceftriaxone] Rash     Rash 2012? Pt agreeable to try with pre mediation with benadryl.        No current facility-administered medications on  file prior to encounter.      Current Outpatient Medications on File Prior to Encounter   Medication Sig    FLUoxetine 10 MG capsule Take 1 capsule (10 mg total) by mouth once daily.    gabapentin (NEURONTIN) 600 MG tablet TAKE 1 TABLET BY MOUTH THREE TIMES DAILY    hydrOXYzine HCl (ATARAX) 25 MG tablet Take 1 tablet (25 mg total) by mouth 3 (three) times daily.    OLANZapine (ZYPREXA) 5 MG tablet Take 1 tablet (5 mg total) by mouth every evening.    oxybutynin (DITROPAN-XL) 5 MG TR24 Take 1 tablet (5 mg total) by mouth once daily.    potassium chloride (KLOR-CON) 10 MEQ TbSR Take 1 tablet (10 mEq total) by mouth once daily.    traZODone (DESYREL) 100 MG tablet Take 1 tablet (100 mg total) by mouth every evening.    albuterol-ipratropium (DUO-NEB) 2.5 mg-0.5 mg/3 mL nebulizer solution Take 3 mLs by nebulization every 4 (four) hours as needed for Wheezing.    azithromycin (ZITHROMAX Z-KAMALA) 250 MG tablet Take 2 tablets (500 mg total) by mouth once daily.    baclofen (LIORESAL) 20 MG tablet 20 mg 2 (two) times daily.     bumetanide (BUMEX) 2 MG tablet Take 2 mg by mouth once daily.     catheter 18 Fr Misc Change every 20 days    ipratropium (ATROVENT) 0.02 % nebulizer solution U 1 VIAL VIA NEBULIZER Q 4 H WHILE AWAKE    lidocaine (LIDODERM) 5 % Place 1 patch onto the skin once daily. Remove & Discard patch within 12 hours or as directed by MD    [employer] medical supply (C-TUB) [facility] NEEDS TO SWITCH [employer] FOR OXYGEN AT 2L. LAST OXYGEN SATURATION WAS 83% ON Room Air. She uses  BIPAP and  Needs  New mask, tubings and    oxyCODONE-acetaminophen (PERCOCET) 7.5-325 mg per tablet TK 1 T PO BID PRN P    VENTOLIN HFA 90 mcg/actuation inhaler      Family History     Problem Relation (Age of Onset)    Anxiety disorder Brother    Dementia Mother    Depression Brother        Tobacco Use    Smoking status: Former Smoker     Packs/day: 0.50     Years: 25.00     Pack years: 12.50     Last attempt to quit:  2002     Years since quittin.1    Smokeless tobacco: Never Used   Substance and Sexual Activity    Alcohol use: Not Currently     Comment: occasional    Drug use: Never     Comment: prescribed opioids at present for pain    Sexual activity: Not Currently     Partners: Male     Comment: since      Review of Systems   Constitutional: Negative for activity change.   HENT: Negative for congestion and dental problem.    Eyes: Negative for discharge and itching.   Respiratory: Positive for shortness of breath. Negative for cough.    Cardiovascular: Negative for chest pain.   Gastrointestinal: Negative for abdominal distention and abdominal pain.   Endocrine: Negative for cold intolerance.   Genitourinary: Negative for difficulty urinating and dyspareunia.   Musculoskeletal: Negative for arthralgias and back pain.   Skin: Negative for color change.   Allergic/Immunologic: Negative for environmental allergies and food allergies.   Neurological: Negative for dizziness and facial asymmetry.   Hematological: Negative for adenopathy. Does not bruise/bleed easily.   Psychiatric/Behavioral: Negative for agitation and behavioral problems.     Objective:     Vital Signs (Most Recent):  Temp: 98.6 °F (37 °C) (20 1206)  Pulse: (!) 56 (20 1330)  Resp: 12 (20 1330)  BP: (!) 117/59 (20 1330)  SpO2: 96 % (20 1330) Vital Signs (24h Range):  Temp:  [97.6 °F (36.4 °C)-98.6 °F (37 °C)] 98.6 °F (37 °C)  Pulse:  [56-96] 56  Resp:  [11-24] 12  SpO2:  [95 %-99 %] 96 %  BP: (106-160)/(55-90) 117/59     Weight: 117.9 kg (260 lb)  Body mass index is 43.27 kg/m².    Physical Exam   Constitutional: She is oriented to person, place, and time. No distress.   HENT:   Head: Atraumatic.   Eyes: Pupils are equal, round, and reactive to light. EOM are normal.   Neck: Normal range of motion. Neck supple.   Cardiovascular: Normal rate and regular rhythm.   Pulmonary/Chest: Effort normal. She has wheezes.   Abdominal:  Soft. Bowel sounds are normal. She exhibits no distension. There is no tenderness.   Genitourinary:   Genitourinary Comments: Supra pubic cath in place.   Musculoskeletal: Normal range of motion. She exhibits no edema or deformity.   S/P left BKA,   Neurological: She is oriented to person, place, and time. No cranial nerve deficit. Coordination normal.   Skin: Skin is warm and dry.   Psychiatric: She has a normal mood and affect. Her behavior is normal.         CRANIAL NERVES     CN III, IV, VI   Pupils are equal, round, and reactive to light.  Extraocular motions are normal.        Significant Labs:   ABGs:   Recent Labs   Lab 03/01/20  1609   PH 7.329*   PCO2 72.3*   HCO3 38.1*   POCSATURATED 90*   BE 10     BMP:   Recent Labs   Lab 03/01/20  0820 03/01/20  0953   *  --      --    K 3.0*  --    CL 94*  --    CO2 32*  --    BUN 26*  --    CREATININE 1.0  --    CALCIUM 9.7  --    MG  --  2.1     CBC:   Recent Labs   Lab 03/01/20  0820   WBC 7.20   HGB 12.1   HCT 40.1        CMP:   Recent Labs   Lab 03/01/20  0820      K 3.0*   CL 94*   CO2 32*   *   BUN 26*   CREATININE 1.0   CALCIUM 9.7   PROT 8.7*   ALBUMIN 3.7   BILITOT 0.6   ALKPHOS 84   AST 26   ALT 11   ANIONGAP 15   EGFRNONAA 60       Significant Imaging: reviewed.

## 2020-03-01 NOTE — ED TRIAGE NOTES
Pt presents via ems with c/o of worsened SOB onset yesterday while she was sleeping. Pt uses a CPAP at home as well as 2L of oxygen. She also reports bilateral ear pain and generalised malaise x 1 day.

## 2020-03-01 NOTE — HPI
64 y.o. female with a PMHx of obesity, chronic obstructive pulmonary disease, asthma, hypertension, diastolic congestive heart failure,chronic respiratory failure,on 2 liter oxygen at home,S/P left BKA,chronic suprapubic catheter, bed bound,and sleep apnea who presents to the Emergency Department via EMS with a cc of chronic shortness of breath worsening yesterday. Patient was given DuoNeb and SoluMedrol en route with EMS with moderate relief. Patient now reports associated wheezing, chills, and bilateral ear pain. She denies chest pain, cough, abdominal pain, nausea, vomiting, diarrhea, or dysuria.  Patient reports a prior history of similar symptoms secondary to chronic obstructive pulmonary disease. She states she is on 2 liters of oxygen at home and BiPAP at night. Patient is a former smoker,ABG show PH of 7.29,PCO 2 of 79,PO 2 of 63,after nebulizer treatment and  IV solumedrol,ABG had only slight improvement,CTA chest show no PE or dissection,she has been admitted to ICU for continues bipap and IV solumedrol and nebulizer treatment for COPD exacerbation.

## 2020-03-01 NOTE — ASSESSMENT & PLAN NOTE
Duo to COPD exacerbation,ABG show PH of 7.29,PCO 2 of 79,PO 2 of 63,after nebulizer treatment and  IV solumedrol,ABG had only slight improvement,CTA chest show no PE or dissection,she has been admitted to ICU for continues bipap and IV solumedrol and nebulizer treatment for COPD exacerbation.

## 2020-03-01 NOTE — RESPIRATORY THERAPY
Bipap initiated per Dr. Quinn. Settings as documented; pt provided her home settings. All alarms set and functional. Pt tolerating well; will continue to monitor.

## 2020-03-01 NOTE — H&P
[facility] Medicine  History & Physical    Patient Name: Mary Ellen Fajardo  MRN: [id number]  Admission Date: 3/1/2020  Attending Physician: Shelia   Primary Care Provider: Any Tran MD         Patient information was obtained from patient and ER records.     Subjective:     Principal Problem:Acute on chronic respiratory failure with hypercapnia    Chief Complaint:   Chief Complaint   Patient presents with    Shortness of Breath     sob since waking this am +wheezing.  Initial O2 sat of 87%.  EMS gave DuoNeb and SoluMedrol.        HPI:  64 y.o. female with a PMHx of obesity, chronic obstructive pulmonary disease, asthma, hypertension, diastolic congestive heart failure,chronic respiratory failure,on 2 liter oxygen at home,S/P left BKA,chronic suprapubic catheter, bed bound,and sleep apnea who presents to the Emergency Department via EMS with a cc of chronic shortness of breath worsening yesterday. Patient was given DuoNeb and SoluMedrol en route with EMS with moderate relief. Patient now reports associated wheezing, chills, and bilateral ear pain. She denies chest pain, cough, abdominal pain, nausea, vomiting, diarrhea, or dysuria.  Patient reports a prior history of similar symptoms secondary to chronic obstructive pulmonary disease. She states she is on 2 liters of oxygen at home and BiPAP at night. Patient is a former smoker,ABG show PH of 7.29,PCO 2 of 79,PO 2 of 63,after nebulizer treatment and  IV solumedrol,ABG had only slight improvement,CTA chest show no PE or dissection,she has been admitted to ICU for continues bipap and IV solumedrol and nebulizer treatment for COPD exacerbation.    Past Medical History:   Diagnosis Date    Abdominal hernia     Addiction to drug     Alcohol abuse     Anxiety     Arthritis     Asthma     Bipolar disorder     Bladder stones     CHF (congestive heart failure)     COPD (chronic obstructive pulmonary disease)     on home o2     CVA (cerebral vascular accident)     2012    Hallucination     Hepatitis C     treated and cured    History of blood clots     Hx of psychiatric care     Hypertension     Belen     Obese body habitus     On home oxygen therapy     ADEEL (obstructive sleep apnea)     ADEEL treated with BiPAP     Paraplegia     Paraplegic spinal paralysis     Psychiatric problem     Psychosis     AVH; Paranoia    Renal disorder     Requires assistance with activities of daily living (ADL)     SCI (spinal cord injury)     incomplete    Sleep difficulties     has sleep apnea and uses a Bi-PAP machine.    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    Substance abuse     Suprapubic catheter 03/15/2011    Therapy     Vaginal delivery     x1    Wheelchair dependence        Past Surgical History:   Procedure Laterality Date    ABOVE-KNEE AMPUTATION Left 7/23/2019    Procedure: AMPUTATION, ABOVE KNEE;  Surgeon: Gordo Rodriguez MD;  Location: [facility] OR;  Service: Orthopedics;  Laterality: Left;    amputation Left 07/23/2019    above the knee    BACK SURGERY      bilateral knee replacement      BREAST BIOPSY      cysto/lithopaxy 2017      CYSTOSCOPY W/ LASER LITHOTRIPSY      JOINT REPLACEMENT      bilateral knee    SUPRAPUBIC CYSTOSTOMY  03/15/2011    patient reports  replaced tubing on 10/27/19, at home       Review of patient's allergies indicates:   Allergen Reactions    Tuberculin ppd Itching and Dermatitis     Patient has old scare that she claims is from TB PPD    Rocephin [ceftriaxone] Rash     Rash 2012? Pt agreeable to try with pre mediation with benadryl.        No current facility-administered medications on file prior to encounter.      Current Outpatient Medications on File Prior to Encounter   Medication Sig    FLUoxetine 10 MG capsule Take 1 capsule (10 mg total) by mouth once daily.    gabapentin (NEURONTIN) 600 MG tablet TAKE 1 TABLET BY MOUTH THREE TIMES DAILY    hydrOXYzine HCl  (ATARAX) 25 MG tablet Take 1 tablet (25 mg total) by mouth 3 (three) times daily.    OLANZapine (ZYPREXA) 5 MG tablet Take 1 tablet (5 mg total) by mouth every evening.    oxybutynin (DITROPAN-XL) 5 MG TR24 Take 1 tablet (5 mg total) by mouth once daily.    potassium chloride (KLOR-CON) 10 MEQ TbSR Take 1 tablet (10 mEq total) by mouth once daily.    traZODone (DESYREL) 100 MG tablet Take 1 tablet (100 mg total) by mouth every evening.    albuterol-ipratropium (DUO-NEB) 2.5 mg-0.5 mg/3 mL nebulizer solution Take 3 mLs by nebulization every 4 (four) hours as needed for Wheezing.    azithromycin (ZITHROMAX Z-KAMALA) 250 MG tablet Take 2 tablets (500 mg total) by mouth once daily.    baclofen (LIORESAL) 20 MG tablet 20 mg 2 (two) times daily.     bumetanide (BUMEX) 2 MG tablet Take 2 mg by mouth once daily.     catheter 18 Fr Misc Change every 20 days    ipratropium (ATROVENT) 0.02 % nebulizer solution U 1 VIAL VIA NEBULIZER Q 4 H WHILE AWAKE    lidocaine (LIDODERM) 5 % Place 1 patch onto the skin once daily. Remove & Discard patch within 12 hours or as directed by MD    miscellaneous medical supply (C-TUB) [facility] [place] [facility] FOR OXYGEN AT 2L. LAST OXYGEN SATURATION WAS 83% ON Room Air. She uses  BIPAP and  Needs  New mask, tubings and    oxyCODONE-acetaminophen (PERCOCET) 7.5-325 mg per tablet TK 1 T PO BID PRN P    VENTOLIN HFA 90 mcg/actuation inhaler      Family History     Problem Relation (Age of Onset)    Anxiety disorder Brother    Dementia Mother    Depression Brother        Tobacco Use    Smoking status: Former Smoker     Packs/day: 0.50     Years: 25.00     Pack years: 12.50     Last attempt to quit:      Years since quittin.1    Smokeless tobacco: Never Used   Substance and Sexual Activity    Alcohol use: Not Currently     Comment: occasional    Drug use: Never     Comment: prescribed opioids at present for pain    Sexual activity: Not Currently     Partners: Male      Comment: since 2011     Review of Systems   Constitutional: Negative for activity change.   HENT: Negative for congestion and dental problem.    Eyes: Negative for discharge and itching.   Respiratory: Positive for shortness of breath. Negative for cough.    Cardiovascular: Negative for chest pain.   Gastrointestinal: Negative for abdominal distention and abdominal pain.   Endocrine: Negative for cold intolerance.   Genitourinary: Negative for difficulty urinating and dyspareunia.   Musculoskeletal: Negative for arthralgias and back pain.   Skin: Negative for color change.   Allergic/Immunologic: Negative for environmental allergies and food allergies.   Neurological: Negative for dizziness and facial asymmetry.   Hematological: Negative for adenopathy. Does not bruise/bleed easily.   Psychiatric/Behavioral: Negative for agitation and behavioral problems.     Objective:     Vital Signs (Most Recent):  Temp: 98.6 °F (37 °C) (03/01/20 1206)  Pulse: (!) 56 (03/01/20 1330)  Resp: 12 (03/01/20 1330)  BP: (!) 117/59 (03/01/20 1330)  SpO2: 96 % (03/01/20 1330) Vital Signs (24h Range):  Temp:  [97.6 °F (36.4 °C)-98.6 °F (37 °C)] 98.6 °F (37 °C)  Pulse:  [56-96] 56  Resp:  [11-24] 12  SpO2:  [95 %-99 %] 96 %  BP: (106-160)/(55-90) 117/59     Weight: 117.9 kg (260 lb)  Body mass index is 43.27 kg/m².    Physical Exam   Constitutional: She is oriented to person, place, and time. No distress.   HENT:   Head: Atraumatic.   Eyes: Pupils are equal, round, and reactive to light. EOM are normal.   Neck: Normal range of motion. Neck supple.   Cardiovascular: Normal rate and regular rhythm.   Pulmonary/Chest: Effort normal. She has wheezes.   Abdominal: Soft. Bowel sounds are normal. She exhibits no distension. There is no tenderness.   Genitourinary:   Genitourinary Comments: Supra pubic cath in place.   Musculoskeletal: Normal range of motion. She exhibits no edema or deformity.   S/P left BKA,   Neurological: She is oriented to  person, place, and time. No cranial nerve deficit. Coordination normal.   Skin: Skin is warm and dry.   Psychiatric: She has a normal mood and affect. Her behavior is normal.         CRANIAL NERVES     CN III, IV, VI   Pupils are equal, round, and reactive to light.  Extraocular motions are normal.        Significant Labs:   ABGs:   Recent Labs   Lab 03/01/20  1609   PH 7.329*   PCO2 72.3*   HCO3 38.1*   POCSATURATED 90*   BE 10     BMP:   Recent Labs   Lab 03/01/20  0820 03/01/20  0953   *  --      --    K 3.0*  --    CL 94*  --    CO2 32*  --    BUN 26*  --    CREATININE 1.0  --    CALCIUM 9.7  --    MG  --  2.1     CBC:   Recent Labs   Lab 03/01/20  0820   WBC 7.20   HGB 12.1   HCT 40.1        CMP:   Recent Labs   Lab 03/01/20  0820      K 3.0*   CL 94*   CO2 32*   *   BUN 26*   CREATININE 1.0   CALCIUM 9.7   PROT 8.7*   ALBUMIN 3.7   BILITOT 0.6   ALKPHOS 84   AST 26   ALT 11   ANIONGAP 15   EGFRNONAA 60       Significant Imaging: reviewed.    Assessment/Plan:     * Acute on chronic respiratory failure with hypercapnia  Duo to COPD exacerbation,ABG show PH of 7.29,PCO 2 of 79,PO 2 of 63,after nebulizer treatment and  IV solumedrol,ABG had only slight improvement,CTA chest show no PE or dissection,she has been admitted to ICU for continues bipap and IV solumedrol and nebulizer treatment for COPD exacerbation.      COPD with acute exacerbation  Will continue with IV solumedrol and Nebulizer.      Status post below knee amputation, left  On baseline she is bed bound,her  helping her out,      Chronic diastolic heart failure  Stable,on lasix,will monitor.      Chronic respiratory failure  On 2 liter NC O 2 at home.      History of CVA (cerebrovascular accident)  No new neuro defiecnicy,alert ,      Bipolar I disorder, current or most recent episode depressed, with psychotic features  Stable,resumed home medication,zyprexa,    Chronic indwelling suprapubic catheter in  place  Supra pubic cath has been  exchanged on 2.10.20,follow up with Urology as out patient.      Chronic hepatitis C  Stable,alert,      Class 3 severe obesity in adult  weightt lose as out patient.      ADEEL (obstructive sleep apnea)  Resumed BIPAP,has home CPAP.      Essential hypertension  Stable at this time,will monitor.      Neurogenic bladder  On supra pubic cath.        VTE Risk Mitigation (From admission, onward)    None             [place] MD  Department of Hospital Medicine   [facility]

## 2020-03-01 NOTE — ED NOTES
Call top ICU, per Marjan MESSINA, unable to take repot at this time. She will call back for report .

## 2020-03-01 NOTE — ED PROVIDER NOTES
Encounter Date: 3/1/2020    SCRIBE #1 NOTE: I, Kadie Guevara, am scribing for, and in the presence of,  Sundeep Quinn MD. I have scribed the following portions of the note - Other sections scribed: HPI, ROS.       History     Chief Complaint   Patient presents with    Shortness of Breath     sob since waking this am +wheezing.  Initial O2 sat of 87%.  EMS gave DuoNeb and SoluMedrol.     CC: Shortness of breath    HPI: This is a 64 y.o. female with a PMHx of obesity, chronic obstructive pulmonary disease, asthma, hypertension, congestive heart failure, and sleep apnea who presents to the Emergency Department via EMS with a cc of chronic shortness of breath worsening yesterday. Patient was given DuoNeb and SoluMedrol en route with EMS with moderate relief. Patient now reports associated wheezing, chills, and bilateral ear pain. She denies chest pain, cough, abdominal pain, nausea, vomiting, diarrhea, or dysuria. She also denies a history of coronary artery disease or diabetes mellitus. No worsening factors are noted. Patient reports a prior history of similar symptoms secondary to chronic obstructive pulmonary disease. She states she is on 2 liters of oxygen at home and BiPAP at night. Patient is a former smoker, does not drink alcohol, and does not use drugs. She is allergic to Tuberculin and Rocephin.    The history is provided by the patient. No  was used.     Review of patient's allergies indicates:   Allergen Reactions    Tuberculin ppd Itching and Dermatitis     Patient has old scare that she claims is from TB PPD    Rocephin [ceftriaxone] Rash     Rash 2012? Pt agreeable to try with pre mediation with benadryl.      Past Medical History:   Diagnosis Date    Abdominal hernia     Addiction to drug     Alcohol abuse     Anxiety     Arthritis     Asthma     Bipolar disorder     Bladder stones     CHF (congestive heart failure)     COPD (chronic obstructive pulmonary disease)      on home o2    CVA (cerebral vascular accident)     2012    Hallucination     Hepatitis C     treated and cured    History of blood clots     Hx of psychiatric care     Hypertension     Belen     Obese body habitus     On home oxygen therapy     ADEEL (obstructive sleep apnea)     ADEEL treated with BiPAP     Paraplegia     Paraplegic spinal paralysis     Psychiatric problem     Psychosis     AVH; Paranoia    Renal disorder     Requires assistance with activities of daily living (ADL)     SCI (spinal cord injury)     incomplete    Sleep difficulties     has sleep apnea and uses a Bi-PAP machine.    Status post amputation of leg 2019    Substance abuse     Suprapubic catheter 03/15/2011    Therapy     Vaginal delivery     x1    Wheelchair dependence      Past Surgical History:   Procedure Laterality Date    ABOVE-KNEE AMPUTATION Left 2019    Procedure: AMPUTATION, ABOVE KNEE;  Surgeon: Gordo Rodriguez MD;  [facility]: [facility];  Service: Orthopedics;  Laterality: Left;    amputation Left 2019    above the knee    BACK SURGERY      bilateral knee replacement      BREAST BIOPSY      cysto/lithopaxy 2017      CYSTOSCOPY W/ LASER LITHOTRIPSY      JOINT REPLACEMENT      bilateral knee    SUPRAPUBIC CYSTOSTOMY  03/15/2011    patient reports  replaced tubing on 10/27/19, at home     Family History   Problem Relation Age of Onset    Dementia Mother     Anxiety disorder Brother     Depression Brother      Social History     Tobacco Use    Smoking status: Former Smoker     Packs/day: 0.50     Years: 25.00     Pack years: 12.50     Last attempt to quit:      Years since quittin.1    Smokeless tobacco: Never Used   Substance Use Topics    Alcohol use: Not Currently     Comment: occasional    Drug use: Never     Comment: prescribed opioids at present for pain     Review of Systems   Constitutional: Positive for chills.   HENT: Positive for ear pain  (bilateral). Negative for sore throat.    Eyes: Negative for pain.   Respiratory: Positive for shortness of breath and wheezing. Negative for cough.    Cardiovascular: Negative for chest pain and leg swelling.   Gastrointestinal: Negative for abdominal pain, diarrhea, nausea and vomiting.   Genitourinary: Negative for dysuria.   Musculoskeletal: Negative for arthralgias, back pain, joint swelling and myalgias.   Skin: Negative for rash.   Neurological: Negative for headaches.       Physical Exam     Initial Vitals [03/01/20 0728]   BP Pulse Resp Temp SpO2   (!) 160/90 70 16 97.6 °F (36.4 °C) 97 %      MAP       --         Physical Exam    The patient was examined specifically for the following:   General:No significant distress, Good color, Warm and dry. Head and neck:Scalp atraumatic, Neck supple. Neurological:Appropriate conversation, Gross motor deficits. Eyes:Conjugate gaze, Clear corneas. ENT: No epistaxis. Cardiac: Regular rate and rhythm, Grossly normal heart tones. Pulmonary: Wheezing, Rales. Gastrointestinal: Abdominal tenderness, Abdominal distention. Musculoskeletal: Extremity deformity, Apparent pain with range of motion of the joints. Skin: Rash.   The findings on examination were normal except for the following:  Patient has mild bilateral wheezing.  She has distant lung sounds.  She is alert and bright.  There is no significant respiratory distress.  She is morbidly obese.  She has left above-the-knee amputation.  She has a suprapubic catheter.  ED Course   Critical Care  Date/Time: 3/1/2020 5:06 PM  Performed by: Sundeep Quinn MD  Authorized by: Sundeep Quinn MD   Direct patient critical care time: 22 minutes  Additional history critical care time: 11 minutes  Ordering / reviewing critical care time: 11 minutes  Documentation critical care time: 11 minutes  Consulting other physicians critical care time: 11 minutes  Total critical care time (exclusive of procedural time) : 66 minutes  Critical  care time was exclusive of separately billable procedures and treating other patients and teaching time.  Critical care was necessary to treat or prevent imminent or life-threatening deterioration of the following conditions: respiratory failure.  Critical care was time spent personally by me on the following activities: evaluation of patient's response to treatment, obtaining history from patient or surrogate, ordering and review of laboratory studies, pulse oximetry, review of old charts, development of treatment plan with patient or surrogate, discussions with primary provider, examination of patient, ordering and performing treatments and interventions, re-evaluation of patient's condition and ordering and review of radiographic studies.        Labs Reviewed   COMPREHENSIVE METABOLIC PANEL - Abnormal; Notable for the following components:       Result Value    Potassium 3.0 (*)     Chloride 94 (*)     CO2 32 (*)     Glucose 122 (*)     BUN, Bld 26 (*)     Total Protein 8.7 (*)     All other components within normal limits   CBC W/ AUTO DIFFERENTIAL - Abnormal; Notable for the following components:    Mean Corpuscular Hemoglobin 26.1 (*)     Mean Corpuscular Hemoglobin Conc 30.2 (*)     All other components within normal limits   ISTAT PROCEDURE - Abnormal; Notable for the following components:    POC PH 7.289 (*)     POC PCO2 79.5 (*)     POC PO2 63 (*)     POC HCO3 38.1 (*)     POC SATURATED O2 87 (*)     POC TCO2 40 (*)     All other components within normal limits   ISTAT PROCEDURE - Abnormal; Notable for the following components:    POC PH 7.329 (*)     POC PCO2 72.3 (*)     POC PO2 67 (*)     POC HCO3 38.1 (*)     POC SATURATED O2 90 (*)     POC TCO2 40 (*)     All other components within normal limits   TROPONIN I   B-TYPE NATRIURETIC PEPTIDE   PROTIME-INR   APTT   TROPONIN I   MAGNESIUM     EKG Readings: (Independently Interpreted)    This patient is in a normal sinus rhythm with a heart rate of 62.   There is diffuse T-wave flattening.  There is no definite evidence of acute myocardial infarction malignant arrhythmia.  The axis is normal.      A 2nd EKG was done at 9:48 a.m..  There are no significant ST segment changes.  The patient has diffuse T-wave flattening.  There is 1 PVC.  The axis is normal.  There is no evidence of acute myocardial infarction or malignant arrhythmia.     ECG Results          EKG 12-lead (Final result)  Result time 03/01/20 12:49:51    Final result by Interface, Lab In [facility] (03/01/20 12:49:51)                 Narrative:    Test Reason : R06.02,    Vent. Rate : 064 BPM     Atrial Rate : 064 BPM     P-R Int : 152 ms          QRS Dur : 098 ms      QT Int : 406 ms       P-R-T Axes : 062 042 205 degrees     QTc Int : 418 ms    Sinus rhythm with occasional Premature ventricular complexes  Nonspecific T wave abnormality  Abnormal ECG  When compared with ECG of 12-FEB-2020 18:00,  Significant changes have occurred  Confirmed by Ramu Chase MD ([id number]) on 3/1/2020 12:49:44 PM    Referred By: [email or web address]   SELF           Confirmed By:Ramu Chase MD                             EKG 12-lead (In process)  Result time 03/01/20 13:25:19    In process by Interface, Lab In [facility] (03/01/20 13:25:19)                 Narrative:    Test Reason : R07.9,    Vent. Rate : 062 BPM     Atrial Rate : 062 BPM     P-R Int : 144 ms          QRS Dur : 100 ms      QT Int : 376 ms       P-R-T Axes : 063 024 044 degrees     QTc Int : 381 ms    Normal sinus rhythm  Nonspecific T wave abnormality  Abnormal ECG  When compared with ECG of 12-FEB-2020 18:00,  Significant changes have occurred    Referred By: [email or web address]   SELF           Confirmed By:                   In process by Interface, Lab In [facility] (03/01/20 13:24:44)                 Narrative:    Test Reason : R07.9,    Vent. Rate : 062 BPM     Atrial Rate : 062 BPM     P-R Int : 144 ms          QRS Dur : 100 ms      QT Int : 376 ms       P-R-T Axes : 063  024 044 degrees     QTc Int : 381 ms    Normal sinus rhythm  Nonspecific T wave abnormality  Abnormal ECG  No previous ECGs available    Referred By: AAAREFERR   SELF           Confirmed By:                             Imaging Results          CTA Chest Non-Coronary (PE Study) (In process)                X-Ray Chest AP Portable (In process)                 Medical decision making: Given the above this patient presents to the emergency room with shortness of breath.  Patient has a history of sleep apnea.  She is on BiPAP at home as well.  She also has a history of COPD.  She is maintained on oxygen at home at 2 L.   She has a nebulizer machine.  She has been using it.  There is some report that the patient had oxygen saturations of 87% the field.  She was treated with albuterol, ipratropium, and Solu-Medrol in the emergency room.  Repeat oxygen saturations are 97% on her 2 L nasal cannula.  I will discharge her to outpatient evaluation and treatment.  I believe this is an exacerbation of COPD.  The chest x-ray reveals some equivocal findings.  I do not believe that this patient is having an aortic dissection clinically.  I doubt pneumonia clinically.  I will discharge her to outpatient evaluation.       Just after discharge, this patient complained of dull heavy chest pain that radiates to her left axilla and now to her  Right ear.   The chest pain is worse with deep breathing.  The chest is tender.   I will repeat an EKG.     ABGs reveal that this patient has an elevated pCO2 and a low pH.  I believe she has some respiratory failure.  I put her on BiPAP.  I repeated the ABG is the pH is improved to 7.32 but the pCO2 is still elevated.  I discussed this case with Dr. Bass and Dr. Nolasco,  Who agreed to admit this patient to the ICU.        Additional MDM:   Heart Score:    History:          Slightly suspicious.  ECG:             Nonspecific repolarisation disturbance  Age:               45-65 years  Risk factors:  1-2 risk factors  Troponin:       Less than or equal to normal limit  Final Score: 3             Scribe Attestation:   Scribe #1: I performed the above scribed service and the documentation accurately describes the services I performed. I attest to the accuracy of the note.                          Clinical Impression:     1. Acute on chronic respiratory failure with hypercapnia    2. Shortness of breath    3. Chest pain    4. Chronic obstructive pulmonary disease with bronchospasm                ED Disposition Condition    Admit             I personally performed the services described in this documentation.  All medical record  entries made by the scribe are at my direction and in my presence.  Signed, Dr. Cheyenne Quinn MD  03/01/20 2213       Sundeep Quinn MD  03/01/20 4572       Sundeep Quinn MD  03/01/20 9214

## 2020-03-02 LAB
ALBUMIN SERPL BCP-MCNC: 3.3 G/DL (ref 3.5–5.2)
ALLENS TEST: ABNORMAL
ALP SERPL-CCNC: 70 U/L (ref 55–135)
ALT SERPL W/O P-5'-P-CCNC: 10 U/L (ref 10–44)
ANION GAP SERPL CALC-SCNC: 10 MMOL/L (ref 8–16)
AST SERPL-CCNC: 20 U/L (ref 10–40)
BASOPHILS # BLD AUTO: 0.01 K/UL (ref 0–0.2)
BASOPHILS NFR BLD: 0.1 % (ref 0–1.9)
BILIRUB SERPL-MCNC: 0.4 MG/DL (ref 0.1–1)
BUN SERPL-MCNC: 18 MG/DL (ref 8–23)
CALCIUM SERPL-MCNC: 9.3 MG/DL (ref 8.7–10.5)
CHLORIDE SERPL-SCNC: 96 MMOL/L (ref 95–110)
CO2 SERPL-SCNC: 32 MMOL/L (ref 23–29)
CREAT SERPL-MCNC: 0.8 MG/DL (ref 0.5–1.4)
DELSYS: ABNORMAL
DIFFERENTIAL METHOD: ABNORMAL
EOSINOPHIL # BLD AUTO: 0 K/UL (ref 0–0.5)
EOSINOPHIL NFR BLD: 0.1 % (ref 0–8)
EP: 8
ERYTHROCYTE [DISTWIDTH] IN BLOOD BY AUTOMATED COUNT: 13.7 % (ref 11.5–14.5)
ERYTHROCYTE [SEDIMENTATION RATE] IN BLOOD BY WESTERGREN METHOD: 8 MM/H
EST. GFR  (AFRICAN AMERICAN): >60 ML/MIN/1.73 M^2
EST. GFR  (NON AFRICAN AMERICAN): >60 ML/MIN/1.73 M^2
FIO2: 28
GLUCOSE SERPL-MCNC: 213 MG/DL (ref 70–110)
HCO3 UR-SCNC: 35.8 MMOL/L (ref 24–28)
HCT VFR BLD AUTO: 34.8 % (ref 37–48.5)
HGB BLD-MCNC: 10.6 G/DL (ref 12–16)
IMM GRANULOCYTES # BLD AUTO: 0.08 K/UL (ref 0–0.04)
IMM GRANULOCYTES NFR BLD AUTO: 0.5 % (ref 0–0.5)
IP: 22
LYMPHOCYTES # BLD AUTO: 0.6 K/UL (ref 1–4.8)
LYMPHOCYTES NFR BLD: 4.1 % (ref 18–48)
MCH RBC QN AUTO: 26.2 PG (ref 27–31)
MCHC RBC AUTO-ENTMCNC: 30.5 G/DL (ref 32–36)
MCV RBC AUTO: 86 FL (ref 82–98)
MIN VOL: 9.4
MODE: ABNORMAL
MONOCYTES # BLD AUTO: 0.5 K/UL (ref 0.3–1)
MONOCYTES NFR BLD: 3.3 % (ref 4–15)
NEUTROPHILS # BLD AUTO: 13.4 K/UL (ref 1.8–7.7)
NEUTROPHILS NFR BLD: 91.9 % (ref 38–73)
NRBC BLD-RTO: 0 /100 WBC
PCO2 BLDA: 54.5 MMHG (ref 35–45)
PH SMN: 7.42 [PH] (ref 7.35–7.45)
PLATELET # BLD AUTO: 219 K/UL (ref 150–350)
PLATELET BLD QL SMEAR: ABNORMAL
PMV BLD AUTO: 10.9 FL (ref 9.2–12.9)
PO2 BLDA: 91 MMHG (ref 80–100)
POC BE: 10 MMOL/L
POC SATURATED O2: 97 % (ref 95–100)
POC TCO2: 37 MMOL/L (ref 23–27)
POTASSIUM SERPL-SCNC: 4.3 MMOL/L (ref 3.5–5.1)
PROCALCITONIN SERPL IA-MCNC: 2.05 NG/ML
PROT SERPL-MCNC: 7.4 G/DL (ref 6–8.4)
RBC # BLD AUTO: 4.04 M/UL (ref 4–5.4)
SAMPLE: ABNORMAL
SITE: ABNORMAL
SODIUM SERPL-SCNC: 138 MMOL/L (ref 136–145)
SP02: 98
SPONT RATE: 16
TROPONIN I SERPL DL<=0.01 NG/ML-MCNC: <0.006 NG/ML (ref 0–0.03)
WBC # BLD AUTO: 14.55 K/UL (ref 3.9–12.7)

## 2020-03-02 PROCEDURE — 36600 WITHDRAWAL OF ARTERIAL BLOOD: CPT

## 2020-03-02 PROCEDURE — 82803 BLOOD GASES ANY COMBINATION: CPT

## 2020-03-02 PROCEDURE — 99222 1ST HOSP IP/OBS MODERATE 55: CPT | Mod: ,,, | Performed by: NURSE PRACTITIONER

## 2020-03-02 PROCEDURE — 25000003 PHARM REV CODE 250: Performed by: EMERGENCY MEDICINE

## 2020-03-02 PROCEDURE — 87070 CULTURE OTHR SPECIMN AEROBIC: CPT

## 2020-03-02 PROCEDURE — 25000003 PHARM REV CODE 250: Performed by: INTERNAL MEDICINE

## 2020-03-02 PROCEDURE — 27000221 HC OXYGEN, UP TO 24 HOURS

## 2020-03-02 PROCEDURE — 63600175 PHARM REV CODE 636 W HCPCS: Performed by: EMERGENCY MEDICINE

## 2020-03-02 PROCEDURE — 94640 AIRWAY INHALATION TREATMENT: CPT

## 2020-03-02 PROCEDURE — 21400001 HC TELEMETRY ROOM

## 2020-03-02 PROCEDURE — 63600175 PHARM REV CODE 636 W HCPCS: Performed by: NURSE PRACTITIONER

## 2020-03-02 PROCEDURE — 84484 ASSAY OF TROPONIN QUANT: CPT

## 2020-03-02 PROCEDURE — 83036 HEMOGLOBIN GLYCOSYLATED A1C: CPT

## 2020-03-02 PROCEDURE — 25000003 PHARM REV CODE 250: Performed by: HOSPITALIST

## 2020-03-02 PROCEDURE — 84145 PROCALCITONIN (PCT): CPT

## 2020-03-02 PROCEDURE — 80053 COMPREHEN METABOLIC PANEL: CPT

## 2020-03-02 PROCEDURE — 99222 PR INITIAL HOSPITAL CARE,LEVL II: ICD-10-PCS | Mod: ,,, | Performed by: NURSE PRACTITIONER

## 2020-03-02 PROCEDURE — 63600175 PHARM REV CODE 636 W HCPCS: Performed by: INTERNAL MEDICINE

## 2020-03-02 PROCEDURE — 25000242 PHARM REV CODE 250 ALT 637 W/ HCPCS: Performed by: INTERNAL MEDICINE

## 2020-03-02 PROCEDURE — 99900035 HC TECH TIME PER 15 MIN (STAT)

## 2020-03-02 PROCEDURE — 36415 COLL VENOUS BLD VENIPUNCTURE: CPT

## 2020-03-02 PROCEDURE — 87205 SMEAR GRAM STAIN: CPT

## 2020-03-02 PROCEDURE — 63600175 PHARM REV CODE 636 W HCPCS: Performed by: HOSPITALIST

## 2020-03-02 PROCEDURE — 85025 COMPLETE CBC W/AUTO DIFF WBC: CPT

## 2020-03-02 PROCEDURE — 94660 CPAP INITIATION&MGMT: CPT

## 2020-03-02 RX ORDER — HYDRALAZINE HYDROCHLORIDE 20 MG/ML
10 INJECTION INTRAMUSCULAR; INTRAVENOUS EVERY 6 HOURS PRN
Status: DISCONTINUED | OUTPATIENT
Start: 2020-03-02 | End: 2020-03-04 | Stop reason: HOSPADM

## 2020-03-02 RX ORDER — METOPROLOL TARTRATE 1 MG/ML
5 INJECTION, SOLUTION INTRAVENOUS EVERY 5 MIN PRN
Status: DISCONTINUED | OUTPATIENT
Start: 2020-03-03 | End: 2020-03-04 | Stop reason: HOSPADM

## 2020-03-02 RX ORDER — LEVOFLOXACIN 5 MG/ML
750 INJECTION, SOLUTION INTRAVENOUS
Status: DISCONTINUED | OUTPATIENT
Start: 2020-03-02 | End: 2020-03-04 | Stop reason: HOSPADM

## 2020-03-02 RX ORDER — METHYLPREDNISOLONE SOD SUCC 125 MG
125 VIAL (EA) INJECTION EVERY 6 HOURS
Status: DISCONTINUED | OUTPATIENT
Start: 2020-03-02 | End: 2020-03-02

## 2020-03-02 RX ORDER — HYDRALAZINE HYDROCHLORIDE 25 MG/1
25 TABLET, FILM COATED ORAL EVERY 8 HOURS
Status: DISCONTINUED | OUTPATIENT
Start: 2020-03-02 | End: 2020-03-04

## 2020-03-02 RX ORDER — METHYLPREDNISOLONE SOD SUCC 125 MG
60 VIAL (EA) INJECTION EVERY 12 HOURS
Status: DISCONTINUED | OUTPATIENT
Start: 2020-03-02 | End: 2020-03-04 | Stop reason: HOSPADM

## 2020-03-02 RX ADMIN — FAMOTIDINE 20 MG: 20 TABLET ORAL at 09:03

## 2020-03-02 RX ADMIN — METHYLPREDNISOLONE SODIUM SUCCINATE 60 MG: 125 INJECTION, POWDER, FOR SOLUTION INTRAMUSCULAR; INTRAVENOUS at 09:03

## 2020-03-02 RX ADMIN — IPRATROPIUM BROMIDE AND ALBUTEROL SULFATE 3 ML: .5; 3 SOLUTION RESPIRATORY (INHALATION) at 08:03

## 2020-03-02 RX ADMIN — POLYETHYLENE GLYCOL 3350 17 G: 17 POWDER, FOR SOLUTION ORAL at 09:03

## 2020-03-02 RX ADMIN — IPRATROPIUM BROMIDE AND ALBUTEROL SULFATE 3 ML: .5; 3 SOLUTION RESPIRATORY (INHALATION) at 04:03

## 2020-03-02 RX ADMIN — LEVOFLOXACIN 750 MG: 750 INJECTION, SOLUTION INTRAVENOUS at 04:03

## 2020-03-02 RX ADMIN — TRAZODONE HYDROCHLORIDE 100 MG: 50 TABLET ORAL at 09:03

## 2020-03-02 RX ADMIN — IPRATROPIUM BROMIDE AND ALBUTEROL SULFATE 3 ML: .5; 3 SOLUTION RESPIRATORY (INHALATION) at 11:03

## 2020-03-02 RX ADMIN — OLANZAPINE 5 MG: 2.5 TABLET, FILM COATED ORAL at 09:03

## 2020-03-02 RX ADMIN — METHYLPREDNISOLONE SODIUM SUCCINATE 125 MG: 125 INJECTION, POWDER, FOR SOLUTION INTRAMUSCULAR; INTRAVENOUS at 05:03

## 2020-03-02 RX ADMIN — GABAPENTIN 600 MG: 300 CAPSULE ORAL at 09:03

## 2020-03-02 RX ADMIN — METHYLPREDNISOLONE SODIUM SUCCINATE 125 MG: 125 INJECTION, POWDER, FOR SOLUTION INTRAMUSCULAR; INTRAVENOUS at 12:03

## 2020-03-02 RX ADMIN — IPRATROPIUM BROMIDE AND ALBUTEROL SULFATE 3 ML: .5; 3 SOLUTION RESPIRATORY (INHALATION) at 03:03

## 2020-03-02 RX ADMIN — GABAPENTIN 600 MG: 300 CAPSULE ORAL at 04:03

## 2020-03-02 RX ADMIN — HYDRALAZINE HYDROCHLORIDE 25 MG: 25 TABLET, FILM COATED ORAL at 04:03

## 2020-03-02 RX ADMIN — BACLOFEN 20 MG: 10 TABLET ORAL at 09:03

## 2020-03-02 RX ADMIN — ENOXAPARIN SODIUM 40 MG: 100 INJECTION SUBCUTANEOUS at 04:03

## 2020-03-02 RX ADMIN — HYDRALAZINE HYDROCHLORIDE 25 MG: 25 TABLET, FILM COATED ORAL at 09:03

## 2020-03-02 RX ADMIN — FUROSEMIDE 40 MG: 40 TABLET ORAL at 09:03

## 2020-03-02 RX ADMIN — FLUOXETINE 10 MG: 10 CAPSULE ORAL at 09:03

## 2020-03-02 RX ADMIN — HYDRALAZINE HYDROCHLORIDE 10 MG: 20 INJECTION INTRAMUSCULAR; INTRAVENOUS at 11:03

## 2020-03-02 RX ADMIN — POTASSIUM CHLORIDE 10 MEQ: 750 TABLET, EXTENDED RELEASE ORAL at 09:03

## 2020-03-02 RX ADMIN — IPRATROPIUM BROMIDE AND ALBUTEROL SULFATE 3 ML: .5; 3 SOLUTION RESPIRATORY (INHALATION) at 12:03

## 2020-03-02 NOTE — PLAN OF CARE
03/02/20 1638   Discharge Assessment   Assessment Type Discharge Planning Assessment       SW attempted to contact pt son at [phone], no answer

## 2020-03-02 NOTE — PLAN OF CARE
Pt in ICU, tolerating NC. No complaints of SOB this shift. VSS. Afebrile. No falls, injuries or skin breakdown this shift, precautions maintained for all.

## 2020-03-02 NOTE — SUBJECTIVE & OBJECTIVE
Past Medical History:   Diagnosis Date    Abdominal hernia     Addiction to drug     Alcohol abuse     Anxiety     Arthritis     Asthma     Bipolar disorder     Bladder stones     CHF (congestive heart failure)     COPD (chronic obstructive pulmonary disease)     on home o2    CVA (cerebral vascular accident)     2012    Hallucination     Hepatitis C     treated and cured    History of blood clots     Hx of psychiatric care     Hypertension     Belen     Obese body habitus     On home oxygen therapy     ADEEL (obstructive sleep apnea)     ADEEL treated with BiPAP     Paraplegia     Paraplegic spinal paralysis     Psychiatric problem     Psychosis     AVH; Paranoia    Renal disorder     Requires assistance with activities of daily living (ADL)     SCI (spinal cord injury)     incomplete    Sleep difficulties     has sleep apnea and uses a Bi-PAP machine.    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    Substance abuse     Suprapubic catheter 03/15/2011    Therapy     Vaginal delivery     x1    Wheelchair dependence        Past Surgical History:   Procedure Laterality Date    ABOVE-KNEE AMPUTATION Left 7/23/2019    Procedure: AMPUTATION, ABOVE KNEE;  Surgeon: Gordo Rodriguez MD;  Location: [facility];  Service: Orthopedics;  Laterality: Left;    amputation Left 07/23/2019    above the knee    BACK SURGERY      bilateral knee replacement      BREAST BIOPSY      cysto/lithopaxy 2017      CYSTOSCOPY W/ LASER LITHOTRIPSY      JOINT REPLACEMENT      bilateral knee    SUPRAPUBIC CYSTOSTOMY  03/15/2011    patient reports  replaced tubing on 10/27/19, at home       Review of patient's allergies indicates:   Allergen Reactions    Tuberculin ppd Itching and Dermatitis     Patient has old scare that she claims is from TB PPD    Rocephin [ceftriaxone] Rash     Rash 2012? Pt agreeable to try with pre mediation with benadryl.        Family History     Problem Relation (Age of Onset)     Anxiety disorder Brother    Dementia Mother    Depression Brother        Tobacco Use    Smoking status: Former Smoker     Packs/day: 0.50     Years: 25.00     Pack years: 12.50     Last attempt to quit:      Years since quittin.1    Smokeless tobacco: Never Used   Substance and Sexual Activity    Alcohol use: Not Currently     Comment: occasional    Drug use: Never     Comment: prescribed opioids at present for pain    Sexual activity: Not Currently     Partners: Male     Comment: since          Review of Systems   Constitutional: Positive for chills and fatigue.   HENT: Positive for postnasal drip, sinus pressure and sneezing.    Respiratory: Positive for cough, chest tightness, shortness of breath and wheezing.    Cardiovascular: Negative for chest pain and palpitations.   Gastrointestinal: Negative for abdominal distention, abdominal pain, diarrhea, nausea and vomiting.   Neurological: Positive for weakness. Negative for headaches.     Objective:     Vital Signs (Most Recent):  Temp: 98.6 °F (37 °C) (20 1200)  Pulse: 70 (20 1200)  Resp: 20 (20 1200)  BP: (!) 164/79 (20 1200)  SpO2: 96 % (20 1200) Vital Signs (24h Range):  Temp:  [97.8 °F (36.6 °C)-98.6 °F (37 °C)] 98.6 °F (37 °C)  Pulse:  [50-84] 70  Resp:  [12-40] 20  SpO2:  [89 %-100 %] 96 %  BP: (107-164)/(56-95) 164/79     Weight: 100 kg (220 lb 7.4 oz)  Body mass index is 36.69 kg/m².      Intake/Output Summary (Last 24 hours) at 3/2/2020 1429  Last data filed at 3/2/2020 1200  Gross per 24 hour   Intake 360 ml   Output 1100 ml   Net -740 ml       Physical Exam   Constitutional: She is oriented to person, place, and time. She appears well-developed. She does not have a sickly appearance. No distress. Nasal cannula in place.   HENT:   Head: Normocephalic and atraumatic.   Eyes: Pupils are equal, round, and reactive to light. Conjunctivae are normal. Right eye exhibits no exudate. Left eye exhibits no exudate.  Right conjunctiva has no hemorrhage. Left conjunctiva has no hemorrhage. No scleral icterus. Right eye exhibits no nystagmus. Left eye exhibits no nystagmus.   Neck: Trachea normal. No neck rigidity. No tracheal deviation present.   Cardiovascular: Normal rate, regular rhythm and normal heart sounds. PMI is not displaced. Exam reveals no gallop and no friction rub.   No murmur heard.  Pulses:       Radial pulses are 2+ on the right side, and 2+ on the left side.   Pulmonary/Chest: Effort normal. No accessory muscle usage. Tachypnea noted. No respiratory distress. She has wheezes in the right middle field, the right lower field, the left middle field and the left lower field. She has no rhonchi. She has no rales.   Abdominal: Soft. Normal appearance and bowel sounds are normal. There is no tenderness.   Musculoskeletal: Normal range of motion.   Neurological: She is alert and oriented to person, place, and time. No cranial nerve deficit or sensory deficit. GCS eye subscore is 4. GCS verbal subscore is 5. GCS motor subscore is 6.   Alert and follows commands, cough, gag reflexes intact, answers questions appropriately, spontaneously moves BUE 3/5, BLE 2/5, no focal deficits to note.      Skin: Skin is warm and dry. Capillary refill takes 2 to 3 seconds. She is not diaphoretic. No cyanosis. Nails show no clubbing.   Nursing note and vitals reviewed.      Vents:  Oxygen Concentration (%): 28 (03/02/20 0018)    Lines/Drains/Airways     Drain                 Suprapubic Catheter 03/15/11 3275 days          Peripheral Intravenous Line                 Peripheral IV - Single Lumen 03/01/20 0600 18 G Left Antecubital 1 day         Peripheral IV - Single Lumen 03/02/20 0552 20 G Left;Posterior Forearm less than 1 day                Significant Labs:    CBC/Anemia Profile:  Recent Labs   Lab 03/01/20  0820 03/02/20  0330   WBC 7.20 14.55*   HGB 12.1 10.6*   HCT 40.1 34.8*    219   MCV 87 86   RDW 13.5 13.7         Chemistries:  Recent Labs   Lab 03/01/20  0820 03/01/20  0953 03/01/20 2102 03/02/20  0513     --  143 138   K 3.0*  --  4.1 4.3   CL 94*  --  97 96   CO2 32*  --  33* 32*   BUN 26*  --  20 18   CREATININE 1.0  --  0.9 0.8   CALCIUM 9.7  --  10.0 9.3   ALBUMIN 3.7  --   --  3.3*   PROT 8.7*  --   --  7.4   BILITOT 0.6  --   --  0.4   ALKPHOS 84  --   --  70   ALT 11  --   --  10   AST 26  --   --  20   MG  --  2.1  --   --        All pertinent labs within the past 24 hours have been reviewed.    Significant Imaging:   I have reviewed all pertinent imaging results/findings within the past 24 hours.

## 2020-03-02 NOTE — RESPIRATORY THERAPY
Results for GEENA GREGORY (MRN [id number]) as of 3/2/2020 00:43   Ref. Range 3/2/2020 00:08   POC PH Latest Ref Range: 7.35 - 7.45  7.425   POC PCO2 Latest Ref Range: 35 - 45 mmHg 54.5 (H)   POC PO2 Latest Ref Range: 80 - 100 mmHg 91   POC BE Latest Ref Range: -2 to 2 mmol/L 10   POC HCO3 Latest Ref Range: 24 - 28 mmol/L 35.8 (H)   POC SATURATED O2 Latest Ref Range: 95 - 100 % 97   POC TCO2 Latest Ref Range: 23 - 27 mmol/L 37 (H)   FiO2 Unknown 28   Sample Unknown ARTERIAL   DelSys Unknown CPAP/BiPAP   Allens Test Unknown Pass   Site Unknown RR   Mode Unknown BiPAP   Rate Unknown 8   EP Unknown 8   IP Unknown 22   Min Vol Unknown 9.4   Sp02 Unknown 98   Spont Rate Unknown 16

## 2020-03-02 NOTE — PLAN OF CARE
Pt awake and alert in bed, denies discomforts. Pt tolerating Bipap between meals. Complete bed bath done with linen change. Pt able to assist with repositioning. Pt encouraged to cough and deep breath every hour to decrease CO2. Pt aware of plan of care. Blood sugar elevated on lab results and Hemoglobin A1C ordered. Pt taught on limiting carbohydrates and sugars in diet and to increase activity while sitting up in chair. Pt aware of types of exercises to be done while sitting. Weight loss and blood sugar regulation explained. Pt states understanding with rationale.

## 2020-03-02 NOTE — HPI
64 y.o. female with a PMHx of obesity, sleep apnea (BiPAP qHS), chronic obstructive pulmonary disease, asthma, hypertension, diastolic congestive heart failure, chronic respiratory failure (on 2L home O2), S/P left BKA, chronic suprapubic catheter, and bed bound who presented to ED via EMS with chronic shortness of breath worsening for 1 day. She was given DuoNeb and SoluMedrol en route with EMS with moderate relief. Upon arrival, she complained of wheezing, chills, and bilateral ear pain. She denies chest pain, abdominal pain, nausea, vomiting, diarrhea, or dysuria. Patient reports a prior history of similar symptoms secondary to chronic obstructive pulmonary disease. Patient is a former smoker, ABG show PH of 7.29 ,PCO2 79,PO2 63. CTA chest negative for PE. She was placed on continuous BiPAP and admitted to ICU for COPD exacerbation.

## 2020-03-02 NOTE — ASSESSMENT & PLAN NOTE
Presented with one day worsening sob accompanied by cough producing greenish sputum, and chills combined with reported exposure to sick contacts. She has history of WHO class II/III pulmonary htn, and COPD with PFTs from 01/2020 showing severe restriction and possible mild obstruction based on FEV 25-75 and wheezes on examination.    --treat for COPD exacerbation    --continue solumedrol, duo nebs     --start abx coverage for CAP and atypicals    --Eval for infectious component:    --send RIP, procal, and sputum culture  --She needs significant weight loss  --continue BIPAP qHS  --continue lasix daily

## 2020-03-02 NOTE — ASSESSMENT & PLAN NOTE
WHO class II/III    Last echo from 2019  · Normal left ventricular systolic function. The estimated ejection fraction is 60%  · Concentric left ventricular hypertrophy.  · Indeterminate left ventricular diastolic function.  · Moderate right ventricular enlargement.  · Mildly reduced right ventricular systolic function.  · Moderate right atrial enlargement.  · Mild to moderate tricuspid regurgitation.  · The estimated PA systolic pressure is 55 mm Hg  · Pulmonary hypertension present.

## 2020-03-02 NOTE — ED NOTES
Pt report    Jamar RN, per jamar pt will be going to room 274 in ICU. Requesting to send patient u to unit after shift change

## 2020-03-02 NOTE — PROGRESS NOTES
"eICU Note    Subjective: SOB, wheezing ; AECOPD diagnosed  CT chest no PE reported per notes      Objective:/70   Pulse 69   Temp 97.8 °F (36.6 °C) (Oral)   Resp (!) 22   Ht 5' 5" (1.651 m)   Wt 100 kg (220 lb 7.4 oz)   SpO2 99%   Breastfeeding? No   BMI 36.69 kg/m²     Data review done   Resp acidosis   7.32.72/67/10   WBC 7.2  K 3    Video review done  Awake, on NIV good Vt 22/8 minimal FiO2    Assessment:  AECOPD  Atelectasis bases?      Plan:  1AECOPD- on NIV- wean as tolerated; on steroids, nebs   2Bedbound- at risk of DVT- on   3 Recheck K     DVT prophylaxis enoxaparin  GI prophylaxis famotidine  Ventilator settings niv    Communication with RN    "

## 2020-03-02 NOTE — ASSESSMENT & PLAN NOTE
Duo to COPD exacerbation,ABG show PH of 7.29,PCO 2 of 79,PO 2 of 63,after nebulizer treatment and  IV solumedrol,ABG had only slight improvement,CTA chest show no PE or dissection,she has been admitted to ICU for continues bipap and IV solumedrol and nebulizer treatment for COPD exacerbation.    Continues on Bi-pap today. Stable. Pulmonary consulted and following.

## 2020-03-02 NOTE — SUBJECTIVE & OBJECTIVE
Interval History:  No new issues. On Bi-pap- comfortable.     Review of Systems   Constitutional: Negative for activity change.   HENT: Negative for congestion.    Respiratory: Negative for chest tightness and shortness of breath.    Cardiovascular: Negative for chest pain.   Gastrointestinal: Negative for abdominal pain.   Genitourinary: Negative for difficulty urinating.   Musculoskeletal: Negative for arthralgias.   Psychiatric/Behavioral: Negative for agitation.     Objective:     Vital Signs (Most Recent):  Temp: 98.5 °F (36.9 °C) (03/02/20 0800)  Pulse: 80 (03/02/20 0807)  Resp: (!) 26 (03/02/20 0807)  BP: 135/65 (03/02/20 0800)  SpO2: 100 % (03/02/20 0807) Vital Signs (24h Range):  Temp:  [97.8 °F (36.6 °C)-98.6 °F (37 °C)] 98.5 °F (36.9 °C)  Pulse:  [50-95] 80  Resp:  [11-40] 26  SpO2:  [89 %-100 %] 100 %  BP: (107-161)/(56-95) 135/65     Weight: 100 kg (220 lb 7.4 oz)  Body mass index is 36.69 kg/m².    Intake/Output Summary (Last 24 hours) at 3/2/2020 1009  Last data filed at 3/2/2020 0500  Gross per 24 hour   Intake --   Output 750 ml   Net -750 ml      Physical Exam   Constitutional: She is oriented to person, place, and time. She appears well-developed and well-nourished.   HENT:   Head: Normocephalic and atraumatic.   Cardiovascular: Normal rate and regular rhythm.   Pulmonary/Chest: Effort normal and breath sounds normal. No stridor. No respiratory distress. She has no wheezes.   Abdominal: Soft. Bowel sounds are normal. She exhibits no distension and no mass. There is no tenderness. There is no guarding.   Neurological: She is alert and oriented to person, place, and time.   Skin: Skin is warm and dry.   Psychiatric: She has a normal mood and affect. Her behavior is normal.   Nursing note and vitals reviewed.      Significant Labs:   BMP:   Recent Labs   Lab 03/01/20  0953  03/02/20  0513   GLU  --    < > 213*   NA  --    < > 138   K  --    < > 4.3   CL  --    < > 96   CO2  --    < > 32*   BUN  --     < > 18   CREATININE  --    < > 0.8   CALCIUM  --    < > 9.3   MG 2.1  --   --     < > = values in this interval not displayed.     CBC:   Recent Labs   Lab 03/01/20  0820 03/02/20  0330   WBC 7.20 14.55*   HGB 12.1 10.6*   HCT 40.1 34.8*    219       Significant Imaging:

## 2020-03-02 NOTE — HOSPITAL COURSE
64 y.o. female with a PMHx of obesity, chronic obstructive pulmonary disease, asthma, hypertension, diastolic congestive heart failure,chronic respiratory failure,on 2 liter oxygen at home,S/P left BKA,chronic suprapubic catheter, bed bound,and sleep apnea who presents to the Emergency Department via EMS with a cc of chronic shortness of breath. ABG show A/C hypoxic hypercapnic respiratory failure, The patient was admitted to the ICU and was started on continues Bi-pap. Pulmonary was consulted,want continue with bipap in iCU,chaka,IV solumedrol d  IV Abx for pneumonia.her symptoms improved,she was transferred to floor and remains stable on daily NC O2 and nightly bipap.  She is feeling better ,but still has cough and mild wheezing.treatemnt in floor continued,elevated BP with new BP med;s improved,her symptoms much improved,patient has been  discharged home with PO Abx,new BP med;s and follow up with PCP in next few days,.

## 2020-03-02 NOTE — PROGRESS NOTES
[facility] Hospital Medicine  Progress Note    Patient Name: Mary Ellen Fajardo  MRN: [id number]  Patient Class: IP- Inpatient   Admission Date: 3/1/2020  Length of Stay: 1 days  Attending Physician: Nicolás Bass MD  Primary Care Provider: Any Tran MD        Subjective:     Principal Problem:Acute on chronic respiratory failure with hypercapnia        HPI:   64 y.o. female with a PMHx of obesity, chronic obstructive pulmonary disease, asthma, hypertension, diastolic congestive heart failure,chronic respiratory failure,on 2 liter oxygen at home,S/P left BKA,chronic suprapubic catheter, bed bound,and sleep apnea who presents to the Emergency Department via EMS with a cc of chronic shortness of breath worsening yesterday. Patient was given DuoNeb and SoluMedrol en route with EMS with moderate relief. Patient now reports associated wheezing, chills, and bilateral ear pain. She denies chest pain, cough, abdominal pain, nausea, vomiting, diarrhea, or dysuria.  Patient reports a prior history of similar symptoms secondary to chronic obstructive pulmonary disease. She states she is on 2 liters of oxygen at home and BiPAP at night. Patient is a former smoker,ABG show PH of 7.29,PCO 2 of 79,PO 2 of 63,after nebulizer treatment and  IV solumedrol,ABG had only slight improvement,CTA chest show no PE or dissection,she has been admitted to ICU for continues bipap and IV solumedrol and nebulizer treatment for COPD exacerbation.    Overview/Hospital Course:   64 y.o. female with a PMHx of obesity, chronic obstructive pulmonary disease, asthma, hypertension, diastolic congestive heart failure,chronic respiratory failure,on 2 liter oxygen at home,S/P left BKA,chronic suprapubic catheter, bed bound,and sleep apnea who presents to the Emergency Department via EMS with a cc of chronic shortness of breath.  The patient was admitted to the ICU and started on Bi-pap. Pulmonary was consulted.      Interval History:  No new issues. On Bi-pap- comfortable.     Review of Systems   Constitutional: Negative for activity change.   HENT: Negative for congestion.    Respiratory: Negative for chest tightness and shortness of breath.    Cardiovascular: Negative for chest pain.   Gastrointestinal: Negative for abdominal pain.   Genitourinary: Negative for difficulty urinating.   Musculoskeletal: Negative for arthralgias.   Psychiatric/Behavioral: Negative for agitation.     Objective:     Vital Signs (Most Recent):  Temp: 98.5 °F (36.9 °C) (03/02/20 0800)  Pulse: 80 (03/02/20 0807)  Resp: (!) 26 (03/02/20 0807)  BP: 135/65 (03/02/20 0800)  SpO2: 100 % (03/02/20 0807) Vital Signs (24h Range):  Temp:  [97.8 °F (36.6 °C)-98.6 °F (37 °C)] 98.5 °F (36.9 °C)  Pulse:  [50-95] 80  Resp:  [11-40] 26  SpO2:  [89 %-100 %] 100 %  BP: (107-161)/(56-95) 135/65     Weight: 100 kg (220 lb 7.4 oz)  Body mass index is 36.69 kg/m².    Intake/Output Summary (Last 24 hours) at 3/2/2020 1009  Last data filed at 3/2/2020 0500  Gross per 24 hour   Intake --   Output 750 ml   Net -750 ml      Physical Exam   Constitutional: She is oriented to person, place, and time. She appears well-developed and well-nourished.   HENT:   Head: Normocephalic and atraumatic.   Cardiovascular: Normal rate and regular rhythm.   Pulmonary/Chest: Effort normal and breath sounds normal. No stridor. No respiratory distress. She has no wheezes.   Abdominal: Soft. Bowel sounds are normal. She exhibits no distension and no mass. There is no tenderness. There is no guarding.   Neurological: She is alert and oriented to person, place, and time.   Skin: Skin is warm and dry.   Psychiatric: She has a normal mood and affect. Her behavior is normal.   Nursing note and vitals reviewed.      Significant Labs:   BMP:   Recent Labs   Lab 03/01/20  0953  03/02/20  0513   GLU  --    < > 213*   NA  --    < > 138   K  --    < > 4.3   CL  --    < > 96   CO2  --    < > 32*   BUN   --    < > 18   CREATININE  --    < > 0.8   CALCIUM  --    < > 9.3   MG 2.1  --   --     < > = values in this interval not displayed.     CBC:   Recent Labs   Lab 03/01/20  0820 03/02/20  0330   WBC 7.20 14.55*   HGB 12.1 10.6*   HCT 40.1 34.8*    219       Significant Imaging:      Assessment/Plan:      * Acute on chronic respiratory failure with hypercapnia  Duo to COPD exacerbation,ABG show PH of 7.29,PCO 2 of 79,PO 2 of 63,after nebulizer treatment and  IV solumedrol,ABG had only slight improvement,CTA chest show no PE or dissection,she has been admitted to ICU for continues bipap and IV solumedrol and nebulizer treatment for COPD exacerbation.    Continues on Bi-pap today. Stable. Pulmonary consulted and following.       Status post below knee amputation, left  On baseline she is bed bound,her  helping her out,      Chronic diastolic heart failure  Stable,on lasix,will monitor.      Chronic respiratory failure  On 2 liter NC O 2 at home.      COPD with acute exacerbation  Will continue with IV solumedrol and Nebulizer.      History of CVA (cerebrovascular accident)  No new neuro defiecnicy,alert ,      Bipolar I disorder, current or most recent episode depressed, with psychotic features  Stable,resumed home medication,zyprexa,    Chronic indwelling suprapubic catheter in place  Supra pubic cath has been  exchanged on 2.10.20,follow up with Urology as out patient.      Chronic hepatitis C  Stable,alert,      Class 3 severe obesity in adult  weightt lose as out patient.      ADEEL (obstructive sleep apnea)  Resumed BIPAP,has home CPAP.      Essential hypertension  Stable at this time,will monitor.      Neurogenic bladder  On supra pubic cath.      hyperglycemia- likely from steroids.  Will check an A1c.     VTE Risk Mitigation (From admission, onward)         Ordered     enoxaparin injection 40 mg  Daily      03/01/20 1949     IP VTE HIGH RISK PATIENT  Once      03/01/20 1949                Critical  care time spent on the evaluation and treatment of severe organ dysfunction, review of pertinent labs and imaging studies, discussions with consulting providers and discussions with patient/family: 30  minutes.      Nicolás Hunt MD  Department of Hospital Medicine   [facility]

## 2020-03-02 NOTE — CARE UPDATE
[facility]  ICU Multidisciplinary Bedside Rounds   SUMMARY     Date: 3/2/2020    Prehospitalization: Home  Admit Date / LOS : 3/1/2020/ 1 days    Diagnosis: Acute on chronic respiratory failure with hypercapnia    Consults:        Active: N/A       Needed: N/A     Code Status: Full Code   Advanced Directive: <no information>    LDA: PIV       Central Lines/Site/Justification:Patient Does Not Have Central Line       Urinary Cath/Order/Justification:Suprapubic    Vasopressors/Infusions:        GOALS: Volume/ Hemodynamic: N/A                     RASS: N/A    CAM ICU: Negative  Pain Management: none       Pain Controlled: yes     Rhythm: SB    Respiratory Device: Nasal Cannula    Oxygen Concentration (%):  [28] 28             Most Recent SBT/ SAT: N/A          VTE Prophylaxis: Pharm  Mobility: Bedrest  Stress Ulcer Prophylaxis: Yes    Dietary: PO  Tolerance: yes  /  Advancement: @ goal    Isolation: No active isolations    Restraints: No    Significant Dates:  Post Op Date: N/A  Rescue Date: N/A  Imaging/ Diagnostics: N/A    Noteworthy Labs:  none    CBC/Anemia Labs: Coags:    Recent Labs   Lab 03/01/20  0820 03/02/20  0330   WBC 7.20 14.55*   HGB 12.1 10.6*   HCT 40.1 34.8*    219   MCV 87 86   RDW 13.5 13.7    Recent Labs   Lab 03/01/20 0820   INR 1.0   APTT 26.7        Chemistries:   Recent Labs   Lab 03/01/20  0820 03/01/20  0953 03/01/20  2102     --  143   K 3.0*  --  4.1   CL 94*  --  97   CO2 32*  --  33*   BUN 26*  --  20   CREATININE 1.0  --  0.9   CALCIUM 9.7  --  10.0   PROT 8.7*  --   --    BILITOT 0.6  --   --    ALKPHOS 84  --   --    ALT 11  --   --    AST 26  --   --    MG  --  2.1  --         Cardiac Enzymes: Ejection Fractions:    Recent Labs     03/01/20 2102 03/02/20  0330   TROPONINI <0.006 <0.006    No results found for: EF     POCT Glucose: HbA1c:    No results for input(s): POCTGLUCOSE in the last 168 hours. No results found for: HGBA1C     Needs from Care Team:  none     ICU LOS 9h  Level of Care: OK to Transfer

## 2020-03-02 NOTE — CONSULTS
[facility] [place]  Pulmonology  Consult Note    Patient Name: Mary Ellen Fajardo  MRN: [id number]  Admission Date: 3/1/2020  Hospital Length of Stay: 1 days  Code Status: Full Code  Attending Physician: Nicolás Bass MD  Primary Care Provider: Any Tran MD   Principal Problem: Acute on chronic respiratory failure with hypercapnia    Inpatient consult to Pulmonology  Consult performed by: Carlos Cooper, NP  Consult ordered by: Nicolás Bass MD        Subjective:     HPI:  64 y.o. female with a PMHx of obesity, sleep apnea (BiPAP qHS), chronic obstructive pulmonary disease, asthma, hypertension, diastolic congestive heart failure, chronic respiratory failure (on 2L home O2), S/P left BKA, chronic suprapubic catheter, and bed bound who presented to ED via EMS with chronic shortness of breath worsening for 1 day. She was given DuoNeb and SoluMedrol en route with EMS with moderate relief. Upon arrival, she complained of wheezing, chills, and bilateral ear pain. She denies chest pain, abdominal pain, nausea, vomiting, diarrhea, or dysuria. Patient reports a prior history of similar symptoms secondary to chronic obstructive pulmonary disease. Patient is a former smoker, ABG show PH of 7.29 ,PCO2 79,PO2 63. CTA chest negative for PE. She was placed on continuous BiPAP and admitted to ICU for COPD exacerbation.     Patient tolerating home requirement of 2L O2 via Nasal Cannula:  · Continue COPD exacerbation treatment (steroids, duo nebs)  · Follow up procal, sputum culture and RIP  · abx for CAP and atypicals pending culture results (aztreonam and azithro)  · BiPAP qHS  · Continue home lasix for diuresis     Past Medical History:   Diagnosis Date    Abdominal hernia     Addiction to drug     Alcohol abuse     Anxiety     Arthritis     Asthma     Bipolar disorder     Bladder stones     CHF (congestive heart failure)     COPD (chronic obstructive pulmonary disease)     on home o2     CVA (cerebral vascular accident)     2012    Hallucination     Hepatitis C     treated and cured    History of blood clots     Hx of psychiatric care     Hypertension     Belen     Obese body habitus     On home oxygen therapy     ADEEL (obstructive sleep apnea)     ADEEL treated with BiPAP     Paraplegia     Paraplegic spinal paralysis     Psychiatric problem     Psychosis     AVH; Paranoia    Renal disorder     Requires assistance with activities of daily living (ADL)     SCI (spinal cord injury)     incomplete    Sleep difficulties     has sleep apnea and uses a Bi-PAP machine.    Status post amputation of leg 2019    Substance abuse     Suprapubic catheter 03/15/2011    Therapy     Vaginal delivery     x1    Wheelchair dependence        Past Surgical History:   Procedure Laterality Date    ABOVE-KNEE AMPUTATION Left 2019    Procedure: AMPUTATION, ABOVE KNEE;  Surgeon: Gordo Rodriguez MD;  Location: [facility] OR;  Service: Orthopedics;  Laterality: Left;    amputation Left 2019    above the knee    BACK SURGERY      bilateral knee replacement      BREAST BIOPSY      cysto/lithopaxy 2017      CYSTOSCOPY W/ LASER LITHOTRIPSY      JOINT REPLACEMENT      bilateral knee    SUPRAPUBIC CYSTOSTOMY  03/15/2011    patient reports  replaced tubing on 10/27/19, at home       Review of patient's allergies indicates:   Allergen Reactions    Tuberculin ppd Itching and Dermatitis     Patient has old scare that she claims is from TB PPD    Rocephin [ceftriaxone] Rash     Rash ? Pt agreeable to try with pre mediation with benadryl.        Family History     Problem Relation (Age of Onset)    Anxiety disorder Brother    Dementia Mother    Depression Brother        Tobacco Use    Smoking status: Former Smoker     Packs/day: 0.50     Years: 25.00     Pack years: 12.50     Last attempt to quit:      Years since quittin.1    Smokeless tobacco: Never Used    Substance and Sexual Activity    Alcohol use: Not Currently     Comment: occasional    Drug use: Never     Comment: prescribed opioids at present for pain    Sexual activity: Not Currently     Partners: Male     Comment: since 2011         Review of Systems   Constitutional: Positive for chills and fatigue.   HENT: Positive for postnasal drip, sinus pressure and sneezing.    Respiratory: Positive for cough, chest tightness, shortness of breath and wheezing.    Cardiovascular: Negative for chest pain and palpitations.   Gastrointestinal: Negative for abdominal distention, abdominal pain, diarrhea, nausea and vomiting.   Neurological: Positive for weakness. Negative for headaches.     Objective:     Vital Signs (Most Recent):  Temp: 98.6 °F (37 °C) (03/02/20 1200)  Pulse: 70 (03/02/20 1200)  Resp: 20 (03/02/20 1200)  BP: (!) 164/79 (03/02/20 1200)  SpO2: 96 % (03/02/20 1200) Vital Signs (24h Range):  Temp:  [97.8 °F (36.6 °C)-98.6 °F (37 °C)] 98.6 °F (37 °C)  Pulse:  [50-84] 70  Resp:  [12-40] 20  SpO2:  [89 %-100 %] 96 %  BP: (107-164)/(56-95) 164/79     Weight: 100 kg (220 lb 7.4 oz)  Body mass index is 36.69 kg/m².      Intake/Output Summary (Last 24 hours) at 3/2/2020 1429  Last data filed at 3/2/2020 1200  Gross per 24 hour   Intake 360 ml   Output 1100 ml   Net -740 ml       Physical Exam   Constitutional: She is oriented to person, place, and time. She appears well-developed. She does not have a sickly appearance. No distress. Nasal cannula in place.   HENT:   Head: Normocephalic and atraumatic.   Eyes: Pupils are equal, round, and reactive to light. Conjunctivae are normal. Right eye exhibits no exudate. Left eye exhibits no exudate. Right conjunctiva has no hemorrhage. Left conjunctiva has no hemorrhage. No scleral icterus. Right eye exhibits no nystagmus. Left eye exhibits no nystagmus.   Neck: Trachea normal. No neck rigidity. No tracheal deviation present.   Cardiovascular: Normal rate, regular rhythm  and normal heart sounds. PMI is not displaced. Exam reveals no gallop and no friction rub.   No murmur heard.  Pulses:       Radial pulses are 2+ on the right side, and 2+ on the left side.   Pulmonary/Chest: Effort normal. No accessory muscle usage. Tachypnea noted. No respiratory distress. She has wheezes in the right middle field, the right lower field, the left middle field and the left lower field. She has no rhonchi. She has no rales.   Abdominal: Soft. Normal appearance and bowel sounds are normal. There is no tenderness.   Musculoskeletal: Normal range of motion.   Neurological: She is alert and oriented to person, place, and time. No cranial nerve deficit or sensory deficit. GCS eye subscore is 4. GCS verbal subscore is 5. GCS motor subscore is 6.   Alert and follows commands, cough, gag reflexes intact, answers questions appropriately, spontaneously moves BUE 3/5, BLE 2/5, no focal deficits to note.      Skin: Skin is warm and dry. Capillary refill takes 2 to 3 seconds. She is not diaphoretic. No cyanosis. Nails show no clubbing.   Nursing note and vitals reviewed.      Vents:  Oxygen Concentration (%): 28 (03/02/20 0018)    Lines/Drains/Airways     Drain                 Suprapubic Catheter 03/15/11 3275 days          Peripheral Intravenous Line                 Peripheral IV - Single Lumen 03/01/20 0600 18 G Left Antecubital 1 day         Peripheral IV - Single Lumen 03/02/20 0552 20 G Left;Posterior Forearm less than 1 day                Significant Labs:    CBC/Anemia Profile:  Recent Labs   Lab 03/01/20  0820 03/02/20  0330   WBC 7.20 14.55*   HGB 12.1 10.6*   HCT 40.1 34.8*    219   MCV 87 86   RDW 13.5 13.7        Chemistries:  Recent Labs   Lab 03/01/20  0820 03/01/20  0953 03/01/20  2102 03/02/20  0513     --  143 138   K 3.0*  --  4.1 4.3   CL 94*  --  97 96   CO2 32*  --  33* 32*   BUN 26*  --  20 18   CREATININE 1.0  --  0.9 0.8   CALCIUM 9.7  --  10.0 9.3   ALBUMIN 3.7  --   --   3.3*   PROT 8.7*  --   --  7.4   BILITOT 0.6  --   --  0.4   ALKPHOS 84  --   --  70   ALT 11  --   --  10   AST 26  --   --  20   MG  --  2.1  --   --        All pertinent labs within the past 24 hours have been reviewed.    Significant Imaging:   I have reviewed all pertinent imaging results/findings within the past 24 hours.    Assessment/Plan:     * Acute on chronic respiratory failure with hypercapnia  Presented with one day worsening sob accompanied by cough producing greenish sputum, and chills combined with reported exposure to sick contacts. She has history of WHO class II/III pulmonary htn, and COPD with PFTs from 01/2020 showing severe restriction and possible mild obstruction based on FEV 25-75 and wheezes on examination. CXR with hyperinflated lungs and no focal consolidation    --treat for COPD exacerbation    --continue solumedrol, duo nebs     --start abx coverage for CAP and atypicals  (aztreonam (rocephin allergy) and azithro)  --Eval for infectious component:    --send RIP, procal, and sputum culture  --She needs significant weight loss  --continue BIPAP qHS  --continue lasix daily      Pulmonary HTN  WHO class II/III    Last echo from 2019  · Normal left ventricular systolic function. The estimated ejection fraction is 60%  · Concentric left ventricular hypertrophy.  · Indeterminate left ventricular diastolic function.  · Moderate right ventricular enlargement.  · Mildly reduced right ventricular systolic function.  · Moderate right atrial enlargement.  · Mild to moderate tricuspid regurgitation.  · The estimated PA systolic pressure is 55 mm Hg  · Pulmonary hypertension present.    Hypercapnic respiratory failure  See respiratory failure    COPD (chronic obstructive pulmonary disease)  See respiratory failure    PPI ppx: famotidine  VTE ppx: enoxaparin    Above plan discussed with Dr. Taveras    I spent >50 minutes reviewing patient records, examining, and counseling the patient with greater  than 50% of the time spent with direct patient care and coordination.     Thank you for your consult. I will sign off. Please contact us if you have any additional questions.     Carlos Cooper NP  Pulmonology  [facility]

## 2020-03-02 NOTE — HOSPITAL COURSE
Patient seen this am on nasal cannula, she was admitted for COPD exacerbation and hypercapneic respiratory failure. ABG improved following BiPAP use overnight and now doing well on nasal cannula (back on baseline supplemental O2). Upon my interview, she endorsed chills, runny nose, and exposure to sick contacts recently.  She reports productive cough with greenish sputum, and thinks she has a sinus infection.

## 2020-03-02 NOTE — NURSING
Pt arrive to ICU on Bipap via ED RN and RT. Pulled onto ICU bed and placed on ICU monitor.Tolerated well. Pt oriented to room, bed in lowest position, side rails up, and call light within reach.

## 2020-03-03 PROBLEM — J18.9 PNEUMONIA DUE TO INFECTIOUS ORGANISM: Status: ACTIVE | Noted: 2020-03-03

## 2020-03-03 LAB
ALBUMIN SERPL BCP-MCNC: 3.2 G/DL (ref 3.5–5.2)
ALP SERPL-CCNC: 74 U/L (ref 55–135)
ALT SERPL W/O P-5'-P-CCNC: 14 U/L (ref 10–44)
ANION GAP SERPL CALC-SCNC: 9 MMOL/L (ref 8–16)
AST SERPL-CCNC: 20 U/L (ref 10–40)
BASOPHILS # BLD AUTO: 0.02 K/UL (ref 0–0.2)
BASOPHILS NFR BLD: 0.2 % (ref 0–1.9)
BILIRUB SERPL-MCNC: 0.3 MG/DL (ref 0.1–1)
BUN SERPL-MCNC: 27 MG/DL (ref 8–23)
CALCIUM SERPL-MCNC: 9.8 MG/DL (ref 8.7–10.5)
CHLORIDE SERPL-SCNC: 99 MMOL/L (ref 95–110)
CO2 SERPL-SCNC: 33 MMOL/L (ref 23–29)
CREAT SERPL-MCNC: 0.9 MG/DL (ref 0.5–1.4)
DIFFERENTIAL METHOD: ABNORMAL
EOSINOPHIL # BLD AUTO: 0.2 K/UL (ref 0–0.5)
EOSINOPHIL NFR BLD: 1.4 % (ref 0–8)
ERYTHROCYTE [DISTWIDTH] IN BLOOD BY AUTOMATED COUNT: 13.6 % (ref 11.5–14.5)
EST. GFR  (AFRICAN AMERICAN): >60 ML/MIN/1.73 M^2
EST. GFR  (NON AFRICAN AMERICAN): >60 ML/MIN/1.73 M^2
ESTIMATED AVG GLUCOSE: 94 MG/DL (ref 68–131)
GLUCOSE SERPL-MCNC: 149 MG/DL (ref 70–110)
HBA1C MFR BLD HPLC: 4.9 % (ref 4–5.6)
HCT VFR BLD AUTO: 35.6 % (ref 37–48.5)
HGB BLD-MCNC: 10.8 G/DL (ref 12–16)
IMM GRANULOCYTES # BLD AUTO: 0.13 K/UL (ref 0–0.04)
IMM GRANULOCYTES NFR BLD AUTO: 1.1 % (ref 0–0.5)
LYMPHOCYTES # BLD AUTO: 1 K/UL (ref 1–4.8)
LYMPHOCYTES NFR BLD: 8.4 % (ref 18–48)
MCH RBC QN AUTO: 26 PG (ref 27–31)
MCHC RBC AUTO-ENTMCNC: 30.3 G/DL (ref 32–36)
MCV RBC AUTO: 86 FL (ref 82–98)
MONOCYTES # BLD AUTO: 0.6 K/UL (ref 0.3–1)
MONOCYTES NFR BLD: 4.7 % (ref 4–15)
NEUTROPHILS # BLD AUTO: 9.9 K/UL (ref 1.8–7.7)
NEUTROPHILS NFR BLD: 84.2 % (ref 38–73)
NRBC BLD-RTO: 0 /100 WBC
PLATELET # BLD AUTO: 253 K/UL (ref 150–350)
PMV BLD AUTO: 10.5 FL (ref 9.2–12.9)
POTASSIUM SERPL-SCNC: 4.2 MMOL/L (ref 3.5–5.1)
PROT SERPL-MCNC: 7.9 G/DL (ref 6–8.4)
RBC # BLD AUTO: 4.15 M/UL (ref 4–5.4)
SODIUM SERPL-SCNC: 141 MMOL/L (ref 136–145)
WBC # BLD AUTO: 11.79 K/UL (ref 3.9–12.7)

## 2020-03-03 PROCEDURE — 85025 COMPLETE CBC W/AUTO DIFF WBC: CPT

## 2020-03-03 PROCEDURE — 27000221 HC OXYGEN, UP TO 24 HOURS

## 2020-03-03 PROCEDURE — 94640 AIRWAY INHALATION TREATMENT: CPT

## 2020-03-03 PROCEDURE — 99900035 HC TECH TIME PER 15 MIN (STAT)

## 2020-03-03 PROCEDURE — 36415 COLL VENOUS BLD VENIPUNCTURE: CPT

## 2020-03-03 PROCEDURE — 21400001 HC TELEMETRY ROOM

## 2020-03-03 PROCEDURE — 25000003 PHARM REV CODE 250: Performed by: INTERNAL MEDICINE

## 2020-03-03 PROCEDURE — 63600175 PHARM REV CODE 636 W HCPCS: Performed by: INTERNAL MEDICINE

## 2020-03-03 PROCEDURE — 94761 N-INVAS EAR/PLS OXIMETRY MLT: CPT

## 2020-03-03 PROCEDURE — 94660 CPAP INITIATION&MGMT: CPT

## 2020-03-03 PROCEDURE — 25000003 PHARM REV CODE 250: Performed by: HOSPITALIST

## 2020-03-03 PROCEDURE — 25000242 PHARM REV CODE 250 ALT 637 W/ HCPCS: Performed by: INTERNAL MEDICINE

## 2020-03-03 PROCEDURE — 80053 COMPREHEN METABOLIC PANEL: CPT

## 2020-03-03 RX ORDER — AMLODIPINE BESYLATE 5 MG/1
10 TABLET ORAL DAILY
Status: DISCONTINUED | OUTPATIENT
Start: 2020-03-03 | End: 2020-03-04

## 2020-03-03 RX ORDER — CLONIDINE HYDROCHLORIDE 0.1 MG/1
0.1 TABLET ORAL EVERY 4 HOURS PRN
Status: DISCONTINUED | OUTPATIENT
Start: 2020-03-03 | End: 2020-03-04 | Stop reason: HOSPADM

## 2020-03-03 RX ADMIN — IPRATROPIUM BROMIDE AND ALBUTEROL SULFATE 3 ML: .5; 3 SOLUTION RESPIRATORY (INHALATION) at 11:03

## 2020-03-03 RX ADMIN — FAMOTIDINE 20 MG: 20 TABLET ORAL at 08:03

## 2020-03-03 RX ADMIN — METHYLPREDNISOLONE SODIUM SUCCINATE 60 MG: 125 INJECTION, POWDER, FOR SOLUTION INTRAMUSCULAR; INTRAVENOUS at 09:03

## 2020-03-03 RX ADMIN — OLANZAPINE 5 MG: 2.5 TABLET, FILM COATED ORAL at 09:03

## 2020-03-03 RX ADMIN — TRAZODONE HYDROCHLORIDE 100 MG: 50 TABLET ORAL at 09:03

## 2020-03-03 RX ADMIN — HYDRALAZINE HYDROCHLORIDE 25 MG: 25 TABLET, FILM COATED ORAL at 09:03

## 2020-03-03 RX ADMIN — IPRATROPIUM BROMIDE AND ALBUTEROL SULFATE 3 ML: .5; 3 SOLUTION RESPIRATORY (INHALATION) at 04:03

## 2020-03-03 RX ADMIN — PROMETHAZINE HYDROCHLORIDE 6.25 MG: 25 INJECTION INTRAMUSCULAR; INTRAVENOUS at 11:03

## 2020-03-03 RX ADMIN — POLYETHYLENE GLYCOL 3350 17 G: 17 POWDER, FOR SOLUTION ORAL at 08:03

## 2020-03-03 RX ADMIN — POTASSIUM CHLORIDE 10 MEQ: 750 TABLET, EXTENDED RELEASE ORAL at 08:03

## 2020-03-03 RX ADMIN — BACLOFEN 20 MG: 10 TABLET ORAL at 08:03

## 2020-03-03 RX ADMIN — GABAPENTIN 600 MG: 300 CAPSULE ORAL at 09:03

## 2020-03-03 RX ADMIN — METOPROLOL TARTRATE 5 MG: 1 INJECTION, SOLUTION INTRAVENOUS at 11:03

## 2020-03-03 RX ADMIN — ENOXAPARIN SODIUM 40 MG: 100 INJECTION SUBCUTANEOUS at 05:03

## 2020-03-03 RX ADMIN — LEVOFLOXACIN 750 MG: 750 INJECTION, SOLUTION INTRAVENOUS at 03:03

## 2020-03-03 RX ADMIN — AMLODIPINE BESYLATE 10 MG: 5 TABLET ORAL at 09:03

## 2020-03-03 RX ADMIN — IPRATROPIUM BROMIDE AND ALBUTEROL SULFATE 3 ML: .5; 3 SOLUTION RESPIRATORY (INHALATION) at 07:03

## 2020-03-03 RX ADMIN — FLUOXETINE 10 MG: 10 CAPSULE ORAL at 08:03

## 2020-03-03 RX ADMIN — FAMOTIDINE 20 MG: 20 TABLET ORAL at 09:03

## 2020-03-03 RX ADMIN — IPRATROPIUM BROMIDE AND ALBUTEROL SULFATE 3 ML: .5; 3 SOLUTION RESPIRATORY (INHALATION) at 02:03

## 2020-03-03 RX ADMIN — BACLOFEN 20 MG: 10 TABLET ORAL at 09:03

## 2020-03-03 RX ADMIN — IPRATROPIUM BROMIDE AND ALBUTEROL SULFATE 3 ML: .5; 3 SOLUTION RESPIRATORY (INHALATION) at 12:03

## 2020-03-03 RX ADMIN — HYDRALAZINE HYDROCHLORIDE 25 MG: 25 TABLET, FILM COATED ORAL at 03:03

## 2020-03-03 RX ADMIN — GABAPENTIN 600 MG: 300 CAPSULE ORAL at 03:03

## 2020-03-03 RX ADMIN — FUROSEMIDE 40 MG: 40 TABLET ORAL at 08:03

## 2020-03-03 RX ADMIN — HYDRALAZINE HYDROCHLORIDE 25 MG: 25 TABLET, FILM COATED ORAL at 06:03

## 2020-03-03 RX ADMIN — IPRATROPIUM BROMIDE AND ALBUTEROL SULFATE 3 ML: .5; 3 SOLUTION RESPIRATORY (INHALATION) at 08:03

## 2020-03-03 RX ADMIN — GABAPENTIN 600 MG: 300 CAPSULE ORAL at 08:03

## 2020-03-03 NOTE — SUBJECTIVE & OBJECTIVE
Past Medical History:   Diagnosis Date    Abdominal hernia     Addiction to drug     Alcohol abuse     Anxiety     Arthritis     Asthma     Bipolar disorder     Bladder stones     CHF (congestive heart failure)     COPD (chronic obstructive pulmonary disease)     on home o2    CVA (cerebral vascular accident)     2012    Hallucination     Hepatitis C     treated and cured    History of blood clots     Hx of psychiatric care     Hypertension     Belen     Obese body habitus     On home oxygen therapy     ADEEL (obstructive sleep apnea)     ADEEL treated with BiPAP     Paraplegia     Paraplegic spinal paralysis     Psychiatric problem     Psychosis     AVH; Paranoia    Renal disorder     Requires assistance with activities of daily living (ADL)     SCI (spinal cord injury)     incomplete    Sleep difficulties     has sleep apnea and uses a Bi-PAP machine.    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    Substance abuse     Suprapubic catheter 03/15/2011    Therapy     Vaginal delivery     x1    Wheelchair dependence        Past Surgical History:   Procedure Laterality Date    ABOVE-KNEE AMPUTATION Left 7/23/2019    Procedure: AMPUTATION, ABOVE KNEE;  Surgeon: Gordo Rodriguez MD;  Location: [facility];  Service: Orthopedics;  Laterality: Left;    amputation Left 07/23/2019    above the knee    BACK SURGERY      bilateral knee replacement      BREAST BIOPSY      cysto/lithopaxy 2017      CYSTOSCOPY W/ LASER LITHOTRIPSY      JOINT REPLACEMENT      bilateral knee    SUPRAPUBIC CYSTOSTOMY  03/15/2011    patient reports  replaced tubing on 10/27/19, at home       Review of patient's allergies indicates:   Allergen Reactions    Tuberculin ppd Itching and Dermatitis     Patient has old scare that she claims is from TB PPD    Rocephin [ceftriaxone] Rash     Rash 2012? Pt agreeable to try with pre mediation with benadryl.        No current facility-administered medications on  file prior to encounter.      Current Outpatient Medications on File Prior to Encounter   Medication Sig    FLUoxetine 10 MG capsule Take 1 capsule (10 mg total) by mouth once daily.    gabapentin (NEURONTIN) 600 MG tablet TAKE 1 TABLET BY MOUTH THREE TIMES DAILY    hydrOXYzine HCl (ATARAX) 25 MG tablet Take 1 tablet (25 mg total) by mouth 3 (three) times daily.    OLANZapine (ZYPREXA) 5 MG tablet Take 1 tablet (5 mg total) by mouth every evening.    oxybutynin (DITROPAN-XL) 5 MG TR24 Take 1 tablet (5 mg total) by mouth once daily.    potassium chloride (KLOR-CON) 10 MEQ TbSR Take 1 tablet (10 mEq total) by mouth once daily.    traZODone (DESYREL) 100 MG tablet Take 1 tablet (100 mg total) by mouth every evening.    albuterol-ipratropium (DUO-NEB) 2.5 mg-0.5 mg/3 mL nebulizer solution Take 3 mLs by nebulization every 4 (four) hours as needed for Wheezing.    azithromycin (ZITHROMAX Z-KAMALA) 250 MG tablet Take 2 tablets (500 mg total) by mouth once daily.    baclofen (LIORESAL) 20 MG tablet 20 mg 2 (two) times daily.     bumetanide (BUMEX) 2 MG tablet Take 2 mg by mouth once daily.     catheter 18 Fr Misc Change every 20 days    ipratropium (ATROVENT) 0.02 % nebulizer solution U 1 VIAL VIA NEBULIZER Q 4 H WHILE AWAKE    lidocaine (LIDODERM) 5 % Place 1 patch onto the skin once daily. Remove & Discard patch within 12 hours or as directed by MD    [email or web address] ([employer]) [facility] NEEDS TO SWITCH [facility] FOR OXYGEN AT 2L. LAST OXYGEN SATURATION WAS 83% ON Room Air. She uses  BIPAP and  Needs  New mask, tubings and    oxyCODONE-acetaminophen (PERCOCET) 7.5-325 mg per tablet TK 1 T PO BID PRN P    VENTOLIN HFA 90 mcg/actuation inhaler      Family History     Problem Relation (Age of Onset)    Anxiety disorder Brother    Dementia Mother    Depression Brother        Tobacco Use    Smoking status: Former Smoker     Packs/day: 0.50     Years: 25.00     Pack years: 12.50     Last attempt to quit:  2002     Years since quittin.1    Smokeless tobacco: Never Used   Substance and Sexual Activity    Alcohol use: Not Currently     Comment: occasional    Drug use: Never     Comment: prescribed opioids at present for pain    Sexual activity: Not Currently     Partners: Male     Comment: since      Review of Systems   Constitutional: Negative for activity change.   HENT: Negative for congestion and dental problem.    Eyes: Negative for discharge and itching.   Respiratory: Positive for shortness of breath. Negative for cough.    Cardiovascular: Negative for chest pain.   Gastrointestinal: Negative for abdominal distention and abdominal pain.   Endocrine: Negative for cold intolerance.   Genitourinary: Negative for difficulty urinating and dyspareunia.   Musculoskeletal: Negative for arthralgias and back pain.   Skin: Negative for color change.   Allergic/Immunologic: Negative for environmental allergies and food allergies.   Neurological: Negative for dizziness and facial asymmetry.   Hematological: Negative for adenopathy. Does not bruise/bleed easily.   Psychiatric/Behavioral: Negative for agitation and behavioral problems.     Objective:     Vital Signs (Most Recent):  Temp: 98.3 °F (36.8 °C) (20)  Pulse: 90 (20)  Resp: 18 (20)  BP: (!) 179/90 (20)  SpO2: (!) 94 % (20) Vital Signs (24h Range):  Temp:  [97.1 °F (36.2 °C)-98.8 °F (37.1 °C)] 98.3 °F (36.8 °C)  Pulse:  [55-99] 90  Resp:  [15-24] 18  SpO2:  [93 %-100 %] 94 %  BP: (131-179)/(67-91) 179/90     Weight: 105.2 kg (231 lb 14.8 oz)  Body mass index is 38.59 kg/m².    Physical Exam   Constitutional: She is oriented to person, place, and time. No distress.   HENT:   Head: Atraumatic.   Eyes: Pupils are equal, round, and reactive to light. EOM are normal.   Neck: Normal range of motion. Neck supple.   Cardiovascular: Normal rate and regular rhythm.   Pulmonary/Chest: Effort normal. She has  wheezes.   Abdominal: Soft. Bowel sounds are normal. She exhibits no distension. There is no tenderness.   Genitourinary:   Genitourinary Comments: Supra pubic cath in place.   Musculoskeletal: Normal range of motion. She exhibits no edema or deformity.   S/P left BKA,   Neurological: She is oriented to person, place, and time. No cranial nerve deficit. Coordination normal.   Skin: Skin is warm and dry.   Psychiatric: She has a normal mood and affect. Her behavior is normal.         CRANIAL NERVES     CN III, IV, VI   Pupils are equal, round, and reactive to light.  Extraocular motions are normal.        Significant Labs:   ABGs:   Recent Labs   Lab 03/02/20  0008   PH 7.425   PCO2 54.5*   HCO3 35.8*   POCSATURATED 97   BE 10     BMP:   Recent Labs   Lab 03/03/20  0425   *      K 4.2   CL 99   CO2 33*   BUN 27*   CREATININE 0.9   CALCIUM 9.8     CBC:   Recent Labs   Lab 03/02/20  0330 03/03/20  0425   WBC 14.55* 11.79   HGB 10.6* 10.8*   HCT 34.8* 35.6*    253     CMP:   Recent Labs   Lab 03/01/20  2102 03/02/20  0513 03/03/20  0425    138 141   K 4.1 4.3 4.2   CL 97 96 99   CO2 33* 32* 33*   * 213* 149*   BUN 20 18 27*   CREATININE 0.9 0.8 0.9   CALCIUM 10.0 9.3 9.8   PROT  --  7.4 7.9   ALBUMIN  --  3.3* 3.2*   BILITOT  --  0.4 0.3   ALKPHOS  --  70 74   AST  --  20 20   ALT  --  10 14   ANIONGAP 13 10 9   EGFRNONAA >60 >60 >60       Significant Imaging: reviewed.

## 2020-03-03 NOTE — NURSING
Pt's report given to Shazia MESSINA. Pt transferred via bed to room 336. Portable cardiac and O2 in use.

## 2020-03-03 NOTE — PROGRESS NOTES
[facility] Hospital Medicine  Progress Note    Patient Name: Mary Ellen Fajardo  MRN: [id number]  Patient Class: IP- Inpatient   Admission Date: 3/1/2020  Length of Stay: 2 days  Attending Physician: Belle Villeda MD  Primary Care Provider: Any Tran MD        Subjective:     Principal Problem:Acute on chronic respiratory failure with hypercapnia        HPI:   64 y.o. female with a PMHx of obesity, chronic obstructive pulmonary disease, asthma, hypertension, diastolic congestive heart failure,chronic respiratory failure,on 2 liter oxygen at home,S/P left BKA,chronic suprapubic catheter, bed bound,and sleep apnea who presents to the Emergency Department via EMS with a cc of chronic shortness of breath worsening yesterday. Patient was given DuoNeb and SoluMedrol en route with EMS with moderate relief. Patient now reports associated wheezing, chills, and bilateral ear pain. She denies chest pain, cough, abdominal pain, nausea, vomiting, diarrhea, or dysuria.  Patient reports a prior history of similar symptoms secondary to chronic obstructive pulmonary disease. She states she is on 2 liters of oxygen at home and BiPAP at night. Patient is a former smoker,ABG show PH of 7.29,PCO 2 of 79,PO 2 of 63,after nebulizer treatment and  IV solumedrol,ABG had only slight improvement,CTA chest show no PE or dissection,she has been admitted to ICU for continues bipap and IV solumedrol and nebulizer treatment for COPD exacerbation.    Overview/Hospital Course:   64 y.o. female with a PMHx of obesity, chronic obstructive pulmonary disease, asthma, hypertension, diastolic congestive heart failure,chronic respiratory failure,on 2 liter oxygen at home,S/P left BKA,chronic suprapubic catheter, bed bound,and sleep apnea who presents to the Emergency Department via EMS with a cc of chronic shortness of breath.  The patient was admitted to the ICU and started on Bi-pap. Pulmonary was consulted.   She  is feeling better ,but still has cough and mild wheezing.    Past Medical History:   Diagnosis Date    Abdominal hernia     Addiction to drug     Alcohol abuse     Anxiety     Arthritis     Asthma     Bipolar disorder     Bladder stones     CHF (congestive heart failure)     COPD (chronic obstructive pulmonary disease)     on home o2    CVA (cerebral vascular accident)     2012    Hallucination     Hepatitis C     treated and cured    History of blood clots     Hx of psychiatric care     Hypertension     Belen     Obese body habitus     On home oxygen therapy     ADEEL (obstructive sleep apnea)     ADEEL treated with BiPAP     Paraplegia     Paraplegic spinal paralysis     Psychiatric problem     Psychosis     AVH; Paranoia    Renal disorder     Requires assistance with activities of daily living (ADL)     SCI (spinal cord injury)     incomplete    Sleep difficulties     has sleep apnea and uses a Bi-PAP machine.    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    Substance abuse     Suprapubic catheter 03/15/2011    Therapy     Vaginal delivery     x1    Wheelchair dependence        Past Surgical History:   Procedure Laterality Date    ABOVE-KNEE AMPUTATION Left 7/23/2019    Procedure: AMPUTATION, ABOVE KNEE;  Surgeon: Gordo Rodriguez MD;  Location: [facility];  Service: Orthopedics;  Laterality: Left;    amputation Left 07/23/2019    above the knee    BACK SURGERY      bilateral knee replacement      BREAST BIOPSY      cysto/lithopaxy 2017      CYSTOSCOPY W/ LASER LITHOTRIPSY      JOINT REPLACEMENT      bilateral knee    SUPRAPUBIC CYSTOSTOMY  03/15/2011    patient reports  replaced tubing on 10/27/19, at home       Review of patient's allergies indicates:   Allergen Reactions    Tuberculin ppd Itching and Dermatitis     Patient has old scare that she claims is from TB PPD    Rocephin [ceftriaxone] Rash     Rash 2012? Pt agreeable to try with pre mediation with  benadryl.        No current facility-administered medications on file prior to encounter.      Current Outpatient Medications on File Prior to Encounter   Medication Sig    FLUoxetine 10 MG capsule Take 1 capsule (10 mg total) by mouth once daily.    gabapentin (NEURONTIN) 600 MG tablet TAKE 1 TABLET BY MOUTH THREE TIMES DAILY    hydrOXYzine HCl (ATARAX) 25 MG tablet Take 1 tablet (25 mg total) by mouth 3 (three) times daily.    OLANZapine (ZYPREXA) 5 MG tablet Take 1 tablet (5 mg total) by mouth every evening.    oxybutynin (DITROPAN-XL) 5 MG TR24 Take 1 tablet (5 mg total) by mouth once daily.    potassium chloride (KLOR-CON) 10 MEQ TbSR Take 1 tablet (10 mEq total) by mouth once daily.    traZODone (DESYREL) 100 MG tablet Take 1 tablet (100 mg total) by mouth every evening.    albuterol-ipratropium (DUO-NEB) 2.5 mg-0.5 mg/3 mL nebulizer solution Take 3 mLs by nebulization every 4 (four) hours as needed for Wheezing.    azithromycin (ZITHROMAX Z-KAMALA) 250 MG tablet Take 2 tablets (500 mg total) by mouth once daily.    baclofen (LIORESAL) 20 MG tablet 20 mg 2 (two) times daily.     bumetanide (BUMEX) 2 MG tablet Take 2 mg by mouth once daily.     catheter 18 Fr Misc Change every 20 days    ipratropium (ATROVENT) 0.02 % nebulizer solution U 1 VIAL VIA NEBULIZER Q 4 H WHILE AWAKE    lidocaine (LIDODERM) 5 % Place 1 patch onto the skin once daily. Remove & Discard patch within 12 hours or as directed by MD    [employer] medical supply (C-TUB) [facility] NEEDS TO SWITCH i[employer]etana FOR OXYGEN AT 2L. LAST OXYGEN SATURATION WAS 83% ON Room Air. She uses  BIPAP and  Needs  New mask, tubings and    oxyCODONE-acetaminophen (PERCOCET) 7.5-325 mg per tablet TK 1 T PO BID PRN P    VENTOLIN HFA 90 mcg/actuation inhaler      Family History     Problem Relation (Age of Onset)    Anxiety disorder Brother    Dementia Mother    Depression Brother        Tobacco Use    Smoking status: Former Smoker     Packs/day: 0.50      Years: 25.00     Pack years: 12.50     Last attempt to quit:      Years since quittin.1    Smokeless tobacco: Never Used   Substance and Sexual Activity    Alcohol use: Not Currently     Comment: occasional    Drug use: Never     Comment: prescribed opioids at present for pain    Sexual activity: Not Currently     Partners: Male     Comment: since      Review of Systems   Constitutional: Negative for activity change.   HENT: Negative for congestion and dental problem.    Eyes: Negative for discharge and itching.   Respiratory: Positive for shortness of breath. Negative for cough.    Cardiovascular: Negative for chest pain.   Gastrointestinal: Negative for abdominal distention and abdominal pain.   Endocrine: Negative for cold intolerance.   Genitourinary: Negative for difficulty urinating and dyspareunia.   Musculoskeletal: Negative for arthralgias and back pain.   Skin: Negative for color change.   Allergic/Immunologic: Negative for environmental allergies and food allergies.   Neurological: Negative for dizziness and facial asymmetry.   Hematological: Negative for adenopathy. Does not bruise/bleed easily.   Psychiatric/Behavioral: Negative for agitation and behavioral problems.     Objective:     Vital Signs (Most Recent):  Temp: 98.3 °F (36.8 °C) (20)  Pulse: 90 (20)  Resp: 18 (20)  BP: (!) 179/90 (20)  SpO2: (!) 94 % (20) Vital Signs (24h Range):  Temp:  [97.1 °F (36.2 °C)-98.8 °F (37.1 °C)] 98.3 °F (36.8 °C)  Pulse:  [55-99] 90  Resp:  [15-24] 18  SpO2:  [93 %-100 %] 94 %  BP: (131-179)/(67-91) 179/90     Weight: 105.2 kg (231 lb 14.8 oz)  Body mass index is 38.59 kg/m².    Physical Exam   Constitutional: She is oriented to person, place, and time. No distress.   HENT:   Head: Atraumatic.   Eyes: Pupils are equal, round, and reactive to light. EOM are normal.   Neck: Normal range of motion. Neck supple.   Cardiovascular: Normal rate and  regular rhythm.   Pulmonary/Chest: Effort normal. She has wheezes.   Abdominal: Soft. Bowel sounds are normal. She exhibits no distension. There is no tenderness.   Genitourinary:   Genitourinary Comments: Supra pubic cath in place.   Musculoskeletal: Normal range of motion. She exhibits no edema or deformity.   S/P left BKA,   Neurological: She is oriented to person, place, and time. No cranial nerve deficit. Coordination normal.   Skin: Skin is warm and dry.   Psychiatric: She has a normal mood and affect. Her behavior is normal.         CRANIAL NERVES     CN III, IV, VI   Pupils are equal, round, and reactive to light.  Extraocular motions are normal.        Significant Labs:   ABGs:   Recent Labs   Lab 03/02/20  0008   PH 7.425   PCO2 54.5*   HCO3 35.8*   POCSATURATED 97   BE 10     BMP:   Recent Labs   Lab 03/03/20  0425   *      K 4.2   CL 99   CO2 33*   BUN 27*   CREATININE 0.9   CALCIUM 9.8     CBC:   Recent Labs   Lab 03/02/20  0330 03/03/20  0425   WBC 14.55* 11.79   HGB 10.6* 10.8*   HCT 34.8* 35.6*    253     CMP:   Recent Labs   Lab 03/01/20  2102 03/02/20  0513 03/03/20  0425    138 141   K 4.1 4.3 4.2   CL 97 96 99   CO2 33* 32* 33*   * 213* 149*   BUN 20 18 27*   CREATININE 0.9 0.8 0.9   CALCIUM 10.0 9.3 9.8   PROT  --  7.4 7.9   ALBUMIN  --  3.3* 3.2*   BILITOT  --  0.4 0.3   ALKPHOS  --  70 74   AST  --  20 20   ALT  --  10 14   ANIONGAP 13 10 9   EGFRNONAA >60 >60 >60       Significant Imaging: reviewed.      Assessment/Plan:      * Acute on chronic respiratory failure with hypercapnia  Duo to COPD exacerbation,ABG show PH of 7.29,PCO 2 of 79,PO 2 of 63,after nebulizer treatment and  IV solumedrol,ABG had only slight improvement,CTA chest show no PE or dissection,she has been admitted to ICU for continues bipap and IV solumedrol and nebulizer treatment for COPD exacerbation.    Continues on Bi-pap today. Stable. Pulmonary consulted and following.       COPD with  acute exacerbation  Will continue with IV solumedrol and Nebulizer.      Pneumonia due to infectious organism    On IV Abx with Levaquin,She is feeling better ,but still has cough and mild wheezing.    Status post below knee amputation, left  On baseline she is bed bound,her  helping her out,      Chronic diastolic heart failure  Stable,on lasix,will monitor.      Chronic respiratory failure  On 2 liter NC O 2 at home.      History of CVA (cerebrovascular accident)  No new neuro defiecnicy,alert ,      Bipolar I disorder, current or most recent episode depressed, with psychotic features  Stable,resumed home medication,zyprexa,    Chronic indwelling suprapubic catheter in place  Supra pubic cath has been  exchanged on 2.10.20,follow up with Urology as out patient.      Chronic hepatitis C  Stable,alert,      Class 3 severe obesity in adult  weightt lose as out patient.      ADEEL (obstructive sleep apnea)  Resumed BIPAP,has home CPAP.      Essential hypertension  Stable at this time,will monitor.      Neurogenic bladder  On supra pubic cath.        VTE Risk Mitigation (From admission, onward)         Ordered     enoxaparin injection 40 mg  Daily      03/01/20 1949     IP VTE HIGH RISK PATIENT  Once      03/01/20 1949                      Belle Villeda MD  Department of Hospital Medicine   [facility]

## 2020-03-03 NOTE — PLAN OF CARE
Patient received on NC 2 LPM. Aerosol treatment given as ordered. BIPAP on standby at this time. Patient would like bipap placement during next scheduled treatment. No SOB reported.

## 2020-03-03 NOTE — NURSING
Received in transfer from ICU. O2 on 2L/NC. VSS. Telemetry on. Alert and oriented x3. Bed in low position and locked. Has SL in RFA and LAC. Pt is bedbound. Has Left AKA.

## 2020-03-03 NOTE — PROGRESS NOTES
Patient's blood pressure had been high all day before transfer from ICU. 177/80 before 2100 scheduled 25 mg PO hydralazine. Still 176/80 at 2316. No PRN bp meds. Messaged MD for PRN orders. Gave 10 mg IV hydralazine at 2345. Patient's blood pressure WNL at 0122. Will continue to monitor.

## 2020-03-04 VITALS
TEMPERATURE: 99 F | HEART RATE: 68 BPM | OXYGEN SATURATION: 96 % | BODY MASS INDEX: 39.01 KG/M2 | HEIGHT: 65 IN | SYSTOLIC BLOOD PRESSURE: 147 MMHG | WEIGHT: 234.13 LBS | RESPIRATION RATE: 16 BRPM | DIASTOLIC BLOOD PRESSURE: 82 MMHG

## 2020-03-04 LAB
ALBUMIN SERPL BCP-MCNC: 3.1 G/DL (ref 3.5–5.2)
ALP SERPL-CCNC: 74 U/L (ref 55–135)
ALT SERPL W/O P-5'-P-CCNC: 17 U/L (ref 10–44)
ANION GAP SERPL CALC-SCNC: 7 MMOL/L (ref 8–16)
AST SERPL-CCNC: 20 U/L (ref 10–40)
BACTERIA SPEC AEROBE CULT: NORMAL
BASOPHILS # BLD AUTO: 0.01 K/UL (ref 0–0.2)
BASOPHILS NFR BLD: 0.1 % (ref 0–1.9)
BILIRUB SERPL-MCNC: 0.2 MG/DL (ref 0.1–1)
BUN SERPL-MCNC: 25 MG/DL (ref 8–23)
CALCIUM SERPL-MCNC: 9.5 MG/DL (ref 8.7–10.5)
CHLORIDE SERPL-SCNC: 98 MMOL/L (ref 95–110)
CO2 SERPL-SCNC: 34 MMOL/L (ref 23–29)
CREAT SERPL-MCNC: 0.8 MG/DL (ref 0.5–1.4)
DIFFERENTIAL METHOD: ABNORMAL
EOSINOPHIL # BLD AUTO: 0 K/UL (ref 0–0.5)
EOSINOPHIL NFR BLD: 0.1 % (ref 0–8)
ERYTHROCYTE [DISTWIDTH] IN BLOOD BY AUTOMATED COUNT: 13.8 % (ref 11.5–14.5)
EST. GFR  (AFRICAN AMERICAN): >60 ML/MIN/1.73 M^2
EST. GFR  (NON AFRICAN AMERICAN): >60 ML/MIN/1.73 M^2
GLUCOSE SERPL-MCNC: 130 MG/DL (ref 70–110)
GRAM STN SPEC: NORMAL
HCT VFR BLD AUTO: 35.7 % (ref 37–48.5)
HGB BLD-MCNC: 10.6 G/DL (ref 12–16)
IMM GRANULOCYTES # BLD AUTO: 0.21 K/UL (ref 0–0.04)
IMM GRANULOCYTES NFR BLD AUTO: 2.2 % (ref 0–0.5)
LYMPHOCYTES # BLD AUTO: 1 K/UL (ref 1–4.8)
LYMPHOCYTES NFR BLD: 10.8 % (ref 18–48)
MCH RBC QN AUTO: 25.9 PG (ref 27–31)
MCHC RBC AUTO-ENTMCNC: 29.7 G/DL (ref 32–36)
MCV RBC AUTO: 87 FL (ref 82–98)
MONOCYTES # BLD AUTO: 0.4 K/UL (ref 0.3–1)
MONOCYTES NFR BLD: 4.3 % (ref 4–15)
NEUTROPHILS # BLD AUTO: 7.8 K/UL (ref 1.8–7.7)
NEUTROPHILS NFR BLD: 82.5 % (ref 38–73)
NRBC BLD-RTO: 0 /100 WBC
PLATELET # BLD AUTO: 273 K/UL (ref 150–350)
PMV BLD AUTO: 10.2 FL (ref 9.2–12.9)
POTASSIUM SERPL-SCNC: 4.8 MMOL/L (ref 3.5–5.1)
PROT SERPL-MCNC: 7.4 G/DL (ref 6–8.4)
RBC # BLD AUTO: 4.1 M/UL (ref 4–5.4)
SODIUM SERPL-SCNC: 139 MMOL/L (ref 136–145)
WBC # BLD AUTO: 9.48 K/UL (ref 3.9–12.7)

## 2020-03-04 PROCEDURE — 25000003 PHARM REV CODE 250: Performed by: HOSPITALIST

## 2020-03-04 PROCEDURE — 25000003 PHARM REV CODE 250: Performed by: INTERNAL MEDICINE

## 2020-03-04 PROCEDURE — 94640 AIRWAY INHALATION TREATMENT: CPT

## 2020-03-04 PROCEDURE — 27000221 HC OXYGEN, UP TO 24 HOURS

## 2020-03-04 PROCEDURE — 36415 COLL VENOUS BLD VENIPUNCTURE: CPT

## 2020-03-04 PROCEDURE — 63600175 PHARM REV CODE 636 W HCPCS: Performed by: INTERNAL MEDICINE

## 2020-03-04 PROCEDURE — 25000242 PHARM REV CODE 250 ALT 637 W/ HCPCS: Performed by: INTERNAL MEDICINE

## 2020-03-04 PROCEDURE — 99900035 HC TECH TIME PER 15 MIN (STAT)

## 2020-03-04 PROCEDURE — 85025 COMPLETE CBC W/AUTO DIFF WBC: CPT

## 2020-03-04 PROCEDURE — 80053 COMPREHEN METABOLIC PANEL: CPT

## 2020-03-04 RX ORDER — CARVEDILOL 3.12 MG/1
3.12 TABLET ORAL 2 TIMES DAILY
Status: DISCONTINUED | OUTPATIENT
Start: 2020-03-04 | End: 2020-03-04 | Stop reason: HOSPADM

## 2020-03-04 RX ORDER — LEVOFLOXACIN 500 MG/1
500 TABLET, FILM COATED ORAL DAILY
Qty: 7 TABLET | Refills: 0 | Status: SHIPPED | OUTPATIENT
Start: 2020-03-04 | End: 2020-03-11

## 2020-03-04 RX ORDER — LOSARTAN POTASSIUM 25 MG/1
50 TABLET ORAL DAILY
Status: DISCONTINUED | OUTPATIENT
Start: 2020-03-04 | End: 2020-03-04 | Stop reason: HOSPADM

## 2020-03-04 RX ORDER — LOSARTAN POTASSIUM 50 MG/1
50 TABLET ORAL DAILY
Qty: 30 TABLET | Refills: 0 | Status: SHIPPED | OUTPATIENT
Start: 2020-03-04 | End: 2020-06-18

## 2020-03-04 RX ORDER — HYDRALAZINE HYDROCHLORIDE 25 MG/1
100 TABLET, FILM COATED ORAL EVERY 8 HOURS
Status: DISCONTINUED | OUTPATIENT
Start: 2020-03-04 | End: 2020-03-04

## 2020-03-04 RX ORDER — AMLODIPINE BESYLATE 10 MG/1
10 TABLET ORAL DAILY
Qty: 30 TABLET | Refills: 0 | Status: SHIPPED | OUTPATIENT
Start: 2020-03-04 | End: 2020-06-18 | Stop reason: ALTCHOICE

## 2020-03-04 RX ORDER — CARVEDILOL 3.12 MG/1
3.12 TABLET ORAL 2 TIMES DAILY
Qty: 60 TABLET | Refills: 0 | Status: SHIPPED | OUTPATIENT
Start: 2020-03-04 | End: 2020-06-18

## 2020-03-04 RX ORDER — AMLODIPINE BESYLATE 5 MG/1
10 TABLET ORAL DAILY
Status: DISCONTINUED | OUTPATIENT
Start: 2020-03-04 | End: 2020-03-04 | Stop reason: HOSPADM

## 2020-03-04 RX ADMIN — METHYLPREDNISOLONE SODIUM SUCCINATE 60 MG: 125 INJECTION, POWDER, FOR SOLUTION INTRAMUSCULAR; INTRAVENOUS at 10:03

## 2020-03-04 RX ADMIN — FUROSEMIDE 40 MG: 40 TABLET ORAL at 08:03

## 2020-03-04 RX ADMIN — FLUOXETINE 10 MG: 10 CAPSULE ORAL at 08:03

## 2020-03-04 RX ADMIN — CLONIDINE HYDROCHLORIDE 0.1 MG: 0.1 TABLET ORAL at 05:03

## 2020-03-04 RX ADMIN — PROMETHAZINE HYDROCHLORIDE 6.25 MG: 25 INJECTION INTRAMUSCULAR; INTRAVENOUS at 08:03

## 2020-03-04 RX ADMIN — HYDRALAZINE HYDROCHLORIDE 25 MG: 25 TABLET, FILM COATED ORAL at 05:03

## 2020-03-04 RX ADMIN — IPRATROPIUM BROMIDE AND ALBUTEROL SULFATE 3 ML: .5; 3 SOLUTION RESPIRATORY (INHALATION) at 04:03

## 2020-03-04 RX ADMIN — LOSARTAN POTASSIUM 50 MG: 25 TABLET ORAL at 09:03

## 2020-03-04 RX ADMIN — FAMOTIDINE 20 MG: 20 TABLET ORAL at 08:03

## 2020-03-04 RX ADMIN — AMLODIPINE BESYLATE 10 MG: 5 TABLET ORAL at 09:03

## 2020-03-04 RX ADMIN — GABAPENTIN 600 MG: 300 CAPSULE ORAL at 08:03

## 2020-03-04 RX ADMIN — METOPROLOL TARTRATE 5 MG: 1 INJECTION, SOLUTION INTRAVENOUS at 12:03

## 2020-03-04 RX ADMIN — IPRATROPIUM BROMIDE AND ALBUTEROL SULFATE 3 ML: .5; 3 SOLUTION RESPIRATORY (INHALATION) at 08:03

## 2020-03-04 RX ADMIN — CARVEDILOL 3.12 MG: 3.12 TABLET, FILM COATED ORAL at 09:03

## 2020-03-04 RX ADMIN — POLYETHYLENE GLYCOL 3350 17 G: 17 POWDER, FOR SOLUTION ORAL at 08:03

## 2020-03-04 RX ADMIN — BACLOFEN 20 MG: 10 TABLET ORAL at 08:03

## 2020-03-04 NOTE — PROGRESS NOTES
Patient is discharged. Telemetry removed. IV removed, tip intact. Discharge teaching given and understood. No questions asked. Awaiting transport

## 2020-03-04 NOTE — PROGRESS NOTES
Report received from off going nurse, SIDDHARTH Bob. Patient AAO. No signs of distress noted. Call light in reach. Bed low and locked. Will continue to monitor.

## 2020-03-04 NOTE — PLAN OF CARE
03/04/20 1023   Final Note   Assessment Type Final Discharge Note   Anticipated Discharge Disposition Home   What phone number can be called within the next 1-3 days to see how you are doing after discharge? [phone]   Hospital Follow Up  Appt(s) scheduled? Yes   Discharge plans and expectations educations in teach back method with documentation complete? Yes

## 2020-03-04 NOTE — PLAN OF CARE
Problem: Wound  Goal: Optimal Wound Healing  Outcome: Ongoing, Progressing     Problem: Fall Injury Risk  Goal: Absence of Fall and Fall-Related Injury  Outcome: Ongoing, Progressing     Problem: Adult Inpatient Plan of Care  Goal: Plan of Care Review  Outcome: Ongoing, Progressing  Goal: Patient-Specific Goal (Individualization)  Outcome: Ongoing, Progressing  Goal: Absence of Hospital-Acquired Illness or Injury  Outcome: Ongoing, Progressing  Goal: Optimal Comfort and Wellbeing  Outcome: Ongoing, Progressing  Goal: Readiness for Transition of Care  Outcome: Ongoing, Progressing  Goal: Rounds/Family Conference  Outcome: Ongoing, Progressing     Problem: Skin Injury Risk Increased  Goal: Skin Health and Integrity  Outcome: Ongoing, Progressing

## 2020-03-04 NOTE — PLAN OF CARE
03/04/20 1016   Medicare Message   Important Message from Medicare regarding Discharge Appeal Rights Given to patient/caregiver;Explained to patient/caregiver;Signed/date by patient/caregiver   Date IMM was signed 03/04/20   Time IMM was signed 1010

## 2020-03-04 NOTE — PROGRESS NOTES
WRITTEN HEALTHCARE DISCHARGE INFORMATION     Things that YOU are RESPONSIBLE for to Manage Your Care At Home:    1. Getting your prescriptions filled.  2. Taking you medications as directed. DO NOT MISS ANY DOSES!  3. Going to your follow-up doctor appointments. This is important because it allows the doctor to monitor your progress and to determine if any changes need to be made to your treatment plan.    If you are unable to make your follow up appointments, please call the number listed and reschedule this appointment.     ____________HELP AT HOME____________________    Experiencing any SIGNS or SYMPTOMS: YOU CAN    Schedule a same day appopintment with your Primary Care Doctor or  you can call [facility] On Call Nurse Care Line for 24/7 assistance at [phone]    If you are experience any signs or symptoms that have become severe, Call 911 and come to your nearest Emergency Room.    Thank you for choosing [facility] and allowing us to care for you.   From your care management team: Judy BRYAN MSOMA [phone]    You should receive a call from [facility] within 48-72 hours to help manage your care after discharge. Please try to make sure that you answer your phone for this important phone call.  Follow-up Information     Any Tran MD On 3/9/2020.    Specialty:  Internal Medicine  Why:  Outpatient Services, PCP, follow-up appointment at 9:00AM.   Contact information:  [address]  [address]  [facility]  [place] [id number] [phone]

## 2020-03-04 NOTE — PLAN OF CARE
"   03/04/20 1018   Post-Acute Status   Post-Acute Authorization Other  (Home )   Other Status No Post-Acute Service Needs   Discharge Delays None known at this time     EDUCATION:  Pt provided with educational information on Hypoxia.  Information reviewed and placed in :My Healthcare Packet" to be brought home for  use as resource after discharge.  Information included:  signs and symptoms to look for at discharge. [facility] instructed pt to call the doctor if experiencing symptoms that may indicate a medical emergency: CALL 911 if signs and symptoms worsen. Reminded pt things she will be responsible for to manage her healthcare at home: getting Rx filled, attending follow up appointments, and taking medication as prescribed were discussed.   Teach back method used.  All questions answered.  Patient verbalized understanding of all information.      [facility] provided pt with a copy of follow-up appointments. [facility] explained/highlighted date, time, and location of each appointment. [facility]CO provided pt with a blue folder and instructed pt to place all medical documents in blue folder. [facility] explained to pt the nurse will provide an AVS with diagnosis and instructed pt to place in blue folder and bring to follow-up appointment.  [facility] notified pt's nurse, Lisa, all CM needs have been met.     "

## 2020-03-04 NOTE — DISCHARGE SUMMARY
[facility] Hospital Medicine  Discharge Summary      Patient Name: Mary Ellen Fajardo  MRN: [id number]  Admission Date: 3/1/2020  Hospital Length of Stay: 3 days  Discharge Date and Time:  03/04/2020 10:58 AM  Attending Physician: Belle Villeda MD   Discharging Provider: Belle Villeda MD  Primary Care Provider: Any Tran MD      HPI:    64 y.o. female with a PMHx of obesity, chronic obstructive pulmonary disease, asthma, hypertension, diastolic congestive heart failure,chronic respiratory failure,on 2 liter oxygen at home,S/P left BKA,chronic suprapubic catheter, bed bound,and sleep apnea who presents to the Emergency Department via EMS with a cc of chronic shortness of breath worsening yesterday. Patient was given DuoNeb and SoluMedrol en route with EMS with moderate relief. Patient now reports associated wheezing, chills, and bilateral ear pain. She denies chest pain, cough, abdominal pain, nausea, vomiting, diarrhea, or dysuria.  Patient reports a prior history of similar symptoms secondary to chronic obstructive pulmonary disease. She states she is on 2 liters of oxygen at home and BiPAP at night. Patient is a former smoker,ABG show PH of 7.29,PCO 2 of 79,PO 2 of 63,after nebulizer treatment and  IV solumedrol,ABG had only slight improvement,CTA chest show no PE or dissection,she has been admitted to ICU for continues bipap and IV solumedrol and nebulizer treatment for COPD exacerbation.    * No surgery found *      Hospital Course:    64 y.o. female with a PMHx of obesity, chronic obstructive pulmonary disease, asthma, hypertension, diastolic congestive heart failure,chronic respiratory failure,on 2 liter oxygen at home,S/P left BKA,chronic suprapubic catheter, bed bound,and sleep apnea who presents to the Emergency Department via EMS with a cc of chronic shortness of breath. ABG show A/C hypoxic hypercapnic respiratory failure, The patient was admitted to the ICU  and was started on continues Bi-pap. Pulmonary was consulted,want continue with bipap in iCU,nebzulier,IV solumedrol d  IV Abx for pneumonia.her symptoms improved,she was transferred to floor and remains stable on daily NC O2 and nightly bipap.  She is feeling better ,but still has cough and mild wheezing.treatemnt in floor continued,elevated BP with new BP med;s improved,her symptoms much improved,patient has been  discharged home with PO Abx,new BP med;s and follow up with PCP in next few days,.     Consults:   Consults (From admission, onward)        Status Ordering Provider     Inpatient consult to Pulmonology  Once     Provider:  Jeff Taveras MD    Completed DANNY BEAR          No new Assessment & Plan notes have been filed under this hospital service since the last note was generated.  Service: Hospital Medicine    Final Active Diagnoses:    Diagnosis Date Noted POA    PRINCIPAL PROBLEM:  Acute on chronic respiratory failure with hypercapnia [J96.22] 07/18/2019 Yes    COPD with acute exacerbation [J44.1] 01/06/2019 Yes    Pneumonia due to infectious organism [J18.9] 03/03/2020 Yes    Status post below knee amputation, left [Z89.512] 03/01/2020 Not Applicable    Pulmonary HTN [I27.20] 11/01/2019 Yes    Hypercapnic respiratory failure [J96.92] 10/30/2019 Yes    Chronic diastolic heart failure [I50.32] 09/20/2019 Yes    Chronic respiratory failure [J96.10] 09/20/2019 Yes    History of CVA (cerebrovascular accident) [Z86.73] 01/06/2019 Not Applicable     Chronic    Class 3 severe obesity in adult [E66.01] 01/06/2019 Yes    Chronic indwelling suprapubic catheter in place [Z93.59] 01/06/2019 Not Applicable     Chronic    Chronic hepatitis C [B18.2] 01/06/2019 Yes     Chronic    Bipolar I disorder, current or most recent episode depressed, with psychotic features [F31.5] 01/06/2019 Yes    COPD (chronic obstructive pulmonary disease) [J44.9] 01/06/2019 Yes     Chronic    ADEEL  (obstructive sleep apnea) [G47.33] 01/19/2018 Yes    Essential hypertension [I10] 01/19/2018 Yes     Chronic    Neurogenic bladder [N31.9] 10/03/2012 Yes      Problems Resolved During this Admission:       Discharged Condition: stable    Disposition: Home or Self Care    Follow Up:  Follow-up Information     Any Tran MD On 3/9/2020.    Specialty:  Internal Medicine  Why:  Outpatient Services, PCP, follow-up appointment at 9:00AM.   Contact information:  [address] [facility]  [address]  [facility]  [place] [address] [phone]                 Patient Instructions:      Activity as tolerated       Significant Diagnostic Studies: Labs:   BMP:   Recent Labs   Lab 03/03/20  0425 03/04/20  0419   * 130*    139   K 4.2 4.8   CL 99 98   CO2 33* 34*   BUN 27* 25*   CREATININE 0.9 0.8   CALCIUM 9.8 9.5   , CMP   Recent Labs   Lab 03/03/20  0425 03/04/20  0419    139   K 4.2 4.8   CL 99 98   CO2 33* 34*   * 130*   BUN 27* 25*   CREATININE 0.9 0.8   CALCIUM 9.8 9.5   PROT 7.9 7.4   ALBUMIN 3.2* 3.1*   BILITOT 0.3 0.2   ALKPHOS 74 74   AST 20 20   ALT 14 17   ANIONGAP 9 7*   ESTGFRAFRICA >60 >60   EGFRNONAA >60 >60    and CBC   Recent Labs   Lab 03/03/20  0425 03/04/20  0419   WBC 11.79 9.48   HGB 10.8* 10.6*   HCT 35.6* 35.7*    273     Microbiology:   Blood Culture   Lab Results   Component Value Date    LABBLOO No growth after 5 days. 09/20/2019     Radiology: X-Ray: CXR: X-Ray Chest 1 View (CXR): No results found for this visit on 03/01/20. and X-Ray Chest PA and Lateral (CXR): No results found for this visit on 03/01/20.    Pending Diagnostic Studies:     Procedure Component Value Units Date/Time    Hemoglobin A1c [[id number]]     Order Status:  Sent Lab Status:  No result     Specimen:  Blood          Medications:  Reconciled Home Medications:      Medication List      START taking these medications    amLODIPine 10 MG tablet  Commonly known as:  NORVASC  Take 1 tablet (10  mg total) by mouth once daily.     carvediloL 3.125 MG tablet  Commonly known as:  COREG  Take 1 tablet (3.125 mg total) by mouth 2 (two) times daily.     levoFLOXacin 500 MG tablet  Commonly known as:  LEVAQUIN  Take 1 tablet (500 mg total) by mouth once daily. for 7 days     losartan 50 MG tablet  Commonly known as:  COZAAR  Take 1 tablet (50 mg total) by mouth once daily.        CONTINUE taking these medications    albuterol-ipratropium 2.5 mg-0.5 mg/3 mL nebulizer solution  Commonly known as:  DUO-NEB  Take 3 mLs by nebulization every 4 (four) hours as needed for Wheezing.     baclofen 20 MG tablet  Commonly known as:  LIORESAL  20 mg 2 (two) times daily.     bumetanide 2 MG tablet  Commonly known as:  BUMEX  Take 2 mg by mouth once daily.     C-Tub Misc  Generic drug:  miscellaneous medical supply  NEEDS TO SWITCH DME [employer] FOR OXYGEN AT 2L. LAST OXYGEN SATURATION WAS 83% ON Room Air. She uses  BIPAP and  Needs  New mask, tubings and     catheter 18 Fr Misc  Change every 20 days     FLUoxetine 10 MG capsule  Take 1 capsule (10 mg total) by mouth once daily.     gabapentin 600 MG tablet  Commonly known as:  NEURONTIN  TAKE 1 TABLET BY MOUTH THREE TIMES DAILY     hydrOXYzine HCl 25 MG tablet  Commonly known as:  ATARAX  Take 1 tablet (25 mg total) by mouth 3 (three) times daily.     ipratropium 0.02 % nebulizer solution  Commonly known as:  ATROVENT  U 1 VIAL VIA NEBULIZER Q 4 H WHILE AWAKE     lidocaine 5 %  Commonly known as:  Lidoderm  Place 1 patch onto the skin once daily. Remove & Discard patch within 12 hours or as directed by MD     OLANZapine 5 MG tablet  Commonly known as:  ZyPREXA  Take 1 tablet (5 mg total) by mouth every evening.     oxybutynin 5 MG Tr24  Commonly known as:  DITROPAN-XL  Take 1 tablet (5 mg total) by mouth once daily.     oxyCODONE-acetaminophen 7.5-325 mg per tablet  Commonly known as:  PERCOCET  TK 1 T PO BID PRN P     traZODone 100 MG tablet  Commonly known as:  DESYREL  Take 1  tablet (100 mg total) by mouth every evening.     Ventolin HFA 90 mcg/actuation inhaler  Generic drug:  albuterol        STOP taking these medications    azithromycin 250 MG tablet  Commonly known as:  Zithromax Z-Roberth     potassium chloride 10 MEQ Tbsr  Commonly known as:  KLOR-CON            Indwelling Lines/Drains at time of discharge:   Lines/Drains/Airways     Drain                 Suprapubic Catheter 03/15/11 3277 days                Time spent on the discharge of patient: over 30  minutes  Patient was seen and examined on the date of discharge and determined to be suitable for discharge.         Belle Villeda MD  Department of Hospital Medicine  [facility]

## 2020-03-04 NOTE — PROGRESS NOTES
ADT 30 order placed for Van Transportation.  Requested  time: 11:00AM   If transportation does not arrive at ETA time nurse will be instructed to follow protocol for transportation below:   How can I get in touch directly with dispatch, if needed?                 Non-emergent dispatch: [phone]      +++NURSING:  If Van does not arrive at requested time please call the above Non Emergent Dispatcher.  If issue not resolved please escalate to your charge nurse for further instructions.

## 2020-03-04 NOTE — PLAN OF CARE
03/04/20 1017   Discharge Assessment   Assessment Type Discharge Planning Assessment   Confirmed/corrected address and phone number on facesheet? No   Assessment information obtained from? Patient   Prior to hospitilization cognitive status: Alert/Oriented   Prior to hospitalization functional status: Assistive Equipment   Current cognitive status: Alert/Oriented   Current Functional Status: Assistive Equipment   Facility Arrived From: Home    Lives With spouse   Able to Return to Prior Arrangements yes   Is patient able to care for self after discharge? Yes   Who are your caregiver(s) and their phone number(s)? Sundeep Spouse     [phone]    Readmission Within the Last 30 Days no previous admission in last 30 days   Patient currently being followed by outpatient case management? No   Patient currently receives any other outside agency services? No   Equipment Currently Used at Home BIPAP;bath bench;bedside commode;oxygen;wheelchair   Do you have any problems affording any of your prescribed medications? No   Is the patient taking medications as prescribed? yes   Does the patient have transportation home? No   Dialysis Name and Scheduled days N/A    Does the patient receive services at the [facility]? No   Discharge Plan A Home with family   DME Needed Upon Discharge  none   Patient/Family in Agreement with Plan yes

## 2020-03-06 ENCOUNTER — NURSE TRIAGE (OUTPATIENT)
Dept: ADMINISTRATIVE | Facility: CLINIC | Age: 65
End: 2020-03-06

## 2020-03-06 ENCOUNTER — PATIENT OUTREACH (OUTPATIENT)
Dept: ADMINISTRATIVE | Facility: CLINIC | Age: 65
End: 2020-03-06

## 2020-03-06 DIAGNOSIS — J96.22 ACUTE ON CHRONIC RESPIRATORY FAILURE WITH HYPERCAPNIA: Primary | ICD-10-CM

## 2020-03-06 NOTE — TELEPHONE ENCOUNTER
"Called pt for TCC call. [facility] bp med started back in hospital. Yesterday bp 84/53. Held it today and 100/51. Disposition: see pcp today. States uses [employer] bus and can't just call today for transport. Denies having friends/family that can bring. Can't use taxi/uber. Doesn't want to be seen today. Lie with foot elevated. Fluids within restriction. OOC if >90/50 or weak, dizzy, lightheaded, chest pain, SOB. Instructed to call pcp and make aware of med/bp readings.     Reason for Disposition   Systolic BP  while taking blood pressure medications and NOT dizzy, lightheaded or weak    Additional Information   Negative: Systolic BP < 90 and feeling dizzy, lightheaded, or weak   Negative: Started suddenly after an allergic medicine, an allergic food, or bee sting   Negative: Shock suspected (e.g., cold/pale/clammy skin, too weak to stand, low BP, rapid pulse)   Negative: Difficult to awaken or acting confused  (e.g., disoriented, slurred speech)   Negative: Fainted   Negative: Chest pain   Negative: Bleeding (e.g., vomiting blood, rectal bleeding or tarry stools, severe vaginal bleeding)   Negative: Extra heart beats or heart is beating fast  (i.e., "palpitations")   Negative: Sounds like a life-threatening emergency to the triager   Negative: Systolic BP < 80 and NOT dizzy, lightheaded or weak (feels normal)   Negative: Abdominal pain   Negative: Major surgery in the past month   Negative: Fever > 100.5 F (38.1 C)   Negative: Drinking very little and has signs of dehydration (e.g., no urine > 12 hours, very dry mouth, very lightheaded)   Negative: Fall in systolic BP > 20 mm Hg from normal and feeling dizzy, lightheaded, or weak   Negative: Patient sounds very sick or weak to the triager   Negative: Systolic BP < 90 and NOT dizzy, lightheaded or weak    Protocols used: LOW BLOOD PRESSURE-A-OH      "

## 2020-03-10 ENCOUNTER — TELEPHONE (OUTPATIENT)
Dept: PHYSICAL MEDICINE AND REHAB | Facility: CLINIC | Age: 65
End: 2020-03-10

## 2020-03-10 NOTE — TELEPHONE ENCOUNTER
----- Message from Evangelist Stern sent at 3/10/2020  2:52 PM CDT -----  Contact: Jeremias cabrales [facility] @ [phone]  Caller calling to the the progress notes faxed to: [phone]

## 2020-03-17 ENCOUNTER — OFFICE VISIT (OUTPATIENT)
Dept: PULMONOLOGY | Facility: CLINIC | Age: 65
End: 2020-03-17
Payer: MEDICARE

## 2020-03-17 VITALS
OXYGEN SATURATION: 90 % | WEIGHT: 210 LBS | BODY MASS INDEX: 34.99 KG/M2 | HEART RATE: 68 BPM | SYSTOLIC BLOOD PRESSURE: 121 MMHG | DIASTOLIC BLOOD PRESSURE: 76 MMHG | HEIGHT: 65 IN

## 2020-03-17 DIAGNOSIS — Z71.89 ADVANCE CARE PLANNING: ICD-10-CM

## 2020-03-17 DIAGNOSIS — G47.33 OSA (OBSTRUCTIVE SLEEP APNEA): ICD-10-CM

## 2020-03-17 DIAGNOSIS — I27.20 PULMONARY HTN: ICD-10-CM

## 2020-03-17 PROCEDURE — 3074F SYST BP LT 130 MM HG: CPT | Mod: S$GLB,,, | Performed by: INTERNAL MEDICINE

## 2020-03-17 PROCEDURE — 99214 OFFICE O/P EST MOD 30 MIN: CPT | Mod: S$GLB,,, | Performed by: INTERNAL MEDICINE

## 2020-03-17 PROCEDURE — 3074F PR MOST RECENT SYSTOLIC BLOOD PRESSURE < 130 MM HG: ICD-10-PCS | Mod: S$GLB,,, | Performed by: INTERNAL MEDICINE

## 2020-03-17 PROCEDURE — 3078F DIAST BP <80 MM HG: CPT | Mod: S$GLB,,, | Performed by: INTERNAL MEDICINE

## 2020-03-17 PROCEDURE — 99999 PR PBB SHADOW E&M-EST. PATIENT-LVL IV: ICD-10-PCS | Mod: PBBFAC,,, | Performed by: INTERNAL MEDICINE

## 2020-03-17 PROCEDURE — 99999 PR PBB SHADOW E&M-EST. PATIENT-LVL IV: CPT | Mod: PBBFAC,,, | Performed by: INTERNAL MEDICINE

## 2020-03-17 PROCEDURE — 3008F PR BODY MASS INDEX (BMI) DOCUMENTED: ICD-10-PCS | Mod: S$GLB,,, | Performed by: INTERNAL MEDICINE

## 2020-03-17 PROCEDURE — 3078F PR MOST RECENT DIASTOLIC BLOOD PRESSURE < 80 MM HG: ICD-10-PCS | Mod: S$GLB,,, | Performed by: INTERNAL MEDICINE

## 2020-03-17 PROCEDURE — 3008F BODY MASS INDEX DOCD: CPT | Mod: S$GLB,,, | Performed by: INTERNAL MEDICINE

## 2020-03-17 PROCEDURE — 99214 PR OFFICE/OUTPT VISIT, EST, LEVL IV, 30-39 MIN: ICD-10-PCS | Mod: S$GLB,,, | Performed by: INTERNAL MEDICINE

## 2020-03-17 NOTE — PROGRESS NOTES
Mary Ellen Fajardo  was seen as a follow up.    CHIEF COMPLAINT:  Apnea; COPD; and Asthma      HISTORY OF PRESENT ILLNESS: Mary Ellen Fajardo is a 64 y.o. female  has a past medical history of Abdominal hernia, Addiction to drug, Alcohol abuse, Anxiety, Arthritis, Asthma, Bipolar disorder, Bladder stones, CHF (congestive heart failure), COPD (chronic obstructive pulmonary disease), CVA (cerebral vascular accident), Hallucination, Hepatitis C, History of blood clots, psychiatric care, Hypertension, Belen, Obese body habitus, On home oxygen therapy, ADEEL (obstructive sleep apnea), ADEEL treated with BiPAP, Paraplegia, Paraplegic spinal paralysis, Psychiatric problem, Psychosis, Renal disorder, Requires assistance with activities of daily living (ADL), SCI (spinal cord injury), Sleep difficulties, Status post amputation of leg (07/23/2019), Substance abuse, Suprapubic catheter (03/15/2011), Therapy, Vaginal delivery, and Wheelchair dependence.  Our first encounter was 1/5/19.  At that time, patient was hospitalized for hypercapnic respiratory failure.  Patient was treated with steroid, antibiotics, nebs, bipap and diuresis.  Patient was discharged with oxygen.  Patient with 2 hospitalizations on 9/20/19 and 10/29/19 for hypercapnic respiratory failure since 1/19.  On both occasions, patient was treated with nebs, steroid, and nippv.      Today, patient is doing well.  Breathing at baseline.  Currently on 2 lpm.   Currently patient is only take albuterol nebs.  Patient has home bipap 22/8.  Patient is using bpap nightly.  No snoring.  Feeling rested upon awake.  No chest pain.  No orthopnea.  No pnd with bipap.  No new issue.  No fever/chill.  No coughing.      PAST MEDICAL HISTORY:    Active Ambulatory Problems     Diagnosis Date Noted    Paraparesis 10/03/2012    Gait disorder 10/03/2012    Neurogenic bladder 10/03/2012    Knee pain, bilateral 04/06/2015    S/P knee replacement 04/06/2015    OA (osteoarthritis) of  knee 04/15/2015    Primary osteoarthritis of left knee 05/27/2015    Poor motor control of trunk 05/27/2015    Decreased range of motion of lower extremity 05/27/2015    Bilateral leg weakness 05/27/2015    Chronic knee pain 01/20/2016    Bladder stone 12/20/2017    Staghorn calculus 12/20/2017    Kidney stones 01/19/2018    Essential hypertension 01/19/2018    Neuropathy 01/19/2018    Primary insomnia 01/19/2018    ADEEL (obstructive sleep apnea) 01/19/2018    Opioid overdose 01/06/2019    COPD (chronic obstructive pulmonary disease) 01/06/2019    Class 3 severe obesity in adult 01/06/2019    Chronic hepatitis C 01/06/2019    Chronic indwelling suprapubic catheter in place 01/06/2019    Bipolar I disorder, current or most recent episode depressed, with psychotic features 01/06/2019    History of CVA (cerebrovascular accident) 01/06/2019    COPD with acute exacerbation 01/06/2019    Infection due to urethral catheter 07/17/2019    Septic arthritis of knee, left 07/17/2019    Acute on chronic respiratory failure with hypercapnia 07/18/2019    Acute on chronic diastolic heart failure 07/19/2019    Mood insomnia 07/24/2019    Hx of colonic polyp 09/17/2019    Family history of colon cancer in father 09/17/2019    Chronic respiratory failure 09/20/2019    Acute on chronic respiratory acidosis 09/20/2019    Chronic diastolic heart failure 09/20/2019    Anemia 10/29/2019    Hypercapnic respiratory failure 10/30/2019    Pulmonary HTN 11/01/2019    Advance care planning 11/01/2019    Status post below knee amputation, left 03/01/2020    Pneumonia due to infectious organism 03/03/2020     Resolved Ambulatory Problems     Diagnosis Date Noted    Volume overload 07/18/2019    Evaluation by psychiatric service required 07/24/2019    Encephalopathy, metabolic 09/20/2019     Past Medical History:   Diagnosis Date    Abdominal hernia     Addiction to drug     Alcohol abuse     Anxiety      Arthritis     Asthma     Bipolar disorder     Bladder stones     CHF (congestive heart failure)     CVA (cerebral vascular accident)     Hallucination     Hepatitis C     History of blood clots     Hx of psychiatric care     Hypertension     Belen     Obese body habitus     On home oxygen therapy     ADEEL treated with BiPAP     Paraplegia     Paraplegic spinal paralysis     Psychiatric problem     Psychosis     Renal disorder     Requires assistance with activities of daily living (ADL)     SCI (spinal cord injury)     Sleep difficulties     Status post amputation of leg 2019    Substance abuse     Therapy     Vaginal delivery     Wheelchair dependence                 PAST SURGICAL HISTORY:    Past Surgical History:   Procedure Laterality Date    ABOVE-KNEE AMPUTATION Left 2019    Procedure: AMPUTATION, ABOVE KNEE;  Surgeon: Gordo Rodriguez MD;  Location: [facility];  Service: Orthopedics;  Laterality: Left;    amputation Left 2019    above the knee    BACK SURGERY      bilateral knee replacement      BREAST BIOPSY      cysto/lithopaxy 2017      CYSTOSCOPY W/ LASER LITHOTRIPSY      JOINT REPLACEMENT      bilateral knee    SUPRAPUBIC CYSTOSTOMY  03/15/2011    patient reports  replaced tubing on 10/27/19, at home         FAMILY HISTORY:                Family History   Problem Relation Age of Onset    Dementia Mother     Anxiety disorder Brother     Depression Brother        SOCIAL HISTORY:          Tobacco:   Social History     Tobacco Use   Smoking Status Former Smoker    Packs/day: 0.50    Years: 25.00    Pack years: 12.50    Last attempt to quit:     Years since quittin.2   Smokeless Tobacco Never Used     alcohol use:    Social History     Substance and Sexual Activity   Alcohol Use Not Currently    Comment: occasional               Occupation:disable    ALLERGIES:    Review of patient's allergies indicates:   Allergen Reactions     Tuberculin ppd Itching and Dermatitis     Patient has old scare that she claims is from TB PPD    Rocephin [ceftriaxone] Rash     Rash 2012? Pt agreeable to try with pre mediation with benadryl.        CURRENT MEDICATIONS:    Current Outpatient Medications   Medication Sig Dispense Refill    amLODIPine (NORVASC) 10 MG tablet Take 1 tablet (10 mg total) by mouth once daily. 30 tablet 0    baclofen (LIORESAL) 20 MG tablet 20 mg 2 (two) times daily.   5    bumetanide (BUMEX) 2 MG tablet Take 2 mg by mouth once daily.   1    carvediloL (COREG) 3.125 MG tablet Take 1 tablet (3.125 mg total) by mouth 2 (two) times daily. 60 tablet 0    catheter 18 Fr Misc Change every 20 days 10 each 1    FLUoxetine 10 MG capsule Take 1 capsule (10 mg total) by mouth once daily. 90 capsule 0    gabapentin (NEURONTIN) 600 MG tablet TAKE 1 TABLET BY MOUTH THREE TIMES DAILY 90 tablet 5    hydrOXYzine HCl (ATARAX) 25 MG tablet Take 1 tablet (25 mg total) by mouth 3 (three) times daily. 180 tablet 0    ipratropium (ATROVENT) 0.02 % nebulizer solution U 1 VIAL VIA NEBULIZER Q 4 H WHILE AWAKE  12    lidocaine (LIDODERM) 5 % Place 1 patch onto the skin once daily. Remove & Discard patch within 12 hours or as directed by MD 15 patch 0    losartan (COZAAR) 50 MG tablet Take 1 tablet (50 mg total) by mouth once daily. 30 tablet 0    miscellaneous medical supply (C-TUB) [place] NEEDS TO SWITCH DME COMPANY FOR OXYGEN AT 2L. LAST OXYGEN SATURATION WAS 83% ON Room Air. She uses  BIPAP and  Needs  New mask, tubings and      OLANZapine (ZYPREXA) 5 MG tablet Take 1 tablet (5 mg total) by mouth every evening. 90 tablet 0    oxybutynin (DITROPAN-XL) 5 MG TR24 Take 1 tablet (5 mg total) by mouth once daily. 90 tablet 3    oxyCODONE-acetaminophen (PERCOCET) 7.5-325 mg per tablet TK 1 T PO BID PRN P      traZODone (DESYREL) 100 MG tablet Take 1 tablet (100 mg total) by mouth every evening. 90 tablet 0    VENTOLIN HFA 90 mcg/actuation inhaler     "   albuterol-ipratropium (DUO-NEB) 2.5 mg-0.5 mg/3 mL nebulizer solution Take 3 mLs by nebulization every 4 (four) hours as needed for Wheezing. 1 Box 3     No current facility-administered medications for this visit.                   REVIEW OF SYSTEMS:   No acute changes from previous encounter dated 11/1/2019 with exceptions mentioned in HPI.        PHYSICAL EXAM:  Vitals:    03/17/20 1042   BP: 121/76   Pulse: 68   SpO2: (!) 90%   Weight: 95.3 kg (210 lb)   Height: 5' 5" (1.651 m)   PainSc: 0-No pain     Body mass index is 34.95 kg/m².     GENERAL:  well develop; no apparent distress  HEENT:  no nasal congestion; no discharge noted; class 3 modified mallampatti.  Denture on top   NECK:  supple; no palpable masses.  CARDIO: regular rate and rhythm  PULM:  clear to auscultation bilaterally; no intercostals retractions; no accessory muscle usage   ABDOMEN:  soft nontender/nondistended.  +bowel sound  EXTREMITIES no cce; left aka  NEURO:  CN II-XII intact.  5/5 motor in all extremities.  sensation grossly intact   to light touch.  PSYCH:  normal affect.  Alert and oriented x 4    LABS  Pulmonary Functions Testing Results(personally reviewed):  None.  ABG (personally reviewed):  7.27/83/88/29 on 2 l nasal canula.  (10/29/19)  10/30/19 7.32/70/91/38     CT CHEST(personally reviewed):  1/6/19 Ct with bilateral mosaic attenuation bilaterally.   trace effusion bilaterally.  Enlarged pa at 46 mm.      bnp 9/20/19 389    Echo 7/18/19  · Normal left ventricular systolic function. The estimated ejection fraction is 60%  · Concentric left ventricular hypertrophy.  · Indeterminate left ventricular diastolic function.  · Moderate right ventricular enlargement.  · Mildly reduced right ventricular systolic function.  · Moderate right atrial enlargement.  · Mild to moderate tricuspid regurgitation.  · The estimated PA systolic pressure is 55 mm Hg  · Pulmonary hypertension present.    ASSESSMENT/PLAN  Problem List Items Addressed " This Visit     Advance care planning    Overview     patient lives with .  Patient inclined for no intubation and cpr in the case of cardiopulmonary arrest.  But not sure at this time.  [facility].           ADEEL (obstructive sleep apnea)    Overview     S/p sleep study at [facility].  Record not available.  Currently on bipa 20/14.  Patient is using bipap.           Current Assessment & Plan     Patient is interested with new unit since current unit does not record compliance.  Will need requalification study.           Relevant Orders    Polysomnogram (CPAP will be added if patient meets diagnostic criteria.)    Pulmonary HTN    Overview     Group 2         Current Assessment & Plan     bumex and oxygen.               Patient will No follow-ups on file. with md/np.    This is 25 minutes visit, over 50% of time spent in direct consultation with patient.

## 2020-03-17 NOTE — ASSESSMENT & PLAN NOTE
Patient is interested with new unit since current unit does not record compliance.  Will need requalification study.

## 2020-03-17 NOTE — PATIENT INSTRUCTIONS
Your provider has scheduled you a Sleep Study    What you need to know:     This referral will be sent to your insurance for authorization.  Depending on your insurance company, this process may take two weeks to be authorized.     Once your study has been authorized, Marycruz or Andreea will contact you to schedule your sleep study.   o Their number is [phone]. The [facility] is located on 2nd floor of [facility].     We will call you when the sleep study results are ready - if you have not heard from us by 2 weeks from the date of the study, please call [phone].     For information regarding financial obligations, please call [employer] at [phone].    If you study is already scheduled, please call [phone].     Once your study has been completed, it will take up to 14 business days for the results to be sent back to your provider.    If you have any questions you can send a message through your [facility] account, or call the clinic at [phone].    You are advised to abstain from driving should you feel sleepy or drowsy.

## 2020-03-31 ENCOUNTER — TELEPHONE (OUTPATIENT)
Dept: PSYCHIATRY | Facility: CLINIC | Age: 65
End: 2020-03-31

## 2020-03-31 NOTE — TELEPHONE ENCOUNTER
Patient called back to reschedule upcoming appointment due to Covid-19 concerns as she has many health problems. She is rescheduled to see Dr Young on  June 8th at 9:00. She said she is ok with medications and does not need any refills at this time, but will call clinic to let us know when she does.

## 2020-04-16 ENCOUNTER — TELEPHONE (OUTPATIENT)
Dept: SLEEP MEDICINE | Facility: HOSPITAL | Age: 65
End: 2020-04-16

## 2020-04-16 NOTE — TELEPHONE ENCOUNTER
Spoke with patient she requested to reschedule appt, patient is rescheduled to 5/5 she verbally agreed to the appt. Patient is also requesting a Hosp bed, she states that is what she sleeps in at home.    Please advise  Thank you   Andreea

## 2020-06-18 ENCOUNTER — OFFICE VISIT (OUTPATIENT)
Dept: PSYCHIATRY | Facility: CLINIC | Age: 65
End: 2020-06-18
Payer: MEDICARE

## 2020-06-18 VITALS
SYSTOLIC BLOOD PRESSURE: 142 MMHG | WEIGHT: 240 LBS | TEMPERATURE: 98 F | HEART RATE: 68 BPM | BODY MASS INDEX: 39.99 KG/M2 | HEIGHT: 65 IN | DIASTOLIC BLOOD PRESSURE: 92 MMHG

## 2020-06-18 DIAGNOSIS — F41.1 GAD (GENERALIZED ANXIETY DISORDER): ICD-10-CM

## 2020-06-18 DIAGNOSIS — F31.31 BIPOLAR I DISORDER, MOST RECENT EPISODE DEPRESSED, MILD: Primary | ICD-10-CM

## 2020-06-18 DIAGNOSIS — G47.00 INSOMNIA, UNSPECIFIED TYPE: ICD-10-CM

## 2020-06-18 PROCEDURE — 99214 PR OFFICE/OUTPT VISIT, EST, LEVL IV, 30-39 MIN: ICD-10-PCS | Mod: S$GLB,,, | Performed by: PSYCHIATRY & NEUROLOGY

## 2020-06-18 PROCEDURE — 99999 PR PBB SHADOW E&M-EST. PATIENT-LVL IV: ICD-10-PCS | Mod: PBBFAC,,, | Performed by: PSYCHIATRY & NEUROLOGY

## 2020-06-18 PROCEDURE — 3077F SYST BP >= 140 MM HG: CPT | Mod: S$GLB,,, | Performed by: PSYCHIATRY & NEUROLOGY

## 2020-06-18 PROCEDURE — 3077F PR MOST RECENT SYSTOLIC BLOOD PRESSURE >= 140 MM HG: ICD-10-PCS | Mod: S$GLB,,, | Performed by: PSYCHIATRY & NEUROLOGY

## 2020-06-18 PROCEDURE — 3080F DIAST BP >= 90 MM HG: CPT | Mod: S$GLB,,, | Performed by: PSYCHIATRY & NEUROLOGY

## 2020-06-18 PROCEDURE — 99214 OFFICE O/P EST MOD 30 MIN: CPT | Mod: S$GLB,,, | Performed by: PSYCHIATRY & NEUROLOGY

## 2020-06-18 PROCEDURE — 3080F PR MOST RECENT DIASTOLIC BLOOD PRESSURE >= 90 MM HG: ICD-10-PCS | Mod: S$GLB,,, | Performed by: PSYCHIATRY & NEUROLOGY

## 2020-06-18 PROCEDURE — 99999 PR PBB SHADOW E&M-EST. PATIENT-LVL IV: CPT | Mod: PBBFAC,,, | Performed by: PSYCHIATRY & NEUROLOGY

## 2020-06-18 PROCEDURE — 3008F PR BODY MASS INDEX (BMI) DOCUMENTED: ICD-10-PCS | Mod: S$GLB,,, | Performed by: PSYCHIATRY & NEUROLOGY

## 2020-06-18 PROCEDURE — 3008F BODY MASS INDEX DOCD: CPT | Mod: S$GLB,,, | Performed by: PSYCHIATRY & NEUROLOGY

## 2020-06-18 RX ORDER — QUETIAPINE FUMARATE 50 MG/1
50 TABLET, FILM COATED ORAL NIGHTLY
Qty: 30 TABLET | Refills: 1 | Status: SHIPPED | OUTPATIENT
Start: 2020-06-18 | End: 2020-07-29

## 2020-06-18 RX ORDER — HYDROXYZINE HYDROCHLORIDE 25 MG/1
25 TABLET, FILM COATED ORAL 2 TIMES DAILY PRN
Qty: 180 TABLET | Refills: 1 | Status: SHIPPED | OUTPATIENT
Start: 2020-06-18 | End: 2021-03-08 | Stop reason: SDUPTHER

## 2020-06-18 RX ORDER — BUPROPION HYDROCHLORIDE 150 MG/1
150 TABLET ORAL DAILY
Qty: 30 TABLET | Refills: 1 | Status: SHIPPED | OUTPATIENT
Start: 2020-06-18 | End: 2020-07-29 | Stop reason: SDUPTHER

## 2020-06-18 NOTE — PROGRESS NOTES
"  Outpatient Psychiatry Follow-Up Visit (MD/NP)    6/18/2020    Clinical Status of Patient:  Outpatient (Ambulatory)    Chief Complaint:  Mary Ellen Fajardo is a 64 y.o. female who presents today for follow-up of depression, mood disorder and anxiety.  Met with patient.      Interval History and Content of Current Session:  Interim Events/Subjective Report/Content of Current Session: Patient Mary Ellen Fajardo presents to clinic for follow up after transfer of care from my nurse practitioner.  She states that she was diagnosed with bipolar disorder in 1995 when she had a significant drug problem.  She never had to go to an inpatient psychiatric facility but has had significant mood swings and changes over time.  Lately, it feels like she has "attention deficit" because she has difficulty with concentration and focus.  She is jumping tasks at home.  Also using the hydroxyzine more to calm her down.  Trazodone not helping much with sleep and feels hangover the next day.  Not sleeping well.  Down and depressed for past 4 weeks or so, feeling sluggish and not motivated.  Feels like she has a "don't care" attitude.  Checking blood pressures at home and is a little concerned that it is a little high today because it has been doing well.  Asking for medication changes to help her feel better.      Prior meds include: fluoxetine, Lexapro, Wellbutrin.    Psychotherapy:  · Target symptoms: depression, anxiety , mood swings  · Why chosen therapy is appropriate versus another modality: relevant to diagnosis  · Outcome monitoring methods: self-report, observation  · Therapeutic intervention type: supportive psychotherapy  · Topics discussed/themes: stress related to medical comorbidities, building skills sets for symptom management, symptom recognition  · The patient's response to the intervention is accepting. The patient's progress toward treatment goals is limited.   · Duration of intervention: 15 minutes.    Review " "of Systems   · PSYCHIATRIC: Pertinant items are noted in the narrative.  · CONSTITUTIONAL: No weight gain or loss.   · MUSCULOSKELETAL: Positive for pain.  · NEUROLOGIC: Positive for numbness and limb weakness.  · RESPIRATORY: No shortness of breath.  · CARDIOVASCULAR: No tachycardia or chest pain.  · GASTROINTESTINAL: No nausea, vomiting, pain, constipation or diarrhea.    Past Medical, Family and Social History: The patient's past medical, family and social history have been reviewed and updated as appropriate within the electronic medical record - see encounter notes.    Compliance: yes    Side effects: None    Risk Parameters:  Patient reports no suicidal ideation  Patient reports no homicidal ideation  Patient reports no self-injurious behavior  Patient reports no violent behavior    Exam (detailed: at least 9 elements; comprehensive: all 15 elements)   Constitutional  Vitals:  Most recent vital signs, dated less than 90 days prior to this appointment, were reviewed.   Vitals:    06/18/20 0719   BP: (!) 142/92   Pulse: 68   Temp: 97.5 °F (36.4 °C)   TempSrc: Other (see comments)   Weight: 108.9 kg (240 lb)   Height: 5' 5" (1.651 m)        General:  age appropriate, obese, seated in wheelchair     Musculoskeletal  Muscle Strength/Tone:  no tremor, no tic   Gait & Station:  non-ataxic     Psychiatric  Speech:  no latency; no press   Mood & Affect:  dysthymic  blunted   Thought Process:  normal and logical   Associations:  intact   Thought Content:  normal, no suicidality, no homicidality, delusions, or paranoia   Insight:  has awareness of illness   Judgement: limited   Orientation:  person, place, situation, time/date   Memory: intact for content of interview   Language: able to name, able to repeat   Attention Span & Concentration:  able to focus   Fund of Knowledge:  intact and appropriate to age and level of education     Assessment and Diagnosis   Status/Progress: Based on the examination today, the " patient's problem(s) is/are adequately but not ideally controlled.  New problems have been presented today.   Co-morbidities are complicating management of the primary condition.  There are no active rule-out diagnoses for this patient at this time.     General Impression: We will continue pharmacological intervention and adjunctive therapy.       ICD-10-CM ICD-9-CM   1. Bipolar I disorder, most recent episode depressed, mild  F31.31 296.51   2. STANLEY (generalized anxiety disorder)  F41.1 300.02   3. Insomnia, unspecified type  G47.00 780.52       Intervention/Counseling/Treatment Plan   · Medication Management: Continue current medications. The risks and benefits of medication were discussed with the patient.  · Counseling provided with patient as follows: importance of compliance with chosen treatment options was emphasized, risks and benefits of treatment options, including medications, were discussed with the patient, risk factor reduction, prognosis, patient education, instructions for  management, treatment and follow-up were reviewed  1.  Stop olanzapine, fluoxetine, and trazodone.  2.  Start quetiapine 50mg nightly for bipolar depression and may also help with sleep.  Warned of risk of TD, EPS, metabolic syndrome.    3.  Start bupropion XL 150mg daily targeting depression and motivation.  Warned of risk of lucas, suicidality, serotonin syndrome, seizures.  4.  Continue hydryoxyzine 25mg PO BID PRN anxiety.  Warned of risk of oversedation.       Return to Clinic: 6 weeks, 2 months, as needed

## 2020-06-18 NOTE — PATIENT INSTRUCTIONS
"        You have been provided with a certain amount of medication with a specified number of refills.  Please follow up within an adequate time before you run out of medications.    REFILLS FOR CONTROLLED SUBSTANCES WILL NOT BE GIVEN WITHOUT AN APPOINTMENT.  I will not honor or fill automated refill requests from pharmacies.  You must come in for an appointment to get refills.        Please book your next appointment for myself or therapist by phone by calling our office at [phone].        Note that follow up appointments are 10-15 minutes long.  It is important that we focus on medication management.  Should you need therapy, please get set up with our therapist or call your insurance company to find out which therapists are available in your area.      PLEASE BE AT LEAST 15 MINUTES EARLY FOR YOUR NEXT APPOINTMENT.  Late arrivals WILL BE TURNED AWAY AND ASKED TO RESCHEDULE.  YOU MUST COME EARLY TO ALLOW TIME FOR CHECK-IN AS WELL AS GET YOUR VITAL SIGNS AND GO OVER YOUR MEDICATIONS.  [facility] is not fair to the patients who present after you and are on time for their appointments.  It causes a delay in the appointments for patients and staff.  YOU MAY ALSO BE DISCHARGED FROM CLINIC with multiple late arrivals or "No Show" appointments.       -----------------------------------------------------------------------------------------------------------------  IF YOU FEEL SUICIDAL OR HAVING THOUGHTS OR PLANS TO HURT YOURSELF OR OTHERS, CALL 911 OR REPORT TO THE NEAREST EMERGENCY ROOM.  YOU CAN ALSO ACCESS THE FOLLOWING HOTLINE:    National Suicide Hotline Number 0-122-977-MDKX (8918)              1)  Stop fluoxetine (Prozac), olanzapine (Zyprexa), and trazodone.    2)  Start bupropion XL (Wellbutrin XL) 150mg in the mornings for depression and motivation.    3)  Start quetiapine (Seroquel) 50mg nightly for sleep and bipolar depression.    4)  Continue taking hydroxyzine up to twice daily as needed for anxiety or " insomnia.

## 2020-06-23 NOTE — CONSULTS
Dictation #1  MRN:[id number]  [facility]:[id number] 63-year-old female with a history of left knee replacement performed by Dr. Nelson.  Apparently became infected and she has had surgery to irrigate and debride.    She states the last episode where she had to have surgery was 6 years ago.    She started having swelling of the left knee 3 days ago.  She came in the emergency room last night.    Past history:  Infected left total knee arthroplasty.    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    Examination:  Patient is lying in bed.  She has a long scar about the anterior aspect of the left knee.  The left knee is warm.  It feels fluctuant anteriorly.  There is a small area that is draining small amount of fluid.    X-rays left knee show a long-stem prosthesis of both the tibia and the femur. It looks like the patella button has been taken out. On x-ray there is a valgus position of the joint.    Diagnosis:  Probable abscess of the left knee.    Treatment options were discussed with her.  Under sterile conditions the area around the anterior aspect of the knee was aspirated. 5 cc of bloody fluid was obtained.  This was sent for culture.    It looks like she has a little blister about the anterior aspect that is getting ready to open up and drain.    Will follow.     No

## 2020-06-25 ENCOUNTER — TELEPHONE (OUTPATIENT)
Dept: PULMONOLOGY | Facility: CLINIC | Age: 65
End: 2020-06-25

## 2020-06-25 NOTE — TELEPHONE ENCOUNTER
----- Message from Joe Naranjo RRT sent at 6/23/2020 11:44 AM CDT -----  Regarding: Covid 19 test order and appt  Dr. Kaylynn Garduno    Mrs. Fajardo agreed to have her PSG appt on 7/22. Please put in an order for Covid19 test and have one of your MA schedule the test. Thanks

## 2020-07-15 ENCOUNTER — HOSPITAL ENCOUNTER (OUTPATIENT)
Dept: RADIOLOGY | Facility: HOSPITAL | Age: 65
Discharge: HOME | End: 2020-07-15
Attending: NURSE PRACTITIONER
Payer: MEDICARE

## 2020-07-15 DIAGNOSIS — N20.0 KIDNEY STONES: ICD-10-CM

## 2020-07-15 PROCEDURE — 74018 RADEX ABDOMEN 1 VIEW: CPT | Mod: TC,FY

## 2020-07-15 PROCEDURE — 74018 RADEX ABDOMEN 1 VIEW: CPT | Mod: 26,,, | Performed by: RADIOLOGY

## 2020-07-15 PROCEDURE — 74018 XR ABDOMEN AP 1 VIEW: ICD-10-PCS | Mod: 26,,, | Performed by: RADIOLOGY

## 2020-07-17 ENCOUNTER — TELEPHONE (OUTPATIENT)
Dept: PULMONOLOGY | Facility: CLINIC | Age: 65
End: 2020-07-17

## 2020-07-19 ENCOUNTER — LAB VISIT (OUTPATIENT)
Dept: URGENT CARE | Facility: CLINIC | Age: 65
End: 2020-07-19
Payer: MEDICARE

## 2020-07-19 VITALS — TEMPERATURE: 97 F

## 2020-07-19 PROCEDURE — U0003 INFECTIOUS AGENT DETECTION BY NUCLEIC ACID (DNA OR RNA); SEVERE ACUTE RESPIRATORY SYNDROME CORONAVIRUS 2 (SARS-COV-2) (CORONAVIRUS DISEASE [COVID-19]), AMPLIFIED PROBE TECHNIQUE, MAKING USE OF HIGH THROUGHPUT TECHNOLOGIES AS DESCRIBED BY CMS-2020-01-R: HCPCS

## 2020-07-20 LAB — SARS-COV-2 RNA RESP QL NAA+PROBE: NOT DETECTED

## 2020-07-22 ENCOUNTER — HOSPITAL ENCOUNTER (OUTPATIENT)
Dept: SLEEP MEDICINE | Facility: HOSPITAL | Age: 65
Discharge: HOME | End: 2020-07-22
Attending: INTERNAL MEDICINE
Payer: MEDICARE

## 2020-07-22 ENCOUNTER — TELEPHONE (OUTPATIENT)
Dept: PULMONOLOGY | Facility: CLINIC | Age: 65
End: 2020-07-22

## 2020-07-22 DIAGNOSIS — G47.33 OSA (OBSTRUCTIVE SLEEP APNEA): ICD-10-CM

## 2020-07-22 PROCEDURE — 95810 POLYSOM 6/> YRS 4/> PARAM: CPT

## 2020-07-22 PROCEDURE — 95810 PR POLYSOMNOGRAPHY, 4 OR MORE: ICD-10-PCS | Mod: 26,,, | Performed by: INTERNAL MEDICINE

## 2020-07-22 PROCEDURE — 95810 POLYSOM 6/> YRS 4/> PARAM: CPT | Mod: 26,,, | Performed by: INTERNAL MEDICINE

## 2020-07-22 NOTE — TELEPHONE ENCOUNTER
Spoke with patient. Full name and  verified. Informed patient of negative Covid test and that the patient is able to complete the sleep study scheduled for tonight.

## 2020-07-22 NOTE — TELEPHONE ENCOUNTER
----- Message from Aylin Fajardo sent at 7/22/2020  8:24 AM CDT -----  Name of Who is Calling: GEENA FAJARDO [[id number]]      What is the request in detail: Pt is calling to request lab results. Please contact to further discuss and advise.        Can the clinic reply by [facility]: N      What Number to [facility] Back [facility]:[phone]

## 2020-07-23 NOTE — PATIENT INSTRUCTIONS
Your sleep study will be scored and interpreted by one of our physicians who are board certified in sleep medicine.  Within two weeks the results will be sent to the physician who referred you. Your physician should then contact you to go over the results, along with any recommendations. If you do not hear from your physician within two weeks, please call them

## 2020-07-23 NOTE — PROGRESS NOTES
End of the night summary  Type of study performed on (-Mary Ellen Fajardo) PSG  ?  Patient education/cpap information prior to study/setup  EKG Appears to be- NSR  Low spo2 - 79%  Any difficulties recording: NONE  Pt reaction to CPAP: pt reports she had one before but didnt use it, pt said it was okay to try it tonight  Tech summary Comments:    pt did not meet criteria for split on cpap. pt did not go to sleep tell 12am thats what she normally does at home, pt has some frequent events observed, pt observed moving frequently and waking up throughout the night, pt slept without o2  for baseline, pt slept okay no reports of discomfort,

## 2020-07-27 ENCOUNTER — TELEPHONE (OUTPATIENT)
Dept: PULMONOLOGY | Facility: CLINIC | Age: 65
End: 2020-07-27

## 2020-07-27 DIAGNOSIS — G47.33 OSA (OBSTRUCTIVE SLEEP APNEA): Primary | ICD-10-CM

## 2020-07-27 NOTE — TELEPHONE ENCOUNTER
Result of sleep study d/w patient.  Patient confirmed that she did not sleep well during night of sleep study study.  Aware that diagnosis of percy was reconfirmed.  Currently on oxygen along with bipap.  Due to oxygen requirement, I am recommending in lab titration.  Patient is in agreement.  Will set up with in lab titration.

## 2020-07-27 NOTE — PROCEDURES
"Dear Provider,     You have ordered sleep LAB services to perform the sleep study for Mary Ellen Fajardo.  The sleep study that you ordered is complete.      Please find Sleep Study result in "Chart Review" under the "Media tab."      As the ordering provider, you are responsible for reviewing the results and implementing a treatment plan with your patient.    If you need a Sleep Medicine provider to explain the sleep study findings and arrange treatment for the patient, please refer patient for consultation to our Sleep Clinic via [facility] with Ambulatory Consult Sleep.    To do that please place an order for an  "Ambulatory Consult Sleep" - it will go to our clinic work queue for our Medical Assistant to contact the patient for an appointment.     For any questions, please contact our clinic staff at [phone] to talk to clinical staff.   "

## 2020-07-29 ENCOUNTER — OFFICE VISIT (OUTPATIENT)
Dept: PSYCHIATRY | Facility: CLINIC | Age: 65
End: 2020-07-29
Payer: MEDICARE

## 2020-07-29 VITALS
BODY MASS INDEX: 40.82 KG/M2 | SYSTOLIC BLOOD PRESSURE: 140 MMHG | DIASTOLIC BLOOD PRESSURE: 86 MMHG | TEMPERATURE: 98 F | HEIGHT: 65 IN | HEART RATE: 78 BPM | WEIGHT: 245 LBS

## 2020-07-29 DIAGNOSIS — F31.75 BIPOLAR 1 DISORDER, DEPRESSED, PARTIAL REMISSION: Primary | ICD-10-CM

## 2020-07-29 DIAGNOSIS — F41.1 GAD (GENERALIZED ANXIETY DISORDER): ICD-10-CM

## 2020-07-29 DIAGNOSIS — G47.00 INSOMNIA, UNSPECIFIED TYPE: ICD-10-CM

## 2020-07-29 PROCEDURE — 3079F PR MOST RECENT DIASTOLIC BLOOD PRESSURE 80-89 MM HG: ICD-10-PCS | Mod: S$GLB,,, | Performed by: PSYCHIATRY & NEUROLOGY

## 2020-07-29 PROCEDURE — 99999 PR PBB SHADOW E&M-EST. PATIENT-LVL IV: ICD-10-PCS | Mod: PBBFAC,,, | Performed by: PSYCHIATRY & NEUROLOGY

## 2020-07-29 PROCEDURE — 3077F PR MOST RECENT SYSTOLIC BLOOD PRESSURE >= 140 MM HG: ICD-10-PCS | Mod: S$GLB,,, | Performed by: PSYCHIATRY & NEUROLOGY

## 2020-07-29 PROCEDURE — 3079F DIAST BP 80-89 MM HG: CPT | Mod: S$GLB,,, | Performed by: PSYCHIATRY & NEUROLOGY

## 2020-07-29 PROCEDURE — 3008F PR BODY MASS INDEX (BMI) DOCUMENTED: ICD-10-PCS | Mod: S$GLB,,, | Performed by: PSYCHIATRY & NEUROLOGY

## 2020-07-29 PROCEDURE — 3077F SYST BP >= 140 MM HG: CPT | Mod: S$GLB,,, | Performed by: PSYCHIATRY & NEUROLOGY

## 2020-07-29 PROCEDURE — 99214 OFFICE O/P EST MOD 30 MIN: CPT | Mod: S$GLB,,, | Performed by: PSYCHIATRY & NEUROLOGY

## 2020-07-29 PROCEDURE — 99999 PR PBB SHADOW E&M-EST. PATIENT-LVL IV: CPT | Mod: PBBFAC,,, | Performed by: PSYCHIATRY & NEUROLOGY

## 2020-07-29 PROCEDURE — 3008F BODY MASS INDEX DOCD: CPT | Mod: S$GLB,,, | Performed by: PSYCHIATRY & NEUROLOGY

## 2020-07-29 PROCEDURE — 99214 PR OFFICE/OUTPT VISIT, EST, LEVL IV, 30-39 MIN: ICD-10-PCS | Mod: S$GLB,,, | Performed by: PSYCHIATRY & NEUROLOGY

## 2020-07-29 RX ORDER — QUETIAPINE FUMARATE 100 MG/1
100 TABLET, FILM COATED ORAL NIGHTLY
Qty: 90 TABLET | Refills: 1 | Status: SHIPPED | OUTPATIENT
Start: 2020-07-29 | End: 2021-03-08 | Stop reason: SDUPTHER

## 2020-07-29 RX ORDER — BUPROPION HYDROCHLORIDE 150 MG/1
150 TABLET ORAL DAILY
Qty: 90 TABLET | Refills: 1 | Status: SHIPPED | OUTPATIENT
Start: 2020-07-29 | End: 2021-03-08 | Stop reason: SDUPTHER

## 2020-07-29 NOTE — PROGRESS NOTES
Outpatient Psychiatry Follow-Up Visit (MD/NP)    7/29/2020    Clinical Status of Patient:  Outpatient (Ambulatory)    Chief Complaint:  Mary Ellen Fajardo is a 64 y.o. female who presents today for follow-up of depression, mood disorder and anxiety.  Met with patient.      Interval History and Content of Current Session:  Interim Events/Subjective Report/Content of Current Session: Patient Mary Ellen Fajardo presents to clinic for follow up.  Recently did a sleep study but did not do well.  She needs to repeat it.  She feels very uncomfortable in that setting.  The changed to Seroquel was better than the olanzapine or trazodone.  She feels that her sleep improved a little bit with the Seroquel.  Her mood is also better and she likes the Wellbutrin.  She feels that it is more tolerable than the Prozac.  She likes her medication changes but would like them in just a little bit more.  No side effects noted or endorsed.  Things are better at home with her  also.      Prior meds include: fluoxetine, Lexapro, Wellbutrin, olanzapine, and trazodone.    Psychotherapy:  · Target symptoms: depression, anxiety , mood swings  · Why chosen therapy is appropriate versus another modality: relevant to diagnosis  · Outcome monitoring methods: self-report, observation  · Therapeutic intervention type: supportive psychotherapy  · Topics discussed/themes: stress related to medical comorbidities, building skills sets for symptom management, symptom recognition  · The patient's response to the intervention is accepting. The patient's progress toward treatment goals is limited.   · Duration of intervention: 15 minutes.    Review of Systems   · PSYCHIATRIC: Pertinant items are noted in the narrative.  · CONSTITUTIONAL: No weight gain or loss.   · MUSCULOSKELETAL: Positive for pain.  · NEUROLOGIC: Positive for numbness and limb weakness.  · RESPIRATORY: No shortness of breath.  · CARDIOVASCULAR: No tachycardia or chest  "pain.  · GASTROINTESTINAL: No nausea, vomiting, pain, constipation or diarrhea.    Past Medical, Family and Social History: The patient's past medical, family and social history have been reviewed and updated as appropriate within the electronic medical record - see encounter notes.    Compliance: yes    Side effects: None    Risk Parameters:  Patient reports no suicidal ideation  Patient reports no homicidal ideation  Patient reports no self-injurious behavior  Patient reports no violent behavior    Exam (detailed: at least 9 elements; comprehensive: all 15 elements)   Constitutional  Vitals:  Most recent vital signs, dated less than 90 days prior to this appointment, were reviewed.   Vitals:    07/29/20 0758   BP: (!) 140/86   Pulse: 78   Temp: 98 °F (36.7 °C)   TempSrc: Oral   Weight: 111.1 kg (245 lb)   Height: 5' 5" (1.651 m)        General:  age appropriate, obese, seated in wheelchair     Musculoskeletal  Muscle Strength/Tone:  no tremor, no tic   Gait & Station:  non-ataxic     Psychiatric  Speech:  no latency; no press   Mood & Affect:  euthymic  blunted   Thought Process:  normal and logical   Associations:  intact   Thought Content:  normal, no suicidality, no homicidality, delusions, or paranoia   Insight:  has awareness of illness   Judgement: limited   Orientation:  person, place, situation, time/date   Memory: intact for content of interview   Language: able to name, able to repeat   Attention Span & Concentration:  able to focus   Fund of Knowledge:  intact and appropriate to age and level of education     Assessment and Diagnosis   Status/Progress: Based on the examination today, the patient's problem(s) is/are adequately but not ideally controlled.  New problems have been presented today.   Co-morbidities are complicating management of the primary condition.  There are no active rule-out diagnoses for this patient at this time.     General Impression: We will continue pharmacological intervention " and adjunctive therapy.       ICD-10-CM ICD-9-CM   1. Bipolar 1 disorder, depressed, partial remission  F31.75 296.55   2. STANLEY (generalized anxiety disorder)  F41.1 300.02   3. Insomnia, unspecified type  G47.00 780.52       Intervention/Counseling/Treatment Plan   · Medication Management: Continue current medications. The risks and benefits of medication were discussed with the patient.  · Counseling provided with patient as follows: importance of compliance with chosen treatment options was emphasized, risks and benefits of treatment options, including medications, were discussed with the patient, risk factor reduction, prognosis, patient education, instructions for  management, treatment and follow-up were reviewed  1.  Increase quetiapine to 100mg nightly for bipolar depression and may also help with sleep.  Warned of risk of TD, EPS, metabolic syndrome.    2.  Continue bupropion XL 150mg daily targeting depression and motivation.  Warned of risk of lucas, suicidality, serotonin syndrome, seizures.  3.  Continue hydryoxyzine 25mg PO BID PRN anxiety.  Warned of risk of oversedation.       Return to Clinic: 6 months, as needed

## 2020-07-29 NOTE — PATIENT INSTRUCTIONS
"        You have been provided with a certain amount of medication with a specified number of refills.  Please follow up within an adequate time before you run out of medications.    REFILLS FOR CONTROLLED SUBSTANCES WILL NOT BE GIVEN WITHOUT AN APPOINTMENT.  I will not honor or fill automated refill requests from pharmacies.  You must come in for an appointment to get refills.        Please book your next appointment for myself or therapist by phone by calling our office at [phone].        Note that follow up appointments are 10-15 minutes long.  It is important that we focus on medication management.  Should you need therapy, please get set up with our therapist or call your insurance company to find out which therapists are available in your area.      PLEASE BE AT LEAST 15 MINUTES EARLY FOR YOUR NEXT APPOINTMENT.  Late arrivals WILL BE TURNED AWAY AND ASKED TO RESCHEDULE.  YOU MUST COME EARLY TO ALLOW TIME FOR CHECK-IN AS WELL AS GET YOUR VITAL SIGNS AND GO OVER YOUR MEDICATIONS.  T[facility] is not fair to the patients who present after you and are on time for their appointments.  It causes a delay in the appointments for patients and staff.  YOU MAY ALSO BE DISCHARGED FROM CLINIC with multiple late arrivals or "No Show" appointments.       -----------------------------------------------------------------------------------------------------------------  IF YOU FEEL SUICIDAL OR HAVING THOUGHTS OR PLANS TO HURT YOURSELF OR OTHERS, CALL 911 OR REPORT TO THE NEAREST EMERGENCY ROOM.  YOU CAN ALSO ACCESS THE FOLLOWING HOTLINE:    National Suicide Hotline Number 0-622-169-TALK (3791)                  "

## 2020-08-09 ENCOUNTER — HOSPITAL ENCOUNTER (EMERGENCY)
Facility: HOSPITAL | Age: 65
Discharge: HOME | End: 2020-08-09
Attending: EMERGENCY MEDICINE
Payer: MEDICARE

## 2020-08-09 VITALS
SYSTOLIC BLOOD PRESSURE: 121 MMHG | OXYGEN SATURATION: 100 % | DIASTOLIC BLOOD PRESSURE: 60 MMHG | HEART RATE: 71 BPM | WEIGHT: 245 LBS | BODY MASS INDEX: 40.82 KG/M2 | RESPIRATION RATE: 16 BRPM | HEIGHT: 65 IN | TEMPERATURE: 99 F

## 2020-08-09 DIAGNOSIS — K59.00 CONSTIPATION, UNSPECIFIED CONSTIPATION TYPE: Primary | ICD-10-CM

## 2020-08-09 DIAGNOSIS — R14.0 ABDOMINAL DISTENSION: ICD-10-CM

## 2020-08-09 DIAGNOSIS — J96.12 CHRONIC RESPIRATORY FAILURE WITH HYPOXIA AND HYPERCAPNIA: ICD-10-CM

## 2020-08-09 DIAGNOSIS — J96.11 CHRONIC RESPIRATORY FAILURE WITH HYPOXIA AND HYPERCAPNIA: ICD-10-CM

## 2020-08-09 DIAGNOSIS — R06.02 SOB (SHORTNESS OF BREATH): ICD-10-CM

## 2020-08-09 LAB
ALBUMIN SERPL BCP-MCNC: 3.9 G/DL (ref 3.5–5.2)
ALP SERPL-CCNC: 113 U/L (ref 55–135)
ALT SERPL W/O P-5'-P-CCNC: 27 U/L (ref 10–44)
ANION GAP SERPL CALC-SCNC: 13 MMOL/L (ref 8–16)
AST SERPL-CCNC: 38 U/L (ref 10–40)
BASOPHILS # BLD AUTO: 0.03 K/UL (ref 0–0.2)
BASOPHILS NFR BLD: 0.5 % (ref 0–1.9)
BILIRUB SERPL-MCNC: 0.4 MG/DL (ref 0.1–1)
BNP SERPL-MCNC: <10 PG/ML (ref 0–99)
BUN SERPL-MCNC: 26 MG/DL (ref 8–23)
CALCIUM SERPL-MCNC: 9.1 MG/DL (ref 8.7–10.5)
CHLORIDE SERPL-SCNC: 96 MMOL/L (ref 95–110)
CO2 SERPL-SCNC: 28 MMOL/L (ref 23–29)
CREAT SERPL-MCNC: 1 MG/DL (ref 0.5–1.4)
DIFFERENTIAL METHOD: ABNORMAL
EOSINOPHIL # BLD AUTO: 0.1 K/UL (ref 0–0.5)
EOSINOPHIL NFR BLD: 1.8 % (ref 0–8)
ERYTHROCYTE [DISTWIDTH] IN BLOOD BY AUTOMATED COUNT: 13.7 % (ref 11.5–14.5)
EST. GFR  (AFRICAN AMERICAN): >60 ML/MIN/1.73 M^2
EST. GFR  (NON AFRICAN AMERICAN): 60 ML/MIN/1.73 M^2
GLUCOSE SERPL-MCNC: 98 MG/DL (ref 70–110)
HCT VFR BLD AUTO: 42.1 % (ref 37–48.5)
HGB BLD-MCNC: 13 G/DL (ref 12–16)
IMM GRANULOCYTES # BLD AUTO: 0.02 K/UL (ref 0–0.04)
IMM GRANULOCYTES NFR BLD AUTO: 0.3 % (ref 0–0.5)
LIPASE SERPL-CCNC: 17 U/L (ref 4–60)
LYMPHOCYTES # BLD AUTO: 1.3 K/UL (ref 1–4.8)
LYMPHOCYTES NFR BLD: 21.7 % (ref 18–48)
MCH RBC QN AUTO: 25.6 PG (ref 27–31)
MCHC RBC AUTO-ENTMCNC: 30.9 G/DL (ref 32–36)
MCV RBC AUTO: 83 FL (ref 82–98)
MONOCYTES # BLD AUTO: 0.8 K/UL (ref 0.3–1)
MONOCYTES NFR BLD: 13.4 % (ref 4–15)
NEUTROPHILS # BLD AUTO: 3.7 K/UL (ref 1.8–7.7)
NEUTROPHILS NFR BLD: 62.3 % (ref 38–73)
NRBC BLD-RTO: 0 /100 WBC
PLATELET # BLD AUTO: 205 K/UL (ref 150–350)
PMV BLD AUTO: 9.9 FL (ref 9.2–12.9)
POTASSIUM SERPL-SCNC: 3.5 MMOL/L (ref 3.5–5.1)
PROT SERPL-MCNC: 8.4 G/DL (ref 6–8.4)
RBC # BLD AUTO: 5.07 M/UL (ref 4–5.4)
SODIUM SERPL-SCNC: 137 MMOL/L (ref 136–145)
TROPONIN I SERPL DL<=0.01 NG/ML-MCNC: <0.006 NG/ML (ref 0–0.03)
WBC # BLD AUTO: 5.95 K/UL (ref 3.9–12.7)

## 2020-08-09 PROCEDURE — 87077 CULTURE AEROBIC IDENTIFY: CPT | Mod: 59

## 2020-08-09 PROCEDURE — 25000003 PHARM REV CODE 250: Performed by: EMERGENCY MEDICINE

## 2020-08-09 PROCEDURE — 87088 URINE BACTERIA CULTURE: CPT

## 2020-08-09 PROCEDURE — 63600175 PHARM REV CODE 636 W HCPCS: Performed by: EMERGENCY MEDICINE

## 2020-08-09 PROCEDURE — 85025 COMPLETE CBC W/AUTO DIFF WBC: CPT

## 2020-08-09 PROCEDURE — 96374 THER/PROPH/DIAG INJ IV PUSH: CPT

## 2020-08-09 PROCEDURE — 93010 ELECTROCARDIOGRAM REPORT: CPT | Mod: ,,, | Performed by: INTERNAL MEDICINE

## 2020-08-09 PROCEDURE — 84484 ASSAY OF TROPONIN QUANT: CPT

## 2020-08-09 PROCEDURE — 93005 ELECTROCARDIOGRAM TRACING: CPT

## 2020-08-09 PROCEDURE — 80053 COMPREHEN METABOLIC PANEL: CPT

## 2020-08-09 PROCEDURE — 87186 SC STD MICRODIL/AGAR DIL: CPT

## 2020-08-09 PROCEDURE — 83880 ASSAY OF NATRIURETIC PEPTIDE: CPT

## 2020-08-09 PROCEDURE — S0028 INJECTION, FAMOTIDINE, 20 MG: HCPCS | Performed by: EMERGENCY MEDICINE

## 2020-08-09 PROCEDURE — 93010 EKG 12-LEAD: ICD-10-PCS | Mod: ,,, | Performed by: INTERNAL MEDICINE

## 2020-08-09 PROCEDURE — 87086 URINE CULTURE/COLONY COUNT: CPT

## 2020-08-09 PROCEDURE — 96375 TX/PRO/DX INJ NEW DRUG ADDON: CPT

## 2020-08-09 PROCEDURE — 83690 ASSAY OF LIPASE: CPT

## 2020-08-09 PROCEDURE — 99285 EMERGENCY DEPT VISIT HI MDM: CPT | Mod: 25

## 2020-08-09 RX ORDER — PSEUDOEPHEDRINE/ACETAMINOPHEN 30MG-500MG
100 TABLET ORAL
Status: COMPLETED | OUTPATIENT
Start: 2020-08-09 | End: 2020-08-09

## 2020-08-09 RX ORDER — ONDANSETRON 2 MG/ML
4 INJECTION INTRAMUSCULAR; INTRAVENOUS
Status: COMPLETED | OUTPATIENT
Start: 2020-08-09 | End: 2020-08-09

## 2020-08-09 RX ORDER — FAMOTIDINE 10 MG/ML
20 INJECTION INTRAVENOUS
Status: COMPLETED | OUTPATIENT
Start: 2020-08-09 | End: 2020-08-09

## 2020-08-09 RX ORDER — SYRING-NEEDL,DISP,INSUL,0.3 ML 29 G X1/2"
296 SYRINGE, EMPTY DISPOSABLE MISCELLANEOUS
Status: COMPLETED | OUTPATIENT
Start: 2020-08-09 | End: 2020-08-09

## 2020-08-09 RX ORDER — CEPHALEXIN 500 MG/1
500 CAPSULE ORAL EVERY 12 HOURS
Qty: 14 CAPSULE | Refills: 0 | Status: SHIPPED | OUTPATIENT
Start: 2020-08-09 | End: 2020-08-16

## 2020-08-09 RX ADMIN — ONDANSETRON HYDROCHLORIDE 4 MG: 2 SOLUTION INTRAMUSCULAR; INTRAVENOUS at 01:08

## 2020-08-09 RX ADMIN — SODIUM CHLORIDE 500 ML: 0.9 INJECTION, SOLUTION INTRAVENOUS at 03:08

## 2020-08-09 RX ADMIN — MAGNESIUM CITRATE 296 ML: 1.75 LIQUID ORAL at 03:08

## 2020-08-09 RX ADMIN — FAMOTIDINE 20 MG: 10 INJECTION INTRAVENOUS at 01:08

## 2020-08-09 RX ADMIN — Medication 100 ML: at 03:08

## 2020-08-09 NOTE — ED NOTES
Patient report given to SIDDHARTH Avilez.  Patient waiting for transport home by [facility].  Patient instructed on delay of discharge and patient verbalized understanding.

## 2020-08-09 NOTE — ED PROVIDER NOTES
"Encounter Date: 8/9/2020       History     Chief Complaint   Patient presents with    Abdominal Pain     Pateint arrived via EMS with c/o generalized abd pain, abd "bloating", and SOB x 1 day.  Reports having constipation, took a laxative earlier today, and had one loose stool afterwards.  Patient on home O2 at 2 LPM per NC.      Shortness of Breath    Constipation     64-year-old female with history of COPD, CHF on home oxygen presents to emergency department for evaluation of abdominal distension for the past "few days" with associated generalized abdominal pain, nausea, constipation.  Patient reports her abdomen is so distended that she has began to feel short of breath..  She denies fever, cough, chest pain.  She reports that she attempted treatment with the laxative yesterday and had a small liquid bowel movement but had no improvement in symptoms.  No exacerbating or alleviating factors.         Review of patient's allergies indicates:   Allergen Reactions    Tuberculin ppd Itching and Dermatitis     Patient has old scare that she claims is from TB PPD    Rocephin [ceftriaxone] Rash     Rash 2012? Pt agreeable to try with pre mediation with benadryl.      Past Medical History:   Diagnosis Date    Abdominal hernia     Addiction to drug     Alcohol abuse     Anxiety     Arthritis     Asthma     Bipolar disorder     Bladder stones     CHF (congestive heart failure)     COPD (chronic obstructive pulmonary disease)     on home o2    CVA (cerebral vascular accident)     2012    Hallucination     Hepatitis C     treated and cured    History of blood clots     Hx of psychiatric care     Hypertension     Belen     Obese body habitus     On home oxygen therapy     ADEEL (obstructive sleep apnea)     ADEEL treated with BiPAP     Paraplegia     Paraplegic spinal paralysis     Psychiatric problem     Psychosis     AVH; Paranoia    Renal disorder     Requires assistance with activities of daily " living (ADL)     SCI (spinal cord injury)     incomplete    Sleep difficulties     has sleep apnea and uses a Bi-PAP machine.    Status post amputation of leg 2019    Substance abuse     Suprapubic catheter 03/15/2011    Therapy     Vaginal delivery     x1    Wheelchair dependence      Past Surgical History:   Procedure Laterality Date    ABOVE-KNEE AMPUTATION Left 2019    Procedure: AMPUTATION, ABOVE KNEE;  Surgeon: Gordo Rodrigeuz MD;  Location: [facility];  Service: Orthopedics;  Laterality: Left;    amputation Left 2019    above the knee    BACK SURGERY      bilateral knee replacement      BREAST BIOPSY      cysto/lithopaxy 2017      CYSTOSCOPY W/ LASER LITHOTRIPSY      JOINT REPLACEMENT      bilateral knee    SUPRAPUBIC CYSTOSTOMY  03/15/2011    patient reports  replaced tubing on 10/27/19, at home     Family History   Problem Relation Age of Onset    Dementia Mother     Anxiety disorder Brother     Depression Brother      Social History     Tobacco Use    Smoking status: Former Smoker     Packs/day: 0.50     Years: 25.00     Pack years: 12.50     Quit date:      Years since quittin.6    Smokeless tobacco: Never Used   Substance Use Topics    Alcohol use: Not Currently     Comment: occasional    Drug use: Never     Comment: prescribed opioids at present for pain     Review of Systems   Constitutional: Negative for fever.   HENT: Negative for sore throat.    Respiratory: Positive for shortness of breath (Due to abdominal distension). Negative for cough and choking.    Cardiovascular: Negative for chest pain and palpitations.   Gastrointestinal: Positive for abdominal distention, abdominal pain, constipation and nausea.   Genitourinary: Positive for difficulty urinating ( suprapubic catheter).   Musculoskeletal: Negative for back pain.   Skin: Negative for rash.   Neurological: Negative for weakness and numbness.   Hematological: Does not bruise/bleed  easily.       Physical Exam     Initial Vitals [08/09/20 0024]   BP Pulse Resp Temp SpO2   118/60 82 20 98.2 °F (36.8 °C) 96 %      MAP       --         Physical Exam    Nursing note and vitals reviewed.  Constitutional: She is not diaphoretic. No distress.   HENT:   Head: Normocephalic and atraumatic.   Mouth/Throat: Oropharynx is clear and moist.   Eyes: EOM are normal. Pupils are equal, round, and reactive to light. No scleral icterus.   Neck: Normal range of motion. Neck supple. No JVD present.   Cardiovascular: Normal rate, regular rhythm and intact distal pulses.   Pulmonary/Chest: No stridor. No respiratory distress. She has no wheezes.   Abdominal: Soft. She exhibits distension. She exhibits no mass. There is no abdominal tenderness. There is no rebound and no guarding.   Hypoactive bowel sounds   Musculoskeletal: No tenderness or edema.      Comments: Status post left AKA   Neurological: She is alert. She has normal strength. No cranial nerve deficit or sensory deficit. GCS score is 15. GCS eye subscore is 4. GCS verbal subscore is 5. GCS motor subscore is 6.   Skin: Skin is warm and dry.   Psychiatric: She has a normal mood and affect.         ED Course   Procedures  Labs Reviewed   CBC W/ AUTO DIFFERENTIAL - Abnormal; Notable for the following components:       Result Value    Mean Corpuscular Hemoglobin 25.6 (*)     Mean Corpuscular Hemoglobin Conc 30.9 (*)     All other components within normal limits   COMPREHENSIVE METABOLIC PANEL - Abnormal; Notable for the following components:    BUN, Bld 26 (*)     All other components within normal limits   CULTURE, URINE   LIPASE   TROPONIN I   B-TYPE NATRIURETIC PEPTIDE     EKG Readings: (Independently Interpreted)   Initial Reading: No STEMI. Rhythm: Normal Sinus Rhythm. Heart Rate: 79. Conduction: Normal. ST Segments: Normal ST Segments. Axis: Normal.   Nonspecific T-wave abnormality       Imaging Results           CT Abdomen Pelvis  Without Contrast (Final  result)  Result time 08/09/20 02:03:44    Final result by Hunter Broderick MD (08/09/20 02:03:44)                 Impression:      There is mild right hydronephrosis and hydroureter with perinephric and periureteral stranding, there is no ureteral calculus seen however there is density of the urinary bladder that may relate to a recently passed calculus, clinical and historical correlation is needed alternatively correlation for UTI/pyelonephritis is needed.    Nonobstructing nephrolithiasis of the right kidney is also noted.    Small indeterminate lesion of the right lobe of the liver for which ultrasound follow-up is recommended as discussed above.    Mild to moderate gallbladder distention there is mild density of the gallbladder that may relate to mild sludge and/or cholelithiasis however there is no evidence for pericholecystic inflammatory change.    Stable left adrenal nodule, as discussed above.    Prominence of the colon throughout the majority of its course, there is a segment of the sigmoid colon that is decompressed however without inflammatory change and without rotatory configuration.  There is mild wall thickening at the rectum, correlation for mild rectal colitis is needed.    Low rectal anal thickening for which clinical and historical correlation and evaluation is recommended.    Intrauterine contraceptive device remains present.    Suspected prominently calcified/mineralized left-sided uterine fibroid.    Suspected 1.9 cm left adnexal cyst and 1.7 cm right adnexal cyst.    Atelectatic changes are noted at the lung bases.    Suprapubic catheter again noted.    This report was flagged in Epic as abnormal.      Electronically signed by: Hunter Broderick  Date:    08/09/2020  Time:    02:03             Narrative:    EXAMINATION:  CT ABDOMEN PELVIS WITHOUT CONTRAST    CLINICAL HISTORY:  Bowel obstruction high-grade suspected;    TECHNIQUE:  Low dose axial images, sagittal and coronal reformations were  obtained from the lung bases to the pubic symphysis.  Intravenous contrast and oral contrast was not utilized.    COMPARISON:  CT examination of the abdomen and pelvis November 26, 2018    FINDINGS:  The lung bases demonstrate mild motion artifact and atelectatic change, with somewhat more prominent suspected atelectasis at the left lung base, left lower lobe.  There is appearance of may relate to a small hiatal hernia, the stomach is otherwise incompletely distended.  There is mild density of the gallbladder, this may relate to mild sludge and/or cholelithiasis, there is no evidence for pericholecystic inflammatory change.  There is a small hypodensity of the right lobe of the liver noted, axial image 48 measuring approximately up to 12.5 mm on axial imaging, this is somewhat small for accurate characterization however does not have the typical appearance of a cyst, could represent a small solid lesion, could represent ang angioma, ultrasound follow-up for characterization and follow-up is recommended.    There is no evidence for peripancreatic inflammatory change, there is no abnormal pancreatic or biliary ductal dilatation.  There is no evidence for acute process of the spleen or right adrenal gland.  There is a left adrenal nodule again noted, measuring approximately 27 Hounsfield units, cannot be characterized as adrenal adenoma however appears stable.    Nonobstructing nephrolithiasis of the right kidney is noted.  There is mild right perinephric stranding and there is mild right hydronephrosis, there is mild to moderate right hydroureter, most notably along the proximal right ureter with periureteral stranding.  There is no ureteral calculus seen, however as seen on axial image 126 there is faint density within the urinary bladder, appearing somewhat linear on coronal image 96 this may relate to a recently passed calculus into the urinary bladder, however correlation for UTI/pyelonephritis is otherwise  needed.  On the left there is no evidence for hydronephrosis or obstructive uropathy, or ureteral calculus.    The abdominal aorta appears normal in caliber with atherosclerotic change noted otherwise not well evaluated on this exam.  There appears to be a suprapubic catheter within the urinary bladder, the urinary bladder is decompressed.  An intrauterine contraceptive device is noted.  There is a prominent calcified lesion to the left of the uterus appearing stable, may relate to a fibroid.  There is a suspected 1.9 cm left adnexal cyst and suspected 1.7 cm right adnexal cyst.    There is no evidence for small bowel obstructive process.  The appendix is identified and does not appear inflamed.  There is mild-to-moderate prominence of the colon throughout its course in the colon appears somewhat redundant and tortuous, there is relative narrowing along a segment of the sigmoid colon, however there is no associated wall thickening or inflammatory change and it does not appear rotatory in configuration, proximal as well as distal to this segment there is distention of the colon with stool, with mild to moderate distention of the rectum with stool, there is suggestion of minimal wall thickening of the rectum, correlation for mild rectal colitis is needed.  There is mild low rectal anal thickening noted for which clinical and historical correlation and evaluation is recommended.  There is no evidence for free intraperitoneal air.  The visualized osseous structures demonstrate chronic change.                               X-Ray Chest AP Portable (Final result)  Result time 08/09/20 01:21:34    Final result by Hunter Broderick MD (08/09/20 01:21:34)                 Impression:      Mild central pulmonary vascular prominence is noted, there is also diminished depth of inspiration and crowding without additional radiographic evidence for acute intrathoracic process.      Electronically signed by: Hunter  [place]  Date:    08/09/2020  Time:    01:21             Narrative:    EXAMINATION:  XR CHEST AP PORTABLE    CLINICAL HISTORY:  Shortness of breath    TECHNIQUE:  Single frontal view of the chest was performed.    COMPARISON:  March 1, 2020    FINDINGS:  Single portable chest view is submitted.  There is diminished depth of inspiration.  This along with portable technique exaggerates the appearance of the cardiomediastinal silhouette which is thought stable, that is appear to be some aortic tortuosity appearing similar to the prior study.  Mild central pulmonary vascular prominence is noted.  Radiographic appearance of crowding is noted.  There is no evidence for confluent infiltrate or consolidation, significant pleural effusion or pneumothorax.  The osseous structures demonstrate chronic change.                                 Medical Decision Making:   History:   Old Medical Records: I decided to obtain old medical records.  Differential Diagnosis:   Includes but not limited to:  Constipation, bowel obstruction, diverticulitis, colitis, GERD, gastritis, gastroenteritis, CHF, COPD, ACS, PE  Independently Interpreted Test(s):   I have ordered and independently interpreted EKG Reading(s) - see prior notes  Clinical Tests:   Lab Tests: Ordered and Reviewed  Radiological Study: Ordered and Reviewed  Medical Tests: Ordered and Reviewed  ED Management:  Patient is afebrile and in no acute distress at time history and physical.  She is not hypoxic, tachypneic or in respiratory distress on her usual dose of home oxygen.  Patient complains mainly of abdominal distension that is causing her to feel short of breath.  She is not coughing.  Lungs are clear auscultation.  She has abdominal distension but no focal abdominal tenderness.  She is not actively vomiting or retching.  Labs without leukocytosis, granulocytosis or significant electrolyte abnormality.  BNP, troponin within normal ranges.  CT of the abdomen pelvis  demonstrates right hydronephrosis with perinephric stranding, no nephrolithiasis noted.  There is some prominence of the colon without inflammatory change.  My review of CT scan demonstrates constipation.  Patient reports that she has attempted treatment with Fleet enema, suppositories, MiraLax without improvement.  Review of chart demonstrates that patient was recently prescribed Macrobid by primary physician for UTI.  She reports that she has been compliant with course of Macrobid.  Patient given enema in the emergency department and had bowel movement and reports some improvement in symptoms.  Suprapubic catheter has been exchanged.  Urine culture sent.  Patient started on Keflex for presumed UTI.  She remains hemodynamically stable in the emergency department.  She continues to saturate 100% on her usual dose of home oxygen.  Patient's symptoms do not appear related to a CHF exacerbation.  Patient does not appear to require inpatient admission or intravenous antibiotics at this time.  counseled on supportive care, appropriate medication usage, concerning symptoms for which to return to ER and the importance of follow up. Understanding and agreement with treatment plan was expressed.   This chart was completed using dictation software, as a result there may be some transcription errors.                                  Clinical Impression:       ICD-10-CM ICD-9-CM   1. Constipation, unspecified constipation type  K59.00 564.00   2. SOB (shortness of breath)  R06.02 786.05   3. Chronic respiratory failure with hypoxia and hypercapnia  J96.11 518.83    J96.12 799.02     786.09   4. Abdominal distension  R14.0 787.3         Disposition:   Disposition: Discharged  Condition: Stable     ED Disposition Condition    Discharge Stable        ED Prescriptions     Medication Sig Dispense Start Date End Date Auth. Provider    cephALEXin (KEFLEX) 500 MG capsule Take 1 capsule (500 mg total) by mouth every 12 (twelve) hours.  for 7 days 14 capsule 8/9/2020 8/16/2020 Mic Bahena MD        Follow-up Information     Follow up With Specialties Details Why Contact Info    Any Tran MD Internal Medicine Schedule an appointment as soon as possible for a visit   [address] [facility] [address]  [facility]  [place] [phone]                                       Mic Bahena MD  08/09/20 0424

## 2020-08-09 NOTE — DISCHARGE INSTRUCTIONS
Emergency department testing is within acceptable ranges.  Your abdominal distension may be related to constipation.  Continue bowel medications as you have been taking.  CT scan suggests possible UTI.  You have been prescribed a course of Keflex for UTI.  Schedule close follow-up with your primary physician as well as her gastroenterologist.  Return to the emergency department for any new, worsening or significantly concerning symptoms.    Thank you for coming to our Emergency Department today. It is important to remember that some problems are difficult to diagnose and may not be found during your first visit. Be sure to follow up with your primary care doctor and review any labs/imaging that was performed with them. If you do not have a primary care doctor, you may contact the one listed on your discharge paperwork or you may also call the [facility] Appointment Desk at [phone] to schedule an appointment with one.     All medications may potentially have side effects and it is impossible to predict which medications may give you side effects. If you feel that you are having a negative effect of any medication you should immediately stop taking them and seek medical attention.    Return to the ER with any questions/concerns, new/concerning symptoms, worsening or failure to improve. Do not drive or make any important decisions for 24 hours if you have received any pain medications, sedatives or mood altering drugs during your ER visit.

## 2020-08-09 NOTE — ED TRIAGE NOTES
Patient arrived via EMS with c/o generalized abd pain, nausea, abd distension, sob, and hx of chronic constipation.  Reports taking laxative and enema last night and reports having a loose stool this morning upon waking up this morning.  Denies vomiting, fever, cough, congestion, or urinary symptoms.  Patient also reports sob that started this evening as her stomach became distended.  Patient reports being on continuous O2 at home per nasal cannula at 2 lpm.  No acute distress noted.

## 2020-08-09 NOTE — ED NOTES
"Enema completed with moderate amount of soft, brown, stool return.  Patient reports, "I feel much better and my stomach doesn't feel as swollen."  Patient cleaned, linens changed, and gown replace.  Patient comfortable in bed with side rails up x 2 and call light in hand.  "

## 2020-08-10 ENCOUNTER — PES CALL (OUTPATIENT)
Dept: ADMINISTRATIVE | Facility: CLINIC | Age: 65
End: 2020-08-10

## 2020-08-11 LAB — BACTERIA UR CULT: ABNORMAL

## 2020-10-06 ENCOUNTER — OFFICE VISIT (OUTPATIENT)
Dept: UROLOGY | Facility: CLINIC | Age: 65
End: 2020-10-06
Payer: MEDICARE

## 2020-10-06 VITALS — TEMPERATURE: 98 F | HEIGHT: 65 IN | WEIGHT: 245 LBS | BODY MASS INDEX: 40.82 KG/M2

## 2020-10-06 DIAGNOSIS — R33.9 INCOMPLETE BLADDER EMPTYING: ICD-10-CM

## 2020-10-06 DIAGNOSIS — N20.0 STAGHORN CALCULUS: Primary | ICD-10-CM

## 2020-10-06 DIAGNOSIS — N31.9 NEUROGENIC BLADDER: ICD-10-CM

## 2020-10-06 PROCEDURE — 99214 PR OFFICE/OUTPT VISIT, EST, LEVL IV, 30-39 MIN: ICD-10-PCS | Mod: S$GLB,,, | Performed by: UROLOGY

## 2020-10-06 PROCEDURE — 99999 PR PBB SHADOW E&M-EST. PATIENT-LVL IV: ICD-10-PCS | Mod: PBBFAC,,, | Performed by: UROLOGY

## 2020-10-06 PROCEDURE — 3008F PR BODY MASS INDEX (BMI) DOCUMENTED: ICD-10-PCS | Mod: S$GLB,,, | Performed by: UROLOGY

## 2020-10-06 PROCEDURE — 3008F BODY MASS INDEX DOCD: CPT | Mod: S$GLB,,, | Performed by: UROLOGY

## 2020-10-06 PROCEDURE — 99214 OFFICE O/P EST MOD 30 MIN: CPT | Mod: S$GLB,,, | Performed by: UROLOGY

## 2020-10-06 PROCEDURE — 99999 PR PBB SHADOW E&M-EST. PATIENT-LVL IV: CPT | Mod: PBBFAC,,, | Performed by: UROLOGY

## 2020-10-06 NOTE — H&P
Subjective:       Patient ID: Mary Ellen Fajardo is a 64 y.o. female who was referred by No ref. provider found    Chief Complaint:   Chief Complaint   Patient presents with    Follow-up     follow up visit        Neurogenic Bladder  Her bladder is managed with a suprapubic catheter. She has had one since March 2011. Her  changes her 18 Fr catheter every 20 days.  She had a routine ALBERTINA suggesting a right kidney stone.  Follow up CT showed a right staghorn calculus and large bladder stone.    Stones  She had a cystolitholapaxy on 12/20/2017 with right stent placement.  She had a Right PCNL on Friday 1/19/2018.  She then had a second look PCNL on Monday 1/29/2018.  She then had a right ureteroscopy and laser lithotripsy on 3/5/2018.  Her stent was removed without difficulty on 4/9/2018.  She denies hematuria or flank pain.    CT scan 11/18--non obstructing right renal stones. She was admitted in 10/2019 due to respiratory failure. UCx--+ E coli at that time. Of note, UTI treated with IV abx at that time    Denies flank pain, gross hematuria or fever. Reports her suprapubic catheter was changed today by her . She is doing well with Ditropan XL 5 mg.    She was recently in the ED due to bloating.  She had significant constipation but her right kidney stones were noted to have increased in burden.  ACTIVE MEDICAL ISSUES:  Patient Active Problem List   Diagnosis    Paraparesis    Gait disorder    Neurogenic bladder    Knee pain, bilateral    S/P knee replacement    OA (osteoarthritis) of knee    Primary osteoarthritis of left knee    Poor motor control of trunk    Decreased range of motion of lower extremity    Bilateral leg weakness    Chronic knee pain    Bladder stone    Staghorn calculus    Kidney stones    Essential hypertension    Neuropathy    Primary insomnia    ADEEL (obstructive sleep apnea)    Opioid overdose    COPD (chronic obstructive pulmonary disease)    Class 3 severe  obesity in adult    Chronic hepatitis C    Chronic indwelling suprapubic catheter in place    History of CVA (cerebrovascular accident)    COPD with acute exacerbation    Infection due to urethral catheter    Septic arthritis of knee, left    Acute on chronic respiratory failure with hypercapnia    Acute on chronic diastolic heart failure    Hx of colonic polyp    Family history of colon cancer in father    Chronic respiratory failure    Acute on chronic respiratory acidosis    Chronic diastolic heart failure    Anemia    Hypercapnic respiratory failure    Pulmonary HTN    Advance care planning    Status post below knee amputation, left    Pneumonia due to infectious organism       PAST MEDICAL HISTORY  Past Medical History:   Diagnosis Date    Abdominal hernia     Addiction to drug     Alcohol abuse     Anxiety     Arthritis     Asthma     Bipolar disorder     Bladder stones     CHF (congestive heart failure)     COPD (chronic obstructive pulmonary disease)     on home o2    CVA (cerebral vascular accident)     2012    Hallucination     Hepatitis C     treated and cured    History of blood clots     Hx of psychiatric care     Hypertension     Belen     Obese body habitus     On home oxygen therapy     ADEEL (obstructive sleep apnea)     ADEEL treated with BiPAP     Paraplegia     Paraplegic spinal paralysis     Psychiatric problem     Psychosis     AVH; Paranoia    Renal disorder     Requires assistance with activities of daily living (ADL)     SCI (spinal cord injury)     incomplete    Sleep difficulties     has sleep apnea and uses a Bi-PAP machine.    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    Substance abuse     Suprapubic catheter 03/15/2011    Therapy     Vaginal delivery     x1    Wheelchair dependence        PAST SURGICAL HISTORY:  Past Surgical History:   Procedure Laterality Date    ABOVE-KNEE AMPUTATION Left 7/23/2019    Procedure: AMPUTATION, ABOVE  KNEE;  Surgeon: Gordo Rodriguez MD;  Location: [facility];  Service: Orthopedics;  Laterality: Left;    amputation Left 2019    above the knee    BACK SURGERY      bilateral knee replacement      BREAST BIOPSY      cysto/lithopaxy 2017      CYSTOSCOPY W/ LASER LITHOTRIPSY      JOINT REPLACEMENT      bilateral knee    SUPRAPUBIC CYSTOSTOMY  03/15/2011    patient reports  replaced tubing on 10/27/19, at home       SOCIAL HISTORY:  Social History     Tobacco Use    Smoking status: Former Smoker     Packs/day: 0.50     Years: 25.00     Pack years: 12.50     Quit date:      Years since quittin.7    Smokeless tobacco: Never Used   Substance Use Topics    Alcohol use: Not Currently     Comment: occasional    Drug use: Never     Comment: prescribed opioids at present for pain       FAMILY HISTORY:  Family History   Problem Relation Age of Onset    Dementia Mother     Anxiety disorder Brother     Depression Brother        ALLERGIES AND MEDICATIONS: updated and reviewed.  Review of patient's allergies indicates:   Allergen Reactions    Tuberculin ppd Itching and Dermatitis     Patient has old scare that she claims is from TB PPD    Rocephin [ceftriaxone] Rash     Rash ? Pt agreeable to try with pre mediation with benadryl.      Current Outpatient Medications   Medication Sig    baclofen (LIORESAL) 20 MG tablet 20 mg 2 (two) times daily.     bumetanide (BUMEX) 2 MG tablet Take 2 mg by mouth once daily.     buPROPion (WELLBUTRIN XL) 150 MG TB24 tablet Take 1 tablet (150 mg total) by mouth once daily.    catheter 18 Fr Misc Change every 20 days    gabapentin (NEURONTIN) 600 MG tablet TAKE 1 TABLET BY MOUTH THREE TIMES DAILY    hydrOXYzine HCL (ATARAX) 25 MG tablet Take 1 tablet (25 mg total) by mouth 2 (two) times daily as needed for Anxiety.    miscellaneous medical supply (C-TUB) [facility] NEEDS TO SWITCH [employer] FOR OXYGEN AT 2L. LAST OXYGEN SATURATION WAS 83% ON Room Air.  "She uses  BIPAP and  Needs  New mask, tubings and    oxybutynin (DITROPAN-XL) 5 MG TR24 Take 1 tablet (5 mg total) by mouth once daily.    oxyCODONE-acetaminophen (PERCOCET) 7.5-325 mg per tablet TK 1 T PO BID PRN P    QUEtiapine (SEROQUEL) 100 MG Tab Take 1 tablet (100 mg total) by mouth every evening.    VENTOLIN HFA 90 mcg/actuation inhaler     albuterol-ipratropium (DUO-NEB) 2.5 mg-0.5 mg/3 mL nebulizer solution Take 3 mLs by nebulization every 4 (four) hours as needed for Wheezing.     No current facility-administered medications for this visit.        Review of Systems   Constitutional: Negative for activity change, fatigue, fever and unexpected weight change.   Eyes: Negative for redness and visual disturbance.   Respiratory: Negative for chest tightness and shortness of breath.    Cardiovascular: Negative for chest pain and leg swelling.   Gastrointestinal: Negative for abdominal distention, abdominal pain, constipation, diarrhea, nausea and vomiting.   Genitourinary: Negative for difficulty urinating, dysuria, flank pain, frequency, hematuria, pelvic pain, urgency and vaginal bleeding.   Musculoskeletal: Negative for arthralgias and joint swelling.   Neurological: Negative for dizziness, weakness and headaches.   Psychiatric/Behavioral: Negative for confusion. The patient is not nervous/anxious.    All other systems reviewed and are negative.      Objective:      Vitals:    10/06/20 1313   Temp: 98.3 °F (36.8 °C)   Weight: 111.1 kg (245 lb)   Height: 5' 5" (1.651 m)     Physical Exam   Nursing note and vitals reviewed.  Constitutional: She is oriented to person, place, and time. She appears well-developed.   HENT:   Head: Normocephalic.   Eyes: Conjunctivae are normal.   Neck: Normal range of motion. No tracheal deviation present. No thyromegaly present.   Cardiovascular: Normal rate, normal heart sounds and normal pulses.    Pulmonary/Chest: Effort normal and breath sounds normal. No respiratory " distress. She has no wheezes.   Abdominal: Soft. She exhibits no distension and no mass. There is no abdominal tenderness. There is no rebound and no guarding. No hernia.   Musculoskeletal: Normal range of motion. No tenderness.   Lymphadenopathy:     She has no cervical adenopathy.   Neurological: She is alert and oriented to person, place, and time.   Skin: Skin is warm and dry. No rash noted. No erythema.     Psychiatric: Her behavior is normal. Judgment and thought content normal.       Urine dipstick shows negative for all components.  Micro exam: negative for WBC's or RBC's.    Contains abnormal data CT Abdomen Pelvis  Without Contrast  Order: [id number]  Status:  Final result   Visible to patient:  Yes (Patient Portal)   Next appt:  None  Details    Reading Physician Reading Date Result Priority   Hunter Broderick MD  [phone] 8/9/2020 STAT      Narrative & Impression     EXAMINATION:  CT ABDOMEN PELVIS WITHOUT CONTRAST     CLINICAL HISTORY:  Bowel obstruction high-grade suspected;     TECHNIQUE:  Low dose axial images, sagittal and coronal reformations were obtained from the lung bases to the pubic symphysis.  Intravenous contrast and oral contrast was not utilized.     COMPARISON:  CT examination of the abdomen and pelvis November 26, 2018     FINDINGS:  The lung bases demonstrate mild motion artifact and atelectatic change, with somewhat more prominent suspected atelectasis at the left lung base, left lower lobe.  There is appearance of may relate to a small hiatal hernia, the stomach is otherwise incompletely distended.  There is mild density of the gallbladder, this may relate to mild sludge and/or cholelithiasis, there is no evidence for pericholecystic inflammatory change.  There is a small hypodensity of the right lobe of the liver noted, axial image 48 measuring approximately up to 12.5 mm on axial imaging, this is somewhat small for accurate characterization however does not have the typical  appearance of a cyst, could represent a small solid lesion, could represent ang angioma, ultrasound follow-up for characterization and follow-up is recommended.     There is no evidence for peripancreatic inflammatory change, there is no abnormal pancreatic or biliary ductal dilatation.  There is no evidence for acute process of the spleen or right adrenal gland.  There is a left adrenal nodule again noted, measuring approximately 27 Hounsfield units, cannot be characterized as adrenal adenoma however appears stable.     Nonobstructing nephrolithiasis of the right kidney is noted.  There is mild right perinephric stranding and there is mild right hydronephrosis, there is mild to moderate right hydroureter, most notably along the proximal right ureter with periureteral stranding.  There is no ureteral calculus seen, however as seen on axial image 126 there is faint density within the urinary bladder, appearing somewhat linear on coronal image 96 this may relate to a recently passed calculus into the urinary bladder, however correlation for UTI/pyelonephritis is otherwise needed.  On the left there is no evidence for hydronephrosis or obstructive uropathy, or ureteral calculus.     The abdominal aorta appears normal in caliber with atherosclerotic change noted otherwise not well evaluated on this exam.  There appears to be a suprapubic catheter within the urinary bladder, the urinary bladder is decompressed.  An intrauterine contraceptive device is noted.  There is a prominent calcified lesion to the left of the uterus appearing stable, may relate to a fibroid.  There is a suspected 1.9 cm left adnexal cyst and suspected 1.7 cm right adnexal cyst.     There is no evidence for small bowel obstructive process.  The appendix is identified and does not appear inflamed.  There is mild-to-moderate prominence of the colon throughout its course in the colon appears somewhat redundant and tortuous, there is relative narrowing  along a segment of the sigmoid colon, however there is no associated wall thickening or inflammatory change and it does not appear rotatory in configuration, proximal as well as distal to this segment there is distention of the colon with stool, with mild to moderate distention of the rectum with stool, there is suggestion of minimal wall thickening of the rectum, correlation for mild rectal colitis is needed.  There is mild low rectal anal thickening noted for which clinical and historical correlation and evaluation is recommended.  There is no evidence for free intraperitoneal air.  The visualized osseous structures demonstrate chronic change.     Impression:     There is mild right hydronephrosis and hydroureter with perinephric and periureteral stranding, there is no ureteral calculus seen however there is density of the urinary bladder that may relate to a recently passed calculus, clinical and historical correlation is needed alternatively correlation for UTI/pyelonephritis is needed.     Nonobstructing nephrolithiasis of the right kidney is also noted.     Small indeterminate lesion of the right lobe of the liver for which ultrasound follow-up is recommended as discussed above.     Mild to moderate gallbladder distention there is mild density of the gallbladder that may relate to mild sludge and/or cholelithiasis however there is no evidence for pericholecystic inflammatory change.     Stable left adrenal nodule, as discussed above.     Prominence of the colon throughout the majority of its course, there is a segment of the sigmoid colon that is decompressed however without inflammatory change and without rotatory configuration.  There is mild wall thickening at the rectum, correlation for mild rectal colitis is needed.     Low rectal anal thickening for which clinical and historical correlation and evaluation is recommended.     Intrauterine contraceptive device remains present.     Suspected prominently  calcified/mineralized left-sided uterine fibroid.     Suspected 1.9 cm left adnexal cyst and 1.7 cm right adnexal cyst.     Atelectatic changes are noted at the lung bases.     Suprapubic catheter again noted.     This report was flagged in Epic as abnormal.        Electronically signed by: Hunter Broderick  Date:                                            08/09/2020  Time:                                           02:03            Last Resulted: 08/09/20 02:03             Assessment:       1. Staghorn calculus    2. Neurogenic bladder    3. Incomplete bladder emptying          Plan:       1. Staghorn calculus  Given the increased stone burden from 2018, plan for Right PCNL on Monday 11/2/2020.  Medical Clearance requested.    2. Neurogenic bladder  SP tube  Oxybutynin    3. Incomplete bladder emptying  SP tube            Follow up in about 1 month (around 11/9/2020) for Follow up.

## 2020-10-06 NOTE — PROGRESS NOTES
Subjective:       Patient ID: Mary Ellen Fajardo is a 64 y.o. female who was referred by No ref. provider found    Chief Complaint:   Chief Complaint   Patient presents with    Follow-up     follow up visit        Neurogenic Bladder  Her bladder is managed with a suprapubic catheter. She has had one since March 2011. Her  changes her 18 Fr catheter every 20 days.  She had a routine ALBERTINA suggesting a right kidney stone.  Follow up CT showed a right staghorn calculus and large bladder stone.    Stones  She had a cystolitholapaxy on 12/20/2017 with right stent placement.  She had a Right PCNL on Friday 1/19/2018.  She then had a second look PCNL on Monday 1/29/2018.  She then had a right ureteroscopy and laser lithotripsy on 3/5/2018.  Her stent was removed without difficulty on 4/9/2018.  She denies hematuria or flank pain.    CT scan 11/18--non obstructing right renal stones. She was admitted in 10/2019 due to respiratory failure. UCx--+ E coli at that time. Of note, UTI treated with IV abx at that time    Denies flank pain, gross hematuria or fever. Reports her suprapubic catheter was changed today by her . She is doing well with Ditropan XL 5 mg.    She was recently in the ED due to bloating.  She had significant constipation but her right kidney stones were noted to have increased in burden.  ACTIVE MEDICAL ISSUES:  Patient Active Problem List   Diagnosis    Paraparesis    Gait disorder    Neurogenic bladder    Knee pain, bilateral    S/P knee replacement    OA (osteoarthritis) of knee    Primary osteoarthritis of left knee    Poor motor control of trunk    Decreased range of motion of lower extremity    Bilateral leg weakness    Chronic knee pain    Bladder stone    Staghorn calculus    Kidney stones    Essential hypertension    Neuropathy    Primary insomnia    ADEEL (obstructive sleep apnea)    Opioid overdose    COPD (chronic obstructive pulmonary disease)    Class 3 severe  obesity in adult    Chronic hepatitis C    Chronic indwelling suprapubic catheter in place    History of CVA (cerebrovascular accident)    COPD with acute exacerbation    Infection due to urethral catheter    Septic arthritis of knee, left    Acute on chronic respiratory failure with hypercapnia    Acute on chronic diastolic heart failure    Hx of colonic polyp    Family history of colon cancer in father    Chronic respiratory failure    Acute on chronic respiratory acidosis    Chronic diastolic heart failure    Anemia    Hypercapnic respiratory failure    Pulmonary HTN    Advance care planning    Status post below knee amputation, left    Pneumonia due to infectious organism       PAST MEDICAL HISTORY  Past Medical History:   Diagnosis Date    Abdominal hernia     Addiction to drug     Alcohol abuse     Anxiety     Arthritis     Asthma     Bipolar disorder     Bladder stones     CHF (congestive heart failure)     COPD (chronic obstructive pulmonary disease)     on home o2    CVA (cerebral vascular accident)     2012    Hallucination     Hepatitis C     treated and cured    History of blood clots     Hx of psychiatric care     Hypertension     Belen     Obese body habitus     On home oxygen therapy     ADEEL (obstructive sleep apnea)     ADEEL treated with BiPAP     Paraplegia     Paraplegic spinal paralysis     Psychiatric problem     Psychosis     AVH; Paranoia    Renal disorder     Requires assistance with activities of daily living (ADL)     SCI (spinal cord injury)     incomplete    Sleep difficulties     has sleep apnea and uses a Bi-PAP machine.    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    Substance abuse     Suprapubic catheter 03/15/2011    Therapy     Vaginal delivery     x1    Wheelchair dependence        PAST SURGICAL HISTORY:  Past Surgical History:   Procedure Laterality Date    ABOVE-KNEE AMPUTATION Left 7/23/2019    Procedure: AMPUTATION, ABOVE  KNEE;  Surgeon: Gordo Rodriguez MD;  Location: [facility];  Service: Orthopedics;  Laterality: Left;    amputation Left 2019    above the knee    BACK SURGERY      bilateral knee replacement      BREAST BIOPSY      cysto/lithopaxy 2017      CYSTOSCOPY W/ LASER LITHOTRIPSY      JOINT REPLACEMENT      bilateral knee    SUPRAPUBIC CYSTOSTOMY  03/15/2011    patient reports  replaced tubing on 10/27/19, at home       SOCIAL HISTORY:  Social History     Tobacco Use    Smoking status: Former Smoker     Packs/day: 0.50     Years: 25.00     Pack years: 12.50     Quit date:      Years since quittin.7    Smokeless tobacco: Never Used   Substance Use Topics    Alcohol use: Not Currently     Comment: occasional    Drug use: Never     Comment: prescribed opioids at present for pain       FAMILY HISTORY:  Family History   Problem Relation Age of Onset    Dementia Mother     Anxiety disorder Brother     Depression Brother        ALLERGIES AND MEDICATIONS: updated and reviewed.  Review of patient's allergies indicates:   Allergen Reactions    Tuberculin ppd Itching and Dermatitis     Patient has old scare that she claims is from TB PPD    Rocephin [ceftriaxone] Rash     Rash ? Pt agreeable to try with pre mediation with benadryl.      Current Outpatient Medications   Medication Sig    baclofen (LIORESAL) 20 MG tablet 20 mg 2 (two) times daily.     bumetanide (BUMEX) 2 MG tablet Take 2 mg by mouth once daily.     buPROPion (WELLBUTRIN XL) 150 MG TB24 tablet Take 1 tablet (150 mg total) by mouth once daily.    catheter 18 Fr Misc Change every 20 days    gabapentin (NEURONTIN) 600 MG tablet TAKE 1 TABLET BY MOUTH THREE TIMES DAILY    hydrOXYzine HCL (ATARAX) 25 MG tablet Take 1 tablet (25 mg total) by mouth 2 (two) times daily as needed for Anxiety.    miscellaneous medical supply (C-TUB) [facility] [place] TO SWITCH [employer] FOR OXYGEN AT 2L. LAST OXYGEN SATURATION WAS 83% ON Room Air.  "She uses  BIPAP and  Needs  New mask, tubings and    oxybutynin (DITROPAN-XL) 5 MG TR24 Take 1 tablet (5 mg total) by mouth once daily.    oxyCODONE-acetaminophen (PERCOCET) 7.5-325 mg per tablet TK 1 T PO BID PRN P    QUEtiapine (SEROQUEL) 100 MG Tab Take 1 tablet (100 mg total) by mouth every evening.    VENTOLIN HFA 90 mcg/actuation inhaler     albuterol-ipratropium (DUO-NEB) 2.5 mg-0.5 mg/3 mL nebulizer solution Take 3 mLs by nebulization every 4 (four) hours as needed for Wheezing.     No current facility-administered medications for this visit.        Review of Systems   Constitutional: Negative for activity change, fatigue, fever and unexpected weight change.   Eyes: Negative for redness and visual disturbance.   Respiratory: Negative for chest tightness and shortness of breath.    Cardiovascular: Negative for chest pain and leg swelling.   Gastrointestinal: Negative for abdominal distention, abdominal pain, constipation, diarrhea, nausea and vomiting.   Genitourinary: Negative for difficulty urinating, dysuria, flank pain, frequency, hematuria, pelvic pain, urgency and vaginal bleeding.   Musculoskeletal: Negative for arthralgias and joint swelling.   Neurological: Negative for dizziness, weakness and headaches.   Psychiatric/Behavioral: Negative for confusion. The patient is not nervous/anxious.    All other systems reviewed and are negative.      Objective:      Vitals:    10/06/20 1313   Temp: 98.3 °F (36.8 °C)   Weight: 111.1 kg (245 lb)   Height: 5' 5" (1.651 m)     Physical Exam   Nursing note and vitals reviewed.  Constitutional: She is oriented to person, place, and time. She appears well-developed.   HENT:   Head: Normocephalic.   Eyes: Conjunctivae are normal.   Neck: Normal range of motion. No tracheal deviation present. No thyromegaly present.   Cardiovascular: Normal rate, normal heart sounds and normal pulses.    Pulmonary/Chest: Effort normal and breath sounds normal. No respiratory " distress. She has no wheezes.   Abdominal: Soft. She exhibits no distension and no mass. There is no abdominal tenderness. There is no rebound and no guarding. No hernia.   Musculoskeletal: Normal range of motion. No tenderness.   Lymphadenopathy:     She has no cervical adenopathy.   Neurological: She is alert and oriented to person, place, and time.   Skin: Skin is warm and dry. No rash noted. No erythema.     Psychiatric: Her behavior is normal. Judgment and thought content normal.       Urine dipstick shows negative for all components.  Micro exam: negative for WBC's or RBC's.    Contains abnormal data CT Abdomen Pelvis  Without Contrast  Order: [id number]  Status:  Final result   Visible to patient:  Yes (Patient Portal)   Next appt:  None  Details    Reading Physician Reading Date Result Priority   Hunter Broderick MD  [phone] 8/9/2020 STAT      Narrative & Impression     EXAMINATION:  CT ABDOMEN PELVIS WITHOUT CONTRAST     CLINICAL HISTORY:  Bowel obstruction high-grade suspected;     TECHNIQUE:  Low dose axial images, sagittal and coronal reformations were obtained from the lung bases to the pubic symphysis.  Intravenous contrast and oral contrast was not utilized.     COMPARISON:  CT examination of the abdomen and pelvis November 26, 2018     FINDINGS:  The lung bases demonstrate mild motion artifact and atelectatic change, with somewhat more prominent suspected atelectasis at the left lung base, left lower lobe.  There is appearance of may relate to a small hiatal hernia, the stomach is otherwise incompletely distended.  There is mild density of the gallbladder, this may relate to mild sludge and/or cholelithiasis, there is no evidence for pericholecystic inflammatory change.  There is a small hypodensity of the right lobe of the liver noted, axial image 48 measuring approximately up to 12.5 mm on axial imaging, this is somewhat small for accurate characterization however does not have the typical  appearance of a cyst, could represent a small solid lesion, could represent ang angioma, ultrasound follow-up for characterization and follow-up is recommended.     There is no evidence for peripancreatic inflammatory change, there is no abnormal pancreatic or biliary ductal dilatation.  There is no evidence for acute process of the spleen or right adrenal gland.  There is a left adrenal nodule again noted, measuring approximately 27 Hounsfield units, cannot be characterized as adrenal adenoma however appears stable.     Nonobstructing nephrolithiasis of the right kidney is noted.  There is mild right perinephric stranding and there is mild right hydronephrosis, there is mild to moderate right hydroureter, most notably along the proximal right ureter with periureteral stranding.  There is no ureteral calculus seen, however as seen on axial image 126 there is faint density within the urinary bladder, appearing somewhat linear on coronal image 96 this may relate to a recently passed calculus into the urinary bladder, however correlation for UTI/pyelonephritis is otherwise needed.  On the left there is no evidence for hydronephrosis or obstructive uropathy, or ureteral calculus.     The abdominal aorta appears normal in caliber with atherosclerotic change noted otherwise not well evaluated on this exam.  There appears to be a suprapubic catheter within the urinary bladder, the urinary bladder is decompressed.  An intrauterine contraceptive device is noted.  There is a prominent calcified lesion to the left of the uterus appearing stable, may relate to a fibroid.  There is a suspected 1.9 cm left adnexal cyst and suspected 1.7 cm right adnexal cyst.     There is no evidence for small bowel obstructive process.  The appendix is identified and does not appear inflamed.  There is mild-to-moderate prominence of the colon throughout its course in the colon appears somewhat redundant and tortuous, there is relative narrowing  along a segment of the sigmoid colon, however there is no associated wall thickening or inflammatory change and it does not appear rotatory in configuration, proximal as well as distal to this segment there is distention of the colon with stool, with mild to moderate distention of the rectum with stool, there is suggestion of minimal wall thickening of the rectum, correlation for mild rectal colitis is needed.  There is mild low rectal anal thickening noted for which clinical and historical correlation and evaluation is recommended.  There is no evidence for free intraperitoneal air.  The visualized osseous structures demonstrate chronic change.     Impression:     There is mild right hydronephrosis and hydroureter with perinephric and periureteral stranding, there is no ureteral calculus seen however there is density of the urinary bladder that may relate to a recently passed calculus, clinical and historical correlation is needed alternatively correlation for UTI/pyelonephritis is needed.     Nonobstructing nephrolithiasis of the right kidney is also noted.     Small indeterminate lesion of the right lobe of the liver for which ultrasound follow-up is recommended as discussed above.     Mild to moderate gallbladder distention there is mild density of the gallbladder that may relate to mild sludge and/or cholelithiasis however there is no evidence for pericholecystic inflammatory change.     Stable left adrenal nodule, as discussed above.     Prominence of the colon throughout the majority of its course, there is a segment of the sigmoid colon that is decompressed however without inflammatory change and without rotatory configuration.  There is mild wall thickening at the rectum, correlation for mild rectal colitis is needed.     Low rectal anal thickening for which clinical and historical correlation and evaluation is recommended.     Intrauterine contraceptive device remains present.     Suspected prominently  calcified/mineralized left-sided uterine fibroid.     Suspected 1.9 cm left adnexal cyst and 1.7 cm right adnexal cyst.     Atelectatic changes are noted at the lung bases.     Suprapubic catheter again noted.     This report was flagged in Epic as abnormal.        Electronically signed by: Hunter Broderick  Date:                                            08/09/2020  Time:                                           02:03            Last Resulted: 08/09/20 02:03             Assessment:       1. Staghorn calculus    2. Neurogenic bladder    3. Incomplete bladder emptying          Plan:       1. Staghorn calculus  Given the increased stone burden from 2018, plan for Right PCNL on Monday 11/2/2020.  Medical Clearance requested.    2. Neurogenic bladder  SP tube  Oxybutynin    3. Incomplete bladder emptying  SP tube            Follow up in about 1 month (around 11/9/2020) for Follow up.

## 2020-10-27 ENCOUNTER — TELEPHONE (OUTPATIENT)
Dept: UROLOGY | Facility: CLINIC | Age: 65
End: 2020-10-27

## 2020-10-27 ENCOUNTER — HOSPITAL ENCOUNTER (OUTPATIENT)
Dept: PREADMISSION TESTING | Facility: HOSPITAL | Age: 65
Discharge: HOME | End: 2020-10-27
Attending: UROLOGY
Payer: MEDICARE

## 2020-10-27 ENCOUNTER — ANESTHESIA EVENT (OUTPATIENT)
Dept: SURGERY | Facility: HOSPITAL | Age: 65
End: 2020-10-27
Payer: MEDICARE

## 2020-10-27 VITALS
TEMPERATURE: 98 F | HEART RATE: 70 BPM | SYSTOLIC BLOOD PRESSURE: 118 MMHG | DIASTOLIC BLOOD PRESSURE: 80 MMHG | RESPIRATION RATE: 18 BRPM | OXYGEN SATURATION: 95 %

## 2020-10-27 NOTE — PRE ADMISSION SCREENING
Pt hasn't seen PCP for clearance for sx that is scheduled for 11/2/2020.  The earliest she can see Dr. Tran/PCP is on 11/3/2020.  Also, because of her transportation service issue, the earliest she would be available for a new surgery date would be 11/11/2020.  ELIJAH Lan (Anesthesia NP spoke with pt, too). Preop testing has not been done because of sx date uncertainty.    Dr. Tinoco has been made aware of this and a message has been sent to Cindy Taylor as well.

## 2020-10-27 NOTE — TELEPHONE ENCOUNTER
----- Message from Rashmi Philippe RN sent at 10/27/2020  2:28 PM CDT -----  Regarding: Surgery/PCP cl/transportation conflict  Pt has not seen PCP for clearance (Dr. Tran) yet.  She has transportation issues.  Her appointment with Dr. Tran is 11/3/2020 at the earliest.  Pt states the earliest she can do surgery now is 11/11/2020 because of transportation issues.    Please call pt to discuss.  Thank you. RANDI Philippe RN

## 2020-10-27 NOTE — TELEPHONE ENCOUNTER
My next available time for his kind of procedure is 11/25 (day prior to Thanksgiving) or Monday 11/30/2020

## 2020-11-02 ENCOUNTER — TELEPHONE (OUTPATIENT)
Dept: PULMONOLOGY | Facility: CLINIC | Age: 65
End: 2020-11-02

## 2020-11-02 NOTE — TELEPHONE ENCOUNTER
Left voicemail to inform patient that we did not received paperwork an also gave the correct fax number

## 2020-11-04 ENCOUNTER — TELEPHONE (OUTPATIENT)
Dept: SLEEP MEDICINE | Facility: HOSPITAL | Age: 65
End: 2020-11-04

## 2020-11-04 DIAGNOSIS — J44.1 COPD WITH ACUTE EXACERBATION: Primary | ICD-10-CM

## 2020-11-04 NOTE — TELEPHONE ENCOUNTER
----- Message from Andreea Truong MA sent at 11/2/2020  4:56 PM CST -----    ----- Message -----  From: Spenser Chaidez MD  Sent: 11/2/2020   3:46 PM CST  To: Kaylynn Dueñas V Staff    Patient needs sleep titration study.  It appears that study was approved.  spenser  ----- Message -----  From: Ebony Eagle NP  Sent: 11/2/2020   9:01 AM CST  To: Spenser Chaidez MD      ----- Message -----  From: Marycruz Alvarado MA  Sent: 11/2/2020   8:17 AM CST  To: Ebony Eagle NP      ----- Message -----  From: Andreea Truong MA  Sent: 10/28/2020  To: Fco Beck Staff      ----- Message -----  From: Rah Light  Sent: 10/27/2020   1:57 PM CDT  To: Kaylynn Dueñas V Staff     Name of Who is Calling:     What is the request in detail: request  to fill out forms and fax  back for oxygen before 0101/20 Please contact to further discuss and advise      Can the clinic reply by [facility]: no    What Number to Call Back if not in [facility]:  samir / [facility] [employer] [facility] / [phone] / fax [phone]

## 2020-11-20 ENCOUNTER — TELEPHONE (OUTPATIENT)
Dept: UROLOGY | Facility: CLINIC | Age: 65
End: 2020-11-20

## 2020-11-20 NOTE — TELEPHONE ENCOUNTER
Patient states she hasn't gotten medical clearance for surgery and would like to reschedule. She is requesting a few dates to choose form.

## 2020-11-20 NOTE — TELEPHONE ENCOUNTER
----- Message from Rocio Cruz sent at 11/20/2020 12:51 PM CST -----  Regarding: Patient Call Back  Contact: Patient  Type: Patient Call Back    Who called: Patient     What is the request in detail: Pt is requesting a call back . Pt states that she needs to reschedule her surgery because she cannot get a pre-op clearance. Please call     Can the clinic reply by [facility]?    Would the patient rather a call back or a response via [facility]? Call back     Best call back number: [phone]

## 2020-12-04 NOTE — PROGRESS NOTES
[facility]  Progress Note    Patient Name: Mary Ellen Fajardo  MRN: [id number]  Patient Class: IP- Inpatient   Admission Date: 7/16/2019  Length of Stay: 7 days  Attending Physician: Micheline Estrada MD  Primary Care Provider: Any Tran MD        Subjective:     Principal Problem:Septic arthritis of knee, left      HPI:  Ms. 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 She also noted decreased ROM of the left knee. The pain is exacerbated with attempt to move the knee or her left ankle. She has not had issues with the joint infection since 2011. ROS was also notable for foul smelling urine.     Overview/Hospital Course:  In the ER she was afebrile and had no leukocytosis. There was concern for septic arthritis. She was admitted to internal medicine and orthopedic surgery was consulted. She was started on broad spectrum antibiotics in the ER after blood cultures were collected. UA was concerning for UTI which would also be covered by broad spectrum antibiotics. UCx sent. Orthopedic surgery performed a bedside joint aspiration on 7/17 revealing bloody fluid. A sample was sent for culture. Cultures NGTD. She had worsening renal function and respiratory function on 7/18/19. CXR concerning for volume overload and diuretics were increased. ABG showed acute on chronic hypercapnic respiratory failure. She was requiring BiPAP to maintain O2 sat and was transferred to the ICU. Pulmonoogy was consulted. BiPAP settings adjusted and she was diuresed. Echo show preserved EF but indeterminate diastolic function and PAP 55. She was complaining of left knee pain and was started on low dose po narcotics. Her vanc through was  elevated so vanc was held 7/19/19. She was transferred back to the floor 7/19 and doing well. Blood and joint aspirate cultures remain negative. ID consulted for empiric abx regimen. ID recommended amputation. Orthopedic surgery agreed. Patient also agreed as she believes this would allow her to mobilize better and possibly walk again. Seems like prosthesis was not allowing this to happen. She still has full function of her right leg. Underwent AKA on 7/23/2019.     Interval History: with pain in stump but otherwise feeling well. In good spirits. Hgb stable at 10 g/dL. CR slightly increased post op. vanc random 14.     Review of Systems   Constitutional: Negative.    Respiratory: Negative.    Cardiovascular: Negative.    Gastrointestinal: Negative.    Musculoskeletal:        Pain left stump   Neurological: Negative.      Objective:     Vital Signs (Most Recent):  Temp: 98.4 °F (36.9 °C) (07/24/19 0754)  Pulse: 79 (07/24/19 0754)  Resp: 17 (07/24/19 0754)  BP: 115/61 (07/24/19 0754)  SpO2: (!) 94 % (07/24/19 0754) Vital Signs (24h Range):  Temp:  [98.2 °F (36.8 °C)-98.8 °F (37.1 °C)] 98.4 °F (36.9 °C)  Pulse:  [56-82] 79  Resp:  [16-25] 17  SpO2:  [91 %-100 %] 94 %  BP: (114-172)/(57-84) 115/61     Weight: 107.1 kg (236 lb 1.8 oz)  Body mass index is 39.29 kg/m².    Intake/Output Summary (Last 24 hours) at 7/24/2019 0841  Last data filed at 7/23/2019 1821  Gross per 24 hour   Intake 900 ml   Output 1825 ml   Net -925 ml      Physical Exam   Constitutional: She is oriented to person, place, and time. She appears well-developed. No distress.   Well appearing   Pulmonary/Chest: No respiratory distress. She has no wheezes. She has no rales.   Breathing comfortably on low flow NC   Abdominal: Soft. She exhibits no distension. There is no tenderness.   Hypoactive bowel sounds   Musculoskeletal:   Left above knee stump. Fresh, dry dressing in place   Neurological: She is alert and oriented to person, place, and time.    Skin: She is not diaphoretic.   Nursing note and vitals reviewed.      Significant Labs: All pertinent labs within the past 24 hours have been reviewed.    Significant Imaging: I have reviewed all pertinent imaging results/findings within the past 24 hours.  I have reviewed and interpreted all pertinent imaging results/findings within the past 24 hours.      Assessment/Plan:      * Septic arthritis of knee, left  Concern for left knee septic arthritis in setting of prosthesis. Appreciate orthopedic surgery recs. Started on broad spectrum antibiotics. Joint aspirate culture NGTD. ID consulted. Underwent AKA 7/23. Did very well and stable thus far. Hgb stable. Cr a bit increased. Hold bumex today. Encourage PO intake. Will ask ID if abx can be stopped. PT/OT. Has a fully functioning right leg which she used for transfer. I believe she'll benefit from further skilled therapy.     Acute on chronic diastolic heart failure  Hold bumex today for increased Cr. Encourage PO intake  Might resume bumex in AM    Acute on chronic respiratory failure with hypercapnia  ABG showed acute on chronic hypercapnic respiratory failure on 7/18. See hospital course. Resolved.    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 Past resistance patterns noted. Possible colonization? Foul smelling urine but no systemic symptoms.     Bipolar 1 disorder  Continued home meds zyprexa and fluoxetine    Chronic indwelling suprapubic catheter in place  Exchanged given UA concerning for UTI. Follow up with Dr. Tinoco in clinic.    Chronic respiratory failure with hypercapnia  As above. Home 2L.    COPD (chronic obstructive pulmonary disease)  PRN duonebs. No acute issues.    ADEEL on CPAP  BiPAP QHS,    Essential hypertension  Does not appear to be on medications at home? Monitor.    VTE Risk Mitigation (From admission, onward)        Ordered     heparin (porcine) injection 5,000 Units  Every 8 hours      07/18/19 1758      IP VTE HIGH RISK PATIENT  Once      07/17/19 0531          Dispo: SHIMON Lujan MD  Department of Hospital Medicine   [facility]   Patient declines

## 2020-12-21 ENCOUNTER — TELEPHONE (OUTPATIENT)
Dept: PULMONOLOGY | Facility: CLINIC | Age: 65
End: 2020-12-21

## 2020-12-21 NOTE — TELEPHONE ENCOUNTER
Returned call no answer.            ----- Message from Rocio Cruz sent at 12/21/2020  8:25 AM CST -----  Regarding: Patient Call Back  Contact: Patient  Type: Patient Call Back    Who called: Patient     What is the request in detail: Pt is requesting a call back in regards to rescheduling her covid test and sleep  study     Can the clinic reply by [facility]?    Would the patient rather a call back or a response via [facility]? Call back     Best call back number: [phone]

## 2021-01-05 ENCOUNTER — HOSPITAL ENCOUNTER (OUTPATIENT)
Dept: PREADMISSION TESTING | Facility: HOSPITAL | Age: 66
Discharge: HOME | End: 2021-01-05
Attending: UROLOGY
Payer: MEDICARE

## 2021-01-05 VITALS
SYSTOLIC BLOOD PRESSURE: 133 MMHG | WEIGHT: 245 LBS | OXYGEN SATURATION: 94 % | HEIGHT: 65 IN | HEART RATE: 80 BPM | DIASTOLIC BLOOD PRESSURE: 93 MMHG | BODY MASS INDEX: 40.82 KG/M2 | RESPIRATION RATE: 18 BRPM

## 2021-01-05 DIAGNOSIS — N20.0 STAGHORN CALCULUS: ICD-10-CM

## 2021-01-05 DIAGNOSIS — Z01.818 PREOP TESTING: ICD-10-CM

## 2021-01-05 LAB
ANION GAP SERPL CALC-SCNC: 9 MMOL/L (ref 8–16)
BASOPHILS # BLD AUTO: 0.05 K/UL (ref 0–0.2)
BASOPHILS NFR BLD: 0.9 % (ref 0–1.9)
BUN SERPL-MCNC: 10 MG/DL (ref 8–23)
CALCIUM SERPL-MCNC: 8.8 MG/DL (ref 8.7–10.5)
CHLORIDE SERPL-SCNC: 100 MMOL/L (ref 95–110)
CO2 SERPL-SCNC: 33 MMOL/L (ref 23–29)
CREAT SERPL-MCNC: 0.8 MG/DL (ref 0.5–1.4)
DIFFERENTIAL METHOD: ABNORMAL
EOSINOPHIL # BLD AUTO: 0.2 K/UL (ref 0–0.5)
EOSINOPHIL NFR BLD: 2.8 % (ref 0–8)
ERYTHROCYTE [DISTWIDTH] IN BLOOD BY AUTOMATED COUNT: 13.4 % (ref 11.5–14.5)
EST. GFR  (AFRICAN AMERICAN): >60 ML/MIN/1.73 M^2
EST. GFR  (NON AFRICAN AMERICAN): >60 ML/MIN/1.73 M^2
GLUCOSE SERPL-MCNC: 86 MG/DL (ref 70–110)
HCT VFR BLD AUTO: 39.4 % (ref 37–48.5)
HGB BLD-MCNC: 12.7 G/DL (ref 12–16)
IMM GRANULOCYTES # BLD AUTO: 0.01 K/UL (ref 0–0.04)
IMM GRANULOCYTES NFR BLD AUTO: 0.2 % (ref 0–0.5)
LYMPHOCYTES # BLD AUTO: 1.9 K/UL (ref 1–4.8)
LYMPHOCYTES NFR BLD: 35.9 % (ref 18–48)
MCH RBC QN AUTO: 26.7 PG (ref 27–31)
MCHC RBC AUTO-ENTMCNC: 32.2 G/DL (ref 32–36)
MCV RBC AUTO: 83 FL (ref 82–98)
MONOCYTES # BLD AUTO: 0.5 K/UL (ref 0.3–1)
MONOCYTES NFR BLD: 8.5 % (ref 4–15)
NEUTROPHILS # BLD AUTO: 2.7 K/UL (ref 1.8–7.7)
NEUTROPHILS NFR BLD: 51.7 % (ref 38–73)
NRBC BLD-RTO: 0 /100 WBC
PLATELET # BLD AUTO: 228 K/UL (ref 150–350)
PMV BLD AUTO: 9.4 FL (ref 9.2–12.9)
POTASSIUM SERPL-SCNC: 2.8 MMOL/L (ref 3.5–5.1)
RBC # BLD AUTO: 4.75 M/UL (ref 4–5.4)
SARS-COV-2 RDRP RESP QL NAA+PROBE: NEGATIVE
SODIUM SERPL-SCNC: 142 MMOL/L (ref 136–145)
WBC # BLD AUTO: 5.29 K/UL (ref 3.9–12.7)

## 2021-01-05 PROCEDURE — 80048 BASIC METABOLIC PNL TOTAL CA: CPT

## 2021-01-05 PROCEDURE — 85025 COMPLETE CBC W/AUTO DIFF WBC: CPT

## 2021-01-05 PROCEDURE — U0002 COVID-19 LAB TEST NON-CDC: HCPCS

## 2021-01-06 ENCOUNTER — TELEPHONE (OUTPATIENT)
Dept: UROLOGY | Facility: CLINIC | Age: 66
End: 2021-01-06

## 2021-01-08 ENCOUNTER — ANESTHESIA (OUTPATIENT)
Dept: SURGERY | Facility: HOSPITAL | Age: 66
End: 2021-01-08
Payer: MEDICARE

## 2021-01-08 ENCOUNTER — HOSPITAL ENCOUNTER (OUTPATIENT)
Facility: HOSPITAL | Age: 66
Discharge: HOME | End: 2021-01-09
Attending: UROLOGY | Admitting: UROLOGY
Payer: MEDICARE

## 2021-01-08 DIAGNOSIS — Z01.818 PREOP TESTING: ICD-10-CM

## 2021-01-08 DIAGNOSIS — Z11.52 ENCOUNTER FOR PREOPERATIVE SCREENING LABORATORY TESTING FOR SEVERE ACUTE RESPIRATORY SYNDROME CORONAVIRUS 2 (SARS-COV-2): ICD-10-CM

## 2021-01-08 DIAGNOSIS — Z01.812 ENCOUNTER FOR PREOPERATIVE SCREENING LABORATORY TESTING FOR SEVERE ACUTE RESPIRATORY SYNDROME CORONAVIRUS 2 (SARS-COV-2): ICD-10-CM

## 2021-01-08 DIAGNOSIS — N20.0 STAGHORN CALCULUS: Primary | ICD-10-CM

## 2021-01-08 PROBLEM — J96.21 ACUTE ON CHRONIC RESPIRATORY FAILURE WITH HYPOXIA AND HYPERCAPNIA: Status: ACTIVE | Noted: 2019-10-30

## 2021-01-08 PROBLEM — J96.02 ACUTE HYPERCAPNIC RESPIRATORY FAILURE: Status: ACTIVE | Noted: 2019-10-30

## 2021-01-08 PROBLEM — R06.1 STRIDOR: Status: ACTIVE | Noted: 2021-01-08

## 2021-01-08 PROBLEM — J96.22 ACUTE ON CHRONIC RESPIRATORY FAILURE WITH HYPOXIA AND HYPERCAPNIA: Status: ACTIVE | Noted: 2019-10-30

## 2021-01-08 LAB
ABO + RH BLD: NORMAL
ALLENS TEST: ABNORMAL
ANION GAP SERPL CALC-SCNC: 15 MMOL/L (ref 8–16)
BASOPHILS # BLD AUTO: 0.03 K/UL (ref 0–0.2)
BASOPHILS NFR BLD: 0.3 % (ref 0–1.9)
BLD GP AB SCN CELLS X3 SERPL QL: NORMAL
BNP SERPL-MCNC: 60 PG/ML (ref 0–99)
BUN SERPL-MCNC: 12 MG/DL (ref 8–23)
CALCIUM SERPL-MCNC: 8.2 MG/DL (ref 8.7–10.5)
CHLORIDE SERPL-SCNC: 104 MMOL/L (ref 95–110)
CO2 SERPL-SCNC: 23 MMOL/L (ref 23–29)
CREAT SERPL-MCNC: 1.3 MG/DL (ref 0.5–1.4)
DELSYS: ABNORMAL
DIFFERENTIAL METHOD: ABNORMAL
EOSINOPHIL # BLD AUTO: 0 K/UL (ref 0–0.5)
EOSINOPHIL NFR BLD: 0.3 % (ref 0–8)
EP: 14
ERYTHROCYTE [DISTWIDTH] IN BLOOD BY AUTOMATED COUNT: 14 % (ref 11.5–14.5)
ERYTHROCYTE [SEDIMENTATION RATE] IN BLOOD BY WESTERGREN METHOD: 10 MM/H
ERYTHROCYTE [SEDIMENTATION RATE] IN BLOOD BY WESTERGREN METHOD: 14 MM/H
EST. GFR  (AFRICAN AMERICAN): 50 ML/MIN/1.73 M^2
EST. GFR  (NON AFRICAN AMERICAN): 43 ML/MIN/1.73 M^2
FINAL PATHOLOGIC DIAGNOSIS: NORMAL
FIO2: 35
FIO2: 35
FLOW: 3
GLUCOSE SERPL-MCNC: 82 MG/DL (ref 70–110)
GROSS: NORMAL
HCO3 UR-SCNC: 33.1 MMOL/L (ref 24–28)
HCO3 UR-SCNC: 34.1 MMOL/L (ref 24–28)
HCO3 UR-SCNC: 35.8 MMOL/L (ref 24–28)
HCT VFR BLD AUTO: 38.9 % (ref 37–48.5)
HGB BLD-MCNC: 12 G/DL (ref 12–16)
IMM GRANULOCYTES # BLD AUTO: 0.09 K/UL (ref 0–0.04)
IMM GRANULOCYTES NFR BLD AUTO: 0.9 % (ref 0–0.5)
IP: 20
LYMPHOCYTES # BLD AUTO: 0.3 K/UL (ref 1–4.8)
LYMPHOCYTES NFR BLD: 2.7 % (ref 18–48)
Lab: NORMAL
MCH RBC QN AUTO: 26.9 PG (ref 27–31)
MCHC RBC AUTO-ENTMCNC: 30.8 G/DL (ref 32–36)
MCV RBC AUTO: 87 FL (ref 82–98)
MIN VOL: 6.4
MODE: ABNORMAL
MONOCYTES # BLD AUTO: 0.1 K/UL (ref 0.3–1)
MONOCYTES NFR BLD: 1.4 % (ref 4–15)
NEUTROPHILS # BLD AUTO: 9.8 K/UL (ref 1.8–7.7)
NEUTROPHILS NFR BLD: 94.4 % (ref 38–73)
NRBC BLD-RTO: 0 /100 WBC
PCO2 BLDA: 71.6 MMHG (ref 35–45)
PCO2 BLDA: 75 MMHG (ref 35–45)
PCO2 BLDA: 82 MMHG (ref 35–45)
PH SMN: 7.21 [PH] (ref 7.35–7.45)
PH SMN: 7.29 [PH] (ref 7.35–7.45)
PH SMN: 7.29 [PH] (ref 7.35–7.45)
PIP: 19
PLATELET # BLD AUTO: 174 K/UL (ref 150–350)
PMV BLD AUTO: 9.5 FL (ref 9.2–12.9)
PO2 BLDA: 100 MMHG (ref 80–100)
PO2 BLDA: 79 MMHG (ref 80–100)
PO2 BLDA: 83 MMHG (ref 80–100)
POC BE: 3 MMOL/L
POC BE: 5 MMOL/L
POC BE: 7 MMOL/L
POC SATURATED O2: 93 % (ref 95–100)
POC SATURATED O2: 94 % (ref 95–100)
POC SATURATED O2: 96 % (ref 95–100)
POC TCO2: 36 MMOL/L (ref 23–27)
POC TCO2: 36 MMOL/L (ref 23–27)
POC TCO2: 38 MMOL/L (ref 23–27)
POTASSIUM SERPL-SCNC: 3.3 MMOL/L (ref 3.5–5.1)
POTASSIUM SERPL-SCNC: 3.3 MMOL/L (ref 3.5–5.1)
RBC # BLD AUTO: 4.46 M/UL (ref 4–5.4)
SAMPLE: ABNORMAL
SITE: ABNORMAL
SODIUM SERPL-SCNC: 142 MMOL/L (ref 136–145)
SP02: 98
SPONT RATE: 14
VT: 350
WBC # BLD AUTO: 10.37 K/UL (ref 3.9–12.7)

## 2021-01-08 PROCEDURE — 25000003 PHARM REV CODE 250: Performed by: NURSE ANESTHETIST, CERTIFIED REGISTERED

## 2021-01-08 PROCEDURE — C2627 CATH, SUPRAPUBIC/CYSTOSCOPIC: HCPCS | Performed by: UROLOGY

## 2021-01-08 PROCEDURE — 86900 BLOOD TYPING SEROLOGIC ABO: CPT

## 2021-01-08 PROCEDURE — D9220A PRA ANESTHESIA: ICD-10-PCS | Mod: CRNA,,, | Performed by: NURSE ANESTHETIST, CERTIFIED REGISTERED

## 2021-01-08 PROCEDURE — 25000242 PHARM REV CODE 250 ALT 637 W/ HCPCS: Performed by: ANESTHESIOLOGY

## 2021-01-08 PROCEDURE — 71000033 HC RECOVERY, INTIAL HOUR: Performed by: UROLOGY

## 2021-01-08 PROCEDURE — 83880 ASSAY OF NATRIURETIC PEPTIDE: CPT

## 2021-01-08 PROCEDURE — D9220A PRA ANESTHESIA: Mod: ANES,,, | Performed by: ANESTHESIOLOGY

## 2021-01-08 PROCEDURE — 37000008 HC ANESTHESIA 1ST 15 MINUTES: Performed by: UROLOGY

## 2021-01-08 PROCEDURE — 63600175 PHARM REV CODE 636 W HCPCS: Performed by: NURSE ANESTHETIST, CERTIFIED REGISTERED

## 2021-01-08 PROCEDURE — 52356 CYSTO/URETERO W/LITHOTRIPSY: CPT | Mod: RT,,, | Performed by: UROLOGY

## 2021-01-08 PROCEDURE — 82803 BLOOD GASES ANY COMBINATION: CPT

## 2021-01-08 PROCEDURE — 74420 UROGRAPHY RTRGR +-KUB: CPT | Mod: 26,,, | Performed by: UROLOGY

## 2021-01-08 PROCEDURE — 27000190 HC CPAP FULL FACE MASK W/VALVE

## 2021-01-08 PROCEDURE — 63600175 PHARM REV CODE 636 W HCPCS: Performed by: ANESTHESIOLOGY

## 2021-01-08 PROCEDURE — 36600 WITHDRAWAL OF ARTERIAL BLOOD: CPT

## 2021-01-08 PROCEDURE — 80048 BASIC METABOLIC PNL TOTAL CA: CPT

## 2021-01-08 PROCEDURE — 51705 PR CHANGE OF BLADDER TUBE,SIMPLE: ICD-10-PCS | Mod: 51,,, | Performed by: UROLOGY

## 2021-01-08 PROCEDURE — 25500020 PHARM REV CODE 255: Performed by: UROLOGY

## 2021-01-08 PROCEDURE — 71000016 HC POSTOP RECOV ADDL HR: Performed by: UROLOGY

## 2021-01-08 PROCEDURE — 27201423 OPTIME MED/SURG SUP & DEVICES STERILE SUPPLY: Performed by: UROLOGY

## 2021-01-08 PROCEDURE — 71000039 HC RECOVERY, EACH ADD'L HOUR: Performed by: UROLOGY

## 2021-01-08 PROCEDURE — 88300 SURGICAL PATH GROSS: CPT | Mod: 26,,, | Performed by: PATHOLOGY

## 2021-01-08 PROCEDURE — 84132 ASSAY OF SERUM POTASSIUM: CPT | Mod: 91

## 2021-01-08 PROCEDURE — 99900035 HC TECH TIME PER 15 MIN (STAT)

## 2021-01-08 PROCEDURE — 94761 N-INVAS EAR/PLS OXIMETRY MLT: CPT

## 2021-01-08 PROCEDURE — 27000221 HC OXYGEN, UP TO 24 HOURS

## 2021-01-08 PROCEDURE — D9220A PRA ANESTHESIA: ICD-10-PCS | Mod: ANES,,, | Performed by: ANESTHESIOLOGY

## 2021-01-08 PROCEDURE — 25000003 PHARM REV CODE 250: Performed by: UROLOGY

## 2021-01-08 PROCEDURE — 88300 SURGICAL PATH GROSS: CPT | Performed by: PATHOLOGY

## 2021-01-08 PROCEDURE — 94660 CPAP INITIATION&MGMT: CPT

## 2021-01-08 PROCEDURE — 82365 CALCULUS SPECTROSCOPY: CPT

## 2021-01-08 PROCEDURE — C1726 CATH, BAL DIL, NON-VASCULAR: HCPCS | Performed by: UROLOGY

## 2021-01-08 PROCEDURE — 71000015 HC POSTOP RECOV 1ST HR: Performed by: UROLOGY

## 2021-01-08 PROCEDURE — C1894 INTRO/SHEATH, NON-LASER: HCPCS | Performed by: UROLOGY

## 2021-01-08 PROCEDURE — 63600175 PHARM REV CODE 636 W HCPCS: Performed by: UROLOGY

## 2021-01-08 PROCEDURE — A4217 STERILE WATER/SALINE, 500 ML: HCPCS | Performed by: UROLOGY

## 2021-01-08 PROCEDURE — C1769 GUIDE WIRE: HCPCS | Performed by: UROLOGY

## 2021-01-08 PROCEDURE — D9220A PRA ANESTHESIA: Mod: CRNA,,, | Performed by: NURSE ANESTHETIST, CERTIFIED REGISTERED

## 2021-01-08 PROCEDURE — 88300 PR  SURG PATH,GROSS,LEVEL I: ICD-10-PCS | Mod: 26,,, | Performed by: PATHOLOGY

## 2021-01-08 PROCEDURE — 63600175 PHARM REV CODE 636 W HCPCS: Performed by: INTERNAL MEDICINE

## 2021-01-08 PROCEDURE — C2617 STENT, NON-COR, TEM W/O DEL: HCPCS | Performed by: UROLOGY

## 2021-01-08 PROCEDURE — 52356 PR CYSTO/URETERO W/LITHOTRIPSY: ICD-10-PCS | Mod: RT,,, | Performed by: UROLOGY

## 2021-01-08 PROCEDURE — 36000709 HC OR TIME LEV III EA ADD 15 MIN: Performed by: UROLOGY

## 2021-01-08 PROCEDURE — 51705 CHANGE OF BLADDER TUBE: CPT | Mod: 51,,, | Performed by: UROLOGY

## 2021-01-08 PROCEDURE — 27100019 HC AMBU BAG ADULT/PED: Performed by: ANESTHESIOLOGY

## 2021-01-08 PROCEDURE — 94640 AIRWAY INHALATION TREATMENT: CPT

## 2021-01-08 PROCEDURE — C1773 RET DEV, INSERTABLE: HCPCS | Performed by: UROLOGY

## 2021-01-08 PROCEDURE — 36000708 HC OR TIME LEV III 1ST 15 MIN: Performed by: UROLOGY

## 2021-01-08 PROCEDURE — 74420 PR  X-RAY RETROGRADE PYELOGRAM: ICD-10-PCS | Mod: 26,,, | Performed by: UROLOGY

## 2021-01-08 PROCEDURE — 85025 COMPLETE CBC W/AUTO DIFF WBC: CPT

## 2021-01-08 PROCEDURE — 37000009 HC ANESTHESIA EA ADD 15 MINS: Performed by: UROLOGY

## 2021-01-08 DEVICE — STENT URET PERCUFLEX 6FR 26CM: Type: IMPLANTABLE DEVICE | Site: URETER | Status: FUNCTIONAL

## 2021-01-08 RX ORDER — DEXAMETHASONE SODIUM PHOSPHATE 4 MG/ML
8 INJECTION, SOLUTION INTRA-ARTICULAR; INTRALESIONAL; INTRAMUSCULAR; INTRAVENOUS; SOFT TISSUE ONCE
Status: COMPLETED | OUTPATIENT
Start: 2021-01-08 | End: 2021-01-08

## 2021-01-08 RX ORDER — PHENAZOPYRIDINE HYDROCHLORIDE 200 MG/1
200 TABLET, FILM COATED ORAL 3 TIMES DAILY PRN
Qty: 21 TABLET | Refills: 0 | Status: SHIPPED | OUTPATIENT
Start: 2021-01-08 | End: 2021-04-21

## 2021-01-08 RX ORDER — BUMETANIDE 1 MG/1
2 TABLET ORAL DAILY
Status: DISCONTINUED | OUTPATIENT
Start: 2021-01-09 | End: 2021-01-09 | Stop reason: HOSPADM

## 2021-01-08 RX ORDER — SUCCINYLCHOLINE CHLORIDE 20 MG/ML
INJECTION INTRAMUSCULAR; INTRAVENOUS
Status: DISCONTINUED | OUTPATIENT
Start: 2021-01-08 | End: 2021-01-08

## 2021-01-08 RX ORDER — HYDROCODONE BITARTRATE AND ACETAMINOPHEN 10; 325 MG/1; MG/1
1 TABLET ORAL EVERY 4 HOURS PRN
Status: DISCONTINUED | OUTPATIENT
Start: 2021-01-08 | End: 2021-01-09 | Stop reason: HOSPADM

## 2021-01-08 RX ORDER — FUROSEMIDE 10 MG/ML
40 INJECTION INTRAMUSCULAR; INTRAVENOUS ONCE
Status: COMPLETED | OUTPATIENT
Start: 2021-01-08 | End: 2021-01-08

## 2021-01-08 RX ORDER — IPRATROPIUM BROMIDE AND ALBUTEROL SULFATE 2.5; .5 MG/3ML; MG/3ML
SOLUTION RESPIRATORY (INHALATION)
Status: DISPENSED
Start: 2021-01-08 | End: 2021-01-09

## 2021-01-08 RX ORDER — WATER 1 ML/ML
IRRIGANT IRRIGATION
Status: DISCONTINUED | OUTPATIENT
Start: 2021-01-08 | End: 2021-01-08 | Stop reason: HOSPADM

## 2021-01-08 RX ORDER — IPRATROPIUM BROMIDE AND ALBUTEROL SULFATE 2.5; .5 MG/3ML; MG/3ML
3 SOLUTION RESPIRATORY (INHALATION) ONCE
Status: COMPLETED | OUTPATIENT
Start: 2021-01-08 | End: 2021-01-08

## 2021-01-08 RX ORDER — IPRATROPIUM BROMIDE AND ALBUTEROL SULFATE 2.5; .5 MG/3ML; MG/3ML
3 SOLUTION RESPIRATORY (INHALATION) EVERY 6 HOURS
Status: DISCONTINUED | OUTPATIENT
Start: 2021-01-09 | End: 2021-01-09 | Stop reason: HOSPADM

## 2021-01-08 RX ORDER — SODIUM CHLORIDE 0.9 G/100ML
IRRIGANT IRRIGATION
Status: DISCONTINUED | OUTPATIENT
Start: 2021-01-08 | End: 2021-01-08 | Stop reason: HOSPADM

## 2021-01-08 RX ORDER — PROPOFOL 10 MG/ML
VIAL (ML) INTRAVENOUS
Status: DISCONTINUED | OUTPATIENT
Start: 2021-01-08 | End: 2021-01-08

## 2021-01-08 RX ORDER — HYDROMORPHONE HYDROCHLORIDE 2 MG/ML
0.2 INJECTION, SOLUTION INTRAMUSCULAR; INTRAVENOUS; SUBCUTANEOUS EVERY 5 MIN PRN
Status: DISCONTINUED | OUTPATIENT
Start: 2021-01-08 | End: 2021-01-08 | Stop reason: HOSPADM

## 2021-01-08 RX ORDER — ONDANSETRON 2 MG/ML
INJECTION INTRAMUSCULAR; INTRAVENOUS
Status: DISCONTINUED | OUTPATIENT
Start: 2021-01-08 | End: 2021-01-08

## 2021-01-08 RX ORDER — GENTAMICIN SULFATE 60 MG/50ML
120 INJECTION, SOLUTION INTRAVENOUS
Status: DISCONTINUED | OUTPATIENT
Start: 2021-01-08 | End: 2021-01-09 | Stop reason: HOSPADM

## 2021-01-08 RX ORDER — PHENAZOPYRIDINE HYDROCHLORIDE 100 MG/1
200 TABLET, FILM COATED ORAL ONCE
Status: COMPLETED | OUTPATIENT
Start: 2021-01-08 | End: 2021-01-08

## 2021-01-08 RX ORDER — FENTANYL CITRATE 50 UG/ML
INJECTION, SOLUTION INTRAMUSCULAR; INTRAVENOUS
Status: DISCONTINUED | OUTPATIENT
Start: 2021-01-08 | End: 2021-01-08

## 2021-01-08 RX ORDER — HYDROCODONE BITARTRATE AND ACETAMINOPHEN 5; 325 MG/1; MG/1
1 TABLET ORAL EVERY 6 HOURS PRN
Qty: 28 TABLET | Refills: 0 | Status: SHIPPED | OUTPATIENT
Start: 2021-01-08 | End: 2021-04-21

## 2021-01-08 RX ORDER — ONDANSETRON 2 MG/ML
4 INJECTION INTRAMUSCULAR; INTRAVENOUS DAILY PRN
Status: DISCONTINUED | OUTPATIENT
Start: 2021-01-08 | End: 2021-01-08 | Stop reason: HOSPADM

## 2021-01-08 RX ORDER — NEOSTIGMINE METHYLSULFATE 1 MG/ML
INJECTION, SOLUTION INTRAVENOUS
Status: DISCONTINUED | OUTPATIENT
Start: 2021-01-08 | End: 2021-01-08

## 2021-01-08 RX ORDER — SODIUM CHLORIDE 0.9 % (FLUSH) 0.9 %
10 SYRINGE (ML) INJECTION
Status: DISCONTINUED | OUTPATIENT
Start: 2021-01-08 | End: 2021-01-08 | Stop reason: HOSPADM

## 2021-01-08 RX ORDER — PHENYLEPHRINE HYDROCHLORIDE 10 MG/ML
INJECTION INTRAVENOUS
Status: DISCONTINUED | OUTPATIENT
Start: 2021-01-08 | End: 2021-01-08

## 2021-01-08 RX ORDER — HYDROCODONE BITARTRATE AND ACETAMINOPHEN 5; 325 MG/1; MG/1
1 TABLET ORAL EVERY 4 HOURS PRN
Status: DISCONTINUED | OUTPATIENT
Start: 2021-01-08 | End: 2021-01-09 | Stop reason: HOSPADM

## 2021-01-08 RX ORDER — MIDAZOLAM HYDROCHLORIDE 1 MG/ML
INJECTION, SOLUTION INTRAMUSCULAR; INTRAVENOUS
Status: DISCONTINUED | OUTPATIENT
Start: 2021-01-08 | End: 2021-01-08

## 2021-01-08 RX ORDER — NITROFURANTOIN 25; 75 MG/1; MG/1
100 CAPSULE ORAL 2 TIMES DAILY
Qty: 20 CAPSULE | Refills: 0 | Status: SHIPPED | OUTPATIENT
Start: 2021-01-08 | End: 2021-01-18

## 2021-01-08 RX ORDER — ROCURONIUM BROMIDE 10 MG/ML
INJECTION, SOLUTION INTRAVENOUS
Status: DISCONTINUED | OUTPATIENT
Start: 2021-01-08 | End: 2021-01-08

## 2021-01-08 RX ORDER — LIDOCAINE HYDROCHLORIDE 20 MG/ML
INJECTION INTRAVENOUS
Status: DISCONTINUED | OUTPATIENT
Start: 2021-01-08 | End: 2021-01-08

## 2021-01-08 RX ADMIN — IPRATROPIUM BROMIDE AND ALBUTEROL SULFATE 3 ML: .5; 3 SOLUTION RESPIRATORY (INHALATION) at 07:01

## 2021-01-08 RX ADMIN — Medication 100 MG: at 07:01

## 2021-01-08 RX ADMIN — ONDANSETRON 4 MG: 2 INJECTION, SOLUTION INTRAMUSCULAR; INTRAVENOUS at 10:01

## 2021-01-08 RX ADMIN — PHENYLEPHRINE HYDROCHLORIDE 200 MCG: 10 INJECTION INTRAVENOUS at 07:01

## 2021-01-08 RX ADMIN — ROCURONIUM BROMIDE 20 MG: 10 INJECTION, SOLUTION INTRAVENOUS at 08:01

## 2021-01-08 RX ADMIN — METHYLPREDNISOLONE SODIUM SUCCINATE 40 MG: 40 INJECTION, POWDER, FOR SOLUTION INTRAMUSCULAR; INTRAVENOUS at 11:01

## 2021-01-08 RX ADMIN — NEOSTIGMINE METHYLSULFATE 5 MG: 1 INJECTION INTRAVENOUS at 10:01

## 2021-01-08 RX ADMIN — FENTANYL CITRATE 50 MCG: 50 INJECTION, SOLUTION INTRAMUSCULAR; INTRAVENOUS at 08:01

## 2021-01-08 RX ADMIN — GLYCOPYRROLATE 0.2 MG: 0.2 INJECTION, SOLUTION INTRAMUSCULAR; INTRAVITREAL at 07:01

## 2021-01-08 RX ADMIN — SUGAMMADEX 200 MG: 100 INJECTION, SOLUTION INTRAVENOUS at 10:01

## 2021-01-08 RX ADMIN — DEXAMETHASONE SODIUM PHOSPHATE 8 MG: 4 INJECTION, SOLUTION INTRA-ARTICULAR; INTRALESIONAL; INTRAMUSCULAR; INTRAVENOUS; SOFT TISSUE at 04:01

## 2021-01-08 RX ADMIN — SUCCINYLCHOLINE CHLORIDE 20 MG: 20 INJECTION, SOLUTION INTRAMUSCULAR; INTRAVENOUS at 08:01

## 2021-01-08 RX ADMIN — PHENYLEPHRINE HYDROCHLORIDE 150 MCG: 10 INJECTION INTRAVENOUS at 08:01

## 2021-01-08 RX ADMIN — WHITE PETROLATUM MINERAL OIL 1 TUBE: 150; 830 OINTMENT OPHTHALMIC at 07:01

## 2021-01-08 RX ADMIN — HYDROMORPHONE HYDROCHLORIDE 0.2 MG: 2 INJECTION, SOLUTION INTRAMUSCULAR; INTRAVENOUS; SUBCUTANEOUS at 11:01

## 2021-01-08 RX ADMIN — PHENYLEPHRINE HYDROCHLORIDE 100 MCG: 10 INJECTION INTRAVENOUS at 07:01

## 2021-01-08 RX ADMIN — PHENYLEPHRINE HYDROCHLORIDE 200 MCG: 10 INJECTION INTRAVENOUS at 08:01

## 2021-01-08 RX ADMIN — ROCURONIUM BROMIDE 30 MG: 10 INJECTION, SOLUTION INTRAVENOUS at 07:01

## 2021-01-08 RX ADMIN — PROPOFOL 140 MG: 10 INJECTION, EMULSION INTRAVENOUS at 07:01

## 2021-01-08 RX ADMIN — PHENYLEPHRINE HYDROCHLORIDE 100 MCG: 10 INJECTION INTRAVENOUS at 08:01

## 2021-01-08 RX ADMIN — FENTANYL CITRATE 50 MCG: 50 INJECTION, SOLUTION INTRAMUSCULAR; INTRAVENOUS at 09:01

## 2021-01-08 RX ADMIN — GENTAMICIN SULFATE 120 MG: 60 INJECTION, SOLUTION INTRAVENOUS at 07:01

## 2021-01-08 RX ADMIN — PHENYLEPHRINE HYDROCHLORIDE 100 MCG: 10 INJECTION INTRAVENOUS at 10:01

## 2021-01-08 RX ADMIN — MIDAZOLAM HYDROCHLORIDE 2 MG: 1 INJECTION, SOLUTION INTRAMUSCULAR; INTRAVENOUS at 07:01

## 2021-01-08 RX ADMIN — PHENYLEPHRINE HYDROCHLORIDE 200 MCG: 10 INJECTION INTRAVENOUS at 11:01

## 2021-01-08 RX ADMIN — ROCURONIUM BROMIDE 20 MG: 10 INJECTION, SOLUTION INTRAVENOUS at 07:01

## 2021-01-08 RX ADMIN — FUROSEMIDE 40 MG: 10 INJECTION, SOLUTION INTRAVENOUS at 06:01

## 2021-01-08 RX ADMIN — SODIUM CHLORIDE, SODIUM LACTATE, POTASSIUM CHLORIDE, AND CALCIUM CHLORIDE 250 ML: .6; .31; .03; .02 INJECTION, SOLUTION INTRAVENOUS at 11:01

## 2021-01-08 RX ADMIN — RACEPINEPHRINE HYDROCHLORIDE 0.5 ML: 11.25 SOLUTION RESPIRATORY (INHALATION) at 04:01

## 2021-01-08 RX ADMIN — PROPOFOL 10 MG: 10 INJECTION, EMULSION INTRAVENOUS at 10:01

## 2021-01-08 RX ADMIN — IPRATROPIUM BROMIDE AND ALBUTEROL SULFATE 3 ML: .5; 2.5 SOLUTION RESPIRATORY (INHALATION) at 03:01

## 2021-01-08 RX ADMIN — FENTANYL CITRATE 100 MCG: 50 INJECTION, SOLUTION INTRAMUSCULAR; INTRAVENOUS at 07:01

## 2021-01-08 RX ADMIN — PROPOFOL 10 MG: 10 INJECTION, EMULSION INTRAVENOUS at 09:01

## 2021-01-08 RX ADMIN — PIPERACILLIN SODIUM AND TAZOBACTAM SODIUM 4.5 G: 4; .5 INJECTION, POWDER, LYOPHILIZED, FOR SOLUTION INTRAVENOUS at 06:01

## 2021-01-08 RX ADMIN — PHENAZOPYRIDINE HYDROCHLORIDE 200 MG: 100 TABLET ORAL at 03:01

## 2021-01-08 RX ADMIN — PHENYLEPHRINE HYDROCHLORIDE 50 MCG: 10 INJECTION INTRAVENOUS at 08:01

## 2021-01-08 RX ADMIN — GLYCOPYRROLATE 0.6 MG: 0.2 INJECTION, SOLUTION INTRAMUSCULAR; INTRAVITREAL at 10:01

## 2021-01-09 VITALS
HEART RATE: 93 BPM | OXYGEN SATURATION: 97 % | RESPIRATION RATE: 22 BRPM | HEIGHT: 65 IN | WEIGHT: 244 LBS | DIASTOLIC BLOOD PRESSURE: 76 MMHG | TEMPERATURE: 99 F | BODY MASS INDEX: 40.65 KG/M2 | SYSTOLIC BLOOD PRESSURE: 125 MMHG

## 2021-01-09 PROBLEM — D72.823 LEUKEMOID REACTION: Status: ACTIVE | Noted: 2021-01-09

## 2021-01-09 LAB
ALBUMIN SERPL BCP-MCNC: 3.1 G/DL (ref 3.5–5.2)
ALLENS TEST: ABNORMAL
ALP SERPL-CCNC: 79 U/L (ref 55–135)
ALT SERPL W/O P-5'-P-CCNC: 8 U/L (ref 10–44)
ANION GAP SERPL CALC-SCNC: 12 MMOL/L (ref 8–16)
AST SERPL-CCNC: 21 U/L (ref 10–40)
BASOPHILS # BLD AUTO: 0.05 K/UL (ref 0–0.2)
BASOPHILS NFR BLD: 0.2 % (ref 0–1.9)
BILIRUB SERPL-MCNC: 0.2 MG/DL (ref 0.1–1)
BUN SERPL-MCNC: 14 MG/DL (ref 8–23)
CALCIUM SERPL-MCNC: 8.3 MG/DL (ref 8.7–10.5)
CHLORIDE SERPL-SCNC: 100 MMOL/L (ref 95–110)
CO2 SERPL-SCNC: 33 MMOL/L (ref 23–29)
CREAT SERPL-MCNC: 1.3 MG/DL (ref 0.5–1.4)
DELSYS: ABNORMAL
DIFFERENTIAL METHOD: ABNORMAL
EOSINOPHIL # BLD AUTO: 0 K/UL (ref 0–0.5)
EOSINOPHIL NFR BLD: 0 % (ref 0–8)
EP: 8
ERYTHROCYTE [DISTWIDTH] IN BLOOD BY AUTOMATED COUNT: 14.4 % (ref 11.5–14.5)
ERYTHROCYTE [SEDIMENTATION RATE] IN BLOOD BY WESTERGREN METHOD: 16 MM/H
EST. GFR  (AFRICAN AMERICAN): 50 ML/MIN/1.73 M^2
EST. GFR  (NON AFRICAN AMERICAN): 43 ML/MIN/1.73 M^2
FIO2: 35
GLUCOSE SERPL-MCNC: 128 MG/DL (ref 70–110)
HCO3 UR-SCNC: 36.2 MMOL/L (ref 24–28)
HCT VFR BLD AUTO: 33.9 % (ref 37–48.5)
HGB BLD-MCNC: 10.7 G/DL (ref 12–16)
IMM GRANULOCYTES # BLD AUTO: 1.15 K/UL (ref 0–0.04)
IMM GRANULOCYTES NFR BLD AUTO: 4.5 % (ref 0–0.5)
IP: 20
LYMPHOCYTES # BLD AUTO: 0.9 K/UL (ref 1–4.8)
LYMPHOCYTES NFR BLD: 3.4 % (ref 18–48)
MCH RBC QN AUTO: 26.6 PG (ref 27–31)
MCHC RBC AUTO-ENTMCNC: 31.6 G/DL (ref 32–36)
MCV RBC AUTO: 84 FL (ref 82–98)
MODE: ABNORMAL
MONOCYTES # BLD AUTO: 0.6 K/UL (ref 0.3–1)
MONOCYTES NFR BLD: 2.4 % (ref 4–15)
NEUTROPHILS # BLD AUTO: 22.7 K/UL (ref 1.8–7.7)
NEUTROPHILS NFR BLD: 89.5 % (ref 38–73)
NRBC BLD-RTO: 0 /100 WBC
PCO2 BLDA: 70.9 MMHG (ref 35–45)
PH SMN: 7.32 [PH] (ref 7.35–7.45)
PLATELET # BLD AUTO: 191 K/UL (ref 150–350)
PMV BLD AUTO: 10.3 FL (ref 9.2–12.9)
PO2 BLDA: 23 MMHG (ref 40–60)
POC BE: 8 MMOL/L
POC SATURATED O2: 32 % (ref 95–100)
POC TCO2: 38 MMOL/L (ref 24–29)
POTASSIUM SERPL-SCNC: 4.2 MMOL/L (ref 3.5–5.1)
PROT SERPL-MCNC: 7.2 G/DL (ref 6–8.4)
RBC # BLD AUTO: 4.02 M/UL (ref 4–5.4)
SAMPLE: ABNORMAL
SITE: ABNORMAL
SODIUM SERPL-SCNC: 145 MMOL/L (ref 136–145)
SPONT RATE: 16
WBC # BLD AUTO: 25.37 K/UL (ref 3.9–12.7)

## 2021-01-09 PROCEDURE — 80053 COMPREHEN METABOLIC PANEL: CPT

## 2021-01-09 PROCEDURE — 82803 BLOOD GASES ANY COMBINATION: CPT

## 2021-01-09 PROCEDURE — 25000242 PHARM REV CODE 250 ALT 637 W/ HCPCS: Performed by: INTERNAL MEDICINE

## 2021-01-09 PROCEDURE — 94761 N-INVAS EAR/PLS OXIMETRY MLT: CPT

## 2021-01-09 PROCEDURE — 99214 PR OFFICE/OUTPT VISIT, EST, LEVL IV, 30-39 MIN: ICD-10-PCS | Mod: ,,, | Performed by: INTERNAL MEDICINE

## 2021-01-09 PROCEDURE — 63600175 PHARM REV CODE 636 W HCPCS: Performed by: UROLOGY

## 2021-01-09 PROCEDURE — 25000003 PHARM REV CODE 250: Performed by: INTERNAL MEDICINE

## 2021-01-09 PROCEDURE — 36415 COLL VENOUS BLD VENIPUNCTURE: CPT

## 2021-01-09 PROCEDURE — 27000221 HC OXYGEN, UP TO 24 HOURS

## 2021-01-09 PROCEDURE — 25000003 PHARM REV CODE 250: Performed by: UROLOGY

## 2021-01-09 PROCEDURE — 94660 CPAP INITIATION&MGMT: CPT

## 2021-01-09 PROCEDURE — 94640 AIRWAY INHALATION TREATMENT: CPT

## 2021-01-09 PROCEDURE — 99214 OFFICE O/P EST MOD 30 MIN: CPT | Mod: ,,, | Performed by: INTERNAL MEDICINE

## 2021-01-09 PROCEDURE — 85025 COMPLETE CBC W/AUTO DIFF WBC: CPT

## 2021-01-09 PROCEDURE — 99217 PR OBSERVATION CARE DISCHARGE: CPT | Mod: ,,, | Performed by: UROLOGY

## 2021-01-09 PROCEDURE — 63600175 PHARM REV CODE 636 W HCPCS: Performed by: INTERNAL MEDICINE

## 2021-01-09 PROCEDURE — 99900035 HC TECH TIME PER 15 MIN (STAT)

## 2021-01-09 PROCEDURE — 99217 PR OBSERVATION CARE DISCHARGE: ICD-10-PCS | Mod: ,,, | Performed by: UROLOGY

## 2021-01-09 RX ADMIN — METHYLPREDNISOLONE SODIUM SUCCINATE 40 MG: 40 INJECTION, POWDER, FOR SOLUTION INTRAMUSCULAR; INTRAVENOUS at 11:01

## 2021-01-09 RX ADMIN — IPRATROPIUM BROMIDE AND ALBUTEROL SULFATE 3 ML: .5; 3 SOLUTION RESPIRATORY (INHALATION) at 02:01

## 2021-01-09 RX ADMIN — IPRATROPIUM BROMIDE AND ALBUTEROL SULFATE 3 ML: .5; 3 SOLUTION RESPIRATORY (INHALATION) at 08:01

## 2021-01-09 RX ADMIN — PIPERACILLIN SODIUM AND TAZOBACTAM SODIUM 4.5 G: 4; .5 INJECTION, POWDER, LYOPHILIZED, FOR SOLUTION INTRAVENOUS at 01:01

## 2021-01-09 RX ADMIN — BUMETANIDE 2 MG: 1 TABLET ORAL at 09:01

## 2021-01-09 RX ADMIN — METHYLPREDNISOLONE SODIUM SUCCINATE 40 MG: 40 INJECTION, POWDER, FOR SOLUTION INTRAMUSCULAR; INTRAVENOUS at 06:01

## 2021-01-09 RX ADMIN — PIPERACILLIN SODIUM AND TAZOBACTAM SODIUM 4.5 G: 4; .5 INJECTION, POWDER, LYOPHILIZED, FOR SOLUTION INTRAVENOUS at 09:01

## 2021-01-11 LAB
COMPN STONE: NORMAL
SPECIMEN SOURCE: NORMAL
STONE ANALYSIS IR-IMP: NORMAL

## 2021-01-26 ENCOUNTER — TELEPHONE (OUTPATIENT)
Dept: HEMATOLOGY/ONCOLOGY | Facility: CLINIC | Age: 66
End: 2021-01-26

## 2021-02-03 DIAGNOSIS — N31.9 NEUROGENIC BLADDER: ICD-10-CM

## 2021-02-03 RX ORDER — OXYBUTYNIN CHLORIDE 5 MG/1
5 TABLET, EXTENDED RELEASE ORAL DAILY
Qty: 90 TABLET | Refills: 3 | Status: CANCELLED | OUTPATIENT
Start: 2021-02-03

## 2021-02-11 ENCOUNTER — TELEPHONE (OUTPATIENT)
Dept: UROLOGY | Facility: CLINIC | Age: 66
End: 2021-02-11

## 2021-02-11 RX ORDER — OXYBUTYNIN CHLORIDE 5 MG/1
5 TABLET, EXTENDED RELEASE ORAL DAILY
Qty: 90 TABLET | Refills: 3 | Status: SHIPPED | OUTPATIENT
Start: 2021-02-11

## 2021-02-15 ENCOUNTER — TELEPHONE (OUTPATIENT)
Dept: PHYSICAL MEDICINE AND REHAB | Facility: CLINIC | Age: 66
End: 2021-02-15

## 2021-03-08 ENCOUNTER — TELEPHONE (OUTPATIENT)
Dept: PSYCHIATRY | Facility: CLINIC | Age: 66
End: 2021-03-08

## 2021-03-08 DIAGNOSIS — F41.9 ANXIETY: Primary | ICD-10-CM

## 2021-03-08 RX ORDER — BUPROPION HYDROCHLORIDE 150 MG/1
150 TABLET ORAL DAILY
Qty: 90 TABLET | Refills: 0 | Status: SHIPPED | OUTPATIENT
Start: 2021-03-08 | End: 2021-04-21

## 2021-03-08 RX ORDER — QUETIAPINE FUMARATE 100 MG/1
100 TABLET, FILM COATED ORAL NIGHTLY
Qty: 90 TABLET | Refills: 0 | Status: SHIPPED | OUTPATIENT
Start: 2021-03-08 | End: 2021-04-21 | Stop reason: SDUPTHER

## 2021-03-08 RX ORDER — HYDROXYZINE HYDROCHLORIDE 25 MG/1
25 TABLET, FILM COATED ORAL 2 TIMES DAILY PRN
Qty: 180 TABLET | Refills: 0 | Status: SHIPPED | OUTPATIENT
Start: 2021-03-08 | End: 2021-04-21 | Stop reason: SDUPTHER

## 2021-04-05 ENCOUNTER — TELEPHONE (OUTPATIENT)
Dept: PSYCHIATRY | Facility: CLINIC | Age: 66
End: 2021-04-05

## 2021-04-06 ENCOUNTER — TELEPHONE (OUTPATIENT)
Dept: UROLOGY | Facility: CLINIC | Age: 66
End: 2021-04-06

## 2021-04-06 DIAGNOSIS — N31.9 NEUROGENIC BLADDER: Primary | ICD-10-CM

## 2021-04-06 DIAGNOSIS — R33.9 INCOMPLETE BLADDER EMPTYING: ICD-10-CM

## 2021-04-21 ENCOUNTER — OFFICE VISIT (OUTPATIENT)
Dept: PSYCHIATRY | Facility: CLINIC | Age: 66
End: 2021-04-21
Payer: MEDICARE

## 2021-04-21 DIAGNOSIS — G47.00 INSOMNIA, UNSPECIFIED TYPE: ICD-10-CM

## 2021-04-21 DIAGNOSIS — F31.75 BIPOLAR 1 DISORDER, DEPRESSED, PARTIAL REMISSION: Primary | ICD-10-CM

## 2021-04-21 DIAGNOSIS — F41.1 GENERALIZED ANXIETY DISORDER: ICD-10-CM

## 2021-04-21 DIAGNOSIS — Z65.8 PSYCHOSOCIAL DISTRESS: ICD-10-CM

## 2021-04-21 DIAGNOSIS — R53.81 DEBILITY: ICD-10-CM

## 2021-04-21 PROCEDURE — 99214 OFFICE O/P EST MOD 30 MIN: CPT | Mod: 95,,, | Performed by: PSYCHIATRY & NEUROLOGY

## 2021-04-21 PROCEDURE — 99214 PR OFFICE/OUTPT VISIT, EST, LEVL IV, 30-39 MIN: ICD-10-PCS | Mod: 95,,, | Performed by: PSYCHIATRY & NEUROLOGY

## 2021-04-21 RX ORDER — HYDROXYZINE HYDROCHLORIDE 25 MG/1
25 TABLET, FILM COATED ORAL 2 TIMES DAILY PRN
Qty: 180 TABLET | Refills: 1 | Status: SHIPPED | OUTPATIENT
Start: 2021-04-21 | End: 2022-09-28

## 2021-04-21 RX ORDER — BUPROPION HYDROCHLORIDE 300 MG/1
300 TABLET ORAL DAILY
Qty: 90 TABLET | Refills: 1 | Status: SHIPPED | OUTPATIENT
Start: 2021-04-21 | End: 2022-09-28

## 2021-04-21 RX ORDER — QUETIAPINE FUMARATE 100 MG/1
100 TABLET, FILM COATED ORAL NIGHTLY
Qty: 90 TABLET | Refills: 1 | Status: SHIPPED | OUTPATIENT
Start: 2021-04-21 | End: 2022-09-28 | Stop reason: SDUPTHER

## 2021-04-29 ENCOUNTER — HOSPITAL ENCOUNTER (OUTPATIENT)
Facility: HOSPITAL | Age: 66
Discharge: HOME | End: 2021-04-30
Attending: STUDENT IN AN ORGANIZED HEALTH CARE EDUCATION/TRAINING PROGRAM | Admitting: HOSPITALIST
Payer: MEDICARE

## 2021-04-29 DIAGNOSIS — Z89.512 STATUS POST BELOW KNEE AMPUTATION, LEFT: Primary | ICD-10-CM

## 2021-04-29 DIAGNOSIS — N31.9 NEUROGENIC BLADDER: ICD-10-CM

## 2021-04-29 DIAGNOSIS — G82.20 PARAPARESIS: ICD-10-CM

## 2021-04-29 DIAGNOSIS — Z86.73 HISTORY OF CVA (CEREBROVASCULAR ACCIDENT): ICD-10-CM

## 2021-04-29 DIAGNOSIS — R07.9 CHEST PAIN: ICD-10-CM

## 2021-04-29 LAB
ALBUMIN SERPL BCP-MCNC: 3.7 G/DL (ref 3.5–5.2)
ALP SERPL-CCNC: 114 U/L (ref 55–135)
ALT SERPL W/O P-5'-P-CCNC: 9 U/L (ref 10–44)
ANION GAP SERPL CALC-SCNC: 9 MMOL/L (ref 8–16)
AST SERPL-CCNC: 17 U/L (ref 10–40)
BASOPHILS # BLD AUTO: 0.04 K/UL (ref 0–0.2)
BASOPHILS NFR BLD: 0.6 % (ref 0–1.9)
BILIRUB SERPL-MCNC: 0.3 MG/DL (ref 0.1–1)
BNP SERPL-MCNC: 24 PG/ML (ref 0–99)
BUN SERPL-MCNC: 13 MG/DL (ref 8–23)
CALCIUM SERPL-MCNC: 9.2 MG/DL (ref 8.7–10.5)
CHLORIDE SERPL-SCNC: 104 MMOL/L (ref 95–110)
CO2 SERPL-SCNC: 29 MMOL/L (ref 23–29)
CREAT SERPL-MCNC: 0.9 MG/DL (ref 0.5–1.4)
CTP QC/QA: YES
DIFFERENTIAL METHOD: ABNORMAL
EOSINOPHIL # BLD AUTO: 0.2 K/UL (ref 0–0.5)
EOSINOPHIL NFR BLD: 2.4 % (ref 0–8)
ERYTHROCYTE [DISTWIDTH] IN BLOOD BY AUTOMATED COUNT: 14.4 % (ref 11.5–14.5)
EST. GFR  (AFRICAN AMERICAN): >60 ML/MIN/1.73 M^2
EST. GFR  (NON AFRICAN AMERICAN): >60 ML/MIN/1.73 M^2
GLUCOSE SERPL-MCNC: 103 MG/DL (ref 70–110)
HCT VFR BLD AUTO: 36.8 % (ref 37–48.5)
HGB BLD-MCNC: 11.9 G/DL (ref 12–16)
IMM GRANULOCYTES # BLD AUTO: 0.02 K/UL (ref 0–0.04)
IMM GRANULOCYTES NFR BLD AUTO: 0.3 % (ref 0–0.5)
LYMPHOCYTES # BLD AUTO: 1.7 K/UL (ref 1–4.8)
LYMPHOCYTES NFR BLD: 28 % (ref 18–48)
MCH RBC QN AUTO: 26.6 PG (ref 27–31)
MCHC RBC AUTO-ENTMCNC: 32.3 G/DL (ref 32–36)
MCV RBC AUTO: 82 FL (ref 82–98)
MONOCYTES # BLD AUTO: 0.6 K/UL (ref 0.3–1)
MONOCYTES NFR BLD: 10.1 % (ref 4–15)
NEUTROPHILS # BLD AUTO: 3.6 K/UL (ref 1.8–7.7)
NEUTROPHILS NFR BLD: 58.6 % (ref 38–73)
NRBC BLD-RTO: 0 /100 WBC
PLATELET # BLD AUTO: 222 K/UL (ref 150–450)
PMV BLD AUTO: 9.8 FL (ref 9.2–12.9)
POTASSIUM SERPL-SCNC: 3.9 MMOL/L (ref 3.5–5.1)
PROT SERPL-MCNC: 8.1 G/DL (ref 6–8.4)
RBC # BLD AUTO: 4.48 M/UL (ref 4–5.4)
SARS-COV-2 RDRP RESP QL NAA+PROBE: NEGATIVE
SODIUM SERPL-SCNC: 142 MMOL/L (ref 136–145)
TROPONIN I SERPL DL<=0.01 NG/ML-MCNC: 0.01 NG/ML (ref 0–0.03)
TROPONIN I SERPL DL<=0.01 NG/ML-MCNC: <0.006 NG/ML (ref 0–0.03)
WBC # BLD AUTO: 6.21 K/UL (ref 3.9–12.7)

## 2021-04-29 PROCEDURE — 85025 COMPLETE CBC W/AUTO DIFF WBC: CPT | Performed by: STUDENT IN AN ORGANIZED HEALTH CARE EDUCATION/TRAINING PROGRAM

## 2021-04-29 PROCEDURE — 96374 THER/PROPH/DIAG INJ IV PUSH: CPT

## 2021-04-29 PROCEDURE — U0002 COVID-19 LAB TEST NON-CDC: HCPCS | Performed by: STUDENT IN AN ORGANIZED HEALTH CARE EDUCATION/TRAINING PROGRAM

## 2021-04-29 PROCEDURE — 84484 ASSAY OF TROPONIN QUANT: CPT | Mod: 91 | Performed by: STUDENT IN AN ORGANIZED HEALTH CARE EDUCATION/TRAINING PROGRAM

## 2021-04-29 PROCEDURE — 93005 ELECTROCARDIOGRAM TRACING: CPT

## 2021-04-29 PROCEDURE — 83880 ASSAY OF NATRIURETIC PEPTIDE: CPT | Performed by: STUDENT IN AN ORGANIZED HEALTH CARE EDUCATION/TRAINING PROGRAM

## 2021-04-29 PROCEDURE — 99285 EMERGENCY DEPT VISIT HI MDM: CPT | Mod: 25

## 2021-04-29 PROCEDURE — 93010 ELECTROCARDIOGRAM REPORT: CPT | Mod: ,,, | Performed by: INTERNAL MEDICINE

## 2021-04-29 PROCEDURE — G0378 HOSPITAL OBSERVATION PER HR: HCPCS

## 2021-04-29 PROCEDURE — 63600175 PHARM REV CODE 636 W HCPCS: Performed by: STUDENT IN AN ORGANIZED HEALTH CARE EDUCATION/TRAINING PROGRAM

## 2021-04-29 PROCEDURE — 80053 COMPREHEN METABOLIC PANEL: CPT | Performed by: STUDENT IN AN ORGANIZED HEALTH CARE EDUCATION/TRAINING PROGRAM

## 2021-04-29 PROCEDURE — 93010 EKG 12-LEAD: ICD-10-PCS | Mod: ,,, | Performed by: INTERNAL MEDICINE

## 2021-04-29 RX ORDER — SODIUM CHLORIDE 0.9 % (FLUSH) 0.9 %
10 SYRINGE (ML) INJECTION
Status: DISCONTINUED | OUTPATIENT
Start: 2021-04-30 | End: 2021-04-30 | Stop reason: HOSPADM

## 2021-04-29 RX ORDER — NITROGLYCERIN 0.4 MG/1
0.4 TABLET SUBLINGUAL EVERY 5 MIN PRN
Status: DISCONTINUED | OUTPATIENT
Start: 2021-04-30 | End: 2021-04-30 | Stop reason: HOSPADM

## 2021-04-29 RX ORDER — CALCIUM CARBONATE 200(500)MG
1000 TABLET,CHEWABLE ORAL EVERY 6 HOURS PRN
Status: DISCONTINUED | OUTPATIENT
Start: 2021-04-30 | End: 2021-04-30 | Stop reason: HOSPADM

## 2021-04-29 RX ORDER — ONDANSETRON 2 MG/ML
4 INJECTION INTRAMUSCULAR; INTRAVENOUS EVERY 8 HOURS PRN
Status: DISCONTINUED | OUTPATIENT
Start: 2021-04-30 | End: 2021-04-30 | Stop reason: HOSPADM

## 2021-04-29 RX ORDER — NAPROXEN SODIUM 220 MG/1
81 TABLET, FILM COATED ORAL DAILY
Status: DISCONTINUED | OUTPATIENT
Start: 2021-04-30 | End: 2021-04-30 | Stop reason: HOSPADM

## 2021-04-29 RX ORDER — ENOXAPARIN SODIUM 100 MG/ML
40 INJECTION SUBCUTANEOUS EVERY 24 HOURS
Status: DISCONTINUED | OUTPATIENT
Start: 2021-04-30 | End: 2021-04-30 | Stop reason: HOSPADM

## 2021-04-29 RX ORDER — MORPHINE SULFATE 4 MG/ML
2 INJECTION, SOLUTION INTRAMUSCULAR; INTRAVENOUS
Status: COMPLETED | OUTPATIENT
Start: 2021-04-29 | End: 2021-04-29

## 2021-04-29 RX ORDER — ACETAMINOPHEN 325 MG/1
650 TABLET ORAL EVERY 6 HOURS PRN
Status: DISCONTINUED | OUTPATIENT
Start: 2021-04-30 | End: 2021-04-30 | Stop reason: HOSPADM

## 2021-04-29 RX ADMIN — MORPHINE SULFATE 2 MG: 4 INJECTION, SOLUTION INTRAMUSCULAR; INTRAVENOUS at 10:04

## 2021-04-30 VITALS
OXYGEN SATURATION: 99 % | RESPIRATION RATE: 17 BRPM | BODY MASS INDEX: 35.99 KG/M2 | DIASTOLIC BLOOD PRESSURE: 79 MMHG | HEART RATE: 58 BPM | HEIGHT: 65 IN | TEMPERATURE: 99 F | SYSTOLIC BLOOD PRESSURE: 138 MMHG | WEIGHT: 216 LBS

## 2021-04-30 LAB
AMPHET+METHAMPHET UR QL: NEGATIVE
ANION GAP SERPL CALC-SCNC: 7 MMOL/L (ref 8–16)
AORTIC ROOT ANNULUS: 3.75 CM
AORTIC VALVE CUSP SEPERATION: 2.36 CM
ASCENDING AORTA: 3.17 CM
AV INDEX (PROSTH): 0.81
AV MEAN GRADIENT: 5 MMHG
AV PEAK GRADIENT: 9 MMHG
AV VALVE AREA: 3.23 CM2
AV VELOCITY RATIO: 0.76
BARBITURATES UR QL SCN>200 NG/ML: NEGATIVE
BASOPHILS # BLD AUTO: 0.03 K/UL (ref 0–0.2)
BASOPHILS NFR BLD: 0.6 % (ref 0–1.9)
BENZODIAZ UR QL SCN>200 NG/ML: NEGATIVE
BSA FOR ECHO PROCEDURE: 2.12 M2
BUN SERPL-MCNC: 13 MG/DL (ref 8–23)
BZE UR QL SCN: NEGATIVE
CALCIUM SERPL-MCNC: 9.3 MG/DL (ref 8.7–10.5)
CANNABINOIDS UR QL SCN: NEGATIVE
CHLORIDE SERPL-SCNC: 104 MMOL/L (ref 95–110)
CHOLEST SERPL-MCNC: 175 MG/DL (ref 120–199)
CHOLEST/HDLC SERPL: 2.6 {RATIO} (ref 2–5)
CO2 SERPL-SCNC: 32 MMOL/L (ref 23–29)
CREAT SERPL-MCNC: 0.9 MG/DL (ref 0.5–1.4)
CREAT UR-MCNC: 129 MG/DL (ref 15–325)
CV ECHO LV RWT: 0.77 CM
CV STRESS BASE HR: 57 BPM
DIASTOLIC BLOOD PRESSURE: 78 MMHG
DIFFERENTIAL METHOD: ABNORMAL
DOP CALC AO PEAK VEL: 1.52 M/S
DOP CALC AO VTI: 34.84 CM
DOP CALC LVOT AREA: 4 CM2
DOP CALC LVOT DIAMETER: 2.25 CM
DOP CALC LVOT PEAK VEL: 1.15 M/S
DOP CALC LVOT STROKE VOLUME: 112.55 CM3
DOP CALCLVOT PEAK VEL VTI: 28.32 CM
E WAVE DECELERATION TIME: 200.72 MSEC
E/A RATIO: 1.17
ECHO LV POSTERIOR WALL: 1.41 CM (ref 0.6–1.1)
EJECTION FRACTION: 55 %
EOSINOPHIL # BLD AUTO: 0.2 K/UL (ref 0–0.5)
EOSINOPHIL NFR BLD: 3.4 % (ref 0–8)
ERYTHROCYTE [DISTWIDTH] IN BLOOD BY AUTOMATED COUNT: 14.8 % (ref 11.5–14.5)
EST. GFR  (AFRICAN AMERICAN): >60 ML/MIN/1.73 M^2
EST. GFR  (NON AFRICAN AMERICAN): >60 ML/MIN/1.73 M^2
FRACTIONAL SHORTENING: 34 % (ref 28–44)
GLUCOSE SERPL-MCNC: 82 MG/DL (ref 70–110)
HCT VFR BLD AUTO: 35.8 % (ref 37–48.5)
HDLC SERPL-MCNC: 68 MG/DL (ref 40–75)
HDLC SERPL: 38.9 % (ref 20–50)
HGB BLD-MCNC: 11.6 G/DL (ref 12–16)
IMM GRANULOCYTES # BLD AUTO: 0.02 K/UL (ref 0–0.04)
IMM GRANULOCYTES NFR BLD AUTO: 0.4 % (ref 0–0.5)
INR PPP: 1 (ref 0.8–1.2)
INTERVENTRICULAR SEPTUM: 1.38 CM (ref 0.6–1.1)
IVRT: 129.18 MSEC
LA MAJOR: 5.12 CM
LA MINOR: 4.81 CM
LA WIDTH: 4.28 CM
LDLC SERPL CALC-MCNC: 95 MG/DL (ref 63–159)
LEFT ATRIUM SIZE: 2.92 CM
LEFT ATRIUM VOLUME INDEX: 25.8 ML/M2
LEFT ATRIUM VOLUME: 52.69 CM3
LEFT INTERNAL DIMENSION IN SYSTOLE: 2.42 CM (ref 2.1–4)
LEFT VENTRICLE DIASTOLIC VOLUME INDEX: 28.21 ML/M2
LEFT VENTRICLE DIASTOLIC VOLUME: 57.55 ML
LEFT VENTRICLE MASS INDEX: 90 G/M2
LEFT VENTRICLE SYSTOLIC VOLUME INDEX: 10 ML/M2
LEFT VENTRICLE SYSTOLIC VOLUME: 20.5 ML
LEFT VENTRICULAR INTERNAL DIMENSION IN DIASTOLE: 3.68 CM (ref 3.5–6)
LEFT VENTRICULAR MASS: 184.48 G
LV LATERAL E/E' RATIO: 13.67 M/S
LYMPHOCYTES # BLD AUTO: 2.1 K/UL (ref 1–4.8)
LYMPHOCYTES NFR BLD: 39.3 % (ref 18–48)
MAGNESIUM SERPL-MCNC: 2.4 MG/DL (ref 1.6–2.6)
MCH RBC QN AUTO: 27.6 PG (ref 27–31)
MCHC RBC AUTO-ENTMCNC: 32.4 G/DL (ref 32–36)
MCV RBC AUTO: 85 FL (ref 82–98)
METHADONE UR QL SCN>300 NG/ML: NEGATIVE
MONOCYTES # BLD AUTO: 0.5 K/UL (ref 0.3–1)
MONOCYTES NFR BLD: 10 % (ref 4–15)
MV PEAK A VEL: 0.7 M/S
MV PEAK E VEL: 0.82 M/S
MV STENOSIS PRESSURE HALF TIME: 58.21 MS
MV VALVE AREA P 1/2 METHOD: 3.78 CM2
NEUTROPHILS # BLD AUTO: 2.4 K/UL (ref 1.8–7.7)
NEUTROPHILS NFR BLD: 46.3 % (ref 38–73)
NONHDLC SERPL-MCNC: 107 MG/DL
NRBC BLD-RTO: 0 /100 WBC
NUC REST EJECTION FRACTION: 79
OHS CV CPX 85 PERCENT MAX PREDICTED HEART RATE MALE: 126
OHS CV CPX MAX PREDICTED HEART RATE: 149
OHS CV CPX PATIENT IS FEMALE: 1
OHS CV CPX PATIENT IS MALE: 0
OHS CV CPX PEAK DIASTOLIC BLOOD PRESSURE: 43 MMHG
OHS CV CPX PEAK HEAR RATE: 84 BPM
OHS CV CPX PEAK RATE PRESSURE PRODUCT: NORMAL
OHS CV CPX PEAK SYSTOLIC BLOOD PRESSURE: 156 MMHG
OHS CV CPX PERCENT MAX PREDICTED HEART RATE ACHIEVED: 56
OHS CV CPX RATE PRESSURE PRODUCT PRESENTING: 7410
OPIATES UR QL SCN: NORMAL
PCP UR QL SCN>25 NG/ML: NEGATIVE
PISA TR MAX VEL: 2.28 M/S
PLATELET # BLD AUTO: 190 K/UL (ref 150–450)
PMV BLD AUTO: 9.5 FL (ref 9.2–12.9)
POTASSIUM SERPL-SCNC: 3.6 MMOL/L (ref 3.5–5.1)
PROTHROMBIN TIME: 10.3 SEC (ref 9–12.5)
PULM VEIN S/D RATIO: 1.27
PV PEAK D VEL: 0.33 M/S
PV PEAK S VEL: 0.42 M/S
PV PEAK VELOCITY: 1.02 CM/S
RA MAJOR: 4.91 CM
RA PRESSURE: 3 MMHG
RA WIDTH: 3.96 CM
RBC # BLD AUTO: 4.2 M/UL (ref 4–5.4)
RIGHT VENTRICULAR END-DIASTOLIC DIMENSION: 3.86 CM
RV TISSUE DOPPLER FREE WALL SYSTOLIC VELOCITY 1 (APICAL 4 CHAMBER VIEW): 15.66 CM/S
SINUS: 3.29 CM
SODIUM SERPL-SCNC: 143 MMOL/L (ref 136–145)
STJ: 2.98 CM
SYSTOLIC BLOOD PRESSURE: 130 MMHG
TDI LATERAL: 0.06 M/S
TOXICOLOGY INFORMATION: NORMAL
TR MAX PG: 21 MMHG
TRICUSPID ANNULAR PLANE SYSTOLIC EXCURSION: 1.98 CM
TRIGL SERPL-MCNC: 60 MG/DL (ref 30–150)
TROPONIN I SERPL DL<=0.01 NG/ML-MCNC: <0.006 NG/ML (ref 0–0.03)
TSH SERPL DL<=0.005 MIU/L-ACNC: 1.34 UIU/ML (ref 0.4–4)
TV REST PULMONARY ARTERY PRESSURE: 24 MMHG
WBC # BLD AUTO: 5.22 K/UL (ref 3.9–12.7)

## 2021-04-30 PROCEDURE — 36415 COLL VENOUS BLD VENIPUNCTURE: CPT | Performed by: NURSE PRACTITIONER

## 2021-04-30 PROCEDURE — 80048 BASIC METABOLIC PNL TOTAL CA: CPT | Performed by: NURSE PRACTITIONER

## 2021-04-30 PROCEDURE — 63600175 PHARM REV CODE 636 W HCPCS: Performed by: INTERNAL MEDICINE

## 2021-04-30 PROCEDURE — 84484 ASSAY OF TROPONIN QUANT: CPT | Performed by: NURSE PRACTITIONER

## 2021-04-30 PROCEDURE — 27000221 HC OXYGEN, UP TO 24 HOURS

## 2021-04-30 PROCEDURE — 80307 DRUG TEST PRSMV CHEM ANLYZR: CPT | Performed by: NURSE PRACTITIONER

## 2021-04-30 PROCEDURE — 96372 THER/PROPH/DIAG INJ SC/IM: CPT

## 2021-04-30 PROCEDURE — 94761 N-INVAS EAR/PLS OXIMETRY MLT: CPT

## 2021-04-30 PROCEDURE — 85025 COMPLETE CBC W/AUTO DIFF WBC: CPT | Performed by: NURSE PRACTITIONER

## 2021-04-30 PROCEDURE — 63600175 PHARM REV CODE 636 W HCPCS: Performed by: NURSE PRACTITIONER

## 2021-04-30 PROCEDURE — 80061 LIPID PANEL: CPT | Performed by: NURSE PRACTITIONER

## 2021-04-30 PROCEDURE — 99220 PR INITIAL OBSERVATION CARE,LEVL III: CPT | Mod: 25,,, | Performed by: INTERNAL MEDICINE

## 2021-04-30 PROCEDURE — G0378 HOSPITAL OBSERVATION PER HR: HCPCS

## 2021-04-30 PROCEDURE — 84443 ASSAY THYROID STIM HORMONE: CPT | Performed by: NURSE PRACTITIONER

## 2021-04-30 PROCEDURE — 83735 ASSAY OF MAGNESIUM: CPT | Performed by: NURSE PRACTITIONER

## 2021-04-30 PROCEDURE — 85610 PROTHROMBIN TIME: CPT | Performed by: NURSE PRACTITIONER

## 2021-04-30 PROCEDURE — 99220 PR INITIAL OBSERVATION CARE,LEVL III: ICD-10-PCS | Mod: 25,,, | Performed by: INTERNAL MEDICINE

## 2021-04-30 RX ORDER — REGADENOSON 0.08 MG/ML
0.4 INJECTION, SOLUTION INTRAVENOUS ONCE
Status: COMPLETED | OUTPATIENT
Start: 2021-04-30 | End: 2021-04-30

## 2021-04-30 RX ORDER — NAPROXEN SODIUM 220 MG/1
81 TABLET, FILM COATED ORAL DAILY
Refills: 0 | Status: ON HOLD
Start: 2021-05-01 | End: 2023-08-11 | Stop reason: HOSPADM

## 2021-04-30 RX ADMIN — ENOXAPARIN SODIUM 40 MG: 40 INJECTION SUBCUTANEOUS at 04:04

## 2021-04-30 RX ADMIN — REGADENOSON 0.4 MG: 0.08 INJECTION, SOLUTION INTRAVENOUS at 11:04

## 2021-05-17 ENCOUNTER — OFFICE VISIT (OUTPATIENT)
Dept: PULMONOLOGY | Facility: CLINIC | Age: 66
End: 2021-05-17
Payer: MEDICARE

## 2021-05-17 VITALS
HEART RATE: 57 BPM | OXYGEN SATURATION: 97 % | SYSTOLIC BLOOD PRESSURE: 145 MMHG | DIASTOLIC BLOOD PRESSURE: 85 MMHG | TEMPERATURE: 98 F

## 2021-05-17 DIAGNOSIS — I27.20 PULMONARY HTN: ICD-10-CM

## 2021-05-17 DIAGNOSIS — R06.09 DYSPNEA ON EXERTION: ICD-10-CM

## 2021-05-17 DIAGNOSIS — Z71.89 ADVANCE CARE PLANNING: ICD-10-CM

## 2021-05-17 DIAGNOSIS — G47.33 OSA (OBSTRUCTIVE SLEEP APNEA): ICD-10-CM

## 2021-05-17 PROCEDURE — 1101F PT FALLS ASSESS-DOCD LE1/YR: CPT | Mod: S$GLB,,, | Performed by: INTERNAL MEDICINE

## 2021-05-17 PROCEDURE — 1126F AMNT PAIN NOTED NONE PRSNT: CPT | Mod: S$GLB,,, | Performed by: INTERNAL MEDICINE

## 2021-05-17 PROCEDURE — 99999 PR PBB SHADOW E&M-EST. PATIENT-LVL III: ICD-10-PCS | Mod: PBBFAC,,, | Performed by: INTERNAL MEDICINE

## 2021-05-17 PROCEDURE — 99214 OFFICE O/P EST MOD 30 MIN: CPT | Mod: S$GLB,,, | Performed by: INTERNAL MEDICINE

## 2021-05-17 PROCEDURE — 99999 PR PBB SHADOW E&M-EST. PATIENT-LVL III: CPT | Mod: PBBFAC,,, | Performed by: INTERNAL MEDICINE

## 2021-05-17 PROCEDURE — 99214 PR OFFICE/OUTPT VISIT, EST, LEVL IV, 30-39 MIN: ICD-10-PCS | Mod: S$GLB,,, | Performed by: INTERNAL MEDICINE

## 2021-05-17 PROCEDURE — 1101F PR PT FALLS ASSESS DOC 0-1 FALLS W/OUT INJ PAST YR: ICD-10-PCS | Mod: S$GLB,,, | Performed by: INTERNAL MEDICINE

## 2021-05-17 PROCEDURE — 1126F PR PAIN SEVERITY QUANTIFIED, NO PAIN PRESENT: ICD-10-PCS | Mod: S$GLB,,, | Performed by: INTERNAL MEDICINE

## 2021-05-17 PROCEDURE — 3288F PR FALLS RISK ASSESSMENT DOCUMENTED: ICD-10-PCS | Mod: S$GLB,,, | Performed by: INTERNAL MEDICINE

## 2021-05-17 PROCEDURE — 3288F FALL RISK ASSESSMENT DOCD: CPT | Mod: S$GLB,,, | Performed by: INTERNAL MEDICINE

## 2021-05-27 ENCOUNTER — DOCUMENT SCAN (OUTPATIENT)
Dept: HOME HEALTH SERVICES | Facility: HOSPITAL | Age: 66
End: 2021-05-27
Payer: MEDICARE

## 2021-06-04 ENCOUNTER — TELEPHONE (OUTPATIENT)
Dept: UROLOGY | Facility: CLINIC | Age: 66
End: 2021-06-04

## 2021-06-09 ENCOUNTER — TELEPHONE (OUTPATIENT)
Dept: UROLOGY | Facility: CLINIC | Age: 66
End: 2021-06-09

## 2021-06-09 DIAGNOSIS — N31.9 NEUROGENIC BLADDER: Primary | ICD-10-CM

## 2021-06-10 ENCOUNTER — TELEPHONE (OUTPATIENT)
Dept: UROLOGY | Facility: CLINIC | Age: 66
End: 2021-06-10

## 2021-06-10 RX ORDER — SULFAMETHOXAZOLE AND TRIMETHOPRIM 800; 160 MG/1; MG/1
1 TABLET ORAL 2 TIMES DAILY
Qty: 14 TABLET | Refills: 0 | Status: SHIPPED | OUTPATIENT
Start: 2021-06-10 | End: 2021-06-17

## 2021-08-24 ENCOUNTER — OFFICE VISIT (OUTPATIENT)
Dept: UROLOGY | Facility: CLINIC | Age: 66
End: 2021-08-24
Payer: MEDICARE

## 2021-08-24 VITALS — BODY MASS INDEX: 35.94 KG/M2 | RESPIRATION RATE: 18 BRPM | WEIGHT: 216 LBS

## 2021-08-24 DIAGNOSIS — R33.9 INCOMPLETE BLADDER EMPTYING: ICD-10-CM

## 2021-08-24 DIAGNOSIS — N22 CALCULUS OF URINARY TRACT IN DISEASES CLASSIFIED ELSEWHERE: Primary | ICD-10-CM

## 2021-08-24 DIAGNOSIS — N31.9 NEUROGENIC BLADDER: ICD-10-CM

## 2021-08-24 PROCEDURE — 99999 PR PBB SHADOW E&M-EST. PATIENT-LVL III: CPT | Mod: PBBFAC,,, | Performed by: UROLOGY

## 2021-08-24 PROCEDURE — 3288F PR FALLS RISK ASSESSMENT DOCUMENTED: ICD-10-PCS | Mod: S$GLB,,, | Performed by: UROLOGY

## 2021-08-24 PROCEDURE — 99214 PR OFFICE/OUTPT VISIT, EST, LEVL IV, 30-39 MIN: ICD-10-PCS | Mod: S$GLB,,, | Performed by: UROLOGY

## 2021-08-24 PROCEDURE — 1101F PT FALLS ASSESS-DOCD LE1/YR: CPT | Mod: S$GLB,,, | Performed by: UROLOGY

## 2021-08-24 PROCEDURE — 99999 PR PBB SHADOW E&M-EST. PATIENT-LVL III: ICD-10-PCS | Mod: PBBFAC,,, | Performed by: UROLOGY

## 2021-08-24 PROCEDURE — 3008F PR BODY MASS INDEX (BMI) DOCUMENTED: ICD-10-PCS | Mod: S$GLB,,, | Performed by: UROLOGY

## 2021-08-24 PROCEDURE — 1160F PR REVIEW ALL MEDS BY PRESCRIBER/CLIN PHARMACIST DOCUMENTED: ICD-10-PCS | Mod: S$GLB,,, | Performed by: UROLOGY

## 2021-08-24 PROCEDURE — 1126F PR PAIN SEVERITY QUANTIFIED, NO PAIN PRESENT: ICD-10-PCS | Mod: S$GLB,,, | Performed by: UROLOGY

## 2021-08-24 PROCEDURE — 1159F PR MEDICATION LIST DOCUMENTED IN MEDICAL RECORD: ICD-10-PCS | Mod: S$GLB,,, | Performed by: UROLOGY

## 2021-08-24 PROCEDURE — 1159F MED LIST DOCD IN RCRD: CPT | Mod: S$GLB,,, | Performed by: UROLOGY

## 2021-08-24 PROCEDURE — 3288F FALL RISK ASSESSMENT DOCD: CPT | Mod: S$GLB,,, | Performed by: UROLOGY

## 2021-08-24 PROCEDURE — 1101F PR PT FALLS ASSESS DOC 0-1 FALLS W/OUT INJ PAST YR: ICD-10-PCS | Mod: S$GLB,,, | Performed by: UROLOGY

## 2021-08-24 PROCEDURE — 3008F BODY MASS INDEX DOCD: CPT | Mod: S$GLB,,, | Performed by: UROLOGY

## 2021-08-24 PROCEDURE — 99214 OFFICE O/P EST MOD 30 MIN: CPT | Mod: S$GLB,,, | Performed by: UROLOGY

## 2021-08-24 PROCEDURE — 1126F AMNT PAIN NOTED NONE PRSNT: CPT | Mod: S$GLB,,, | Performed by: UROLOGY

## 2021-08-24 PROCEDURE — 1160F RVW MEDS BY RX/DR IN RCRD: CPT | Mod: S$GLB,,, | Performed by: UROLOGY

## 2022-09-28 ENCOUNTER — OFFICE VISIT (OUTPATIENT)
Dept: PSYCHIATRY | Facility: CLINIC | Age: 67
End: 2022-09-28
Payer: MEDICARE

## 2022-09-28 DIAGNOSIS — Z65.8 PSYCHOSOCIAL DISTRESS: ICD-10-CM

## 2022-09-28 DIAGNOSIS — F41.1 GENERALIZED ANXIETY DISORDER: ICD-10-CM

## 2022-09-28 DIAGNOSIS — G47.00 INSOMNIA, UNSPECIFIED TYPE: ICD-10-CM

## 2022-09-28 DIAGNOSIS — F31.75 BIPOLAR 1 DISORDER, DEPRESSED, PARTIAL REMISSION: Primary | ICD-10-CM

## 2022-09-28 DIAGNOSIS — R53.81 DEBILITY: ICD-10-CM

## 2022-09-28 PROCEDURE — 1159F MED LIST DOCD IN RCRD: CPT | Mod: CPTII,95,, | Performed by: PSYCHIATRY & NEUROLOGY

## 2022-09-28 PROCEDURE — 99214 PR OFFICE/OUTPT VISIT, EST, LEVL IV, 30-39 MIN: ICD-10-PCS | Mod: 95,,, | Performed by: PSYCHIATRY & NEUROLOGY

## 2022-09-28 PROCEDURE — 1160F RVW MEDS BY RX/DR IN RCRD: CPT | Mod: CPTII,95,, | Performed by: PSYCHIATRY & NEUROLOGY

## 2022-09-28 PROCEDURE — 1160F PR REVIEW ALL MEDS BY PRESCRIBER/CLIN PHARMACIST DOCUMENTED: ICD-10-PCS | Mod: CPTII,95,, | Performed by: PSYCHIATRY & NEUROLOGY

## 2022-09-28 PROCEDURE — 99214 OFFICE O/P EST MOD 30 MIN: CPT | Mod: 95,,, | Performed by: PSYCHIATRY & NEUROLOGY

## 2022-09-28 PROCEDURE — 1159F PR MEDICATION LIST DOCUMENTED IN MEDICAL RECORD: ICD-10-PCS | Mod: CPTII,95,, | Performed by: PSYCHIATRY & NEUROLOGY

## 2022-09-28 RX ORDER — HYDROXYZINE HYDROCHLORIDE 25 MG/1
25 TABLET, FILM COATED ORAL 2 TIMES DAILY PRN
Qty: 180 TABLET | Refills: 0 | Status: SHIPPED | OUTPATIENT
Start: 2022-09-28 | End: 2022-12-27

## 2022-09-28 RX ORDER — SERTRALINE HYDROCHLORIDE 50 MG/1
50 TABLET, FILM COATED ORAL DAILY
Qty: 90 TABLET | Refills: 0 | Status: SHIPPED | OUTPATIENT
Start: 2022-09-28 | End: 2022-11-07

## 2022-09-28 RX ORDER — QUETIAPINE FUMARATE 100 MG/1
100 TABLET, FILM COATED ORAL NIGHTLY
Qty: 90 TABLET | Refills: 0 | Status: SHIPPED | OUTPATIENT
Start: 2022-09-28 | End: 2022-11-07 | Stop reason: SDUPTHER
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
## 2022-09-28 NOTE — PATIENT INSTRUCTIONS

## 2022-10-28 ENCOUNTER — TELEPHONE (OUTPATIENT)
Dept: PSYCHIATRY | Facility: CLINIC | Age: 67
End: 2022-10-28
Payer: MEDICARE

## 2022-10-28 NOTE — TELEPHONE ENCOUNTER
Returned pts call. Made her aware of what was stated below by Dr. Young- pt verbally acknowledged.

## 2022-10-28 NOTE — TELEPHONE ENCOUNTER
Pt called office, lvm. Returned call: pt reports side effects from Zoloft rx. Pt reports loose stool and constant diarrhea. Pt narrowed cause to Zoloft. Pt stated the medication was working well however she can not handle the side effects as she is bed bound.    Pt requesting MD advice.    Made pt aware msg will be sent to Dr. Young for further advice, and once I receive a response, I will call her w/ update.    Pt verbally acknowledged.

## 2022-11-07 ENCOUNTER — OFFICE VISIT (OUTPATIENT)
Dept: PSYCHIATRY | Facility: CLINIC | Age: 67
End: 2022-11-07
Payer: COMMERCIAL

## 2022-11-07 DIAGNOSIS — F31.75 BIPOLAR 1 DISORDER, DEPRESSED, PARTIAL REMISSION: Primary | ICD-10-CM

## 2022-11-07 DIAGNOSIS — F41.1 GENERALIZED ANXIETY DISORDER: ICD-10-CM

## 2022-11-07 DIAGNOSIS — R53.81 DEBILITY: ICD-10-CM

## 2022-11-07 PROCEDURE — 1159F PR MEDICATION LIST DOCUMENTED IN MEDICAL RECORD: ICD-10-PCS | Mod: CPTII,95,S$GLB, | Performed by: PSYCHIATRY & NEUROLOGY

## 2022-11-07 PROCEDURE — 1160F RVW MEDS BY RX/DR IN RCRD: CPT | Mod: CPTII,95,S$GLB, | Performed by: PSYCHIATRY & NEUROLOGY

## 2022-11-07 PROCEDURE — 1159F MED LIST DOCD IN RCRD: CPT | Mod: CPTII,95,S$GLB, | Performed by: PSYCHIATRY & NEUROLOGY

## 2022-11-07 PROCEDURE — 99214 PR OFFICE/OUTPT VISIT, EST, LEVL IV, 30-39 MIN: ICD-10-PCS | Mod: 95,S$GLB,, | Performed by: PSYCHIATRY & NEUROLOGY

## 2022-11-07 PROCEDURE — 99999 PR PBB SHADOW E&M-EST. PATIENT-LVL II: CPT | Mod: PBBFAC,,, | Performed by: PSYCHIATRY & NEUROLOGY

## 2022-11-07 PROCEDURE — 99999 PR PBB SHADOW E&M-EST. PATIENT-LVL II: ICD-10-PCS | Mod: PBBFAC,,, | Performed by: PSYCHIATRY & NEUROLOGY

## 2022-11-07 PROCEDURE — 99214 OFFICE O/P EST MOD 30 MIN: CPT | Mod: 95,S$GLB,, | Performed by: PSYCHIATRY & NEUROLOGY

## 2022-11-07 PROCEDURE — 1160F PR REVIEW ALL MEDS BY PRESCRIBER/CLIN PHARMACIST DOCUMENTED: ICD-10-PCS | Mod: CPTII,95,S$GLB, | Performed by: PSYCHIATRY & NEUROLOGY

## 2022-11-07 RX ORDER — CITALOPRAM 10 MG/1
10 TABLET ORAL DAILY
Qty: 90 TABLET | Refills: 0 | Status: SHIPPED | OUTPATIENT
Start: 2022-11-07 | End: 2023-03-01 | Stop reason: SDUPTHER

## 2022-11-07 RX ORDER — QUETIAPINE FUMARATE 100 MG/1
100 TABLET, FILM COATED ORAL NIGHTLY
Qty: 90 TABLET | Refills: 0 | Status: SHIPPED | OUTPATIENT
Start: 2022-11-07 | End: 2023-03-01 | Stop reason: SDUPTHER

## 2022-11-07 NOTE — PROGRESS NOTES
Outpatient Psychiatry Follow-Up Visit (MD/NP)    11/7/2022    The patient location is: son's home; [place]  The chief complaint leading to consultation is: depression, stress    Visit type: audiovisual    Face to Face time with patient: 25 minutes  35 minutes of total time spent on the encounter, which includes face to face time and non-face to face time preparing to see the patient (eg, review of tests), Obtaining and/or reviewing separately obtained history, Documenting clinical information in the electronic or other health record, Independently interpreting results (not separately reported) and communicating results to the patient/family/caregiver, or Care coordination (not separately reported).         Each patient to whom he or she provides medical services by telemedicine is:  (1) informed of the relationship between the physician and patient and the respective role of any other health care provider with respect to management of the patient; and (2) notified that he or she may decline to receive medical services by telemedicine and may withdraw from such care at any time.      Clinical Status of Patient:  Outpatient (Ambulatory)    Chief Complaint:  Mary Ellen Fajrado is a 66 y.o. female who presents today for follow-up of depression, mood disorder and anxiety.  Met with patient.      Interval History and Content of Current Session:  Interim Events/Subjective Report/Content of Current Session: Patient Mary Ellen Fajardo presents to clinic for follow up.  She stopped the sertraline because it was causing diarrhea.  Wears Depends.  Getting good sleep as long as she takes quetiapine.  She does feel that her depression and anxiety were better when she was taking the sertraline for that short while.  Mentions that maybe the sertraline was contributing to the insomnia.  Asking for another medication to replace the sertraline that would hopefully not cause diarrhea.      Prior meds include: fluoxetine,  Lexapro, Wellbutrin, olanzapine, sertraline, and trazodone.    Psychotherapy:  Target symptoms: depression, anxiety , mood swings  Why chosen therapy is appropriate versus another modality: relevant to diagnosis  Outcome monitoring methods: self-report, observation  Therapeutic intervention type: supportive psychotherapy  Topics discussed/themes: stress related to medical comorbidities, building skills sets for symptom management, symptom recognition  The patient's response to the intervention is accepting. The patient's progress toward treatment goals is limited.   Duration of intervention: 15 minutes.    Review of Systems   PSYCHIATRIC: Pertinant items are noted in the narrative.  CONSTITUTIONAL: No weight gain or loss.   MUSCULOSKELETAL: Positive for pain.  NEUROLOGIC: Positive for numbness and limb weakness.  RESPIRATORY: No shortness of breath.  CARDIOVASCULAR: No tachycardia or chest pain.  GASTROINTESTINAL: No nausea, vomiting, pain, constipation or diarrhea.    Past Medical, Family and Social History: The patient's past medical, family and social history have been reviewed and updated as appropriate within the electronic medical record - see encounter notes.    Compliance: yes    Side effects: None    Risk Parameters:  Patient reports no suicidal ideation  Patient reports no homicidal ideation  Patient reports no self-injurious behavior  Patient reports no violent behavior    Exam (detailed: at least 9 elements; comprehensive: all 15 elements)   Constitutional  Vitals:  Most recent vital signs, dated less than 90 days prior to this appointment, were reviewed.   There were no vitals filed for this visit.     General:  age appropriate, obese     Musculoskeletal  Muscle Strength/Tone:  no tremor, no tic   Gait & Station:  not observed; video visit     Psychiatric  Speech:  no latency; no press   Mood & Affect:  anxious, dysthymic  blunted   Thought Process:  normal and logical   Associations:  intact   Thought  Content:  normal, no suicidality, no homicidality, delusions, or paranoia   Insight:  has awareness of illness   Judgement: limited   Orientation:  person, place, situation, time/date   Memory: intact for content of interview   Language: able to name, able to repeat   Attention Span & Concentration:  able to focus   Fund of Knowledge:  intact and appropriate to age and level of education     Assessment and Diagnosis   Status/Progress: Based on the examination today, the patient's problem(s) is/are adequately but not ideally controlled.  New problems have been presented today.   Co-morbidities are complicating management of the primary condition.  There are no active rule-out diagnoses for this patient at this time.     General Impression: We will continue pharmacological intervention and adjunctive therapy.       ICD-10-CM ICD-9-CM   1. Bipolar 1 disorder, depressed, partial remission  F31.75 296.55   2. Generalized anxiety disorder  F41.1 300.02   3. Debility  R53.81 799.3       Intervention/Counseling/Treatment Plan   Medication Management: Continue current medications. The risks and benefits of medication were discussed with the patient.  Counseling provided with patient as follows: importance of compliance with chosen treatment options was emphasized, risks and benefits of treatment options, including medications, were discussed with the patient, risk factor reduction, prognosis, patient education, instructions for  management, treatment and follow-up were reviewed  1.  Continue quetiapine 100 mg nightly for bipolar depression and may also help with sleep.  Warned of risk of TD, EPS, metabolic syndrome.    2.  Start citalopram 10 mg daily targeting depression and motivation.  Warned of risk of lucas, suicidality, serotonin syndrome.  3.  Continue hydryoxyzine 25 mg PO BID PRN anxiety.  Warned of risk of oversedation.  4.  Patient may have to consider her care and abilities.  She may need SNF/rehab vs nursing  home placement.  She is under a lot of psychosocial stress.       Return to Clinic: 6 weeks, as needed

## 2022-11-07 NOTE — PATIENT INSTRUCTIONS

## 2022-11-25 NOTE — PROGRESS NOTES
Patient seen and examined at bedside this AM      cxs no growth from joint aspirate    Had discussion with patient that she is a poor surgical candidate for any revision of her knee as she is essentially bedbound    PE:  aaox3  No drainage today on wound    Leg knee with limited ROM due to pain. She has a well healed surgical incision. The scar is hyperpigmented but not longer erythematous.     Informed her that should the need arise for source control for sepsis we would be available to consider surgery on her leg including an AKA if needed as she has a stemmed prosthesis and is non-ambulatory    Will follow     No

## 2023-01-13 ENCOUNTER — HOSPITAL ENCOUNTER (EMERGENCY)
Facility: HOSPITAL | Age: 68
Discharge: HOME | End: 2023-01-13
Attending: EMERGENCY MEDICINE
Payer: MEDICARE

## 2023-01-13 VITALS
DIASTOLIC BLOOD PRESSURE: 57 MMHG | TEMPERATURE: 99 F | RESPIRATION RATE: 20 BRPM | BODY MASS INDEX: 28.37 KG/M2 | SYSTOLIC BLOOD PRESSURE: 121 MMHG | HEART RATE: 70 BPM | OXYGEN SATURATION: 99 % | WEIGHT: 170.5 LBS

## 2023-01-13 DIAGNOSIS — N39.0 ACUTE LOWER UTI: Primary | ICD-10-CM

## 2023-01-13 DIAGNOSIS — R07.9 CHEST PAIN: ICD-10-CM

## 2023-01-13 DIAGNOSIS — R51.9 NONINTRACTABLE HEADACHE, UNSPECIFIED CHRONICITY PATTERN, UNSPECIFIED HEADACHE TYPE: ICD-10-CM

## 2023-01-13 LAB
ALBUMIN SERPL BCP-MCNC: 3.1 G/DL (ref 3.5–5.2)
ALP SERPL-CCNC: 79 U/L (ref 55–135)
ALT SERPL W/O P-5'-P-CCNC: 7 U/L (ref 10–44)
ANION GAP SERPL CALC-SCNC: 12 MMOL/L (ref 8–16)
AST SERPL-CCNC: 13 U/L (ref 10–40)
BACTERIA #/AREA URNS HPF: ABNORMAL /HPF
BASOPHILS # BLD AUTO: 0.02 K/UL (ref 0–0.2)
BASOPHILS NFR BLD: 0.2 % (ref 0–1.9)
BILIRUB SERPL-MCNC: 0.4 MG/DL (ref 0.1–1)
BILIRUB UR QL STRIP: NEGATIVE
BUN SERPL-MCNC: 22 MG/DL (ref 8–23)
CALCIUM SERPL-MCNC: 9.2 MG/DL (ref 8.7–10.5)
CHLORIDE SERPL-SCNC: 98 MMOL/L (ref 95–110)
CLARITY UR: ABNORMAL
CO2 SERPL-SCNC: 30 MMOL/L (ref 23–29)
COLOR UR: ABNORMAL
CREAT SERPL-MCNC: 1.1 MG/DL (ref 0.5–1.4)
DIFFERENTIAL METHOD: ABNORMAL
EOSINOPHIL # BLD AUTO: 0.1 K/UL (ref 0–0.5)
EOSINOPHIL NFR BLD: 1.5 % (ref 0–8)
ERYTHROCYTE [DISTWIDTH] IN BLOOD BY AUTOMATED COUNT: 16.1 % (ref 11.5–14.5)
EST. GFR  (NO RACE VARIABLE): 55 ML/MIN/1.73 M^2
GLUCOSE SERPL-MCNC: 93 MG/DL (ref 70–110)
GLUCOSE UR QL STRIP: NEGATIVE
HCT VFR BLD AUTO: 31.5 % (ref 37–48.5)
HGB BLD-MCNC: 9.6 G/DL (ref 12–16)
HGB UR QL STRIP: ABNORMAL
HIV 1+2 AB+HIV1 P24 AG SERPL QL IA: NEGATIVE
HYALINE CASTS #/AREA URNS LPF: 0 /LPF
IMM GRANULOCYTES # BLD AUTO: 0.02 K/UL (ref 0–0.04)
IMM GRANULOCYTES NFR BLD AUTO: 0.2 % (ref 0–0.5)
KETONES UR QL STRIP: ABNORMAL
LEUKOCYTE ESTERASE UR QL STRIP: ABNORMAL
LYMPHOCYTES # BLD AUTO: 1.9 K/UL (ref 1–4.8)
LYMPHOCYTES NFR BLD: 22.4 % (ref 18–48)
MCH RBC QN AUTO: 25 PG (ref 27–31)
MCHC RBC AUTO-ENTMCNC: 30.5 G/DL (ref 32–36)
MCV RBC AUTO: 82 FL (ref 82–98)
MICROSCOPIC COMMENT: ABNORMAL
MONOCYTES # BLD AUTO: 0.9 K/UL (ref 0.3–1)
MONOCYTES NFR BLD: 10.3 % (ref 4–15)
NEUTROPHILS # BLD AUTO: 5.6 K/UL (ref 1.8–7.7)
NEUTROPHILS NFR BLD: 65.4 % (ref 38–73)
NITRITE UR QL STRIP: NEGATIVE
NRBC BLD-RTO: 0 /100 WBC
PH UR STRIP: 7 [PH] (ref 5–8)
PLATELET # BLD AUTO: 272 K/UL (ref 150–450)
PMV BLD AUTO: 9.2 FL (ref 9.2–12.9)
POTASSIUM SERPL-SCNC: 3.5 MMOL/L (ref 3.5–5.1)
PROT SERPL-MCNC: 8.5 G/DL (ref 6–8.4)
PROT UR QL STRIP: ABNORMAL
RBC # BLD AUTO: 3.84 M/UL (ref 4–5.4)
RBC #/AREA URNS HPF: >100 /HPF (ref 0–4)
SODIUM SERPL-SCNC: 140 MMOL/L (ref 136–145)
SP GR UR STRIP: 1.01 (ref 1–1.03)
SQUAMOUS #/AREA URNS HPF: 3 /HPF
TROPONIN I SERPL DL<=0.01 NG/ML-MCNC: <0.006 NG/ML (ref 0–0.03)
URATE CRY URNS QL MICRO: ABNORMAL
URN SPEC COLLECT METH UR: ABNORMAL
UROBILINOGEN UR STRIP-ACNC: ABNORMAL EU/DL
WBC # BLD AUTO: 8.57 K/UL (ref 3.9–12.7)
WBC #/AREA URNS HPF: >100 /HPF (ref 0–5)
WBC CLUMPS URNS QL MICRO: ABNORMAL

## 2023-01-13 PROCEDURE — 93010 EKG 12-LEAD: ICD-10-PCS | Mod: ,,, | Performed by: INTERNAL MEDICINE

## 2023-01-13 PROCEDURE — 25000003 PHARM REV CODE 250: Performed by: EMERGENCY MEDICINE

## 2023-01-13 PROCEDURE — 96365 THER/PROPH/DIAG IV INF INIT: CPT

## 2023-01-13 PROCEDURE — 93010 ELECTROCARDIOGRAM REPORT: CPT | Mod: ,,, | Performed by: INTERNAL MEDICINE

## 2023-01-13 PROCEDURE — 63600175 PHARM REV CODE 636 W HCPCS: Performed by: EMERGENCY MEDICINE

## 2023-01-13 PROCEDURE — 85025 COMPLETE CBC W/AUTO DIFF WBC: CPT | Performed by: EMERGENCY MEDICINE

## 2023-01-13 PROCEDURE — 87086 URINE CULTURE/COLONY COUNT: CPT | Performed by: EMERGENCY MEDICINE

## 2023-01-13 PROCEDURE — 84484 ASSAY OF TROPONIN QUANT: CPT | Performed by: EMERGENCY MEDICINE

## 2023-01-13 PROCEDURE — 99285 EMERGENCY DEPT VISIT HI MDM: CPT | Mod: 25

## 2023-01-13 PROCEDURE — 80053 COMPREHEN METABOLIC PANEL: CPT | Performed by: EMERGENCY MEDICINE

## 2023-01-13 PROCEDURE — 81000 URINALYSIS NONAUTO W/SCOPE: CPT | Performed by: EMERGENCY MEDICINE

## 2023-01-13 PROCEDURE — 87389 HIV-1 AG W/HIV-1&-2 AB AG IA: CPT | Performed by: EMERGENCY MEDICINE

## 2023-01-13 PROCEDURE — 93005 ELECTROCARDIOGRAM TRACING: CPT

## 2023-01-13 RX ORDER — LEVOFLOXACIN 750 MG/1
750 TABLET ORAL
Status: COMPLETED | OUTPATIENT
Start: 2023-01-13 | End: 2023-01-13

## 2023-01-13 RX ORDER — LEVOFLOXACIN 500 MG/1
500 TABLET, FILM COATED ORAL DAILY
Qty: 5 TABLET | Refills: 0 | OUTPATIENT
Start: 2023-01-13 | End: 2023-01-16

## 2023-01-13 RX ADMIN — PIPERACILLIN SODIUM AND TAZOBACTAM SODIUM 4.5 G: 4; .5 INJECTION, POWDER, LYOPHILIZED, FOR SOLUTION INTRAVENOUS at 06:01

## 2023-01-13 RX ADMIN — LEVOFLOXACIN 750 MG: 750 TABLET, FILM COATED ORAL at 08:01

## 2023-01-13 NOTE — PROGRESS NOTES
Pharmacist Renal Dose Adjustment Note    Mary Ellen Fajardo is a 67 y.o. female being treated with the medication levaquin    Patient Data:    Vital Signs (Most Recent):  Temp: 99.3 °F (37.4 °C) (01/13/23 0056)  Pulse: 81 (01/13/23 0633)  Resp: (!) 21 (01/13/23 0633)  BP: 106/62 (01/13/23 0633)  SpO2: 95 % (01/13/23 0633)   Vital Signs (72h Range):  Temp:  [99.3 °F (37.4 °C)]   Pulse:  []   Resp:  [17-25]   BP: (106-127)/(62-84)   SpO2:  [91 %-99 %]      Recent Labs   Lab 01/13/23  0553   CREATININE 1.1     Creatinine clearance cannot be calculated (Unknown ideal weight.)    Medication:levaquin 500 mg po x 1 dose will be changed to medication:levaquin 750 mg po x 1 dose per renal dosing protocol.     Pharmacist's Name: Snehal Mosqueda [facility]  Pharmacist's Extension: [phone]

## 2023-01-13 NOTE — DISCHARGE INSTRUCTIONS
Take Levaquin as prescribed follow up with her doctor 1-2 days.  Return as needed for any worsening symptoms, problems, questions or concerns.

## 2023-01-13 NOTE — ED PROVIDER NOTES
SCRIBE #1 NOTE: I, Omar Inman, am scribing for, and in the presence of, Ion Redd MD. I have scribed the HPI, ROS, and PEx    SCRIBE #2 NOTE: I, Yon Robert, am scribing for, and in the presence of,  Ayad Lopez Jr., MD. I have scribed the remaining portions of the note not scribed by Scribe #1.      History     Chief Complaint   Patient presents with    Headache     Pt offers multiple complaints. CO pressure in forehead, visual hallucinations, and blurred vision that started early yesterday. Pt also reports loss of strength in arms and not able to hold objects. CO chest pain but unable to describe. Freq hx UTI but not on abx w/in last week.     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 Pt agreeable to try with pre mediation with benadryl.          History of Present Illness     HPI    1/13/2023, 5:10 AM  History obtained from the patient      History of Present Illness: Mary Ellen Fajardo is a 67 y.o. female patient with a PMHx of CHF, COPD, asthma, HTN, suprapubic catheter who presents to the Emergency Department for evaluation of HA which onset gradually last night along with other complaints. Pt's states her HA began last night. She also c/o weakness and tingling in her BUE and CP that is described as a pressure and located midsternal and left sided. She also c/o suprapubic abdominal tenderness and a subjective fever. She notes she has a frequent hx of UTI. No mitigating or exacerbating factors reported. Patient denies any SOB, n/v/d, and all other sxs at this time. No further complaints or concerns at this time.       Arrival mode: Personal vehicle    PCP: Any Tran MD        Past Medical History:  Past Medical History:   Diagnosis Date    Abdominal hernia     Addiction to drug     Alcohol abuse     Anxiety      Arthritis     Asthma     Bipolar disorder     Bladder stones     CHF (congestive heart failure)     COPD (chronic obstructive pulmonary disease)     on home o2    CVA (cerebral vascular accident)     2012    Hallucination     Hepatitis C     treated and cured    History of blood clots     Hx of psychiatric care     Hypertension     Belen     Obese body habitus     On home oxygen therapy     ADEEL (obstructive sleep apnea)     ADEEL treated with BiPAP     Paraplegia     Paraplegic spinal paralysis     Psychiatric problem     Psychosis     AVH; Paranoia    Renal disorder     Requires assistance with activities of daily living (ADL)     SCI (spinal cord injury)     incomplete    Sleep difficulties     has sleep apnea and uses a Bi-PAP machine.    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    Substance abuse     Suprapubic catheter 03/15/2011    Therapy     Vaginal delivery     x1    Wheelchair dependence        Past Surgical History:  Past Surgical History:   Procedure Laterality Date    ABOVE-KNEE AMPUTATION Left 7/23/2019    Procedure: AMPUTATION, ABOVE KNEE;  Surgeon: Gordo Rodriguez MD;  Location: [facility] OR;  Service: Orthopedics;  Laterality: Left;    amputation Left 07/23/2019    above the knee    BACK SURGERY      bilateral knee replacement      BREAST BIOPSY      cysto/lithopaxy 2017      CYSTOSCOPY Right 1/8/2021    Procedure: CYSTOSCOPY, RIGHT OCCULER BALLOON CATHETER PLACEMENT;  Surgeon: RAMA Tinoco MD;  Location: [facility] OR;  Service: Urology;  Laterality: Right;    CYSTOSCOPY W/ LASER LITHOTRIPSY      JOINT REPLACEMENT      bilateral knee    PERCUTANEOUS NEPHROLITHOTOMY Right 1/8/2021    Procedure: NEPHROLITHOTOMY, PERCUTANEOUS;  Surgeon: RAMA Tinoco MD;  Location: [facility] OR;  Service: Urology;  Laterality: Right;  x3 rooms, or7, or9,or12  ATTEMPTED    RETROGRADE PYELOGRAPHY Right 1/8/2021    Procedure: PYELOGRAM, RETROGRADE;  Surgeon: RAMA Tinoco MD;  Location: [facility] OR;  Service: Urology;   Laterality: Right;    SUPRAPUBIC CYSTOSTOMY  03/15/2011    patient reports  replaced tubing on 10/27/19, at home    URETEROSCOPY Right 2021    Procedure: URETEROSCOPY;  Surgeon: RAMA Tinoco MD;  Location: [facility];  Service: Urology;  Laterality: Right;         Family History:  Family History   Problem Relation Age of Onset    Dementia Mother     Anxiety disorder Brother     Depression Brother        Social History:  Social History     Tobacco Use    Smoking status: Former     Packs/day: 0.50     Years: 25.00     Pack years: 12.50     Types: Cigarettes     Quit date:      Years since quittin.0    Smokeless tobacco: Never   Substance and Sexual Activity    Alcohol use: Not Currently     Comment: occasional    Drug use: Never     Comment: prescribed opioids at present for pain    Sexual activity: Not Currently     Partners: Male     Comment: since         Review of Systems     Review of Systems   Constitutional:  Positive for fever (subjective).   Cardiovascular:  Positive for chest pain (pressure; midsternal and left sided).   Gastrointestinal:  Positive for abdominal pain (suprapubic).   Neurological:  Positive for weakness (BUE), numbness (BUE) and headaches.   All other systems reviewed and are negative.     Physical Exam     Initial Vitals [23 0056]   BP Pulse Resp Temp SpO2   127/80 102 18 99.3 °F (37.4 °C) (!) 94 %      MAP       --          Physical Exam   Nursing Notes and Vital Signs Reviewed.  Constitutional: Patient is in no acute distress. Foul-smelling urine in room.   Head: Atraumatic. Normocephalic.  Eyes:  EOM intact. Conjunctivae are not pale. No scleral icterus.  ENT: Mucous membranes are moist.  Neck: Supple. Full ROM.  Cardiovascular: Regular rate. Regular rhythm. No murmurs, rubs, or gallops. Distal pulses are 2+ and symmetric.  Pulmonary/Chest: No respiratory distress. Clear to auscultation bilaterally. No wheezing or rales.  Abdominal: Soft and non-distended.   There is suprapubic tenderness. Pt has a suprapubic catheter. No rebound, guarding, or rigidity. Good bowel sounds.  Musculoskeletal: Moves all extremities. No obvious deformities. No edema.  Skin: Warm and dry.  Neurological:  Alert, awake, and appropriate.  Normal speech.  No acute focal neurological deficits are appreciated.  Psychiatric: Normal affect. Good eye contact. Appropriate in content.     ED Course   Procedures  ED Vital Signs:  Vitals:    01/13/23 0056 01/13/23 0138 01/13/23 0139 01/13/23 0202   BP: 127/80 114/74  117/84   Pulse: 102 97 95 91   Resp: 18 (!) 25  20   Temp: 99.3 °F (37.4 °C)      TempSrc: Oral      SpO2: (!) 94% (!) 91%  97%   Weight:  77.3 kg (170 lb 8 oz)      01/13/23 0332 01/13/23 0402 01/13/23 0633   BP: 122/64 120/71 106/62   Pulse: 86 85 81   Resp:  17 (!) 21   Temp:      TempSrc:      SpO2: 96% 99% 95%   Weight:          Abnormal Lab Results:  Labs Reviewed   URINALYSIS, REFLEX TO URINE CULTURE - Abnormal; Notable for the following components:       Result Value    Color, UA Orange (*)     Appearance, UA Cloudy (*)     Protein, UA 3+ (*)     Ketones, UA Trace (*)     Occult Blood UA 3+ (*)     Urobilinogen, UA 4.0-6.0 (*)     Leukocytes, UA 3+ (*)     All other components within normal limits    Narrative:     Specimen Source->Urine   CBC W/ AUTO DIFFERENTIAL - Abnormal; Notable for the following components:    RBC 3.84 (*)     Hemoglobin 9.6 (*)     Hematocrit 31.5 (*)     MCH 25.0 (*)     MCHC 30.5 (*)     RDW 16.1 (*)     All other components within normal limits   COMPREHENSIVE METABOLIC PANEL - Abnormal; Notable for the following components:    CO2 30 (*)     Total Protein 8.5 (*)     Albumin 3.1 (*)     ALT 7 (*)     eGFR 55 (*)     All other components within normal limits   URINALYSIS MICROSCOPIC - Abnormal; Notable for the following components:    RBC, UA >100 (*)     WBC, UA >100 (*)     WBC Clumps, UA Many (*)     Bacteria Many (*)     Uric Acid Gillian, UA Many (*)      All other components within normal limits    Narrative:     Specimen Source->Urine   CULTURE, URINE   HIV 1 / 2 ANTIBODY    Narrative:     Release to patient->Immediate   TROPONIN I        All Lab Results:  Results for orders placed or performed during the hospital encounter of 01/13/23   HIV 1/2 Ag/Ab (4th Gen)   Result Value Ref Range    HIV 1/2 Ag/Ab Negative Negative   Urinalysis, Reflex to Urine Culture Urine, Clean Catch    Specimen: Urine   Result Value Ref Range    Specimen UA Urine, Clean Catch     Color, UA Orange (A) Yellow, Straw, Rosario    Appearance, UA Cloudy (A) Clear    pH, UA 7.0 5.0 - 8.0    Specific Gravity, UA 1.015 1.005 - 1.030    Protein, UA 3+ (A) Negative    Glucose, UA Negative Negative    Ketones, UA Trace (A) Negative    Bilirubin (UA) Negative Negative    Occult Blood UA 3+ (A) Negative    Nitrite, UA Negative Negative    Urobilinogen, UA 4.0-6.0 (A) <2.0 EU/dL    Leukocytes, UA 3+ (A) Negative   CBC auto differential   Result Value Ref Range    WBC 8.57 3.90 - 12.70 K/uL    RBC 3.84 (L) 4.00 - 5.40 M/uL    Hemoglobin 9.6 (L) 12.0 - 16.0 g/dL    Hematocrit 31.5 (L) 37.0 - 48.5 %    MCV 82 82 - 98 fL    MCH 25.0 (L) 27.0 - 31.0 pg    MCHC 30.5 (L) 32.0 - 36.0 g/dL    RDW 16.1 (H) 11.5 - 14.5 %    Platelets 272 150 - 450 K/uL    MPV 9.2 9.2 - 12.9 fL    Immature Granulocytes 0.2 0.0 - 0.5 %    Gran # (ANC) 5.6 1.8 - 7.7 K/uL    Immature Grans (Abs) 0.02 0.00 - 0.04 K/uL    Lymph # 1.9 1.0 - 4.8 K/uL    Mono # 0.9 0.3 - 1.0 K/uL    Eos # 0.1 0.0 - 0.5 K/uL    Baso # 0.02 0.00 - 0.20 K/uL    nRBC 0 0 /100 WBC    Gran % 65.4 38.0 - 73.0 %    Lymph % 22.4 18.0 - 48.0 %    Mono % 10.3 4.0 - 15.0 %    Eosinophil % 1.5 0.0 - 8.0 %    Basophil % 0.2 0.0 - 1.9 %    Differential Method Automated    Comprehensive metabolic panel   Result Value Ref Range    Sodium 140 136 - 145 mmol/L    Potassium 3.5 3.5 - 5.1 mmol/L    Chloride 98 95 - 110 mmol/L    CO2 30 (H) 23 - 29 mmol/L    Glucose 93 70 - 110  mg/dL    BUN 22 8 - 23 mg/dL    Creatinine 1.1 0.5 - 1.4 mg/dL    Calcium 9.2 8.7 - 10.5 mg/dL    Total Protein 8.5 (H) 6.0 - 8.4 g/dL    Albumin 3.1 (L) 3.5 - 5.2 g/dL    Total Bilirubin 0.4 0.1 - 1.0 mg/dL    Alkaline Phosphatase 79 55 - 135 U/L    AST 13 10 - 40 U/L    ALT 7 (L) 10 - 44 U/L    Anion Gap 12 8 - 16 mmol/L    eGFR 55 (A) >60 mL/min/1.73 m^2   Troponin I   Result Value Ref Range    Troponin I <0.006 0.000 - 0.026 ng/mL   Urinalysis Microscopic   Result Value Ref Range    RBC, UA >100 (H) 0 - 4 /hpf    WBC, UA >100 (H) 0 - 5 /hpf    WBC Clumps, UA Many (A) None-Rare    Bacteria Many (A) None-Occ /hpf    Squam Epithel, UA 3 /hpf    Hyaline Casts, UA 0 0-1/lpf /lpf    Uric Acid Gillian, UA Many (A) None-Moderate    Microscopic Comment SEE COMMENT          Imaging Results:  Imaging Results              CT Head Without Contrast (Final result)  Result time 01/13/23 07:38:21      Final result by Eulogio Hector MD (01/13/23 07:38:21)                   Impression:      1. No acute intracranial findings.  2. Atrophy and chronic ischemic change.  3. No significant change since 10/29/2019.  ASPECTS score = 10    All CT scans at this facility are performed  using dose modulation techniques as appropriate to performed exam including the following:  automated exposure control; adjustment of mA and/or kV according to the patients size (this includes techniques or standardized protocols for targeted exams where dose is matched to indication/reason for exam: i.e. extremities or head);  iterative reconstruction technique.      Electronically signed by: Eulogio Hector  Date:    01/13/2023  Time:    07:38               Narrative:    EXAMINATION:  CT HEAD WITHOUT CONTRAST    CLINICAL HISTORY:  Headache, sudden, severe;.    TECHNIQUE:  Low dose axial images were obtained through the head.  Coronal and sagittal reformations were also performed. Contrast was not  administered.    COMPARISON:  10/29/2019    FINDINGS:  Midline structures are intact. .  Age-appropriate atrophy noted.  Mild low-density change in periventricular deep white matter compatible chronic ischemic microvascular disease.  Focal hypodensity left subinsular white matter could represent an old lacunar infarct or prominent perivascular space.  No change.    No intracranial hemorrhage,acute infarct, or suspicious intracranial lesion is identified.    Skull base and calvarium are normal. Visualized sinuses and mastoid air cells are clear.                                     The EKG was ordered, reviewed, and independently interpreted by the ED provider.  Interpretation time: 05:52:57  Rate: 78 BPM  Rhythm: normal sinus rhythm  Interpretation: Abnormal QRS-T angle, consider primary T wave abnormality. No STEMI.      The Emergency Provider reviewed the vital signs and test results, which are outlined above.     ED Discussion     6:00 AM: Dr. Redd transfers care of patient to Dr. Lopez pending imaging results.      8:11 AM: Reassessed pt at this time.  Pt states her condition has improved at this time. Discussed with pt all pertinent ED information and results. Discussed pt dx and plan of tx. Gave pt all f/u and return to the ED instructions. All questions and concerns were addressed at this time. Pt expresses understanding of information and instructions, and is comfortable with plan to discharge. Pt is stable for discharge.    I discussed with patient and/or family/caretaker that evaluation in the ED does not suggest any emergent or life threatening medical conditions requiring immediate intervention beyond what was provided in the ED, and I believe patient is safe for discharge.  Regardless, an unremarkable evaluation in the ED does not preclude the development or presence of a serious of life threatening condition. As such, patient was instructed to return immediately for any worsening or change in  current symptoms.     8:13 AM  67-year-old  female with indwelling Wolfe catheter foul-smelling urine presenting with headache and multiple other complaints.  All the labs were ordered by my preceding physician whom I took over for.  I have reviewed each result individually and I have reviewed the history and physical examination.  I have also reviewed the CT scan results.  Patient has a significant UTI though has a normal white count is afebrile.  Vital signs look okay as well.  I reviewed the prior physician's notes as well.  Due the patient's indwelling Wolfe and multiple medical conditions, we have considered admission however the patient does not desire this looks well has normal white count.  She is stable safe for discharge in my opinion.  Will start antibiotics with close follow-up.  She verbalized agreement understanding with all instructions seems reliable.  She is for discharge my opinion.     Medical Decision Making:   Clinical Tests:   Lab Tests: Ordered and Reviewed  Radiological Study: Ordered and Reviewed  Medical Tests: Ordered and Reviewed         ED Medication(s):  Medications   levoFLOXacin tablet 500 mg (has no administration in time range)   piperacillin-tazobactam (ZOSYN) 4.5 g in dextrose 5 % in water (D5W) 5 % 100 mL IVPB (MB+) (4.5 g Intravenous New Bag 1/13/23 0609)       New Prescriptions    LEVOFLOXACIN (LEVAQUIN) 500 MG TABLET    Take 1 tablet (500 mg total) by mouth once daily. for 7 days        Follow-up Information       Any Tran MD In 2 days.    Specialty: Internal Medicine  Contact information:  [address] [facility]  [address]  [facility]  [place] [address] [phone]                                 Scribe Attestation:   Scribe #1: I performed the above scribed service and the documentation accurately describes the services I performed. I attest to the accuracy of the note.     Attending:   Physician Attestation Statement for Scribe #1: Ion YUNG  MD Tramaine, personally performed the services described in this documentation, as scribed by Omar Inman, in my presence, and it is both accurate and complete.       Scribe Attestation:   Scribe #2: I performed the above scribed service and the documentation accurately describes the services I performed. I attest to the accuracy of the note.    Attending Attestation:           Physician Attestation for Scribe:    Physician Attestation Statement for Scribe #2: I, Ayad Lopez Jr., MD, reviewed documentation, as scribed by Yon Robert in my presence, and it is both accurate and complete. I also acknowledge and confirm the content of the note done by Scribe #1.         Clinical Impression       ICD-10-CM ICD-9-CM   1. Acute lower UTI  N39.0 599.0   2. Chest pain  R07.9 786.50   3. Nonintractable headache, unspecified chronicity pattern, unspecified headache type  R51.9 784.0       Disposition:   Disposition: Discharged  Condition: Stable       Ayad Lopez Jr., MD  01/13/23 0815

## 2023-01-15 LAB
BACTERIA UR CULT: NORMAL
BACTERIA UR CULT: NORMAL

## 2023-01-16 ENCOUNTER — HOSPITAL ENCOUNTER (EMERGENCY)
Facility: HOSPITAL | Age: 68
Discharge: HOME | End: 2023-01-16
Attending: EMERGENCY MEDICINE
Payer: MEDICARE

## 2023-01-16 VITALS
HEART RATE: 87 BPM | TEMPERATURE: 99 F | WEIGHT: 170.88 LBS | SYSTOLIC BLOOD PRESSURE: 120 MMHG | OXYGEN SATURATION: 97 % | RESPIRATION RATE: 16 BRPM | BODY MASS INDEX: 28.43 KG/M2 | DIASTOLIC BLOOD PRESSURE: 77 MMHG

## 2023-01-16 DIAGNOSIS — F41.9 ANXIETY: ICD-10-CM

## 2023-01-16 DIAGNOSIS — N39.0 URINARY TRACT INFECTION ASSOCIATED WITH INDWELLING URETHRAL CATHETER, SUBSEQUENT ENCOUNTER: ICD-10-CM

## 2023-01-16 DIAGNOSIS — R07.9 CHEST PAIN: Primary | ICD-10-CM

## 2023-01-16 DIAGNOSIS — T83.511D URINARY TRACT INFECTION ASSOCIATED WITH INDWELLING URETHRAL CATHETER, SUBSEQUENT ENCOUNTER: ICD-10-CM

## 2023-01-16 LAB
ALBUMIN SERPL BCP-MCNC: 3 G/DL (ref 3.5–5.2)
ALP SERPL-CCNC: 73 U/L (ref 55–135)
ALT SERPL W/O P-5'-P-CCNC: 6 U/L (ref 10–44)
ANION GAP SERPL CALC-SCNC: 13 MMOL/L (ref 8–16)
AST SERPL-CCNC: 14 U/L (ref 10–40)
BASOPHILS # BLD AUTO: 0.03 K/UL (ref 0–0.2)
BASOPHILS NFR BLD: 0.6 % (ref 0–1.9)
BILIRUB SERPL-MCNC: 0.2 MG/DL (ref 0.1–1)
BNP SERPL-MCNC: 104 PG/ML (ref 0–99)
BUN SERPL-MCNC: 19 MG/DL (ref 8–23)
CALCIUM SERPL-MCNC: 9.3 MG/DL (ref 8.7–10.5)
CHLORIDE SERPL-SCNC: 100 MMOL/L (ref 95–110)
CO2 SERPL-SCNC: 29 MMOL/L (ref 23–29)
CREAT SERPL-MCNC: 1.1 MG/DL (ref 0.5–1.4)
DIFFERENTIAL METHOD: ABNORMAL
EOSINOPHIL # BLD AUTO: 0.3 K/UL (ref 0–0.5)
EOSINOPHIL NFR BLD: 5 % (ref 0–8)
ERYTHROCYTE [DISTWIDTH] IN BLOOD BY AUTOMATED COUNT: 15.6 % (ref 11.5–14.5)
EST. GFR  (NO RACE VARIABLE): 55 ML/MIN/1.73 M^2
GLUCOSE SERPL-MCNC: 90 MG/DL (ref 70–110)
HCT VFR BLD AUTO: 31.4 % (ref 37–48.5)
HGB BLD-MCNC: 9.8 G/DL (ref 12–16)
IMM GRANULOCYTES # BLD AUTO: 0.02 K/UL (ref 0–0.04)
IMM GRANULOCYTES NFR BLD AUTO: 0.4 % (ref 0–0.5)
LYMPHOCYTES # BLD AUTO: 1.1 K/UL (ref 1–4.8)
LYMPHOCYTES NFR BLD: 21.5 % (ref 18–48)
MCH RBC QN AUTO: 25.4 PG (ref 27–31)
MCHC RBC AUTO-ENTMCNC: 31.2 G/DL (ref 32–36)
MCV RBC AUTO: 81 FL (ref 82–98)
MONOCYTES # BLD AUTO: 0.5 K/UL (ref 0.3–1)
MONOCYTES NFR BLD: 9.2 % (ref 4–15)
NEUTROPHILS # BLD AUTO: 3.3 K/UL (ref 1.8–7.7)
NEUTROPHILS NFR BLD: 63.3 % (ref 38–73)
NRBC BLD-RTO: 0 /100 WBC
PLATELET # BLD AUTO: 266 K/UL (ref 150–450)
PMV BLD AUTO: 8.8 FL (ref 9.2–12.9)
POTASSIUM SERPL-SCNC: 3.3 MMOL/L (ref 3.5–5.1)
PROT SERPL-MCNC: 8.3 G/DL (ref 6–8.4)
RBC # BLD AUTO: 3.86 M/UL (ref 4–5.4)
SODIUM SERPL-SCNC: 142 MMOL/L (ref 136–145)
TROPONIN I SERPL DL<=0.01 NG/ML-MCNC: 0.01 NG/ML (ref 0–0.03)
WBC # BLD AUTO: 5.21 K/UL (ref 3.9–12.7)

## 2023-01-16 PROCEDURE — 93010 EKG 12-LEAD: ICD-10-PCS | Mod: ,,, | Performed by: INTERNAL MEDICINE

## 2023-01-16 PROCEDURE — 83880 ASSAY OF NATRIURETIC PEPTIDE: CPT | Performed by: EMERGENCY MEDICINE

## 2023-01-16 PROCEDURE — 84484 ASSAY OF TROPONIN QUANT: CPT | Performed by: EMERGENCY MEDICINE

## 2023-01-16 PROCEDURE — 80053 COMPREHEN METABOLIC PANEL: CPT | Performed by: EMERGENCY MEDICINE

## 2023-01-16 PROCEDURE — 85025 COMPLETE CBC W/AUTO DIFF WBC: CPT | Performed by: EMERGENCY MEDICINE

## 2023-01-16 PROCEDURE — 93010 ELECTROCARDIOGRAM REPORT: CPT | Mod: ,,, | Performed by: INTERNAL MEDICINE

## 2023-01-16 PROCEDURE — 93005 ELECTROCARDIOGRAM TRACING: CPT

## 2023-01-16 PROCEDURE — 99285 EMERGENCY DEPT VISIT HI MDM: CPT | Mod: 25

## 2023-01-16 RX ORDER — NITROFURANTOIN 25; 75 MG/1; MG/1
100 CAPSULE ORAL 2 TIMES DAILY
Qty: 10 CAPSULE | Refills: 0 | Status: SHIPPED | OUTPATIENT
Start: 2023-01-16 | End: 2023-01-21

## 2023-01-16 NOTE — DISCHARGE INSTRUCTIONS
Please stop your Levaquin antibiotic as there is resistance to it on your urine culture.  Your new antibiotic has been sent to your pharmacy.

## 2023-01-16 NOTE — ED PROVIDER NOTES
"SCRIBE #1 NOTE: I, Stefany Katz, am scribing for, and in the presence of, Laisha Lewis MD. I have scribed the entire note.       History     Chief Complaint   Patient presents with    Dysuria     Pt states she doesn't "feel well" since recent UTI     Review of patient's allergies indicates:   Allergen Reactions    Tuberculin ppd Itching and Dermatitis     Patient has old scare that she claims is from TB PPD    Rocephin [ceftriaxone] Rash     Rash 2012? Pt agreeable to try with pre mediation with benadryl.          History of Present Illness     HPI    1/16/2023, 1:37 PM  History obtained from the patient      History of Present Illness: Mary Ellen Fajardo is a 67 y.o. female patient with a PMHx of asthma, CHF, CVA, HTN, renal disorder, and substance abuse who presents to the Emergency Department for evaluation of dysuria. Pt was recently diagnosed with a UTI and was prescribed Levaquin. She states that she is still having sxs. Pt also c/o pain to her R shoulder that began around 5:00AM and began to radiate down her R arm and up into her neck. Symptoms are constant and moderate in severity. No mitigating or exacerbating factors reported. Associated sxs include diaphoresis, CP, congestion, blurry vision, and a HA. Patient denies any SOB, cough, fever, chills, weakness, and all other sxs at this time. No further complaints or concerns at this time.       Arrival mode: Ambulance service    PCP: Any Tran MD        Past Medical History:  Past Medical History:   Diagnosis Date    Abdominal hernia     Addiction to drug     Alcohol abuse     Anxiety     Arthritis     Asthma     Bipolar disorder     Bladder stones     CHF (congestive heart failure)     COPD (chronic obstructive pulmonary disease)     on home o2    CVA (cerebral vascular accident)     2012    Hallucination     Hepatitis C     treated and cured    History of blood clots     Hx of psychiatric care     Hypertension     Belen     Obese body " habitus     On home oxygen therapy     ADEEL (obstructive sleep apnea)     ADEEL treated with BiPAP     Paraplegia     Paraplegic spinal paralysis     Psychiatric problem     Psychosis     AVH; Paranoia    Renal disorder     Requires assistance with activities of daily living (ADL)     SCI (spinal cord injury)     incomplete    Sleep difficulties     has sleep apnea and uses a Bi-PAP machine.    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    Substance abuse     Suprapubic catheter 03/15/2011    Therapy     Vaginal delivery     x1    Wheelchair dependence        Past Surgical History:  Past Surgical History:   Procedure Laterality Date    ABOVE-KNEE AMPUTATION Left 7/23/2019    Procedure: AMPUTATION, ABOVE KNEE;  Surgeon: Gordo Rodriguez MD;  Location: [facility] OR;  Service: Orthopedics;  Laterality: Left;    amputation Left 07/23/2019    above the knee    BACK SURGERY      bilateral knee replacement      BREAST BIOPSY      cysto/lithopaxy 2017      CYSTOSCOPY Right 1/8/2021    Procedure: CYSTOSCOPY, RIGHT OCCULER BALLOON CATHETER PLACEMENT;  Surgeon: RAMA Tinoco MD;  Location: [facility] OR;  Service: Urology;  Laterality: Right;    CYSTOSCOPY W/ LASER LITHOTRIPSY      JOINT REPLACEMENT      bilateral knee    PERCUTANEOUS NEPHROLITHOTOMY Right 1/8/2021    Procedure: NEPHROLITHOTOMY, PERCUTANEOUS;  Surgeon: RAMA Tinoco MD;  Location: [facility] OR;  Service: Urology;  Laterality: Right;  x3 rooms, or7, or9,or12  ATTEMPTED    RETROGRADE PYELOGRAPHY Right 1/8/2021    Procedure: PYELOGRAM, RETROGRADE;  Surgeon: RAMA Tinoco MD;  Location: [facility] OR;  Service: Urology;  Laterality: Right;    SUPRAPUBIC CYSTOSTOMY  03/15/2011    patient reports  replaced tubing on 10/27/19, at home    URETEROSCOPY Right 1/8/2021    Procedure: URETEROSCOPY;  Surgeon: RAMA Tinoco MD;  Location: [facility] OR;  Service: Urology;  Laterality: Right;         Family History:  Family History   Problem Relation Age of Onset    Dementia Mother      Anxiety disorder Brother     Depression Brother        Social History:  Social History     Tobacco Use    Smoking status: Former     Packs/day: 0.50     Years: 25.00     Pack years: 12.50     Types: Cigarettes     Quit date:      Years since quittin.0    Smokeless tobacco: Never   Substance and Sexual Activity    Alcohol use: Not Currently     Comment: occasional    Drug use: Never     Comment: prescribed opioids at present for pain    Sexual activity: Not Currently     Partners: Male     Comment: since         Review of Systems     Review of Systems   Constitutional:  Positive for diaphoresis. Negative for chills and fever.   HENT:  Positive for congestion. Negative for sore throat.    Eyes:  Positive for visual disturbance (Blurry Vision).   Respiratory:  Negative for cough and shortness of breath.    Cardiovascular:  Positive for chest pain.   Gastrointestinal:  Negative for nausea.   Genitourinary:  Positive for dysuria.   Musculoskeletal:  Positive for arthralgias (R shoulder), myalgias (R arm) and neck pain. Negative for back pain.   Skin:  Negative for rash.   Neurological:  Positive for headaches. Negative for weakness.   Hematological:  Does not bruise/bleed easily.   All other systems reviewed and are negative.     Physical Exam     Initial Vitals [23 1325]   BP Pulse Resp Temp SpO2   120/77 87 16 98.5 °F (36.9 °C) 97 %      MAP       --          Physical Exam  Nursing Notes and Vital Signs Reviewed.  Constitutional: Patient is in no acute distress. Chronically debilitated.   Head: Atraumatic. Normocephalic.  Eyes: PERRL. EOM intact. Conjunctivae are not pale. No scleral icterus.  ENT: Mucous membranes are moist. Oropharynx is clear and symmetric.    Neck: Supple. Full ROM. No lymphadenopathy.  Cardiovascular: Regular rate. Regular rhythm. No murmurs, rubs, or gallops. Distal pulses are 2+ and symmetric.  Pulmonary/Chest: No respiratory distress. Clear to auscultation bilaterally. No  wheezing or rales.  Abdominal: Soft and non-distended.  There is no tenderness.  No rebound, guarding, or rigidity.   Genitourinary: Indwelling delcid catheter in place with cloudy urine noted.   Musculoskeletal: Moves all extremities. Left BKA. No edema.   Skin: Warm and dry.  Neurological:  Alert, awake, and appropriate.  Normal speech.  No acute focal neurological deficits are appreciated.  Psychiatric: Normal affect. Good eye contact. Appropriate in content.     ED Course   Procedures  ED Vital Signs:  Vitals:    01/16/23 1325   BP: 120/77   Pulse: 87   Resp: 16   Temp: 98.5 °F (36.9 °C)   TempSrc: Oral   SpO2: 97%       Abnormal Lab Results:  Labs Reviewed   CBC W/ AUTO DIFFERENTIAL - Abnormal; Notable for the following components:       Result Value    RBC 3.86 (*)     Hemoglobin 9.8 (*)     Hematocrit 31.4 (*)     MCV 81 (*)     MCH 25.4 (*)     MCHC 31.2 (*)     RDW 15.6 (*)     MPV 8.8 (*)     All other components within normal limits   COMPREHENSIVE METABOLIC PANEL - Abnormal; Notable for the following components:    Potassium 3.3 (*)     Albumin 3.0 (*)     ALT 6 (*)     eGFR 55 (*)     All other components within normal limits   B-TYPE NATRIURETIC PEPTIDE - Abnormal; Notable for the following components:     (*)     All other components within normal limits   TROPONIN I        All Lab Results:  Results for orders placed or performed during the hospital encounter of 01/16/23   CBC auto differential   Result Value Ref Range    WBC 5.21 3.90 - 12.70 K/uL    RBC 3.86 (L) 4.00 - 5.40 M/uL    Hemoglobin 9.8 (L) 12.0 - 16.0 g/dL    Hematocrit 31.4 (L) 37.0 - 48.5 %    MCV 81 (L) 82 - 98 fL    MCH 25.4 (L) 27.0 - 31.0 pg    MCHC 31.2 (L) 32.0 - 36.0 g/dL    RDW 15.6 (H) 11.5 - 14.5 %    Platelets 266 150 - 450 K/uL    MPV 8.8 (L) 9.2 - 12.9 fL    Immature Granulocytes 0.4 0.0 - 0.5 %    Gran # (ANC) 3.3 1.8 - 7.7 K/uL    Immature Grans (Abs) 0.02 0.00 - 0.04 K/uL    Lymph # 1.1 1.0 - 4.8 K/uL    Mono # 0.5  0.3 - 1.0 K/uL    Eos # 0.3 0.0 - 0.5 K/uL    Baso # 0.03 0.00 - 0.20 K/uL    nRBC 0 0 /100 WBC    Gran % 63.3 38.0 - 73.0 %    Lymph % 21.5 18.0 - 48.0 %    Mono % 9.2 4.0 - 15.0 %    Eosinophil % 5.0 0.0 - 8.0 %    Basophil % 0.6 0.0 - 1.9 %    Differential Method Automated    Comprehensive metabolic panel   Result Value Ref Range    Sodium 142 136 - 145 mmol/L    Potassium 3.3 (L) 3.5 - 5.1 mmol/L    Chloride 100 95 - 110 mmol/L    CO2 29 23 - 29 mmol/L    Glucose 90 70 - 110 mg/dL    BUN 19 8 - 23 mg/dL    Creatinine 1.1 0.5 - 1.4 mg/dL    Calcium 9.3 8.7 - 10.5 mg/dL    Total Protein 8.3 6.0 - 8.4 g/dL    Albumin 3.0 (L) 3.5 - 5.2 g/dL    Total Bilirubin 0.2 0.1 - 1.0 mg/dL    Alkaline Phosphatase 73 55 - 135 U/L    AST 14 10 - 40 U/L    ALT 6 (L) 10 - 44 U/L    Anion Gap 13 8 - 16 mmol/L    eGFR 55 (A) >60 mL/min/1.73 m^2   Troponin I #1   Result Value Ref Range    Troponin I 0.010 0.000 - 0.026 ng/mL   BNP   Result Value Ref Range     (H) 0 - 99 pg/mL         Imaging Results:  Imaging Results              X-Ray Chest AP Portable (Final result)  Result time 01/16/23 14:13:44      Final result by Red Godinez MD (01/16/23 14:13:44)                   Impression:      1.  Negative for acute process involving the chest.    2.  Stable findings as noted above.      Electronically signed by: Red Godinez MD  Date:    01/16/2023  Time:    14:13               Narrative:    EXAMINATION:  XR CHEST AP PORTABLE    CLINICAL HISTORY:  Chest Pain;    COMPARISON:  Studies dating back to January 8, 2021    FINDINGS:  The study is lordotic in position.  Oxygen tubing overlies.  The lungs are clear. The cardiac silhouette size is enlarged.  The trachea is midline and the mediastinal width is normal. Negative for focal infiltrate, effusion or pneumothorax. Pulmonary vasculature is normal. Negative for osseous abnormalities. Stable ectatic and tortuous aorta as well as marked degenerative changes of the spine and both  shoulder girdles.                                       The EKG was ordered, reviewed, and independently interpreted by the ED provider.  Interpretation time: 14:19  Rate: 62 BPM  Rhythm: normal sinus rhythm  Interpretation: Minimal voltage criteria for LVH, may be normal variant. No STEMI.             The Emergency Provider reviewed the vital signs and test results, which are outlined above.     ED Discussion       2:59 PM: Reassessed pt at this time. Discussed with pt all pertinent ED information and results. Discussed pt dx and plan of tx. Gave pt all f/u and return to the ED instructions. All questions and concerns were addressed at this time. Pt expresses understanding of information and instructions, and is comfortable with plan to discharge. Pt is stable for discharge.    I discussed with patient and/or family/caretaker that evaluation in the ED does not suggest any emergent or life threatening medical conditions requiring immediate intervention beyond what was provided in the ED, and I believe patient is safe for discharge.  Regardless, an unremarkable evaluation in the ED does not preclude the development or presence of a serious of life threatening condition. As such, patient was instructed to return immediately for any worsening or change in current symptoms.         Medical Decision Making:   History:   Old Medical Records: I decided to obtain old medical records.  Old Records Summarized: records from clinic visits and records from previous admission(s).       <> Summary of Records: Reviewed urine culture from 3 days ago. The culture showed that it was resistant to Levaquin. Will change abx.   Differential Diagnosis:   1. Sepsis  2. Acs  3. Dehydration  4. PE  Clinical Tests:   Lab Tests: Ordered and Reviewed  Radiological Study: Ordered and Reviewed  Medical Tests: Ordered and Reviewed  ED Management:  Patient with mult chronic medical problems, chf, copd, chronic resp failure, left bka, chronic pain,  presents with vague right sided chest and shoulder pain, vital signs reviewed and normal, ECG reviewed and normal, exam otherwise unremarkable (indwelling delcid noted), cardiac workup done including lab work and imaging, lab work and imaging reviewed all normal, reassurance provided, patient stable for discharge home, antibiotics changed from Levaquin to Macrobid and sent to pharmacy, patient told to stop Levaquin due to urine resistance.          ED Medication(s):  Medications - No data to display    New Prescriptions    NITROFURANTOIN, MACROCRYSTAL-MONOHYDRATE, (MACROBID) 100 MG CAPSULE    Take 1 capsule (100 mg total) by mouth 2 (two) times daily. for 5 days        Follow-up Information       Any Tran MD. Schedule an appointment as soon as possible for a visit in 2 days.    Specialty: Internal Medicine  Why: Return to the Emergency Room, If symptoms worsen  Contact information:  [address] [facility]  [address]  [facility]  [place] [address]  [phone]                                 Scribe Attestation:   Scribe #1: I performed the above scribed service and the documentation accurately describes the services I performed. I attest to the accuracy of the note.     Attending:   Physician Attestation Statement for Scribe #1: I, Laisha Lewis MD, personally performed the services described in this documentation, as scribed by Stefany Katz, in my presence, and it is both accurate and complete.           Clinical Impression       ICD-10-CM ICD-9-CM   1. Chest pain  R07.9 786.50   2. Anxiety  F41.9 300.00   3. Urinary tract infection associated with indwelling urethral catheter, subsequent encounter  T83.511D V58.89    N39.0 996.64     599.0       Disposition:   Disposition: Discharged  Condition: Stable       Laisha Lewis MD  01/16/23 [id number]

## 2023-02-13 ENCOUNTER — TELEPHONE (OUTPATIENT)
Dept: PSYCHIATRY | Facility: CLINIC | Age: 68
End: 2023-02-13

## 2023-02-13 NOTE — TELEPHONE ENCOUNTER
"Pt called and lvm requesting call back. Returned call- pt stated she saw on tv something called "T.D" and is requesting Dr. Young to rx it for her "tremors" that happen every couple of months. Made pt aware that is more likely a neurology question, and Dr. Young would most likely not rx it however msg will be sent.     No concerns voiced.   "

## 2023-02-28 ENCOUNTER — PATIENT MESSAGE (OUTPATIENT)
Dept: PSYCHIATRY | Facility: CLINIC | Age: 68
End: 2023-02-28

## 2023-03-01 DIAGNOSIS — F41.1 GENERALIZED ANXIETY DISORDER: ICD-10-CM

## 2023-03-01 RX ORDER — QUETIAPINE FUMARATE 100 MG/1
100 TABLET, FILM COATED ORAL NIGHTLY
Qty: 90 TABLET | Refills: 0 | Status: SHIPPED | OUTPATIENT
Start: 2023-03-01 | End: 2023-03-02 | Stop reason: SDUPTHER

## 2023-03-01 RX ORDER — CITALOPRAM 10 MG/1
10 TABLET ORAL DAILY
Qty: 90 TABLET | Refills: 0 | Status: SHIPPED | OUTPATIENT
Start: 2023-03-01 | End: 2023-03-02 | Stop reason: SDUPTHER

## 2023-03-01 RX ORDER — HYDROXYZINE HYDROCHLORIDE 25 MG/1
25 TABLET, FILM COATED ORAL 2 TIMES DAILY PRN
Qty: 180 TABLET | Refills: 0 | Status: SHIPPED | OUTPATIENT
Start: 2023-03-01 | End: 2023-03-02 | Stop reason: SDUPTHER

## 2023-03-02 DIAGNOSIS — F41.1 GENERALIZED ANXIETY DISORDER: ICD-10-CM

## 2023-03-02 RX ORDER — QUETIAPINE FUMARATE 100 MG/1
100 TABLET, FILM COATED ORAL NIGHTLY
Qty: 90 TABLET | Refills: 0 | Status: SHIPPED | OUTPATIENT
Start: 2023-03-02 | End: 2023-04-14 | Stop reason: SDUPTHER

## 2023-03-02 RX ORDER — CITALOPRAM 10 MG/1
10 TABLET ORAL DAILY
Qty: 90 TABLET | Refills: 0 | Status: SHIPPED | OUTPATIENT
Start: 2023-03-02 | End: 2023-04-14 | Stop reason: SDUPTHER

## 2023-03-02 RX ORDER — HYDROXYZINE HYDROCHLORIDE 25 MG/1
25 TABLET, FILM COATED ORAL 2 TIMES DAILY PRN
Qty: 180 TABLET | Refills: 0 | Status: SHIPPED | OUTPATIENT
Start: 2023-03-02 | End: 2023-07-26 | Stop reason: SDUPTHER

## 2023-03-02 NOTE — TELEPHONE ENCOUNTER
Pt called and stated her meds were sent to the wrong pharmacy. Pt is requesting all meds to be sent to [facility] on [address] [facility] in [place], [place].

## 2023-03-21 ENCOUNTER — TELEPHONE (OUTPATIENT)
Dept: PSYCHIATRY | Facility: CLINIC | Age: 68
End: 2023-03-21

## 2023-03-21 NOTE — TELEPHONE ENCOUNTER
Pt called after hours yesterday to make NP appt w/ farhana virtually as Dr. Young left. Returned call this morning, no answer, lvm requesting call back to get her set up w/ Farhana. Office number, contact name provided.

## 2023-04-03 ENCOUNTER — TELEPHONE (OUTPATIENT)
Dept: PSYCHIATRY | Facility: CLINIC | Age: 68
End: 2023-04-03
Payer: MEDICARE

## 2023-04-03 NOTE — TELEPHONE ENCOUNTER
Pt called during lunch, lvm requesting call back to further discuss making appt w/ farhana. Returned call, appt made w/ pt w/ Farhana for 4/14. No concerns voiced.

## 2023-04-14 ENCOUNTER — OFFICE VISIT (OUTPATIENT)
Dept: PSYCHIATRY | Facility: CLINIC | Age: 68
End: 2023-04-14
Payer: MEDICARE

## 2023-04-14 DIAGNOSIS — F25.0 SCHIZOAFFECTIVE DISORDER, BIPOLAR TYPE: ICD-10-CM

## 2023-04-14 DIAGNOSIS — F41.1 GENERALIZED ANXIETY DISORDER: Primary | ICD-10-CM

## 2023-04-14 DIAGNOSIS — F43.10 PTSD (POST-TRAUMATIC STRESS DISORDER): ICD-10-CM

## 2023-04-14 PROCEDURE — 1160F RVW MEDS BY RX/DR IN RCRD: CPT | Mod: CPTII,95,, | Performed by: PHYSICIAN ASSISTANT

## 2023-04-14 PROCEDURE — 1159F PR MEDICATION LIST DOCUMENTED IN MEDICAL RECORD: ICD-10-PCS | Mod: CPTII,95,, | Performed by: PHYSICIAN ASSISTANT

## 2023-04-14 PROCEDURE — 90792 PR PSYCHIATRIC DIAGNOSTIC EVALUATION W/MEDICAL SERVICES: ICD-10-PCS | Mod: 95,,, | Performed by: PHYSICIAN ASSISTANT

## 2023-04-14 PROCEDURE — 1159F MED LIST DOCD IN RCRD: CPT | Mod: CPTII,95,, | Performed by: PHYSICIAN ASSISTANT

## 2023-04-14 PROCEDURE — 1160F PR REVIEW ALL MEDS BY PRESCRIBER/CLIN PHARMACIST DOCUMENTED: ICD-10-PCS | Mod: CPTII,95,, | Performed by: PHYSICIAN ASSISTANT

## 2023-04-14 PROCEDURE — 90792 PSYCH DIAG EVAL W/MED SRVCS: CPT | Mod: 95,,, | Performed by: PHYSICIAN ASSISTANT

## 2023-04-14 RX ORDER — CITALOPRAM 20 MG/1
20 TABLET, FILM COATED ORAL DAILY
Qty: 90 TABLET | Refills: 1 | Status: SHIPPED | OUTPATIENT
Start: 2023-04-14 | End: 2023-06-15 | Stop reason: SDUPTHER

## 2023-04-14 RX ORDER — QUETIAPINE FUMARATE 150 MG/1
150 TABLET, FILM COATED ORAL NIGHTLY
Qty: 90 TABLET | Refills: 1 | Status: SHIPPED | OUTPATIENT
Start: 2023-04-14 | End: 2023-05-15
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
## 2023-05-11 DIAGNOSIS — F41.1 GENERALIZED ANXIETY DISORDER: ICD-10-CM

## 2023-05-11 RX ORDER — HYDROXYZINE HYDROCHLORIDE 25 MG/1
TABLET, FILM COATED ORAL
Refills: 0 | OUTPATIENT
Start: 2023-05-11

## 2023-05-15 ENCOUNTER — OFFICE VISIT (OUTPATIENT)
Dept: PSYCHIATRY | Facility: CLINIC | Age: 68
End: 2023-05-15
Payer: COMMERCIAL

## 2023-05-15 DIAGNOSIS — F25.0 SCHIZOAFFECTIVE DISORDER, BIPOLAR TYPE: ICD-10-CM

## 2023-05-15 DIAGNOSIS — F41.1 GENERALIZED ANXIETY DISORDER: Primary | ICD-10-CM

## 2023-05-15 DIAGNOSIS — F43.10 PTSD (POST-TRAUMATIC STRESS DISORDER): ICD-10-CM

## 2023-05-15 PROCEDURE — 99214 PR OFFICE/OUTPT VISIT, EST, LEVL IV, 30-39 MIN: ICD-10-PCS | Mod: 95,,, | Performed by: PHYSICIAN ASSISTANT

## 2023-05-15 PROCEDURE — 99214 OFFICE O/P EST MOD 30 MIN: CPT | Mod: 95,,, | Performed by: PHYSICIAN ASSISTANT

## 2023-05-15 PROCEDURE — 1159F PR MEDICATION LIST DOCUMENTED IN MEDICAL RECORD: ICD-10-PCS | Mod: CPTII,95,, | Performed by: PHYSICIAN ASSISTANT

## 2023-05-15 PROCEDURE — 1160F RVW MEDS BY RX/DR IN RCRD: CPT | Mod: CPTII,95,, | Performed by: PHYSICIAN ASSISTANT

## 2023-05-15 PROCEDURE — 1159F MED LIST DOCD IN RCRD: CPT | Mod: CPTII,95,, | Performed by: PHYSICIAN ASSISTANT

## 2023-05-15 PROCEDURE — 1160F PR REVIEW ALL MEDS BY PRESCRIBER/CLIN PHARMACIST DOCUMENTED: ICD-10-PCS | Mod: CPTII,95,, | Performed by: PHYSICIAN ASSISTANT

## 2023-05-15 RX ORDER — QUETIAPINE FUMARATE 200 MG/1
200 TABLET, FILM COATED ORAL NIGHTLY
Qty: 90 TABLET | Refills: 1 | Status: SHIPPED | OUTPATIENT
Start: 2023-05-15 | End: 2023-11-11

## 2023-05-15 NOTE — PROGRESS NOTES
Outpatient Psychiatry Follow-Up Visit ([facility])    5/15/2023    The patient location is: Home at address on record  The chief complaint leading to consultation is: depression, stress    Visit type: audiovisual    Face to Face time with patient: 5 minutes  15 minutes of total time spent on the encounter, which includes face to face time and non-face to face time preparing to see the patient (eg, review of tests), Obtaining and/or reviewing separately obtained history, Documenting clinical information in the electronic or other health record, Independently interpreting results (not separately reported) and communicating results to the patient/family/caregiver, or Care coordination (not separately reported).         Each patient to whom he or she provides medical services by telemedicine is:  (1) informed of the relationship between the physician and patient and the respective role of any other health care provider with respect to management of the patient; and (2) notified that he or she may decline to receive medical services by telemedicine and may withdraw from such care at any time.      Clinical Status of Patient:  Outpatient (Ambulatory)    Chief Complaint:  Mary Ellen Fajardo is a 67 y.o. female who presents today for follow-up of depression, mood disorder and anxiety.  Met with patient.      Interval History and Content of Current Session:  Interim Events/Subjective Report/Content of Current Session: Patient Mary Ellen Fajardo presents for follow-up of generalized anxiety disorder, PTSD, schizoaffective disorder bipolar type.  She is currently taking quetiapine 150 mg q.h.s. and citalopram 20 mg daily.  Both increased last visit.  Patient states her anxiety is much better under control but she is still a little anxious, and likewise her sleep is doing well and her hallucinations are a little bit better but not eliminated.  Overall she is happy with her progress and would like to increase the doses of  her medications again.  Denies any side effects since increasing medications.      Review of Systems   PSYCHIATRIC: Pertinant items are noted in the narrative.  CONSTITUTIONAL: No weight gain or loss.   MUSCULOSKELETAL: Positive for pain.  NEUROLOGIC: Positive for numbness and limb weakness.  RESPIRATORY: No shortness of breath.  CARDIOVASCULAR: No tachycardia or chest pain.  GASTROINTESTINAL: No nausea, vomiting, pain, constipation or diarrhea.    Past Medical, Family and Social History: The patient's past medical, family and social history have been reviewed and updated as appropriate within the electronic medical record - see encounter notes.    Compliance: yes    Side effects: None    Risk Parameters:  Patient reports no suicidal ideation  Patient reports no homicidal ideation  Patient reports no self-injurious behavior  Patient reports no violent behavior    Exam (detailed: at least 9 elements; comprehensive: all 15 elements)   Constitutional  Vitals:  Most recent vital signs, dated less than 90 days prior to this appointment, were reviewed.   There were no vitals filed for this visit.     General:  age appropriate, obese     Musculoskeletal  Muscle Strength/Tone:  no tremor, no tic   Gait & Station:  not observed; video visit     Psychiatric  Speech:  no latency; no press   Mood & Affect:  steady, happy  blunted   Thought Process:  normal and logical   Associations:  intact   Thought Content:  normal, no suicidality, no homicidality, delusions, or paranoia   Insight:  has awareness of illness   Judgement: limited   Orientation:  person, place, situation, time/date   Memory: intact for content of interview   Language: able to name, able to repeat   Attention Span & Concentration:  able to focus   Fund of Knowledge:  intact and appropriate to age and level of education     Assessment and Diagnosis   Status/Progress: Based on the examination today, the patient's problem(s) is/are adequately but not ideally  controlled.  New problems have been presented today.   Co-morbidities are complicating management of the primary condition.  There are no active rule-out diagnoses for this patient at this time.     General Impression: Schizoaffective disorder bipolar type, STANLEY as previously diagnosed, and PTSD.  Anxiety improved with increase in citalopram to 20 mg daily and sleep and hallucinations improved since increasing quetiapine to 150 mg nightly.  We will increase both again slightly.      ICD-10-CM ICD-9-CM   1. Generalized anxiety disorder  F41.1 300.02   2. Schizoaffective disorder, bipolar type  F25.0 295.70   3. PTSD (post-traumatic stress disorder)  F43.10 309.81       Increasing quetiapine to 200 mg nightly.  I have explained the risks, benefits, and alternatives of medication treatment in detail. We specifically discussed risks and benefits of medication treatment, including but not limited to, headache, nausea, GI upset,  potential for metabolic syndrome: appetite stimulation, weight gain, dysregulation of blood sugar, diabetes, increase in lipids . Patient voices understanding and all questions have been answered. Patient agrees to treatment plan and medication trial.    Increasing citalopram to 3 200 0 mg nightly.  I have explained the risks, benefits, and alternatives of medication treatment in detail. We specifically discussed risks and benefits of medication treatment, including but not limited to, headache, nausea, GI upset, insomnia, agitation and possible increased thoughts of self harm. These thought will likely resolve with regular follow up and treatment. Patient voices understanding and all questions have been answered. Patient agrees to treatment plan and medication trial.  Educated on QT prolongation risk.  Continue hydroxyzine 25 mg TID PRN anxiety.  F/u 1m      Intervention/Counseling/Treatment Plan   Medication Management: Continue current medications. The risks and benefits of medication were  discussed with the patient.  Counseling provided with patient as follows: importance of compliance with chosen treatment options was emphasized, risks and benefits of treatment options, including medications, were discussed with the patient, risk factor reduction, prognosis, patient education, instructions for  management, treatment and follow-up were reviewed         Return to Clinic: 1 month

## 2023-06-15 ENCOUNTER — OFFICE VISIT (OUTPATIENT)
Dept: PSYCHIATRY | Facility: CLINIC | Age: 68
End: 2023-06-15
Payer: MEDICARE

## 2023-06-15 DIAGNOSIS — F25.0 SCHIZOAFFECTIVE DISORDER, BIPOLAR TYPE: ICD-10-CM

## 2023-06-15 DIAGNOSIS — F31.75 BIPOLAR 1 DISORDER, DEPRESSED, PARTIAL REMISSION: ICD-10-CM

## 2023-06-15 DIAGNOSIS — F41.1 GENERALIZED ANXIETY DISORDER: Primary | ICD-10-CM

## 2023-06-15 DIAGNOSIS — F43.10 PTSD (POST-TRAUMATIC STRESS DISORDER): ICD-10-CM

## 2023-06-15 PROCEDURE — 99214 PR OFFICE/OUTPT VISIT, EST, LEVL IV, 30-39 MIN: ICD-10-PCS | Mod: 95,,, | Performed by: PHYSICIAN ASSISTANT

## 2023-06-15 PROCEDURE — 1160F PR REVIEW ALL MEDS BY PRESCRIBER/CLIN PHARMACIST DOCUMENTED: ICD-10-PCS | Mod: CPTII,95,, | Performed by: PHYSICIAN ASSISTANT

## 2023-06-15 PROCEDURE — 1160F RVW MEDS BY RX/DR IN RCRD: CPT | Mod: CPTII,95,, | Performed by: PHYSICIAN ASSISTANT

## 2023-06-15 PROCEDURE — 99214 OFFICE O/P EST MOD 30 MIN: CPT | Mod: 95,,, | Performed by: PHYSICIAN ASSISTANT

## 2023-06-15 PROCEDURE — 1159F MED LIST DOCD IN RCRD: CPT | Mod: CPTII,95,, | Performed by: PHYSICIAN ASSISTANT

## 2023-06-15 PROCEDURE — 1159F PR MEDICATION LIST DOCUMENTED IN MEDICAL RECORD: ICD-10-PCS | Mod: CPTII,95,, | Performed by: PHYSICIAN ASSISTANT

## 2023-06-15 RX ORDER — CITALOPRAM 20 MG/1
30 TABLET, FILM COATED ORAL DAILY
Qty: 135 TABLET | Refills: 1 | Status: SHIPPED | OUTPATIENT
Start: 2023-06-15 | End: 2023-12-12

## 2023-06-15 NOTE — PROGRESS NOTES
Outpatient Psychiatry Follow-Up Visit ([facility])    6/15/2023    The patient location is: Home at address on record  The chief complaint leading to consultation is: depression, stress    Visit type: audiovisual    Face to Face time with patient: 6 minutes  15 minutes of total time spent on the encounter, which includes face to face time and non-face to face time preparing to see the patient (eg, review of tests), Obtaining and/or reviewing separately obtained history, Documenting clinical information in the electronic or other health record, Independently interpreting results (not separately reported) and communicating results to the patient/family/caregiver, or Care coordination (not separately reported).         Each patient to whom he or she provides medical services by telemedicine is:  (1) informed of the relationship between the physician and patient and the respective role of any other health care provider with respect to management of the patient; and (2) notified that he or she may decline to receive medical services by telemedicine and may withdraw from such care at any time.      Clinical Status of Patient:  Outpatient (Ambulatory)    Chief Complaint:  Mary Ellen Fajardo is a 67 y.o. female who presents today for follow-up of depression, mood disorder and anxiety.  Met with patient.      Interval History and Content of Current Session:  Interim Events/Subjective Report/Content of Current Session: Patient Mary Ellen Fajardo presents for follow-up of generalized anxiety disorder, PTSD, schizoaffective disorder bipolar type.  She is currently taking quetiapine 200 mg q.h.s. and citalopram 20 mg daily.  Last visit she was supposed to increase citalopram and quetiapine, but she only increased quetiapine.  Reports the hallucinations at night have stopped and she is now sleeping well.  She states that she got her citalopram pills wet a few days ago so is feeling very depressed and anxious.        Review  of Systems   PSYCHIATRIC: Pertinant items are noted in the narrative.  CONSTITUTIONAL: No weight gain or loss.   MUSCULOSKELETAL: Positive for pain.  NEUROLOGIC: Positive for numbness and limb weakness.  RESPIRATORY: No shortness of breath.  CARDIOVASCULAR: No tachycardia or chest pain.  GASTROINTESTINAL: No nausea, vomiting, pain, constipation or diarrhea.    Past Medical, Family and Social History: The patient's past medical, family and social history have been reviewed and updated as appropriate within the electronic medical record - see encounter notes.    Compliance: yes    Side effects: None    Risk Parameters:  Patient reports no suicidal ideation  Patient reports no homicidal ideation  Patient reports no self-injurious behavior  Patient reports no violent behavior    Exam (detailed: at least 9 elements; comprehensive: all 15 elements)   Constitutional  Vitals:  Most recent vital signs, dated less than 90 days prior to this appointment, were reviewed.   There were no vitals filed for this visit.     General:  age appropriate, obese     Musculoskeletal  Muscle Strength/Tone:  no tremor, no tic   Gait & Station:  not observed; video visit     Psychiatric  Speech:  no latency; no press   Mood & Affect:  steady, dysthymic  blunted   Thought Process:  normal and logical   Associations:  intact   Thought Content:  normal, no suicidality, no homicidality, delusions, or paranoia   Insight:  has awareness of illness   Judgement: limited   Orientation:  person, place, situation, time/date   Memory: intact for content of interview   Language: able to name, able to repeat   Attention Span & Concentration:  able to focus   Fund of Knowledge:  intact and appropriate to age and level of education     Assessment and Diagnosis   Status/Progress: Based on the examination today, the patient's problem(s) is/are adequately but not ideally controlled.  New problems have been presented today.   Co-morbidities are complicating  management of the primary condition.  There are no active rule-out diagnoses for this patient at this time.     General Impression: Schizoaffective disorder bipolar type, STANLEY as previously diagnosed, and PTSD.  Sleep and psychosis now well treated with quetiapine 200 mg qHS, anxiety still bothersome on citalopram 20 mg daily, and even worse the past few days since she wasn't able to take it.  Will increase citalopram to 30 mg daily.  Last ECG a few months ago had normal Qtc.      ICD-10-CM ICD-9-CM   1. Generalized anxiety disorder  F41.1 300.02   2. Schizoaffective disorder, bipolar type  F25.0 295.70   3. PTSD (post-traumatic stress disorder)  F43.10 309.81   4. Bipolar 1 disorder, depressed, partial remission  F31.75 296.55         Continue quetiapine 200 mg daily.  ECG and lab parameters are OK.  Increasing citalopram to 30 mg nightly.  I have explained the risks, benefits, and alternatives of medication treatment in detail. We specifically discussed risks and benefits of medication treatment, including but not limited to, headache, nausea, GI upset, insomnia, agitation and possible increased thoughts of self harm. These thought will likely resolve with regular follow up and treatment. Patient voices understanding and all questions have been answered. Patient agrees to treatment plan and medication trial.  Educated on QT prolongation risk.  Continue hydroxyzine 25 mg TID PRN anxiety.  F/u 1m      Intervention/Counseling/Treatment Plan   Medication Management: Continue current medications. The risks and benefits of medication were discussed with the patient.  Counseling provided with patient as follows: importance of compliance with chosen treatment options was emphasized, risks and benefits of treatment options, including medications, were discussed with the patient, risk factor reduction, prognosis, patient education, instructions for  management, treatment and follow-up were reviewed         Return to Clinic:  1 month

## 2023-07-26 DIAGNOSIS — F41.1 GENERALIZED ANXIETY DISORDER: ICD-10-CM

## 2023-07-26 RX ORDER — HYDROXYZINE HYDROCHLORIDE 25 MG/1
25 TABLET, FILM COATED ORAL 2 TIMES DAILY PRN
Qty: 180 TABLET | Refills: 0 | Status: SHIPPED | OUTPATIENT
Start: 2023-07-26

## 2023-07-26 NOTE — TELEPHONE ENCOUNTER
Pt called and requested attached refill to be sent to [facility] pharmacy for her. No further concerns voiced.

## 2023-08-06 ENCOUNTER — HOSPITAL ENCOUNTER (INPATIENT)
Facility: HOSPITAL | Age: 68
LOS: 10 days | Discharge: NURSING, REHAB OR LONG-TERM CARE | DRG: 208 | End: 2023-08-16
Attending: EMERGENCY MEDICINE | Admitting: SPECIALIST
Payer: MEDICARE

## 2023-08-06 DIAGNOSIS — J44.9 CHRONIC OBSTRUCTIVE PULMONARY DISEASE, UNSPECIFIED COPD TYPE: Chronic | ICD-10-CM

## 2023-08-06 DIAGNOSIS — J96.22 ACUTE ON CHRONIC RESPIRATORY FAILURE WITH HYPERCAPNIA: Primary | ICD-10-CM

## 2023-08-06 DIAGNOSIS — I49.9 ARRHYTHMIA: ICD-10-CM

## 2023-08-06 DIAGNOSIS — M79.604 RIGHT LEG PAIN: ICD-10-CM

## 2023-08-06 DIAGNOSIS — Z86.73 HISTORY OF CVA (CEREBROVASCULAR ACCIDENT): Chronic | ICD-10-CM

## 2023-08-06 DIAGNOSIS — R41.82 ALTERED MENTAL STATUS: ICD-10-CM

## 2023-08-06 DIAGNOSIS — R00.1 BRADYCARDIA: ICD-10-CM

## 2023-08-06 DIAGNOSIS — R11.2 NAUSEA & VOMITING: ICD-10-CM

## 2023-08-06 DIAGNOSIS — I50.32 CHRONIC DIASTOLIC CONGESTIVE HEART FAILURE: ICD-10-CM

## 2023-08-06 PROBLEM — T07.XXXA WOUNDS, MULTIPLE: Status: ACTIVE | Noted: 2023-08-06

## 2023-08-06 PROBLEM — T83.510A URINARY TRACT INFECTION ASSOCIATED WITH CYSTOSTOMY CATHETER: Status: ACTIVE | Noted: 2019-07-17

## 2023-08-06 LAB
ALBUMIN SERPL BCP-MCNC: 2.8 G/DL (ref 3.5–5.2)
ALLENS TEST: ABNORMAL
ALP SERPL-CCNC: 69 U/L (ref 55–135)
ALT SERPL W/O P-5'-P-CCNC: 9 U/L (ref 10–44)
ANION GAP SERPL CALC-SCNC: 8 MMOL/L (ref 8–16)
AST SERPL-CCNC: 20 U/L (ref 10–40)
BACTERIA #/AREA URNS HPF: ABNORMAL /HPF
BASOPHILS # BLD AUTO: 0.04 K/UL (ref 0–0.2)
BASOPHILS NFR BLD: 0.4 % (ref 0–1.9)
BILIRUB SERPL-MCNC: 0.3 MG/DL (ref 0.1–1)
BILIRUB UR QL STRIP: NEGATIVE
BUN SERPL-MCNC: 15 MG/DL (ref 8–23)
CALCIUM SERPL-MCNC: 9.2 MG/DL (ref 8.7–10.5)
CHLORIDE SERPL-SCNC: 105 MMOL/L (ref 95–110)
CLARITY UR: ABNORMAL
CO2 SERPL-SCNC: 27 MMOL/L (ref 23–29)
COLOR UR: ABNORMAL
CREAT SERPL-MCNC: 1 MG/DL (ref 0.5–1.4)
DELSYS: ABNORMAL
DIFFERENTIAL METHOD: ABNORMAL
EOSINOPHIL # BLD AUTO: 0 K/UL (ref 0–0.5)
EOSINOPHIL NFR BLD: 0.2 % (ref 0–8)
ERYTHROCYTE [DISTWIDTH] IN BLOOD BY AUTOMATED COUNT: 18.6 % (ref 11.5–14.5)
EST. GFR  (NO RACE VARIABLE): >60 ML/MIN/1.73 M^2
FLOW: 2
GLUCOSE SERPL-MCNC: 100 MG/DL (ref 70–110)
GLUCOSE UR QL STRIP: NEGATIVE
HCO3 UR-SCNC: 33.1 MMOL/L (ref 24–28)
HCT VFR BLD AUTO: 34 % (ref 37–48.5)
HGB BLD-MCNC: 10.3 G/DL (ref 12–16)
HGB UR QL STRIP: ABNORMAL
HYALINE CASTS #/AREA URNS LPF: 0 /LPF
IMM GRANULOCYTES # BLD AUTO: 0.04 K/UL (ref 0–0.04)
IMM GRANULOCYTES NFR BLD AUTO: 0.4 % (ref 0–0.5)
KETONES UR QL STRIP: NEGATIVE
LACTATE SERPL-SCNC: 0.8 MMOL/L (ref 0.5–2.2)
LEUKOCYTE ESTERASE UR QL STRIP: ABNORMAL
LYMPHOCYTES # BLD AUTO: 1.2 K/UL (ref 1–4.8)
LYMPHOCYTES NFR BLD: 12.9 % (ref 18–48)
MCH RBC QN AUTO: 24.3 PG (ref 27–31)
MCHC RBC AUTO-ENTMCNC: 30.3 G/DL (ref 32–36)
MCV RBC AUTO: 80 FL (ref 82–98)
MICROSCOPIC COMMENT: ABNORMAL
MODE: ABNORMAL
MONOCYTES # BLD AUTO: 0.9 K/UL (ref 0.3–1)
MONOCYTES NFR BLD: 9.5 % (ref 4–15)
NEUTROPHILS # BLD AUTO: 7.1 K/UL (ref 1.8–7.7)
NEUTROPHILS NFR BLD: 76.6 % (ref 38–73)
NITRITE UR QL STRIP: NEGATIVE
NRBC BLD-RTO: 0 /100 WBC
PCO2 BLDA: 65.1 MMHG (ref 35–45)
PH SMN: 7.31 [PH] (ref 7.35–7.45)
PH UR STRIP: 8 [PH] (ref 5–8)
PLATELET # BLD AUTO: 255 K/UL (ref 150–450)
PMV BLD AUTO: 8.7 FL (ref 9.2–12.9)
PO2 BLDA: 99 MMHG (ref 80–100)
POC BE: 7 MMOL/L
POC SATURATED O2: 97 % (ref 95–100)
POTASSIUM SERPL-SCNC: 3.8 MMOL/L (ref 3.5–5.1)
PROCALCITONIN SERPL IA-MCNC: 1.11 NG/ML
PROT SERPL-MCNC: 8.3 G/DL (ref 6–8.4)
PROT UR QL STRIP: ABNORMAL
RBC # BLD AUTO: 4.23 M/UL (ref 4–5.4)
RBC #/AREA URNS HPF: >100 /HPF (ref 0–4)
SAMPLE: ABNORMAL
SITE: ABNORMAL
SODIUM SERPL-SCNC: 140 MMOL/L (ref 136–145)
SP GR UR STRIP: 1.03 (ref 1–1.03)
SP02: 98
TROPONIN I SERPL DL<=0.01 NG/ML-MCNC: 0.01 NG/ML (ref 0–0.03)
URN SPEC COLLECT METH UR: ABNORMAL
UROBILINOGEN UR STRIP-ACNC: NEGATIVE EU/DL
WBC # BLD AUTO: 9.29 K/UL (ref 3.9–12.7)
WBC #/AREA URNS HPF: >100 /HPF (ref 0–5)
WBC CLUMPS URNS QL MICRO: ABNORMAL
YEAST URNS QL MICRO: ABNORMAL

## 2023-08-06 PROCEDURE — 85025 COMPLETE CBC W/AUTO DIFF WBC: CPT | Performed by: EMERGENCY MEDICINE

## 2023-08-06 PROCEDURE — 27000190 HC CPAP FULL FACE MASK W/VALVE

## 2023-08-06 PROCEDURE — 99285 EMERGENCY DEPT VISIT HI MDM: CPT | Mod: 25

## 2023-08-06 PROCEDURE — 96365 THER/PROPH/DIAG IV INF INIT: CPT

## 2023-08-06 PROCEDURE — 81000 URINALYSIS NONAUTO W/SCOPE: CPT | Performed by: EMERGENCY MEDICINE

## 2023-08-06 PROCEDURE — 99900035 HC TECH TIME PER 15 MIN (STAT)

## 2023-08-06 PROCEDURE — 83605 ASSAY OF LACTIC ACID: CPT | Performed by: EMERGENCY MEDICINE

## 2023-08-06 PROCEDURE — 87186 SC STD MICRODIL/AGAR DIL: CPT | Mod: 59 | Performed by: EMERGENCY MEDICINE

## 2023-08-06 PROCEDURE — 93010 ELECTROCARDIOGRAM REPORT: CPT | Mod: ,,, | Performed by: INTERNAL MEDICINE

## 2023-08-06 PROCEDURE — 93010 EKG 12-LEAD: ICD-10-PCS | Mod: ,,, | Performed by: INTERNAL MEDICINE

## 2023-08-06 PROCEDURE — 80053 COMPREHEN METABOLIC PANEL: CPT | Performed by: EMERGENCY MEDICINE

## 2023-08-06 PROCEDURE — P9612 CATHETERIZE FOR URINE SPEC: HCPCS

## 2023-08-06 PROCEDURE — 87154 CUL TYP ID BLD PTHGN 6+ TRGT: CPT | Performed by: EMERGENCY MEDICINE

## 2023-08-06 PROCEDURE — 84484 ASSAY OF TROPONIN QUANT: CPT | Performed by: EMERGENCY MEDICINE

## 2023-08-06 PROCEDURE — 63600175 PHARM REV CODE 636 W HCPCS: Performed by: EMERGENCY MEDICINE

## 2023-08-06 PROCEDURE — 25000003 PHARM REV CODE 250: Performed by: EMERGENCY MEDICINE

## 2023-08-06 PROCEDURE — 87040 BLOOD CULTURE FOR BACTERIA: CPT | Performed by: EMERGENCY MEDICINE

## 2023-08-06 PROCEDURE — 82803 BLOOD GASES ANY COMBINATION: CPT

## 2023-08-06 PROCEDURE — 27100171 HC OXYGEN HIGH FLOW UP TO 24 HOURS

## 2023-08-06 PROCEDURE — 87086 URINE CULTURE/COLONY COUNT: CPT | Performed by: EMERGENCY MEDICINE

## 2023-08-06 PROCEDURE — 94761 N-INVAS EAR/PLS OXIMETRY MLT: CPT

## 2023-08-06 PROCEDURE — 84145 PROCALCITONIN (PCT): CPT | Performed by: EMERGENCY MEDICINE

## 2023-08-06 PROCEDURE — 36600 WITHDRAWAL OF ARTERIAL BLOOD: CPT

## 2023-08-06 PROCEDURE — 11000001 HC ACUTE MED/SURG PRIVATE ROOM

## 2023-08-06 PROCEDURE — 94660 CPAP INITIATION&MGMT: CPT

## 2023-08-06 PROCEDURE — 87077 CULTURE AEROBIC IDENTIFY: CPT | Performed by: EMERGENCY MEDICINE

## 2023-08-06 PROCEDURE — 87088 URINE BACTERIA CULTURE: CPT | Performed by: EMERGENCY MEDICINE

## 2023-08-06 RX ORDER — SODIUM CHLORIDE 0.9 % (FLUSH) 0.9 %
10 SYRINGE (ML) INJECTION
Status: DISCONTINUED | OUTPATIENT
Start: 2023-08-07 | End: 2023-08-16 | Stop reason: HOSPADM

## 2023-08-06 RX ORDER — BUMETANIDE 0.25 MG/ML
1 INJECTION INTRAMUSCULAR; INTRAVENOUS 2 TIMES DAILY
Status: DISCONTINUED | OUTPATIENT
Start: 2023-08-06 | End: 2023-08-08

## 2023-08-06 RX ORDER — ONDANSETRON 2 MG/ML
4 INJECTION INTRAMUSCULAR; INTRAVENOUS EVERY 8 HOURS PRN
Status: DISCONTINUED | OUTPATIENT
Start: 2023-08-07 | End: 2023-08-06

## 2023-08-06 RX ORDER — ENOXAPARIN SODIUM 100 MG/ML
40 INJECTION SUBCUTANEOUS DAILY
Status: DISCONTINUED | OUTPATIENT
Start: 2023-08-07 | End: 2023-08-16 | Stop reason: HOSPADM

## 2023-08-06 RX ORDER — IPRATROPIUM BROMIDE AND ALBUTEROL SULFATE 2.5; .5 MG/3ML; MG/3ML
3 SOLUTION RESPIRATORY (INHALATION) EVERY 6 HOURS
Status: DISCONTINUED | OUTPATIENT
Start: 2023-08-07 | End: 2023-08-07

## 2023-08-06 RX ORDER — FAMOTIDINE 10 MG/ML
20 INJECTION INTRAVENOUS EVERY 12 HOURS
Status: DISCONTINUED | OUTPATIENT
Start: 2023-08-07 | End: 2023-08-07

## 2023-08-06 RX ORDER — PIPERACILLIN SODIUM, TAZOBACTAM SODIUM 4; .5 G/20ML; G/20ML
INJECTION, POWDER, LYOPHILIZED, FOR SOLUTION INTRAVENOUS
Status: COMPLETED
Start: 2023-08-06 | End: 2023-08-06

## 2023-08-06 RX ADMIN — PIPERACILLIN SODIUM AND TAZOBACTAM SODIUM 4.5 G: 4; .5 INJECTION, POWDER, FOR SOLUTION INTRAVENOUS at 08:08

## 2023-08-06 NOTE — ED PROVIDER NOTES
SCRIBE #1 NOTE: I, Amanda Askew, am scribing for, and in the presence of, Maricel Santa DO. I have scribed the HPI, ROS, and PEx.     SCRIBE #2 NOTE: I, Denia Hernandez, am scribing for, and in the presence of,  Donte Estrada MD. I have scribed the remaining portions of the note not scribed by Scribe #1.      History     Chief Complaint   Patient presents with    Altered Mental Status     Family sent patient for confusion and generalized weakness. LKW at 0600. Painful urination for weeks.      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 Pt agreeable to try with pre mediation with benadryl.          History of Present Illness     HPI    8/6/2023, 5:28 PM  History obtained from the patient and EMS  History limited by patient's altered mental status        History of Present Illness: Mary Ellen Fajardo is a 67 y.o. female patient with a PMHx of HTN, COPD, CVA, and CHF who presents to the Emergency Department for evaluation of altered mental status which onset this afternoon around 3:00 p.m.  Per EMS, patient's family states that she has been confused and generally weak today. Pt complains of CP, abdominal pain, and bilateral leg pain. Symptoms are constant and moderate in severity. No mitigating or exacerbating factors reported. Associated sxs include painful urination for the past few weeks. No Prior Tx reported. No further complaints or concerns at this time.       Arrival mode: EMS    PCP: Any Tran MD        Past Medical History:  Past Medical History:   Diagnosis Date    Abdominal hernia     Addiction to drug     Alcohol abuse     Anxiety     Arthritis     Asthma     Bipolar disorder     Bladder stones     CHF (congestive heart failure)     COPD (chronic obstructive pulmonary disease)     on home o2    CVA (cerebral vascular accident)      2012    Hallucination     Hepatitis C     treated and cured    History of blood clots     Hx of psychiatric care     Hypertension     Belen     Obese body habitus     On home oxygen therapy     ADEEL (obstructive sleep apnea)     ADEEL treated with BiPAP     Paraplegia     Paraplegic spinal paralysis     Psychiatric problem     Psychosis     AVH; Paranoia    Renal disorder     Requires assistance with activities of daily living (ADL)     SCI (spinal cord injury)     incomplete    Sleep difficulties     has sleep apnea and uses a Bi-PAP machine.    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    Substance abuse     Suprapubic catheter 03/15/2011    Therapy     Vaginal delivery     x1    Wheelchair dependence        Past Surgical History:  Past Surgical History:   Procedure Laterality Date    ABOVE-KNEE AMPUTATION Left 7/23/2019    Procedure: AMPUTATION, ABOVE KNEE;  Surgeon: Gordo Rodriguez MD;  Location: [facility] OR;  Service: Orthopedics;  Laterality: Left;    amputation Left 07/23/2019    above the knee    BACK SURGERY      bilateral knee replacement      BREAST BIOPSY      cysto/lithopaxy 2017      CYSTOSCOPY Right 1/8/2021    Procedure: CYSTOSCOPY, RIGHT OCCULER BALLOON CATHETER PLACEMENT;  Surgeon: RAMA Tinoco MD;  Location: [facility] OR;  Service: Urology;  Laterality: Right;    CYSTOSCOPY W/ LASER LITHOTRIPSY      JOINT REPLACEMENT      bilateral knee    PERCUTANEOUS NEPHROLITHOTOMY Right 1/8/2021    Procedure: NEPHROLITHOTOMY, PERCUTANEOUS;  Surgeon: RAMA Tinoco MD;  Location: [facility] OR;  Service: Urology;  Laterality: Right;  x3 rooms, or7, or9,or12  ATTEMPTED    RETROGRADE PYELOGRAPHY Right 1/8/2021    Procedure: PYELOGRAM, RETROGRADE;  Surgeon: RAMA Tinoco MD;  Location: [facility] OR;  Service: Urology;  Laterality: Right;    SUPRAPUBIC CYSTOSTOMY  03/15/2011    patient reports  replaced tubing on 10/27/19, at home    URETEROSCOPY Right 1/8/2021    Procedure: URETEROSCOPY;  Surgeon: RAMA Gonzalez  MD Alejandrina;  Location: [facility] OR;  Service: Urology;  Laterality: Right;         Family History:  Family History   Problem Relation Age of Onset    Dementia Mother     Anxiety disorder Brother     Depression Brother        Social History:  Social History     Tobacco Use    Smoking status: Former     Current packs/day: 0.00     Average packs/day: 0.5 packs/day for 25.0 years (12.5 ttl pk-yrs)     Types: Cigarettes     Start date:      Quit date:      Years since quittin.6    Smokeless tobacco: Never   Substance and Sexual Activity    Alcohol use: Not Currently     Comment: occasional    Drug use: Not Currently     Comment: prescribed opioids at present for pain    Sexual activity: Not Currently     Partners: Male     Comment: since         Review of Systems     Review of Systems   Cardiovascular:  Positive for chest pain.   Gastrointestinal:  Positive for abdominal pain.   Genitourinary:  Positive for dysuria.   Musculoskeletal:  Positive for myalgias (bilateral lower extremities).        Physical Exam     Initial Vitals [23 1651]   BP Pulse Resp Temp SpO2   130/80 66 16 98.6 °F (37 °C) 99 %      MAP       --          Physical Exam  Nursing Notes and Vital Signs Reviewed.  Constitutional:  Drowsy.  Well-developed and well-nourished.  Head: Atraumatic. Normocephalic.  Eyes: PERRL. EOM intact. No scleral icterus.  ENT: Mucous membranes are moist.   Neck: Supple. Full ROM.  Cardiovascular: Regular rate. Regular rhythm. No murmurs, rubs, or gallops. Distal pulses are 2+ and symmetric.  Pulmonary/Chest: No respiratory distress.  Diminished lung sounds.  Abdominal: Soft and non-distended.  There is mild diffuse tenderness to palpation. Genitourinary: No CVA tenderness.  Indwelling suprapubic Wolfe catheter.    Musculoskeletal: Left AKA  Skin: Warm and dry. Skin breakdown on inner left thigh.  Neurological:  Alerted and oriented to person but not place or time. Repeatedly falling asleep during  "exam.     ED Course   Procedures  ED Vital Signs:  Vitals:    08/07/23 0542 08/07/23 0600 08/07/23 0615 08/07/23 0630   BP:       Pulse:  74 75 77   Resp:  14 (!) 23 (!) 28   Temp:       TempSrc:       SpO2:  100% 100% 100%   Weight: 78.2 kg (172 lb 6.4 oz)   78 kg (172 lb)   Height:    5' 5" (1.651 m)    08/07/23 0724 08/07/23 0750 08/07/23 1124 08/07/23 1603   BP: 115/80  (!) 104/55 116/63   Pulse: 77 79 77 75   Resp: 20 18 18 18   Temp: 98.6 °F (37 °C)  98 °F (36.7 °C) 98.8 °F (37.1 °C)   TempSrc: Oral  Oral Oral   SpO2: 96% 100% 99% 98%   Weight:       Height:        08/07/23 1755 08/07/23 1937 08/07/23 2027 08/07/23 2113   BP: (!) 110/57 128/67     Pulse: 81 78 78    Resp: 16 18 18 18   Temp: 98.5 °F (36.9 °C) 98.2 °F (36.8 °C)     TempSrc: Oral Oral     SpO2: 95% 96% 96%    Weight:       Height:        08/07/23 2137 08/08/23 0026 08/08/23 0053   BP:  (!) 98/57    Pulse: 79 67 68   Resp:  19 18   Temp:  97.3 °F (36.3 °C)    TempSrc:  Oral    SpO2:  99% 99%   Weight:      Height:          Abnormal Lab Results:  Labs Reviewed   CBC W/ AUTO DIFFERENTIAL - Abnormal; Notable for the following components:       Result Value    Hemoglobin 10.3 (*)     Hematocrit 34.0 (*)     MCV 80 (*)     MCH 24.3 (*)     MCHC 30.3 (*)     RDW 18.6 (*)     MPV 8.7 (*)     Gran % 76.6 (*)     Lymph % 12.9 (*)     All other components within normal limits   COMPREHENSIVE METABOLIC PANEL - Abnormal; Notable for the following components:    Albumin 2.8 (*)     ALT 9 (*)     All other components within normal limits   URINALYSIS, REFLEX TO URINE CULTURE - Abnormal; Notable for the following components:    Color, UA Orange (*)     Appearance, UA Cloudy (*)     Protein, UA 3+ (*)     Occult Blood UA 1+ (*)     Leukocytes, UA 2+ (*)     All other components within normal limits    Narrative:     Specimen Source->Urine   PROCALCITONIN - Abnormal; Notable for the following components:    Procalcitonin 1.11 (*)     All other components within " normal limits   URINALYSIS MICROSCOPIC - Abnormal; Notable for the following components:    RBC, UA >100 (*)     WBC, UA >100 (*)     WBC Clumps, UA Many (*)     Bacteria Moderate (*)     Yeast, UA Occasional (*)     All other components within normal limits    Narrative:     Specimen Source->Urine   ISTAT PROCEDURE - Abnormal; Notable for the following components:    POC PH 7.314 (*)     POC PCO2 65.1 (*)     POC HCO3 33.1 (*)     All other components within normal limits   CULTURE, URINE   LACTIC ACID, PLASMA   LACTIC ACID, PLASMA   TROPONIN I   B-TYPE NATRIURETIC PEPTIDE   TROPONIN I        All Lab Results:  Results for orders placed or performed during the hospital encounter of 08/06/23   Blood culture x two cultures. Draw prior to antibiotics.    Specimen: Peripheral, Antecubital, Left; Blood   Result Value Ref Range    Blood Culture, Routine No Growth to date    Blood culture x two cultures. Draw prior to antibiotics.    Specimen: Peripheral, Antecubital, Left; Blood   Result Value Ref Range    Blood Culture, Routine       Gram stain robert bottle: Gram positive cocci in clusters resembling Staph    Blood Culture, Routine       Results called to and read back by:Jeremiah Fajardo RN 08/07/2023  18:19   Rapid Organism ID by PCR (from Blood culture)   Result Value Ref Range    Enterococcus faecalis Not Detected Not Detected    Enterococcus faecium Not Detected Not Detected    Listeria monocytogenes Not Detected Not Detected    Staphylococcus spp. Not Detected Not Detected    Staphylococcus aureus Not Detected Not Detected    Staphylococcus epidermidis Not Detected Not Detected    Staphylococcus lugdunensis Not Detected Not Detected    Streptococcus species Not Detected Not Detected    Streptococcus agalactiae Not Detected Not Detected    Streptococcus pneumoniae Not Detected Not Detected    Streptococcus pyogenes Not Detected Not Detected    Acinetobacter calcoaceticus/baumannii complex Not Detected Not Detected     Bacteroides fragilis Not Detected Not Detected    Enterobacterales Not Detected Not Detected    Enterobacter cloacae complex Not Detected Not Detected    Escherichia coli Not Detected Not Detected    Klebsiella aerogenes Not Detected Not Detected    Klebsiella oxytoca Not Detected Not Detected    Klebsiella pneumoniae group Not Detected Not Detected    Proteus Not Detected Not Detected    Salmonella sp Not Detected Not Detected    Serratia marcescens Not Detected Not Detected    Haemophilus influenzae Not Detected Not Detected    Neisseria meningtidis Not Detected Not Detected    Pseudomonas aeruginosa Not Detected Not Detected    Stenotrophomonas maltophilia Not Detected Not Detected    Candida albicans Not Detected Not Detected    Candida auris Not Detected Not Detected    Candida glabrata Not Detected Not Detected    Candida krusei Not Detected Not Detected    Candida parapsilosis Not Detected Not Detected    Candida tropicalis Not Detected Not Detected    Cryptococcus neoformans/gattii Not Detected Not Detected    CTX-M (ESBL ) Not Detected Not Detected    IMP (Carbapenem resistant) Not Detected Not Detected    KPC resistance gene (Carbapenem resistant) Not Detected Not Detected    mcr-1  Not Detected Not Detected    mec A/C  Not Detected Not Detected    mec A/C and MREJ (MRSA) gene Not Detected Not Detected    NDM (Carbapenem resistant) Not Detected Not Detected    OXA-48-like (Carbapenem resistant) Not Detected Not Detected    van A/B (VRE gene) Not Detected Not Detected    VIM (Carbapenem resistant) Not Detected Not Detected   CBC auto differential   Result Value Ref Range    WBC 9.29 3.90 - 12.70 K/uL    RBC 4.23 4.00 - 5.40 M/uL    Hemoglobin 10.3 (L) 12.0 - 16.0 g/dL    Hematocrit 34.0 (L) 37.0 - 48.5 %    MCV 80 (L) 82 - 98 fL    MCH 24.3 (L) 27.0 - 31.0 pg    MCHC 30.3 (L) 32.0 - 36.0 g/dL    RDW 18.6 (H) 11.5 - 14.5 %    Platelets 255 150 - 450 K/uL    MPV 8.7 (L) 9.2 - 12.9 fL    Immature  Granulocytes 0.4 0.0 - 0.5 %    Gran # (ANC) 7.1 1.8 - 7.7 K/uL    Immature Grans (Abs) 0.04 0.00 - 0.04 K/uL    Lymph # 1.2 1.0 - 4.8 K/uL    Mono # 0.9 0.3 - 1.0 K/uL    Eos # 0.0 0.0 - 0.5 K/uL    Baso # 0.04 0.00 - 0.20 K/uL    nRBC 0 0 /100 WBC    Gran % 76.6 (H) 38.0 - 73.0 %    Lymph % 12.9 (L) 18.0 - 48.0 %    Mono % 9.5 4.0 - 15.0 %    Eosinophil % 0.2 0.0 - 8.0 %    Basophil % 0.4 0.0 - 1.9 %    Differential Method Automated    Comprehensive metabolic panel   Result Value Ref Range    Sodium 140 136 - 145 mmol/L    Potassium 3.8 3.5 - 5.1 mmol/L    Chloride 105 95 - 110 mmol/L    CO2 27 23 - 29 mmol/L    Glucose 100 70 - 110 mg/dL    BUN 15 8 - 23 mg/dL    Creatinine 1.0 0.5 - 1.4 mg/dL    Calcium 9.2 8.7 - 10.5 mg/dL    Total Protein 8.3 6.0 - 8.4 g/dL    Albumin 2.8 (L) 3.5 - 5.2 g/dL    Total Bilirubin 0.3 0.1 - 1.0 mg/dL    Alkaline Phosphatase 69 55 - 135 U/L    AST 20 10 - 40 U/L    ALT 9 (L) 10 - 44 U/L    eGFR >60 >60 mL/min/1.73 m^2    Anion Gap 8 8 - 16 mmol/L   Lactic acid, plasma #1   Result Value Ref Range    Lactate (Lactic Acid) 0.8 0.5 - 2.2 mmol/L   Lactic acid, plasma #2   Result Value Ref Range    Lactate (Lactic Acid) 1.7 0.5 - 2.2 mmol/L   Urinalysis, Reflex to Urine Culture Urine, Catheterized    Specimen: Urine   Result Value Ref Range    Specimen UA Urine, Catheterized     Color, UA Orange (A) Yellow, Straw, Rosario    Appearance, UA Cloudy (A) Clear    pH, UA 8.0 5.0 - 8.0    Specific Gravity, UA 1.030 1.005 - 1.030    Protein, UA 3+ (A) Negative    Glucose, UA Negative Negative    Ketones, UA Negative Negative    Bilirubin (UA) Negative Negative    Occult Blood UA 1+ (A) Negative    Nitrite, UA Negative Negative    Urobilinogen, UA Negative <2.0 EU/dL    Leukocytes, UA 2+ (A) Negative   Troponin I   Result Value Ref Range    Troponin I 0.009 0.000 - 0.026 ng/mL   Procalcitonin   Result Value Ref Range    Procalcitonin 1.11 (H) <0.25 ng/mL   Urinalysis Microscopic   Result Value Ref  Range    RBC, UA >100 (H) 0 - 4 /hpf    WBC, UA >100 (H) 0 - 5 /hpf    WBC Clumps, UA Many (A) None-Rare    Bacteria Moderate (A) None-Occ /hpf    Yeast, UA Occasional (A) None    Hyaline Casts, UA 0 0-1/lpf /lpf    Microscopic Comment SEE COMMENT    Brain natriuretic peptide   Result Value Ref Range    BNP 70 0 - 99 pg/mL   Troponin I   Result Value Ref Range    Troponin I 0.021 0.000 - 0.026 ng/mL   Basic metabolic panel   Result Value Ref Range    Sodium 143 136 - 145 mmol/L    Potassium 4.2 3.5 - 5.1 mmol/L    Chloride 105 95 - 110 mmol/L    CO2 28 23 - 29 mmol/L    Glucose 85 70 - 110 mg/dL    BUN 14 8 - 23 mg/dL    Creatinine 0.9 0.5 - 1.4 mg/dL    Calcium 8.9 8.7 - 10.5 mg/dL    Anion Gap 10 8 - 16 mmol/L    eGFR >60 >60 mL/min/1.73 m^2   Magnesium   Result Value Ref Range    Magnesium 1.7 1.6 - 2.6 mg/dL   CBC auto differential   Result Value Ref Range    WBC 8.35 3.90 - 12.70 K/uL    RBC 4.05 4.00 - 5.40 M/uL    Hemoglobin 9.6 (L) 12.0 - 16.0 g/dL    Hematocrit 34.3 (L) 37.0 - 48.5 %    MCV 85 82 - 98 fL    MCH 23.7 (L) 27.0 - 31.0 pg    MCHC 28.0 (L) 32.0 - 36.0 g/dL    RDW 18.7 (H) 11.5 - 14.5 %    Platelets 244 150 - 450 K/uL    MPV 8.8 (L) 9.2 - 12.9 fL    Immature Granulocytes 0.4 0.0 - 0.5 %    Gran # (ANC) 5.6 1.8 - 7.7 K/uL    Immature Grans (Abs) 0.03 0.00 - 0.04 K/uL    Lymph # 1.3 1.0 - 4.8 K/uL    Mono # 1.2 (H) 0.3 - 1.0 K/uL    Eos # 0.1 0.0 - 0.5 K/uL    Baso # 0.07 0.00 - 0.20 K/uL    nRBC 0 0 /100 WBC    Gran % 67.1 38.0 - 73.0 %    Lymph % 15.9 (L) 18.0 - 48.0 %    Mono % 14.5 4.0 - 15.0 %    Eosinophil % 1.3 0.0 - 8.0 %    Basophil % 0.8 0.0 - 1.9 %    Differential Method Automated    Troponin I   Result Value Ref Range    Troponin I 0.017 0.000 - 0.026 ng/mL   Echo   Result Value Ref Range    BSA 1.89 m2    LVOT stroke volume 77.41 cm3    LVIDd 3.65 3.5 - 6.0 cm    LV Systolic Volume 16.63 mL    LV Systolic Volume Index 8.9 mL/m2    LVIDs 2.22 2.1 - 4.0 cm    LV Diastolic Volume 56.35 mL     LV Diastolic Volume Index 30.30 mL/m2    IVS 1.13 (A) 0.6 - 1.1 cm    LVOT diameter 1.88 cm    LVOT area 2.8 cm2    FS 39 28 - 44 %    Left Ventricle Relative Wall Thickness 0.61 cm    Posterior Wall 1.11 (A) 0.6 - 1.1 cm    LV mass 130.15 g    LV Mass Index 70 g/m2    MV Peak E Jack 0.70 m/s    TDI LATERAL 0.15 m/s    TDI SEPTAL 0.08 m/s    E/E' ratio 6.09 m/s    MV Peak A Jack 0.86 m/s    TR Max Jack 3.60 m/s    E/A ratio 0.81     IVRT 85.63 msec    E wave deceleration time 305.86 msec    LV SEPTAL E/E' RATIO 8.75 m/s    LV LATERAL E/E' RATIO 4.67 m/s    LVOT peak jack 1.12 m/s    Left Ventricular Outflow Tract Mean Velocity 0.94 cm/s    Left Ventricular Outflow Tract Mean Gradient 3.88 mmHg    LA size 3.20 cm    Left Atrium Major Axis 5.21 cm    Left Atrium Minor Axis 3.57 cm    RV mid diameter 3.19 cm    RVOT peak VTI 16.4 cm    RA Major Axis 4.43 cm    AV mean gradient 7 mmHg    AV peak gradient 9 mmHg    Ao peak jack 1.46 m/s    Ao VTI 31.90 cm    LVOT peak VTI 27.90 cm    AV valve area 2.43 cm²    AV Velocity Ratio 0.77     AV index (prosthetic) 0.87     GABRIELA by Velocity Ratio 2.13 cm²    Triscuspid Valve Regurgitation Peak Gradient 52 mmHg    PV mean gradient 2 mmHg    RVOT peak jack 1.06 m/s    Ao root annulus 3.40 cm    STJ 4.19 cm    Ascending aorta 4.33 cm    IVC diameter 0.97 cm    Mean e' 0.12 m/s    ZLVIDS -2.87     ZLVIDD -3.49     LA Volume Index 18.6 mL/m2    LA volume 34.57 cm3    LA WIDTH 3.0 cm    RA Width 3.0 cm   ISTAT PROCEDURE   Result Value Ref Range    POC PH 7.314 (L) 7.35 - 7.45    POC PCO2 65.1 (HH) 35 - 45 mmHg    POC PO2 99 80 - 100 mmHg    POC HCO3 33.1 (H) 24 - 28 mmol/L    POC BE 7 -2 to 2 mmol/L    POC SATURATED O2 97 95 - 100 %    Sample ARTERIAL     Site RR     Allens Test N/A     DelSys Nasal Can     Mode SPONT     Flow 2     Sp02 98          Imaging Results:  Imaging Results              US Lower Extremity Veins Right (Final result)  Result time 08/06/23 19:27:38      Final result  by Dale Theodore MD (08/06/23 19:27:38)                   Impression:      No evidence of deep venous thrombosis in the right lower extremity.      Electronically signed by: Dale Theodore  Date:    08/06/2023  Time:    19:27               Narrative:    EXAMINATION:  US LOWER EXTREMITY VEINS RIGHT    CLINICAL HISTORY:  Pain in right leg    TECHNIQUE:  Duplex and color flow Doppler evaluation and graded compression of the right lower extremity veins was performed.    COMPARISON:  None    FINDINGS:  Right thigh veins: The common femoral, femoral, popliteal, upper greater saphenous, and deep femoral veins are patent and free of thrombus. The veins are normally compressible and have normal phasic flow and augmentation response.    Right calf veins: The visualized calf veins are patent.    Contralateral CFV: The contralateral (left) common femoral vein is patent and free of thrombus.    Miscellaneous: None                                       CT Head Without Contrast (Final result)  Result time 08/06/23 18:57:05      Final result by Dale Theodore MD (08/06/23 18:57:05)                   Impression:      No acute abnormality.    Atrophy and chronic white matter changes    All CT scans   are performed using dose optimization techniques including the following: automated exposure control; adjustment of the mA and/or kV; use of iterative reconstruction technique.  Dose modulation was employed for ALARA by means of: Automated exposure control; adjustment of the mA and/or kV according to patient size (this includes techniques or standardized protocols for targeted exams where dose is matched to indication/reason for exam; i.e. extremities or head); and/or use of iterative reconstructive technique.      Electronically signed by: Dale Theodore  Date:    08/06/2023  Time:    18:57               Narrative:    EXAMINATION:  CT HEAD WITHOUT CONTRAST    CLINICAL HISTORY:  Mental status change, unknown cause;    TECHNIQUE:  Low  dose axial CT images obtained throughout the head without intravenous contrast. Sagittal and coronal reconstructions were performed.    COMPARISON:  None.    FINDINGS:  Atrophy and chronic white matter changes    No parenchymal mass, hemorrhage, edema or major vascular distribution infarct.    Skull/extracranial contents (limited evaluation): No fracture. Mastoid air cells and paranasal sinuses are essentially clear.                                       X-Ray Chest AP Portable (Final result)  Result time 08/06/23 17:34:06      Final result by Dale Theodore MD (08/06/23 17:34:06)                   Impression:      As above      Electronically signed by: Dale Theodore  Date:    08/06/2023  Time:    17:34               Narrative:    EXAMINATION:  XR CHEST AP PORTABLE    CLINICAL HISTORY:  Sepsis;    TECHNIQUE:  Single frontal view of the chest was performed.    COMPARISON:  None    FINDINGS:  Tortuous aorta.  Mild perihilar edema.  Mild cardiomegaly.  Correlate clinically for low-grade CHF.  Atypical infectious process not excluded.    Bones are intact.                                       The EKG was ordered, reviewed, and independently interpreted by the ED provider.  Interpretation time: 17:46  Rate: 62 BPM  Rhythm: Sinus rhythm with premature atrial complexes  Interpretation: ST and T wave abnormality, consider anterolateral ischemia. Prolonged QT. No STEMI.               The Emergency Provider reviewed the vital signs and test results, which are outlined above.     ED Discussion     8:00 PM: Dr. Santa transfers care of patient to Dr. Estrada pending radiology results.    8:14 PM: Dr. Estrada agrees with Dr. Santa's assessment and plan of care. Evaluated pt. Pt is resting comfortably and is in no acute distress.  D/w pt all pertinent results. D/w pt any concerns expressed at this time. Answered all questions. Pt expresses understanding at this time.    11:28 PM: Discussed case with Yessica Hooker NP  (Critical Care). Dr. Huerta agrees with current care and management of pt and accepts admission.   Admitting Service: Critical Care  Admitting Physician: Dr. Huerta  Admit to: Inpatient ICU    11:28 PM: Re-evaluated pt. I have discussed test results, shared treatment plan, and the need for admission with patient and family at bedside. Pt and family express understanding at this time and agree with all information. All questions answered. Pt and family have no further questions or concerns at this time. Pt is ready for admit.      ED Course as of 08/08/23 0146   Sun Aug 06, 2023   1757 EKG shows normal sinus rhythm with PACs.  Rate 62.  DC interval 132.  QRS 88.  , prolonged.  T-wave inversions in the anterolateral leads.  No ST elevation   [NF]   1943 Patient lives with her son Swapnil phone #[phone].  I spoke with him.  He states that when he arrived home from work at 3:00 p.m. she was unresponsive and difficult to arouse which is unusual for her.  She has a chronic suprapubic catheter that he changes once a month but he states the urine was cloudy and thick over the last 2 weeks.  She also has chronic wounds and her wound care nurse has been unavailable for the last 2 weeks.  She wears oxygen as needed at home. [NF]      ED Course User Index  [NF] Maricel Santa, DO     Medical Decision Making:   Clinical Tests:   Lab Tests: Ordered and Reviewed  Radiological Study: Ordered and Reviewed  Medical Tests: Ordered and Reviewed           ED Medication(s):  Medications   sodium chloride 0.9% flush 10 mL (has no administration in time range)   enoxaparin injection 40 mg (40 mg Subcutaneous Given 8/7/23 1601)   bumetanide injection 1 mg (1 mg Intravenous Given 8/7/23 2323)   citalopram tablet 30 mg (30 mg Oral Given 8/7/23 0902)   QUEtiapine tablet 200 mg (200 mg Oral Given 8/7/23 2057)   gabapentin capsule 200 mg (200 mg Oral Given 8/7/23 2057)   albuterol-ipratropium 2.5 mg-0.5 mg/3 mL nebulizer  solution 3 mL (3 mLs Nebulization Given 8/8/23 0053)   mupirocin 2 % ointment ( Nasal Given 8/7/23 2057)   cefTRIAXone (ROCEPHIN) 1 g in dextrose 5 % in water (D5W) 100 mL IVPB (MB+) (0 g Intravenous Stopped 8/7/23 2131)   diphenhydrAMINE capsule 25 mg (has no administration in time range)   hydrOXYzine HCL tablet 25 mg (25 mg Oral Given 8/7/23 2114)   oxyCODONE-acetaminophen 5-325 mg per tablet 1 tablet (1 tablet Oral Given 8/7/23 2113)   acetaminophen tablet 650 mg (has no administration in time range)   piperacillin-tazobactam (ZOSYN) 4.5 g in dextrose 5 % in water (D5W) 100 mL IVPB (MB+) (0 g Intravenous Stopped 8/6/23 2046)   piperacillin-tazobactam (ZOSYN) 4.5 gram injection (  Back Association 8/6/23 2016)   magnesium sulfate 2g in water 50mL IVPB (premix) (0 g Intravenous Stopped 8/7/23 1709)       Current Discharge Medication List                  Scribe Attestation:   Scribe #1: I performed the above scribed service and the documentation accurately describes the services I performed. I attest to the accuracy of the note.     Attending:   Physician Attestation Statement for Scribe #1: I, Maricel Santa DO, personally performed the services described in this documentation, as scribed by Amanda Askew, in my presence, and it is both accurate and complete.       Scribe Attestation:   Scribe #2: I performed the above scribed service and the documentation accurately describes the services I performed. I attest to the accuracy of the note.    Attending Attestation:           Physician Attestation for Scribe:    Physician Attestation Statement for Scribe #2: I, Donte Multani MD, reviewed documentation, as scribed by Denia Hernandez in my presence, and it is both accurate and complete. I also acknowledge and confirm the content of the note done by Scribe #1.           Clinical Impression       ICD-10-CM ICD-9-CM   1. Altered mental status  R41.82 780.97   2. Right leg pain  M79.604 729.5   3. Acute on chronic  respiratory failure with hypercapnia  J96.22 518.84   4. Chronic diastolic congestive heart failure  I50.32 428.32     428.0       Disposition:   Disposition: Admitted  Condition: Serious  '       Donte Estrada Jr., MD  08/08/23 0147

## 2023-08-07 LAB
ACINETOBACTER CALCOACETICUS/BAUMANNII COMPLEX: NOT DETECTED
ANION GAP SERPL CALC-SCNC: 10 MMOL/L (ref 8–16)
AORTIC ROOT ANNULUS: 3.4 CM
ASCENDING AORTA: 4.33 CM
AV INDEX (PROSTH): 0.87
AV MEAN GRADIENT: 7 MMHG
AV PEAK GRADIENT: 9 MMHG
AV VALVE AREA BY VELOCITY RATIO: 2.13 CM²
AV VALVE AREA: 2.43 CM²
AV VELOCITY RATIO: 0.77
BACTEROIDES FRAGILIS: NOT DETECTED
BASOPHILS # BLD AUTO: 0.07 K/UL (ref 0–0.2)
BASOPHILS NFR BLD: 0.8 % (ref 0–1.9)
BNP SERPL-MCNC: 70 PG/ML (ref 0–99)
BSA FOR ECHO PROCEDURE: 1.89 M2
BUN SERPL-MCNC: 14 MG/DL (ref 8–23)
CALCIUM SERPL-MCNC: 8.9 MG/DL (ref 8.7–10.5)
CANDIDA ALBICANS: NOT DETECTED
CANDIDA AURIS: NOT DETECTED
CANDIDA GLABRATA: NOT DETECTED
CANDIDA KRUSEI: NOT DETECTED
CANDIDA PARAPSILOSIS: NOT DETECTED
CANDIDA TROPICALIS: NOT DETECTED
CHLORIDE SERPL-SCNC: 105 MMOL/L (ref 95–110)
CO2 SERPL-SCNC: 28 MMOL/L (ref 23–29)
CREAT SERPL-MCNC: 0.9 MG/DL (ref 0.5–1.4)
CRYPTOCOCCUS NEOFORMANS/GATTII: NOT DETECTED
CTX-M GENE: NOT DETECTED
CV ECHO LV RWT: 0.61 CM
DIFFERENTIAL METHOD: ABNORMAL
DOP CALC AO PEAK VEL: 1.46 M/S
DOP CALC AO VTI: 31.9 CM
DOP CALC LVOT AREA: 2.8 CM2
DOP CALC LVOT DIAMETER: 1.88 CM
DOP CALC LVOT PEAK VEL: 1.12 M/S
DOP CALC LVOT STROKE VOLUME: 77.41 CM3
DOP CALC RVOT PEAK VEL: 1.06 M/S
DOP CALC RVOT VTI: 16.4 CM
DOP CALCLVOT PEAK VEL VTI: 27.9 CM
E WAVE DECELERATION TIME: 305.86 MSEC
E/A RATIO: 0.81
E/E' RATIO: 6.09 M/S
ECHO LV POSTERIOR WALL: 1.11 CM (ref 0.6–1.1)
ENTEROBACTER CLOACAE COMPLEX: NOT DETECTED
ENTEROBACTERALES: NOT DETECTED
ENTEROCOCCUS FAECALIS: NOT DETECTED
ENTEROCOCCUS FAECIUM: NOT DETECTED
EOSINOPHIL # BLD AUTO: 0.1 K/UL (ref 0–0.5)
EOSINOPHIL NFR BLD: 1.3 % (ref 0–8)
ERYTHROCYTE [DISTWIDTH] IN BLOOD BY AUTOMATED COUNT: 18.7 % (ref 11.5–14.5)
ESCHERICHIA COLI: NOT DETECTED
EST. GFR  (NO RACE VARIABLE): >60 ML/MIN/1.73 M^2
FRACTIONAL SHORTENING: 39 % (ref 28–44)
GLUCOSE SERPL-MCNC: 85 MG/DL (ref 70–110)
HAEMOPHILUS INFLUENZAE: NOT DETECTED
HCT VFR BLD AUTO: 34.3 % (ref 37–48.5)
HGB BLD-MCNC: 9.6 G/DL (ref 12–16)
IMM GRANULOCYTES # BLD AUTO: 0.03 K/UL (ref 0–0.04)
IMM GRANULOCYTES NFR BLD AUTO: 0.4 % (ref 0–0.5)
IMP GENE: NOT DETECTED
INTERVENTRICULAR SEPTUM: 1.13 CM (ref 0.6–1.1)
IVC DIAMETER: 0.97 CM
IVRT: 85.63 MSEC
KLEBSIELLA AEROGENES: NOT DETECTED
KLEBSIELLA OXYTOCA: NOT DETECTED
KLEBSIELLA PNEUMONIAE GROUP: NOT DETECTED
KPC: NOT DETECTED
LA MAJOR: 5.21 CM
LA MINOR: 3.57 CM
LA WIDTH: 3 CM
LACTATE SERPL-SCNC: 1.7 MMOL/L (ref 0.5–2.2)
LEFT ATRIUM SIZE: 3.2 CM
LEFT ATRIUM VOLUME INDEX: 18.6 ML/M2
LEFT ATRIUM VOLUME: 34.57 CM3
LEFT INTERNAL DIMENSION IN SYSTOLE: 2.22 CM (ref 2.1–4)
LEFT VENTRICLE DIASTOLIC VOLUME INDEX: 30.3 ML/M2
LEFT VENTRICLE DIASTOLIC VOLUME: 56.35 ML
LEFT VENTRICLE MASS INDEX: 70 G/M2
LEFT VENTRICLE SYSTOLIC VOLUME INDEX: 8.9 ML/M2
LEFT VENTRICLE SYSTOLIC VOLUME: 16.63 ML
LEFT VENTRICULAR INTERNAL DIMENSION IN DIASTOLE: 3.65 CM (ref 3.5–6)
LEFT VENTRICULAR MASS: 130.15 G
LISTERIA MONOCYTOGENES: NOT DETECTED
LV LATERAL E/E' RATIO: 4.67 M/S
LV SEPTAL E/E' RATIO: 8.75 M/S
LVOT MG: 3.88 MMHG
LVOT MV: 0.94 CM/S
LYMPHOCYTES # BLD AUTO: 1.3 K/UL (ref 1–4.8)
LYMPHOCYTES NFR BLD: 15.9 % (ref 18–48)
MAGNESIUM SERPL-MCNC: 1.7 MG/DL (ref 1.6–2.6)
MCH RBC QN AUTO: 23.7 PG (ref 27–31)
MCHC RBC AUTO-ENTMCNC: 28 G/DL (ref 32–36)
MCR-1: NOT DETECTED
MCV RBC AUTO: 85 FL (ref 82–98)
MEC A/C AND MREJ (MRSA): NOT DETECTED
MEC A/C: NOT DETECTED
MONOCYTES # BLD AUTO: 1.2 K/UL (ref 0.3–1)
MONOCYTES NFR BLD: 14.5 % (ref 4–15)
MV PEAK A VEL: 0.86 M/S
MV PEAK E VEL: 0.7 M/S
NDM GENE: NOT DETECTED
NEISSERIA MENINGITIDIS: NOT DETECTED
NEUTROPHILS # BLD AUTO: 5.6 K/UL (ref 1.8–7.7)
NEUTROPHILS NFR BLD: 67.1 % (ref 38–73)
NRBC BLD-RTO: 0 /100 WBC
OXA-48-LIKE: NOT DETECTED
PISA TR MAX VEL: 3.6 M/S
PLATELET # BLD AUTO: 244 K/UL (ref 150–450)
PMV BLD AUTO: 8.8 FL (ref 9.2–12.9)
POTASSIUM SERPL-SCNC: 4.2 MMOL/L (ref 3.5–5.1)
PROTEUS SPECIES: NOT DETECTED
PSEUDOMONAS AERUGINOSA: NOT DETECTED
PV MEAN GRADIENT: 2 MMHG
RA MAJOR: 4.43 CM
RA WIDTH: 3 CM
RBC # BLD AUTO: 4.05 M/UL (ref 4–5.4)
RV MID DIAMA: 3.19 CM
SALMONELLA SP: NOT DETECTED
SERRATIA MARCESCENS: NOT DETECTED
SODIUM SERPL-SCNC: 143 MMOL/L (ref 136–145)
STAPHYLOCOCCUS AUREUS: NOT DETECTED
STAPHYLOCOCCUS EPIDERMIDIS: NOT DETECTED
STAPHYLOCOCCUS LUGDUNESIS: NOT DETECTED
STAPHYLOCOCCUS SPECIES: NOT DETECTED
STENOTROPHOMONAS MALTOPHILIA: NOT DETECTED
STJ: 4.19 CM
STREPTOCOCCUS AGALACTIAE: NOT DETECTED
STREPTOCOCCUS PNEUMONIAE: NOT DETECTED
STREPTOCOCCUS PYOGENES: NOT DETECTED
STREPTOCOCCUS SPECIES: NOT DETECTED
TDI LATERAL: 0.15 M/S
TDI SEPTAL: 0.08 M/S
TDI: 0.12 M/S
TR MAX PG: 52 MMHG
TROPONIN I SERPL DL<=0.01 NG/ML-MCNC: 0.02 NG/ML (ref 0–0.03)
TROPONIN I SERPL DL<=0.01 NG/ML-MCNC: 0.02 NG/ML (ref 0–0.03)
VAN A/B: NOT DETECTED
VIM GENE: NOT DETECTED
WBC # BLD AUTO: 8.35 K/UL (ref 3.9–12.7)
Z-SCORE OF LEFT VENTRICULAR DIMENSION IN END DIASTOLE: -3.49
Z-SCORE OF LEFT VENTRICULAR DIMENSION IN END SYSTOLE: -2.87

## 2023-08-07 PROCEDURE — 25000003 PHARM REV CODE 250: Performed by: NURSE PRACTITIONER

## 2023-08-07 PROCEDURE — 63600175 PHARM REV CODE 636 W HCPCS: Performed by: NURSE PRACTITIONER

## 2023-08-07 PROCEDURE — 27100171 HC OXYGEN HIGH FLOW UP TO 24 HOURS

## 2023-08-07 PROCEDURE — 25000242 PHARM REV CODE 250 ALT 637 W/ HCPCS: Performed by: NURSE PRACTITIONER

## 2023-08-07 PROCEDURE — 85025 COMPLETE CBC W/AUTO DIFF WBC: CPT | Performed by: NURSE PRACTITIONER

## 2023-08-07 PROCEDURE — 83735 ASSAY OF MAGNESIUM: CPT | Performed by: NURSE PRACTITIONER

## 2023-08-07 PROCEDURE — 84484 ASSAY OF TROPONIN QUANT: CPT | Performed by: NURSE PRACTITIONER

## 2023-08-07 PROCEDURE — 94761 N-INVAS EAR/PLS OXIMETRY MLT: CPT

## 2023-08-07 PROCEDURE — 87081 CULTURE SCREEN ONLY: CPT | Performed by: SPECIALIST

## 2023-08-07 PROCEDURE — 80048 BASIC METABOLIC PNL TOTAL CA: CPT | Performed by: NURSE PRACTITIONER

## 2023-08-07 PROCEDURE — 11000001 HC ACUTE MED/SURG PRIVATE ROOM

## 2023-08-07 PROCEDURE — 94640 AIRWAY INHALATION TREATMENT: CPT

## 2023-08-07 PROCEDURE — 97530 THERAPEUTIC ACTIVITIES: CPT

## 2023-08-07 PROCEDURE — 25000003 PHARM REV CODE 250: Performed by: HOSPITALIST

## 2023-08-07 PROCEDURE — 99900035 HC TECH TIME PER 15 MIN (STAT)

## 2023-08-07 PROCEDURE — 97162 PT EVAL MOD COMPLEX 30 MIN: CPT

## 2023-08-07 PROCEDURE — 25000003 PHARM REV CODE 250: Performed by: SPECIALIST

## 2023-08-07 PROCEDURE — 83880 ASSAY OF NATRIURETIC PEPTIDE: CPT | Performed by: EMERGENCY MEDICINE

## 2023-08-07 PROCEDURE — 36415 COLL VENOUS BLD VENIPUNCTURE: CPT | Performed by: NURSE PRACTITIONER

## 2023-08-07 PROCEDURE — 83605 ASSAY OF LACTIC ACID: CPT | Performed by: EMERGENCY MEDICINE

## 2023-08-07 RX ORDER — MUPIROCIN 20 MG/G
OINTMENT TOPICAL 2 TIMES DAILY
Status: DISPENSED | OUTPATIENT
Start: 2023-08-07 | End: 2023-08-12

## 2023-08-07 RX ORDER — QUETIAPINE FUMARATE 100 MG/1
200 TABLET, FILM COATED ORAL NIGHTLY
Status: DISCONTINUED | OUTPATIENT
Start: 2023-08-07 | End: 2023-08-12

## 2023-08-07 RX ORDER — MAGNESIUM SULFATE HEPTAHYDRATE 40 MG/ML
2 INJECTION, SOLUTION INTRAVENOUS ONCE
Status: COMPLETED | OUTPATIENT
Start: 2023-08-07 | End: 2023-08-07

## 2023-08-07 RX ORDER — DIPHENHYDRAMINE HCL 25 MG
25 CAPSULE ORAL EVERY 6 HOURS PRN
Status: DISCONTINUED | OUTPATIENT
Start: 2023-08-07 | End: 2023-08-16 | Stop reason: HOSPADM

## 2023-08-07 RX ORDER — GABAPENTIN 100 MG/1
200 CAPSULE ORAL 3 TIMES DAILY
Status: DISCONTINUED | OUTPATIENT
Start: 2023-08-07 | End: 2023-08-12

## 2023-08-07 RX ORDER — ACETAMINOPHEN 325 MG/1
650 TABLET ORAL EVERY 6 HOURS PRN
Status: DISCONTINUED | OUTPATIENT
Start: 2023-08-07 | End: 2023-08-16 | Stop reason: HOSPADM

## 2023-08-07 RX ORDER — CITALOPRAM 10 MG/1
30 TABLET ORAL DAILY
Status: DISCONTINUED | OUTPATIENT
Start: 2023-08-07 | End: 2023-08-16 | Stop reason: HOSPADM

## 2023-08-07 RX ORDER — IPRATROPIUM BROMIDE AND ALBUTEROL SULFATE 2.5; .5 MG/3ML; MG/3ML
3 SOLUTION RESPIRATORY (INHALATION) EVERY 6 HOURS PRN
Status: DISCONTINUED | OUTPATIENT
Start: 2023-08-07 | End: 2023-08-16 | Stop reason: HOSPADM

## 2023-08-07 RX ORDER — OXYCODONE AND ACETAMINOPHEN 5; 325 MG/1; MG/1
1 TABLET ORAL EVERY 12 HOURS PRN
Status: DISCONTINUED | OUTPATIENT
Start: 2023-08-07 | End: 2023-08-16 | Stop reason: HOSPADM

## 2023-08-07 RX ORDER — HYDROXYZINE HYDROCHLORIDE 25 MG/1
25 TABLET, FILM COATED ORAL 2 TIMES DAILY PRN
Status: DISCONTINUED | OUTPATIENT
Start: 2023-08-07 | End: 2023-08-16 | Stop reason: HOSPADM

## 2023-08-07 RX ADMIN — IPRATROPIUM BROMIDE AND ALBUTEROL SULFATE 3 ML: .5; 3 SOLUTION RESPIRATORY (INHALATION) at 01:08

## 2023-08-07 RX ADMIN — MUPIROCIN: 20 OINTMENT TOPICAL at 09:08

## 2023-08-07 RX ADMIN — GABAPENTIN 200 MG: 100 CAPSULE ORAL at 09:08

## 2023-08-07 RX ADMIN — IPRATROPIUM BROMIDE AND ALBUTEROL SULFATE 3 ML: .5; 3 SOLUTION RESPIRATORY (INHALATION) at 07:08

## 2023-08-07 RX ADMIN — BUMETANIDE 1 MG: 0.25 INJECTION INTRAMUSCULAR; INTRAVENOUS at 09:08

## 2023-08-07 RX ADMIN — CITALOPRAM HYDROBROMIDE 30 MG: 20 TABLET ORAL at 09:08

## 2023-08-07 RX ADMIN — MUPIROCIN: 20 OINTMENT TOPICAL at 08:08

## 2023-08-07 RX ADMIN — PIPERACILLIN AND TAZOBACTAM 4.5 G: 4; .5 INJECTION, POWDER, LYOPHILIZED, FOR SOLUTION INTRAVENOUS; PARENTERAL at 05:08

## 2023-08-07 RX ADMIN — ENOXAPARIN SODIUM 40 MG: 40 INJECTION SUBCUTANEOUS at 04:08

## 2023-08-07 RX ADMIN — BUMETANIDE 1 MG: 0.25 INJECTION INTRAMUSCULAR; INTRAVENOUS at 11:08

## 2023-08-07 RX ADMIN — CEFTRIAXONE 1 G: 1 INJECTION, POWDER, FOR SOLUTION INTRAMUSCULAR; INTRAVENOUS at 09:08

## 2023-08-07 RX ADMIN — PIPERACILLIN AND TAZOBACTAM 4.5 G: 4; .5 INJECTION, POWDER, LYOPHILIZED, FOR SOLUTION INTRAVENOUS; PARENTERAL at 11:08

## 2023-08-07 RX ADMIN — OXYCODONE HYDROCHLORIDE AND ACETAMINOPHEN 1 TABLET: 5; 325 TABLET ORAL at 09:08

## 2023-08-07 RX ADMIN — GABAPENTIN 200 MG: 100 CAPSULE ORAL at 03:08

## 2023-08-07 RX ADMIN — QUETIAPINE FUMARATE 200 MG: 100 TABLET ORAL at 08:08

## 2023-08-07 RX ADMIN — GABAPENTIN 200 MG: 100 CAPSULE ORAL at 08:08

## 2023-08-07 RX ADMIN — FAMOTIDINE 20 MG: 10 INJECTION, SOLUTION INTRAVENOUS at 09:08

## 2023-08-07 RX ADMIN — MAGNESIUM SULFATE HEPTAHYDRATE 2 G: 40 INJECTION, SOLUTION INTRAVENOUS at 03:08

## 2023-08-07 RX ADMIN — HYDROXYZINE HYDROCHLORIDE 25 MG: 25 TABLET ORAL at 09:08

## 2023-08-07 RX ADMIN — BUMETANIDE 1 MG: 0.25 INJECTION INTRAMUSCULAR; INTRAVENOUS at 01:08

## 2023-08-07 NOTE — HPI
Mary Ellen Fajrado is a 67-year-old female with a past medical history significant for stroke, hypertension, COPD, chronic diastolic heart failure, chronic respiratory failure on home oxygen on 2 L/min NC, obstructive sleep apnea, neurogenic bladder with chronic indwelling suprapubic catheter, left AKA, paraparesis, and wounds who presented to the emergency room due to generalized weakness, confusion, and painful urination. Patient complains of chest pain on/off, abdominal pain, and bilateral leg pain. ABG revealed mild acidosis with hypercapnia. She is on continuous BIPAP at this time. She does have UTI and suprapubic catheter should be changed out. She has been given Zosyn. Lactic acid is normal however procalcitonin is elevated. BP is elevated. There is evidence of congestive heart failure on CXR. US was done due to leg pain, it is negative for DVT. There is no leukocytosis or fever. Critical care team consulted for admission to ICU due to hypercapnic respiratory failure requiring continuous BIPAP and for treatment of UTI.    Interval history:  8/7: placed on home 2L this AM, continue NIPPV qHS and PRN naps, no new issues or c/o on exam, no family at bedside   8/12 Transferred to ICU for worsening shortness of breath; on AVAPS. ABG on AVAPS consistent with worsening hypercapnia. AMS and unable to follow commands. Intubated / sedated; post intubation had abnormal motor tonic clonic activity. Ativan given. Keppra ordered. Placed on diprivan. CT head pending.   8/13. No seizure overnight. EEG scheduled this am. Neuro consult pending. Extubated without complication.  8/14: No acute events.  Stable on home O2 of 2L currently.  Wearing Bipap at night.  Hemodynamics and oxygenation are stable.  8/15: No acute events overnight. Pulmonary status stable on 2L/NC.

## 2023-08-07 NOTE — ASSESSMENT & PLAN NOTE
Wound care consult  Turn every 2 hours, use positioning devices/wedge as needed  Immobility issue, she is paraplegic   Price (Do Not Change): 0.00 Detail Level: Simple Instructions: This plan will send the code FBSD to the PM system.  DO NOT or CHANGE the price.

## 2023-08-07 NOTE — SUBJECTIVE & OBJECTIVE
Past Medical History:   Diagnosis Date    Abdominal hernia     Addiction to drug     Alcohol abuse     Anxiety     Arthritis     Asthma     Bipolar disorder     Bladder stones     CHF (congestive heart failure)     COPD (chronic obstructive pulmonary disease)     on home o2    CVA (cerebral vascular accident)     2012    Hallucination     Hepatitis C     treated and cured    History of blood clots     Hx of psychiatric care     Hypertension     Belen     Obese body habitus     On home oxygen therapy     ADEEL (obstructive sleep apnea)     ADEEL treated with BiPAP     Paraplegia     Paraplegic spinal paralysis     Psychiatric problem     Psychosis     AVH; Paranoia    Renal disorder     Requires assistance with activities of daily living (ADL)     SCI (spinal cord injury)     incomplete    Sleep difficulties     has sleep apnea and uses a Bi-PAP machine.    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    Substance abuse     Suprapubic catheter 03/15/2011    Therapy     Vaginal delivery     x1    Wheelchair dependence        Past Surgical History:   Procedure Laterality Date    ABOVE-KNEE AMPUTATION Left 7/23/2019    Procedure: AMPUTATION, ABOVE KNEE;  Surgeon: Gordo Rodriguez MD;  Location: [facility] OR;  Service: Orthopedics;  Laterality: Left;    amputation Left 07/23/2019    above the knee    BACK SURGERY      bilateral knee replacement      BREAST BIOPSY      cysto/lithopaxy 2017      CYSTOSCOPY Right 1/8/2021    Procedure: CYSTOSCOPY, RIGHT OCCULER BALLOON CATHETER PLACEMENT;  Surgeon: RAMA Tinoco MD;  Location: [facility] OR;  Service: Urology;  Laterality: Right;    CYSTOSCOPY W/ LASER LITHOTRIPSY      JOINT REPLACEMENT      bilateral knee    PERCUTANEOUS NEPHROLITHOTOMY Right 1/8/2021    Procedure: NEPHROLITHOTOMY, PERCUTANEOUS;  Surgeon: RAMA Tinoco MD;  Location: [facility] OR;  Service: Urology;  Laterality: Right;  x3 rooms, or7, or9,or12  ATTEMPTED    RETROGRADE PYELOGRAPHY Right 1/8/2021    Procedure:  PYELOGRAM, RETROGRADE;  Surgeon: RAMA Tinoco MD;  Location: [facility] OR;  Service: Urology;  Laterality: Right;    SUPRAPUBIC CYSTOSTOMY  03/15/2011    patient reports  replaced tubing on 10/27/19, at home    URETEROSCOPY Right 1/8/2021    Procedure: URETEROSCOPY;  Surgeon: RAMA Tinoco MD;  Location: [facility] OR;  Service: Urology;  Laterality: Right;       Review of patient's allergies indicates:   Allergen Reactions    Tuberculin ppd Itching and Dermatitis     Patient has old scare that she claims is from TB PPD    Rocephin [ceftriaxone] Rash     Rash 2012? Pt agreeable to try with pre mediation with benadryl.        No current facility-administered medications on file prior to encounter.     Current Outpatient Medications on File Prior to Encounter   Medication Sig    albuterol-ipratropium (DUO-NEB) 2.5 mg-0.5 mg/3 mL nebulizer solution Take 3 mLs by nebulization every 4 (four) hours as needed for Wheezing.    aspirin 81 MG Chew Take 1 tablet (81 mg total) by mouth once daily.    baclofen (LIORESAL) 20 MG tablet 20 mg 2 (two) times daily.     bumetanide (BUMEX) 2 MG tablet Take 2 mg by mouth once daily.     catheter 18 Fr Misc Change every 20 days    citalopram (CELEXA) 20 MG tablet Take 1.5 tablets (30 mg total) by mouth once daily.    gabapentin (NEURONTIN) 600 MG tablet TAKE 1 TABLET BY MOUTH THREE TIMES DAILY    hydrOXYzine HCL (ATARAX) 25 MG tablet Take 1 tablet (25 mg total) by mouth 2 (two) times daily as needed for Anxiety.    miscellaneous medical supply (C-TUB) [facility] NEED[place] TO SWITCH [facility] FOR OXYGEN AT 2L. LAST OXYGEN SATURATION WAS 83% ON Room Air. She uses  BIPAP and  Needs  New mask, tubings and    oxybutynin (DITROPAN-XL) 5 MG TR24 Take 1 tablet (5 mg total) by mouth once daily.    QUEtiapine (SEROQUEL) 200 MG Tab Take 1 tablet (200 mg total) by mouth every evening.     Family History       Problem Relation (Age of Onset)    Anxiety disorder Brother    Dementia Mother    Depression  Brother          Tobacco Use    Smoking status: Former     Current packs/day: 0.00     Average packs/day: 0.5 packs/day for 25.0 years (12.5 ttl pk-yrs)     Types: Cigarettes     Start date:      Quit date:      Years since quittin.6    Smokeless tobacco: Never   Substance and Sexual Activity    Alcohol use: Not Currently     Comment: occasional    Drug use: Not Currently     Comment: prescribed opioids at present for pain    Sexual activity: Not Currently     Partners: Male     Comment: since      Review of Systems   All other systems reviewed and are negative.    Objective:     Vital Signs (Most Recent):  Temp: 98 °F (36.7 °C) (23 1124)  Pulse: 77 (23 1124)  Resp: 18 (23 1124)  BP: (!) 104/55 (23 112)  SpO2: 99 % (23 112) Vital Signs (24h Range):  Temp:  [98 °F (36.7 °C)-98.7 °F (37.1 °C)] 98 °F (36.7 °C)  Pulse:  [59-95] 77  Resp:  [10-50] 18  SpO2:  [85 %-100 %] 99 %  BP: (104-197)/() 104/55     Weight: 78 kg (172 lb)  Body mass index is 28.62 kg/m².     Physical Exam  Vitals and nursing note reviewed.   Constitutional:       General: She is not in acute distress.     Appearance: Normal appearance. She is normal weight.   Cardiovascular:      Rate and Rhythm: Normal rate and regular rhythm.      Heart sounds: No murmur heard.  Pulmonary:      Effort: Pulmonary effort is normal. No respiratory distress.      Breath sounds: No wheezing.      Comments: Decreased BS bilaterally  Genitourinary:     Comments: Suprapubic catheter  Musculoskeletal:      Comments: Left BKA   Neurological:      General: No focal deficit present.      Mental Status: She is alert and oriented to person, place, and time.   Psychiatric:         Mood and Affect: Mood normal.         Behavior: Behavior normal.          Significant Labs: All pertinent labs within the past 24 hours have been reviewed.  Recent Lab Results  (Last 5 results in the past 24 hours)        23  6079    08/07/23  0507   08/07/23  0142   08/07/23  0008   08/06/23  1908        Anion Gap   10             Ao root annulus 3.40               Ascending aorta 4.33               Ao peak ben 1.46               Ao VTI 31.90               AV valve area 2.43               GABRIELA by Velocity Ratio 2.13               AV mean gradient 7               AV index (prosthetic) 0.87               AV peak gradient 9               AV Velocity Ratio 0.77               Baso #   0.07             Basophil %   0.8             Blood Culture, Routine         No Growth to date  [P]       BNP       70  Comment: Values of less than 100 pg/ml are consistent with non-CHF populations.         BSA 1.89               BUN   14             Calcium   8.9             Chloride   105             CO2   28             Creatinine   0.9             Left Ventricle Relative Wall Thickness 0.61               Differential Method   Automated             E/A ratio 0.81               E/E' ratio 6.09               eGFR   >60             Eos #   0.1             Eosinophil %   1.3             E wave deceleration time 305.86               FS 39               Glucose   85             Gran # (ANC)   5.6             Gran %   67.1             Hematocrit   34.3             Hemoglobin   9.6             Immature Grans (Abs)   0.03  Comment: Mild elevation in immature granulocytes is non specific and   can be seen in a variety of conditions including stress response,   acute inflammation, trauma and pregnancy. Correlation with other   laboratory and clinical findings is essential.               Immature Granulocytes   0.4             IVC diameter 0.97               IVRT 85.63               IVSd 1.13               LA WIDTH 3.0               Lactate, Florian       1.7  Comment: Falsely low lactic acid results can be found in samples   containing >=13.0 mg/dL total bilirubin and/or >=3.5 mg/dL   direct bilirubin.           Left Atrium Major Axis 5.21               Left Atrium Minor Axis  3.57               LA size 3.20               LA volume 34.57               LA vol index 18.6               LVOT area 2.8               LV LATERAL E/E' RATIO 4.67               LV SEPTAL E/E' RATIO 8.75               LV EDV BP 56.35               LV Diastolic Volume Index 30.30               LVIDd 3.65               LVIDs 2.22               LV mass 130.15               LV Mass Index 70               Left Ventricular Outflow Tract Mean Gradient 3.88               Left Ventricular Outflow Tract Mean Velocity 0.94               LVOT diameter 1.88               LVOT peak jack 1.12               LVOT stroke volume 77.41               LVOT peak VTI 27.90               LV ESV BP 16.63               LV Systolic Volume Index 8.9               Lymph #   1.3             Lymph %   15.9             Magnesium   1.7             MCH   23.7             MCHC   28.0             MCV   85             Mean e' 0.12               Mono #   1.2             Mono %   14.5             MPV   8.8             MV Peak A Jack 0.86               MV Peak E Jack 0.70               nRBC   0             Platelets   244             Potassium   4.2             PV mean gradient 2               Posterior Wall 1.11               RA Major Axis 4.43               RA Width 3.0               RBC   4.05             RDW   18.7             RV mid diameter 3.19               RVOT peak jack 1.06               RVOT peak VTI 16.4               Sodium   143             STJ 4.19               TDI SEPTAL 0.08               TDI LATERAL 0.15               Triscuspid Valve Regurgitation Peak Gradient 52               TR Max Jack 3.60               Troponin I   0.017  Comment: The reference interval for Troponin I represents the 99th percentile   cutoff   for our facility and is consistent with 3rd generation assay   performance.     0.021  Comment: The reference interval for Troponin I represents the 99th percentile   cutoff   for our facility and is consistent with 3rd generation  assay   performance.             WBC   8.35             ZLVIDD -3.49               ZLVIDS -2.87                                       [P] - Preliminary Result               Significant Imaging: I have reviewed all pertinent imaging results/findings within the past 24 hours.    US Lower Extremity Veins Right   Final Result      No evidence of deep venous thrombosis in the right lower extremity.         Electronically signed by: Dale Theodore   Date:    08/06/2023   Time:    19:27      CT Head Without Contrast   Final Result      No acute abnormality.      Atrophy and chronic white matter changes      All CT scans   are performed using dose optimization techniques including the following: automated exposure control; adjustment of the mA and/or kV; use of iterative reconstruction technique.  Dose modulation was employed for ALARA by means of: Automated exposure control; adjustment of the mA and/or kV according to patient size (this includes techniques or standardized protocols for targeted exams where dose is matched to indication/reason for exam; i.e. extremities or head); and/or use of iterative reconstructive technique.         Electronically signed by: Dale Theodore   Date:    08/06/2023   Time:    18:57      X-Ray Chest AP Portable   Final Result      As above         Electronically signed by: Dale Theodore   Date:    08/06/2023   Time:    17:34

## 2023-08-07 NOTE — ASSESSMENT & PLAN NOTE
Does not appear to be exacerbated at this time, no wheezing noted  Respirations unlabored with BIPAP in use  Duo-nebs ordered, scheduled every 6 hours

## 2023-08-07 NOTE — PROGRESS NOTES
Pharmacist Renal Dose Adjustment Note    Mary Ellen Fajardo is a 67 y.o. female being treated with the medication enoxaparin    Patient Data:    Vital Signs (Most Recent):  Temp: 98.5 °F (36.9 °C) (08/06/23 1830)  Pulse: 63 (08/07/23 0200)  Resp: (!) 28 (08/07/23 0200)  BP: (!) 183/126 (08/07/23 0200)  SpO2: 100 % (08/07/23 0200) Vital Signs (72h Range):  Temp:  [98.5 °F (36.9 °C)-98.6 °F (37 °C)]   Pulse:  [59-79]   Resp:  [10-28]   BP: (130-197)/()   SpO2:  [89 %-100 %]      Recent Labs   Lab 08/06/23 1752   CREATININE 1.0     Serum creatinine: 1 mg/dL 08/06/23 1752  Estimated creatinine clearance: 59.5 mL/min    Enoxaparin 40 mg q12h will be changed to enoxaparin 40 mg q24h for CrCl >30 mL/min and BMI <40.     Pharmacist's Name: Stephanie Billings  Pharmacist's Extension: [phone]

## 2023-08-07 NOTE — ASSESSMENT & PLAN NOTE
Echo report from 4/2021   The left ventricle is normal in size with mild concentric hypertrophy and normal systolic function.   The estimated ejection fraction is 55%.   Indeterminate left ventricular diastolic function.   Normal right ventricular size with normal right ventricular systolic function.   Mild left atrial enlargement.   Normal central venous pressure (3 mmHg).   The estimated PA systolic pressure is 24 mmHg     Echo    Result Date: 8/7/2023    Left Ventricle: Normal wall motion. There is low normal systolic   function with a visually estimated ejection fraction of 50 - 55%.    Right Ventricle: Normal right ventricular cavity size.        Continue Bumex 1 mg IV BID, transition to PO when appropriate  Strict I/O's, daily weights, Na/fluid restriction, AM labs

## 2023-08-07 NOTE — ASSESSMENT & PLAN NOTE
Bed bound at baseline with limited help at home   PT/OT, supportive care  Med mgmt consulted for assistance with placement

## 2023-08-07 NOTE — SUBJECTIVE & OBJECTIVE
Past Medical History:   Diagnosis Date    Abdominal hernia     Addiction to drug     Alcohol abuse     Anxiety     Arthritis     Asthma     Bipolar disorder     Bladder stones     CHF (congestive heart failure)     COPD (chronic obstructive pulmonary disease)     on home o2    CVA (cerebral vascular accident)     2012    Hallucination     Hepatitis C     treated and cured    History of blood clots     Hx of psychiatric care     Hypertension     Belen     Obese body habitus     On home oxygen therapy     ADEEL (obstructive sleep apnea)     ADEEL treated with BiPAP     Paraplegia     Paraplegic spinal paralysis     Psychiatric problem     Psychosis     AVH; Paranoia    Renal disorder     Requires assistance with activities of daily living (ADL)     SCI (spinal cord injury)     incomplete    Sleep difficulties     has sleep apnea and uses a Bi-PAP machine.    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    Substance abuse     Suprapubic catheter 03/15/2011    Therapy     Vaginal delivery     x1    Wheelchair dependence        Past Surgical History:   Procedure Laterality Date    ABOVE-KNEE AMPUTATION Left 7/23/2019    Procedure: AMPUTATION, ABOVE KNEE;  Surgeon: Gordo Rodriguez MD;  Location: [facility] OR;  Service: Orthopedics;  Laterality: Left;    amputation Left 07/23/2019    above the knee    BACK SURGERY      bilateral knee replacement      BREAST BIOPSY      cysto/lithopaxy 2017      CYSTOSCOPY Right 1/8/2021    Procedure: CYSTOSCOPY, RIGHT OCCULER BALLOON CATHETER PLACEMENT;  Surgeon: RAMA Tinoco MD;  Location: [facility] OR;  Service: Urology;  Laterality: Right;    CYSTOSCOPY W/ LASER LITHOTRIPSY      JOINT REPLACEMENT      bilateral knee    PERCUTANEOUS NEPHROLITHOTOMY Right 1/8/2021    Procedure: NEPHROLITHOTOMY, PERCUTANEOUS;  Surgeon: RAMA Tinoco MD;  Location: [facility] OR;  Service: Urology;  Laterality: Right;  x3 rooms, or7, or9,or12  ATTEMPTED    RETROGRADE PYELOGRAPHY Right 1/8/2021    Procedure:  PYELOGRAM, RETROGRADE;  Surgeon: RAMA Tinoco MD;  Location: [facility] OR;  Service: Urology;  Laterality: Right;    SUPRAPUBIC CYSTOSTOMY  03/15/2011    patient reports  replaced tubing on 10/27/19, at home    URETEROSCOPY Right 2021    Procedure: URETEROSCOPY;  Surgeon: RAMA Tinoco MD;  Location: [facility] OR;  Service: Urology;  Laterality: Right;       Review of patient's allergies indicates:   Allergen Reactions    Tuberculin ppd Itching and Dermatitis     Patient has old scare that she claims is from TB PPD    Rocephin [ceftriaxone] Rash     Rash ? Pt agreeable to try with pre mediation with benadryl.        Family History       Problem Relation (Age of Onset)    Anxiety disorder Brother    Dementia Mother    Depression Brother          Tobacco Use    Smoking status: Former     Current packs/day: 0.00     Average packs/day: 0.5 packs/day for 25.0 years (12.5 ttl pk-yrs)     Types: Cigarettes     Start date:      Quit date:      Years since quittin.6    Smokeless tobacco: Never   Substance and Sexual Activity    Alcohol use: Not Currently     Comment: occasional    Drug use: Not Currently     Comment: prescribed opioids at present for pain    Sexual activity: Not Currently     Partners: Male     Comment: since          Review of Systems   Constitutional:  Positive for activity change, chills and fatigue.   HENT: Negative.     Eyes: Negative.    Respiratory:  Positive for cough.    Cardiovascular:  Positive for chest pain.   Gastrointestinal:  Positive for abdominal pain.   Endocrine: Negative.    Genitourinary:  Positive for dysuria.   Musculoskeletal:  Positive for arthralgias.        Leg pain   Skin:  Positive for wound.   Allergic/Immunologic: Negative.    Neurological:  Positive for weakness.   Hematological: Negative.    Psychiatric/Behavioral: Negative.     All other systems reviewed and are negative.    Objective:     Vital Signs (Most Recent):  Temp: 98.5 °F (36.9 °C)  (08/06/23 1830)  Pulse: (!) 59 (08/06/23 2035)  Resp: 11 (08/06/23 2035)  BP: (!) 164/76 (08/06/23 2035)  SpO2: 100 % (08/06/23 2035) Vital Signs (24h Range):  Temp:  [98.5 °F (36.9 °C)-98.6 °F (37 °C)] 98.5 °F (36.9 °C)  Pulse:  [59-79] 59  Resp:  [10-18] 11  SpO2:  [89 %-100 %] 100 %  BP: (130-197)/(76-91) 164/76        Body mass index is 28.43 kg/m².      Intake/Output Summary (Last 24 hours) at 8/6/2023 2335  Last data filed at 8/6/2023 2046  Gross per 24 hour   Intake 100 ml   Output --   Net 100 ml        Physical Exam  Vitals and nursing note reviewed.   Constitutional:       General: She is not in acute distress.     Appearance: She is obese. She is ill-appearing. She is not diaphoretic.      Comments: Somnolent, wakes easily and answers some questions appropriately, there is some confusion, appears lethargic   HENT:      Head: Normocephalic.      Nose: Nose normal.      Mouth/Throat:      Mouth: Mucous membranes are moist.      Pharynx: Oropharynx is clear.   Eyes:      Pupils: Pupils are equal, round, and reactive to light.   Cardiovascular:      Rate and Rhythm: Regular rhythm. Bradycardia present.      Heart sounds: Normal heart sounds.   Pulmonary:      Effort: No respiratory distress.      Comments: Diminished breath sounds bilaterally  Abdominal:      General: Bowel sounds are normal.      Tenderness: There is abdominal tenderness. There is no guarding.      Comments: Obese, mild generalized tenderness throughout abdomen upon palpation   Genitourinary:     Comments: Suprapubic catheter, cloudy urine  Musculoskeletal:         General: Tenderness present.      Cervical back: Normal range of motion and neck supple.      Right lower leg: Edema present.      Comments: Left BKA   Skin:     General: Skin is warm and dry.      Capillary Refill: Capillary refill takes less than 2 seconds.      Comments: Wounds noted   Neurological:      Comments: Oriented to person and place, follows commands, attempts to  answer questions with some mild confusion, lethargic   Psychiatric:      Comments: Somnolent, cooperative          Vents: ON BIPAP  Oxygen Concentration (%): 40 (08/06/23 2004)    Lines/Drains/Airways       Drain  Duration                  Suprapubic Catheter 01/08/21 0745 latex 18 Fr. 940 days         Ureteral Drain/Stent 01/08/21 1028 Right ureter 6 Fr. 940 days              Peripheral Intravenous Line  Duration                  Peripheral IV - Single Lumen 08/06/23 1820 20 G Left Antecubital <1 day                    Significant Labs:    CBC/Anemia Profile:  Recent Labs   Lab 08/06/23  1752   WBC 9.29   HGB 10.3*   HCT 34.0*      MCV 80*   RDW 18.6*        Chemistries:  Recent Labs   Lab 08/06/23  1752      K 3.8      CO2 27   BUN 15   CREATININE 1.0   CALCIUM 9.2   ALBUMIN 2.8*   PROT 8.3   BILITOT 0.3   ALKPHOS 69   ALT 9*   AST 20       ABGs:   Recent Labs   Lab 08/06/23  1832   PH 7.314*   PCO2 65.1*   HCO3 33.1*   POCSATURATED 97   BE 7     Lactic Acid:   Recent Labs   Lab 08/06/23  1752   LACTATE 0.8     PROCALCITONIN 1.11    Troponin:   Recent Labs   Lab 08/06/23  1752   TROPONINI 0.009     Urine Studies:   Recent Labs   Lab 08/06/23  1821   COLORU Orange*   APPEARANCEUA Cloudy*   PHUR 8.0   SPECGRAV 1.030   PROTEINUA 3+*   GLUCUA Negative   KETONESU Negative   BILIRUBINUA Negative   OCCULTUA 1+*   NITRITE Negative   UROBILINOGEN Negative   LEUKOCYTESUR 2+*   RBCUA >100*   WBCUA >100*   BACTERIA Moderate*   HYALINECASTS 0     BNP ordered, pending    All pertinent labs within the past 24 hours have been reviewed.    Significant Imaging:   I have reviewed all pertinent imaging results/findings within the past 24 hours.  US Lower Extremity Veins Right [[id number]] Resulted: 08/06/23 1927   Order Status: Completed Updated: 08/06/23 1930   Narrative:     EXAMINATION:   US LOWER EXTREMITY VEINS RIGHT     CLINICAL HISTORY:   Pain in right leg     TECHNIQUE:   Duplex and color flow Doppler  evaluation and graded compression of the right lower extremity veins was performed.     COMPARISON:   None     FINDINGS:   Right thigh veins: The common femoral, femoral, popliteal, upper greater saphenous, and deep femoral veins are patent and free of thrombus. The veins are normally compressible and have normal phasic flow and augmentation response.     Right calf veins: The visualized calf veins are patent.     Contralateral CFV: The contralateral (left) common femoral vein is patent and free of thrombus.     Miscellaneous: None    Impression:       No evidence of deep venous thrombosis in the right lower extremity.       Electronically signed by: Dale Theodore   Date: 08/06/2023   Time: 19:27   CT Head Without Contrast [[id number]] Resulted: 08/06/23 1857   Order Status: Completed Updated: 08/06/23 1859   Narrative:     EXAMINATION:   CT HEAD WITHOUT CONTRAST     CLINICAL HISTORY:   Mental status change, unknown cause;     TECHNIQUE:   Low dose axial CT images obtained throughout the head without intravenous contrast. Sagittal and coronal reconstructions were performed.     COMPARISON:   None.     FINDINGS:   Atrophy and chronic white matter changes     No parenchymal mass, hemorrhage, edema or major vascular distribution infarct.     Skull/extracranial contents (limited evaluation): No fracture. Mastoid air cells and paranasal sinuses are essentially clear.    Impression:       No acute abnormality.     Atrophy and chronic white matter changes     All CT scans   are performed using dose optimization techniques including the following: automated exposure control; adjustment of the mA and/or kV; use of iterative reconstruction technique.  Dose modulation was employed for ALARA by means of: Automated exposure control; adjustment of the mA and/or kV according to patient size (this includes techniques or standardized protocols for targeted exams where dose is matched to indication/reason for exam; i.e. extremities or  head); and/or use of iterative reconstructive technique.       Electronically signed by: Dale Theodore   Date: 08/06/2023   Time: 18:57   X-Ray Chest AP Portable [[id number]] Resulted: 08/06/23 1734   Order Status: Completed Updated: 08/06/23 1736   Narrative:     EXAMINATION:   XR CHEST AP PORTABLE     CLINICAL HISTORY:   Sepsis;     TECHNIQUE:   Single frontal view of the chest was performed.     COMPARISON:   None     FINDINGS:   Tortuous aorta.  Mild perihilar edema.  Mild cardiomegaly.  Correlate clinically for low-grade CHF.  Atypical infectious process not excluded.     Bones are intact.    Impression:       As above       Electronically signed by: Dale Theodore   Date: 08/06/2023   Time: 17:34     EKG reviewed, SR with PAC's, T wave inversion, prolonged QTC

## 2023-08-07 NOTE — ASSESSMENT & PLAN NOTE
- bed bound at baseline with limited help at home   - PT/OT  - med mgmt consulted for assistance with placement (which pt is quite interested in) and d/c planning   - supportive care

## 2023-08-07 NOTE — HPI
Patient is 67 year old female with past medical history significant for stroke, hypertension, COPD, CHF, chronic respiratory failure on home oxygen (2 L/min NC), ADEEL, neurogenic bladder with chronic indwelling suprapubic catheter, left AKA, paraparesis, and wounds who presented to ED due to generalized weakness, confusion, and painful urination. Patient complains of chest pain on/off, abdominal pain, and bilateral leg pain. ABG revealed mild acidosis with hypercapnia. She is on continuous BIPAP at this time. She does have UTI and suprapubic catheter should be changed out. She has been given Zosyn. Lactic acid is normal however procalcitonin is elevated. BP is elevated. There is evidence of congestive heart failure on CXR. US was done due to leg pain, it is negative for DVT. There is no leukocytosis or fever. Critical care team consulted for admission to ICU due to hypercapnic respiratory failure requiring continuous BIPAP and for treatment of UTI.

## 2023-08-07 NOTE — ASSESSMENT & PLAN NOTE
CT head does not show any acute intracranial abnormalities  Lethargy likely due to hypercapnia and/or UTI

## 2023-08-07 NOTE — ASSESSMENT & PLAN NOTE
Patient with Hypercapnic and Hypoxic Respiratory failure which is Acute on chronic.  she is on home oxygen at 2 LPM. Supplemental oxygen was provided and noted- Oxygen Concentration (%):  [28-40] 28    .   Signs/symptoms of respiratory failure include- respiratory distress and lethargy. Contributing diagnoses includes - CHF and COPD Labs and images were reviewed. Patient Has recent ABG, which has been reviewed. Will treat underlying causes and adjust management of respiratory failure as follows:    8/7/23  Transitioned to 2L NC this AM, her home flow  Continue with NIPPV qHS and PRN naps

## 2023-08-07 NOTE — CONSULTS
[facility] - Intensive Care ([facility])  Wound Care    Patient Name:  Mary Ellen Fajardo   MRN:  [id number]  Date: 2023  Diagnosis: Acute on chronic respiratory failure with hypoxia and hypercapnia    History:     Past Medical History:   Diagnosis Date    Abdominal hernia     Addiction to drug     Alcohol abuse     Anxiety     Arthritis     Asthma     Bipolar disorder     Bladder stones     CHF (congestive heart failure)     COPD (chronic obstructive pulmonary disease)     on home o2    CVA (cerebral vascular accident)         Hallucination     Hepatitis C     treated and cured    History of blood clots     Hx of psychiatric care     Hypertension     Belen     Obese body habitus     On home oxygen therapy     ADEEL (obstructive sleep apnea)     ADEEL treated with BiPAP     Paraplegia     Paraplegic spinal paralysis     Psychiatric problem     Psychosis     AVH; Paranoia    Renal disorder     Requires assistance with activities of daily living (ADL)     SCI (spinal cord injury)     incomplete    Sleep difficulties     has sleep apnea and uses a Bi-PAP machine.    Status post amputation of leg 2019    Substance abuse     Suprapubic catheter 03/15/2011    Therapy     Vaginal delivery     x1    Wheelchair dependence        Social History     Socioeconomic History    Marital status:      Spouse name: Sunedep    Number of children: 1   Occupational History    Occupation: Disabled   Tobacco Use    Smoking status: Former     Current packs/day: 0.00     Average packs/day: 0.5 packs/day for 25.0 years (12.5 ttl pk-yrs)     Types: Cigarettes     Start date:      Quit date: 2002     Years since quittin.6    Smokeless tobacco: Never   Substance and Sexual Activity    Alcohol use: Not Currently     Comment: occasional    Drug use: Not Currently     Comment: prescribed opioids at present for pain    Sexual activity: Not Currently     Partners: Male     Comment: since    Other Topics Concern    Patient feels  they ought to cut down on drinking/drug use No    Patient annoyed by others criticizing their drinking/drug use No    Patient has felt bad or guilty about drinking/drug use Yes    Patient has had a drink/used drugs as an eye opener in the AM No   Social History Narrative    Strained marriage.    Disabled.       Precautions:     Allergies as of 08/06/2023 - Reviewed 08/06/2023   Allergen Reaction Noted    Tuberculin ppd Itching and Dermatitis 09/20/2019    Rocephin [ceftriaxone] Rash 10/03/2012       WO Assessment Details/Treatment     Consulted on this 66 y/o F patient due to present on admission altered skin integrity. Patient is awake and alert, denies pain. Left AKA, right heel intact, heel offloading boot in place. Patient has suprapubic catheter that was exchanged on admit per chart review, mary stomal site intact with no breakdown.   IAD noted to bilateral medial thighs, groins, perineum with partial thickness tissue loss and redness. Cleansed with no rinse foaming cleanser, patted dry. Stoma powder dusted to denuded skin, then thick layer moisture barrier paste applied to all affected areas. Recommend reapply every shift and prn.  Turned for sacral assessment. Sacrum intact with no redness.  Right posterio-lateral breast abrasion is noted with partial thickenss tissue loss, scant sanguinous drainage noted. Cleansed with saline and foam dressing applied.  Right posterior scapula/right upper lateral back region has area of indurated deep redness and warmth. No fluctuance to site noted, unknown etiology, but cannot rule out DTPI vs. Cellulitis. Discussed with primary nurse to notify MD if worsens.       08/07/23 1015   WOCN Assessment   WOCN Total Time (mins) 30   Visit Date 08/07/23   Visit Time 1015   Consult Type New   WOCN Speciality Wound   Wound moisture;other;At risk for pressure Injury   Intervention assessed;applied;chart review;coordination of care;team conference;orders        Altered Skin Integrity  08/07/23  medial Thigh Incontinence associated dermatitis   Date First Assessed: 08/07/23   Altered Skin Integrity Present on Admission - Did Patient arrive to the hospital with altered skin?: yes  Side: (c)   Orientation: medial  Location: (c) Thigh  Primary Wound Type: Incontinence associated dermatitis   Wound Image    Dressing Appearance Open to air   Drainage Amount Scant   Drainage Characteristics/Odor Serous   Appearance Red;Moist   Tissue loss description Partial thickness   Periwound Area Intact   Wound Edges Irregular   Care Cleansed with:;Wound cleanser;Applied:;Skin Barrier  (moisture barrier paste adn stoma powder)        Altered Skin Integrity Right lateral;posterior Breast Abrasion(s)   No Date First Assessed or Time First Assessed found.   Side: Right  Orientation: lateral;posterior  Location: (c) Breast  Primary Wound Type: Abrasion(s)   Wound Image    Dressing Appearance Intact   Drainage Amount Scant   Drainage Characteristics/Odor Serosanguineous   Appearance Pink;Red;Moist;Epithelialization   Tissue loss description Partial thickness   Periwound Area Intact;Scar tissue   Wound Edges Irregular   Care Cleansed with:;Sterile normal saline;Applied:;Skin Barrier   Dressing Foam;Changed   Dressing Change Due 08/14/23        Altered Skin Integrity 08/07/23 Right upper Back Other (comment)   Date First Assessed: 08/07/23   Altered Skin Integrity Present on Admission - Did Patient arrive to the hospital with altered skin?: yes  Side: Right  Orientation: upper  Location: Back  Primary Wound Type: Other (comment)   Wound Image    Dressing Appearance Open to air   Drainage Amount None   Appearance Red   Periwound Area Indurated;Redness;Warm   Care Cleansed with:;Sterile normal saline;Applied:;Skin Barrier       Recommendations made to primary team for wound care moisture management, pressure injury prevention . Orders placed.     08/07/2023

## 2023-08-07 NOTE — ASSESSMENT & PLAN NOTE
- transitioned to 2L NC this AM, her home flow  - continue with NIPPV qHS and PRN naps  - reinforced need for compliance at home  - CXR and ABGs PRN

## 2023-08-07 NOTE — CONSULTS
[facility] ([facility])  [facility] Medicine  Consult Note    Patient Name: Mary Ellen Fajardo  MRN: [id number]  Admission Date: 8/6/2023  Hospital Length of Stay: 1 days  Attending Physician: Juan Luis MD   Primary Care Provider: Any Tran MD           Patient information was obtained from patient, past medical records and ER records.     Inpatient consult to Hospitalist  Consult performed by: Juan Luis MD  Consult ordered by: Carlos Wilson NP        Subjective:     Principal Problem: Acute on chronic respiratory failure with hypoxia and hypercapnia    Chief Complaint:   Chief Complaint   Patient presents with    Altered Mental Status     Family sent patient for confusion and generalized weakness. LKW at 0600. Painful urination for weeks.         HPI: Patient is 67 year old female with past medical history significant for stroke, hypertension, COPD, CHF, chronic respiratory failure on home oxygen (2 L/min NC), ADEEL, neurogenic bladder with chronic indwelling suprapubic catheter, left AKA, paraparesis, and wounds who presented to ED due to generalized weakness, confusion, and painful urination. Patient complains of chest pain on/off, abdominal pain, and bilateral leg pain. ABG revealed mild acidosis with hypercapnia. She is on continuous BIPAP at this time. She does have UTI and suprapubic catheter should be changed out. She has been given Zosyn. Lactic acid is normal however procalcitonin is elevated. BP is elevated. There is evidence of congestive heart failure on CXR. US was done due to leg pain, it is negative for DVT. There is no leukocytosis or fever. Critical care team consulted for admission to ICU due to hypercapnic respiratory failure requiring continuous BIPAP and for treatment of UTI.     Hospital Course:  8/7: placed on home 2L this AM, continue NIPPV qHS and PRN naps, no new issues or c/o on exam, no family at bedside  ----  Chart reviewed, examined patient at bedside  this afternoon, asking to restart her home anxiety and pain medication.  Currently stable on 2L NC, stable for floor transfer.      Past Medical History:   Diagnosis Date    Abdominal hernia     Addiction to drug     Alcohol abuse     Anxiety     Arthritis     Asthma     Bipolar disorder     Bladder stones     CHF (congestive heart failure)     COPD (chronic obstructive pulmonary disease)     on home o2    CVA (cerebral vascular accident)     2012    Hallucination     Hepatitis C     treated and cured    History of blood clots     Hx of psychiatric care     Hypertension     Belen     Obese body habitus     On home oxygen therapy     ADEEL (obstructive sleep apnea)     ADEEL treated with BiPAP     Paraplegia     Paraplegic spinal paralysis     Psychiatric problem     Psychosis     AVH; Paranoia    Renal disorder     Requires assistance with activities of daily living (ADL)     SCI (spinal cord injury)     incomplete    Sleep difficulties     has sleep apnea and uses a Bi-PAP machine.    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    Substance abuse     Suprapubic catheter 03/15/2011    Therapy     Vaginal delivery     x1    Wheelchair dependence        Past Surgical History:   Procedure Laterality Date    ABOVE-KNEE AMPUTATION Left 7/23/2019    Procedure: AMPUTATION, ABOVE KNEE;  Surgeon: Gordo Rodriguez MD;  Location: [facility] OR;  Service: Orthopedics;  Laterality: Left;    amputation Left 07/23/2019    above the knee    BACK SURGERY      bilateral knee replacement      BREAST BIOPSY      cysto/lithopaxy 2017      CYSTOSCOPY Right 1/8/2021    Procedure: CYSTOSCOPY, RIGHT OCCULER BALLOON CATHETER PLACEMENT;  Surgeon: RAMA Tinoco MD;  Location: [facility] OR;  Service: Urology;  Laterality: Right;    CYSTOSCOPY W/ LASER LITHOTRIPSY      JOINT REPLACEMENT      bilateral knee    PERCUTANEOUS NEPHROLITHOTOMY Right 1/8/2021    Procedure: NEPHROLITHOTOMY, PERCUTANEOUS;  Surgeon: RAMA Tinoco MD;  Location: [facility] OR;   Service: Urology;  Laterality: Right;  x3 rooms, or7, or9,or12  ATTEMPTED    RETROGRADE PYELOGRAPHY Right 1/8/2021    Procedure: PYELOGRAM, RETROGRADE;  Surgeon: RAMA Tinoco MD;  Location: [facility] OR;  Service: Urology;  Laterality: Right;    SUPRAPUBIC CYSTOSTOMY  03/15/2011    patient reports  replaced tubing on 10/27/19, at home    URETEROSCOPY Right 1/8/2021    Procedure: URETEROSCOPY;  Surgeon: RAMA Tinoco MD;  Location: [facility] OR;  Service: Urology;  Laterality: Right;       Review of patient's allergies indicates:   Allergen Reactions    Tuberculin ppd Itching and Dermatitis     Patient has old scare that she claims is from TB PPD    Rocephin [ceftriaxone] Rash     Rash 2012? Pt agreeable to try with pre mediation with benadryl.        No current facility-administered medications on file prior to encounter.     Current Outpatient Medications on File Prior to Encounter   Medication Sig    albuterol-ipratropium (DUO-NEB) 2.5 mg-0.5 mg/3 mL nebulizer solution Take 3 mLs by nebulization every 4 (four) hours as needed for Wheezing.    aspirin 81 MG Chew Take 1 tablet (81 mg total) by mouth once daily.    baclofen (LIORESAL) 20 MG tablet 20 mg 2 (two) times daily.     bumetanide (BUMEX) 2 MG tablet Take 2 mg by mouth once daily.     catheter 18 Fr Misc Change every 20 days    citalopram (CELEXA) 20 MG tablet Take 1.5 tablets (30 mg total) by mouth once daily.    gabapentin (NEURONTIN) 600 MG tablet TAKE 1 TABLET BY MOUTH THREE TIMES DAILY    hydrOXYzine HCL (ATARAX) 25 MG tablet Take 1 tablet (25 mg total) by mouth 2 (two) times daily as needed for Anxiety.    miscellaneous medical supply (C-TUB[place] [facility] [place] [facility] FOR OXYGEN AT 2L. LAST OXYGEN SATURATION WAS 83% ON Room Air. She uses  BIPAP and  Needs  New mask, tubings and    oxybutynin (DITROPAN-XL) 5 MG TR24 Take 1 tablet (5 mg total) by mouth once daily.    QUEtiapine (SEROQUEL) 200 MG Tab Take 1 tablet (200 mg total) by mouth every  evening.     Family History       Problem Relation (Age of Onset)    Anxiety disorder Brother    Dementia Mother    Depression Brother          Tobacco Use    Smoking status: Former     Current packs/day: 0.00     Average packs/day: 0.5 packs/day for 25.0 years (12.5 ttl pk-yrs)     Types: Cigarettes     Start date:      Quit date:      Years since quittin.6    Smokeless tobacco: Never   Substance and Sexual Activity    Alcohol use: Not Currently     Comment: occasional    Drug use: Not Currently     Comment: prescribed opioids at present for pain    Sexual activity: Not Currently     Partners: Male     Comment: since      Review of Systems   All other systems reviewed and are negative.    Objective:     Vital Signs (Most Recent):  Temp: 98 °F (36.7 °C) (23 1124)  Pulse: 77 (23 1124)  Resp: 18 (23 1124)  BP: (!) 104/55 (23 1124)  SpO2: 99 % (23 1124) Vital Signs (24h Range):  Temp:  [98 °F (36.7 °C)-98.7 °F (37.1 °C)] 98 °F (36.7 °C)  Pulse:  [59-95] 77  Resp:  [10-50] 18  SpO2:  [85 %-100 %] 99 %  BP: (104-197)/() 104/55     Weight: 78 kg (172 lb)  Body mass index is 28.62 kg/m².     Physical Exam  Vitals and nursing note reviewed.   Constitutional:       General: She is not in acute distress.     Appearance: Normal appearance. She is normal weight.   Cardiovascular:      Rate and Rhythm: Normal rate and regular rhythm.      Heart sounds: No murmur heard.  Pulmonary:      Effort: Pulmonary effort is normal. No respiratory distress.      Breath sounds: No wheezing.      Comments: Decreased BS bilaterally  Genitourinary:     Comments: Suprapubic catheter  Musculoskeletal:      Comments: Left BKA   Neurological:      General: No focal deficit present.      Mental Status: She is alert and oriented to person, place, and time.   Psychiatric:         Mood and Affect: Mood normal.         Behavior: Behavior normal.          Significant Labs: All pertinent labs within the  past 24 hours have been reviewed.  Recent Lab Results  (Last 5 results in the past 24 hours)        08/07/23  0758   08/07/23  0507   08/07/23  0142   08/07/23  0008   08/06/23  1908        Anion Gap   10             Ao root annulus 3.40               Ascending aorta 4.33               Ao peak ben 1.46               Ao VTI 31.90               AV valve area 2.43               GABRIELA by Velocity Ratio 2.13               AV mean gradient 7               AV index (prosthetic) 0.87               AV peak gradient 9               AV Velocity Ratio 0.77               Baso #   0.07             Basophil %   0.8             Blood Culture, Routine         No Growth to date  [P]       BNP       70  Comment: Values of less than 100 pg/ml are consistent with non-CHF populations.         BSA 1.89               BUN   14             Calcium   8.9             Chloride   105             CO2   28             Creatinine   0.9             Left Ventricle Relative Wall Thickness 0.61               Differential Method   Automated             E/A ratio 0.81               E/E' ratio 6.09               eGFR   >60             Eos #   0.1             Eosinophil %   1.3             E wave deceleration time 305.86               FS 39               Glucose   85             Gran # (ANC)   5.6             Gran %   67.1             Hematocrit   34.3             Hemoglobin   9.6             Immature Grans (Abs)   0.03  Comment: Mild elevation in immature granulocytes is non specific and   can be seen in a variety of conditions including stress response,   acute inflammation, trauma and pregnancy. Correlation with other   laboratory and clinical findings is essential.               Immature Granulocytes   0.4             IVC diameter 0.97               IVRT 85.63               IVSd 1.13               LA WIDTH 3.0               Lactate, Florian       1.7  Comment: Falsely low lactic acid results can be found in samples   containing >=13.0 mg/dL total bilirubin  and/or >=3.5 mg/dL   direct bilirubin.           Left Atrium Major Axis 5.21               Left Atrium Minor Axis 3.57               LA size 3.20               LA volume 34.57               LA vol index 18.6               LVOT area 2.8               LV LATERAL E/E' RATIO 4.67               LV SEPTAL E/E' RATIO 8.75               LV EDV BP 56.35               LV Diastolic Volume Index 30.30               LVIDd 3.65               LVIDs 2.22               LV mass 130.15               LV Mass Index 70               Left Ventricular Outflow Tract Mean Gradient 3.88               Left Ventricular Outflow Tract Mean Velocity 0.94               LVOT diameter 1.88               LVOT peak jack 1.12               LVOT stroke volume 77.41               LVOT peak VTI 27.90               LV ESV BP 16.63               LV Systolic Volume Index 8.9               Lymph #   1.3             Lymph %   15.9             Magnesium   1.7             MCH   23.7             MCHC   28.0             MCV   85             Mean e' 0.12               Mono #   1.2             Mono %   14.5             MPV   8.8             MV Peak A Jack 0.86               MV Peak E Jack 0.70               nRBC   0             Platelets   244             Potassium   4.2             PV mean gradient 2               Posterior Wall 1.11               RA Major Axis 4.43               RA Width 3.0               RBC   4.05             RDW   18.7             RV mid diameter 3.19               RVOT peak jack 1.06               RVOT peak VTI 16.4               Sodium   143             STJ 4.19               TDI SEPTAL 0.08               TDI LATERAL 0.15               Triscuspid Valve Regurgitation Peak Gradient 52               TR Max Jack 3.60               Troponin I   0.017  Comment: The reference interval for Troponin I represents the 99th percentile   cutoff   for our facility and is consistent with 3rd generation assay   performance.     0.021  Comment: The reference  interval for Troponin I represents the 99th percentile   cutoff   for our facility and is consistent with 3rd generation assay   performance.             WBC   8.35             ZLVIDD -3.49               ZLVIDS -2.87                                       [P] - Preliminary Result               Significant Imaging: I have reviewed all pertinent imaging results/findings within the past 24 hours.    US Lower Extremity Veins Right   Final Result      No evidence of deep venous thrombosis in the right lower extremity.         Electronically signed by: Dale Theodore   Date:    08/06/2023   Time:    19:27      CT Head Without Contrast   Final Result      No acute abnormality.      Atrophy and chronic white matter changes      All CT scans   are performed using dose optimization techniques including the following: automated exposure control; adjustment of the mA and/or kV; use of iterative reconstruction technique.  Dose modulation was employed for ALARA by means of: Automated exposure control; adjustment of the mA and/or kV according to patient size (this includes techniques or standardized protocols for targeted exams where dose is matched to indication/reason for exam; i.e. extremities or head); and/or use of iterative reconstructive technique.         Electronically signed by: Dael Theodore   Date:    08/06/2023   Time:    18:57      X-Ray Chest AP Portable   Final Result      As above         Electronically signed by: Dale Theodore   Date:    08/06/2023   Time:    17:34            Assessment/Plan:     * Acute on chronic respiratory failure with hypoxia and hypercapnia  Patient with Hypercapnic and Hypoxic Respiratory failure which is Acute on chronic.  she is on home oxygen at 2 LPM. Supplemental oxygen was provided and noted- Oxygen Concentration (%):  [28-40] 28    .   Signs/symptoms of respiratory failure include- respiratory distress and lethargy. Contributing diagnoses includes - CHF and COPD Labs and images were  reviewed. Patient Has recent ABG, which has been reviewed. Will treat underlying causes and adjust management of respiratory failure as follows:    8/7/23  Transitioned to 2L NC this AM, her home flow  Continue with NIPPV qHS and PRN naps    Acute on chronic diastolic heart failure  Echo report from 4/2021  The left ventricle is normal in size with mild concentric hypertrophy and normal systolic function.  The estimated ejection fraction is 55%.  Indeterminate left ventricular diastolic function.  Normal right ventricular size with normal right ventricular systolic function.  Mild left atrial enlargement.  Normal central venous pressure (3 mmHg).  The estimated PA systolic pressure is 24 mmHg     Echo    Result Date: 8/7/2023    Left Ventricle: Normal wall motion. There is low normal systolic   function with a visually estimated ejection fraction of 50 - 55%.    Right Ventricle: Normal right ventricular cavity size.        Continue Bumex 1 mg IV BID, transition to PO when appropriate  Strict I/O's, daily weights, Na/fluid restriction, AM labs    Pulmonary HTN  Echo    Result Date: 8/7/2023    Left Ventricle: Normal wall motion. There is low normal systolic   function with a visually estimated ejection fraction of 50 - 55%.    Right Ventricle: Normal right ventricular cavity size.      Plan as above    COPD (chronic obstructive pulmonary disease)  Not in exacerbation  On home 2L NC  PRN breathing treatments      Urinary tract infection associated with cystostomy catheter  Suprapubic cath changed upon arrival to the ICU  On ceftriaxone  Urine culture pending    History of CVA (cerebrovascular accident)  Bed bound at baseline with limited help at home   PT/OT, supportive care  Med mgmt consulted for assistance with placement    Anemia  H/H stable, near baseline  Monitor as needed    ADEEL (obstructive sleep apnea)  Noncompliant with home NIPPV  NIPPV qHS and PRN    Essential hypertension  BP stable  Resume home  meds    Paraparesis  Turn q2h  Supportive care    Wounds, multiple  Wound Care consulted  Turn q2h, supportive care        VTE Risk Mitigation (From admission, onward)           Ordered     enoxaparin injection 40 mg  Daily         08/06/23 2322     IP VTE HIGH RISK PATIENT  Once         08/06/23 2322     Place sequential compression device  Until discontinued         08/06/23 2322                    Thank you for your consult. I will follow-up with patient. Please contact us if you have any additional questions.    Juan Luis MD  Department of Hospital Medicine   [facility] ([facility]

## 2023-08-07 NOTE — PLAN OF CARE
[facility] (H[facility])  Initial Discharge Assessment       Primary Care Provider: Any Tran MD    Admission Diagnosis: Altered mental status [R41.82]  Right leg pain [M79.604]  Chronic diastolic congestive heart failure [I50.32]  Acute on chronic respiratory failure with hypercapnia [J96.22]    Admission Date: 8/6/2023  Expected Discharge Date:     Transition of Care Barriers: None    Payor: HU[employer] MANAGED MEDICARE / Plan: HU[employer] BUILD HMO PPO SPECIAL NEEDS / Product Type: Medicare Advantage /     Extended Emergency Contact Information  Primary Emergency Contact: Ras Maher  Address: [address] [place] [address] [place]  Mobile Phone: [phone]  Relation: Grandchild  Secondary Emergency Contact: Swapnil Christianson  [place]  Mobile Phone: [phone]  Relation: Son    Discharge Plan A: Home with family         [id number] - [place], [place] - [address]  [address] [place] [address]  Phone: [phone] Fax: [phone]    [id number] - [place], [place] - [address]  [address] [facility] [place] [address]  Phone: [phone] Fax: [phone]    [facility] - [facility] [address]  [address] [facility] [address]  Phone: [phone] Fax: [phone]      Initial Assessment (most recent)       Adult Discharge Assessment - 08/07/23 1140          Discharge Assessment    Assessment Type Discharge Planning Assessment     Confirmed/corrected address, phone number and insurance Yes     Confirmed Demographics Correct on Facesheet     Source of Information patient     Communicated LOY with patient/caregiver Date not available/Unable to determine     Reason For Admission Acute on chronic respiratory failure with hypoxia and hypercapnia     People in Home child(kamran), adult     Facility Arrived From: home     Do you expect to return to your current living situation? Yes     Do you have help  at home or someone to help you manage your care at home? Yes     Who are your caregiver(s) and their phone number(s)? part time - son works during the day     Prior to hospitilization cognitive status: Alert/Oriented     Current cognitive status: Alert/Oriented     Walking or Climbing Stairs ambulation difficulty, dependent;stair climbing difficulty, dependent;transferring difficulty, dependent     Mobility Management wheelchair     Dressing/Bathing bathing difficulty, dependent     Dressing/Bathing Management shower chair     Home Accessibility wheelchair accessible     Home Layout Able to live on 1st floor     Equipment Currently Used at Home bedside commode;bath bench;urinary catheter supplies;wheelchair;oxygen;BIPAP     Readmission within 30 days? No     Patient currently being followed by outpatient case management? No     Do you currently have service(s) that help you manage your care at home? No     Do you take prescription medications? Yes     Do you have prescription coverage? Yes     Coverage MCR     Do you have any problems affording any of your prescribed medications? No     Is the patient taking medications as prescribed? yes     Who is going to help you get home at discharge? family     How do you get to doctors appointments? family or friend will provide     Are you on dialysis? No     Do you take coumadin? No     Discharge Plan A Home with family     DME Needed Upon Discharge  BIPAP     Discharge Plan discussed with: Patient     Transition of Care Barriers None                   Anticipated DC dispo: home with family   Prior Level of Function: dependent with ADLs, ambulates using a wheelchair   People in home: son and son's girlfriend     Comments:  CM met with patient at bedside to introduce role and discuss discharge planning. Son will be part time help at home and can provide transport at time of discharge. Patient states she does not use her CPAP at home due to recall. Also asked asked about a  custom measured wheelchair. Advised patient that is not something that can be handled at the hospital. Confirmed demographics, insurance, and emergency contacts. CM discharge needs depends on hospital progress. CM will continue following to assist with other needs.

## 2023-08-07 NOTE — H&P
[facility] - Emergency Dept.  Critical Care Medicine  History & Physical    Patient Name: Mary Ellen Fajardo  MRN: [id number]  Admission Date: 8/6/2023  Hospital Length of Stay: 1 days  Code Status: Full Code  Attending Physician: Neville Huerta MD   Primary Care Provider: Any Tran MD   Principal Problem: Acute on chronic respiratory failure with hypercapnia    Subjective:     HPI:  Patient is 67 year old female with past medical history significant for stroke, hypertension, COPD, CHF, chronic respiratory failure on home oxygen (2 L/min NC), ADEEL, neurogenic bladder with chronic indwelling suprapubic catheter, left AKA, paraparesis, and wounds who presented to ED due to generalized weakness, confusion, and painful urination. Patient complains of chest pain on/off, abdominal pain, and bilateral leg pain. ABG revealed mild acidosis with hypercapnia. She is on continuous BIPAP at this time. She does have UTI and suprapubic catheter should be changed out. She has been given Zosyn. Lactic acid is normal however procalcitonin is elevated. BP is elevated. There is evidence of congestive heart failure on CXR. US was done due to leg pain, it is negative for DVT. There is no leukocytosis or fever. Critical care team consulted for admission to ICU due to hypercapnic respiratory failure requiring continuous BIPAP and for treatment of UTI.         Hospital/ICU Course:  No notes on file     Past Medical History:   Diagnosis Date    Abdominal hernia     Addiction to drug     Alcohol abuse     Anxiety     Arthritis     Asthma     Bipolar disorder     Bladder stones     CHF (congestive heart failure)     COPD (chronic obstructive pulmonary disease)     on home o2    CVA (cerebral vascular accident)     2012    Hallucination     Hepatitis C     treated and cured    History of blood clots     Hx of psychiatric care     Hypertension     Belen     Obese body habitus     On home oxygen therapy     ADEEL  (obstructive sleep apnea)     ADEEL treated with BiPAP     Paraplegia     Paraplegic spinal paralysis     Psychiatric problem     Psychosis     AVH; Paranoia    Renal disorder     Requires assistance with activities of daily living (ADL)     SCI (spinal cord injury)     incomplete    Sleep difficulties     has sleep apnea and uses a Bi-PAP machine.    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    Substance abuse     Suprapubic catheter 03/15/2011    Therapy     Vaginal delivery     x1    Wheelchair dependence        Past Surgical History:   Procedure Laterality Date    ABOVE-KNEE AMPUTATION Left 7/23/2019    Procedure: AMPUTATION, ABOVE KNEE;  Surgeon: Gordo Rodriguez MD;  Location: [facility] OR;  Service: Orthopedics;  Laterality: Left;    amputation Left 07/23/2019    above the knee    BACK SURGERY      bilateral knee replacement      BREAST BIOPSY      cysto/lithopaxy 2017      CYSTOSCOPY Right 1/8/2021    Procedure: CYSTOSCOPY, RIGHT OCCULER BALLOON CATHETER PLACEMENT;  Surgeon: RAMA Tinoco MD;  Location: [facility] OR;  Service: Urology;  Laterality: Right;    CYSTOSCOPY W/ LASER LITHOTRIPSY      JOINT REPLACEMENT      bilateral knee    PERCUTANEOUS NEPHROLITHOTOMY Right 1/8/2021    Procedure: NEPHROLITHOTOMY, PERCUTANEOUS;  Surgeon: RAMA Tinoco MD;  Location: [facility] OR;  Service: Urology;  Laterality: Right;  x3 rooms, or7, or9,or12  ATTEMPTED    RETROGRADE PYELOGRAPHY Right 1/8/2021    Procedure: PYELOGRAM, RETROGRADE;  Surgeon: RAMA Tinoco MD;  Location: [facility] OR;  Service: Urology;  Laterality: Right;    SUPRAPUBIC CYSTOSTOMY  03/15/2011    patient reports  replaced tubing on 10/27/19, at home    URETEROSCOPY Right 1/8/2021    Procedure: URETEROSCOPY;  Surgeon: RAMA Tinoco MD;  Location: [facility];  Service: Urology;  Laterality: Right;       Review of patient's allergies indicates:   Allergen Reactions    Tuberculin ppd Itching and Dermatitis     Patient has old  scare that she claims is from TB PPD    Rocephin [ceftriaxone] Rash     Rash ? Pt agreeable to try with pre mediation with benadryl.        Family History       Problem Relation (Age of Onset)    Anxiety disorder Brother    Dementia Mother    Depression Brother          Tobacco Use    Smoking status: Former     Current packs/day: 0.00     Average packs/day: 0.5 packs/day for 25.0 years (12.5 ttl pk-yrs)     Types: Cigarettes     Start date:      Quit date:      Years since quittin.6    Smokeless tobacco: Never   Substance and Sexual Activity    Alcohol use: Not Currently     Comment: occasional    Drug use: Not Currently     Comment: prescribed opioids at present for pain    Sexual activity: Not Currently     Partners: Male     Comment: since          Review of Systems   Constitutional:  Positive for activity change, chills and fatigue.   HENT: Negative.     Eyes: Negative.    Respiratory:  Positive for cough.    Cardiovascular:  Positive for chest pain.   Gastrointestinal:  Positive for abdominal pain.   Endocrine: Negative.    Genitourinary:  Positive for dysuria.   Musculoskeletal:  Positive for arthralgias.        Leg pain   Skin:  Positive for wound.   Allergic/Immunologic: Negative.    Neurological:  Positive for weakness.   Hematological: Negative.    Psychiatric/Behavioral: Negative.     All other systems reviewed and are negative.    Objective:     Vital Signs (Most Recent):  Temp: 98.5 °F (36.9 °C) (23 1830)  Pulse: (!) 59 (23)  Resp: 11 (23)  BP: (!) 164/76 (23)  SpO2: 100 % (23) Vital Signs (24h Range):  Temp:  [98.5 °F (36.9 °C)-98.6 °F (37 °C)] 98.5 °F (36.9 °C)  Pulse:  [59-79] 59  Resp:  [10-18] 11  SpO2:  [89 %-100 %] 100 %  BP: (130-197)/(76-91) 164/76        Body mass index is 28.43 kg/m².      Intake/Output Summary (Last 24 hours) at 2023 2331  Last data filed at 2023 2046  Gross per 24 hour   Intake 100 ml    Output --   Net 100 ml        Physical Exam  Vitals and nursing note reviewed.   Constitutional:       General: She is not in acute distress.     Appearance: She is obese. She is ill-appearing. She is not diaphoretic.      Comments: Somnolent, wakes easily and answers some questions appropriately, there is some confusion, appears lethargic   HENT:      Head: Normocephalic.      Nose: Nose normal.      Mouth/Throat:      Mouth: Mucous membranes are moist.      Pharynx: Oropharynx is clear.   Eyes:      Pupils: Pupils are equal, round, and reactive to light.   Cardiovascular:      Rate and Rhythm: Regular rhythm. Bradycardia present.      Heart sounds: Normal heart sounds.   Pulmonary:      Effort: No respiratory distress.      Comments: Diminished breath sounds bilaterally  Abdominal:      General: Bowel sounds are normal.      Tenderness: There is abdominal tenderness. There is no guarding.      Comments: Obese, mild generalized tenderness throughout abdomen upon palpation   Genitourinary:     Comments: Suprapubic catheter, cloudy urine  Musculoskeletal:         General: Tenderness present.      Cervical back: Normal range of motion and neck supple.      Right lower leg: Edema present.      Comments: Left BKA   Skin:     General: Skin is warm and dry.      Capillary Refill: Capillary refill takes less than 2 seconds.      Comments: Wounds noted   Neurological:      Comments: Oriented to person and place, follows commands, attempts to answer questions with some mild confusion, lethargic   Psychiatric:      Comments: Somnolent, cooperative          Vents: ON BIPAP  Oxygen Concentration (%): 40 (08/06/23 2004)    Lines/Drains/Airways       Drain  Duration                  Suprapubic Catheter 01/08/21 0745 latex 18 Fr. 940 days         Ureteral Drain/Stent 01/08/21 1028 Right ureter 6 Fr. 940 days              Peripheral Intravenous Line  Duration                  Peripheral IV - Single Lumen 08/06/23 1820 20 G Left  Antecubital <1 day                    Significant Labs:    CBC/Anemia Profile:  Recent Labs   Lab 08/06/23  1752   WBC 9.29   HGB 10.3*   HCT 34.0*      MCV 80*   RDW 18.6*        Chemistries:  Recent Labs   Lab 08/06/23  1752      K 3.8      CO2 27   BUN 15   CREATININE 1.0   CALCIUM 9.2   ALBUMIN 2.8*   PROT 8.3   BILITOT 0.3   ALKPHOS 69   ALT 9*   AST 20       ABGs:   Recent Labs   Lab 08/06/23  1832   PH 7.314*   PCO2 65.1*   HCO3 33.1*   POCSATURATED 97   BE 7     Lactic Acid:   Recent Labs   Lab 08/06/23  1752   LACTATE 0.8     PROCALCITONIN 1.11    Troponin:   Recent Labs   Lab 08/06/23  1752   TROPONINI 0.009     Urine Studies:   Recent Labs   Lab 08/06/23  1821   COLORU Orange*   APPEARANCEUA Cloudy*   PHUR 8.0   SPECGRAV 1.030   PROTEINUA 3+*   GLUCUA Negative   KETONESU Negative   BILIRUBINUA Negative   OCCULTUA 1+*   NITRITE Negative   UROBILINOGEN Negative   LEUKOCYTESUR 2+*   RBCUA >100*   WBCUA >100*   BACTERIA Moderate*   HYALINECASTS 0     BNP ordered, pending    All pertinent labs within the past 24 hours have been reviewed.    Significant Imaging:   I have reviewed all pertinent imaging results/findings within the past 24 hours.  US Lower Extremity Veins Right [[id number]] Resulted: 08/06/23 1927   Order Status: Completed Updated: 08/06/23 1930   Narrative:     EXAMINATION:   US LOWER EXTREMITY VEINS RIGHT     CLINICAL HISTORY:   Pain in right leg     TECHNIQUE:   Duplex and color flow Doppler evaluation and graded compression of the right lower extremity veins was performed.     COMPARISON:   None     FINDINGS:   Right thigh veins: The common femoral, femoral, popliteal, upper greater saphenous, and deep femoral veins are patent and free of thrombus. The veins are normally compressible and have normal phasic flow and augmentation response.     Right calf veins: The visualized calf veins are patent.     Contralateral CFV: The contralateral (left) common femoral vein is patent  and free of thrombus.     Miscellaneous: None    Impression:       No evidence of deep venous thrombosis in the right lower extremity.       Electronically signed by: Dale Theodore   Date: 08/06/2023   Time: 19:27   CT Head Without Contrast [[id number]] Resulted: 08/06/23 1857   Order Status: Completed Updated: 08/06/23 1859   Narrative:     EXAMINATION:   CT HEAD WITHOUT CONTRAST     CLINICAL HISTORY:   Mental status change, unknown cause;     TECHNIQUE:   Low dose axial CT images obtained throughout the head without intravenous contrast. Sagittal and coronal reconstructions were performed.     COMPARISON:   None.     FINDINGS:   Atrophy and chronic white matter changes     No parenchymal mass, hemorrhage, edema or major vascular distribution infarct.     Skull/extracranial contents (limited evaluation): No fracture. Mastoid air cells and paranasal sinuses are essentially clear.    Impression:       No acute abnormality.     Atrophy and chronic white matter changes     All CT scans   are performed using dose optimization techniques including the following: automated exposure control; adjustment of the mA and/or kV; use of iterative reconstruction technique.  Dose modulation was employed for ALARA by means of: Automated exposure control; adjustment of the mA and/or kV according to patient size (this includes techniques or standardized protocols for targeted exams where dose is matched to indication/reason for exam; i.e. extremities or head); and/or use of iterative reconstructive technique.       Electronically signed by: Dale Theodore   Date: 08/06/2023   Time: 18:57   X-Ray Chest AP Portable [[id number]] Resulted: 08/06/23 1734   Order Status: Completed Updated: 08/06/23 1736   Narrative:     EXAMINATION:   XR CHEST AP PORTABLE     CLINICAL HISTORY:   Sepsis;     TECHNIQUE:   Single frontal view of the chest was performed.     COMPARISON:   None     FINDINGS:   Tortuous aorta.  Mild perihilar edema.  Mild  cardiomegaly.  Correlate clinically for low-grade CHF.  Atypical infectious process not excluded.     Bones are intact.    Impression:       As above       Electronically signed by: Dale Theodore   Date: 08/06/2023   Time: 17:34     EKG reviewed, SR with PAC's, T wave inversion, prolonged QTC    Assessment/Plan:     Neuro  History of CVA (cerebrovascular accident)  CT head does not show any acute intracranial abnormalities  Lethargy likely due to hypercapnia and/or UTI    Paraparesis  Supportive care, positioning  She states that she gets up to wheelchair with assistance of family members  Turn every 2 hours    Pulmonary  * Acute on chronic respiratory failure with hypercapnia  Patient with Hypercapnic Respiratory failure which is Acute on chronic.  she is on home oxygen at 2 LPM. Supplemental oxygen was provided and noted- Oxygen Concentration (%):  [40] 40    .   Signs/symptoms of respiratory failure include- lethargy. Contributing diagnoses includes - CHF, COPD and ADEEL Labs and images were reviewed. Patient Has recent ABG, which has been reviewed. Will treat underlying causes and adjust management of respiratory failure as follows- continuous BIPAP    Recent Labs     08/06/23  1832   PH 7.314*   PCO2 65.1*   PO2 99   HCO3 33.1*   POCSATURATED 97   BE 7     Continuous BIPAP for now, supposed to use CPAP at home but admits to noncompliance  Continuous pulse oximetry  Monitor ABG, CXR      COPD (chronic obstructive pulmonary disease)  Does not appear to be exacerbated at this time, no wheezing noted  Respirations unlabored with BIPAP in use  Duo-nebs ordered, scheduled every 6 hours    Cardiac/Vascular  Pulmonary HTN  Will get repeat Echo    Essential hypertension  BP stable, diuretic ordered, monitor response  Add PRN hydralazine if indicated    Acute on chronic diastolic heart failure  Usually taking Bumex at home, unsure of medication compliance  Some fluid overload, will give IV Bumex 1 mg BID and monitor  closely  Currently on BIPAP    Echo report from 4/2021   The left ventricle is normal in size with mild concentric hypertrophy and normal systolic function.   The estimated ejection fraction is 55%.   Indeterminate left ventricular diastolic function.   Normal right ventricular size with normal right ventricular systolic function.   Mild left atrial enlargement.   Normal central venous pressure (3 mmHg).   The estimated PA systolic pressure is 24 mmHg.    Will get repeat Echo        Renal/  Urinary tract infection associated with cystostomy catheter  Monitor culture reports  Change suprapubic catheter  Zosyn      Oncology  Anemia  Chronic, per review of labs  Hgb stable, 10.3  Monitor CBC    Orthopedic  Wounds, multiple  Wound care consult  Turn every 2 hours, use positioning devices/wedge as needed  Immobility issue, she is paraplegic    Other  ADEEL (obstructive sleep apnea)  Will need NIPPV nightly     Critical Care Time: 67 minutes  Critical secondary to acute on chronic hypercapnic respiratory failure requiring continuous BIPAP     Critical care was time spent personally by me on the following activities: development of treatment plan with patient or surrogate and bedside caregivers, discussions with consultants, evaluation of patient's response to treatment, examination of patient, ordering and performing treatments and interventions, ordering and review of laboratory studies, ordering and review of radiographic studies, pulse oximetry, re-evaluation of patient's condition. This critical care time did not overlap with that of any other provider or involve time for any procedures.     Yessica Hooker NP  Critical Care Medicine  [facility] - Emergency Dept.

## 2023-08-07 NOTE — ASSESSMENT & PLAN NOTE
Usually taking Bumex at home, unsure of medication compliance  Some fluid overload, will give IV Bumex 1 mg BID and monitor closely  Currently on BIPAP    Echo report from 4/2021   The left ventricle is normal in size with mild concentric hypertrophy and normal systolic function.   The estimated ejection fraction is 55%.   Indeterminate left ventricular diastolic function.   Normal right ventricular size with normal right ventricular systolic function.   Mild left atrial enlargement.   Normal central venous pressure (3 mmHg).   The estimated PA systolic pressure is 24 mmHg.    Will get repeat Echo

## 2023-08-07 NOTE — EICU
eICU Physician Brief Note    Chart reviewed. On camera, the patient is resting in bed, in NAD. On BIPAP 10/5 breathing 22-26/300-400, FiO2 35% sat 98%.  Mrs Mary Ellen Christianson is a 67 year old lady presenting with generalized weakness, confusion and dysuria. Workup in the ED showed mild respiratory acidosis and she was placed on BIPAP.  PMH: COPD on home O2 at 2L, ADEEL on BIPAP, HFpEF, HTN, CVA, hep C,chronic indwelling Wolfe for neurogenic bladder, left AKA, paraparesis/WC dependent, bipolar disorder..  Labs and investigations reviewed.  Current medications reviewed.  A/P:  Acute on chronic hypercapnic and chronic hypoxemic respiratory   Failure  Titrate BIPPA to normal pH (has chronic hypercapnia).  Could be related to underlying COPD, OHS with noncompliance with nocturnal BIPAP (per patient), CHF or a combination of above.  Rec'd Bumex x1 dose.  No e/o COPD exacerbation, therefore continue bronchodilators.   Assess home symptoms of COPD to decide if treatment regimen needs to be escalated.  Stress to patient importance of noc BIPAP use and assess barriers to use.  UTI  Wolfe changed in ED.  Started on Zosyn, follow cultures and de-escalate as soon as able.  GI/DVT prophylaxis - is on Famotidine and Lovenox, mobility protocol.    eICU is available should acute issues arise.

## 2023-08-07 NOTE — PLAN OF CARE
AAOx4.  Removed BIPAP this morning and placed on NC at 2L- in which pt also wears at home. Tolerating well.  SP cath in place. Urine yellow, cloudy with puss.   Eating each meal.  POC reviewed with pt. Will transfer pt to med/surg when bed becomes available.

## 2023-08-07 NOTE — ASSESSMENT & PLAN NOTE
Echo    Result Date: 8/7/2023    Left Ventricle: Normal wall motion. There is low normal systolic   function with a visually estimated ejection fraction of 50 - 55%.    Right Ventricle: Normal right ventricular cavity size.      Plan as above

## 2023-08-07 NOTE — ASSESSMENT & PLAN NOTE
Patient with Hypercapnic Respiratory failure which is Acute on chronic.  she is on home oxygen at 2 LPM. Supplemental oxygen was provided and noted- Oxygen Concentration (%):  [40] 40    .   Signs/symptoms of respiratory failure include- lethargy. Contributing diagnoses includes - CHF, COPD and ADEEL Labs and images were reviewed. Patient Has recent ABG, which has been reviewed. Will treat underlying causes and adjust management of respiratory failure as follows- continuous BIPAP    Recent Labs     08/06/23  1832   PH 7.314*   PCO2 65.1*   PO2 99   HCO3 33.1*   POCSATURATED 97   BE 7     Continuous BIPAP for now, supposed to use CPAP at home but admits to noncompliance  Continuous pulse oximetry  Monitor ABG, CXR

## 2023-08-07 NOTE — SUBJECTIVE & OBJECTIVE
ROS complete and negative unless stated in the interval HPI    Objective:     Vital Signs (Most Recent):  Temp: 98.6 °F (37 °C) (08/07/23 0724)  Pulse: 79 (08/07/23 0750)  Resp: 18 (08/07/23 0750)  BP: 115/80 (08/07/23 0724)  SpO2: 100 % (08/07/23 0750) Vital Signs (24h Range):  Temp:  [98.5 °F (36.9 °C)-98.7 °F (37.1 °C)] 98.6 °F (37 °C)  Pulse:  [59-95] 79  Resp:  [10-50] 18  SpO2:  [85 %-100 %] 100 %  BP: (115-197)/() 115/80     Weight: 78 kg (172 lb)  Body mass index is 28.62 kg/m².      Intake/Output Summary (Last 24 hours) at 8/7/2023 1112  Last data filed at 8/7/2023 1000  Gross per 24 hour   Intake 460.55 ml   Output 650 ml   Net -189.45 ml        Physical Exam  Vitals reviewed.   Constitutional:       General: She is awake. She is not in acute distress.     Comments: Chronically ill appearing female lying in bed in NAD   Cardiovascular:      Rate and Rhythm: Normal rate.      Pulses:           Radial pulses are 2+ on the right side and 2+ on the left side.   Pulmonary:      Effort: Pulmonary effort is normal.      Breath sounds: Decreased breath sounds present. No wheezing or rhonchi.      Comments: On 2L NC  Abdominal:      General: There is no distension.      Palpations: Abdomen is soft.   Musculoskeletal:      Comments: L BKA   Skin:     General: Skin is warm and dry.   Neurological:      General: No focal deficit present.      Mental Status: She is alert.   Psychiatric:         Behavior: Behavior is cooperative.             Vents:  Oxygen Concentration (%): 28 (08/07/23 0750)    Lines/Drains/Airways       Drain  Duration                  Ureteral Drain/Stent 01/08/21 1028 Right ureter 6 Fr. 940 days         Suprapubic Catheter 08/07/23 0301 100% silicone 16 Fr. <1 day              Peripheral Intravenous Line  Duration                  Peripheral IV - Single Lumen 08/07/23 0143 20 G Left Antecubital <1 day                    Significant Labs:    CBC/Anemia Profile:  Recent Labs   Lab  08/06/23  1752 08/07/23  0507   WBC 9.29 8.35   HGB 10.3* 9.6*   HCT 34.0* 34.3*    244   MCV 80* 85   RDW 18.6* 18.7*        Chemistries:  Recent Labs   Lab 08/06/23  1752 08/07/23  0507    143   K 3.8 4.2    105   CO2 27 28   BUN 15 14   CREATININE 1.0 0.9   CALCIUM 9.2 8.9   ALBUMIN 2.8*  --    PROT 8.3  --    BILITOT 0.3  --    ALKPHOS 69  --    ALT 9*  --    AST 20  --    MG  --  1.7       All pertinent labs within the past 24 hours have been reviewed.    Significant Imaging:  I have reviewed all pertinent imaging results/findings within the past 24 hours.

## 2023-08-07 NOTE — PROGRESS NOTES
[facility] ([facility])  Critical Care Medicine  Progress Note    Patient Name: Mary Ellen Fajardo  MRN: [id number]  Admission Date: 8/6/2023  Hospital Length of Stay: 1 days  Code Status: Full Code  Attending Provider: Neville Huerta MD  Primary Care Provider: Any Tran MD   Principal Problem: Acute on chronic respiratory failure with hypoxia and hypercapnia    Subjective:     HPI:  Patient is 67 year old female with past medical history significant for stroke, hypertension, COPD, CHF, chronic respiratory failure on home oxygen (2 L/min NC), ADEEL, neurogenic bladder with chronic indwelling suprapubic catheter, left AKA, paraparesis, and wounds who presented to ED due to generalized weakness, confusion, and painful urination. Patient complains of chest pain on/off, abdominal pain, and bilateral leg pain. ABG revealed mild acidosis with hypercapnia. She is on continuous BIPAP at this time. She does have UTI and suprapubic catheter should be changed out. She has been given Zosyn. Lactic acid is normal however procalcitonin is elevated. BP is elevated. There is evidence of congestive heart failure on CXR. US was done due to leg pain, it is negative for DVT. There is no leukocytosis or fever. Critical care team consulted for admission to ICU due to hypercapnic respiratory failure requiring continuous BIPAP and for treatment of UTI.      Hospital/ICU Course:  8/7: placed on home 2L this AM, continue NIPPV qHS and PRN naps, no new issues or c/o on exam, no family at bedside       ROS complete and negative unless stated in the interval HPI    Objective:     Vital Signs (Most Recent):  Temp: 98.6 °F (37 °C) (08/07/23 0724)  Pulse: 79 (08/07/23 0750)  Resp: 18 (08/07/23 0750)  BP: 115/80 (08/07/23 0724)  SpO2: 100 % (08/07/23 0750) Vital Signs (24h Range):  Temp:  [98.5 °F (36.9 °C)-98.7 °F (37.1 °C)] 98.6 °F (37 °C)  Pulse:  [59-95] 79  Resp:  [10-50] 18  SpO2:  [85 %-100 %] 100 %  BP:  (115-197)/() 115/80     Weight: 78 kg (172 lb)  Body mass index is 28.62 kg/m².      Intake/Output Summary (Last 24 hours) at 8/7/2023 1112  Last data filed at 8/7/2023 1000  Gross per 24 hour   Intake 460.55 ml   Output 650 ml   Net -189.45 ml        Physical Exam  Vitals reviewed.   Constitutional:       General: She is awake. She is not in acute distress.     Comments: Chronically ill appearing female lying in bed in NAD   Cardiovascular:      Rate and Rhythm: Normal rate.      Pulses:           Radial pulses are 2+ on the right side and 2+ on the left side.   Pulmonary:      Effort: Pulmonary effort is normal.      Breath sounds: Decreased breath sounds present. No wheezing or rhonchi.      Comments: On 2L NC  Abdominal:      General: There is no distension.      Palpations: Abdomen is soft.   Musculoskeletal:      Comments: L BKA   Skin:     General: Skin is warm and dry.   Neurological:      General: No focal deficit present.      Mental Status: She is alert.   Psychiatric:         Behavior: Behavior is cooperative.             Vents:  Oxygen Concentration (%): 28 (08/07/23 0750)    Lines/Drains/Airways       Drain  Duration                  Ureteral Drain/Stent 01/08/21 1028 Right ureter 6 Fr. 940 days         Suprapubic Catheter 08/07/23 0301 100% silicone 16 Fr. <1 day              Peripheral Intravenous Line  Duration                  Peripheral IV - Single Lumen 08/07/23 0143 20 G Left Antecubital <1 day                    Significant Labs:    CBC/Anemia Profile:  Recent Labs   Lab 08/06/23  1752 08/07/23  0507   WBC 9.29 8.35   HGB 10.3* 9.6*   HCT 34.0* 34.3*    244   MCV 80* 85   RDW 18.6* 18.7*        Chemistries:  Recent Labs   Lab 08/06/23  1752 08/07/23  0507    143   K 3.8 4.2    105   CO2 27 28   BUN 15 14   CREATININE 1.0 0.9   CALCIUM 9.2 8.9   ALBUMIN 2.8*  --    PROT 8.3  --    BILITOT 0.3  --    ALKPHOS 69  --    ALT 9*  --    AST 20  --    MG  --  1.7       All  pertinent labs within the past 24 hours have been reviewed.    Significant Imaging:  I have reviewed all pertinent imaging results/findings within the past 24 hours.      ABG  Recent Labs   Lab 08/06/23  1832   PH 7.314*   PO2 99   PCO2 65.1*   HCO3 33.1*   BE 7     Assessment/Plan:     Neuro  History of CVA (cerebrovascular accident)  - bed bound at baseline with limited help at home   - PT/OT  - med mgmt consulted for assistance with placement (which pt is quite interested in) and d/c planning   - supportive care    Paraparesis  - turn q2h  - supportive care    Pulmonary  * Acute on chronic respiratory failure with hypoxia and hypercapnia  - transitioned to 2L NC this AM, her home flow  - continue with NIPPV qHS and PRN naps  - reinforced need for compliance at home  - CXR and ABGs PRN    COPD (chronic obstructive pulmonary disease)  - not in acute exacerbation   - PRN nebs  - continue home flow O2 at 2L    Cardiac/Vascular  Pulmonary HTN  - supportive care  - repeat echo pending     Acute on chronic diastolic heart failure  Echo report from 4/2021   The left ventricle is normal in size with mild concentric hypertrophy and normal systolic function.   The estimated ejection fraction is 55%.   Indeterminate left ventricular diastolic function.   Normal right ventricular size with normal right ventricular systolic function.   Mild left atrial enlargement.   Normal central venous pressure (3 mmHg).   The estimated PA systolic pressure is 24 mmHg    - repeat echo pending  - expect transition to PO diuretic soon  - telemetry  - strict I&Os    Essential hypertension  - resume home meds as indicated   - monitor trends and adjust as needed for control     Renal/  Urinary tract infection associated with cystostomy catheter  - suprapubic cath changed upon arrival to the ICU  - continue zosyn for now  - follow urine culture and adjust abx as needed    Oncology  Anemia  Chronic, per review of labs  Hgb stable,  10.3  Monitor CBC    Orthopedic  Wounds, multiple  - wound care consulted for recs  - turn q2h  - supportive care    Other  ADEEL (obstructive sleep apnea)  - noncompliant with home NIPPV  - NIPPV qHS and PRN    Prophylaxis Measures:  GI ppx: Oral Diet  VTE ppx: Enoxaparin  Glucose control: Monitor blood glucose    Code Status: Full Code     Patient stable for care outside of the ICU setting. [facility] consulted. Critical Care team will sign off at this time as patient's critical care issues have resolved and patient is appropriate for ongoing management outside of the ICU setting. Please call if the patient's condition should change and warrant reevaluation.     Carlos Wilson NP  Critical Care Medicine  [facility] - Intensive Care ([facility])

## 2023-08-07 NOTE — ASSESSMENT & PLAN NOTE
Echo report from 4/2021   The left ventricle is normal in size with mild concentric hypertrophy and normal systolic function.   The estimated ejection fraction is 55%.   Indeterminate left ventricular diastolic function.   Normal right ventricular size with normal right ventricular systolic function.   Mild left atrial enlargement.   Normal central venous pressure (3 mmHg).   The estimated PA systolic pressure is 24 mmHg    - repeat echo pending  - expect transition to PO diuretic soon  - telemetry  - strict I&Os

## 2023-08-07 NOTE — ASSESSMENT & PLAN NOTE
Supportive care, positioning  She states that she gets up to wheelchair with assistance of family members  Turn every 2 hours

## 2023-08-07 NOTE — ASSESSMENT & PLAN NOTE
- suprapubic cath changed upon arrival to the ICU  - continue zosyn for now  - follow urine culture and adjust abx as needed

## 2023-08-08 LAB
ANION GAP SERPL CALC-SCNC: 10 MMOL/L (ref 8–16)
BASOPHILS # BLD AUTO: 0.05 K/UL (ref 0–0.2)
BASOPHILS NFR BLD: 0.7 % (ref 0–1.9)
BUN SERPL-MCNC: 22 MG/DL (ref 8–23)
CALCIUM SERPL-MCNC: 8.8 MG/DL (ref 8.7–10.5)
CHLORIDE SERPL-SCNC: 100 MMOL/L (ref 95–110)
CO2 SERPL-SCNC: 27 MMOL/L (ref 23–29)
CREAT SERPL-MCNC: 1.3 MG/DL (ref 0.5–1.4)
DIFFERENTIAL METHOD: ABNORMAL
EOSINOPHIL # BLD AUTO: 0.3 K/UL (ref 0–0.5)
EOSINOPHIL NFR BLD: 3.9 % (ref 0–8)
ERYTHROCYTE [DISTWIDTH] IN BLOOD BY AUTOMATED COUNT: 18.7 % (ref 11.5–14.5)
EST. GFR  (NO RACE VARIABLE): 45 ML/MIN/1.73 M^2
GLUCOSE SERPL-MCNC: 75 MG/DL (ref 70–110)
HCT VFR BLD AUTO: 34 % (ref 37–48.5)
HGB BLD-MCNC: 9.9 G/DL (ref 12–16)
IMM GRANULOCYTES # BLD AUTO: 0.04 K/UL (ref 0–0.04)
IMM GRANULOCYTES NFR BLD AUTO: 0.5 % (ref 0–0.5)
LYMPHOCYTES # BLD AUTO: 2.1 K/UL (ref 1–4.8)
LYMPHOCYTES NFR BLD: 27.8 % (ref 18–48)
MAGNESIUM SERPL-MCNC: 2.2 MG/DL (ref 1.6–2.6)
MCH RBC QN AUTO: 25.3 PG (ref 27–31)
MCHC RBC AUTO-ENTMCNC: 29.1 G/DL (ref 32–36)
MCV RBC AUTO: 87 FL (ref 82–98)
MONOCYTES # BLD AUTO: 0.9 K/UL (ref 0.3–1)
MONOCYTES NFR BLD: 11.7 % (ref 4–15)
NEUTROPHILS # BLD AUTO: 4.2 K/UL (ref 1.8–7.7)
NEUTROPHILS NFR BLD: 55.4 % (ref 38–73)
NRBC BLD-RTO: 0 /100 WBC
PLATELET # BLD AUTO: 230 K/UL (ref 150–450)
PMV BLD AUTO: 8.8 FL (ref 9.2–12.9)
POTASSIUM SERPL-SCNC: 3.8 MMOL/L (ref 3.5–5.1)
RBC # BLD AUTO: 3.92 M/UL (ref 4–5.4)
SODIUM SERPL-SCNC: 137 MMOL/L (ref 136–145)
WBC # BLD AUTO: 7.62 K/UL (ref 3.9–12.7)

## 2023-08-08 PROCEDURE — 94640 AIRWAY INHALATION TREATMENT: CPT

## 2023-08-08 PROCEDURE — 85025 COMPLETE CBC W/AUTO DIFF WBC: CPT | Performed by: NURSE PRACTITIONER

## 2023-08-08 PROCEDURE — 80048 BASIC METABOLIC PNL TOTAL CA: CPT | Performed by: NURSE PRACTITIONER

## 2023-08-08 PROCEDURE — 63600175 PHARM REV CODE 636 W HCPCS: Performed by: NURSE PRACTITIONER

## 2023-08-08 PROCEDURE — 25000242 PHARM REV CODE 250 ALT 637 W/ HCPCS: Performed by: NURSE PRACTITIONER

## 2023-08-08 PROCEDURE — 97166 OT EVAL MOD COMPLEX 45 MIN: CPT

## 2023-08-08 PROCEDURE — 99900035 HC TECH TIME PER 15 MIN (STAT)

## 2023-08-08 PROCEDURE — 25000003 PHARM REV CODE 250: Performed by: HOSPITALIST

## 2023-08-08 PROCEDURE — 27100171 HC OXYGEN HIGH FLOW UP TO 24 HOURS

## 2023-08-08 PROCEDURE — 27000190 HC CPAP FULL FACE MASK W/VALVE

## 2023-08-08 PROCEDURE — 97110 THERAPEUTIC EXERCISES: CPT

## 2023-08-08 PROCEDURE — 97530 THERAPEUTIC ACTIVITIES: CPT

## 2023-08-08 PROCEDURE — 25000003 PHARM REV CODE 250: Performed by: NURSE PRACTITIONER

## 2023-08-08 PROCEDURE — 83735 ASSAY OF MAGNESIUM: CPT | Performed by: NURSE PRACTITIONER

## 2023-08-08 PROCEDURE — 94761 N-INVAS EAR/PLS OXIMETRY MLT: CPT

## 2023-08-08 PROCEDURE — 11000001 HC ACUTE MED/SURG PRIVATE ROOM

## 2023-08-08 PROCEDURE — 94660 CPAP INITIATION&MGMT: CPT

## 2023-08-08 RX ORDER — OXYBUTYNIN CHLORIDE 5 MG/1
5 TABLET ORAL 2 TIMES DAILY
Status: DISCONTINUED | OUTPATIENT
Start: 2023-08-08 | End: 2023-08-16 | Stop reason: HOSPADM

## 2023-08-08 RX ADMIN — OXYCODONE HYDROCHLORIDE AND ACETAMINOPHEN 1 TABLET: 5; 325 TABLET ORAL at 02:08

## 2023-08-08 RX ADMIN — CITALOPRAM HYDROBROMIDE 30 MG: 20 TABLET ORAL at 08:08

## 2023-08-08 RX ADMIN — GABAPENTIN 200 MG: 100 CAPSULE ORAL at 08:08

## 2023-08-08 RX ADMIN — CEFTRIAXONE 1 G: 1 INJECTION, POWDER, FOR SOLUTION INTRAMUSCULAR; INTRAVENOUS at 08:08

## 2023-08-08 RX ADMIN — QUETIAPINE FUMARATE 200 MG: 100 TABLET ORAL at 08:08

## 2023-08-08 RX ADMIN — ENOXAPARIN SODIUM 40 MG: 40 INJECTION SUBCUTANEOUS at 04:08

## 2023-08-08 RX ADMIN — OXYBUTYNIN CHLORIDE 5 MG: 5 TABLET ORAL at 08:08

## 2023-08-08 RX ADMIN — GABAPENTIN 200 MG: 100 CAPSULE ORAL at 02:08

## 2023-08-08 RX ADMIN — IPRATROPIUM BROMIDE AND ALBUTEROL SULFATE 3 ML: 2.5; .5 SOLUTION RESPIRATORY (INHALATION) at 12:08

## 2023-08-08 RX ADMIN — HYDROXYZINE HYDROCHLORIDE 25 MG: 25 TABLET ORAL at 08:08

## 2023-08-08 RX ADMIN — MUPIROCIN: 20 OINTMENT TOPICAL at 08:08

## 2023-08-08 RX ADMIN — BUMETANIDE 1 MG: 0.25 INJECTION INTRAMUSCULAR; INTRAVENOUS at 08:08

## 2023-08-08 NOTE — PT/OT/SLP EVAL
Occupational Therapy   Evaluation    Name: Mary Ellen Fajardo  MRN: [id number]  Admitting Diagnosis: Acute on chronic respiratory failure with hypoxia and hypercapnia  Recent Surgery: * No surgery found *      Recommendations:     Discharge Recommendations: nursing facility, skilled  Discharge Equipment Recommendations:  to be determined by next level of care  Barriers to discharge:  Decreased caregiver support    Assessment:     Mary Ellen Fajardo is a 67 y.o. female with a medical diagnosis of Acute on chronic respiratory failure with hypoxia and hypercapnia.  She presents with the following performance deficits affecting function: weakness, impaired endurance, impaired self care skills, impaired functional mobility, gait instability, impaired balance, decreased coordination, decreased lower extremity function, decreased safety awareness, pain, edema, impaired cardiopulmonary response to activity.      Rehab Prognosis: Good; patient would benefit from acute skilled OT services to address these deficits and reach maximum level of function.       Plan:     Patient to be seen 2 x/week to address the above listed problems via self-care/home management, therapeutic exercises, therapeutic activities  Plan of Care Expires: 08/22/23  Plan of Care Reviewed with: patient    Subjective     Chief Complaint: pain, weakness  Patient/Family Comments/goals: improve strength    Occupational Profile:  Living Environment: lives with son in a 1 story house with threshold to enter. Pt's son works during the day.  Previous level of function: Pt requires assist with bathing, dressing, and toileting. Pt receives bed baths. Pt uses w/c for functional mobility and reports that she has been primarily restricted to the bed for ~4 months. Pt requires assist with t/fs using slide board.  Roles and Routines: does not rive  Equipment Used at Home: wheelchair, bedside commode, slide board  Assistance upon Discharge:  unknown    Pain/Comfort:  Pain Rating 1: 8/10  Location - Side 1: Bilateral  Location - Orientation 1: medial  Location 1: thigh (rash)  Pain Addressed 1: Distraction, Reposition  Pain Rating Post-Intervention 1: 8/10    Pt reports she does not want to go back to her current living situation.  Objective:     Communicated with: Nurse and epic chart review prior to session.  Patient found HOB elevated with peripheral IV, telemetry, delcid catheter, oxygen upon OT entry to room.    General Precautions: Standard, fall  Orthopedic Precautions:  (L AKA)  Braces: N/A  Respiratory Status: Nasal cannula, flow 2 L/min    Occupational Performance:    Bed Mobility:    Patient completed Rolling/Turning to Left with  moderate assistance  Patient completed Supine to Sit with moderate assistance and 2 persons  Patient completed Sit to Supine with moderate assistance and 2 persons  Forward scoot to EOB with Min A.  Supine scoot to EOB with CGA and bed in trend using BUE on bedrails.     Activities of Daily Living:  Lower Body Dressing: total assistance sonja sock    Cognitive/Visual Perceptual:  Cognitive/Psychosocial Skills:     -       Oriented to: Person, Place, Time, and Situation   -       Follows Commands/attention:Follows multistep  commands  -       Communication: clear/fluent  -       Safety awareness/insight to disability: impaired   -       Mood/Affect/Coping skills/emotional control: Cooperative and Pleasant    Physical Exam:  Balance:    -       poor static and dynamic sitting balance. Pt with posterior and R lateral lean sitting EOB, requiring Max A to maintain upright posture despite BUE self support.   Sensation:    -       Intact  Dominant hand:    -       right  Upper Extremity Range of Motion:     -       Right Upper Extremity: WFL  -       Left Upper Extremity: WFL  Upper Extremity Strength:    -       Right Upper Extremity: 4+/5 grossly  -       Left Upper Extremity: 4+/5 grossly   Strength:    -       Right  Upper Extremity: WFL  -       Left Upper Extremity: WFL    [facility] 6 Click ADL:  [facility] Total Score: 12    Treatment & Education:  Pt performed x5 reps BUE AROM shoulder flexion and elbow flexion/ext while EOB. Patient educated on role of OT in acute setting and benefits of participation. Educated on techniques to use to increase independence and decrease fall risk. Educated on importance of EOB activity and calling for A to transfer and meet needs. Encouraged completion of B UE AROM therex throughout the day to tolerance to increase functional strength and activity tolerance. Educated patient on importance of increased tolerance to upright position and direct impact on CV endurance and strength. Patient encouraged to sit up with bed in chair position for a minimum of 2 consecutive hours per day and for meals. Patient stated understanding and in agreement with POC.     Patient left HOB elevated with all lines intact and call button in reach    GOALS:   Multidisciplinary Problems       Occupational Therapy Goals          Problem: Occupational Therapy    Goal Priority Disciplines Outcome Interventions   Occupational Therapy Goal     OT, PT/OT     Description: Goals to be met by: 8/22/23     Patient will increase functional independence with ADLs by performing:    UE Dressing with Set-up Assistance.  Grooming while EOB with Contact Guard Assistance.  Supine to sit with Contact Guard Assistance.  Upper extremity exercise program x15 reps per handout, with independence.                         History:     Past Medical History:   Diagnosis Date    Abdominal hernia     Addiction to drug     Alcohol abuse     Anxiety     Arthritis     Asthma     Bipolar disorder     Bladder stones     CHF (congestive heart failure)     COPD (chronic obstructive pulmonary disease)     on home o2    CVA (cerebral vascular accident)     2012    Hallucination     Hepatitis C     treated and cured    History of blood clots     Hx of psychiatric  care     Hypertension     [place]     Obese body habitus     On home oxygen therapy     ADEEL (obstructive sleep apnea)     ADEEL treated with BiPAP     Paraplegia     Paraplegic spinal paralysis     Psychiatric problem     Psychosis     AVH; Paranoia    Renal disorder     Requires assistance with activities of daily living (ADL)     SCI (spinal cord injury)     incomplete    Sleep difficulties     has sleep apnea and uses a Bi-PAP machine.    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    Substance abuse     Suprapubic catheter 03/15/2011    Therapy     Vaginal delivery     x1    Wheelchair dependence          Past Surgical History:   Procedure Laterality Date    ABOVE-KNEE AMPUTATION Left 7/23/2019    Procedure: AMPUTATION, ABOVE KNEE;  Surgeon: Gordo Rodriguez MD;  Location: [facility] OR;  Service: Orthopedics;  Laterality: Left;    amputation Left 07/23/2019    above the knee    BACK SURGERY      bilateral knee replacement      BREAST BIOPSY      cysto/lithopaxy 2017      CYSTOSCOPY Right 1/8/2021    Procedure: CYSTOSCOPY, RIGHT OCCULER BALLOON CATHETER PLACEMENT;  Surgeon: RAMA Tinoco MD;  Location: [facility] OR;  Service: Urology;  Laterality: Right;    CYSTOSCOPY W/ LASER LITHOTRIPSY      JOINT REPLACEMENT      bilateral knee    PERCUTANEOUS NEPHROLITHOTOMY Right 1/8/2021    Procedure: NEPHROLITHOTOMY, PERCUTANEOUS;  Surgeon: RAMA Tinoco MD;  Location: [facility] OR;  Service: Urology;  Laterality: Right;  x3 rooms, or7, or9,or12  ATTEMPTED    RETROGRADE PYELOGRAPHY Right 1/8/2021    Procedure: PYELOGRAM, RETROGRADE;  Surgeon: RAMA Tinoco MD;  Location: [facility] OR;  Service: Urology;  Laterality: Right;    SUPRAPUBIC CYSTOSTOMY  03/15/2011    patient reports  replaced tubing on 10/27/19, at home    URETEROSCOPY Right 1/8/2021    Procedure: URETEROSCOPY;  Surgeon: RAMA Tinoco MD;  Location: [facility] OR;  Service: Urology;  Laterality: Right;       Time Tracking:     OT Date of Treatment: 08/08/23  OT Start  Time: 1445  OT Stop Time: 1510  OT Total Time (min): 25 min    Billable Minutes:Evaluation 15  Therapeutic Activity 10    8/8/2023  Yasmine Martines OT

## 2023-08-08 NOTE — PROGRESS NOTES
[facility] Medicine  Progress Note    Patient Name: Mary Ellen Fajardo  MRN: [id number]  Patient Class: IP- Inpatient   Admission Date: 8/6/2023  Length of Stay: 2 days  Attending Physician: Juan Luis MD  Primary Care Provider: Any Tran MD        Subjective:     Principal Problem:Acute on chronic respiratory failure with hypoxia and hypercapnia        HPI:  Patient is 67 year old female with past medical history significant for stroke, hypertension, COPD, CHF, chronic respiratory failure on home oxygen (2 L/min NC), ADEEL, neurogenic bladder with chronic indwelling suprapubic catheter, left AKA, paraparesis, and wounds who presented to ED due to generalized weakness, confusion, and painful urination. Patient complains of chest pain on/off, abdominal pain, and bilateral leg pain. ABG revealed mild acidosis with hypercapnia. She is on continuous BIPAP at this time. She does have UTI and suprapubic catheter should be changed out. She has been given Zosyn. Lactic acid is normal however procalcitonin is elevated. BP is elevated. There is evidence of congestive heart failure on CXR. US was done due to leg pain, it is negative for DVT. There is no leukocytosis or fever. Critical care team consulted for admission to ICU due to hypercapnic respiratory failure requiring continuous BIPAP and for treatment of UTI.      Overview/Hospital Course:  8/7: placed on home 2L this AM, continue NIPPV qHS and PRN naps, no new issues or c/o on exam, no family at bedside  ------  Chart reviewed, examined patient at bedside this afternoon, asking to restart her home anxiety and pain medication.  Currently stable on 2L NC, stable for floor transfer.    8/8: NAEON. Patient c/o bladder spasms, asking for medication, states previously on oxybutynin.  BP marginal this AM. Cr increase from 0.9 --> 1.3, will stop Bumex  Urine culture with >100k GNR, speciation pending, continue ceftriaxone  Remains stable on 2L  NC  SNF placement pending        Review of Systems   All other systems reviewed and are negative.    Objective:     Vital Signs (Most Recent):  Temp: 98.1 °F (36.7 °C) (08/08/23 1624)  Pulse: 69 (08/08/23 1624)  Resp: 18 (08/08/23 1624)  BP: 99/60 (08/08/23 1624)  SpO2: (!) 94 % (08/08/23 1624) Vital Signs (24h Range):  Temp:  [97.3 °F (36.3 °C)-98.6 °F (37 °C)] 98.1 °F (36.7 °C)  Pulse:  [56-81] 69  Resp:  [16-20] 18  SpO2:  [94 %-99 %] 94 %  BP: ()/(52-67) 99/60     Weight: 78 kg (172 lb)  Body mass index is 28.62 kg/m².    Intake/Output Summary (Last 24 hours) at 8/8/2023 1627  Last data filed at 8/8/2023 1137  Gross per 24 hour   Intake --   Output 1400 ml   Net -1400 ml         Physical Exam  Vitals and nursing note reviewed.   Constitutional:       General: She is not in acute distress.     Appearance: Normal appearance. She is normal weight.      Comments: Wearing NC   Cardiovascular:      Rate and Rhythm: Normal rate and regular rhythm.      Heart sounds: No murmur heard.  Pulmonary:      Effort: Pulmonary effort is normal. No respiratory distress.      Breath sounds: No wheezing.      Comments: Shallow BS bilaterally  Genitourinary:     Comments: Suprapubic catheter in place  Neurological:      Mental Status: She is alert.             Significant Labs: All pertinent labs within the past 24 hours have been reviewed.  Recent Lab Results         08/08/23  0611        Anion Gap 10       Baso # 0.05       Basophil % 0.7       BUN 22       Calcium 8.8       Chloride 100       CO2 27       Creatinine 1.3       Differential Method Automated       eGFR 45       Eos # 0.3       Eosinophil % 3.9       Glucose 75       Gran # (ANC) 4.2       Gran % 55.4       Hematocrit 34.0       Hemoglobin 9.9       Immature Grans (Abs) 0.04  Comment: Mild elevation in immature granulocytes is non specific and   can be seen in a variety of conditions including stress response,   acute inflammation, trauma and pregnancy.  Correlation with other   laboratory and clinical findings is essential.         Immature Granulocytes 0.5       Lymph # 2.1       Lymph % 27.8       Magnesium 2.2       MCH 25.3       MCHC 29.1       MCV 87       Mono # 0.9       Mono % 11.7       MPV 8.8       nRBC 0       Platelets 230       Potassium 3.8       RBC 3.92       RDW 18.7       Sodium 137       WBC 7.62               Significant Imaging: I have reviewed all pertinent imaging results/findings within the past 24 hours.    US Lower Extremity Veins Right   Final Result      No evidence of deep venous thrombosis in the right lower extremity.         Electronically signed by: Dale Theodore   Date:    08/06/2023   Time:    19:27      CT Head Without Contrast   Final Result      No acute abnormality.      Atrophy and chronic white matter changes      All CT scans   are performed using dose optimization techniques including the following: automated exposure control; adjustment of the mA and/or kV; use of iterative reconstruction technique.  Dose modulation was employed for ALARA by means of: Automated exposure control; adjustment of the mA and/or kV according to patient size (this includes techniques or standardized protocols for targeted exams where dose is matched to indication/reason for exam; i.e. extremities or head); and/or use of iterative reconstructive technique.         Electronically signed by: Dale Theodore   Date:    08/06/2023   Time:    18:57      X-Ray Chest AP Portable   Final Result      As above         Electronically signed by: Dale Theodore   Date:    08/06/2023   Time:    17:34              Assessment/Plan:      * Acute on chronic respiratory failure with hypoxia and hypercapnia  Patient with Hypercapnic and Hypoxic Respiratory failure which is Acute on chronic.  she is on home oxygen at 2 LPM. Supplemental oxygen was provided and noted- Oxygen Concentration (%):  [28-30] 28    .   Signs/symptoms of respiratory failure include-  respiratory distress and lethargy. Contributing diagnoses includes - CHF and COPD Labs and images were reviewed. Patient Has recent ABG, which has been reviewed. Will treat underlying causes and adjust management of respiratory failure as follows:    8/7/23  Transitioned to 2L NC this AM, her home flow  Continue with NIPPV qHS and PRN naps    Acute on chronic diastolic heart failure  Echo report from 4/2021   The left ventricle is normal in size with mild concentric hypertrophy and normal systolic function.   The estimated ejection fraction is 55%.   Indeterminate left ventricular diastolic function.   Normal right ventricular size with normal right ventricular systolic function.   Mild left atrial enlargement.   Normal central venous pressure (3 mmHg).   The estimated PA systolic pressure is 24 mmHg     Echo    Result Date: 8/7/2023    Left Ventricle: Normal wall motion. There is low normal systolic   function with a visually estimated ejection fraction of 50 - 55%.    Right Ventricle: Normal right ventricular cavity size.        Continue Bumex 1 mg IV BID, transition to PO when appropriate  Strict I/O's, daily weights, Na/fluid restriction, AM labs    8/8/23  Hold Bumex today with marginal BP and rise in serum Cr  AM labs, consider restarting tomorrow    Pulmonary HTN  Echo    Result Date: 8/7/2023    Left Ventricle: Normal wall motion. There is low normal systolic   function with a visually estimated ejection fraction of 50 - 55%.    Right Ventricle: Normal right ventricular cavity size.      Plan as above    COPD (chronic obstructive pulmonary disease)  Not in exacerbation  On home 2L NC  PRN breathing treatments      Urinary tract infection associated with cystostomy catheter  Suprapubic cath changed upon arrival to the ICU  On ceftriaxone  Urine culture pending    History of CVA (cerebrovascular accident)  Bed bound at baseline with limited help at home   PT/OT, supportive care  Med mgmt consulted  for assistance with placement    Anemia  H/H stable, near baseline  Monitor as needed    ADEEL (obstructive sleep apnea)  Noncompliant with home NIPPV  NIPPV qHS and PRN    Essential hypertension  BP stable  Resume home meds    Paraparesis  Turn q2h  Supportive care    Wounds, multiple  Wound Care consulted  Turn q2h, supportive care      VTE Risk Mitigation (From admission, onward)         Ordered     enoxaparin injection 40 mg  Daily         08/06/23 2322     IP VTE HIGH RISK PATIENT  Once         08/06/23 2322     Place sequential compression device  Until discontinued         08/06/23 2322                Discharge Planning   LOY:      Code Status: Full Code   Is the patient medically ready for discharge?:     Reason for patient still in hospital (select all that apply): Patient trending condition, Laboratory test, Treatment and Pending disposition  Discharge Plan A: Skilled Nursing Facility   Discharge Delays: None known at this time              Juan Luis MD  Department of Hospital Medicine   [place] - Med Surg

## 2023-08-08 NOTE — PT/OT/SLP EVAL
Physical Therapy Evaluation    Patient Name:  Mary Ellen Fajardo   MRN:  [id number]    Recommendations:     Discharge Recommendations: nursing facility, skilled   Discharge Equipment Recommendations: none   Barriers to discharge: decreased caregiver assist    Assessment:     Mary Ellen Fajardo is a 67 y.o. female admitted with a medical diagnosis of Acute on chronic respiratory failure with hypoxia and hypercapnia.  She presents with the following impairments/functional limitations: weakness, impaired endurance, impaired functional mobility, gait instability, impaired balance, decreased coordination, decreased safety awareness, decreased lower extremity function which limits safe functional mobility and increases caregiver burden and risk for issues related to immobility. Pt is grossly deconditioned but motivated to improve functional status. Pt will benefit from continued PT services; SNF.    Rehab Prognosis: Good; patient would benefit from acute skilled PT services to address these deficits and reach maximum level of function.    Recent Surgery: * No surgery found *      Plan:     During this hospitalization, patient to be seen 3 x/week to address the identified rehab impairments via gait training, therapeutic activities, therapeutic exercises, neuromuscular re-education and progress toward the following goals:    Plan of Care Expires:  08/21/23    Subjective     Chief Complaint: acute on chronic respiratory failure with hypoxia and hypercapnia  Patient/Family Comments/goals: to go home/get better  Pain/Comfort:  Pain Rating 1: 7/10 (pain to medial thighs (rash))  Pain Rating Post-Intervention 1: 7/10    Patients cultural, spiritual, Anglican conflicts given the current situation: no    Living Environment:  Pt lives with son in [facility] with threshold step at entry   Prior to admission, patients level of function required assistance for ADLs (bed baths), w/c mobility in home, dependent community mobility.  Pt  reports that she has been primarily restricted to the bed for the last 4 months. Equipment used at home: wheelchair, oxygen, nebulizer, bedside commode, BIPAP.  DME owned (not currently used): none.  Upon discharge, patient will have assistance from family/son who works during the day.    Objective:     Communicated with nursing (Jeremiah/Mariela RENDON) and performed chart review via epic prior to session.  Patient found supine with peripheral IV, telemetry, delcid catheter, oxygen  upon PT entry to room. Family (son and 2 adult grandchildren at bedside)    General Precautions: Standard, fall  Orthopedic Precautions:N/A (L AKA)   Braces: N/A  Respiratory Status: Nasal cannula, flow 2 L/min    Exams:  Cognitive Exam:  Patient is oriented to Person, Place, Time, and Situation  Gross Motor Coordination:  WFL  Postural Exam:  Patient presented with the following abnormalities:    -       Rounded shoulders  -       Forward head  RLE ROM: limited grossly ~25% of normal due to hx of TKR and Revisions/infections  RLE Strength: impaired 2+ to 3-/5  LLE ROM: limited L hip ROM  LLE Strength: grossly deconditioned    Functional Mobility:  Bed Mobility:     Scooting: moderate assistance  Supine to Sit: moderate assistance  Sit to Supine: moderate assistance  Tolerated sitting EOB 7-10 mins, posterior lean, unable to maintain sitting EOB with BUE support  Balance: mod to fair dynamic standing balance      AM-PAC 6 CLICK MOBILITY  Total Score:8       Treatment & Education:  Educated pt on benefits of consistent participation in PT services to meet functional goals. Educated pt on seated therex to promote strength and joint mobility. Exercises included AP, LAQ. Educated to perform exercises intermittently throughout day to tolerance. Educated pt on importance of sitting OOB to promote endurance and overall activity tolerance. Educated pt on call don't fall policy and use of call button to alert nursing staff of needs. Pt expressed  understanding.      Patient left supine with all lines intact, call button in reach, nursing notified, and family present. During session, IV slipped out of RUE. Nsg notified and nsg able to replace IV to LUE     GOALS:   Multidisciplinary Problems       Physical Therapy Goals          Problem: Physical Therapy    Goal Priority Disciplines Outcome Goal Variances Interventions   Physical Therapy Goal     PT, PT/OT      Description: Pt will perform bed mobility CGA in order to participate in EOB activity.  Pt will perform transfers with LRAD CGA in order to participate in OOB activity.   Pt will tolerate sitting EOB x 15-20 mins in order to participate in daily activities.  Pt will perform w/c mobility x 50ft with SPV in order to progress toward baseline.                       History:     Past Medical History:   Diagnosis Date    Abdominal hernia     Addiction to drug     Alcohol abuse     Anxiety     Arthritis     Asthma     Bipolar disorder     Bladder stones     CHF (congestive heart failure)     COPD (chronic obstructive pulmonary disease)     on home o2    CVA (cerebral vascular accident)     2012    Hallucination     Hepatitis C     treated and cured    History of blood clots     Hx of psychiatric care     Hypertension     [place]     Obese body habitus     On home oxygen therapy     ADEEL (obstructive sleep apnea)     ADEEL treated with BiPAP     Paraplegia     Paraplegic spinal paralysis     Psychiatric problem     Psychosis     AVH; Paranoia    Renal disorder     Requires assistance with activities of daily living (ADL)     SCI (spinal cord injury)     incomplete    Sleep difficulties     has sleep apnea and uses a Bi-PAP machine.    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    Substance abuse     Suprapubic catheter 03/15/2011    Therapy     Vaginal delivery     x1    Wheelchair dependence        Past Surgical History:   Procedure Laterality Date    ABOVE-KNEE AMPUTATION Left 7/23/2019    Procedure: AMPUTATION,  ABOVE KNEE;  Surgeon: Gordo Rodriguez MD;  Location: [facility] OR;  Service: Orthopedics;  Laterality: Left;    amputation Left 07/23/2019    above the knee    BACK SURGERY      bilateral knee replacement      BREAST BIOPSY      cysto/lithopaxy 2017      CYSTOSCOPY Right 1/8/2021    Procedure: CYSTOSCOPY, RIGHT OCCULER BALLOON CATHETER PLACEMENT;  Surgeon: RAMA Tinoco MD;  Location: [facility] OR;  Service: Urology;  Laterality: Right;    CYSTOSCOPY W/ LASER LITHOTRIPSY      JOINT REPLACEMENT      bilateral knee    PERCUTANEOUS NEPHROLITHOTOMY Right 1/8/2021    Procedure: NEPHROLITHOTOMY, PERCUTANEOUS;  Surgeon: RAMA Tinoco MD;  Location: [facility] OR;  Service: Urology;  Laterality: Right;  x3 rooms, or7, or9,or12  ATTEMPTED    RETROGRADE PYELOGRAPHY Right 1/8/2021    Procedure: PYELOGRAM, RETROGRADE;  Surgeon: RAMA Tinoco MD;  Location: [facility] OR;  Service: Urology;  Laterality: Right;    SUPRAPUBIC CYSTOSTOMY  03/15/2011    patient reports  replaced tubing on 10/27/19, at home    URETEROSCOPY Right 1/8/2021    Procedure: URETEROSCOPY;  Surgeon: RAMA Tinoco MD;  Location: [facility];  Service: Urology;  Laterality: Right;       Time Tracking:     PT Received On: 08/07/23  PT Start Time: 1810     PT Stop Time: 1845  PT Total Time (min): 35 min     Billable Minutes: Evaluation 15 and Therapeutic Activity 25      08/07/2023

## 2023-08-08 NOTE — PLAN OF CARE
SW went and meet with patient at bedside to discuss SNF placement. Per Patient, she wished for SNF placement in the [facility], since that is where the majority of her family stays. SW stated that some SNF placements have accepted and SW was able to get preferred choices for placement.  Patient was accepted at the following SNF's:  [facility] [place], [facility], [facility] and [facility]   Patient's preferred SNF's are as followed:  [facility], [facility], [place], [facility], and [facility].     SW completed PASRR/ 142 for patient and uploaded to [employer].   SW will continue to follow and assist with Placement as needed.

## 2023-08-08 NOTE — HOSPITAL COURSE
8/7: placed on home 2L this AM, continue NIPPV qHS and PRN naps, no new issues or c/o on exam, no family at bedside  ------  Chart reviewed, examined patient at bedside this afternoon, asking to restart her home anxiety and pain medication.  Currently stable on 2L NC, stable for floor transfer.    8/8: NAEON. Patient c/o bladder spasms, asking for medication, states previously on oxybutynin.  BP marginal this AM. Cr increase from 0.9 --> 1.3, will stop Bumex  Urine culture with >100k GNR, speciation pending, continue ceftriaxone  Remains stable on 2L NC  SNF placement pending    8/9: NAEON. Patient asking for her home muscle relaxer, will restart  Cr decreased to 1.1. Restart home PO Bumex  Urine culture results pending, continue ceftriaxone  1 of 2 blood culture polymicrobial, likely contaminant  Remains stable on 2L NC  SNF placement pending    8/10: NAEON. Patient in bed, denies new issues, stable on 2L NC  Urine culture +proteus, sensitives reviewed, continue ceftriaxone  SNF placement pending    8/11: NAEON, SNF facility refused acceptance due to reported no BM x 3 days  No BMs documented since ICU transfer, start daily miralax and colace    8/12: Overnight events reviewed, patient noted to be lethargic overnight  ABG results reviewed, hypercapnic, currently on AVAPS  CT Head with no acute findings  Remains lethargic, repeat ABG ordered, transfer to ICU    8/14- Pt seen and examined, chart reviewed. Briefly, 67 year old AAF, hx of stroke, HTN, COPD, CHF, chronic respiratory failure on home O2, ADEEL, neurogenic bladder with chronic indwelling suprapubic catheter, left AKA, paraparesis, and wounds, admitted 8/6 with generalized weakness, confusion, and painful urination, B leg pain, found to have mild Resp Acidosis, treated with continuous BIPAP. Also had UTI and suprapubic catheter changed, treated Zosyn. Lactic acid was normal, Procalcitonin was elevated. BP was elevated with evidence of CHF on CXR. US legs -  due to leg pain, Neg for DVT.    On 812- Pt became lethargic with worsening hypercapnic respiratory failure - transferred to ICU- changed from Bipap to AVPS- ABG still worsening hypercapnia, AMS and unable to follow commands. Intubated /sedated. Post intubation had abnormal motor tonic clonic activity-Ativan given. Keppra ordered. Placed on diprivan. CT head Neg. Extubated yesterday 8/13 and now stable on 2 L NC. Now being transferred out of ICU, AAOx3, speech clear. PO4 low- replaced. No new issues. All Cx remain NGTD, off all Abx.     8/15- looks well, resting comfortably in bed, eating, on 2 L NC. Denies any complaints, doing Bipap qhs and NC during the day. Also did better with PT/OT, sat at the EOB etc. H/H stable, CO2 35, may need a little Diamox. Will d/c Keppra. Likely had post intubation seizure due to rapid correction of hypercapnia.     8/16- looks bright, AAOx3, on RA, ready to go, VSS Afeb, labs stable, she is eating drinking well, participating in PT/OT, wearing Bipap qhs and prn nap and uses o2 during the day. She has been accepted at [facility]. She was seen and examined and deemed stable for discharge to SNF today via EMS.

## 2023-08-08 NOTE — PT/OT/SLP PROGRESS
Physical Therapy Treatment    Patient Name:  Mary Ellen Fajardo   MRN:  [id number]    Recommendations:     Discharge Recommendations: nursing facility, skilled  Discharge Equipment Recommendations: to be determined by next level of care  Barriers to discharge: Decreased caregiver support    Assessment:     Mary Ellen Fajardo is a 67 y.o. female admitted with a medical diagnosis of Acute on chronic respiratory failure with hypoxia and hypercapnia.  She presents with the following impairments/functional limitations: weakness, impaired endurance, gait instability, impaired functional mobility, impaired balance, pain, decreased safety awareness, decreased lower extremity function, decreased coordination, impaired cardiopulmonary response to activity.    Rehab Prognosis: Good; patient would benefit from acute skilled PT services to address these deficits and reach maximum level of function.    Recent Surgery: * No surgery found *      Plan:     During this hospitalization, patient to be seen 3 x/week to address the identified rehab impairments via gait training, therapeutic activities, therapeutic exercises, neuromuscular re-education and progress toward the following goals:    Plan of Care Expires:  08/21/23    Subjective     Chief Complaint: Pt is motivated to participate  Patient/Family Comments/goals: none stated  Pain/Comfort:  Pain Rating 1: 8/10  Location - Side 1: Bilateral  Location - Orientation 1: medial  Location 1: thigh  Pain Addressed 1: Reposition, Distraction  Pain Rating Post-Intervention 1: 8/10      Objective:     Communicated with nurse prior to session.  Patient found supine with peripheral IV, telemetry, delcid catheter, oxygen upon PT entry to room.     General Precautions: Standard, fall  Orthopedic Precautions:  (L AKA)  Braces: N/A  Respiratory Status: Nasal cannula, flow 2 L/min     Functional Mobility:  Bed Mobility:     Rolling Left:  moderate assistance and of 2 persons  Supine  scooting: contact guard assistance, bed in trend  Supine to Sit: moderate assistance and of 2 persons  Sit to Supine: maximal assistance and of 2 persons  Balance: Demonstrated poor sitting dynamic balance, MAX A to maintain balance  Tolerated sitting EOB approx 10min  Attempted seated TherEx, pt began to slide so was laid back down positioned in supine    AM-PAC 6 CLICK MOBILITY  Turning over in bed (including adjusting bedclothes, sheets and blankets)?: 2  Sitting down on and standing up from a chair with arms (e.g., wheelchair, bedside commode, etc.): 1  Moving from lying on back to sitting on the side of the bed?: 2  Moving to and from a bed to a chair (including a wheelchair)?: 1  Need to walk in hospital room?: 1  Climbing 3-5 steps with a railing?: 1  Basic Mobility Total Score: 8     Treatment & Education:  Pt tolerated interventions well. Completed supine marching, quad set, and ankle pumps x10ea. Reviewed importance of OOB activities and HEP (hip flex, LAQ, ham curls, ankle pumps) in order to maintain/regain strength. Reviewed increased risk of falling due to weakness, instructed to utilize call bell for assistance for all transfers. Pt agreeable to all requests.    Patient left HOB elevated with all lines intact and call button in reach.    GOALS:   Multidisciplinary Problems       Physical Therapy Goals          Problem: Physical Therapy    Goal Priority Disciplines Outcome Goal Variances Interventions   Physical Therapy Goal     PT, PT/OT Ongoing, Progressing     Description: Pt will perform bed mobility CGA in order to participate in EOB activity.  Pt will perform transfers with LRAD CGA in order to participate in OOB activity.   Pt will tolerate sitting EOB x 15-20 mins in order to participate in daily activities.  Pt will perform w/c mobility x 50ft with SPV in order to progress toward baseline.                     Time Tracking:     PT Received On: 08/08/23  PT Start Time: 1443     PT Stop Time:  1508  PT Total Time (min): 25 min     Billable Minutes: Therapeutic Activity 15min and Therapeutic Exercise 10min    Treatment Type: Treatment  PT/PTA: PT     Number of PTA visits since last PT visit: 0     08/08/2023

## 2023-08-08 NOTE — PLAN OF CARE
08/08/23 1243   Post-Acute Status   Post-Acute Authorization Placement   Post-Acute Placement Status Referrals Sent   Coverage [facility] Managed Medicare   Discharge Delays None known at this time   Discharge Plan   Discharge Plan A Skilled Nursing Facility       SW meet with patient at bedside to discuss therapy recommendation for SNF placement. Patient stated she lives in [facility] now and would like a list of [facility] SNF's. SW provided lists and inquired about if referrals could be sent looking for available beds. Patient was agreeable to referrals being sent out. SW will follow up with patient this afternoon for placement.  SW will continue to follow and assist as needed.

## 2023-08-08 NOTE — SUBJECTIVE & OBJECTIVE
Review of Systems   All other systems reviewed and are negative.    Objective:     Vital Signs (Most Recent):  Temp: 98.1 °F (36.7 °C) (08/08/23 1624)  Pulse: 69 (08/08/23 1624)  Resp: 18 (08/08/23 1624)  BP: 99/60 (08/08/23 1624)  SpO2: (!) 94 % (08/08/23 1624) Vital Signs (24h Range):  Temp:  [97.3 °F (36.3 °C)-98.6 °F (37 °C)] 98.1 °F (36.7 °C)  Pulse:  [56-81] 69  Resp:  [16-20] 18  SpO2:  [94 %-99 %] 94 %  BP: ()/(52-67) 99/60     Weight: 78 kg (172 lb)  Body mass index is 28.62 kg/m².    Intake/Output Summary (Last 24 hours) at 8/8/2023 1627  Last data filed at 8/8/2023 1137  Gross per 24 hour   Intake --   Output 1400 ml   Net -1400 ml         Physical Exam  Vitals and nursing note reviewed.   Constitutional:       General: She is not in acute distress.     Appearance: Normal appearance. She is normal weight.      Comments: Wearing NC   Cardiovascular:      Rate and Rhythm: Normal rate and regular rhythm.      Heart sounds: No murmur heard.  Pulmonary:      Effort: Pulmonary effort is normal. No respiratory distress.      Breath sounds: No wheezing.      Comments: Shallow BS bilaterally  Genitourinary:     Comments: Suprapubic catheter in place  Neurological:      Mental Status: She is alert.             Significant Labs: All pertinent labs within the past 24 hours have been reviewed.  Recent Lab Results         08/08/23  0611        Anion Gap 10       Baso # 0.05       Basophil % 0.7       BUN 22       Calcium 8.8       Chloride 100       CO2 27       Creatinine 1.3       Differential Method Automated       eGFR 45       Eos # 0.3       Eosinophil % 3.9       Glucose 75       Gran # (ANC) 4.2       Gran % 55.4       Hematocrit 34.0       Hemoglobin 9.9       Immature Grans (Abs) 0.04  Comment: Mild elevation in immature granulocytes is non specific and   can be seen in a variety of conditions including stress response,   acute inflammation, trauma and pregnancy. Correlation with other   laboratory  and clinical findings is essential.         Immature Granulocytes 0.5       Lymph # 2.1       Lymph % 27.8       Magnesium 2.2       MCH 25.3       MCHC 29.1       MCV 87       Mono # 0.9       Mono % 11.7       MPV 8.8       nRBC 0       Platelets 230       Potassium 3.8       RBC 3.92       RDW 18.7       Sodium 137       WBC 7.62               Significant Imaging: I have reviewed all pertinent imaging results/findings within the past 24 hours.    US Lower Extremity Veins Right   Final Result      No evidence of deep venous thrombosis in the right lower extremity.         Electronically signed by: Dale Theodore   Date:    08/06/2023   Time:    19:27      CT Head Without Contrast   Final Result      No acute abnormality.      Atrophy and chronic white matter changes      All CT scans   are performed using dose optimization techniques including the following: automated exposure control; adjustment of the mA and/or kV; use of iterative reconstruction technique.  Dose modulation was employed for ALARA by means of: Automated exposure control; adjustment of the mA and/or kV according to patient size (this includes techniques or standardized protocols for targeted exams where dose is matched to indication/reason for exam; i.e. extremities or head); and/or use of iterative reconstructive technique.         Electronically signed by: Dale Theodore   Date:    08/06/2023   Time:    18:57      X-Ray Chest AP Portable   Final Result      As above         Electronically signed by: Dale Theodore   Date:    08/06/2023   Time:    17:34

## 2023-08-08 NOTE — PLAN OF CARE
Pt tolerated interventions well. Required MOD A x2 for bed mobility, poor sitting balance. Recommending SNF upon d/c.

## 2023-08-08 NOTE — ASSESSMENT & PLAN NOTE
Patient with Hypercapnic and Hypoxic Respiratory failure which is Acute on chronic.  she is on home oxygen at 2 LPM. Supplemental oxygen was provided and noted- Oxygen Concentration (%):  [28-30] 28    .   Signs/symptoms of respiratory failure include- respiratory distress and lethargy. Contributing diagnoses includes - CHF and COPD Labs and images were reviewed. Patient Has recent ABG, which has been reviewed. Will treat underlying causes and adjust management of respiratory failure as follows:    8/7/23  Transitioned to 2L NC this AM, her home flow  Continue with NIPPV qHS and PRN naps

## 2023-08-08 NOTE — PLAN OF CARE
Free from injury. IV abx maintained. Bpap in use, tolerated well. Wound care completed. No s/s of acute distress. 12hr chart check complete.

## 2023-08-08 NOTE — ASSESSMENT & PLAN NOTE
Echo report from 4/2021   The left ventricle is normal in size with mild concentric hypertrophy and normal systolic function.   The estimated ejection fraction is 55%.   Indeterminate left ventricular diastolic function.   Normal right ventricular size with normal right ventricular systolic function.   Mild left atrial enlargement.   Normal central venous pressure (3 mmHg).   The estimated PA systolic pressure is 24 mmHg     Echo    Result Date: 8/7/2023    Left Ventricle: Normal wall motion. There is low normal systolic   function with a visually estimated ejection fraction of 50 - 55%.    Right Ventricle: Normal right ventricular cavity size.        Continue Bumex 1 mg IV BID, transition to PO when appropriate  Strict I/O's, daily weights, Na/fluid restriction, AM labs    8/8/23  Hold Bumex today with marginal BP and rise in serum Cr  AM labs, consider restarting tomorrow

## 2023-08-08 NOTE — PLAN OF CARE
EVAL AND TX COMPLETED: facilitated bed mobility with mod A, tolerated sitting EOB x 7-10 mins. Pt is not a functional ambulator. Recommend SNF

## 2023-08-09 ENCOUNTER — DOCUMENTATION ONLY (OUTPATIENT)
Dept: CARDIOLOGY | Facility: CLINIC | Age: 68
End: 2023-08-09
Payer: MEDICARE

## 2023-08-09 LAB
ANION GAP SERPL CALC-SCNC: 11 MMOL/L (ref 8–16)
BACTERIA BLD CULT: ABNORMAL
BASOPHILS # BLD AUTO: 0.05 K/UL (ref 0–0.2)
BASOPHILS NFR BLD: 0.8 % (ref 0–1.9)
BUN SERPL-MCNC: 22 MG/DL (ref 8–23)
CALCIUM SERPL-MCNC: 8.9 MG/DL (ref 8.7–10.5)
CHLORIDE SERPL-SCNC: 97 MMOL/L (ref 95–110)
CO2 SERPL-SCNC: 29 MMOL/L (ref 23–29)
CREAT SERPL-MCNC: 1.1 MG/DL (ref 0.5–1.4)
DIFFERENTIAL METHOD: ABNORMAL
EOSINOPHIL # BLD AUTO: 0.5 K/UL (ref 0–0.5)
EOSINOPHIL NFR BLD: 8.1 % (ref 0–8)
ERYTHROCYTE [DISTWIDTH] IN BLOOD BY AUTOMATED COUNT: 18.1 % (ref 11.5–14.5)
EST. GFR  (NO RACE VARIABLE): 55 ML/MIN/1.73 M^2
GLUCOSE SERPL-MCNC: 61 MG/DL (ref 70–110)
HCT VFR BLD AUTO: 33 % (ref 37–48.5)
HGB BLD-MCNC: 9.6 G/DL (ref 12–16)
IMM GRANULOCYTES # BLD AUTO: 0.05 K/UL (ref 0–0.04)
IMM GRANULOCYTES NFR BLD AUTO: 0.8 % (ref 0–0.5)
LYMPHOCYTES # BLD AUTO: 2 K/UL (ref 1–4.8)
LYMPHOCYTES NFR BLD: 33.3 % (ref 18–48)
MAGNESIUM SERPL-MCNC: 2.2 MG/DL (ref 1.6–2.6)
MCH RBC QN AUTO: 24.9 PG (ref 27–31)
MCHC RBC AUTO-ENTMCNC: 29.1 G/DL (ref 32–36)
MCV RBC AUTO: 86 FL (ref 82–98)
MONOCYTES # BLD AUTO: 0.7 K/UL (ref 0.3–1)
MONOCYTES NFR BLD: 11.1 % (ref 4–15)
MRSA SPEC QL CULT: NORMAL
NEUTROPHILS # BLD AUTO: 2.7 K/UL (ref 1.8–7.7)
NEUTROPHILS NFR BLD: 45.9 % (ref 38–73)
NRBC BLD-RTO: 0 /100 WBC
PLATELET # BLD AUTO: 281 K/UL (ref 150–450)
PMV BLD AUTO: 9.2 FL (ref 9.2–12.9)
POTASSIUM SERPL-SCNC: 4.3 MMOL/L (ref 3.5–5.1)
RBC # BLD AUTO: 3.85 M/UL (ref 4–5.4)
SODIUM SERPL-SCNC: 137 MMOL/L (ref 136–145)
WBC # BLD AUTO: 5.95 K/UL (ref 3.9–12.7)

## 2023-08-09 PROCEDURE — 80048 BASIC METABOLIC PNL TOTAL CA: CPT | Performed by: NURSE PRACTITIONER

## 2023-08-09 PROCEDURE — 36415 COLL VENOUS BLD VENIPUNCTURE: CPT | Performed by: NURSE PRACTITIONER

## 2023-08-09 PROCEDURE — 63600175 PHARM REV CODE 636 W HCPCS: Performed by: NURSE PRACTITIONER

## 2023-08-09 PROCEDURE — 85025 COMPLETE CBC W/AUTO DIFF WBC: CPT | Performed by: NURSE PRACTITIONER

## 2023-08-09 PROCEDURE — 83735 ASSAY OF MAGNESIUM: CPT | Performed by: NURSE PRACTITIONER

## 2023-08-09 PROCEDURE — 25000003 PHARM REV CODE 250: Performed by: NURSE PRACTITIONER

## 2023-08-09 PROCEDURE — 97110 THERAPEUTIC EXERCISES: CPT

## 2023-08-09 PROCEDURE — 25000003 PHARM REV CODE 250: Performed by: HOSPITALIST

## 2023-08-09 PROCEDURE — 97112 NEUROMUSCULAR REEDUCATION: CPT

## 2023-08-09 PROCEDURE — 97530 THERAPEUTIC ACTIVITIES: CPT

## 2023-08-09 PROCEDURE — 99900035 HC TECH TIME PER 15 MIN (STAT)

## 2023-08-09 PROCEDURE — 94660 CPAP INITIATION&MGMT: CPT

## 2023-08-09 PROCEDURE — 94761 N-INVAS EAR/PLS OXIMETRY MLT: CPT

## 2023-08-09 PROCEDURE — 27100171 HC OXYGEN HIGH FLOW UP TO 24 HOURS

## 2023-08-09 PROCEDURE — 11000001 HC ACUTE MED/SURG PRIVATE ROOM

## 2023-08-09 RX ORDER — BACLOFEN 10 MG/1
20 TABLET ORAL 2 TIMES DAILY
Status: DISCONTINUED | OUTPATIENT
Start: 2023-08-09 | End: 2023-08-12

## 2023-08-09 RX ADMIN — QUETIAPINE FUMARATE 200 MG: 100 TABLET ORAL at 08:08

## 2023-08-09 RX ADMIN — OXYBUTYNIN CHLORIDE 5 MG: 5 TABLET ORAL at 09:08

## 2023-08-09 RX ADMIN — MUPIROCIN: 20 OINTMENT TOPICAL at 09:08

## 2023-08-09 RX ADMIN — HYDROXYZINE HYDROCHLORIDE 25 MG: 25 TABLET ORAL at 08:08

## 2023-08-09 RX ADMIN — OXYCODONE HYDROCHLORIDE AND ACETAMINOPHEN 1 TABLET: 5; 325 TABLET ORAL at 05:08

## 2023-08-09 RX ADMIN — GABAPENTIN 200 MG: 100 CAPSULE ORAL at 08:08

## 2023-08-09 RX ADMIN — GABAPENTIN 200 MG: 100 CAPSULE ORAL at 09:08

## 2023-08-09 RX ADMIN — OXYBUTYNIN CHLORIDE 5 MG: 5 TABLET ORAL at 08:08

## 2023-08-09 RX ADMIN — ENOXAPARIN SODIUM 40 MG: 40 INJECTION SUBCUTANEOUS at 05:08

## 2023-08-09 RX ADMIN — GABAPENTIN 200 MG: 100 CAPSULE ORAL at 02:08

## 2023-08-09 RX ADMIN — BACLOFEN 20 MG: 10 TABLET ORAL at 08:08

## 2023-08-09 RX ADMIN — CITALOPRAM HYDROBROMIDE 30 MG: 20 TABLET ORAL at 09:08

## 2023-08-09 NOTE — PT/OT/SLP PROGRESS
"Occupational Therapy   Treatment    Name: Mary Ellen Fajardo  MRN: [id number]  Admitting Diagnosis:  Acute on chronic respiratory failure with hypoxia and hypercapnia       Recommendations:     Discharge Recommendations: nursing facility, skilled  Discharge Equipment Recommendations:  to be determined by next level of care  Barriers to discharge:       Assessment:     Mary Ellen Fajardo is a 67 y.o. female with a medical diagnosis of Acute on chronic respiratory failure with hypoxia and hypercapnia.  She presents with DEBILITY AND GENERALIZED WEAKNESS. Performance deficits affecting function are weakness, impaired functional mobility, impaired balance, decreased upper extremity function, decreased safety awareness, impaired endurance, abnormal tone, impaired coordination, impaired self care skills, gait instability, decreased coordination, decreased lower extremity function, pain, decreased ROM, edema.     Rehab Prognosis:  Good; patient would benefit from acute skilled OT services to address these deficits and reach maximum level of function.       Plan:     Patient to be seen 2 x/week to address the above listed problems via self-care/home management, therapeutic activities, therapeutic exercises  Plan of Care Expires: 08/22/23  Plan of Care Reviewed with: patient    Subjective     Chief Complaint: DEBILITY AND GENERALIZED WEAKNESS  Patient/Family Comments/goals: NURSE AND Epic CHART REVIEW  Pain/Comfort:  Pain Rating 1: 8/10  Location - Orientation 1: medial  Location 1:  ("BETWEEN MY LEGS")    Objective:     Communicated with: NURSE AND Epic CHART REVIEW prior to session.  Patient found up in chair with peripheral IV, telemetry, delcid catheter upon OT entry to room.    General Precautions: Standard, fall    Orthopedic Precautions:N/A (L AKA)  Braces: N/A  Respiratory Status: Room air     Occupational Performance:     Bed Mobility:    Patient completed Rolling/Turning to Left with  maximal assistance and 2 " persons  Patient completed Rolling/Turning to Right with maximal assistance and 2 persons  Patient completed Scooting/Bridging with maximal assistance  Patient completed Supine to Sit with maximal assistance and 2 persons  Patient completed Sit to Supine with maximal assistance and 2 persons       Activities of Daily Living:  Upper Body Dressing: independence and maximal assistance .  Lower Body Dressing: independence and total assistance .  Toileting: total assistance [employer]      [facility] 6 Click ADL: 12    Treatment & Education:  Patient educated on role of OT in acute setting and benefits of participation. Educated on techniques to use to increase independence and decrease fall risk with functional transfers. PT EDUCATED ON HEP. PT VERBALIZED UNDERSTANDING AND RETURN DEMONSTRATION. PT PERFORMED 1 SET X 10 REPS B UE ROM EXERCISE SEATED EOB AND REACHING ACTIVITY IN VARIOUS PLANES AND RANGES WITH MAX STATIC/ DYNAMIC SITTING BALANCE EOB. Educated on importance of OOB activity and calling for A to transfer back to bed. Encouraged completion of B UE AROM therex throughout the day to tolerance to increase functional strength and activity tolerance. PT EDUCATED ON THE IMPORTANCE SITTING IN UPRIGHT POSITION BENEFITS CV STRENGTH/ENDURANCE Patient stated understanding and in agreement with POC.      Patient left with bed in chair position with all lines intact, call button in reach, and NURSE notified    GOALS:   Multidisciplinary Problems       Occupational Therapy Goals          Problem: Occupational Therapy    Goal Priority Disciplines Outcome Interventions   Occupational Therapy Goal     OT, PT/OT Ongoing, Progressing    Description: Goals to be met by: 8/22/23     Patient will increase functional independence with ADLs by performing:    UE Dressing with Set-up Assistance.  Grooming while EOB with Contact Guard Assistance.  Supine to sit with Contact Guard Assistance.  Upper extremity exercise program x15 reps per  handout, with independence.                         Time Tracking:     OT Date of Treatment: 08/09/23  OT Start Time: 1025  OT Stop Time: 1055  OT Total Time (min): 30 min    Billable Minutes:Therapeutic Activity 30 MINUTES    OT/FRANCIA: OT          8/9/2023

## 2023-08-09 NOTE — PT/OT/SLP PROGRESS
Occupational Therapy      Patient Name:  Mary Ellen Fajardo   MRN:  [id number]  S: PT COOPERATIVE WITH THERAPY SESSION  O: PT SEEN IN ROOM SUPINE WITH  BED IN CHAIR. PT ALSO DISPLAYED A RIGHT LATERAL LEAN . PT EDUCATED ON USING FULL LENGTH MIRROR AND BOTTOM BED RAILS  WITH RETURN TO  UPRIGHT SITTING POSITION IN BED.  PT ALSO EDUCATED ON HEP UTILIZING BED RAILS. PT VERBALIZED UNDERSTANDING AND RETURNED DEMONSTRATION. PT EDUCATED ON BENEFITS OF BED IN CHAIR POSITION AND TRYING TO MAINTAIN UPRIGHT SITTING POSITION BENEFITS HAS ON CORE STRENGTH AND ENDURANCE  A: PT CONTINUES TO WORK ON  SITTING BALANCE IN UPRIGHT SITTING POSITION IN BED.   P: CONTINUE WITH YESENIA Burns OT  8/9/2023  [id number]  1 TA

## 2023-08-09 NOTE — PLAN OF CARE
Pt tolerated interventions well. Required MOD A of 2 for bed mobility, Intermittent MAX A for sitting balance, able to hold self for short periods, fatigues quickly. Recommending SNF upon d/c.

## 2023-08-09 NOTE — PLAN OF CARE
[facility] - Med Surg  Discharge Reassessment    Primary Care Provider: Any Tran MD    Expected Discharge Date:     Reassessment (most recent)       Discharge Reassessment - 08/09/23 0905          Discharge Reassessment    Assessment Type Discharge Planning Reassessment     Did the patient's condition or plan change since previous assessment? No     Discharge Plan discussed with: Patient     Communicated LOY with patient/caregiver Date not available/Unable to determine     Discharge Plan A Skilled Nursing Facility                   SW spoke with pt regarding accepting facilities. Pt stated that she wants her granddaughter to visit all accepting facilities in the [place] [facility] area. SW explained that visiting each accepting facility is time consuming and not an option. Pt requested SW call her granddaughter. SW tried calling pt's granddaughter but was unsuccessful. SW left an voicemail.  12;06pm SW spoke with pt's granddaughter who stated she will get with pt's spouse to decide about facilities.

## 2023-08-09 NOTE — PLAN OF CARE
Free from injury. Wound care performed. PRN pain meds given as needed. IV abx maintained. 12hr chart check complete.

## 2023-08-09 NOTE — PLAN OF CARE
Patient resting in bed, remains free of falls and injuries this shift. VSS. No obvious s/sx of distress noted. Pain managed with PRN medications. Wound care performed as ordered, DORITA pump on bed heel boot in place to R foot for skin protection. Suprapubic catheter in place with no complications, output monitored and documented. POC reviewed with the patient, verbalized understanding. No further needs at this time, call light within reach bed alarm set. Continue POC as ordered, chart check completed and all orders reviewed.

## 2023-08-09 NOTE — PROGRESS NOTES
"[facility]) nurse navigator notified of HFT[facility] candidate in need of education and introduction to 4-6 week program.      PT aao x 3 while lying in bed. Introduced self to pt as HFTCC nurse navigator.     Patient given "Home Care Guide for Heart Failure Patients" , "[facility]" flyer and "Daily weight and symptom tracker".  Encouraged pt to review information.      Reviewed the following key points of HFT[facility] program with pt and family:   1.) Take your medications as directed.    2.) Weight yourself daily   3.) Follow low salt and limited fluid diet.    4.) Stop smoking and start exercising   5.) Go to your appointments and call your team.      Pt reminded to follow Symptom tracker and to call at the onset of symptoms according to tracker.     Reviewed plan for follow up once discharged to include phone calls, in person and virtual visits to assist pt optimizing their heart failure medication regimen and encouraging healthy lifestyle modifications.  Reminded pt that program will assist them over the next 4-6 weeks and then patient will be transferred to long term care provider .  Reminded pt how to contact HFT[facility] navigator via phone and or via [employer].     Pt instructed appointment with TAD Prado will be printed on hospital discharge paperwork.     Pt also reminded HF nurse will call 48-72 hours after discharge to check on them.     PT verbalize read back of information given.  Encouraged pt and family to read over information often and contact team with any questions or concerns.      "

## 2023-08-09 NOTE — PT/OT/SLP PROGRESS
Physical Therapy Treatment    Patient Name:  Mary Ellen Fajardo   MRN:  [id number]    Recommendations:     Discharge Recommendations: nursing facility, skilled  Discharge Equipment Recommendations: to be determined by next level of care  Barriers to discharge: Decreased caregiver support    Assessment:     Mary Ellen Fajardo is a 67 y.o. female admitted with a medical diagnosis of Acute on chronic respiratory failure with hypoxia and hypercapnia.  She presents with the following impairments/functional limitations: weakness, impaired endurance, impaired functional mobility, impaired balance, pain, decreased safety awareness, decreased lower extremity function, decreased coordination, impaired cardiopulmonary response to activity, edema.    Rehab Prognosis: Good; patient would benefit from acute skilled PT services to address these deficits and reach maximum level of function.    Recent Surgery: * No surgery found *      Plan:     During this hospitalization, patient to be seen 3 x/week to address the identified rehab impairments via gait training, therapeutic exercises, therapeutic activities, neuromuscular re-education and progress toward the following goals:    Plan of Care Expires:  08/21/23    Subjective     Chief Complaint: Pt is motivated to participate  Patient/Family Comments/goals: to get back to PLOF  Pain/Comfort:  Pain Rating 1: 5/10  Location - Side 1: Bilateral  Location - Orientation 1: medial  Location 1: thigh  Pain Addressed 1: Reposition, Distraction  Pain Rating Post-Intervention 1: 5/10      Objective:     Communicated with nurse Rowland prior to session.  Patient found supine with peripheral IV, telemetry, oxygen, delcid catheter upon PT entry to room.     General Precautions: Standard, fall  Orthopedic Precautions:  (L AKA)  Braces: N/A  Respiratory Status: Nasal cannula, flow 2 L/min     Functional Mobility:  Bed Mobility:     Rolling Left:  moderate assistance and of 2 persons  Rolling  Right: moderate assistance and of 2 persons  Supine scooting: stand by assistance  Supine to Sit: moderate assistance and of 2 persons  Sit to Supine: moderate assistance and of 2 persons  Balance: 15min spent working on static and dynamic balance, constant verbal cues to redirect to midline, pt able to correct with verbal cues and pulling self with B UE, use of bed rails for support, unable to get R leg fully under self due to ROM restrictions. Pt with posterior and R lateral instability, cushions places behind pt to assist with balance    TherEx: LAQ, ham curls, marching x10 ea, with bed in chair position - crunches to assist in sitting balance x7      AM-PAC 6 CLICK MOBILITY  Turning over in bed (including adjusting bedclothes, sheets and blankets)?: 2  Sitting down on and standing up from a chair with arms (e.g., wheelchair, bedside commode, etc.): 1  Moving from lying on back to sitting on the side of the bed?: 2  Moving to and from a bed to a chair (including a wheelchair)?: 1  Need to walk in hospital room?: 1  Climbing 3-5 steps with a railing?: 1  Basic Mobility Total Score: 8       Treatment & Education:  Pt tolerated interventions well. Completed seated TherEx. Reviewed importance of EOB activities and HEP (hip flex, LAQ, ham curls, ankle pumps) in order to maintain/regain strength. Reviewed increased risk of falling due to weakness, instructed to utilize call bell for assistance for all transfers. Pt agreeable to all requests.      Patient left with bed in chair position with all lines intact and call button in reach..    GOALS:   Multidisciplinary Problems       Physical Therapy Goals          Problem: Physical Therapy    Goal Priority Disciplines Outcome Goal Variances Interventions   Physical Therapy Goal     PT, PT/OT Ongoing, Progressing     Description: Pt will perform bed mobility CGA in order to participate in EOB activity.  Pt will perform transfers with LRAD CGA in order to participate in OOB  activity.   Pt will tolerate sitting EOB x 15-20 mins in order to participate in daily activities.  Pt will perform w/c mobility x 50ft with SPV in order to progress toward baseline.                       Time Tracking:     PT Received On: 08/09/23  PT Start Time: 1000     PT Stop Time: 1025  PT Total Time (min): 25 min     Billable Minutes: Therapeutic Exercise 10min and Neuromuscular Re-education 15min    Treatment Type: Treatment  PT/PTA: PT     Number of PTA visits since last PT visit: 0     08/09/2023

## 2023-08-10 LAB
ANION GAP SERPL CALC-SCNC: 10 MMOL/L (ref 8–16)
BASOPHILS # BLD AUTO: 0.06 K/UL (ref 0–0.2)
BASOPHILS NFR BLD: 0.9 % (ref 0–1.9)
BUN SERPL-MCNC: 20 MG/DL (ref 8–23)
CALCIUM SERPL-MCNC: 9.1 MG/DL (ref 8.7–10.5)
CHLORIDE SERPL-SCNC: 100 MMOL/L (ref 95–110)
CO2 SERPL-SCNC: 27 MMOL/L (ref 23–29)
CREAT SERPL-MCNC: 1.1 MG/DL (ref 0.5–1.4)
DIFFERENTIAL METHOD: ABNORMAL
EOSINOPHIL # BLD AUTO: 0.3 K/UL (ref 0–0.5)
EOSINOPHIL NFR BLD: 4.5 % (ref 0–8)
ERYTHROCYTE [DISTWIDTH] IN BLOOD BY AUTOMATED COUNT: 17.7 % (ref 11.5–14.5)
EST. GFR  (NO RACE VARIABLE): 55 ML/MIN/1.73 M^2
GLUCOSE SERPL-MCNC: 82 MG/DL (ref 70–110)
HCT VFR BLD AUTO: 35.5 % (ref 37–48.5)
HGB BLD-MCNC: 10.2 G/DL (ref 12–16)
IMM GRANULOCYTES # BLD AUTO: 0.04 K/UL (ref 0–0.04)
IMM GRANULOCYTES NFR BLD AUTO: 0.6 % (ref 0–0.5)
LYMPHOCYTES # BLD AUTO: 1.5 K/UL (ref 1–4.8)
LYMPHOCYTES NFR BLD: 23.5 % (ref 18–48)
MAGNESIUM SERPL-MCNC: 2.3 MG/DL (ref 1.6–2.6)
MCH RBC QN AUTO: 24.6 PG (ref 27–31)
MCHC RBC AUTO-ENTMCNC: 28.7 G/DL (ref 32–36)
MCV RBC AUTO: 86 FL (ref 82–98)
MONOCYTES # BLD AUTO: 0.7 K/UL (ref 0.3–1)
MONOCYTES NFR BLD: 10.3 % (ref 4–15)
NEUTROPHILS # BLD AUTO: 3.9 K/UL (ref 1.8–7.7)
NEUTROPHILS NFR BLD: 60.2 % (ref 38–73)
NRBC BLD-RTO: 0 /100 WBC
PLATELET # BLD AUTO: 266 K/UL (ref 150–450)
PMV BLD AUTO: 9 FL (ref 9.2–12.9)
POTASSIUM SERPL-SCNC: 4.3 MMOL/L (ref 3.5–5.1)
RBC # BLD AUTO: 4.14 M/UL (ref 4–5.4)
SODIUM SERPL-SCNC: 137 MMOL/L (ref 136–145)
WBC # BLD AUTO: 6.5 K/UL (ref 3.9–12.7)

## 2023-08-10 PROCEDURE — 63600175 PHARM REV CODE 636 W HCPCS: Performed by: NURSE PRACTITIONER

## 2023-08-10 PROCEDURE — 99900035 HC TECH TIME PER 15 MIN (STAT)

## 2023-08-10 PROCEDURE — 94640 AIRWAY INHALATION TREATMENT: CPT

## 2023-08-10 PROCEDURE — 11000001 HC ACUTE MED/SURG PRIVATE ROOM

## 2023-08-10 PROCEDURE — 27000221 HC OXYGEN, UP TO 24 HOURS

## 2023-08-10 PROCEDURE — 80048 BASIC METABOLIC PNL TOTAL CA: CPT | Performed by: NURSE PRACTITIONER

## 2023-08-10 PROCEDURE — 25000003 PHARM REV CODE 250: Performed by: SPECIALIST

## 2023-08-10 PROCEDURE — 25000003 PHARM REV CODE 250: Performed by: HOSPITALIST

## 2023-08-10 PROCEDURE — 25000242 PHARM REV CODE 250 ALT 637 W/ HCPCS: Performed by: NURSE PRACTITIONER

## 2023-08-10 PROCEDURE — 85025 COMPLETE CBC W/AUTO DIFF WBC: CPT | Performed by: NURSE PRACTITIONER

## 2023-08-10 PROCEDURE — 94761 N-INVAS EAR/PLS OXIMETRY MLT: CPT

## 2023-08-10 PROCEDURE — 83735 ASSAY OF MAGNESIUM: CPT | Performed by: NURSE PRACTITIONER

## 2023-08-10 PROCEDURE — 36415 COLL VENOUS BLD VENIPUNCTURE: CPT | Performed by: NURSE PRACTITIONER

## 2023-08-10 PROCEDURE — 25000003 PHARM REV CODE 250: Performed by: NURSE PRACTITIONER

## 2023-08-10 RX ORDER — BUMETANIDE 1 MG/1
2 TABLET ORAL DAILY
Status: DISCONTINUED | OUTPATIENT
Start: 2023-08-10 | End: 2023-08-16 | Stop reason: HOSPADM

## 2023-08-10 RX ADMIN — ENOXAPARIN SODIUM 40 MG: 40 INJECTION SUBCUTANEOUS at 04:08

## 2023-08-10 RX ADMIN — ACETAMINOPHEN 650 MG: 325 TABLET ORAL at 05:08

## 2023-08-10 RX ADMIN — IPRATROPIUM BROMIDE AND ALBUTEROL SULFATE 3 ML: 2.5; .5 SOLUTION RESPIRATORY (INHALATION) at 12:08

## 2023-08-10 RX ADMIN — GABAPENTIN 200 MG: 100 CAPSULE ORAL at 09:08

## 2023-08-10 RX ADMIN — BACLOFEN 20 MG: 10 TABLET ORAL at 08:08

## 2023-08-10 RX ADMIN — OXYCODONE HYDROCHLORIDE AND ACETAMINOPHEN 1 TABLET: 5; 325 TABLET ORAL at 06:08

## 2023-08-10 RX ADMIN — MUPIROCIN: 20 OINTMENT TOPICAL at 09:08

## 2023-08-10 RX ADMIN — BACLOFEN 20 MG: 10 TABLET ORAL at 09:08

## 2023-08-10 RX ADMIN — QUETIAPINE FUMARATE 200 MG: 100 TABLET ORAL at 08:08

## 2023-08-10 RX ADMIN — GABAPENTIN 200 MG: 100 CAPSULE ORAL at 03:08

## 2023-08-10 RX ADMIN — MUPIROCIN: 20 OINTMENT TOPICAL at 08:08

## 2023-08-10 RX ADMIN — OXYBUTYNIN CHLORIDE 5 MG: 5 TABLET ORAL at 08:08

## 2023-08-10 RX ADMIN — HYDROXYZINE HYDROCHLORIDE 25 MG: 25 TABLET ORAL at 08:08

## 2023-08-10 RX ADMIN — BUMETANIDE 2 MG: 1 TABLET ORAL at 11:08

## 2023-08-10 RX ADMIN — GABAPENTIN 200 MG: 100 CAPSULE ORAL at 08:08

## 2023-08-10 RX ADMIN — OXYBUTYNIN CHLORIDE 5 MG: 5 TABLET ORAL at 09:08

## 2023-08-10 RX ADMIN — CITALOPRAM HYDROBROMIDE 30 MG: 20 TABLET ORAL at 11:08

## 2023-08-10 RX ADMIN — CEFTRIAXONE 1 G: 1 INJECTION, POWDER, FOR SOLUTION INTRAMUSCULAR; INTRAVENOUS at 11:08

## 2023-08-10 NOTE — SUBJECTIVE & OBJECTIVE
Review of Systems   All other systems reviewed and are negative.    Objective:     Vital Signs (Most Recent):  Temp: 98.8 °F (37.1 °C) (08/09/23 2031)  Pulse: 74 (08/09/23 2031)  Resp: 18 (08/09/23 2031)  BP: 132/72 (08/09/23 2031)  SpO2: (!) 94 % (08/09/23 2031) Vital Signs (24h Range):  Temp:  [97.6 °F (36.4 °C)-98.8 °F (37.1 °C)] 98.8 °F (37.1 °C)  Pulse:  [64-77] 74  Resp:  [15-21] 18  SpO2:  [92 %-98 %] 94 %  BP: ()/(52-72) 132/72     Weight: 78.4 kg (172 lb 13.5 oz)  Body mass index is 28.76 kg/m².    Intake/Output Summary (Last 24 hours) at 8/9/2023 2241  Last data filed at 8/9/2023 1639  Gross per 24 hour   Intake --   Output 1100 ml   Net -1100 ml         Physical Exam  Vitals and nursing note reviewed.     Constitutional:       General: She is not in acute distress.     Appearance: Normal appearance. She is normal weight.      Comments: Wearing NC   Cardiovascular:      Rate and Rhythm: Normal rate and regular rhythm.      Heart sounds: No murmur heard.  Pulmonary:      Effort: Pulmonary effort is normal. No respiratory distress.      Breath sounds: No wheezing.      Comments: Shallow BS bilaterally  Genitourinary:     Comments: Suprapubic catheter in place  Neurological:      Mental Status: She is alert.         Significant Labs: All pertinent labs within the past 24 hours have been reviewed.  Recent Lab Results         08/09/23  0522        Anion Gap 11       Baso # 0.05       Basophil % 0.8       BUN 22       Calcium 8.9       Chloride 97       CO2 29       Creatinine 1.1       Differential Method Automated       eGFR 55       Eos # 0.5       Eosinophil % 8.1       Glucose 61       Gran # (ANC) 2.7       Gran % 45.9       Hematocrit 33.0       Hemoglobin 9.6       Immature Grans (Abs) 0.05  Comment: Mild elevation in immature granulocytes is non specific and   can be seen in a variety of conditions including stress response,   acute inflammation, trauma and pregnancy. Correlation with other    laboratory and clinical findings is essential.         Immature Granulocytes 0.8       Lymph # 2.0       Lymph % 33.3       Magnesium 2.2       MCH 24.9       MCHC 29.1       MCV 86       Mono # 0.7       Mono % 11.1       MPV 9.2       nRBC 0       Platelets 281       Potassium 4.3       RBC 3.85       RDW 18.1       Sodium 137       WBC 5.95               Significant Imaging: I have reviewed all pertinent imaging results/findings within the past 24 hours.

## 2023-08-10 NOTE — PLAN OF CARE
[facility] spoke with pt regarding accepting facilities. Pt chose [facility]. [facility] notified the liaison at [facility] to submit for auth. Pending auth [facility]. [facility] spoke with pt's son who stated pt lives in the  area and doesn't need to go a SNF in [place]. Pt's son stated he will discuss this matter with pt and call SW back.

## 2023-08-10 NOTE — ASSESSMENT & PLAN NOTE
Suprapubic cath changed upon arrival to the ICU  Urine culture +proteus, sensitivities reviewed  Continue ceftriaxone, antibiotic day # 3, plan for 7 day course  Transition to PO antibiotic on discharge

## 2023-08-10 NOTE — ASSESSMENT & PLAN NOTE
Patient with Hypercapnic and Hypoxic Respiratory failure which is Acute on chronic.  she is on home oxygen at 2 LPM. Supplemental oxygen was provided and noted- Oxygen Concentration (%):  [30-32] 32    .   Signs/symptoms of respiratory failure include- respiratory distress and lethargy. Contributing diagnoses includes - CHF and COPD Labs and images were reviewed. Patient Has recent ABG, which has been reviewed. Will treat underlying causes and adjust management of respiratory failure as follows:    8/7/23  Transitioned to 2L NC this AM, her home flow  Continue with NIPPV qHS and PRN naps    8/10/23  Remains stable on 2L NC  Continue with NIPPV qHS and PRN naps

## 2023-08-10 NOTE — SUBJECTIVE & OBJECTIVE
Review of Systems   All other systems reviewed and are negative.    Objective:     Vital Signs (Most Recent):  Temp: 97.7 °F (36.5 °C) (08/10/23 1248)  Pulse: 69 (08/10/23 1248)  Resp: 18 (08/10/23 1248)  BP: 117/65 (08/10/23 1248)  SpO2: 100 % (08/10/23 1248) Vital Signs (24h Range):  Temp:  [97.7 °F (36.5 °C)-98.8 °F (37.1 °C)] 97.7 °F (36.5 °C)  Pulse:  [60-74] 69  Resp:  [15-24] 18  SpO2:  [89 %-100 %] 100 %  BP: (106-132)/(54-72) 117/65     Weight: 78.4 kg (172 lb 13.5 oz)  Body mass index is 28.76 kg/m².    Intake/Output Summary (Last 24 hours) at 8/10/2023 1342  Last data filed at 8/10/2023 1328  Gross per 24 hour   Intake --   Output 2100 ml   Net -2100 ml         Physical Exam  Vitals and nursing note reviewed.   Constitutional:       General: She is not in acute distress.     Appearance: She is obese. She is ill-appearing.      Comments: Wearing 2L NC   Cardiovascular:      Rate and Rhythm: Normal rate and regular rhythm.      Heart sounds: No murmur heard.  Pulmonary:      Effort: Pulmonary effort is normal. No respiratory distress.      Breath sounds: No wheezing.   Genitourinary:     Comments: +suprapubic catheter            Significant Labs: All pertinent labs within the past 24 hours have been reviewed.  Recent Lab Results         08/10/23  0707        Anion Gap 10       Baso # 0.06       Basophil % 0.9       BUN 20       Calcium 9.1       Chloride 100       CO2 27       Creatinine 1.1       Differential Method Automated       eGFR 55       Eos # 0.3       Eosinophil % 4.5       Glucose 82       Gran # (ANC) 3.9       Gran % 60.2       Hematocrit 35.5       Hemoglobin 10.2       Immature Grans (Abs) 0.04  Comment: Mild elevation in immature granulocytes is non specific and   can be seen in a variety of conditions including stress response,   acute inflammation, trauma and pregnancy. Correlation with other   laboratory and clinical findings is essential.         Immature Granulocytes 0.6       Lymph  # 1.5       Lymph % 23.5       Magnesium 2.3       MCH 24.6       MCHC 28.7       MCV 86       Mono # 0.7       Mono % 10.3       MPV 9.0       nRBC 0       Platelets 266       Potassium 4.3       RBC 4.14       RDW 17.7       Sodium 137       WBC 6.50               Significant Imaging: I have reviewed all pertinent imaging results/findings within the past 24 hours.

## 2023-08-10 NOTE — PLAN OF CARE
Free from injury. Wound care completed. Bpap in use. No s/s of acute distress. 12hr chart check complete.

## 2023-08-10 NOTE — ASSESSMENT & PLAN NOTE
Echo report from 4/2021   The left ventricle is normal in size with mild concentric hypertrophy and normal systolic function.   The estimated ejection fraction is 55%.   Indeterminate left ventricular diastolic function.   Normal right ventricular size with normal right ventricular systolic function.   Mild left atrial enlargement.   Normal central venous pressure (3 mmHg).   The estimated PA systolic pressure is 24 mmHg     Echo    Result Date: 8/7/2023    Left Ventricle: Normal wall motion. There is low normal systolic   function with a visually estimated ejection fraction of 50 - 55%.    Right Ventricle: Normal right ventricular cavity size.        Continue Bumex 1 mg IV BID, transition to PO when appropriate  Strict I/O's, daily weights, Na/fluid restriction, AM labs    8/8/23  Hold Bumex today with marginal BP and rise in serum Cr  AM labs, consider restarting tomorrow    8/10/23  Cr improved, resume home PO Bumex today

## 2023-08-10 NOTE — PLAN OF CARE
POC discussed with patient; Patient verbalized understanding. VSS. 2L nasal cannula maintained.Wound care completed per orders. Cardiac monitoring maintained. Blood glucose monitored. Turned as tolerated. Suprapubic catheter in place with cloudy, yellow output. Remains free from injury. Call light within reach. Bed alarm on. Purposeful rounding every two hours. No signs of acute distress noted. Chart review completed.

## 2023-08-10 NOTE — PROGRESS NOTES
[facility]  Progress Note    Patient Name: Mary Ellen Fajardo  MRN: [id number]  Patient Class: IP- Inpatient   Admission Date: 8/6/2023  Length of Stay: 4 days  Attending Physician: Juan Luis MD  Primary Care Provider: Any Tran MD        Subjective:     Principal Problem:Acute on chronic respiratory failure with hypoxia and hypercapnia        HPI:  Patient is 67 year old female with past medical history significant for stroke, hypertension, COPD, CHF, chronic respiratory failure on home oxygen (2 L/min NC), ADEEL, neurogenic bladder with chronic indwelling suprapubic catheter, left AKA, paraparesis, and wounds who presented to ED due to generalized weakness, confusion, and painful urination. Patient complains of chest pain on/off, abdominal pain, and bilateral leg pain. ABG revealed mild acidosis with hypercapnia. She is on continuous BIPAP at this time. She does have UTI and suprapubic catheter should be changed out. She has been given Zosyn. Lactic acid is normal however procalcitonin is elevated. BP is elevated. There is evidence of congestive heart failure on CXR. US was done due to leg pain, it is negative for DVT. There is no leukocytosis or fever. Critical care team consulted for admission to ICU due to hypercapnic respiratory failure requiring continuous BIPAP and for treatment of UTI.      Overview/Hospital Course:  8/7: placed on home 2L this AM, continue NIPPV qHS and PRN naps, no new issues or c/o on exam, no family at bedside  ------  Chart reviewed, examined patient at bedside this afternoon, asking to restart her home anxiety and pain medication.  Currently stable on 2L NC, stable for floor transfer.    8/8: NAEON. Patient c/o bladder spasms, asking for medication, states previously on oxybutynin.  BP marginal this AM. Cr increase from 0.9 --> 1.3, will stop Bumex  Urine culture with >100k GNR, speciation pending, continue ceftriaxone  Remains stable on 2L  NC  SNF placement pending    8/9: LACI. Patient asking for her home muscle relaxer, will restart  Cr decreased to 1.1. Restart home PO Bumex  Urine culture results pending, continue ceftriaxone  1 of 2 blood culture polymicrobial, likely contaminant  Remains stable on 2L NC  SNF placement pending    8/10: LACI. Patient in bed, denies new issues, stable on 2L NC  Urine culture +proteus, sensitives reviewed, continue ceftriaxone  SNF placement pending, stable for hospital discharge        Review of Systems   All other systems reviewed and are negative.    Objective:     Vital Signs (Most Recent):  Temp: 97.7 °F (36.5 °C) (08/10/23 1248)  Pulse: 69 (08/10/23 1248)  Resp: 18 (08/10/23 1248)  BP: 117/65 (08/10/23 1248)  SpO2: 100 % (08/10/23 1248) Vital Signs (24h Range):  Temp:  [97.7 °F (36.5 °C)-98.8 °F (37.1 °C)] 97.7 °F (36.5 °C)  Pulse:  [60-74] 69  Resp:  [15-24] 18  SpO2:  [89 %-100 %] 100 %  BP: (106-132)/(54-72) 117/65     Weight: 78.4 kg (172 lb 13.5 oz)  Body mass index is 28.76 kg/m².    Intake/Output Summary (Last 24 hours) at 8/10/2023 1342  Last data filed at 8/10/2023 1328  Gross per 24 hour   Intake --   Output 2100 ml   Net -2100 ml         Physical Exam  Vitals and nursing note reviewed.   Constitutional:       General: She is not in acute distress.     Appearance: She is obese. She is ill-appearing.      Comments: Wearing 2L NC   Cardiovascular:      Rate and Rhythm: Normal rate and regular rhythm.      Heart sounds: No murmur heard.  Pulmonary:      Effort: Pulmonary effort is normal. No respiratory distress.      Breath sounds: No wheezing.   Genitourinary:     Comments: +suprapubic catheter            Significant Labs: All pertinent labs within the past 24 hours have been reviewed.  Recent Lab Results         08/10/23  0707        Anion Gap 10       Baso # 0.06       Basophil % 0.9       BUN 20       Calcium 9.1       Chloride 100       CO2 27       Creatinine 1.1       Differential Method  Automated       eGFR 55       Eos # 0.3       Eosinophil % 4.5       Glucose 82       Gran # (ANC) 3.9       Gran % 60.2       Hematocrit 35.5       Hemoglobin 10.2       Immature Grans (Abs) 0.04  Comment: Mild elevation in immature granulocytes is non specific and   can be seen in a variety of conditions including stress response,   acute inflammation, trauma and pregnancy. Correlation with other   laboratory and clinical findings is essential.         Immature Granulocytes 0.6       Lymph # 1.5       Lymph % 23.5       Magnesium 2.3       MCH 24.6       MCHC 28.7       MCV 86       Mono # 0.7       Mono % 10.3       MPV 9.0       nRBC 0       Platelets 266       Potassium 4.3       RBC 4.14       RDW 17.7       Sodium 137       WBC 6.50               Significant Imaging: I have reviewed all pertinent imaging results/findings within the past 24 hours.      Assessment/Plan:      * Acute on chronic respiratory failure with hypoxia and hypercapnia  Patient with Hypercapnic and Hypoxic Respiratory failure which is Acute on chronic.  she is on home oxygen at 2 LPM. Supplemental oxygen was provided and noted- Oxygen Concentration (%):  [30-32] 32    .   Signs/symptoms of respiratory failure include- respiratory distress and lethargy. Contributing diagnoses includes - CHF and COPD Labs and images were reviewed. Patient Has recent ABG, which has been reviewed. Will treat underlying causes and adjust management of respiratory failure as follows:    8/7/23  Transitioned to 2L NC this AM, her home flow  Continue with NIPPV qHS and PRN naps    8/10/23  Remains stable on 2L NC  Continue with NIPPV qHS and PRN naps    Acute on chronic diastolic heart failure  Echo report from 4/2021   The left ventricle is normal in size with mild concentric hypertrophy and normal systolic function.   The estimated ejection fraction is 55%.   Indeterminate left ventricular diastolic function.   Normal right ventricular size with normal  right ventricular systolic function.   Mild left atrial enlargement.   Normal central venous pressure (3 mmHg).   The estimated PA systolic pressure is 24 mmHg     Echo    Result Date: 8/7/2023    Left Ventricle: Normal wall motion. There is low normal systolic   function with a visually estimated ejection fraction of 50 - 55%.    Right Ventricle: Normal right ventricular cavity size.        Continue Bumex 1 mg IV BID, transition to PO when appropriate  Strict I/O's, daily weights, Na/fluid restriction, AM labs    8/8/23  Hold Bumex today with marginal BP and rise in serum Cr  AM labs, consider restarting tomorrow    8/10/23  Cr improved, resume home PO Bumex today    Pulmonary HTN  Echo    Result Date: 8/7/2023    Left Ventricle: Normal wall motion. There is low normal systolic   function with a visually estimated ejection fraction of 50 - 55%.    Right Ventricle: Normal right ventricular cavity size.      Plan as above    COPD (chronic obstructive pulmonary disease)  Not in exacerbation  On home 2L NC  PRN breathing treatments    Urinary tract infection associated with cystostomy catheter  Suprapubic cath changed upon arrival to the ICU  Urine culture +proteus, sensitivities reviewed  Continue ceftriaxone, antibiotic day # 3, plan for 7 day course  Transition to PO antibiotic on discharge    History of CVA (cerebrovascular accident)  Bed bound at baseline with limited help at home   PT/OT, supportive care  Med mgmt consulted for assistance with placement  SNF insurance authorization pending    Anemia  H/H stable, near baseline  Monitor as needed    ADEEL (obstructive sleep apnea)  Noncompliant with home NIPPV  NIPPV qHS and PRN    Essential hypertension  BP stable  Resume home meds    Paraparesis  Turn q2h  Supportive care    Wounds, multiple  Wound Care consulted  Turn q2h, supportive care    VTE Risk Mitigation (From admission, onward)         Ordered     enoxaparin injection 40 mg  Daily         08/06/23  2322     IP VTE HIGH RISK PATIENT  Once         08/06/23 2322     Place sequential compression device  Until discontinued         08/06/23 2322                Discharge Planning   LOY:      Code Status: Full Code   Is the patient medically ready for discharge?:     Reason for patient still in hospital (select all that apply): Pending disposition  Discharge Plan A: Skilled Nursing Facility   Discharge Delays: None known at this time              Juan Luis MD  Department of Hospital Medicine   '[facility]

## 2023-08-10 NOTE — ASSESSMENT & PLAN NOTE
Bed bound at baseline with limited help at home   PT/OT, supportive care  Med mgmt consulted for assistance with placement  SNF insurance authorization pending

## 2023-08-10 NOTE — PROGRESS NOTES
[facility] Medicine  Progress Note    Patient Name: Mary Ellen Fajardo  MRN: [id number]  Patient Class: IP- Inpatient   Admission Date: 8/6/2023  Length of Stay: 3 days  Attending Physician: Juan Luis MD  Primary Care Provider: Any Tran MD        Subjective:     Principal Problem:Acute on chronic respiratory failure with hypoxia and hypercapnia        HPI:  Patient is 67 year old female with past medical history significant for stroke, hypertension, COPD, CHF, chronic respiratory failure on home oxygen (2 L/min NC), ADEEL, neurogenic bladder with chronic indwelling suprapubic catheter, left AKA, paraparesis, and wounds who presented to ED due to generalized weakness, confusion, and painful urination. Patient complains of chest pain on/off, abdominal pain, and bilateral leg pain. ABG revealed mild acidosis with hypercapnia. She is on continuous BIPAP at this time. She does have UTI and suprapubic catheter should be changed out. She has been given Zosyn. Lactic acid is normal however procalcitonin is elevated. BP is elevated. There is evidence of congestive heart failure on CXR. US was done due to leg pain, it is negative for DVT. There is no leukocytosis or fever. Critical care team consulted for admission to ICU due to hypercapnic respiratory failure requiring continuous BIPAP and for treatment of UTI.      Overview/Hospital Course:  8/7: placed on home 2L this AM, continue NIPPV qHS and PRN naps, no new issues or c/o on exam, no family at bedside  ------  Chart reviewed, examined patient at bedside this afternoon, asking to restart her home anxiety and pain medication.  Currently stable on 2L NC, stable for floor transfer.    8/8: NAEON. Patient c/o bladder spasms, asking for medication, states previously on oxybutynin.  BP marginal this AM. Cr increase from 0.9 --> 1.3, will stop Bumex  Urine culture with >100k GNR, speciation pending, continue ceftriaxone  Remains stable on 2L  NC  SNF placement pending    8/9: LACI. Patient asking for her home muscle relaxer, will restart  Cr decreased to 1.1. Restart home PO Bumex  Urine culture results pending, continue ceftriaxone  Remains stable on 2L NC  SNF placement pending        Review of Systems   All other systems reviewed and are negative.    Objective:     Vital Signs (Most Recent):  Temp: 98.8 °F (37.1 °C) (08/09/23 2031)  Pulse: 74 (08/09/23 2031)  Resp: 18 (08/09/23 2031)  BP: 132/72 (08/09/23 2031)  SpO2: (!) 94 % (08/09/23 2031) Vital Signs (24h Range):  Temp:  [97.6 °F (36.4 °C)-98.8 °F (37.1 °C)] 98.8 °F (37.1 °C)  Pulse:  [64-77] 74  Resp:  [15-21] 18  SpO2:  [92 %-98 %] 94 %  BP: ()/(52-72) 132/72     Weight: 78.4 kg (172 lb 13.5 oz)  Body mass index is 28.76 kg/m².    Intake/Output Summary (Last 24 hours) at 8/9/2023 2241  Last data filed at 8/9/2023 1639  Gross per 24 hour   Intake --   Output 1100 ml   Net -1100 ml         Physical Exam  Vitals and nursing note reviewed.     Constitutional:       General: She is not in acute distress.     Appearance: Normal appearance. She is normal weight.      Comments: Wearing NC   Cardiovascular:      Rate and Rhythm: Normal rate and regular rhythm.      Heart sounds: No murmur heard.  Pulmonary:      Effort: Pulmonary effort is normal. No respiratory distress.      Breath sounds: No wheezing.      Comments: Shallow BS bilaterally  Genitourinary:     Comments: Suprapubic catheter in place  Neurological:      Mental Status: She is alert.         Significant Labs: All pertinent labs within the past 24 hours have been reviewed.  Recent Lab Results         08/09/23  0522        Anion Gap 11       Baso # 0.05       Basophil % 0.8       BUN 22       Calcium 8.9       Chloride 97       CO2 29       Creatinine 1.1       Differential Method Automated       eGFR 55       Eos # 0.5       Eosinophil % 8.1       Glucose 61       Gran # (ANC) 2.7       Gran % 45.9       Hematocrit 33.0        Hemoglobin 9.6       Immature Grans (Abs) 0.05  Comment: Mild elevation in immature granulocytes is non specific and   can be seen in a variety of conditions including stress response,   acute inflammation, trauma and pregnancy. Correlation with other   laboratory and clinical findings is essential.         Immature Granulocytes 0.8       Lymph # 2.0       Lymph % 33.3       Magnesium 2.2       MCH 24.9       MCHC 29.1       MCV 86       Mono # 0.7       Mono % 11.1       MPV 9.2       nRBC 0       Platelets 281       Potassium 4.3       RBC 3.85       RDW 18.1       Sodium 137       WBC 5.95               Significant Imaging: I have reviewed all pertinent imaging results/findings within the past 24 hours.      Assessment/Plan:      * Acute on chronic respiratory failure with hypoxia and hypercapnia  Patient with Hypercapnic and Hypoxic Respiratory failure which is Acute on chronic.  she is on home oxygen at 2 LPM. Supplemental oxygen was provided and noted- Oxygen Concentration (%):  [28-30] 28    .   Signs/symptoms of respiratory failure include- respiratory distress and lethargy. Contributing diagnoses includes - CHF and COPD Labs and images were reviewed. Patient Has recent ABG, which has been reviewed. Will treat underlying causes and adjust management of respiratory failure as follows:    8/7/23  Transitioned to 2L NC this AM, her home flow  Continue with NIPPV qHS and PRN naps    Acute on chronic diastolic heart failure  Echo report from 4/2021   The left ventricle is normal in size with mild concentric hypertrophy and normal systolic function.   The estimated ejection fraction is 55%.   Indeterminate left ventricular diastolic function.   Normal right ventricular size with normal right ventricular systolic function.   Mild left atrial enlargement.   Normal central venous pressure (3 mmHg).   The estimated PA systolic pressure is 24 mmHg     Echo    Result Date: 8/7/2023    Left Ventricle: Normal wall  motion. There is low normal systolic   function with a visually estimated ejection fraction of 50 - 55%.    Right Ventricle: Normal right ventricular cavity size.        Continue Bumex 1 mg IV BID, transition to PO when appropriate  Strict I/O's, daily weights, Na/fluid restriction, AM labs    8/8/23  Hold Bumex today with marginal BP and rise in serum Cr  AM labs, consider restarting tomorrow    Pulmonary HTN  Echo    Result Date: 8/7/2023    Left Ventricle: Normal wall motion. There is low normal systolic   function with a visually estimated ejection fraction of 50 - 55%.    Right Ventricle: Normal right ventricular cavity size.      Plan as above    COPD (chronic obstructive pulmonary disease)  Not in exacerbation  On home 2L NC  PRN breathing treatments      Urinary tract infection associated with cystostomy catheter  Suprapubic cath changed upon arrival to the ICU  On ceftriaxone  Urine culture pending    History of CVA (cerebrovascular accident)  Bed bound at baseline with limited help at home   PT/OT, supportive care  Med mgmt consulted for assistance with placement    Anemia  H/H stable, near baseline  Monitor as needed    ADEEL (obstructive sleep apnea)  Noncompliant with home NIPPV  NIPPV qHS and PRN    Essential hypertension  BP stable  Resume home meds    Paraparesis  Turn q2h  Supportive care    Wounds, multiple  Wound Care consulted  Turn q2h, supportive care        VTE Risk Mitigation (From admission, onward)         Ordered     enoxaparin injection 40 mg  Daily         08/06/23 2322     IP VTE HIGH RISK PATIENT  Once         08/06/23 2322     Place sequential compression device  Until discontinued         08/06/23 2322                Discharge Planning   LOY:      Code Status: Full Code   Is the patient medically ready for discharge?:     Reason for patient still in hospital (select all that apply): Patient trending condition, Laboratory test, Treatment and Pending disposition  Discharge Plan A:  Skilled Nursing Facility   Discharge Delays: None known at this time              Juan Luis MD  Department of Hospital Medicine   [facility] Surg

## 2023-08-11 LAB
ANION GAP SERPL CALC-SCNC: 10 MMOL/L (ref 8–16)
BACTERIA UR CULT: ABNORMAL
BACTERIA UR CULT: ABNORMAL
BASOPHILS # BLD AUTO: 0.04 K/UL (ref 0–0.2)
BASOPHILS NFR BLD: 0.8 % (ref 0–1.9)
BUN SERPL-MCNC: 19 MG/DL (ref 8–23)
CALCIUM SERPL-MCNC: 8.8 MG/DL (ref 8.7–10.5)
CHLORIDE SERPL-SCNC: 99 MMOL/L (ref 95–110)
CO2 SERPL-SCNC: 31 MMOL/L (ref 23–29)
CREAT SERPL-MCNC: 1 MG/DL (ref 0.5–1.4)
DIFFERENTIAL METHOD: ABNORMAL
EOSINOPHIL # BLD AUTO: 0.2 K/UL (ref 0–0.5)
EOSINOPHIL NFR BLD: 4.8 % (ref 0–8)
ERYTHROCYTE [DISTWIDTH] IN BLOOD BY AUTOMATED COUNT: 17.5 % (ref 11.5–14.5)
EST. GFR  (NO RACE VARIABLE): >60 ML/MIN/1.73 M^2
GLUCOSE SERPL-MCNC: 73 MG/DL (ref 70–110)
HCT VFR BLD AUTO: 32.9 % (ref 37–48.5)
HGB BLD-MCNC: 9.5 G/DL (ref 12–16)
IMM GRANULOCYTES # BLD AUTO: 0.01 K/UL (ref 0–0.04)
IMM GRANULOCYTES NFR BLD AUTO: 0.2 % (ref 0–0.5)
LYMPHOCYTES # BLD AUTO: 1.9 K/UL (ref 1–4.8)
LYMPHOCYTES NFR BLD: 39.5 % (ref 18–48)
MAGNESIUM SERPL-MCNC: 2.1 MG/DL (ref 1.6–2.6)
MCH RBC QN AUTO: 24.7 PG (ref 27–31)
MCHC RBC AUTO-ENTMCNC: 28.9 G/DL (ref 32–36)
MCV RBC AUTO: 86 FL (ref 82–98)
MONOCYTES # BLD AUTO: 0.5 K/UL (ref 0.3–1)
MONOCYTES NFR BLD: 11 % (ref 4–15)
NEUTROPHILS # BLD AUTO: 2.1 K/UL (ref 1.8–7.7)
NEUTROPHILS NFR BLD: 43.7 % (ref 38–73)
NRBC BLD-RTO: 0 /100 WBC
PLATELET # BLD AUTO: 232 K/UL (ref 150–450)
PMV BLD AUTO: 8.8 FL (ref 9.2–12.9)
POCT GLUCOSE: 79 MG/DL (ref 70–110)
POCT GLUCOSE: 81 MG/DL (ref 70–110)
POTASSIUM SERPL-SCNC: 4.2 MMOL/L (ref 3.5–5.1)
RBC # BLD AUTO: 3.85 M/UL (ref 4–5.4)
SODIUM SERPL-SCNC: 140 MMOL/L (ref 136–145)
WBC # BLD AUTO: 4.81 K/UL (ref 3.9–12.7)

## 2023-08-11 PROCEDURE — 85025 COMPLETE CBC W/AUTO DIFF WBC: CPT | Performed by: NURSE PRACTITIONER

## 2023-08-11 PROCEDURE — 27100171 HC OXYGEN HIGH FLOW UP TO 24 HOURS

## 2023-08-11 PROCEDURE — 25000003 PHARM REV CODE 250: Performed by: NURSE PRACTITIONER

## 2023-08-11 PROCEDURE — 99900035 HC TECH TIME PER 15 MIN (STAT)

## 2023-08-11 PROCEDURE — 94660 CPAP INITIATION&MGMT: CPT

## 2023-08-11 PROCEDURE — 83735 ASSAY OF MAGNESIUM: CPT | Performed by: NURSE PRACTITIONER

## 2023-08-11 PROCEDURE — 25000003 PHARM REV CODE 250: Performed by: HOSPITALIST

## 2023-08-11 PROCEDURE — 11000001 HC ACUTE MED/SURG PRIVATE ROOM

## 2023-08-11 PROCEDURE — 80048 BASIC METABOLIC PNL TOTAL CA: CPT | Performed by: NURSE PRACTITIONER

## 2023-08-11 PROCEDURE — 63600175 PHARM REV CODE 636 W HCPCS: Performed by: HOSPITALIST

## 2023-08-11 PROCEDURE — 94761 N-INVAS EAR/PLS OXIMETRY MLT: CPT

## 2023-08-11 PROCEDURE — 63600175 PHARM REV CODE 636 W HCPCS: Performed by: NURSE PRACTITIONER

## 2023-08-11 RX ORDER — GABAPENTIN 600 MG/1
600 TABLET ORAL 3 TIMES DAILY
Qty: 21 TABLET | Refills: 0 | Status: SHIPPED | OUTPATIENT
Start: 2023-08-11

## 2023-08-11 RX ORDER — AMOXICILLIN AND CLAVULANATE POTASSIUM 875; 125 MG/1; MG/1
1 TABLET, FILM COATED ORAL 2 TIMES DAILY
Qty: 10 TABLET | Refills: 0 | Status: SHIPPED | OUTPATIENT
Start: 2023-08-11 | End: 2023-08-11 | Stop reason: SDUPTHER

## 2023-08-11 RX ORDER — POLYETHYLENE GLYCOL 3350 17 G/17G
17 POWDER, FOR SOLUTION ORAL DAILY
Status: DISCONTINUED | OUTPATIENT
Start: 2023-08-12 | End: 2023-08-11

## 2023-08-11 RX ORDER — AMOXICILLIN AND CLAVULANATE POTASSIUM 875; 125 MG/1; MG/1
1 TABLET, FILM COATED ORAL 2 TIMES DAILY
Qty: 10 TABLET | Refills: 0 | Status: SHIPPED | OUTPATIENT
Start: 2023-08-11 | End: 2023-08-16

## 2023-08-11 RX ORDER — GABAPENTIN 600 MG/1
600 TABLET ORAL 3 TIMES DAILY
Qty: 21 TABLET | Refills: 0 | Status: SHIPPED | OUTPATIENT
Start: 2023-08-11 | End: 2023-08-11 | Stop reason: SDUPTHER

## 2023-08-11 RX ORDER — POLYETHYLENE GLYCOL 3350 17 G/17G
17 POWDER, FOR SOLUTION ORAL DAILY
Status: DISCONTINUED | OUTPATIENT
Start: 2023-08-11 | End: 2023-08-16 | Stop reason: HOSPADM

## 2023-08-11 RX ORDER — OXYCODONE AND ACETAMINOPHEN 5; 325 MG/1; MG/1
1 TABLET ORAL EVERY 12 HOURS PRN
Qty: 10 TABLET | Refills: 0 | Status: SHIPPED | OUTPATIENT
Start: 2023-08-11 | End: 2023-08-15 | Stop reason: SDUPTHER

## 2023-08-11 RX ORDER — DOCUSATE SODIUM 100 MG/1
100 CAPSULE, LIQUID FILLED ORAL DAILY
Status: DISCONTINUED | OUTPATIENT
Start: 2023-08-11 | End: 2023-08-16 | Stop reason: HOSPADM

## 2023-08-11 RX ORDER — OXYCODONE AND ACETAMINOPHEN 5; 325 MG/1; MG/1
1 TABLET ORAL EVERY 12 HOURS PRN
Qty: 10 TABLET | Refills: 0 | Status: SHIPPED | OUTPATIENT
Start: 2023-08-11 | End: 2023-08-11 | Stop reason: SDUPTHER

## 2023-08-11 RX ADMIN — POLYETHYLENE GLYCOL 3350 17 G: 17 POWDER, FOR SOLUTION ORAL at 03:08

## 2023-08-11 RX ADMIN — DOCUSATE SODIUM 100 MG: 100 CAPSULE, LIQUID FILLED ORAL at 03:08

## 2023-08-11 RX ADMIN — BACLOFEN 20 MG: 10 TABLET ORAL at 09:08

## 2023-08-11 RX ADMIN — QUETIAPINE FUMARATE 200 MG: 100 TABLET ORAL at 09:08

## 2023-08-11 RX ADMIN — BACLOFEN 20 MG: 10 TABLET ORAL at 10:08

## 2023-08-11 RX ADMIN — GABAPENTIN 200 MG: 100 CAPSULE ORAL at 09:08

## 2023-08-11 RX ADMIN — OXYBUTYNIN CHLORIDE 5 MG: 5 TABLET ORAL at 10:08

## 2023-08-11 RX ADMIN — GABAPENTIN 200 MG: 100 CAPSULE ORAL at 03:08

## 2023-08-11 RX ADMIN — OXYBUTYNIN CHLORIDE 5 MG: 5 TABLET ORAL at 09:08

## 2023-08-11 RX ADMIN — ACETAMINOPHEN 650 MG: 325 TABLET ORAL at 09:08

## 2023-08-11 RX ADMIN — GABAPENTIN 200 MG: 100 CAPSULE ORAL at 10:08

## 2023-08-11 RX ADMIN — MUPIROCIN: 20 OINTMENT TOPICAL at 10:08

## 2023-08-11 RX ADMIN — CITALOPRAM HYDROBROMIDE 30 MG: 20 TABLET ORAL at 10:08

## 2023-08-11 RX ADMIN — OXYCODONE HYDROCHLORIDE AND ACETAMINOPHEN 1 TABLET: 5; 325 TABLET ORAL at 06:08

## 2023-08-11 RX ADMIN — MUPIROCIN: 20 OINTMENT TOPICAL at 09:08

## 2023-08-11 RX ADMIN — ENOXAPARIN SODIUM 40 MG: 40 INJECTION SUBCUTANEOUS at 05:08

## 2023-08-11 NOTE — ASSESSMENT & PLAN NOTE
Patient with Hypercapnic and Hypoxic Respiratory failure which is Acute on chronic.  she is on home oxygen at 2 LPM. Supplemental oxygen was provided and noted- Oxygen Concentration (%):  [30] 30    .   Signs/symptoms of respiratory failure include- respiratory distress and lethargy. Contributing diagnoses includes - CHF and COPD Labs and images were reviewed. Patient Has recent ABG, which has been reviewed. Will treat underlying causes and adjust management of respiratory failure as follows:    8/7/23  Transitioned to 2L NC this AM, her home flow  Continue with NIPPV qHS and PRN naps    8/11/23  Remains stable on 2L NC  Continue with NIPPV qHS and PRN naps

## 2023-08-11 NOTE — PLAN OF CARE
[facility] - Med Surg  Discharge Final Note    Primary Care Provider: Any Tran MD    Expected Discharge Date: 8/11/2023    Final Discharge Note (most recent)       Final Note - 08/11/23 1014          Final Note    Assessment Type Final Discharge Note     Anticipated Discharge Disposition Skilled Nursing Facility     Hospital Resources/Appts/Education Provided Appointments scheduled and added to AVS        Post-Acute Status    Post-Acute Authorization Placement     Post-Acute Placement Status Set-up Complete/Auth obtained     Coverage [employer]                                Contact Info       [employer]    Phone: [phone]       Next Steps: Follow up    Instructions: Patient can call 3 business days prior to follow up appointments to schedule transportation through [employer] insurance.

## 2023-08-11 NOTE — PROGRESS NOTES
[facility] Medicine  Progress Note    Patient Name: Mary Ellen Fajardo  MRN: [id number]  Patient Class: IP- Inpatient   Admission Date: 8/6/2023  Length of Stay: 5 days  Attending Physician: Juan Luis MD  Primary Care Provider: Any Tran MD        Subjective:     Principal Problem:Acute on chronic respiratory failure with hypoxia and hypercapnia        HPI:  Patient is 67 year old female with past medical history significant for stroke, hypertension, COPD, CHF, chronic respiratory failure on home oxygen (2 L/min NC), ADEEL, neurogenic bladder with chronic indwelling suprapubic catheter, left AKA, paraparesis, and wounds who presented to ED due to generalized weakness, confusion, and painful urination. Patient complains of chest pain on/off, abdominal pain, and bilateral leg pain. ABG revealed mild acidosis with hypercapnia. She is on continuous BIPAP at this time. She does have UTI and suprapubic catheter should be changed out. She has been given Zosyn. Lactic acid is normal however procalcitonin is elevated. BP is elevated. There is evidence of congestive heart failure on CXR. US was done due to leg pain, it is negative for DVT. There is no leukocytosis or fever. Critical care team consulted for admission to ICU due to hypercapnic respiratory failure requiring continuous BIPAP and for treatment of UTI.      Overview/Hospital Course:  8/7: placed on home 2L this AM, continue NIPPV qHS and PRN naps, no new issues or c/o on exam, no family at bedside  ------  Chart reviewed, examined patient at bedside this afternoon, asking to restart her home anxiety and pain medication.  Currently stable on 2L NC, stable for floor transfer.    8/8: NAEON. Patient c/o bladder spasms, asking for medication, states previously on oxybutynin.  BP marginal this AM. Cr increase from 0.9 --> 1.3, will stop Bumex  Urine culture with >100k GNR, speciation pending, continue ceftriaxone  Remains stable on 2L  NC  SNF placement pending    8/9: LACI. Patient asking for her home muscle relaxer, will restart  Cr decreased to 1.1. Restart home PO Bumex  Urine culture results pending, continue ceftriaxone  1 of 2 blood culture polymicrobial, likely contaminant  Remains stable on 2L NC  SNF placement pending    8/10: LACI. Patient in bed, denies new issues, stable on 2L NC  Urine culture +proteus, sensitives reviewed, continue ceftriaxone  SNF placement pending, stable for hospital discharge    8/11: LACI, SNF facility refused acceptance due to reported no BM x 3 days  No BMs documented since ICU transfer, start daily miralax and colace  Stable for discharge to SNF        Review of Systems   All other systems reviewed and are negative.    Objective:     Vital Signs (Most Recent):  Temp: 98.4 °F (36.9 °C) (08/11/23 1629)  Pulse: (!) 50 (08/11/23 1629)  Resp: 14 (08/11/23 1629)  BP: (!) 120/57 (08/11/23 1629)  SpO2: 97 % (08/11/23 1629) Vital Signs (24h Range):  Temp:  [97.4 °F (36.3 °C)-98.9 °F (37.2 °C)] 98.4 °F (36.9 °C)  Pulse:  [49-65] 50  Resp:  [14-20] 14  SpO2:  [96 %-100 %] 97 %  BP: ()/(55-84) 120/57     Weight: 78.4 kg (172 lb 13.5 oz)  Body mass index is 28.76 kg/m².    Intake/Output Summary (Last 24 hours) at 8/11/2023 1716  Last data filed at 8/11/2023 0825  Gross per 24 hour   Intake 291.54 ml   Output 800 ml   Net -508.46 ml         Physical Exam  Vitals and nursing note reviewed.   Constitutional:       General: She is not in acute distress.     Appearance: Normal appearance. She is obese. She is ill-appearing.   Cardiovascular:      Rate and Rhythm: Normal rate and regular rhythm.      Heart sounds: No murmur heard.  Pulmonary:      Effort: Pulmonary effort is normal. No respiratory distress.      Breath sounds: No wheezing.   Genitourinary:     Comments: Suprapubic catheter  Neurological:      General: No focal deficit present.      Mental Status: She is alert and oriented to person, place, and time.    Psychiatric:         Mood and Affect: Mood normal.         Behavior: Behavior normal.             Significant Labs: All pertinent labs within the past 24 hours have been reviewed.  Recent Lab Results         08/11/23  1044   08/11/23  0551        Anion Gap   10       Baso #   0.04       Basophil %   0.8       BUN   19       Calcium   8.8       Chloride   99       CO2   31       Creatinine   1.0       Differential Method   Automated       eGFR   >60       Eos #   0.2       Eosinophil %   4.8       Glucose   73       Gran # (ANC)   2.1       Gran %   43.7       Hematocrit   32.9       Hemoglobin   9.5       Immature Grans (Abs)   0.01  Comment: Mild elevation in immature granulocytes is non specific and   can be seen in a variety of conditions including stress response,   acute inflammation, trauma and pregnancy. Correlation with other   laboratory and clinical findings is essential.         Immature Granulocytes   0.2       Lymph #   1.9       Lymph %   39.5       Magnesium   2.1       MCH   24.7       MCHC   28.9       MCV   86       Mono #   0.5       Mono %   11.0       MPV   8.8       nRBC   0       Platelets   232       POCT Glucose 81         Potassium   4.2       RBC   3.85       RDW   17.5       Sodium   140       WBC   4.81               Significant Imaging: I have reviewed all pertinent imaging results/findings within the past 24 hours.      Assessment/Plan:      * Acute on chronic respiratory failure with hypoxia and hypercapnia  Patient with Hypercapnic and Hypoxic Respiratory failure which is Acute on chronic.  she is on home oxygen at 2 LPM. Supplemental oxygen was provided and noted- Oxygen Concentration (%):  [30] 30    .   Signs/symptoms of respiratory failure include- respiratory distress and lethargy. Contributing diagnoses includes - CHF and COPD Labs and images were reviewed. Patient Has recent ABG, which has been reviewed. Will treat underlying causes and adjust management of respiratory  failure as follows:    8/7/23  Transitioned to 2L NC this AM, her home flow  Continue with NIPPV qHS and PRN naps    8/11/23  Remains stable on 2L NC  Continue with NIPPV qHS and PRN naps    Acute on chronic diastolic heart failure  Echo report from 4/2021   The left ventricle is normal in size with mild concentric hypertrophy and normal systolic function.   The estimated ejection fraction is 55%.   Indeterminate left ventricular diastolic function.   Normal right ventricular size with normal right ventricular systolic function.   Mild left atrial enlargement.   Normal central venous pressure (3 mmHg).   The estimated PA systolic pressure is 24 mmHg     Echo    Result Date: 8/7/2023    Left Ventricle: Normal wall motion. There is low normal systolic   function with a visually estimated ejection fraction of 50 - 55%.    Right Ventricle: Normal right ventricular cavity size.        Continue Bumex 1 mg IV BID, transition to PO when appropriate  Strict I/O's, daily weights, Na/fluid restriction, AM labs    8/8/23  Hold Bumex today with marginal BP and rise in serum Cr  AM labs, consider restarting tomorrow    8/10/23  Cr improved, resume home PO Bumex today    Pulmonary HTN  Echo    Result Date: 8/7/2023    Left Ventricle: Normal wall motion. There is low normal systolic   function with a visually estimated ejection fraction of 50 - 55%.    Right Ventricle: Normal right ventricular cavity size.      Plan as above    COPD (chronic obstructive pulmonary disease)  Not in exacerbation  On home 2L NC  PRN breathing treatments    Urinary tract infection associated with cystostomy catheter  Suprapubic cath changed upon arrival to the ICU  Urine culture +proteus, sensitivities reviewed  Continue ceftriaxone, antibiotic day # 5, plan for 7 day course  Transition to PO antibiotic on discharge    History of CVA (cerebrovascular accident)  Bed bound at baseline with limited help at home   PT/OT, supportive care  Med mgmt  consulted for assistance with SNF placement    Anemia  H/H stable, near baseline  Monitor as needed    ADEEL (obstructive sleep apnea)  Noncompliant with home NIPPV  NIPPV qHS and PRN    Essential hypertension  BP stable  Resume home meds    Paraparesis  Turn q2h  Supportive care    Wounds, multiple  Wound Care consulted  Turn q2h, supportive care      VTE Risk Mitigation (From admission, onward)         Ordered     enoxaparin injection 40 mg  Daily         08/06/23 2322     IP VTE HIGH RISK PATIENT  Once         08/06/23 2322     Place sequential compression device  Until discontinued         08/06/23 2322                Discharge Planning   LOY: 8/11/2023     Code Status: Full Code   Is the patient medically ready for discharge?:     Reason for patient still in hospital (select all that apply): Pending disposition  Discharge Plan A: Skilled Nursing Facility   Discharge Delays: None known at this time              Juan Luis MD  Department of Hospital Medicine   [facility] - Med Surg

## 2023-08-11 NOTE — ASSESSMENT & PLAN NOTE
Bed bound at baseline with limited help at home   PT/OT, supportive care  Med mgmt consulted for assistance with SNF placement

## 2023-08-11 NOTE — PT/OT/SLP PROGRESS
Occupational Therapy      Patient Name:  Mary Ellen Fajardo   MRN:  [id number]    Attempted OT treatment at 1045- per nurse Haas, requesting to hold at this time. Stated patient with increased lethargy, AMD, and disorientation  . Will continue efforts.      Ban DALLAS, LOTR  8/11/2023

## 2023-08-11 NOTE — PLAN OF CARE
08/11/23 1019   Medicare Message   Important Message from Medicare regarding Discharge Appeal Rights Given to patient/caregiver   Date IMM was signed 08/11/23   Time IMM was signed 1019

## 2023-08-11 NOTE — NURSING
Called report to Amy at [facility]. Amy says per [facility] policy patient can not transfer if she hasn't had a bowel movement within the last three days. Last charted bowel movement 8/7/2023. Patient has not had any bowel movements during this RN care. Patient unaware when last bowel movement was.  notified.

## 2023-08-11 NOTE — PLAN OF CARE
Pt's nurse tried calling report and was told pt can't be admitted because pt's last bowel movement was on 08/07/23. SW informed MD. SW also informed admissions at [facility] of this matter.

## 2023-08-11 NOTE — PT/OT/SLP PROGRESS
Physical Therapy      Patient Name:  Mary Ellen Fajardo   MRN:  [id number]    10:45 a.m.  Communicated with patient's nurse, Samina, who requested PT be held at this time. Nurse reported the patient is currently experiencing increased lethargy, disorientation and altered mental status.   Will follow up at next available opportunity.

## 2023-08-11 NOTE — ASSESSMENT & PLAN NOTE
Suprapubic cath changed upon arrival to the ICU  Urine culture +proteus, sensitivities reviewed  Continue ceftriaxone, antibiotic day # 5, plan for 7 day course  Transition to PO antibiotic on discharge

## 2023-08-11 NOTE — SUBJECTIVE & OBJECTIVE
Review of Systems   All other systems reviewed and are negative.    Objective:     Vital Signs (Most Recent):  Temp: 98.4 °F (36.9 °C) (08/11/23 1629)  Pulse: (!) 50 (08/11/23 1629)  Resp: 14 (08/11/23 1629)  BP: (!) 120/57 (08/11/23 1629)  SpO2: 97 % (08/11/23 1629) Vital Signs (24h Range):  Temp:  [97.4 °F (36.3 °C)-98.9 °F (37.2 °C)] 98.4 °F (36.9 °C)  Pulse:  [49-65] 50  Resp:  [14-20] 14  SpO2:  [96 %-100 %] 97 %  BP: ()/(55-84) 120/57     Weight: 78.4 kg (172 lb 13.5 oz)  Body mass index is 28.76 kg/m².    Intake/Output Summary (Last 24 hours) at 8/11/2023 1716  Last data filed at 8/11/2023 0825  Gross per 24 hour   Intake 291.54 ml   Output 800 ml   Net -508.46 ml         Physical Exam  Vitals and nursing note reviewed.   Constitutional:       General: She is not in acute distress.     Appearance: Normal appearance. She is obese. She is ill-appearing.   Cardiovascular:      Rate and Rhythm: Normal rate and regular rhythm.      Heart sounds: No murmur heard.  Pulmonary:      Effort: Pulmonary effort is normal. No respiratory distress.      Breath sounds: No wheezing.   Genitourinary:     Comments: Suprapubic catheter  Neurological:      General: No focal deficit present.      Mental Status: She is alert and oriented to person, place, and time.   Psychiatric:         Mood and Affect: Mood normal.         Behavior: Behavior normal.             Significant Labs: All pertinent labs within the past 24 hours have been reviewed.  Recent Lab Results         08/11/23  1044   08/11/23  0551        Anion Gap   10       Baso #   0.04       Basophil %   0.8       BUN   19       Calcium   8.8       Chloride   99       CO2   31       Creatinine   1.0       Differential Method   Automated       eGFR   >60       Eos #   0.2       Eosinophil %   4.8       Glucose   73       Gran # (ANC)   2.1       Gran %   43.7       Hematocrit   32.9       Hemoglobin   9.5       Immature Grans (Abs)   0.01  Comment: Mild elevation in  immature granulocytes is non specific and   can be seen in a variety of conditions including stress response,   acute inflammation, trauma and pregnancy. Correlation with other   laboratory and clinical findings is essential.         Immature Granulocytes   0.2       Lymph #   1.9       Lymph %   39.5       Magnesium   2.1       MCH   24.7       MCHC   28.9       MCV   86       Mono #   0.5       Mono %   11.0       MPV   8.8       nRBC   0       Platelets   232       POCT Glucose 81         Potassium   4.2       RBC   3.85       RDW   17.5       Sodium   140       WBC   4.81               Significant Imaging: I have reviewed all pertinent imaging results/findings within the past 24 hours.

## 2023-08-12 PROBLEM — R56.9 NEW ONSET SEIZURE: Status: ACTIVE | Noted: 2023-08-12

## 2023-08-12 LAB
ALBUMIN SERPL BCP-MCNC: 2.6 G/DL (ref 3.5–5.2)
ALLENS TEST: ABNORMAL
ALP SERPL-CCNC: 52 U/L (ref 55–135)
ALT SERPL W/O P-5'-P-CCNC: 6 U/L (ref 10–44)
AMMONIA PLAS-SCNC: 44 UMOL/L (ref 10–50)
ANION GAP SERPL CALC-SCNC: 9 MMOL/L (ref 8–16)
AST SERPL-CCNC: 12 U/L (ref 10–40)
BACTERIA BLD CULT: NORMAL
BASOPHILS # BLD AUTO: 0.05 K/UL (ref 0–0.2)
BASOPHILS NFR BLD: 1 % (ref 0–1.9)
BILIRUB SERPL-MCNC: 0.2 MG/DL (ref 0.1–1)
BUN SERPL-MCNC: 14 MG/DL (ref 8–23)
CALCIUM SERPL-MCNC: 9.1 MG/DL (ref 8.7–10.5)
CHLORIDE SERPL-SCNC: 101 MMOL/L (ref 95–110)
CO2 SERPL-SCNC: 32 MMOL/L (ref 23–29)
CREAT SERPL-MCNC: 0.8 MG/DL (ref 0.5–1.4)
DELSYS: ABNORMAL
DIFFERENTIAL METHOD: ABNORMAL
EOSINOPHIL # BLD AUTO: 0.3 K/UL (ref 0–0.5)
EOSINOPHIL NFR BLD: 4.9 % (ref 0–8)
ERYTHROCYTE [DISTWIDTH] IN BLOOD BY AUTOMATED COUNT: 17.5 % (ref 11.5–14.5)
ERYTHROCYTE [SEDIMENTATION RATE] IN BLOOD BY WESTERGREN METHOD: 2 MM/H
ERYTHROCYTE [SEDIMENTATION RATE] IN BLOOD BY WESTERGREN METHOD: 20 MM/H
EST. GFR  (NO RACE VARIABLE): >60 ML/MIN/1.73 M^2
FIO2: 30
FIO2: 30
FIO2: 40
GLUCOSE SERPL-MCNC: 69 MG/DL (ref 70–110)
HCO3 UR-SCNC: 35 MMOL/L (ref 24–28)
HCO3 UR-SCNC: 37.8 MMOL/L (ref 24–28)
HCO3 UR-SCNC: 38.1 MMOL/L (ref 24–28)
HCO3 UR-SCNC: 38.9 MMOL/L (ref 24–28)
HCT VFR BLD AUTO: 32 % (ref 37–48.5)
HGB BLD-MCNC: 9.3 G/DL (ref 12–16)
IMM GRANULOCYTES # BLD AUTO: 0.02 K/UL (ref 0–0.04)
IMM GRANULOCYTES NFR BLD AUTO: 0.4 % (ref 0–0.5)
LYMPHOCYTES # BLD AUTO: 1.6 K/UL (ref 1–4.8)
LYMPHOCYTES NFR BLD: 31.6 % (ref 18–48)
MAGNESIUM SERPL-MCNC: 2.2 MG/DL (ref 1.6–2.6)
MCH RBC QN AUTO: 24.7 PG (ref 27–31)
MCHC RBC AUTO-ENTMCNC: 29.1 G/DL (ref 32–36)
MCV RBC AUTO: 85 FL (ref 82–98)
MODE: ABNORMAL
MONOCYTES # BLD AUTO: 0.5 K/UL (ref 0.3–1)
MONOCYTES NFR BLD: 9 % (ref 4–15)
NEUTROPHILS # BLD AUTO: 2.7 K/UL (ref 1.8–7.7)
NEUTROPHILS NFR BLD: 53.1 % (ref 38–73)
NRBC BLD-RTO: 0 /100 WBC
PCO2 BLDA: 31.6 MMHG (ref 35–45)
PCO2 BLDA: 74.5 MMHG (ref 35–45)
PCO2 BLDA: 82.9 MMHG (ref 35–45)
PCO2 BLDA: 87.1 MMHG (ref 35–45)
PEEP: 5
PH SMN: 7.26 [PH] (ref 7.35–7.45)
PH SMN: 7.27 [PH] (ref 7.35–7.45)
PH SMN: 7.31 [PH] (ref 7.35–7.45)
PH SMN: 7.65 [PH] (ref 7.35–7.45)
PIP: 28
PLATELET # BLD AUTO: 237 K/UL (ref 150–450)
PMV BLD AUTO: 9.1 FL (ref 9.2–12.9)
PO2 BLDA: 104 MMHG (ref 80–100)
PO2 BLDA: 107 MMHG (ref 80–100)
PO2 BLDA: 67 MMHG (ref 80–100)
PO2 BLDA: 93 MMHG (ref 80–100)
POC BE: 11 MMOL/L
POC BE: 12 MMOL/L
POC BE: 12 MMOL/L
POC BE: 14 MMOL/L
POC SATURATED O2: 88 % (ref 95–100)
POC SATURATED O2: 96 % (ref 95–100)
POC SATURATED O2: 97 % (ref 95–100)
POC SATURATED O2: 99 % (ref 95–100)
POCT GLUCOSE: 77 MG/DL (ref 70–110)
POCT GLUCOSE: 80 MG/DL (ref 70–110)
POTASSIUM SERPL-SCNC: 4.6 MMOL/L (ref 3.5–5.1)
PROT SERPL-MCNC: 7.4 G/DL (ref 6–8.4)
RBC # BLD AUTO: 3.77 M/UL (ref 4–5.4)
SAMPLE: ABNORMAL
SITE: ABNORMAL
SODIUM SERPL-SCNC: 142 MMOL/L (ref 136–145)
VT: 400
VT: 500
VT: 500
WBC # BLD AUTO: 5.13 K/UL (ref 3.9–12.7)

## 2023-08-12 PROCEDURE — 25000003 PHARM REV CODE 250: Performed by: HOSPITALIST

## 2023-08-12 PROCEDURE — 27000221 HC OXYGEN, UP TO 24 HOURS

## 2023-08-12 PROCEDURE — 99900035 HC TECH TIME PER 15 MIN (STAT)

## 2023-08-12 PROCEDURE — 93010 EKG 12-LEAD: ICD-10-PCS | Mod: 76,,, | Performed by: INTERNAL MEDICINE

## 2023-08-12 PROCEDURE — 94660 CPAP INITIATION&MGMT: CPT

## 2023-08-12 PROCEDURE — 27100171 HC OXYGEN HIGH FLOW UP TO 24 HOURS

## 2023-08-12 PROCEDURE — 25000003 PHARM REV CODE 250: Performed by: NURSE PRACTITIONER

## 2023-08-12 PROCEDURE — 63600175 PHARM REV CODE 636 W HCPCS: Performed by: HOSPITALIST

## 2023-08-12 PROCEDURE — 93005 ELECTROCARDIOGRAM TRACING: CPT

## 2023-08-12 PROCEDURE — 82140 ASSAY OF AMMONIA: CPT | Performed by: NURSE PRACTITIONER

## 2023-08-12 PROCEDURE — 25000003 PHARM REV CODE 250

## 2023-08-12 PROCEDURE — 82800 BLOOD PH: CPT

## 2023-08-12 PROCEDURE — 93010 ELECTROCARDIOGRAM REPORT: CPT | Mod: ,,, | Performed by: INTERNAL MEDICINE

## 2023-08-12 PROCEDURE — 36620 INSERTION CATHETER ARTERY: CPT

## 2023-08-12 PROCEDURE — 99900026 HC AIRWAY MAINTENANCE (STAT)

## 2023-08-12 PROCEDURE — C1751 CATH, INF, PER/CENT/MIDLINE: HCPCS

## 2023-08-12 PROCEDURE — 94760 N-INVAS EAR/PLS OXIMETRY 1: CPT

## 2023-08-12 PROCEDURE — 20000000 HC ICU ROOM

## 2023-08-12 PROCEDURE — 93010 ELECTROCARDIOGRAM REPORT: CPT | Mod: 76,,, | Performed by: INTERNAL MEDICINE

## 2023-08-12 PROCEDURE — 80053 COMPREHEN METABOLIC PANEL: CPT | Performed by: NURSE PRACTITIONER

## 2023-08-12 PROCEDURE — 94002 VENT MGMT INPAT INIT DAY: CPT

## 2023-08-12 PROCEDURE — 36415 COLL VENOUS BLD VENIPUNCTURE: CPT | Performed by: NURSE PRACTITIONER

## 2023-08-12 PROCEDURE — 36600 WITHDRAWAL OF ARTERIAL BLOOD: CPT

## 2023-08-12 PROCEDURE — 87205 SMEAR GRAM STAIN: CPT | Performed by: NURSE PRACTITIONER

## 2023-08-12 PROCEDURE — 85025 COMPLETE CBC W/AUTO DIFF WBC: CPT | Performed by: NURSE PRACTITIONER

## 2023-08-12 PROCEDURE — 63600175 PHARM REV CODE 636 W HCPCS: Performed by: NURSE PRACTITIONER

## 2023-08-12 PROCEDURE — 94761 N-INVAS EAR/PLS OXIMETRY MLT: CPT

## 2023-08-12 PROCEDURE — 63600175 PHARM REV CODE 636 W HCPCS

## 2023-08-12 PROCEDURE — 37799 UNLISTED PX VASCULAR SURGERY: CPT

## 2023-08-12 PROCEDURE — 63600175 PHARM REV CODE 636 W HCPCS: Performed by: SPECIALIST

## 2023-08-12 PROCEDURE — 31500 INSERT EMERGENCY AIRWAY: CPT

## 2023-08-12 PROCEDURE — 87040 BLOOD CULTURE FOR BACTERIA: CPT | Performed by: NURSE PRACTITIONER

## 2023-08-12 PROCEDURE — 87070 CULTURE OTHR SPECIMN AEROBIC: CPT | Performed by: NURSE PRACTITIONER

## 2023-08-12 PROCEDURE — 82803 BLOOD GASES ANY COMBINATION: CPT

## 2023-08-12 PROCEDURE — 25000003 PHARM REV CODE 250: Performed by: SPECIALIST

## 2023-08-12 PROCEDURE — 83735 ASSAY OF MAGNESIUM: CPT | Performed by: NURSE PRACTITIONER

## 2023-08-12 RX ORDER — ETOMIDATE 2 MG/ML
25 INJECTION INTRAVENOUS ONCE
Status: COMPLETED | OUTPATIENT
Start: 2023-08-12 | End: 2023-08-12

## 2023-08-12 RX ORDER — LEVETIRACETAM 10 MG/ML
1000 INJECTION INTRAVASCULAR EVERY 12 HOURS
Status: DISCONTINUED | OUTPATIENT
Start: 2023-08-12 | End: 2023-08-14

## 2023-08-12 RX ORDER — ROCURONIUM BROMIDE 10 MG/ML
80 INJECTION, SOLUTION INTRAVENOUS ONCE
Status: COMPLETED | OUTPATIENT
Start: 2023-08-12 | End: 2023-08-12

## 2023-08-12 RX ORDER — LORAZEPAM 2 MG/ML
1 INJECTION INTRAMUSCULAR ONCE
Status: COMPLETED | OUTPATIENT
Start: 2023-08-12 | End: 2023-08-12

## 2023-08-12 RX ORDER — NOREPINEPHRINE BITARTRATE/D5W 4MG/250ML
0-3 PLASTIC BAG, INJECTION (ML) INTRAVENOUS CONTINUOUS
Status: DISCONTINUED | OUTPATIENT
Start: 2023-08-12 | End: 2023-08-14

## 2023-08-12 RX ORDER — FENTANYL CITRATE 50 UG/ML
100 INJECTION, SOLUTION INTRAMUSCULAR; INTRAVENOUS ONCE
Status: COMPLETED | OUTPATIENT
Start: 2023-08-12 | End: 2023-08-12

## 2023-08-12 RX ORDER — FENTANYL CITRATE-0.9 % NACL/PF 10 MCG/ML
0-200 PLASTIC BAG, INJECTION (ML) INTRAVENOUS CONTINUOUS
Status: DISCONTINUED | OUTPATIENT
Start: 2023-08-12 | End: 2023-08-12

## 2023-08-12 RX ORDER — CHLORHEXIDINE GLUCONATE ORAL RINSE 1.2 MG/ML
15 SOLUTION DENTAL 2 TIMES DAILY
Status: DISCONTINUED | OUTPATIENT
Start: 2023-08-12 | End: 2023-08-14

## 2023-08-12 RX ORDER — LORAZEPAM 2 MG/ML
4 INJECTION INTRAMUSCULAR ONCE
Status: COMPLETED | OUTPATIENT
Start: 2023-08-12 | End: 2023-08-12

## 2023-08-12 RX ORDER — NOREPINEPHRINE BITARTRATE/D5W 4MG/250ML
PLASTIC BAG, INJECTION (ML) INTRAVENOUS
Status: COMPLETED
Start: 2023-08-12 | End: 2023-08-12

## 2023-08-12 RX ORDER — LORAZEPAM 2 MG/ML
INJECTION INTRAMUSCULAR
Status: COMPLETED
Start: 2023-08-12 | End: 2023-08-12

## 2023-08-12 RX ORDER — PROPOFOL 10 MG/ML
0-50 INJECTION, EMULSION INTRAVENOUS CONTINUOUS
Status: DISCONTINUED | OUTPATIENT
Start: 2023-08-12 | End: 2023-08-14

## 2023-08-12 RX ORDER — FENTANYL CITRATE-0.9 % NACL/PF 10 MCG/ML
0-200 PLASTIC BAG, INJECTION (ML) INTRAVENOUS CONTINUOUS
Status: DISCONTINUED | OUTPATIENT
Start: 2023-08-12 | End: 2023-08-14

## 2023-08-12 RX ORDER — PROPOFOL 10 MG/ML
INJECTION, EMULSION INTRAVENOUS
Status: COMPLETED
Start: 2023-08-12 | End: 2023-08-12

## 2023-08-12 RX ADMIN — Medication 0.02 MCG/KG/MIN: at 02:08

## 2023-08-12 RX ADMIN — FENTANYL CITRATE 100 MCG: 50 INJECTION INTRAMUSCULAR; INTRAVENOUS at 11:08

## 2023-08-12 RX ADMIN — NOREPINEPHRINE BITARTRATE 0.02 MCG/KG/MIN: 4 INJECTION, SOLUTION INTRAVENOUS at 02:08

## 2023-08-12 RX ADMIN — LORAZEPAM 4 MG: 2 INJECTION INTRAMUSCULAR at 12:08

## 2023-08-12 RX ADMIN — CEFTRIAXONE 1 G: 1 INJECTION, POWDER, FOR SOLUTION INTRAMUSCULAR; INTRAVENOUS at 12:08

## 2023-08-12 RX ADMIN — PROPOFOL 40 MCG/KG/MIN: 10 INJECTION, EMULSION INTRAVENOUS at 12:08

## 2023-08-12 RX ADMIN — ETOMIDATE 25 MG: 2 INJECTION INTRAVENOUS at 11:08

## 2023-08-12 RX ADMIN — 0.12% CHLORHEXIDINE GLUCONATE 15 ML: 1.2 RINSE ORAL at 08:08

## 2023-08-12 RX ADMIN — LEVETIRACETAM INJECTION 1000 MG: 10 INJECTION INTRAVENOUS at 08:08

## 2023-08-12 RX ADMIN — Medication 25 MCG/HR: at 11:08

## 2023-08-12 RX ADMIN — OXYBUTYNIN CHLORIDE 5 MG: 5 TABLET ORAL at 08:08

## 2023-08-12 RX ADMIN — PROPOFOL 40 MCG/KG/MIN: 10 INJECTION, EMULSION INTRAVENOUS at 04:08

## 2023-08-12 RX ADMIN — LORAZEPAM 4 MG: 2 INJECTION INTRAMUSCULAR; INTRAVENOUS at 12:08

## 2023-08-12 RX ADMIN — LORAZEPAM 1 MG: 2 INJECTION INTRAMUSCULAR at 12:08

## 2023-08-12 RX ADMIN — LEVETIRACETAM INJECTION 1000 MG: 10 INJECTION INTRAVENOUS at 01:08

## 2023-08-12 RX ADMIN — ENOXAPARIN SODIUM 40 MG: 40 INJECTION SUBCUTANEOUS at 04:08

## 2023-08-12 RX ADMIN — LORAZEPAM 1 MG: 2 INJECTION INTRAMUSCULAR; INTRAVENOUS at 12:08

## 2023-08-12 RX ADMIN — ROCURONIUM BROMIDE 80 MG: 10 INJECTION INTRAVENOUS at 11:08

## 2023-08-12 NOTE — SIGNIFICANT EVENT
"Notified by nurse that pt has a MEWS score of 5 and has been disoriented all night. HR 40s most of the night as well. Pt seen and examined-on BIPAP. Pt very difficult to arouse. She states her name and that she is at [facility] but falls quickly back to sleep. Pt kept repeating "medicine, medicine" over and over prior to going back to sleep. BIPAP removed- pt very sleepy and remains difficult to arouse. Intermittently following commands. No obvious focal deficits. HR 40s-but increased to 55 when stimulated. RR even and unlabored. Breath sounds diminished. Pt intermittently following commands. Will check CT head, EKG, CBC, CMP, Ammonia, STAT POCT Glucose, ABG, and CXR.   Pt received Baclofen 20mg, Seroquel 200mg, and Gabapentin 200mg at 2100. May need mediation adjustments.     Critical care time spent on the evaluation and treatment of severe organ dysfunction, review of pertinent labs and imaging studies, discussions with consulting providers and discussions with patient/family: 30 minutes.   "

## 2023-08-12 NOTE — CONSULTS
[facility] ([facility])  Critical Care Medicine  Consult Note    Patient Name: Mary Ellen Fajardo  MRN: [id number]  Admission Date: 8/6/2023  Hospital Length of Stay: 6 days  Code Status: Full Code  Attending Physician: Juan Luis MD   Primary Care Provider: Any Tran MD   Principal Problem: Acute on chronic respiratory failure with hypoxia and hypercapnia    [unfilled]  Subjective:     HPI:  Patient is 67 year old female with past medical history significant for stroke, hypertension, COPD, CHF, chronic respiratory failure on home oxygen (2 L/min NC), ADEEL, neurogenic bladder with chronic indwelling suprapubic catheter, left AKA, paraparesis, and wounds who presented to ED due to generalized weakness, confusion, and painful urination. Patient complains of chest pain on/off, abdominal pain, and bilateral leg pain. ABG revealed mild acidosis with hypercapnia. She is on continuous BIPAP at this time. She does have UTI and suprapubic catheter should be changed out. She has been given Zosyn. Lactic acid is normal however procalcitonin is elevated. BP is elevated. There is evidence of congestive heart failure on CXR. US was done due to leg pain, it is negative for DVT. There is no leukocytosis or fever. Critical care team consulted for admission to ICU due to hypercapnic respiratory failure requiring continuous BIPAP and for treatment of UTI.      Hospital/ICU Course:  8/7: placed on home 2L this AM, continue NIPPV qHS and PRN naps, no new issues or c/o on exam, no family at bedside     8/12 Transferred to ICU for worsening SOB. Was on AVAPS. ABG on AVAPS consistent with worsening hypercapnia. AMS and unable to follow commands.     Intubated / sedated   Post intubation had abnormal motor tonic clonic activity  Ativan given. Keppra ordered.   Placed on diprivan  CT head pending.      Past Medical History:   Diagnosis Date    Abdominal hernia     Addiction to drug     Alcohol abuse     Anxiety      Arthritis     Asthma     Bipolar disorder     Bladder stones     CHF (congestive heart failure)     COPD (chronic obstructive pulmonary disease)     on home o2    CVA (cerebral vascular accident)     2012    Hallucination     Hepatitis C     treated and cured    History of blood clots     Hx of psychiatric care     Hypertension     Belen     Obese body habitus     On home oxygen therapy     ADEEL (obstructive sleep apnea)     ADEEL treated with BiPAP     Paraplegia     Paraplegic spinal paralysis     Psychiatric problem     Psychosis     AVH; Paranoia    Renal disorder     Requires assistance with activities of daily living (ADL)     SCI (spinal cord injury)     incomplete    Sleep difficulties     has sleep apnea and uses a Bi-PAP machine.    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    Substance abuse     Suprapubic catheter 03/15/2011    Therapy     Vaginal delivery     x1    Wheelchair dependence        Past Surgical History:   Procedure Laterality Date    ABOVE-KNEE AMPUTATION Left 7/23/2019    Procedure: AMPUTATION, ABOVE KNEE;  Surgeon: Gordo Rodriguez MD;  Location: [facility];  Service: Orthopedics;  Laterality: Left;    amputation Left 07/23/2019    above the knee    BACK SURGERY      bilateral knee replacement      BREAST BIOPSY      cysto/lithopaxy 2017      CYSTOSCOPY Right 1/8/2021    Procedure: CYSTOSCOPY, RIGHT OCCULER BALLOON CATHETER PLACEMENT;  Surgeon: RAMA Tinoco MD;  Location: [facility] OR;  Service: Urology;  Laterality: Right;    CYSTOSCOPY W/ LASER LITHOTRIPSY      JOINT REPLACEMENT      bilateral knee    PERCUTANEOUS NEPHROLITHOTOMY Right 1/8/2021    Procedure: NEPHROLITHOTOMY, PERCUTANEOUS;  Surgeon: RAMA Tinoco MD;  Location: [facility] OR;  Service: Urology;  Laterality: Right;  x3 rooms, or7, or9,or12  ATTEMPTED    RETROGRADE PYELOGRAPHY Right 1/8/2021    Procedure: PYELOGRAM, RETROGRADE;  Surgeon: RAMA Tinoco MD;  Location: [facility] OR;   Service: Urology;  Laterality: Right;    SUPRAPUBIC CYSTOSTOMY  03/15/2011    patient reports  replaced tubing on 10/27/19, at home    URETEROSCOPY Right 2021    Procedure: URETEROSCOPY;  Surgeon: RAMA Tinoco MD;  Location: [facility];  Service: Urology;  Laterality: Right;       Review of patient's allergies indicates:   Allergen Reactions    Tuberculin ppd Itching and Dermatitis     Patient has old scare that she claims is from TB PPD    Rocephin [ceftriaxone] Rash     Rash ? Pt agreeable to try with pre mediation with benadryl.        Family History       Problem Relation (Age of Onset)    Anxiety disorder Brother    Dementia Mother    Depression Brother          Tobacco Use    Smoking status: Former     Current packs/day: 0.00     Average packs/day: 0.5 packs/day for 25.0 years (12.5 ttl pk-yrs)     Types: Cigarettes     Start date:      Quit date:      Years since quittin.6    Smokeless tobacco: Never   Substance and Sexual Activity    Alcohol use: Not Currently     Comment: occasional    Drug use: Not Currently     Comment: prescribed opioids at present for pain    Sexual activity: Not Currently     Partners: Male     Comment: since          Review of Systems   Unable to perform ROS: Acuity of condition     Objective:     Vital Signs (Most Recent):  Temp: 97.5 °F (36.4 °C) (23 1111)  Pulse: 86 (23 1252)  Resp: (!) 111 (23 1252)  BP: 123/68 (23 1305)  SpO2: (!) 94 % (23 1252) Vital Signs (24h Range):  Temp:  [97.5 °F (36.4 °C)-98.7 °F (37.1 °C)] 97.5 °F (36.4 °C)  Pulse:  [45-94] 86  Resp:  [] 111  SpO2:  [92 %-100 %] 94 %  BP: ()/(49-98) 123/68     Weight: 78.4 kg (172 lb 13.5 oz)  Body mass index is 28.76 kg/m².      Intake/Output Summary (Last 24 hours) at 2023 1315  Last data filed at 2023 1300  Gross per 24 hour   Intake 102.96 ml   Output 350 ml   Net -247.04 ml        Physical Exam  Vitals and nursing note  reviewed.   Constitutional:       General: She is in acute distress.      Appearance: She is ill-appearing. She is not toxic-appearing or diaphoretic.      Interventions: She is intubated.   HENT:      Head: Normocephalic.   Eyes:      Extraocular Movements: Extraocular movements intact.      Pupils: Pupils are equal, round, and reactive to light.   Cardiovascular:      Rate and Rhythm: Bradycardia present.   Pulmonary:      Effort: She is intubated.      Breath sounds: Decreased breath sounds present.   Abdominal:      General: Abdomen is flat.   Neurological:      Motor: Weakness present.          Vents:  Vent Mode: A/C (08/12/23 1120)  Ventilator Initiated: Yes (08/12/23 1120)  Set Rate: 20 BPM (08/12/23 1120)  Vt Set: 400 mL (08/12/23 1120)  PEEP/CPAP: 5 cmH20 (08/12/23 1120)  Oxygen Concentration (%): 100 (08/12/23 1252)  Peak Airway Pressure: 22 cmH20 (08/12/23 1120)  Plateau Pressure: 0 cmH20 (08/12/23 1120)  Total Ve: 8.09 L/m (08/12/23 1120)  Negative Inspiratory Force (cm H2O): 0 (08/12/23 1120)  F/VT Ratio<105 (RSBI): (!) 59.41 (08/12/23 1120)    Lines/Drains/Airways       Central Venous Catheter Line  Duration             Percutaneous Central Line Insertion/Assessment - Triple Lumen  08/12/23 1137 Internal Jugular Right <1 day              Drain  Duration                  Ureteral Drain/Stent 01/08/21 1028 Right ureter 6 Fr. 946 days         Suprapubic Catheter 08/07/23 0301 100% silicone 16 Fr. 5 days              Airway  Duration                  Airway - Non-Surgical 08/12/23 1120 Endotracheal Tube <1 day              Peripheral Intravenous Line  Duration                  Peripheral IV - Single Lumen 08/12/23 0015 22 G Left;Posterior Forearm <1 day                    Significant Labs:    CBC/Anemia Profile:  Recent Labs   Lab 08/11/23  0551 08/12/23  0537   WBC 4.81 5.13   HGB 9.5* 9.3*   HCT 32.9* 32.0*    237   MCV 86 85   RDW 17.5* 17.5*        Chemistries:  Recent Labs   Lab 08/11/23  0551  08/12/23  0537    142   K 4.2 4.6   CL 99 101   CO2 31* 32*   BUN 19 14   CREATININE 1.0 0.8   CALCIUM 8.8 9.1   ALBUMIN  --  2.6*   PROT  --  7.4   BILITOT  --  0.2   ALKPHOS  --  52*   ALT  --  6*   AST  --  12   MG 2.1 2.2       All pertinent labs within the past 24 hours have been reviewed.    Significant Imaging:   I have reviewed all pertinent imaging results/findings within the past 24 hours.      ABG  Recent Labs   Lab 08/12/23  1011   PH 7.257*   PO2 67*   PCO2 87.1*   HCO3 38.9*   BE 12     Assessment/Plan:     Neuro  Paraparesis  - turn q2h  - supportive care  - Wound care    History of CVA (cerebrovascular accident)  - bed bound at baseline with limited help at home   - Sz - new onset  - CT head stat  - See orders    Pulmonary  * Acute on chronic respiratory failure with hypoxia and hypercapnia  Worsening hypercapnia  Intubated / sedated  ABG pending  CxR  Nebs and steroids.     COPD (chronic obstructive pulmonary disease)  - Vent settings reviewed  -Steroids  - PRN nebs      Cardiac/Vascular  Pulmonary HTN  - supportive care  - repeat echo pending     Acute on chronic diastolic heart failure  Echo report from 4/2021   The left ventricle is normal in size with mild concentric hypertrophy and normal systolic function.   The estimated ejection fraction is 55%.   Indeterminate left ventricular diastolic function.   Normal right ventricular size with normal right ventricular systolic function.   Mild left atrial enlargement.   Normal central venous pressure (3 mmHg).   The estimated PA systolic pressure is 24 mmHg    - repeat echo pending  - expect transition to PO diuretic soon  - telemetry  - strict I&Os    Essential hypertension  - resume home meds as indicated   - monitor trends and adjust as needed for control     Renal/  Urinary tract infection associated with cystostomy catheter  - suprapubic cath changed upon arrival to the ICU  - continue zosyn for now  - follow urine culture and  adjust abx as needed    Oncology  Anemia  Chronic, per review of labs  Hgb stable, 10.3  Monitor CBC    Orthopedic  Wounds, multiple  - wound care consulted for recs  - turn q2h  - supportive care    Other  ADEEL (obstructive sleep apnea)  - noncompliant with home NIPPV  - NIPPV qHS and PRN        Critical Care Daily Checklist:    A: Awake: RASS Goal/Actual Goal: RASS Goal: -2-->light sedation  Actual:     B: Spontaneous Breathing Trial Performed?     C: SAT & SBT Coordinated?  yes                      D: Delirium: CAM-ICU Overall CAM-ICU: Positive   E: Early Mobility Performed? Yes   F: Feeding Goal:    Status:     Current Diet Order   Procedures    Diet Cardiac    Diet Cardiac      AS: Analgesia/Sedation yes   T: Thromboembolic Prophylaxis yes   H: HOB > 300 Yes   U: Stress Ulcer Prophylaxis (if needed) yes   G: Glucose Control yes   B: Bowel Function Stool Occurrence: 0   I: Indwelling Catheter (Lines & Wolfe) Necessity ye   D: De-escalation of Antimicrobials/Pharmacotherapies yes    Plan for the day/ETD yes    Code Status:  Family/Goals of Care: Full Code  yes     Critical Care Time: 48 minutes  Critical secondary to Patient has a condition that poses threat to life and bodily function: Severe Respiratory Distress     Critical care was time spent personally by me on the following activities: development of treatment plan with patient or surrogate and bedside caregivers, discussions with consultants, evaluation of patient's response to treatment, examination of patient, ordering and performing treatments and interventions, ordering and review of laboratory studies, ordering and review of radiographic studies, pulse oximetry, re-evaluation of patient's condition. This critical care time did not overlap with that of any other provider or involve time for any procedures.    Thank you for your consult. I will follow-up with patient. Please contact us if you have any additional questions.     Neville Huerta MD ,  FACP, FCCP  Critical Care Medicine  [facility] ([facility])

## 2023-08-12 NOTE — ASSESSMENT & PLAN NOTE
- bed bound at baseline with limited help at home   - Sz - new onset  - CT head stat  - See orders

## 2023-08-12 NOTE — SUBJECTIVE & OBJECTIVE
Past Medical History:   Diagnosis Date    Abdominal hernia     Addiction to drug     Alcohol abuse     Anxiety     Arthritis     Asthma     Bipolar disorder     Bladder stones     CHF (congestive heart failure)     COPD (chronic obstructive pulmonary disease)     on home o2    CVA (cerebral vascular accident)     2012    Hallucination     Hepatitis C     treated and cured    History of blood clots     Hx of psychiatric care     Hypertension     Belen     Obese body habitus     On home oxygen therapy     ADEEL (obstructive sleep apnea)     ADEEL treated with BiPAP     Paraplegia     Paraplegic spinal paralysis     Psychiatric problem     Psychosis     AVH; Paranoia    Renal disorder     Requires assistance with activities of daily living (ADL)     SCI (spinal cord injury)     incomplete    Sleep difficulties     has sleep apnea and uses a Bi-PAP machine.    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    Substance abuse     Suprapubic catheter 03/15/2011    Therapy     Vaginal delivery     x1    Wheelchair dependence        Past Surgical History:   Procedure Laterality Date    ABOVE-KNEE AMPUTATION Left 7/23/2019    Procedure: AMPUTATION, ABOVE KNEE;  Surgeon: Gordo Rodriguez MD;  Location: [facility] OR;  Service: Orthopedics;  Laterality: Left;    amputation Left 07/23/2019    above the knee    BACK SURGERY      bilateral knee replacement      BREAST BIOPSY      cysto/lithopaxy 2017      CYSTOSCOPY Right 1/8/2021    Procedure: CYSTOSCOPY, RIGHT OCCULER BALLOON CATHETER PLACEMENT;  Surgeon: RAMA Tinoco MD;  Location: [facility] OR;  Service: Urology;  Laterality: Right;    CYSTOSCOPY W/ LASER LITHOTRIPSY      JOINT REPLACEMENT      bilateral knee    PERCUTANEOUS NEPHROLITHOTOMY Right 1/8/2021    Procedure: NEPHROLITHOTOMY, PERCUTANEOUS;  Surgeon: RAMA Tinoco MD;  Location: [facility] OR;  Service: Urology;  Laterality: Right;  x3 rooms, or7, or9,or12  ATTEMPTED    RETROGRADE PYELOGRAPHY Right 1/8/2021    Procedure:  PYELOGRAM, RETROGRADE;  Surgeon: RAMA Tinoco MD;  Location: [facility] OR;  Service: Urology;  Laterality: Right;    SUPRAPUBIC CYSTOSTOMY  03/15/2011    patient reports  replaced tubing on 10/27/19, at home    URETEROSCOPY Right 2021    Procedure: URETEROSCOPY;  Surgeon: RAMA Tinoco MD;  Location: [facility] OR;  Service: Urology;  Laterality: Right;       Review of patient's allergies indicates:   Allergen Reactions    Tuberculin ppd Itching and Dermatitis     Patient has old scare that she claims is from TB PPD    Rocephin [ceftriaxone] Rash     Rash ? Pt agreeable to try with pre mediation with benadryl.        Family History       Problem Relation (Age of Onset)    Anxiety disorder Brother    Dementia Mother    Depression Brother          Tobacco Use    Smoking status: Former     Current packs/day: 0.00     Average packs/day: 0.5 packs/day for 25.0 years (12.5 ttl pk-yrs)     Types: Cigarettes     Start date:      Quit date:      Years since quittin.6    Smokeless tobacco: Never   Substance and Sexual Activity    Alcohol use: Not Currently     Comment: occasional    Drug use: Not Currently     Comment: prescribed opioids at present for pain    Sexual activity: Not Currently     Partners: Male     Comment: since          Review of Systems   Unable to perform ROS: Acuity of condition     Objective:     Vital Signs (Most Recent):  Temp: 97.5 °F (36.4 °C) (23 1111)  Pulse: 86 (23 1252)  Resp: (!) 111 (23 1252)  BP: 123/68 (23 1305)  SpO2: (!) 94 % (23 1252) Vital Signs (24h Range):  Temp:  [97.5 °F (36.4 °C)-98.7 °F (37.1 °C)] 97.5 °F (36.4 °C)  Pulse:  [45-94] 86  Resp:  [] 111  SpO2:  [92 %-100 %] 94 %  BP: ()/(49-98) 123/68     Weight: 78.4 kg (172 lb 13.5 oz)  Body mass index is 28.76 kg/m².      Intake/Output Summary (Last 24 hours) at 2023 1315  Last data filed at 2023 1300  Gross per 24 hour   Intake 102.96 ml   Output 350 ml    Net -247.04 ml        Physical Exam  Vitals and nursing note reviewed.   Constitutional:       General: She is in acute distress.      Appearance: She is ill-appearing. She is not toxic-appearing or diaphoretic.      Interventions: She is intubated.   HENT:      Head: Normocephalic.   Eyes:      Extraocular Movements: Extraocular movements intact.      Pupils: Pupils are equal, round, and reactive to light.   Cardiovascular:      Rate and Rhythm: Bradycardia present.   Pulmonary:      Effort: She is intubated.      Breath sounds: Decreased breath sounds present.   Abdominal:      General: Abdomen is flat.   Neurological:      Motor: Weakness present.          Vents:  Vent Mode: A/C (08/12/23 1120)  Ventilator Initiated: Yes (08/12/23 1120)  Set Rate: 20 BPM (08/12/23 1120)  Vt Set: 400 mL (08/12/23 1120)  PEEP/CPAP: 5 cmH20 (08/12/23 1120)  Oxygen Concentration (%): 100 (08/12/23 1252)  Peak Airway Pressure: 22 cmH20 (08/12/23 1120)  Plateau Pressure: 0 cmH20 (08/12/23 1120)  Total Ve: 8.09 L/m (08/12/23 1120)  Negative Inspiratory Force (cm H2O): 0 (08/12/23 1120)  F/VT Ratio<105 (RSBI): (!) 59.41 (08/12/23 1120)    Lines/Drains/Airways       Central Venous Catheter Line  Duration             Percutaneous Central Line Insertion/Assessment - Triple Lumen  08/12/23 1137 Internal Jugular Right <1 day              Drain  Duration                  Ureteral Drain/Stent 01/08/21 1028 Right ureter 6 Fr. 946 days         Suprapubic Catheter 08/07/23 0301 100% silicone 16 Fr. 5 days              Airway  Duration                  Airway - Non-Surgical 08/12/23 1120 Endotracheal Tube <1 day              Peripheral Intravenous Line  Duration                  Peripheral IV - Single Lumen 08/12/23 0015 22 G Left;Posterior Forearm <1 day                    Significant Labs:    CBC/Anemia Profile:  Recent Labs   Lab 08/11/23  0551 08/12/23  0537   WBC 4.81 5.13   HGB 9.5* 9.3*   HCT 32.9* 32.0*    237   MCV 86 85   RDW  17.5* 17.5*        Chemistries:  Recent Labs   Lab 08/11/23  0551 08/12/23  0537    142   K 4.2 4.6   CL 99 101   CO2 31* 32*   BUN 19 14   CREATININE 1.0 0.8   CALCIUM 8.8 9.1   ALBUMIN  --  2.6*   PROT  --  7.4   BILITOT  --  0.2   ALKPHOS  --  52*   ALT  --  6*   AST  --  12   MG 2.1 2.2       All pertinent labs within the past 24 hours have been reviewed.    Significant Imaging:   I have reviewed all pertinent imaging results/findings within the past 24 hours.

## 2023-08-12 NOTE — SUBJECTIVE & OBJECTIVE
Review of Systems   All other systems reviewed and are negative.    Objective:     Vital Signs (Most Recent):  Temp: 97.6 °F (36.4 °C) (08/12/23 0735)  Pulse: (!) 54 (08/12/23 0735)  Resp: 16 (08/12/23 0735)  BP: 113/62 (08/12/23 0735)  SpO2: 97 % (08/12/23 0735) Vital Signs (24h Range):  Temp:  [97.4 °F (36.3 °C)-98.9 °F (37.2 °C)] 97.6 °F (36.4 °C)  Pulse:  [45-63] 54  Resp:  [14-21] 16  SpO2:  [95 %-100 %] 97 %  BP: ()/(49-79) 113/62     Weight: 78.4 kg (172 lb 13.5 oz)  Body mass index is 28.76 kg/m².    Intake/Output Summary (Last 24 hours) at 8/12/2023 1013  Last data filed at 8/12/2023 0646  Gross per 24 hour   Intake 91.89 ml   Output 350 ml   Net -258.11 ml         Physical Exam  Vitals and nursing note reviewed.   Constitutional:       General: She is in acute distress.      Appearance: Normal appearance. She is obese. She is ill-appearing.      Comments: Lethargic, opens to physical stimuli   Cardiovascular:      Rate and Rhythm: Normal rate and regular rhythm.      Heart sounds: No murmur heard.  Pulmonary:      Effort: Pulmonary effort is normal. No respiratory distress.      Breath sounds: No wheezing.   Genitourinary:     Comments: Suprapubic catheter            Significant Labs: All pertinent labs within the past 24 hours have been reviewed.  Recent Lab Results  (Last 5 results in the past 24 hours)        08/12/23  0652   08/12/23  0553   08/12/23  0537   08/12/23  0531   08/11/23  2125        Albumin     2.6           Alkaline Phosphatase     52           Allens Test Pass   Pass             ALT     6           Ammonia     44           Anion Gap     9           AST     12           Baso #     0.05           Basophil %     1.0           BILIRUBIN TOTAL     0.2  Comment: For infants and newborns, interpretation of results should be based  on gestational age, weight and in agreement with clinical  observations.    Premature Infant recommended reference ranges:  Up to 24 hours.............<8.0  mg/dL  Up to 48 hours............<12.0 mg/dL  3-5 days..................<15.0 mg/dL  6-29 days.................<15.0 mg/dL             Site LR   RR             BUN     14           Calcium     9.1           Chloride     101           CO2     32           Creatinine     0.8           DelSys CPAP/BiPAP   Nasal Can             Differential Method     Automated           eGFR     >60           Eos #     0.3           Eosinophil %     4.9           FiO2 30               Glucose     69           Gran # (ANC)     2.7           Gran %     53.1           Hematocrit     32.0           Hemoglobin     9.3           Immature Grans (Abs)     0.02  Comment: Mild elevation in immature granulocytes is non specific and   can be seen in a variety of conditions including stress response,   acute inflammation, trauma and pregnancy. Correlation with other   laboratory and clinical findings is essential.             Immature Granulocytes     0.4           Lymph #     1.6           Lymph %     31.6           Magnesium     2.2           MCH     24.7           MCHC     29.1           MCV     85           Mode AVAPS   SPONT             Mono #     0.5           Mono %     9.0           MPV     9.1           nRBC     0           PiP 28               Platelets     237           POC BE 12   11             POC HCO3 37.8   38.1             POC PCO2 74.5   82.9             POC PH 7.313   7.271             POC PO2 93   104             POC SATURATED O2 96   97             POCT Glucose       80   79       Potassium     4.6           PROTEIN TOTAL     7.4           Rate 20   2             RBC     3.77           RDW     17.5           Sample ARTERIAL   ARTERIAL             Sodium     142           Vt 500               WBC     5.13                                  Significant Imaging: I have reviewed all pertinent imaging results/findings within the past 24 hours.    CT Head Without Contrast   Final Result      Negative for acute intracranial abnormality.       All CT scans at this facility are performed  using dose modulation techniques as appropriate to performed exam including the following:  automated exposure control; adjustment of mA and/or kV according to the patients size (this includes techniques or standardized protocols for targeted exams where dose is matched to indication/reason for exam: i.e. extremities or head);  iterative reconstruction technique.         Electronically signed by: Sundeep Lewis MD   Date:    08/12/2023   Time:    08:52      X-Ray Chest AP Portable   Final Result      No acute findings.         Electronically signed by: Sundeep Lewis MD   Date:    08/12/2023   Time:    08:10      US Lower Extremity Veins Right   Final Result      No evidence of deep venous thrombosis in the right lower extremity.         Electronically signed by: Dale Theodore   Date:    08/06/2023   Time:    19:27      CT Head Without Contrast   Final Result      No acute abnormality.      Atrophy and chronic white matter changes      All CT scans   are performed using dose optimization techniques including the following: automated exposure control; adjustment of the mA and/or kV; use of iterative reconstruction technique.  Dose modulation was employed for ALARA by means of: Automated exposure control; adjustment of the mA and/or kV according to patient size (this includes techniques or standardized protocols for targeted exams where dose is matched to indication/reason for exam; i.e. extremities or head); and/or use of iterative reconstructive technique.         Electronically signed by: Dale Theodore   Date:    08/06/2023   Time:    18:57      X-Ray Chest AP Portable   Final Result      As above         Electronically signed by: Dale Theodore   Date:    08/06/2023   Time:    17:34

## 2023-08-12 NOTE — ASSESSMENT & PLAN NOTE
Suprapubic cath changed upon arrival to the ICU  Urine culture +proteus, sensitivities reviewed  Continue ceftriaxone, antibiotic day # 6, plan for 7 day course

## 2023-08-12 NOTE — PLAN OF CARE
Pt intubated, sedated for rass -2 with prop & fent. BP supported with levo. Afebrile. SB on monitor, 50s. OG intact. Clamped. Suprapubic delcid intact, flushed. Seizure like activity this afternoon - MD at bedside. Ativan given, propofol started. Pt turned/repositioned with pillows. Heel boot intact. Central line, art line intact. Bed low, wheels locked, alarms audible. POC reviewed.

## 2023-08-12 NOTE — PLAN OF CARE
Fall precautions maintained. Pt oriented to self at time during shift, but remains drowsy . Pt able to follow commands after being asked to do so several times. Hospital medicine was made aware of the pts mental status and heart rate being in the 40-'s during the shift, awaiting new orders.Wound care complete. Pt passing gas but still no BM. Chart check complete.         Problem: Adult Inpatient Plan of Care  Goal: Plan of Care Review  Outcome: Ongoing, Progressing     Problem: Adult Inpatient Plan of Care  Goal: Absence of Hospital-Acquired Illness or Injury  Outcome: Ongoing, Progressing     Problem: Adult Inpatient Plan of Care  Goal: Optimal Comfort and Wellbeing  Outcome: Ongoing, Progressing

## 2023-08-12 NOTE — CARE UPDATE
EKG showed sinus penny with PAC's. ABG on 2 liters NC showed pH: 7.27, PCO2 82.9, PO2: 104, BE: 11. HCO3: 38.1. Discussed with Critical care, Maricel baumann at bedside. Bipap changed to AVAPS. Mental status improving. Plan to repeat ABG on AVAPS.

## 2023-08-12 NOTE — PLAN OF CARE
POC discussed with patient. Patient lethargic, intermittently disoriented, and falling asleep during conversations; MD notified and no new orders at this time.VSS. 2L nasal cannula maintained.Wound care completed per orders. Cardiac monitoring maintained. Blood glucose monitored. Turned as tolerated. Suprapubic catheter in place with cloudy, yellow output. Remains free from injury. Call light within reach. Bed alarm on. Purposeful rounding every two hours. No signs of acute distress noted. Chart review completed.

## 2023-08-12 NOTE — ASSESSMENT & PLAN NOTE
Patient with Hypercapnic and Hypoxic Respiratory failure which is Acute on chronic.  she is on home oxygen at 2 LPM. Supplemental oxygen was provided and noted- Oxygen Concentration (%):  [28-30] 30    .   Signs/symptoms of respiratory failure include- respiratory distress and lethargy. Contributing diagnoses includes - CHF and COPD Labs and images were reviewed. Patient Has recent ABG, which has been reviewed. Will treat underlying causes and adjust management of respiratory failure as follows:    8/7/23  Transitioned to 2L NC this AM, her home flow  Continue with NIPPV qHS and PRN naps    8/11/23  Remains stable on 2L NC  Continue with NIPPV qHS and PRN naps    8/12/23  Lethargic overnight, ABG with worsening hypercapnia  BiPAP changed to AVAPS overnight  Transfer to ICU, concern for further deterioration

## 2023-08-12 NOTE — PROGRESS NOTES
[facility]  Progress Note    Patient Name: Mary Ellen Fajardo  MRN: [id number]  Patient Class: IP- Inpatient   Admission Date: 8/6/2023  Length of Stay: 6 days  Attending Physician: Juan Luis MD  Primary Care Provider: Any Tran MD        Subjective:     Principal Problem:Acute on chronic respiratory failure with hypoxia and hypercapnia        HPI:  Patient is 67 year old female with past medical history significant for stroke, hypertension, COPD, CHF, chronic respiratory failure on home oxygen (2 L/min NC), ADEEL, neurogenic bladder with chronic indwelling suprapubic catheter, left AKA, paraparesis, and wounds who presented to ED due to generalized weakness, confusion, and painful urination. Patient complains of chest pain on/off, abdominal pain, and bilateral leg pain. ABG revealed mild acidosis with hypercapnia. She is on continuous BIPAP at this time. She does have UTI and suprapubic catheter should be changed out. She has been given Zosyn. Lactic acid is normal however procalcitonin is elevated. BP is elevated. There is evidence of congestive heart failure on CXR. US was done due to leg pain, it is negative for DVT. There is no leukocytosis or fever. Critical care team consulted for admission to ICU due to hypercapnic respiratory failure requiring continuous BIPAP and for treatment of UTI.      Overview/Hospital Course:  8/7: placed on home 2L this AM, continue NIPPV qHS and PRN naps, no new issues or c/o on exam, no family at bedside  ------  Chart reviewed, examined patient at bedside this afternoon, asking to restart her home anxiety and pain medication.  Currently stable on 2L NC, stable for floor transfer.    8/8: NAEON. Patient c/o bladder spasms, asking for medication, states previously on oxybutynin.  BP marginal this AM. Cr increase from 0.9 --> 1.3, will stop Bumex  Urine culture with >100k GNR, speciation pending, continue ceftriaxone  Remains stable on 2L  NC  SNF placement pending    8/9: LACI. Patient asking for her home muscle relaxer, will restart  Cr decreased to 1.1. Restart home PO Bumex  Urine culture results pending, continue ceftriaxone  1 of 2 blood culture polymicrobial, likely contaminant  Remains stable on 2L NC  SNF placement pending    8/10: LACI. Patient in bed, denies new issues, stable on 2L NC  Urine culture +proteus, sensitives reviewed, continue ceftriaxone  SNF placement pending    8/11: LACI, SNF facility refused acceptance due to reported no BM x 3 days  No BMs documented since ICU transfer, start daily miralax and colace    8/12: Overnight events reviewed, patient noted to be lethargic overnight  ABG results reviewed, hypercapnic, currently on AVAPS  CT Head with no acute findings  Remains lethargic, repeat ABG ordered, transfer to ICU        Review of Systems   All other systems reviewed and are negative.    Objective:     Vital Signs (Most Recent):  Temp: 97.6 °F (36.4 °C) (08/12/23 0735)  Pulse: (!) 54 (08/12/23 0735)  Resp: 16 (08/12/23 0735)  BP: 113/62 (08/12/23 0735)  SpO2: 97 % (08/12/23 0735) Vital Signs (24h Range):  Temp:  [97.4 °F (36.3 °C)-98.9 °F (37.2 °C)] 97.6 °F (36.4 °C)  Pulse:  [45-63] 54  Resp:  [14-21] 16  SpO2:  [95 %-100 %] 97 %  BP: ()/(49-79) 113/62     Weight: 78.4 kg (172 lb 13.5 oz)  Body mass index is 28.76 kg/m².    Intake/Output Summary (Last 24 hours) at 8/12/2023 1013  Last data filed at 8/12/2023 0646  Gross per 24 hour   Intake 91.89 ml   Output 350 ml   Net -258.11 ml         Physical Exam  Vitals and nursing note reviewed.   Constitutional:       General: She is in acute distress.      Appearance: Normal appearance. She is obese. She is ill-appearing.      Comments: Lethargic, opens to physical stimuli   Cardiovascular:      Rate and Rhythm: Normal rate and regular rhythm.      Heart sounds: No murmur heard.  Pulmonary:      Effort: Pulmonary effort is normal. No respiratory distress.      Breath  sounds: No wheezing.   Genitourinary:     Comments: Suprapubic catheter            Significant Labs: All pertinent labs within the past 24 hours have been reviewed.  Recent Lab Results  (Last 5 results in the past 24 hours)        08/12/23  0652   08/12/23  0553   08/12/23  0537   08/12/23  0531   08/11/23  2125        Albumin     2.6           Alkaline Phosphatase     52           Allens Test Pass   Pass             ALT     6           Ammonia     44           Anion Gap     9           AST     12           Baso #     0.05           Basophil %     1.0           BILIRUBIN TOTAL     0.2  Comment: For infants and newborns, interpretation of results should be based  on gestational age, weight and in agreement with clinical  observations.    Premature Infant recommended reference ranges:  Up to 24 hours.............<8.0 mg/dL  Up to 48 hours............<12.0 mg/dL  3-5 days..................<15.0 mg/dL  6-29 days.................<15.0 mg/dL             Site LR   RR             BUN     14           Calcium     9.1           Chloride     101           CO2     32           Creatinine     0.8           DelSys CPAP/BiPAP   Nasal Can             Differential Method     Automated           eGFR     >60           Eos #     0.3           Eosinophil %     4.9           FiO2 30               Glucose     69           Gran # (ANC)     2.7           Gran %     53.1           Hematocrit     32.0           Hemoglobin     9.3           Immature Grans (Abs)     0.02  Comment: Mild elevation in immature granulocytes is non specific and   can be seen in a variety of conditions including stress response,   acute inflammation, trauma and pregnancy. Correlation with other   laboratory and clinical findings is essential.             Immature Granulocytes     0.4           Lymph #     1.6           Lymph %     31.6           Magnesium     2.2           MCH     24.7           MCHC     29.1           MCV     85           Mode AVAPS   SPONT              Mono #     0.5           Mono %     9.0           MPV     9.1           nRBC     0           PiP 28               Platelets     237           POC BE 12   11             POC HCO3 37.8   38.1             POC PCO2 74.5   82.9             POC PH 7.313   7.271             POC PO2 93   104             POC SATURATED O2 96   97             POCT Glucose       80   79       Potassium     4.6           PROTEIN TOTAL     7.4           Rate 20   2             RBC     3.77           RDW     17.5           Sample ARTERIAL   ARTERIAL             Sodium     142           Vt 500               WBC     5.13                                  Significant Imaging: I have reviewed all pertinent imaging results/findings within the past 24 hours.    CT Head Without Contrast   Final Result      Negative for acute intracranial abnormality.      All CT scans at this facility are performed  using dose modulation techniques as appropriate to performed exam including the following:  automated exposure control; adjustment of mA and/or kV according to the patients size (this includes techniques or standardized protocols for targeted exams where dose is matched to indication/reason for exam: i.e. extremities or head);  iterative reconstruction technique.         Electronically signed by: Sundeep Lewis MD   Date:    08/12/2023   Time:    08:52      X-Ray Chest AP Portable   Final Result      No acute findings.         Electronically signed by: Sundeep Lewis MD   Date:    08/12/2023   Time:    08:10      US Lower Extremity Veins Right   Final Result      No evidence of deep venous thrombosis in the right lower extremity.         Electronically signed by: Dale Theodore   Date:    08/06/2023   Time:    19:27      CT Head Without Contrast   Final Result      No acute abnormality.      Atrophy and chronic white matter changes      All CT scans   are performed using dose optimization techniques including the following: automated exposure control;  adjustment of the mA and/or kV; use of iterative reconstruction technique.  Dose modulation was employed for ALARA by means of: Automated exposure control; adjustment of the mA and/or kV according to patient size (this includes techniques or standardized protocols for targeted exams where dose is matched to indication/reason for exam; i.e. extremities or head); and/or use of iterative reconstructive technique.         Electronically signed by: Dale Theodore   Date:    08/06/2023   Time:    18:57      X-Ray Chest AP Portable   Final Result      As above         Electronically signed by: Dale Theodore   Date:    08/06/2023   Time:    17:34              Assessment/Plan:      * Acute on chronic respiratory failure with hypoxia and hypercapnia  Patient with Hypercapnic and Hypoxic Respiratory failure which is Acute on chronic.  she is on home oxygen at 2 LPM. Supplemental oxygen was provided and noted- Oxygen Concentration (%):  [28-30] 30    .   Signs/symptoms of respiratory failure include- respiratory distress and lethargy. Contributing diagnoses includes - CHF and COPD Labs and images were reviewed. Patient Has recent ABG, which has been reviewed. Will treat underlying causes and adjust management of respiratory failure as follows:    8/7/23  Transitioned to 2L NC this AM, her home flow  Continue with NIPPV qHS and PRN naps    8/11/23  Remains stable on 2L NC  Continue with NIPPV qHS and PRN naps    8/12/23  Lethargic overnight, ABG with worsening hypercapnia  BiPAP changed to AVAPS overnight  Transfer to ICU, concern for further deterioration    Acute on chronic diastolic heart failure  Echo report from 4/2021  The left ventricle is normal in size with mild concentric hypertrophy and normal systolic function.  The estimated ejection fraction is 55%.  Indeterminate left ventricular diastolic function.  Normal right ventricular size with normal right ventricular systolic function.  Mild left atrial  enlargement.  Normal central venous pressure (3 mmHg).  The estimated PA systolic pressure is 24 mmHg     Echo    Result Date: 8/7/2023    Left Ventricle: Normal wall motion. There is low normal systolic   function with a visually estimated ejection fraction of 50 - 55%.    Right Ventricle: Normal right ventricular cavity size.        Continue Bumex 1 mg IV BID, transition to PO when appropriate  Strict I/O's, daily weights, Na/fluid restriction, AM labs    8/8/23  Hold Bumex today with marginal BP and rise in serum Cr  AM labs, consider restarting tomorrow    8/10/23  Cr improved, resume home PO Bumex today    Pulmonary HTN  Echo    Result Date: 8/7/2023    Left Ventricle: Normal wall motion. There is low normal systolic   function with a visually estimated ejection fraction of 50 - 55%.    Right Ventricle: Normal right ventricular cavity size.      Plan as above    COPD (chronic obstructive pulmonary disease)  Not in exacerbation  On home 2L NC  PRN breathing treatments    Urinary tract infection associated with cystostomy catheter  Suprapubic cath changed upon arrival to the ICU  Urine culture +proteus, sensitivities reviewed  Continue ceftriaxone, antibiotic day # 6, plan for 7 day course    History of CVA (cerebrovascular accident)  Bed bound at baseline with limited help at home   PT/OT, supportive care  Med mgmt consulted for assistance with SNF placement    Anemia  H/H stable, near baseline  Monitor as needed    ADEEL (obstructive sleep apnea)  Noncompliant with home NIPPV  NIPPV qHS and PRN    Essential hypertension  BP stable  Resume home meds    Paraparesis  Turn q2h  Supportive care    Wounds, multiple  Wound Care consulted  Turn q2h, supportive care      VTE Risk Mitigation (From admission, onward)         Ordered     enoxaparin injection 40 mg  Daily         08/06/23 2322     IP VTE HIGH RISK PATIENT  Once         08/06/23 2322     Place sequential compression device  Until discontinued          08/06/23 2322                Discharge Planning   LOY: 8/11/2023     Code Status: Full Code   Is the patient medically ready for discharge?:     Reason for patient still in hospital (select all that apply): Patient unstable, Laboratory test, Treatment, Imaging, Consult recommendations and Pending disposition  Discharge Plan A: Skilled Nursing Facility   Discharge Delays: None known at this time      Critical care time spent on the evaluation and treatment of severe organ dysfunction, review of pertinent labs and imaging studies, discussions with consulting providers and discussions with patient/family: 40 minutes.          Juan Luis MD  Department of Hospital Medicine   [facility] - Med S[facility]

## 2023-08-13 LAB
ALLENS TEST: ABNORMAL
ANION GAP SERPL CALC-SCNC: 10 MMOL/L (ref 8–16)
BASOPHILS # BLD AUTO: 0.06 K/UL (ref 0–0.2)
BASOPHILS NFR BLD: 0.7 % (ref 0–1.9)
BUN SERPL-MCNC: 15 MG/DL (ref 8–23)
CALCIUM SERPL-MCNC: 8.9 MG/DL (ref 8.7–10.5)
CHLORIDE SERPL-SCNC: 100 MMOL/L (ref 95–110)
CO2 SERPL-SCNC: 30 MMOL/L (ref 23–29)
CREAT SERPL-MCNC: 0.8 MG/DL (ref 0.5–1.4)
DELSYS: ABNORMAL
DIFFERENTIAL METHOD: ABNORMAL
EOSINOPHIL # BLD AUTO: 0.2 K/UL (ref 0–0.5)
EOSINOPHIL NFR BLD: 2.1 % (ref 0–8)
ERYTHROCYTE [DISTWIDTH] IN BLOOD BY AUTOMATED COUNT: 17.5 % (ref 11.5–14.5)
ERYTHROCYTE [SEDIMENTATION RATE] IN BLOOD BY WESTERGREN METHOD: 18 MM/H
EST. GFR  (NO RACE VARIABLE): >60 ML/MIN/1.73 M^2
FIO2: 30
GLUCOSE SERPL-MCNC: 85 MG/DL (ref 70–110)
GLUCOSE SERPL-MCNC: 89 MG/DL (ref 70–110)
HCO3 UR-SCNC: 33.9 MMOL/L (ref 24–28)
HCT VFR BLD AUTO: 30.1 % (ref 37–48.5)
HCT VFR BLD CALC: 29 %PCV (ref 36–54)
HGB BLD-MCNC: 9.4 G/DL (ref 12–16)
IMM GRANULOCYTES # BLD AUTO: 0.04 K/UL (ref 0–0.04)
IMM GRANULOCYTES NFR BLD AUTO: 0.4 % (ref 0–0.5)
LACTATE SERPL-SCNC: 0.9 MMOL/L (ref 0.5–2.2)
LYMPHOCYTES # BLD AUTO: 2 K/UL (ref 1–4.8)
LYMPHOCYTES NFR BLD: 22.3 % (ref 18–48)
MAGNESIUM SERPL-MCNC: 2 MG/DL (ref 1.6–2.6)
MCH RBC QN AUTO: 25.1 PG (ref 27–31)
MCHC RBC AUTO-ENTMCNC: 31.2 G/DL (ref 32–36)
MCV RBC AUTO: 81 FL (ref 82–98)
MODE: ABNORMAL
MONOCYTES # BLD AUTO: 0.8 K/UL (ref 0.3–1)
MONOCYTES NFR BLD: 9 % (ref 4–15)
NEUTROPHILS # BLD AUTO: 5.9 K/UL (ref 1.8–7.7)
NEUTROPHILS NFR BLD: 65.5 % (ref 38–73)
NRBC BLD-RTO: 0 /100 WBC
PCO2 BLDA: 42.9 MMHG (ref 35–45)
PEEP: 5
PH SMN: 7.51 [PH] (ref 7.35–7.45)
PHOSPHATE SERPL-MCNC: 1.3 MG/DL (ref 2.7–4.5)
PLATELET # BLD AUTO: 271 K/UL (ref 150–450)
PMV BLD AUTO: 8.7 FL (ref 9.2–12.9)
PO2 BLDA: 67 MMHG (ref 80–100)
POC BE: 11 MMOL/L
POC IONIZED CALCIUM: 1.2 MMOL/L (ref 1.06–1.42)
POC SATURATED O2: 94 % (ref 95–100)
POTASSIUM BLD-SCNC: 4.3 MMOL/L (ref 3.5–5.1)
POTASSIUM SERPL-SCNC: 4.5 MMOL/L (ref 3.5–5.1)
PROCALCITONIN SERPL IA-MCNC: 0.19 NG/ML
RBC # BLD AUTO: 3.74 M/UL (ref 4–5.4)
SAMPLE: ABNORMAL
SITE: ABNORMAL
SODIUM BLD-SCNC: 137 MMOL/L (ref 136–145)
SODIUM SERPL-SCNC: 140 MMOL/L (ref 136–145)
VT: 340
WBC # BLD AUTO: 9.02 K/UL (ref 3.9–12.7)

## 2023-08-13 PROCEDURE — 83605 ASSAY OF LACTIC ACID: CPT | Performed by: NURSE PRACTITIONER

## 2023-08-13 PROCEDURE — 84100 ASSAY OF PHOSPHORUS: CPT | Performed by: NURSE PRACTITIONER

## 2023-08-13 PROCEDURE — 63600175 PHARM REV CODE 636 W HCPCS: Performed by: NURSE PRACTITIONER

## 2023-08-13 PROCEDURE — 63600175 PHARM REV CODE 636 W HCPCS: Performed by: SPECIALIST

## 2023-08-13 PROCEDURE — 25000003 PHARM REV CODE 250: Performed by: HOSPITALIST

## 2023-08-13 PROCEDURE — 84145 PROCALCITONIN (PCT): CPT | Performed by: NURSE PRACTITIONER

## 2023-08-13 PROCEDURE — 25000003 PHARM REV CODE 250: Performed by: NURSE PRACTITIONER

## 2023-08-13 PROCEDURE — 80048 BASIC METABOLIC PNL TOTAL CA: CPT | Performed by: NURSE PRACTITIONER

## 2023-08-13 PROCEDURE — 94660 CPAP INITIATION&MGMT: CPT | Mod: XB

## 2023-08-13 PROCEDURE — 20000000 HC ICU ROOM

## 2023-08-13 PROCEDURE — 27100108

## 2023-08-13 PROCEDURE — 85014 HEMATOCRIT: CPT

## 2023-08-13 PROCEDURE — 37799 UNLISTED PX VASCULAR SURGERY: CPT

## 2023-08-13 PROCEDURE — 27100171 HC OXYGEN HIGH FLOW UP TO 24 HOURS

## 2023-08-13 PROCEDURE — 82330 ASSAY OF CALCIUM: CPT

## 2023-08-13 PROCEDURE — 84295 ASSAY OF SERUM SODIUM: CPT

## 2023-08-13 PROCEDURE — 94761 N-INVAS EAR/PLS OXIMETRY MLT: CPT

## 2023-08-13 PROCEDURE — 82803 BLOOD GASES ANY COMBINATION: CPT

## 2023-08-13 PROCEDURE — 99900035 HC TECH TIME PER 15 MIN (STAT)

## 2023-08-13 PROCEDURE — 85025 COMPLETE CBC W/AUTO DIFF WBC: CPT | Performed by: NURSE PRACTITIONER

## 2023-08-13 PROCEDURE — 94003 VENT MGMT INPAT SUBQ DAY: CPT

## 2023-08-13 PROCEDURE — 84132 ASSAY OF SERUM POTASSIUM: CPT

## 2023-08-13 PROCEDURE — 83735 ASSAY OF MAGNESIUM: CPT | Performed by: NURSE PRACTITIONER

## 2023-08-13 PROCEDURE — 27000190 HC CPAP FULL FACE MASK W/VALVE

## 2023-08-13 PROCEDURE — 99900026 HC AIRWAY MAINTENANCE (STAT)

## 2023-08-13 RX ADMIN — 0.12% CHLORHEXIDINE GLUCONATE 15 ML: 1.2 RINSE ORAL at 08:08

## 2023-08-13 RX ADMIN — CITALOPRAM HYDROBROMIDE 30 MG: 20 TABLET ORAL at 08:08

## 2023-08-13 RX ADMIN — BUMETANIDE 2 MG: 1 TABLET ORAL at 08:08

## 2023-08-13 RX ADMIN — LEVETIRACETAM INJECTION 1000 MG: 10 INJECTION INTRAVENOUS at 08:08

## 2023-08-13 RX ADMIN — OXYBUTYNIN CHLORIDE 5 MG: 5 TABLET ORAL at 08:08

## 2023-08-13 RX ADMIN — DOCUSATE SODIUM 100 MG: 100 CAPSULE, LIQUID FILLED ORAL at 08:08

## 2023-08-13 RX ADMIN — LEVETIRACETAM INJECTION 1000 MG: 10 INJECTION INTRAVENOUS at 09:08

## 2023-08-13 RX ADMIN — 0.12% CHLORHEXIDINE GLUCONATE 15 ML: 1.2 RINSE ORAL at 09:08

## 2023-08-13 RX ADMIN — POLYETHYLENE GLYCOL 3350 17 G: 17 POWDER, FOR SOLUTION ORAL at 08:08

## 2023-08-13 RX ADMIN — OXYBUTYNIN CHLORIDE 5 MG: 5 TABLET ORAL at 09:08

## 2023-08-13 RX ADMIN — SODIUM PHOSPHATE, MONOBASIC, MONOHYDRATE AND SODIUM PHOSPHATE, DIBASIC, ANHYDROUS 30 MMOL: 142; 276 INJECTION, SOLUTION INTRAVENOUS at 05:08

## 2023-08-13 RX ADMIN — PROPOFOL 5 MCG/KG/MIN: 10 INJECTION, EMULSION INTRAVENOUS at 05:08

## 2023-08-13 RX ADMIN — ENOXAPARIN SODIUM 40 MG: 40 INJECTION SUBCUTANEOUS at 05:08

## 2023-08-13 NOTE — PROGRESS NOTES
[facility] - Intensive Care ([facility])  Critical Care Medicine  Progress Note    Patient Name: Mary Ellen Fajardo  MRN: [id number]  Admission Date: 8/6/2023  Hospital Length of Stay: 7 days  Code Status: Full Code  Attending Provider: Neville Huerta MD  Primary Care Provider: Any Tran MD   Principal Problem: Acute on chronic respiratory failure with hypoxia and hypercapnia    Subjective:     HPI:  Patient is 67 year old female with past medical history significant for stroke, hypertension, COPD, CHF, chronic respiratory failure on home oxygen (2 L/min NC), ADEEL, neurogenic bladder with chronic indwelling suprapubic catheter, left AKA, paraparesis, and wounds who presented to ED due to generalized weakness, confusion, and painful urination. Patient complains of chest pain on/off, abdominal pain, and bilateral leg pain. ABG revealed mild acidosis with hypercapnia. She is on continuous BIPAP at this time. She does have UTI and suprapubic catheter should be changed out. She has been given Zosyn. Lactic acid is normal however procalcitonin is elevated. BP is elevated. There is evidence of congestive heart failure on CXR. US was done due to leg pain, it is negative for DVT. There is no leukocytosis or fever. Critical care team consulted for admission to ICU due to hypercapnic respiratory failure requiring continuous BIPAP and for treatment of UTI.      Hospital/ICU Course:  8/7: placed on home 2L this AM, continue NIPPV qHS and PRN naps, no new issues or c/o on exam, no family at bedside     8/12 Transferred to ICU for worsening SOB. Was on AVAPS. ABG on AVAPS consistent with worsening hypercapnia. AMS and unable to follow commands.     Intubated / sedated   Post intubation had abnormal motor tonic clonic activity  Ativan given. Keppra ordered.   Placed on diprivan  CT head pending.    8/13. No Sz overnight. EEG scheduled this am. Neuro consult pending. Off sedation wakes up and follows commands. No family  been here.          Objective:     Vital Signs (Most Recent):  Temp: 98.8 °F (37.1 °C) (08/13/23 1104)  Pulse: 81 (08/13/23 1350)  Resp: (!) 23 (08/13/23 1350)  BP: 116/77 (08/13/23 1350)  SpO2: 96 % (08/13/23 1350) Vital Signs (24h Range):  Temp:  [97.7 °F (36.5 °C)-98.9 °F (37.2 °C)] 98.8 °F (37.1 °C)  Pulse:  [45-86] 81  Resp:  [10-28] 23  SpO2:  [96 %-100 %] 96 %  BP: ()/() 116/77  Arterial Line BP: ()/(50-83) 123/68     Weight: 80.3 kg (177 lb 0.5 oz)  Body mass index is 29.46 kg/m².      Intake/Output Summary (Last 24 hours) at 8/13/2023 1354  Last data filed at 8/13/2023 1100  Gross per 24 hour   Intake 915.01 ml   Output 650 ml   Net 265.01 ml        Physical Exam  Vitals and nursing note reviewed.   HENT:      Head: Normocephalic.   Eyes:      Pupils: Pupils are equal, round, and reactive to light.   Cardiovascular:      Rate and Rhythm: Tachycardia present.      Heart sounds: Murmur heard.   Pulmonary:      Breath sounds: Rhonchi present.      Comments: Intubated            Review of Systems    Vents:  Vent Mode: (S) Spont (08/13/23 1040)  Ventilator Initiated: Yes (08/12/23 1120)  Set Rate: (S) 10 BPM (Peer Dr. Huerta) (08/13/23 0916)  Vt Set: 350 mL (08/13/23 0908)  Pressure Support: (S) 10 cmH20 (08/13/23 1040)  PEEP/CPAP: 5 cmH20 (08/13/23 1040)  Oxygen Concentration (%): 35 (08/13/23 1350)  Peak Airway Pressure: 16 cmH20 (08/13/23 1040)  Plateau Pressure: 22 cmH20 (08/13/23 1040)  Total Ve: 3.61 L/m (08/13/23 1040)  Negative Inspiratory Force (cm H2O): 0 (08/13/23 1040)  F/VT Ratio<105 (RSBI): 152.17 (08/13/23 1040)    Lines/Drains/Airways       Central Venous Catheter Line  Duration             Percutaneous Central Line Insertion/Assessment - Triple Lumen  08/12/23 1137 Internal Jugular Right 1 day              Drain  Duration                  Ureteral Drain/Stent 01/08/21 1028 Right ureter 6 Fr. 947 days         Suprapubic Catheter 08/07/23 0301 100% silicone 16 Fr. 6 days               Airway  Duration                  Airway - Non-Surgical 08/12/23 1120 Endotracheal Tube 1 day              Arterial Line  Duration             Arterial Line 08/12/23 1510 Right Radial <1 day              Peripheral Intravenous Line  Duration                  Peripheral IV - Single Lumen 08/12/23 0015 22 G Left;Posterior Forearm 1 day                    Significant Labs:    CBC/Anemia Profile:  Recent Labs   Lab 08/12/23  0537 08/13/23  0356 08/13/23  0356   WBC 5.13 9.02  --    HGB 9.3* 9.4*  --    HCT 32.0* 30.1* 29*    271  --    MCV 85 81*  --    RDW 17.5* 17.5*  --         Chemistries:  Recent Labs   Lab 08/12/23  0537 08/13/23  0356    140   K 4.6 4.5    100   CO2 32* 30*   BUN 14 15   CREATININE 0.8 0.8   CALCIUM 9.1 8.9   ALBUMIN 2.6*  --    PROT 7.4  --    BILITOT 0.2  --    ALKPHOS 52*  --    ALT 6*  --    AST 12  --    MG 2.2 2.0   PHOS  --  1.3*       All pertinent labs within the past 24 hours have been reviewed.    Significant Imaging:  I have reviewed all pertinent imaging results/findings within the past 24 hours.      ABG  Recent Labs   Lab 08/13/23  0356   PH 7.506*   PO2 67*   PCO2 42.9   HCO3 33.9*   BE 11     Assessment/Plan:     Neuro  Paraparesis  - turn q2h  - supportive care  - Wound care    History of CVA (cerebrovascular accident)  - bed bound at baseline with limited help at home   - Sz - new onset  - CT head stat  - See orders    New onset seizure  8/13 EEG, Neuro consult. Continue keppra. Wean sedation as tolerated. CT showed.no new change. May consider CT in 48hrs.     Pulmonary  * Acute on chronic respiratory failure with hypoxia and hypercapnia  Worsening hypercapnia  Intubated / sedated  CxR reviewed  Nebs and steroids.   Clinically better  Overventilated - vent settings changed. MV is 3-4L. Even at 4.5 L she was in the 7.5 Ph range.    Can consider SBT but was awaiting fmaily. Need a clear goals of care discussion. She was awaiting SNF placement when this  happened. She has very poor reserve. High risk for reintubation if non compliant with NIPPV and potential need for trach.    COPD (chronic obstructive pulmonary disease)  - Vent settings reviewed  -Steroids  - PRN nebs      Cardiac/Vascular  Pulmonary HTN  - supportive care  - repeat echo pending     Acute on chronic diastolic heart failure  Echo report from 4/2021   The left ventricle is normal in size with mild concentric hypertrophy and normal systolic function.   The estimated ejection fraction is 55%.   Indeterminate left ventricular diastolic function.   Normal right ventricular size with normal right ventricular systolic function.   Mild left atrial enlargement.   Normal central venous pressure (3 mmHg).   The estimated PA systolic pressure is 24 mmHg      - strict I&Os  - Continue diuretics prn to keep her slightly negative    Essential hypertension  - resume home meds as indicated   - monitor trends and adjust as needed for control     Renal/  Urinary tract infection associated with cystostomy catheter  - suprapubic cath changed upon arrival to the ICU  - continue zosyn for now  - follow urine culture and adjust abx as needed    Oncology  Anemia  Chronic, per review of labs  Hgb stable, 10.3  Monitor CBC    Orthopedic  Wounds, multiple  - wound care consulted for recs  - turn q2h  - supportive care    Other  ADEEL (obstructive sleep apnea)  - noncompliant with home NIPPV  - NIPPV qHS and PRN       Critical Care Daily Checklist:    A: Awake: RASS Goal/Actual Goal: RASS Goal: 0-->alert and calm  Actual:     B: Spontaneous Breathing Trial Performed? Spon. Breathing Trial Initiated?: (S) Initiated (08/13/23 1040)   C: SAT & SBT Coordinated?  yes                      D: Delirium: CAM-ICU Overall CAM-ICU: Positive   E: Early Mobility Performed? Yes   F: Feeding Goal:    Status:     Current Diet Order   Procedures    Diet Cardiac    Diet NPO      AS: Analgesia/Sedation yes   T: Thromboembolic Prophylaxis  yes   H: HOB > 300 Yes   U: Stress Ulcer Prophylaxis (if needed) yes   G: Glucose Control yes   B: Bowel Function Stool Occurrence: 0   I: Indwelling Catheter (Lines & Wolfe) Necessity yes   D: De-escalation of Antimicrobials/Pharmacotherapies yes    Plan for the day/ETD yes    Code Status:  Family/Goals of Care: Full Code  yes     Critical Care Time: 38 minutes  Critical secondary to Patient has a condition that poses threat to life and bodily function: Severe Respiratory Distress      Critical care was time spent personally by me on the following activities: development of treatment plan with patient or surrogate and bedside caregivers, discussions with consultants, evaluation of patient's response to treatment, examination of patient, ordering and performing treatments and interventions, ordering and review of laboratory studies, ordering and review of radiographic studies, pulse oximetry, re-evaluation of patient's condition. This critical care time did not overlap with that of any other provider or involve time for any procedures.     Neville Huerta MD  Critical Care Medicine  [facility] - Intensive Care [facility])

## 2023-08-13 NOTE — PT/OT/SLP PROGRESS
Physical Therapy      Patient Name:  Mary Ellen Fajardo   MRN:  [id number]    07:20 a.m.  Communicated with patient's nurse, Marina, who reported patient is still intubated at this time. Will follow up at next available opportunity.

## 2023-08-13 NOTE — ASSESSMENT & PLAN NOTE
8/13 EEG, Neuro consult. Continue keppra. Wean sedation as tolerated. CT showed.no new change. May consider CT in 48hrs.

## 2023-08-13 NOTE — PLAN OF CARE
Pt extubated to bipap this evening. Oriented to self and place. BP stable. Afebrile. NSR on monitor, 70-80s. NPO at this time. Had BM. Suprapubic catheter flushed & intact. Pt turned/repositioned with pillows. Heel boot in place. PIV, central line, art line intact. Bed low, wheels locked, alarms audible. POC reviewed with pt & family.

## 2023-08-13 NOTE — PROCEDURES
"Mary Ellen Fajardo is a 67 y.o. female patient.    Temp: 97.7 °F (36.5 °C) (08/12/23 1506)  Pulse: (!) 48 (08/12/23 1939)  Resp: 18 (08/12/23 1939)  BP: (!) 97/56 (08/12/23 1720)  SpO2: 100 % (08/12/23 1939)  Weight: 78.4 kg (172 lb 13.5 oz) (08/09/23 0522)  Height: 5' 5" (165.1 cm) (08/07/23 0630)       Intubation    Date/Time: 8/12/2023 10:50 AM  Location procedure was performed: [facility] INTENSIVE CARE UNIT    Performed by: Elieser Peralta NP  Authorized by: Neville Huerta MD  Consent Done: Emergent Situation  Indications: respiratory failure and hypercapnia  Intubation method: direct  Patient status: paralyzed (RSI)  Preoxygenation: AVAPS.  Sedatives: etomidate  Paralytic: rocuronium  Laryngoscope size: Mac 4  Tube size: 7.5 mm  Tube type: cuffed  Number of attempts: 1  Cords visualized: yes  Post-procedure assessment: chest rise and ETCO2 monitor  Breath sounds: diminished, equal and absent over the epigastrium and equal  Cuff inflated: yes  Tube secured with: ETT fermin  Chest x-ray interpreted by me and radiologist.  Chest x-ray findings: endotracheal tube in appropriate position  Patient tolerance: Patient tolerated the procedure well with no immediate complications          8/12/2023    "

## 2023-08-13 NOTE — ASSESSMENT & PLAN NOTE
Worsening hypercapnia  Intubated / sedated  CxR reviewed  Nebs and steroids.   Clinically better  Overventilated - vent settings changed. MV is 3-4L. Even at 4.5 L she was in the 7.5 Ph range.    Can consider SBT but was awaiting fmaily. Need a clear goals of care discussion. She was awaiting SNF placement when this happened. She has very poor reserve. High risk for reintubation if non compliant with NIPPV and potential need for trach.

## 2023-08-13 NOTE — SUBJECTIVE & OBJECTIVE
Objective:     Vital Signs (Most Recent):  Temp: 98.8 °F (37.1 °C) (08/13/23 1104)  Pulse: 81 (08/13/23 1350)  Resp: (!) 23 (08/13/23 1350)  BP: 116/77 (08/13/23 1350)  SpO2: 96 % (08/13/23 1350) Vital Signs (24h Range):  Temp:  [97.7 °F (36.5 °C)-98.9 °F (37.2 °C)] 98.8 °F (37.1 °C)  Pulse:  [45-86] 81  Resp:  [10-28] 23  SpO2:  [96 %-100 %] 96 %  BP: ()/() 116/77  Arterial Line BP: ()/(50-83) 123/68     Weight: 80.3 kg (177 lb 0.5 oz)  Body mass index is 29.46 kg/m².      Intake/Output Summary (Last 24 hours) at 8/13/2023 1354  Last data filed at 8/13/2023 1100  Gross per 24 hour   Intake 915.01 ml   Output 650 ml   Net 265.01 ml        Physical Exam  Vitals and nursing note reviewed.   HENT:      Head: Normocephalic.   Eyes:      Pupils: Pupils are equal, round, and reactive to light.   Cardiovascular:      Rate and Rhythm: Tachycardia present.      Heart sounds: Murmur heard.   Pulmonary:      Breath sounds: Rhonchi present.      Comments: Intubated            Review of Systems    Vents:  Vent Mode: (S) Spont (08/13/23 1040)  Ventilator Initiated: Yes (08/12/23 1120)  Set Rate: (S) 10 BPM (Peer Dr. Huerta) (08/13/23 0916)  Vt Set: 350 mL (08/13/23 0908)  Pressure Support: (S) 10 cmH20 (08/13/23 1040)  PEEP/CPAP: 5 cmH20 (08/13/23 1040)  Oxygen Concentration (%): 35 (08/13/23 1350)  Peak Airway Pressure: 16 cmH20 (08/13/23 1040)  Plateau Pressure: 22 cmH20 (08/13/23 1040)  Total Ve: 3.61 L/m (08/13/23 1040)  Negative Inspiratory Force (cm H2O): 0 (08/13/23 1040)  F/VT Ratio<105 (RSBI): 152.17 (08/13/23 1040)    Lines/Drains/Airways       Central Venous Catheter Line  Duration             Percutaneous Central Line Insertion/Assessment - Triple Lumen  08/12/23 1137 Internal Jugular Right 1 day              Drain  Duration                  Ureteral Drain/Stent 01/08/21 1028 Right ureter 6 Fr. 947 days         Suprapubic Catheter 08/07/23 0301 100% silicone 16 Fr. 6 days              Airway   Duration                  Airway - Non-Surgical 08/12/23 1120 Endotracheal Tube 1 day              Arterial Line  Duration             Arterial Line 08/12/23 1510 Right Radial <1 day              Peripheral Intravenous Line  Duration                  Peripheral IV - Single Lumen 08/12/23 0015 22 G Left;Posterior Forearm 1 day                    Significant Labs:    CBC/Anemia Profile:  Recent Labs   Lab 08/12/23  0537 08/13/23  0356 08/13/23  0356   WBC 5.13 9.02  --    HGB 9.3* 9.4*  --    HCT 32.0* 30.1* 29*    271  --    MCV 85 81*  --    RDW 17.5* 17.5*  --         Chemistries:  Recent Labs   Lab 08/12/23 0537 08/13/23  0356    140   K 4.6 4.5    100   CO2 32* 30*   BUN 14 15   CREATININE 0.8 0.8   CALCIUM 9.1 8.9   ALBUMIN 2.6*  --    PROT 7.4  --    BILITOT 0.2  --    ALKPHOS 52*  --    ALT 6*  --    AST 12  --    MG 2.2 2.0   PHOS  --  1.3*       All pertinent labs within the past 24 hours have been reviewed.    Significant Imaging:  I have reviewed all pertinent imaging results/findings within the past 24 hours.

## 2023-08-13 NOTE — PROCEDURES
"Mary Ellen Fajardo is a 67 y.o. female patient.    Temp: 97.7 °F (36.5 °C) (08/12/23 1506)  Pulse: (!) 48 (08/12/23 1939)  Resp: 18 (08/12/23 1939)  BP: (!) 97/56 (08/12/23 1720)  SpO2: 100 % (08/12/23 1939)  Weight: 78.4 kg (172 lb 13.5 oz) (08/09/23 0522)  Height: 5' 5" (165.1 cm) (08/07/23 0630)       Central Line    Date/Time: 8/12/2023 8:09 PM    Performed by: Elieser Peralta NP  Authorized by: Neville Huerta MD    Location procedure was performed:  [facility] INTENSIVE CARE UNIT  Consent Done ?:  Emergent Situation  Time out complete?: Verified correct patient, procedure, equipment, staff, and site/side    Indications:  Hemodynamic monitoring and vascular access  Anesthesia:  Local infiltration  Local anesthetic:  Lidocaine 1% without epinephrine  Preparation:  Skin prepped with ChloraPrep  Location:  Right internal jugular  Catheter type:  Triple lumen  Catheter size:  7 Fr  Ultrasound guidance: Yes    Needle advanced into vessel with real time ultrasound guidance.    Guidewire confirmed in vessel.    Steril sheath on probe.    Sterile gel used.  Manometry: No    Number of attempts:  1  Securement:  Line sutured, chlorhexidine patch, sterile dressing applied and blood return through all ports  Complications: No    XRay:  Placement verified by x-ray, no pneumothorax on x-ray and successful placement  Adverse Events:  NoneTermination Site: superior vena cava      8/12/2023    "

## 2023-08-13 NOTE — PLAN OF CARE
Plan of care review with grandpepe Lal ([phone]) at bedside, Update by Provider Maricel MAYA at bedside. Drips titrated for goal RASS ordered, restraints in place for patient safety, call light within reach. Bradycardia with heart rate in the 40s, provider notified. Wound care completed and complete bed bath given.     Problem: Impaired Wound Healing  Goal: Optimal Wound Healing  Outcome: Ongoing, Progressing  Intervention: Promote Wound Healing  Flowsheets (Taken 8/13/2023 0718)  Oral Nutrition Promotion: safe use of adaptive equipment encouraged  Sleep/Rest Enhancement:   awakenings minimized   relaxation techniques promoted  Activity Management: Patient unable to perform activities  Pain Management Interventions:   relaxation techniques promoted   pillow support provided   position adjusted     Problem: Communication Impairment (Mechanical Ventilation, Invasive)  Goal: Effective Communication  Outcome: Ongoing, Progressing  Intervention: Ensure Effective Communication  Flowsheets (Taken 8/13/2023 0721)  Communication Enhancement Strategies: communication board used     Problem: Restraint, Nonbehavioral (Nonviolent)  Goal: Absence of Harm or Injury  Outcome: Ongoing, Progressing  Intervention: Implement Least Restrictive Safety Strategies  Flowsheets (Taken 8/13/2023 0645)  Medical Device Protection:   IV pole/bag removed from visual field   tubing secured  Less Restrictive Alternative:   bed alarm in use   calming techniques promoted   environment adjusted   sensory stimulation limited  De-Escalation Techniques:   medication administered   stimulation decreased  Intervention: Protect Skin and Joint Integrity  Flowsheets (Taken 8/13/2023 0645)  Body Position: position maintained  Range of Motion: ROM (range of motion) performed  Intervention: Education  Flowsheets (Taken 8/13/2023 0645)  Discontinuation Criteria: Absence of behavior that required restraint  Criteria Explained: Yes  Patient's Response:  NL  Patient / Family Notification:   Patient   Family (who)  Patient / Family Teaching:   Need for restraint   Restraint monitoring   Reevaluation time frames   Attempted alternatives   DC criteria  Intervention: Restraint Monitoring Q2 hours w/Assessment for Injury  Flowsheets (Taken 8/13/2023 9264)  Visual Check: A  Circulation: NS  Range of Motion: P  Fluids: N  Food/Meal: N  Elimination: UC  Pain Rating (0-10): Rest: 0  Privacy Maintained: Yes  Vital Signs per MD order: Yes  Intervention: Restraint Injuries Monitor Q2 hours  Flowsheets (Taken 8/13/2023 1838)  Injuries Noted: None

## 2023-08-13 NOTE — ASSESSMENT & PLAN NOTE
Echo report from 4/2021   The left ventricle is normal in size with mild concentric hypertrophy and normal systolic function.   The estimated ejection fraction is 55%.   Indeterminate left ventricular diastolic function.   Normal right ventricular size with normal right ventricular systolic function.   Mild left atrial enlargement.   Normal central venous pressure (3 mmHg).   The estimated PA systolic pressure is 24 mmHg      - strict I&Os  - Continue diuretics prn to keep her slightly negative

## 2023-08-14 LAB
ANION GAP SERPL CALC-SCNC: 9 MMOL/L (ref 8–16)
BASOPHILS # BLD AUTO: 0.05 K/UL (ref 0–0.2)
BASOPHILS NFR BLD: 0.6 % (ref 0–1.9)
BUN SERPL-MCNC: 12 MG/DL (ref 8–23)
CALCIUM SERPL-MCNC: 8.8 MG/DL (ref 8.7–10.5)
CHLORIDE SERPL-SCNC: 100 MMOL/L (ref 95–110)
CO2 SERPL-SCNC: 32 MMOL/L (ref 23–29)
CREAT SERPL-MCNC: 0.7 MG/DL (ref 0.5–1.4)
DIFFERENTIAL METHOD: ABNORMAL
EOSINOPHIL # BLD AUTO: 0.1 K/UL (ref 0–0.5)
EOSINOPHIL NFR BLD: 1.5 % (ref 0–8)
ERYTHROCYTE [DISTWIDTH] IN BLOOD BY AUTOMATED COUNT: 18 % (ref 11.5–14.5)
EST. GFR  (NO RACE VARIABLE): >60 ML/MIN/1.73 M^2
GLUCOSE SERPL-MCNC: 73 MG/DL (ref 70–110)
HCT VFR BLD AUTO: 28.5 % (ref 37–48.5)
HGB BLD-MCNC: 8.7 G/DL (ref 12–16)
IMM GRANULOCYTES # BLD AUTO: 0.04 K/UL (ref 0–0.04)
IMM GRANULOCYTES NFR BLD AUTO: 0.5 % (ref 0–0.5)
LACTATE SERPL-SCNC: 0.7 MMOL/L (ref 0.5–2.2)
LYMPHOCYTES # BLD AUTO: 1.5 K/UL (ref 1–4.8)
LYMPHOCYTES NFR BLD: 19.6 % (ref 18–48)
MAGNESIUM SERPL-MCNC: 2 MG/DL (ref 1.6–2.6)
MCH RBC QN AUTO: 24.9 PG (ref 27–31)
MCHC RBC AUTO-ENTMCNC: 30.5 G/DL (ref 32–36)
MCV RBC AUTO: 82 FL (ref 82–98)
MONOCYTES # BLD AUTO: 0.6 K/UL (ref 0.3–1)
MONOCYTES NFR BLD: 8.2 % (ref 4–15)
NEUTROPHILS # BLD AUTO: 5.4 K/UL (ref 1.8–7.7)
NEUTROPHILS NFR BLD: 69.6 % (ref 38–73)
NRBC BLD-RTO: 0 /100 WBC
PHOSPHATE SERPL-MCNC: 3.3 MG/DL (ref 2.7–4.5)
PLATELET # BLD AUTO: 253 K/UL (ref 150–450)
PMV BLD AUTO: 9 FL (ref 9.2–12.9)
POCT GLUCOSE: 93 MG/DL (ref 70–110)
POTASSIUM SERPL-SCNC: 3.6 MMOL/L (ref 3.5–5.1)
PROCALCITONIN SERPL IA-MCNC: 0.17 NG/ML
RBC # BLD AUTO: 3.49 M/UL (ref 4–5.4)
SODIUM SERPL-SCNC: 141 MMOL/L (ref 136–145)
WBC # BLD AUTO: 7.8 K/UL (ref 3.9–12.7)

## 2023-08-14 PROCEDURE — 27000221 HC OXYGEN, UP TO 24 HOURS

## 2023-08-14 PROCEDURE — 83735 ASSAY OF MAGNESIUM: CPT | Performed by: NURSE PRACTITIONER

## 2023-08-14 PROCEDURE — 97530 THERAPEUTIC ACTIVITIES: CPT

## 2023-08-14 PROCEDURE — 99900035 HC TECH TIME PER 15 MIN (STAT)

## 2023-08-14 PROCEDURE — 84100 ASSAY OF PHOSPHORUS: CPT | Performed by: NURSE PRACTITIONER

## 2023-08-14 PROCEDURE — 93010 EKG 12-LEAD: ICD-10-PCS | Mod: ,,, | Performed by: INTERNAL MEDICINE

## 2023-08-14 PROCEDURE — 85025 COMPLETE CBC W/AUTO DIFF WBC: CPT | Performed by: NURSE PRACTITIONER

## 2023-08-14 PROCEDURE — 25000003 PHARM REV CODE 250: Performed by: EMERGENCY MEDICINE

## 2023-08-14 PROCEDURE — 25000003 PHARM REV CODE 250: Performed by: HOSPITALIST

## 2023-08-14 PROCEDURE — 27100171 HC OXYGEN HIGH FLOW UP TO 24 HOURS

## 2023-08-14 PROCEDURE — 11000001 HC ACUTE MED/SURG PRIVATE ROOM

## 2023-08-14 PROCEDURE — 63600175 PHARM REV CODE 636 W HCPCS: Performed by: NURSE PRACTITIONER

## 2023-08-14 PROCEDURE — 84145 PROCALCITONIN (PCT): CPT | Performed by: NURSE PRACTITIONER

## 2023-08-14 PROCEDURE — 63600175 PHARM REV CODE 636 W HCPCS: Performed by: HOSPITALIST

## 2023-08-14 PROCEDURE — 93005 ELECTROCARDIOGRAM TRACING: CPT

## 2023-08-14 PROCEDURE — 93010 ELECTROCARDIOGRAM REPORT: CPT | Mod: ,,, | Performed by: INTERNAL MEDICINE

## 2023-08-14 PROCEDURE — 94660 CPAP INITIATION&MGMT: CPT

## 2023-08-14 PROCEDURE — 80048 BASIC METABOLIC PNL TOTAL CA: CPT | Performed by: NURSE PRACTITIONER

## 2023-08-14 PROCEDURE — 83605 ASSAY OF LACTIC ACID: CPT | Performed by: NURSE PRACTITIONER

## 2023-08-14 PROCEDURE — 25000003 PHARM REV CODE 250: Performed by: NURSE PRACTITIONER

## 2023-08-14 PROCEDURE — 25000003 PHARM REV CODE 250: Performed by: INTERNAL MEDICINE

## 2023-08-14 RX ORDER — ONDANSETRON 2 MG/ML
4 INJECTION INTRAMUSCULAR; INTRAVENOUS ONCE
Status: COMPLETED | OUTPATIENT
Start: 2023-08-14 | End: 2023-08-14

## 2023-08-14 RX ORDER — QUETIAPINE FUMARATE 100 MG/1
200 TABLET, FILM COATED ORAL NIGHTLY
Status: DISCONTINUED | OUTPATIENT
Start: 2023-08-14 | End: 2023-08-16 | Stop reason: HOSPADM

## 2023-08-14 RX ORDER — LEVETIRACETAM 500 MG/1
1000 TABLET ORAL 2 TIMES DAILY
Status: DISCONTINUED | OUTPATIENT
Start: 2023-08-14 | End: 2023-08-16 | Stop reason: HOSPADM

## 2023-08-14 RX ORDER — POTASSIUM CHLORIDE 20 MEQ/1
40 TABLET, EXTENDED RELEASE ORAL ONCE
Status: COMPLETED | OUTPATIENT
Start: 2023-08-14 | End: 2023-08-14

## 2023-08-14 RX ADMIN — POTASSIUM CHLORIDE 40 MEQ: 1500 TABLET, EXTENDED RELEASE ORAL at 09:08

## 2023-08-14 RX ADMIN — CITALOPRAM HYDROBROMIDE 30 MG: 20 TABLET ORAL at 09:08

## 2023-08-14 RX ADMIN — OXYBUTYNIN CHLORIDE 5 MG: 5 TABLET ORAL at 09:08

## 2023-08-14 RX ADMIN — BUMETANIDE 2 MG: 1 TABLET ORAL at 09:08

## 2023-08-14 RX ADMIN — ENOXAPARIN SODIUM 40 MG: 40 INJECTION SUBCUTANEOUS at 05:08

## 2023-08-14 RX ADMIN — DOCUSATE SODIUM 100 MG: 100 CAPSULE, LIQUID FILLED ORAL at 09:08

## 2023-08-14 RX ADMIN — LEVETIRACETAM 1000 MG: 500 TABLET, FILM COATED ORAL at 09:08

## 2023-08-14 RX ADMIN — QUETIAPINE FUMARATE 200 MG: 100 TABLET ORAL at 09:08

## 2023-08-14 RX ADMIN — POLYETHYLENE GLYCOL 3350 17 G: 17 POWDER, FOR SOLUTION ORAL at 09:08

## 2023-08-14 RX ADMIN — OXYCODONE HYDROCHLORIDE AND ACETAMINOPHEN 1 TABLET: 5; 325 TABLET ORAL at 09:08

## 2023-08-14 RX ADMIN — ONDANSETRON 4 MG: 2 INJECTION INTRAMUSCULAR; INTRAVENOUS at 09:08

## 2023-08-14 NOTE — PLAN OF CARE
Updated progress notes sent to [facility]. If authorization has lapsed, advised facility to submit again.

## 2023-08-14 NOTE — PROGRESS NOTES
[facility] ([facility] Medicine  Progress Note    Patient Name: Mary Ellen Fajardo  MRN: [id number]  Patient Class: IP- Inpatient   Admission Date: 8/6/2023  Length of Stay: 8 days  Attending Physician: Neville Huerta MD  Primary Care Provider: Any Tran MD        Subjective:     Principal Problem:Acute on chronic respiratory failure with hypoxia and hypercapnia        HPI:  Patient is 67 year old female with past medical history significant for stroke, hypertension, COPD, CHF, chronic respiratory failure on home oxygen (2 L/min NC), ADEEL, neurogenic bladder with chronic indwelling suprapubic catheter, left AKA, paraparesis, and wounds who presented to ED due to generalized weakness, confusion, and painful urination. Patient complains of chest pain on/off, abdominal pain, and bilateral leg pain. ABG revealed mild acidosis with hypercapnia. She is on continuous BIPAP at this time. She does have UTI and suprapubic catheter should be changed out. She has been given Zosyn. Lactic acid is normal however procalcitonin is elevated. BP is elevated. There is evidence of congestive heart failure on CXR. US was done due to leg pain, it is negative for DVT. There is no leukocytosis or fever. Critical care team consulted for admission to ICU due to hypercapnic respiratory failure requiring continuous BIPAP and for treatment of UTI.      Overview/Hospital Course:  8/7: placed on home 2L this AM, continue NIPPV qHS and PRN naps, no new issues or c/o on exam, no family at bedside  ------  Chart reviewed, examined patient at bedside this afternoon, asking to restart her home anxiety and pain medication.  Currently stable on 2L NC, stable for floor transfer.    8/8: NAEON. Patient c/o bladder spasms, asking for medication, states previously on oxybutynin.  BP marginal this AM. Cr increase from 0.9 --> 1.3, will stop Bumex  Urine culture with >100k GNR, speciation pending, continue  ceftriaxone  Remains stable on 2L NC  SNF placement pending    8/9: RAGINIEON. Patient asking for her home muscle relaxer, will restart  Cr decreased to 1.1. Restart home PO Bumex  Urine culture results pending, continue ceftriaxone  1 of 2 blood culture polymicrobial, likely contaminant  Remains stable on 2L NC  SNF placement pending    8/10: MELISSAON. Patient in bed, denies new issues, stable on 2L NC  Urine culture +proteus, sensitives reviewed, continue ceftriaxone  SNF placement pending    8/11: LACI, SNF facility refused acceptance due to reported no BM x 3 days  No BMs documented since ICU transfer, start daily miralax and colace    8/12: Overnight events reviewed, patient noted to be lethargic overnight  ABG results reviewed, hypercapnic, currently on AVAPS  CT Head with no acute findings  Remains lethargic, repeat ABG ordered, transfer to ICU    8/14- Pt seen and examined, chart reviewed. Briefly, 67 year old AAF, hx of stroke, HTN, COPD, CHF, chronic respiratory failure on home O2, ADEEL, neurogenic bladder with chronic indwelling suprapubic catheter, left AKA, paraparesis, and wounds, admitted 8/6 with generalized weakness, confusion, and painful urination, B leg pain, found to have mild Resp Acidosis, treated with continuous BIPAP. Also had UTI and suprapubic catheter changed, treated Zosyn. Lactic acid was normal, Procalcitonin was elevated. BP was elevated with evidence of CHF on CXR. US legs - due to leg pain, Neg for DVT.    On 812- Pt became lethargic with worsening hypercapnic respiratory failure - transferred to ICU- changed from Bipap to AVPS- ABG still worsening hypercapnia, AMS and unable to follow commands. Intubated /sedated. Post intubation had abnormal motor tonic clonic activity-Ativan given. Keppra ordered. Placed on diprivan. CT head Neg. Extubated yesterday 8/13 and now stable on 2 L NC. Now being transferred out of ICU, AAOx3, speech clear. PO4 low- replaced. No new issues. All Cx remain NGTD,  off all Abx.         Interval History: Extubated yesterday 8/13 and now stable on 2 L NC. Now being transferred out of ICU, AAOx3, speech clear. PO4 low- replaced. No new issues. All Cx remain NGTD, off all Abx.       Review of Systems   Constitutional:  Positive for activity change and fatigue.   HENT: Negative.     Eyes: Negative.  Negative for visual disturbance.   Respiratory: Negative.  Negative for cough, shortness of breath and wheezing.    Cardiovascular: Negative.    Gastrointestinal: Negative.    Endocrine: Negative.    Genitourinary: Negative.    Musculoskeletal:  Positive for gait problem. Negative for arthralgias.   Skin: Negative.    Allergic/Immunologic: Negative.    Neurological:  Positive for seizures.   Hematological: Negative.    Psychiatric/Behavioral: Negative.       Objective:     Vital Signs (Most Recent):  Temp: 98.5 °F (36.9 °C) (08/14/23 1115)  Pulse: 77 (08/14/23 1130)  Resp: (!) 27 (08/14/23 1130)  BP: 128/70 (08/14/23 1100)  SpO2: 98 % (08/14/23 1130) Vital Signs (24h Range):  Temp:  [98.5 °F (36.9 °C)-98.9 °F (37.2 °C)] 98.5 °F (36.9 °C)  Pulse:  [57-91] 77  Resp:  [15-31] 27  SpO2:  [94 %-100 %] 98 %  BP: (105-154)/(56-86) 128/70  Arterial Line BP: (106-130)/(59-71) 130/70     Weight: 80.3 kg (177 lb 0.5 oz)  Body mass index is 29.46 kg/m².    Intake/Output Summary (Last 24 hours) at 8/14/2023 1228  Last data filed at 8/14/2023 1137  Gross per 24 hour   Intake 460.11 ml   Output 805 ml   Net -344.89 ml         Physical Exam  Vitals and nursing note reviewed.   Constitutional:       General: She is not in acute distress.  Cardiovascular:      Rate and Rhythm: Normal rate and regular rhythm.      Pulses: Normal pulses.      Heart sounds: No murmur heard.  Pulmonary:      Effort: Pulmonary effort is normal. No respiratory distress.      Breath sounds: Rales (bibasilar) present. No wheezing or rhonchi.   Abdominal:      General: Abdomen is flat. There is no distension.      Palpations:  Abdomen is soft.      Tenderness: There is no abdominal tenderness.   Musculoskeletal:      Right lower leg: Edema (trace) present.      Comments: Left amputation   Neurological:      Mental Status: She is alert and oriented to person, place, and time. Mental status is at baseline.   Psychiatric:         Mood and Affect: Mood normal.         Behavior: Behavior normal.             Significant Labs: All pertinent labs within the past 24 hours have been reviewed.  ABGs:   Recent Labs   Lab 08/12/23  1527 08/13/23  0356   PH 7.652* 7.506*   PCO2 31.6* 42.9   HCO3 35.0* 33.9*   POCSATURATED 99 94*   BE 14 11   PO2 107* 67*     Blood Culture:   Recent Labs   Lab 08/12/23 2003   LABBLOO No Growth to date  No Growth to date     BMP:   Recent Labs   Lab 08/14/23  0428   GLU 73      K 3.6      CO2 32*   BUN 12   CREATININE 0.7   CALCIUM 8.8   MG 2.0     CBC:   Recent Labs   Lab 08/13/23  0356 08/13/23  0356 08/14/23  0428   WBC 9.02  --  7.80   HGB 9.4*  --  8.7*   HCT 30.1* 29* 28.5*     --  253     Magnesium:   Recent Labs   Lab 08/13/23  0356 08/14/23  0428   MG 2.0 2.0       Significant Imaging: I have reviewed all pertinent imaging results/findings within the past 24 hours.      Assessment/Plan:      * Acute on chronic respiratory failure with hypoxia and hypercapnia  Patient with Hypercapnic and Hypoxic Respiratory failure which is Acute on chronic.  she is on home oxygen at 2 LPM. Supplemental oxygen was provided and noted- Vent Mode: Spont  Oxygen Concentration (%):  [35-36] (P) 35  Resp Rate Total:  [21 br/min-34 br/min] 21 br/min  PEEP/CPAP:  [5 cmH20] 5 cmH20  Pressure Support:  [7 cmH20-10 cmH20] 7 cmH20  Mean Airway Pressure:  [7.2 cmH20-8.2 cmH20] 7.2 cmH20    .   Signs/symptoms of respiratory failure include- respiratory distress and lethargy. Contributing diagnoses includes - CHF and COPD Labs and images were reviewed. Patient Has recent ABG, which has been reviewed. Will treat underlying  causes and adjust management of respiratory failure as follows:    8/7/23  Transitioned to 2L NC this AM, her home flow  Continue with NIPPV qHS and PRN naps    8/11/23  Remains stable on 2L NC  Continue with NIPPV qHS and PRN naps    8/12/23  Lethargic overnight, ABG with worsening hypercapnia  BiPAP changed to AVAPS overnight  Transfer to ICU, concern for further deterioration    8/14- being transferred out after she was intubated on vent for severe hypercapnia on 8/12- doing better now    Acute on chronic diastolic heart failure  Echo report from 4/2021   The left ventricle is normal in size with mild concentric hypertrophy and normal systolic function.   The estimated ejection fraction is 55%.   Indeterminate left ventricular diastolic function.   Normal right ventricular size with normal right ventricular systolic function.   Mild left atrial enlargement.   Normal central venous pressure (3 mmHg).   The estimated PA systolic pressure is 24 mmHg     Echo    Result Date: 8/7/2023    Left Ventricle: Normal wall motion. There is low normal systolic   function with a visually estimated ejection fraction of 50 - 55%.    Right Ventricle: Normal right ventricular cavity size.        Continue Bumex 1 mg IV BID, transition to PO when appropriate  Strict I/O's, daily weights, Na/fluid restriction, AM labs    8/8/23  Hold Bumex today with marginal BP and rise in serum Cr  AM labs, consider restarting tomorrow    8/10/23  Cr improved, resume home PO Bumex today    8/14- cont Bumex    Urinary tract infection associated with cystostomy catheter  Suprapubic cath changed upon arrival to the ICU  Urine culture +proteus, sensitivities reviewed  Continue ceftriaxone, antibiotic day # 6, plan for 7 day course     Abx completed      Wounds, multiple  Wound Care consulted  Turn q2h, supportive care    Anemia  H/H stable, near baseline  Monitor as needed    Give Venofer, Triple Vitamins    History of CVA (cerebrovascular  accident)  Bed bound at baseline with limited help at home   PT/OT, supportive care  Med mgmt consulted for assistance with SNF placement    ADEEL (obstructive sleep apnea)  Noncompliant with home NIPPV  NIPPV qHS and PRN    New onset seizure  On Keppra      Pulmonary HTN  Echo    Result Date: 8/7/2023    Left Ventricle: Normal wall motion. There is low normal systolic   function with a visually estimated ejection fraction of 50 - 55%.    Right Ventricle: Normal right ventricular cavity size.      Plan as above    COPD (chronic obstructive pulmonary disease)  Not in exacerbation  On home 2L NC  PRN breathing treatments    Essential hypertension  BP stable  Resume home meds    Paraparesis  Turn q2h  Supportive care      VTE Risk Mitigation (From admission, onward)         Ordered     enoxaparin injection 40 mg  Daily         08/06/23 2322     IP VTE HIGH RISK PATIENT  Once         08/06/23 2322     Place sequential compression device  Until discontinued         08/06/23 2322                Discharge Planning   LOY: 8/11/2023     Code Status: Full Code   Is the patient medically ready for discharge?:     Reason for patient still in hospital (select all that apply): Patient trending condition, Laboratory test, Treatment, Imaging, Consult recommendations and PT / OT recommendations  Discharge Plan A: Skilled Nursing Facility   Discharge Delays: None known at this time      Ray Key MD  Department of Hospital Medicine   [facility] - Intensive Care ([facility])

## 2023-08-14 NOTE — ASSESSMENT & PLAN NOTE
Intubated 8/12 and extubated 8/13  Continue bronchodilators  Oxygenation stable on 2L (home O2) currently and she is being compliant with Bipap qhs

## 2023-08-14 NOTE — PLAN OF CARE
P.T. EVAL COMPLETE.  PT CURRENTLY REQUIRES MAXA X 2 FOR BED MOBILITY.  P.T. RECOMMENDS SNF AT D/C

## 2023-08-14 NOTE — PROGRESS NOTES
[facility] ([facility])  Wound Care    Patient Name:  Mary Ellen Fajardo   MRN:  [id number]  Date: 2023  Diagnosis: Acute on chronic respiratory failure with hypoxia and hypercapnia    History:     Past Medical History:   Diagnosis Date    Abdominal hernia     Addiction to drug     Alcohol abuse     Anxiety     Arthritis     Asthma     Bipolar disorder     Bladder stones     CHF (congestive heart failure)     COPD (chronic obstructive pulmonary disease)     on home o2    CVA (cerebral vascular accident)         Hallucination     Hepatitis C     treated and cured    History of blood clots     Hx of psychiatric care     Hypertension     Belen     New onset seizure 2023    Obese body habitus     On home oxygen therapy     ADEEL (obstructive sleep apnea)     ADEEL treated with BiPAP     Paraplegia     Paraplegic spinal paralysis     Psychiatric problem     Psychosis     AVH; Paranoia    Renal disorder     Requires assistance with activities of daily living (ADL)     SCI (spinal cord injury)     incomplete    Sleep difficulties     has sleep apnea and uses a Bi-PAP machine.    Status post amputation of leg 2019    Substance abuse     Suprapubic catheter 03/15/2011    Therapy     Vaginal delivery     x1    Wheelchair dependence        Social History     Socioeconomic History    Marital status:      Spouse name: Sundeep    Number of children: 1   Occupational History    Occupation: Disabled   Tobacco Use    Smoking status: Former     Current packs/day: 0.00     Average packs/day: 0.5 packs/day for 25.0 years (12.5 ttl pk-yrs)     Types: Cigarettes     Start date:      Quit date: 2002     Years since quittin.6    Smokeless tobacco: Never   Substance and Sexual Activity    Alcohol use: Not Currently     Comment: occasional    Drug use: Not Currently     Comment: prescribed opioids at present for pain    Sexual activity: Not Currently     Partners: Male     Comment: since    Other  Topics Concern    Patient feels they ought to cut down on drinking/drug use No    Patient annoyed by others criticizing their drinking/drug use No    Patient has felt bad or guilty about drinking/drug use Yes    Patient has had a drink/used drugs as an eye opener in the AM No   Social History Narrative    Strained marriage.    Disabled.       Precautions:     Allergies as of 08/06/2023 - Reviewed 08/06/2023   Allergen Reaction Noted    Tuberculin ppd Itching and Dermatitis 09/20/2019    Rocephin [ceftriaxone] Rash 10/03/2012       [facility] Assessment Details/Treatment     F/U visit with ms. Fajardo for ongoing wound care - she is awake and alert, primary nurse and PCT at bedside during assessment.   Intertrigo noted to left lower breast fold - interdry sheet applied into fold.  IAD again noted to medial thigh with great improvement in prior condition. moisture barrier paste in use. Sacrum, buttock intact with no redness or breakdown noted.  Right lateral/posterior breast abrasion has re-epithelialized, FRANCIA. Pink scar tissue intact. Right lateral upper back prior suspected abscess site has resolved, no remaining induration or redness is noted. FRANCIA.  Recommend ongoing HAPI prevention interventions.       08/14/2023

## 2023-08-14 NOTE — NURSING
Pt AAOx2; self and place. GCS 14; confused. VSS. Afebrile. SR on monitor. 4LNC when awake; BiPAP at night. Suprapubic catheter in place; 135mL UOP this shift. No BM this shift. No accuchecks.    Pt resting comfortably in bed with side rails up x3; locked and lowered with alarm set. Call light in place. Advised pt to call out if anything is needed. Pt verbalized understanding.

## 2023-08-14 NOTE — PT/OT/SLP PROGRESS
Physical Therapy Treatment    Patient Name:  Mary Ellen Fajardo   MRN:  [id number]    Recommendations:     Discharge Recommendations: nursing facility, skilled  Discharge Equipment Recommendations: to be determined by next level of care  Barriers to discharge: Decreased caregiver support    Assessment:     Mary Ellen Fajardo is a 67 y.o. female admitted with a medical diagnosis of Acute on chronic respiratory failure with hypoxia and hypercapnia.  She presents with the following impairments/functional limitations: weakness, impaired endurance, impaired functional mobility, impaired balance, decreased safety awareness, decreased coordination, decreased upper extremity function, decreased lower extremity function, impaired cardiopulmonary response to activity.    Rehab Prognosis: Fair; patient would benefit from acute skilled PT services to address these deficits and reach maximum level of function.    Recent Surgery: * No surgery found *     Plan:     During this hospitalization, patient to be seen 3 x/week to address the identified rehab impairments via therapeutic exercises, therapeutic activities and progress toward the following goals:    Plan of Care Expires:  08/28/23    Subjective     Chief Complaint: WEAKNESS  Patient/Family Comments/goals:   Pain/Comfort:  Pain Rating 1: 0/10      Objective:     Communicated with NURSE SALMERON prior to session.  Patient found supine with telemetry, peripheral IV, pulse ox (continuous), blood pressure cuff, oxygen, central line (SUPRAPUBIC CATHETER) upon PT entry to room.     General Precautions: Standard, fall (L AKA)  Orthopedic Precautions:  (L AKA)  Braces: N/A  Respiratory Status: Nasal cannula, flow 2 L/min     Functional Mobility:  Bed Mobility:     Rolling Left:  maximal assistance  Rolling Right: maximal assistance  Scooting: maximal assistance and of 2 persons  Bridging: maximal assistance and of 2 persons  Supine to Sit: maximal assistance and of 2 persons  Sit  "to Supine: maximal assistance and of 2 persons  Balance: POOR SITTING BALANCE, TOLERATED SITTING APPROX. 10 MINUTES WITH MAXA X 2 TO MAINTAIN UPRIGHT POSTURE, LIMITED BY POSTERIOR PUSH, UNABLE TO TOUCH GROUND DUE TO HIGH BED-NOTIFIED NURSE    AM-PAC 6 CLICK MOBILITY  Turning over in bed (including adjusting bedclothes, sheets and blankets)?: 2  Sitting down on and standing up from a chair with arms (e.g., wheelchair, bedside commode, etc.): 1  Moving from lying on back to sitting on the side of the bed?: 2  Moving to and from a bed to a chair (including a wheelchair)?: 1  Need to walk in hospital room?: 1  Climbing 3-5 steps with a railing?: 1  Basic Mobility Total Score: 8     Treatment & Education:  PT EDUCATION:  - ROLE OF P.T. AND POC IN ACUTE CARE HOSPITAL SETTING  - TO CONTINUE SUPINE THERAPUETIC EXERCISES THROUGHOUT THE DAY TO INCREASE ACTIVITY TOLERANCE AND DECREASE RISK FOR PNEUMONIA AND BLOOD CLOTS: HIP FLEX/EXT, HIP ABD/ADD, QUAD SET, HEEL SLIDE, AP  - RISK FOR FALLS DUE TO GENERALIZED WEAKNESS, EDUCATED ON "CALL DON'T FALL", ENCOURAGED TO CALL FOR ASSISTANCE WITH ALL NEEDS SUCH AS BED<>CHAIR TRANSFERS OR TRIPS TO BATHROOM, PT AGREEABLE TO SAFETY PRECAUTIONS    Patient left HOB elevated with all lines intact, call button in reach, chair alarm on, and NURSE notified..    GOALS:   Multidisciplinary Problems       Physical Therapy Goals          Problem: Physical Therapy    Goal Priority Disciplines Outcome Goal Variances Interventions   Physical Therapy Goal     PT, PT/OT Ongoing, Progressing     Description: LTG'S TO BE MET IN 14 DAYS (8-28-23)  PT WILL REQUIRE MODA FOR BED MOBILITY  PT WILL REQUIRE MAXA FOR BED<>CHAIR TF'S  PT WILL INC AMPAC SCORE BY 2 POINTS TO PROGRESS GROSS FUNC MOBILITY                         Time Tracking:     PT Received On: 08/14/23  PT Start Time: 0800     PT Stop Time: 0825  PT Total Time (min): 25 min     Billable Minutes: Therapeutic Activity 25    Treatment Type: " Treatment  PT/PTA: PT     Number of PTA visits since last PT visit: 0     08/14/2023

## 2023-08-14 NOTE — SUBJECTIVE & OBJECTIVE
Interval History: Extubated yesterday 8/13 and now stable on 2 L NC. Now being transferred out of ICU, AAOx3, speech clear. PO4 low- replaced. No new issues. All Cx remain NGTD, off all Abx.       Review of Systems   Constitutional:  Positive for activity change and fatigue.   HENT: Negative.     Eyes: Negative.  Negative for visual disturbance.   Respiratory: Negative.  Negative for cough, shortness of breath and wheezing.    Cardiovascular: Negative.    Gastrointestinal: Negative.    Endocrine: Negative.    Genitourinary: Negative.    Musculoskeletal:  Positive for gait problem. Negative for arthralgias.   Skin: Negative.    Allergic/Immunologic: Negative.    Neurological:  Positive for seizures.   Hematological: Negative.    Psychiatric/Behavioral: Negative.       Objective:     Vital Signs (Most Recent):  Temp: 98.5 °F (36.9 °C) (08/14/23 1115)  Pulse: 77 (08/14/23 1130)  Resp: (!) 27 (08/14/23 1130)  BP: 128/70 (08/14/23 1100)  SpO2: 98 % (08/14/23 1130) Vital Signs (24h Range):  Temp:  [98.5 °F (36.9 °C)-98.9 °F (37.2 °C)] 98.5 °F (36.9 °C)  Pulse:  [57-91] 77  Resp:  [15-31] 27  SpO2:  [94 %-100 %] 98 %  BP: (105-154)/(56-86) 128/70  Arterial Line BP: (106-130)/(59-71) 130/70     Weight: 80.3 kg (177 lb 0.5 oz)  Body mass index is 29.46 kg/m².    Intake/Output Summary (Last 24 hours) at 8/14/2023 1228  Last data filed at 8/14/2023 1137  Gross per 24 hour   Intake 460.11 ml   Output 805 ml   Net -344.89 ml         Physical Exam  Vitals and nursing note reviewed.   Constitutional:       General: She is not in acute distress.  Cardiovascular:      Rate and Rhythm: Normal rate and regular rhythm.      Pulses: Normal pulses.      Heart sounds: No murmur heard.  Pulmonary:      Effort: Pulmonary effort is normal. No respiratory distress.      Breath sounds: Rales (bibasilar) present. No wheezing or rhonchi.   Abdominal:      General: Abdomen is flat. There is no distension.      Palpations: Abdomen is soft.       Tenderness: There is no abdominal tenderness.   Musculoskeletal:      Right lower leg: Edema (trace) present.      Comments: Left amputation   Neurological:      Mental Status: She is alert and oriented to person, place, and time. Mental status is at baseline.   Psychiatric:         Mood and Affect: Mood normal.         Behavior: Behavior normal.             Significant Labs: All pertinent labs within the past 24 hours have been reviewed.  ABGs:   Recent Labs   Lab 08/12/23  1527 08/13/23  0356   PH 7.652* 7.506*   PCO2 31.6* 42.9   HCO3 35.0* 33.9*   POCSATURATED 99 94*   BE 14 11   PO2 107* 67*     Blood Culture:   Recent Labs   Lab 08/12/23 2003   LABBLOO No Growth to date  No Growth to date     BMP:   Recent Labs   Lab 08/14/23  0428   GLU 73      K 3.6      CO2 32*   BUN 12   CREATININE 0.7   CALCIUM 8.8   MG 2.0     CBC:   Recent Labs   Lab 08/13/23  0356 08/13/23  0356 08/14/23  0428   WBC 9.02  --  7.80   HGB 9.4*  --  8.7*   HCT 30.1* 29* 28.5*     --  253     Magnesium:   Recent Labs   Lab 08/13/23  0356 08/14/23  0428   MG 2.0 2.0       Significant Imaging: I have reviewed all pertinent imaging results/findings within the past 24 hours.

## 2023-08-14 NOTE — ASSESSMENT & PLAN NOTE
Echo report from 4/2021   The left ventricle is normal in size with mild concentric hypertrophy and normal systolic function.   The estimated ejection fraction is 55%.   Indeterminate left ventricular diastolic function.   Normal right ventricular size with normal right ventricular systolic function.   Mild left atrial enlargement.   Normal central venous pressure (3 mmHg).   The estimated PA systolic pressure is 24 mmHg    - continue oral diuresis with goal of slight negative fluid balance  - strict I&Os

## 2023-08-14 NOTE — SUBJECTIVE & OBJECTIVE
Interval History:   No acute events since extubation yesterday.  She is alert and conversant this morning.  Oxygenation is stable on home 2L and she was compliant with Bipap last night.  No further seizure activity.      Objective:     Vital Signs (Most Recent):  Temp: 98.6 °F (37 °C) (08/14/23 0730)  Pulse: 72 (08/14/23 0815)  Resp: (!) 29 (08/14/23 0815)  BP: 122/68 (08/14/23 0600)  SpO2: 98 % (08/14/23 0815) Vital Signs (24h Range):  Temp:  [98.6 °F (37 °C)-98.9 °F (37.2 °C)] 98.6 °F (37 °C)  Pulse:  [49-91] 72  Resp:  [10-31] 29  SpO2:  [96 %-100 %] 98 %  BP: (104-154)/(56-83) 122/68  Arterial Line BP: (101-136)/(58-77) 130/70     Weight: 80.3 kg (177 lb 0.5 oz)  Body mass index is 29.46 kg/m².      Intake/Output Summary (Last 24 hours) at 8/14/2023 0900  Last data filed at 8/14/2023 0800  Gross per 24 hour   Intake 224.72 ml   Output 760 ml   Net -535.28 ml        Physical Exam  Vitals and nursing note reviewed.   Constitutional:       General: She is not in acute distress.  Cardiovascular:      Rate and Rhythm: Normal rate and regular rhythm.      Pulses: Normal pulses.      Heart sounds: No murmur heard.  Pulmonary:      Effort: Pulmonary effort is normal. No respiratory distress.      Breath sounds: Rales (bibasilar) present. No wheezing or rhonchi.   Abdominal:      General: Abdomen is flat. There is no distension.      Palpations: Abdomen is soft.      Tenderness: There is no abdominal tenderness.   Musculoskeletal:      Right lower leg: Edema (trace) present.      Comments: Left amputation   Neurological:      Mental Status: She is alert and oriented to person, place, and time. Mental status is at baseline.           Review of Systems    Vents:  Vent Mode: Spont (08/13/23 1400)  Ventilator Initiated: Yes (08/12/23 1120)  Set Rate: (S) 10 BPM (Peer Dr. Huerta) (08/13/23 0916)  Vt Set: 350 mL (08/13/23 0908)  Pressure Support: (S) 7 cmH20 (08/13/23 1400)  PEEP/CPAP: 5 cmH20 (08/13/23 1400)  Oxygen  Concentration (%): (P) 35 (08/14/23 0345)  Peak Airway Pressure: 12 cmH20 (08/13/23 1400)  Plateau Pressure: 22 cmH20 (08/13/23 1400)  Total Ve: 4.11 L/m (08/13/23 1400)  Negative Inspiratory Force (cm H2O): 0 (08/13/23 1400)  F/VT Ratio<105 (RSBI): (!) 78.43 (08/13/23 1400)    Lines/Drains/Airways       Central Venous Catheter Line  Duration             Percutaneous Central Line Insertion/Assessment - Triple Lumen  08/12/23 1137 Internal Jugular Right 1 day              Drain  Duration                  Ureteral Drain/Stent 01/08/21 1028 Right ureter 6 Fr. 947 days         Suprapubic Catheter 08/07/23 0301 100% silicone 16 Fr. 7 days              Peripheral Intravenous Line  Duration                  Peripheral IV - Single Lumen 08/12/23 0015 22 G Left;Posterior Forearm 2 days                    Significant Labs:    CBC/Anemia Profile:  Recent Labs   Lab 08/13/23  0356 08/13/23  0356 08/14/23  0428   WBC 9.02  --  7.80   HGB 9.4*  --  8.7*   HCT 30.1* 29* 28.5*     --  253   MCV 81*  --  82   RDW 17.5*  --  18.0*        Chemistries:  Recent Labs   Lab 08/13/23  0356 08/14/23  0428    141   K 4.5 3.6    100   CO2 30* 32*   BUN 15 12   CREATININE 0.8 0.7   CALCIUM 8.9 8.8   MG 2.0 2.0   PHOS 1.3* 3.3       All pertinent labs within the past 24 hours have been reviewed.    Significant Imaging:  I have reviewed all pertinent imaging results/findings within the past 24 hours.

## 2023-08-14 NOTE — ASSESSMENT & PLAN NOTE
- suprapubic cath changed upon arrival to the ICU  - completed course of Zosyn/Rocephin  - Urine Cx negative. Blood cultures contaminated.

## 2023-08-14 NOTE — ASSESSMENT & PLAN NOTE
Patient with Hypercapnic and Hypoxic Respiratory failure which is Acute on chronic.  she is on home oxygen at 2 LPM. Supplemental oxygen was provided and noted- Vent Mode: Spont  Oxygen Concentration (%):  [35-36] (P) 35  Resp Rate Total:  [21 br/min-34 br/min] 21 br/min  PEEP/CPAP:  [5 cmH20] 5 cmH20  Pressure Support:  [7 cmH20-10 cmH20] 7 cmH20  Mean Airway Pressure:  [7.2 cmH20-8.2 cmH20] 7.2 cmH20    .   Signs/symptoms of respiratory failure include- respiratory distress and lethargy. Contributing diagnoses includes - CHF and COPD Labs and images were reviewed. Patient Has recent ABG, which has been reviewed. Will treat underlying causes and adjust management of respiratory failure as follows:    8/7/23  Transitioned to 2L NC this AM, her home flow  Continue with NIPPV qHS and PRN naps    8/11/23  Remains stable on 2L NC  Continue with NIPPV qHS and PRN naps    8/12/23  Lethargic overnight, ABG with worsening hypercapnia  BiPAP changed to AVAPS overnight  Transfer to ICU, concern for further deterioration    8/14- being transferred out after she was intubated on vent for severe hypercapnia on 8/12- doing better now

## 2023-08-14 NOTE — ASSESSMENT & PLAN NOTE
- bed bound at baseline with limited help at home   - Sz - new onset following intubation  - CT head negative for acute issues  - PT/OT  - Neuro consult pending

## 2023-08-14 NOTE — ASSESSMENT & PLAN NOTE
Suprapubic cath changed upon arrival to the ICU  Urine culture +proteus, sensitivities reviewed  Continue ceftriaxone, antibiotic day # 6, plan for 7 day course     Abx completed

## 2023-08-14 NOTE — PLAN OF CARE
Spoke with Sydnee in admissions at [facility]. Advised [progress notes were sent via [facility] and pt will be discharged tomorrow at the earliest. They will submit for reauthorization through [employer].

## 2023-08-14 NOTE — ASSESSMENT & PLAN NOTE
Echo report from 4/2021   The left ventricle is normal in size with mild concentric hypertrophy and normal systolic function.   The estimated ejection fraction is 55%.   Indeterminate left ventricular diastolic function.   Normal right ventricular size with normal right ventricular systolic function.   Mild left atrial enlargement.   Normal central venous pressure (3 mmHg).   The estimated PA systolic pressure is 24 mmHg     Echo    Result Date: 8/7/2023    Left Ventricle: Normal wall motion. There is low normal systolic   function with a visually estimated ejection fraction of 50 - 55%.    Right Ventricle: Normal right ventricular cavity size.        Continue Bumex 1 mg IV BID, transition to PO when appropriate  Strict I/O's, daily weights, Na/fluid restriction, AM labs    8/8/23  Hold Bumex today with marginal BP and rise in serum Cr  AM labs, consider restarting tomorrow    8/10/23  Cr improved, resume home PO Bumex today    8/14- cont Bumex

## 2023-08-14 NOTE — PROGRESS NOTES
[facility] - Intensive Care ([facility])  Critical Care Medicine  Progress Note    Patient Name: Mary Ellen Fajardo  MRN: [id number]  Admission Date: 8/6/2023  Hospital Length of Stay: 8 days  Code Status: Full Code  Attending Provider: Neville Huerta MD  Primary Care Provider: Any Tran MD   Principal Problem: Acute on chronic respiratory failure with hypoxia and hypercapnia    Subjective:     HPI:  Patient is 67 year old female with past medical history significant for stroke, hypertension, COPD, CHF, chronic respiratory failure on home oxygen (2 L/min NC), ADEEL, neurogenic bladder with chronic indwelling suprapubic catheter, left AKA, paraparesis, and wounds who presented to ED due to generalized weakness, confusion, and painful urination. Patient complains of chest pain on/off, abdominal pain, and bilateral leg pain. ABG revealed mild acidosis with hypercapnia. She is on continuous BIPAP at this time. She does have UTI and suprapubic catheter should be changed out. She has been given Zosyn. Lactic acid is normal however procalcitonin is elevated. BP is elevated. There is evidence of congestive heart failure on CXR. US was done due to leg pain, it is negative for DVT. There is no leukocytosis or fever. Critical care team consulted for admission to ICU due to hypercapnic respiratory failure requiring continuous BIPAP and for treatment of UTI.      Hospital/ICU Course:  8/7: placed on home 2L this AM, continue NIPPV qHS and PRN naps, no new issues or c/o on exam, no family at bedside     8/12 Transferred to ICU for worsening SOB. Was on AVAPS. ABG on AVAPS consistent with worsening hypercapnia. AMS and unable to follow commands.     Intubated / sedated   Post intubation had abnormal motor tonic clonic activity  Ativan given. Keppra ordered.   Placed on diprivan  CT head pending.    8/13. No Sz overnight. EEG scheduled this am. Neuro consult pending. Extubated without complication.  8/14: No acute  events.  Stable on home O2 of 2L currently.  Wearing Bipap at night.  Hemodynamics and oxygenation are stable.      Interval History:   No acute events since extubation yesterday.  She is alert and conversant this morning.  Oxygenation is stable on home 2L and she was compliant with Bipap last night.  No further seizure activity.      Objective:     Vital Signs (Most Recent):  Temp: 98.6 °F (37 °C) (08/14/23 0730)  Pulse: 72 (08/14/23 0815)  Resp: (!) 29 (08/14/23 0815)  BP: 122/68 (08/14/23 0600)  SpO2: 98 % (08/14/23 0815) Vital Signs (24h Range):  Temp:  [98.6 °F (37 °C)-98.9 °F (37.2 °C)] 98.6 °F (37 °C)  Pulse:  [49-91] 72  Resp:  [10-31] 29  SpO2:  [96 %-100 %] 98 %  BP: (104-154)/(56-83) 122/68  Arterial Line BP: (101-136)/(58-77) 130/70     Weight: 80.3 kg (177 lb 0.5 oz)  Body mass index is 29.46 kg/m².      Intake/Output Summary (Last 24 hours) at 8/14/2023 0900  Last data filed at 8/14/2023 0800  Gross per 24 hour   Intake 224.72 ml   Output 760 ml   Net -535.28 ml        Physical Exam  Vitals and nursing note reviewed.   Constitutional:       General: She is not in acute distress.  Cardiovascular:      Rate and Rhythm: Normal rate and regular rhythm.      Pulses: Normal pulses.      Heart sounds: No murmur heard.  Pulmonary:      Effort: Pulmonary effort is normal. No respiratory distress.      Breath sounds: Rales (bibasilar) present. No wheezing or rhonchi.   Abdominal:      General: Abdomen is flat. There is no distension.      Palpations: Abdomen is soft.      Tenderness: There is no abdominal tenderness.   Musculoskeletal:      Right lower leg: Edema (trace) present.      Comments: Left amputation   Neurological:      Mental Status: She is alert and oriented to person, place, and time. Mental status is at baseline.           Review of Systems    Vents:  Vent Mode: Spont (08/13/23 1400)  Ventilator Initiated: Yes (08/12/23 1120)  Set Rate: (S) 10 BPM (Peer Dr. Huerta) (08/13/23 6617)  Vt Set: 350  mL (08/13/23 0908)  Pressure Support: (S) 7 cmH20 (08/13/23 1400)  PEEP/CPAP: 5 cmH20 (08/13/23 1400)  Oxygen Concentration (%): (P) 35 (08/14/23 0345)  Peak Airway Pressure: 12 cmH20 (08/13/23 1400)  Plateau Pressure: 22 cmH20 (08/13/23 1400)  Total Ve: 4.11 L/m (08/13/23 1400)  Negative Inspiratory Force (cm H2O): 0 (08/13/23 1400)  F/VT Ratio<105 (RSBI): (!) 78.43 (08/13/23 1400)    Lines/Drains/Airways       Central Venous Catheter Line  Duration             Percutaneous Central Line Insertion/Assessment - Triple Lumen  08/12/23 1137 Internal Jugular Right 1 day              Drain  Duration                  Ureteral Drain/Stent 01/08/21 1028 Right ureter 6 Fr. 947 days         Suprapubic Catheter 08/07/23 0301 100% silicone 16 Fr. 7 days              Peripheral Intravenous Line  Duration                  Peripheral IV - Single Lumen 08/12/23 0015 22 G Left;Posterior Forearm 2 days                    Significant Labs:    CBC/Anemia Profile:  Recent Labs   Lab 08/13/23  0356 08/13/23  0356 08/14/23  0428   WBC 9.02  --  7.80   HGB 9.4*  --  8.7*   HCT 30.1* 29* 28.5*     --  253   MCV 81*  --  82   RDW 17.5*  --  18.0*        Chemistries:  Recent Labs   Lab 08/13/23  0356 08/14/23  0428    141   K 4.5 3.6    100   CO2 30* 32*   BUN 15 12   CREATININE 0.8 0.7   CALCIUM 8.9 8.8   MG 2.0 2.0   PHOS 1.3* 3.3       All pertinent labs within the past 24 hours have been reviewed.    Significant Imaging:  I have reviewed all pertinent imaging results/findings within the past 24 hours.      ABG  Recent Labs   Lab 08/13/23 0356   PH 7.506*   PO2 67*   PCO2 42.9   HCO3 33.9*   BE 11     Assessment/Plan:     Neuro  New onset seizure  - 8/13 EEG, Neuro consult pending  - Continue keppra.   - CT showed no new change  - mental status at baseline    History of CVA (cerebrovascular accident)  - bed bound at baseline with limited help at home   - Sz - new onset following intubation  - CT head negative for acute  issues  - PT/OT  - Neuro consult pending    Paraparesis  - turn q2h  - supportive care  - Wound care  - PT/OT    Pulmonary  * Acute on chronic respiratory failure with hypoxia and hypercapnia  Intubated 8/12 and extubated 8/13  Continue bronchodilators  Oxygenation stable on 2L (home O2) currently and she is being compliant with Bipap qhs    COPD (chronic obstructive pulmonary disease)  - extubated 8/13  - PRN nebs  - stable on home O2  --goal SpO2 > 88%  - continue Bipap qhs    Cardiac/Vascular  Pulmonary HTN  - supportive care  - repeat echo pending     Acute on chronic diastolic heart failure  Echo report from 4/2021   The left ventricle is normal in size with mild concentric hypertrophy and normal systolic function.   The estimated ejection fraction is 55%.   Indeterminate left ventricular diastolic function.   Normal right ventricular size with normal right ventricular systolic function.   Mild left atrial enlargement.   Normal central venous pressure (3 mmHg).   The estimated PA systolic pressure is 24 mmHg    - continue oral diuresis with goal of slight negative fluid balance  - strict I&Os      Essential hypertension  - resume home meds as indicated   - monitor trends and adjust as needed for control     Renal/  Urinary tract infection associated with cystostomy catheter  - suprapubic cath changed upon arrival to the ICU  - completed course of Zosyn/Rocephin  - Urine Cx negative. Blood cultures contaminated.      Oncology  Anemia  Chronic, per review of labs  Hgb stable, 10.3  Monitor CBC    Orthopedic  Wounds, multiple  - wound care consulted for recs  - turn q2h  - supportive care    Other  ADEEL (obstructive sleep apnea)  - noncompliant with home NIPPV  - NIPPV qHS      Stable for transfer to the floor.  Pulmonary will follow, at least briefly, on transition of care.  Please call with any questions or concerns.       Ronald Suero MD  Critical Care Medicine  [facility] - Intensive Care  ([facility])

## 2023-08-14 NOTE — PT/OT/SLP PROGRESS
"Occupational Therapy   Treatment    Name: Mary Ellen Fajardo  MRN: [id number]  Admitting Diagnosis:  Acute on chronic respiratory failure with hypoxia and hypercapnia       Recommendations:     Discharge Recommendations: nursing facility, skilled  Discharge Equipment Recommendations:  to be determined by next level of care  Barriers to discharge:  Decreased caregiver support    Assessment:     Mary Ellen Fajardo is a 67 y.o. female with a medical diagnosis of Acute on chronic respiratory failure with hypoxia and hypercapnia.  She presents with the following performance deficits affecting function are weakness, impaired endurance, impaired self care skills, impaired functional mobility, impaired balance, impaired cardiopulmonary response to activity, impaired fine motor, decreased upper extremity function, decreased lower extremity function, decreased safety awareness, impaired cognition.     Rehab Prognosis:  Fair; patient would benefit from acute skilled OT services to address these deficits and reach maximum level of function.       Plan:     Patient to be seen 2 x/week to address the above listed problems via self-care/home management, therapeutic activities, therapeutic exercises  Plan of Care Expires: 08/22/23  Plan of Care Reviewed with: patient    Subjective     Chief Complaint: Reported "I normally get in a wheelchair."  Patient/Family Comments/goals: increase independence  Pain/Comfort:  Pain Rating 1: 0/10    Objective:     Communicated with: NurseMicheline, prior to session.  Patient found supine with blood pressure cuff, pulse ox (continuous), telemetry, peripheral IV, oxygen, central line (suprapubic catheter) upon OT entry to room.    General Precautions: Standard, fall    Orthopedic Precautions: (L AKA)  Braces: N/A  Respiratory Status: Nasal cannula, flow 2 L/min     Occupational Performance:     Bed Mobility:    Patient completed Supine to Sit with maximal assistance and 2 persons  Patient " completed Sit to Supine with maximal assistance and 2 persons   Sat EOB x8 minutes with mod A.   Unable to lower hospital bed to appropriate height, patient with posterior lean, and poor R knee flexion- all making EOB sitting extremely unsafe. Terminated sitting at 8 minutes due to risk of falls.    Functional Mobility/Transfers:  Not appropriate for functional transfers at this time.    Activities of Daily Living:  Lower Body Dressing: total assistance donning socks while seated EOB.    [facility] 6 Click ADL: 10    Treatment & Education:  Patient tolerated intervention fair. Significant global weakness requiring A of 2 for all mobility. Patient provided with max v/c for technique to increase engagement. Patient with posterior instability, max cueing to promote anterior flexion. Difficulty maintaining. After returning to supine, encouraged completion of B UE AROM therex throughout the day to increase functional strength and activity tolerance needed for ADL completion. Patient stated understanding and in agreement with POC. Will continue to progress as safely able.    Patient left with bed in chair position with all lines intact, call button in reach, bed alarm on, and nurse notified    GOALS:   Multidisciplinary Problems       Occupational Therapy Goals          Problem: Occupational Therapy    Goal Priority Disciplines Outcome Interventions   Occupational Therapy Goal     OT, PT/OT Ongoing, Progressing    Description: Goals to be met by: 8/22/23     Patient will increase functional independence with ADLs by performing:    UE Dressing with Set-up Assistance.  Grooming while EOB with Contact Guard Assistance.  Supine to sit with Contact Guard Assistance.  Upper extremity exercise program x15 reps per handout, with independence.                         Time Tracking:     OT Date of Treatment: 08/14/23  OT Start Time: 0805  OT Stop Time: 0830  OT Total Time (min): 25 min    Billable Minutes:Therapeutic Activity  25    8/14/2023

## 2023-08-14 NOTE — ASSESSMENT & PLAN NOTE
- 8/13 EEG, Neuro consult pending  - Continue keppra.   - CT showed no new change  - mental status at baseline

## 2023-08-15 PROBLEM — R56.9 NEW ONSET SEIZURE: Status: RESOLVED | Noted: 2023-08-12 | Resolved: 2023-08-15

## 2023-08-15 PROBLEM — N39.0 URINARY TRACT INFECTION ASSOCIATED WITH CYSTOSTOMY CATHETER: Status: RESOLVED | Noted: 2019-07-17 | Resolved: 2023-08-15

## 2023-08-15 PROBLEM — I50.33 ACUTE ON CHRONIC DIASTOLIC HEART FAILURE: Status: RESOLVED | Noted: 2019-07-19 | Resolved: 2023-08-15

## 2023-08-15 PROBLEM — T83.510A URINARY TRACT INFECTION ASSOCIATED WITH CYSTOSTOMY CATHETER: Status: RESOLVED | Noted: 2019-07-17 | Resolved: 2023-08-15

## 2023-08-15 PROBLEM — J96.21 ACUTE ON CHRONIC RESPIRATORY FAILURE WITH HYPOXIA AND HYPERCAPNIA: Status: RESOLVED | Noted: 2019-10-30 | Resolved: 2023-08-15

## 2023-08-15 PROBLEM — J96.22 ACUTE ON CHRONIC RESPIRATORY FAILURE WITH HYPOXIA AND HYPERCAPNIA: Status: RESOLVED | Noted: 2019-10-30 | Resolved: 2023-08-15

## 2023-08-15 LAB
ANION GAP SERPL CALC-SCNC: 9 MMOL/L (ref 8–16)
BACTERIA SPEC AEROBE CULT: NORMAL
BACTERIA SPEC AEROBE CULT: NORMAL
BASOPHILS # BLD AUTO: 0.04 K/UL (ref 0–0.2)
BASOPHILS NFR BLD: 0.6 % (ref 0–1.9)
BUN SERPL-MCNC: 13 MG/DL (ref 8–23)
CALCIUM SERPL-MCNC: 8.7 MG/DL (ref 8.7–10.5)
CHLORIDE SERPL-SCNC: 98 MMOL/L (ref 95–110)
CO2 SERPL-SCNC: 35 MMOL/L (ref 23–29)
CREAT SERPL-MCNC: 0.9 MG/DL (ref 0.5–1.4)
DIFFERENTIAL METHOD: ABNORMAL
EOSINOPHIL # BLD AUTO: 0.2 K/UL (ref 0–0.5)
EOSINOPHIL NFR BLD: 3 % (ref 0–8)
ERYTHROCYTE [DISTWIDTH] IN BLOOD BY AUTOMATED COUNT: 17.7 % (ref 11.5–14.5)
EST. GFR  (NO RACE VARIABLE): >60 ML/MIN/1.73 M^2
GLUCOSE SERPL-MCNC: 85 MG/DL (ref 70–110)
GRAM STN SPEC: NORMAL
HCT VFR BLD AUTO: 27.8 % (ref 37–48.5)
HGB BLD-MCNC: 8.4 G/DL (ref 12–16)
IMM GRANULOCYTES # BLD AUTO: 0.03 K/UL (ref 0–0.04)
IMM GRANULOCYTES NFR BLD AUTO: 0.5 % (ref 0–0.5)
LYMPHOCYTES # BLD AUTO: 2.1 K/UL (ref 1–4.8)
LYMPHOCYTES NFR BLD: 31.4 % (ref 18–48)
MAGNESIUM SERPL-MCNC: 1.9 MG/DL (ref 1.6–2.6)
MCH RBC QN AUTO: 24.9 PG (ref 27–31)
MCHC RBC AUTO-ENTMCNC: 30.2 G/DL (ref 32–36)
MCV RBC AUTO: 82 FL (ref 82–98)
MONOCYTES # BLD AUTO: 0.7 K/UL (ref 0.3–1)
MONOCYTES NFR BLD: 10.7 % (ref 4–15)
NEUTROPHILS # BLD AUTO: 3.6 K/UL (ref 1.8–7.7)
NEUTROPHILS NFR BLD: 53.8 % (ref 38–73)
NRBC BLD-RTO: 0 /100 WBC
PHOSPHATE SERPL-MCNC: 3.1 MG/DL (ref 2.7–4.5)
PLATELET # BLD AUTO: 223 K/UL (ref 150–450)
PMV BLD AUTO: 8.8 FL (ref 9.2–12.9)
POTASSIUM SERPL-SCNC: 4 MMOL/L (ref 3.5–5.1)
RBC # BLD AUTO: 3.38 M/UL (ref 4–5.4)
SODIUM SERPL-SCNC: 142 MMOL/L (ref 136–145)
WBC # BLD AUTO: 6.62 K/UL (ref 3.9–12.7)

## 2023-08-15 PROCEDURE — 25000003 PHARM REV CODE 250: Performed by: NURSE PRACTITIONER

## 2023-08-15 PROCEDURE — 11000001 HC ACUTE MED/SURG PRIVATE ROOM

## 2023-08-15 PROCEDURE — 94761 N-INVAS EAR/PLS OXIMETRY MLT: CPT

## 2023-08-15 PROCEDURE — 25000003 PHARM REV CODE 250: Performed by: EMERGENCY MEDICINE

## 2023-08-15 PROCEDURE — 25000242 PHARM REV CODE 250 ALT 637 W/ HCPCS: Performed by: NURSE PRACTITIONER

## 2023-08-15 PROCEDURE — 27000221 HC OXYGEN, UP TO 24 HOURS

## 2023-08-15 PROCEDURE — 94660 CPAP INITIATION&MGMT: CPT

## 2023-08-15 PROCEDURE — 63600175 PHARM REV CODE 636 W HCPCS: Performed by: NURSE PRACTITIONER

## 2023-08-15 PROCEDURE — 83735 ASSAY OF MAGNESIUM: CPT | Performed by: NURSE PRACTITIONER

## 2023-08-15 PROCEDURE — 25000003 PHARM REV CODE 250: Performed by: INTERNAL MEDICINE

## 2023-08-15 PROCEDURE — 97530 THERAPEUTIC ACTIVITIES: CPT

## 2023-08-15 PROCEDURE — 99900035 HC TECH TIME PER 15 MIN (STAT)

## 2023-08-15 PROCEDURE — 84100 ASSAY OF PHOSPHORUS: CPT | Performed by: NURSE PRACTITIONER

## 2023-08-15 PROCEDURE — 80048 BASIC METABOLIC PNL TOTAL CA: CPT | Performed by: NURSE PRACTITIONER

## 2023-08-15 PROCEDURE — 27100171 HC OXYGEN HIGH FLOW UP TO 24 HOURS

## 2023-08-15 PROCEDURE — 25000003 PHARM REV CODE 250: Performed by: HOSPITALIST

## 2023-08-15 PROCEDURE — 63600175 PHARM REV CODE 636 W HCPCS: Performed by: EMERGENCY MEDICINE

## 2023-08-15 PROCEDURE — 85025 COMPLETE CBC W/AUTO DIFF WBC: CPT | Performed by: NURSE PRACTITIONER

## 2023-08-15 PROCEDURE — 94640 AIRWAY INHALATION TREATMENT: CPT

## 2023-08-15 RX ORDER — POLYETHYLENE GLYCOL 3350 17 G/17G
17 POWDER, FOR SOLUTION ORAL DAILY
Refills: 0
Start: 2023-08-16 | End: 2023-09-04

## 2023-08-15 RX ORDER — LANOLIN ALCOHOL/MO/W.PET/CERES
1 CREAM (GRAM) TOPICAL DAILY
Status: DISCONTINUED | OUTPATIENT
Start: 2023-08-16 | End: 2023-08-16 | Stop reason: HOSPADM

## 2023-08-15 RX ORDER — ARFORMOTEROL TARTRATE 15 UG/2ML
15 SOLUTION RESPIRATORY (INHALATION) 2 TIMES DAILY
Status: DISCONTINUED | OUTPATIENT
Start: 2023-08-15 | End: 2023-08-16 | Stop reason: HOSPADM

## 2023-08-15 RX ORDER — BUDESONIDE 0.5 MG/2ML
0.5 INHALANT ORAL EVERY 12 HOURS
Status: DISCONTINUED | OUTPATIENT
Start: 2023-08-15 | End: 2023-08-16 | Stop reason: HOSPADM

## 2023-08-15 RX ORDER — THIAMINE HCL 100 MG
100 TABLET ORAL DAILY
Status: DISCONTINUED | OUTPATIENT
Start: 2023-08-16 | End: 2023-08-16 | Stop reason: HOSPADM

## 2023-08-15 RX ORDER — OXYCODONE AND ACETAMINOPHEN 5; 325 MG/1; MG/1
1 TABLET ORAL EVERY 12 HOURS PRN
Qty: 4 TABLET | Refills: 0 | Status: ON HOLD | OUTPATIENT
Start: 2023-08-15 | End: 2023-08-28 | Stop reason: HOSPADM

## 2023-08-15 RX ORDER — LEVETIRACETAM 1000 MG/1
1000 TABLET ORAL 2 TIMES DAILY
Qty: 60 TABLET | Refills: 11
Start: 2023-08-15 | End: 2023-08-16 | Stop reason: HOSPADM

## 2023-08-15 RX ADMIN — Medication 1 TABLET: at 05:08

## 2023-08-15 RX ADMIN — CITALOPRAM HYDROBROMIDE 30 MG: 20 TABLET ORAL at 10:08

## 2023-08-15 RX ADMIN — ENOXAPARIN SODIUM 40 MG: 40 INJECTION SUBCUTANEOUS at 05:08

## 2023-08-15 RX ADMIN — LEVETIRACETAM 1000 MG: 500 TABLET, FILM COATED ORAL at 09:08

## 2023-08-15 RX ADMIN — OXYBUTYNIN CHLORIDE 5 MG: 5 TABLET ORAL at 09:08

## 2023-08-15 RX ADMIN — QUETIAPINE FUMARATE 200 MG: 100 TABLET ORAL at 09:08

## 2023-08-15 RX ADMIN — DOCUSATE SODIUM 100 MG: 100 CAPSULE, LIQUID FILLED ORAL at 09:08

## 2023-08-15 RX ADMIN — ARFORMOTEROL TARTRATE 15 MCG: 15 SOLUTION RESPIRATORY (INHALATION) at 07:08

## 2023-08-15 RX ADMIN — POLYETHYLENE GLYCOL 3350 17 G: 17 POWDER, FOR SOLUTION ORAL at 09:08

## 2023-08-15 RX ADMIN — IPRATROPIUM BROMIDE AND ALBUTEROL SULFATE 3 ML: 2.5; .5 SOLUTION RESPIRATORY (INHALATION) at 04:08

## 2023-08-15 RX ADMIN — BUDESONIDE 0.5 MG: 0.5 INHALANT ORAL at 07:08

## 2023-08-15 RX ADMIN — OXYCODONE HYDROCHLORIDE AND ACETAMINOPHEN 1 TABLET: 5; 325 TABLET ORAL at 09:08

## 2023-08-15 RX ADMIN — BUMETANIDE 2 MG: 1 TABLET ORAL at 09:08

## 2023-08-15 RX ADMIN — IRON SUCROSE 200 MG: 20 INJECTION, SOLUTION INTRAVENOUS at 05:08

## 2023-08-15 NOTE — PLAN OF CARE
IMPROVED EOB SITTING TOLERANCE TODAY, MAXA X 2 FOR BED MOBILITY, CGA/SBA FOR SITTING EOB ONCE POSITONED

## 2023-08-15 NOTE — SUBJECTIVE & OBJECTIVE
Interval History: looks well, resting comfortably in bed, eating, on 2 L NC. Denies any complaints, doing Bipap qhs and NC during the day. Also did better with PT/OT, sat at the EOB etc. H/H stable, CO2 35, may need a little Diamox. Will d/c Keppra. Likely had post intubation seizure due to rapid correction of hypercapnia.     Review of Systems   Constitutional:  Positive for activity change and fatigue.   HENT: Negative.     Eyes: Negative.  Negative for visual disturbance.   Respiratory: Negative.  Negative for cough, shortness of breath and wheezing.    Cardiovascular: Negative.    Gastrointestinal: Negative.    Endocrine: Negative.    Genitourinary: Negative.    Musculoskeletal:  Positive for gait problem. Negative for arthralgias.   Skin: Negative.    Allergic/Immunologic: Negative.    Neurological:  Positive for seizures.   Hematological: Negative.    Psychiatric/Behavioral: Negative.       Objective:     Vital Signs (Most Recent):  Temp: 98.5 °F (36.9 °C) (08/15/23 1043)  Pulse: 68 (08/15/23 1200)  Resp: (!) 28 (08/15/23 1200)  BP: 122/70 (08/15/23 1200)  SpO2: 98 % (08/15/23 1200) Vital Signs (24h Range):  Temp:  [97.7 °F (36.5 °C)-98.6 °F (37 °C)] 98.5 °F (36.9 °C)  Pulse:  [60-81] 68  Resp:  [16-36] 28  SpO2:  [94 %-100 %] 98 %  BP: (110-168)/(59-93) 122/70     Weight: 80.3 kg (177 lb 0.5 oz)  Body mass index is 29.46 kg/m².    Intake/Output Summary (Last 24 hours) at 8/15/2023 1527  Last data filed at 8/15/2023 1412  Gross per 24 hour   Intake --   Output 2475 ml   Net -2475 ml         Physical Exam  Vitals and nursing note reviewed.   Constitutional:       General: She is not in acute distress.  Cardiovascular:      Rate and Rhythm: Normal rate and regular rhythm.      Pulses: Normal pulses.      Heart sounds: No murmur heard.  Pulmonary:      Effort: Pulmonary effort is normal. No respiratory distress.      Breath sounds: Rales (bibasilar) present. No wheezing or rhonchi.   Abdominal:      General:  Abdomen is flat. There is no distension.      Palpations: Abdomen is soft.      Tenderness: There is no abdominal tenderness.   Musculoskeletal:      Right lower leg: Edema (trace) present.      Comments: Left amputation   Neurological:      Mental Status: She is alert and oriented to person, place, and time. Mental status is at baseline.   Psychiatric:         Mood and Affect: Mood normal.         Behavior: Behavior normal.             Significant Labs: All pertinent labs within the past 24 hours have been reviewed.  ABGs:   BMP:   Recent Labs   Lab 08/15/23  0432   GLU 85      K 4.0   CL 98   CO2 35*   BUN 13   CREATININE 0.9   CALCIUM 8.7   MG 1.9     CBC:   Recent Labs   Lab 08/14/23  0428 08/15/23  0432   WBC 7.80 6.62   HGB 8.7* 8.4*   HCT 28.5* 27.8*    223     Magnesium:   Recent Labs   Lab 08/14/23  0428 08/15/23  0432   MG 2.0 1.9       Significant Imaging: I have reviewed all pertinent imaging results/findings within the past 24 hours.

## 2023-08-15 NOTE — PT/OT/SLP PROGRESS
"Occupational Therapy   Treatment    Name: Mary Ellen Fajardo  MRN: [id number]  Admitting Diagnosis:  Acute on chronic respiratory failure with hypoxia and hypercapnia       Recommendations:     Discharge Recommendations: nursing facility, skilled  Discharge Equipment Recommendations:  to be determined by next level of care  Barriers to discharge:  Decreased caregiver support    Assessment:     Mary Ellen Fajardo is a 67 y.o. female with a medical diagnosis of Acute on chronic respiratory failure with hypoxia and hypercapnia.  She presents with the following performance deficits affecting function are weakness, impaired endurance, impaired self care skills, impaired functional mobility, impaired balance, impaired cardiopulmonary response to activity, decreased safety awareness, impaired cognition, decreased lower extremity function, decreased upper extremity function.     Rehab Prognosis:  Fair; patient would benefit from acute skilled OT services to address these deficits and reach maximum level of function.       Plan:     Patient to be seen 2 x/week to address the above listed problems via self-care/home management, therapeutic activities, therapeutic exercises  Plan of Care Expires: 08/22/23  Plan of Care Reviewed with: patient    Subjective     Chief Complaint: Reported "I am doing better today."  Patient/Family Comments/goals: increase independence  Pain/Comfort:  Pain Rating 1: 0/10    Objective:     Communicated with: NurseEaston, prior to session.  Patient found supine with blood pressure cuff, pulse ox (continuous), telemetry, peripheral IV, oxygen, central line (SUPRAPUBIC CATHETER) upon OT entry to room.    General Precautions: Standard, fall (L AKA)    Orthopedic Precautions:N/A  Braces: N/A  Respiratory Status: Nasal cannula, flow 2 L/min     Occupational Performance:     Bed Mobility:    Patient completed Supine to Sit with maximal assistance and 2 persons  Patient completed Sit to Supine with " maximal assistance and 2 persons   Patient sat EOB x15 minutes with CGA to min A. Requires B UE support for static sitting balance.  Hospital bed exchanged allowing for patient to place R LE on floor (dependent supine transfer between beds).  Max cueing for technique to increase active engagement in transfer and increase safety    Functional Mobility/Transfers:  Not appropriate for functional transfers at this time.    [facility] 6 Click ADL: 10    Treatment & Education:  Patient tolerated session well this date. Improved overall safety after bed exchange. Patient completed B UE AROM therex x10 reps, x2 planes of motion (requiring increase in static sitting balance). Encouraged completion of HEP throughout the day while in supine. Will continue to progress activity to tolerance.    Patient left HOB elevated with all lines intact, call button in reach, bed alarm on, and nurse notified    GOALS:   Multidisciplinary Problems       Occupational Therapy Goals          Problem: Occupational Therapy    Goal Priority Disciplines Outcome Interventions   Occupational Therapy Goal     OT, PT/OT Ongoing, Progressing    Description: Goals to be met by: 8/22/23     Patient will increase functional independence with ADLs by performing:    UE Dressing with Set-up Assistance.  Grooming while EOB with Contact Guard Assistance.  Supine to sit with Contact Guard Assistance.  Upper extremity exercise program x15 reps per handout, with independence.                         Time Tracking:     OT Date of Treatment: 08/15/23  OT Start Time: 1000  OT Stop Time: 1025  OT Total Time (min): 25 min    Billable Minutes:Therapeutic Activity 25    Maribeth Jaquez OT  8/15/2023

## 2023-08-15 NOTE — PHYSICIAN QUERY
PT Name: Mary Ellen Fajardo  MR #: [id number]    DOCUMENTATION CLARIFICATION     CDS/: Ildefonso Greenfield RN, CCDS              Contact information:   [email or web address]     This form is a permanent document in the medical record.     Query Date: August 15, 2023    By submitting this query, we are merely seeking further clarification of documentation. Please utilize your independent clinical judgment when addressing the question(s) below.    The Medical Record contains the following:   Indicators   Supporting Clinical Findings Location in Medical Record   x AMS, Confusion,  LOC, etc.  Altered Mental Status(AMS)--Family sent patient for confusion and generalized weakness;  altered mental status which onset this afternoon around 3:00 p.m  --Alerted and oriented to person but not place or time      8/12 HM: sign. event: Pt very difficult to arouse; intermittently following commands    08/12@06HM: mental status improving      8/6  ED provider             8/12  Significant event , HM      8/12  Hosp. Med. (HM)    x Acute/Chronic Illness AMS; Acute on chronic respiratory failure with hypercapnia;chronic diastolic Heart failure;     admission to ICU due to hypercapnic respiratory failure requiring continuous BIPAP and for treatment of UTI.  --chronic indwelling suprapubic catheter    Acute on chronic respiratory failure with hypoxia and hypercapnia  - transitioned to 2L NC this AM, her home flow  Urinary tract infection associated with cystostomy catheter   8/6 ED provider      8/6 Pulm/ ICU         8/7 Pulm/ICU   x Radiology Findings CTH: No acute abnormality.    Negative for acute intracranial abnormality. 8/6  CT Head     8/12 CT Head     Electrolyte Imbalance      Medication      Treatment             x Other Urine culture:   proteus mirabilis >100,000;  Alcaligenes Faecalis >100,000 8/6 micro      The noted clinical guidelines are only system guidelines and do not replace the providers clinical  judgment.    The National [place] of Neurologic Disorders and Stroke (NINDS) of the NIH describes encephalopathy as any diffuse disease of the brain that alters brain function or structure.    Provider, please specify the diagnosis or diagnoses associated with above clinical findings.  [ xx  ] Metabolic Encephalopathy - Due to electrolyte imbalance, metabolic derangements, or infectious processes, includes Septic Encephalopathy, Uremic Encephalopathy   [   ] Encephalopathy, unspecified      [   ] Other Encephalopathy (please specify): ____________________     Please document in your progress notes daily for the duration of treatment until resolved, and include in your discharge summary.    References:  JANIS Jean RN, CCDS. (2018, June 9). Notes from the Instructor: Encephalopathy tips. Retrieved October 22, 2020, from [email or web address]  ICD-10-CM/PCS [employer] Integrated Codebook (Version V.20.8.10.0) [Computer software]. (2020). Retrieved October 21, 2020.        Form No. 49364

## 2023-08-15 NOTE — SUBJECTIVE & OBJECTIVE
Mary Ellen Fajardo is a 67-year-old female with a past medical history significant for stroke, hypertension, COPD, chronic diastolic heart failure, chronic respiratory failure on home oxygen on 2 L/min NC, obstructive sleep apnea, neurogenic bladder with chronic indwelling suprapubic catheter, left AKA, paraparesis, and wounds who presented to the emergency room due to generalized weakness, confusion, and painful urination. Patient complains of chest pain on/off, abdominal pain, and bilateral leg pain. ABG revealed mild acidosis with hypercapnia. She is on continuous BIPAP at this time. She does have UTI and suprapubic catheter should be changed out. She has been given Zosyn. Lactic acid is normal however procalcitonin is elevated. BP is elevated. There is evidence of congestive heart failure on CXR. US was done due to leg pain, it is negative for DVT. There is no leukocytosis or fever. Critical care team consulted for admission to ICU due to hypercapnic respiratory failure requiring continuous BIPAP and for treatment of UTI.    Interval history:  8/7: placed on home 2L this AM, continue NIPPV qHS and PRN naps, no new issues or c/o on exam, no family at bedside   8/12 Transferred to ICU for worsening shortness of breath; on AVAPS. ABG on AVAPS consistent with worsening hypercapnia. AMS and unable to follow commands. Intubated / sedated; post intubation had abnormal motor tonic clonic activity. Ativan given. Keppra ordered. Placed on diprivan. CT head pending.   8/13. No seizure overnight. EEG scheduled this am. Neuro consult pending. Extubated without complication.  8/14: No acute events.  Stable on home O2 of 2L currently.  Wearing Bipap at night.  Hemodynamics and oxygenation are stable.  8/15: No acute events overnight. Pulmonary status stable on 2L/NC.     Objective:     Vital Signs (Most Recent):  Temp: 97.7 °F (36.5 °C) (08/15/23 0701)  Pulse: 74 (08/15/23 0701)  Resp: (!) 21 (08/15/23 0701)  BP: (!) 121/59  (08/15/23 0701)  SpO2: 99 % (08/15/23 0701) Vital Signs (24h Range):  Temp:  [97.7 °F (36.5 °C)-98.6 °F (37 °C)] 97.7 °F (36.5 °C)  Pulse:  [60-82] 74  Resp:  [19-36] 21  SpO2:  [94 %-100 %] 99 %  BP: (115-168)/(59-93) 121/59   Weight: 80.3 kg (177 lb 0.5 oz);  Body mass index is 29.46 kg/m².    Intake/Output Summary (Last 24 hours) at 8/15/2023 1012  Last data filed at 8/15/2023 0600  Gross per 24 hour   Intake 120 ml   Output 2665 ml   Net -2545 ml      Physical Exam  Vitals and nursing note reviewed.   HENT:      Head: Normocephalic.   Eyes:      Conjunctiva/sclera: Conjunctivae normal.   Cardiovascular:      Rate and Rhythm: Normal rate.   Pulmonary:      Effort: Pulmonary effort is normal.      Breath sounds: Normal breath sounds.   Abdominal:      Comments: Diffuse abdominal pain, some tenderness with palpation; however, no point tenderness. +flatus / BM   Musculoskeletal:      Cervical back: Normal range of motion and neck supple.      Comments: Left AKA   Skin:     General: Skin is warm and dry.   Neurological:      Mental Status: She is alert. Mental status is at baseline.   Psychiatric:         Mood and Affect: Mood normal.         Review of Systems: Negative except as indicated in HPI    Significant Labs:  CBC/Anemia Profile:  Recent Labs   Lab 08/14/23  0428 08/15/23  0432   WBC 7.80 6.62   HGB 8.7* 8.4*   HCT 28.5* 27.8*    223   MCV 82 82   RDW 18.0* 17.7*   Chemistries:  Recent Labs   Lab 08/14/23  0428 08/15/23  0432    142   K 3.6 4.0    98   CO2 32* 35*   BUN 12 13   CREATININE 0.7 0.9   CALCIUM 8.8 8.7   MG 2.0 1.9   PHOS 3.3 3.1

## 2023-08-15 NOTE — PROGRESS NOTES
[facility] - Intensive Care ([facility]  Progress Note    Patient Name: Mary Ellen Fajardo  MRN: [id number]  Patient Class: IP- Inpatient   Admission Date: 8/6/2023  Length of Stay: 9 days  Attending Physician: Ray Key MD  Primary Care Provider: Any Tran MD        Subjective:     Principal Problem:Acute on chronic respiratory failure with hypoxia and hypercapnia        HPI:  Patient is 67 year old female with past medical history significant for stroke, hypertension, COPD, CHF, chronic respiratory failure on home oxygen (2 L/min NC), ADEEL, neurogenic bladder with chronic indwelling suprapubic catheter, left AKA, paraparesis, and wounds who presented to ED due to generalized weakness, confusion, and painful urination. Patient complains of chest pain on/off, abdominal pain, and bilateral leg pain. ABG revealed mild acidosis with hypercapnia. She is on continuous BIPAP at this time. She does have UTI and suprapubic catheter should be changed out. She has been given Zosyn. Lactic acid is normal however procalcitonin is elevated. BP is elevated. There is evidence of congestive heart failure on CXR. US was done due to leg pain, it is negative for DVT. There is no leukocytosis or fever. Critical care team consulted for admission to ICU due to hypercapnic respiratory failure requiring continuous BIPAP and for treatment of UTI.      Overview/Hospital Course:  8/7: placed on home 2L this AM, continue NIPPV qHS and PRN naps, no new issues or c/o on exam, no family at bedside  ------  Chart reviewed, examined patient at bedside this afternoon, asking to restart her home anxiety and pain medication.  Currently stable on 2L NC, stable for floor transfer.    8/8: NAEON. Patient c/o bladder spasms, asking for medication, states previously on oxybutynin.  BP marginal this AM. Cr increase from 0.9 --> 1.3, will stop Bumex  Urine culture with >100k GNR, speciation pending, continue  ceftriaxone  Remains stable on 2L NC  SNF placement pending    8/9: RAGINIEON. Patient asking for her home muscle relaxer, will restart  Cr decreased to 1.1. Restart home PO Bumex  Urine culture results pending, continue ceftriaxone  1 of 2 blood culture polymicrobial, likely contaminant  Remains stable on 2L NC  SNF placement pending    8/10: MELISSAON. Patient in bed, denies new issues, stable on 2L NC  Urine culture +proteus, sensitives reviewed, continue ceftriaxone  SNF placement pending    8/11: LACI, SNF facility refused acceptance due to reported no BM x 3 days  No BMs documented since ICU transfer, start daily miralax and colace    8/12: Overnight events reviewed, patient noted to be lethargic overnight  ABG results reviewed, hypercapnic, currently on AVAPS  CT Head with no acute findings  Remains lethargic, repeat ABG ordered, transfer to ICU    8/14- Pt seen and examined, chart reviewed. Briefly, 67 year old AAF, hx of stroke, HTN, COPD, CHF, chronic respiratory failure on home O2, ADEEL, neurogenic bladder with chronic indwelling suprapubic catheter, left AKA, paraparesis, and wounds, admitted 8/6 with generalized weakness, confusion, and painful urination, B leg pain, found to have mild Resp Acidosis, treated with continuous BIPAP. Also had UTI and suprapubic catheter changed, treated Zosyn. Lactic acid was normal, Procalcitonin was elevated. BP was elevated with evidence of CHF on CXR. US legs - due to leg pain, Neg for DVT.    On 812- Pt became lethargic with worsening hypercapnic respiratory failure - transferred to ICU- changed from Bipap to AVPS- ABG still worsening hypercapnia, AMS and unable to follow commands. Intubated /sedated. Post intubation had abnormal motor tonic clonic activity-Ativan given. Keppra ordered. Placed on diprivan. CT head Neg. Extubated yesterday 8/13 and now stable on 2 L NC. Now being transferred out of ICU, AAOx3, speech clear. PO4 low- replaced. No new issues. All Cx remain NGTD,  off all Abx.     8/15- looks well, resting comfortably in bed, eating, on 2 L NC. Denies any complaints, doing Bipap qhs and NC during the day. Also did better with PT/OT, sat at the EOB etc. H/H stable, CO2 35, may need a little Diamox. Will d/c Keppra. Likely had post intubation seizure due to rapid correction of hypercapnia.           Interval History: looks well, resting comfortably in bed, eating, on 2 L NC. Denies any complaints, doing Bipap qhs and NC during the day. Also did better with PT/OT, sat at the EOB etc. H/H stable, CO2 35, may need a little Diamox. Will d/c Keppra. Likely had post intubation seizure due to rapid correction of hypercapnia.     Review of Systems   Constitutional:  Positive for activity change and fatigue.   HENT: Negative.     Eyes: Negative.  Negative for visual disturbance.   Respiratory: Negative.  Negative for cough, shortness of breath and wheezing.    Cardiovascular: Negative.    Gastrointestinal: Negative.    Endocrine: Negative.    Genitourinary: Negative.    Musculoskeletal:  Positive for gait problem. Negative for arthralgias.   Skin: Negative.    Allergic/Immunologic: Negative.    Neurological:  Positive for seizures.   Hematological: Negative.    Psychiatric/Behavioral: Negative.       Objective:     Vital Signs (Most Recent):  Temp: 98.5 °F (36.9 °C) (08/15/23 1043)  Pulse: 68 (08/15/23 1200)  Resp: (!) 28 (08/15/23 1200)  BP: 122/70 (08/15/23 1200)  SpO2: 98 % (08/15/23 1200) Vital Signs (24h Range):  Temp:  [97.7 °F (36.5 °C)-98.6 °F (37 °C)] 98.5 °F (36.9 °C)  Pulse:  [60-81] 68  Resp:  [16-36] 28  SpO2:  [94 %-100 %] 98 %  BP: (110-168)/(59-93) 122/70     Weight: 80.3 kg (177 lb 0.5 oz)  Body mass index is 29.46 kg/m².    Intake/Output Summary (Last 24 hours) at 8/15/2023 1527  Last data filed at 8/15/2023 1412  Gross per 24 hour   Intake --   Output 2475 ml   Net -2475 ml         Physical Exam  Vitals and nursing note reviewed.   Constitutional:       General: She  is not in acute distress.  Cardiovascular:      Rate and Rhythm: Normal rate and regular rhythm.      Pulses: Normal pulses.      Heart sounds: No murmur heard.  Pulmonary:      Effort: Pulmonary effort is normal. No respiratory distress.      Breath sounds: Rales (bibasilar) present. No wheezing or rhonchi.   Abdominal:      General: Abdomen is flat. There is no distension.      Palpations: Abdomen is soft.      Tenderness: There is no abdominal tenderness.   Musculoskeletal:      Right lower leg: Edema (trace) present.      Comments: Left amputation   Neurological:      Mental Status: She is alert and oriented to person, place, and time. Mental status is at baseline.   Psychiatric:         Mood and Affect: Mood normal.         Behavior: Behavior normal.             Significant Labs: All pertinent labs within the past 24 hours have been reviewed.  ABGs:   BMP:   Recent Labs   Lab 08/15/23  0432   GLU 85      K 4.0   CL 98   CO2 35*   BUN 13   CREATININE 0.9   CALCIUM 8.7   MG 1.9     CBC:   Recent Labs   Lab 08/14/23  0428 08/15/23  0432   WBC 7.80 6.62   HGB 8.7* 8.4*   HCT 28.5* 27.8*    223     Magnesium:   Recent Labs   Lab 08/14/23  0428 08/15/23  0432   MG 2.0 1.9       Significant Imaging: I have reviewed all pertinent imaging results/findings within the past 24 hours.      Assessment/Plan:      Wounds, multiple  Wound Care consulted  Turn q2h, supportive care    Doing better, wound clean, dry    Anemia  H/H stable, near baseline  Monitor as needed    Give Venofer, Triple Vitamins    History of CVA (cerebrovascular accident)  Bed bound at baseline with limited help at home   PT/OT, supportive care  Med mgmt consulted for assistance with SNF placement    Being discharged to SNF for Rehab    ADEEL (obstructive sleep apnea)  Noncompliant with home NIPPV  NIPPV qHS and PRN    Pulmonary HTN  Echo    Result Date: 8/7/2023    Left Ventricle: Normal wall motion. There is low normal systolic   function  with a visually estimated ejection fraction of 50 - 55%.    Right Ventricle: Normal right ventricular cavity size.      Plan as above    COPD (chronic obstructive pulmonary disease)  Not in exacerbation  On home 2L NC  PRN breathing treatments    Essential hypertension  BP stable  Resume home meds    Paraparesis  Turn q2h  Supportive care      VTE Risk Mitigation (From admission, onward)         Ordered     enoxaparin injection 40 mg  Daily         08/06/23 2322     IP VTE HIGH RISK PATIENT  Once         08/06/23 2322     Place sequential compression device  Until discontinued         08/06/23 2322                Discharge Planning   LOY: 8/15/2023     Code Status: Full Code   Is the patient medically ready for discharge?:     Reason for patient still in hospital (select all that apply): Patient trending condition, Laboratory test, Treatment, Imaging, Consult recommendations, PT / OT recommendations and Pending disposition  Discharge Plan A: Skilled Nursing Facility   Discharge Delays: None known at this time    Ray Key MD  Department of Hospital Medicine   [facility] - Intensive Care ([facility])

## 2023-08-15 NOTE — PLAN OF CARE
08/15/23 1310   Post-Acute Status   Post-Acute Authorization Placement   Post-Acute Placement Status Pending payor review/awaiting authorization (if required)     Received call from Sydnee at [facility]. Advised updated progress notes sent to facility yesterday and today. When asked about authorization pertaining to our conversation yesterday, Sydnee stated I actually spoke with Yuliana yesterday afternoon, not her. She doesn't see where auth was submitted to insurance by their facility. Advised pt is medically stable for DC and once we have authorization DC orders will follow. She said she will submit for authorization.

## 2023-08-15 NOTE — PT/OT/SLP PROGRESS
Physical Therapy Treatment    Patient Name:  Mary Ellen Fajardo   MRN:  [id number]    Recommendations:     Discharge Recommendations: nursing facility, skilled  Discharge Equipment Recommendations: to be determined by next level of care  Barriers to discharge: None    Assessment:     Mary Ellen Fajardo is a 67 y.o. female admitted with a medical diagnosis of Acute on chronic respiratory failure with hypoxia and hypercapnia.  She presents with the following impairments/functional limitations: weakness, impaired endurance, impaired functional mobility, impaired balance, decreased safety awareness, decreased lower extremity function, decreased upper extremity function, decreased coordination, impaired cardiopulmonary response to activity, impaired skin, edema.    Rehab Prognosis: Fair; patient would benefit from acute skilled PT services to address these deficits and reach maximum level of function.    Recent Surgery: * No surgery found *     Plan:     During this hospitalization, patient to be seen 3 x/week to address the identified rehab impairments via therapeutic activities, therapeutic exercises and progress toward the following goals:    Plan of Care Expires:  08/28/23    Subjective     Chief Complaint: NONE, AGREEABLE TO TX.  Patient/Family Comments/goals:   Pain/Comfort:  Pain Rating 1: 0/10      Objective:     Communicated with NURSE WEBER prior to session.  Patient found supine with telemetry, blood pressure cuff, oxygen, delcid catheter, central line, pulse ox (continuous), peripheral IV upon PT entry to room.     General Precautions: Standard, fall (L AKA)  Orthopedic Precautions: N/A  Braces: N/A  Respiratory Status: Nasal cannula, flow 2 L/min     Functional Mobility:  Bed Mobility:     Rolling Left:  moderate assistance  Rolling Right: moderate assistance  Scooting: maximal assistance and of 2 persons-SEATED SCOOT TOWARD EOB  Bridging: total assistance and of 2 persons-SUPINE SCOOT TOWARD  "HOB  Supine to Sit: maximal assistance and of 2 persons  Sit to Supine: maximal assistance and of 2 persons  Balance: PT TOLERATED STATIC SITTING EOB FOR APPROX. 15 MINUTES WITH MIN/CGA AFTER POSITIONED AT EOB, BUE SUPPORT REQUIRED TO MAINTAIN SITTING  PT REQUIRED A 4 PERSON SUPINE DEPENDENT TF FROM ONE HOSPITAL BED TO ANOTHER (NEW BED LOWER TO GROUND FOR INCREASED SAFETY SITTING EOB    AM-PAC 6 CLICK MOBILITY  Turning over in bed (including adjusting bedclothes, sheets and blankets)?: 2  Sitting down on and standing up from a chair with arms (e.g., wheelchair, bedside commode, etc.): 1  Moving from lying on back to sitting on the side of the bed?: 2  Moving to and from a bed to a chair (including a wheelchair)?: 1  Need to walk in hospital room?: 1  Climbing 3-5 steps with a railing?: 1  Basic Mobility Total Score: 8     Treatment & Education:  PT EDUCATION:  - ROLE OF P.T. AND POC IN ACUTE CARE HOSPITAL SETTING  - IMPORTANCE OF ACTIVITY PACING  - TO CONTINUE THERAPUETIC EXERCISES THROUGHOUT THE DAY TO INCREASE ACTIVITY TOLERANCE AND DECREASE RISK FOR PNEUMONIA AND BLOOD CLOTS: HIP FLEX/EXT, HIP ABD/ADD, QUAD SET, HEEL SLIDE, AP  - RISK FOR FALLS DUE TO GENERALIZED WEAKNESS, EDUCATED ON "CALL DON'T FALL", ENCOURAGED TO CALL FOR ASSISTANCE WITH ALL NEEDS SUCH AS BED<>CHAIR TRANSFERS OR TRIPS TO BATHROOM, PT AGREEABLE TO SAFETY PRECAUTIONS    Patient left HOB elevated with all lines intact, call button in reach, bed alarm on, and NURSE notified..    GOALS:   Multidisciplinary Problems       Physical Therapy Goals          Problem: Physical Therapy    Goal Priority Disciplines Outcome Goal Variances Interventions   Physical Therapy Goal     PT, PT/OT Ongoing, Progressing     Description: LTG'S TO BE MET IN 14 DAYS (8-28-23)  PT WILL REQUIRE MODA FOR BED MOBILITY  PT WILL REQUIRE MAXA FOR BED<>CHAIR TF'S  PT WILL INC AMPAC SCORE BY 2 POINTS TO PROGRESS GROSS FUNC MOBILITY                         Time Tracking:     PT " Received On: 08/15/23  PT Start Time: 1000     PT Stop Time: 1025  PT Total Time (min): 25 min     Billable Minutes: Therapeutic Activity 25    Treatment Type: Treatment  PT/PTA: PT     Number of PTA visits since last PT visit: 0     08/15/2023

## 2023-08-15 NOTE — ASSESSMENT & PLAN NOTE
Echo report from 4/2021:   The left ventricle is normal in size with mild concentric hypertrophy and normal systolic function.   The estimated ejection fraction is 55%.   Indeterminate left ventricular diastolic function.   Normal right ventricular size with normal right ventricular systolic function.   Mild left atrial enlargement.   Normal central venous pressure (3 mmHg).   The estimated PA systolic pressure is 24 mmHg    Plan:  · Continue Bumex  · Continue strict I&Os

## 2023-08-15 NOTE — ASSESSMENT & PLAN NOTE
Bed bound at baseline with limited help at home   PT/OT, supportive care  Med mgmt consulted for assistance with SNF placement    Being discharged to SNF for Rehab

## 2023-08-15 NOTE — ASSESSMENT & PLAN NOTE
Patient admitted with acute on chronic respiratory failure. Patient is on home oxygen at 2L/NC. Signs and symptoms of respiratory failure included lethargy and increased work of breathing. Contributing diagnoses included CHF, COPD, and ADEEL.    Plan:  · Developed worsening shortness of breath. Initially placed on AVAPS but ultimately required intubation on 8/12 and successfully extubated on 8/13  · Add scheduled Brovana and Pulmicort  · PRN nebs  · Pulmonary status appears stable on home oxygen  · Maintain goal saturation of 88% or greater  · Continue Bipap qhs

## 2023-08-15 NOTE — PROGRESS NOTES
[facility]  Pulmonology Progress Note    Patient Name: Mary Ellen Fajardo  MRN: [id number]  Admission Date: 8/6/2023    Hospital Length of Stay: 9 days  Code Status: Full Code     Attending Provider: Ray Key MD    Principal Problem: Acute on chronic respiratory failure with hypoxia and hypercapnia  Subjective:     Mary Ellen Fajardo is a 67-year-old female with a past medical history significant for stroke, hypertension, COPD, chronic diastolic heart failure, chronic respiratory failure on home oxygen on 2 L/min NC, obstructive sleep apnea, neurogenic bladder with chronic indwelling suprapubic catheter, left AKA, paraparesis, and wounds who presented to the emergency room due to generalized weakness, confusion, and painful urination. Patient complains of chest pain on/off, abdominal pain, and bilateral leg pain. ABG revealed mild acidosis with hypercapnia. She is on continuous BIPAP at this time. She does have UTI and suprapubic catheter should be changed out. She has been given Zosyn. Lactic acid is normal however procalcitonin is elevated. BP is elevated. There is evidence of congestive heart failure on CXR. US was done due to leg pain, it is negative for DVT. There is no leukocytosis or fever. Critical care team consulted for admission to ICU due to hypercapnic respiratory failure requiring continuous BIPAP and for treatment of UTI.    Interval history:   8/7: placed on home 2L this AM, continue NIPPV qHS and PRN naps, no new issues or c/o on exam, no family at bedside    8/12 Transferred to ICU for worsening shortness of breath; on AVAPS. ABG on AVAPS consistent with worsening hypercapnia. AMS and unable to follow commands. Intubated / sedated; post intubation had abnormal motor tonic clonic activity. Ativan given. Keppra ordered. Placed on diprivan. CT head pending.    8/13. No seizure overnight. EEG scheduled this am. Neuro consult pending. Extubated without  complication.   8/14: No acute events.  Stable on home O2 of 2L currently.  Wearing Bipap at night.  Hemodynamics and oxygenation are stable.   8/15: No acute events overnight. Pulmonary status stable on 2L/NC.     Objective:     Vital Signs (Most Recent):  Temp: 97.7 °F (36.5 °C) (08/15/23 0701)  Pulse: 74 (08/15/23 0701)  Resp: (!) 21 (08/15/23 0701)  BP: (!) 121/59 (08/15/23 0701)  SpO2: 99 % (08/15/23 0701) Vital Signs (24h Range):  Temp:  [97.7 °F (36.5 °C)-98.6 °F (37 °C)] 97.7 °F (36.5 °C)  Pulse:  [60-82] 74  Resp:  [19-36] 21  SpO2:  [94 %-100 %] 99 %  BP: (115-168)/(59-93) 121/59   Weight: 80.3 kg (177 lb 0.5 oz);  Body mass index is 29.46 kg/m².    Intake/Output Summary (Last 24 hours) at 8/15/2023 1012  Last data filed at 8/15/2023 0600  Gross per 24 hour   Intake 120 ml   Output 2665 ml   Net -2545 ml      Physical Exam  Vitals and nursing note reviewed.   HENT:      Head: Normocephalic.   Eyes:      Conjunctiva/sclera: Conjunctivae normal.   Cardiovascular:      Rate and Rhythm: Normal rate.   Pulmonary:      Effort: Pulmonary effort is normal.      Breath sounds: Normal breath sounds.   Abdominal:      Comments: Diffuse abdominal pain, some tenderness with palpation; however, no point tenderness. +flatus / BM   Musculoskeletal:      Cervical back: Normal range of motion and neck supple.      Comments: Left AKA   Skin:     General: Skin is warm and dry.   Neurological:      Mental Status: She is alert. Mental status is at baseline.   Psychiatric:         Mood and Affect: Mood normal.      Review of Systems: Negative except as indicated in HPI    Significant Labs:  CBC/Anemia Profile:  Recent Labs   Lab 08/14/23  0428 08/15/23  0432   WBC 7.80 6.62   HGB 8.7* 8.4*   HCT 28.5* 27.8*    223   MCV 82 82   RDW 18.0* 17.7*   Chemistries:  Recent Labs   Lab 08/14/23  0428 08/15/23  0432    142   K 3.6 4.0    98   CO2 32* 35*   BUN 12 13   CREATININE 0.7 0.9   CALCIUM 8.8 8.7   MG 2.0 1.9    PHOS 3.3 3.1   ABG  Recent Labs   Lab 08/13/23  0356   PH 7.506*   PO2 67*   PCO2 42.9   HCO3 33.9*   BE 11     Assessment/Plan:   Acute on chronic respiratory failure with hypoxia and hypercapnia  COPD (chronic obstructive pulmonary disease)  Patient admitted with acute on chronic respiratory failure. Patient is on home oxygen at 2L/NC. Signs and symptoms of respiratory failure included lethargy and increased work of breathing. Contributing diagnoses included CHF, COPD, and ADEEL.    Plan:  · Developed worsening shortness of breath. Initially placed on AVAPS but ultimately required intubation on 8/12 and successfully extubated on 8/13  · Add scheduled Brovana and Pulmicort  · PRN nebs  · Pulmonary status appears stable on home oxygen  · Maintain goal saturation of 88% or greater  · Continue Bipap qhs    Acute on chronic diastolic heart failure  Echo report from 4/2021:   The left ventricle is normal in size with mild concentric hypertrophy and normal systolic function.   The estimated ejection fraction is 55%.   Indeterminate left ventricular diastolic function.   Normal right ventricular size with normal right ventricular systolic function.   Mild left atrial enlargement.   Normal central venous pressure (3 mmHg).   The estimated PA systolic pressure is 24 mmHg    Plan:  · Continue Bumex  · Continue strict I&Os    ADEEL (obstructive sleep apnea)  · noncompliant with home NIPPV  · Compliance reinforced  · Continue NIPPV qHS     Cuca Garrison NP  Pulmonary and Critical Care  [facility]

## 2023-08-15 NOTE — PLAN OF CARE
Sent DC info to [facility]. After DON reviewed, they are unable to accept today due to complications with RT and the settings on the bipap. Messaged [facility] (order was placed for [facility]) to reschedule  for tomorrow at 8am. Provider notified.

## 2023-08-16 VITALS
WEIGHT: 177 LBS | SYSTOLIC BLOOD PRESSURE: 108 MMHG | OXYGEN SATURATION: 96 % | TEMPERATURE: 98 F | BODY MASS INDEX: 29.49 KG/M2 | DIASTOLIC BLOOD PRESSURE: 54 MMHG | HEIGHT: 65 IN | RESPIRATION RATE: 28 BRPM | HEART RATE: 85 BPM

## 2023-08-16 LAB
ANION GAP SERPL CALC-SCNC: 11 MMOL/L (ref 8–16)
BASOPHILS # BLD AUTO: 0.05 K/UL (ref 0–0.2)
BASOPHILS NFR BLD: 0.8 % (ref 0–1.9)
BUN SERPL-MCNC: 17 MG/DL (ref 8–23)
CALCIUM SERPL-MCNC: 8.8 MG/DL (ref 8.7–10.5)
CHLORIDE SERPL-SCNC: 95 MMOL/L (ref 95–110)
CO2 SERPL-SCNC: 34 MMOL/L (ref 23–29)
CREAT SERPL-MCNC: 1 MG/DL (ref 0.5–1.4)
DIFFERENTIAL METHOD: ABNORMAL
EOSINOPHIL # BLD AUTO: 0.2 K/UL (ref 0–0.5)
EOSINOPHIL NFR BLD: 2.7 % (ref 0–8)
ERYTHROCYTE [DISTWIDTH] IN BLOOD BY AUTOMATED COUNT: 17.8 % (ref 11.5–14.5)
EST. GFR  (NO RACE VARIABLE): >60 ML/MIN/1.73 M^2
GLUCOSE SERPL-MCNC: 71 MG/DL (ref 70–110)
HCT VFR BLD AUTO: 28.8 % (ref 37–48.5)
HGB BLD-MCNC: 8.6 G/DL (ref 12–16)
IMM GRANULOCYTES # BLD AUTO: 0.03 K/UL (ref 0–0.04)
IMM GRANULOCYTES NFR BLD AUTO: 0.5 % (ref 0–0.5)
LYMPHOCYTES # BLD AUTO: 1.9 K/UL (ref 1–4.8)
LYMPHOCYTES NFR BLD: 30.7 % (ref 18–48)
MAGNESIUM SERPL-MCNC: 1.9 MG/DL (ref 1.6–2.6)
MCH RBC QN AUTO: 24.9 PG (ref 27–31)
MCHC RBC AUTO-ENTMCNC: 29.9 G/DL (ref 32–36)
MCV RBC AUTO: 84 FL (ref 82–98)
MONOCYTES # BLD AUTO: 0.8 K/UL (ref 0.3–1)
MONOCYTES NFR BLD: 11.9 % (ref 4–15)
NEUTROPHILS # BLD AUTO: 3.4 K/UL (ref 1.8–7.7)
NEUTROPHILS NFR BLD: 53.4 % (ref 38–73)
NRBC BLD-RTO: 0 /100 WBC
PHOSPHATE SERPL-MCNC: 3.5 MG/DL (ref 2.7–4.5)
PLATELET # BLD AUTO: 232 K/UL (ref 150–450)
PMV BLD AUTO: 9.7 FL (ref 9.2–12.9)
POTASSIUM SERPL-SCNC: 4.2 MMOL/L (ref 3.5–5.1)
RBC # BLD AUTO: 3.45 M/UL (ref 4–5.4)
SODIUM SERPL-SCNC: 140 MMOL/L (ref 136–145)
WBC # BLD AUTO: 6.31 K/UL (ref 3.9–12.7)

## 2023-08-16 PROCEDURE — 80048 BASIC METABOLIC PNL TOTAL CA: CPT | Performed by: NURSE PRACTITIONER

## 2023-08-16 PROCEDURE — 25000003 PHARM REV CODE 250: Performed by: NURSE PRACTITIONER

## 2023-08-16 PROCEDURE — 99900035 HC TECH TIME PER 15 MIN (STAT)

## 2023-08-16 PROCEDURE — 25000003 PHARM REV CODE 250: Performed by: INTERNAL MEDICINE

## 2023-08-16 PROCEDURE — 94799 UNLISTED PULMONARY SVC/PX: CPT

## 2023-08-16 PROCEDURE — 25000242 PHARM REV CODE 250 ALT 637 W/ HCPCS: Performed by: NURSE PRACTITIONER

## 2023-08-16 PROCEDURE — 84100 ASSAY OF PHOSPHORUS: CPT | Performed by: NURSE PRACTITIONER

## 2023-08-16 PROCEDURE — 94660 CPAP INITIATION&MGMT: CPT

## 2023-08-16 PROCEDURE — 94640 AIRWAY INHALATION TREATMENT: CPT

## 2023-08-16 PROCEDURE — 25000003 PHARM REV CODE 250: Performed by: HOSPITALIST

## 2023-08-16 PROCEDURE — 25000003 PHARM REV CODE 250: Performed by: EMERGENCY MEDICINE

## 2023-08-16 PROCEDURE — 83735 ASSAY OF MAGNESIUM: CPT | Performed by: NURSE PRACTITIONER

## 2023-08-16 PROCEDURE — 36415 COLL VENOUS BLD VENIPUNCTURE: CPT | Performed by: NURSE PRACTITIONER

## 2023-08-16 PROCEDURE — 85025 COMPLETE CBC W/AUTO DIFF WBC: CPT | Performed by: NURSE PRACTITIONER

## 2023-08-16 PROCEDURE — 94761 N-INVAS EAR/PLS OXIMETRY MLT: CPT

## 2023-08-16 PROCEDURE — 27100171 HC OXYGEN HIGH FLOW UP TO 24 HOURS

## 2023-08-16 RX ORDER — LANOLIN ALCOHOL/MO/W.PET/CERES
100 CREAM (GRAM) TOPICAL DAILY
Start: 2023-08-16

## 2023-08-16 RX ORDER — FERROUS SULFATE 324(65)MG
324 TABLET, DELAYED RELEASE (ENTERIC COATED) ORAL DAILY
Start: 2023-08-16

## 2023-08-16 RX ADMIN — DOCUSATE SODIUM 100 MG: 100 CAPSULE, LIQUID FILLED ORAL at 08:08

## 2023-08-16 RX ADMIN — FERROUS SULFATE TAB 325 MG (65 MG ELEMENTAL FE) 1 EACH: 325 (65 FE) TAB at 08:08

## 2023-08-16 RX ADMIN — THERA TABS 1 TABLET: TAB at 08:08

## 2023-08-16 RX ADMIN — IPRATROPIUM BROMIDE AND ALBUTEROL SULFATE 3 ML: 2.5; .5 SOLUTION RESPIRATORY (INHALATION) at 07:08

## 2023-08-16 RX ADMIN — POLYETHYLENE GLYCOL 3350 17 G: 17 POWDER, FOR SOLUTION ORAL at 08:08

## 2023-08-16 RX ADMIN — LEVETIRACETAM 1000 MG: 500 TABLET, FILM COATED ORAL at 08:08

## 2023-08-16 RX ADMIN — Medication 1 TABLET: at 08:08

## 2023-08-16 RX ADMIN — OXYBUTYNIN CHLORIDE 5 MG: 5 TABLET ORAL at 08:08

## 2023-08-16 RX ADMIN — BUDESONIDE 0.5 MG: 0.5 INHALANT ORAL at 07:08

## 2023-08-16 RX ADMIN — CITALOPRAM HYDROBROMIDE 30 MG: 20 TABLET ORAL at 08:08

## 2023-08-16 RX ADMIN — ARFORMOTEROL TARTRATE 15 MCG: 15 SOLUTION RESPIRATORY (INHALATION) at 07:08

## 2023-08-16 RX ADMIN — BUMETANIDE 2 MG: 1 TABLET ORAL at 08:08

## 2023-08-16 RX ADMIN — Medication 100 MG: at 08:08

## 2023-08-16 NOTE — PROGRESS NOTES
[facility] - Intensive Care ([facility])  Adult Nutrition  Progress Note    SUMMARY       Recommendations  1. Recommend pt continues Cardiac diet as medically appropriate.   2. Recommend pt receives Vanilla boost plus BID to assist with feeling nutritional gaps.   3. Recommend pt receives Law BID to assist with wound healing.   4. Recommend Pt receives Banatrol BID to  assist with creamy, soft stools.   5. Weigh weekly.    Goals:   1. Pt will consume >75% of EEN and EPN prior to RD follow up. 2. Pt will receive and consume >75% of supplements prior to RD follow up.   3. The pt stool consistency will improve prior to RD follow up.  Nutrition Goal Status: new  Communication of RD Recs: other (comment) (POC: Sticky Note)    Assessment and Plan    Nutrition Problem  Inadequate PO intake  Increased protein needs  Altered GI function    Related to (etiology):   Altered GI function  Increased demand for nutrition  Alteration in GI tract function    Signs and Symptoms (as evidenced by):   Abdominal bloating  Multiple wound  Creamy/Soft stools    Interventions(treatment strategy):  1. Commercial Beverage  2. Commercial Food  3. Mineral supplement therapy for wound healing  4. Collaboration of care with medical providers    Nutrition Diagnosis Status:   New       Malnutrition Assessment     Skin (Micronutrient): bruised, red, wounds unhealed (Eric = 15)                                 Reason for Assessment    Reason For Assessment: length of stay  Diagnosis:  (Multiple wounds)  Relevant Medical History: Drug/Alcohol abuse, CHF, COPD, CVA, HTN, Paraplegia, Wheel chair dependence, (L) AKA  Interdisciplinary Rounds: did not attend  General Information Comments:   8/16: 68 yo/ female admitted w/o no active principle problem. Per EMR pt is scheduled for discharge and awaiting transport to facility. Pt states that her appetite comes and goes d/t abdominal bloating. Pt states she has only been able to consume 65% of her normal PO  "intake during meals d/t bloating for the pass 2 weeks. The pt states her current PO intake is between 65%-85%.  NFPE not performed, Pt appears well nourished. Pt currently charted to weigh 177 lb 0.5 oz, BMI 29.46 (Normal for age). Per wound care note, IAD noted to bilateral medial thighs, groins, perineum with partial thickness tissue loss and redness. Right posterior-lateral breast abrasion is noted with partial thickenss tissue loss, scant sanguinous drainage noted. Pt states to experiences nausea from time to time however states not currently. Pt denies v/d, chewing, and swallowing difficulties.Pt LBM is charted as 8/15, creamy and soft. Pt currently on room air. RD will continue to monitor.  Nutrition Discharge Planning: Cardiac    Wt Readings from Last 20 Encounters:   08/16/23 80.3 kg (177 lb 0.5 oz)   01/16/23 77.5 kg (170 lb 13.7 oz)   01/13/23 77.3 kg (170 lb 8 oz)   08/24/21 98 kg (216 lb)   04/30/21 98 kg (216 lb)   01/08/21 110.7 kg (244 lb)   01/05/21 111.1 kg (245 lb)   10/06/20 111.1 kg (245 lb)   08/09/20 111.1 kg (245 lb)   03/17/20 95.3 kg (210 lb)   03/04/20 106.2 kg (234 lb 2.1 oz)   02/12/20 113.4 kg (250 lb)   11/25/19 106.6 kg (235 lb)   10/29/19 105.2 kg (231 lb 14.8 oz)   09/20/19 112.5 kg (248 lb 0.3 oz)   07/22/19 107.1 kg (236 lb 1.8 oz)   01/16/19 109.3 kg (241 lb)   01/06/19 109.6 kg (241 lb 10 oz)   11/12/18 106.6 kg (235 lb)   05/07/18 106.1 kg (234 lb)   ]    Nutrition Risk Screen    Nutrition Risk Screen: large or nonhealing wound, burn or pressure injury    Nutrition/Diet History    Typical Food/Fluid Intake: x1 ensure high protein per day  Spiritual, Cultural Beliefs, Cheondoism Practices, Values that Affect Care: no  Food Allergies: NKFA  Factors Affecting Nutritional Intake: abdominal pain, altered gastrointestinal function, decreased appetite    Anthropometrics    Temp: 98.2 °F (36.8 °C)  Height Method: Stated  Height: 5' 5" (165.1 cm)  Height (inches): 65 in  Weight Method: " "Bed Scale  Weight: 80.3 kg (177 lb 0.5 oz)  Weight (lb): 177.03 lb  Ideal Body Weight (IBW), Female: 125 lb  % Ideal Body Weight, Female (lb): 141.62 %  BMI (Calculated): 29.5  BMI Grade: 25 - 29.9 - overweight (Normal for age)  Amputation %: 16  Total Amputation %: 16  Amputation Ideal Body Weight (IBW), Female (lb): 109 lb  Amputee BMI (kg/m2): 35.17 kg/m2       Lab/Procedures/Meds  BMP  Lab Results   Component Value Date     08/16/2023    K 4.2 08/16/2023    CL 95 08/16/2023    CO2 34 (H) 08/16/2023    BUN 17 08/16/2023    CREATININE 1.0 08/16/2023    CALCIUM 8.8 08/16/2023    ANIONGAP 11 08/16/2023    EGFRNORACEVR >60 08/16/2023     Lab Results   Component Value Date    CALCIUM 8.8 08/16/2023    PHOS 3.5 08/16/2023     No results for input(s): "POCTGLUCOSE" in the last 24 hours.    Pertinent Labs Reviewed: reviewed  Pertinent Medications Reviewed: reviewed  Scheduled Meds:   arformoteroL  15 mcg Nebulization BID    budesonide  0.5 mg Nebulization Q12H    bumetanide  2 mg Oral Daily    citalopram  30 mg Oral Daily    docusate sodium  100 mg Oral Daily    enoxparin  40 mg Subcutaneous Daily    ferrous sulfate  1 tablet Oral Daily    folic acid-vit B6-vit B12 2.5-25-2 mg  1 tablet Oral Daily    levETIRAcetam  1,000 mg Oral BID    multivitamin  1 tablet Oral Daily    oxybutynin  5 mg Oral BID    polyethylene glycol  17 g Oral Daily    QUEtiapine  200 mg Oral QHS    thiamine  100 mg Oral Daily     Continuous Infusions:  PRN Meds:.acetaminophen, albuterol-ipratropium, diphenhydrAMINE, hydrOXYzine HCL, oxyCODONE-acetaminophen, sodium chloride 0.9%    Estimated/Assessed Needs    Weight Used For Calorie Calculations: 80.3 kg (177 lb 0.5 oz)  Energy Calorie Requirements (kcal): 8510-3793 (MJS: AF 1.2-1.4 kcal/kg per woulds)     Protein Requirements: 100-120 (1.25-1.5 g/kg per wounds)  Weight Used For Protein Calculations: 80.3 kg (177 lb 0.5 oz)  Fluid Requirements (mL): 4578-6382  Estimated Fluid Requirement " Method: RDA Method  RDA Method (mL): 1607  CHO Requirement: 201-234      Nutrition Prescription Ordered    Current Diet Order: Cardiac    Evaluation of Received Nutrient/Fluid Intake    I/O: -27059.2 Since admit  Energy Calories Required: not meeting needs  Protein Required: not meeting needs  Fluid Required: (P) not meeting needs  Tolerance: (P) tolerating  % Intake of Estimated Energy Needs: 50 - 75 %  % Meal Intake: 50 - 75 %    Nutrition Risk    Level of Risk/Frequency of Follow-up: (P) low (x1 weekly)     Monitor and Evaluation    Food and Nutrient Intake: energy intake, food and beverage intake  Food and Nutrient Adminstration: diet order  Knowledge/Beliefs/Attitudes: food and nutrition knowledge/skill, beliefs and attitudes  Physical Activity and Function: nutrition-related ADLs and IADLs, factors affecting access to physical activity  Anthropometric Measurements: weight, body mass index  Biochemical Data, Medical Tests and Procedures: electrolyte and renal panel, gastrointestinal profile, inflammatory profile, lipid profile  Nutrition-Focused Physical Findings: overall appearance, skin     Nutrition Follow-Up    RD Follow-up?: Yes   Octavio Urena, Registration Eligible, Provisional LDN

## 2023-08-16 NOTE — NURSING
Nicole w/ Emily Tapia at [facility].  [facility] to transport pt to facility d/t pt being bedbound.

## 2023-08-16 NOTE — NURSING
Pt AAOx4. GCS 15. VSS. Afebrile. NSR on monitor. 2LNC while awake, BiPAP while asleep. Suprapubic catheter in place; 650mL UOP this shift. BM x1 this shift. No accuchecks. Working on discharge to [facility].     Pt resting comfortably in bed with side rails up x3; locked and lowered with alarm set. Call light in place. Advised pt to call out if anything is needed. Pt verbalized understanding.

## 2023-08-16 NOTE — PLAN OF CARE
[facility] - Intensive Care (Hospital)  Discharge Final Note    Primary Care Provider: Any Tran MD    Expected Discharge Date: 8/15/2023    Final Discharge Note (most recent)       Final Note - 08/16/23 0815          Final Note    Assessment Type Final Discharge Note     Anticipated Discharge Disposition Skilled Nursing Facility        Post-Acute Status    Post-Acute Authorization Placement     Post-Acute Placement Status Set-up Complete/Auth obtained     Discharge Delays None known at this time                     Important Message from Medicare  Important Message from Medicare regarding Discharge Appeal Rights: Given to patient/caregiver, Explained to patient/caregiver, Signed/date by patient/caregiver     Date IMM was signed: 08/15/23  Time IMM was signed: 1449     Follow-up providers       [employer] [facility] [employer]    Phone: [phone]       Next Steps: Follow up    Instructions: Patient can call 3 business days prior to follow up appointments to schedule transportation through [employer] insurance.              After-discharge care                Destination       [facility]   Service: Skilled Nursing    [facility] [address] [facility] [address]   Phone: [phone]                             DC disposition: [facility]   Transportation: [place],  est 0800  Nurse provided with phone number for report: [phone]  All DC info uploaded into [employer].     SIDDHARTH Brock

## 2023-08-16 NOTE — DISCHARGE SUMMARY
[facility] - Intensive Care ([facility] Medicine  Discharge Summary      Patient Name: Mary Ellen Fajardo  MRN: [id number]  [facility]: [id number]  Patient Class: IP- Inpatient  Admission Date: 8/6/2023  Hospital Length of Stay: 10 days  Discharge Date and Time:  08/16/2023 11:28 AM  Attending Physician: Evelia att. providers found   Discharging Provider: Ray Key MD  Primary Care Provider: Any Tran MD    Primary Care Team: [facility] MEDICINE B    HPI:   Patient is 67 year old female with past medical history significant for stroke, hypertension, COPD, CHF, chronic respiratory failure on home oxygen (2 L/min NC), ADEEL, neurogenic bladder with chronic indwelling suprapubic catheter, left AKA, paraparesis, and wounds who presented to ED due to generalized weakness, confusion, and painful urination. Patient complains of chest pain on/off, abdominal pain, and bilateral leg pain. ABG revealed mild acidosis with hypercapnia. She is on continuous BIPAP at this time. She does have UTI and suprapubic catheter should be changed out. She has been given Zosyn. Lactic acid is normal however procalcitonin is elevated. BP is elevated. There is evidence of congestive heart failure on CXR. US was done due to leg pain, it is negative for DVT. There is no leukocytosis or fever. Critical care team consulted for admission to ICU due to hypercapnic respiratory failure requiring continuous BIPAP and for treatment of UTI.      * No surgery found *      Hospital Course:   8/7: placed on home 2L this AM, continue NIPPV qHS and PRN naps, no new issues or c/o on exam, no family at bedside  ------  Chart reviewed, examined patient at bedside this afternoon, asking to restart her home anxiety and pain medication.  Currently stable on 2L NC, stable for floor transfer.    8/8: NAEON. Patient c/o bladder spasms, asking for medication, states previously on oxybutynin.  BP marginal this AM. Cr increase from 0.9 --> 1.3, will stop  Bumex  Urine culture with >100k GNR, speciation pending, continue ceftriaxone  Remains stable on 2L NC  SNF placement pending    8/9: NAEON. Patient asking for her home muscle relaxer, will restart  Cr decreased to 1.1. Restart home PO Bumex  Urine culture results pending, continue ceftriaxone  1 of 2 blood culture polymicrobial, likely contaminant  Remains stable on 2L NC  SNF placement pending    8/10: NAEON. Patient in bed, denies new issues, stable on 2L NC  Urine culture +proteus, sensitives reviewed, continue ceftriaxone  SNF placement pending    8/11: NAEON, SNF facility refused acceptance due to reported no BM x 3 days  No BMs documented since ICU transfer, start daily miralax and colace    8/12: Overnight events reviewed, patient noted to be lethargic overnight  ABG results reviewed, hypercapnic, currently on AVAPS  CT Head with no acute findings  Remains lethargic, repeat ABG ordered, transfer to ICU    8/14- Pt seen and examined, chart reviewed. Briefly, 67 year old AAF, hx of stroke, HTN, COPD, CHF, chronic respiratory failure on home O2, ADEEL, neurogenic bladder with chronic indwelling suprapubic catheter, left AKA, paraparesis, and wounds, admitted 8/6 with generalized weakness, confusion, and painful urination, B leg pain, found to have mild Resp Acidosis, treated with continuous BIPAP. Also had UTI and suprapubic catheter changed, treated Zosyn. Lactic acid was normal, Procalcitonin was elevated. BP was elevated with evidence of CHF on CXR. US legs - due to leg pain, Neg for DVT.    On 812- Pt became lethargic with worsening hypercapnic respiratory failure - transferred to ICU- changed from Bipap to AVPS- ABG still worsening hypercapnia, AMS and unable to follow commands. Intubated /sedated. Post intubation had abnormal motor tonic clonic activity-Ativan given. Keppra ordered. Placed on diprivan. CT head Neg. Extubated yesterday 8/13 and now stable on 2 L NC. Now being transferred out of ICU, AAOx3,  speech clear. PO4 low- replaced. No new issues. All Cx remain NGTD, off all Abx.     8/15- looks well, resting comfortably in bed, eating, on 2 L NC. Denies any complaints, doing Bipap qhs and NC during the day. Also did better with PT/OT, sat at the EOB etc. H/H stable, CO2 35, may need a little Diamox. Will d/c Keppra. Likely had post intubation seizure due to rapid correction of hypercapnia.     8/16- looks bright, AAOx3, on RA, ready to go, VSS Afeb, labs stable, she is eating drinking well, participating in PT/OT, wearing Bipap qhs and prn nap and uses o2 during the day. She has been accepted at [facility]. She was seen and examined and deemed stable for discharge to SNF today via EMS.        Goals of Care Treatment Preferences:  Code Status: Full Code      Consults:   Consults (From admission, onward)        Status Ordering Provider     Inpatient consult to Hospitalist  Once        Provider:  (Not yet assigned)    Acknowledged KUSHAL KATZ     Inpatient consult to Social Work  Once        Provider:  (Not yet assigned)    Completed JUAN MASSEY     Inpatient consult to Social Work  Once        Provider:  (Not yet assigned)    Completed JUAN MASSEY     Inpatient consult to Hospitalist  Once        Provider:  Juan Massey MD    Completed CRISTOPHER BENAVIDEZ     IP consult to case management  Once        Provider:  (Not yet assigned)    Completed MICHAEL GAGE          No new Assessment & Plan notes have been filed under this hospital service since the last note was generated.  Service: Hospital Medicine    Final Active Diagnoses:    Diagnosis Date Noted POA    Wounds, multiple [T07.XXXA] 08/06/2023 Yes    Anemia [D64.9] 10/29/2019 Yes    History of CVA (cerebrovascular accident) [Z86.73] 01/06/2019 Not Applicable     Chronic    ADEEL (obstructive sleep apnea) [G47.33] 01/19/2018 Yes    COPD (chronic obstructive pulmonary disease) [J44.9] 01/06/2019 Yes     Chronic    Essential  hypertension [I10] 01/19/2018 Yes     Chronic    Paraparesis [G82.20] 10/03/2012 Yes      Problems Resolved During this Admission:    Diagnosis Date Noted Date Resolved POA    PRINCIPAL PROBLEM:  Acute on chronic respiratory failure with hypoxia and hypercapnia [J96.21, J96.22] 10/30/2019 08/15/2023 Yes    Acute on chronic diastolic heart failure [I50.33] 07/19/2019 08/15/2023 Yes    Urinary tract infection associated with cystostomy catheter [T83.510A, N39.0] 07/17/2019 08/15/2023 Yes    New onset seizure [R56.9] 08/12/2023 08/15/2023 Yes       Discharged Condition: stable    Disposition: Skilled Nursing Facility    Follow Up:   Contact information for follow-up providers     [employer] [facility] [employer] Follow up.    Why: Patient can call 3 business days prior to follow up appointments to schedule transportation through [employer] insurance.  Contact information:  Phone: [phone]                 Contact information for after-discharge care     [facility] .    Service: Skilled Nursing  Contact information:  [address] [address]  [phone]                           Patient Instructions:      Diet Cardiac     Diet Cardiac     Activity as tolerated       Significant Diagnostic Studies: Labs:   BMP:   Recent Labs   Lab 08/15/23  0432 08/16/23  0408   GLU 85 71    140   K 4.0 4.2   CL 98 95   CO2 35* 34*   BUN 13 17   CREATININE 0.9 1.0   CALCIUM 8.7 8.8   MG 1.9 1.9   , CMP   Recent Labs   Lab 08/15/23  0432 08/16/23  0408    140   K 4.0 4.2   CL 98 95   CO2 35* 34*   GLU 85 71   BUN 13 17   CREATININE 0.9 1.0   CALCIUM 8.7 8.8   ANIONGAP 9 11    and All labs within the past 24 hours have been reviewed  Cardiac Graphics: Echocardiogram:   Transthoracic echo (TTE) complete (Cupid Only):   Results for orders placed or performed during the hospital encounter of 08/06/23   Echo   Result Value Ref Range    BSA 1.89 m2    LVOT stroke volume 77.41  cm3    LVIDd 3.65 3.5 - 6.0 cm    LV Systolic Volume 16.63 mL    LV Systolic Volume Index 8.9 mL/m2    LVIDs 2.22 2.1 - 4.0 cm    LV Diastolic Volume 56.35 mL    LV Diastolic Volume Index 30.30 mL/m2    IVS 1.13 (A) 0.6 - 1.1 cm    LVOT diameter 1.88 cm    LVOT area 2.8 cm2    FS 39 28 - 44 %    Left Ventricle Relative Wall Thickness 0.61 cm    Posterior Wall 1.11 (A) 0.6 - 1.1 cm    LV mass 130.15 g    LV Mass Index 70 g/m2    MV Peak E Jack 0.70 m/s    TDI LATERAL 0.15 m/s    TDI SEPTAL 0.08 m/s    E/E' ratio 6.09 m/s    MV Peak A Jack 0.86 m/s    TR Max Jack 3.60 m/s    E/A ratio 0.81     IVRT 85.63 msec    E wave deceleration time 305.86 msec    LV SEPTAL E/E' RATIO 8.75 m/s    LV LATERAL E/E' RATIO 4.67 m/s    LVOT peak jack 1.12 m/s    Left Ventricular Outflow Tract Mean Velocity 0.94 cm/s    Left Ventricular Outflow Tract Mean Gradient 3.88 mmHg    LA size 3.20 cm    Left Atrium Major Axis 5.21 cm    Left Atrium Minor Axis 3.57 cm    RV mid diameter 3.19 cm    RVOT peak VTI 16.4 cm    RA Major Axis 4.43 cm    AV mean gradient 7 mmHg    AV peak gradient 9 mmHg    Ao peak jack 1.46 m/s    Ao VTI 31.90 cm    LVOT peak VTI 27.90 cm    AV valve area 2.43 cm²    AV Velocity Ratio 0.77     AV index (prosthetic) 0.87     GABRIELA by Velocity Ratio 2.13 cm²    Triscuspid Valve Regurgitation Peak Gradient 52 mmHg    PV mean gradient 2 mmHg    RVOT peak jack 1.06 m/s    Ao root annulus 3.40 cm    STJ 4.19 cm    Ascending aorta 4.33 cm    IVC diameter 0.97 cm    Mean e' 0.12 m/s    ZLVIDS -2.87     ZLVIDD -3.49     LA Volume Index 18.6 mL/m2    LA volume 34.57 cm3    LA WIDTH 3.0 cm    RA Width 3.0 cm    Narrative      Left Ventricle: Normal wall motion. There is low normal systolic   function with a visually estimated ejection fraction of 50 - 55%.    Right Ventricle: Normal right ventricular cavity size.         Pending Diagnostic Studies:     None         Medications:  Reconciled Home Medications:      Medication List       START taking these medications    amoxicillin-clavulanate 875-125mg 875-125 mg per tablet  Commonly known as: AUGMENTIN  Take 1 tablet by mouth 2 (two) times daily. for 5 days     ferrous sulfate 324 mg (65 mg iron) Tbec  Take 1 tablet (324 mg total) by mouth once daily.     folic acid-vit B6-vit B12 2.5-25-2 mg 2.5-25-2 mg Tab  Commonly known as: FOLBIC or Equiv  Take 1 tablet by mouth once daily.     multivitamin Tab  Take 1 tablet by mouth once daily.     oxyCODONE-acetaminophen 5-325 mg per tablet  Commonly known as: PERCOCET  Take 1 tablet by mouth every 12 (twelve) hours as needed for Pain.     polyethylene glycol 17 gram Pwpk  Commonly known as: GLYCOLAX  Take 17 g by mouth once daily.     thiamine 100 MG tablet  Take 1 tablet (100 mg total) by mouth once daily.        CONTINUE taking these medications    albuterol-ipratropium 2.5 mg-0.5 mg/3 mL nebulizer solution  Commonly known as: DUO-NEB  Take 3 mLs by nebulization every 4 (four) hours as needed for Wheezing.     baclofen 20 MG tablet  Commonly known as: LIORESAL  20 mg 2 (two) times daily.     bumetanide 2 MG tablet  Commonly known as: BUMEX  Take 2 mg by mouth once daily.     C-TUB Misc  Generic drug: miscellaneous medical supply  NEEDS TO SWITCH [facility] FOR OXYGEN AT 2L. LAST OXYGEN SATURATION WAS 83% ON Room Air. She uses  BIPAP and  Needs  New mask, tubings and     catheter 18 Fr Misc  Change every 20 days     citalopram 20 MG tablet  Commonly known as: CeleXA  Take 1.5 tablets (30 mg total) by mouth once daily.     gabapentin 600 MG tablet  Commonly known as: NEURONTIN  Take 1 tablet (600 mg total) by mouth 3 (three) times daily.     hydrOXYzine HCL 25 MG tablet  Commonly known as: ATARAX  Take 1 tablet (25 mg total) by mouth 2 (two) times daily as needed for Anxiety.     oxybutynin 5 MG Tr24  Commonly known as: DITROPAN-XL  Take 1 tablet (5 mg total) by mouth once daily.     QUEtiapine 200 MG Tab  Commonly known as: SEROQUEL  Take 1 tablet  (200 mg total) by mouth every evening.        STOP taking these medications    aspirin 81 MG Chew            Indwelling Lines/Drains at time of discharge:   Lines/Drains/Airways     Drain  Duration                Ureteral Drain/Stent 01/08/21 1028 Right ureter 6 Fr. 949 days         Suprapubic Catheter 08/07/23 0301 100% silicone 16 Fr. 9 days                Time spent on the discharge of patient: 41 minutes    Ray Key MD  Department of Hospital Medicine  [facility] - Intensive Care ([facility])

## 2023-08-16 NOTE — PLAN OF CARE
Nutrition Recs: 8/16  1. Recommend pt continues Cardiac diet as medically appropriate.   2. Recommend pt receives Vanilla boost plus BID to assist with feeling nutritional gaps.   3. Recommend pt receives Law BID to assist with wound healing.   4. Recommend Pt receives Banatrol BID to  assist with creamy, soft stools.   5. Weigh weekly.    Goals:   1. Pt will consume >75% of EEN and EPN prior to RD follow up.   2. Pt will receive and consume >75% of supplements prior to RD follow up.   3. The pt stool consistency will improve prior to RD follow up.  Nutrition Goal Status: new  Communication of RD Recs: other (comment) (POC: Sticky Note)   Octavio Urena, Registration Eligible, Provisional LDN

## 2023-08-18 ENCOUNTER — TELEPHONE (OUTPATIENT)
Dept: CARDIOLOGY | Facility: CLINIC | Age: 68
End: 2023-08-18
Payer: MEDICARE

## 2023-08-18 LAB — BACTERIA BLD CULT: NORMAL

## 2023-08-18 NOTE — TELEPHONE ENCOUNTER
"Pt currently at [facility] [place]  I spoke briefly with nurse Rocio.  She reports pt os doing "ok"  Vitals "good"  No other worsening HF symptoms at this time.  I gave her our direct # to give to  if pt needs f/u after d/c from SNF  HFTCC episode closed for now     ----- Message from Cora Cabral LPN sent at 2023 10:14 AM CDT -----  Regardinhr call      "

## 2023-08-22 ENCOUNTER — HOSPITAL ENCOUNTER (INPATIENT)
Facility: HOSPITAL | Age: 68
LOS: 6 days | Discharge: NURSING, REHAB OR LONG-TERM CARE | DRG: 493 | End: 2023-08-30
Attending: STUDENT IN AN ORGANIZED HEALTH CARE EDUCATION/TRAINING PROGRAM | Admitting: STUDENT IN AN ORGANIZED HEALTH CARE EDUCATION/TRAINING PROGRAM
Payer: MEDICARE

## 2023-08-22 DIAGNOSIS — M25.571 RIGHT ANKLE PAIN: ICD-10-CM

## 2023-08-22 DIAGNOSIS — S82.401A TIBIA/FIBULA FRACTURE, RIGHT, CLOSED, INITIAL ENCOUNTER: ICD-10-CM

## 2023-08-22 DIAGNOSIS — S99.911A RIGHT ANKLE INJURY: ICD-10-CM

## 2023-08-22 DIAGNOSIS — R06.02 SOB (SHORTNESS OF BREATH): ICD-10-CM

## 2023-08-22 DIAGNOSIS — J96.22 ACUTE ON CHRONIC RESPIRATORY FAILURE WITH HYPERCAPNIA: ICD-10-CM

## 2023-08-22 DIAGNOSIS — S82.201A TIBIA/FIBULA FRACTURE, RIGHT, CLOSED, INITIAL ENCOUNTER: ICD-10-CM

## 2023-08-22 DIAGNOSIS — S82.831A CLOSED FRACTURE OF RIGHT TIBIAL PLAFOND WITH FIBULA INVOLVEMENT, INITIAL ENCOUNTER: ICD-10-CM

## 2023-08-22 DIAGNOSIS — S82.871A CLOSED FRACTURE OF RIGHT TIBIAL PLAFOND WITH FIBULA INVOLVEMENT, INITIAL ENCOUNTER: ICD-10-CM

## 2023-08-22 DIAGNOSIS — R07.9 CHEST PAIN: ICD-10-CM

## 2023-08-22 DIAGNOSIS — M79.604 RIGHT LEG PAIN: ICD-10-CM

## 2023-08-22 DIAGNOSIS — S82.831D CLOSED FRACTURE OF RIGHT TIBIAL PLAFOND WITH FIBULA INVOLVEMENT WITH ROUTINE HEALING: Primary | ICD-10-CM

## 2023-08-22 DIAGNOSIS — S82.871D CLOSED FRACTURE OF RIGHT TIBIAL PLAFOND WITH FIBULA INVOLVEMENT WITH ROUTINE HEALING: Primary | ICD-10-CM

## 2023-08-22 LAB
ALLENS TEST: ABNORMAL
HCO3 UR-SCNC: 37 MMOL/L (ref 24–28)
PCO2 BLDA: 77.8 MMHG (ref 35–45)
PH SMN: 7.29 [PH] (ref 7.35–7.45)
PO2 BLDA: 115 MMHG (ref 40–60)
POC BE: 10 MMOL/L
POC SATURATED O2: 98 % (ref 95–100)
POC TCO2: 39 MMOL/L (ref 24–29)
SAMPLE: ABNORMAL
SITE: ABNORMAL

## 2023-08-22 PROCEDURE — 83880 ASSAY OF NATRIURETIC PEPTIDE: CPT | Performed by: STUDENT IN AN ORGANIZED HEALTH CARE EDUCATION/TRAINING PROGRAM

## 2023-08-22 PROCEDURE — 85025 COMPLETE CBC W/AUTO DIFF WBC: CPT | Performed by: STUDENT IN AN ORGANIZED HEALTH CARE EDUCATION/TRAINING PROGRAM

## 2023-08-22 PROCEDURE — 93005 ELECTROCARDIOGRAM TRACING: CPT

## 2023-08-22 PROCEDURE — 27100171 HC OXYGEN HIGH FLOW UP TO 24 HOURS

## 2023-08-22 PROCEDURE — 82803 BLOOD GASES ANY COMBINATION: CPT

## 2023-08-22 PROCEDURE — 93010 ELECTROCARDIOGRAM REPORT: CPT | Mod: ,,, | Performed by: INTERNAL MEDICINE

## 2023-08-22 PROCEDURE — 27000190 HC CPAP FULL FACE MASK W/VALVE

## 2023-08-22 PROCEDURE — 80053 COMPREHEN METABOLIC PANEL: CPT | Performed by: STUDENT IN AN ORGANIZED HEALTH CARE EDUCATION/TRAINING PROGRAM

## 2023-08-22 PROCEDURE — 94660 CPAP INITIATION&MGMT: CPT

## 2023-08-22 PROCEDURE — 99285 EMERGENCY DEPT VISIT HI MDM: CPT | Mod: 25

## 2023-08-22 PROCEDURE — 93010 EKG 12-LEAD: ICD-10-PCS | Mod: ,,, | Performed by: INTERNAL MEDICINE

## 2023-08-22 PROCEDURE — 84484 ASSAY OF TROPONIN QUANT: CPT | Performed by: STUDENT IN AN ORGANIZED HEALTH CARE EDUCATION/TRAINING PROGRAM

## 2023-08-22 PROCEDURE — 99900035 HC TECH TIME PER 15 MIN (STAT)

## 2023-08-22 PROCEDURE — 94761 N-INVAS EAR/PLS OXIMETRY MLT: CPT

## 2023-08-23 ENCOUNTER — ANESTHESIA (OUTPATIENT)
Dept: SURGERY | Facility: HOSPITAL | Age: 68
DRG: 493 | End: 2023-08-23
Payer: MEDICARE

## 2023-08-23 ENCOUNTER — ANESTHESIA EVENT (OUTPATIENT)
Dept: SURGERY | Facility: HOSPITAL | Age: 68
DRG: 493 | End: 2023-08-23
Payer: MEDICARE

## 2023-08-23 PROBLEM — L89.202: Status: ACTIVE | Noted: 2023-08-23

## 2023-08-23 PROBLEM — S82.301A CLOSED FRACTURE OF DISTAL END OF RIGHT TIBIA: Status: ACTIVE | Noted: 2023-08-23

## 2023-08-23 PROBLEM — J96.12 CHRONIC RESPIRATORY FAILURE WITH HYPOXIA AND HYPERCAPNIA: Status: ACTIVE | Noted: 2019-09-20

## 2023-08-23 PROBLEM — S82.831A CLOSED FRACTURE OF RIGHT TIBIAL PLAFOND WITH FIBULA INVOLVEMENT: Status: ACTIVE | Noted: 2023-08-23

## 2023-08-23 PROBLEM — S82.871D: Status: ACTIVE | Noted: 2023-08-23

## 2023-08-23 PROBLEM — J96.11 CHRONIC RESPIRATORY FAILURE WITH HYPOXIA AND HYPERCAPNIA: Status: ACTIVE | Noted: 2019-09-20

## 2023-08-23 PROBLEM — S82.871A CLOSED FRACTURE OF RIGHT TIBIAL PLAFOND WITH FIBULA INVOLVEMENT: Status: ACTIVE | Noted: 2023-08-23

## 2023-08-23 PROBLEM — S82.831D: Status: ACTIVE | Noted: 2023-08-23

## 2023-08-23 LAB
ALBUMIN SERPL BCP-MCNC: 2.8 G/DL (ref 3.5–5.2)
ALLENS TEST: ABNORMAL
ALP SERPL-CCNC: 82 U/L (ref 55–135)
ALT SERPL W/O P-5'-P-CCNC: 8 U/L (ref 10–44)
ANION GAP SERPL CALC-SCNC: 10 MMOL/L (ref 8–16)
AST SERPL-CCNC: 14 U/L (ref 10–40)
BASOPHILS # BLD AUTO: 0.05 K/UL (ref 0–0.2)
BASOPHILS NFR BLD: 0.7 % (ref 0–1.9)
BILIRUB SERPL-MCNC: 0.2 MG/DL (ref 0.1–1)
BNP SERPL-MCNC: 126 PG/ML (ref 0–99)
BUN SERPL-MCNC: 32 MG/DL (ref 8–23)
CALCIUM SERPL-MCNC: 8.7 MG/DL (ref 8.7–10.5)
CHLORIDE SERPL-SCNC: 100 MMOL/L (ref 95–110)
CO2 SERPL-SCNC: 28 MMOL/L (ref 23–29)
CREAT SERPL-MCNC: 1 MG/DL (ref 0.5–1.4)
DELSYS: ABNORMAL
DIFFERENTIAL METHOD: ABNORMAL
EOSINOPHIL # BLD AUTO: 0.3 K/UL (ref 0–0.5)
EOSINOPHIL NFR BLD: 4.5 % (ref 0–8)
EP: 5
ERYTHROCYTE [DISTWIDTH] IN BLOOD BY AUTOMATED COUNT: 17.4 % (ref 11.5–14.5)
EST. GFR  (NO RACE VARIABLE): >60 ML/MIN/1.73 M^2
FIO2: 28
GLUCOSE SERPL-MCNC: 130 MG/DL (ref 70–110)
HCO3 UR-SCNC: 34.6 MMOL/L (ref 24–28)
HCT VFR BLD AUTO: 27.5 % (ref 37–48.5)
HGB BLD-MCNC: 7.8 G/DL (ref 12–16)
IMM GRANULOCYTES # BLD AUTO: 0.02 K/UL (ref 0–0.04)
IMM GRANULOCYTES NFR BLD AUTO: 0.3 % (ref 0–0.5)
IP: 12
LYMPHOCYTES # BLD AUTO: 1.4 K/UL (ref 1–4.8)
LYMPHOCYTES NFR BLD: 18.6 % (ref 18–48)
MCH RBC QN AUTO: 24.7 PG (ref 27–31)
MCHC RBC AUTO-ENTMCNC: 28.4 G/DL (ref 32–36)
MCV RBC AUTO: 87 FL (ref 82–98)
MODE: ABNORMAL
MONOCYTES # BLD AUTO: 0.7 K/UL (ref 0.3–1)
MONOCYTES NFR BLD: 9.2 % (ref 4–15)
NEUTROPHILS # BLD AUTO: 5.1 K/UL (ref 1.8–7.7)
NEUTROPHILS NFR BLD: 66.7 % (ref 38–73)
NRBC BLD-RTO: 0 /100 WBC
PCO2 BLDA: 71.4 MMHG (ref 35–45)
PH SMN: 7.29 [PH] (ref 7.35–7.45)
PLATELET # BLD AUTO: 221 K/UL (ref 150–450)
PMV BLD AUTO: 10.1 FL (ref 9.2–12.9)
PO2 BLDA: 60 MMHG (ref 40–60)
POC BE: 8 MMOL/L
POC SATURATED O2: 86 % (ref 95–100)
POC TCO2: 37 MMOL/L (ref 24–29)
POTASSIUM SERPL-SCNC: 3.9 MMOL/L (ref 3.5–5.1)
PROT SERPL-MCNC: 7.2 G/DL (ref 6–8.4)
RBC # BLD AUTO: 3.16 M/UL (ref 4–5.4)
SAMPLE: ABNORMAL
SITE: ABNORMAL
SODIUM SERPL-SCNC: 138 MMOL/L (ref 136–145)
TROPONIN I SERPL DL<=0.01 NG/ML-MCNC: <0.006 NG/ML (ref 0–0.03)
WBC # BLD AUTO: 7.58 K/UL (ref 3.9–12.7)

## 2023-08-23 PROCEDURE — 64446 NJX AA&/STRD SC NRV NFS IMG: CPT

## 2023-08-23 PROCEDURE — G0378 HOSPITAL OBSERVATION PER HR: HCPCS

## 2023-08-23 PROCEDURE — 64450: ICD-10-PCS | Mod: 59,RT,, | Performed by: ANESTHESIOLOGY

## 2023-08-23 PROCEDURE — 27828 PR OPEN TREATMENT FRACTURE DISTAL TIBIA & FIBULA: ICD-10-PCS | Mod: RT,,, | Performed by: ORTHOPAEDIC SURGERY

## 2023-08-23 PROCEDURE — 37000009 HC ANESTHESIA EA ADD 15 MINS: Performed by: ORTHOPAEDIC SURGERY

## 2023-08-23 PROCEDURE — 25000003 PHARM REV CODE 250: Performed by: NURSE ANESTHETIST, CERTIFIED REGISTERED

## 2023-08-23 PROCEDURE — 25000003 PHARM REV CODE 250: Performed by: STUDENT IN AN ORGANIZED HEALTH CARE EDUCATION/TRAINING PROGRAM

## 2023-08-23 PROCEDURE — 99900035 HC TECH TIME PER 15 MIN (STAT)

## 2023-08-23 PROCEDURE — C1713 ANCHOR/SCREW BN/BN,TIS/BN: HCPCS | Performed by: ORTHOPAEDIC SURGERY

## 2023-08-23 PROCEDURE — 99223 1ST HOSP IP/OBS HIGH 75: CPT | Mod: 57,GC,, | Performed by: ORTHOPAEDIC SURGERY

## 2023-08-23 PROCEDURE — 96361 HYDRATE IV INFUSION ADD-ON: CPT

## 2023-08-23 PROCEDURE — 63600175 PHARM REV CODE 636 W HCPCS

## 2023-08-23 PROCEDURE — 94640 AIRWAY INHALATION TREATMENT: CPT

## 2023-08-23 PROCEDURE — 25000003 PHARM REV CODE 250: Performed by: HOSPITALIST

## 2023-08-23 PROCEDURE — D9220A PRA ANESTHESIA: Mod: CRNA,,, | Performed by: NURSE ANESTHETIST, CERTIFIED REGISTERED

## 2023-08-23 PROCEDURE — 82803 BLOOD GASES ANY COMBINATION: CPT

## 2023-08-23 PROCEDURE — 27800903 OPTIME MED/SURG SUP & DEVICES OTHER IMPLANTS: Performed by: ORTHOPAEDIC SURGERY

## 2023-08-23 PROCEDURE — 94761 N-INVAS EAR/PLS OXIMETRY MLT: CPT

## 2023-08-23 PROCEDURE — 27000221 HC OXYGEN, UP TO 24 HOURS

## 2023-08-23 PROCEDURE — 64448 NJX AA&/STRD FEM NRV NFS IMG: CPT

## 2023-08-23 PROCEDURE — 94660 CPAP INITIATION&MGMT: CPT

## 2023-08-23 PROCEDURE — 63600175 PHARM REV CODE 636 W HCPCS: Performed by: ANESTHESIOLOGY

## 2023-08-23 PROCEDURE — 71000016 HC POSTOP RECOV ADDL HR: Performed by: ORTHOPAEDIC SURGERY

## 2023-08-23 PROCEDURE — 37000008 HC ANESTHESIA 1ST 15 MINUTES: Performed by: ORTHOPAEDIC SURGERY

## 2023-08-23 PROCEDURE — 25000003 PHARM REV CODE 250

## 2023-08-23 PROCEDURE — D9220A PRA ANESTHESIA: Mod: ANES,,, | Performed by: ANESTHESIOLOGY

## 2023-08-23 PROCEDURE — C1769 GUIDE WIRE: HCPCS | Performed by: ORTHOPAEDIC SURGERY

## 2023-08-23 PROCEDURE — 63600175 PHARM REV CODE 636 W HCPCS: Performed by: STUDENT IN AN ORGANIZED HEALTH CARE EDUCATION/TRAINING PROGRAM

## 2023-08-23 PROCEDURE — 36000710: Performed by: ORTHOPAEDIC SURGERY

## 2023-08-23 PROCEDURE — 27201423 OPTIME MED/SURG SUP & DEVICES STERILE SUPPLY: Performed by: ORTHOPAEDIC SURGERY

## 2023-08-23 PROCEDURE — 64450 NJX AA&/STRD OTHER PN/BRANCH: CPT | Mod: 59,RT,, | Performed by: ANESTHESIOLOGY

## 2023-08-23 PROCEDURE — 99223 1ST HOSP IP/OBS HIGH 75: CPT | Mod: ,,,

## 2023-08-23 PROCEDURE — 99223 PR INITIAL HOSPITAL CARE,LEVL III: ICD-10-PCS | Mod: ,,,

## 2023-08-23 PROCEDURE — 99223 PR INITIAL HOSPITAL CARE,LEVL III: ICD-10-PCS | Mod: 57,GC,, | Performed by: ORTHOPAEDIC SURGERY

## 2023-08-23 PROCEDURE — 36000711: Performed by: ORTHOPAEDIC SURGERY

## 2023-08-23 PROCEDURE — 64999 UNLISTED PX NERVOUS SYSTEM: CPT | Mod: ,,, | Performed by: ANESTHESIOLOGY

## 2023-08-23 PROCEDURE — 63600175 PHARM REV CODE 636 W HCPCS: Performed by: ORTHOPAEDIC SURGERY

## 2023-08-23 PROCEDURE — 25000242 PHARM REV CODE 250 ALT 637 W/ HCPCS: Performed by: ANESTHESIOLOGY

## 2023-08-23 PROCEDURE — 64999: ICD-10-PCS | Mod: ,,, | Performed by: ANESTHESIOLOGY

## 2023-08-23 PROCEDURE — 63600175 PHARM REV CODE 636 W HCPCS: Performed by: NURSE ANESTHETIST, CERTIFIED REGISTERED

## 2023-08-23 PROCEDURE — D9220A PRA ANESTHESIA: ICD-10-PCS | Mod: CRNA,,, | Performed by: NURSE ANESTHETIST, CERTIFIED REGISTERED

## 2023-08-23 PROCEDURE — 27828 TREAT LOWER LEG FRACTURE: CPT | Mod: RT,,, | Performed by: ORTHOPAEDIC SURGERY

## 2023-08-23 PROCEDURE — 71000015 HC POSTOP RECOV 1ST HR: Performed by: ORTHOPAEDIC SURGERY

## 2023-08-23 PROCEDURE — 71000033 HC RECOVERY, INTIAL HOUR: Performed by: ORTHOPAEDIC SURGERY

## 2023-08-23 PROCEDURE — D9220A PRA ANESTHESIA: ICD-10-PCS | Mod: ANES,,, | Performed by: ANESTHESIOLOGY

## 2023-08-23 PROCEDURE — 25000242 PHARM REV CODE 250 ALT 637 W/ HCPCS: Performed by: HOSPITALIST

## 2023-08-23 PROCEDURE — 96374 THER/PROPH/DIAG INJ IV PUSH: CPT

## 2023-08-23 DEVICE — SCREW CORTEX 3.5 X 24: Type: IMPLANTABLE DEVICE | Site: ANKLE | Status: FUNCTIONAL

## 2023-08-23 DEVICE — IMPLANTABLE DEVICE: Type: IMPLANTABLE DEVICE | Site: ANKLE | Status: FUNCTIONAL

## 2023-08-23 DEVICE — SCREW LOCKING 3.5MM X 40MM: Type: IMPLANTABLE DEVICE | Site: ANKLE | Status: FUNCTIONAL

## 2023-08-23 DEVICE — SCREW LOCKING 3.5MM/32MM RECES: Type: IMPLANTABLE DEVICE | Site: ANKLE | Status: FUNCTIONAL

## 2023-08-23 DEVICE — SCREW LOCKING 3.5MM X 45MM: Type: IMPLANTABLE DEVICE | Site: ANKLE | Status: FUNCTIONAL

## 2023-08-23 DEVICE — SCREW CORTEX 3.5MM X 50MM: Type: IMPLANTABLE DEVICE | Site: ANKLE | Status: FUNCTIONAL

## 2023-08-23 DEVICE — SCREW LOCKING 3.5MM X 38MM: Type: IMPLANTABLE DEVICE | Site: ANKLE | Status: FUNCTIONAL

## 2023-08-23 DEVICE — SCREW LOCKING 3.5MM X 28MM: Type: IMPLANTABLE DEVICE | Site: ANKLE | Status: FUNCTIONAL

## 2023-08-23 RX ORDER — BUPIVACAINE HYDROCHLORIDE 2.5 MG/ML
INJECTION, SOLUTION EPIDURAL; INFILTRATION; INTRACAUDAL
Status: DISCONTINUED | OUTPATIENT
Start: 2023-08-23 | End: 2023-08-23

## 2023-08-23 RX ORDER — GLUCAGON 1 MG
1 KIT INJECTION
Status: DISCONTINUED | OUTPATIENT
Start: 2023-08-23 | End: 2023-08-30 | Stop reason: HOSPADM

## 2023-08-23 RX ORDER — ONDANSETRON 2 MG/ML
INJECTION INTRAMUSCULAR; INTRAVENOUS
Status: DISCONTINUED | OUTPATIENT
Start: 2023-08-23 | End: 2023-08-23

## 2023-08-23 RX ORDER — DEXAMETHASONE SODIUM PHOSPHATE 4 MG/ML
INJECTION, SOLUTION INTRA-ARTICULAR; INTRALESIONAL; INTRAMUSCULAR; INTRAVENOUS; SOFT TISSUE
Status: DISCONTINUED | OUTPATIENT
Start: 2023-08-23 | End: 2023-08-23

## 2023-08-23 RX ORDER — METHOCARBAMOL 500 MG/1
500 TABLET, FILM COATED ORAL 3 TIMES DAILY PRN
Status: DISCONTINUED | OUTPATIENT
Start: 2023-08-23 | End: 2023-08-24

## 2023-08-23 RX ORDER — BACLOFEN 10 MG/1
10 TABLET ORAL 2 TIMES DAILY
Status: DISCONTINUED | OUTPATIENT
Start: 2023-08-23 | End: 2023-08-24

## 2023-08-23 RX ORDER — ONDANSETRON 8 MG/1
8 TABLET, ORALLY DISINTEGRATING ORAL EVERY 8 HOURS PRN
Status: DISCONTINUED | OUTPATIENT
Start: 2023-08-23 | End: 2023-08-30 | Stop reason: HOSPADM

## 2023-08-23 RX ORDER — IBUPROFEN 200 MG
16 TABLET ORAL
Status: DISCONTINUED | OUTPATIENT
Start: 2023-08-23 | End: 2023-08-30 | Stop reason: HOSPADM

## 2023-08-23 RX ORDER — PHENYLEPHRINE HYDROCHLORIDE 10 MG/ML
INJECTION INTRAVENOUS
Status: DISCONTINUED | OUTPATIENT
Start: 2023-08-23 | End: 2023-08-23

## 2023-08-23 RX ORDER — SODIUM CHLORIDE 0.9 % (FLUSH) 0.9 %
10 SYRINGE (ML) INJECTION
Status: DISCONTINUED | OUTPATIENT
Start: 2023-08-23 | End: 2023-08-30 | Stop reason: HOSPADM

## 2023-08-23 RX ORDER — HYDROXYZINE HYDROCHLORIDE 25 MG/1
25 TABLET, FILM COATED ORAL 2 TIMES DAILY PRN
Status: DISCONTINUED | OUTPATIENT
Start: 2023-08-23 | End: 2023-08-30 | Stop reason: HOSPADM

## 2023-08-23 RX ORDER — LANOLIN ALCOHOL/MO/W.PET/CERES
1 CREAM (GRAM) TOPICAL DAILY
Status: DISCONTINUED | OUTPATIENT
Start: 2023-08-23 | End: 2023-08-30 | Stop reason: HOSPADM

## 2023-08-23 RX ORDER — PROCHLORPERAZINE EDISYLATE 5 MG/ML
5 INJECTION INTRAMUSCULAR; INTRAVENOUS EVERY 6 HOURS PRN
Status: DISCONTINUED | OUTPATIENT
Start: 2023-08-23 | End: 2023-08-30 | Stop reason: HOSPADM

## 2023-08-23 RX ORDER — ROPIVACAINE HYDROCHLORIDE 2 MG/ML
0.1 INJECTION, SOLUTION EPIDURAL; INFILTRATION; PERINEURAL CONTINUOUS
Status: DISCONTINUED | OUTPATIENT
Start: 2023-08-23 | End: 2023-08-28

## 2023-08-23 RX ORDER — KETAMINE HCL IN 0.9 % NACL 50 MG/5 ML
SYRINGE (ML) INTRAVENOUS
Status: DISCONTINUED | OUTPATIENT
Start: 2023-08-23 | End: 2023-08-23

## 2023-08-23 RX ORDER — HYDROMORPHONE HYDROCHLORIDE 1 MG/ML
0.2 INJECTION, SOLUTION INTRAMUSCULAR; INTRAVENOUS; SUBCUTANEOUS EVERY 5 MIN PRN
Status: DISCONTINUED | OUTPATIENT
Start: 2023-08-23 | End: 2023-08-23 | Stop reason: HOSPADM

## 2023-08-23 RX ORDER — MUPIROCIN 20 MG/G
OINTMENT TOPICAL
Status: DISCONTINUED | OUTPATIENT
Start: 2023-08-23 | End: 2023-08-23 | Stop reason: HOSPADM

## 2023-08-23 RX ORDER — NALOXONE HCL 0.4 MG/ML
0.02 VIAL (ML) INJECTION
Status: DISCONTINUED | OUTPATIENT
Start: 2023-08-23 | End: 2023-08-30 | Stop reason: HOSPADM

## 2023-08-23 RX ORDER — IPRATROPIUM BROMIDE AND ALBUTEROL SULFATE 2.5; .5 MG/3ML; MG/3ML
3 SOLUTION RESPIRATORY (INHALATION) EVERY 4 HOURS PRN
Status: DISCONTINUED | OUTPATIENT
Start: 2023-08-23 | End: 2023-08-25

## 2023-08-23 RX ORDER — TALC
6 POWDER (GRAM) TOPICAL NIGHTLY PRN
Status: DISCONTINUED | OUTPATIENT
Start: 2023-08-23 | End: 2023-08-30 | Stop reason: HOSPADM

## 2023-08-23 RX ORDER — OXYCODONE AND ACETAMINOPHEN 10; 325 MG/1; MG/1
1 TABLET ORAL EVERY 4 HOURS PRN
Status: DISCONTINUED | OUTPATIENT
Start: 2023-08-23 | End: 2023-08-24

## 2023-08-23 RX ORDER — BUMETANIDE 1 MG/1
2 TABLET ORAL DAILY
Status: DISCONTINUED | OUTPATIENT
Start: 2023-08-23 | End: 2023-08-26

## 2023-08-23 RX ORDER — POLYETHYLENE GLYCOL 3350 17 G/17G
17 POWDER, FOR SOLUTION ORAL 2 TIMES DAILY PRN
Status: DISCONTINUED | OUTPATIENT
Start: 2023-08-23 | End: 2023-08-30 | Stop reason: HOSPADM

## 2023-08-23 RX ORDER — ONDANSETRON 2 MG/ML
4 INJECTION INTRAMUSCULAR; INTRAVENOUS DAILY PRN
Status: DISCONTINUED | OUTPATIENT
Start: 2023-08-23 | End: 2023-08-23 | Stop reason: HOSPADM

## 2023-08-23 RX ORDER — ROCURONIUM BROMIDE 10 MG/ML
INJECTION, SOLUTION INTRAVENOUS
Status: DISCONTINUED | OUTPATIENT
Start: 2023-08-23 | End: 2023-08-23

## 2023-08-23 RX ORDER — SIMETHICONE 80 MG
1 TABLET,CHEWABLE ORAL 4 TIMES DAILY PRN
Status: DISCONTINUED | OUTPATIENT
Start: 2023-08-23 | End: 2023-08-30 | Stop reason: HOSPADM

## 2023-08-23 RX ORDER — SODIUM CHLORIDE 0.9 % (FLUSH) 0.9 %
5 SYRINGE (ML) INJECTION
Status: DISCONTINUED | OUTPATIENT
Start: 2023-08-23 | End: 2023-08-30 | Stop reason: HOSPADM

## 2023-08-23 RX ORDER — HYDROMORPHONE HYDROCHLORIDE 1 MG/ML
1 INJECTION, SOLUTION INTRAMUSCULAR; INTRAVENOUS; SUBCUTANEOUS
Status: DISCONTINUED | OUTPATIENT
Start: 2023-08-23 | End: 2023-08-23

## 2023-08-23 RX ORDER — THIAMINE HCL 100 MG
100 TABLET ORAL DAILY
Status: DISCONTINUED | OUTPATIENT
Start: 2023-08-23 | End: 2023-08-30 | Stop reason: HOSPADM

## 2023-08-23 RX ORDER — ACETAMINOPHEN 325 MG/1
650 TABLET ORAL EVERY 4 HOURS PRN
Status: DISCONTINUED | OUTPATIENT
Start: 2023-08-23 | End: 2023-08-24

## 2023-08-23 RX ORDER — LIDOCAINE HYDROCHLORIDE 10 MG/ML
INJECTION, SOLUTION EPIDURAL; INFILTRATION; INTRACAUDAL; PERINEURAL
Status: DISCONTINUED | OUTPATIENT
Start: 2023-08-23 | End: 2023-08-23

## 2023-08-23 RX ORDER — MORPHINE SULFATE 4 MG/ML
4 INJECTION, SOLUTION INTRAMUSCULAR; INTRAVENOUS EVERY 4 HOURS PRN
Status: DISCONTINUED | OUTPATIENT
Start: 2023-08-23 | End: 2023-08-24

## 2023-08-23 RX ORDER — IBUPROFEN 200 MG
24 TABLET ORAL
Status: DISCONTINUED | OUTPATIENT
Start: 2023-08-23 | End: 2023-08-30 | Stop reason: HOSPADM

## 2023-08-23 RX ORDER — VANCOMYCIN HYDROCHLORIDE 1 G/20ML
INJECTION, POWDER, LYOPHILIZED, FOR SOLUTION INTRAVENOUS
Status: DISCONTINUED | OUTPATIENT
Start: 2023-08-23 | End: 2023-08-23 | Stop reason: HOSPADM

## 2023-08-23 RX ORDER — ROPIVACAINE HYDROCHLORIDE 2 MG/ML
0.1 INJECTION, SOLUTION EPIDURAL; INFILTRATION; PERINEURAL CONTINUOUS
Status: DISCONTINUED | OUTPATIENT
Start: 2023-08-23 | End: 2023-08-27

## 2023-08-23 RX ORDER — IPRATROPIUM BROMIDE AND ALBUTEROL SULFATE 2.5; .5 MG/3ML; MG/3ML
3 SOLUTION RESPIRATORY (INHALATION)
Status: COMPLETED | OUTPATIENT
Start: 2023-08-23 | End: 2023-08-23

## 2023-08-23 RX ORDER — HYDROMORPHONE HYDROCHLORIDE 1 MG/ML
0.2 INJECTION, SOLUTION INTRAMUSCULAR; INTRAVENOUS; SUBCUTANEOUS
Status: COMPLETED | OUTPATIENT
Start: 2023-08-23 | End: 2023-08-23

## 2023-08-23 RX ORDER — QUETIAPINE FUMARATE 100 MG/1
200 TABLET, FILM COATED ORAL NIGHTLY
Status: DISCONTINUED | OUTPATIENT
Start: 2023-08-23 | End: 2023-08-30 | Stop reason: HOSPADM

## 2023-08-23 RX ORDER — OXYBUTYNIN CHLORIDE 5 MG/1
5 TABLET, EXTENDED RELEASE ORAL DAILY
Status: DISCONTINUED | OUTPATIENT
Start: 2023-08-23 | End: 2023-08-30 | Stop reason: HOSPADM

## 2023-08-23 RX ORDER — MAG HYDROX/ALUMINUM HYD/SIMETH 200-200-20
30 SUSPENSION, ORAL (FINAL DOSE FORM) ORAL 4 TIMES DAILY PRN
Status: DISCONTINUED | OUTPATIENT
Start: 2023-08-23 | End: 2023-08-30 | Stop reason: HOSPADM

## 2023-08-23 RX ORDER — NAPROXEN SODIUM 220 MG/1
81 TABLET, FILM COATED ORAL 2 TIMES DAILY
Status: DISCONTINUED | OUTPATIENT
Start: 2023-08-23 | End: 2023-08-30 | Stop reason: HOSPADM

## 2023-08-23 RX ORDER — CHOLECALCIFEROL (VITAMIN D3) 25 MCG
1000 TABLET ORAL DAILY
Status: DISCONTINUED | OUTPATIENT
Start: 2023-08-23 | End: 2023-08-30 | Stop reason: HOSPADM

## 2023-08-23 RX ORDER — PROPOFOL 10 MG/ML
VIAL (ML) INTRAVENOUS
Status: DISCONTINUED | OUTPATIENT
Start: 2023-08-23 | End: 2023-08-23

## 2023-08-23 RX ORDER — BISACODYL 10 MG
10 SUPPOSITORY, RECTAL RECTAL DAILY PRN
Status: DISCONTINUED | OUTPATIENT
Start: 2023-08-23 | End: 2023-08-30 | Stop reason: HOSPADM

## 2023-08-23 RX ORDER — FAMOTIDINE 10 MG/ML
INJECTION INTRAVENOUS
Status: DISCONTINUED | OUTPATIENT
Start: 2023-08-23 | End: 2023-08-23

## 2023-08-23 RX ORDER — HYDROCODONE BITARTRATE AND ACETAMINOPHEN 5; 325 MG/1; MG/1
1 TABLET ORAL EVERY 6 HOURS PRN
Status: DISCONTINUED | OUTPATIENT
Start: 2023-08-23 | End: 2023-08-23

## 2023-08-23 RX ORDER — SODIUM CHLORIDE 0.9 % (FLUSH) 0.9 %
10 SYRINGE (ML) INJECTION
Status: DISCONTINUED | OUTPATIENT
Start: 2023-08-23 | End: 2023-08-23 | Stop reason: HOSPADM

## 2023-08-23 RX ORDER — CITALOPRAM 10 MG/1
30 TABLET ORAL DAILY
Status: DISCONTINUED | OUTPATIENT
Start: 2023-08-23 | End: 2023-08-24

## 2023-08-23 RX ORDER — MIDAZOLAM HYDROCHLORIDE 1 MG/ML
INJECTION INTRAMUSCULAR; INTRAVENOUS
Status: DISCONTINUED | OUTPATIENT
Start: 2023-08-23 | End: 2023-08-23

## 2023-08-23 RX ORDER — FENTANYL CITRATE 50 UG/ML
INJECTION, SOLUTION INTRAMUSCULAR; INTRAVENOUS
Status: DISCONTINUED | OUTPATIENT
Start: 2023-08-23 | End: 2023-08-23

## 2023-08-23 RX ORDER — GABAPENTIN 300 MG/1
300 CAPSULE ORAL 3 TIMES DAILY
Status: DISCONTINUED | OUTPATIENT
Start: 2023-08-23 | End: 2023-08-24

## 2023-08-23 RX ADMIN — PHENYLEPHRINE HYDROCHLORIDE 100 MCG: 10 INJECTION INTRAVENOUS at 11:08

## 2023-08-23 RX ADMIN — SUGAMMADEX 200 MG: 100 INJECTION, SOLUTION INTRAVENOUS at 12:08

## 2023-08-23 RX ADMIN — BUPIVACAINE HYDROCHLORIDE 30 ML: 2.5 INJECTION, SOLUTION EPIDURAL; INFILTRATION; INTRACAUDAL; PERINEURAL at 01:08

## 2023-08-23 RX ADMIN — GLYCOPYRROLATE 0.2 MG: 0.2 INJECTION, SOLUTION INTRAMUSCULAR; INTRAVENOUS at 10:08

## 2023-08-23 RX ADMIN — BACLOFEN 10 MG: 10 TABLET ORAL at 06:08

## 2023-08-23 RX ADMIN — BUPIVACAINE HYDROCHLORIDE 15 ML: 2.5 INJECTION, SOLUTION EPIDURAL; INFILTRATION; INTRACAUDAL at 02:08

## 2023-08-23 RX ADMIN — BUMETANIDE 2 MG: 1 TABLET ORAL at 06:08

## 2023-08-23 RX ADMIN — PHENYLEPHRINE HYDROCHLORIDE 100 MCG: 10 INJECTION INTRAVENOUS at 10:08

## 2023-08-23 RX ADMIN — GABAPENTIN 300 MG: 300 CAPSULE ORAL at 02:08

## 2023-08-23 RX ADMIN — FOLIC ACID-PYRIDOXINE-CYANOCOBALAMIN TAB 2.5-25-2 MG 1 TABLET: 2.5-25-2 TAB at 02:08

## 2023-08-23 RX ADMIN — SODIUM CHLORIDE 1000 ML: 9 INJECTION, SOLUTION INTRAVENOUS at 12:08

## 2023-08-23 RX ADMIN — FAMOTIDINE 20 MG: 10 INJECTION, SOLUTION INTRAVENOUS at 10:08

## 2023-08-23 RX ADMIN — DEXAMETHASONE SODIUM PHOSPHATE 4 MG: 4 INJECTION, SOLUTION INTRAMUSCULAR; INTRAVENOUS at 10:08

## 2023-08-23 RX ADMIN — Medication 25 MG: at 10:08

## 2023-08-23 RX ADMIN — CITALOPRAM HYDROBROMIDE 30 MG: 20 TABLET ORAL at 02:08

## 2023-08-23 RX ADMIN — ONDANSETRON 4 MG: 2 INJECTION INTRAMUSCULAR; INTRAVENOUS at 12:08

## 2023-08-23 RX ADMIN — GABAPENTIN 300 MG: 300 CAPSULE ORAL at 09:08

## 2023-08-23 RX ADMIN — CEFAZOLIN 2 G: 2 INJECTION, POWDER, FOR SOLUTION INTRAMUSCULAR; INTRAVENOUS at 06:08

## 2023-08-23 RX ADMIN — IPRATROPIUM BROMIDE AND ALBUTEROL SULFATE 3 ML: .5; 3 SOLUTION RESPIRATORY (INHALATION) at 11:08

## 2023-08-23 RX ADMIN — OXYCODONE HYDROCHLORIDE AND ACETAMINOPHEN 1 TABLET: 10; 325 TABLET ORAL at 02:08

## 2023-08-23 RX ADMIN — ROCURONIUM BROMIDE 50 MG: 10 INJECTION, SOLUTION INTRAVENOUS at 10:08

## 2023-08-23 RX ADMIN — IPRATROPIUM BROMIDE AND ALBUTEROL SULFATE 3 ML: .5; 3 SOLUTION RESPIRATORY (INHALATION) at 09:08

## 2023-08-23 RX ADMIN — ROPIVACAINE HYDROCHLORIDE 0.1 ML/HR: 2 INJECTION, SOLUTION EPIDURAL; INFILTRATION at 03:08

## 2023-08-23 RX ADMIN — MUPIROCIN: 20 OINTMENT TOPICAL at 09:08

## 2023-08-23 RX ADMIN — ROCURONIUM BROMIDE 20 MG: 10 INJECTION, SOLUTION INTRAVENOUS at 11:08

## 2023-08-23 RX ADMIN — FERROUS SULFATE TAB 325 MG (65 MG ELEMENTAL FE) 1 EACH: 325 (65 FE) TAB at 02:08

## 2023-08-23 RX ADMIN — LIDOCAINE HYDROCHLORIDE 20 MG: 10 INJECTION, SOLUTION EPIDURAL; INFILTRATION; INTRACAUDAL; PERINEURAL at 10:08

## 2023-08-23 RX ADMIN — CHOLECALCIFEROL TAB 25 MCG (1000 UNIT) 1000 UNITS: 25 TAB at 02:08

## 2023-08-23 RX ADMIN — OXYBUTYNIN CHLORIDE 5 MG: 5 TABLET, EXTENDED RELEASE ORAL at 06:08

## 2023-08-23 RX ADMIN — ASPIRIN 81 MG 81 MG: 81 TABLET ORAL at 09:08

## 2023-08-23 RX ADMIN — HYDROMORPHONE HYDROCHLORIDE 0.2 MG: 1 INJECTION, SOLUTION INTRAMUSCULAR; INTRAVENOUS; SUBCUTANEOUS at 01:08

## 2023-08-23 RX ADMIN — HYDROMORPHONE HYDROCHLORIDE 0.2 MG: 1 INJECTION, SOLUTION INTRAMUSCULAR; INTRAVENOUS; SUBCUTANEOUS at 02:08

## 2023-08-23 RX ADMIN — PROPOFOL 140 MG: 10 INJECTION, EMULSION INTRAVENOUS at 10:08

## 2023-08-23 RX ADMIN — HYDROMORPHONE HYDROCHLORIDE 0.2 MG: 1 INJECTION, SOLUTION INTRAMUSCULAR; INTRAVENOUS; SUBCUTANEOUS at 12:08

## 2023-08-23 RX ADMIN — SODIUM CHLORIDE: 0.9 INJECTION, SOLUTION INTRAVENOUS at 09:08

## 2023-08-23 RX ADMIN — VANCOMYCIN HYDROCHLORIDE 1000 MG: 1 INJECTION, POWDER, LYOPHILIZED, FOR SOLUTION INTRAVENOUS at 09:08

## 2023-08-23 RX ADMIN — QUETIAPINE FUMARATE 200 MG: 100 TABLET ORAL at 09:08

## 2023-08-23 RX ADMIN — MIDAZOLAM HYDROCHLORIDE 2 MG: 2 INJECTION, SOLUTION INTRAMUSCULAR; INTRAVENOUS at 09:08

## 2023-08-23 RX ADMIN — CEFAZOLIN 2 G: 2 INJECTION, POWDER, FOR SOLUTION INTRAMUSCULAR; INTRAVENOUS at 09:08

## 2023-08-23 RX ADMIN — ROPIVACAINE HYDROCHLORIDE 0.1 ML/HR: 2 INJECTION, SOLUTION EPIDURAL; INFILTRATION at 02:08

## 2023-08-23 RX ADMIN — FENTANYL CITRATE 100 MCG: 50 INJECTION, SOLUTION INTRAMUSCULAR; INTRAVENOUS at 10:08

## 2023-08-23 NOTE — SUBJECTIVE & OBJECTIVE
Past Medical History:   Diagnosis Date    Abdominal hernia     Addiction to drug     Alcohol abuse     Anxiety     Arthritis     Asthma     Bipolar disorder     Bladder stones     CHF (congestive heart failure)     COPD (chronic obstructive pulmonary disease)     on home o2    CVA (cerebral vascular accident)     2012    Hallucination     Hepatitis C     treated and cured    History of blood clots     Hx of psychiatric care     Hypertension     [place]     New onset seizure 8/12/2023    Obese body habitus     On home oxygen therapy     ADEEL (obstructive sleep apnea)     ADEEL treated with BiPAP     Paraplegia     Paraplegic spinal paralysis     Psychiatric problem     Psychosis     AVH; Paranoia    Renal disorder     Requires assistance with activities of daily living (ADL)     SCI (spinal cord injury)     incomplete    Sleep difficulties     has sleep apnea and uses a Bi-PAP machine.    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    Substance abuse     Suprapubic catheter 03/15/2011    Therapy     Vaginal delivery     x1    Wheelchair dependence        Past Surgical History:   Procedure Laterality Date    ABOVE-KNEE AMPUTATION Left 7/23/2019    Procedure: AMPUTATION, ABOVE KNEE;  Surgeon: Gordo Rodriguez MD;  Location: [facility] OR;  Service: Orthopedics;  Laterality: Left;    amputation Left 07/23/2019    above the knee    BACK SURGERY      bilateral knee replacement      BREAST BIOPSY      cysto/lithopaxy 2017      CYSTOSCOPY Right 1/8/2021    Procedure: CYSTOSCOPY, RIGHT OCCULER BALLOON CATHETER PLACEMENT;  Surgeon: RAMA Tinoco MD;  Location: [facility] OR;  Service: Urology;  Laterality: Right;    CYSTOSCOPY W/ LASER LITHOTRIPSY      JOINT REPLACEMENT      bilateral knee    PERCUTANEOUS NEPHROLITHOTOMY Right 1/8/2021    Procedure: NEPHROLITHOTOMY, PERCUTANEOUS;  Surgeon: RAMA Tinoco MD;  Location: [facility] OR;  Service: Urology;  Laterality: Right;  x3 rooms, or7, or9,or12  ATTEMPTED    RETROGRADE PYELOGRAPHY Right  1/8/2021    Procedure: PYELOGRAM, RETROGRADE;  Surgeon: RAMA Tinoco MD;  Location: [facility] OR;  Service: Urology;  Laterality: Right;    SUPRAPUBIC CYSTOSTOMY  03/15/2011    patient reports  replaced tubing on 10/27/19, at home    URETEROSCOPY Right 1/8/2021    Procedure: URETEROSCOPY;  Surgeon: RAMA Tinoco MD;  Location: [facility] OR;  Service: Urology;  Laterality: Right;       Review of patient's allergies indicates:   Allergen Reactions    Tuberculin ppd Itching and Dermatitis     Patient has old scare that she claims is from TB PPD    Rocephin [ceftriaxone] Rash     Rash 2012? Pt agreeable to try with pre mediation with benadryl.        No current facility-administered medications on file prior to encounter.     Current Outpatient Medications on File Prior to Encounter   Medication Sig    albuterol-ipratropium (DUO-NEB) 2.5 mg-0.5 mg/3 mL nebulizer solution Take 3 mLs by nebulization every 4 (four) hours as needed for Wheezing.    baclofen (LIORESAL) 20 MG tablet 20 mg 2 (two) times daily.     bumetanide (BUMEX) 2 MG tablet Take 2 mg by mouth once daily.     catheter 18 Fr Misc Change every 20 days    citalopram (CELEXA) 20 MG tablet Take 1.5 tablets (30 mg total) by mouth once daily.    ferrous sulfate 324 mg (65 mg iron) TbEC Take 1 tablet (324 mg total) by mouth once daily.    folic acid-vit B6-vit B12 2.5-25-2 mg (FOLBIC OR EQUIV) 2.5-25-2 mg Tab Take 1 tablet by mouth once daily.    gabapentin (NEURONTIN) 600 MG tablet Take 1 tablet (600 mg total) by mouth 3 (three) times daily.    hydrOXYzine HCL (ATARAX) 25 MG tablet Take 1 tablet (25 mg total) by mouth 2 (two) times daily as needed for Anxiety.    miscellaneous medical supply (C-TUB) [facility] NEEDS TO SWITCH DME COMPANY FOR OXYGEN AT 2L. LAST OXYGEN SATURATION WAS 83% ON Room Air. She uses  BIPAP and  Needs  New mask, tubings and    multivitamin Tab Take 1 tablet by mouth once daily.    oxybutynin (DITROPAN-XL) 5 MG TR24 Take 1 tablet (5 mg  total) by mouth once daily.    oxyCODONE-acetaminophen (PERCOCET) 5-325 mg per tablet Take 1 tablet by mouth every 12 (twelve) hours as needed for Pain.    polyethylene glycol (GLYCOLAX) 17 gram PwPk Take 17 g by mouth once daily.    QUEtiapine (SEROQUEL) 200 MG Tab Take 1 tablet (200 mg total) by mouth every evening.    thiamine 100 MG tablet Take 1 tablet (100 mg total) by mouth once daily.     Family History       Problem Relation (Age of Onset)    Anxiety disorder Brother    Dementia Mother    Depression Brother          Tobacco Use    Smoking status: Former     Current packs/day: 0.00     Average packs/day: 0.5 packs/day for 25.0 years (12.5 ttl pk-yrs)     Types: Cigarettes     Start date:      Quit date:      Years since quittin.6    Smokeless tobacco: Never   Substance and Sexual Activity    Alcohol use: Not Currently     Comment: occasional    Drug use: Not Currently     Comment: prescribed opioids at present for pain    Sexual activity: Not Currently     Partners: Male     Comment: since      Review of Systems   Constitutional:  Negative for chills and fever.   HENT:  Negative for congestion and trouble swallowing.    Eyes:  Negative for visual disturbance.   Respiratory:  Negative for cough and shortness of breath.    Cardiovascular:  Negative for chest pain and palpitations.   Gastrointestinal:  Positive for abdominal pain. Negative for constipation, diarrhea, nausea and vomiting.   Genitourinary:  Negative for dysuria and frequency.   Musculoskeletal:  Positive for arthralgias, gait problem and joint swelling.   Skin:  Negative for color change and rash.   Neurological:  Negative for weakness, light-headedness and numbness.   Psychiatric/Behavioral:  Negative for confusion. The patient is not nervous/anxious.      Objective:     Vital Signs (Most Recent):  Temp: 98.4 °F (36.9 °C) (23)  Pulse: (!) 50 (23)  Resp: 15 (23)  BP: 110/62 (23)  SpO2:  100 % (08/23/23 0200) Vital Signs (24h Range):  Temp:  [98.4 °F (36.9 °C)-98.6 °F (37 °C)] 98.4 °F (36.9 °C)  Pulse:  [50-68] 50  Resp:  [12-18] 15  SpO2:  [96 %-100 %] 100 %  BP: (104-110)/(62-72) 110/62     Weight: 80.3 kg (177 lb)  Body mass index is 29.45 kg/m².     Physical Exam  Vitals and nursing note reviewed.   Constitutional:       General: She is not in acute distress.  HENT:      Head: Normocephalic and atraumatic.      Mouth/Throat:      Pharynx: No oropharyngeal exudate.   Eyes:      Extraocular Movements: Extraocular movements intact.      Conjunctiva/sclera: Conjunctivae normal.   Cardiovascular:      Rate and Rhythm: Normal rate and regular rhythm.      Heart sounds: Normal heart sounds.   Pulmonary:      Effort: Pulmonary effort is normal. No respiratory distress.      Breath sounds: Normal breath sounds.   Abdominal:      General: Bowel sounds are normal.      Palpations: Abdomen is soft.      Tenderness: There is abdominal tenderness. There is no guarding or rebound.      Comments: Mild TTP throughout the abdomen.  Indwelling delcid catheter    Musculoskeletal:      Cervical back: Normal range of motion.      Comments: RLE in short-leg splint       Left Lower Extremity: Left leg is amputated above knee.   Skin:     General: Skin is warm and dry.      Comments: Small stage 2 pressure wounds proximal posterior thighs   Neurological:      Mental Status: She is alert and oriented to person, place, and time.      Sensory: No sensory deficit.      Comments: Able to wiggle her right toes, identify light touch   Psychiatric:         Behavior: Behavior is withdrawn.         Thought Content: Thought content normal.                Significant Labs: All pertinent labs within the past 24 hours have been reviewed.  ABGs:   Recent Labs   Lab 08/22/23  2339 08/23/23  0056   PH 7.285* 7.293*   PCO2 77.8* 71.4*   HCO3 37.0* 34.6*   POCSATURATED 98 86*   BE 10 8   PO2 115* 60     CBC:   Recent Labs   Lab  08/22/23  2335   WBC 7.58   HGB 7.8*   HCT 27.5*        CMP:   Recent Labs   Lab 08/22/23  2335      K 3.9      CO2 28   *   BUN 32*   CREATININE 1.0   CALCIUM 8.7   PROT 7.2   ALBUMIN 2.8*   BILITOT 0.2   ALKPHOS 82   AST 14   ALT 8*   ANIONGAP 10       Significant Imaging: I have reviewed all pertinent imaging results/findings within the past 24 hours.  X-Ray Ankle Complete Right  Narrative: EXAMINATION:  XR ANKLE COMPLETE 3 VIEW RIGHT    CLINICAL HISTORY:  Pain in right ankle and joints of right foot    TECHNIQUE:  AP, lateral, and oblique images of the right ankle were performed.  The images are confirmed to be the right leg with the technologist.    COMPARISON:  01/15/2018    FINDINGS:  Right knee arthroplasty.    Acute fracture of the distal right tibia and fibula, approximately 2-3 cm above the talar dome. Mild comminution and mild displacement. Follow-up recommended.  Impression: Acute fractures of the distal tibia and fibula. Orthopedic follow-up recommended.    This report was flagged in Epic as abnormal.    Electronically signed by: Dimitri Gregory  Date:    08/23/2023  Time:    00:14  X-Ray Tibia Fibula 2 View Right  Narrative: EXAMINATION:  XR TIBIA FIBULA 2 VIEW RIGHT    CLINICAL HISTORY:  Pain in right ankle and joints of right foot    TECHNIQUE:  AP and lateral views of the right tibia and fibula were performed.  Images are confirmed to be the right leg with the technologist    COMPARISON:  01/15/2018    FINDINGS:  Right knee arthroplasty.    Acute fracture of the distal tibia and fibula, approximately 2-3 cm above the talar dome.  Mild comminution and mild displacement.  Follow-up recommended.  Impression: Acute fractures of the distal right tibia and fibula.  Orthopedic follow-up recommended.    This report was flagged in Epic as abnormal.    Electronically signed by: Dimitri Gregory  Date:    08/23/2023  Time:    00:13

## 2023-08-23 NOTE — ASSESSMENT & PLAN NOTE
Patient with Hypercapnic and Hypoxic Respiratory failure which is Chronic.  she is on home oxygen at 2 LPM. Supplemental oxygen was provided and noted- Oxygen Concentration (%):  [28] 28    .   Signs/symptoms of respiratory failure include- none at this time. Contributing diagnoses includes - CHF, COPD and Pulmonary HTN Labs and images were reviewed. Patient Has recent ABG, which has been reviewed (VBG). Will treat underlying causes and adjust management of respiratory failure as follows-   Continue home supplemental oxygen for goal SpO2 >88%  Continue nightly BiPAP  PRN nebs

## 2023-08-23 NOTE — CONSULTS
[facility] - Emergency Dept  Orthopedics  Consult Note    Patient Name: Mary Ellen Fajardo  MRN: [id number]  Admission Date: 8/22/2023  Hospital Length of Stay: 0 days  Attending Provider: Adrian Hahn MD  Primary Care Provider: Any Tran MD    Patient information was obtained from patient and ER records.     Inpatient consult to Orthopedic Surgery  Consult performed by: RAMA Miguel MD  Consult ordered by: Adrian Hahn MD        Subjective:     Principal Problem:Closed fracture of distal end of right tibia    Chief Complaint:   Chief Complaint   Patient presents with    Ankle Pain     Pt coming from [facility] for right ankle pain and swelling following getting it stuck during a transfer to her shower chair at 6:15 this morning. NH staff state they were getting her from her chair to her shower chair and got her ankle stuck between the two. X-ray at NH showed right ankle fracture. 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  Patient was reportedly being moved to a shower chair from her wheelchair when her right ankle caught and twisted in the wheel of her wheelchair.  She experienced immediate pain and deformity of the right ankle.  Denies any numbness, tingling, and states she has never injured this ankle before.  She denies any other musculoskeletal pains at this time.  Patient is wheelchair-bound at baseline and not on any blood thinners.        Past Medical History:   Diagnosis Date    Abdominal hernia     Addiction to drug     Alcohol abuse     Anxiety     Arthritis     Asthma     Bipolar disorder     Bladder stones     CHF  (congestive heart failure)     COPD (chronic obstructive pulmonary disease)     on home o2    CVA (cerebral vascular accident)     2012    Hallucination     Hepatitis C     treated and cured    History of blood clots     Hx of psychiatric care     Hypertension     Belen     New onset seizure 8/12/2023    Obese body habitus     On home oxygen therapy     ADEEL (obstructive sleep apnea)     ADEEL treated with BiPAP     Paraplegia     Paraplegic spinal paralysis     Psychiatric problem     Psychosis     AVH; Paranoia    Renal disorder     Requires assistance with activities of daily living (ADL)     SCI (spinal cord injury)     incomplete    Sleep difficulties     has sleep apnea and uses a Bi-PAP machine.    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    Substance abuse     Suprapubic catheter 03/15/2011    Therapy     Vaginal delivery     x1    Wheelchair dependence        Past Surgical History:   Procedure Laterality Date    ABOVE-KNEE AMPUTATION Left 7/23/2019    Procedure: AMPUTATION, ABOVE KNEE;  Surgeon: Gordo Rodriguez MD;  Location: Health system OR;  Service: Orthopedics;  Laterality: Left;    amputation Left 07/23/2019    above the knee    BACK SURGERY      bilateral knee replacement      BREAST BIOPSY      cysto/lithopaxy 2017      CYSTOSCOPY Right 1/8/2021    Procedure: CYSTOSCOPY, RIGHT OCCULER BALLOON CATHETER PLACEMENT;  Surgeon: RAMA Tinoco MD;  Location: Health system OR;  Service: Urology;  Laterality: Right;    CYSTOSCOPY W/ LASER LITHOTRIPSY      JOINT REPLACEMENT      bilateral knee    PERCUTANEOUS NEPHROLITHOTOMY Right 1/8/2021    Procedure: NEPHROLITHOTOMY, PERCUTANEOUS;  Surgeon: RAMA Tinoco MD;  Location: Health system OR;  Service: Urology;  Laterality: Right;  x3 rooms, or7, or9,or12  ATTEMPTED    RETROGRADE PYELOGRAPHY Right 1/8/2021    Procedure: PYELOGRAM, RETROGRADE;  Surgeon: RAMA Tinoco MD;  Location: Health system OR;  Service: Urology;  Laterality: Right;    SUPRAPUBIC CYSTOSTOMY  03/15/2011    patient  reports  replaced tubing on 10/27/19, at home    URETEROSCOPY Right 1/8/2021    Procedure: URETEROSCOPY;  Surgeon: RAMA Tinoco MD;  Location: [facility];  Service: Urology;  Laterality: Right;       Review of patient's allergies indicates:   Allergen Reactions    Tuberculin ppd Itching and Dermatitis     Patient has old scare that she claims is from TB PPD    Rocephin [ceftriaxone] Rash     Rash 2012? Pt agreeable to try with pre mediation with benadryl.        No current facility-administered medications for this encounter.     Current Outpatient Medications   Medication Sig    albuterol-ipratropium (DUO-NEB) 2.5 mg-0.5 mg/3 mL nebulizer solution Take 3 mLs by nebulization every 4 (four) hours as needed for Wheezing.    baclofen (LIORESAL) 20 MG tablet 20 mg 2 (two) times daily.     bumetanide (BUMEX) 2 MG tablet Take 2 mg by mouth once daily.     catheter 18 Fr Misc Change every 20 days    citalopram (CELEXA) 20 MG tablet Take 1.5 tablets (30 mg total) by mouth once daily.    ferrous sulfate 324 mg (65 mg iron) TbEC Take 1 tablet (324 mg total) by mouth once daily.    folic acid-vit B6-vit B12 2.5-25-2 mg (FOLBIC OR EQUIV) 2.5-25-2 mg Tab Take 1 tablet by mouth once daily.    gabapentin (NEURONTIN) 600 MG tablet Take 1 tablet (600 mg total) by mouth 3 (three) times daily.    hydrOXYzine HCL (ATARAX) 25 MG tablet Take 1 tablet (25 mg total) by mouth 2 (two) times daily as needed for Anxiety.    miscellaneous medical supply (C-TUB) [facility] NEEDS TO SWITCH [employer] FOR OXYGEN AT 2L. LAST OXYGEN SATURATION WAS 83% ON Room Air. She uses  BIPAP and  Needs  New mask, tubings and    multivitamin Tab Take 1 tablet by mouth once daily.    oxybutynin (DITROPAN-XL) 5 MG TR24 Take 1 tablet (5 mg total) by mouth once daily.    oxyCODONE-acetaminophen (PERCOCET) 5-325 mg per tablet Take 1 tablet by mouth every 12 (twelve) hours as needed for Pain.    polyethylene glycol (GLYCOLAX) 17 gram PwPk Take 17 g by mouth once  daily.    QUEtiapine (SEROQUEL) 200 MG Tab Take 1 tablet (200 mg total) by mouth every evening.    thiamine 100 MG tablet Take 1 tablet (100 mg total) by mouth once daily.     Family History       Problem Relation (Age of Onset)    Anxiety disorder Brother    Dementia Mother    Depression Brother          Tobacco Use    Smoking status: Former     Current packs/day: 0.00     Average packs/day: 0.5 packs/day for 25.0 years (12.5 ttl pk-yrs)     Types: Cigarettes     Start date:      Quit date:      Years since quittin.6    Smokeless tobacco: Never   Substance and Sexual Activity    Alcohol use: Not Currently     Comment: occasional    Drug use: Not Currently     Comment: prescribed opioids at present for pain    Sexual activity: Not Currently     Partners: Male     Comment: since      ROS  Constitutional: Denies fever/chills  Eyes: Denies change in vision  ENT: Denies sore throat or rhinorrhea   Respiratory: Denies shortness of breath or cough  Cardiovascular: Denies chest pain or palpitations  Gastrointestinal: Denies abdominal pain, nausea, or vomiting  Genitourinary: Denies dysuria and flank pain  Skin: Denies new rash or skin lesions   Allergic/Immunologic: Denies adverse reactions to current medications  Neurological: Denies headaches or dizziness  Musculoskeletal: see HPI    Objective:     Vital Signs (Most Recent):  Temp: 98.4 °F (36.9 °C) (23 0200)  Pulse: (!) 50 (23 0200)  Resp: 15 (23 0200)  BP: 110/62 (23 0200)  SpO2: 100 % (23 0200) Vital Signs (24h Range):  Temp:  [98.4 °F (36.9 °C)-98.6 °F (37 °C)] 98.4 °F (36.9 °C)  Pulse:  [50-68] 50  Resp:  [12-18] 15  SpO2:  [96 %-100 %] 100 %  BP: (104-110)/(62-72) 110/62     Weight: 80.3 kg (177 lb)     Body mass index is 29.45 kg/m².    No intake or output data in the 24 hours ending 23 0243     Ortho/SPM Exam  Physical Exam:  General:  mild distress, WDWN  HENT:  NCAT, Bilateral ears/eyes normal  CV:  Normal  pulses, color, and cap refill  Lungs:  Normal respiratory effort  Neuro: No FND, awake, alert  Psych:  Oriented to Person, Place, Time, and Situation    MSK:       LUE:  Inspection: Skin intact throughout, no swelling, no effusions, no ecchymosis   Palpation: Non-TTP throughout, no palpable abnormality.   ROM: AROM and PROM of the shoulder, elbow, wrist, and hand intact without pain.   Neuro: AIN/PIN/Radial/Median/Ulnar Nerves assessed in isolation without deficit.   SILT throughout.    Vascular: Radial artery palpated 2+. Capillary refill <3s.         RUE:  Inspection: Skin intact throughout, no swelling, no effusions, no ecchymosis   Palpation: Non-TTP throughout, no palpable abnormality.   ROM: AROM and PROM of the shoulder, elbow, wrist, and hand intact without pain.   Neuro: AIN/PIN/Radial/Median/Ulnar Nerves assessed in isolation without deficit.   SILT throughout.    Vascular: Radial artery palpated 2+. Capillary refill <3s.         LLE:  Inspection: Above knee amputation   Skin intact throughout, no swelling, no effusions, no ecchymosis  Hip range of motion intact without pain         RLE:  Inspection: Skin intact throughout, no open wounds  1 cm fracture blister present over anteromedial ankle  Swelling present at right ankle   Palpation: TTP at ankle; otherwise non-TTP throughout.  Compartments are soft and compressible   ROM: PROM of the hip, knee, foot intact without pain; active range of motion limited secondary to weakness throughout  Active and passive range of motion of the ankle limited secondary to pain   Neuro: TA/Gastroc/EHL/FHL assessed in isolation without deficit.   SILT Sa/Das/DP/SP/T nerve distributions   Vascular: DP and PT arteries palpated 2+. Capillary refill <3s.          Spine/pelvis/axial body:  No tenderness to palpation of cervical, thoracic, or lumbar spine  Stable and without pain with direct anterior pressure over ASIS.  No chest wall or abdominal tenderness  No decubitus  ulcers  Muscle tone normal      Significant Labs: All pertinent labs within the past 24 hours have been reviewed.    Significant Imaging: I have reviewed and interpreted all pertinent imaging results/findings.  Xray of the right tib-fib showing a comminuted distal third tibia and fibula fracture with partial posterior displacement of the distal fragments     Assessment/Plan:     * Closed fracture of distal end of right tibia  Mary Ellen Fajardo is a 67 y.o. female with PMH of CHF, COPD, neurogenic bladder with chronic indwelling suprapubic catheter, hepatitis-C, CVA, bilateral TKAs complicated by postoperative infections resulting in a left AKA, who presents from [facility] with fractures of the right distal tibia and fibula.  Injury reportedly occurred while patient was transferring from wheelchair to shower chair.  She presented closed, neurovascularly intact, and without any other musculoskeletal injuries on physical exam.  RLE compartments soft and compressible, and she was noted to have fracture blistering over the anteromedial ankle.  Patient was placed in a short-leg splint and window was created for further compartment checks overnight.  Patient is reportedly nonambulatory and wheelchair-bound at baseline.    - NPO as precaution   - Nonweightbearing to the right lower extremity  - Ice and elevate affected extremity at all times   - Multimodal pain control   - Q4h compartment checks   - Consenting patient for internal vs external fixation this morning           RAMA Miguel MD  Orthopedics  [facility] - Emergency Dept

## 2023-08-23 NOTE — ED PROVIDER NOTES
Encounter Date: 8/22/2023       History     Chief Complaint   Patient presents with    Ankle Pain     Pt coming from [facility] for right ankle pain and swelling following getting it stuck during a transfer to her shower chair at 6:15 this morning. NH staff state they were getting her from her chair to her shower chair and got her ankle stuck between the two. X-ray at NH showed right ankle fracture. Pt complaining of pain to ankle, able to move toes, sensory intact, pulse intact, swelling noted     Mary Ellen Fajardo is a 67 y.o. female with PMH of CHF, COPD, and stroke presenting with right sided ankle and lower leg pain. She states she hurt her ankle this morning when she was being transferred from her wheelchair to a shower chair and her foot got caught in the wheel of the wheelchair. This occurred at [facility] and they obtained an X-ray following the incident that showed a distal tibia-fibula fracture. She reports 8/10 pain to her ankle and up to her distal knee. She also has an above the knee amputation of her left leg and is normally wheelchair bound. Per EMS, she received Percocet 5mg around 630 pm en route to the ED. She also reports some mild SOB, and some swelling to her right leg that is new for her. Denies chest pain, headache, palpitations, dizziness, syncope, numbness/tingling, nausea/vomiting, or any signs of rash or open wounds.    The history is provided by the patient and the EMS personnel.     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 Pt agreeable to try with pre mediation with benadryl.      Past Medical History:   Diagnosis Date    Abdominal hernia     Addiction to drug     Alcohol abuse     Anxiety     Arthritis     Asthma     Bipolar disorder     Bladder stones     CHF (congestive heart failure)     COPD (chronic  obstructive pulmonary disease)     on home o2    CVA (cerebral vascular accident)     2012    Hallucination     Hepatitis C     treated and cured    History of blood clots     Hx of psychiatric care     Hypertension     Belen     New onset seizure 8/12/2023    Obese body habitus     On home oxygen therapy     ADEEL (obstructive sleep apnea)     ADEEL treated with BiPAP     Paraplegia     Paraplegic spinal paralysis     Psychiatric problem     Psychosis     AVH; Paranoia    Renal disorder     Requires assistance with activities of daily living (ADL)     SCI (spinal cord injury)     incomplete    Sleep difficulties     has sleep apnea and uses a Bi-PAP machine.    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    Substance abuse     Suprapubic catheter 03/15/2011    Therapy     Vaginal delivery     x1    Wheelchair dependence      Past Surgical History:   Procedure Laterality Date    ABOVE-KNEE AMPUTATION Left 7/23/2019    Procedure: AMPUTATION, ABOVE KNEE;  Surgeon: Gordo Rodriguez MD;  Location: [facility];  Service: Orthopedics;  Laterality: Left;    amputation Left 07/23/2019    above the knee    BACK SURGERY      bilateral knee replacement      BREAST BIOPSY      cysto/lithopaxy 2017      CYSTOSCOPY Right 1/8/2021    Procedure: CYSTOSCOPY, RIGHT OCCULER BALLOON CATHETER PLACEMENT;  Surgeon: RAMA Tinoco MD;  Location: [facility] OR;  Service: Urology;  Laterality: Right;    CYSTOSCOPY W/ LASER LITHOTRIPSY      JOINT REPLACEMENT      bilateral knee    PERCUTANEOUS NEPHROLITHOTOMY Right 1/8/2021    Procedure: NEPHROLITHOTOMY, PERCUTANEOUS;  Surgeon: RAMA Tinoco MD;  Location: [facility] OR;  Service: Urology;  Laterality: Right;  x3 rooms, or7, or9,or12  ATTEMPTED    RETROGRADE PYELOGRAPHY Right 1/8/2021    Procedure: PYELOGRAM, RETROGRADE;  Surgeon: RAMA Tinoco MD;  Location: [facility] OR;  Service: Urology;  Laterality: Right;    SUPRAPUBIC CYSTOSTOMY  03/15/2011    patient reports  replaced tubing on 10/27/19, at  home    URETEROSCOPY Right 2021    Procedure: URETEROSCOPY;  Surgeon: RAMA Tinoco MD;  Location: [facility] OR;  Service: Urology;  Laterality: Right;     Family History   Problem Relation Age of Onset    Dementia Mother     Anxiety disorder Brother     Depression Brother      Social History     Tobacco Use    Smoking status: Former     Current packs/day: 0.00     Average packs/day: 0.5 packs/day for 25.0 years (12.5 ttl pk-yrs)     Types: Cigarettes     Start date:      Quit date:      Years since quittin.6    Smokeless tobacco: Never   Substance Use Topics    Alcohol use: Not Currently     Comment: occasional    Drug use: Not Currently     Comment: prescribed opioids at present for pain     Review of Systems    Physical Exam     Initial Vitals [23 2202]   BP Pulse Resp Temp SpO2   104/72 68 18 98.6 °F (37 °C) 100 %      MAP       --         Physical Exam    Constitutional: She appears well-developed and well-nourished. No distress.   Appears drowsy and begins to nod off mid-conversation   HENT:   Head: Normocephalic and atraumatic.   Eyes: EOM are normal. Pupils are equal, round, and reactive to light.   Neck: No JVD present.   Cardiovascular:  Normal rate, regular rhythm and normal heart sounds.           Dorsalis pedis and Posterior tibial pulse was not palpable on physical exam (limited due to swelling). Both DP and PT were present when assessed with bedside doppler.   Pulmonary/Chest: Breath sounds normal. No respiratory distress. She has no wheezes. She has no rhonchi.   Abdominal: Abdomen is soft. Bowel sounds are normal. She exhibits distension. There is no abdominal tenderness.   Musculoskeletal:      Comments: Swelling to foot, ankle, and lower shin on right leg; Above the knee amputation of left leg     Neurological: She is alert and oriented to person, place, and time. No cranial nerve deficit.   Skin: Skin is warm and dry.   Psychiatric: She has a normal mood and affect.          ED Course   Procedures  Labs Reviewed   COMPREHENSIVE METABOLIC PANEL   CBC W/ AUTO DIFFERENTIAL   TROPONIN I   B-TYPE NATRIURETIC PEPTIDE          Imaging Results    None          Medications - No data to display  Medical Decision Making  Patient is a 67 y.o female with PMH of HTN, CHF, COPD, and chronic respiratory failure on home oxygen 2L presenting with right sided ankle and leg pain. Patient reports leg was caught in wheelchair wheel when transferring to the shower at [facility] this morning. X-ray showed distal tibia-fibula fracture. DP and PT pulse was not palpable on exam due to swelling but both were present when assessed with bedside doppler.    Labs ordered: CBC significant for Hgb/Hct of 7.8/27.5.  CMP unremarkable. Troponin negative. BNP pending. VBG significant for PCO2 of 72  Imaging ordered: X-Ray Ankle and X-Ray Tibia-Fibula showed acute fractures of the distal tibia and fibula. Chest X-Ray negative for acute process or infiltrate.  Differential Diagnoses include: Tibia-fibula fracture, Ankle fracture, Compartment Syndrome, Peripheral artery disease, and Chronic Venous insufficiency.   Ortho consulted and evaluated the patient and recommended admission to Hospital medicine.    Amount and/or Complexity of Data Reviewed  Labs: ordered. Decision-making details documented in ED Course.     Details: CBC, CMP, Troponin, BNP, and VBG.  Radiology: ordered. Decision-making details documented in ED Course.     Details: Xray of ankle, Xray Tibia and Fibula, and Chest Xray    Risk  Decision regarding hospitalization.               ED Course as of 08/23/23 0204   Wed Aug 23, 2023   0031 CBC auto differential(!)  Slightly worsening anemia with hgb 7.8. no leukocytosis [BD]   0032 ISTAT PROCEDURE(!!)  Mildy respiratory acidosis with pH 7.3 and pCO2 78.  Patient does appear to be falling asleep a lot on exam which I suspect is secondary to her pCO2.  We will place her on BiPAP.  She is chronically on  BiPAP at nights so I suspect this clinical change is 2/2 not being on her home BiPAP.  [BD]   0033 X-Ray Tibia Fibula 2 View Right(!)  Acute tibia and fibula fractures.  We have consulted and discussed the case with orthopedic surgery who will evaluate the patient [BD]   0033 X-Ray Chest AP Portable  Chest x-ray independently interpreted by me shows no acute process such as pneumonia, pneumothorax, or pulmonary edema.    [BD]   0105 ISTAT PROCEDURE(!)  Repeat VBG is improving [BD]   0105 Comprehensive metabolic panel(!)  CMP unremarkable without significant electrolyte derangement, impaired renal function, or elevated LFTs   [BD]   0105 Troponin I: <0.006  ACS unlikely [BD]   0156 Discussed the case with Ortho Surgery who recommends HM admission. Resident discussed the case with HM who will admit the patient to their service.  [BD]      ED Course User Index  [BD] Adrian Hahn MD                      Clinical Impression:   Final diagnoses:  [M25.571] Right ankle pain  [R06.02] SOB (shortness of breath)  [M79.604] Right leg pain  [S99.911A] Right ankle injury               Damion Krause, DO  Resident  08/23/23 0238       Damion Krause,   Resident  08/23/23 030

## 2023-08-23 NOTE — NURSING
Nurses Note -- 4 Eyes      8/23/2023   4:46 AM      Skin assessed during: Admit      [] No Altered Skin Integrity Present    []Prevention Measures Documented      [x] Yes- Altered Skin Integrity Present or Discovered   [] LDA Added if Not in Epic (Describe Wound)   [x] New Altered Skin Integrity was Present on Admit and Documented in LDA   [] Wound Image Taken    Wound Care Consulted? Yes    Attending Nurse:  Minda Griggs RN     Second RN/Staff Member:  Adri Garcia RN

## 2023-08-23 NOTE — ASSESSMENT & PLAN NOTE
Mary Ellen Fajardo is a 67 y.o. female with PMH of CHF, COPD, neurogenic bladder with chronic indwelling suprapubic catheter, hepatitis-C, CVA, bilateral TKAs complicated by postoperative infections resulting in a left AKA, who presents from [facility] with fractures of the right distal tibia and fibula.  Injury reportedly occurred while patient was transferring from wheelchair to shower chair.  She presented closed, neurovascularly intact, and without any other musculoskeletal injuries on physical exam.  RLE compartments soft and compressible, and she was noted to have fracture blistering over the anteromedial ankle.  Patient was placed in a short-leg splint and window was created for further compartment checks overnight.  Patient is reportedly nonambulatory and wheelchair-bound at baseline.    - NPO as precaution   - Nonweightbearing to the right lower extremity  - Ice and elevate affected extremity at all times   - Multimodal pain control   - Q4h compartment checks   - Follow up CT results and discuss further plan with staff in AM

## 2023-08-23 NOTE — HPI
Mary Ellen Fajardo is a 67 y.o. female with past medical history of CHF, COPD, neurogenic bladder with chronic indwelling suprapubic catheter, hepatitis-C, CVA, bilateral TKAs, left AKA after a post op infection, who [facility] with right ankle pain and injury.  Patient reports she was being moved to a shower chair from her wheelchair when her right ankle caught and twisted in the wheel of her wheelchair.  She experienced immediate pain and deformity of the right ankle.  Denies any numbness, tingling, and states she has never injured this ankle before.  She denies any other injury. Wheelchair-bound at baseline and not on any blood thinners.  Has some abdominal cramping. Last BM yesterday. No other symptoms such as shortness of breath, chest pain, nausea, vomiting. She is somewhat withdrawn during interview which she attributes to pain.    In the ED, AFVSS. Labs with hbg 7.8 (baseline ~9-10). CBC and CMP otherwise unremarkable. VBG with pH 7.285, PCO2 77.8, PO2 115, and HCO3 37. Placed on her home nightly bipap. XR imaging of RLE revealed  Acute fracture of the distal tibia and fibula, approximately 2-3 cm above the talar dome.  Mild comminution and mild displacement.  Ortho evaluated the patient, splinted her ankle, and recommended admission to hospital medicine. Given dilaudid  and 1L NS bolus.

## 2023-08-23 NOTE — OP NOTE
OP NOTE    DOS:  08/23/2023    Preop Dx: Right tibial pilon plus fibula fracture    Disuse osteopenia    Postop Dx: Right tibial pilon plus fibula fracture    Disuse osteopenia    Procedure: Fixation right tibial pilon fracture with fibula fracture - 44523    Surgeon: Suleman Schmitz M.D.    Asst:  Suleman Bowden M.D    Anesthesia: GETA    EBL:  20 cc    IVF:  1000 cc crystalloid    Implants: Synthes distal tibial medial plate.  Intramedullary screw fibula.  Montage calcium phosphate in metaphysis.  Prevena restore incisional wound VAC    Specimens: None    Findings: Very poor bone quality.    Dispo:  To PACU extubated/stable       Indications for Procedure:      67-year-old female with significant neuropathy status post left lower extremity amputation presents with a right distal tibial pilon fracture with fibula well below a revision stem total knee.  I am taking her to the operating for either external fixation (not preferable given her existing metal and poor bone quality) versus open reduction internal fixation.  The risks, benefits and alternatives to surgery were discussed the patient prior to going the operating room including her increased risk for wound breakdown and infection.  Informed consent was obtained.    Procedure in Detail:    Patient identified the preoperative holding area the site was marked.  Patient was wheeled to the operating room placed on the operating table in supine position.  General endotracheal anesthesia induced and preoperative antibiotics were administered to include vancomycin and Ancef.  Right lower extremities placed in a nonsterile tourniquet.  At this point I took the splint off.  The patient had some swelling and very mild anterior blister.  I felt that she was going to be a poor candidate for a short external fixator and then spanning across that fixed later.  Her soft tissues wrinkled enough to proceed with definitive fixation with what I thought was not an added risk.   Right lower extremity was then prepped and draped sterile fashion.  A time-out was undertaken to confirm patient, side, site, surgery, surgeon and administration of preoperative antibiotics.  All agreed we proceeded.      The leg was exsanguinated the tourniquet was raised.  I made a distal medial incision.  I pulled out of varus and placed blue towels to get the best possible lateral alignment.  I took the longest distal tibial medial plate and held this over the skin.  This would barely crossover with the revision stem.  I decided at this point I needed a much shorter plate in order to keep a significant amount of bone between the 2 stress risers.  I slid a 6 hole Synthes distal tibial medial plate subcutaneously from the distal incision.  I fixed this with a wire distally after positioning in under AP and lateral fluoroscopy.  I placed a single bicortical screw in the center hole to pull it down to bone.  I then pulled out the appropriate length alignment and placed a whirly bird in the slotted hole just above the fracture.  She would very poor purchase.  I quickly placed a locking screw just above the joint distally.  I then made a stab incision and placed a locking screw through the tower the most proximal hole of the plate.  I placed a 2nd locking screw in the shaft above the fracture after removing the whirly bird.  I placed a 3rd locking screw in the shaft.  I then turned back distally and placed the other most distal locking screw.  I then removed the initial bicortical screw and replaced this with unicortical locking screw.  There was a large metaphyseal defect around the area of the other locking screws.      At this point I took 5 cc of montage calcium phosphate and placed this into the metaphysis to fill in the void and help add some structure and strength to the distal tibia.  I turned my attention to the fibula while the montage was drying.    At this point I made a distal incision and drilled a hole  at the tip of the fibula.  I then placed a 110 mm intramedullary screw in the fibula to hold this in better position and prevent valgus.  I then turned my attention back medially.  After the montage dried I placed several more locking screws through the calcium phosphate into the distal tibia.    I visualized the entire construct under fluoroscopy and had good length alignment.  I would good fixation.  The tourniquet was let down hemostasis obtained electrocautery.  The wounds were copiously irrigated normal saline solution.  The deep fascia was closed with 0 Vicryl suture over vancomycin cefepime powder.  The subcutaneous tissues were closed with 3-0 Vicryl suture and the skin with skin staples.  I then placed a Prevena restore wound VAC gaining good suction seal to increase blood flow to the skin and the entire area.    All instrument sponge counts were reported correct in the case.  There were no complications.  Patient was placed into a Podus boot, extubated, awakened and taken to recovery room stable condition.      Plan for the patient:     Multimodal pain management limiting narcotics.  She will be nonweightbearing on that side until there is good evidence for fracture healing.  I will leave the staples in closer to 3 weeks given her poor soft tissue envelope.    Suleman Schmitz MD

## 2023-08-23 NOTE — NURSING
Patient returned to room, family at bedside. Patient is very lethargic at this time, but easy to arouse for interaction and assessment. Vital Signs stable will continue with plan of care. Patient has returned with a block as well as a wound vac that will be used upon discharge. No complaints of pain at this time.

## 2023-08-23 NOTE — NURSING
Transferred to surgery after receiving hibiclens wipe, new pain medication has been ordered, but not verified . Patient transferred with O2 via bed. All belongings placed in bag and at nurses station for safe keeping. Catheter emptied prior to transfer.

## 2023-08-23 NOTE — ANESTHESIA POSTPROCEDURE EVALUATION
Anesthesia Post Evaluation    Patient: Mary Ellen Fajardo    Procedure(s) Performed: Procedure(s) (LRB):  ORIF, FRACTURE, PILON (Right)  ORIF, FRACTURE, FIBULA (Right)    Final Anesthesia Type: general      Patient location during evaluation: PACU  Patient participation: Yes- Able to Participate  Level of consciousness: awake and alert and oriented  Post-procedure vital signs: reviewed and stable  Pain management: adequate  Airway patency: patent    PONV status at discharge: No PONV  Anesthetic complications: no      Cardiovascular status: blood pressure returned to baseline and hemodynamically stable  Respiratory status: unassisted, spontaneous ventilation and room air  Hydration status: euvolemic  Follow-up not needed.          Vitals Value Taken Time   /62 08/23/23 1444   Temp 36.6 °C (97.9 °F) 08/23/23 1322   Pulse 74 08/23/23 1445   Resp 28 08/23/23 1445   SpO2 94 % 08/23/23 1445   Vitals shown include unvalidated device data.      No case tracking events are documented in the log.      Pain/Teresita Score: Pain Rating Prior to Med Admin: 7 (8/23/2023  2:32 PM)  Pain Rating Post Med Admin: 2 (8/23/2023  1:21 PM)  Teresita Score: 8 (8/23/2023  1:45 PM)

## 2023-08-23 NOTE — TRANSFER OF CARE
"Anesthesia Transfer of Care Note    Patient: Mary Ellen Fajardo    Procedure(s) Performed: Procedure(s) (LRB):  ORIF, FRACTURE, PILON (Right)  ORIF, FRACTURE, FIBULA (Right)    Patient location: PACU    Anesthesia Type: general    Transport from OR: Transported from OR on 6-10 L/min O2 by face mask with adequate spontaneous ventilation    Post pain: adequate analgesia    Post assessment: no apparent anesthetic complications    Post vital signs: stable    Level of consciousness: awake and alert    Nausea/Vomiting: no nausea/vomiting    Complications: none    Transfer of care protocol was followedComments: Put on bipap in PACU      Last vitals:   Visit Vitals  BP (!) 146/77   Pulse 88   Temp 36.6 °C (97.9 °F) (Temporal)   Resp (!) 28   Ht 5' 5" (1.651 m)   Wt 83 kg (183 lb)   SpO2 96%   BMI 30.45 kg/m²     "

## 2023-08-23 NOTE — PROGRESS NOTES
Pt arrived to [facility] with RLE in wrap, elevated, with ice in place and site krystin noted. IV to right hand leaking when flushed. Attempted x1 without success-pt stated she normally has PICC lines due to poor access. Pt on 3L oxygen via NC, noted pt to be wheezing upon arrival. Notified Dr. Ayala of need for IV and wheezing. Breathing treatment ordered and Dr. Ayala to start IV.   Pt has suprapubic catheter and left AKA. Pt had denture in place-spoke with primary RN Carlos and informed her that denture would be at [facility] preop desk and would need to be brought back to patient's room.  Chart reviewed. Preop nursing care completed per orders. Safe surgery checklist complete. Family not present at hospital. Call bell within reach. Instructed pt to call for assistance.

## 2023-08-23 NOTE — ANESTHESIA PREPROCEDURE EVALUATION
08/23/2023  Mary Ellen Fajardo is a 67 y.o., female   Pre-operative evaluation for Procedure(s) (LRB):  APPLICATION, EXTERNAL FIXATION DEVICE, LOWER EXTREMITY (Right)    Mary Ellen Fajardo is a 67 y.o. female     Patient Active Problem List   Diagnosis    Paraparesis    Gait disorder    Neurogenic bladder    Knee pain, bilateral    S/P knee replacement    OA (osteoarthritis) of knee    Primary osteoarthritis of left knee    Poor motor control of trunk    Decreased range of motion of lower extremity    Bilateral leg weakness    Chronic knee pain    Bladder stone    Staghorn calculus    Kidney stones    Essential hypertension    Neuropathy    Primary insomnia    ADEEL (obstructive sleep apnea)    Opioid overdose    COPD (chronic obstructive pulmonary disease)    Class 3 severe obesity in adult    Chronic hepatitis C    Chronic indwelling suprapubic catheter in place    History of CVA (cerebrovascular accident)    COPD with acute exacerbation    Septic arthritis of knee, left    Hx of colonic polyp    Family history of colon cancer in father    Chronic respiratory failure    Acute on chronic respiratory acidosis    Chronic diastolic heart failure    Anemia    Pulmonary HTN    Advance care planning    Status post below knee amputation, left    Pneumonia due to infectious organism    Stridor    Leukemoid reaction    Chest pain    Dyspnea on exertion    Wounds, multiple    Closed fracture of distal end of right tibia    Pressure injury of thigh, stage 2       Review of patient's allergies indicates:   Allergen Reactions    Tuberculin ppd Itching and Dermatitis     Patient has old scare that she claims is from TB PPD    Rocephin [ceftriaxone] Rash     Rash 2012? Pt agreeable to try with pre mediation with benadryl.        No current facility-administered medications  on file prior to encounter.     Current Outpatient Medications on File Prior to Encounter   Medication Sig Dispense Refill    albuterol-ipratropium (DUO-NEB) 2.5 mg-0.5 mg/3 mL nebulizer solution Take 3 mLs by nebulization every 4 (four) hours as needed for Wheezing. 1 Box 3    baclofen (LIORESAL) 20 MG tablet 20 mg 2 (two) times daily.   5    bumetanide (BUMEX) 2 MG tablet Take 2 mg by mouth once daily.   1    catheter 18 Fr Misc Change every 20 days 10 each 1    citalopram (CELEXA) 20 MG tablet Take 1.5 tablets (30 mg total) by mouth once daily. 135 tablet 1    ferrous sulfate 324 mg (65 mg iron) TbEC Take 1 tablet (324 mg total) by mouth once daily.      folic acid-vit B6-vit B12 2.5-25-2 mg (FOLBIC OR EQUIV) 2.5-25-2 mg Tab Take 1 tablet by mouth once daily.      gabapentin (NEURONTIN) 600 MG tablet Take 1 tablet (600 mg total) by mouth 3 (three) times daily. 21 tablet 0    hydrOXYzine HCL (ATARAX) 25 MG tablet Take 1 tablet (25 mg total) by mouth 2 (two) times daily as needed for Anxiety. 180 tablet 0    miscellaneous medical supply (C-TUB) [facility] NEEDS TO SWITCH DME COMPANY FOR OXYGEN AT 2L. LAST OXYGEN SATURATION WAS 83% ON Room Air. She uses  BIPAP and  Needs  New mask, tubings and      multivitamin Tab Take 1 tablet by mouth once daily.      oxybutynin (DITROPAN-XL) 5 MG TR24 Take 1 tablet (5 mg total) by mouth once daily. 90 tablet 3    oxyCODONE-acetaminophen (PERCOCET) 5-325 mg per tablet Take 1 tablet by mouth every 12 (twelve) hours as needed for Pain. 4 tablet 0    polyethylene glycol (GLYCOLAX) 17 gram PwPk Take 17 g by mouth once daily.  0    QUEtiapine (SEROQUEL) 200 MG Tab Take 1 tablet (200 mg total) by mouth every evening. 90 tablet 1    thiamine 100 MG tablet Take 1 tablet (100 mg total) by mouth once daily.         Past Surgical History:   Procedure Laterality Date    ABOVE-KNEE AMPUTATION Left 7/23/2019    Procedure: AMPUTATION, ABOVE KNEE;  Surgeon: Gordo Rodriguez MD;   Location: [facility] OR;  Service: Orthopedics;  Laterality: Left;    amputation Left 07/23/2019    above the knee    BACK SURGERY      bilateral knee replacement      BREAST BIOPSY      cysto/lithopaxy 2017      CYSTOSCOPY Right 1/8/2021    Procedure: CYSTOSCOPY, RIGHT OCCULER BALLOON CATHETER PLACEMENT;  Surgeon: RAMA Tinoco MD;  Location: [facility] OR;  Service: Urology;  Laterality: Right;    CYSTOSCOPY W/ LASER LITHOTRIPSY      JOINT REPLACEMENT      bilateral knee    PERCUTANEOUS NEPHROLITHOTOMY Right 1/8/2021    Procedure: NEPHROLITHOTOMY, PERCUTANEOUS;  Surgeon: RAMA Tinoco MD;  Location: [facility] OR;  Service: Urology;  Laterality: Right;  x3 rooms, or7, or9,or12  ATTEMPTED    RETROGRADE PYELOGRAPHY Right 1/8/2021    Procedure: PYELOGRAM, RETROGRADE;  Surgeon: RAMA Tinoco MD;  Location: [facility] OR;  Service: Urology;  Laterality: Right;    SUPRAPUBIC CYSTOSTOMY  03/15/2011    patient reports  replaced tubing on 10/27/19, at home    URETEROSCOPY Right 1/8/2021    Procedure: URETEROSCOPY;  Surgeon: RAMA Tinoco MD;  Location: [facility] OR;  Service: Urology;  Laterality: Right;     CBC:   Recent Labs     08/22/23  2335   WBC 7.58   RBC 3.16*   HGB 7.8*   HCT 27.5*      MCV 87   MCH 24.7*   MCHC 28.4*       CMP:   Recent Labs     08/22/23  2335      K 3.9      CO2 28   BUN 32*   CREATININE 1.0   *   CALCIUM 8.7   ALBUMIN 2.8*   PROT 7.2   ALKPHOS 82   ALT 8*   AST 14   BILITOT 0.2     Pre-op Assessment    I have reviewed the Patient Summary Reports.     I have reviewed the Nursing Notes. I have reviewed the NPO Status.   I have reviewed the Medications.     Review of Systems  Anesthesia Hx:  No problems with previous Anesthesia  History of prior surgery of interest to airway management or planning: Denies Family Hx of Anesthesia complications.   Denies Personal Hx of Anesthesia complications.   Social:  No Alcohol Use, Non-Smoker, Former Smoker     Hematology/Oncology:  Hematology Normal   Oncology Normal     EENT/Dental:EENT/Dental Normal   Cardiovascular:   Exercise tolerance: good Hypertension CHF    Pulmonary:   Pneumonia COPD, moderate Asthma Shortness of breath Sleep Apnea    Renal/:   renal calculi    Hepatic/GI:   Liver Disease, Hepatitis, C    Musculoskeletal:   Arthritis     Neurological:   CVA Seizures    Endocrine:  Endocrine Normal  Obesity / BMI > 30  Psych:   Psychiatric History          Physical Exam  General: Well nourished, Cooperative, Alert and Oriented    Airway:  Mallampati: III   Mouth Opening: Normal  TM Distance: Normal  Tongue: Normal  Neck ROM: Normal ROM    Chest/Lungs:  Normal Respiratory Rate    Heart:  Rate: Normal  Rhythm: Regular Rhythm        Anesthesia Plan  Type of Anesthesia, risks & benefits discussed:    Anesthesia Type: Gen ETT  Intra-op Monitoring Plan: Standard ASA Monitors  Post Op Pain Control Plan: multimodal analgesia and IV/PO Opioids PRN  Induction:  IV  Airway Plan: Direct, Post-Induction  Informed Consent: Informed consent signed with the Patient and all parties understand the risks and agree with anesthesia plan.  All questions answered. Patient consented to blood products? No  ASA Score: 3  Day of Surgery Review of History & Physical: H&P Update referred to the surgeon/provider.    Ready For Surgery From Anesthesia Perspective.     .

## 2023-08-23 NOTE — H&P
[facility] Medicine  History & Physical    Patient Name: Mary Ellen Fajardo  MRN: [id number]  Patient Class: OP- Observation  Admission Date: 8/22/2023  Attending Physician: Kaylee Montes MD   Primary Care Provider: Any Tran MD         Patient information was obtained from patient, past medical records and ER records.     Subjective:     Principal Problem:Closed fracture of distal end of right tibia    Chief Complaint:   Chief Complaint   Patient presents with    Ankle Pain     Pt coming from [facility] for right ankle pain and swelling following getting it stuck during a transfer to her shower chair at 6:15 this morning. NH staff state they were getting her from her chair to her shower chair and got her ankle stuck between the two. X-ray at NH showed right ankle fracture. Pt complaining of pain to ankle, able to move toes, sensory intact, pulse intact, swelling noted        HPI: Mary Ellen Fajardo is a 67 y.o. female with past medical history of CHF, COPD, neurogenic bladder with chronic indwelling suprapubic catheter, hepatitis-C, CVA, bilateral TKAs, left AKA after a post op infection, who presents [facility] with right ankle pain and injury.  Patient reports she was being moved to a shower chair from her wheelchair when her right ankle caught and twisted in the wheel of her wheelchair.  She experienced immediate pain and deformity of the right ankle.  Denies any numbness, tingling, and states she has never injured this ankle before.  She denies any other injury. Wheelchair-bound at baseline and not on any blood thinners.  Has some abdominal cramping. Last BM yesterday. No other symptoms such as shortness of breath, chest pain, nausea, vomiting. She is somewhat withdrawn during interview which she attributes to pain.    In the ED, AFVSS. Labs with hbg 7.8 (baseline ~9-10). CBC and CMP otherwise unremarkable. VBG with pH 7.285, PCO2 77.8, PO2 115, and  HCO3 37. Placed on her home nightly bipap. XR imaging of RLE revealed  Acute fracture of the distal tibia and fibula, approximately 2-3 cm above the talar dome.  Mild comminution and mild displacement.  Ortho evaluated the patient, splinted her ankle, and recommended admission to hospital medicine. Given dilaudid  and 1L NS bolus.      Past Medical History:   Diagnosis Date    Abdominal hernia     Addiction to drug     Alcohol abuse     Anxiety     Arthritis     Asthma     Bipolar disorder     Bladder stones     CHF (congestive heart failure)     COPD (chronic obstructive pulmonary disease)     on home o2    CVA (cerebral vascular accident)     2012    Hallucination     Hepatitis C     treated and cured    History of blood clots     Hx of psychiatric care     Hypertension     Belen     New onset seizure 8/12/2023    Obese body habitus     On home oxygen therapy     ADEEL (obstructive sleep apnea)     ADEEL treated with BiPAP     Paraplegia     Paraplegic spinal paralysis     Psychiatric problem     Psychosis     AVH; Paranoia    Renal disorder     Requires assistance with activities of daily living (ADL)     SCI (spinal cord injury)     incomplete    Sleep difficulties     has sleep apnea and uses a Bi-PAP machine.    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    Substance abuse     Suprapubic catheter 03/15/2011    Therapy     Vaginal delivery     x1    Wheelchair dependence        Past Surgical History:   Procedure Laterality Date    ABOVE-KNEE AMPUTATION Left 7/23/2019    Procedure: AMPUTATION, ABOVE KNEE;  Surgeon: Gordo Rodriguez MD;  Location: [facility] OR;  Service: Orthopedics;  Laterality: Left;    amputation Left 07/23/2019    above the knee    BACK SURGERY      bilateral knee replacement      BREAST BIOPSY      cysto/lithopaxy 2017      CYSTOSCOPY Right 1/8/2021    Procedure: CYSTOSCOPY, RIGHT OCCULER BALLOON CATHETER PLACEMENT;  Surgeon: RAMA Tinoco MD;  Location: [facility] OR;  Service: Urology;  Laterality:  Right;    CYSTOSCOPY W/ LASER LITHOTRIPSY      JOINT REPLACEMENT      bilateral knee    PERCUTANEOUS NEPHROLITHOTOMY Right 1/8/2021    Procedure: NEPHROLITHOTOMY, PERCUTANEOUS;  Surgeon: RAMA Tinoco MD;  Location: [facility] OR;  Service: Urology;  Laterality: Right;  x3 rooms, or7, or9,or12  ATTEMPTED    RETROGRADE PYELOGRAPHY Right 1/8/2021    Procedure: PYELOGRAM, RETROGRADE;  Surgeon: RAMA Tinoco MD;  Location: [facility] OR;  Service: Urology;  Laterality: Right;    SUPRAPUBIC CYSTOSTOMY  03/15/2011    patient reports  replaced tubing on 10/27/19, at home    URETEROSCOPY Right 1/8/2021    Procedure: URETEROSCOPY;  Surgeon: RAMA Tinoco MD;  Location: [facility] OR;  Service: Urology;  Laterality: Right;       Review of patient's allergies indicates:   Allergen Reactions    Tuberculin ppd Itching and Dermatitis     Patient has old scare that she claims is from TB PPD    Rocephin [ceftriaxone] Rash     Rash 2012? Pt agreeable to try with pre mediation with benadryl.        No current facility-administered medications on file prior to encounter.     Current Outpatient Medications on File Prior to Encounter   Medication Sig    albuterol-ipratropium (DUO-NEB) 2.5 mg-0.5 mg/3 mL nebulizer solution Take 3 mLs by nebulization every 4 (four) hours as needed for Wheezing.    baclofen (LIORESAL) 20 MG tablet 20 mg 2 (two) times daily.     bumetanide (BUMEX) 2 MG tablet Take 2 mg by mouth once daily.     catheter 18 Fr Misc Change every 20 days    citalopram (CELEXA) 20 MG tablet Take 1.5 tablets (30 mg total) by mouth once daily.    ferrous sulfate 324 mg (65 mg iron) TbEC Take 1 tablet (324 mg total) by mouth once daily.    folic acid-vit B6-vit B12 2.5-25-2 mg (FOLBIC OR EQUIV) 2.5-25-2 mg Tab Take 1 tablet by mouth once daily.    gabapentin (NEURONTIN) 600 MG tablet Take 1 tablet (600 mg total) by mouth 3 (three) times daily.    hydrOXYzine HCL (ATARAX) 25 MG tablet Take 1 tablet (25 mg total) by mouth 2 (two)  times daily as needed for Anxiety.    miscellaneous medical supply (C-TUB) [facility] NEEDS TO SWITCH DME COMPANY FOR OXYGEN AT 2L. LAST OXYGEN SATURATION WAS 83% ON Room Air. She uses  BIPAP and  Needs  New mask, tubings and    multivitamin Tab Take 1 tablet by mouth once daily.    oxybutynin (DITROPAN-XL) 5 MG TR24 Take 1 tablet (5 mg total) by mouth once daily.    oxyCODONE-acetaminophen (PERCOCET) 5-325 mg per tablet Take 1 tablet by mouth every 12 (twelve) hours as needed for Pain.    polyethylene glycol (GLYCOLAX) 17 gram PwPk Take 17 g by mouth once daily.    QUEtiapine (SEROQUEL) 200 MG Tab Take 1 tablet (200 mg total) by mouth every evening.    thiamine 100 MG tablet Take 1 tablet (100 mg total) by mouth once daily.     Family History       Problem Relation (Age of Onset)    Anxiety disorder Brother    Dementia Mother    Depression Brother          Tobacco Use    Smoking status: Former     Current packs/day: 0.00     Average packs/day: 0.5 packs/day for 25.0 years (12.5 ttl pk-yrs)     Types: Cigarettes     Start date:      Quit date:      Years since quittin.6    Smokeless tobacco: Never   Substance and Sexual Activity    Alcohol use: Not Currently     Comment: occasional    Drug use: Not Currently     Comment: prescribed opioids at present for pain    Sexual activity: Not Currently     Partners: Male     Comment: since      Review of Systems   Constitutional:  Negative for chills and fever.   HENT:  Negative for congestion and trouble swallowing.    Eyes:  Negative for visual disturbance.   Respiratory:  Negative for cough and shortness of breath.    Cardiovascular:  Negative for chest pain and palpitations.   Gastrointestinal:  Positive for abdominal pain. Negative for constipation, diarrhea, nausea and vomiting.   Genitourinary:  Negative for dysuria and frequency.   Musculoskeletal:  Positive for arthralgias, gait problem and joint swelling.   Skin:  Negative for color change and rash.    Neurological:  Negative for weakness, light-headedness and numbness.   Psychiatric/Behavioral:  Negative for confusion. The patient is not nervous/anxious.      Objective:     Vital Signs (Most Recent):  Temp: 98.4 °F (36.9 °C) (08/23/23 0200)  Pulse: (!) 50 (08/23/23 0200)  Resp: 15 (08/23/23 0200)  BP: 110/62 (08/23/23 0200)  SpO2: 100 % (08/23/23 0200) Vital Signs (24h Range):  Temp:  [98.4 °F (36.9 °C)-98.6 °F (37 °C)] 98.4 °F (36.9 °C)  Pulse:  [50-68] 50  Resp:  [12-18] 15  SpO2:  [96 %-100 %] 100 %  BP: (104-110)/(62-72) 110/62     Weight: 80.3 kg (177 lb)  Body mass index is 29.45 kg/m².     Physical Exam  Vitals and nursing note reviewed.   Constitutional:       General: She is not in acute distress.  HENT:      Head: Normocephalic and atraumatic.      Mouth/Throat:      Pharynx: No oropharyngeal exudate.   Eyes:      Extraocular Movements: Extraocular movements intact.      Conjunctiva/sclera: Conjunctivae normal.   Cardiovascular:      Rate and Rhythm: Normal rate and regular rhythm.      Heart sounds: Normal heart sounds.   Pulmonary:      Effort: Pulmonary effort is normal. No respiratory distress.      Breath sounds: Normal breath sounds.   Abdominal:      General: Bowel sounds are normal.      Palpations: Abdomen is soft.      Tenderness: There is abdominal tenderness. There is no guarding or rebound.      Comments: Mild TTP throughout the abdomen.  Indwelling delcid catheter    Musculoskeletal:      Cervical back: Normal range of motion.      Comments: RLE in short-leg splint       Left Lower Extremity: Left leg is amputated above knee.   Skin:     General: Skin is warm and dry.      Comments: Small stage 2 pressure wounds proximal posterior thighs   Neurological:      Mental Status: She is alert and oriented to person, place, and time.      Sensory: No sensory deficit.      Comments: Able to wiggle her right toes, identify light touch   Psychiatric:         Behavior: Behavior is withdrawn.          Thought Content: Thought content normal.                Significant Labs: All pertinent labs within the past 24 hours have been reviewed.  ABGs:   Recent Labs   Lab 08/22/23  2339 08/23/23  0056   PH 7.285* 7.293*   PCO2 77.8* 71.4*   HCO3 37.0* 34.6*   POCSATURATED 98 86*   BE 10 8   PO2 115* 60     CBC:   Recent Labs   Lab 08/22/23  2335   WBC 7.58   HGB 7.8*   HCT 27.5*        CMP:   Recent Labs   Lab 08/22/23  2335      K 3.9      CO2 28   *   BUN 32*   CREATININE 1.0   CALCIUM 8.7   PROT 7.2   ALBUMIN 2.8*   BILITOT 0.2   ALKPHOS 82   AST 14   ALT 8*   ANIONGAP 10       Significant Imaging: I have reviewed all pertinent imaging results/findings within the past 24 hours.  X-Ray Ankle Complete Right  Narrative: EXAMINATION:  XR ANKLE COMPLETE 3 VIEW RIGHT    CLINICAL HISTORY:  Pain in right ankle and joints of right foot    TECHNIQUE:  AP, lateral, and oblique images of the right ankle were performed.  The images are confirmed to be the right leg with the technologist.    COMPARISON:  01/15/2018    FINDINGS:  Right knee arthroplasty.    Acute fracture of the distal right tibia and fibula, approximately 2-3 cm above the talar dome. Mild comminution and mild displacement. Follow-up recommended.  Impression: Acute fractures of the distal tibia and fibula. Orthopedic follow-up recommended.    This report was flagged in Epic as abnormal.    Electronically signed by: Dimitri Gregory  Date:    08/23/2023  Time:    00:14  X-Ray Tibia Fibula 2 View Right  Narrative: EXAMINATION:  XR TIBIA FIBULA 2 VIEW RIGHT    CLINICAL HISTORY:  Pain in right ankle and joints of right foot    TECHNIQUE:  AP and lateral views of the right tibia and fibula were performed.  Images are confirmed to be the right leg with the technologist    COMPARISON:  01/15/2018    FINDINGS:  Right knee arthroplasty.    Acute fracture of the distal tibia and fibula, approximately 2-3 cm above the talar dome.  Mild comminution and  mild displacement.  Follow-up recommended.  Impression: Acute fractures of the distal right tibia and fibula.  Orthopedic follow-up recommended.    This report was flagged in Epic as abnormal.    Electronically signed by: Dimitri Gregory  Date:    08/23/2023  Time:    00:13       Assessment/Plan:     * Closed fracture of distal end of right tibia  67F with left AKA, previous CVA, paraparesis presenting from [facility] with right ankle pain and ijury during a transfer from wheelchair to shower chair. She was found to have an acute fracture of the the distal tibia and fibula in the ED. Neurovascularly intact. Currently in 8/10 pain at rest, 10/10 when leg is moved. Ortho placed in short-leg splint w/ window for compartment checks in ED.    Orthopedic surgery consulted, appreciate recs  - NPO as precaution   - Nonweightbearing to the right lower extremity  - Ice and elevate affected extremity at all times   - Multimodal pain control   - Q4h compartment checks   - Follow up CT results and discuss further plan with staff in AM   Pain control, elevated extremity, ice  PT/OT consult pending [facility] ortho recs  Fall precautiosn    Pressure injury of thigh, stage 2  -Turn patient Q2h  -Basic wound care    Anemia  Hbg 7.8 (Basleien 9-10)  Monitor daily CBC    Chronic respiratory failure  Patient with Hypercapnic and Hypoxic Respiratory failure which is Chronic.  she is on home oxygen at 2 LPM. Supplemental oxygen was provided and noted- Oxygen Concentration (%):  [28] 28    .   Signs/symptoms of respiratory failure include- none at this time. Contributing diagnoses includes - CHF, COPD and Pulmonary HTN Labs and images were reviewed. Patient Has recent ABG, which has been reviewed (VBG). Will treat underlying causes and adjust management of respiratory failure as follows-   Continue home supplemental oxygen for goal SpO2 >88%  Continue nightly BiPAP  PRN nebs    History of CVA (cerebrovascular accident)  Bed bound at  baseline, presenting from rehab facility  PT/OT pending ortho recs     Chronic indwelling suprapubic catheter in place  Noted    COPD (chronic obstructive pulmonary disease)  Chronic and stable  Continue home oxygen and bipap  Prn nebs    ADEEL (obstructive sleep apnea)  On bipap qhs at home  Continue nightly    Primary insomnia  -Continue home seroquel     Essential hypertension  SBP in 100s on admission  On diuretics only  Monitor    Paraparesis  Chronic  -Turn Q2h      VTE Risk Mitigation (From admission, onward)           Ordered     IP VTE HIGH RISK PATIENT  Once         08/23/23 0342     Place sequential compression device  Until discontinued         08/23/23 0342     Reason for No Pharmacological VTE Prophylaxis  Once        Question:  Reasons:  Answer:  Physician Provided (leave comment)    08/23/23 0342                         On 08/23/2023, patient should be placed in hospital observation services under my care in collaboration with DO. Ana Ulloa PA-C  Department of Hospital Medicine  [facility] - Surgery

## 2023-08-23 NOTE — PLAN OF CARE
Patient admitted overnight for right ankle fx. Ortho at bedside consenting for procedure. She was recent hospitalized at [facility] for UTi and acute on chronic respiratory failure and required  intubation at that time/diuresis with concerns for post extubation seizure during that admission as well as negative DVT work up. She was transitioned to nightly bipap and her chronic 2-3L oxygen before discharging to [facility] in [place]. She has a stroke history with chronic paraparesis and uses a slide board to transfer for 10 years at home prior to this. She  had an indwelling suprapubic for chronic retention and had it changed on Thursday she reports. She has chronic pain and takes percocet 10 at home. She was at [facility] for 1 day and reports that the techs were mobilizing her and she told them to use the slideboard and how she has to be moved as she has done it this way for years with her baseline motor issues and she reports that they did not listen to her or call the nurses to assist and so her leg was caught in the side of the rail and it snapped her ankle and broke her ankle while she was being moved at this facility. She was having pain this morning and leg elevated and splinted and we discussed the general management of these type of fractures including ex fix first to control swelling and then likely definitive fixation once swelling improved with monitoring in interim.   Reviewed her MAR and was not on some of her meds started on discharge including bumex, neurontin and resumed these for her now yobany diuetics considering recent admit. She had a slight exp wheeze at times but was not stopping her from speaking in full sentences and being able to converse well and good historian on exam. Bipap at bedside as uses at night and on chronic oxygen now.   Left AKA for infection in the past. CXR on admit stable, EKG with sinus penny.  ABG 7.29/71/60/34 on admit. Trop negative. Hg 7.8  with elvis recently 8-9 and on folate/b12/MVI long term.  On home psych meds- seroquel, celexa, atarax PRN. Has home baclofen for spasms.  She is in agreement with plans as above. She absolutely does not want to go back to previous facility she was at given the events that occurred above and I let CM know so they can send a blast referral to other SNFs for once medically stable later in admission  Will plan for Low Na, 2L diet post op    s

## 2023-08-23 NOTE — ANESTHESIA PROCEDURE NOTES
Right Popliteal Continuous Catheter    Patient location during procedure: post-op   Block not for primary anesthetic.  Reason for block: at surgeon's request and post-op pain management   Post-op Pain Location: Right Leg   Start time: 8/23/2023 1:40 PM  Timeout: 8/23/2023 1:37 PM   End time: 8/23/2023 1:55 PM    Staffing  Authorizing Provider: Mai Leal MD  Performing Provider: Parker Campbell MD    Staffing  Performed by: Parker Campbell MD  Authorized by: Mai Leal MD    Preanesthetic Checklist  Completed: patient identified, IV checked, site marked, risks and benefits discussed, surgical consent, monitors and equipment checked, pre-op evaluation and timeout performed  Peripheral Block  Patient position: supine  Prep: ChloraPrep and site prepped and draped  Patient monitoring: heart rate, cardiac monitor, continuous pulse ox, continuous capnometry and frequent blood pressure checks  Block type: popliteal  Laterality: right  Injection technique: continuous  Needle  Needle type: Tuohy   Needle gauge: 18 G  Needle length: 3.5 in  Needle localization: anatomical landmarks and ultrasound guidance  Catheter type: non-stimulating  Catheter size: 20 G  Test dose: lidocaine 1.5% with Epi 1-to-200,000 and negative   -ultrasound image captured on disc.  Assessment  Injection assessment: negative aspiration, negative parasthesia and local visualized surrounding nerve  Paresthesia pain: none  Heart rate change: no  Slow fractionated injection: yes  Pain Tolerance: comfortable throughout block and no complaints  Medications:    Medications: bupivacaine (pf) (MARCAINE) injection 0.25% - Perineural   30 mL - 8/23/2023 1:55:00 PM    Additional Notes  A time out was conducted. Site krystin confirmed with team and patient. Allergies reviewed.   Vital signs stable throughout block. RN monitoring vitals throughout.   Needle advanced under continuous ultrasound guidance.  Local injected incrementally after confirming negative  aspiration. No signs or symptoms of intravascular or intraneural injection noted.   No persistent paresthesias elicited or expressed. Patient tolerated procedure well.  30mL of 0.25% Bupivacaine with epinephrine 1:300K used for the block.

## 2023-08-23 NOTE — ED PROVIDER NOTES
Encounter Date: 8/22/2023       History     Chief Complaint   Patient presents with    Ankle Pain     Pt coming from [facility] for right ankle pain and swelling following getting it stuck during a transfer to her shower chair at 6:15 this morning. NH staff state they were getting her from her chair to her shower chair and got her ankle stuck between the two. X-ray at NH showed right ankle fracture. Pt complaining of pain to ankle, able to move toes, sensory intact, pulse intact, swelling noted     HPI  Review of patient's allergies indicates:   Allergen Reactions    Tuberculin ppd Itching and Dermatitis     Patient has old scare that she claims is from TB PPD    Rocephin [ceftriaxone] Rash     Rash 2012? Pt agreeable to try with pre mediation with benadryl.      Past Medical History:   Diagnosis Date    Abdominal hernia     Addiction to drug     Alcohol abuse     Anxiety     Arthritis     Asthma     Bipolar disorder     Bladder stones     CHF (congestive heart failure)     COPD (chronic obstructive pulmonary disease)     on home o2    CVA (cerebral vascular accident)     2012    Hallucination     Hepatitis C     treated and cured    History of blood clots     Hx of psychiatric care     Hypertension     Belen     New onset seizure 8/12/2023    Obese body habitus     On home oxygen therapy     ADEEL (obstructive sleep apnea)     ADEEL treated with BiPAP     Paraplegia     Paraplegic spinal paralysis     Psychiatric problem     Psychosis     AVH; Paranoia    Renal disorder     Requires assistance with activities of daily living (ADL)     SCI (spinal cord injury)     incomplete    Sleep difficulties     has sleep apnea and uses a Bi-PAP machine.    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    Substance abuse     Suprapubic catheter 03/15/2011    Therapy     Vaginal delivery     x1    Wheelchair dependence      Past Surgical History:   Procedure Laterality Date    ABOVE-KNEE AMPUTATION Left 7/23/2019    Procedure:  AMPUTATION, ABOVE KNEE;  Surgeon: Gordo Rodriguez MD;  Location: [facility] OR;  Service: Orthopedics;  Laterality: Left;    amputation Left 2019    above the knee    BACK SURGERY      bilateral knee replacement      BREAST BIOPSY      cysto/lithopaxy 2017      CYSTOSCOPY Right 2021    Procedure: CYSTOSCOPY, RIGHT OCCULER BALLOON CATHETER PLACEMENT;  Surgeon: RAMA Tinoco MD;  Location: [facility] OR;  Service: Urology;  Laterality: Right;    CYSTOSCOPY W/ LASER LITHOTRIPSY      JOINT REPLACEMENT      bilateral knee    PERCUTANEOUS NEPHROLITHOTOMY Right 2021    Procedure: NEPHROLITHOTOMY, PERCUTANEOUS;  Surgeon: RAMA Tinoco MD;  Location: [facility] OR;  Service: Urology;  Laterality: Right;  x3 rooms, or7, or9,or12  ATTEMPTED    RETROGRADE PYELOGRAPHY Right 2021    Procedure: PYELOGRAM, RETROGRADE;  Surgeon: RAMA Tinoco MD;  Location: [facility] OR;  Service: Urology;  Laterality: Right;    SUPRAPUBIC CYSTOSTOMY  03/15/2011    patient reports  replaced tubing on 10/27/19, at home    URETEROSCOPY Right 2021    Procedure: URETEROSCOPY;  Surgeon: RAMA Tinoco MD;  Location: [facility] OR;  Service: Urology;  Laterality: Right;     Family History   Problem Relation Age of Onset    Dementia Mother     Anxiety disorder Brother     Depression Brother      Social History     Tobacco Use    Smoking status: Former     Current packs/day: 0.00     Average packs/day: 0.5 packs/day for 25.0 years (12.5 ttl pk-yrs)     Types: Cigarettes     Start date:      Quit date:      Years since quittin.6    Smokeless tobacco: Never   Substance Use Topics    Alcohol use: Not Currently     Comment: occasional    Drug use: Not Currently     Comment: prescribed opioids at present for pain     Review of Systems    Physical Exam     Initial Vitals [23 2202]   BP Pulse Resp Temp SpO2   104/72 68 18 98.6 °F (37 °C) 100 %      MAP       --         Physical Exam    ED Course   Procedures  Labs Reviewed -  No data to display       Imaging Results    None          Medications - No data to display  Medical Decision Making                             Clinical Impression:   Final diagnoses:  [M25.571] Right ankle pain

## 2023-08-23 NOTE — ANESTHESIA PROCEDURE NOTES
Intubation    Date/Time: 8/23/2023 10:11 AM    Performed by: Chad Parikh CRNA  Authorized by: Goran Cummins MD    Intubation:     Induction:  Intravenous    Intubated:  Postinduction    Mask Ventilation:  Easy mask    Attempts:  1    Attempted By:  CRNA    Method of Intubation:  Video laryngoscopy    Blade:  Moody 3    Laryngeal View Grade: Grade I - full view of cords      Difficult Airway Encountered?: No      Complications:  None    Airway Device:  Oral endotracheal tube    Airway Device Size:  7.5    Style/Cuff Inflation:  Cuffed (inflated to minimal occlusive pressure)    Inflation Amount (mL):  8    Tube secured:  21    Secured at:  The lips    Placement Verified By:  Capnometry and Fiber optic visualization    Complicating Factors:  None    Findings Post-Intubation:  BS equal bilateral and atraumatic/condition of teeth unchanged

## 2023-08-23 NOTE — SUBJECTIVE & OBJECTIVE
Past Medical History:   Diagnosis Date    Abdominal hernia     Addiction to drug     Alcohol abuse     Anxiety     Arthritis     Asthma     Bipolar disorder     Bladder stones     CHF (congestive heart failure)     COPD (chronic obstructive pulmonary disease)     on home o2    CVA (cerebral vascular accident)     2012    Hallucination     Hepatitis C     treated and cured    History of blood clots     Hx of psychiatric care     Hypertension     Belen     New onset seizure 8/12/2023    Obese body habitus     On home oxygen therapy     ADEEL (obstructive sleep apnea)     ADEEL treated with BiPAP     Paraplegia     Paraplegic spinal paralysis     Psychiatric problem     Psychosis     AVH; Paranoia    Renal disorder     Requires assistance with activities of daily living (ADL)     SCI (spinal cord injury)     incomplete    Sleep difficulties     has sleep apnea and uses a Bi-PAP machine.    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    Substance abuse     Suprapubic catheter 03/15/2011    Therapy     Vaginal delivery     x1    Wheelchair dependence        Past Surgical History:   Procedure Laterality Date    ABOVE-KNEE AMPUTATION Left 7/23/2019    Procedure: AMPUTATION, ABOVE KNEE;  Surgeon: Gordo Rodriguez MD;  Location: [facility] OR;  Service: Orthopedics;  Laterality: Left;    amputation Left 07/23/2019    above the knee    BACK SURGERY      bilateral knee replacement      BREAST BIOPSY      cysto/lithopaxy 2017      CYSTOSCOPY Right 1/8/2021    Procedure: CYSTOSCOPY, RIGHT OCCULER BALLOON CATHETER PLACEMENT;  Surgeon: RAMA Tinoco MD;  Location: [facility] OR;  Service: Urology;  Laterality: Right;    CYSTOSCOPY W/ LASER LITHOTRIPSY      JOINT REPLACEMENT      bilateral knee    PERCUTANEOUS NEPHROLITHOTOMY Right 1/8/2021    Procedure: NEPHROLITHOTOMY, PERCUTANEOUS;  Surgeon: RAMA Tinoco MD;  Location: [facility] OR;  Service: Urology;  Laterality: Right;  x3 rooms, or7, or9,or12  ATTEMPTED    RETROGRADE PYELOGRAPHY Right  1/8/2021    Procedure: PYELOGRAM, RETROGRADE;  Surgeon: RAMA Tinoco MD;  Location: [facility] OR;  Service: Urology;  Laterality: Right;    SUPRAPUBIC CYSTOSTOMY  03/15/2011    patient reports  replaced tubing on 10/27/19, at home    URETEROSCOPY Right 1/8/2021    Procedure: URETEROSCOPY;  Surgeon: RAMA Tinoco MD;  Location: [facility] OR;  Service: Urology;  Laterality: Right;       Review of patient's allergies indicates:   Allergen Reactions    Tuberculin ppd Itching and Dermatitis     Patient has old scare that she claims is from TB PPD    Rocephin [ceftriaxone] Rash     Rash 2012? Pt agreeable to try with pre mediation with benadryl.        No current facility-administered medications for this encounter.     Current Outpatient Medications   Medication Sig    albuterol-ipratropium (DUO-NEB) 2.5 mg-0.5 mg/3 mL nebulizer solution Take 3 mLs by nebulization every 4 (four) hours as needed for Wheezing.    baclofen (LIORESAL) 20 MG tablet 20 mg 2 (two) times daily.     bumetanide (BUMEX) 2 MG tablet Take 2 mg by mouth once daily.     catheter 18 Fr Misc Change every 20 days    citalopram (CELEXA) 20 MG tablet Take 1.5 tablets (30 mg total) by mouth once daily.    ferrous sulfate 324 mg (65 mg iron) TbEC Take 1 tablet (324 mg total) by mouth once daily.    folic acid-vit B6-vit B12 2.5-25-2 mg (FOLBIC OR EQUIV) 2.5-25-2 mg Tab Take 1 tablet by mouth once daily.    gabapentin (NEURONTIN) 600 MG tablet Take 1 tablet (600 mg total) by mouth 3 (three) times daily.    hydrOXYzine HCL (ATARAX) 25 MG tablet Take 1 tablet (25 mg total) by mouth 2 (two) times daily as needed for Anxiety.    miscellaneous medical supply (C-TUB) [facility] NEEDS TO SWITCH DME COMPANY FOR OXYGEN AT 2L. LAST OXYGEN SATURATION WAS 83% ON Room Air. She uses  BIPAP and  Needs  New mask, tubings and    multivitamin Tab Take 1 tablet by mouth once daily.    oxybutynin (DITROPAN-XL) 5 MG TR24 Take 1 tablet (5 mg total) by mouth once daily.     oxyCODONE-acetaminophen (PERCOCET) 5-325 mg per tablet Take 1 tablet by mouth every 12 (twelve) hours as needed for Pain.    polyethylene glycol (GLYCOLAX) 17 gram PwPk Take 17 g by mouth once daily.    QUEtiapine (SEROQUEL) 200 MG Tab Take 1 tablet (200 mg total) by mouth every evening.    thiamine 100 MG tablet Take 1 tablet (100 mg total) by mouth once daily.     Family History       Problem Relation (Age of Onset)    Anxiety disorder Brother    Dementia Mother    Depression Brother          Tobacco Use    Smoking status: Former     Current packs/day: 0.00     Average packs/day: 0.5 packs/day for 25.0 years (12.5 ttl pk-yrs)     Types: Cigarettes     Start date:      Quit date:      Years since quittin.6    Smokeless tobacco: Never   Substance and Sexual Activity    Alcohol use: Not Currently     Comment: occasional    Drug use: Not Currently     Comment: prescribed opioids at present for pain    Sexual activity: Not Currently     Partners: Male     Comment: since      ROS  Constitutional: Denies fever/chills  Eyes: Denies change in vision  ENT: Denies sore throat or rhinorrhea   Respiratory: Denies shortness of breath or cough  Cardiovascular: Denies chest pain or palpitations  Gastrointestinal: Denies abdominal pain, nausea, or vomiting  Genitourinary: Denies dysuria and flank pain  Skin: Denies new rash or skin lesions   Allergic/Immunologic: Denies adverse reactions to current medications  Neurological: Denies headaches or dizziness  Musculoskeletal: see HPI    Objective:     Vital Signs (Most Recent):  Temp: 98.4 °F (36.9 °C) (23 0200)  Pulse: (!) 50 (23 0200)  Resp: 15 (23 0200)  BP: 110/62 (23 0200)  SpO2: 100 % (23 0200) Vital Signs (24h Range):  Temp:  [98.4 °F (36.9 °C)-98.6 °F (37 °C)] 98.4 °F (36.9 °C)  Pulse:  [50-68] 50  Resp:  [12-18] 15  SpO2:  [96 %-100 %] 100 %  BP: (104-110)/(62-72) 110/62     Weight: 80.3 kg (177 lb)     Body mass index is 29.45  kg/m².    No intake or output data in the 24 hours ending 08/23/23 0243     Ortho/SPM Exam  Physical Exam:  General:  mild distress, WDWN  HENT:  NCAT, Bilateral ears/eyes normal  CV:  Normal pulses, color, and cap refill  Lungs:  Normal respiratory effort  Neuro: No FND, awake, alert  Psych:  Oriented to Person, Place, Time, and Situation    MSK:       LUE:  Inspection: Skin intact throughout, no swelling, no effusions, no ecchymosis   Palpation: Non-TTP throughout, no palpable abnormality.   ROM: AROM and PROM of the shoulder, elbow, wrist, and hand intact without pain.   Neuro: AIN/PIN/Radial/Median/Ulnar Nerves assessed in isolation without deficit.   SILT throughout.    Vascular: Radial artery palpated 2+. Capillary refill <3s.         RUE:  Inspection: Skin intact throughout, no swelling, no effusions, no ecchymosis   Palpation: Non-TTP throughout, no palpable abnormality.   ROM: AROM and PROM of the shoulder, elbow, wrist, and hand intact without pain.   Neuro: AIN/PIN/Radial/Median/Ulnar Nerves assessed in isolation without deficit.   SILT throughout.    Vascular: Radial artery palpated 2+. Capillary refill <3s.         LLE:  Inspection: Above knee amputation   Skin intact throughout, no swelling, no effusions, no ecchymosis  Hip range of motion intact without pain         RLE:  Inspection: Skin intact throughout, no open wounds  1 cm fracture blister present over anteromedial ankle  Swelling present at right ankle   Palpation: TTP at ankle; otherwise non-TTP throughout.  Compartments are soft and compressible   ROM: PROM of the hip, knee, foot intact without pain; active range of motion limited secondary to weakness throughout  Active and passive range of motion of the ankle limited secondary to pain   Neuro: TA/Gastroc/EHL/FHL assessed in isolation without deficit.   SILT Sa/Das/DP/SP/T nerve distributions   Vascular: DP and PT arteries palpated 2+. Capillary refill <3s.          Spine/pelvis/axial  body:  No tenderness to palpation of cervical, thoracic, or lumbar spine  Stable and without pain with direct anterior pressure over ASIS.  No chest wall or abdominal tenderness  No decubitus ulcers  Muscle tone normal      Significant Labs: All pertinent labs within the past 24 hours have been reviewed.    Significant Imaging: I have reviewed and interpreted all pertinent imaging results/findings.  Xray of the right tib-fib showing a comminuted distal third tibia and fibula fracture with partial posterior displacement of the distal fragments

## 2023-08-23 NOTE — ANESTHESIA PROCEDURE NOTES
Right Saphenous Continuous Catheter    Patient location during procedure: post-op   Block not for primary anesthetic.  Reason for block: at surgeon's request and post-op pain management   Post-op Pain Location: Right Leg   Start time: 8/23/2023 1:55 PM  Timeout: 8/23/2023 1:40 PM   End time: 8/23/2023 2:15 PM    Staffing  Authorizing Provider: Mai Leal MD  Performing Provider: Parker Campbell MD    Staffing  Performed by: Parker Campbell MD  Authorized by: Mai Leal MD    Preanesthetic Checklist  Completed: patient identified, IV checked, site marked, risks and benefits discussed, surgical consent, monitors and equipment checked, pre-op evaluation and timeout performed  Peripheral Block  Patient position: supine  Prep: ChloraPrep  Patient monitoring: heart rate, cardiac monitor, continuous capnometry, continuous pulse ox and frequent blood pressure checks  Block type: saphenous  Laterality: right  Injection technique: continuous  Needle  Needle type: Tuohy   Needle gauge: 17 G  Needle length: 3.5 in  Needle localization: anatomical landmarks and ultrasound guidance  Catheter type: spring wound  Catheter size: 19 G  Test dose: lidocaine 1.5% with Epi 1-to-200,000 and negative   -ultrasound image captured on disc.  Assessment  Injection assessment: negative aspiration, negative parasthesia and local visualized surrounding nerve  Paresthesia pain: none  Heart rate change: no  Slow fractionated injection: yes  Pain Tolerance: comfortable throughout block and no complaints  Medications:    Medications: bupivacaine (pf) (MARCAINE) injection 0.25% - Perineural   15 mL - 8/23/2023 2:12:00 PM    Additional Notes  A time out was conducted. Site krystin confirmed with team and patient. Allergies reviewed.   Vital signs stable throughout block. RN monitoring vitals throughout.   Needle advanced under continuous ultrasound guidance.  Local injected incrementally after confirming negative aspiration. No signs or symptoms  of intravascular or intraneural injection noted.   No persistent paresthesias elicited or expressed. Patient tolerated procedure well.  15mL of 0.25% Bupivacaine with epinephrine 1:300K used for the block.

## 2023-08-23 NOTE — ASSESSMENT & PLAN NOTE
67F with left AKA, previous CVA, paraparesis presenting from [facility] with right ankle pain and ijury during a transfer from wheelchair to shower chair. She was found to have an acute fracture of the the distal tibia and fibula in the ED. Neurovascularly intact. Currently in 8/10 pain at rest, 10/10 when leg is moved. Ortho placed in short-leg splint w/ window for compartment checks in ED.    Orthopedic surgery consulted, appreciate recs  - NPO as precaution   - Nonweightbearing to the right lower extremity  - Ice and elevate affected extremity at all times   - Multimodal pain control   - Q4h compartment checks   - Follow up CT results and discuss further plan with staff in AM   Pain control, elevated extremity, ice  PT/OT consult pending [facility] ortho recs  Fall precautiosn

## 2023-08-23 NOTE — PLAN OF CARE
Patient in OR since 7AM Unable to complete d/c planning assessment at this time. Will follow-up in am.      Shazia Razo RNCM  Case Management  [facility]  [phone]'

## 2023-08-23 NOTE — HPI
"Subjective   Milagro Sanderson is a 67 y.o. female who presents today for follow up     This provider is located at [facility] at [address] [place] [address]. The provider identified himself as well as his credentials.   The Patient is at home using her phone because problems with video connection. The patient's condition being diagnosed/treated is appropriate for telemedicine. The patient gave consent to be seen remotely, and when consent is given they understand that the consent allows for patient identifiable information to be sent to a third party as needed.   They may refuse to be seen remotely at any time. The electronic data is encrypted and password protected, and the patient has been advised of the potential risks to privacy not withstanding such measures        Chief Complaint: Depression    History of Present Illness: Patient is a 67-year-old  female who presents for follow-up for anxiety and depression.  Patient reports today that she is, \"doing okay I guess.\"  She states that overall her mood and anxiety have improved and she has been more active.  She has also been more socially engaged.  She is not self isolating as she was previously.  She is staying in and safe with COVID-19.  She is having some dysphoria but it may be related to seasonal changes.  She denies any current medication side effects.  She denies any current issues with sleep or appetite.  Patient denies SI/HI/AVH.    The following portions of the patient's history were reviewed and updated as appropriate: allergies, current medications, past family history, past medical history, past social history, past surgical history and problem list.      Past Medical History:  Past Medical History:   Diagnosis Date   • Benign familial tremor    • Blood in urine    • Body piercing     EARS   • Depression    • Eczema    • Elevated LDL cholesterol level    • Fever blister    • History of fracture     ARM AT AGE 6 " Patient is a 67-year-old female with a past medical history of CHF, COPD, neurogenic bladder with chronic indwelling suprapubic catheter, hepatitis-C, CVA, bilateral TKAs complicated by postoperative infections resulting in left BKA, who presents from [facility] with right ankle pain.  Patient was reportedly being moved to a shower chair from her wheelchair when her right ankles caught in the wheel of her wheelchair.  She experienced immediate pain and deformity of the right ankle.  Denies any numbness, tingling, and states she has never injured this ankle before.  She denies any other musculoskeletal pains at this time.  Patient is wheelchair-bound at baseline and not on any blood thinners.     - PATIENT REPORTS SHE DOES NOT REMEMBER LATERALITY   • History of migraine    • History of tremor    • Onychomycosis    • Problems with swallowing     REPORTS SHE FEELS LIKE PILLS ARE GETTING STUCK IN A POCKET IN HER THROAT WHEN SHE TRIES TO SWALLOW THEM   • Sebaceous cyst    • Vitamin D deficiency    • Wears glasses        Social History:  Social History     Socioeconomic History   • Marital status: Single     Spouse name: Not on file   • Number of children: Not on file   • Years of education: Not on file   • Highest education level: Not on file   Tobacco Use   • Smoking status: Never Smoker   • Smokeless tobacco: Never Used   Substance and Sexual Activity   • Alcohol use: No   • Drug use: No   • Sexual activity: Defer       Family History:  Family History   Problem Relation Age of Onset   • Heart attack Mother    • Hypertension Mother    • Anxiety disorder Mother    • Depression Mother    • Heart attack Other    • Kidney disease Other    • Bipolar disorder Father    • Diabetes Paternal Aunt    • Diabetes Paternal Uncle    • Seizures Maternal Aunt    • Colon cancer Neg Hx    • Cirrhosis Neg Hx    • Liver disease Neg Hx    • Liver cancer Neg Hx    • Crohn's disease Neg Hx    • Ulcerative colitis Neg Hx    • Esophageal cancer Neg Hx    • Stomach cancer Neg Hx    • ADD / ADHD Neg Hx    • Alcohol abuse Neg Hx    • Dementia Neg Hx    • Drug abuse Neg Hx    • OCD Neg Hx    • Paranoid behavior Neg Hx    • Schizophrenia Neg Hx    • Self-Injurious Behavior  Neg Hx    • Suicide Attempts Neg Hx        Past Surgical History:  Past Surgical History:   Procedure Laterality Date   • APPENDECTOMY  2012   • APPENDECTOMY     • BREAST BIOPSY Bilateral    • BREAST CYST ASPIRATION     • BREAST SURGERY Bilateral     history of mammatone left and right breast   • COLONOSCOPY     • CYST REMOVAL      SEBACEOUS CYST X3 FROM HEAD   • ENDOSCOPY N/A 2/7/2019    Procedure: ESOPHAGOGASTRODUODENOSCOPY W/ BIOPSIES;  Surgeon: Simba Rogers MD;   "Location: [facility] ENDOSCOPY;  Service: Gastroenterology   • WISDOM TOOTH EXTRACTION      EXTRACTION X2       Problem List:  Patient Active Problem List   Diagnosis   • Severe episode of recurrent major depressive disorder, without psychotic features (CMS/HCC)   • Eczema   • Vitamin B12 deficiency   • Vitamin D deficiency   • Hereditary essential tremor   • Onychomycosis   • Recurrent cold sores   • GERD with esophagitis   • High risk medication use       Allergy:   Allergies   Allergen Reactions   • Hazelnut Anaphylaxis   • Macrobid [Nitrofurantoin Monohyd Macro] Other (See Comments)     REPORTS \"I FELL AND IT WAS LIKE I COULDN'T MOVE\"           Current Medications:   Current Outpatient Medications   Medication Sig Dispense Refill   • acyclovir (ZOVIRAX) 400 MG tablet TAKE ONE TABLET BY MOUTH THREE TIMES A DAY FOR 5 DAYS STARTING AT FIRST SIGN OF COLD SORE 15 tablet 4   • Ascorbic Acid (VITAMIN C) 500 MG capsule Take 500 mg by mouth Every Other Day.     • calcium (OS-SANTHOSH) 600 MG tablet Take 600 mg by mouth 1 (One) Time Per Week.     • Cholecalciferol (VITAMIN D-3 PO) Take 2,000 Units by mouth Daily.     • magnesium chloride ER 64 MG DR tablet Take 250 mg by mouth Every 14 (Fourteen) Days.     • mirtazapine (REMERON) 7.5 MG tablet Take 1 tablet by mouth Every Night. 90 tablet 0   • Multiple Vitamins-Minerals (CENTRUM SILVER ULTRA WOMENS PO) Take 1 tablet by mouth Every Other Day.     • Omega-3 Fatty Acids (FISH OIL) 1000 MG capsule capsule Take 1,200 mg by mouth Daily With Breakfast.     • primidone (MYSOLINE) 50 MG tablet TAKE 2 AND 1/2 TABLETS BY MOUTH THREE TIMES A  tablet 1   • propranolol (INDERAL) 20 MG tablet Take 1 tablet by mouth 3 (Three) Times a Day. For tremor. Monitor blood pressure 270 tablet 1   • sertraline (ZOLOFT) 100 MG tablet Take 2 tablets by mouth Daily. 180 tablet 0     No current facility-administered medications for this visit.        Review of Symptoms:    Review of Systems "   Constitutional: Positive for activity change (improved) and appetite change (improved). Negative for chills, diaphoresis, fatigue, fever and unexpected weight loss.   HENT: Negative.    Eyes: Negative.    Respiratory: Negative.    Cardiovascular: Negative.    Gastrointestinal: Negative.    Endocrine: Negative.    Genitourinary: Negative.    Musculoskeletal: Negative.    Skin: Negative.    Allergic/Immunologic: Negative.    Neurological: Positive for tremors.   Hematological: Negative.    Psychiatric/Behavioral: Positive for dysphoric mood and stress. Negative for agitation, decreased concentration, sleep disturbance (improved), suicidal ideas and depressed mood. The patient is not nervous/anxious.          Physical Exam:   not currently breastfeeding.  Unable to assess, telephone visit    Appearance: Unable to assess, telephone visit  Gait, Station, Strength: Unable to assess, telephone visit    Mental Status Exam:   Hygiene:   Unable to assess, telephone visit  Cooperation:  Cooperative  Eye Contact:  Unable to assess, telephone visit  Psychomotor Behavior:  Unable to assess, telephone visit  Affect:  Full range  Mood: normal, some seasonal dysphoria   Hopelessness: Denies  Speech:  Normal  Thought Process:  Goal directed and Linear  Thought Content:  Normal  Suicidal:  None  Homicidal:  None  Hallucinations:  None  Delusion:  None  Memory:  Intact  Orientation:  Person, Place, Time and Situation  Reliability:  good  Insight:  Fair  Judgement:  Good  Impulse Control:  Good  Physical/Medical Issues:  No        Lab Results:   No visits with results within 1 Month(s) from this visit.   Latest known visit with results is:   Orders Only on 07/27/2020   Component Date Value Ref Range Status   • Primidone Level 07/27/2020 11.4  5.0 - 12.0 ug/mL Final    Comment:                                 Detection Limit = 0.3                           <0.3 indicates None Detected     • Phenobarbital 07/27/2020 15  15 - 40 ug/mL  Final                                    Detection Limit = 3       Assessment/Plan   Diagnoses and all orders for this visit:    1. Mild episode of recurrent major depressive disorder (CMS/HCC)  -     mirtazapine (REMERON) 7.5 MG tablet; Take 1 tablet by mouth Every Night.  Dispense: 90 tablet; Refill: 0    2. Generalized anxiety disorder  -     mirtazapine (REMERON) 7.5 MG tablet; Take 1 tablet by mouth Every Night.  Dispense: 90 tablet; Refill: 0    3. Phase of life problem in adult  -     mirtazapine (REMERON) 7.5 MG tablet; Take 1 tablet by mouth Every Night.  Dispense: 90 tablet; Refill: 0    4. Other insomnia  -     mirtazapine (REMERON) 7.5 MG tablet; Take 1 tablet by mouth Every Night.  Dispense: 90 tablet; Refill: 0    -Patient overall having sustained improvement though she does have some increased dysphoria that may be related to seasonal changes.  -Reviewed previous documentation  -Reviewed most recent available labs  -Continue Zoloft 200mg PO daily for mood and anxiety  -Continue mirtazapine 7.5 mg p.o. nightly for mood, anxiety, insomnia, and appetite   -Encouraged patient to continue with therapy for phase of life issues and depression  -Encouraged to follow-up with neurology for tremor  -Consider low-dose Ritalin for refractory depression and should patient continue to have vegetative symptoms though these have largely improved  -Approximate appointment time 8:50 AM to 9:15 AM via telephone visit due to technical difficulty with video visit      Visit Diagnoses:    ICD-10-CM ICD-9-CM   1. Mild episode of recurrent major depressive disorder (CMS/HCC)  F33.0 296.31   2. Generalized anxiety disorder  F41.1 300.02   3. Phase of life problem in adult  Z60.0 V62.89   4. Other insomnia  G47.09 780.52       TREATMENT PLAN/GOALS: Continue supportive psychotherapy efforts and medications as indicated. Treatment and medication options discussed during today's visit. Patient acknowledged and verbally consented  to continue with current treatment plan and was educated on the importance of compliance with treatment and follow-up appointments.    MEDICATION ISSUES:    Discussed medication options and treatment plan of prescribed medication as well as the risks, benefits, and side effects including potential falls, possible impaired driving and metabolic adversities among others. Patient is agreeable to call the office with any worsening of symptoms or onset of side effects. Patient is agreeable to call 911 or go to the nearest ER should he/she begin having SI/HI.     MEDS ORDERED DURING VISIT:  New Medications Ordered This Visit   Medications   • mirtazapine (REMERON) 7.5 MG tablet     Sig: Take 1 tablet by mouth Every Night.     Dispense:  90 tablet     Refill:  0     Don't fill until due or patient calls per her request.       Return in about 3 months (around 3/18/2021).             This document has been electronically signed by Des Elizondo MD  December 29, 2020 10:24 EST

## 2023-08-24 PROBLEM — J96.12 ACUTE ON CHRONIC RESPIRATORY ACIDOSIS: Status: RESOLVED | Noted: 2019-09-20 | Resolved: 2023-08-24

## 2023-08-24 PROBLEM — Z71.89 ADVANCE CARE PLANNING: Status: RESOLVED | Noted: 2019-11-01 | Resolved: 2023-08-24

## 2023-08-24 PROBLEM — R06.09 DYSPNEA ON EXERTION: Status: RESOLVED | Noted: 2021-05-17 | Resolved: 2023-08-24

## 2023-08-24 PROBLEM — J18.9 PNEUMONIA DUE TO INFECTIOUS ORGANISM: Status: RESOLVED | Noted: 2020-03-03 | Resolved: 2023-08-24

## 2023-08-24 PROBLEM — J44.1 COPD WITH ACUTE EXACERBATION: Status: RESOLVED | Noted: 2019-01-06 | Resolved: 2023-08-24

## 2023-08-24 PROBLEM — J44.9 COPD (CHRONIC OBSTRUCTIVE PULMONARY DISEASE): Chronic | Status: RESOLVED | Noted: 2019-01-06 | Resolved: 2023-08-24

## 2023-08-24 PROBLEM — E66.09 CLASS 1 OBESITY DUE TO EXCESS CALORIES WITHOUT SERIOUS COMORBIDITY WITH BODY MASS INDEX (BMI) OF 30.0 TO 30.9 IN ADULT: Status: ACTIVE | Noted: 2019-01-06

## 2023-08-24 PROBLEM — Z80.0 FAMILY HISTORY OF COLON CANCER IN FATHER: Status: RESOLVED | Noted: 2019-09-17 | Resolved: 2023-08-24

## 2023-08-24 PROBLEM — R07.9 CHEST PAIN: Status: RESOLVED | Noted: 2021-04-29 | Resolved: 2023-08-24

## 2023-08-24 PROBLEM — D63.8 ANEMIA OF CHRONIC DISEASE: Status: ACTIVE | Noted: 2019-10-29

## 2023-08-24 PROBLEM — J96.02 ACUTE ON CHRONIC RESPIRATORY ACIDOSIS: Status: RESOLVED | Noted: 2019-09-20 | Resolved: 2023-08-24

## 2023-08-24 PROBLEM — D72.823 LEUKEMOID REACTION: Status: RESOLVED | Noted: 2021-01-09 | Resolved: 2023-08-24

## 2023-08-24 PROBLEM — R06.1 STRIDOR: Status: RESOLVED | Noted: 2021-01-08 | Resolved: 2023-08-24

## 2023-08-24 PROBLEM — T07.XXXA WOUNDS, MULTIPLE: Status: RESOLVED | Noted: 2023-08-06 | Resolved: 2023-08-24

## 2023-08-24 PROBLEM — T40.2X1A OPIOID OVERDOSE: Status: RESOLVED | Noted: 2019-01-06 | Resolved: 2023-08-24

## 2023-08-24 PROBLEM — M00.9 SEPTIC ARTHRITIS OF KNEE, LEFT: Status: RESOLVED | Noted: 2019-07-17 | Resolved: 2023-08-24

## 2023-08-24 LAB
ABO + RH BLD: NORMAL
ANION GAP SERPL CALC-SCNC: 6 MMOL/L (ref 8–16)
ANION GAP SERPL CALC-SCNC: 7 MMOL/L (ref 8–16)
BASOPHILS # BLD AUTO: 0.03 K/UL (ref 0–0.2)
BASOPHILS NFR BLD: 0.5 % (ref 0–1.9)
BLD GP AB SCN CELLS X3 SERPL QL: NORMAL
BLD PROD TYP BPU: NORMAL
BLOOD UNIT EXPIRATION DATE: NORMAL
BLOOD UNIT TYPE CODE: 5100
BLOOD UNIT TYPE: NORMAL
BUN SERPL-MCNC: 23 MG/DL (ref 8–23)
BUN SERPL-MCNC: 24 MG/DL (ref 8–23)
CALCIUM SERPL-MCNC: 8.6 MG/DL (ref 8.7–10.5)
CALCIUM SERPL-MCNC: 8.9 MG/DL (ref 8.7–10.5)
CHLORIDE SERPL-SCNC: 104 MMOL/L (ref 95–110)
CHLORIDE SERPL-SCNC: 104 MMOL/L (ref 95–110)
CO2 SERPL-SCNC: 30 MMOL/L (ref 23–29)
CO2 SERPL-SCNC: 32 MMOL/L (ref 23–29)
CODING SYSTEM: NORMAL
CREAT SERPL-MCNC: 1 MG/DL (ref 0.5–1.4)
CREAT SERPL-MCNC: 1 MG/DL (ref 0.5–1.4)
CROSSMATCH INTERPRETATION: NORMAL
DIFFERENTIAL METHOD: ABNORMAL
DISPENSE STATUS: NORMAL
EOSINOPHIL # BLD AUTO: 0.1 K/UL (ref 0–0.5)
EOSINOPHIL NFR BLD: 2.2 % (ref 0–8)
ERYTHROCYTE [DISTWIDTH] IN BLOOD BY AUTOMATED COUNT: 17.6 % (ref 11.5–14.5)
EST. GFR  (NO RACE VARIABLE): >60 ML/MIN/1.73 M^2
EST. GFR  (NO RACE VARIABLE): >60 ML/MIN/1.73 M^2
FERRITIN SERPL-MCNC: 201 NG/ML (ref 20–300)
GLUCOSE SERPL-MCNC: 86 MG/DL (ref 70–110)
GLUCOSE SERPL-MCNC: 92 MG/DL (ref 70–110)
HCT VFR BLD AUTO: 24.1 % (ref 37–48.5)
HGB BLD-MCNC: 6.9 G/DL (ref 12–16)
IMM GRANULOCYTES # BLD AUTO: 0.02 K/UL (ref 0–0.04)
IMM GRANULOCYTES NFR BLD AUTO: 0.3 % (ref 0–0.5)
IRON SERPL-MCNC: 15 UG/DL (ref 30–160)
LYMPHOCYTES # BLD AUTO: 1.6 K/UL (ref 1–4.8)
LYMPHOCYTES NFR BLD: 24.8 % (ref 18–48)
MCH RBC QN AUTO: 25.4 PG (ref 27–31)
MCHC RBC AUTO-ENTMCNC: 28.6 G/DL (ref 32–36)
MCV RBC AUTO: 89 FL (ref 82–98)
MONOCYTES # BLD AUTO: 0.6 K/UL (ref 0.3–1)
MONOCYTES NFR BLD: 9 % (ref 4–15)
NEUTROPHILS # BLD AUTO: 4.1 K/UL (ref 1.8–7.7)
NEUTROPHILS NFR BLD: 63.2 % (ref 38–73)
NRBC BLD-RTO: 0 /100 WBC
NUM UNITS TRANS PACKED RBC: NORMAL
PLATELET # BLD AUTO: 201 K/UL (ref 150–450)
PMV BLD AUTO: 10.7 FL (ref 9.2–12.9)
POCT GLUCOSE: 115 MG/DL (ref 70–110)
POTASSIUM SERPL-SCNC: 4.1 MMOL/L (ref 3.5–5.1)
POTASSIUM SERPL-SCNC: 4.4 MMOL/L (ref 3.5–5.1)
RBC # BLD AUTO: 2.72 M/UL (ref 4–5.4)
SATURATED IRON: 7 % (ref 20–50)
SODIUM SERPL-SCNC: 141 MMOL/L (ref 136–145)
SODIUM SERPL-SCNC: 142 MMOL/L (ref 136–145)
SPECIMEN OUTDATE: NORMAL
TOTAL IRON BINDING CAPACITY: 221 UG/DL (ref 250–450)
TRANSFERRIN SERPL-MCNC: 149 MG/DL (ref 200–375)
WBC # BLD AUTO: 6.44 K/UL (ref 3.9–12.7)

## 2023-08-24 PROCEDURE — 25000003 PHARM REV CODE 250

## 2023-08-24 PROCEDURE — 99233 PR SUBSEQUENT HOSPITAL CARE,LEVL III: ICD-10-PCS | Mod: ,,, | Performed by: INTERNAL MEDICINE

## 2023-08-24 PROCEDURE — 27000221 HC OXYGEN, UP TO 24 HOURS

## 2023-08-24 PROCEDURE — 99231 SBSQ HOSP IP/OBS SF/LOW 25: CPT | Mod: ,,, | Performed by: SURGERY

## 2023-08-24 PROCEDURE — 85025 COMPLETE CBC W/AUTO DIFF WBC: CPT | Performed by: INTERNAL MEDICINE

## 2023-08-24 PROCEDURE — 63600175 PHARM REV CODE 636 W HCPCS

## 2023-08-24 PROCEDURE — 97165 OT EVAL LOW COMPLEX 30 MIN: CPT

## 2023-08-24 PROCEDURE — 25000242 PHARM REV CODE 250 ALT 637 W/ HCPCS: Performed by: HOSPITALIST

## 2023-08-24 PROCEDURE — P9016 RBC LEUKOCYTES REDUCED: HCPCS | Performed by: INTERNAL MEDICINE

## 2023-08-24 PROCEDURE — 99231 PR SUBSEQUENT HOSPITAL CARE,LEVL I: ICD-10-PCS | Mod: ,,, | Performed by: SURGERY

## 2023-08-24 PROCEDURE — 21400001 HC TELEMETRY ROOM

## 2023-08-24 PROCEDURE — 80048 BASIC METABOLIC PNL TOTAL CA: CPT

## 2023-08-24 PROCEDURE — 99900035 HC TECH TIME PER 15 MIN (STAT)

## 2023-08-24 PROCEDURE — 94761 N-INVAS EAR/PLS OXIMETRY MLT: CPT

## 2023-08-24 PROCEDURE — 82728 ASSAY OF FERRITIN: CPT | Performed by: INTERNAL MEDICINE

## 2023-08-24 PROCEDURE — 99233 SBSQ HOSP IP/OBS HIGH 50: CPT | Mod: ,,, | Performed by: INTERNAL MEDICINE

## 2023-08-24 PROCEDURE — 36415 COLL VENOUS BLD VENIPUNCTURE: CPT

## 2023-08-24 PROCEDURE — 97162 PT EVAL MOD COMPLEX 30 MIN: CPT

## 2023-08-24 PROCEDURE — 86900 BLOOD TYPING SEROLOGIC ABO: CPT | Performed by: INTERNAL MEDICINE

## 2023-08-24 PROCEDURE — 97112 NEUROMUSCULAR REEDUCATION: CPT

## 2023-08-24 PROCEDURE — 25000003 PHARM REV CODE 250: Performed by: STUDENT IN AN ORGANIZED HEALTH CARE EDUCATION/TRAINING PROGRAM

## 2023-08-24 PROCEDURE — 80048 BASIC METABOLIC PNL TOTAL CA: CPT | Mod: 91 | Performed by: INTERNAL MEDICINE

## 2023-08-24 PROCEDURE — 36430 TRANSFUSION BLD/BLD COMPNT: CPT

## 2023-08-24 PROCEDURE — 83540 ASSAY OF IRON: CPT | Performed by: INTERNAL MEDICINE

## 2023-08-24 PROCEDURE — 25000003 PHARM REV CODE 250: Performed by: INTERNAL MEDICINE

## 2023-08-24 PROCEDURE — 84466 ASSAY OF TRANSFERRIN: CPT | Performed by: INTERNAL MEDICINE

## 2023-08-24 PROCEDURE — 97535 SELF CARE MNGMENT TRAINING: CPT

## 2023-08-24 PROCEDURE — 25000003 PHARM REV CODE 250: Performed by: SURGERY

## 2023-08-24 PROCEDURE — 36415 COLL VENOUS BLD VENIPUNCTURE: CPT | Performed by: INTERNAL MEDICINE

## 2023-08-24 PROCEDURE — 25000003 PHARM REV CODE 250: Performed by: HOSPITALIST

## 2023-08-24 PROCEDURE — 86920 COMPATIBILITY TEST SPIN: CPT | Performed by: INTERNAL MEDICINE

## 2023-08-24 PROCEDURE — 97110 THERAPEUTIC EXERCISES: CPT

## 2023-08-24 PROCEDURE — 94660 CPAP INITIATION&MGMT: CPT

## 2023-08-24 RX ORDER — CITALOPRAM 10 MG/1
30 TABLET ORAL NIGHTLY
Status: DISCONTINUED | OUTPATIENT
Start: 2023-08-25 | End: 2023-08-24

## 2023-08-24 RX ORDER — METHOCARBAMOL 500 MG/1
500 TABLET, FILM COATED ORAL EVERY 6 HOURS
Status: DISCONTINUED | OUTPATIENT
Start: 2023-08-24 | End: 2023-08-24

## 2023-08-24 RX ORDER — AMOXICILLIN 250 MG
1 CAPSULE ORAL DAILY
Status: DISCONTINUED | OUTPATIENT
Start: 2023-08-24 | End: 2023-08-30 | Stop reason: HOSPADM

## 2023-08-24 RX ORDER — ACETAMINOPHEN 500 MG
1000 TABLET ORAL EVERY 8 HOURS
Status: DISCONTINUED | OUTPATIENT
Start: 2023-08-24 | End: 2023-08-30 | Stop reason: HOSPADM

## 2023-08-24 RX ORDER — METHOCARBAMOL 750 MG/1
750 TABLET, FILM COATED ORAL EVERY 6 HOURS
Status: DISCONTINUED | OUTPATIENT
Start: 2023-08-24 | End: 2023-08-27

## 2023-08-24 RX ORDER — OXYCODONE HYDROCHLORIDE 10 MG/1
10 TABLET ORAL EVERY 4 HOURS PRN
Status: DISPENSED | OUTPATIENT
Start: 2023-08-24 | End: 2023-08-26

## 2023-08-24 RX ORDER — GABAPENTIN 300 MG/1
600 CAPSULE ORAL 3 TIMES DAILY
Status: DISCONTINUED | OUTPATIENT
Start: 2023-08-24 | End: 2023-08-30 | Stop reason: HOSPADM

## 2023-08-24 RX ORDER — OXYCODONE HYDROCHLORIDE 5 MG/1
5 TABLET ORAL EVERY 4 HOURS PRN
Status: DISCONTINUED | OUTPATIENT
Start: 2023-08-24 | End: 2023-08-24

## 2023-08-24 RX ORDER — OXYCODONE HYDROCHLORIDE 5 MG/1
5 TABLET ORAL EVERY 4 HOURS PRN
Status: DISCONTINUED | OUTPATIENT
Start: 2023-08-24 | End: 2023-08-30 | Stop reason: HOSPADM

## 2023-08-24 RX ORDER — CITALOPRAM 10 MG/1
30 TABLET ORAL NIGHTLY
Status: DISCONTINUED | OUTPATIENT
Start: 2023-08-24 | End: 2023-08-30 | Stop reason: HOSPADM

## 2023-08-24 RX ORDER — HYDROCODONE BITARTRATE AND ACETAMINOPHEN 500; 5 MG/1; MG/1
TABLET ORAL
Status: DISCONTINUED | OUTPATIENT
Start: 2023-08-24 | End: 2023-08-30 | Stop reason: HOSPADM

## 2023-08-24 RX ADMIN — BACLOFEN 10 MG: 10 TABLET ORAL at 10:08

## 2023-08-24 RX ADMIN — CEFAZOLIN 2 G: 2 INJECTION, POWDER, FOR SOLUTION INTRAMUSCULAR; INTRAVENOUS at 06:08

## 2023-08-24 RX ADMIN — OXYCODONE HYDROCHLORIDE 5 MG: 5 TABLET ORAL at 10:08

## 2023-08-24 RX ADMIN — GABAPENTIN 600 MG: 300 CAPSULE ORAL at 02:08

## 2023-08-24 RX ADMIN — FERROUS SULFATE TAB 325 MG (65 MG ELEMENTAL FE) 1 EACH: 325 (65 FE) TAB at 10:08

## 2023-08-24 RX ADMIN — CITALOPRAM HYDROBROMIDE 30 MG: 10 TABLET ORAL at 08:08

## 2023-08-24 RX ADMIN — METHOCARBAMOL 500 MG: 500 TABLET ORAL at 12:08

## 2023-08-24 RX ADMIN — ASPIRIN 81 MG 81 MG: 81 TABLET ORAL at 10:08

## 2023-08-24 RX ADMIN — QUETIAPINE FUMARATE 200 MG: 100 TABLET ORAL at 08:08

## 2023-08-24 RX ADMIN — IPRATROPIUM BROMIDE AND ALBUTEROL SULFATE 3 ML: .5; 3 SOLUTION RESPIRATORY (INHALATION) at 08:08

## 2023-08-24 RX ADMIN — ACETAMINOPHEN 1000 MG: 500 TABLET ORAL at 10:08

## 2023-08-24 RX ADMIN — GABAPENTIN 300 MG: 300 CAPSULE ORAL at 10:08

## 2023-08-24 RX ADMIN — METHOCARBAMOL 750 MG: 750 TABLET ORAL at 11:08

## 2023-08-24 RX ADMIN — OXYCODONE HYDROCHLORIDE 10 MG: 10 TABLET ORAL at 05:08

## 2023-08-24 RX ADMIN — CHOLECALCIFEROL TAB 25 MCG (1000 UNIT) 1000 UNITS: 25 TAB at 10:08

## 2023-08-24 RX ADMIN — OXYBUTYNIN CHLORIDE 5 MG: 5 TABLET, EXTENDED RELEASE ORAL at 10:08

## 2023-08-24 RX ADMIN — BUMETANIDE 2 MG: 1 TABLET ORAL at 10:08

## 2023-08-24 RX ADMIN — FOLIC ACID-PYRIDOXINE-CYANOCOBALAMIN TAB 2.5-25-2 MG 1 TABLET: 2.5-25-2 TAB at 10:08

## 2023-08-24 RX ADMIN — GABAPENTIN 600 MG: 300 CAPSULE ORAL at 08:08

## 2023-08-24 RX ADMIN — OXYCODONE HYDROCHLORIDE 10 MG: 10 TABLET ORAL at 10:08

## 2023-08-24 RX ADMIN — ASPIRIN 81 MG 81 MG: 81 TABLET ORAL at 08:08

## 2023-08-24 RX ADMIN — ACETAMINOPHEN 1000 MG: 500 TABLET ORAL at 02:08

## 2023-08-24 RX ADMIN — THIAMINE HCL TAB 100 MG 100 MG: 100 TAB at 10:08

## 2023-08-24 RX ADMIN — METHOCARBAMOL 750 MG: 750 TABLET ORAL at 05:08

## 2023-08-24 NOTE — ANESTHESIA POST-OP PAIN MANAGEMENT
Acute Pain Service Progress Note    Mary Ellen Fajardo is a 67 y.o., female, [id number].    Surgery:  ORIF, FRACTURE, PILON (Right: Ankle)       ORIF, FRACTURE, FIBULA (Right: Leg Lower)    Post Op Day #: 1    Catheter type: perineural  saphenous  and popliteal    Infusion type: Ropivacaine 0.2%    Problem List:    Active Hospital Problems    Diagnosis  POA    *Closed fracture of right tibial plafond with fibula involvement with routine healing s/p ORIF on 8/23/2023 [S82.871D, S82.831D]  Not Applicable    Pressure injury of thigh, stage 2 [L89.202]  Yes    Anemia [D64.9]  Yes    Chronic respiratory failure with hypoxia and hypercapnia [J96.11, J96.12]  Yes      pulmonary htn + volume overload.  No overt evidence of copd per ct.        Chronic indwelling suprapubic catheter in place [Z93.59]  Not Applicable     Chronic    History of CVA (cerebrovascular accident) [Z86.73]  Not Applicable     Chronic    COPD (chronic obstructive pulmonary disease) [J44.9]  Yes     Chronic    ADEEL (obstructive sleep apnea) [G47.33]  Yes     S/p sleep study at [facility].  Record not available.  Ahi of 44 per psg 2020. Currently on bipap 20/14.        Essential hypertension [I10]  Yes     Chronic    Primary insomnia [F51.01]  Yes     Chronic    Paraparesis [G82.20]  Yes      Resolved Hospital Problems   No resolved problems to display.       Subjective:     General appearance of alert, oriented, no complaints   Pain with rest: 4    Numbers   Pain with movement: 4    Numbers   Side Effects    1. Pruritis No    2. Nausea No    3. Motor Blockade No, 0=Ability to raise lower extremities off bed    4. Sedation No, 1=awake and alert    Objective:   Catheter site clean, dry, intact        Vitals   Vitals:    08/24/23 0514   BP: 131/60   Pulse: (!) 57   Resp: 18   Temp: 36.6 °C (97.8 °F)        Labs    Admission on 08/22/2023   Component Date Value Ref Range Status    Sodium 08/22/2023 138  136 - 145 mmol/L Final    Potassium 08/22/2023  3.9  3.5 - 5.1 mmol/L Final    Chloride 08/22/2023 100  95 - 110 mmol/L Final    CO2 08/22/2023 28  23 - 29 mmol/L Final    Glucose 08/22/2023 130 (H)  70 - 110 mg/dL Final    BUN 08/22/2023 32 (H)  8 - 23 mg/dL Final    Creatinine 08/22/2023 1.0  0.5 - 1.4 mg/dL Final    Calcium 08/22/2023 8.7  8.7 - 10.5 mg/dL Final    Total Protein 08/22/2023 7.2  6.0 - 8.4 g/dL Final    Albumin 08/22/2023 2.8 (L)  3.5 - 5.2 g/dL Final    Total Bilirubin 08/22/2023 0.2  0.1 - 1.0 mg/dL Final    Alkaline Phosphatase 08/22/2023 82  55 - 135 U/L Final    AST 08/22/2023 14  10 - 40 U/L Final    ALT 08/22/2023 8 (L)  10 - 44 U/L Final    eGFR 08/22/2023 >60.0  >60 mL/min/1.73 m^2 Final    Anion Gap 08/22/2023 10  8 - 16 mmol/L Final    WBC 08/22/2023 7.58  3.90 - 12.70 K/uL Final    RBC 08/22/2023 3.16 (L)  4.00 - 5.40 M/uL Final    Hemoglobin 08/22/2023 7.8 (L)  12.0 - 16.0 g/dL Final    Hematocrit 08/22/2023 27.5 (L)  37.0 - 48.5 % Final    MCV 08/22/2023 87  82 - 98 fL Final    MCH 08/22/2023 24.7 (L)  27.0 - 31.0 pg Final    MCHC 08/22/2023 28.4 (L)  32.0 - 36.0 g/dL Final    RDW 08/22/2023 17.4 (H)  11.5 - 14.5 % Final    Platelets 08/22/2023 221  150 - 450 K/uL Final    MPV 08/22/2023 10.1  9.2 - 12.9 fL Final    Immature Granulocytes 08/22/2023 0.3  0.0 - 0.5 % Final    Gran # (ANC) 08/22/2023 5.1  1.8 - 7.7 K/uL Final    Immature Grans (Abs) 08/22/2023 0.02  0.00 - 0.04 K/uL Final    Lymph # 08/22/2023 1.4  1.0 - 4.8 K/uL Final    Mono # 08/22/2023 0.7  0.3 - 1.0 K/uL Final    Eos # 08/22/2023 0.3  0.0 - 0.5 K/uL Final    Baso # 08/22/2023 0.05  0.00 - 0.20 K/uL Final    nRBC 08/22/2023 0  0 /100 WBC Final    Gran % 08/22/2023 66.7  38.0 - 73.0 % Final    Lymph % 08/22/2023 18.6  18.0 - 48.0 % Final    Mono % 08/22/2023 9.2  4.0 - 15.0 % Final    Eosinophil % 08/22/2023 4.5  0.0 - 8.0 % Final    Basophil % 08/22/2023 0.7  0.0 - 1.9 % Final    Differential Method 08/22/2023 Automated    Final    Troponin I 08/22/2023 <0.006  0.000 - 0.026 ng/mL Final    BNP 08/22/2023 126 (H)  0 - 99 pg/mL Final    POC PH 08/22/2023 7.285 (L)  7.35 - 7.45 Final    POC PCO2 08/22/2023 77.8 (H)  35 - 45 mmHg Final    POC PO2 08/22/2023 115 (HH)  40 - 60 mmHg Final    POC HCO3 08/22/2023 37.0 (H)  24 - 28 mmol/L Final    POC BE 08/22/2023 10  -2 to 2 mmol/L Final    POC SATURATED O2 08/22/2023 98  95 - 100 % Final    POC TCO2 08/22/2023 39 (H)  24 - 29 mmol/L Final    Sample 08/22/2023 VENOUS   Final    Site 08/22/2023 Other   Final    Allens Test 08/22/2023 N/A   Final    POC PH 08/23/2023 7.293 (L)  7.35 - 7.45 Final    POC PCO2 08/23/2023 71.4 (H)  35 - 45 mmHg Final    POC PO2 08/23/2023 60  40 - 60 mmHg Final    POC HCO3 08/23/2023 34.6 (H)  24 - 28 mmol/L Final    POC BE 08/23/2023 8  -2 to 2 mmol/L Final    POC SATURATED O2 08/23/2023 86 (L)  95 - 100 % Final    POC TCO2 08/23/2023 37 (H)  24 - 29 mmol/L Final    Sample 08/23/2023 VENOUS   Final    Site 08/23/2023 Other   Final    Allens Test 08/23/2023 N/A   Final    DelSys 08/23/2023 CPAP/BiPAP   Final    Mode 08/23/2023 BiPAP   Final    FiO2 08/23/2023 28   Final    IP 08/23/2023 12   Final    EP 08/23/2023 5   Final    Sodium 08/24/2023 142  136 - 145 mmol/L Final    Potassium 08/24/2023 4.4  3.5 - 5.1 mmol/L Final    Chloride 08/24/2023 104  95 - 110 mmol/L Final    CO2 08/24/2023 32 (H)  23 - 29 mmol/L Final    Glucose 08/24/2023 92  70 - 110 mg/dL Final    BUN 08/24/2023 23  8 - 23 mg/dL Final    Creatinine 08/24/2023 1.0  0.5 - 1.4 mg/dL Final    Calcium 08/24/2023 8.6 (L)  8.7 - 10.5 mg/dL Final    Anion Gap 08/24/2023 6 (L)  8 - 16 mmol/L Final    eGFR 08/24/2023 >60.0  >60 mL/min/1.73 m^2 Final    Iron 08/24/2023 15 (L)  30 - 160 ug/dL Final    Transferrin 08/24/2023 149 (L)  200 - 375 mg/dL Final    TIBC 08/24/2023 221 (L)  250 - 450 ug/dL Final    Saturated Iron 08/24/2023 7 (L)  20 - 50 % Final    Ferritin  08/24/2023 201  20.0 - 300.0 ng/mL Final        Meds   Current Facility-Administered Medications   Medication Dose Route Frequency Provider Last Rate Last Admin    acetaminophen tablet 1,000 mg  1,000 mg Oral Q8H Linda Wiseman MD        albuterol-ipratropium 2.5 mg-0.5 mg/3 mL nebulizer solution 3 mL  3 mL Nebulization Q4H PRN Kaylee Montes MD   3 mL at 08/23/23 2301    aluminum-magnesium hydroxide-simethicone 200-200-20 mg/5 mL suspension 30 mL  30 mL Oral QID PRN Ana Fonseca PA-C        aspirin chewable tablet 81 mg  81 mg Oral BID Suleman Bowden MD   81 mg at 08/23/23 2107    baclofen tablet 10 mg  10 mg Oral BID Kaylee Montes MD   10 mg at 08/23/23 1806    bisacodyL suppository 10 mg  10 mg Rectal Daily PRN Ana Fonseca PA-C        bumetanide tablet 2 mg  2 mg Oral Daily Kaylee Montes MD   2 mg at 08/23/23 1806    citalopram tablet 30 mg  30 mg Oral Daily Ana Fonseca PA-C   30 mg at 08/23/23 1458    dextrose 10% bolus 125 mL 125 mL  12.5 g Intravenous PRN Ana Fonseca PA-C        dextrose 10% bolus 250 mL 250 mL  25 g Intravenous PRN Ana Fonseca PA-C        ferrous sulfate tablet 1 each  1 tablet Oral Daily Ana Fonseca PA-C   1 each at 08/23/23 1434    folic acid-vit B6-vit B12 2.5-25-2 mg tablet 1 tablet  1 tablet Oral Daily Ana Fonseca PA-C   1 tablet at 08/23/23 1458    gabapentin capsule 300 mg  300 mg Oral TID Kaylee Montes MD   300 mg at 08/23/23 2106    glucagon (human recombinant) injection 1 mg  1 mg Intramuscular PRN Ana Fonseca PA-C        glucose chewable tablet 16 g  16 g Oral PRN Ana Fonseca PA-C        glucose chewable tablet 24 g  24 g Oral PRN Ana Fonseca PA-C        hydrOXYzine HCL tablet 25 mg  25 mg Oral BID Ana Hurst PA-C        melatonin tablet 6 mg  6 mg Oral Nightly CONSTANCEN Ana Fonseca PA-C        methocarbamoL tablet 500 mg  500 mg Oral  Q6H Linda Wiseman MD        naloxone 0.4 mg/mL injection 0.02 mg  0.02 mg Intravenous PRN Ana Fonseca PA-C        ondansetron disintegrating tablet 8 mg  8 mg Oral Q8H PRN Ana Fonseca PA-C        oxybutynin 24 hr tablet 5 mg  5 mg Oral Daily Kaylee Montes MD   5 mg at 08/23/23 1806    oxyCODONE immediate release tablet 5 mg  5 mg Oral Q4H PRN Linda Wiseman MD        polyethylene glycol packet 17 g  17 g Oral BID PRN Ana Fonseca PA-C        prochlorperazine injection Soln 5 mg  5 mg Intravenous Q6H PRN Ana Fonseca PA-C        QUEtiapine tablet 200 mg  200 mg Oral QHS Ana Fonseca PA-C   200 mg at 08/23/23 2106    ROPIvacaine (PF) 2 mg/ml (0.2%) solution  0.1 mL/hr Perineural Continuous Parker Campbell MD 0.1 mL/hr at 08/23/23 1552 0.1 mL/hr at 08/23/23 1552    ROPIvacaine (PF) 2 mg/ml (0.2%) solution  0.1 mL/hr Perineural Continuous Parker Campbell MD 0.1 mL/hr at 08/23/23 1440 0.1 mL/hr at 08/23/23 1440    simethicone chewable tablet 80 mg  1 tablet Oral QID PRN Ana Fonseca PA-C        sodium chloride 0.9% flush 10 mL  10 mL Intravenous PRN Ramu Good MD        sodium chloride 0.9% flush 5 mL  5 mL Intravenous PRN Ana Fonseca PA-C        thiamine tablet 100 mg  100 mg Oral Daily Ana Fonseca PA-C        vitamin D 1000 units tablet 1,000 Units  1,000 Units Oral Daily Suleman Bowden MD   1,000 Units at 08/23/23 1457       Assessment:     Pain control adequate    Plan:     Patient doing well, continue present treatment.  -- Continue PNCs at current rate  -- Continue multimodal pain regimen of Tylenol 1 gm Q8H, Gabapentin 300 mg TID, Robaxin 500 mg Q6H, and Oxy 5 mg IR Q4H PRN.   -- Dispo: [facility]    Linda Wiseman MD  Anesthesia PGY4, CA3

## 2023-08-24 NOTE — PT/OT/SLP EVAL
"Physical Therapy Co-Evaluation    Patient Name:  Mary Ellen Fajardo   MRN:  [id number]    Recommendations:     Discharge Recommendations: nursing facility, skilled   Discharge Equipment Recommendations: to be determined by next level of care   Barriers to discharge: None    Assessment:     Mary Ellen Fajardo is a 67 y.o. female admitted with a medical diagnosis of Closed fracture of right tibial plafond with fibula involvement with routine healing.  She presents with the following impairments/functional limitations: weakness, impaired endurance, decreased lower extremity function, pain, orthopedic precautions, decreased ROM, impaired functional mobility, impaired balance, impaired self care skills   Pt receptive and tolerated PT co-eval with OT well. Pt educated on RLE and NWB precautions prior to start of treatment with pt verbalizing understanding. PTA pt states that she uses a slide board and needed assistance to transfer to/from bed to wheelchair. Pt presents with L AKA. Pt needed max A x2 persons to complete bed mobility. Pt sat EOB ~15 mins needing mod-min A for balance. Skilled nursing facility recommended at discharge once medically ready to improve functional mobility with transfers.      Rehab Prognosis: Good; patient would benefit from acute skilled PT services to address these deficits and reach maximum level of function.    Recent Surgery: Procedure(s) (LRB):  ORIF, FRACTURE, PILON (Right)  ORIF, FRACTURE, FIBULA (Right) 1 Day Post-Op    Plan:     During this hospitalization, patient to be seen 3 x/week to address the identified rehab impairments via therapeutic activities, therapeutic exercises, neuromuscular re-education and progress toward the following goals:    Plan of Care Expires:  09/23/23    Subjective     Chief Complaint: R hip and knee pain  Patient/Family Comments/goals: "I use a slide board to transfer out my wheelchair"  Pain/Comfort:  Pain Rating 1: 8/10  Location - Side 1: " Right  Location 1: hip  Pain Addressed 1: Reposition, Distraction  Pain Rating Post-Intervention 1:  (pt did not rate)  Pain Rating 2: 8/10  Location - Side 2: Right  Location - Orientation 2: generalized  Location 2: knee  Pain Addressed 2: Reposition, Distraction  Pain Rating Post-Intervention 2:  (pt did not rate)    Patients cultural, spiritual, Sikhism conflicts given the current situation: no    Patient History:     Living Environment: Pt lives with son in [facility] with ruthann KATZ. Bathroom: walk-in shower   Prior Level of Function: w/c bound, able to propel own w/c with BUE, needed assistance with transfer and uses slide board. Needs assistance to complete some ADLs  DME owned: w/c, hospital bed, slide board  Caregiver Assistance: Son      Objective:     Communicated with RN prior to session.  Patient found HOB elevated with perineural catheter, wound vac, oxygen, PRAFO  upon PT entry to room.    General Precautions: Standard, fall  Orthopedic Precautions:RLE non weight bearing   Braces:  (PRAFO)  Respiratory Status: Nasal cannula, flow 2 L/min    Exams:  Sensation:    -       Intact  RLE ROM: WFL except Ankle ROM not tested   RLE Strength: Deficits: grossly 2+/5  LLE ROM: WFL  LLE Strength: hip flexion: 2+/5    Functional Mobility:    Bed Mobility:   Rolling: to L with maximal assistance and of 2 persons  Supine > Sit: maximal assistance and of 2 persons  Sit > Supine: maximal assistance and of 2 persons    Balance:   Sitting balance: FAIR: Cannot move trunk without losing balance  Pt sat EOB ~15 mins  Needed mod to min A to maintain balance  Pt sat with R posterolateral lean. Verbal and manual cues given for anterior weight shift and mid line posture  PT performed knee and hip PROM and AAROM to R knee      AM-PAC 6 CLICK MOBILITY  Total Score:9     OT present for coeval due to pt's multiple medical comorbidities and functional/cognition deficits requiring two skilled therapists to appropriately progress  pt's musculoskeletal strength, neuromuscular control, and endurance while taking into consideration medical acuity and pt safety.    Treatment & Education:  Pt able to sit EOB ~15 mins this session, able to work on sitting balance and upright posture. PT performed R hip and knee PROM and AAROM while pt was sitting EOB.  Pt educated on safety with mobility and using call button for assistance from nursing staff with bed mobility.  Pt educated on tips to reduce fall risk.  All questions answered within the scope of PT.  White board updated accordingly.      Patient left HOB elevated with all lines intact and call button in reach.    GOALS:   Multidisciplinary Problems       Physical Therapy Goals          Problem: Physical Therapy    Goal Priority Disciplines Outcome Goal Variances Interventions   Physical Therapy Goal     PT, PT/OT Ongoing, Progressing     Description: Goals to be met by: 23     Patient will increase functional independence with mobility by performin. Supine to sit with MInimal Assistance  2. Sit to supine with MInimal Assistance  3. Bed to wheelchair transfer with Minimal Assistance using Slideboard  4. Wheelchair propulsion x50 feet with Stand-by Assistance using bilateral uppper extremities  5. Sitting at edge of bed x8 minutes with Contact Guard Assistance to perform ADLs                         History:     Past Medical History:   Diagnosis Date    Abdominal hernia     Addiction to drug     Alcohol abuse     Anxiety     Arthritis     Asthma     Bipolar disorder     Bladder stones     CHF (congestive heart failure)     COPD (chronic obstructive pulmonary disease)     on home o2    CVA (cerebral vascular accident)         Hallucination     Hepatitis C     treated and cured    History of blood clots     Hx of psychiatric care     Hypertension     Belen     New onset seizure 2023    Obese body habitus     On home oxygen therapy     ADEEL (obstructive sleep apnea)     ADEEL treated  with BiPAP     Paraplegia     Paraplegic spinal paralysis     Psychiatric problem     Psychosis     AVH; Paranoia    Renal disorder     Requires assistance with activities of daily living (ADL)     SCI (spinal cord injury)     incomplete    Sleep difficulties     has sleep apnea and uses a Bi-PAP machine.    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    Substance abuse     Suprapubic catheter 03/15/2011    Therapy     Vaginal delivery     x1    Wheelchair dependence        Past Surgical History:   Procedure Laterality Date    ABOVE-KNEE AMPUTATION Left 7/23/2019    Procedure: AMPUTATION, ABOVE KNEE;  Surgeon: Gordo Rodrgiuez MD;  Location: [facility] OR;  Service: Orthopedics;  Laterality: Left;    amputation Left 07/23/2019    above the knee    BACK SURGERY      bilateral knee replacement      BREAST BIOPSY      cysto/lithopaxy 2017      CYSTOSCOPY Right 1/8/2021    Procedure: CYSTOSCOPY, RIGHT OCCULER BALLOON CATHETER PLACEMENT;  Surgeon: RAMA Tinoco MD;  Location: [facility] OR;  Service: Urology;  Laterality: Right;    CYSTOSCOPY W/ LASER LITHOTRIPSY      JOINT REPLACEMENT      bilateral knee    OPEN REDUCTION AND INTERNAL FIXATION (ORIF) OF PILON FRACTURE Right 8/23/2023    Procedure: ORIF, FRACTURE, PILON;  Surgeon: Suleman Schmitz MD;  [facility];  Service: Orthopedics;  Laterality: Right;    ORIF FIBULA FRACTURE Right 8/23/2023    Procedure: ORIF, FRACTURE, FIBULA;  Surgeon: Suleman Schmitz MD;  Location: [facility] OR 2ND FLR;  Service: Orthopedics;  Laterality: Right;    PERCUTANEOUS NEPHROLITHOTOMY Right 1/8/2021    Procedure: NEPHROLITHOTOMY, PERCUTANEOUS;  Surgeon: RAMA Tinoco MD;  Location: [facility] OR;  Service: Urology;  Laterality: Right;  x3 rooms, or7, or9,or12  ATTEMPTED    RETROGRADE PYELOGRAPHY Right 1/8/2021    Procedure: PYELOGRAM, RETROGRADE;  Surgeon: RAMA Tinoco MD;  Location: [facility] OR;  Service: Urology;  Laterality: Right;    SUPRAPUBIC CYSTOSTOMY  03/15/2011     patient reports  replaced tubing on 10/27/19, at home    URETEROSCOPY Right 1/8/2021    Procedure: URETEROSCOPY;  Surgeon: RAMA Tinoco MD;  Location: [facility];  Service: Urology;  Laterality: Right;       Time Tracking:     PT Received On: 08/24/23  PT Start Time: 0922     PT Stop Time: 0951  PT Total Time (min): 29 min     Billable Minutes: Evaluation 15 and Therapeutic Exercise 14      08/24/2023

## 2023-08-24 NOTE — PLAN OF CARE
PT eval completed- see note for details, goals and POC established.     Problem: Physical Therapy  Goal: Physical Therapy Goal  Description: Goals to be met by: 23     Patient will increase functional independence with mobility by performin. Supine to sit with MInimal Assistance  2. Sit to supine with MInimal Assistance  3. Bed to wheelchair transfer with Minimal Assistance using Slideboard  4. Wheelchair propulsion x50 feet with Stand-by Assistance using bilateral uppper extremities  5. Sitting at edge of bed x8 minutes with Contact Guard Assistance to perform ADLs    Outcome: Ongoing, Progressing   2023

## 2023-08-24 NOTE — PLAN OF CARE
Pt. Evaluated and goals established   Problem: Occupational Therapy  Goal: Occupational Therapy Goal  Description: Goals to be met by: 9/24/23     Patient will increase functional independence with ADLs by performing:    UE Dressing with [place].  Grooming while EOB with [place].  Toileting from bedside commode with Stand-by Assistance for hygiene and clothing management.   Sitting at edge of bed 10 minutes with Stand-by Assistance.  Rolling to Bilateral with [place].   Supine to sit with Modified [place].  Toilet transfer to bedside commode with Stand-by Assistance.    Outcome: Ongoing, Progressing

## 2023-08-24 NOTE — SUBJECTIVE & OBJECTIVE
Interval History: Patient taken to OR yesterday with Ortho and underwent right ankle ORIF by Dr. Suleman Schmitz. Wound vac in place to right ankle incision sites. Patient to work with PT/OT today. SW/CM sent blast referrals for a new SNF as patient does not want to return to [facility] s she felt they broke her ankle in the first place but moving her incorrectly. Patient report 5/10 pain to right ankle this am. Labs reviewed. Hgb down to 6.9 today from 7.8 yesterday. Anemia labs consistent with anemia of chronic disease and likely further drop in Hgb related to fracture and operative repair and expected blood loss. Discussed with patient and plan to transfuse 1 unit of PRBCs today. Blood consent obtained. Patient with no signs of active bleeding on exam. Vital signs stable. BMP stable.     Review of Systems   Constitutional:  Negative for chills and fever.   Respiratory:  Negative for cough and shortness of breath.    Cardiovascular:  Negative for chest pain.   Gastrointestinal:  Negative for abdominal pain, diarrhea and nausea.   Musculoskeletal:  Positive for arthralgias (Right ankle).   Neurological:  Negative for dizziness.   Psychiatric/Behavioral:  Negative for agitation and confusion.      Objective:     Vital Signs (Most Recent):  Temp: 96.6 °F (35.9 °C) (08/24/23 1137)  Pulse: 61 (08/24/23 1137)  Resp: 18 (08/24/23 1137)  BP: 108/49 (08/24/23 1137)  SpO2: 95 % (08/24/23 1137) on room air Vital Signs (24h Range):  Temp:  [96.6 °F (35.9 °C)-98.9 °F (37.2 °C)] 96.6 °F (35.9 °C)  Pulse:  [57-85] 61  Resp:  [16-20] 18  SpO2:  [95 %-100 %] 95 %  BP: ()/(49-94) 108/49     Weight: 83 kg (183 lb)  Body mass index is 30.45 kg/m².    Intake/Output Summary (Last 24 hours) at 8/24/2023 1629  Last data filed at 8/24/2023 1219  Gross per 24 hour   Intake 800 ml   Output 1400 ml   Net -600 ml         Physical Exam  Vitals and nursing note reviewed.   Constitutional:       General: She is not in acute  distress.     Appearance: Normal appearance. She is well-developed. She is obese. She is not ill-appearing.   Eyes:      Conjunctiva/sclera: Conjunctivae normal.   Cardiovascular:      Rate and Rhythm: Normal rate and regular rhythm.      Heart sounds: Normal heart sounds. No murmur heard.     No friction rub. No gallop.   Pulmonary:      Effort: Pulmonary effort is normal. No respiratory distress.      Breath sounds: Normal breath sounds. No wheezing.   Abdominal:      General: Abdomen is flat. Bowel sounds are normal. There is no distension.      Palpations: Abdomen is soft.      Tenderness: There is no abdominal tenderness.   Musculoskeletal:      Comments: Left AKA noted. Stump well healed to left leg. Wound vac noted to right ankle incision sites.    Skin:     General: Skin is warm.      Findings: No erythema.   Neurological:      Mental Status: She is alert and oriented to person, place, and time.   Psychiatric:         Mood and Affect: Mood normal.         Behavior: Behavior normal. Behavior is cooperative.         Thought Content: Thought content normal.         Judgment: Judgment normal.           Significant Labs: CBC:   Recent Labs   Lab 08/22/23  2335 08/24/23  1303   WBC 7.58 6.44   HGB 7.8* 6.9*   HCT 27.5* 24.1*    201     CMP:   Recent Labs   Lab 08/22/23  2335 08/24/23  0453 08/24/23  1042    142 141   K 3.9 4.4 4.1    104 104   CO2 28 32* 30*   * 92 86   BUN 32* 23 24*   CREATININE 1.0 1.0 1.0   CALCIUM 8.7 8.6* 8.9   PROT 7.2  --   --    ALBUMIN 2.8*  --   --    BILITOT 0.2  --   --    ALKPHOS 82  --   --    AST 14  --   --    ALT 8*  --   --    ANIONGAP 10 6* 7*       Significant Imaging: I have reviewed all pertinent imaging results/findings within the past 24 hours.

## 2023-08-24 NOTE — ASSESSMENT & PLAN NOTE
Chronic and controlled. Continue Bumex 2 mg po daily to treat. Monitor vital signs very 4 hours. Target BP < 150/90.

## 2023-08-24 NOTE — ASSESSMENT & PLAN NOTE
· Hgb down to 6.9 on 8/24. Plan to transfuse 1 unit of PRBCs on 8/24. No signs of clinical bleeding and post-op and expected blood loss related to surgery.   · Hbg 7.8 on admit. Baseline Hgb 9-10. Low Hgb on admit likely related to fracture and recent acute illness and recent hospitalization for CHF.   · Transfuse blood if Hgb < 7 and asymptomatic or < 8 and symptomatic.   · Monitor daily CBC.

## 2023-08-24 NOTE — ASSESSMENT & PLAN NOTE
Mary Ellen Fajardo is a 67 y.o. 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  Injury reportedly occurred while patient was transferring from wheelchair to shower chair.  She presented closed, neurovascularly intact, and without any other musculoskeletal injuries on physical exam.  RLE compartments soft and compressible, and she was noted to have fracture blistering over the anteromedial ankle.  Patient was placed in a short-leg splint and window was created for further compartment checks overnight.  Patient is reportedly nonambulatory and wheelchair-bound at baseline.     Now s/p ORIF sindi fx     - Pain MM  - Wound vac to suction  - PT/OT: ARMANDO RLE  - ASA 81 BID  - Ice and elevate affected extremity at all times   - Abx: ancef x24h  - Suprapubic catheter    Dispo: FU PT/OT recs

## 2023-08-24 NOTE — ASSESSMENT & PLAN NOTE
Patient is identified as having Diastolic (HFpEF) heart failure that is Chronic. CHF is currently controlled. Latest ECHO performed and demonstrates- Results for orders placed during the hospital encounter of 08/06/23    Echo    Interpretation Summary    Left Ventricle: Normal wall motion. There is low normal systolic function with a visually estimated ejection fraction of 50 - 55%.    Right Ventricle: Normal right ventricular cavity size.  Continue home Bumex 2 mg po daily to treat and monitor clinical status closely. Monitor on telemetry. Patient is off CHF pathway.  Monitor strict Is&Os and daily weights.  Place on fluid restriction of 1.5 L. Continue to stress to patient importance of self efficacy and  on diet for CHF. Last BNP reviewed- and noted below   Recent Labs   Lab 08/22/23  2335   *

## 2023-08-24 NOTE — ASSESSMENT & PLAN NOTE
· Chronic condition and related to previous CVA. Patient at her baseline neurologic and functional level.   · Fall precautions.

## 2023-08-24 NOTE — ADDENDUM NOTE
Addendum  created 08/24/23 1208 by Ramu Daugherty MD    Charge Capture section accepted, Cosign clinical note with attestation, Order list changed, Pharmacy for encounter modified

## 2023-08-24 NOTE — ASSESSMENT & PLAN NOTE
Body mass index is 30.45 kg/m². Morbid obesity complicates all aspects of disease management from diagnostic modalities to treatment. Weight loss encouraged and health benefits explained to patient.

## 2023-08-24 NOTE — PROGRESS NOTES
[facility]  Orthopedics  Progress Note    Patient Name: Mary Ellen Fajardo  MRN: [id number]  Admission Date: 8/22/2023  Hospital Length of Stay: 0 days  Attending Provider: Awa Purvis MD  Primary Care Provider: Any Tran MD  Follow-up For: Procedure(s) (LRB):  ORIF, FRACTURE, PILON (Right)  ORIF, FRACTURE, FIBULA (Right)    Post-Operative Day: 1 Day Post-Op  Subjective:     Principal Problem:Closed fracture of right tibial plafond with fibula involvement with routine healing    Principal Orthopedic Problem: same as above s/p ORIF pilon fx 8/23    Interval History: NAEO. VSS. Pain well controlled. Wound vac to suction with no output. Potous boot in place.     Review of patient's allergies indicates:   Allergen Reactions    Tuberculin ppd Itching and Dermatitis     Patient has old scare that she claims is from TB PPD    Rocephin [ceftriaxone] Rash     Rash 2012? Pt agreeable to try with pre mediation with benadryl.        Current Facility-Administered Medications   Medication    acetaminophen tablet 1,000 mg    albuterol-ipratropium 2.5 mg-0.5 mg/3 mL nebulizer solution 3 mL    aluminum-magnesium hydroxide-simethicone 200-200-20 mg/5 mL suspension 30 mL    aspirin chewable tablet 81 mg    baclofen tablet 10 mg    bisacodyL suppository 10 mg    bumetanide tablet 2 mg    citalopram tablet 30 mg    dextrose 10% bolus 125 mL 125 mL    dextrose 10% bolus 250 mL 250 mL    ferrous sulfate tablet 1 each    folic acid-vit B6-vit B12 2.5-25-2 mg tablet 1 tablet    gabapentin capsule 300 mg    glucagon (human recombinant) injection 1 mg    glucose chewable tablet 16 g    glucose chewable tablet 24 g    hydrOXYzine HCL tablet 25 mg    melatonin tablet 6 mg    methocarbamoL tablet 500 mg    naloxone 0.4 mg/mL injection 0.02 mg    ondansetron disintegrating tablet 8 mg    oxybutynin 24 hr tablet 5 mg    oxyCODONE immediate release tablet 5 mg    polyethylene glycol packet 17 g  "   prochlorperazine injection Soln 5 mg    QUEtiapine tablet 200 mg    ROPIvacaine (PF) 2 mg/ml (0.2%) solution    ROPIvacaine (PF) 2 mg/ml (0.2%) solution    senna-docusate 8.6-50 mg per tablet 1 tablet    simethicone chewable tablet 80 mg    sodium chloride 0.9% flush 10 mL    sodium chloride 0.9% flush 5 mL    thiamine tablet 100 mg    vitamin D 1000 units tablet 1,000 Units     Objective:     Vital Signs (Most Recent):  Temp: 98.9 °F (37.2 °C) (08/24/23 0759)  Pulse: 75 (08/24/23 0759)  Resp: 18 (08/24/23 0759)  BP: 114/66 (08/24/23 0759)  SpO2: 95 % (08/24/23 0759) Vital Signs (24h Range):  Temp:  [97.3 °F (36.3 °C)-98.9 °F (37.2 °C)] 98.9 °F (37.2 °C)  Pulse:  [57-88] 75  Resp:  [10-35] 18  SpO2:  [89 %-100 %] 95 %  BP: ()/(58-94) 114/66     Weight: 83 kg (183 lb)  Height: 5' 5" (165.1 cm)  Body mass index is 30.45 kg/m².      Intake/Output Summary (Last 24 hours) at 8/24/2023 0935  Last data filed at 8/24/2023 0802  Gross per 24 hour   Intake 450 ml   Output 1130 ml   Net -680 ml        Ortho/SPM Exam  Wound vac to suction with good seal  Potus boot on  Unable to wiggle toes 2/2 block  Cap refill <2s     Significant Labs: All pertinent labs within the past 24 hours have been reviewed.    Significant Imaging: I have reviewed and interpreted all pertinent imaging results/findings.    Assessment/Plan:     * Closed fracture of right tibial plafond with fibula involvement with routine healing s/p ORIF on 8/23/2023  Mary Ellen Fajardo is a 67 y.o. 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  Injury reportedly occurred while patient was transferring from wheelchair to shower chair.  She presented closed, neurovascularly intact, and without any other musculoskeletal " injuries on physical exam.  RLE compartments soft and compressible, and she was noted to have fracture blistering over the anteromedial ankle.  Patient was placed in a short-leg splint and window was created for further compartment checks overnight.  Patient is reportedly nonambulatory and wheelchair-bound at baseline.     Now s/p ORIF jorgeon fx     - Pain MM  - Wound vac to suction  - PT/OT: NWB RLE  - ASA 81 BID  - Ice and elevate affected extremity at all times   - Abx: ancef x24h  - Suprapubic catheter    Dispo: FU PT/OT recs          Suleman Bowden MD  Orthopedics  [facility] - [facility]

## 2023-08-24 NOTE — PT/OT/SLP EVAL
Occupational Therapy   Evaluation    Name: Mary Ellen Fajardo  MRN: [id number]  Admitting Diagnosis: Closed fracture of right tibial plafond with fibula involvement with routine healing  Recent Surgery: Procedure(s) (LRB):  ORIF, FRACTURE, PILON (Right)  ORIF, FRACTURE, FIBULA (Right) 1 Day Post-Op    Recommendations:     Discharge Recommendations: nursing facility, skilled  Discharge Equipment Recommendations:  to be determined by next level of care  Barriers to discharge:  Other (Comment)    Assessment:     Mary Ellen Fajardo is a 67 y.o. female with a medical diagnosis of Closed fracture of right tibial plafond with fibula involvement with routine healing.  She presents with c/o RLE pain however tolerated sitting EOB ~15mins with cues midline awareness and postural alignment. Performance deficits affecting function: weakness, impaired self care skills, impaired balance, impaired functional mobility, impaired endurance, orthopedic precautions, decreased ROM, decreased lower extremity function, impaired skin.  Recommending SNF once medically appropriate for discharge to increase maximal independence, reduce burden of care, and ensure safety.       Rehab Prognosis: Fair; patient would benefit from acute skilled OT services to address these deficits and reach maximum level of function.       Plan:     Patient to be seen 3 x/week to address the above listed problems via self-care/home management, neuromuscular re-education, therapeutic exercises, therapeutic activities  Plan of Care Expires: 09/23/23  Plan of Care Reviewed with: patient    Subjective     Chief Complaint: R Hip and Knee   Patient/Family Comments/goals: Pt.reports no that this has happen, she is in bad condition     Occupational Profile:  Living Environment: Arrived from [facility], prior to aster pt. Lives with her son in a  [facility] with ruthann ALLEN Bathroom: walk-in shower   Previous level of function: Assistance for all ADLs and self-  propel in w/c with BUE, and used a sliding board for all transfers   Roles and Routines: Mother and friend   Equipment Used at Home: slide board, wheelchair, hospital bed,oxygen   Assistance upon Discharge: son     Pain/Comfort:  Pain Rating 1: 8/10  Location - Side 1: Right  Location 1: hip  Pain Addressed 1: Reposition, Distraction  Pain Rating Post-Intervention 1:  (did not rate)  Pain Rating 2: 8/10  Location - Side 2: Right  Location - Orientation 2: generalized  Location 2: knee  Pain Addressed 2: Reposition, Distraction  Pain Rating Post-Intervention 2:  (did not rate)    Patients cultural, spiritual, Samaritan conflicts given the current situation: no    Objective:     Communicated with: RN prior to session.  Patient found HOB elevated with PRAFO, perineural catheter, wound vac, delcid catheter upon OT entry to room.    General Precautions: Standard, fall  Orthopedic Precautions: RLE non weight bearing  Braces:  (PRAFO RLE)  Respiratory Status: Room air    Occupational Performance:    Bed Mobility:    Patient completed Rolling/Turning to Left with  maximal assistance and 2 persons  Patient completed Supine to Sit with maximal assistance and 2 persons  Patient completed Sit to Supine with maximal assistance and 2 persons    Functional Mobility/Transfers:  Pt. Sat at EOB ~ 15 mins with Mod A- Min A sitting balance   R Posterolateral Lean, verbal and tactile cues for midline awareness and postural alignment     Activities of Daily Living:  Grooming: minimum assistance oral care, facial hygiene seated at EOB    Cognitive/Visual Perceptual:  Cognitive/Psychosocial Skills:     -       Oriented to: Person, Place, and Situation   -       Follows Commands/attention:Follows multistep  commands  -       Communication: clear/fluent  -       Memory: No Deficits noted  -       Safety awareness/insight to disability: impaired   -       Mood/Affect/Coping skills/emotional control: Appropriate to situation    Physical  Exam:  Balance:   Static Sitting:  Min A - Mod A  Dynamic Sitting: Mod A    Upper Extremity Range of Motion:     -       Right Upper Extremity: WFL  -       Left Upper Extremity: WFL  Upper Extremity Strength:    -       Right Upper Extremity: WFL  -       Left Upper Extremity: WFL   Strength:    -       Right Upper Extremity: WFL  -       Left Upper Extremity: WFL    AMPAC 6 Click ADL:  AMPAC Total Score: 14    Treatment & Education:  Pt.educated and reviewed WB precautions and edema management   Pt educated on role of occupational therapy, POC, and safety during ADLs and functional mobility. Pt and OT discussed importance of safe, continued mobility to optimize daily living skills. Pt verbalized understanding. Pt given instruction to call for medical staff/nurse for assistance.   PT present for coeval due to pt's multiple medical comorbidities and functional/cognition deficits requiring two skilled therapists to appropriately progress pt's musculoskeletal strength, neuromuscular control, and endurance while taking into consideration medical acuity and pt safety.         Patient left HOB elevated with all lines intact and call button in reach    GOALS:   Multidisciplinary Problems       Occupational Therapy Goals          Problem: Occupational Therapy    Goal Priority Disciplines Outcome Interventions   Occupational Therapy Goal     OT, PT/OT Ongoing, Progressing    Description: Goals to be met by: 9/24/23     Patient will increase functional independence with ADLs by performing:    UE Dressing with [place].  Grooming while EOB with [place].  Toileting from bedside commode with Stand-by Assistance for hygiene and clothing management.   Sitting at edge of bed 10 minutes with Stand-by Assistance.  Rolling to Bilateral with [place].   Supine to sit with Modified [place].  Toilet transfer to bedside commode with Stand-by Assistance.                         History:     Past Medical History:    Diagnosis Date    Abdominal hernia     Addiction to drug     Alcohol abuse     Anxiety     Arthritis     Asthma     Bipolar disorder     Bladder stones     CHF (congestive heart failure)     COPD (chronic obstructive pulmonary disease)     on home o2    CVA (cerebral vascular accident)     2012    Hallucination     Hepatitis C     treated and cured    History of blood clots     Hx of psychiatric care     Hypertension     [place]     New onset seizure 8/12/2023    Obese body habitus     On home oxygen therapy     ADEEL (obstructive sleep apnea)     ADEEL treated with BiPAP     Paraplegia     Paraplegic spinal paralysis     Psychiatric problem     Psychosis     AVH; Paranoia    Renal disorder     Requires assistance with activities of daily living (ADL)     SCI (spinal cord injury)     incomplete    Sleep difficulties     has sleep apnea and uses a Bi-PAP machine.    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    Substance abuse     Suprapubic catheter 03/15/2011    Therapy     Vaginal delivery     x1    Wheelchair dependence          Past Surgical History:   Procedure Laterality Date    ABOVE-KNEE AMPUTATION Left 7/23/2019    Procedure: AMPUTATION, ABOVE KNEE;  Surgeon: Gordo Rodriguez MD;  Location: [facility] OR;  Service: Orthopedics;  Laterality: Left;    amputation Left 07/23/2019    above the knee    BACK SURGERY      bilateral knee replacement      BREAST BIOPSY      cysto/lithopaxy 2017      CYSTOSCOPY Right 1/8/2021    Procedure: CYSTOSCOPY, RIGHT OCCULER BALLOON CATHETER PLACEMENT;  Surgeon: RAMA Tinoco MD;  Location: [facility] OR;  Service: Urology;  Laterality: Right;    CYSTOSCOPY W/ LASER LITHOTRIPSY      JOINT REPLACEMENT      bilateral knee    OPEN REDUCTION AND INTERNAL FIXATION (ORIF) OF PILON FRACTURE Right 8/23/2023    Procedure: ORIF, FRACTURE, PILON;  Surgeon: Suleman Schmitz MD;  Location: [address];  Service: Orthopedics;  Laterality: Right;    ORIF FIBULA FRACTURE Right 8/23/2023     Procedure: ORIF, FRACTURE, FIBULA;  Surgeon: Suleman Schmitz MD;  Location: [facility] OR [address] [place] [address];  Service: Orthopedics;  Laterality: Right;    PERCUTANEOUS NEPHROLITHOTOMY Right 1/8/2021    Procedure: NEPHROLITHOTOMY, PERCUTANEOUS;  Surgeon: RAMA Tinoco MD;  Location: [facility];  Service: Urology;  Laterality: Right;  x3 rooms, or7, or9,or12  ATTEMPTED    RETROGRADE PYELOGRAPHY Right 1/8/2021    Procedure: PYELOGRAM, RETROGRADE;  Surgeon: RAMA Tinoco MD;  Location: [facility];  Service: Urology;  Laterality: Right;    SUPRAPUBIC CYSTOSTOMY  03/15/2011    patient reports  replaced tubing on 10/27/19, at home    URETEROSCOPY Right 1/8/2021    Procedure: URETEROSCOPY;  Surgeon: RAMA Tinoco MD;  Location: [facility];  Service: Urology;  Laterality: Right;       Time Tracking:     OT Date of Treatment: 08/24/23  OT Start Time: 0921  OT Stop Time: 0958  OT Total Time (min): 37 min    Billable Minutes:Evaluation 10  Self Care/Home Management 10  Neuromuscular Re-education 17    8/24/2023

## 2023-08-24 NOTE — ADDENDUM NOTE
Addendum  created 08/24/23 0738 by Linda Wiseman MD    Order list changed, Pend clinical note, Pharmacy for encounter modified

## 2023-08-24 NOTE — ASSESSMENT & PLAN NOTE
Patient with Hypercapnic and Hypoxic Respiratory failure which is Chronic.  she is on home oxygen at 2 LPM and BiPAP nightly for her ADEEL. Supplemental oxygen was provided and noted-at 2 liters of oxygen.      .   Signs/symptoms of respiratory failure include- none at this time. Contributing diagnoses includes - CHF and Pulmonary HTN and ADEEL. Labs and images were reviewed. Patient Has recent ABG, which has been reviewed (VBG). Will treat underlying causes and adjust management of respiratory failure as follows-   Continue home supplemental oxygen for goal SpO2 >88%.  Continue nightly BiPAP for her chronic ADEEL.

## 2023-08-24 NOTE — PLAN OF CARE
08/24/23 0810   Post-Acute Status   Post-Acute Authorization Placement   Post-Acute Placement Status Referrals Sent     SW sent referral blast for SNF placement. Pt d/c from [facility], unable with services/seeking alternative facility.     Carmen Shell LMSW  Case Management   [facility]   Ext. [phone]

## 2023-08-24 NOTE — ASSESSMENT & PLAN NOTE
Chronic condition. Continue nightly BiPAP in hospital to treat as has chronic hypercapnic and hypoxic respiratory failure related to her chronic ADEEL and pulmonary HTN.

## 2023-08-24 NOTE — ASSESSMENT & PLAN NOTE
68 y/o with left AKA, previous CVA, paraparesis presenting from [facility] with right ankle pain and injury during a transfer from wheelchair to shower chair. She was found to have an acute closed trimalleolar fracture of the right distal tibia and fibula in the ED. Neurovascularly intact.     · Orthopedic surgery consulted and recommended operative repair. Patient taken to the OR on 8/23/2023 and underwent ORIF of right ankle by Dr. Suleman Schmitz.   · Post-op, patient is non weight bearing to right lower extremity as per Ortho.  · PT/OT consulted post-op to work with patient.  · Patient on Aspirin 81 mg po BID for DVT prophylaxis and will continue for 6 weeks.   · Fall precautions.  · Wound vac in place to right ankle incision site and will continue. Management as per Ortho.  · Multimodal pain medications for pain management with scheduled Tylenol 1000 mg po TID + Robaxin 750 mg po 4 times daily + Oxycodone IR prn for breakthrough pain.   · Perineural pain catheter in place with continuous Ropivacaine for post-op pain management and Anesthesia monitoring and managing.

## 2023-08-24 NOTE — PLAN OF CARE
[facility] - Surgery  Initial Discharge Assessment       Primary Care Provider: Any Tran MD    Admission Diagnosis: SOB (shortness of breath) [R06.02]  Right leg pain [M79.604]  Right ankle pain [M25.571]  Chest pain [R07.9]  Right ankle injury [S99.911A]  Tibia/fibula fracture, right, closed, initial encounter [S82.201A, S82.401A]  Acute on chronic respiratory failure with hypercapnia [J96.22]    Admission Date: 8/22/2023  Expected Discharge Date: 8/28/2023         Payor: [employer] MEDICARE / Plan: [employer] HMO PPO SPECIAL NEEDS / Product Type: Medicare Advantage /     Extended Emergency Contact Information  Primary Emergency Contact: JunaidsavanaCarmelocarmencharley  Address: [address] [place] [address] [place]  Mobile Phone: [phone]  Relation: Grandchild  Secondary Emergency Contact: Swapnil Christianson  [place]  Mobile Phone: [phone]  Relation: Son    Discharge Plan A: Skilled Nursing Facility         [id number] - [place], [place] - [address]  [address] [place] [address]  Phone: [phone] Fax: [phone]    [id number] - [place], [place] - [address]  [address] [address]  Phone: [phone] Fax: [phone]    [facility] - [facility] [address]  [address] [address]  Phone: [phone] Fax: [phone]      Initial Assessment (most recent)       Adult Discharge Assessment - 08/24/23 1148          Discharge Assessment    Assessment Type Discharge Planning Assessment     Confirmed/corrected address, phone number and insurance Yes     Confirmed Demographics Correct on Facesheet     Source of Information patient     Does patient/caregiver understand observation status Yes     Communicated LOY with patient/caregiver Yes     People in Home child(kamran), adult     Do you expect to return to your current living situation? Yes     Do you have help at home or someone  to help you manage your care at home? Yes     Who are your caregiver(s) and their phone number(s)? Ras Maher (Grandchild) [phone]     Prior to hospitilization cognitive status: Alert/Oriented     Current cognitive status: Alert/Oriented     Walking or Climbing Stairs ambulation difficulty, requires equipment     Mobility Management Wheelchair, Hospital bed     Equipment Currently Used at Home wheelchair;hospital bed     Readmission within 30 days? No     Patient currently being followed by outpatient case management? No     Do you currently have service(s) that help you manage your care at home? No     Do you take prescription medications? Yes     Do you have prescription coverage? Yes     Do you have any problems affording any of your prescribed medications? No     Is the patient taking medications as prescribed? yes     Who is going to help you get home at discharge? SNF recommended     How do you get to doctors appointments? family or friend will provide     Are you on dialysis? No     Do you take coumadin? No     DME Needed Upon Discharge  none     Discharge Plan A Skilled Nursing Facility        Physical Activity    On average, how many days per week do you engage in moderate to strenuous exercise (like a brisk walk)? 0 days     On average, how many minutes do you engage in exercise at this level? 0 min        Financial Resource Strain    How hard is it for you to pay for the very basics like food, housing, medical care, and heating? Not hard at all        Housing Stability    In the last 12 months, was there a time when you were not able to pay the mortgage or rent on time? No     In the last 12 months, how many places have you lived? 1     In the last 12 months, was there a time when you did not have a steady place to sleep or slept in a shelter (including now)? No        Food Insecurity    Within the past 12 months, you worried that your food would run out before you got the money to buy more.  Never true     Within the past 12 months, the food you bought just didn't last and you didn't have money to get more. Never true        Stress    Do you feel stress - tense, restless, nervous, or anxious, or unable to sleep at night because your mind is troubled all the time - these days? Not at all        Social Connections    In a typical week, how many times do you talk on the phone with family, friends, or neighbors? More than three times a week     How often do you get together with friends or relatives? More than three times a week     How often do you attend Latter-day or Scientology services? Never     Do you belong to any clubs or organizations such as Latter-day groups, unions, fraternal or athletic groups, or school groups? No     How often do you attend meetings of the clubs or organizations you belong to? Never     Are you , , , , never , or living with a partner?         Alcohol Use    Q1: How often do you have a drink containing alcohol? Never     Q2: How many drinks containing alcohol do you have on a typical day when you are drinking? Patient does not drink     Q3: How often do you have six or more drinks on one occasion? Never                   Spoke with patient at bedside to complete d/c planning assessment. Patient lives with her son and grand daughter. Home is single story without steps to enter. Patient has a wheelchair and hospital bed. PT/OT recommended Skilled nursing care at d/c. Patient adamant she does not wish to return to [facility]. Blast referrals sent near son's home in [facility] Will continue to follow. LOY 8/28/23.    Shazia Razo RNCM  Case Management  [facility]  [phone]

## 2023-08-24 NOTE — HOSPITAL COURSE
Ortho consulted and patient taken to OR on 8/23/2023 and underwent ORIF of closed right trimalleolar fracture by Dr. Suleman Schmitz. Incisional wound vac placed by Ortho in OR to surgical site and remain in place post-op. Patient post-op nonweight bearing to right lower extremity. Podus boot in place to right ankle as per Ortho. PT/OT consulted post-op and working with patient and recommending SNF on discharge when medically ready. Referrals sent as patient states she does not want to go back to [facility] for her SNF care. Patient on multimodal pain meds post-op for pain control with scheduled Tylenol and Robaxin with Oxycodone prn for breakthrough pain. Pain controlled post-op. Patient on Aspirin 81 mg po BID for DVT prophylaxis and will continue. Patient transfused 1 unit of PRBCs on 8/24 for Hgb 6.9 and Hgb improved to 7.2 on 8/25. Hgb stable at 7.4 on 8/26. Pain controlled. Wound vac remains in place to right ankle incision sites with minimal output on 8/26. Hgb stable at 7.2 on 8/27. Hgb improved to 8.2 on on 8/28. Patient concerned about COVID exposure as grandson visited several days ago and now COVID positive so COVID test done and negative on 8/27. Pain controlled on 8/28. Perineural catheter removed by Anesthesia on am of 8/28. Patient medically ready for discharge and has been accepted to [facility] on 8/28 and patient appealing her discharge.Patient was denied her appeal for discharge and discharged in good condition with pain controlled to right ankle on 8/30. Patient switched from hospital wound vac to Prevena wound vac to her right ankle incision on discharge and to remain in place for 1 week. Follow-up in Orthopedic clinic on discharge. Patient discharged on Aspirin 81 mg po BID x 6 weeks on discharge for DVT prophylaxis.

## 2023-08-24 NOTE — SUBJECTIVE & OBJECTIVE
Principal Problem:Closed fracture of right tibial plafond with fibula involvement with routine healing    Principal Orthopedic Problem: same as above s/p ORIF pilon fx 8/23    Interval History: NAEO. VSS. Pain well controlled. Wound vac to suction with no output. Potous boot in place.     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 Pt agreeable to try with pre mediation with benadryl.        Current Facility-Administered Medications   Medication    acetaminophen tablet 1,000 mg    albuterol-ipratropium 2.5 mg-0.5 mg/3 mL nebulizer solution 3 mL    aluminum-magnesium hydroxide-simethicone 200-200-20 mg/5 mL suspension 30 mL    aspirin chewable tablet 81 mg    baclofen tablet 10 mg    bisacodyL suppository 10 mg    bumetanide tablet 2 mg    citalopram tablet 30 mg    dextrose 10% bolus 125 mL 125 mL    dextrose 10% bolus 250 mL 250 mL    ferrous sulfate tablet 1 each    folic acid-vit B6-vit B12 2.5-25-2 mg tablet 1 tablet    gabapentin capsule 300 mg    glucagon (human recombinant) injection 1 mg    glucose chewable tablet 16 g    glucose chewable tablet 24 g    hydrOXYzine HCL tablet 25 mg    melatonin tablet 6 mg    methocarbamoL tablet 500 mg    naloxone 0.4 mg/mL injection 0.02 mg    ondansetron disintegrating tablet 8 mg    oxybutynin 24 hr tablet 5 mg    oxyCODONE immediate release tablet 5 mg    polyethylene glycol packet 17 g    prochlorperazine injection Soln 5 mg    QUEtiapine tablet 200 mg    ROPIvacaine (PF) 2 mg/ml (0.2%) solution    ROPIvacaine (PF) 2 mg/ml (0.2%) solution    senna-docusate 8.6-50 mg per tablet 1 tablet    simethicone chewable tablet 80 mg    sodium chloride 0.9% flush 10 mL    sodium chloride 0.9% flush 5 mL    thiamine tablet 100 mg    vitamin D 1000 units tablet 1,000 Units     Objective:     Vital Signs (Most  "Recent):  Temp: 98.9 °F (37.2 °C) (08/24/23 0759)  Pulse: 75 (08/24/23 0759)  Resp: 18 (08/24/23 0759)  BP: 114/66 (08/24/23 0759)  SpO2: 95 % (08/24/23 0759) Vital Signs (24h Range):  Temp:  [97.3 °F (36.3 °C)-98.9 °F (37.2 °C)] 98.9 °F (37.2 °C)  Pulse:  [57-88] 75  Resp:  [10-35] 18  SpO2:  [89 %-100 %] 95 %  BP: ()/(58-94) 114/66     Weight: 83 kg (183 lb)  Height: 5' 5" (165.1 cm)  Body mass index is 30.45 kg/m².      Intake/Output Summary (Last 24 hours) at 8/24/2023 0935  Last data filed at 8/24/2023 0802  Gross per 24 hour   Intake 450 ml   Output 1130 ml   Net -680 ml        Ortho/SPM Exam  Wound vac to suction with good seal  Potus boot on  Unable to wiggle toes 2/2 block  Cap refill <2s     Significant Labs: All pertinent labs within the past 24 hours have been reviewed.    Significant Imaging: I have reviewed and interpreted all pertinent imaging results/findings.  "

## 2023-08-25 LAB
ANION GAP SERPL CALC-SCNC: 4 MMOL/L (ref 8–16)
BASOPHILS # BLD AUTO: 0.03 K/UL (ref 0–0.2)
BASOPHILS NFR BLD: 0.5 % (ref 0–1.9)
BUN SERPL-MCNC: 32 MG/DL (ref 8–23)
CALCIUM SERPL-MCNC: 8.4 MG/DL (ref 8.7–10.5)
CHLORIDE SERPL-SCNC: 101 MMOL/L (ref 95–110)
CO2 SERPL-SCNC: 32 MMOL/L (ref 23–29)
CREAT SERPL-MCNC: 1.2 MG/DL (ref 0.5–1.4)
DIFFERENTIAL METHOD: ABNORMAL
EOSINOPHIL # BLD AUTO: 0.2 K/UL (ref 0–0.5)
EOSINOPHIL NFR BLD: 3.2 % (ref 0–8)
ERYTHROCYTE [DISTWIDTH] IN BLOOD BY AUTOMATED COUNT: 17.8 % (ref 11.5–14.5)
EST. GFR  (NO RACE VARIABLE): 49.6 ML/MIN/1.73 M^2
GLUCOSE SERPL-MCNC: 107 MG/DL (ref 70–110)
HCT VFR BLD AUTO: 25.2 % (ref 37–48.5)
HGB BLD-MCNC: 7.2 G/DL (ref 12–16)
IMM GRANULOCYTES # BLD AUTO: 0.04 K/UL (ref 0–0.04)
IMM GRANULOCYTES NFR BLD AUTO: 0.7 % (ref 0–0.5)
LYMPHOCYTES # BLD AUTO: 1.8 K/UL (ref 1–4.8)
LYMPHOCYTES NFR BLD: 29.2 % (ref 18–48)
MCH RBC QN AUTO: 25.5 PG (ref 27–31)
MCHC RBC AUTO-ENTMCNC: 28.6 G/DL (ref 32–36)
MCV RBC AUTO: 89 FL (ref 82–98)
MONOCYTES # BLD AUTO: 0.8 K/UL (ref 0.3–1)
MONOCYTES NFR BLD: 13.3 % (ref 4–15)
NEUTROPHILS # BLD AUTO: 3.2 K/UL (ref 1.8–7.7)
NEUTROPHILS NFR BLD: 53.1 % (ref 38–73)
NRBC BLD-RTO: 0 /100 WBC
PLATELET # BLD AUTO: 173 K/UL (ref 150–450)
PMV BLD AUTO: 9.6 FL (ref 9.2–12.9)
POTASSIUM SERPL-SCNC: 4.1 MMOL/L (ref 3.5–5.1)
RBC # BLD AUTO: 2.82 M/UL (ref 4–5.4)
SODIUM SERPL-SCNC: 137 MMOL/L (ref 136–145)
WBC # BLD AUTO: 6.03 K/UL (ref 3.9–12.7)

## 2023-08-25 PROCEDURE — 25000003 PHARM REV CODE 250

## 2023-08-25 PROCEDURE — 25000003 PHARM REV CODE 250: Performed by: HOSPITALIST

## 2023-08-25 PROCEDURE — 99233 PR SUBSEQUENT HOSPITAL CARE,LEVL III: ICD-10-PCS | Mod: ,,, | Performed by: INTERNAL MEDICINE

## 2023-08-25 PROCEDURE — 25000003 PHARM REV CODE 250: Performed by: PHYSICIAN ASSISTANT

## 2023-08-25 PROCEDURE — 94761 N-INVAS EAR/PLS OXIMETRY MLT: CPT

## 2023-08-25 PROCEDURE — 80048 BASIC METABOLIC PNL TOTAL CA: CPT | Performed by: INTERNAL MEDICINE

## 2023-08-25 PROCEDURE — 99900035 HC TECH TIME PER 15 MIN (STAT)

## 2023-08-25 PROCEDURE — 36415 COLL VENOUS BLD VENIPUNCTURE: CPT | Performed by: INTERNAL MEDICINE

## 2023-08-25 PROCEDURE — 25000003 PHARM REV CODE 250: Performed by: SURGERY

## 2023-08-25 PROCEDURE — 25000242 PHARM REV CODE 250 ALT 637 W/ HCPCS: Performed by: HOSPITALIST

## 2023-08-25 PROCEDURE — 27000221 HC OXYGEN, UP TO 24 HOURS

## 2023-08-25 PROCEDURE — 99233 SBSQ HOSP IP/OBS HIGH 50: CPT | Mod: ,,, | Performed by: INTERNAL MEDICINE

## 2023-08-25 PROCEDURE — 99231 PR SUBSEQUENT HOSPITAL CARE,LEVL I: ICD-10-PCS | Mod: ,,, | Performed by: ANESTHESIOLOGY

## 2023-08-25 PROCEDURE — 99231 SBSQ HOSP IP/OBS SF/LOW 25: CPT | Mod: ,,, | Performed by: ANESTHESIOLOGY

## 2023-08-25 PROCEDURE — 25000242 PHARM REV CODE 250 ALT 637 W/ HCPCS: Performed by: INTERNAL MEDICINE

## 2023-08-25 PROCEDURE — 94640 AIRWAY INHALATION TREATMENT: CPT

## 2023-08-25 PROCEDURE — 21400001 HC TELEMETRY ROOM

## 2023-08-25 PROCEDURE — 25000003 PHARM REV CODE 250: Performed by: STUDENT IN AN ORGANIZED HEALTH CARE EDUCATION/TRAINING PROGRAM

## 2023-08-25 PROCEDURE — 85025 COMPLETE CBC W/AUTO DIFF WBC: CPT | Performed by: INTERNAL MEDICINE

## 2023-08-25 PROCEDURE — 25000003 PHARM REV CODE 250: Performed by: INTERNAL MEDICINE

## 2023-08-25 PROCEDURE — 94799 UNLISTED PULMONARY SVC/PX: CPT

## 2023-08-25 RX ORDER — IPRATROPIUM BROMIDE AND ALBUTEROL SULFATE 2.5; .5 MG/3ML; MG/3ML
3 SOLUTION RESPIRATORY (INHALATION)
Status: DISCONTINUED | OUTPATIENT
Start: 2023-08-26 | End: 2023-08-30 | Stop reason: HOSPADM

## 2023-08-25 RX ORDER — IPRATROPIUM BROMIDE AND ALBUTEROL SULFATE 2.5; .5 MG/3ML; MG/3ML
3 SOLUTION RESPIRATORY (INHALATION) EVERY 4 HOURS PRN
Status: DISCONTINUED | OUTPATIENT
Start: 2023-08-25 | End: 2023-08-25

## 2023-08-25 RX ADMIN — THIAMINE HCL TAB 100 MG 100 MG: 100 TAB at 09:08

## 2023-08-25 RX ADMIN — HYDROXYZINE HYDROCHLORIDE 25 MG: 25 TABLET ORAL at 08:08

## 2023-08-25 RX ADMIN — ASPIRIN 81 MG 81 MG: 81 TABLET ORAL at 08:08

## 2023-08-25 RX ADMIN — GABAPENTIN 600 MG: 300 CAPSULE ORAL at 02:08

## 2023-08-25 RX ADMIN — FOLIC ACID-PYRIDOXINE-CYANOCOBALAMIN TAB 2.5-25-2 MG 1 TABLET: 2.5-25-2 TAB at 09:08

## 2023-08-25 RX ADMIN — ACETAMINOPHEN 1000 MG: 500 TABLET ORAL at 02:08

## 2023-08-25 RX ADMIN — FERROUS SULFATE TAB 325 MG (65 MG ELEMENTAL FE) 1 EACH: 325 (65 FE) TAB at 09:08

## 2023-08-25 RX ADMIN — BISACODYL 10 MG: 10 SUPPOSITORY RECTAL at 08:08

## 2023-08-25 RX ADMIN — QUETIAPINE FUMARATE 200 MG: 100 TABLET ORAL at 08:08

## 2023-08-25 RX ADMIN — METHOCARBAMOL 750 MG: 750 TABLET ORAL at 11:08

## 2023-08-25 RX ADMIN — GABAPENTIN 600 MG: 300 CAPSULE ORAL at 09:08

## 2023-08-25 RX ADMIN — ACETAMINOPHEN 1000 MG: 500 TABLET ORAL at 05:08

## 2023-08-25 RX ADMIN — Medication 6 MG: at 10:08

## 2023-08-25 RX ADMIN — IPRATROPIUM BROMIDE AND ALBUTEROL SULFATE 3 ML: .5; 3 SOLUTION RESPIRATORY (INHALATION) at 09:08

## 2023-08-25 RX ADMIN — OXYCODONE HYDROCHLORIDE 10 MG: 10 TABLET ORAL at 08:08

## 2023-08-25 RX ADMIN — METHOCARBAMOL 750 MG: 750 TABLET ORAL at 05:08

## 2023-08-25 RX ADMIN — ACETAMINOPHEN 1000 MG: 500 TABLET ORAL at 08:08

## 2023-08-25 RX ADMIN — ASPIRIN 81 MG 81 MG: 81 TABLET ORAL at 09:08

## 2023-08-25 RX ADMIN — SENNOSIDES AND DOCUSATE SODIUM 1 TABLET: 50; 8.6 TABLET ORAL at 09:08

## 2023-08-25 RX ADMIN — BUMETANIDE 2 MG: 1 TABLET ORAL at 09:08

## 2023-08-25 RX ADMIN — ONDANSETRON 8 MG: 8 TABLET, ORALLY DISINTEGRATING ORAL at 08:08

## 2023-08-25 RX ADMIN — CHOLECALCIFEROL TAB 25 MCG (1000 UNIT) 1000 UNITS: 25 TAB at 09:08

## 2023-08-25 RX ADMIN — GABAPENTIN 600 MG: 300 CAPSULE ORAL at 08:08

## 2023-08-25 RX ADMIN — ALUMINUM HYDROXIDE, MAGNESIUM HYDROXIDE, AND SIMETHICONE 30 ML: 200; 200; 20 SUSPENSION ORAL at 08:08

## 2023-08-25 RX ADMIN — CITALOPRAM HYDROBROMIDE 30 MG: 10 TABLET ORAL at 08:08

## 2023-08-25 RX ADMIN — OXYBUTYNIN CHLORIDE 5 MG: 5 TABLET, EXTENDED RELEASE ORAL at 09:08

## 2023-08-25 RX ADMIN — SIMETHICONE 80 MG: 80 TABLET, CHEWABLE ORAL at 08:08

## 2023-08-25 NOTE — PLAN OF CARE
[facility]CO called [facility] to inform pt is agreeable. [facility] informed pt's  grandchild (Ras) to compete intake paperwork. CM team to follow.    10.40 AM  CHECO completed LOCET and faxed PASRR to state. Waiting on 142.    Carmen Shell LMSW  Case Management   [facility]   Ext. [phone]

## 2023-08-25 NOTE — ASSESSMENT & PLAN NOTE
· Hgb slightly improved to 7.2 on 8/25 after transfused 1 unit of PRBCs. Monitor Hgb daily. Momnitor for any signs of active bleeding.   · Hgb down to 6.9 on 8/24. Plan to transfuse 1 unit of PRBCs on 8/24. No signs of clinical bleeding and post-op and expected blood loss related to surgery.   · Hbg 7.8 on admit. Baseline Hgb 9-10. Low Hgb on admit likely related to fracture and recent acute illness and recent hospitalization for CHF.   · Transfuse blood if Hgb < 7 and asymptomatic or < 8 and symptomatic.   · Monitor daily CBC.

## 2023-08-25 NOTE — PROGRESS NOTES
[facility] Medicine  Progress Note    Patient Name: Mary Ellen Fajardo  MRN: [id number]  Patient Class: IP- Inpatient   Admission Date: 8/22/2023  Length of Stay: 1 days  Attending Physician: Awa Purvis MD  Primary Care Provider: Any Tran MD        Subjective:     Principal Problem:Closed fracture of right tibial plafond with fibula involvement with routine healing        HPI:  Mary Ellen Fajardo is a 67 y.o. 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  Patient reports she was being moved to a shower chair from her wheelchair when her right ankle caught and twisted in the wheel of her wheelchair.  She experienced immediate pain and deformity of the right ankle.  Denies any numbness, tingling, and states she has never injured this ankle before.  She denies any other injury. Wheelchair-bound at baseline and not on any blood thinners.  Has some abdominal cramping. Last BM yesterday. No other symptoms such as shortness of breath, chest pain, nausea, vomiting. She is somewhat withdrawn during interview which she attributes to pain.    In the ED, AFVSS. Labs with hbg 7.8 (baseline ~9-10). CBC and CMP otherwise unremarkable. VBG with pH 7.285, PCO2 77.8, PO2 115, and HCO3 37. Placed on her home nightly bipap. XR imaging of RLE revealed  Acute fracture of the distal tibia and fibula, approximately 2-3 cm above the talar dome.  Mild comminution and mild displacement.  Ortho evaluated the patient, splinted her ankle, and recommended admission to hospital medicine. Given dilaudid  and 1L NS bolus.      Overview/Hospital Course:  Ortho consulted and patient taken to OR on 8/23/2023 and underwent ORIF of closed right trimalleolar fracture by Dr. Suleman Schmitz. Incisional wound vac  placed by Ortho in OR to surgical site and remain in place post-op. Patient post-op nonweight bearing to right lower extremity. PT/OT consulted post-op and working with patient and recommending SNF on discharge when medically ready. Referrals sent as patient states she does not want to go back to [facility] for her SNF care. Patient on multimodal pain meds post-op for pain control with scheduled Tylenol and Robaxin with Oxycodone prn for breakthrough pain. Pain controlled post-op. Patient on Aspirin 81 mg po BID for DVT prophylaxis and will continue. Patein transfused 1 unit of PRBCs on 8/24 for Hgb 6.9 and Hgb improved to 7.2 on 8/25.       Interval History: Patient reports pain in right ankle as 4/10 today. Patient has several accepting SNF facilities according to  and she is working with patient and her family on choices and should be medically ready for discharge on 8/28. Wound vac to right ankle  and perineural catheter remains in place. Labs reviewed. Hgb slightly improved from 6.9 yesterday to 7.2 today after transfused 1 unit of PRBCs yesterday. Patient with clinical signs of bleeding but need to closely watch Hgb.     Review of Systems   Constitutional:  Negative for chills and fever.   Respiratory:  Negative for cough and shortness of breath.    Cardiovascular:  Negative for chest pain.   Gastrointestinal:  Negative for abdominal pain, diarrhea and nausea.   Musculoskeletal:  Positive for arthralgias (Right ankle).   Neurological:  Negative for dizziness.   Psychiatric/Behavioral:  Negative for agitation and confusion.      Objective:     Vital Signs (Most Recent):  Temp: 98.2 °F (36.8 °C) (08/25/23 1126)  Pulse: 73 (08/25/23 1422)  Resp: 18 (08/25/23 1126)  BP: 117/75 (08/25/23 1126)  SpO2: 93 % (08/25/23 1126) on room air Vital Signs (24h Range):  Temp:  [97.6 °F (36.4 °C)-98.7 °F (37.1 °C)] 98.2 °F (36.8 °C)  Pulse:  [50-74] 73  Resp:  [16-20] 18  SpO2:  [93 %-100 %] 93 %  BP:  ()/(51-75) 117/75     Weight: 83 kg (183 lb)  Body mass index is 30.45 kg/m².    Intake/Output Summary (Last 24 hours) at 8/25/2023 1545  Last data filed at 8/25/2023 0553  Gross per 24 hour   Intake 425.83 ml   Output 300 ml   Net 125.83 ml         Physical Exam  Vitals and nursing note reviewed.   Constitutional:       General: She is not in acute distress.     Appearance: Normal appearance. She is well-developed. She is obese. She is not ill-appearing.   Cardiovascular:      Rate and Rhythm: Normal rate and regular rhythm.      Heart sounds: Normal heart sounds. No murmur heard.     No friction rub. No gallop.   Pulmonary:      Effort: Pulmonary effort is normal. No respiratory distress.      Breath sounds: Normal breath sounds. No wheezing.   Abdominal:      General: Abdomen is flat. Bowel sounds are normal. There is no distension.      Palpations: Abdomen is soft.      Tenderness: There is no abdominal tenderness.   Musculoskeletal:      Comments: Left AKA noted. Stump well healed to left leg. Wound vac noted to right ankle incision sites.    Skin:     General: Skin is warm.      Findings: No erythema.   Neurological:      Mental Status: She is alert and oriented to person, place, and time.   Psychiatric:         Mood and Affect: Mood normal.         Behavior: Behavior normal. Behavior is cooperative.         Thought Content: Thought content normal.         Judgment: Judgment normal.             Significant Labs: CBC:   Recent Labs   Lab 08/24/23  1303 08/25/23  0327   WBC 6.44 6.03   HGB 6.9* 7.2*   HCT 24.1* 25.2*    173     CMP:   Recent Labs   Lab 08/24/23  0453 08/24/23  1042 08/25/23  0327    141 137   K 4.4 4.1 4.1    104 101   CO2 32* 30* 32*   GLU 92 86 107   BUN 23 24* 32*   CREATININE 1.0 1.0 1.2   CALCIUM 8.6* 8.9 8.4*   ANIONGAP 6* 7* 4*       Significant Imaging: I have reviewed all pertinent imaging results/findings within the past 24 hours.      Assessment/Plan:      *  Closed fracture of right tibial plafond with fibula involvement with routine healing s/p ORIF on 8/23/2023  68 y/o with left AKA, previous CVA, paraparesis presenting from [facility] with right ankle pain and injury during a transfer from wheelchair to shower chair. She was found to have an acute closed trimalleolar fracture of the right distal tibia and fibula in the ED. Neurovascularly intact.     · Orthopedic surgery consulted and recommended operative repair. Patient taken to the OR on 8/23/2023 and underwent ORIF of right ankle by Dr. Suleman Schmitz.   · Post-op, patient is non weight bearing to right lower extremity as per Ortho.  · PT/OT consulted post-op to work with patient. Recommending SNF and referrals sent and has several accepting facilities and CM/SW working with patient on family on her choices. Anticipate should be medically ready for discharge on 8/28.   · Patient on Aspirin 81 mg po BID for DVT prophylaxis and will continue for 6 weeks.   · Fall precautions.  · Wound vac in place to right ankle incision site and will continue. Management as per Ortho.  · Pain controlled. Continue multimodal pain medications for pain management with scheduled Tylenol 1000 mg po TID + Robaxin 750 mg po 4 times daily + Oxycodone IR prn for breakthrough pain.   · Perineural pain catheters in place with continuous Ropivacaine for post-op pain management and Anesthesia monitoring and managing.     Chronic respiratory failure with hypoxia and hypercapnia  Patient with Hypercapnic and Hypoxic Respiratory failure which is Chronic.  she is on home oxygen at 2 LPM and BiPAP nightly for her ADEEL. Supplemental oxygen was provided and noted-at 2 liters of oxygen. Oxygen Concentration (%):  [28] 28    .   Signs/symptoms of respiratory failure include- none at this time. Contributing diagnoses includes - CHF and Pulmonary HTN and ADEEL. Labs and images were reviewed. Patient Has recent ABG, which has been reviewed  (VBG). Will treat underlying causes and adjust management of respiratory failure as follows-   Continue home supplemental oxygen for goal SpO2 >88%.  Continue nightly BiPAP for her chronic ADEEL.       ADEEL (obstructive sleep apnea)  Chronic condition. Continue nightly BiPAP in hospital to treat as has chronic hypercapnic and hypoxic respiratory failure related to her chronic ADEEL and pulmonary HTN.     Anemia of chronic disease  · Hgb slightly improved to 7.2 on 8/25 after transfused 1 unit of PRBCs. Monitor Hgb daily. Momnitor for any signs of active bleeding.   · Hgb down to 6.9 on 8/24. Plan to transfuse 1 unit of PRBCs on 8/24. No signs of clinical bleeding and post-op and expected blood loss related to surgery.   · Hbg 7.8 on admit. Baseline Hgb 9-10. Low Hgb on admit likely related to fracture and recent acute illness and recent hospitalization for CHF.   · Transfuse blood if Hgb < 7 and asymptomatic or < 8 and symptomatic.   · Monitor daily CBC.    Pressure injury of thigh, stage 2  - Present on admit. Turn patient every 2 hours.   - Continue local wound care.     Chronic indwelling suprapubic catheter in place  Present on admit. Continue routine suprapubic catheter care.     Essential hypertension  Chronic and controlled. Continue Bumex 2 mg po daily to treat. Monitor vital signs very 4 hours. Target BP < 150/90.     Primary insomnia  Chronic condition. Continue home Seroquel 200 mg po nightly.     Paraparesis  · Chronic condition and related to previous CVA. Patient at her baseline neurologic and functional level.   · Fall precautions.     History of CVA (cerebrovascular accident)  Bed bound at baseline from previous CVA. Patient at her baseline neurologic and functional level.       Class 1 obesity due to excess calories without serious comorbidity with body mass index (BMI) of 30.0 to 30.9 in adult  Body mass index is 30.45 kg/m². Morbid obesity complicates all aspects of disease management from diagnostic  modalities to treatment. Weight loss encouraged and health benefits explained to patient.         Chronic diastolic heart failure  Patient is identified as having Diastolic (HFpEF) heart failure that is Chronic. CHF is currently controlled. Latest ECHO performed and demonstrates- Results for orders placed during the hospital encounter of 08/06/23    Echo    Interpretation Summary    Left Ventricle: Normal wall motion. There is low normal systolic function with a visually estimated ejection fraction of 50 - 55%.    Right Ventricle: Normal right ventricular cavity size.  Continue home Bumex 2 mg po daily to treat and monitor clinical status closely. Monitor on telemetry. Patient is off CHF pathway.  Monitor strict Is&Os and daily weights.  Place on fluid restriction of 1.5 L. Continue to stress to patient importance of self efficacy and  on diet for CHF. Last BNP reviewed- and noted below   Recent Labs   Lab 08/22/23  2335   *         VTE Risk Mitigation (From admission, onward)         Ordered     IP VTE HIGH RISK PATIENT  Once         08/23/23 0342     Place sequential compression device  Until discontinued         08/23/23 0342     Reason for No Pharmacological VTE Prophylaxis  Once        Question:  Reasons:  Answer:  Physician Provided (leave comment)    08/23/23 0342                Discharge Planning   LOY: 8/28/2023     Code Status: Full Code   Is the patient medically ready for discharge?: No    Reason for patient still in hospital (select all that apply): Patient trending condition  Discharge Plan A: Skilled Nursing Facility          Awa Purvis MD  Department of Hospital Medicine   [facility] - Surgery

## 2023-08-25 NOTE — PROGRESS NOTES
[facility] - [facility]  Orthopedics  Progress Note    Patient Name: Mary Ellen Fajardo  MRN: [id number]  Admission Date: 8/22/2023  Hospital Length of Stay: 1 days  Attending Provider: Awa Purvis MD  Primary Care Provider: Any Tran MD  Follow-up For: Procedure(s) (LRB):  ORIF, FRACTURE, PILON (Right)  ORIF, FRACTURE, FIBULA (Right)    Post-Operative Day: 2 Days Post-Op  Subjective:     Principal Problem:Closed fracture of right tibial plafond with fibula involvement with routine healing    Principal Orthopedic Problem:  same as above s/p ORIF pilon fx 8/23    Interval History: NAEO. VSS, monitoring hypotension.  Hgb 7.2. Pain well controlled. Wound vac to suction with no output. Potous boot in place. Pt yesterday did ok, rec SNF     Review of patient's allergies indicates:   Allergen Reactions    Tuberculin ppd Itching and Dermatitis     Patient has old scare that she claims is from TB PPD    Rocephin [ceftriaxone] Rash     Rash 2012? Pt agreeable to try with pre mediation with benadryl.        Current Facility-Administered Medications   Medication    0.9%  NaCl infusion (for blood administration)    acetaminophen tablet 1,000 mg    albuterol-ipratropium 2.5 mg-0.5 mg/3 mL nebulizer solution 3 mL    aluminum-magnesium hydroxide-simethicone 200-200-20 mg/5 mL suspension 30 mL    aspirin chewable tablet 81 mg    bisacodyL suppository 10 mg    bumetanide tablet 2 mg    citalopram tablet 30 mg    dextrose 10% bolus 125 mL 125 mL    dextrose 10% bolus 250 mL 250 mL    ferrous sulfate tablet 1 each    folic acid-vit B6-vit B12 2.5-25-2 mg tablet 1 tablet    gabapentin capsule 600 mg    glucagon (human recombinant) injection 1 mg    glucose chewable tablet 16 g    glucose chewable tablet 24 g    hydrOXYzine HCL tablet 25 mg    melatonin tablet 6 mg    methocarbamoL tablet 750 mg    naloxone 0.4 mg/mL injection 0.02 mg    ondansetron disintegrating tablet 8 mg    oxybutynin 24 hr tablet 5 mg    oxyCODONE  "immediate release tablet 5 mg    oxyCODONE immediate release tablet Tab 10 mg    polyethylene glycol packet 17 g    prochlorperazine injection Soln 5 mg    QUEtiapine tablet 200 mg    ROPIvacaine (PF) 2 mg/ml (0.2%) solution    ROPIvacaine (PF) 2 mg/ml (0.2%) solution    senna-docusate 8.6-50 mg per tablet 1 tablet    simethicone chewable tablet 80 mg    sodium chloride 0.9% flush 10 mL    sodium chloride 0.9% flush 5 mL    thiamine tablet 100 mg    vitamin D 1000 units tablet 1,000 Units     Objective:     Vital Signs (Most Recent):  Temp: 97.7 °F (36.5 °C) (08/25/23 0424)  Pulse: (!) 55 (08/25/23 0424)  Resp: 17 (08/25/23 0424)  BP: (!) 107/58 (08/25/23 0424)  SpO2: 98 % (08/25/23 0424) Vital Signs (24h Range):  Temp:  [96.6 °F (35.9 °C)-98.9 °F (37.2 °C)] 97.7 °F (36.5 °C)  Pulse:  [50-75] 55  Resp:  [16-20] 17  SpO2:  [94 %-100 %] 98 %  BP: ()/(49-66) 107/58     Weight: 83 kg (183 lb)  Height: 5' 5" (165.1 cm)  Body mass index is 30.45 kg/m².      Intake/Output Summary (Last 24 hours) at 8/25/2023 0637  Last data filed at 8/25/2023 0553  Gross per 24 hour   Intake 1025.83 ml   Output 1300 ml   Net -274.17 ml       Ortho/SPM Exam  Wound vac to suction with good seal  Potus boot on  Unable to wiggle toes 2/2 block  Cap refill <2s      Significant Labs: CBC:   Recent Labs   Lab 08/24/23  1303 08/25/23  0327   WBC 6.44 6.03   HGB 6.9* 7.2*   HCT 24.1* 25.2*    173     CMP:   Recent Labs   Lab 08/24/23  0453 08/24/23  1042 08/25/23  0327    141 137   K 4.4 4.1 4.1    104 101   CO2 32* 30* 32*   GLU 92 86 107   BUN 23 24* 32*   CREATININE 1.0 1.0 1.2   CALCIUM 8.6* 8.9 8.4*   ANIONGAP 6* 7* 4*     All pertinent labs within the past 24 hours have been reviewed.    Significant Imaging: I have reviewed all pertinent imaging results/findings.    Assessment/Plan:     * Closed fracture of right tibial plafond with fibula involvement with routine healing s/p ORIF on 8/23/2023  Mary Ellen Fajardo " is a 67 y.o. female with PMH of CHF, COPD, neurogenic bladder with chronic indwelling suprapubic catheter, hepatitis-C, CVA, bilateral TKAs complicated by postoperative infections resulting in a left AKA, who presents from [facility] with fractures of the right distal tibia and fibula.  Injury reportedly occurred while patient was transferring from wheelchair to shower chair.  She presented closed, neurovascularly intact, and without any other musculoskeletal injuries on physical exam.  RLE compartments soft and compressible, and she was noted to have fracture blistering over the anteromedial ankle.  Patient was placed in a short-leg splint and window was created for further compartment checks overnight.  Patient is reportedly nonambulatory and wheelchair-bound at baseline.      Now s/p ORIF pilon fx      - Pain MM  - Wound vac to suction  - PT/OT: NWB RLE  - ASA 81 BID  - Ice and elevate affected extremity at all times   - Abx: ancef x24h  - Suprapubic catheter     Dispo: FU PT/OT recs      Nina Guzman PA-C  Orthopedics  [facility] - Surgery

## 2023-08-25 NOTE — PLAN OF CARE
Pt is AAOX4,VSS. Pt is progressing towards plan of care goals. Pain management has been assessed. Pt reports pain, PRN meds are given and pain is reassessed. RLE elevated with ice. Wound vac in place,  no output this shift. Suprapubic catheter in place, output documented. Fall precautions in place, no report of falls. Safety measures are in place bed in lowest position, wheels locked, call light within reach.

## 2023-08-25 NOTE — ANESTHESIA POST-OP PAIN MANAGEMENT
Acute Pain Service Progress Note    Mary Ellen Fajardo is a 67 y.o., female, [id number].    Surgery:  ORIF, FRACTURE, PILON (Right: Ankle)       ORIF, FRACTURE, FIBULA (Right: Leg Lower)    Post Op Day #: 2    Catheter type: perineural  saphenous  and popliteal    Infusion type: Ropivacaine 0.2%    Problem List:    Active Hospital Problems    Diagnosis  POA    *Closed fracture of right tibial plafond with fibula involvement with routine healing s/p ORIF on 8/23/2023 [S82.871D, S82.831D]  Not Applicable    Pressure injury of thigh, stage 2 [L89.202]  Yes    Anemia of chronic disease [D63.8]  Yes    Chronic respiratory failure with hypoxia and hypercapnia [J96.11, J96.12]  Yes      pulmonary htn + volume overload.  No overt evidence of copd per ct.        Chronic diastolic heart failure [I50.32]  Yes    Chronic indwelling suprapubic catheter in place [Z93.59]  Not Applicable     Chronic    History of CVA (cerebrovascular accident) [Z86.73]  Not Applicable     Chronic    Class 1 obesity due to excess calories without serious comorbidity with body mass index (BMI) of 30.0 to 30.9 in adult [E66.09, Z68.30]  Not Applicable    DAEEL (obstructive sleep apnea) [G47.33]  Yes     S/p sleep study at [facility].  Record not available.  Ahi of 44 per psg 2020. Currently on bipap 20/14.        Essential hypertension [I10]  Yes     Chronic    Primary insomnia [F51.01]  Yes     Chronic    Paraparesis [G82.20]  Yes      Resolved Hospital Problems    Diagnosis Date Resolved POA    COPD (chronic obstructive pulmonary disease) [J44.9] 08/24/2023 Yes     Chronic       Subjective:     General appearance of alert, oriented, no complaints   Pain with rest: 4    Numbers   Pain with movement: 4    Numbers   Side Effects    1. Pruritis No    2. Nausea No    3. Motor Blockade No, 0=Ability to raise lower extremities off bed    4. Sedation No, 1=awake and alert    Objective:   Catheter site clean, dry, intact        Vitals   Vitals:     08/25/23 0424   BP: (!) 107/58   Pulse: (!) 55   Resp: 17   Temp: 36.5 °C (97.7 °F)        Labs    Admission on 08/22/2023   Component Date Value Ref Range Status    Sodium 08/22/2023 138  136 - 145 mmol/L Final    Potassium 08/22/2023 3.9  3.5 - 5.1 mmol/L Final    Chloride 08/22/2023 100  95 - 110 mmol/L Final    CO2 08/22/2023 28  23 - 29 mmol/L Final    Glucose 08/22/2023 130 (H)  70 - 110 mg/dL Final    BUN 08/22/2023 32 (H)  8 - 23 mg/dL Final    Creatinine 08/22/2023 1.0  0.5 - 1.4 mg/dL Final    Calcium 08/22/2023 8.7  8.7 - 10.5 mg/dL Final    Total Protein 08/22/2023 7.2  6.0 - 8.4 g/dL Final    Albumin 08/22/2023 2.8 (L)  3.5 - 5.2 g/dL Final    Total Bilirubin 08/22/2023 0.2  0.1 - 1.0 mg/dL Final    Alkaline Phosphatase 08/22/2023 82  55 - 135 U/L Final    AST 08/22/2023 14  10 - 40 U/L Final    ALT 08/22/2023 8 (L)  10 - 44 U/L Final    eGFR 08/22/2023 >60.0  >60 mL/min/1.73 m^2 Final    Anion Gap 08/22/2023 10  8 - 16 mmol/L Final    WBC 08/22/2023 7.58  3.90 - 12.70 K/uL Final    RBC 08/22/2023 3.16 (L)  4.00 - 5.40 M/uL Final    Hemoglobin 08/22/2023 7.8 (L)  12.0 - 16.0 g/dL Final    Hematocrit 08/22/2023 27.5 (L)  37.0 - 48.5 % Final    MCV 08/22/2023 87  82 - 98 fL Final    MCH 08/22/2023 24.7 (L)  27.0 - 31.0 pg Final    MCHC 08/22/2023 28.4 (L)  32.0 - 36.0 g/dL Final    RDW 08/22/2023 17.4 (H)  11.5 - 14.5 % Final    Platelets 08/22/2023 221  150 - 450 K/uL Final    MPV 08/22/2023 10.1  9.2 - 12.9 fL Final    Immature Granulocytes 08/22/2023 0.3  0.0 - 0.5 % Final    Gran # (ANC) 08/22/2023 5.1  1.8 - 7.7 K/uL Final    Immature Grans (Abs) 08/22/2023 0.02  0.00 - 0.04 K/uL Final    Lymph # 08/22/2023 1.4  1.0 - 4.8 K/uL Final    Mono # 08/22/2023 0.7  0.3 - 1.0 K/uL Final    Eos # 08/22/2023 0.3  0.0 - 0.5 K/uL Final    Baso # 08/22/2023 0.05  0.00 - 0.20 K/uL Final    nRBC 08/22/2023 0  0 /100 WBC Final    Gran % 08/22/2023 66.7  38.0 - 73.0 % Final     Lymph % 08/22/2023 18.6  18.0 - 48.0 % Final    Mono % 08/22/2023 9.2  4.0 - 15.0 % Final    Eosinophil % 08/22/2023 4.5  0.0 - 8.0 % Final    Basophil % 08/22/2023 0.7  0.0 - 1.9 % Final    Differential Method 08/22/2023 Automated   Final    Troponin I 08/22/2023 <0.006  0.000 - 0.026 ng/mL Final    BNP 08/22/2023 126 (H)  0 - 99 pg/mL Final    POC PH 08/22/2023 7.285 (L)  7.35 - 7.45 Final    POC PCO2 08/22/2023 77.8 (H)  35 - 45 mmHg Final    POC PO2 08/22/2023 115 (HH)  40 - 60 mmHg Final    POC HCO3 08/22/2023 37.0 (H)  24 - 28 mmol/L Final    POC BE 08/22/2023 10  -2 to 2 mmol/L Final    POC SATURATED O2 08/22/2023 98  95 - 100 % Final    POC TCO2 08/22/2023 39 (H)  24 - 29 mmol/L Final    Sample 08/22/2023 VENOUS   Final    Site 08/22/2023 Other   Final    Allens Test 08/22/2023 N/A   Final    POC PH 08/23/2023 7.293 (L)  7.35 - 7.45 Final    POC PCO2 08/23/2023 71.4 (H)  35 - 45 mmHg Final    POC PO2 08/23/2023 60  40 - 60 mmHg Final    POC HCO3 08/23/2023 34.6 (H)  24 - 28 mmol/L Final    POC BE 08/23/2023 8  -2 to 2 mmol/L Final    POC SATURATED O2 08/23/2023 86 (L)  95 - 100 % Final    POC TCO2 08/23/2023 37 (H)  24 - 29 mmol/L Final    Sample 08/23/2023 VENOUS   Final    Site 08/23/2023 Other   Final    Allens Test 08/23/2023 N/A   Final    DelSys 08/23/2023 CPAP/BiPAP   Final    Mode 08/23/2023 BiPAP   Final    FiO2 08/23/2023 28   Final    IP 08/23/2023 12   Final    EP 08/23/2023 5   Final    Sodium 08/24/2023 142  136 - 145 mmol/L Final    Potassium 08/24/2023 4.4  3.5 - 5.1 mmol/L Final    Chloride 08/24/2023 104  95 - 110 mmol/L Final    CO2 08/24/2023 32 (H)  23 - 29 mmol/L Final    Glucose 08/24/2023 92  70 - 110 mg/dL Final    BUN 08/24/2023 23  8 - 23 mg/dL Final    Creatinine 08/24/2023 1.0  0.5 - 1.4 mg/dL Final    Calcium 08/24/2023 8.6 (L)  8.7 - 10.5 mg/dL Final    Anion Gap 08/24/2023 6 (L)  8 - 16 mmol/L Final    eGFR 08/24/2023 >60.0  >60  mL/min/1.73 m^2 Final    Iron 08/24/2023 15 (L)  30 - 160 ug/dL Final    Transferrin 08/24/2023 149 (L)  200 - 375 mg/dL Final    TIBC 08/24/2023 221 (L)  250 - 450 ug/dL Final    Saturated Iron 08/24/2023 7 (L)  20 - 50 % Final    Ferritin 08/24/2023 201  20.0 - 300.0 ng/mL Final    Sodium 08/24/2023 141  136 - 145 mmol/L Final    Potassium 08/24/2023 4.1  3.5 - 5.1 mmol/L Final    Chloride 08/24/2023 104  95 - 110 mmol/L Final    CO2 08/24/2023 30 (H)  23 - 29 mmol/L Final    Glucose 08/24/2023 86  70 - 110 mg/dL Final    BUN 08/24/2023 24 (H)  8 - 23 mg/dL Final    Creatinine 08/24/2023 1.0  0.5 - 1.4 mg/dL Final    Calcium 08/24/2023 8.9  8.7 - 10.5 mg/dL Final    Anion Gap 08/24/2023 7 (L)  8 - 16 mmol/L Final    eGFR 08/24/2023 >60.0  >60 mL/min/1.73 m^2 Final    WBC 08/24/2023 6.44  3.90 - 12.70 K/uL Final    RBC 08/24/2023 2.72 (L)  4.00 - 5.40 M/uL Final    Hemoglobin 08/24/2023 6.9 (L)  12.0 - 16.0 g/dL Final    Hematocrit 08/24/2023 24.1 (L)  37.0 - 48.5 % Final    MCV 08/24/2023 89  82 - 98 fL Final    MCH 08/24/2023 25.4 (L)  27.0 - 31.0 pg Final    MCHC 08/24/2023 28.6 (L)  32.0 - 36.0 g/dL Final    RDW 08/24/2023 17.6 (H)  11.5 - 14.5 % Final    Platelets 08/24/2023 201  150 - 450 K/uL Final    MPV 08/24/2023 10.7  9.2 - 12.9 fL Final    Immature Granulocytes 08/24/2023 0.3  0.0 - 0.5 % Final    Gran # (ANC) 08/24/2023 4.1  1.8 - 7.7 K/uL Final    Immature Grans (Abs) 08/24/2023 0.02  0.00 - 0.04 K/uL Final    Lymph # 08/24/2023 1.6  1.0 - 4.8 K/uL Final    Mono # 08/24/2023 0.6  0.3 - 1.0 K/uL Final    Eos # 08/24/2023 0.1  0.0 - 0.5 K/uL Final    Baso # 08/24/2023 0.03  0.00 - 0.20 K/uL Final    nRBC 08/24/2023 0  0 /100 WBC Final    Gran % 08/24/2023 63.2  38.0 - 73.0 % Final    Lymph % 08/24/2023 24.8  18.0 - 48.0 % Final    Mono % 08/24/2023 9.0  4.0 - 15.0 % Final    Eosinophil % 08/24/2023 2.2  0.0 - 8.0 % Final    Basophil % 08/24/2023 0.5  0.0 - 1.9 %  Final    Differential Method 08/24/2023 Automated   Final    UNIT NUMBER 08/24/2023 [id number]   Final    Product Code 08/24/2023 [id number]   Final    DISPENSE STATUS 08/24/2023 TRANSFUSED   Final    CODING SYSTEM 08/24/2023 [id number]   Final    Unit Blood Type Code 08/24/2023 5100   Final    Unit Blood Type 08/24/2023 O POS   Final    Unit Expiration 08/24/2023 [id number]   Final    CROSSMATCH INTERPRETATION 08/24/2023 Compatible   Final    Group & Rh 08/24/2023 O POS   Final    Indirect William 08/24/2023 NEG   Final    Specimen Outdate 08/24/2023 08/27/2023 23:59   Final    POCT Glucose 08/24/2023 115 (H)  70 - 110 mg/dL Final    Sodium 08/25/2023 137  136 - 145 mmol/L Final    Potassium 08/25/2023 4.1  3.5 - 5.1 mmol/L Final    Chloride 08/25/2023 101  95 - 110 mmol/L Final    CO2 08/25/2023 32 (H)  23 - 29 mmol/L Final    Glucose 08/25/2023 107  70 - 110 mg/dL Final    BUN 08/25/2023 32 (H)  8 - 23 mg/dL Final    Creatinine 08/25/2023 1.2  0.5 - 1.4 mg/dL Final    Calcium 08/25/2023 8.4 (L)  8.7 - 10.5 mg/dL Final    Anion Gap 08/25/2023 4 (L)  8 - 16 mmol/L Final    eGFR 08/25/2023 49.6 (A)  >60 mL/min/1.73 m^2 Final    WBC 08/25/2023 6.03  3.90 - 12.70 K/uL Final    RBC 08/25/2023 2.82 (L)  4.00 - 5.40 M/uL Final    Hemoglobin 08/25/2023 7.2 (L)  12.0 - 16.0 g/dL Final    Hematocrit 08/25/2023 25.2 (L)  37.0 - 48.5 % Final    MCV 08/25/2023 89  82 - 98 fL Final    MCH 08/25/2023 25.5 (L)  27.0 - 31.0 pg Final    MCHC 08/25/2023 28.6 (L)  32.0 - 36.0 g/dL Final    RDW 08/25/2023 17.8 (H)  11.5 - 14.5 % Final    Platelets 08/25/2023 173  150 - 450 K/uL Final    MPV 08/25/2023 9.6  9.2 - 12.9 fL Final    Immature Granulocytes 08/25/2023 0.7 (H)  0.0 - 0.5 % Final    Gran # (ANC) 08/25/2023 3.2  1.8 - 7.7 K/uL Final    Immature Grans (Abs) 08/25/2023 0.04  0.00 - 0.04 K/uL Final    Lymph # 08/25/2023 1.8  1.0 - 4.8 K/uL Final    Mono # 08/25/2023 0.8  0.3 - 1.0 K/uL Final     Eos # 08/25/2023 0.2  0.0 - 0.5 K/uL Final    Baso # 08/25/2023 0.03  0.00 - 0.20 K/uL Final    nRBC 08/25/2023 0  0 /100 WBC Final    Gran % 08/25/2023 53.1  38.0 - 73.0 % Final    Lymph % 08/25/2023 29.2  18.0 - 48.0 % Final    Mono % 08/25/2023 13.3  4.0 - 15.0 % Final    Eosinophil % 08/25/2023 3.2  0.0 - 8.0 % Final    Basophil % 08/25/2023 0.5  0.0 - 1.9 % Final    Differential Method 08/25/2023 Automated   Final        Meds   Current Facility-Administered Medications   Medication Dose Route Frequency Provider Last Rate Last Admin    0.9%  NaCl infusion (for blood administration)   Intravenous Q24H PRN Awa Purvis MD        acetaminophen tablet 1,000 mg  1,000 mg Oral Q8H Linda Wiseman MD   1,000 mg at 08/25/23 0550    albuterol-ipratropium 2.5 mg-0.5 mg/3 mL nebulizer solution 3 mL  3 mL Nebulization Q4H PRN Kaylee Montes MD   3 mL at 08/24/23 2058    aluminum-magnesium hydroxide-simethicone 200-200-20 mg/5 mL suspension 30 mL  30 mL Oral QID PRN Ana Fonseca PA-C        aspirin chewable tablet 81 mg  81 mg Oral BID Suleman Bowden MD   81 mg at 08/24/23 2055    bisacodyL suppository 10 mg  10 mg Rectal Daily PRN Ana Fonseca PA-C        bumetanide tablet 2 mg  2 mg Oral Daily Kaylee Montes MD   2 mg at 08/24/23 1016    citalopram tablet 30 mg  30 mg Oral QHS Awa Purvis MD   30 mg at 08/24/23 2055    dextrose 10% bolus 125 mL 125 mL  12.5 g Intravenous PRN Ana Fonseca PA-C        dextrose 10% bolus 250 mL 250 mL  25 g Intravenous PRN Ana Fonseca PA-C        ferrous sulfate tablet 1 each  1 tablet Oral Daily Ana Fonseca PA-C   1 each at 08/24/23 1017    folic acid-vit B6-vit B12 2.5-25-2 mg tablet 1 tablet  1 tablet Oral Daily Ana Fonseca PA-C   1 tablet at 08/24/23 1020    gabapentin capsule 600 mg  600 mg Oral TID Awa Purvis MD   600 mg at 08/24/23 2055    glucagon (human recombinant)  injection 1 mg  1 mg Intramuscular PRN Ana Fonseca PA-C        glucose chewable tablet 16 g  16 g Oral PRN Ana Fonseca PA-C        glucose chewable tablet 24 g  24 g Oral PRN Ana Fonseca PA-C        hydrOXYzine HCL tablet 25 mg  25 mg Oral BID PRN Ana Fonseca PA-C        melatonin tablet 6 mg  6 mg Oral Nightly PRN Ana Fonseca PA-C        methocarbamoL tablet 750 mg  750 mg Oral Q6H Awa Purvis MD   750 mg at 08/25/23 0550    naloxone 0.4 mg/mL injection 0.02 mg  0.02 mg Intravenous PRN Ana Fonseca PA-C        ondansetron disintegrating tablet 8 mg  8 mg Oral Q8H PRN Ana Fonseca PA-C        oxybutynin 24 hr tablet 5 mg  5 mg Oral Daily Kaylee Montes MD   5 mg at 08/24/23 1020    oxyCODONE immediate release tablet 5 mg  5 mg Oral Q4H PRN Ramu Daugherty MD        oxyCODONE immediate release tablet Tab 10 mg  10 mg Oral Q4H PRN Ramu Daugherty MD   10 mg at 08/24/23 2202    polyethylene glycol packet 17 g  17 g Oral BID PRN Ana Fonseca PA-C        prochlorperazine injection Soln 5 mg  5 mg Intravenous Q6H PRN Ana Fonseca PA-C        QUEtiapine tablet 200 mg  200 mg Oral QHS Ana Fonseca PA-C   200 mg at 08/24/23 2055    ROPIvacaine (PF) 2 mg/ml (0.2%) solution  0.1 mL/hr Perineural Continuous Parker Campbell MD 0.1 mL/hr at 08/23/23 1552 0.1 mL/hr at 08/23/23 1552    ROPIvacaine (PF) 2 mg/ml (0.2%) solution  0.1 mL/hr Perineural Continuous Parker Campbell MD 0.1 mL/hr at 08/23/23 1440 0.1 mL/hr at 08/23/23 1440    senna-docusate 8.6-50 mg per tablet 1 tablet  1 tablet Oral Daily Cielo Almaguer PA-C        simethicone chewable tablet 80 mg  1 tablet Oral QID PRN Ana Fonseca PA-C        sodium chloride 0.9% flush 10 mL  10 mL Intravenous PRN Ramu Good MD        sodium chloride 0.9% flush 5 mL  5 mL Intravenous PRN Ana Fonseca PA-C        thiamine tablet 100 mg  100  mg Oral Daily Ana Fonseca PA-C   100 mg at 08/24/23 1017    vitamin D 1000 units tablet 1,000 Units  1,000 Units Oral Daily Suleman Bowden MD   1,000 Units at 08/24/23 1017       Assessment:     Pain control adequate. Complaining mostly of right knee pain.     Plan:     Patient doing well, continue present treatment.  -- Continue PNCs at current rate. Plan to pause Adductor catheter on Sunday and Popliteal cathter on Monday.   -- Continue multimodal pain regimen of Tylenol 1 gm Q8H, Gabapentin 300 mg TID, Robaxin 500 mg Q6H, and Oxy 5 mg IR Q4H PRN. Added Oxy 10 mg Q4H PRN for severe pain (on home Percocet 10 BID).   -- Dispo: [facility]    Linda Wiseman MD  Anesthesia PGY4, CA3

## 2023-08-25 NOTE — PLAN OF CARE
Patient expected to be medically stable for discharge on 8/28/23. Accepted to 3 skilled facilities 1) [facility], 2) [facility] 3) [facility]. Discussed with patient and grand daughter Hali. Family will move forward with admission to [facility]. Sw to update facility so grand daughter can sign intake paper work. Grand daughter did request a referral be placed to [facility] to see if a bed may be available on date of d/c. Referral placed, will continue to follow.        UPDATE 930AM-Patient accepted to [facility] for admission on Monday 8/28/23. Grand daughter and patient updated.    Shazia Razo RNCM  Case Management  [facility]  [phone]

## 2023-08-25 NOTE — ASSESSMENT & PLAN NOTE
68 y/o with left AKA, previous CVA, paraparesis presenting from [facility] with right ankle pain and injury during a transfer from wheelchair to shower chair. She was found to have an acute closed trimalleolar fracture of the right distal tibia and fibula in the ED. Neurovascularly intact.     · Orthopedic surgery consulted and recommended operative repair. Patient taken to the OR on 8/23/2023 and underwent ORIF of right ankle by Dr. Suleman Schmitz.   · Post-op, patient is non weight bearing to right lower extremity as per Ortho.  · PT/OT consulted post-op to work with patient. Recommending SNF and referrals sent and has several accepting facilities and CM/SW working with patient on family on her choices. Anticipate should be medically ready for discharge on 8/28.   · Patient on Aspirin 81 mg po BID for DVT prophylaxis and will continue for 6 weeks.   · Fall precautions.  · Wound vac in place to right ankle incision site and will continue. Management as per Ortho.  · Pain controlled. Continue multimodal pain medications for pain management with scheduled Tylenol 1000 mg po TID + Robaxin 750 mg po 4 times daily + Oxycodone IR prn for breakthrough pain.   · Perineural pain catheters in place with continuous Ropivacaine for post-op pain management and Anesthesia monitoring and managing.

## 2023-08-25 NOTE — SUBJECTIVE & OBJECTIVE
Interval History: Patient reports pain in right ankle as 4/10 today. Patient has several accepting SNF facilities according to  and she is working with patient and her family on choices and should be medically ready for discharge on 8/28. Wound vac to right ankle  and perineural catheter remains in place. Labs reviewed. Hgb slightly improved from 6.9 yesterday to 7.2 today after transfused 1 unit of PRBCs yesterday. Patient with clinical signs of bleeding but need to closely watch Hgb.     Review of Systems   Constitutional:  Negative for chills and fever.   Respiratory:  Negative for cough and shortness of breath.    Cardiovascular:  Negative for chest pain.   Gastrointestinal:  Negative for abdominal pain, diarrhea and nausea.   Musculoskeletal:  Positive for arthralgias (Right ankle).   Neurological:  Negative for dizziness.   Psychiatric/Behavioral:  Negative for agitation and confusion.      Objective:     Vital Signs (Most Recent):  Temp: 98.2 °F (36.8 °C) (08/25/23 1126)  Pulse: 73 (08/25/23 1422)  Resp: 18 (08/25/23 1126)  BP: 117/75 (08/25/23 1126)  SpO2: 93 % (08/25/23 1126) on room air Vital Signs (24h Range):  Temp:  [97.6 °F (36.4 °C)-98.7 °F (37.1 °C)] 98.2 °F (36.8 °C)  Pulse:  [50-74] 73  Resp:  [16-20] 18  SpO2:  [93 %-100 %] 93 %  BP: ()/(51-75) 117/75     Weight: 83 kg (183 lb)  Body mass index is 30.45 kg/m².    Intake/Output Summary (Last 24 hours) at 8/25/2023 1545  Last data filed at 8/25/2023 0553  Gross per 24 hour   Intake 425.83 ml   Output 300 ml   Net 125.83 ml         Physical Exam  Vitals and nursing note reviewed.   Constitutional:       General: She is not in acute distress.     Appearance: Normal appearance. She is well-developed. She is obese. She is not ill-appearing.   Cardiovascular:      Rate and Rhythm: Normal rate and regular rhythm.      Heart sounds: Normal heart sounds. No murmur heard.     No friction rub. No gallop.   Pulmonary:      Effort: Pulmonary  effort is normal. No respiratory distress.      Breath sounds: Normal breath sounds. No wheezing.   Abdominal:      General: Abdomen is flat. Bowel sounds are normal. There is no distension.      Palpations: Abdomen is soft.      Tenderness: There is no abdominal tenderness.   Musculoskeletal:      Comments: Left AKA noted. Stump well healed to left leg. Wound vac noted to right ankle incision sites.    Skin:     General: Skin is warm.      Findings: No erythema.   Neurological:      Mental Status: She is alert and oriented to person, place, and time.   Psychiatric:         Mood and Affect: Mood normal.         Behavior: Behavior normal. Behavior is cooperative.         Thought Content: Thought content normal.         Judgment: Judgment normal.             Significant Labs: CBC:   Recent Labs   Lab 08/24/23  1303 08/25/23  0327   WBC 6.44 6.03   HGB 6.9* 7.2*   HCT 24.1* 25.2*    173     CMP:   Recent Labs   Lab 08/24/23  0453 08/24/23  1042 08/25/23  0327    141 137   K 4.4 4.1 4.1    104 101   CO2 32* 30* 32*   GLU 92 86 107   BUN 23 24* 32*   CREATININE 1.0 1.0 1.2   CALCIUM 8.6* 8.9 8.4*   ANIONGAP 6* 7* 4*       Significant Imaging: I have reviewed all pertinent imaging results/findings within the past 24 hours.

## 2023-08-25 NOTE — NURSING
Pt BP drop to 94/55 without any symptoms. Informed Dr. Webster. Was informed to continue to monitor the pt and to notified the doctor if her MAP go blow 65 or she have symptoms.

## 2023-08-25 NOTE — ADDENDUM NOTE
Addendum  created 08/25/23 1304 by Mai Leal MD    Charge Capture section accepted, Cosign clinical note with attestation

## 2023-08-25 NOTE — NURSING
Pt alert and oriented x 4, calm and cooperative. Pt is aware of her treatment plan and discharge planning in process. Pt' right lower extremity is elevated on pillows with ice packs in place, wound vac dressing intact, no output from wound vac drainage noted. Pt noted wheezing this morning, prn breathing treatment given per respiratory therapist. Drainage noted to suprapubic catheter site, site cleaned with normal saline, 4 x 4 split gauze drain dressing applied, clean, dry, and intact. Pt reported the reason there is drainage at her suprapubic catheter site is that she normally has an 18F suprapubic catheter and now has a 16F suprapubic catheter in.

## 2023-08-25 NOTE — ASSESSMENT & PLAN NOTE
Patient with Hypercapnic and Hypoxic Respiratory failure which is Chronic.  she is on home oxygen at 2 LPM and BiPAP nightly for her ADEEL. Supplemental oxygen was provided and noted-at 2 liters of oxygen. Oxygen Concentration (%):  [28] 28    .   Signs/symptoms of respiratory failure include- none at this time. Contributing diagnoses includes - CHF and Pulmonary HTN and ADEEL. Labs and images were reviewed. Patient Has recent ABG, which has been reviewed (V[facility]). Will treat underlying causes and adjust management of respiratory failure as follows-   Continue home supplemental oxygen for goal SpO2 >88%.  Continue nightly BiPAP for her chronic ADEEL.

## 2023-08-26 LAB
ANION GAP SERPL CALC-SCNC: 6 MMOL/L (ref 8–16)
BASOPHILS # BLD AUTO: 0.03 K/UL (ref 0–0.2)
BASOPHILS NFR BLD: 0.5 % (ref 0–1.9)
BUN SERPL-MCNC: 37 MG/DL (ref 8–23)
CALCIUM SERPL-MCNC: 8.7 MG/DL (ref 8.7–10.5)
CHLORIDE SERPL-SCNC: 103 MMOL/L (ref 95–110)
CO2 SERPL-SCNC: 34 MMOL/L (ref 23–29)
CREAT SERPL-MCNC: 1.1 MG/DL (ref 0.5–1.4)
DIFFERENTIAL METHOD: ABNORMAL
EOSINOPHIL # BLD AUTO: 0.2 K/UL (ref 0–0.5)
EOSINOPHIL NFR BLD: 4.3 % (ref 0–8)
ERYTHROCYTE [DISTWIDTH] IN BLOOD BY AUTOMATED COUNT: 17.4 % (ref 11.5–14.5)
EST. GFR  (NO RACE VARIABLE): 55.1 ML/MIN/1.73 M^2
GLUCOSE SERPL-MCNC: 107 MG/DL (ref 70–110)
HCT VFR BLD AUTO: 26.2 % (ref 37–48.5)
HGB BLD-MCNC: 7.4 G/DL (ref 12–16)
IMM GRANULOCYTES # BLD AUTO: 0.02 K/UL (ref 0–0.04)
IMM GRANULOCYTES NFR BLD AUTO: 0.4 % (ref 0–0.5)
LYMPHOCYTES # BLD AUTO: 1.4 K/UL (ref 1–4.8)
LYMPHOCYTES NFR BLD: 24.5 % (ref 18–48)
MCH RBC QN AUTO: 25.2 PG (ref 27–31)
MCHC RBC AUTO-ENTMCNC: 28.2 G/DL (ref 32–36)
MCV RBC AUTO: 89 FL (ref 82–98)
MONOCYTES # BLD AUTO: 0.5 K/UL (ref 0.3–1)
MONOCYTES NFR BLD: 8.7 % (ref 4–15)
NEUTROPHILS # BLD AUTO: 3.4 K/UL (ref 1.8–7.7)
NEUTROPHILS NFR BLD: 61.6 % (ref 38–73)
NRBC BLD-RTO: 0 /100 WBC
PLATELET # BLD AUTO: 198 K/UL (ref 150–450)
PMV BLD AUTO: 10.2 FL (ref 9.2–12.9)
POTASSIUM SERPL-SCNC: 4.5 MMOL/L (ref 3.5–5.1)
RBC # BLD AUTO: 2.94 M/UL (ref 4–5.4)
SODIUM SERPL-SCNC: 143 MMOL/L (ref 136–145)
WBC # BLD AUTO: 5.54 K/UL (ref 3.9–12.7)

## 2023-08-26 PROCEDURE — 97530 THERAPEUTIC ACTIVITIES: CPT

## 2023-08-26 PROCEDURE — 94761 N-INVAS EAR/PLS OXIMETRY MLT: CPT

## 2023-08-26 PROCEDURE — 25000003 PHARM REV CODE 250: Performed by: PHYSICIAN ASSISTANT

## 2023-08-26 PROCEDURE — 94799 UNLISTED PULMONARY SVC/PX: CPT

## 2023-08-26 PROCEDURE — 25000003 PHARM REV CODE 250

## 2023-08-26 PROCEDURE — 25000242 PHARM REV CODE 250 ALT 637 W/ HCPCS: Performed by: INTERNAL MEDICINE

## 2023-08-26 PROCEDURE — 94660 CPAP INITIATION&MGMT: CPT

## 2023-08-26 PROCEDURE — 21400001 HC TELEMETRY ROOM

## 2023-08-26 PROCEDURE — 25000003 PHARM REV CODE 250: Performed by: INTERNAL MEDICINE

## 2023-08-26 PROCEDURE — 99231 PR SUBSEQUENT HOSPITAL CARE,LEVL I: ICD-10-PCS | Mod: ,,, | Performed by: ANESTHESIOLOGY

## 2023-08-26 PROCEDURE — 99900035 HC TECH TIME PER 15 MIN (STAT)

## 2023-08-26 PROCEDURE — 36415 COLL VENOUS BLD VENIPUNCTURE: CPT | Performed by: INTERNAL MEDICINE

## 2023-08-26 PROCEDURE — 99233 SBSQ HOSP IP/OBS HIGH 50: CPT | Mod: ,,, | Performed by: INTERNAL MEDICINE

## 2023-08-26 PROCEDURE — 94640 AIRWAY INHALATION TREATMENT: CPT

## 2023-08-26 PROCEDURE — 25000003 PHARM REV CODE 250: Performed by: HOSPITALIST

## 2023-08-26 PROCEDURE — 97535 SELF CARE MNGMENT TRAINING: CPT

## 2023-08-26 PROCEDURE — 63600175 PHARM REV CODE 636 W HCPCS

## 2023-08-26 PROCEDURE — 85025 COMPLETE CBC W/AUTO DIFF WBC: CPT | Performed by: INTERNAL MEDICINE

## 2023-08-26 PROCEDURE — 80048 BASIC METABOLIC PNL TOTAL CA: CPT | Performed by: INTERNAL MEDICINE

## 2023-08-26 PROCEDURE — 25000003 PHARM REV CODE 250: Performed by: STUDENT IN AN ORGANIZED HEALTH CARE EDUCATION/TRAINING PROGRAM

## 2023-08-26 PROCEDURE — 99233 PR SUBSEQUENT HOSPITAL CARE,LEVL III: ICD-10-PCS | Mod: ,,, | Performed by: INTERNAL MEDICINE

## 2023-08-26 PROCEDURE — 25000003 PHARM REV CODE 250: Performed by: SURGERY

## 2023-08-26 PROCEDURE — 27000221 HC OXYGEN, UP TO 24 HOURS

## 2023-08-26 PROCEDURE — 99231 SBSQ HOSP IP/OBS SF/LOW 25: CPT | Mod: ,,, | Performed by: ANESTHESIOLOGY

## 2023-08-26 RX ADMIN — CHOLECALCIFEROL TAB 25 MCG (1000 UNIT) 1000 UNITS: 25 TAB at 10:08

## 2023-08-26 RX ADMIN — METHOCARBAMOL 750 MG: 750 TABLET ORAL at 12:08

## 2023-08-26 RX ADMIN — OXYCODONE HYDROCHLORIDE 5 MG: 5 TABLET ORAL at 09:08

## 2023-08-26 RX ADMIN — GABAPENTIN 600 MG: 300 CAPSULE ORAL at 10:08

## 2023-08-26 RX ADMIN — CITALOPRAM HYDROBROMIDE 30 MG: 10 TABLET ORAL at 09:08

## 2023-08-26 RX ADMIN — IPRATROPIUM BROMIDE AND ALBUTEROL SULFATE 3 ML: 2.5; .5 SOLUTION RESPIRATORY (INHALATION) at 04:08

## 2023-08-26 RX ADMIN — FERROUS SULFATE TAB 325 MG (65 MG ELEMENTAL FE) 1 EACH: 325 (65 FE) TAB at 10:08

## 2023-08-26 RX ADMIN — METHOCARBAMOL 750 MG: 750 TABLET ORAL at 05:08

## 2023-08-26 RX ADMIN — FOLIC ACID-PYRIDOXINE-CYANOCOBALAMIN TAB 2.5-25-2 MG 1 TABLET: 2.5-25-2 TAB at 10:08

## 2023-08-26 RX ADMIN — GABAPENTIN 600 MG: 300 CAPSULE ORAL at 03:08

## 2023-08-26 RX ADMIN — ASPIRIN 81 MG 81 MG: 81 TABLET ORAL at 09:08

## 2023-08-26 RX ADMIN — IPRATROPIUM BROMIDE AND ALBUTEROL SULFATE 3 ML: 2.5; .5 SOLUTION RESPIRATORY (INHALATION) at 07:08

## 2023-08-26 RX ADMIN — GABAPENTIN 600 MG: 300 CAPSULE ORAL at 09:08

## 2023-08-26 RX ADMIN — ROPIVACAINE HYDROCHLORIDE 0.1 ML/HR: 2 INJECTION, SOLUTION EPIDURAL; INFILTRATION at 04:08

## 2023-08-26 RX ADMIN — METHOCARBAMOL 750 MG: 750 TABLET ORAL at 11:08

## 2023-08-26 RX ADMIN — METHOCARBAMOL 750 MG: 750 TABLET ORAL at 06:08

## 2023-08-26 RX ADMIN — OXYCODONE HYDROCHLORIDE 10 MG: 10 TABLET ORAL at 10:08

## 2023-08-26 RX ADMIN — ACETAMINOPHEN 1000 MG: 500 TABLET ORAL at 03:08

## 2023-08-26 RX ADMIN — QUETIAPINE FUMARATE 200 MG: 100 TABLET ORAL at 09:08

## 2023-08-26 RX ADMIN — THIAMINE HCL TAB 100 MG 100 MG: 100 TAB at 10:08

## 2023-08-26 RX ADMIN — OXYBUTYNIN CHLORIDE 5 MG: 5 TABLET, EXTENDED RELEASE ORAL at 10:08

## 2023-08-26 RX ADMIN — ACETAMINOPHEN 1000 MG: 500 TABLET ORAL at 05:08

## 2023-08-26 RX ADMIN — SENNOSIDES AND DOCUSATE SODIUM 1 TABLET: 50; 8.6 TABLET ORAL at 10:08

## 2023-08-26 RX ADMIN — HYDROXYZINE HYDROCHLORIDE 25 MG: 25 TABLET ORAL at 06:08

## 2023-08-26 RX ADMIN — ASPIRIN 81 MG 81 MG: 81 TABLET ORAL at 10:08

## 2023-08-26 RX ADMIN — ACETAMINOPHEN 1000 MG: 500 TABLET ORAL at 09:08

## 2023-08-26 RX ADMIN — IPRATROPIUM BROMIDE AND ALBUTEROL SULFATE 3 ML: 2.5; .5 SOLUTION RESPIRATORY (INHALATION) at 12:08

## 2023-08-26 RX ADMIN — ROPIVACAINE HYDROCHLORIDE 0.1 ML/HR: 2 INJECTION, SOLUTION EPIDURAL; INFILTRATION at 03:08

## 2023-08-26 NOTE — ANESTHESIA POST-OP PAIN MANAGEMENT
Acute Pain Service Progress Note    Mary Ellen Fajardo is a 67 y.o., female, [id number].    Surgery:  ORIF, FRACTURE, PILON (Right: Ankle)       ORIF, FRACTURE, FIBULA (Right: Leg Lower)    Post Op Day #: 3    Catheter type: perineural  saphenous  and popliteal    Infusion type: Ropivacaine 0.2%    Problem List:    Active Hospital Problems    Diagnosis  POA    *Closed fracture of right tibial plafond with fibula involvement with routine healing s/p ORIF on 8/23/2023 [S82.871D, S82.831D]  Not Applicable    Pressure injury of thigh, stage 2 [L89.202]  Yes    Anemia of chronic disease [D63.8]  Yes    Chronic respiratory failure with hypoxia and hypercapnia [J96.11, J96.12]  Yes      pulmonary htn + volume overload.  No overt evidence of copd per ct.        Chronic diastolic heart failure [I50.32]  Yes    Chronic indwelling suprapubic catheter in place [Z93.59]  Not Applicable     Chronic    History of CVA (cerebrovascular accident) [Z86.73]  Not Applicable     Chronic    Class 1 obesity due to excess calories without serious comorbidity with body mass index (BMI) of 30.0 to 30.9 in adult [E66.09, Z68.30]  Not Applicable    ADEEL (obstructive sleep apnea) [G47.33]  Yes     S/p sleep study at [facility].  Record not available.  Ahi of 44 per psg 2020. Currently on bipap 20/14.        Essential hypertension [I10]  Yes     Chronic    Primary insomnia [F51.01]  Yes     Chronic    Paraparesis [G82.20]  Yes      Resolved Hospital Problems    Diagnosis Date Resolved POA    COPD (chronic obstructive pulmonary disease) [J44.9] 08/24/2023 Yes     Chronic       Subjective:     General appearance of alert, oriented, no complaints   Pain with rest: 4    Numbers   Pain with movement: 4    Numbers   Side Effects    1. Pruritis No    2. Nausea No    3. Motor Blockade No, 0=Ability to raise lower extremities off bed    4. Sedation No, 1=awake and alert    Objective:   Catheter site clean, dry, intact        Vitals   Vitals:     08/26/23 1017   BP:    Pulse:    Resp: 18   Temp:         Labs    Admission on 08/22/2023   Component Date Value Ref Range Status    Sodium 08/22/2023 138  136 - 145 mmol/L Final    Potassium 08/22/2023 3.9  3.5 - 5.1 mmol/L Final    Chloride 08/22/2023 100  95 - 110 mmol/L Final    CO2 08/22/2023 28  23 - 29 mmol/L Final    Glucose 08/22/2023 130 (H)  70 - 110 mg/dL Final    BUN 08/22/2023 32 (H)  8 - 23 mg/dL Final    Creatinine 08/22/2023 1.0  0.5 - 1.4 mg/dL Final    Calcium 08/22/2023 8.7  8.7 - 10.5 mg/dL Final    Total Protein 08/22/2023 7.2  6.0 - 8.4 g/dL Final    Albumin 08/22/2023 2.8 (L)  3.5 - 5.2 g/dL Final    Total Bilirubin 08/22/2023 0.2  0.1 - 1.0 mg/dL Final    Alkaline Phosphatase 08/22/2023 82  55 - 135 U/L Final    AST 08/22/2023 14  10 - 40 U/L Final    ALT 08/22/2023 8 (L)  10 - 44 U/L Final    eGFR 08/22/2023 >60.0  >60 mL/min/1.73 m^2 Final    Anion Gap 08/22/2023 10  8 - 16 mmol/L Final    WBC 08/22/2023 7.58  3.90 - 12.70 K/uL Final    RBC 08/22/2023 3.16 (L)  4.00 - 5.40 M/uL Final    Hemoglobin 08/22/2023 7.8 (L)  12.0 - 16.0 g/dL Final    Hematocrit 08/22/2023 27.5 (L)  37.0 - 48.5 % Final    MCV 08/22/2023 87  82 - 98 fL Final    MCH 08/22/2023 24.7 (L)  27.0 - 31.0 pg Final    MCHC 08/22/2023 28.4 (L)  32.0 - 36.0 g/dL Final    RDW 08/22/2023 17.4 (H)  11.5 - 14.5 % Final    Platelets 08/22/2023 221  150 - 450 K/uL Final    MPV 08/22/2023 10.1  9.2 - 12.9 fL Final    Immature Granulocytes 08/22/2023 0.3  0.0 - 0.5 % Final    Gran # (ANC) 08/22/2023 5.1  1.8 - 7.7 K/uL Final    Immature Grans (Abs) 08/22/2023 0.02  0.00 - 0.04 K/uL Final    Lymph # 08/22/2023 1.4  1.0 - 4.8 K/uL Final    Mono # 08/22/2023 0.7  0.3 - 1.0 K/uL Final    Eos # 08/22/2023 0.3  0.0 - 0.5 K/uL Final    Baso # 08/22/2023 0.05  0.00 - 0.20 K/uL Final    nRBC 08/22/2023 0  0 /100 WBC Final    Gran % 08/22/2023 66.7  38.0 - 73.0 % Final    Lymph % 08/22/2023 18.6  18.0 - 48.0  % Final    Mono % 08/22/2023 9.2  4.0 - 15.0 % Final    Eosinophil % 08/22/2023 4.5  0.0 - 8.0 % Final    Basophil % 08/22/2023 0.7  0.0 - 1.9 % Final    Differential Method 08/22/2023 Automated   Final    Troponin I 08/22/2023 <0.006  0.000 - 0.026 ng/mL Final    BNP 08/22/2023 126 (H)  0 - 99 pg/mL Final    POC PH 08/22/2023 7.285 (L)  7.35 - 7.45 Final    POC PCO2 08/22/2023 77.8 (H)  35 - 45 mmHg Final    POC PO2 08/22/2023 115 (HH)  40 - 60 mmHg Final    POC HCO3 08/22/2023 37.0 (H)  24 - 28 mmol/L Final    POC BE 08/22/2023 10  -2 to 2 mmol/L Final    POC SATURATED O2 08/22/2023 98  95 - 100 % Final    POC TCO2 08/22/2023 39 (H)  24 - 29 mmol/L Final    Sample 08/22/2023 VENOUS   Final    Site 08/22/2023 Other   Final    Allens Test 08/22/2023 N/A   Final    POC PH 08/23/2023 7.293 (L)  7.35 - 7.45 Final    POC PCO2 08/23/2023 71.4 (H)  35 - 45 mmHg Final    POC PO2 08/23/2023 60  40 - 60 mmHg Final    POC HCO3 08/23/2023 34.6 (H)  24 - 28 mmol/L Final    POC BE 08/23/2023 8  -2 to 2 mmol/L Final    POC SATURATED O2 08/23/2023 86 (L)  95 - 100 % Final    POC TCO2 08/23/2023 37 (H)  24 - 29 mmol/L Final    Sample 08/23/2023 VENOUS   Final    Site 08/23/2023 Other   Final    Allens Test 08/23/2023 N/A   Final    DelSys 08/23/2023 CPAP/BiPAP   Final    Mode 08/23/2023 BiPAP   Final    FiO2 08/23/2023 28   Final    IP 08/23/2023 12   Final    EP 08/23/2023 5   Final    Sodium 08/24/2023 142  136 - 145 mmol/L Final    Potassium 08/24/2023 4.4  3.5 - 5.1 mmol/L Final    Chloride 08/24/2023 104  95 - 110 mmol/L Final    CO2 08/24/2023 32 (H)  23 - 29 mmol/L Final    Glucose 08/24/2023 92  70 - 110 mg/dL Final    BUN 08/24/2023 23  8 - 23 mg/dL Final    Creatinine 08/24/2023 1.0  0.5 - 1.4 mg/dL Final    Calcium 08/24/2023 8.6 (L)  8.7 - 10.5 mg/dL Final    Anion Gap 08/24/2023 6 (L)  8 - 16 mmol/L Final    eGFR 08/24/2023 >60.0  >60 mL/min/1.73 m^2 Final    Iron 08/24/2023 15  (L)  30 - 160 ug/dL Final    Transferrin 08/24/2023 149 (L)  200 - 375 mg/dL Final    TIBC 08/24/2023 221 (L)  250 - 450 ug/dL Final    Saturated Iron 08/24/2023 7 (L)  20 - 50 % Final    Ferritin 08/24/2023 201  20.0 - 300.0 ng/mL Final    Sodium 08/24/2023 141  136 - 145 mmol/L Final    Potassium 08/24/2023 4.1  3.5 - 5.1 mmol/L Final    Chloride 08/24/2023 104  95 - 110 mmol/L Final    CO2 08/24/2023 30 (H)  23 - 29 mmol/L Final    Glucose 08/24/2023 86  70 - 110 mg/dL Final    BUN 08/24/2023 24 (H)  8 - 23 mg/dL Final    Creatinine 08/24/2023 1.0  0.5 - 1.4 mg/dL Final    Calcium 08/24/2023 8.9  8.7 - 10.5 mg/dL Final    Anion Gap 08/24/2023 7 (L)  8 - 16 mmol/L Final    eGFR 08/24/2023 >60.0  >60 mL/min/1.73 m^2 Final    WBC 08/24/2023 6.44  3.90 - 12.70 K/uL Final    RBC 08/24/2023 2.72 (L)  4.00 - 5.40 M/uL Final    Hemoglobin 08/24/2023 6.9 (L)  12.0 - 16.0 g/dL Final    Hematocrit 08/24/2023 24.1 (L)  37.0 - 48.5 % Final    MCV 08/24/2023 89  82 - 98 fL Final    MCH 08/24/2023 25.4 (L)  27.0 - 31.0 pg Final    MCHC 08/24/2023 28.6 (L)  32.0 - 36.0 g/dL Final    RDW 08/24/2023 17.6 (H)  11.5 - 14.5 % Final    Platelets 08/24/2023 201  150 - 450 K/uL Final    MPV 08/24/2023 10.7  9.2 - 12.9 fL Final    Immature Granulocytes 08/24/2023 0.3  0.0 - 0.5 % Final    Gran # (ANC) 08/24/2023 4.1  1.8 - 7.7 K/uL Final    Immature Grans (Abs) 08/24/2023 0.02  0.00 - 0.04 K/uL Final    Lymph # 08/24/2023 1.6  1.0 - 4.8 K/uL Final    Mono # 08/24/2023 0.6  0.3 - 1.0 K/uL Final    Eos # 08/24/2023 0.1  0.0 - 0.5 K/uL Final    Baso # 08/24/2023 0.03  0.00 - 0.20 K/uL Final    nRBC 08/24/2023 0  0 /100 WBC Final    Gran % 08/24/2023 63.2  38.0 - 73.0 % Final    Lymph % 08/24/2023 24.8  18.0 - 48.0 % Final    Mono % 08/24/2023 9.0  4.0 - 15.0 % Final    Eosinophil % 08/24/2023 2.2  0.0 - 8.0 % Final    Basophil % 08/24/2023 0.5  0.0 - 1.9 % Final    Differential Method 08/24/2023  Automated   Final    UNIT NUMBER 08/24/2023 [id number]   Final    Product Code 08/24/2023 [id number]   Final    DISPENSE STATUS 08/24/2023 TRANSFUSED   Final    CODING SYSTEM 08/24/2023 [id number]   Final    Unit Blood Type Code 08/24/2023 5100   Final    Unit Blood Type 08/24/2023 O POS   Final    Unit Expiration 08/24/2023 [id number]   Final    CROSSMATCH INTERPRETATION 08/24/2023 Compatible   Final    Group & Rh 08/24/2023 O POS   Final    Indirect William 08/24/2023 NEG   Final    Specimen Outdate 08/24/2023 08/27/2023 23:59   Final    POCT Glucose 08/24/2023 115 (H)  70 - 110 mg/dL Final    Sodium 08/25/2023 137  136 - 145 mmol/L Final    Potassium 08/25/2023 4.1  3.5 - 5.1 mmol/L Final    Chloride 08/25/2023 101  95 - 110 mmol/L Final    CO2 08/25/2023 32 (H)  23 - 29 mmol/L Final    Glucose 08/25/2023 107  70 - 110 mg/dL Final    BUN 08/25/2023 32 (H)  8 - 23 mg/dL Final    Creatinine 08/25/2023 1.2  0.5 - 1.4 mg/dL Final    Calcium 08/25/2023 8.4 (L)  8.7 - 10.5 mg/dL Final    Anion Gap 08/25/2023 4 (L)  8 - 16 mmol/L Final    eGFR 08/25/2023 49.6 (A)  >60 mL/min/1.73 m^2 Final    WBC 08/25/2023 6.03  3.90 - 12.70 K/uL Final    RBC 08/25/2023 2.82 (L)  4.00 - 5.40 M/uL Final    Hemoglobin 08/25/2023 7.2 (L)  12.0 - 16.0 g/dL Final    Hematocrit 08/25/2023 25.2 (L)  37.0 - 48.5 % Final    MCV 08/25/2023 89  82 - 98 fL Final    MCH 08/25/2023 25.5 (L)  27.0 - 31.0 pg Final    MCHC 08/25/2023 28.6 (L)  32.0 - 36.0 g/dL Final    RDW 08/25/2023 17.8 (H)  11.5 - 14.5 % Final    Platelets 08/25/2023 173  150 - 450 K/uL Final    MPV 08/25/2023 9.6  9.2 - 12.9 fL Final    Immature Granulocytes 08/25/2023 0.7 (H)  0.0 - 0.5 % Final    Gran # (ANC) 08/25/2023 3.2  1.8 - 7.7 K/uL Final    Immature Grans (Abs) 08/25/2023 0.04  0.00 - 0.04 K/uL Final    Lymph # 08/25/2023 1.8  1.0 - 4.8 K/uL Final    Mono # 08/25/2023 0.8  0.3 - 1.0 K/uL Final    Eos # 08/25/2023 0.2  0.0 - 0.5 K/uL  Final    Baso # 08/25/2023 0.03  0.00 - 0.20 K/uL Final    nRBC 08/25/2023 0  0 /100 WBC Final    Gran % 08/25/2023 53.1  38.0 - 73.0 % Final    Lymph % 08/25/2023 29.2  18.0 - 48.0 % Final    Mono % 08/25/2023 13.3  4.0 - 15.0 % Final    Eosinophil % 08/25/2023 3.2  0.0 - 8.0 % Final    Basophil % 08/25/2023 0.5  0.0 - 1.9 % Final    Differential Method 08/25/2023 Automated   Final    Sodium 08/26/2023 143  136 - 145 mmol/L Final    Potassium 08/26/2023 4.5  3.5 - 5.1 mmol/L Final    Chloride 08/26/2023 103  95 - 110 mmol/L Final    CO2 08/26/2023 34 (H)  23 - 29 mmol/L Final    Glucose 08/26/2023 107  70 - 110 mg/dL Final    BUN 08/26/2023 37 (H)  8 - 23 mg/dL Final    Creatinine 08/26/2023 1.1  0.5 - 1.4 mg/dL Final    Calcium 08/26/2023 8.7  8.7 - 10.5 mg/dL Final    Anion Gap 08/26/2023 6 (L)  8 - 16 mmol/L Final    eGFR 08/26/2023 55.1 (A)  >60 mL/min/1.73 m^2 Final    WBC 08/26/2023 5.54  3.90 - 12.70 K/uL Final    RBC 08/26/2023 2.94 (L)  4.00 - 5.40 M/uL Final    Hemoglobin 08/26/2023 7.4 (L)  12.0 - 16.0 g/dL Final    Hematocrit 08/26/2023 26.2 (L)  37.0 - 48.5 % Final    MCV 08/26/2023 89  82 - 98 fL Final    MCH 08/26/2023 25.2 (L)  27.0 - 31.0 pg Final    MCHC 08/26/2023 28.2 (L)  32.0 - 36.0 g/dL Final    RDW 08/26/2023 17.4 (H)  11.5 - 14.5 % Final    Platelets 08/26/2023 198  150 - 450 K/uL Final    MPV 08/26/2023 10.2  9.2 - 12.9 fL Final    Immature Granulocytes 08/26/2023 0.4  0.0 - 0.5 % Final    Gran # (ANC) 08/26/2023 3.4  1.8 - 7.7 K/uL Final    Immature Grans (Abs) 08/26/2023 0.02  0.00 - 0.04 K/uL Final    Lymph # 08/26/2023 1.4  1.0 - 4.8 K/uL Final    Mono # 08/26/2023 0.5  0.3 - 1.0 K/uL Final    Eos # 08/26/2023 0.2  0.0 - 0.5 K/uL Final    Baso # 08/26/2023 0.03  0.00 - 0.20 K/uL Final    nRBC 08/26/2023 0  0 /100 WBC Final    Gran % 08/26/2023 61.6  38.0 - 73.0 % Final    Lymph % 08/26/2023 24.5  18.0 - 48.0 % Final    Mono % 08/26/2023 8.7  4.0 -  15.0 % Final    Eosinophil % 08/26/2023 4.3  0.0 - 8.0 % Final    Basophil % 08/26/2023 0.5  0.0 - 1.9 % Final    Differential Method 08/26/2023 Automated   Final        Meds   Current Facility-Administered Medications   Medication Dose Route Frequency Provider Last Rate Last Admin    0.9%  NaCl infusion (for blood administration)   Intravenous Q24H PRN Awa Purvis MD        acetaminophen tablet 1,000 mg  1,000 mg Oral Q8H Linda Wiseman MD   1,000 mg at 08/26/23 0537    albuterol-ipratropium 2.5 mg-0.5 mg/3 mL nebulizer solution 3 mL  3 mL Nebulization Q4H [place] Jason Henderson MD   3 mL at 08/26/23 0746    aluminum-magnesium hydroxide-simethicone 200-200-20 mg/5 mL suspension 30 mL  30 mL Oral QID PRN Ana Fonseca PA-C   30 mL at 08/25/23 2023    aspirin chewable tablet 81 mg  81 mg Oral BID Suleman Bowden MD   81 mg at 08/26/23 1007    bisacodyL suppository 10 mg  10 mg Rectal Daily PRN Ana Fonseca PA-C   10 mg at 08/25/23 2009    bumetanide tablet 2 mg  2 mg Oral Daily Kaylee Montes MD   2 mg at 08/25/23 0937    citalopram tablet 30 mg  30 mg Oral QHS Awa Purvis MD   30 mg at 08/25/23 2019    dextrose 10% bolus 125 mL 125 mL  12.5 g Intravenous PRN Ana Fonseca PA-C        dextrose 10% bolus 250 mL 250 mL  25 g Intravenous PRN Ana Fonseca PA-C        ferrous sulfate tablet 1 each  1 tablet Oral Daily Ana Fonseca PA-C   1 each at 08/26/23 1007    folic acid-vit B6-vit B12 2.5-25-2 mg tablet 1 tablet  1 tablet Oral Daily Ana Fonseca PA-C   1 tablet at 08/26/23 1007    gabapentin capsule 600 mg  600 mg Oral TID Awa Purvis MD   600 mg at 08/26/23 1007    glucagon (human recombinant) injection 1 mg  1 mg Intramuscular PRN Ana Fonseca PA-C        glucose chewable tablet 16 g  16 g Oral PRN Ana Fonseca PA-C        glucose chewable tablet 24 g  24 g Oral PRN Ana Fonseca PA-C         hydrOXYzine HCL tablet 25 mg  25 mg Oral BID PRN Ana Fonseca PA-C   25 mg at 08/26/23 0614    melatonin tablet 6 mg  6 mg Oral Nightly PRN Ana Fonseca PA-C   6 mg at 08/25/23 2222    methocarbamoL tablet 750 mg  750 mg Oral Q6H Awa Purvis MD   750 mg at 08/26/23 0537    naloxone 0.4 mg/mL injection 0.02 mg  0.02 mg Intravenous PRN Ana Fonseca PA-C        ondansetron disintegrating tablet 8 mg  8 mg Oral Q8H PRN Ana Fonseca PA-C   8 mg at 08/25/23 2008    oxybutynin 24 hr tablet 5 mg  5 mg Oral Daily Kaylee Montes MD   5 mg at 08/26/23 1007    oxyCODONE immediate release tablet 5 mg  5 mg Oral Q4H PRN Rmau Daugherty MD        oxyCODONE immediate release tablet Tab 10 mg  10 mg Oral Q4H PRN Ramu Daugherty MD   10 mg at 08/26/23 1017    polyethylene glycol packet 17 g  17 g Oral BID PRN Ana Fonseca PA-C        prochlorperazine injection Soln 5 mg  5 mg Intravenous Q6H PRN Ana Fonseca PA-C        QUEtiapine tablet 200 mg  200 mg Oral QHS Ana Fonseca PA-C   200 mg at 08/25/23 2019    ROPIvacaine (PF) 2 mg/ml (0.2%) solution  0.1 mL/hr Perineural Continuous Parker Campbell MD 0.1 mL/hr at 08/23/23 1552 0.1 mL/hr at 08/23/23 1552    ROPIvacaine (PF) 2 mg/ml (0.2%) solution  0.1 mL/hr Perineural Continuous Parker Campbell MD 0.1 mL/hr at 08/23/23 1440 0.1 mL/hr at 08/23/23 1440    senna-docusate 8.6-50 mg per tablet 1 tablet  1 tablet Oral Daily Cielo Almaguer PA-C   1 tablet at 08/26/23 1007    simethicone chewable tablet 80 mg  1 tablet Oral QID PRN Ana Fonseca PA-C   80 mg at 08/25/23 2009    sodium chloride 0.9% flush 10 mL  10 mL Intravenous PRN Ramu Good MD        sodium chloride 0.9% flush 5 mL  5 mL Intravenous PRN Ana Fonseca PA-C        thiamine tablet 100 mg  100 mg Oral Daily Ana Fonseca PA-C   100 mg at 08/26/23 1007    vitamin D 1000 units tablet 1,000 Units  1,000  Units Oral Daily Suleman Bowden MD   1,000 Units at 08/26/23 1007       Assessment:     Pain control adequate. Complaining mostly of right knee pain that is likely not covered by blocks.    Plan:     Patient doing well, continue present treatment.  -- Continue PNCs at current rate. Plan to pause Adductor catheter on Sunday and Popliteal cathter on Monday.   -- Continue multimodal pain regimen of Tylenol 1 gm Q8H, Gabapentin 300 mg TID, Robaxin 500 mg Q6H, and Oxy 5 mg IR Q4H PRN. Added Oxy 10 mg Q4H PRN for severe pain (on home Percocet 10 BID).   -- Dispo: [facility]    Elyssa Lebron, DO  Anesthesia PGY3, CA2

## 2023-08-26 NOTE — SUBJECTIVE & OBJECTIVE
Interval History: Patient reports pain to right ankle is 5/10 this am. Patient reports right knee is bothering her today and likely due to fact she has swelling to right knee area and down from surgery. Patient has been elevating right leg to help with post-op swelling. Labs reviewed. Hgb improved to 7.4 from 7.2 yesterday. Labs otherwise unremarkable. Patient with low BP this am with SBP in low 90's. Patient asymptomatic. Will discontinue Bumex for now. Patient is eating and drinking well.     Review of Systems   Constitutional:  Negative for chills and fever.   Respiratory:  Negative for cough and shortness of breath.    Cardiovascular:  Negative for chest pain.   Gastrointestinal:  Negative for abdominal pain, diarrhea and nausea.   Musculoskeletal:  Positive for arthralgias (Right ankle; Right knee).   Neurological:  Negative for dizziness.   Psychiatric/Behavioral:  Negative for agitation and confusion.      Objective:     Vital Signs (Most Recent):  Temp: 98.3 °F (36.8 °C) (08/26/23 1227)  Pulse: 74 (08/26/23 1227)  Resp: 20 (08/26/23 1227)  BP: 107/50 (08/26/23 1227)  SpO2: 92% (08/26/23 1227) on 2 liters of oxygen Vital Signs (24h Range):  Temp:  [97.4 °F (36.3 °C)-98.8 °F (37.1 °C)] 98.3 °F (36.8 °C)  Pulse:  [59-98] 74  Resp:  [15-20] 20  SpO2:  [83 %-96 %] 88 %  BP: ()/(50-65) 87/50     Weight: 83 kg (183 lb)  Body mass index is 30.45 kg/m².    Intake/Output Summary (Last 24 hours) at 8/26/2023 1357  Last data filed at 8/26/2023 0425  Gross per 24 hour   Intake --   Output 1700 ml   Net -1700 ml         Physical Exam  Vitals and nursing note reviewed.   Constitutional:       General: She is not in acute distress.     Appearance: Normal appearance. She is well-developed. She is obese. She is not ill-appearing.   Cardiovascular:      Rate and Rhythm: Normal rate and regular rhythm.      Heart sounds: Normal heart sounds. No murmur heard.     No friction rub. No gallop.   Pulmonary:      Effort:  Pulmonary effort is normal. No respiratory distress.      Breath sounds: Normal breath sounds. No wheezing.   Abdominal:      General: Abdomen is flat. Bowel sounds are normal. There is no distension.      Palpations: Abdomen is soft.      Tenderness: There is no abdominal tenderness.   Musculoskeletal:         General: Swelling (Right lower leg) present.      Comments: Left AKA noted. Stump well healed to left leg. Wound vac noted to right ankle incision sites. Brace to right ankle.    Skin:     General: Skin is warm.      Findings: No erythema.   Neurological:      Mental Status: She is alert and oriented to person, place, and time.   Psychiatric:         Mood and Affect: Mood normal.         Behavior: Behavior normal. Behavior is cooperative.         Thought Content: Thought content normal.         Judgment: Judgment normal.             Significant Labs: CBC:   Recent Labs   Lab 08/25/23 0327 08/26/23  0347   WBC 6.03 5.54   HGB 7.2* 7.4*   HCT 25.2* 26.2*    198     CMP:   Recent Labs   Lab 08/25/23 0327 08/26/23  0347    143   K 4.1 4.5    103   CO2 32* 34*    107   BUN 32* 37*   CREATININE 1.2 1.1   CALCIUM 8.4* 8.7   ANIONGAP 4* 6*       Significant Imaging: I have reviewed all pertinent imaging results/findings within the past 24 hours.

## 2023-08-26 NOTE — SUBJECTIVE & OBJECTIVE
Principal Problem:Closed fracture of right tibial plafond with fibula involvement with routine healing    Principal Orthopedic Problem: same as above s/p ORIF pilon fx 8/23    Interval History: NAEO. VSS. Pain well controlled. Wound vac to suction with no output. Potous boot in place with ice over wound vac.    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 Pt agreeable to try with pre mediation with benadryl.        Current Facility-Administered Medications   Medication    0.9%  NaCl infusion (for blood administration)    acetaminophen tablet 1,000 mg    albuterol-ipratropium 2.5 mg-0.5 mg/3 mL nebulizer solution 3 mL    aluminum-magnesium hydroxide-simethicone 200-200-20 mg/5 mL suspension 30 mL    aspirin chewable tablet 81 mg    bisacodyL suppository 10 mg    bumetanide tablet 2 mg    citalopram tablet 30 mg    dextrose 10% bolus 125 mL 125 mL    dextrose 10% bolus 250 mL 250 mL    ferrous sulfate tablet 1 each    folic acid-vit B6-vit B12 2.5-25-2 mg tablet 1 tablet    gabapentin capsule 600 mg    glucagon (human recombinant) injection 1 mg    glucose chewable tablet 16 g    glucose chewable tablet 24 g    hydrOXYzine HCL tablet 25 mg    melatonin tablet 6 mg    methocarbamoL tablet 750 mg    naloxone 0.4 mg/mL injection 0.02 mg    ondansetron disintegrating tablet 8 mg    oxybutynin 24 hr tablet 5 mg    oxyCODONE immediate release tablet 5 mg    oxyCODONE immediate release tablet Tab 10 mg    polyethylene glycol packet 17 g    prochlorperazine injection Soln 5 mg    QUEtiapine tablet 200 mg    ROPIvacaine (PF) 2 mg/ml (0.2%) solution    ROPIvacaine (PF) 2 mg/ml (0.2%) solution    senna-docusate 8.6-50 mg per tablet 1 tablet    simethicone chewable tablet 80 mg    sodium chloride 0.9% flush 10 mL    sodium chloride 0.9% flush 5 mL    thiamine tablet 100  "mg    vitamin D 1000 units tablet 1,000 Units     Objective:     Vital Signs (Most Recent):  Temp: 97.4 °F (36.3 °C) (08/26/23 0434)  Pulse: 63 (08/26/23 0434)  Resp: 18 (08/26/23 0434)  BP: (!) 113/59 (08/26/23 0434)  SpO2: 95 % (08/26/23 0434) Vital Signs (24h Range):  Temp:  [97.4 °F (36.3 °C)-98.8 °F (37.1 °C)] 97.4 °F (36.3 °C)  Pulse:  [55-98] 63  Resp:  [15-20] 18  SpO2:  [83 %-98 %] 95 %  BP: (111-141)/(56-75) 113/59     Weight: 83 kg (183 lb)  Height: 5' 5" (165.1 cm)  Body mass index is 30.45 kg/m².      Intake/Output Summary (Last 24 hours) at 8/26/2023 0648  Last data filed at 8/26/2023 0425  Gross per 24 hour   Intake 180 ml   Output 2700 ml   Net -2520 ml          Ortho/SPM Exam  Wound vac to suction with good seal  Potus boot on  Able to wiggle toes  SILT  Cap refill <2s     Significant Labs: All pertinent labs within the past 24 hours have been reviewed.    Significant Imaging: I have reviewed and interpreted all pertinent imaging results/findings.  "

## 2023-08-26 NOTE — ASSESSMENT & PLAN NOTE
Mary Ellen Fajardo is a 67 y.o. 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  Injury reportedly occurred while patient was transferring from wheelchair to shower chair.  She presented closed, neurovascularly intact, and without any other musculoskeletal injuries on physical exam.  RLE compartments soft and compressible, and she was noted to have fracture blistering over the anteromedial ankle.  Patient was placed in a short-leg splint and window was created for further compartment checks overnight.  Patient is reportedly nonambulatory and wheelchair-bound at baseline.     Now s/p ORIF sindi fx 8/23    - Pain MM  - Wound vac to suction  - PT/OT: [facility]   - ASA 81 BID  - Ice and elevate affected extremity at all times   - Abx: ancef x24h  - Suprapubic catheter    Dispo: PT/OT recs SNF

## 2023-08-26 NOTE — ASSESSMENT & PLAN NOTE
· Hgb improved to 7.4 on 8/26.   · Hgb slightly improved to 7.2 on 8/25 after transfused 1 unit of PRBCs. Monitor Hgb daily. Momnitor for any signs of active bleeding.   · Hgb down to 6.9 on 8/24. Plan to transfuse 1 unit of PRBCs on 8/24. No signs of clinical bleeding and post-op and expected blood loss related to surgery.   · Hbg 7.8 on admit. Baseline Hgb 9-10. Low Hgb on admit likely related to fracture and recent acute illness and recent hospitalization for CHF.   · Transfuse blood if Hgb < 7 and asymptomatic or < 8 and symptomatic.   · Monitor daily CBC.

## 2023-08-26 NOTE — PROGRESS NOTES
[facility] Medicine  Progress Note    Patient Name: Mary Ellen Fajardo  MRN: [id number]  Patient Class: IP- Inpatient   Admission Date: 8/22/2023  Length of Stay: 2 days  Attending Physician: Awa Purvis MD  Primary Care Provider: Any Tran MD        Subjective:     Principal Problem:Closed fracture of right tibial plafond with fibula involvement with routine healing        HPI:  Mary Ellen Fajardo is a 67 y.o. 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  Patient reports she was being moved to a shower chair from her wheelchair when her right ankle caught and twisted in the wheel of her wheelchair.  She experienced immediate pain and deformity of the right ankle.  Denies any numbness, tingling, and states she has never injured this ankle before.  She denies any other injury. Wheelchair-bound at baseline and not on any blood thinners.  Has some abdominal cramping. Last BM yesterday. No other symptoms such as shortness of breath, chest pain, nausea, vomiting. She is somewhat withdrawn during interview which she attributes to pain.    In the ED, AFVSS. Labs with hbg 7.8 (baseline ~9-10). CBC and CMP otherwise unremarkable. VBG with pH 7.285, PCO2 77.8, PO2 115, and HCO3 37. Placed on her home nightly bipap. XR imaging of RLE revealed  Acute fracture of the distal tibia and fibula, approximately 2-3 cm above the talar dome.  Mild comminution and mild displacement.  Ortho evaluated the patient, splinted her ankle, and recommended admission to hospital medicine. Given dilaudid  and 1L NS bolus.      Overview/Hospital Course:  Ortho consulted and patient taken to OR on 8/23/2023 and underwent ORIF of closed right trimalleolar fracture by Dr. Suleman Schmitz. Incisional wound vac  placed by Ortho in OR to surgical site and remain in place post-op. Patient post-op nonweight bearing to right lower extremity. PT/OT consulted post-op and working with patient and recommending SNF on discharge when medically ready. Referrals sent as patient states she does not want to go back to [facility] for her SNF care. Patient on multimodal pain meds post-op for pain control with scheduled Tylenol and Robaxin with Oxycodone prn for breakthrough pain. Pain controlled post-op. Patient on Aspirin 81 mg po BID for DVT prophylaxis and will continue. Patient transfused 1 unit of PRBCs on 8/24 for Hgb 6.9 and Hgb improved to 7.2 on 8/25. Hgb stable at 7.4 on 8/26. Pain controlled. Wound vac remains in place to right ankle incision sites with minimal output on 8/26.        Interval History: Patient reports pain to right ankle is 5/10 this am. Patient reports right knee is bothering her today and likely due to fact she has swelling to right knee area and down from surgery. Patient has been elevating right leg to help with post-op swelling. Labs reviewed. Hgb improved to 7.4 from 7.2 yesterday. Labs otherwise unremarkable. Patient with low BP this am with SBP in low 90's. Patient asymptomatic. Will discontinue Bumex for now. Patient is eating and drinking well.     Review of Systems   Constitutional:  Negative for chills and fever.   Respiratory:  Negative for cough and shortness of breath.    Cardiovascular:  Negative for chest pain.   Gastrointestinal:  Negative for abdominal pain, diarrhea and nausea.   Musculoskeletal:  Positive for arthralgias (Right ankle; Right knee).   Neurological:  Negative for dizziness.   Psychiatric/Behavioral:  Negative for agitation and confusion.      Objective:     Vital Signs (Most Recent):  Temp: 98.3 °F (36.8 °C) (08/26/23 1227)  Pulse: 74 (08/26/23 1227)  Resp: 20 (08/26/23 1227)  BP: 107/50 (08/26/23 1227)  SpO2: 92% (08/26/23 1227) on 2 liters of oxygen Vital Signs (24h  Range):  Temp:  [97.4 °F (36.3 °C)-98.8 °F (37.1 °C)] 98.3 °F (36.8 °C)  Pulse:  [59-98] 74  Resp:  [15-20] 20  SpO2:  [83 %-96 %] 88 %  BP: ()/(50-65) 87/50     Weight: 83 kg (183 lb)  Body mass index is 30.45 kg/m².    Intake/Output Summary (Last 24 hours) at 8/26/2023 1357  Last data filed at 8/26/2023 0425  Gross per 24 hour   Intake --   Output 1700 ml   Net -1700 ml         Physical Exam  Vitals and nursing note reviewed.   Constitutional:       General: She is not in acute distress.     Appearance: Normal appearance. She is well-developed. She is obese. She is not ill-appearing.   Cardiovascular:      Rate and Rhythm: Normal rate and regular rhythm.      Heart sounds: Normal heart sounds. No murmur heard.     No friction rub. No gallop.   Pulmonary:      Effort: Pulmonary effort is normal. No respiratory distress.      Breath sounds: Normal breath sounds. No wheezing.   Abdominal:      General: Abdomen is flat. Bowel sounds are normal. There is no distension.      Palpations: Abdomen is soft.      Tenderness: There is no abdominal tenderness.   Musculoskeletal:         General: Swelling (Right lower leg) present.      Comments: Left AKA noted. Stump well healed to left leg. Wound vac noted to right ankle incision sites. Brace to right ankle.    Skin:     General: Skin is warm.      Findings: No erythema.   Neurological:      Mental Status: She is alert and oriented to person, place, and time.   Psychiatric:         Mood and Affect: Mood normal.         Behavior: Behavior normal. Behavior is cooperative.         Thought Content: Thought content normal.         Judgment: Judgment normal.             Significant Labs: CBC:   Recent Labs   Lab 08/25/23 0327 08/26/23 0347   WBC 6.03 5.54   HGB 7.2* 7.4*   HCT 25.2* 26.2*    198     CMP:   Recent Labs   Lab 08/25/23 0327 08/26/23 0347    143   K 4.1 4.5    103   CO2 32* 34*    107   BUN 32* 37*   CREATININE 1.2 1.1   CALCIUM 8.4*  8.7   ANIONGAP 4* 6*       Significant Imaging: I have reviewed all pertinent imaging results/findings within the past 24 hours.      Assessment/Plan:      * Closed fracture of right tibial plafond with fibula involvement with routine healing s/p ORIF on 8/23/2023  68 y/o with left AKA, previous CVA, paraparesis presenting from [facility] with right ankle pain and injury during a transfer from wheelchair to shower chair. She was found to have an acute closed trimalleolar fracture of the right distal tibia and fibula in the ED. Neurovascularly intact.     · Orthopedic surgery consulted and recommended operative repair. Patient taken to the OR on 8/23/2023 and underwent ORIF of right ankle by Dr. Suleman Schmitz.   · Post-op, patient is non weight bearing to right lower extremity as per Ortho.  · PT/OT consulted post-op to work with patient. Recommending SNF and referrals sent and has several accepting facilities and CM/SW working with patient on family on her choices. Anticipate should be medically ready for discharge on 8/28.   · Patient on Aspirin 81 mg po BID for DVT prophylaxis and will continue for 6 weeks.   · Fall precautions.  · Wound vac in place to right ankle incision site and will continue. Management as per Ortho.  · Pain controlled. Continue multimodal pain medications for pain management with scheduled Tylenol 1000 mg po TID + Robaxin 750 mg po 4 times daily + Oxycodone IR prn for breakthrough pain.   · Perineural pain catheters in place with continuous Ropivacaine for post-op pain management and Anesthesia monitoring and managing.     Chronic respiratory failure with hypoxia and hypercapnia  Patient with Hypercapnic and Hypoxic Respiratory failure which is Chronic.  she is on home oxygen at 2 LPM and BiPAP nightly for her ADEEL. Supplemental oxygen was provided and noted-at 2 liters of oxygen.      .   Signs/symptoms of respiratory failure include- none at this time. Contributing diagnoses  includes - CHF and Pulmonary HTN and ADEEL. Labs and images were reviewed. Patient Has recent ABG, which has been reviewed (VBG). Will treat underlying causes and adjust management of respiratory failure as follows-   Continue home supplemental oxygen for goal SpO2 >88%.  Continue nightly BiPAP for her chronic ADEEL.       ADEEL (obstructive sleep apnea)  Chronic condition. Continue nightly BiPAP in hospital to treat as has chronic hypercapnic and hypoxic respiratory failure related to her chronic ADEEL and pulmonary HTN.     Anemia of chronic disease  · Hgb improved to 7.4 on 8/26.   · Hgb slightly improved to 7.2 on 8/25 after transfused 1 unit of PRBCs. Monitor Hgb daily. Momnitor for any signs of active bleeding.   · Hgb down to 6.9 on 8/24. Plan to transfuse 1 unit of PRBCs on 8/24. No signs of clinical bleeding and post-op and expected blood loss related to surgery.   · Hbg 7.8 on admit. Baseline Hgb 9-10. Low Hgb on admit likely related to fracture and recent acute illness and recent hospitalization for CHF.   · Transfuse blood if Hgb < 7 and asymptomatic or < 8 and symptomatic.   · Monitor daily CBC.    Pressure injury of thigh, stage 2  - Present on admit. Turn patient every 2 hours.   - Continue local wound care.     Chronic indwelling suprapubic catheter in place  Present on admit. Continue routine suprapubic catheter care.     Essential hypertension  Chronic and controlled. Patient's BP running low post-op so Bumex discontinued don 8/26. Monitor vital signs very 4 hours. Target BP < 150/90.     Primary insomnia  Chronic condition. Continue home Seroquel 200 mg po nightly.     Paraparesis  · Chronic condition and related to previous CVA. Patient at her baseline neurologic and functional level.   · Fall precautions.     History of CVA (cerebrovascular accident)  Bed bound at baseline from previous CVA. Patient at her baseline neurologic and functional level.       Class 1 obesity due to excess calories without  serious comorbidity with body mass index (BMI) of 30.0 to 30.9 in adult  Body mass index is 30.45 kg/m². Morbid obesity complicates all aspects of disease management from diagnostic modalities to treatment. Weight loss encouraged and health benefits explained to patient.         Chronic diastolic heart failure  Patient is identified as having Diastolic (HFpEF) heart failure that is Chronic. CHF is currently controlled. Latest ECHO performed and demonstrates- Results for orders placed during the hospital encounter of 08/06/23    Echo    Interpretation Summary    Left Ventricle: Normal wall motion. There is low normal systolic function with a visually estimated ejection fraction of 50 - 55%.    Right Ventricle: Normal right ventricular cavity size.  Continue home Bumex 2 mg po daily to treat and monitor clinical status closely. Monitor on telemetry. Patient is off CHF pathway.  Monitor strict Is&Os and daily weights.  Place on fluid restriction of 1.5 L. Continue to stress to patient importance of self efficacy and  on diet for CHF. Last BNP reviewed- and noted below   Recent Labs   Lab 08/22/23  2335   *         VTE Risk Mitigation (From admission, onward)         Ordered     IP VTE HIGH RISK PATIENT  Once         08/23/23 0342     Place sequential compression device  Until discontinued         08/23/23 0342     Reason for No Pharmacological VTE Prophylaxis  Once        Question:  Reasons:  Answer:  Physician Provided (leave comment)    08/23/23 0342                Discharge Planning   LOY: 8/28/2023     Code Status: Full Code   Is the patient medically ready for discharge?: No    Reason for patient still in hospital (select all that apply): Patient trending condition  Discharge Plan A: Skilled Nursing Facility            Awa Purvis MD  Department of Hospital Medicine   [facility] - Surgery

## 2023-08-26 NOTE — ASSESSMENT & PLAN NOTE
Chronic and controlled. Patient's BP running low post-op so Bumex discontinued don 8/26. Monitor vital signs very 4 hours. Target BP < 150/90.

## 2023-08-26 NOTE — PROGRESS NOTES
[facility]  Orthopedics  Progress Note    Patient Name: Mary Ellen Fajardo  MRN: [id number]  Admission Date: 8/22/2023  Hospital Length of Stay: 2 days  Attending Provider: Awa Purvis MD  Primary Care Provider: Any Tran MD  Follow-up For: Procedure(s) (LRB):  ORIF, FRACTURE, PILON (Right)  ORIF, FRACTURE, FIBULA (Right)    Post-Operative Day: 3 Days Post-Op  Subjective:     Principal Problem:Closed fracture of right tibial plafond with fibula involvement with routine healing    Principal Orthopedic Problem: same as above s/p ORIF pilon fx 8/23    Interval History: NAEO. VSS. Pain well controlled. Wound vac to suction with no output. Potous boot in place with ice over wound vac.    Review of patient's allergies indicates:   Allergen Reactions    Tuberculin ppd Itching and Dermatitis     Patient has old scare that she claims is from TB PPD    Rocephin [ceftriaxone] Rash     Rash 2012? Pt agreeable to try with pre mediation with benadryl.        Current Facility-Administered Medications   Medication    0.9%  NaCl infusion (for blood administration)    acetaminophen tablet 1,000 mg    albuterol-ipratropium 2.5 mg-0.5 mg/3 mL nebulizer solution 3 mL    aluminum-magnesium hydroxide-simethicone 200-200-20 mg/5 mL suspension 30 mL    aspirin chewable tablet 81 mg    bisacodyL suppository 10 mg    bumetanide tablet 2 mg    citalopram tablet 30 mg    dextrose 10% bolus 125 mL 125 mL    dextrose 10% bolus 250 mL 250 mL    ferrous sulfate tablet 1 each    folic acid-vit B6-vit B12 2.5-25-2 mg tablet 1 tablet    gabapentin capsule 600 mg    glucagon (human recombinant) injection 1 mg    glucose chewable tablet 16 g    glucose chewable tablet 24 g    hydrOXYzine HCL tablet 25 mg    melatonin tablet 6 mg    methocarbamoL tablet 750 mg    naloxone 0.4 mg/mL injection 0.02 mg    ondansetron disintegrating tablet 8 mg    oxybutynin 24 hr tablet 5 mg    oxyCODONE immediate release  "tablet 5 mg    oxyCODONE immediate release tablet Tab 10 mg    polyethylene glycol packet 17 g    prochlorperazine injection Soln 5 mg    QUEtiapine tablet 200 mg    ROPIvacaine (PF) 2 mg/ml (0.2%) solution    ROPIvacaine (PF) 2 mg/ml (0.2%) solution    senna-docusate 8.6-50 mg per tablet 1 tablet    simethicone chewable tablet 80 mg    sodium chloride 0.9% flush 10 mL    sodium chloride 0.9% flush 5 mL    thiamine tablet 100 mg    vitamin D 1000 units tablet 1,000 Units     Objective:     Vital Signs (Most Recent):  Temp: 97.4 °F (36.3 °C) (08/26/23 0434)  Pulse: 63 (08/26/23 0434)  Resp: 18 (08/26/23 0434)  BP: (!) 113/59 (08/26/23 0434)  SpO2: 95 % (08/26/23 0434) Vital Signs (24h Range):  Temp:  [97.4 °F (36.3 °C)-98.8 °F (37.1 °C)] 97.4 °F (36.3 °C)  Pulse:  [55-98] 63  Resp:  [15-20] 18  SpO2:  [83 %-98 %] 95 %  BP: (111-141)/(56-75) 113/59     Weight: 83 kg (183 lb)  Height: 5' 5" (165.1 cm)  Body mass index is 30.45 kg/m².      Intake/Output Summary (Last 24 hours) at 8/26/2023 0648  Last data filed at 8/26/2023 0425  Gross per 24 hour   Intake 180 ml   Output 2700 ml   Net -2520 ml         Ortho/SPM Exam  Wound vac to suction with good seal  Potus boot on  Able to wiggle toes  SILT  Cap refill <2s     Significant Labs: All pertinent labs within the past 24 hours have been reviewed.    Significant Imaging: I have reviewed and interpreted all pertinent imaging results/findings.    Assessment/Plan:     * Closed fracture of right tibial plafond with fibula involvement with routine healing s/p ORIF on 8/23/2023  Mary Ellen CASTREJON Sammy Fajardo is a 67 y.o. female with PMH of CHF, COPD, neurogenic bladder with chronic indwelling suprapubic catheter, hepatitis-C, CVA, bilateral TKAs complicated by postoperative infections resulting in a left AKA, who [facility] of the right distal tibia and fibula.  Injury reportedly occurred while patient was transferring from wheelchair to " shower chair.  She presented closed, neurovascularly intact, and without any other musculoskeletal injuries on physical exam.  RLE compartments soft and compressible, and she was noted to have fracture blistering over the anteromedial ankle.  Patient was placed in a short-leg splint and window was created for further compartment checks overnight.  Patient is reportedly nonambulatory and wheelchair-bound at baseline.     Now s/p ORIF pilon fx 8/23    - Pain MM  - Wound vac to suction  - PT/OT: NWB RLE   - ASA 81 BID  - Ice and elevate affected extremity at all times   - Abx: ancef x24h  - Suprapubic catheter    Dispo: PT/OT recs SNF          Suleman Bowden MD  Orthopedics  [facility] - [facility]

## 2023-08-26 NOTE — PLAN OF CARE
Problem: Adult Inpatient Plan of Care  Goal: Plan of Care Review  Outcome: Ongoing, Progressing  Goal: Patient-Specific Goal (Individualized)  Outcome: Ongoing, Progressing  Goal: Absence of Hospital-Acquired Illness or Injury  Outcome: Ongoing, Progressing  Goal: Optimal Comfort and Wellbeing  Outcome: Ongoing, Progressing  Goal: Readiness for Transition of Care  Outcome: Ongoing, Progressing     Problem: Fluid Imbalance (Pneumonia)  Goal: Fluid Balance  Outcome: Ongoing, Progressing  Intervention: Monitor and Manage Fluid Balance  Flowsheets (Taken 8/26/2023 1724)  Fluid/Electrolyte Management: fluids restricted     Problem: Infection (Pneumonia)  Goal: Resolution of Infection Signs and Symptoms  Outcome: Ongoing, Progressing  Intervention: Prevent Infection Progression  Flowsheets (Taken 8/26/2023 1724)  Infection Management: aseptic technique maintained     Problem: Respiratory Compromise (Pneumonia)  Goal: Effective Oxygenation and Ventilation  Outcome: Ongoing, Progressing  Intervention: Promote Airway Secretion Clearance  Flowsheets (Taken 8/26/2023 1724)  Breathing Techniques/Airway Clearance: deep/controlled cough encouraged     Problem: Infection  Goal: Absence of Infection Signs and Symptoms  Outcome: Ongoing, Progressing  Intervention: Prevent or Manage Infection  Flowsheets (Taken 8/26/2023 1724)  Infection Management: aseptic technique maintained     Problem: Impaired Wound Healing  Goal: Optimal Wound Healing  Outcome: Ongoing, Progressing     Problem: Skin Injury Risk Increased  Goal: Skin Health and Integrity  Outcome: Ongoing, Progressing     Problem: Fall Injury Risk  Goal: Absence of Fall and Fall-Related Injury  Outcome: Ongoing, Progressing  Intervention: Identify and Manage Contributors  Flowsheets (Taken 8/26/2023 1724)  Medication Review/Management: medications reviewed

## 2023-08-26 NOTE — PLAN OF CARE
Problem: Adult Inpatient Plan of Care  Goal: Plan of Care Review  Outcome: Ongoing, Progressing  Goal: Patient-Specific Goal (Individualized)  Outcome: Ongoing, Progressing  Goal: Absence of Hospital-Acquired Illness or Injury  Outcome: Ongoing, Progressing  Goal: Optimal Comfort and Wellbeing  Outcome: Ongoing, Progressing  Goal: Readiness for Transition of Care  Outcome: Ongoing, Progressing     Problem: Adult Inpatient Plan of Care  Goal: Plan of Care Review  Outcome: Ongoing, Progressing     Problem: Adult Inpatient Plan of Care  Goal: Patient-Specific Goal (Individualized)  Outcome: Ongoing, Progressing     Problem: Adult Inpatient Plan of Care  Goal: Absence of Hospital-Acquired Illness or Injury  Outcome: Ongoing, Progressing     Problem: Adult Inpatient Plan of Care  Goal: Optimal Comfort and Wellbeing  Outcome: Ongoing, Progressing     Problem: Adult Inpatient Plan of Care  Goal: Readiness for Transition of Care  Outcome: Ongoing, Progressing     Problem: Fluid Imbalance (Pneumonia)  Goal: Fluid Balance  Outcome: Ongoing, Progressing     Problem: Fluid Imbalance (Pneumonia)  Goal: Fluid Balance  Outcome: Ongoing, Progressing     Problem: Infection (Pneumonia)  Goal: Resolution of Infection Signs and Symptoms  Outcome: Ongoing, Progressing     Problem: Infection (Pneumonia)  Goal: Resolution of Infection Signs and Symptoms  Outcome: Ongoing, Progressing  Remain SR  and Sinus penny at times while sleeping   on telemetry monitor. PRN medication effective. For pain and sleep.  Explained plan of care, verbalized understanding. Educated pt .on new medicine . No injury during shift, Side rails up x 2, call light by bedside.   NO BM ; administered suppository, Neb tratment administered and changed to Q4 while awake, 2L NC while not on Bipap; applied ice to right leg and ankle woundvac has zero output.

## 2023-08-26 NOTE — ASSESSMENT & PLAN NOTE
68 y/o with left AKA, previous CVA, paraparesis presenting from [facility] with right ankle pain and injury during a transfer from wheelchair to shower chair. She was found to have an acute closed trimalleolar fracture of the right distal tibia and fibula in the ED. Neurovascularly intact.     · Orthopedic surgery consulted and recommended operative repair. Patient taken to the OR on 8/23/2023 and underwent ORIF of right ankle by Dr. Suleman Schmitz.   · Post-op, patient is non weight bearing to right lower extremity as per Ortho.  · PT/OT consulted post-op to work with patient. Recommending SNF and referrals sent and has several accepting facilities and CM/SW working with patient on family on her choices. Anticipate should be medically ready for discharge on 8/28.   · Patient on Aspirin 81 mg po BID for DVT prophylaxis and will continue for 6 weeks.   · Fall precautions.  · Wound vac in place to right ankle incision site and will continue. Management as per Ortho.  · Pain controlled. Continue multimodal pain medications for pain management with scheduled Tylenol 1000 mg po TID + Robaxin 750 mg po 4 times daily + Oxycodone IR prn for breakthrough pain.   · Perineural pain catheters in place with continuous Ropivacaine for post-op pain management and Anesthesia monitoring and managing.

## 2023-08-26 NOTE — PT/OT/SLP PROGRESS
Occupational Therapy   Treatment    Name: Mary Ellen Fajardo  MRN: [id number]  Admitting Diagnosis:  Closed fracture of right tibial plafond with fibula involvement with routine healing  3 Days Post-Op    Recommendations:     Discharge Recommendations: nursing facility, skilled  Discharge Equipment Recommendations:   (TBD)  Barriers to discharge:  None    Assessment:     Mary Ellen Fajardo is a 67 y.o. female with a medical diagnosis of Closed fracture of right tibial plafond with fibula involvement with routine healing.  She presents with R [place] fx. Performance deficits affecting function are weakness, impaired endurance, impaired self care skills, impaired functional mobility, impaired balance. Pt was able to perform supine/sit T/F c max A x2 and has poor static sitting balance.  Pt was able to tolerate sitting EOB for approx. 15 minutes c CGA-total assist and pt leans to the R.  Able to perform grooming task c max a while sitting EOB.  Pt is progressing well.    Rehab Prognosis:  Good; patient would benefit from acute skilled OT services to address these deficits and reach maximum level of function.       Plan:     Patient to be seen 3 x/week to address the above listed problems via self-care/home management, therapeutic activities, therapeutic exercises, wheelchair management/training  Plan of Care Expires: 09/23/23  Plan of Care Reviewed with: patient    Subjective     Chief Complaint: R Pilon fx and is ARMANDO SALAZAR  Patient/Family Comments/goals: It's been awhile since I brushed my teeth.  Pain/Comfort:  Pain Rating 1:  (Pt did not rate)    Objective:     Communicated with: RN prior to session.  Patient found supine with perineural catheter, PRAFO, wound vac (Suprapubic catheter) upon OT entry to room.    General Precautions: Standard, fall    Orthopedic Precautions:RLE non weight bearing  Braces:  (PRAFO)  Respiratory Status: Room air     Occupational Performance:     Bed Mobility:    Patient completed  Supine to Sit with maximal assistance and 2 persons  Patient completed Sit to Supine with maximal assistance and 2 persons     Functional Mobility/Transfers:    Functional Mobility: Pt was able to tolerate sitting EOB for approx. 15 min c CGA-max A and leans to the R.    Activities of Daily Living:  Grooming: maximal assistance to brush teeth/dentures and to wash off face while sitting EOB.  Pt c difficulty holding onto objects.      [facility] 6 Click ADL:        Patient left supine with all lines intact, call button in reach, and RN notified    GOALS:   Multidisciplinary Problems       Occupational Therapy Goals          Problem: Occupational Therapy    Goal Priority Disciplines Outcome Interventions   Occupational Therapy Goal     OT, PT/OT Ongoing, Progressing    Description: Goals to be met by: 9/24/23     Patient will increase functional independence with ADLs by performing:    UE Dressing with [place].  Grooming while EOB with [place].  Toileting from bedside commode with Stand-by Assistance for hygiene and clothing management.   Sitting at edge of bed 10 minutes with Stand-by Assistance.  Rolling to Bilateral with [place].   Supine to sit with Modified [place].  Toilet transfer to bedside commode with Stand-by Assistance.                         Time Tracking:     OT Date of Treatment: 08/26/23  OT Start Time: 0905  OT Stop Time: 0930  OT Total Time (min): 25 min    Billable Minutes:Self Care/Home Management 13  Therapeutic Activity 12               8/26/2023

## 2023-08-27 LAB
ANION GAP SERPL CALC-SCNC: 3 MMOL/L (ref 8–16)
BASOPHILS # BLD AUTO: 0.03 K/UL (ref 0–0.2)
BASOPHILS NFR BLD: 0.4 % (ref 0–1.9)
BUN SERPL-MCNC: 27 MG/DL (ref 8–23)
CALCIUM SERPL-MCNC: 8.9 MG/DL (ref 8.7–10.5)
CHLORIDE SERPL-SCNC: 101 MMOL/L (ref 95–110)
CO2 SERPL-SCNC: 36 MMOL/L (ref 23–29)
CREAT SERPL-MCNC: 0.9 MG/DL (ref 0.5–1.4)
DIFFERENTIAL METHOD: ABNORMAL
EOSINOPHIL # BLD AUTO: 0.3 K/UL (ref 0–0.5)
EOSINOPHIL NFR BLD: 3.4 % (ref 0–8)
ERYTHROCYTE [DISTWIDTH] IN BLOOD BY AUTOMATED COUNT: 17.2 % (ref 11.5–14.5)
EST. GFR  (NO RACE VARIABLE): >60 ML/MIN/1.73 M^2
GLUCOSE SERPL-MCNC: 105 MG/DL (ref 70–110)
HCT VFR BLD AUTO: 25.6 % (ref 37–48.5)
HGB BLD-MCNC: 7.2 G/DL (ref 12–16)
IMM GRANULOCYTES # BLD AUTO: 0.03 K/UL (ref 0–0.04)
IMM GRANULOCYTES NFR BLD AUTO: 0.4 % (ref 0–0.5)
LYMPHOCYTES # BLD AUTO: 1.2 K/UL (ref 1–4.8)
LYMPHOCYTES NFR BLD: 16.7 % (ref 18–48)
MCH RBC QN AUTO: 25.4 PG (ref 27–31)
MCHC RBC AUTO-ENTMCNC: 28.1 G/DL (ref 32–36)
MCV RBC AUTO: 90 FL (ref 82–98)
MONOCYTES # BLD AUTO: 0.6 K/UL (ref 0.3–1)
MONOCYTES NFR BLD: 7.8 % (ref 4–15)
NEUTROPHILS # BLD AUTO: 5.2 K/UL (ref 1.8–7.7)
NEUTROPHILS NFR BLD: 71.3 % (ref 38–73)
NRBC BLD-RTO: 0 /100 WBC
PLATELET # BLD AUTO: 204 K/UL (ref 150–450)
PMV BLD AUTO: 9.6 FL (ref 9.2–12.9)
POTASSIUM SERPL-SCNC: 4.6 MMOL/L (ref 3.5–5.1)
RBC # BLD AUTO: 2.84 M/UL (ref 4–5.4)
SARS-COV-2 RNA RESP QL NAA+PROBE: NOT DETECTED
SODIUM SERPL-SCNC: 140 MMOL/L (ref 136–145)
WBC # BLD AUTO: 7.31 K/UL (ref 3.9–12.7)

## 2023-08-27 PROCEDURE — 99231 PR SUBSEQUENT HOSPITAL CARE,LEVL I: ICD-10-PCS | Mod: ,,, | Performed by: ANESTHESIOLOGY

## 2023-08-27 PROCEDURE — 80048 BASIC METABOLIC PNL TOTAL CA: CPT | Performed by: INTERNAL MEDICINE

## 2023-08-27 PROCEDURE — 25000003 PHARM REV CODE 250: Performed by: SURGERY

## 2023-08-27 PROCEDURE — 27000207 HC ISOLATION

## 2023-08-27 PROCEDURE — 25000003 PHARM REV CODE 250: Performed by: STUDENT IN AN ORGANIZED HEALTH CARE EDUCATION/TRAINING PROGRAM

## 2023-08-27 PROCEDURE — 94761 N-INVAS EAR/PLS OXIMETRY MLT: CPT

## 2023-08-27 PROCEDURE — 99233 PR SUBSEQUENT HOSPITAL CARE,LEVL III: ICD-10-PCS | Mod: ,,, | Performed by: INTERNAL MEDICINE

## 2023-08-27 PROCEDURE — 25000003 PHARM REV CODE 250

## 2023-08-27 PROCEDURE — 85025 COMPLETE CBC W/AUTO DIFF WBC: CPT | Performed by: INTERNAL MEDICINE

## 2023-08-27 PROCEDURE — 25000003 PHARM REV CODE 250: Performed by: HOSPITALIST

## 2023-08-27 PROCEDURE — 27100171 HC OXYGEN HIGH FLOW UP TO 24 HOURS

## 2023-08-27 PROCEDURE — 87635 SARS-COV-2 COVID-19 AMP PRB: CPT | Performed by: INTERNAL MEDICINE

## 2023-08-27 PROCEDURE — 94660 CPAP INITIATION&MGMT: CPT

## 2023-08-27 PROCEDURE — 25000003 PHARM REV CODE 250: Performed by: INTERNAL MEDICINE

## 2023-08-27 PROCEDURE — 99231 SBSQ HOSP IP/OBS SF/LOW 25: CPT | Mod: ,,, | Performed by: ANESTHESIOLOGY

## 2023-08-27 PROCEDURE — 99900035 HC TECH TIME PER 15 MIN (STAT)

## 2023-08-27 PROCEDURE — 21400001 HC TELEMETRY ROOM

## 2023-08-27 PROCEDURE — 36415 COLL VENOUS BLD VENIPUNCTURE: CPT | Performed by: INTERNAL MEDICINE

## 2023-08-27 PROCEDURE — 94640 AIRWAY INHALATION TREATMENT: CPT

## 2023-08-27 PROCEDURE — 25000242 PHARM REV CODE 250 ALT 637 W/ HCPCS: Performed by: INTERNAL MEDICINE

## 2023-08-27 PROCEDURE — 99233 SBSQ HOSP IP/OBS HIGH 50: CPT | Mod: ,,, | Performed by: INTERNAL MEDICINE

## 2023-08-27 RX ORDER — METHOCARBAMOL 500 MG/1
1000 TABLET, FILM COATED ORAL EVERY 6 HOURS
Status: DISCONTINUED | OUTPATIENT
Start: 2023-08-27 | End: 2023-08-30 | Stop reason: HOSPADM

## 2023-08-27 RX ADMIN — IPRATROPIUM BROMIDE AND ALBUTEROL SULFATE 3 ML: 2.5; .5 SOLUTION RESPIRATORY (INHALATION) at 03:08

## 2023-08-27 RX ADMIN — ACETAMINOPHEN 1000 MG: 500 TABLET ORAL at 02:08

## 2023-08-27 RX ADMIN — GABAPENTIN 600 MG: 300 CAPSULE ORAL at 08:08

## 2023-08-27 RX ADMIN — ACETAMINOPHEN 1000 MG: 500 TABLET ORAL at 09:08

## 2023-08-27 RX ADMIN — GABAPENTIN 600 MG: 300 CAPSULE ORAL at 02:08

## 2023-08-27 RX ADMIN — CITALOPRAM HYDROBROMIDE 30 MG: 10 TABLET ORAL at 09:08

## 2023-08-27 RX ADMIN — CHOLECALCIFEROL TAB 25 MCG (1000 UNIT) 1000 UNITS: 25 TAB at 08:08

## 2023-08-27 RX ADMIN — OXYCODONE HYDROCHLORIDE 5 MG: 5 TABLET ORAL at 08:08

## 2023-08-27 RX ADMIN — ASPIRIN 81 MG 81 MG: 81 TABLET ORAL at 08:08

## 2023-08-27 RX ADMIN — OXYCODONE HYDROCHLORIDE 5 MG: 5 TABLET ORAL at 05:08

## 2023-08-27 RX ADMIN — IPRATROPIUM BROMIDE AND ALBUTEROL SULFATE 3 ML: 2.5; .5 SOLUTION RESPIRATORY (INHALATION) at 11:08

## 2023-08-27 RX ADMIN — HYDROXYZINE HYDROCHLORIDE 25 MG: 25 TABLET ORAL at 05:08

## 2023-08-27 RX ADMIN — ASPIRIN 81 MG 81 MG: 81 TABLET ORAL at 09:08

## 2023-08-27 RX ADMIN — METHOCARBAMOL 750 MG: 750 TABLET ORAL at 05:08

## 2023-08-27 RX ADMIN — THIAMINE HCL TAB 100 MG 100 MG: 100 TAB at 08:08

## 2023-08-27 RX ADMIN — OXYBUTYNIN CHLORIDE 5 MG: 5 TABLET, EXTENDED RELEASE ORAL at 08:08

## 2023-08-27 RX ADMIN — METHOCARBAMOL 750 MG: 750 TABLET ORAL at 11:08

## 2023-08-27 RX ADMIN — IPRATROPIUM BROMIDE AND ALBUTEROL SULFATE 3 ML: 2.5; .5 SOLUTION RESPIRATORY (INHALATION) at 08:08

## 2023-08-27 RX ADMIN — METHOCARBAMOL 1000 MG: 500 TABLET ORAL at 06:08

## 2023-08-27 RX ADMIN — HYDROXYZINE HYDROCHLORIDE 25 MG: 25 TABLET ORAL at 06:08

## 2023-08-27 RX ADMIN — QUETIAPINE FUMARATE 200 MG: 100 TABLET ORAL at 09:08

## 2023-08-27 RX ADMIN — FOLIC ACID-PYRIDOXINE-CYANOCOBALAMIN TAB 2.5-25-2 MG 1 TABLET: 2.5-25-2 TAB at 08:08

## 2023-08-27 RX ADMIN — GABAPENTIN 600 MG: 300 CAPSULE ORAL at 09:08

## 2023-08-27 RX ADMIN — ACETAMINOPHEN 1000 MG: 500 TABLET ORAL at 05:08

## 2023-08-27 RX ADMIN — FERROUS SULFATE TAB 325 MG (65 MG ELEMENTAL FE) 1 EACH: 325 (65 FE) TAB at 08:08

## 2023-08-27 RX ADMIN — IPRATROPIUM BROMIDE AND ALBUTEROL SULFATE 3 ML: 2.5; .5 SOLUTION RESPIRATORY (INHALATION) at 07:08

## 2023-08-27 NOTE — ASSESSMENT & PLAN NOTE
Mary Ellen Fajardo is a 67 y.o. 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  Injury reportedly occurred while patient was transferring from wheelchair to shower chair.  She presented closed, neurovascularly intact, and without any other musculoskeletal injuries on physical exam.  RLE compartments soft and compressible, and she was noted to have fracture blistering over the anteromedial ankle.  Patient was placed in a short-leg splint and window was created for further compartment checks overnight.  Patient is reportedly nonambulatory and wheelchair-bound at baseline.     Now s/p ORIF sindi max 8/23    - Pain MM  - Wound vac to suction  - PT/OT: ARMANDO RLE   - ASA 81 BID  - Ice and elevate affected extremity at all times   - Abx: ancef x24h  - Suprapubic catheter    Dispo: PT/OT recs SNF, pending placement

## 2023-08-27 NOTE — PLAN OF CARE
Problem: Adult Inpatient Plan of Care  Goal: Plan of Care Review  Outcome: Ongoing, Progressing  Goal: Patient-Specific Goal (Individualized)  Outcome: Ongoing, Progressing  Goal: Absence of Hospital-Acquired Illness or Injury  Outcome: Ongoing, Progressing  Goal: Optimal Comfort and Wellbeing  Outcome: Ongoing, Progressing  Goal: Readiness for Transition of Care  Outcome: Ongoing, Progressing     Problem: Fluid Imbalance (Pneumonia)  Goal: Fluid Balance  Outcome: Ongoing, Progressing     Problem: Respiratory Compromise (Pneumonia)  Goal: Effective Oxygenation and Ventilation  Outcome: Ongoing, Progressing     Problem: Infection  Goal: Absence of Infection Signs and Symptoms  Outcome: Ongoing, Progressing     Problem: Impaired Wound Healing  Goal: Optimal Wound Healing  Outcome: Ongoing, Progressing     Problem: Skin Injury Risk Increased  Goal: Skin Health and Integrity  Outcome: Ongoing, Progressing

## 2023-08-27 NOTE — SUBJECTIVE & OBJECTIVE
Interval History: Patient reports right knee and right ankle pain and rates pain a 4-5/10. Patient complaining of right knee and ankle swelling and stiffness. Patient eating and drinking well. Wound vac in place to right ankle incision sites and Podus boot in place to right leg and perineural catheters in place as per Anesthesia. Labs reviewed. Hgb slightly decreased to 7.2 today from 7.5 yesterday. Patient with no active clinical signs of bleeding. Patient's BP remains on lower end of normal with SBP  range so will continue to hold home Bumex.     Review of Systems   Constitutional:  Negative for chills and fever.   Respiratory:  Negative for cough and shortness of breath.    Cardiovascular:  Negative for chest pain.   Gastrointestinal:  Negative for abdominal pain, diarrhea and nausea.   Musculoskeletal:  Positive for arthralgias (Right ankle; Right knee).   Neurological:  Negative for dizziness.   Psychiatric/Behavioral:  Negative for agitation and confusion.      Objective:     Vital Signs (Most Recent):  Temp: 97.7 °F (36.5 °C) (08/27/23 1129)  Pulse: 65 (08/27/23 1135)  Resp: 20 (08/27/23 1134)  BP: 99/54 (08/27/23 1129)  SpO2: 97 % (08/27/23 1134) on 2 liters of oxygen Vital Signs (24h Range):  Temp:  [97.7 °F (36.5 °C)-99 °F (37.2 °C)] 97.7 °F (36.5 °C)  Pulse:  [62-77] 65  Resp:  [13-20] 20  SpO2:  [90 %-98 %] 97 %  BP: ()/(54-66) 99/54     Weight: 83 kg (183 lb)  Body mass index is 30.45 kg/m².    Intake/Output Summary (Last 24 hours) at 8/27/2023 1326  Last data filed at 8/27/2023 1158  Gross per 24 hour   Intake 550 ml   Output 1625 ml   Net -1075 ml         Physical Exam  Vitals and nursing note reviewed.   Constitutional:       General: She is not in acute distress.     Appearance: Normal appearance. She is well-developed. She is obese. She is not ill-appearing.   Cardiovascular:      Rate and Rhythm: Normal rate and regular rhythm.      Heart sounds: Normal heart sounds. No murmur heard.      No friction rub. No gallop.   Pulmonary:      Effort: Pulmonary effort is normal. No respiratory distress.      Breath sounds: Normal breath sounds. No wheezing.   Abdominal:      General: Abdomen is flat. Bowel sounds are normal. There is no distension.      Palpations: Abdomen is soft.      Tenderness: There is no abdominal tenderness.   Musculoskeletal:         General: Swelling (Right lower leg from right knee down to foot) present.      Comments: Left AKA noted. Stump well healed to left leg. Wound vac noted to right ankle incision sites. Podus boot in place to right ankle.    Skin:     General: Skin is warm.      Findings: No erythema.   Neurological:      Mental Status: She is alert and oriented to person, place, and time.   Psychiatric:         Mood and Affect: Mood normal.         Behavior: Behavior normal. Behavior is cooperative.         Thought Content: Thought content normal.         Judgment: Judgment normal.             Significant Labs: CBC:   Recent Labs   Lab 08/26/23 0347 08/27/23 0314   WBC 5.54 7.31   HGB 7.4* 7.2*   HCT 26.2* 25.6*    204     CMP:   Recent Labs   Lab 08/26/23 0347 08/27/23  0314    140   K 4.5 4.6    101   CO2 34* 36*    105   BUN 37* 27*   CREATININE 1.1 0.9   CALCIUM 8.7 8.9   ANIONGAP 6* 3*       Significant Imaging: I have reviewed all pertinent imaging results/findings within the past 24 hours.

## 2023-08-27 NOTE — ASSESSMENT & PLAN NOTE
· Hgb down to 7.2 on 8/27. Monitor. No signs of bleeding.   · Hgb improved to 7.4 on 8/26.   · Hgb slightly improved to 7.2 on 8/25 after transfused 1 unit of PRBCs. Monitor Hgb daily. Momnitor for any signs of active bleeding.   · Hgb down to 6.9 on 8/24. Plan to transfuse 1 unit of PRBCs on 8/24. No signs of clinical bleeding and post-op and expected blood loss related to surgery.   · Hbg 7.8 on admit. Baseline Hgb 9-10. Low Hgb on admit likely related to fracture and recent acute illness and recent hospitalization for CHF.   · Transfuse blood if Hgb < 7 and asymptomatic or < 8 and symptomatic.   · Monitor daily CBC.

## 2023-08-27 NOTE — PLAN OF CARE
Pt resting in bed comfortably. PIV line intact and free of infection and irritation. Fall precautions maintained, no falls noted. Call light within reach, bed locked and in lowest position. Non-skid socks on while out of bed. Patient instructed to call for assistance. Weight shift assistance and incontinence care provided. Suprapubic catheter intact, draining to urimeter. Wound vac in place to RLE. No complaints or concerns at this time. Will continue to monitor and follow plan of care.

## 2023-08-27 NOTE — SUBJECTIVE & OBJECTIVE
Principal Problem:Closed fracture of right tibial plafond with fibula involvement with routine healing    Principal Orthopedic Problem: same as above s/p ORIF pilon fx 8/23    Interval History: NAEO. VSS. Pain well controlled. Wound vac to suction with no output. Potous boot in place with ice over wound vac.    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 Pt agreeable to try with pre mediation with benadryl.        Current Facility-Administered Medications   Medication    0.9%  NaCl infusion (for blood administration)    acetaminophen tablet 1,000 mg    albuterol-ipratropium 2.5 mg-0.5 mg/3 mL nebulizer solution 3 mL    aluminum-magnesium hydroxide-simethicone 200-200-20 mg/5 mL suspension 30 mL    aspirin chewable tablet 81 mg    bisacodyL suppository 10 mg    citalopram tablet 30 mg    dextrose 10% bolus 125 mL 125 mL    dextrose 10% bolus 250 mL 250 mL    ferrous sulfate tablet 1 each    folic acid-vit B6-vit B12 2.5-25-2 mg tablet 1 tablet    gabapentin capsule 600 mg    glucagon (human recombinant) injection 1 mg    glucose chewable tablet 16 g    glucose chewable tablet 24 g    hydrOXYzine HCL tablet 25 mg    melatonin tablet 6 mg    methocarbamoL tablet 750 mg    naloxone 0.4 mg/mL injection 0.02 mg    ondansetron disintegrating tablet 8 mg    oxybutynin 24 hr tablet 5 mg    oxyCODONE immediate release tablet 5 mg    polyethylene glycol packet 17 g    prochlorperazine injection Soln 5 mg    QUEtiapine tablet 200 mg    ROPIvacaine (PF) 2 mg/ml (0.2%) solution    ROPIvacaine (PF) 2 mg/ml (0.2%) solution    senna-docusate 8.6-50 mg per tablet 1 tablet    simethicone chewable tablet 80 mg    sodium chloride 0.9% flush 10 mL    sodium chloride 0.9% flush 5 mL    thiamine tablet 100 mg    vitamin D 1000 units tablet 1,000 Units     Objective:     Vital  "Signs (Most Recent):  Temp: 98.2 °F (36.8 °C) (08/27/23 0343)  Pulse: 65 (08/27/23 0343)  Resp: 18 (08/27/23 0529)  BP: (!) 112/58 (08/27/23 0343)  SpO2: 96 % (08/27/23 0343) Vital Signs (24h Range):  Temp:  [98.1 °F (36.7 °C)-99 °F (37.2 °C)] 98.2 °F (36.8 °C)  Pulse:  [60-77] 65  Resp:  [13-20] 18  SpO2:  [88 %-96 %] 96 %  BP: ()/(50-66) 112/58     Weight: 83 kg (183 lb)  Height: 5' 5" (165.1 cm)  Body mass index is 30.45 kg/m².      Intake/Output Summary (Last 24 hours) at 8/27/2023 0610  Last data filed at 8/27/2023 0529  Gross per 24 hour   Intake --   Output 700 ml   Net -700 ml          Ortho/SPM Exam  Wound vac to suction with good seal  Potus boot on  Able to wiggle toes  SILT  Cap refill <2s     Significant Labs: All pertinent labs within the past 24 hours have been reviewed.    Significant Imaging: I have reviewed and interpreted all pertinent imaging results/findings.  "

## 2023-08-27 NOTE — ANESTHESIA POST-OP PAIN MANAGEMENT
Acute Pain Service Progress Note    Mary Ellen Fajardo is a 67 y.o., female, [id number].    Surgery:  ORIF, FRACTURE, PILON (Right: Ankle)       ORIF, FRACTURE, FIBULA (Right: Leg Lower)    Post Op Day #: 4    Catheter type: perineural  saphenous  and popliteal    Infusion type: Ropivacaine 0.2%    Problem List:    Active Hospital Problems    Diagnosis  POA    *Closed fracture of right tibial plafond with fibula involvement with routine healing s/p ORIF on 8/23/2023 [S82.871D, S82.831D]  Not Applicable    Pressure injury of thigh, stage 2 [L89.202]  Yes    Anemia of chronic disease [D63.8]  Yes    Chronic respiratory failure with hypoxia and hypercapnia [J96.11, J96.12]  Yes      pulmonary htn + volume overload.  No overt evidence of copd per ct.        Chronic diastolic heart failure [I50.32]  Yes    Chronic indwelling suprapubic catheter in place [Z93.59]  Not Applicable     Chronic    History of CVA (cerebrovascular accident) [Z86.73]  Not Applicable     Chronic    Class 1 obesity due to excess calories without serious comorbidity with body mass index (BMI) of 30.0 to 30.9 in adult [E66.09, Z68.30]  Not Applicable    ADEEL (obstructive sleep apnea) [G47.33]  Yes     S/p sleep study at [facility].  Record not available.  Ahi of 44 per psg 2020. Currently on bipap 20/14.        Essential hypertension [I10]  Yes     Chronic    Primary insomnia [F51.01]  Yes     Chronic    Paraparesis [G82.20]  Yes      Resolved Hospital Problems    Diagnosis Date Resolved POA    COPD (chronic obstructive pulmonary disease) [J44.9] 08/24/2023 Yes     Chronic       Subjective:     General appearance of alert, oriented, no complaints   Pain with rest: 4    Numbers   Pain with movement: 4    Numbers   Side Effects    1. Pruritis No    2. Nausea No    3. Motor Blockade No, 0=Ability to raise lower extremities off bed    4. Sedation No, 1=awake and alert    Objective:   Catheter site clean, dry, intact        Vitals   Vitals:     08/27/23 0343   BP: (!) 112/58   Pulse: 65   Resp: 18   Temp: 36.8 °C (98.2 °F)        Labs    No results displayed because visit has over 200 results.           Meds   Current Facility-Administered Medications   Medication Dose Route Frequency Provider Last Rate Last Admin    0.9%  NaCl infusion (for blood administration)   Intravenous Q24H PRN Awa Purvis MD        acetaminophen tablet 1,000 mg  1,000 mg Oral Q8H Linda Wiseman MD   1,000 mg at 08/26/23 2123    albuterol-ipratropium 2.5 mg-0.5 mg/3 mL nebulizer solution 3 mL  3 mL Nebulization Q4H WAKE Jason Henderson MD   3 mL at 08/26/23 1923    aluminum-magnesium hydroxide-simethicone 200-200-20 mg/5 mL suspension 30 mL  30 mL Oral QID PRN Ana Fonseca PA-C   30 mL at 08/25/23 2023    aspirin chewable tablet 81 mg  81 mg Oral BID Suleman Bowden MD   81 mg at 08/26/23 2123    bisacodyL suppository 10 mg  10 mg Rectal Daily PRN Ana Fonseca PA-C   10 mg at 08/25/23 2009    citalopram tablet 30 mg  30 mg Oral QHS Awa Purvis MD   30 mg at 08/26/23 2123    dextrose 10% bolus 125 mL 125 mL  12.5 g Intravenous PRN Ana Fonseca, CICI        dextrose 10% bolus 250 mL 250 mL  25 g Intravenous PRN Ana Fonseca PA-C        ferrous sulfate tablet 1 each  1 tablet Oral Daily Ana Fonseca PA-C   1 each at 08/26/23 1007    folic acid-vit B6-vit B12 2.5-25-2 mg tablet 1 tablet  1 tablet Oral Daily Ana Fonseca PA-C   1 tablet at 08/26/23 1007    gabapentin capsule 600 mg  600 mg Oral TID Awa Purvis MD   600 mg at 08/26/23 2123    glucagon (human recombinant) injection 1 mg  1 mg Intramuscular PRN FonsecaAna oakes PA-C        glucose chewable tablet 16 g  16 g Oral Ana Hurst PA-C        glucose chewable tablet 24 g  24 g Oral Ana Hurst PA-C        hydrOXYzine HCL tablet 25 mg  25 mg Oral BID Aan Hurst PA-C   25 mg at 08/26/23 0614     melatonin tablet 6 mg  6 mg Oral Nightly PRN Ana Fonseca PA-C   6 mg at 08/25/23 2222    methocarbamoL tablet 750 mg  750 mg Oral Q6H Awa Purvis MD   750 mg at 08/26/23 2318    naloxone 0.4 mg/mL injection 0.02 mg  0.02 mg Intravenous PRN Ana Fonseca PA-C        ondansetron disintegrating tablet 8 mg  8 mg Oral Q8H PRN Ana Fonseca PA-C   8 mg at 08/25/23 2008    oxybutynin 24 hr tablet 5 mg  5 mg Oral Daily Kaylee Montes MD   5 mg at 08/26/23 1007    oxyCODONE immediate release tablet 5 mg  5 mg Oral Q4H PRN Ramu Daugherty MD   5 mg at 08/26/23 2127    polyethylene glycol packet 17 g  17 g Oral BID PRN Ana Fonseca PA-C        prochlorperazine injection Soln 5 mg  5 mg Intravenous Q6H PRN Ana Fonseca PA-C        QUEtiapine tablet 200 mg  200 mg Oral QHS Ana Fonseca PA-C   200 mg at 08/26/23 2123    ROPIvacaine (PF) 2 mg/ml (0.2%) solution  0.1 mL/hr Perineural Continuous Parker Campbell MD 0.1 mL/hr at 08/26/23 1635 0.1 mL/hr at 08/26/23 1635    ROPIvacaine (PF) 2 mg/ml (0.2%) solution  0.1 mL/hr Perineural Continuous Parker Campbell MD 0.1 mL/hr at 08/26/23 1527 0.1 mL/hr at 08/26/23 1527    senna-docusate 8.6-50 mg per tablet 1 tablet  1 tablet Oral Daily Cielo Almaguer PA-C   1 tablet at 08/26/23 1007    simethicone chewable tablet 80 mg  1 tablet Oral QID PRN Ana Fonseca PA-C   80 mg at 08/25/23 2009    sodium chloride 0.9% flush 10 mL  10 mL Intravenous PRN Ramu Good MD        sodium chloride 0.9% flush 5 mL  5 mL Intravenous PRN Ana Fonseca PA-C        thiamine tablet 100 mg  100 mg Oral Daily Ana Fonseca PA-C   100 mg at 08/26/23 1007    vitamin D 1000 units tablet 1,000 Units  1,000 Units Oral Daily Suleman Bowden MD   1,000 Units at 08/26/23 1007       Assessment:     Pain control adequate.     Plan:     Patient doing well, continue present treatment.  -- Paused adductor PNC  this morning. Will likely pull this afternoon.  -- Continue popliteal PNC. Anticipate pause/pull on 8/28.   -- Continue multimodal pain regimen of Tylenol 1 gm Q8H, Gabapentin 300 mg TID, Robaxin 500 mg Q6H, and Oxy 5 mg IR Q4H PRN. Added Oxy 10 mg Q4H PRN for severe pain (on home Percocet 10 BID).   -- Dispo: DEBORAH Lebron, DO  Anesthesia PGY3, CA2

## 2023-08-27 NOTE — PROGRESS NOTES
[facility]  Progress Note    Patient Name: Mary Ellen Fajardo  MRN: [id number]  Patient Class: IP- Inpatient   Admission Date: 8/22/2023  Length of Stay: 3 days  Attending Physician: Awa Purvis MD  Primary Care Provider: Any Tran MD        Subjective:     Principal Problem:Closed fracture of right tibial plafond with fibula involvement with routine healing        HPI:  Mary Ellen Fajardo is a 67 y.o. 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  Patient reports she was being moved to a shower chair from her wheelchair when her right ankle caught and twisted in the wheel of her wheelchair.  She experienced immediate pain and deformity of the right ankle.  Denies any numbness, tingling, and states she has never injured this ankle before.  She denies any other injury. Wheelchair-bound at baseline and not on any blood thinners.  Has some abdominal cramping. Last BM yesterday. No other symptoms such as shortness of breath, chest pain, nausea, vomiting. She is somewhat withdrawn during interview which she attributes to pain.    In the ED, AFVSS. Labs with hbg 7.8 (baseline ~9-10). CBC and CMP otherwise unremarkable. VBG with pH 7.285, PCO2 77.8, PO2 115, and HCO3 37. Placed on her home nightly bipap. XR imaging of RLE revealed  Acute fracture of the distal tibia and fibula, approximately 2-3 cm above the talar dome.  Mild comminution and mild displacement.  Ortho evaluated the patient, splinted her ankle, and recommended admission to hospital medicine. Given dilaudid  and 1L NS bolus.      Overview/Hospital Course:  Ortho consulted and patient taken to OR on 8/23/2023 and underwent ORIF of closed right trimalleolar fracture by Dr. Suleman Schmitz. Incisional wound vac  placed by Ortho in OR to surgical site and remain in place post-op. Patient post-op nonweight bearing to right lower extremity. Podus boot in place to right ankle as per Ortho. PT/OT consulted post-op and working with patient and recommending SNF on discharge when medically ready. Referrals sent as patient states she does not want to go back to [facility] for her SNF care. Patient on multimodal pain meds post-op for pain control with scheduled Tylenol and Robaxin with Oxycodone prn for breakthrough pain. Pain controlled post-op. Patient on Aspirin 81 mg po BID for DVT prophylaxis and will continue. Patient transfused 1 unit of PRBCs on 8/24 for Hgb 6.9 and Hgb improved to 7.2 on 8/25. Hgb stable at 7.4 on 8/26. Pain controlled. Wound vac remains in place to right ankle incision sites with minimal output on 8/26. Hgb stable at 7.2 on 8/27.       Interval History: Patient reports right knee and right ankle pain and rates pain a 4-5/10. Patient complaining of right knee and ankle swelling and stiffness. Patient eating and drinking well. Wound vac in place to right ankle incision sites and Podus boot in place to right leg and perineural catheters in place as per Anesthesia. Labs reviewed. Hgb slightly decreased to 7.2 today from 7.5 yesterday. Patient with no active clinical signs of bleeding. Patient's BP remains on lower end of normal with SBP  range so will continue to hold home Bumex.     Review of Systems   Constitutional:  Negative for chills and fever.   Respiratory:  Negative for cough and shortness of breath.    Cardiovascular:  Negative for chest pain.   Gastrointestinal:  Negative for abdominal pain, diarrhea and nausea.   Musculoskeletal:  Positive for arthralgias (Right ankle; Right knee).   Neurological:  Negative for dizziness.   Psychiatric/Behavioral:  Negative for agitation and confusion.      Objective:     Vital Signs (Most Recent):  Temp: 97.7 °F (36.5 °C) (08/27/23 1129)  Pulse: 65  (08/27/23 1135)  Resp: 20 (08/27/23 1134)  BP: 99/54 (08/27/23 1129)  SpO2: 97 % (08/27/23 1134) on 2 liters of oxygen Vital Signs (24h Range):  Temp:  [97.7 °F (36.5 °C)-99 °F (37.2 °C)] 97.7 °F (36.5 °C)  Pulse:  [62-77] 65  Resp:  [13-20] 20  SpO2:  [90 %-98 %] 97 %  BP: ()/(54-66) 99/54     Weight: 83 kg (183 lb)  Body mass index is 30.45 kg/m².    Intake/Output Summary (Last 24 hours) at 8/27/2023 1326  Last data filed at 8/27/2023 1158  Gross per 24 hour   Intake 550 ml   Output 1625 ml   Net -1075 ml         Physical Exam  Vitals and nursing note reviewed.   Constitutional:       General: She is not in acute distress.     Appearance: Normal appearance. She is well-developed. She is obese. She is not ill-appearing.   Cardiovascular:      Rate and Rhythm: Normal rate and regular rhythm.      Heart sounds: Normal heart sounds. No murmur heard.     No friction rub. No gallop.   Pulmonary:      Effort: Pulmonary effort is normal. No respiratory distress.      Breath sounds: Normal breath sounds. No wheezing.   Abdominal:      General: Abdomen is flat. Bowel sounds are normal. There is no distension.      Palpations: Abdomen is soft.      Tenderness: There is no abdominal tenderness.   Musculoskeletal:         General: Swelling (Right lower leg from right knee down to foot) present.      Comments: Left AKA noted. Stump well healed to left leg. Wound vac noted to right ankle incision sites. Podus boot in place to right ankle.    Skin:     General: Skin is warm.      Findings: No erythema.   Neurological:      Mental Status: She is alert and oriented to person, place, and time.   Psychiatric:         Mood and Affect: Mood normal.         Behavior: Behavior normal. Behavior is cooperative.         Thought Content: Thought content normal.         Judgment: Judgment normal.             Significant Labs: CBC:   Recent Labs   Lab 08/26/23  0347 08/27/23  0314   WBC 5.54 7.31   HGB 7.4* 7.2*   HCT 26.2* 25.6*   PLT  198 204     CMP:   Recent Labs   Lab 08/26/23  0347 08/27/23  0314    140   K 4.5 4.6    101   CO2 34* 36*    105   BUN 37* 27*   CREATININE 1.1 0.9   CALCIUM 8.7 8.9   ANIONGAP 6* 3*       Significant Imaging: I have reviewed all pertinent imaging results/findings within the past 24 hours.      Assessment/Plan:      * Closed fracture of right tibial plafond with fibula involvement with routine healing s/p ORIF on 8/23/2023  66 y/o with left AKA, previous CVA, paraparesis presenting from [facility] with right ankle pain and injury during a transfer from wheelchair to shower chair. She was found to have an acute closed trimalleolar fracture of the right distal tibia and fibula in the ED. Neurovascularly intact.     · Orthopedic surgery consulted and recommended operative repair. Patient taken to the OR on 8/23/2023 and underwent ORIF of right ankle by Dr. Suleman Schmitz.   · Post-op, patient is non weight bearing to right lower extremity as per Ortho.  · PT/OT consulted post-op to work with patient. Recommending SNF and referrals sent and has several accepting facilities and CM/SW working with patient on family on her choices. Anticipate should be medically ready for discharge on 8/28.   · Patient on Aspirin 81 mg po BID for DVT prophylaxis and will continue for 6 weeks.   · Fall precautions.  · Podus boot to right ankle as per Ortho.   · Wound vac in place to right ankle incision site and will continue. Management as per Ortho.  · Pain controlled. Continue multimodal pain medications for pain management with scheduled Tylenol 1000 mg po TID + Robaxin 750 mg po 4 times daily + Oxycodone IR prn for breakthrough pain.   · Perineural pain catheters in place with continuous Ropivacaine for post-op pain management and Anesthesia monitoring and managing.     Chronic respiratory failure with hypoxia and hypercapnia  Patient with Hypercapnic and Hypoxic Respiratory failure which is Chronic.   she is on home oxygen at 2 LPM and BiPAP nightly for her ADEEL. Supplemental oxygen was provided and noted-at 2 liters of oxygen. Oxygen Concentration (%):  [28] 28    .   Signs/symptoms of respiratory failure include- none at this time. Contributing diagnoses includes - CHF and Pulmonary HTN and ADEEL. Labs and images were reviewed. Patient Has recent ABG, which has been reviewed (VBG). Will treat underlying causes and adjust management of respiratory failure as follows-   Continue home supplemental oxygen for goal SpO2 >88%.  Continue nightly BiPAP for her chronic ADEEL.       ADEEL (obstructive sleep apnea)  Chronic condition. Continue nightly BiPAP in hospital to treat as has chronic hypercapnic and hypoxic respiratory failure related to her chronic ADEEL and pulmonary HTN.     Anemia of chronic disease  · Hgb down to 7.2 on 8/27. Monitor. No signs of bleeding.   · Hgb improved to 7.4 on 8/26.   · Hgb slightly improved to 7.2 on 8/25 after transfused 1 unit of PRBCs. Monitor Hgb daily. Momnitor for any signs of active bleeding.   · Hgb down to 6.9 on 8/24. Plan to transfuse 1 unit of PRBCs on 8/24. No signs of clinical bleeding and post-op and expected blood loss related to surgery.   · Hbg 7.8 on admit. Baseline Hgb 9-10. Low Hgb on admit likely related to fracture and recent acute illness and recent hospitalization for CHF.   · Transfuse blood if Hgb < 7 and asymptomatic or < 8 and symptomatic.   · Monitor daily CBC.    Pressure injury of thigh, stage 2  - Present on admit. Turn patient every 2 hours.   - Continue local wound care.     Chronic indwelling suprapubic catheter in place  Present on admit. Continue routine suprapubic catheter care.     Essential hypertension  Chronic and controlled. Patient's BP running low post-op so Bumex discontinued don 8/26. Monitor vital signs very 4 hours. Target BP < 150/90.     Primary insomnia  Chronic condition. Continue home Seroquel 200 mg po nightly.     Paraparesis  · Chronic  condition and related to previous CVA. Patient at her baseline neurologic and functional level.   · Fall precautions.     History of CVA (cerebrovascular accident)  Bed bound at baseline from previous CVA. Patient at her baseline neurologic and functional level.       Class 1 obesity due to excess calories without serious comorbidity with body mass index (BMI) of 30.0 to 30.9 in adult  Body mass index is 30.45 kg/m². Morbid obesity complicates all aspects of disease management from diagnostic modalities to treatment. Weight loss encouraged and health benefits explained to patient.         Chronic diastolic heart failure  Patient is identified as having Diastolic (HFpEF) heart failure that is Chronic. CHF is currently controlled. Latest ECHO performed and demonstrates- Results for orders placed during the hospital encounter of 08/06/23    Echo    Interpretation Summary    Left Ventricle: Normal wall motion. There is low normal systolic function with a visually estimated ejection fraction of 50 - 55%.    Right Ventricle: Normal right ventricular cavity size.  Continue home Bumex 2 mg po daily to treat and monitor clinical status closely. Monitor on telemetry. Patient is off CHF pathway.  Monitor strict Is&Os and daily weights.  Place on fluid restriction of 1.5 L. Continue to stress to patient importance of self efficacy and  on diet for CHF. Last BNP reviewed- and noted below   Recent Labs   Lab 08/22/23  2335   *         VTE Risk Mitigation (From admission, onward)         Ordered     IP VTE HIGH RISK PATIENT  Once         08/23/23 0342     Place sequential compression device  Until discontinued         08/23/23 0342     Reason for No Pharmacological VTE Prophylaxis  Once        Question:  Reasons:  Answer:  Physician Provided (leave comment)    08/23/23 0342                Discharge Planning   LOY: 8/28/2023     Code Status: Full Code   Is the patient medically ready for discharge?: No    Reason for  patient still in hospital (select all that apply): Patient trending condition  Discharge Plan A: Skilled Nursing Facility          Awa Purvis MD  Department of Hospital Medicine   [facility] - Surgery

## 2023-08-27 NOTE — ASSESSMENT & PLAN NOTE
68 y/o with left AKA, previous CVA, paraparesis presenting from [facility] with right ankle pain and injury during a transfer from wheelchair to shower chair. She was found to have an acute closed trimalleolar fracture of the right distal tibia and fibula in the ED. Neurovascularly intact.     · Orthopedic surgery consulted and recommended operative repair. Patient taken to the OR on 8/23/2023 and underwent ORIF of right ankle by Dr. Suleman Schmitz.   · Post-op, patient is non weight bearing to right lower extremity as per Ortho.  · PT/OT consulted post-op to work with patient. Recommending SNF and referrals sent and has several accepting facilities and CM/SW working with patient on family on her choices. Anticipate should be medically ready for discharge on 8/28.   · Patient on Aspirin 81 mg po BID for DVT prophylaxis and will continue for 6 weeks.   · Fall precautions.  · Podus boot to right ankle as per Ortho.   · Wound vac in place to right ankle incision site and will continue. Management as per Ortho.  · Pain controlled. Continue multimodal pain medications for pain management with scheduled Tylenol 1000 mg po TID + Robaxin 750 mg po 4 times daily + Oxycodone IR prn for breakthrough pain.   · Perineural pain catheters in place with continuous Ropivacaine for post-op pain management and Anesthesia monitoring and managing.

## 2023-08-27 NOTE — PROGRESS NOTES
[facility]  Orthopedics  Progress Note    Patient Name: Mary Ellen Fajardo  MRN: [id number]  Admission Date: 8/22/2023  Hospital Length of Stay: 3 days  Attending Provider: Awa Purvis MD  Primary Care Provider: Any Tran MD  Follow-up For: Procedure(s) (LRB):  ORIF, FRACTURE, PILON (Right)  ORIF, FRACTURE, FIBULA (Right)    Post-Operative Day: 4 Days Post-Op  Subjective:     Principal Problem:Closed fracture of right tibial plafond with fibula involvement with routine healing    Principal Orthopedic Problem: same as above s/p ORIF pilon fx 8/23    Interval History: NAEO. VSS. Pain well controlled. Wound vac to suction with no output. Potous boot in place with ice over wound vac.    Review of patient's allergies indicates:   Allergen Reactions    Tuberculin ppd Itching and Dermatitis     Patient has old scare that she claims is from TB PPD    Rocephin [ceftriaxone] Rash     Rash 2012? Pt agreeable to try with pre mediation with benadryl.        Current Facility-Administered Medications   Medication    0.9%  NaCl infusion (for blood administration)    acetaminophen tablet 1,000 mg    albuterol-ipratropium 2.5 mg-0.5 mg/3 mL nebulizer solution 3 mL    aluminum-magnesium hydroxide-simethicone 200-200-20 mg/5 mL suspension 30 mL    aspirin chewable tablet 81 mg    bisacodyL suppository 10 mg    citalopram tablet 30 mg    dextrose 10% bolus 125 mL 125 mL    dextrose 10% bolus 250 mL 250 mL    ferrous sulfate tablet 1 each    folic acid-vit B6-vit B12 2.5-25-2 mg tablet 1 tablet    gabapentin capsule 600 mg    glucagon (human recombinant) injection 1 mg    glucose chewable tablet 16 g    glucose chewable tablet 24 g    hydrOXYzine HCL tablet 25 mg    melatonin tablet 6 mg    methocarbamoL tablet 750 mg    naloxone 0.4 mg/mL injection 0.02 mg    ondansetron disintegrating tablet 8 mg    oxybutynin 24 hr tablet 5 mg    oxyCODONE immediate release tablet 5 mg     "polyethylene glycol packet 17 g    prochlorperazine injection Soln 5 mg    QUEtiapine tablet 200 mg    ROPIvacaine (PF) 2 mg/ml (0.2%) solution    ROPIvacaine (PF) 2 mg/ml (0.2%) solution    senna-docusate 8.6-50 mg per tablet 1 tablet    simethicone chewable tablet 80 mg    sodium chloride 0.9% flush 10 mL    sodium chloride 0.9% flush 5 mL    thiamine tablet 100 mg    vitamin D 1000 units tablet 1,000 Units     Objective:     Vital Signs (Most Recent):  Temp: 98.2 °F (36.8 °C) (08/27/23 0343)  Pulse: 65 (08/27/23 0343)  Resp: 18 (08/27/23 0529)  BP: (!) 112/58 (08/27/23 0343)  SpO2: 96 % (08/27/23 0343) Vital Signs (24h Range):  Temp:  [98.1 °F (36.7 °C)-99 °F (37.2 °C)] 98.2 °F (36.8 °C)  Pulse:  [60-77] 65  Resp:  [13-20] 18  SpO2:  [88 %-96 %] 96 %  BP: ()/(50-66) 112/58     Weight: 83 kg (183 lb)  Height: 5' 5" (165.1 cm)  Body mass index is 30.45 kg/m².      Intake/Output Summary (Last 24 hours) at 8/27/2023 0610  Last data filed at 8/27/2023 0529  Gross per 24 hour   Intake --   Output 700 ml   Net -700 ml         Ortho/SPM Exam  Wound vac to suction with good seal  Potus boot on  Able to wiggle toes  SILT  Cap refill <2s     Significant Labs: All pertinent labs within the past 24 hours have been reviewed.    Significant Imaging: I have reviewed and interpreted all pertinent imaging results/findings.    Assessment/Plan:     * Closed fracture of right tibial plafond with fibula involvement with routine healing s/p ORIF on 8/23/2023  Mary Ellen Fajardo is a 67 y.o. female with PMH of CHF, COPD, neurogenic bladder with chronic indwelling suprapubic catheter, hepatitis-C, CVA, bilateral TKAs complicated by postoperative infections resulting in a left AKA, who presents from [facility] with fractures of the right distal tibia and fibula.  Injury reportedly occurred while patient was transferring from wheelchair to shower chair.  She presented closed, neurovascularly intact, and without any " other musculoskeletal injuries on physical exam.  RLE compartments soft and compressible, and she was noted to have fracture blistering over the anteromedial ankle.  Patient was placed in a short-leg splint and window was created for further compartment checks overnight.  Patient is reportedly nonambulatory and wheelchair-bound at baseline.     Now s/p ORIF pilon fx 8/23    - Pain MM  - Wound vac to suction  - PT/OT: NWB RLE   - ASA 81 BID  - Ice and elevate affected extremity at all times   - Abx: ancef x24h  - Suprapubic catheter    Dispo: PT/OT recs SNF, pending placement          Suleman Bowden MD  Orthopedics  [facility] - Surgery

## 2023-08-28 LAB
ANION GAP SERPL CALC-SCNC: 6 MMOL/L (ref 8–16)
BASOPHILS # BLD AUTO: 0.04 K/UL (ref 0–0.2)
BASOPHILS NFR BLD: 0.6 % (ref 0–1.9)
BUN SERPL-MCNC: 21 MG/DL (ref 8–23)
CALCIUM SERPL-MCNC: 9.3 MG/DL (ref 8.7–10.5)
CHLORIDE SERPL-SCNC: 101 MMOL/L (ref 95–110)
CO2 SERPL-SCNC: 30 MMOL/L (ref 23–29)
CREAT SERPL-MCNC: 0.8 MG/DL (ref 0.5–1.4)
DIFFERENTIAL METHOD: ABNORMAL
EOSINOPHIL # BLD AUTO: 0.4 K/UL (ref 0–0.5)
EOSINOPHIL NFR BLD: 6.1 % (ref 0–8)
ERYTHROCYTE [DISTWIDTH] IN BLOOD BY AUTOMATED COUNT: 17.1 % (ref 11.5–14.5)
EST. GFR  (NO RACE VARIABLE): >60 ML/MIN/1.73 M^2
GLUCOSE SERPL-MCNC: 103 MG/DL (ref 70–110)
HCT VFR BLD AUTO: 28.8 % (ref 37–48.5)
HGB BLD-MCNC: 8.2 G/DL (ref 12–16)
IMM GRANULOCYTES # BLD AUTO: 0.02 K/UL (ref 0–0.04)
IMM GRANULOCYTES NFR BLD AUTO: 0.3 % (ref 0–0.5)
LYMPHOCYTES # BLD AUTO: 1.6 K/UL (ref 1–4.8)
LYMPHOCYTES NFR BLD: 24.3 % (ref 18–48)
MCH RBC QN AUTO: 25.5 PG (ref 27–31)
MCHC RBC AUTO-ENTMCNC: 28.5 G/DL (ref 32–36)
MCV RBC AUTO: 89 FL (ref 82–98)
MONOCYTES # BLD AUTO: 0.5 K/UL (ref 0.3–1)
MONOCYTES NFR BLD: 8.1 % (ref 4–15)
NEUTROPHILS # BLD AUTO: 3.9 K/UL (ref 1.8–7.7)
NEUTROPHILS NFR BLD: 60.6 % (ref 38–73)
NRBC BLD-RTO: 0 /100 WBC
PLATELET # BLD AUTO: 225 K/UL (ref 150–450)
PMV BLD AUTO: 9.2 FL (ref 9.2–12.9)
POTASSIUM SERPL-SCNC: 4.9 MMOL/L (ref 3.5–5.1)
RBC # BLD AUTO: 3.22 M/UL (ref 4–5.4)
SODIUM SERPL-SCNC: 137 MMOL/L (ref 136–145)
WBC # BLD AUTO: 6.41 K/UL (ref 3.9–12.7)

## 2023-08-28 PROCEDURE — 25000003 PHARM REV CODE 250: Performed by: HOSPITALIST

## 2023-08-28 PROCEDURE — 94640 AIRWAY INHALATION TREATMENT: CPT

## 2023-08-28 PROCEDURE — 99900035 HC TECH TIME PER 15 MIN (STAT)

## 2023-08-28 PROCEDURE — 94761 N-INVAS EAR/PLS OXIMETRY MLT: CPT

## 2023-08-28 PROCEDURE — 94660 CPAP INITIATION&MGMT: CPT

## 2023-08-28 PROCEDURE — 25000003 PHARM REV CODE 250: Performed by: SURGERY

## 2023-08-28 PROCEDURE — 85025 COMPLETE CBC W/AUTO DIFF WBC: CPT | Performed by: INTERNAL MEDICINE

## 2023-08-28 PROCEDURE — 25000242 PHARM REV CODE 250 ALT 637 W/ HCPCS: Performed by: INTERNAL MEDICINE

## 2023-08-28 PROCEDURE — 25000003 PHARM REV CODE 250

## 2023-08-28 PROCEDURE — 21400001 HC TELEMETRY ROOM

## 2023-08-28 PROCEDURE — 27100171 HC OXYGEN HIGH FLOW UP TO 24 HOURS

## 2023-08-28 PROCEDURE — 36415 COLL VENOUS BLD VENIPUNCTURE: CPT | Performed by: INTERNAL MEDICINE

## 2023-08-28 PROCEDURE — 25000003 PHARM REV CODE 250: Performed by: PHYSICIAN ASSISTANT

## 2023-08-28 PROCEDURE — 80048 BASIC METABOLIC PNL TOTAL CA: CPT | Performed by: INTERNAL MEDICINE

## 2023-08-28 PROCEDURE — 25000003 PHARM REV CODE 250: Performed by: STUDENT IN AN ORGANIZED HEALTH CARE EDUCATION/TRAINING PROGRAM

## 2023-08-28 PROCEDURE — 25000003 PHARM REV CODE 250: Performed by: INTERNAL MEDICINE

## 2023-08-28 PROCEDURE — 99233 SBSQ HOSP IP/OBS HIGH 50: CPT | Mod: ,,, | Performed by: INTERNAL MEDICINE

## 2023-08-28 PROCEDURE — 99233 PR SUBSEQUENT HOSPITAL CARE,LEVL III: ICD-10-PCS | Mod: ,,, | Performed by: INTERNAL MEDICINE

## 2023-08-28 PROCEDURE — 94760 N-INVAS EAR/PLS OXIMETRY 1: CPT

## 2023-08-28 RX ORDER — ONDANSETRON 8 MG/1
8 TABLET, ORALLY DISINTEGRATING ORAL EVERY 8 HOURS PRN
Start: 2023-08-28

## 2023-08-28 RX ORDER — OXYCODONE HYDROCHLORIDE 5 MG/1
5 TABLET ORAL EVERY 4 HOURS PRN
Qty: 30 TABLET | Refills: 0 | Status: SHIPPED | OUTPATIENT
Start: 2023-08-28

## 2023-08-28 RX ORDER — TALC
6 POWDER (GRAM) TOPICAL NIGHTLY PRN
Refills: 0
Start: 2023-08-28

## 2023-08-28 RX ORDER — ACETAMINOPHEN 500 MG
1000 TABLET ORAL EVERY 8 HOURS
COMMUNITY
Start: 2023-08-28

## 2023-08-28 RX ORDER — CHOLECALCIFEROL (VITAMIN D3) 25 MCG
1000 TABLET ORAL DAILY
Start: 2023-08-29

## 2023-08-28 RX ORDER — METHOCARBAMOL 1000 MG/1
1000 TABLET, COATED ORAL 4 TIMES DAILY
Start: 2023-08-28

## 2023-08-28 RX ORDER — AMOXICILLIN 250 MG
1 CAPSULE ORAL DAILY
Start: 2023-08-29

## 2023-08-28 RX ORDER — BUMETANIDE 1 MG/1
2 TABLET ORAL DAILY
Status: DISCONTINUED | OUTPATIENT
Start: 2023-08-28 | End: 2023-08-30 | Stop reason: HOSPADM

## 2023-08-28 RX ORDER — NAPROXEN SODIUM 220 MG/1
81 TABLET, FILM COATED ORAL 2 TIMES DAILY
Refills: 0 | COMMUNITY
Start: 2023-08-28 | End: 2023-10-09

## 2023-08-28 RX ADMIN — ASPIRIN 81 MG 81 MG: 81 TABLET ORAL at 09:08

## 2023-08-28 RX ADMIN — GABAPENTIN 600 MG: 300 CAPSULE ORAL at 03:08

## 2023-08-28 RX ADMIN — ACETAMINOPHEN 1000 MG: 500 TABLET ORAL at 03:08

## 2023-08-28 RX ADMIN — ACETAMINOPHEN 1000 MG: 500 TABLET ORAL at 05:08

## 2023-08-28 RX ADMIN — IPRATROPIUM BROMIDE AND ALBUTEROL SULFATE 3 ML: 2.5; .5 SOLUTION RESPIRATORY (INHALATION) at 07:08

## 2023-08-28 RX ADMIN — THIAMINE HCL TAB 100 MG 100 MG: 100 TAB at 09:08

## 2023-08-28 RX ADMIN — BUMETANIDE 2 MG: 1 TABLET ORAL at 09:08

## 2023-08-28 RX ADMIN — IPRATROPIUM BROMIDE AND ALBUTEROL SULFATE 3 ML: 2.5; .5 SOLUTION RESPIRATORY (INHALATION) at 08:08

## 2023-08-28 RX ADMIN — CHOLECALCIFEROL TAB 25 MCG (1000 UNIT) 1000 UNITS: 25 TAB at 09:08

## 2023-08-28 RX ADMIN — OXYCODONE HYDROCHLORIDE 5 MG: 5 TABLET ORAL at 05:08

## 2023-08-28 RX ADMIN — SENNOSIDES AND DOCUSATE SODIUM 1 TABLET: 50; 8.6 TABLET ORAL at 09:08

## 2023-08-28 RX ADMIN — GABAPENTIN 600 MG: 300 CAPSULE ORAL at 09:08

## 2023-08-28 RX ADMIN — ACETAMINOPHEN 1000 MG: 500 TABLET ORAL at 09:08

## 2023-08-28 RX ADMIN — METHOCARBAMOL 1000 MG: 500 TABLET ORAL at 12:08

## 2023-08-28 RX ADMIN — METHOCARBAMOL 1000 MG: 500 TABLET ORAL at 05:08

## 2023-08-28 RX ADMIN — FOLIC ACID-PYRIDOXINE-CYANOCOBALAMIN TAB 2.5-25-2 MG 1 TABLET: 2.5-25-2 TAB at 09:08

## 2023-08-28 RX ADMIN — IPRATROPIUM BROMIDE AND ALBUTEROL SULFATE 3 ML: 2.5; .5 SOLUTION RESPIRATORY (INHALATION) at 11:08

## 2023-08-28 RX ADMIN — QUETIAPINE FUMARATE 200 MG: 100 TABLET ORAL at 09:08

## 2023-08-28 RX ADMIN — FERROUS SULFATE TAB 325 MG (65 MG ELEMENTAL FE) 1 EACH: 325 (65 FE) TAB at 09:08

## 2023-08-28 RX ADMIN — OXYCODONE HYDROCHLORIDE 5 MG: 5 TABLET ORAL at 12:08

## 2023-08-28 RX ADMIN — CITALOPRAM HYDROBROMIDE 30 MG: 10 TABLET ORAL at 09:08

## 2023-08-28 RX ADMIN — OXYCODONE HYDROCHLORIDE 5 MG: 5 TABLET ORAL at 06:08

## 2023-08-28 RX ADMIN — OXYBUTYNIN CHLORIDE 5 MG: 5 TABLET, EXTENDED RELEASE ORAL at 09:08

## 2023-08-28 NOTE — PROGRESS NOTES
[facility]  Progress Note    Patient Name: Mary Ellen Fajardo  MRN: [id number]  Patient Class: IP- Inpatient   Admission Date: 8/22/2023  Length of Stay: 4 days  Attending Physician: Awa Purvis MD  Primary Care Provider: Any Tran MD        Subjective:     Principal Problem:Closed fracture of right tibial plafond with fibula involvement with routine healing        HPI:  Mary Ellen Fajardo is a 67 y.o. 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  Patient reports she was being moved to a shower chair from her wheelchair when her right ankle caught and twisted in the wheel of her wheelchair.  She experienced immediate pain and deformity of the right ankle.  Denies any numbness, tingling, and states she has never injured this ankle before.  She denies any other injury. Wheelchair-bound at baseline and not on any blood thinners.  Has some abdominal cramping. Last BM yesterday. No other symptoms such as shortness of breath, chest pain, nausea, vomiting. She is somewhat withdrawn during interview which she attributes to pain.    In the ED, AFVSS. Labs with hbg 7.8 (baseline ~9-10). CBC and CMP otherwise unremarkable. VBG with pH 7.285, PCO2 77.8, PO2 115, and HCO3 37. Placed on her home nightly bipap. XR imaging of RLE revealed  Acute fracture of the distal tibia and fibula, approximately 2-3 cm above the talar dome.  Mild comminution and mild displacement.  Ortho evaluated the patient, splinted her ankle, and recommended admission to hospital medicine. Given dilaudid  and 1L NS bolus.      Overview/Hospital Course:  Ortho consulted and patient taken to OR on 8/23/2023 and underwent ORIF of closed right trimalleolar fracture by Dr. Suleman Schmitz. Incisional wound vac  placed by Ortho in OR to surgical site and remain in place post-op. Patient post-op nonweight bearing to right lower extremity. Podus boot in place to right ankle as per Ortho. PT/OT consulted post-op and working with patient and recommending SNF on discharge when medically ready. Referrals sent as patient states she does not want to go back to [facility] for her SNF care. Patient on multimodal pain meds post-op for pain control with scheduled Tylenol and Robaxin with Oxycodone prn for breakthrough pain. Pain controlled post-op. Patient on Aspirin 81 mg po BID for DVT prophylaxis and will continue. Patient transfused 1 unit of PRBCs on 8/24 for Hgb 6.9 and Hgb improved to 7.2 on 8/25. Hgb stable at 7.4 on 8/26. Pain controlled. Wound vac remains in place to right ankle incision sites with minimal output on 8/26. Hgb stable at 7.2 on 8/27. Hgb improved to 8.2 on on 8/28. Patient concerned about COVID exposure as grandson visited several days ago and now COVID positive so COVID test done and negative on 8/27. Pain controlled on 8/28. Perineural catheter removed by Anesthesia on am of 8/28. Patient medically ready for discharge and has been accepted to [facility] on 8/28.       Interval History: Patient complaining of wheezing this am. Patient has very mild wheezing on exam and not in any respiratory distress and not tachypneic. Patient is on Duonebs every 4 hours while awake. Patient reports she has been using her incentive spirometer. Plan to resume home Bumex 2 mg po daily to day as BP improved. Patient has chronic respiratory failure and not having any acute issues and current issues chronic and not acute. Patient concerned as her grandson visited her in hospital several days ago and now is COVID positive. COVID PCR test done last night and returned negative. Patient denied any cough, Patient remains on 2 liters of oxygen and on 2 iters of oxygen chronically and oxygen sats are > 90%. Labs  reviewed this am. Hgb improved to 8.2 today up from 7.4 yesterday. BMP stable. Patient has been accepted to [facility] and auth received for that facility. [facility] has no beds which was her first choice. Patient medically stable for discharge and appealing her discharge from hospital. Perineural catheter removed by Anesthesia this am. Pain controlled to right ankle. Right leg swelling unchanged.     Review of Systems   Constitutional:  Negative for chills and fever.   Respiratory:  Positive for wheezing. Negative for cough and shortness of breath.    Cardiovascular:  Negative for chest pain.   Gastrointestinal:  Negative for abdominal pain, diarrhea and nausea.   Musculoskeletal:  Positive for arthralgias (Right ankle; Right knee).   Neurological:  Negative for dizziness.   Psychiatric/Behavioral:  Negative for agitation and confusion.      Objective:     Vital Signs (Most Recent):  Temp: 96.5 °F (35.8 °C) (08/28/23 1138)  Pulse: 75 (08/28/23 1425)  Resp: 18 (08/28/23 1231)  BP: 134/63 (08/28/23 1138)  SpO2: 95 % (08/28/23 1138) on 2 liters of oxygen  Vital Signs (24h Range):  Temp:  [96.5 °F (35.8 °C)-98.5 °F (36.9 °C)] 96.5 °F (35.8 °C)  Pulse:  [70-82] 75  Resp:  [16-20] 18  SpO2:  [89 %-96 %] 95 %  BP: (104-137)/(57-65) 134/63     Weight: 83 kg (183 lb)  Body mass index is 30.45 kg/m².    Intake/Output Summary (Last 24 hours) at 8/28/2023 1502  Last data filed at 8/28/2023 0430  Gross per 24 hour   Intake 1050 ml   Output 625 ml   Net 425 ml         Physical Exam  Vitals and nursing note reviewed.   Constitutional:       General: She is not in acute distress.     Appearance: Normal appearance. She is well-developed. She is obese. She is not ill-appearing.   Cardiovascular:      Rate and Rhythm: Normal rate and regular rhythm.      Heart sounds: Normal heart sounds. No murmur heard.     No friction rub. No gallop.   Pulmonary:      Effort: Pulmonary effort is normal. No respiratory distress.       Breath sounds: Wheezing (Mild end expiratory) present.   Abdominal:      General: Abdomen is flat. Bowel sounds are normal. There is no distension.      Palpations: Abdomen is soft.      Tenderness: There is no abdominal tenderness.   Musculoskeletal:         General: Swelling (Right lower leg from right knee down to foot) present.      Comments: Left AKA noted. Stump well healed to left leg. Wound vac noted to right ankle incision sites. Podus boot in place to right ankle.    Skin:     General: Skin is warm.      Findings: No erythema.   Neurological:      Mental Status: She is alert and oriented to person, place, and time.   Psychiatric:         Mood and Affect: Mood normal.         Behavior: Behavior normal. Behavior is cooperative.         Thought Content: Thought content normal.         Judgment: Judgment normal.             Significant Labs: CBC:   Recent Labs   Lab 08/27/23 0314 08/28/23  0616   WBC 7.31 6.41   HGB 7.2* 8.2*   HCT 25.6* 28.8*    225     CMP:   Recent Labs   Lab 08/27/23 0314 08/28/23  0616    137   K 4.6 4.9    101   CO2 36* 30*    103   BUN 27* 21   CREATININE 0.9 0.8   CALCIUM 8.9 9.3   ANIONGAP 3* 6*       Significant Imaging: I have reviewed all pertinent imaging results/findings within the past 24 hours.      Assessment/Plan:      * Closed fracture of right tibial plafond with fibula involvement with routine healing s/p ORIF on 8/23/2023  68 y/o with left AKA, previous CVA, paraparesis presenting from [facility] with right ankle pain and injury during a transfer from wheelchair to shower chair. She was found to have an acute closed trimalleolar fracture of the right distal tibia and fibula in the ED. Neurovascularly intact.     · Orthopedic surgery consulted and recommended operative repair. Patient taken to the OR on 8/23/2023 and underwent ORIF of right ankle by Dr. Suleman Schmitz.   · Post-op, patient is non weight bearing to right lower  extremity as per Ortho.  · PT/OT consulted post-op to work with patient. Recommending SNF and referrals sent and has several accepting facilities. Patient wants [facility] but no beds available. Patient accepted to [facility] and auth obtained but patient does not feel she is medically ready for hospital discharge on 8/28 as having some wheezing and appealing her discharge.   · Patient on Aspirin 81 mg po BID for DVT prophylaxis and will continue for 6 weeks.   · Fall precautions.  · Podus boot to right ankle as per Ortho.   · Wound vac in place to right ankle incision site and will continue. Management as per Ortho. Plan to switch to Prevena incisional wound vac on discharge for 1 week as per Ortho.   · Pain controlled. Continue multimodal pain medications for pain management with scheduled Tylenol 1000 mg po TID + Robaxin 750 mg po 4 times daily + Oxycodone IR prn for breakthrough pain.   · Perineural pain catheters removed by Anesthesia on am of 8/28.     Chronic respiratory failure with hypoxia and hypercapnia  Pulmonary HTN  Patient with Hypercapnic and Hypoxic Respiratory failure which is Chronic.  she is on home oxygen at 2 LPM and BiPAP nightly for her ADEEL at +12/+5 and FiO2 30%. Supplemental oxygen was provided and noted-at 2 liters of oxygen. Oxygen Concentration (%):  [28] 28    .   Signs/symptoms of respiratory failure include- none at this time. Contributing diagnoses includes - CHF and Pulmonary HTN and ADEEL. Labs and images were reviewed. Patient Has recent ABG, which has been reviewed (VBG). Will treat underlying causes and adjust management of respiratory failure as follows-   · Continue home supplemental oxygen for goal SpO2 >88%.  Continue nightly BiPAP for her chronic ADEEL.   · Patient not having any signs of acute or worsening respiratory failure. Wheezing is chronic issue and on scheduled Duonebs every 4 hours while awake and will continue.   · Resume home Bumex 2 mg po daily on 8/28 as  has known history of volume overload due to Pulmonary HTN in past. .       ADEEL (obstructive sleep apnea)  Chronic condition. Continue nightly BiPAP in hospital to treat as has chronic hypercapnic and hypoxic respiratory failure related to her chronic ADEEL and pulmonary HTN.     Anemia of chronic disease  · Hgb improved to 8.2 on 8/28. No signs of bleeding.   · Hgb down to 7.2 on 8/27. Monitor. No signs of bleeding.   · Hgb improved to 7.4 on 8/26.   · Hgb slightly improved to 7.2 on 8/25 after transfused 1 unit of PRBCs. Monitor Hgb daily. Momnitor for any signs of active bleeding.   · Hgb down to 6.9 on 8/24. Plan to transfuse 1 unit of PRBCs on 8/24. No signs of clinical bleeding and post-op and expected blood loss related to surgery.   · Hbg 7.8 on admit. Baseline Hgb 9-10. Low Hgb on admit likely related to fracture and recent acute illness and recent hospitalization for CHF.   · Transfuse blood if Hgb < 7 and asymptomatic or < 8 and symptomatic.   · Monitor daily CBC.    Pressure injury of thigh, stage 2  - Present on admit. Turn patient every 2 hours.   - Continue local wound care.     Chronic indwelling suprapubic catheter in place  Present on admit. Continue routine suprapubic catheter care. Patient changes suprapubic catheter every 20 days. Last changed ion this admit on 8/22.     Essential hypertension  Chronic and controlled. BP improved. Resume home Bumex 2 mg po daily on 8/28. Monitor vital signs very 4 hours. Target BP < 150/90.     Primary insomnia  Chronic condition. Continue home Seroquel 200 mg po nightly.     Paraparesis  · Chronic condition and related to previous CVA. Patient at her baseline neurologic and functional level.   · Fall precautions.     History of CVA (cerebrovascular accident)  Bed bound at baseline from previous CVA. Patient at her baseline neurologic and functional level.       Class 1 obesity due to excess calories without serious comorbidity with body mass index (BMI) of 30.0 to  30.9 in adult  Body mass index is 30.45 kg/m². Morbid obesity complicates all aspects of disease management from diagnostic modalities to treatment. Weight loss encouraged and health benefits explained to patient.         Chronic diastolic heart failure  Patient is identified as having Diastolic (HFpEF) heart failure that is Chronic. CHF is currently controlled. Latest ECHO performed and demonstrates- Results for orders placed during the hospital encounter of 08/06/23    Echo    Interpretation Summary    Left Ventricle: Normal wall motion. There is low normal systolic function with a visually estimated ejection fraction of 50 - 55%.    Right Ventricle: Normal right ventricular cavity size.  Continue home Bumex 2 mg po daily to treat and monitor clinical status closely. Monitor on telemetry. Patient is off CHF pathway.  Monitor strict Is&Os and daily weights.  Place on fluid restriction of 1.5 L. Continue to stress to patient importance of self efficacy and  on diet for CHF. Last BNP reviewed- and noted below   Recent Labs   Lab 08/22/23  2335   *         VTE Risk Mitigation (From admission, onward)         Ordered     IP VTE HIGH RISK PATIENT  Once         08/23/23 0342     Place sequential compression device  Until discontinued         08/23/23 0342     Reason for No Pharmacological VTE Prophylaxis  Once        Question:  Reasons:  Answer:  Physician Provided (leave comment)    08/23/23 0342                Discharge Planning   LOY: 8/28/2023     Code Status: Full Code   Is the patient medically ready for discharge?: Yes    Reason for patient still in hospital (select all that apply): Patient trending condition and Pending disposition  Discharge Plan A: Skilled Nursing Facility; Patient medically ready and appealing discharge. Patient has been accepted to [facility] SNF and auth received.          Awa Purvis MD  Department of Hospital Medicine   [facility] - Surgery

## 2023-08-28 NOTE — ASSESSMENT & PLAN NOTE
66 y/o with left AKA, previous CVA, paraparesis presenting from [facility] with right ankle pain and injury during a transfer from wheelchair to shower chair. She was found to have an acute closed trimalleolar fracture of the right distal tibia and fibula in the ED. Neurovascularly intact.     · Orthopedic surgery consulted and recommended operative repair. Patient taken to the OR on 8/23/2023 and underwent ORIF of right ankle by Dr. Suleman cShmitz.   · Post-op, patient is non weight bearing to right lower extremity as per Ortho.  · PT/OT consulted post-op to work with patient. Recommending SNF and referrals sent and has several accepting facilities. Patient wants [facility] but no beds available. Patient accepted to [facility] and auth obtained but patient does not feel she is medically ready for hospital discharge on 8/28 as having some wheezing and appealing her discharge.   · Patient on Aspirin 81 mg po BID for DVT prophylaxis and will continue for 6 weeks.   · Fall precautions.  · Podus boot to right ankle as per Ortho.   · Wound vac in place to right ankle incision site and will continue. Management as per Ortho. Plan to switch to Prevena incisional wound vac on discharge for 1 week as per Ortho.   · Pain controlled. Continue multimodal pain medications for pain management with scheduled Tylenol 1000 mg po TID + Robaxin 750 mg po 4 times daily + Oxycodone IR prn for breakthrough pain.   · Perineural pain catheters removed by Anesthesia on am of 8/28.

## 2023-08-28 NOTE — ASSESSMENT & PLAN NOTE
Mary Ellen Fajardo is a 67 y.o. female with PMH of CHF, COPD, neurogenic bladder with chronic indwelling suprapubic catheter, hepatitis-C, CVA, bilateral TKAs complicated by postoperative infections resulting in a left AKA, who [facility] with fractures of the right distal tibia and fibula.  Injury reportedly occurred while patient was transferring from wheelchair to shower chair.  She presented closed, neurovascularly intact, and without any other musculoskeletal injuries on physical exam.  RLE compartments soft and compressible, and she was noted to have fracture blistering over the anteromedial ankle.  Patient was placed in a short-leg splint and window was created for further compartment checks overnight.  Patient is reportedly nonambulatory and wheelchair-bound at baseline.     Now s/p ORIF sindi fx 8/23    - Pain MM  - Wound vac to suction  - PT/OT: ARMANDO RLE   - ASA 81 BID  - Ice and elevate affected extremity at all times   - Abx: ancef x24h  - Suprapubic catheter    Dispo: PT/OT recs SNF, pending placement, ok to dc from orthopaedic perspective

## 2023-08-28 NOTE — SUBJECTIVE & OBJECTIVE
Principal Problem:Closed fracture of right tibial plafond with fibula involvement with routine healing    Principal Orthopedic Problem: same as above s/p ORIF pilon fx 8/23    Interval History: NAEO. VSS. Pain well controlled. Wound vac to suction with no output. Potous boot in place, RLE elevated.    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 Pt agreeable to try with pre mediation with benadryl.        Current Facility-Administered Medications   Medication    0.9%  NaCl infusion (for blood administration)    acetaminophen tablet 1,000 mg    albuterol-ipratropium 2.5 mg-0.5 mg/3 mL nebulizer solution 3 mL    aluminum-magnesium hydroxide-simethicone 200-200-20 mg/5 mL suspension 30 mL    aspirin chewable tablet 81 mg    bisacodyL suppository 10 mg    citalopram tablet 30 mg    dextrose 10% bolus 125 mL 125 mL    dextrose 10% bolus 250 mL 250 mL    ferrous sulfate tablet 1 each    folic acid-vit B6-vit B12 2.5-25-2 mg tablet 1 tablet    gabapentin capsule 600 mg    glucagon (human recombinant) injection 1 mg    glucose chewable tablet 16 g    glucose chewable tablet 24 g    hydrOXYzine HCL tablet 25 mg    melatonin tablet 6 mg    methocarbamoL tablet 1,000 mg    naloxone 0.4 mg/mL injection 0.02 mg    ondansetron disintegrating tablet 8 mg    oxybutynin 24 hr tablet 5 mg    oxyCODONE immediate release tablet 5 mg    polyethylene glycol packet 17 g    prochlorperazine injection Soln 5 mg    QUEtiapine tablet 200 mg    ROPIvacaine (PF) 2 mg/ml (0.2%) solution    senna-docusate 8.6-50 mg per tablet 1 tablet    simethicone chewable tablet 80 mg    sodium chloride 0.9% flush 10 mL    sodium chloride 0.9% flush 5 mL    thiamine tablet 100 mg    vitamin D 1000 units tablet 1,000 Units     Objective:     Vital Signs (Most Recent):  Temp: 96.5 °F (35.8 °C) (08/28/23  "0740)  Pulse: 70 (08/28/23 0740)  Resp: 16 (08/28/23 0740)  BP: 137/64 (08/28/23 0740)  SpO2: (!) 92 % (08/28/23 0740) Vital Signs (24h Range):  Temp:  [96.5 °F (35.8 °C)-98.5 °F (36.9 °C)] 96.5 °F (35.8 °C)  Pulse:  [65-81] 70  Resp:  [16-20] 16  SpO2:  [89 %-97 %] 92 %  BP: ()/(54-65) 137/64     Weight: 83 kg (183 lb)  Height: 5' 5" (165.1 cm)  Body mass index is 30.45 kg/m².      Intake/Output Summary (Last 24 hours) at 8/28/2023 0811  Last data filed at 8/28/2023 0430  Gross per 24 hour   Intake 1350 ml   Output 875 ml   Net 475 ml          Ortho/SPM Exam  Wound vac to suction with good seal  Potus boot on  Able to wiggle toes  SILT  Cap refill <2s     Significant Labs: All pertinent labs within the past 24 hours have been reviewed.    Significant Imaging: I have reviewed and interpreted all pertinent imaging results/findings.  "

## 2023-08-28 NOTE — PLAN OF CARE
Pt AAOx4. Vital signs as charted. Safety measures in place. Surgical site CDI. Wound vac in place, output to be charted. No c/o pain during night. PIV in place, saline locked. Suprapubic catheter in place, output charted. Tolerating diet. Neurovascular checks WNL. No signs of distress. Pt resting during night, no falls or injuries at this time.

## 2023-08-28 NOTE — PROGRESS NOTES
Pt resting comfortably.  Right popliteal PNC has been paused. Dressing CDI.  Catheter discontinued, tip intact.  Pt tolerated well.  Educated regarding continued pain management.  Understanding verbalized.

## 2023-08-28 NOTE — SUBJECTIVE & OBJECTIVE
Interval History: Patient complaining of wheezing this am. Patient has very mild wheezing on exam and not in any respiratory distress and not tachypneic. Patient is on Duonebs every 4 hours while awake. Patient reports she has been using her incentive spirometer. Plan to resume home Bumex 2 mg po daily to day as BP improved. Patient has chronic respiratory failure and not having any acute issues and current issues chronic and not acute. Patient concerned as her grandson visited her in hospital several days ago and now is COVID positive. COVID PCR test done last night and returned negative. Patient denied any cough, Patient remains on 2 liters of oxygen and on 2 iters of oxygen chronically and oxygen sats are > 90%. Labs reviewed this am. Hgb improved to 8.2 today up from 7.4 yesterday. BMP stable. Patient has been accepted to [facility] and auth received for that facility. [facility] has no beds which was her first choice. Patient medically stable for discharge and appealing her discharge from hospital. Perineural catheter removed by Anesthesia this am. Pain controlled to right ankle. Right leg swelling unchanged.     Review of Systems   Constitutional:  Negative for chills and fever.   Respiratory:  Positive for wheezing. Negative for cough and shortness of breath.    Cardiovascular:  Negative for chest pain.   Gastrointestinal:  Negative for abdominal pain, diarrhea and nausea.   Musculoskeletal:  Positive for arthralgias (Right ankle; Right knee).   Neurological:  Negative for dizziness.   Psychiatric/Behavioral:  Negative for agitation and confusion.      Objective:     Vital Signs (Most Recent):  Temp: 96.5 °F (35.8 °C) (08/28/23 1138)  Pulse: 75 (08/28/23 1425)  Resp: 18 (08/28/23 1231)  BP: 134/63 (08/28/23 1138)  SpO2: 95 % (08/28/23 1138) on 2 liters of oxygen  Vital Signs (24h Range):  Temp:  [96.5 °F (35.8 °C)-98.5 °F (36.9 °C)] 96.5 °F (35.8 °C)  Pulse:  [70-82] 75  Resp:  [16-20] 18  SpO2:  [89  %-96 %] 95 %  BP: (104-137)/(57-65) 134/63     Weight: 83 kg (183 lb)  Body mass index is 30.45 kg/m².    Intake/Output Summary (Last 24 hours) at 8/28/2023 1502  Last data filed at 8/28/2023 0430  Gross per 24 hour   Intake 1050 ml   Output 625 ml   Net 425 ml         Physical Exam  Vitals and nursing note reviewed.   Constitutional:       General: She is not in acute distress.     Appearance: Normal appearance. She is well-developed. She is obese. She is not ill-appearing.   Cardiovascular:      Rate and Rhythm: Normal rate and regular rhythm.      Heart sounds: Normal heart sounds. No murmur heard.     No friction rub. No gallop.   Pulmonary:      Effort: Pulmonary effort is normal. No respiratory distress.      Breath sounds: Wheezing (Mild end expiratory) present.   Abdominal:      General: Abdomen is flat. Bowel sounds are normal. There is no distension.      Palpations: Abdomen is soft.      Tenderness: There is no abdominal tenderness.   Musculoskeletal:         General: Swelling (Right lower leg from right knee down to foot) present.      Comments: Left AKA noted. Stump well healed to left leg. Wound vac noted to right ankle incision sites. Podus boot in place to right ankle.    Skin:     General: Skin is warm.      Findings: No erythema.   Neurological:      Mental Status: She is alert and oriented to person, place, and time.   Psychiatric:         Mood and Affect: Mood normal.         Behavior: Behavior normal. Behavior is cooperative.         Thought Content: Thought content normal.         Judgment: Judgment normal.             Significant Labs: CBC:   Recent Labs   Lab 08/27/23  0314 08/28/23  0616   WBC 7.31 6.41   HGB 7.2* 8.2*   HCT 25.6* 28.8*    225     CMP:   Recent Labs   Lab 08/27/23  0314 08/28/23  0616    137   K 4.6 4.9    101   CO2 36* 30*    103   BUN 27* 21   CREATININE 0.9 0.8   CALCIUM 8.9 9.3   ANIONGAP 3* 6*       Significant Imaging: I have reviewed all  pertinent imaging results/findings within the past 24 hours.

## 2023-08-28 NOTE — PLAN OF CARE
08/28/23 0858   Post-Acute Status   Post-Acute Authorization Placement   Post-Acute Placement Status Pending payor review/awaiting authorization (if required)   Discharge Plan   Discharge Plan A Skilled Nursing Facility     Pt's d/c plan changed to [facility], [facility] informed [place] at facility. [facility] #[phone], will follow d/c. [facility] previously accepted to [facility]. [facility] submitted authorization for SNF via Availity. SW to follow.    1:18 PM  Authorization approved. [facility] sent request for [facility] #Fax [phone]. [facility] called [facility] # [phone] to inform,received voice message.       Carmen Shell LMSW  Case Management   [facility]   Ext. [phone]

## 2023-08-28 NOTE — PLAN OF CARE
Problem: Adult Inpatient Plan of Care  Goal: Plan of Care Review  Outcome: Ongoing, Progressing  Goal: Patient-Specific Goal (Individualized)  Outcome: Ongoing, Progressing  Goal: Absence of Hospital-Acquired Illness or Injury  Outcome: Ongoing, Progressing  Goal: Optimal Comfort and Wellbeing  Outcome: Ongoing, Progressing  Goal: Readiness for Transition of Care  Outcome: Ongoing, Progressing     Problem: Fluid Imbalance (Pneumonia)  Goal: Fluid Balance  Outcome: Ongoing, Progressing     Problem: Infection (Pneumonia)  Goal: Resolution of Infection Signs and Symptoms  Outcome: Ongoing, Progressing     Problem: Respiratory Compromise (Pneumonia)  Goal: Effective Oxygenation and Ventilation  Outcome: Ongoing, Progressing     Problem: Infection  Goal: Absence of Infection Signs and Symptoms  Outcome: Ongoing, Progressing     Problem: Impaired Wound Healing  Goal: Optimal Wound Healing  Outcome: Ongoing, Progressing     Problem: Skin Injury Risk Increased  Goal: Skin Health and Integrity  Outcome: Ongoing, Progressing     Problem: Fall Injury Risk  Goal: Absence of Fall and Fall-Related Injury  Outcome: Ongoing, Progressing     Patient was intended to discharge to rehab today but did not leave, discharge was removed later this evening. PRN pain medication was needed a couple times throughout the day. Patients right leg needed to be repositioned so that way it was sitting straight and correctly. Patient stated that this made her more comfortable but she still continues to lean towards her right side. Plan of care continues.

## 2023-08-28 NOTE — ASSESSMENT & PLAN NOTE
· Hgb improved to 8.2 on 8/28. No signs of bleeding.   · Hgb down to 7.2 on 8/27. Monitor. No signs of bleeding.   · Hgb improved to 7.4 on 8/26.   · Hgb slightly improved to 7.2 on 8/25 after transfused 1 unit of PRBCs. Monitor Hgb daily. Momnitor for any signs of active bleeding.   · Hgb down to 6.9 on 8/24. Plan to transfuse 1 unit of PRBCs on 8/24. No signs of clinical bleeding and post-op and expected blood loss related to surgery.   · Hbg 7.8 on admit. Baseline Hgb 9-10. Low Hgb on admit likely related to fracture and recent acute illness and recent hospitalization for CHF.   · Transfuse blood if Hgb < 7 and asymptomatic or < 8 and symptomatic.   · Monitor daily CBC.

## 2023-08-28 NOTE — PLAN OF CARE
[facility]     Department of Hospital Medicine     [address] [place] [address]     [phone] after hours  [phone] fax       NURSING HOME ORDERS    08/30/2023    Admit to [facility]:  Skilled Bed                                            Diagnoses:  Active Hospital Problems    Diagnosis  POA    *Closed fracture of right tibial plafond with fibula involvement with routine healing s/p ORIF on 8/23/2023 [S82.871D, S82.831D]  Not Applicable     Priority: 1 - High    Atypical chest pain [R07.89]  No     Priority: 1 - High    Chronic respiratory failure with hypoxia and hypercapnia [J96.11, J96.12]  Yes     Priority: 2       pulmonary htn + volume overload.  No overt evidence of copd per ct.        ADEEL (obstructive sleep apnea) [G47.33]  Yes     Priority: 3      S/p sleep study at [facility].  Record not available.  Ahi of 44 per psg 2020. Currently on bipap 20/14.        Anemia of chronic disease [D63.8]  Yes     Priority: 4     Pressure injury of thigh, stage 2 [L89.202]  Yes     Priority: 5     Chronic indwelling suprapubic catheter in place [Z93.59]  Not Applicable     Priority: 6      Chronic    Essential hypertension [I10]  Yes     Priority: 7      Chronic    Primary insomnia [F51.01]  Yes     Priority: 8      Chronic    Paraparesis [G82.20]  Yes     Priority: 9     History of CVA (cerebrovascular accident) [Z86.73]  Not Applicable     Priority: 10      Chronic    Class 1 obesity due to excess calories without serious comorbidity with body mass index (BMI) of 30.0 to 30.9 in adult [E66.09, Z68.30]  Not Applicable     Priority: 11     Chronic diastolic heart failure [I50.32]  Yes     Priority: 12     Pulmonary HTN [I27.20]  Yes     Group 2.  Diuresis and bp optimization.  bumex        Resolved Hospital Problems    Diagnosis Date Resolved POA    COPD (chronic obstructive pulmonary disease) [J44.9] 08/24/2023 Yes     Priority: 7      Chronic        Patient is homebound due to:  Closed fracture of right tibial plafond with fibula involvement with routine healing    Allergies:  Review of patient's allergies indicates:   Allergen Reactions    Tuberculin ppd Itching and Dermatitis     Patient has old scare that she claims is from TB PPD    Rocephin [ceftriaxone] Rash     Rash 2012? Pt agreeable to try with pre mediation with benadryl.        Vitals: Every shift (Skilled Nursing patients)        Code Status: Full Code     Diet: 2 gram sodium diet with thin liquids     Supplement: House supplement one can by mouth  with meals      Activities:   - Up in a chair each morning as tolerated   - Ambulate with assistance to bathroom   - Scheduled walks once each shift (every 8 hours)   - May ambulate independently   - May use walker, cane, or self-propelled wheelchair   - Weight bearing: Non weight bearing to right lower extremity; Podus boot to right ankle for support.     LABS:  Per facility protocol       Nursing: Out of bed BID, Up with assistance  Routine suprapubic catheter care.   Elevate and ice right ankle to help with swelling.    Nursing Precautions:             - Fall precautions per nursing home protocol     - Decubitus precautions:        -  for positioning   - Pressure reducing foam mattress   - Turn patient every two hours. Use wedge pillows to anchor patient    CONSULTS:       Physical Therapy to evaluate and treat 5 times a week     Occupational Therapy to evaluate and treat 5 times a week      MISCELLANEOUS CARE:  Routine Skin for Bedridden Patients: Instruct patient/caregiver to apply moisture barrier cream to all skin folds and wet areas in perineal area daily and after baths and all bowel movements.    Oxygen:  Oxygen at 2 L/min nasal canula to be used:  Continuously., Assess oxygen saturation via pulse oximeter as needed for increase in SOB., Notify physician if oxygen saturation less than 88%, and Consult respiratory therapy for  instruction on home oxygen use    BiPAP nightly for chronic respiratory failure and ADEEL at IPAP +12 and EPAP +5 with FiO2 30%      WOUND CARE ORDERS  Prevena wound vac to right ankle with continuous suction and keep in place for 1 week and once runs out can remove.                  Medications:        Medication List        START taking these medications      acetaminophen 500 MG tablet  Commonly known as: TYLENOL  Take 2 tablets (1,000 mg total) by mouth every 8 (eight) hours.     aspirin 81 MG Chew  Take 1 tablet (81 mg total) by mouth 2 (two) times a day. DVT prophylaxis after ankle fracture surgery. End date 10/9/2023.     melatonin 3 mg tablet  Commonly known as: MELATIN  Take 2 tablets (6 mg total) by mouth nightly as needed for Insomnia.     methocarbamoL 1,000 mg Tab  Take 1,000 mg by mouth 4 (four) times daily.     ondansetron 8 MG Tbdl  Commonly known as: ZOFRAN-ODT  Take 1 tablet (8 mg total) by mouth every 8 (eight) hours as needed (Nausea or vomiting).     oxyCODONE 5 MG immediate release tablet  Commonly known as: ROXICODONE  Take 1 tablet (5 mg total) by mouth every 4 (four) hours as needed for Pain.     senna-docusate 8.6-50 mg 8.6-50 mg per tablet  Commonly known as: PERICOLACE  Take 1 tablet by mouth once daily.       vitamin D 1000 units Tab  Commonly known as: VITAMIN D3  Take 1 tablet (1,000 Units total) by mouth once daily.              CONTINUE taking these medications      albuterol-ipratropium 2.5 mg-0.5 mg/3 mL nebulizer solution  Commonly known as: DUO-NEB  Take 3 mLs by nebulization every 4 (four) hours as needed for Wheezing.     bumetanide 2 MG tablet  Commonly known as: BUMEX  Take 2 mg by mouth once daily.     catheter 18 Fr Misc  Change suprapubic every 20 days. Last changed on 8/30/2023.      citalopram 20 MG tablet  Commonly known as: CeleXA  Take 1.5 tablets (30 mg total) by mouth once daily.     ferrous sulfate 324 mg (65 mg iron) Tbec  Take 1 tablet (324 mg total) by mouth once  daily.     folic acid-vit B6-vit B12 2.5-25-2 mg 2.5-25-2 mg Tab  Commonly known as: FOLBIC or Equiv  Take 1 tablet by mouth once daily.     gabapentin 600 MG tablet  Commonly known as: NEURONTIN  Take 1 tablet (600 mg total) by mouth 3 (three) times daily.     hydrOXYzine HCL 25 MG tablet  Commonly known as: ATARAX  Take 1 tablet (25 mg total) by mouth 2 (two) times daily as needed for Anxiety.     multivitamin Tab  Take 1 tablet by mouth once daily.     oxybutynin 5 MG Tr24  Commonly known as: DITROPAN-XL  Take 1 tablet (5 mg total) by mouth once daily.     polyethylene glycol 17 gram Pwpk  Commonly known as: GLYCOLAX  Take 17 g by mouth once daily.     QUEtiapine 200 MG Tab  Commonly known as: SEROQUEL  Take 1 tablet (200 mg total) by mouth every evening.     thiamine 100 MG tablet  Take 1 tablet (100 mg total) by mouth once daily.            STOP taking these medications      baclofen 20 MG tablet  Commonly known as: LIORESAL     oxyCODONE-acetaminophen 5-325 mg per tablet  Commonly known as: PERCOCET                Follow-up:   Future Appointments   Date Time Provider Department Center   9/6/2023 10:45 AM Nina Guzman PA-C [facility] [address]   9/13/2023 10:45 AM Nina Guzman PA-C [facility] [address]       _________________________________  Awa Purvis MD  08/30/2023

## 2023-08-28 NOTE — PLAN OF CARE
Spoke with patient regarding change in d/c plan. Patient very upset she is no longer able to d/c to [facility]. Says she would prefer a facility closer to Grand daughter rather than near her Grand son who now has Covid. 7 referrals sent in [facility] for skilled placement. MD confirms patient is medically stable to d/c today. Attempted to call Grand daughter Hali, unable to leave message as mailbox was full. Will continue to follow.      Shazia Razo RNCM  Case Management  [facility]  [phone]

## 2023-08-28 NOTE — ADDENDUM NOTE
Addendum  created 08/28/23 0834 by Megha Castro RN    Clinical Note Signed, Order list changed

## 2023-08-28 NOTE — ASSESSMENT & PLAN NOTE
Pulmonary HTN  Patient with Hypercapnic and Hypoxic Respiratory failure which is Chronic.  she is on home oxygen at 2 LPM and BiPAP nightly for her ADEEL at +12/+5 and FiO2 30%. Supplemental oxygen was provided and noted-at 2 liters of oxygen. Oxygen Concentration (%):  [28] 28    .   Signs/symptoms of respiratory failure include- none at this time. Contributing diagnoses includes - CHF and Pulmonary HTN and ADEEL. Labs and images were reviewed. Patient Has recent ABG, which has been reviewed (VBG). Will treat underlying causes and adjust management of respiratory failure as follows-   · Continue home supplemental oxygen for goal SpO2 >88%.  Continue nightly BiPAP for her chronic ADEEL.   · Patient not having any signs of acute or worsening respiratory failure. Wheezing is chronic issue and on scheduled Duonebs every 4 hours while awake and will continue.   · Resume home Bumex 2 mg po daily on 8/28 as has known history of volume overload due to Pulmonary HTN in past. .

## 2023-08-28 NOTE — PROGRESS NOTES
[facility]  Orthopedics  Progress Note    Patient Name: Mary Ellen Fajardo  MRN: [id number]  Admission Date: 8/22/2023  Hospital Length of Stay: 4 days  Attending Provider: Awa Purvis MD  Primary Care Provider: Any Tran MD  Follow-up For: Procedure(s) (LRB):  ORIF, FRACTURE, PILON (Right)  ORIF, FRACTURE, FIBULA (Right)    Post-Operative Day: 5 Days Post-Op  Subjective:     Principal Problem:Closed fracture of right tibial plafond with fibula involvement with routine healing    Principal Orthopedic Problem: same as above s/p ORIF pilon fx 8/23    Interval History: NAEO. VSS. Pain well controlled. Wound vac to suction with no output. Potous boot in place, RLE elevated.    Review of patient's allergies indicates:   Allergen Reactions    Tuberculin ppd Itching and Dermatitis     Patient has old scare that she claims is from TB PPD    Rocephin [ceftriaxone] Rash     Rash 2012? Pt agreeable to try with pre mediation with benadryl.        Current Facility-Administered Medications   Medication    0.9%  NaCl infusion (for blood administration)    acetaminophen tablet 1,000 mg    albuterol-ipratropium 2.5 mg-0.5 mg/3 mL nebulizer solution 3 mL    aluminum-magnesium hydroxide-simethicone 200-200-20 mg/5 mL suspension 30 mL    aspirin chewable tablet 81 mg    bisacodyL suppository 10 mg    citalopram tablet 30 mg    dextrose 10% bolus 125 mL 125 mL    dextrose 10% bolus 250 mL 250 mL    ferrous sulfate tablet 1 each    folic acid-vit B6-vit B12 2.5-25-2 mg tablet 1 tablet    gabapentin capsule 600 mg    glucagon (human recombinant) injection 1 mg    glucose chewable tablet 16 g    glucose chewable tablet 24 g    hydrOXYzine HCL tablet 25 mg    melatonin tablet 6 mg    methocarbamoL tablet 1,000 mg    naloxone 0.4 mg/mL injection 0.02 mg    ondansetron disintegrating tablet 8 mg    oxybutynin 24 hr tablet 5 mg    oxyCODONE immediate release tablet 5 mg    polyethylene  "glycol packet 17 g    prochlorperazine injection Soln 5 mg    QUEtiapine tablet 200 mg    ROPIvacaine (PF) 2 mg/ml (0.2%) solution    senna-docusate 8.6-50 mg per tablet 1 tablet    simethicone chewable tablet 80 mg    sodium chloride 0.9% flush 10 mL    sodium chloride 0.9% flush 5 mL    thiamine tablet 100 mg    vitamin D 1000 units tablet 1,000 Units     Objective:     Vital Signs (Most Recent):  Temp: 96.5 °F (35.8 °C) (08/28/23 0740)  Pulse: 70 (08/28/23 0740)  Resp: 16 (08/28/23 0740)  BP: 137/64 (08/28/23 0740)  SpO2: (!) 92 % (08/28/23 0740) Vital Signs (24h Range):  Temp:  [96.5 °F (35.8 °C)-98.5 °F (36.9 °C)] 96.5 °F (35.8 °C)  Pulse:  [65-81] 70  Resp:  [16-20] 16  SpO2:  [89 %-97 %] 92 %  BP: ()/(54-65) 137/64     Weight: 83 kg (183 lb)  Height: 5' 5" (165.1 cm)  Body mass index is 30.45 kg/m².      Intake/Output Summary (Last 24 hours) at 8/28/2023 0811  Last data filed at 8/28/2023 0430  Gross per 24 hour   Intake 1350 ml   Output 875 ml   Net 475 ml         Ortho/SPM Exam  Wound vac to suction with good seal  Potus boot on  Able to wiggle toes  SILT  Cap refill <2s     Significant Labs: All pertinent labs within the past 24 hours have been reviewed.    Significant Imaging: I have reviewed and interpreted all pertinent imaging results/findings.    Assessment/Plan:     * Closed fracture of right tibial plafond with fibula involvement with routine healing s/p ORIF on 8/23/2023  Mary Ellen Fajardo is a 67 y.o. 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  Injury reportedly occurred while patient was transferring from wheelchair to shower chair.  She presented closed, neurovascularly intact, and without any other musculoskeletal injuries on physical exam.  " RLE compartments soft and compressible, and she was noted to have fracture blistering over the anteromedial ankle.  Patient was placed in a short-leg splint and window was created for further compartment checks overnight.  Patient is reportedly nonambulatory and wheelchair-bound at baseline.     Now s/p ORIF sindi fx 8/23    - Pain MM  - Wound vac to suction  - PT/OT: NWB RLE   - ASA 81 BID  - Ice and elevate affected extremity at all times   - Abx: ancef x24h  - Suprapubic catheter    Dispo: PT/OT recs SNF, pending placement, ok to dc from orthopaedic perspective          Suleman Bowden MD  Orthopedics  [facility] - Surgery

## 2023-08-28 NOTE — ASSESSMENT & PLAN NOTE
Present on admit. Continue routine suprapubic catheter care. Patient changes suprapubic catheter every 20 days. Last changed ion this admit on 8/22.

## 2023-08-28 NOTE — PLAN OF CARE
CHW met with patient/family at bedside. Patient experience rounding completed and reviewed the following.     Do you know your discharge plan? Yes        If yes, what is the plan?  SNF    Have you discussed your needs and preferences with your SW/CM? Yes       Do you currently have difficulty keeping up with bills, affording medicine or buying food?  No    Assigned SW/CM notified of any patient/family needs or concerns. Appropriate resources provided to address patient's needs.

## 2023-08-28 NOTE — ASSESSMENT & PLAN NOTE
Chronic and controlled. BP improved. Resume home Bumex 2 mg po daily on 8/28. Monitor vital signs very 4 hours. Target BP < 150/90.

## 2023-08-28 NOTE — PLAN OF CARE
"Patient reports she will be appealing her discharge as she feels she is not stable to d/c due to Chronic Wheezing. SW to notify [facility] of d/c delay. Will continue to follow.      300PM-Patient unable to provide [employer] Case ID number. Stated "I don't  have a pen so I didn't write it down. Will contact "[employer] to get information.    Shazia Razo RNCM  Case Management  [facility]  [phone]    "

## 2023-08-28 NOTE — ANESTHESIA POST-OP PAIN MANAGEMENT
Acute Pain Service Progress Note    Mary Ellen Fajardo is a 67 y.o., female, [id number].    Surgery:  ORIF, FRACTURE, PILON (Right: Ankle)       ORIF, FRACTURE, FIBULA (Right: Leg Lower)    Post Op Day #: 5    Catheter type: perineural  saphenous  and popliteal    Infusion type: Ropivacaine 0.2%    Problem List:    Active Hospital Problems    Diagnosis  POA    *Closed fracture of right tibial plafond with fibula involvement with routine healing s/p ORIF on 8/23/2023 [S82.871D, S82.831D]  Not Applicable    Pressure injury of thigh, stage 2 [L89.202]  Yes    Anemia of chronic disease [D63.8]  Yes    Chronic respiratory failure with hypoxia and hypercapnia [J96.11, J96.12]  Yes      pulmonary htn + volume overload.  No overt evidence of copd per ct.        Chronic diastolic heart failure [I50.32]  Yes    Chronic indwelling suprapubic catheter in place [Z93.59]  Not Applicable     Chronic    History of CVA (cerebrovascular accident) [Z86.73]  Not Applicable     Chronic    Class 1 obesity due to excess calories without serious comorbidity with body mass index (BMI) of 30.0 to 30.9 in adult [E66.09, Z68.30]  Not Applicable    ADEEL (obstructive sleep apnea) [G47.33]  Yes     S/p sleep study at [facility].  Record not available.  Ahi of 44 per psg 2020. Currently on bipap 20/14.        Essential hypertension [I10]  Yes     Chronic    Primary insomnia [F51.01]  Yes     Chronic    Paraparesis [G82.20]  Yes      Resolved Hospital Problems    Diagnosis Date Resolved POA    COPD (chronic obstructive pulmonary disease) [J44.9] 08/24/2023 Yes     Chronic       Subjective:     General appearance of alert, oriented, no complaints   Pain with rest: 4    Numbers   Pain with movement: 4    Numbers   Side Effects    1. Pruritis No    2. Nausea No    3. Motor Blockade No, 0=Ability to raise lower extremities off bed    4. Sedation No, 1=awake and alert    Objective:   Catheter site clean, dry, intact        Vitals   Vitals:     08/28/23 0934   BP: 137/64   Pulse:    Resp:    Temp:         Labs    No results displayed because visit has over 200 results.           Meds   Current Facility-Administered Medications   Medication Dose Route Frequency Provider Last Rate Last Admin    0.9%  NaCl infusion (for blood administration)   Intravenous Q24H PRN Awa Purvis MD        acetaminophen tablet 1,000 mg  1,000 mg Oral Q8H Linda Wiseman MD   1,000 mg at 08/28/23 0554    albuterol-ipratropium 2.5 mg-0.5 mg/3 mL nebulizer solution 3 mL  3 mL Nebulization Q4H [place] Jason Henderson MD   3 mL at 08/28/23 0849    aluminum-magnesium hydroxide-simethicone 200-200-20 mg/5 mL suspension 30 mL  30 mL Oral QID PRN Ana Fonseca PA-C   30 mL at 08/25/23 2023    aspirin chewable tablet 81 mg  81 mg Oral BID Suleman Bowden MD   81 mg at 08/28/23 0934    bisacodyL suppository 10 mg  10 mg Rectal Daily PRN Ana Fonseca PA-C   10 mg at 08/25/23 2009    bumetanide tablet 2 mg  2 mg Oral Daily Awa Purvis MD   2 mg at 08/28/23 0934    citalopram tablet 30 mg  30 mg Oral QHS Awa Purvis MD   30 mg at 08/27/23 2124    dextrose 10% bolus 125 mL 125 mL  12.5 g Intravenous PRN Ana Fonseca PA-C        dextrose 10% bolus 250 mL 250 mL  25 g Intravenous PRN Ana Fonseca PA-C        ferrous sulfate tablet 1 each  1 tablet Oral Daily Ana Fonseca PA-C   1 each at 08/28/23 0934    folic acid-vit B6-vit B12 2.5-25-2 mg tablet 1 tablet  1 tablet Oral Daily Ana Fonseca PA-C   1 tablet at 08/28/23 0934    gabapentin capsule 600 mg  600 mg Oral TID Awa Purvis MD   600 mg at 08/28/23 0934    glucagon (human recombinant) injection 1 mg  1 mg Intramuscular Ana Hurst PA-C        glucose chewable tablet 16 g  16 g Oral Ana Hurst PA-C        glucose chewable tablet 24 g  24 g Oral Ana Hurst PA-C        hydrOXYzine HCL tablet 25 mg  25  mg Oral BID PRN Ana Fonseca PA-C   25 mg at 08/27/23 1811    melatonin tablet 6 mg  6 mg Oral Nightly PRN Ana Fonseca PA-C   6 mg at 08/25/23 2222    methocarbamoL tablet 1,000 mg  1,000 mg Oral Q6H Awa Purvis MD   1,000 mg at 08/28/23 0554    naloxone 0.4 mg/mL injection 0.02 mg  0.02 mg Intravenous PRN Ana Fonseca PA-C        ondansetron disintegrating tablet 8 mg  8 mg Oral Q8H PRN Ana Fonseca PA-C   8 mg at 08/25/23 2008    oxybutynin 24 hr tablet 5 mg  5 mg Oral Daily Kaylee Montes MD   5 mg at 08/28/23 0934    oxyCODONE immediate release tablet 5 mg  5 mg Oral Q4H PRN Ramu Daugherty MD   5 mg at 08/28/23 0602    polyethylene glycol packet 17 g  17 g Oral BID PRN Ana Fonseca PA-C        prochlorperazine injection Soln 5 mg  5 mg Intravenous Q6H PRN Ana Fonseca PA-C        QUEtiapine tablet 200 mg  200 mg Oral QHS Ana Fonseca PA-C   200 mg at 08/27/23 2123    senna-docusate 8.6-50 mg per tablet 1 tablet  1 tablet Oral Daily Cielo Almaguer PA-C   1 tablet at 08/28/23 0934    simethicone chewable tablet 80 mg  1 tablet Oral QID PRN Ana Fonseca PA-C   80 mg at 08/25/23 2009    sodium chloride 0.9% flush 10 mL  10 mL Intravenous PRN Ramu Good MD        sodium chloride 0.9% flush 5 mL  5 mL Intravenous PRN Ana Fonseca PA-C        thiamine tablet 100 mg  100 mg Oral Daily Ana Fonseca PA-C   100 mg at 08/28/23 0934    vitamin D 1000 units tablet 1,000 Units  1,000 Units Oral Daily Suleman Bowden MD   1,000 Units at 08/28/23 0934       Assessment:     Pain control adequate.     Plan:     Patient doing well, continue present treatment.  -- Paused popliteal PNC and pulled this AM.   -- Continue multimodal pain regimen of Tylenol 1 gm Q8H, Gabapentin 300 mg TID, Robaxin 500 mg Q6H, and Oxy 5 mg IR Q4H PRN.  -- Dispo: [facility]    Linda Wiseman MD  Anesthesia PGY4, CA3

## 2023-08-29 PROBLEM — R07.89 ATYPICAL CHEST PAIN: Status: ACTIVE | Noted: 2023-08-29

## 2023-08-29 LAB
ANION GAP SERPL CALC-SCNC: 7 MMOL/L (ref 8–16)
BNP SERPL-MCNC: 120 PG/ML (ref 0–99)
BUN SERPL-MCNC: 18 MG/DL (ref 8–23)
CALCIUM SERPL-MCNC: 9.2 MG/DL (ref 8.7–10.5)
CHLORIDE SERPL-SCNC: 98 MMOL/L (ref 95–110)
CO2 SERPL-SCNC: 37 MMOL/L (ref 23–29)
CREAT SERPL-MCNC: 0.8 MG/DL (ref 0.5–1.4)
EST. GFR  (NO RACE VARIABLE): >60 ML/MIN/1.73 M^2
GLUCOSE SERPL-MCNC: 71 MG/DL (ref 70–110)
POTASSIUM SERPL-SCNC: 4.6 MMOL/L (ref 3.5–5.1)
SODIUM SERPL-SCNC: 142 MMOL/L (ref 136–145)
TROPONIN I SERPL DL<=0.01 NG/ML-MCNC: <0.006 NG/ML (ref 0–0.03)

## 2023-08-29 PROCEDURE — 25000242 PHARM REV CODE 250 ALT 637 W/ HCPCS: Performed by: INTERNAL MEDICINE

## 2023-08-29 PROCEDURE — 11000001 HC ACUTE MED/SURG PRIVATE ROOM

## 2023-08-29 PROCEDURE — 36415 COLL VENOUS BLD VENIPUNCTURE: CPT | Performed by: PHYSICIAN ASSISTANT

## 2023-08-29 PROCEDURE — 80048 BASIC METABOLIC PNL TOTAL CA: CPT | Performed by: INTERNAL MEDICINE

## 2023-08-29 PROCEDURE — 93010 EKG 12-LEAD: ICD-10-PCS | Mod: ,,, | Performed by: INTERNAL MEDICINE

## 2023-08-29 PROCEDURE — 36415 COLL VENOUS BLD VENIPUNCTURE: CPT | Performed by: INTERNAL MEDICINE

## 2023-08-29 PROCEDURE — 25000003 PHARM REV CODE 250: Performed by: INTERNAL MEDICINE

## 2023-08-29 PROCEDURE — 94640 AIRWAY INHALATION TREATMENT: CPT

## 2023-08-29 PROCEDURE — 99233 PR SUBSEQUENT HOSPITAL CARE,LEVL III: ICD-10-PCS | Mod: ,,, | Performed by: INTERNAL MEDICINE

## 2023-08-29 PROCEDURE — 94660 CPAP INITIATION&MGMT: CPT

## 2023-08-29 PROCEDURE — 99900035 HC TECH TIME PER 15 MIN (STAT)

## 2023-08-29 PROCEDURE — 25000003 PHARM REV CODE 250: Performed by: HOSPITALIST

## 2023-08-29 PROCEDURE — 25000003 PHARM REV CODE 250: Performed by: PHYSICIAN ASSISTANT

## 2023-08-29 PROCEDURE — 25000003 PHARM REV CODE 250

## 2023-08-29 PROCEDURE — 99233 SBSQ HOSP IP/OBS HIGH 50: CPT | Mod: ,,, | Performed by: INTERNAL MEDICINE

## 2023-08-29 PROCEDURE — 83880 ASSAY OF NATRIURETIC PEPTIDE: CPT | Performed by: PHYSICIAN ASSISTANT

## 2023-08-29 PROCEDURE — 25000003 PHARM REV CODE 250: Performed by: SURGERY

## 2023-08-29 PROCEDURE — 27100171 HC OXYGEN HIGH FLOW UP TO 24 HOURS

## 2023-08-29 PROCEDURE — 93005 ELECTROCARDIOGRAM TRACING: CPT

## 2023-08-29 PROCEDURE — 84484 ASSAY OF TROPONIN QUANT: CPT | Performed by: PHYSICIAN ASSISTANT

## 2023-08-29 PROCEDURE — 25000003 PHARM REV CODE 250: Performed by: STUDENT IN AN ORGANIZED HEALTH CARE EDUCATION/TRAINING PROGRAM

## 2023-08-29 PROCEDURE — 94761 N-INVAS EAR/PLS OXIMETRY MLT: CPT

## 2023-08-29 PROCEDURE — 93010 ELECTROCARDIOGRAM REPORT: CPT | Mod: ,,, | Performed by: INTERNAL MEDICINE

## 2023-08-29 PROCEDURE — 25000242 PHARM REV CODE 250 ALT 637 W/ HCPCS: Performed by: PHYSICIAN ASSISTANT

## 2023-08-29 RX ORDER — NITROGLYCERIN 0.4 MG/1
0.4 TABLET SUBLINGUAL EVERY 5 MIN PRN
Status: DISCONTINUED | OUTPATIENT
Start: 2023-08-29 | End: 2023-08-30 | Stop reason: HOSPADM

## 2023-08-29 RX ADMIN — CITALOPRAM HYDROBROMIDE 30 MG: 10 TABLET ORAL at 09:08

## 2023-08-29 RX ADMIN — BUMETANIDE 2 MG: 1 TABLET ORAL at 08:08

## 2023-08-29 RX ADMIN — METHOCARBAMOL 1000 MG: 500 TABLET ORAL at 06:08

## 2023-08-29 RX ADMIN — METHOCARBAMOL 1000 MG: 500 TABLET ORAL at 12:08

## 2023-08-29 RX ADMIN — GABAPENTIN 600 MG: 300 CAPSULE ORAL at 09:08

## 2023-08-29 RX ADMIN — ASPIRIN 81 MG 81 MG: 81 TABLET ORAL at 08:08

## 2023-08-29 RX ADMIN — NITROGLYCERIN 0.4 MG: 0.4 TABLET, ORALLY DISINTEGRATING SUBLINGUAL at 09:08

## 2023-08-29 RX ADMIN — OXYCODONE HYDROCHLORIDE 5 MG: 5 TABLET ORAL at 09:08

## 2023-08-29 RX ADMIN — IPRATROPIUM BROMIDE AND ALBUTEROL SULFATE 3 ML: 2.5; .5 SOLUTION RESPIRATORY (INHALATION) at 11:08

## 2023-08-29 RX ADMIN — GABAPENTIN 600 MG: 300 CAPSULE ORAL at 08:08

## 2023-08-29 RX ADMIN — IPRATROPIUM BROMIDE AND ALBUTEROL SULFATE 3 ML: 2.5; .5 SOLUTION RESPIRATORY (INHALATION) at 04:08

## 2023-08-29 RX ADMIN — GABAPENTIN 600 MG: 300 CAPSULE ORAL at 03:08

## 2023-08-29 RX ADMIN — METHOCARBAMOL 1000 MG: 500 TABLET ORAL at 05:08

## 2023-08-29 RX ADMIN — FERROUS SULFATE TAB 325 MG (65 MG ELEMENTAL FE) 1 EACH: 325 (65 FE) TAB at 08:08

## 2023-08-29 RX ADMIN — SENNOSIDES AND DOCUSATE SODIUM 1 TABLET: 50; 8.6 TABLET ORAL at 08:08

## 2023-08-29 RX ADMIN — FOLIC ACID-PYRIDOXINE-CYANOCOBALAMIN TAB 2.5-25-2 MG 1 TABLET: 2.5-25-2 TAB at 08:08

## 2023-08-29 RX ADMIN — OXYBUTYNIN CHLORIDE 5 MG: 5 TABLET, EXTENDED RELEASE ORAL at 08:08

## 2023-08-29 RX ADMIN — ASPIRIN 81 MG 81 MG: 81 TABLET ORAL at 09:08

## 2023-08-29 RX ADMIN — CHOLECALCIFEROL TAB 25 MCG (1000 UNIT) 1000 UNITS: 25 TAB at 08:08

## 2023-08-29 RX ADMIN — IPRATROPIUM BROMIDE AND ALBUTEROL SULFATE 3 ML: 2.5; .5 SOLUTION RESPIRATORY (INHALATION) at 07:08

## 2023-08-29 RX ADMIN — ACETAMINOPHEN 1000 MG: 500 TABLET ORAL at 09:08

## 2023-08-29 RX ADMIN — OXYCODONE HYDROCHLORIDE 5 MG: 5 TABLET ORAL at 01:08

## 2023-08-29 RX ADMIN — METHOCARBAMOL 1000 MG: 500 TABLET ORAL at 01:08

## 2023-08-29 RX ADMIN — QUETIAPINE FUMARATE 200 MG: 100 TABLET ORAL at 09:08

## 2023-08-29 RX ADMIN — SIMETHICONE 80 MG: 80 TABLET, CHEWABLE ORAL at 05:08

## 2023-08-29 RX ADMIN — ACETAMINOPHEN 1000 MG: 500 TABLET ORAL at 01:08

## 2023-08-29 RX ADMIN — ACETAMINOPHEN 1000 MG: 500 TABLET ORAL at 05:08

## 2023-08-29 RX ADMIN — THIAMINE HCL TAB 100 MG 100 MG: 100 TAB at 08:08

## 2023-08-29 RX ADMIN — IPRATROPIUM BROMIDE AND ALBUTEROL SULFATE 3 ML: 2.5; .5 SOLUTION RESPIRATORY (INHALATION) at 08:08

## 2023-08-29 NOTE — PT/OT/SLP PROGRESS
Physical Therapy      Patient Name:  Mary Ellen Fajardo   MRN:  [id number]    Patient not seen today secondary to Nurse/ [facility] hold. Attempted at 0949, nurse reported pt having chest pain, given nitro). Unable to follow up for second attempt. Will follow-up when appropriate.  8/29/2023

## 2023-08-29 NOTE — ASSESSMENT & PLAN NOTE
66 y/o with left AKA, previous CVA, paraparesis presenting from [facility] with right ankle pain and injury during a transfer from wheelchair to shower chair. She was found to have an acute closed trimalleolar fracture of the right distal tibia and fibula in the ED. Neurovascularly intact.     · Orthopedic surgery consulted and recommended operative repair. Patient taken to the OR on 8/23/2023 and underwent ORIF of right ankle by Dr. Suleman Schmitz.   · Post-op, patient is non weight bearing to right lower extremity as per Ortho.  · PT/OT consulted post-op to work with patient. Recommending SNF and referrals sent and has several accepting facilities. Patient wants [facility] but no beds available. Patient accepted to [facility] and auth obtained but patient does not feel she is medically ready for hospital discharge on 8/28 and appealing her discharge.   · Patient on Aspirin 81 mg po BID for DVT prophylaxis and will continue for 6 weeks.   · Fall precautions.  · Podus boot to right ankle as per Ortho.   · Wound vac in place to right ankle incision site and will continue. Management as per Ortho. Plan to switch to Prevena incisional wound vac on discharge for 1 week as per Ortho.   · Pain controlled. Continue multimodal pain medications for pain management with scheduled Tylenol 1000 mg po TID + Robaxin 750 mg po 4 times daily + Oxycodone IR prn for breakthrough pain.   · Perineural pain catheters removed by Anesthesia on am of 8/28.

## 2023-08-29 NOTE — ASSESSMENT & PLAN NOTE
· Patient reported on am of 8/29 having pain in her chest area. Patient reported abdomen felt tight and then after she ate breakfast felt tightness and burning in her substernal area. Pain did not radiate. Patient denies any SOB or nausea.   · EKG done and reviewed and negative for any ischemic changes.   · Troponin negative at <0.006.   · CXR done and showed very mild pulmonary edema and patient back on her home diuretic Bumex and BNP checked and only 120 so no evidence of heart failure exacerbation.   · Patient given SLNTG x 2 and pain much improved. Suspect pain likely esophogeal in nature as occurred after eating and cardiac work-up negative.

## 2023-08-29 NOTE — PT/OT/SLP PROGRESS
Occupational Therapy      Patient Name:  Mary Ellen Fajardo   MRN:  [id number]    Patient not seen today secondary to nursing hold due to pt having chest pain and given nitro.  OT unable to follow up in the afternoon.  Will follow-up as scheduled per OT POC.    8/29/2023

## 2023-08-29 NOTE — PLAN OF CARE
Problem: Adult Inpatient Plan of Care  Goal: Plan of Care Review  Outcome: Ongoing, Progressing  Goal: Patient-Specific Goal (Individualized)  Outcome: Ongoing, Progressing  Goal: Absence of Hospital-Acquired Illness or Injury  Outcome: Ongoing, Progressing  Goal: Optimal Comfort and Wellbeing  Outcome: Ongoing, Progressing  Goal: Readiness for Transition of Care  Outcome: Ongoing, Progressing     Problem: Fluid Imbalance (Pneumonia)  Goal: Fluid Balance  Outcome: Ongoing, Progressing     Problem: Infection (Pneumonia)  Goal: Resolution of Infection Signs and Symptoms  Outcome: Ongoing, Progressing     Problem: Respiratory Compromise (Pneumonia)  Goal: Effective Oxygenation and Ventilation  Outcome: Ongoing, Progressing     Problem: Infection  Goal: Absence of Infection Signs and Symptoms  Outcome: Ongoing, Progressing     Problem: Impaired Wound Healing  Goal: Optimal Wound Healing  Outcome: Ongoing, Progressing     Problem: Skin Injury Risk Increased  Goal: Skin Health and Integrity  Outcome: Ongoing, Progressing     Problem: Fall Injury Risk  Goal: Absence of Fall and Fall-Related Injury  Outcome: Ongoing, Progressing     Patient was experiencing some chest pain this morning, EKG and chest Hi were taken and 2x nitro were given. Patient coughed up some phlegm also, she said that later this afternoon she felt a lot better. No additional complaints of chest pain and any significant pain at all. Plan of care continues.

## 2023-08-29 NOTE — SUBJECTIVE & OBJECTIVE
Interval History: Patient appealing her discharge. Patient reported this am she was having pain in her chest area. Patient states her abdomen felt tight and then after she ate breakfast felt tightness and burning in her substernal area. Pain did not radiate. Patient denies any SOB or nausea. EKG done and reviewed this am and negative for any ischemic changes. Troponin negative at <0.006. CXR done and showed very mild pulmonary edema and patient back on her home diuretic Bumex and BNP checked and only 120. Patient given SLNTG x 2 and pain much improved. Suspect pain likely esophogeal in nature as occurred after eating and cardiac work-up negative. Patient with no wheezing on exam today and remains on 2 liters which is her home level. Right ankle pain controlled. Patient remains medically ready for hospital discharge.     Review of Systems   Constitutional:  Negative for chills and fever.   Respiratory:  Positive for chest tightness. Negative for cough, shortness of breath and wheezing.    Cardiovascular:  Negative for chest pain.   Gastrointestinal:  Negative for abdominal pain, diarrhea and nausea.   Musculoskeletal:  Positive for arthralgias (Right ankle; Right knee).   Neurological:  Negative for dizziness.   Psychiatric/Behavioral:  Negative for agitation and confusion.      Objective:     Vital Signs (Most Recent):  Temp: 97.8 °F (36.6 °C) (08/29/23 1140)  Pulse: 92 (08/29/23 1146)  Resp: 18 (08/29/23 1146)  BP: 122/70 (08/29/23 1140)  SpO2: 96 % (08/29/23 1146) on room air Vital Signs (24h Range):  Temp:  [96.8 °F (36 °C)-98.6 °F (37 °C)] 97.8 °F (36.6 °C)  Pulse:  [60-92] 92  Resp:  [16-22] 22  SpO2:  [90 %-100 %] 96 %  BP: (104-138)/(58-70) 122/70     Weight: 83 kg (183 lb)  Body mass index is 30.45 kg/m².  No intake or output data in the 24 hours ending 08/29/23 1452      Physical Exam  Vitals and nursing note reviewed.   Constitutional:       General: She is not in acute distress.     Appearance: Normal  appearance. She is well-developed. She is obese. She is not ill-appearing.   Cardiovascular:      Rate and Rhythm: Normal rate and regular rhythm.      Heart sounds: Normal heart sounds. No murmur heard.     No friction rub. No gallop.   Pulmonary:      Effort: Pulmonary effort is normal. No respiratory distress.      Breath sounds: No wheezing.   Abdominal:      General: Abdomen is flat. Bowel sounds are normal. There is no distension.      Palpations: Abdomen is soft.      Tenderness: There is no abdominal tenderness.   Musculoskeletal:         General: Swelling (Right lower leg from right knee down to foot) present.      Comments: Left AKA noted. Stump well healed to left leg. Wound vac noted to right ankle incision sites. Podus boot in place to right ankle.    Skin:     General: Skin is warm.      Findings: No erythema.   Neurological:      Mental Status: She is alert and oriented to person, place, and time.   Psychiatric:         Mood and Affect: Mood normal.         Behavior: Behavior normal. Behavior is cooperative.         Thought Content: Thought content normal.         Judgment: Judgment normal.             Significant Labs: CBC:   Recent Labs   Lab 08/28/23  0616   WBC 6.41   HGB 8.2*   HCT 28.8*        CMP:   Recent Labs   Lab 08/28/23  0616 08/29/23  0336    142   K 4.9 4.6    98   CO2 30* 37*    71   BUN 21 18   CREATININE 0.8 0.8   CALCIUM 9.3 9.2   ANIONGAP 6* 7*     BNP   Date Value Ref Range Status   08/29/2023 120 (H) 0 - 99 pg/mL Final     Comment:     Values of less than 100 pg/ml are consistent with non-CHF populations.   08/22/2023 126 (H) 0 - 99 pg/mL Final     Comment:     Values of less than 100 pg/ml are consistent with non-CHF populations.   08/07/2023 70 0 - 99 pg/mL Final     Comment:     Values of less than 100 pg/ml are consistent with non-CHF populations.   01/16/2023 104 (H) 0 - 99 pg/mL Final     Comment:     Values of less than 100 pg/ml are consistent  with non-CHF populations.   04/29/2021 24 0 - 99 pg/mL Final     Comment:     Values of less than 100 pg/ml are consistent with non-CHF populations.   01/08/2021 60 0 - 99 pg/mL Final     Comment:     Values of less than 100 pg/ml are consistent with non-CHF populations.     Recent Labs     08/29/23  1052   TROPONINI <0.006       Significant Imaging: I have reviewed all pertinent imaging results/findings within the past 24 hours.

## 2023-08-29 NOTE — ASSESSMENT & PLAN NOTE
Pulmonary HTN  Patient with Hypercapnic and Hypoxic Respiratory failure which is Chronic.  she is on home oxygen at 2 LPM and BiPAP nightly for her ADEEL at +12/+5 and FiO2 30%. Supplemental oxygen was provided and noted-at 2 liters of oxygen. Oxygen Concentration (%):  [35] 35    .   Signs/symptoms of respiratory failure include- none at this time. Contributing diagnoses includes - CHF and Pulmonary HTN and ADEEL. Labs and images were reviewed. Patient Has recent ABG, which has been reviewed ([facility]). Will treat underlying causes and adjust management of respiratory failure as follows-   · Continue home supplemental oxygen for goal SpO2 >88%.  Continue nightly BiPAP for her chronic ADEEL.   · Patient not having any signs of acute or worsening respiratory failure. Wheezing is chronic issue and on scheduled Duonebs every 4 hours while awake and will continue.   · Continue home Bumex 2 mg po daily as history of volume overload due to Pulmonary HTN in past. .

## 2023-08-29 NOTE — ASSESSMENT & PLAN NOTE
· Hgb improved to 8.2 on 8/28. No signs of bleeding.   · Hgb down to 7.2 on 8/27. Monitor. No signs of bleeding.   · Hgb improved to 7.4 on 8/26.   · Hgb slightly improved to 7.2 on 8/25 after transfused 1 unit of PRBCs. Monitor Hgb daily. Momnitor for any signs of active bleeding.   · Hgb down to 6.9 on 8/24. Plan to transfuse 1 unit of PRBCs on 8/24. No signs of clinical bleeding and post-op and expected blood loss related to surgery.   · Hbg 7.8 on admit. Baseline Hgb 9-10. Low Hgb on admit likely related to fracture and recent acute illness and recent hospitalization for CHF.   · Transfuse blood if Hgb < 7 and asymptomatic or < 8 and symptomatic.

## 2023-08-29 NOTE — PROGRESS NOTES
[facility] Update    Patient c/o of chest pain, 9/10 with radiating pain down left arm. Associated SOB and abdominal pain. Denies any other symptoms. Last time she had this pain was a few years ago, and she states was diagnosed with heart failure. No history of CAD/ MI. CTAB, abdomen soft/ last BM yesterday. STAT EKG sinus with PACS (new), no new ST-T wave abnormalities. Sublingual NTG, troponin, bnp and cxr ordered. Will continue to follow. Attending updates and agrees with plan.      Cielo Almaguer PA-C  [facility]

## 2023-08-29 NOTE — PLAN OF CARE
Patient's appeal status checked via [employer] site. Appeal is currently in clinical review. Will continue to follow.    UPDATE: 230PM-Patient's [employer] appeal is still pending but pending completion. Anticipate decision this afternoon versus tomorrow morning. Will continue to follow.      Shazia OLIVARES  Case Management  [facility]  [phone]

## 2023-08-29 NOTE — PROGRESS NOTES
[facility] Medicine  Progress Note    Patient Name: Mary Ellen Fajardo  MRN: [id number]  Patient Class: IP- Inpatient   Admission Date: 8/22/2023  Length of Stay: 5 days  Attending Physician: Awa Purvis MD  Primary Care Provider: Any Tran MD        Subjective:     Principal Problem:Closed fracture of right tibial plafond with fibula involvement with routine healing        HPI:  Mary Ellen Fajardo is a 67 y.o. 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  Patient reports she was being moved to a shower chair from her wheelchair when her right ankle caught and twisted in the wheel of her wheelchair.  She experienced immediate pain and deformity of the right ankle.  Denies any numbness, tingling, and states she has never injured this ankle before.  She denies any other injury. Wheelchair-bound at baseline and not on any blood thinners.  Has some abdominal cramping. Last BM yesterday. No other symptoms such as shortness of breath, chest pain, nausea, vomiting. She is somewhat withdrawn during interview which she attributes to pain.    In the ED, AFVSS. Labs with hbg 7.8 (baseline ~9-10). CBC and CMP otherwise unremarkable. VBG with pH 7.285, PCO2 77.8, PO2 115, and HCO3 37. Placed on her home nightly bipap. XR imaging of RLE revealed  Acute fracture of the distal tibia and fibula, approximately 2-3 cm above the talar dome.  Mild comminution and mild displacement.  Ortho evaluated the patient, splinted her ankle, and recommended admission to hospital medicine. Given dilaudid  and 1L NS bolus.      Overview/Hospital Course:  Ortho consulted and patient taken to OR on 8/23/2023 and underwent ORIF of closed right trimalleolar fracture by Dr. Suleman Schmitz. Incisional wound vac  placed by Ortho in OR to surgical site and remain in place post-op. Patient post-op nonweight bearing to right lower extremity. Podus boot in place to right ankle as per Ortho. PT/OT consulted post-op and working with patient and recommending SNF on discharge when medically ready. Referrals sent as patient states she does not want to go back to [facility] for her SNF care. Patient on multimodal pain meds post-op for pain control with scheduled Tylenol and Robaxin with Oxycodone prn for breakthrough pain. Pain controlled post-op. Patient on Aspirin 81 mg po BID for DVT prophylaxis and will continue. Patient transfused 1 unit of PRBCs on 8/24 for Hgb 6.9 and Hgb improved to 7.2 on 8/25. Hgb stable at 7.4 on 8/26. Pain controlled. Wound vac remains in place to right ankle incision sites with minimal output on 8/26. Hgb stable at 7.2 on 8/27. Hgb improved to 8.2 on on 8/28. Patient concerned about COVID exposure as grandson visited several days ago and now COVID positive so COVID test done and negative on 8/27. Pain controlled on 8/28. Perineural catheter removed by Anesthesia on am of 8/28. Patient medically ready for discharge and has been accepted to [facility] on 8/28 and patient appealing her discharge.       Interval History: Patient appealing her discharge. Patient reported this am she was having pain in her chest area. Patient states her abdomen felt tight and then after she ate breakfast felt tightness and burning in her substernal area. Pain did not radiate. Patient denies any SOB or nausea. EKG done and reviewed this am and negative for any ischemic changes. Troponin negative at <0.006. CXR done and showed very mild pulmonary edema and patient back on her home diuretic Bumex and BNP checked and only 120. Patient given SLNTG x 2 and pain much improved. Suspect pain likely esophogeal in nature as occurred after eating and cardiac work-up negative. Patient with no wheezing on exam today and  remains on 2 liters which is her home level. Right ankle pain controlled. Patient remains medically ready for hospital discharge.     Review of Systems   Constitutional:  Negative for chills and fever.   Respiratory:  Positive for chest tightness. Negative for cough, shortness of breath and wheezing.    Cardiovascular:  Negative for chest pain.   Gastrointestinal:  Negative for abdominal pain, diarrhea and nausea.   Musculoskeletal:  Positive for arthralgias (Right ankle; Right knee).   Neurological:  Negative for dizziness.   Psychiatric/Behavioral:  Negative for agitation and confusion.      Objective:     Vital Signs (Most Recent):  Temp: 97.8 °F (36.6 °C) (08/29/23 1140)  Pulse: 92 (08/29/23 1146)  Resp: 18 (08/29/23 1146)  BP: 122/70 (08/29/23 1140)  SpO2: 96 % (08/29/23 1146) on room air Vital Signs (24h Range):  Temp:  [96.8 °F (36 °C)-98.6 °F (37 °C)] 97.8 °F (36.6 °C)  Pulse:  [60-92] 92  Resp:  [16-22] 22  SpO2:  [90 %-100 %] 96 %  BP: (104-138)/(58-70) 122/70     Weight: 83 kg (183 lb)  Body mass index is 30.45 kg/m².  No intake or output data in the 24 hours ending 08/29/23 1452      Physical Exam  Vitals and nursing note reviewed.   Constitutional:       General: She is not in acute distress.     Appearance: Normal appearance. She is well-developed. She is obese. She is not ill-appearing.   Cardiovascular:      Rate and Rhythm: Normal rate and regular rhythm.      Heart sounds: Normal heart sounds. No murmur heard.     No friction rub. No gallop.   Pulmonary:      Effort: Pulmonary effort is normal. No respiratory distress.      Breath sounds: No wheezing.   Abdominal:      General: Abdomen is flat. Bowel sounds are normal. There is no distension.      Palpations: Abdomen is soft.      Tenderness: There is no abdominal tenderness.   Musculoskeletal:         General: Swelling (Right lower leg from right knee down to foot) present.      Comments: Left AKA noted. Stump well healed to left leg. Wound vac  noted to right ankle incision sites. Podus boot in place to right ankle.    Skin:     General: Skin is warm.      Findings: No erythema.   Neurological:      Mental Status: She is alert and oriented to person, place, and time.   Psychiatric:         Mood and Affect: Mood normal.         Behavior: Behavior normal. Behavior is cooperative.         Thought Content: Thought content normal.         Judgment: Judgment normal.             Significant Labs: CBC:   Recent Labs   Lab 08/28/23  0616   WBC 6.41   HGB 8.2*   HCT 28.8*        CMP:   Recent Labs   Lab 08/28/23  0616 08/29/23  0336    142   K 4.9 4.6    98   CO2 30* 37*    71   BUN 21 18   CREATININE 0.8 0.8   CALCIUM 9.3 9.2   ANIONGAP 6* 7*     BNP   Date Value Ref Range Status   08/29/2023 120 (H) 0 - 99 pg/mL Final     Comment:     Values of less than 100 pg/ml are consistent with non-CHF populations.   08/22/2023 126 (H) 0 - 99 pg/mL Final     Comment:     Values of less than 100 pg/ml are consistent with non-CHF populations.   08/07/2023 70 0 - 99 pg/mL Final     Comment:     Values of less than 100 pg/ml are consistent with non-CHF populations.   01/16/2023 104 (H) 0 - 99 pg/mL Final     Comment:     Values of less than 100 pg/ml are consistent with non-CHF populations.   04/29/2021 24 0 - 99 pg/mL Final     Comment:     Values of less than 100 pg/ml are consistent with non-CHF populations.   01/08/2021 60 0 - 99 pg/mL Final     Comment:     Values of less than 100 pg/ml are consistent with non-CHF populations.     Recent Labs     08/29/23  1052   TROPONINI <0.006       Significant Imaging: I have reviewed all pertinent imaging results/findings within the past 24 hours.      Assessment/Plan:      * Closed fracture of right tibial plafond with fibula involvement with routine healing s/p ORIF on 8/23/2023  68 y/o with left AKA, previous CVA, paraparesis presenting from [facility] with right ankle pain and injury during a  transfer from wheelchair to shower chair. She was found to have an acute closed trimalleolar fracture of the right distal tibia and fibula in the ED. Neurovascularly intact.     · Orthopedic surgery consulted and recommended operative repair. Patient taken to the OR on 8/23/2023 and underwent ORIF of right ankle by Dr. Suleman Schmitz.   · Post-op, patient is non weight bearing to right lower extremity as per Ortho.  · PT/OT consulted post-op to work with patient. Recommending SNF and referrals sent and has several accepting facilities. Patient wants [facility] but no beds available. Patient accepted to [facility] and auth obtained but patient does not feel she is medically ready for hospital discharge on 8/28 and appealing her discharge.   · Patient on Aspirin 81 mg po BID for DVT prophylaxis and will continue for 6 weeks.   · Fall precautions.  · Podus boot to right ankle as per Ortho.   · Wound vac in place to right ankle incision site and will continue. Management as per Ortho. Plan to switch to Prevena incisional wound vac on discharge for 1 week as per Ortho.   · Pain controlled. Continue multimodal pain medications for pain management with scheduled Tylenol 1000 mg po TID + Robaxin 750 mg po 4 times daily + Oxycodone IR prn for breakthrough pain.   · Perineural pain catheters removed by Anesthesia on am of 8/28.     Atypical chest pain  · Patient reported on am of 8/29 having pain in her chest area. Patient reported abdomen felt tight and then after she ate breakfast felt tightness and burning in her substernal area. Pain did not radiate. Patient denies any SOB or nausea.   · EKG done and reviewed and negative for any ischemic changes.   · Troponin negative at <0.006.   · CXR done and showed very mild pulmonary edema and patient back on her home diuretic Bumex and BNP checked and only 120 so no evidence of heart failure exacerbation.   · Patient given SLNTG x 2 and pain much improved. Suspect pain  likely esophogeal in nature as occurred after eating and cardiac work-up negative.     Chronic respiratory failure with hypoxia and hypercapnia  Pulmonary HTN  Patient with Hypercapnic and Hypoxic Respiratory failure which is Chronic.  she is on home oxygen at 2 LPM and BiPAP nightly for her ADEEL at +12/+5 and FiO2 30%. Supplemental oxygen was provided and noted-at 2 liters of oxygen. Oxygen Concentration (%):  [35] 35    .   Signs/symptoms of respiratory failure include- none at this time. Contributing diagnoses includes - CHF and Pulmonary HTN and ADEEL. Labs and images were reviewed. Patient Has recent ABG, which has been reviewed (VBG). Will treat underlying causes and adjust management of respiratory failure as follows-   · Continue home supplemental oxygen for goal SpO2 >88%.  Continue nightly BiPAP for her chronic ADEEL.   · Patient not having any signs of acute or worsening respiratory failure. Wheezing is chronic issue and on scheduled Duonebs every 4 hours while awake and will continue.   · Continue home Bumex 2 mg po daily as history of volume overload due to Pulmonary HTN in past. .       ADEEL (obstructive sleep apnea)  Chronic condition. Continue nightly BiPAP in hospital to treat as has chronic hypercapnic and hypoxic respiratory failure related to her chronic ADEEL and pulmonary HTN.     Anemia of chronic disease  · Hgb improved to 8.2 on 8/28. No signs of bleeding.   · Hgb down to 7.2 on 8/27. Monitor. No signs of bleeding.   · Hgb improved to 7.4 on 8/26.   · Hgb slightly improved to 7.2 on 8/25 after transfused 1 unit of PRBCs. Monitor Hgb daily. Momnitor for any signs of active bleeding.   · Hgb down to 6.9 on 8/24. Plan to transfuse 1 unit of PRBCs on 8/24. No signs of clinical bleeding and post-op and expected blood loss related to surgery.   · Hbg 7.8 on admit. Baseline Hgb 9-10. Low Hgb on admit likely related to fracture and recent acute illness and recent hospitalization for CHF.   · Transfuse blood  if Hgb < 7 and asymptomatic or < 8 and symptomatic.       Pressure injury of thigh, stage 2  - Present on admit. Turn patient every 2 hours.   - Continue local wound care.     Chronic indwelling suprapubic catheter in place  Present on admit. Continue routine suprapubic catheter care. Patient changes suprapubic catheter every 20 days. Last changed ion this admit on 8/22.     Essential hypertension  Chronic and controlled. BP improved. Resume home Bumex 2 mg po daily on 8/28. Monitor vital signs very 4 hours. Target BP < 150/90.     Primary insomnia  Chronic condition. Continue home Seroquel 200 mg po nightly.     Paraparesis  · Chronic condition and related to previous CVA. Patient at her baseline neurologic and functional level.   · Fall precautions.     History of CVA (cerebrovascular accident)  Bed bound at baseline from previous CVA. Patient at her baseline neurologic and functional level.       Class 1 obesity due to excess calories without serious comorbidity with body mass index (BMI) of 30.0 to 30.9 in adult  Body mass index is 30.45 kg/m². Morbid obesity complicates all aspects of disease management from diagnostic modalities to treatment. Weight loss encouraged and health benefits explained to patient.         Chronic diastolic heart failure  Patient is identified as having Diastolic (HFpEF) heart failure that is Chronic. CHF is currently controlled. Latest ECHO performed and demonstrates- Results for orders placed during the hospital encounter of 08/06/23    Echo    Interpretation Summary    Left Ventricle: Normal wall motion. There is low normal systolic function with a visually estimated ejection fraction of 50 - 55%.    Right Ventricle: Normal right ventricular cavity size.  Continue home Bumex 2 mg po daily to treat and monitor clinical status closely. Monitor on telemetry. Patient is off CHF pathway.  Monitor strict Is&Os and daily weights.  Place on fluid restriction of 1.5 L. Continue to stress  to patient importance of self efficacy and  on diet for CHF. Last BNP reviewed- and noted below   Recent Labs   Lab 08/22/23  2335   *         VTE Risk Mitigation (From admission, onward)         Ordered     IP VTE HIGH RISK PATIENT  Once         08/23/23 0342     Place sequential compression device  Until discontinued         08/23/23 0342     Reason for No Pharmacological VTE Prophylaxis  Once        Question:  Reasons:  Answer:  Physician Provided (leave comment)    08/23/23 0342                Discharge Planning   LOY: 8/29/2023     Code Status: Full Code   Is the patient medically ready for discharge?: Yes    Reason for patient still in hospital (select all that apply): Patient new problem, Patient trending condition and Pending disposition  Discharge Plan A: Skilled Nursing Facility; Patient appealing her discharge.        Awa Purvis MD  Department of Hospital Medicine   [facility] - Surgery

## 2023-08-30 VITALS
DIASTOLIC BLOOD PRESSURE: 81 MMHG | SYSTOLIC BLOOD PRESSURE: 143 MMHG | RESPIRATION RATE: 18 BRPM | TEMPERATURE: 98 F | WEIGHT: 183 LBS | BODY MASS INDEX: 30.49 KG/M2 | HEART RATE: 78 BPM | OXYGEN SATURATION: 96 % | HEIGHT: 65 IN

## 2023-08-30 LAB
ANION GAP SERPL CALC-SCNC: 5 MMOL/L (ref 8–16)
BUN SERPL-MCNC: 15 MG/DL (ref 8–23)
CALCIUM SERPL-MCNC: 8.6 MG/DL (ref 8.7–10.5)
CHLORIDE SERPL-SCNC: 97 MMOL/L (ref 95–110)
CO2 SERPL-SCNC: 38 MMOL/L (ref 23–29)
CREAT SERPL-MCNC: 0.8 MG/DL (ref 0.5–1.4)
EST. GFR  (NO RACE VARIABLE): >60 ML/MIN/1.73 M^2
GLUCOSE SERPL-MCNC: 87 MG/DL (ref 70–110)
POTASSIUM SERPL-SCNC: 3.9 MMOL/L (ref 3.5–5.1)
SODIUM SERPL-SCNC: 140 MMOL/L (ref 136–145)

## 2023-08-30 PROCEDURE — 25000003 PHARM REV CODE 250

## 2023-08-30 PROCEDURE — 1111F PR DISCHARGE MEDS RECONCILED W/ CURRENT OUTPATIENT MED LIST: ICD-10-PCS | Mod: CPTII,,, | Performed by: INTERNAL MEDICINE

## 2023-08-30 PROCEDURE — 25000003 PHARM REV CODE 250: Performed by: SURGERY

## 2023-08-30 PROCEDURE — 80048 BASIC METABOLIC PNL TOTAL CA: CPT | Performed by: INTERNAL MEDICINE

## 2023-08-30 PROCEDURE — 25000003 PHARM REV CODE 250: Performed by: PHYSICIAN ASSISTANT

## 2023-08-30 PROCEDURE — 94761 N-INVAS EAR/PLS OXIMETRY MLT: CPT

## 2023-08-30 PROCEDURE — 25000003 PHARM REV CODE 250: Performed by: HOSPITALIST

## 2023-08-30 PROCEDURE — 25000003 PHARM REV CODE 250: Performed by: STUDENT IN AN ORGANIZED HEALTH CARE EDUCATION/TRAINING PROGRAM

## 2023-08-30 PROCEDURE — 99900035 HC TECH TIME PER 15 MIN (STAT)

## 2023-08-30 PROCEDURE — 25000003 PHARM REV CODE 250: Performed by: INTERNAL MEDICINE

## 2023-08-30 PROCEDURE — 25000242 PHARM REV CODE 250 ALT 637 W/ HCPCS: Performed by: INTERNAL MEDICINE

## 2023-08-30 PROCEDURE — 27100171 HC OXYGEN HIGH FLOW UP TO 24 HOURS

## 2023-08-30 PROCEDURE — 99239 PR HOSPITAL DISCHARGE DAY,>30 MIN: ICD-10-PCS | Mod: ,,, | Performed by: INTERNAL MEDICINE

## 2023-08-30 PROCEDURE — 94799 UNLISTED PULMONARY SVC/PX: CPT

## 2023-08-30 PROCEDURE — 99239 HOSP IP/OBS DSCHRG MGMT >30: CPT | Mod: ,,, | Performed by: INTERNAL MEDICINE

## 2023-08-30 PROCEDURE — 1111F DSCHRG MED/CURRENT MED MERGE: CPT | Mod: CPTII,,, | Performed by: INTERNAL MEDICINE

## 2023-08-30 PROCEDURE — 94640 AIRWAY INHALATION TREATMENT: CPT

## 2023-08-30 PROCEDURE — 97530 THERAPEUTIC ACTIVITIES: CPT

## 2023-08-30 PROCEDURE — 94660 CPAP INITIATION&MGMT: CPT

## 2023-08-30 PROCEDURE — 36415 COLL VENOUS BLD VENIPUNCTURE: CPT | Performed by: INTERNAL MEDICINE

## 2023-08-30 PROCEDURE — 97535 SELF CARE MNGMENT TRAINING: CPT

## 2023-08-30 PROCEDURE — 97112 NEUROMUSCULAR REEDUCATION: CPT | Mod: CQ

## 2023-08-30 RX ADMIN — HYDROXYZINE HYDROCHLORIDE 25 MG: 25 TABLET ORAL at 02:08

## 2023-08-30 RX ADMIN — GABAPENTIN 600 MG: 300 CAPSULE ORAL at 02:08

## 2023-08-30 RX ADMIN — IPRATROPIUM BROMIDE AND ALBUTEROL SULFATE 3 ML: 2.5; .5 SOLUTION RESPIRATORY (INHALATION) at 12:08

## 2023-08-30 RX ADMIN — ASPIRIN 81 MG 81 MG: 81 TABLET ORAL at 09:08

## 2023-08-30 RX ADMIN — BUMETANIDE 2 MG: 1 TABLET ORAL at 09:08

## 2023-08-30 RX ADMIN — THIAMINE HCL TAB 100 MG 100 MG: 100 TAB at 09:08

## 2023-08-30 RX ADMIN — GABAPENTIN 600 MG: 300 CAPSULE ORAL at 09:08

## 2023-08-30 RX ADMIN — OXYCODONE HYDROCHLORIDE 5 MG: 5 TABLET ORAL at 05:08

## 2023-08-30 RX ADMIN — METHOCARBAMOL 1000 MG: 500 TABLET ORAL at 12:08

## 2023-08-30 RX ADMIN — IPRATROPIUM BROMIDE AND ALBUTEROL SULFATE 3 ML: 2.5; .5 SOLUTION RESPIRATORY (INHALATION) at 08:08

## 2023-08-30 RX ADMIN — SENNOSIDES AND DOCUSATE SODIUM 1 TABLET: 50; 8.6 TABLET ORAL at 09:08

## 2023-08-30 RX ADMIN — BISACODYL 10 MG: 10 SUPPOSITORY RECTAL at 09:08

## 2023-08-30 RX ADMIN — ACETAMINOPHEN 1000 MG: 500 TABLET ORAL at 02:08

## 2023-08-30 RX ADMIN — METHOCARBAMOL 1000 MG: 500 TABLET ORAL at 05:08

## 2023-08-30 RX ADMIN — ACETAMINOPHEN 1000 MG: 500 TABLET ORAL at 05:08

## 2023-08-30 RX ADMIN — CHOLECALCIFEROL TAB 25 MCG (1000 UNIT) 1000 UNITS: 25 TAB at 09:08

## 2023-08-30 RX ADMIN — FOLIC ACID-PYRIDOXINE-CYANOCOBALAMIN TAB 2.5-25-2 MG 1 TABLET: 2.5-25-2 TAB at 09:08

## 2023-08-30 RX ADMIN — FERROUS SULFATE TAB 325 MG (65 MG ELEMENTAL FE) 1 EACH: 325 (65 FE) TAB at 09:08

## 2023-08-30 RX ADMIN — OXYBUTYNIN CHLORIDE 5 MG: 5 TABLET, EXTENDED RELEASE ORAL at 09:08

## 2023-08-30 NOTE — NURSING
Attempted to call report to nurse at [facility] x2. Pt's belongings packed and pt transferred to [facility] portable wound vac. PIV removed, no redness/swelling. Awaiting transportation.

## 2023-08-30 NOTE — PLAN OF CARE
Patient's [employer] appeal completed and patient found to be stable for d/c. Message sent to [facility] to begin process of d/c. Will continue to follow.      Shazia OLIVARES  Case Management  [facility]  [phone]

## 2023-08-30 NOTE — PLAN OF CARE
08/30/23 1053   Post-Acute Status   Post-Acute Authorization Placement   Post-Acute Placement Status Set-up Complete/Auth obtained     Patient's set-up has been completed. [facility] scheduled d/c transportation to [facility]. Patient is scheduled to be picked up at 1:00 pm. [facility] provided patient's nurse with report number #[phone]; ask for the nurse for room #237. Requested  time does not guarantee arrival time.      VIDAL Moriah reports informing the pt at bedside, agreeable.    Carmen Shell LMSW  Case Management   [facility]   Ext. [phone]

## 2023-08-30 NOTE — PT/OT/SLP PROGRESS
Occupational Therapy   Co-Treatment    Name: Mary Ellen Fajardo  MRN: [id number]  Admitting Diagnosis:  Closed fracture of right tibial plafond with fibula involvement with routine healing  7 Days Post-Op    Recommendations:     Discharge Recommendations: nursing facility, skilled  Discharge Equipment Recommendations:  to be determined by next level of care  Barriers to discharge:  None    Assessment:     Mary Ellen Fajarod is a 67 y.o. female with a medical diagnosis of Closed fracture of right tibial plafond with fibula involvement with routine healing.  She presents with no c/o pain today and tolerated grooming at  EOB ~15mins with 3 noted LOB. Performance deficits affecting function are weakness, impaired endurance, impaired self care skills, impaired functional mobility, impaired balance.     Rehab Prognosis:  Good; patient would benefit from acute skilled OT services to address these deficits and reach maximum level of function.       Plan:     Patient to be seen 3 x/week to address the above listed problems via self-care/home management, neuromuscular re-education, therapeutic activities, therapeutic exercises  Plan of Care Expires: 09/23/23  Plan of Care Reviewed with: patient    Subjective     Patient/Family Comments/goals: Pt> reports he took a pain pill before the session  Pain/Comfort:  Pain Rating 1: 0/10  Location - Side 1: Right  Location - Orientation 1: generalized  Location 1: hip  Pain Addressed 1: Reposition, Distraction    Objective:     Communicated with: RN prior to session.  Patient found HOB elevated with PRAFO, wound vac, peripheral IV (Suprapubic catheter) upon OT entry to room.    General Precautions: Standard, fall    Orthopedic Precautions:RLE non weight bearing  Braces:  (PRAFO)  Respiratory Status: Nasal cannula, flow 2 L/min     Occupational Performance:     Bed Mobility:    Patient completed Rolling/Turning to Left with  stand by assistance  Patient completed Supine to Sit with  maximal assistance  Patient completed Sit to Supine with maximal assistance   Pt. Sat at EOB ~ 15mins with 3 noted LOB at EOB posterolaterally to the Right with Mod A to recover     Activities of Daily Living:  Grooming: minimum assistance  wipe down with purple wipes for upper body at EOB   Upper Body Dressing: minimum assistance to don gown anteriorly at EOB     [facility] 6 Click ADL: 11    Treatment & Education:  Pt.educated on positioning and skin integrity techniques   Pt. Educated on energy conservation techniques/task modifications upon discharge   Pt educated on role of occupational therapy, POC, and safety during ADLs and functional mobility. Pt and OT discussed importance of safe, continued mobility to optimize daily living skills. Pt verbalized understanding. Pt given instruction to call for medical staff/nurse for assistance.     Co-treatment with PT for maximal pt participation, safety, and activity tolerance     Patient left HOB elevated with all lines intact, call button in reach, and RN notified    GOALS:   Multidisciplinary Problems       Occupational Therapy Goals          Problem: Occupational Therapy    Goal Priority Disciplines Outcome Interventions   Occupational Therapy Goal     OT, PT/OT Ongoing, Progressing    Description: Goals to be met by: 9/24/23     Patient will increase functional independence with ADLs by performing:    UE Dressing with [place].  Grooming while EOB with [place].  Toileting from bedside commode with Stand-by Assistance for hygiene and clothing management.   Sitting at edge of bed 10 minutes with Stand-by Assistance.  Rolling to Bilateral with [place].   Supine to sit with Modified [place].  Toilet transfer to bedside commode with Stand-by Assistance.                         Time Tracking:     OT Date of Treatment: 08/30/23  OT Start Time: 0835  OT Stop Time: 0858  OT Total Time (min): 23 min    Billable Minutes:Self Care/Home Management 13  Therapeutic  Activity 10    OT/FRANCIA: OT          8/30/2023

## 2023-08-30 NOTE — PT/OT/SLP PROGRESS
Physical Therapy Treatment    Patient Name:  Mary Ellen Fajardo   MRN:  [id number]    Recommendations:     Discharge Recommendations: nursing facility, skilled  Discharge Equipment Recommendations: to be determined by next level of care  Barriers to discharge: None    Assessment:     Mary Ellen Fajarod is a 67 y.o. female admitted with a medical diagnosis of Closed fracture of right tibial plafond with fibula involvement with routine healing.  She presents with the following impairments/functional limitations: weakness, impaired endurance, impaired self care skills, impaired functional mobility, impaired balance, decreased lower extremity function, impaired skin, impaired cardiopulmonary response to activity, orthopedic precautions.    Rehab Prognosis: Good; patient would benefit from acute skilled PT services to address these deficits and reach maximum level of function.    Recent Surgery: Procedure(s) (LRB):  ORIF, FRACTURE, PILON (Right)  ORIF, FRACTURE, FIBULA (Right) 7 Days Post-Op    Plan:     During this hospitalization, patient to be seen 3 x/week to address the identified rehab impairments via therapeutic activities, therapeutic exercises, neuromuscular re-education and progress toward the following goals:    Plan of Care Expires:  09/23/23    Subjective     Chief Complaint: none verbalized  Patient/Family Comments/goals: return to PLOF  Pain/Comfort:  Pain Rating 1: 0/10      Objective:     Communicated with RN prior to session.  Patient found HOB elevated with PICC line, PRAFO, wound vac (suprapubic catheter) upon PT entry to room.     General Precautions: Standard, fall  Orthopedic Precautions: RLE non weight bearing  Braces:  (PRAFO)  Respiratory Status: nasal cannula     Functional Mobility:  Bed Mobility:     Rolling Left:  stand by assistance  Supine to Sit: maximal assistance  Sit to Supine: maximal assistance  Sitting balance EOB: x15 mins with 3 LOB requiring mod A to recover      AM-PAC 6  CLICK MOBILITY  Turning over in bed (including adjusting bedclothes, sheets and blankets)?: 3  Sitting down on and standing up from a chair with arms (e.g., wheelchair, bedside commode, etc.): 1  Moving from lying on back to sitting on the side of the bed?: 2  Moving to and from a bed to a chair (including a wheelchair)?: 2  Need to walk in hospital room?: 1  Climbing 3-5 steps with a railing?: 1  Basic Mobility Total Score: 10       Treatment & Education:  Pt sat EOB while performing self care tasks with OT challenging dynamic balance.   Education provided on breathing technique, safety, calling for assistance     Bedside table in front of patient and area set up for function, convenience, and safety. RN aware of patient's mobility needs and status. Questions/concerns addressed within PTA scope of practice; patient  with no further questions. Time was provided for active listening, discussion of health disposition, and discussion of safe discharge.    OT present for cotreat due to pt's multiple medical comorbidities and functional/cognition deficits requiring two skilled therapists to appropriately progress pt's musculoskeletal strength, neuromuscular control, and endurance while taking into consideration medical acuity and pt safety.    Patient left HOB elevated with all lines intact, call button in reach, and RN notified..    GOALS:   Multidisciplinary Problems       Physical Therapy Goals          Problem: Physical Therapy    Goal Priority Disciplines Outcome Goal Variances Interventions   Physical Therapy Goal     PT, PT/OT Ongoing, Progressing     Description: Goals to be met by: 23     Patient will increase functional independence with mobility by performin. Supine to sit with MInimal Assistance  2. Sit to supine with MInimal Assistance  3. Bed to wheelchair transfer with Minimal Assistance using Slideboard  4. Wheelchair propulsion x50 feet with Stand-by Assistance using bilateral uppper  extremities  5. Sitting at edge of bed x8 minutes with Contact Guard Assistance to perform ADLs                         Time Tracking:     PT Received On: 08/30/23  PT Start Time: 0835     PT Stop Time: 0858  PT Total Time (min): 23 min     Billable Minutes: Neuromuscular Re-education 23    Treatment Type: Treatment  PT/PTA: PTA     Number of PTA visits since last PT visit: 1     08/30/2023

## 2023-08-30 NOTE — NURSING
Suprapubic catheter exchanged with 18fr 30cc catheter. Area cleaned with betadine and catheter inserted via sterile procedure with clear yellow urine returned and bulb inflated with 30mls of NS. Pt tolerated well.

## 2023-08-31 NOTE — PLAN OF CARE
[facility]  Discharge Final Note    Primary Care Provider: Any Tran MD    Expected Discharge Date: 8/30/2023    Final Discharge Note (most recent)       Final Note - 08/31/23 1009          Final Note    Assessment Type Final Discharge Note     Anticipated Discharge Disposition Skilled Nursing Facility     What phone number can be called within the next 1-3 days to see how you are doing after discharge? --   [phone]    Hospital Resources/Appts/Education Provided Post-Acute resouces added to AVS        Post-Acute Status    Post-Acute Authorization Placement     Post-Acute Placement Status Set-up Complete/Auth obtained                     Important Message from Medicare  Important Message from Medicare regarding Discharge Appeal Rights: Given to patient/caregiver, Explained to patient/caregiver, Signed/date by patient/caregiver     Date IMM was signed: 08/30/23  Time IMM was signed: 1000      Future Appointments   Date Time Provider Department Center   9/6/2023 10:45 AM Nina Guzman PA-C [facility] [address]   9/13/2023 10:45 AM Nina Guzman PA-C [facility] [address]     Patient discharged to [facility] on 8/30/23.    Shazia OLIVARES  Case Management  [facility]  [phone]

## 2023-09-01 NOTE — ADDENDUM NOTE
Addendum  created 09/01/23 0822 by Mai Leal MD    Attestation recorded in Intraprocedure, Intraprocedure Attestations filed

## 2023-09-03 PROBLEM — R07.89 ATYPICAL CHEST PAIN: Status: RESOLVED | Noted: 2023-08-29 | Resolved: 2023-09-03

## 2023-09-03 NOTE — ASSESSMENT & PLAN NOTE
· Resolved with no recurrence likely GI in origin.   · Patient reported on am of 8/29 having pain in her chest area. Patient reported abdomen felt tight and then after she ate breakfast felt tightness and burning in her substernal area. Pain did not radiate. Patient denies any SOB or nausea.   · EKG done and reviewed and negative for any ischemic changes.   · Troponin negative at <0.006.   · CXR done and showed very mild pulmonary edema and patient back on her home diuretic Bumex and BNP checked and only 120 so no evidence of heart failure exacerbation.   · Patient given SLNTG x 2 and pain much improved. Suspect pain likely esophogeal in nature as occurred after eating and cardiac work-up negative.

## 2023-09-03 NOTE — ASSESSMENT & PLAN NOTE
Pulmonary HTN  Patient with Hypercapnic and Hypoxic Respiratory failure which is Chronic.  she is on home oxygen at 2 LPM and BiPAP nightly for her ADEEL at +12/+5 and FiO2 30% and discharged on this regimen. Supplemental oxygen was provided and noted-at 2 liters of oxygen.      .   Signs/symptoms of respiratory failure include- none at this time. Contributing diagnoses includes - CHF and Pulmonary HTN and ADEEL. Labs and images were reviewed. Patient Has recent ABG, which has been reviewed ([facility]). Will treat underlying causes and adjust management of respiratory failure as follows-   · Continue home supplemental oxygen for goal SpO2 >88%.  Continue nightly BiPAP for her chronic ADEEL.   · Patient not having any signs of acute or worsening respiratory failure. Wheezing is chronic issue and on scheduled Duonebs every 4 hours while awake and will continue.   · Continue home Bumex 2 mg po daily as history of volume overload due to Pulmonary HTN in past on discharge.

## 2023-09-03 NOTE — ASSESSMENT & PLAN NOTE
· Hgb improved to 8.2 on 8/28. No signs of bleeding.   · Hgb down to 7.2 on 8/27. Monitor. No signs of bleeding.   · Hgb improved to 7.4 on 8/26.   · Hgb slightly improved to 7.2 on 8/25 after transfused 1 unit of PRBCs. Monitor Hgb daily. Momnitor for any signs of active bleeding.   · Hgb down to 6.9 on 8/24. Plan to transfuse 1 unit of PRBCs on 8/24. No signs of clinical bleeding and post-op and expected blood loss related to surgery.   · Hbg 7.8 on admit. Baseline Hgb 9-10. Low Hgb on admit likely related to fracture and recent acute illness and recent hospitalization for CHF.

## 2023-09-03 NOTE — ASSESSMENT & PLAN NOTE
Bed bound at baseline from previous CVA. Patient at her baseline neurologic and functional level.

## 2023-09-03 NOTE — ASSESSMENT & PLAN NOTE
Patient is identified as having Diastolic (HFpEF) heart failure that is Chronic. CHF is currently controlled. Latest ECHO performed and demonstrates- Results for orders placed during the hospital encounter of 08/06/23    Echo    Interpretation Summary    Left Ventricle: Normal wall motion. There is low normal systolic function with a visually estimated ejection fraction of 50 - 55%.    Right Ventricle: Normal right ventricular cavity size.  Continue home Bumex 2 mg po daily to treat on discharge.   Recent Labs   Lab 08/29/23  1052   *

## 2023-09-03 NOTE — ASSESSMENT & PLAN NOTE
Present on admit. Continue routine suprapubic catheter care. Patient changes suprapubic catheter every 20 days. Last changed on day of discharge on 8/30.

## 2023-09-03 NOTE — DISCHARGE SUMMARY
[facility] Medicine  Discharge Summary      Patient Name: Mary Ellen Fajardo  MRN: [id number]  CARMELO: [id number]  Patient Class: IP- Inpatient  Admission Date: 8/22/2023  Hospital Length of Stay: 6 days  Discharge Date and Time: 8/30/2023  2:25 PM  Attending Physician: Awa Purvis MD   Discharging Provider: Awa Purvis MD  Primary Care Provider: Any Tran MD  [facility] Medicine Team: [facility] Awa Purvis MD  Primary Care Team: [facility]    HPI:   Mary Ellen Fajardo is a 67 y.o. 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  Patient reports she was being moved to a shower chair from her wheelchair when her right ankle caught and twisted in the wheel of her wheelchair.  She experienced immediate pain and deformity of the right ankle.  Denies any numbness, tingling, and states she has never injured this ankle before.  She denies any other injury. Wheelchair-bound at baseline and not on any blood thinners.  Has some abdominal cramping. Last BM yesterday. No other symptoms such as shortness of breath, chest pain, nausea, vomiting. She is somewhat withdrawn during interview which she attributes to pain.    In the ED, AFVSS. Labs with hbg 7.8 (baseline ~9-10). CBC and CMP otherwise unremarkable. VBG with pH 7.285, PCO2 77.8, PO2 115, and HCO3 37. Placed on her home nightly bipap. XR imaging of RLE revealed  Acute fracture of the distal tibia and fibula, approximately 2-3 cm above the talar dome.  Mild comminution and mild displacement.  Ortho evaluated the patient, splinted her ankle, and recommended admission to hospital medicine. Given dilaudid  and 1L NS bolus.      8/23/2023  Procedure(s) (LRB):  ORIF, FRACTURE, PILON (Right)  ORIF, FRACTURE, FIBULA  (Right)    Surgeon(s):  Suleman Bowden MD Mautner, James F., MD      Hospital Course:   Ortho consulted and patient taken to OR on 8/23/2023 and underwent ORIF of closed right trimalleolar fracture by Dr. Suleman Schmitz. Incisional wound vac placed by Ortho in OR to surgical site and remain in place post-op. Patient post-op nonweight bearing to right lower extremity. Podus boot in place to right ankle as per Ortho. PT/OT consulted post-op and working with patient and recommending SNF on discharge when medically ready. Referrals sent as patient states she does not want to go back to [facility] for her SNF care. Patient on multimodal pain meds post-op for pain control with scheduled Tylenol and Robaxin with Oxycodone prn for breakthrough pain. Pain controlled post-op. Patient on Aspirin 81 mg po BID for DVT prophylaxis and will continue. Patient transfused 1 unit of PRBCs on 8/24 for Hgb 6.9 and Hgb improved to 7.2 on 8/25. Hgb stable at 7.4 on 8/26. Pain controlled. Wound vac remains in place to right ankle incision sites with minimal output on 8/26. Hgb stable at 7.2 on 8/27. Hgb improved to 8.2 on on 8/28. Patient concerned about COVID exposure as grandson visited several days ago and now COVID positive so COVID test done and negative on 8/27. Pain controlled on 8/28. Perineural catheter removed by Anesthesia on am of 8/28. Patient medically ready for discharge and has been accepted to [facility] on 8/28 and patient appealing her discharge.Patient was denied her appeal for discharge and discharged in good condition with pain controlled to right ankle on 8/30. Patient switched from hospital wound vac to Prevena wound vac to her right ankle incision on discharge and to remain in place for 1 week. Follow-up in Orthopedic clinic on discharge. Patient discharged on Aspirin 81 mg po BID x 6 weeks on discharge for DVT prophylaxis.       Goals of Care Treatment Preferences:  Code Status: Full  Code      Consults:   Consults (From admission, onward)        Status Ordering Provider     Inpatient consult to Midline team  Once        Provider:  (Not yet assigned)    Completed TRUNG REESE     Inpatient consult to Orthopedic Surgery  Once        Provider:  (Not yet assigned)    Completed BINTA MCKEON          Neuro  History of CVA (cerebrovascular accident)  Bed bound at baseline from previous CVA. Patient at her baseline neurologic and functional level.       Paraparesis  · Chronic condition and related to previous CVA. Patient at her baseline neurologic and functional level.   · Fall precautions.     Pulmonary  Chronic respiratory failure with hypoxia and hypercapnia  Pulmonary HTN  Patient with Hypercapnic and Hypoxic Respiratory failure which is Chronic.  she is on home oxygen at 2 LPM and BiPAP nightly for her ADEEL at +12/+5 and FiO2 30% and discharged on this regimen. Supplemental oxygen was provided and noted-at 2 liters of oxygen.      .   Signs/symptoms of respiratory failure include- none at this time. Contributing diagnoses includes - CHF and Pulmonary HTN and ADEEL. Labs and images were reviewed. Patient Has recent ABG, which has been reviewed (VBG). Will treat underlying causes and adjust management of respiratory failure as follows-   · Continue home supplemental oxygen for goal SpO2 >88%.  Continue nightly BiPAP for her chronic ADEEL.   · Patient not having any signs of acute or worsening respiratory failure. Wheezing is chronic issue and on scheduled Duonebs every 4 hours while awake and will continue.   · Continue home Bumex 2 mg po daily as history of volume overload due to Pulmonary HTN in past on discharge.       COPD (chronic obstructive pulmonary disease)-resolved as of 8/24/2023  Chronic and stable.  Continue home oxygen and BiPAP on discharge.  Duonebs prn on discharge.     Cardiac/Vascular  Chronic diastolic heart failure  Patient is identified as having Diastolic (HFpEF) heart  failure that is Chronic. CHF is currently controlled. Latest ECHO performed and demonstrates- Results for orders placed during the hospital encounter of 08/06/23    Echo    Interpretation Summary    Left Ventricle: Normal wall motion. There is low normal systolic function with a visually estimated ejection fraction of 50 - 55%.    Right Ventricle: Normal right ventricular cavity size.  Continue home Bumex 2 mg po daily to treat on discharge.   Recent Labs   Lab 08/29/23  1052   *       Essential hypertension  Chronic and controlled. BP improved. Discharged on home Bumex 2 mg po daily.    Renal/  Chronic indwelling suprapubic catheter in place  Present on admit. Continue routine suprapubic catheter care. Patient changes suprapubic catheter every 20 days. Last changed on day of discharge on 8/30.     Oncology  Anemia of chronic disease  · Hgb improved to 8.2 on 8/28. No signs of bleeding.   · Hgb down to 7.2 on 8/27. Monitor. No signs of bleeding.   · Hgb improved to 7.4 on 8/26.   · Hgb slightly improved to 7.2 on 8/25 after transfused 1 unit of PRBCs. Monitor Hgb daily. Momnitor for any signs of active bleeding.   · Hgb down to 6.9 on 8/24. Plan to transfuse 1 unit of PRBCs on 8/24. No signs of clinical bleeding and post-op and expected blood loss related to surgery.   · Hbg 7.8 on admit. Baseline Hgb 9-10. Low Hgb on admit likely related to fracture and recent acute illness and recent hospitalization for CHF.       Endocrine  Class 1 obesity due to excess calories without serious comorbidity with body mass index (BMI) of 30.0 to 30.9 in adult  Body mass index is 30.45 kg/m². Morbid obesity complicates all aspects of disease management from diagnostic modalities to treatment. Weight loss encouraged and health benefits explained to patient.         Orthopedic  * Closed fracture of right tibial plafond with fibula involvement with routine healing s/p ORIF on 8/23/2023  66 y/o with left AKA, previous CVA,  paraparesis presenting from [facility] with right ankle pain and injury during a transfer from wheelchair to shower chair. She was found to have an acute closed trimalleolar fracture of the right distal tibia and fibula in the ED. Neurovascularly intact.     · Orthopedic surgery consulted and recommended operative repair. Patient taken to the OR on 8/23/2023 and underwent ORIF of right ankle by Dr. Suleman Schmitz.   · Post-op, patient is non weight bearing to right lower extremity as per Ortho on discharge.  · PT/OT consulted post-op to work with patient. Recommending SNF and referrals sent and has several accepting facilities. Patient wants [facility] but no beds available. Patient accepted to [facility] and auth obtained but patient does not feel she is medically ready for hospital discharge on 8/28 and appealing her discharge. Appeal denied and patient discharged in good condition on 8/30 to [facility].   · Patient on Aspirin 81 mg po BID for DVT prophylaxis and will continue for 6 weeks on discharge.   · Fall precautions on discharge.  · Podus boot to right ankle as per Ortho on discharge.   · Wound vac in place to right ankle incision site and will continue. Management as per Ortho. Switched to Prevena incisional wound vac on discharge for 1 week as per Ortho and Prevena ain place on discharge.   · Pain controlled. Continue multimodal pain medications for pain management with scheduled Tylenol 1000 mg po TID + Robaxin 750 mg po 4 times daily + Oxycodone IR prn for breakthrough pain on discharge.   · Perineural pain catheters removed by Anesthesia on am of 8/28.     Pressure injury of thigh, stage 2  - Present on admit. Turn patient every 2 hours.   - Continue local wound care on discharge.     Other  Primary insomnia  Chronic condition. Continue home Seroquel 200 mg po nightly on discharge.     ADEEL (obstructive sleep apnea)  Chronic condition. Continue nightly BiPAP on discharge to  treat as has chronic hypercapnic and hypoxic respiratory failure related to her chronic ADEEL and pulmonary HTN.     Atypical chest pain-resolved as of 9/3/2023  · Resolved with no recurrence likely GI in origin.   · Patient reported on am of 8/29 having pain in her chest area. Patient reported abdomen felt tight and then after she ate breakfast felt tightness and burning in her substernal area. Pain did not radiate. Patient denies any SOB or nausea.   · EKG done and reviewed and negative for any ischemic changes.   · Troponin negative at <0.006.   · CXR done and showed very mild pulmonary edema and patient back on her home diuretic Bumex and BNP checked and only 120 so no evidence of heart failure exacerbation.   · Patient given SLNTG x 2 and pain much improved. Suspect pain likely esophogeal in nature as occurred after eating and cardiac work-up negative.     Final Active Diagnoses:    Diagnosis Date Noted POA    PRINCIPAL PROBLEM:  Closed fracture of right tibial plafond with fibula involvement with routine healing s/p ORIF on 8/23/2023 [S82.871D, S82.831D] 08/23/2023 Not Applicable    Chronic respiratory failure with hypoxia and hypercapnia [J96.11, J96.12] 09/20/2019 Yes    ADEEL (obstructive sleep apnea) [G47.33] 01/19/2018 Yes    Anemia of chronic disease [D63.8] 10/29/2019 Yes    Pressure injury of thigh, stage 2 [L89.202] 08/23/2023 Yes    Chronic indwelling suprapubic catheter in place [Z93.59] 01/06/2019 Not Applicable     Chronic    Essential hypertension [I10] 01/19/2018 Yes     Chronic    Primary insomnia [F51.01] 01/19/2018 Yes     Chronic    Paraparesis [G82.20] 10/03/2012 Yes    History of CVA (cerebrovascular accident) [Z86.73] 01/06/2019 Not Applicable     Chronic    Class 1 obesity due to excess calories without serious comorbidity with body mass index (BMI) of 30.0 to 30.9 in adult [E66.09, Z68.30] 01/06/2019 Not Applicable    Chronic diastolic heart failure [I50.32] 09/20/2019 Yes     Pulmonary HTN [I27.20] 11/01/2019 Yes      Problems Resolved During this Admission:    Diagnosis Date Noted Date Resolved POA    Atypical chest pain [R07.89] 08/29/2023 09/03/2023 No    COPD (chronic obstructive pulmonary disease) [J44.9] 01/06/2019 08/24/2023 Yes     Chronic       Discharged Condition: good    Disposition: Skilled Nursing Facility ([facility])    Follow Up:     Future Appointments   Date Time Provider Department Center   9/6/2023 10:45 AM Nina Guzman PA-C [facility] [address]   9/13/2023 10:45 AM Nina Guzman PA-C [facility] [address]       Patient Instructions:      Diet Cardiac     Notify your health care provider if you experience any of the following:  temperature >100.4     Notify your health care provider if you experience any of the following:  persistent nausea and vomiting or diarrhea     Notify your health care provider if you experience any of the following:  severe uncontrolled pain     Notify your health care provider if you experience any of the following:  redness, tenderness, or signs of infection (pain, swelling, redness, odor or green/yellow discharge around incision site)     Notify your health care provider if you experience any of the following:  difficulty breathing or increased cough     Notify your health care provider if you experience any of the following:  severe persistent headache     Notify your health care provider if you experience any of the following:  worsening rash     Notify your health care provider if you experience any of the following:  persistent dizziness, light-headedness, or visual disturbances     Notify your health care provider if you experience any of the following:  increased confusion or weakness     Activity as tolerated     Weight bearing restrictions (specify):   Order Comments: Non weight bearing to right lower extremity       Significant Diagnostic Studies:   Hemoglobin   Date Value Ref Range Status   08/28/2023 8.2 (L) 12.0  - 16.0 g/dL Final   08/27/2023 7.2 (L) 12.0 - 16.0 g/dL Final   08/26/2023 7.4 (L) 12.0 - 16.0 g/dL Final   08/25/2023 7.2 (L) 12.0 - 16.0 g/dL Final   08/24/2023 6.9 (L) 12.0 - 16.0 g/dL Final     POC Hematocrit   Date Value Ref Range Status   08/13/2023 29 (L) 36 - 54 %PCV Final     Hematocrit   Date Value Ref Range Status   08/28/2023 28.8 (L) 37.0 - 48.5 % Final   08/27/2023 25.6 (L) 37.0 - 48.5 % Final   08/26/2023 26.2 (L) 37.0 - 48.5 % Final   08/25/2023 25.2 (L) 37.0 - 48.5 % Final   08/24/2023 24.1 (L) 37.0 - 48.5 % Final     BUN   Date Value Ref Range Status   08/30/2023 15 8 - 23 mg/dL Final   08/29/2023 18 8 - 23 mg/dL Final   08/28/2023 21 8 - 23 mg/dL Final   08/27/2023 27 (H) 8 - 23 mg/dL Final   08/26/2023 37 (H) 8 - 23 mg/dL Final     Creatinine   Date Value Ref Range Status   08/30/2023 0.8 0.5 - 1.4 mg/dL Final   08/29/2023 0.8 0.5 - 1.4 mg/dL Final   08/28/2023 0.8 0.5 - 1.4 mg/dL Final   08/27/2023 0.9 0.5 - 1.4 mg/dL Final   08/26/2023 1.1 0.5 - 1.4 mg/dL Final       Pending Diagnostic Studies:     None         Medications:  Reconciled Home Medications:      Medication List      START taking these medications    acetaminophen 500 MG tablet  Commonly known as: TYLENOL  Take 2 tablets (1,000 mg total) by mouth every 8 (eight) hours.     aspirin 81 MG Chew  Take 1 tablet (81 mg total) by mouth 2 (two) times a day. DVT prophylaxis after ankle fracture surgery. End date 10/9/2023.     melatonin 3 mg tablet  Commonly known as: MELATIN  Take 2 tablets (6 mg total) by mouth nightly as needed for Insomnia.     methocarbamoL 1,000 mg Tab  Take 1,000 mg by mouth 4 (four) times daily.     ondansetron 8 MG Tbdl  Commonly known as: ZOFRAN-ODT  Take 1 tablet (8 mg total) by mouth every 8 (eight) hours as needed (Nausea or vomiting).     oxyCODONE 5 MG immediate release tablet  Commonly known as: ROXICODONE  Take 1 tablet (5 mg total) by mouth every 4 (four) hours as needed for Pain.     senna-docusate 8.6-50  mg 8.6-50 mg per tablet  Commonly known as: PERICOLACE  Take 1 tablet by mouth once daily.     vitamin D 1000 units Tab  Commonly known as: VITAMIN D3  Take 1 tablet (1,000 Units total) by mouth once daily.        CONTINUE taking these medications    albuterol-ipratropium 2.5 mg-0.5 mg/3 mL nebulizer solution  Commonly known as: DUO-NEB  Take 3 mLs by nebulization every 4 (four) hours as needed for Wheezing.     bumetanide 2 MG tablet  Commonly known as: BUMEX  Take 2 mg by mouth once daily.     C-TUB Misc  Generic drug: miscellaneous medical supply  NEEDS TO SWITCH DME [employer] FOR OXYGEN AT 2L. LAST OXYGEN SATURATION WAS 83% ON Room Air. She uses  BIPAP and  Needs  New mask, tubings and     catheter 18 Fr Misc  Change every 20 days     citalopram 20 MG tablet  Commonly known as: CeleXA  Take 1.5 tablets (30 mg total) by mouth once daily.     ferrous sulfate 324 mg (65 mg iron) Tbec  Take 1 tablet (324 mg total) by mouth once daily.     folic acid-vit B6-vit B12 2.5-25-2 mg 2.5-25-2 mg Tab  Commonly known as: FOLBIC or Equiv  Take 1 tablet by mouth once daily.     gabapentin 600 MG tablet  Commonly known as: NEURONTIN  Take 1 tablet (600 mg total) by mouth 3 (three) times daily.     hydrOXYzine HCL 25 MG tablet  Commonly known as: ATARAX  Take 1 tablet (25 mg total) by mouth 2 (two) times daily as needed for Anxiety.     multivitamin Tab  Take 1 tablet by mouth once daily.     oxybutynin 5 MG Tr24  Commonly known as: DITROPAN-XL  Take 1 tablet (5 mg total) by mouth once daily.     polyethylene glycol 17 gram Pwpk  Commonly known as: GLYCOLAX  Take 17 g by mouth once daily.     QUEtiapine 200 MG Tab  Commonly known as: SEROQUEL  Take 1 tablet (200 mg total) by mouth every evening.     thiamine 100 MG tablet  Take 1 tablet (100 mg total) by mouth once daily.        STOP taking these medications    baclofen 20 MG tablet  Commonly known as: LIORESAL     oxyCODONE-acetaminophen 5-325 mg per tablet  Commonly known as:  PERCOCET            Indwelling Lines/Drains at time of discharge:   Lines/Drains/Airways     Drain  Duration                Suprapubic Catheter 08/30/23 1201 100% silicone 18 Fr. 3 days                Time spent on the discharge of patient: 32 minutes         Awa Purvis MD  Department of Hospital Medicine  [facility] - Surgery

## 2023-09-03 NOTE — ASSESSMENT & PLAN NOTE
66 y/o with left AKA, previous CVA, paraparesis presenting from [facility] with right ankle pain and injury during a transfer from wheelchair to shower chair. She was found to have an acute closed trimalleolar fracture of the right distal tibia and fibula in the ED. Neurovascularly intact.     · Orthopedic surgery consulted and recommended operative repair. Patient taken to the OR on 8/23/2023 and underwent ORIF of right ankle by Dr. Suleman Schmitz.   · Post-op, patient is non weight bearing to right lower extremity as per Ortho on discharge.  · PT/OT consulted post-op to work with patient. Recommending SNF and referrals sent and has several accepting facilities. Patient wants [facility] but no beds available. Patient accepted to [facility] and auth obtained but patient does not feel she is medically ready for hospital discharge on 8/28 and appealing her discharge. Appeal denied and patient discharged in good condition on 8/30 to [facility].   · Patient on Aspirin 81 mg po BID for DVT prophylaxis and will continue for 6 weeks on discharge.   · Fall precautions on discharge.  · Podus boot to right ankle as per Ortho on discharge.   · Wound vac in place to right ankle incision site and will continue. Management as per Ortho. Switched to Prevena incisional wound vac on discharge for 1 week as per Ortho and Prevena ain place on discharge.   · Pain controlled. Continue multimodal pain medications for pain management with scheduled Tylenol 1000 mg po TID + Robaxin 750 mg po 4 times daily + Oxycodone IR prn for breakthrough pain on discharge.   · Perineural pain catheters removed by Anesthesia on am of 8/28.

## 2023-09-03 NOTE — ASSESSMENT & PLAN NOTE
Message   Recorded as Task   Date: 01/23/2017 06:43 PM, Created By: Linda Sanchez   Task Name: Follow Up   Assigned To: [facility] triage,Team   Regarding Patient: Eduardo Kunz, Status: In Progress   Comment:    RachelleStephanie - 23 Jan 2017 6:43 PM     TASK CREATED  received prescription to sign for orthotics for Lili Corado asking for diagnosis and signature  We have not seen him since 2015    I'm not sure if someone else has been following him? ? Please call parent and find out if we should forward this elsewhere or if not, he needs to come in for a well visit  Thanks  Maxine Quiroga - 23 Jan 2017 6:46 PM     TASK IN PROGRESS   [facility] - 23 Jan 2017 6:55 PM     TASK EDITED  Spoke with Dad, he did not realize he was due for a well  Well appt  scheduled in the [place]  office on Monday 2/13/17 at 1720  Active Problems   1  Autistic spectrum disorder (299 00) (F84 0)  2  Undescended right testicle (752 51) (Q53 10)    Current Meds  1  No Reported Medications Recorded    Allergies   1   No Known Drug Allergies    Signatures   Electronically signed by : Janina Purvis, ; Jan 23 2017  6:55PM EST                       (Author)    Electronically signed by : Jackson Gutierrez DO; Jan 23 2017  7:15PM EST                       (Acknowledgement) · Chronic condition and related to previous CVA. Patient at her baseline neurologic and functional level.   · Fall precautions.

## 2023-09-03 NOTE — ASSESSMENT & PLAN NOTE
Chronic condition. Continue nightly BiPAP on discharge to treat as has chronic hypercapnic and hypoxic respiratory failure related to her chronic ADEEL and pulmonary HTN.

## 2023-09-04 ENCOUNTER — HOSPITAL ENCOUNTER (EMERGENCY)
Facility: HOSPITAL | Age: 68
Discharge: HOME | End: 2023-09-04
Attending: EMERGENCY MEDICINE
Payer: MEDICARE

## 2023-09-04 VITALS
TEMPERATURE: 98 F | HEART RATE: 76 BPM | WEIGHT: 198 LBS | RESPIRATION RATE: 13 BRPM | OXYGEN SATURATION: 100 % | BODY MASS INDEX: 32.94 KG/M2 | SYSTOLIC BLOOD PRESSURE: 134 MMHG | DIASTOLIC BLOOD PRESSURE: 71 MMHG

## 2023-09-04 DIAGNOSIS — K59.00 CONSTIPATION, UNSPECIFIED CONSTIPATION TYPE: Primary | ICD-10-CM

## 2023-09-04 LAB
ALBUMIN SERPL BCP-MCNC: 2.7 G/DL (ref 3.5–5.2)
ALP SERPL-CCNC: 76 U/L (ref 55–135)
ALT SERPL W/O P-5'-P-CCNC: 5 U/L (ref 10–44)
ANION GAP SERPL CALC-SCNC: 11 MMOL/L (ref 8–16)
AST SERPL-CCNC: 17 U/L (ref 10–40)
BACTERIA #/AREA URNS HPF: ABNORMAL /HPF
BASO STIPL BLD QL SMEAR: ABNORMAL
BASOPHILS # BLD AUTO: 0.03 K/UL (ref 0–0.2)
BASOPHILS NFR BLD: 0.3 % (ref 0–1.9)
BILIRUB SERPL-MCNC: 0.3 MG/DL (ref 0.1–1)
BILIRUB UR QL STRIP: NEGATIVE
BUN SERPL-MCNC: 23 MG/DL (ref 8–23)
CALCIUM SERPL-MCNC: 8.5 MG/DL (ref 8.7–10.5)
CHLORIDE SERPL-SCNC: 93 MMOL/L (ref 95–110)
CLARITY UR: ABNORMAL
CO2 SERPL-SCNC: 36 MMOL/L (ref 23–29)
COLOR UR: YELLOW
CREAT SERPL-MCNC: 1 MG/DL (ref 0.5–1.4)
DIFFERENTIAL METHOD: ABNORMAL
EOSINOPHIL # BLD AUTO: 0.2 K/UL (ref 0–0.5)
EOSINOPHIL NFR BLD: 2 % (ref 0–8)
ERYTHROCYTE [DISTWIDTH] IN BLOOD BY AUTOMATED COUNT: 17.2 % (ref 11.5–14.5)
EST. GFR  (NO RACE VARIABLE): >60 ML/MIN/1.73 M^2
GLUCOSE SERPL-MCNC: 93 MG/DL (ref 70–110)
GLUCOSE UR QL STRIP: NEGATIVE
HCT VFR BLD AUTO: 27.5 % (ref 37–48.5)
HGB BLD-MCNC: 8 G/DL (ref 12–16)
HGB UR QL STRIP: ABNORMAL
HYALINE CASTS #/AREA URNS LPF: 0 /LPF
HYPOCHROMIA BLD QL SMEAR: ABNORMAL
IMM GRANULOCYTES # BLD AUTO: 0.07 K/UL (ref 0–0.04)
IMM GRANULOCYTES NFR BLD AUTO: 0.8 % (ref 0–0.5)
KETONES UR QL STRIP: NEGATIVE
LEUKOCYTE ESTERASE UR QL STRIP: ABNORMAL
LYMPHOCYTES # BLD AUTO: 0.8 K/UL (ref 1–4.8)
LYMPHOCYTES NFR BLD: 8.7 % (ref 18–48)
MCH RBC QN AUTO: 25.6 PG (ref 27–31)
MCHC RBC AUTO-ENTMCNC: 29.1 G/DL (ref 32–36)
MCV RBC AUTO: 88 FL (ref 82–98)
MICROSCOPIC COMMENT: ABNORMAL
MONOCYTES # BLD AUTO: 0.8 K/UL (ref 0.3–1)
MONOCYTES NFR BLD: 8.1 % (ref 4–15)
NEUTROPHILS # BLD AUTO: 7.4 K/UL (ref 1.8–7.7)
NEUTROPHILS NFR BLD: 80.1 % (ref 38–73)
NITRITE UR QL STRIP: NEGATIVE
NRBC BLD-RTO: 0 /100 WBC
PH UR STRIP: 7 [PH] (ref 5–8)
PLATELET # BLD AUTO: 225 K/UL (ref 150–450)
PLATELET BLD QL SMEAR: ABNORMAL
PMV BLD AUTO: 9.4 FL (ref 9.2–12.9)
POLYCHROMASIA BLD QL SMEAR: ABNORMAL
POTASSIUM SERPL-SCNC: 4.5 MMOL/L (ref 3.5–5.1)
PROT SERPL-MCNC: 7.6 G/DL (ref 6–8.4)
PROT UR QL STRIP: ABNORMAL
RBC # BLD AUTO: 3.12 M/UL (ref 4–5.4)
RBC #/AREA URNS HPF: >100 /HPF (ref 0–4)
SODIUM SERPL-SCNC: 140 MMOL/L (ref 136–145)
SP GR UR STRIP: 1.02 (ref 1–1.03)
SQUAMOUS #/AREA URNS HPF: 7 /HPF
STOMATOCYTES BLD QL SMEAR: PRESENT
UNIDENT CRYS URNS QL MICRO: ABNORMAL
URN SPEC COLLECT METH UR: ABNORMAL
UROBILINOGEN UR STRIP-ACNC: NEGATIVE EU/DL
WBC # BLD AUTO: 9.22 K/UL (ref 3.9–12.7)
WBC #/AREA URNS HPF: >100 /HPF (ref 0–5)
WBC CLUMPS URNS QL MICRO: ABNORMAL
YEAST URNS QL MICRO: ABNORMAL

## 2023-09-04 PROCEDURE — 99284 EMERGENCY DEPT VISIT MOD MDM: CPT | Mod: 25

## 2023-09-04 PROCEDURE — 87086 URINE CULTURE/COLONY COUNT: CPT | Performed by: EMERGENCY MEDICINE

## 2023-09-04 PROCEDURE — 80053 COMPREHEN METABOLIC PANEL: CPT | Performed by: EMERGENCY MEDICINE

## 2023-09-04 PROCEDURE — 81000 URINALYSIS NONAUTO W/SCOPE: CPT | Performed by: EMERGENCY MEDICINE

## 2023-09-04 PROCEDURE — 85025 COMPLETE CBC W/AUTO DIFF WBC: CPT | Performed by: EMERGENCY MEDICINE

## 2023-09-04 RX ORDER — NITROFURANTOIN 25; 75 MG/1; MG/1
100 CAPSULE ORAL 2 TIMES DAILY
Qty: 10 CAPSULE | Refills: 0 | Status: SHIPPED | OUTPATIENT
Start: 2023-09-04 | End: 2023-09-09

## 2023-09-04 RX ORDER — POLYETHYLENE GLYCOL 3350 17 G/17G
17 POWDER, FOR SOLUTION ORAL DAILY
Qty: 119 G | Refills: 0 | Status: SHIPPED | OUTPATIENT
Start: 2023-09-04 | End: 2023-09-04 | Stop reason: SDUPTHER

## 2023-09-04 RX ORDER — POLYETHYLENE GLYCOL 3350 17 G/17G
17 POWDER, FOR SOLUTION ORAL DAILY
Qty: 119 G | Refills: 0 | Status: SHIPPED | OUTPATIENT
Start: 2023-09-04 | End: 2023-09-11

## 2023-09-04 RX ORDER — GLYCERIN 1 G/1
1 SUPPOSITORY RECTAL
Qty: 25 SUPPOSITORY | Refills: 0 | Status: SHIPPED | OUTPATIENT
Start: 2023-09-04 | End: 2023-09-04 | Stop reason: SDUPTHER

## 2023-09-04 RX ORDER — NITROFURANTOIN 25; 75 MG/1; MG/1
100 CAPSULE ORAL 2 TIMES DAILY
Qty: 10 CAPSULE | Refills: 0 | Status: SHIPPED | OUTPATIENT
Start: 2023-09-04 | End: 2023-09-04 | Stop reason: SDUPTHER

## 2023-09-04 RX ORDER — GLYCERIN 1 G/1
1 SUPPOSITORY RECTAL
Qty: 25 SUPPOSITORY | Refills: 0 | Status: SHIPPED | OUTPATIENT
Start: 2023-09-04

## 2023-09-04 NOTE — ED PROVIDER NOTES
SCRIBE #1 NOTE: I, Tunde Chapman, am scribing for, and in the presence of, Celso Tyson MD. I have scribed the entire note.      History      Chief Complaint   Patient presents with    Abdominal Pain     Pt sent from [facility] with abdominal pain       Review of patient's allergies indicates:   Allergen Reactions    Tuberculin ppd Itching and Dermatitis     Patient has old scare that she claims is from TB PPD    Rocephin [ceftriaxone] Rash     Rash 2012? Pt agreeable to try with pre mediation with benadryl.         HPI   HPI    9/4/2023, 9:03 AM   History obtained from the patient      History of Present Illness: Mary Ellen Fajardo is a 67 y.o. female patient with a PMHx of CHF, COPD, CVA, HTN who presents to the Emergency Department for generalized abdominal pain, onset 3 weeks PTA. Pt was sent to the ED from [facility] for further evaluation. Pt is on 2L O2 at home. Symptoms are constant and moderate in severity. No mitigating or exacerbating factors reported. Associated sxs include abdominal distention. Patient denies any fever, chills, n/v/d, SOB, CP, weakness, numbness, dizziness, headache, and all other sxs at this time. No prior Tx reported. No further complaints or concerns at this time.     Arrival mode: AASI    PCP: Any Tran MD       Past Medical History:  Past Medical History:   Diagnosis Date    Abdominal hernia     Addiction to drug     Alcohol abuse     Anemia of chronic disease 10/29/2019    Anxiety     Arthritis     Asthma     Bipolar disorder     Bladder stones     CHF (congestive heart failure)     Chronic diastolic heart failure 9/20/2019    Chronic respiratory failure with hypoxia and hypercapnia 9/20/2019     pulmonary htn + volume overload.  No overt evidence of copd per ct.      Class 1 obesity due to excess calories without serious comorbidity with body mass index (BMI) of 30.0 to 30.9 in adult 1/6/2019    Closed fracture of right tibial plafond with  fibula involvement with routine healing s/p ORIF on 8/23/2023 8/23/2023    COPD (chronic obstructive pulmonary disease)     on home o2    CVA (cerebral vascular accident)     2012    Essential hypertension 1/19/2018    Hallucination     Hepatitis C     treated and cured    History of blood clots     History of CVA (cerebrovascular accident) 1/6/2019    Hx of psychiatric care     Hypertension     Belen     New onset seizure 8/12/2023    Obese body habitus     On home oxygen therapy     ADEEL (obstructive sleep apnea)     ADEEL treated with BiPAP     Paraparesis 10/3/2012    Paraplegia     Paraplegic spinal paralysis     Pressure injury of thigh, stage 2 8/23/2023    Primary insomnia 1/19/2018    Psychiatric problem     Psychosis     AVH; Paranoia    Pulmonary HTN 11/1/2019    Group 2.  Diuresis and bp optimization.  bumex    Renal disorder     Requires assistance with activities of daily living (ADL)     SCI (spinal cord injury)     incomplete    Sleep difficulties     has sleep apnea and uses a Bi-PAP machine.    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    Substance abuse     Suprapubic catheter 03/15/2011    Therapy     Vaginal delivery     x1    Wheelchair dependence        Past Surgical History:  Past Surgical History:   Procedure Laterality Date    ABOVE-KNEE AMPUTATION Left 7/23/2019    Procedure: AMPUTATION, ABOVE KNEE;  Surgeon: Gordo Rodriguez MD;  Location: [facility] OR;  Service: Orthopedics;  Laterality: Left;    amputation Left 07/23/2019    above the knee    BACK SURGERY      bilateral knee replacement      BREAST BIOPSY      cysto/lithopaxy 2017      CYSTOSCOPY Right 1/8/2021    Procedure: CYSTOSCOPY, RIGHT OCCULER BALLOON CATHETER PLACEMENT;  Surgeon: RAMA Tinoco MD;  Location: [facility] OR;  Service: Urology;  Laterality: Right;    CYSTOSCOPY W/ LASER LITHOTRIPSY      JOINT REPLACEMENT      bilateral knee    OPEN REDUCTION AND INTERNAL FIXATION (ORIF) OF PILON FRACTURE Right 8/23/2023    Procedure:  ORIF, FRACTURE, PILON;  Surgeon: Suleman Schmitz MD;  Location: [facility] OR 2ND FLR;  Service: Orthopedics;  Laterality: Right;    ORIF FIBULA FRACTURE Right 2023    Procedure: ORIF, FRACTURE, FIBULA;  Surgeon: Suleman Schmitz MD;  Location: [facility] OR 2ND FLR;  Service: Orthopedics;  Laterality: Right;    PERCUTANEOUS NEPHROLITHOTOMY Right 2021    Procedure: NEPHROLITHOTOMY, PERCUTANEOUS;  Surgeon: RAMA Tinoco MD;  Location: [facility];  Service: Urology;  Laterality: Right;  x3 rooms, or7, or9,or12  ATTEMPTED    RETROGRADE PYELOGRAPHY Right 2021    Procedure: PYELOGRAM, RETROGRADE;  Surgeon: RAMA Tinoco MD;  Location: [facility];  Service: Urology;  Laterality: Right;    SUPRAPUBIC CYSTOSTOMY  03/15/2011    patient reports  replaced tubing on 10/27/19, at home    URETEROSCOPY Right 2021    Procedure: URETEROSCOPY;  Surgeon: RAMA Tinoco MD;  Location: [facility];  Service: Urology;  Laterality: Right;         Family History:  Family History   Problem Relation Age of Onset    Dementia Mother     Anxiety disorder Brother     Depression Brother        Social History:  Social History     Tobacco Use    Smoking status: Former     Current packs/day: 0.00     Average packs/day: 0.5 packs/day for 25.0 years (12.5 ttl pk-yrs)     Types: Cigarettes     Start date:      Quit date: 2002     Years since quittin.6    Smokeless tobacco: Never   Substance and Sexual Activity    Alcohol use: Not Currently     Comment: occasional    Drug use: Not Currently     Comment: prescribed opioids at present for pain    Sexual activity: Not Currently     Partners: Male     Comment: since        ROS   Review of Systems   Constitutional:  Negative for chills and fever.   HENT:  Negative for sore throat.    Respiratory:  Negative for shortness of breath.    Cardiovascular:  Negative for chest pain.   Gastrointestinal:  Positive for abdominal distention and abdominal pain (generalized). Negative for  diarrhea and nausea.   Genitourinary:  Negative for dysuria.   Musculoskeletal:  Negative for back pain.   Skin:  Negative for rash.   Neurological:  Negative for dizziness, weakness, numbness and headaches.   Hematological:  Does not bruise/bleed easily.   All other systems reviewed and are negative.    Physical Exam      Initial Vitals   BP Pulse Resp Temp SpO2   09/04/23 0858 09/04/23 0858 09/04/23 0858 09/04/23 0900 09/04/23 0858   (!) 145/80 70 18 98.1 °F (36.7 °C) 98 %      MAP       --                 Physical Exam  Nursing Notes and Vital Signs Reviewed.  Constitutional: Patient is in no acute distress. Well-developed and well-nourished.  Head: Atraumatic. Normocephalic.  Eyes: PERRL. EOM intact. Conjunctivae are not pale. No scleral icterus.  ENT: Mucous membranes are moist. Oropharynx is clear and symmetric.    Neck: Supple. Full ROM. No lymphadenopathy.  Cardiovascular: Regular rate. Regular rhythm. No murmurs, rubs, or gallops. Distal pulses are 2+ and symmetric.  Pulmonary/Chest: No respiratory distress. Clear to auscultation bilaterally. No wheezing or rales.  Abdominal: Distended.  There is diffuse abdominal tenderness.  No rebound or guarding.  Rectal: Female chaperone present for the duration of the rectal exam.There is no tenderness. No masses or hemorrhoids. Normal sphincter tone. There is no stool in the rectal vault.  Musculoskeletal: Moves all extremities. Left AKA. WoundVac to the RLE.  Skin: Warm and dry.  Neurological:  Alert, awake, and appropriate.  Normal speech.  No acute focal neurological deficits are appreciated.  Psychiatric: Normal affect. Good eye contact. Appropriate in content.    ED Course    Procedures  ED Vital Signs:  Vitals:    09/04/23 0858 09/04/23 0900 09/04/23 0945   BP: (!) 145/80  131/76   Pulse: 70  81   Resp: 18  19   Temp:  98.1 °F (36.7 °C)    TempSrc:  Oral    SpO2: 98%  100%   Weight:  89.8 kg (197 lb 15.6 oz)        Abnormal Lab Results:  Labs Reviewed   CBC W/  AUTO DIFFERENTIAL - Abnormal; Notable for the following components:       Result Value    RBC 3.12 (*)     Hemoglobin 8.0 (*)     Hematocrit 27.5 (*)     MCH 25.6 (*)     MCHC 29.1 (*)     RDW 17.2 (*)     Immature Granulocytes 0.8 (*)     Immature Grans (Abs) 0.07 (*)     Lymph # 0.8 (*)     Gran % 80.1 (*)     Lymph % 8.7 (*)     All other components within normal limits   COMPREHENSIVE METABOLIC PANEL - Abnormal; Notable for the following components:    Chloride 93 (*)     CO2 36 (*)     Calcium 8.5 (*)     Albumin 2.7 (*)     ALT 5 (*)     All other components within normal limits   URINALYSIS, REFLEX TO URINE CULTURE - Abnormal; Notable for the following components:    Appearance, UA Cloudy (*)     Protein, UA 3+ (*)     Occult Blood UA 3+ (*)     Leukocytes, UA 3+ (*)     All other components within normal limits    Narrative:     Specimen Source->Urine   URINALYSIS MICROSCOPIC - Abnormal; Notable for the following components:    RBC, UA >100 (*)     WBC, UA >100 (*)     WBC Clumps, UA Many (*)     Bacteria Many (*)     Yeast, UA Moderate (*)     All other components within normal limits    Narrative:     Specimen Source->Urine   CULTURE, URINE        All Lab Results:  Results for orders placed or performed during the hospital encounter of 09/04/23   CBC Auto Differential   Result Value Ref Range    WBC 9.22 3.90 - 12.70 K/uL    RBC 3.12 (L) 4.00 - 5.40 M/uL    Hemoglobin 8.0 (L) 12.0 - 16.0 g/dL    Hematocrit 27.5 (L) 37.0 - 48.5 %    MCV 88 82 - 98 fL    MCH 25.6 (L) 27.0 - 31.0 pg    MCHC 29.1 (L) 32.0 - 36.0 g/dL    RDW 17.2 (H) 11.5 - 14.5 %    Platelets 225 150 - 450 K/uL    MPV 9.4 9.2 - 12.9 fL    Immature Granulocytes 0.8 (H) 0.0 - 0.5 %    Gran # (ANC) 7.4 1.8 - 7.7 K/uL    Immature Grans (Abs) 0.07 (H) 0.00 - 0.04 K/uL    Lymph # 0.8 (L) 1.0 - 4.8 K/uL    Mono # 0.8 0.3 - 1.0 K/uL    Eos # 0.2 0.0 - 0.5 K/uL    Baso # 0.03 0.00 - 0.20 K/uL    nRBC 0 0 /100 WBC    Gran % 80.1 (H) 38.0 - 73.0 %     Lymph % 8.7 (L) 18.0 - 48.0 %    Mono % 8.1 4.0 - 15.0 %    Eosinophil % 2.0 0.0 - 8.0 %    Basophil % 0.3 0.0 - 1.9 %    Platelet Estimate Appears normal     Poly Occasional     Hypo Occasional     Stomatocytes Present     Basophilic Stippling Moderate     Differential Method Automated    Comprehensive Metabolic Panel   Result Value Ref Range    Sodium 140 136 - 145 mmol/L    Potassium 4.5 3.5 - 5.1 mmol/L    Chloride 93 (L) 95 - 110 mmol/L    CO2 36 (H) 23 - 29 mmol/L    Glucose 93 70 - 110 mg/dL    BUN 23 8 - 23 mg/dL    Creatinine 1.0 0.5 - 1.4 mg/dL    Calcium 8.5 (L) 8.7 - 10.5 mg/dL    Total Protein 7.6 6.0 - 8.4 g/dL    Albumin 2.7 (L) 3.5 - 5.2 g/dL    Total Bilirubin 0.3 0.1 - 1.0 mg/dL    Alkaline Phosphatase 76 55 - 135 U/L    AST 17 10 - 40 U/L    ALT 5 (L) 10 - 44 U/L    eGFR >60 >60 mL/min/1.73 m^2    Anion Gap 11 8 - 16 mmol/L   Urinalysis, Reflex to Urine Culture Urine, Clean Catch    Specimen: Urine   Result Value Ref Range    Specimen UA Urine, Clean Catch     Color, UA Yellow Yellow, Straw, Rosario    Appearance, UA Cloudy (A) Clear    pH, UA 7.0 5.0 - 8.0    Specific Gravity, UA 1.020 1.005 - 1.030    Protein, UA 3+ (A) Negative    Glucose, UA Negative Negative    Ketones, UA Negative Negative    Bilirubin (UA) Negative Negative    Occult Blood UA 3+ (A) Negative    Nitrite, UA Negative Negative    Urobilinogen, UA Negative <2.0 EU/dL    Leukocytes, UA 3+ (A) Negative   Urinalysis Microscopic   Result Value Ref Range    RBC, UA >100 (H) 0 - 4 /hpf    WBC, UA >100 (H) 0 - 5 /hpf    WBC Clumps, UA Many (A) None-Rare    Bacteria Many (A) None-Occ /hpf    Yeast, UA Moderate (A) None    Squam Epithel, UA 7 /hpf    Hyaline Casts, UA 0 0-1/lpf /lpf    Unclass Gillian UA Moderate None-Moderate    Microscopic Comment SEE COMMENT      Imaging Results:  Imaging Results              CT Renal Stone Study ABD Pelvis WO (Final result)  Result time 09/04/23 09:41:47      Final result by Jacques Ho MD  (09/04/23 09:41:47)                   Impression:      Right ureteral stent with multiple right renal and ureteral stones.  Chronic volume loss right kidney.  Suprapubic Wolfe catheter with nonspecific air within the bladder lumen    Moderate large bowel stool/constipation.    Nonspecific gallbladder distension.    Small left pleural effusion.    Low-grade subcutaneous edema/anasarca.    IUD.    All CT scans at this facility are performed  using dose modulation techniques as appropriate to performed exam including the following:  automated exposure control; adjustment of mA and/or kV according to the patients size (this includes techniques or standardized protocols for targeted exams where dose is matched to indication/reason for exam: i.e. extremities or head);  iterative reconstruction technique.      Electronically signed by: Jacques Ho MD  Date:    09/04/2023  Time:    09:41               Narrative:    EXAMINATION:  CT RENAL STONE STUDY ABD PELVIS WO    CLINICAL HISTORY:  No abdominal pain., Flank pain, kidney stone suspected;    TECHNIQUE:  Limited noncontrast CT scan of the abdomen and pelvis.    COMPARISON:  08/09/2020 CT scan    FINDINGS:  Small left pleural effusion is present with minor bibasilar atelectasis, left greater than right.  Small hiatal hernia is present.    The liver and spleen are nonenlarged.    The gallbladder is mildly distended.    The left kidney appears grossly normal.    The right kidney is atrophic with multiple variable sized calcifications within the calices.  A right ureteral stent is present.  The ureter is distended with several distal right ureteral stones.  The bladder is decompressed with a suprapubic Wolfe catheter.    In the pelvis the uterus is present with an IUD.  Calcified fibroid again noted.    The aorta is mildly calcified.  No aneurysm.    The IVC is normal.    Small bowel loops are nondilated.    Large volume of stool in the large bowel consistent with  constipation.    No free fluid or ascites.    Subcutaneous edema or anasarca is present.    Thoracolumbar degenerative disc disease with fusion of the L4-5 and L5-S1 disc spaces.                                              The Emergency Provider reviewed the vital signs and test results, which are outlined above.    ED Discussion     10:01 AM: Reassessed pt at this time. Discussed with pt all pertinent ED information and results. Discussed pt dx and plan of tx. Gave pt all f/u and return to the ED instructions. All questions and concerns were addressed at this time. Pt expresses understanding of information and instructions, and is comfortable with plan to discharge. Pt is stable for discharge.    I discussed with patient and/or family/caretaker that evaluation in the ED does not suggest any emergent or life threatening medical conditions requiring immediate intervention beyond what was provided in the ED, and I believe patient is safe for discharge.  Regardless, an unremarkable evaluation in the ED does not preclude the development or presence of a serious of life threatening condition. As such, patient was instructed to return immediately for any worsening or change in current symptoms.         ED Medication(s):  Medications - No data to display    New Prescriptions    BISACODYL (FLEET) 10 MG/30 ML ENEM    Place 30 mLs (10 mg total) rectally once. for 1 dose    GLYCERIN ADULT SUPPOSITORY    Place 1 suppository rectally as needed for Constipation.    NITROFURANTOIN, MACROCRYSTAL-MONOHYDRATE, (MACROBID) 100 MG CAPSULE    Take 1 capsule (100 mg total) by mouth 2 (two) times daily. for 5 days    POLYETHYLENE GLYCOL (GLYCOLAX) 17 GRAM/DOSE POWDER    Take 17 g by mouth once daily. for 7 days     Medical Decision Making    Medical Decision Making  Problems Addressed:  Constipation, unspecified constipation type: chronic illness or injury     Details: Rectal exam, no stool in the vault.  Rx laxative, enema and  suppository    Amount and/or Complexity of Data Reviewed  Labs: ordered. Decision-making details documented in ED Course.  Radiology: ordered. Decision-making details documented in ED Course.    Risk  OTC drugs.  Prescription drug management.                Scribe Attestation:   Scribe #1: I performed the above scribed service and the documentation accurately describes the services I performed. I attest to the accuracy of the note.    Attending:   Physician Attestation Statement for Scribe #1: I, Celso Tyson MD, personally performed the services described in this documentation, as scribed by Tunde Chapman, in my presence, and it is both accurate and complete.          Clinical Impression       ICD-10-CM ICD-9-CM   1. Constipation, unspecified constipation type  K59.00 564.00       Disposition:   Disposition: Discharged  Condition: Stable         Celso Tyson MD  09/04/23 1015

## 2023-09-05 LAB
BACTERIA UR CULT: NORMAL
BACTERIA UR CULT: NORMAL

## 2023-09-05 NOTE — PHYSICIAN QUERY
PT Name: Mary Ellen Fajardo  MR #: [id number]     Documentation Clarification       CDI : Mai Altamirano RN, CDS              Contact information: [email or web address]     This form is a permanent document in the medical record.     Query Date: September 5, 2023    By submitting this query, we are merely seeking further clarification of documentation.  Please utilize your independent clinical judgment when addressing the question(s) below.     The medical record contains the following:  Indicators Supporting Clinical Findings Location in Medical Record   x Chronic Illness CHF, HTN, AOCD, COPD, ADEEL, neurogenic bladder with chronic indwelling suprapubic catheter, hepatitis-C, CVA, bilateral TKAs, left AKA after a post op infection, Chronic respiratory failure with hypoxia and hypercapnia,Pulmonary HTN, Chronic diastolic heart failure, Pressure injury of thigh, stage 2 Hospital Medicine discharge summary   x Total Care or Max Assist Bed Mobility:   Rolling: to L with maximal assistance and of 2 persons  Supine > Sit: maximal assistance and of 2 persons  Sit > Supine: maximal assistance and of 2 persons  Balance:   Sitting balance: FAIR: Cannot move trunk without losing balance  Pt sat EOB ~15 mins  Needed mod to min A to maintain balance  Pt sat with R posterolateral lean. Verbal and manual cues given for anterior weight shift and mid line posture  PT performed knee and hip PROM and AAROM to R knee Physical therapy eval 8/24   x Immobility or Debility -weakness, impaired endurance, impaired self care skills, impaired functional mobility, impaired balance, decreased lower extremity function, impaired skin, impaired cardiopulmonary response to activity, orthopedic precautions.  -Basic Mobility Total Score: 10   Podus boot to R ankle, post-op nonweight bearing to right lower extremity Physical therapy 8/30            Hospital Medicine discharge summary    Contractures     x Late effects of stroke (sequelae of stroke)  Paraparesis         Chronic condition and related to previous CVA. Hospital Medicine discharge summary    Treatment     x Other right ankle pain and swelling following getting it stuck during a transfer to her shower chair, NH staff state they were getting her from her chair to her shower chair and got her ankle stuck between the two. able to move toes, sensory intact, pulse intact, swelling noted Hospital Medicine H&P     Provider, please specify the diagnosis or diagnoses associated with above clinical findings:       [   ] Functional quadriplegia - lack of ability to use ones limbs due to extreme debility in the absence of damage or injury to the spinal cord, requiring total care with all activities of daily living; a permanent condition; not a true paralysis   [ x  ] General debility/deconditioning   [   ] Other diagnosis (please specify):____________________________     Please document in your progress notes daily for the duration of treatment, until resolved, and include in your discharge summary.    Form No. [id number]

## 2023-09-05 NOTE — PHYSICIAN QUERY
PT Name: Mary Ellen Fajardo  MR #: [id number]    DOCUMENTATION CLARIFICATION       CDI : Mai Altamirano RN, CDS              Contact information: [email or web address]     This form is a permanent document in the medical record.      Query Date: September 5, 2023    By submitting this query, we are merely seeking further clarification of documentation. Please utilize your independent clinical judgment when addressing the question(s) below.    The Medical Record contains the following:   Indicators  Supporting Clinical Findings Location in Medical Record   x Anemia documented Anemia of chronic disease   Hospital Medicine 8/24   x H&H  Hgb slightly improved from 6.9 yesterday  H&H   10.2/35.5  H&H     7.8/27.5  H&H     6.9/24.1  H&H     8.2/28.8  Hgb baseline ~9-10 [facility] 8/25  Labs 8/10  Labs 8/22  Labs 8/24  Labs 8/28  Hospital Medicine discharge summary   x BP                    HR /72  HR 68  BP: ()/(49-94)   Pulse:  [57-85]  BP: ()/(51-75)   Pulse:  [50-74]   ED provider  [facility] 8/24  [facility] 8/25   x Bleeding likely further drop in Hgb related to fracture and operative repair and expected blood loss.   [facility] 8/24   x Procedure/Surgery Performed/EBL Fixation right tibial pilon fracture with fibula fracture  EBl 20 cc   OP note 8/23   x Transfusion(s) Transfused with 1 unit PRBC's 8/24 Blood transfusion record   x Acute/Chronic illness CHF, COPD, neurogenic bladder with chronic indwelling suprapubic catheter, hepatitis-C, CVA, bilateral TKAs, left AKA after a post op infection, Paraparesis, Chronic respiratory failure with hypoxia and hypercapnia  Pulmonary HTN, copd, Chronic diastolic heart failure, ADEEL, stage 2 Pressure injury of thigh,    Acute fracture of the distal tibia and fibula Hospital Medicine discharge summary   x Treatments 1L NS bolus. Hospital Medicine H&P    Other       Provider, please specify diagnosis or diagnoses associated with  above clinical findings.   [   ] Acute blood loss anemia    [   x] Acute blood loss anemia expected post-operatively    [   ] Other Hematological Diagnosis (please specify): _________________            Please document in your progress notes daily for the duration of treatment, until resolved, and include in your discharge summary.    Form No. [id number]

## 2023-09-06 ENCOUNTER — OFFICE VISIT (OUTPATIENT)
Dept: ORTHOPEDICS | Facility: CLINIC | Age: 68
End: 2023-09-06
Payer: MEDICARE

## 2023-09-06 DIAGNOSIS — S82.871D CLOSED FRACTURE OF RIGHT TIBIAL PLAFOND WITH FIBULA INVOLVEMENT WITH ROUTINE HEALING: Primary | ICD-10-CM

## 2023-09-06 DIAGNOSIS — S82.831D CLOSED FRACTURE OF RIGHT TIBIAL PLAFOND WITH FIBULA INVOLVEMENT WITH ROUTINE HEALING: Primary | ICD-10-CM

## 2023-09-06 PROCEDURE — 99024 PR POST-OP FOLLOW-UP VISIT: ICD-10-PCS | Mod: S$GLB,,, | Performed by: PHYSICIAN ASSISTANT

## 2023-09-06 PROCEDURE — 99024 POSTOP FOLLOW-UP VISIT: CPT | Mod: S$GLB,,, | Performed by: PHYSICIAN ASSISTANT

## 2023-09-26 ENCOUNTER — TELEPHONE (OUTPATIENT)
Dept: ORTHOPEDICS | Facility: CLINIC | Age: 68
End: 2023-09-26
Payer: MEDICARE

## 2023-09-26 NOTE — TELEPHONE ENCOUNTER
----- Message from Nina Guzman PA-C sent at 9/26/2023  5:27 AM CDT -----  Missed post op appointment will need to be rescheduled still has sutures.

## 2023-09-26 NOTE — PROGRESS NOTES
Principal Orthopedic Problem: Right tibial pilon plus fibula fracture                          Disuse osteopenia     08/23/23: : Fixation right tibial pilon fracture with fibula fracture     Ms. Fajardo is here today for a post-operative visit    Interval History:  she reports that she is doing ok.   she is at nursing facility. she is  participating in PT/OT, maybe?.   Pain seems controlled.  she is  taking pain medication. No records sent with patient.     she denies fever, chills, and sweats .     Physical exam:    Patient arrives to exam room: stretcher.  Patient is un accompanied    Dressing taken down. WV removed Incision is clean, dry and intact.  Sutures/ staples left in place. .       RADS: All pertinent images were reviewed by myself:   none done today    Assessment:  Post-op visit ( 2 weeks)    Plan:  Current care, treatment plan, precautions, activity level/ modifications, limitations, rehabilitation exercises and proposed future treatment were discussed with the patient. We discussed the need to monitor for changes in symptoms and condition and report them to the physician.  Discussed importance of compliance with all appointments and follow up examinations.     WOUND CARE :  Wound redressed with soft dressing  Placed in podus boot due to concern for pressure to wound        ACTIVITY:   - sedentary   -range of motion as tolerated none until sutures removed   - NWB      -PT/OT, nursing facility, Patient is responsible to establish and continue care      PAIN MEDICATION:   - Multimodal pain control  - Pain medication: refill was not needed  - Pain medication refill policy provided to patient for review, yes.    - Patient was informed a multi-modal approach is used to treat their pain. With the goal to get off of narcotic pain medication and discontinue as soon as possible.   - ice and elevation to reduce pain and swelling    FOLLOW UP:   - Patient will follow up in the clinic in 1 weeks, sooner if  any concerns.  - Pending healing at time consider removal fos sutures/ staples       If there are any questions prior to scheduled follow up, the patient was instructed to contact the office

## 2023-09-27 ENCOUNTER — TELEPHONE (OUTPATIENT)
Dept: ORTHOPEDICS | Facility: CLINIC | Age: 68
End: 2023-09-27
Payer: MEDICARE

## 2023-11-05 NOTE — ASSESSMENT & PLAN NOTE
Her overall symptom complex includes: poor sleep, feels depressed, AVHs, poor health, chronic illness, history of: diagnosis of Bipolar I disorder, anger, psychosocial distress (marital problems), irritability, paranoia, mood swings, anhedonia, excessive shopping, energy without sleep for 4-5 days, suicidal ideation in the past, history of extreme happiness history of feeling overly confident.  Mary Ellen Fajardo is not suicidal, homicidal or gravely disabled from a mental health prospective and therefore, does not meet the criteria for a PEC.    Recommendation: Obtain Thyroid function tests. Increase Olanzapine to 10 mg po qhs.Continue Prozac 10 mg po qd. Start Trazodone 50 mg po qhs sleep. Utilize Zyprexa 5 mg PO/IM  q 8 hours prn agitation or uncontrollable threatening behavior. Do NOT exceed 30 mg/day of Zyprexa. Mary Ellen Fajardo is appropriate for Nursing facility/long-term care placement from a mental health prospective.        05-Nov-2023 23:27

## 2023-11-27 PROBLEM — J96.12 CHRONIC RESPIRATORY FAILURE WITH HYPOXIA AND HYPERCAPNIA: Status: RESOLVED | Noted: 2019-09-20 | Resolved: 2023-11-27

## 2023-11-27 PROBLEM — J96.11 CHRONIC RESPIRATORY FAILURE WITH HYPOXIA AND HYPERCAPNIA: Status: RESOLVED | Noted: 2019-09-20 | Resolved: 2023-11-27

## 2023-11-30 NOTE — PLAN OF CARE
Patient resting in bed, remains free of falls and injuries this shift. VSS. No obvious s/sx of distress noted. Pain managed with PRN medications. Wound care performed as ordered, DORITA pump placed on the bed heel boot in place to R foot. Suprapubic catheter in place with no complications, output monitored and documented. POC reviewed with the patient, verbalized understanding. No further needs at this time, call light within reach bed alarm set. Continue POC as ordered, chart check completed and all orders reviewed.    no

## 2025-01-31 NOTE — PROGRESS NOTES
[facility]  Pulmonology  Progress Note    Patient Name: Mary Ellen Fajardo  MRN: [id number]  Admission Date: 9/20/2019  Hospital Length of Stay: 1 days  Code Status: Full Code  Attending Provider: Edgardo Pedroza MD  Primary Care Provider: Any Tran MD   Principal Problem: Acute and chronic respiratory failure with hypercapnia    Subjective:     Interval History: MELISSA overnight. Wore the bipap and tolerated that well. Feels closer to her baseline this am    Objective:     Vital Signs (Most Recent):  Temp: 97.7 °F (36.5 °C) (09/21/19 0800)  Pulse: 74 (09/21/19 0935)  Resp: (!) 23 (09/21/19 0935)  BP: (!) 145/78 (09/21/19 0800)  SpO2: (!) 92 % (09/21/19 0935) Vital Signs (24h Range):  Temp:  [97.6 °F (36.4 °C)-98.1 °F (36.7 °C)] 97.7 °F (36.5 °C)  Pulse:  [46-78] 74  Resp:  [15-40] 23  SpO2:  [90 %-100 %] 92 %  BP: ()/(55-92) 145/78     Weight: 112.5 kg (248 lb 0.3 oz)  Body mass index is 42.57 kg/m².      Intake/Output Summary (Last 24 hours) at 9/21/2019 1119  Last data filed at 9/21/2019 0700  Gross per 24 hour   Intake 1630 ml   Output 2080 ml   Net -450 ml       Physical Exam   Constitutional: She is oriented to person, place, and time. She appears well-developed and well-nourished.   HENT:   Head: Normocephalic and atraumatic.   Mouth/Throat: Oropharynx is clear and moist.   Eyes: Conjunctivae and EOM are normal.   Neck: Trachea normal and full passive range of motion without pain.   Cardiovascular: Normal rate, regular rhythm, S1 normal, S2 normal, normal heart sounds and intact distal pulses. Exam reveals no gallop and no friction rub.   No murmur heard.  Pulmonary/Chest: No respiratory distress. She has no wheezes. She has no rales. She exhibits no tenderness.   Abdominal: Soft. Bowel sounds are normal. She exhibits no distension. There is no tenderness.   Neurological: She is alert and oriented to person, place, and time.   Skin: Skin is warm and dry. No abrasion and no  bruising noted.   Vitals reviewed.      Vents:  Oxygen Concentration (%): 30 (09/21/19 0315)    Lines/Drains/Airways     Peripherally Inserted Central Catheter Line                 PICC Double Lumen 09/20/19 2120 right basilic less than 1 day          Drain                 Suprapubic Catheter 09/20/19 1002 18 Fr. 1 day          Peripheral Intravenous Line                 Peripheral IV - Single Lumen Right Antecubital -- days         Peripheral IV - Single Lumen 09/20/19 0514 18 G Right Hand 1 day                Significant Labs:    CBC/Anemia Profile:  Recent Labs   Lab 09/20/19 0514 09/21/19 0410   WBC 9.32 6.19   HGB 12.6 10.9*   HCT 42.7 36.7*    210   MCV 87 84   RDW 14.9* 14.7*        Chemistries:  Recent Labs   Lab 09/20/19 0514 09/21/19 0410    139   K 3.4* 4.0   CL 93* 94*   CO2 33* 38*   BUN 12 20   CREATININE 1.0 0.9   CALCIUM 9.0 9.0   ALBUMIN 3.5 3.0*   PROT 8.9* 7.8   BILITOT 0.6 0.4   ALKPHOS 83 65   ALT 8* 11   AST 18 19       All pertinent labs within the past 24 hours have been reviewed.    Significant Imaging:  I have reviewed and interpreted all pertinent imaging results/findings within the past 24 hours.    Assessment/Plan:     * Acute and chronic respiratory failure with hypercapnia  2/2 COPD/OHS exacerbation possible 2/2 viral illness in addition to noncompliance w/ home bipap  - Improving  - Off abx 2/2 lack of leukocytosis and sputum production  - Resp cx NGTD  - Steroids and duonebs  - Home nightly bipap  - Needs an appointment w/ Dr. Chaidez as he does sleep medicine as well as pulmonary  - PT/OT    COPD (chronic obstructive pulmonary disease)  Treatment as above      Signing off at this time. Please call with any questions     Pepito Godfrey MD  Pulmonology  [facility]  Cell [phone]     normal (ped)...

## 2025-04-30 NOTE — DISCHARGE SUMMARY
[facility]  Discharge Note  Short Stay    Admit Date: 12/20/2017    Discharge Date and Time: 12/20/2017 11:27 AM      Attending Physician: RAMA Tinoco MD     Discharge Provider: OMA Tinoco    Diagnoses:  Active Hospital Problems    Diagnosis  POA    *Bladder stone [N21.0]  Yes    Staghorn calculus [N20.0]  Yes      Resolved Hospital Problems    Diagnosis Date Resolved POA   No resolved problems to display.       Discharged Condition: stable    Hospital Course: Patient was admitted for an outpatient procedure and tolerated the procedure well with no complications.    Final Diagnoses: Same as principal problem.    Disposition: Home or Self Care    Follow up/Patient Instructions:    Medications:  Reconciled Home Medications:   Current Discharge Medication List      CONTINUE these medications which have CHANGED    Details   oxyCODONE-acetaminophen (PERCOCET)  mg per tablet Take 1 tablet by mouth every 8 (eight) hours as needed for Pain.  Qty: 30 tablet, Refills: 0    Associated Diagnoses: Bladder stone; Staghorn calculus         CONTINUE these medications which have NOT CHANGED    Details   albuterol-ipratropium 2.5mg-0.5mg/3mL (DUO-NEB) 0.5 mg-3 mg(2.5 mg base)/3 mL nebulizer solution Refills: 0      amlodipine (NORVASC) 10 MG tablet Take 10 mg by mouth once daily.   Refills: 1      baclofen (LIORESAL) 20 MG tablet 10 mg 4 (four) times daily.   Refills: 5      bumetanide (BUMEX) 1 MG tablet 1 mg once daily.       catheter 18 Fr Misc Change every 20 days  Qty: 10 each, Refills: 1    Associated Diagnoses: Neurogenic bladder      ciprofloxacin HCl (CIPRO) 500 MG tablet Take 1 tablet (500 mg total) by mouth 2 (two) times daily.  Qty: 28 tablet, Refills: 0    Associated Diagnoses: Dysuria      gabapentin (NEURONTIN) 300 MG capsule 600 mg 3 (three) times daily.       oxybutynin (DITROPAN-XL) 5 MG TR24 TAKE 1 TABLET BY MOUTH EVERY DAY  Qty: 90 tablet, Refills: 0    Associated Diagnoses:  What Type Of Note Output Would You Prefer (Optional)?: Bullet Format Hpi Title: Evaluation of Skin Lesions Neurogenic bladder      potassium chloride (KLOR-CON) 10 MEQ TbSR       sertraline (ZOLOFT) 50 MG tablet TK 1 T PO QD  Refills: 5      trazodone (DESYREL) 100 MG tablet 100 mg.   Refills: 5      VENTOLIN HFA 90 mcg/actuation inhaler       ranitidine (ZANTAC) 150 MG tablet Refills: 0             Discharge Procedure Orders  Diet general     Activity as tolerated     Call MD for:   Order Comments: Significant Hematuria       Follow-up Information     W Carlos Tinoco MD. Schedule an appointment as soon as possible for a visit in 1 week.    Specialty:  Urology  Why:  Post op Check  Contact information:  [address] [place] [id number]  [phone]                   Discharge Procedure Orders (must include Diet, Follow-up, Activity):    Discharge Procedure Orders (must include Diet, Follow-up, Activity)  Diet general     Activity as tolerated     Call MD for:   Order Comments: Significant Hematuria         How Severe Are Your Spot(S)?: mild Have Your Spot(S) Been Treated In The Past?: has not been treated Additional History: Established patient was last seen by Solange Harrington PA-C on 10/2024.  \\n\\nPatient presents for full skin exam with spots of concern on face.

## 2025-05-01 NOTE — ED NOTES
Physical Therapy Progress Note    Visit Type: Progress Note  Visit: 18  Referring Provider: Amador Kulkarni NP  Medical Diagnosis (from order): S82.302D - Closed fracture of distal end of left tibia with routine healing, unspecified fracture morphology, subsequent encounter  M17.12 - Primary osteoarthritis of left knee  M25.552 - Left hip pain     SUBJECTIVE                                                                                                               Patient reports she has continued to notice a significant improvement in her pain, it has gone from more constant and severe to intermittent and lower intensity, she has transitioned to a single point cane from the walker she is feeling unsteady still to an extent on the cane   Current Functional Limitations: Stairs  Balance for sustained standing and initiation of walk    Pain / Symptoms  - Pain rating (out of 10): Current: 3 ; Worst: 9 (very brief and intermittent)      OBJECTIVE                                                                                                                     Strength  (out of 5 unless noted, standard test position unless noted)   Hip:    - Flexion:        • Left: 4-        • Right: 4  Knee:    - Flexion:        • Left: 5        • Right: 5    - Extension:        • Left: 4+        • Right: 4+           Balance Tests   5 Times Sit to Stand (seconds) 29.05    Interpretation:        > 12 seconds indicates need for further assessment for fall risk for community dwelling elderly      > 16 seconds indicative of falls risk for Parkinson's disease  Timed Up and Go (TUG)      - Total time to complete: 21.71 sec, stable on turns     - Assistive device: single point cane    Interpretation: < 10 seconds = normal; > or = 12 seconds is a positive screen for fall risk based on     CDC STEADI tool kit; > or = 13.5 seconds has been shown to indicate high risk for falls     high risk of falls     Ambulation / Gait  - Assistive  Temp down. Skin cool and moist. Awaiting bed in ICU to be cleaned.    device: single point cane  - Assist Level: modified independent  - Surface: even  - Description: antalgic, leans left and lateral trunk sway          Outcome/Assessments  Outcome Measures:   Lower Extremity Functional Scale: LEFS Calculated Total: 45 (0=extreme difficulty; 80=no difficulty) see flowsheet for additional documentation       Treatment     Therapeutic Exercise  Ongoing assessment and test and measures for progress note    Sit to stands with yellow theraband around knees, working on feeling engagement in hip while performing to help with independent performance and review as new part of HEP     Not performed today   Nu step Lvl 5 x6' with subjective information gathering: seat 11, arms 10  Standing hip abduction 2x10 bilaterally  Standing hip hikes with L leg on 2\" step -light tactile cues 2x10, R 1 x 10  2\" lateral step ups 2 x 10 L  Leg press seat 7 back 4 DL 90# 3 x 10, L 50# 1 x 10  R sidelying clams with YTB 2 x 10  R sidelying hip abd x 10- tactile cues to maintain straight plane abd  Bridging 2 x 10  Short arc quad 7.5# 2 x 10 B    Skilled input: demonstration, verbal instruction/cues and tactile instruction/cues    Writer verbally educated and received verbal consent for hand placement, positioning of patient, and techniques to be performed today from patient for hand placement and palpation for techniques and therapist position for techniques as described above and how they are pertinent to the patient's plan of care.  Home Exercise Program  Access Code: [email or web address]  URL: [email or web address]  Date: 05/01/2025  Prepared by: Andrez Jimenez    Exercises  - Active Straight Leg Raise with Quad Set  - 3 x daily - 7 x weekly - 2 sets - 10 reps  - Seated Hamstring Stretch  - 3 x daily - 7 x weekly - 1 sets - 2-3 reps - 30 seconds hold  - Supine Bridge  - 1 x daily - 7 x weekly - 2 sets - 10 reps  - Hooklying Bilateral Isometric Clamshell  - 1 x daily - 7 x weekly - 1 sets - 10  reps - 5 hold  - Supine Knee Extension Stretch on Towel Roll  - 1-2 x daily - 7 x weekly - 2 sets - 2-3 reps - 60 hold  - Supine Knee Extension Strengthening  - 1 x daily - 7 x weekly - 2 sets - 10 reps  - Sit to Stand with Resistance Around Legs  - 2 x daily - 5 x weekly - 3 sets - 10 reps  - Seated Hip Abduction with Resistance  - 2 x daily - 5 x weekly - 3 sets - 10 reps  - Seated Quad Set  - 5-6 x daily - 7 x weekly - 1 sets - 10 reps - 3\" hold  - Standing Hip Abduction with Counter Support  - 5-6 x daily - 7 x weekly - 1 sets - 5 reps  - Hip Flexor Stretch on Step  - 2 x daily - 7 x weekly - 2 sets - 6 reps  - Sit to Stand with Resistance Around Legs  - 1 x daily - 7 x weekly - 3 sets - 5 reps      ASSESSMENT                                                                                                            Patient has made remarkable progress over just the last two weeks, recently she turned a corner on her pain and has been making much more notable improvement. She is progressing towards all of her goals, she has less pain and progressing towards good function, she still is getting right hip pain with sit to stands which resolved with a band around her knees showing adding biofeedback to create torque based stability which we added to her HEP, her gait is still unsteady and needs progression in hip strength but is doing well with her current plan of care   Education:   - Results of above outlined education: Verbalizes understanding, Needs reinforcement and Demonstrates understanding    PLAN                                                                                                                          Modify frequency and duration per below.  Frequency / Duration  2 times per week tapering as patient progresses for 5 weeks for an estimated total of 10 visits  Requesting additional 10 visits.    Suggestions for next session as indicated: Work hip strength progression  Progress gait, stairs  Work  on body mechanics for sit to stands     Goals  Long Term Goals: to be met by end of plan of care  1. Patient will report tolerating intermittent standing and walking for 10 minutes with patient reported manageable pain/symptoms of 3/10 and with patient reported manageable/tolerable difficulty for grocery shopping/lifestyle errands.   Status: met  Standin min, 3/10, main issue low back, walking: 15 min 3/10  2. Patient will ascend and descend 4 or more stairs using using step-to pattern, with rail(s) using least restrictive device independently to complete home access.  Improve bilateral hip and knee strength to 4+/5.   Status: partially met L 4/5, R 4+/5, heavy UE support on railing and step through pattern   3. Patient will transfer to and from sitting / standing from a 18 inch height to use standard height seating in home and community independently and without compensation without pain/symptoms.   Status: partially met  3/10 pain with transfers on average, needs a moment before she can continue walking due to balance, no pain with band around knees    4. Lower Extremity Functional Scale: Patient will score 40 or higher on The Lower Extremity Functional Scale to indicate a decreased level of difficulty with walking and moving around. (minimal clinically important difference: 9 points change)  Status: met  Score 45  5. Patient will be independent with progressed and modified home exercise program. Status: partially met  Progressing       Therapy procedure time and total treatment time can be found documented on the Time Entry flowsheet

## (undated) DEVICE — SYR 50ML CATH TIP

## (undated) DEVICE — SOL 9P NACL IRR PIC IL

## (undated) DEVICE — CATH FOLEY SIL 2W SHORT TP 24F

## (undated) DEVICE — GLOVE BIOGEL SZ 8 1/2

## (undated) DEVICE — CATH BLLN UROMAX ULT 7FR 2.3MM

## (undated) DEVICE — GLOVE RAD RESIST LEAD IMPRG 8

## (undated) DEVICE — SPONGE DERMACEA GAUZE 4X4

## (undated) DEVICE — SOL IRR NACL .9% 3000ML

## (undated) DEVICE — Device

## (undated) DEVICE — CANISTER SUCTION 2 LTR

## (undated) DEVICE — CATH RE-ENTRY 24F MALECOT

## (undated) DEVICE — BIG TABLE COVER

## (undated) DEVICE — FIBER MOSES 200 DFL

## (undated) DEVICE — INFLATION DEVICE ENCORE 26

## (undated) DEVICE — SEE L#120831

## (undated) DEVICE — SOL IRR STRL WATER 500ML

## (undated) DEVICE — SEE MEDLINE ITEM 157117

## (undated) DEVICE — PREVENA RESTOR

## (undated) DEVICE — URETEROSCOPE LITHOVUE STANDARD

## (undated) DEVICE — SUT VICRYL PLUS 3-0 SH 18IN

## (undated) DEVICE — TOWEL OR DISP STRL BLUE 4/PK

## (undated) DEVICE — TOURNIQUET SB QC DP 34X4IN

## (undated) DEVICE — GLOVE BIOGEL ECLIPSE SZ 6

## (undated) DEVICE — JELLY LUBRICANT STERILE 4 OZ

## (undated) DEVICE — LINER GLOVE POWDERFREE SZ 6

## (undated) DEVICE — KIT CATH NEPHSTMY NEPHROMAX XL

## (undated) DEVICE — SEE MEDLINE ITEM 152530

## (undated) DEVICE — MAT QUICK 40X30 FLOOR FLUID LF

## (undated) DEVICE — COLLECTION SPECIMEN NEPTUNE

## (undated) DEVICE — SET NEPH PERC 10FR 30CM

## (undated) DEVICE — ADAPT UROLOGICAL 2-WAY STOPCK

## (undated) DEVICE — GOWN B1 X-LG X-LONG

## (undated) DEVICE — WIRE GUIDE TFLN CT SPR STFF ST

## (undated) DEVICE — DRAPE C-ARM ELAS CLIP 42X120IN

## (undated) DEVICE — SEE MEDLINE ITEM 152532

## (undated) DEVICE — SUT SILK 0 BLK BR FSL 18 IN

## (undated) DEVICE — BIT DRILL QC 170MM 2.8X80MM

## (undated) DEVICE — GLOVE, SURG,LATEX FREE SZ 7

## (undated) DEVICE — BASKET STONE RETRV 1.9FRX120CM

## (undated) DEVICE — KIT NEPHR DLTN BLLN CATH 10F

## (undated) DEVICE — PROBE ULTRASOUND 3.3MM X 403MM

## (undated) DEVICE — SYS LABLNG CORECT MED 4 FLG

## (undated) DEVICE — GLOVE BIOGEL PI MICRO SZ 7

## (undated) DEVICE — TAPE SURG DURAPORE 2 X10YD

## (undated) DEVICE — BLANKET UPPER BODY 78.7X29.9IN

## (undated) DEVICE — CRADLE LAMINECTOMY ARM

## (undated) DEVICE — GLOVE SURG BIOGEL LATEX SZ 7.5

## (undated) DEVICE — APPLICATOR CHLORAPREP ORN 26ML

## (undated) DEVICE — POSITIONER IV ARMBOARD FOAM

## (undated) DEVICE — TAPE SILK 3IN

## (undated) DEVICE — DRAPE STERI U-SHAPED 47X51IN

## (undated) DEVICE — SOL NACL IRR 1000ML BTL

## (undated) DEVICE — COVER SNAP KAP 26IN

## (undated) DEVICE — PACK ARTHROSCOPY W/ISO BAC

## (undated) DEVICE — COVER OVERHEAD SURG LT BLUE

## (undated) DEVICE — DRAPE U SPLIT SHEET 54X76IN

## (undated) DEVICE — PAD CAST SPECIALIST STRL 6

## (undated) DEVICE — SHEET DRAPE FAN-FOLDED 3/4

## (undated) DEVICE — KIT CATH URTRL CONE TIP 5F

## (undated) DEVICE — ELECTRODE REM PLYHSV RETURN 9

## (undated) DEVICE — SEE MEDLINE ITEM 152467

## (undated) DEVICE — SHEATH ACC HD 12/14 FRX36CM

## (undated) DEVICE — WIRE GUIDE .035 150CM.

## (undated) DEVICE — SYR ONLY LUER LOCK 20CC

## (undated) DEVICE — DURAPREP SURG SCRUB 26ML

## (undated) DEVICE — GAUZE SPONGE 4X4 12PLY

## (undated) DEVICE — SEE L#95700

## (undated) DEVICE — SYR 10CC LUER LOCK

## (undated) DEVICE — SEE MEDLINE ITEM 152622

## (undated) DEVICE — BAG URINARY LEG COMBO PACK STA

## (undated) DEVICE — UNDERGLOVES BIOGEL PI SIZE 8

## (undated) DEVICE — DRAPE THREE-QTR REINF 53X77IN

## (undated) DEVICE — SLEEVE SCD EXPRESS CALF MEDIUM

## (undated) DEVICE — SEE MEDLINE ITEM 146313

## (undated) DEVICE — GOWN AERO CHROME W/ TOWEL XL

## (undated) DEVICE — BANDAGE ACE ELASTIC 6"

## (undated) DEVICE — SEE MEDLINE ITEM 146345

## (undated) DEVICE — PILLOW HEAD REST

## (undated) DEVICE — CABLE LASER FIBER 200 MICRON

## (undated) DEVICE — CATH URETERAL ANES 10FR 50CM

## (undated) DEVICE — CATH URTRL OPEN END STR TIP 5F

## (undated) DEVICE — SCRUB HIBICLENS 4% CHG 4OZ

## (undated) DEVICE — SUT 0 18IN SILK BLK BRAIDE

## (undated) DEVICE — SENSOR DUAL FLEX STR 150CM

## (undated) DEVICE — BLADE SAW OSC ME-92 COATED

## (undated) DEVICE — K-WIRE TRCR PT1.6MM DIA 150MM
Type: IMPLANTABLE DEVICE | Site: ANKLE | Status: NON-FUNCTIONAL
Removed: 2023-08-23

## (undated) DEVICE — SUT VICRYL 2-0 36 CT-1

## (undated) DEVICE — CONTAINER SPECIMEN STRL 4OZ

## (undated) DEVICE — DRAPE T EXTRM SURG 121X128X90

## (undated) DEVICE — UNDERGLOVE BIOGEL PI SZ 6.5 LF

## (undated) DEVICE — BIT DRILL QC 135MM 2.5X45MM

## (undated) DEVICE — STAPLER SKIN PROXIMATE WIDE

## (undated) DEVICE — SUT VICRYL PLUS ANTIBACT

## (undated) DEVICE — PAD ABD 8X10 STERILE

## (undated) DEVICE — SUPPORT ULNA NERVE PROTECTOR

## (undated) DEVICE — SUT ETHILON 2-0 FSLX 30 BLK

## (undated) DEVICE — BNDG COFLEX FOAM LF2 ST 4X5YD

## (undated) DEVICE — SEE MEDLINE ITEM 156946

## (undated) DEVICE — SHEATH NAVIGATOR HD 11/13 36

## (undated) DEVICE — PULSAVAC ZIMMER

## (undated) DEVICE — PROBE ULTRA PNEUMATIC

## (undated) DEVICE — CATCHER STONE W/TUBING ADAPTER

## (undated) DEVICE — GUIDE WIRE MOTION .035 X 150CM

## (undated) DEVICE — DEVICE PUSH PULL REDUC 4.5

## (undated) DEVICE — BAG URINE LEG 32OZ FLIP FLO

## (undated) DEVICE — ARMCRADLE BLUE POLY FOAM

## (undated) DEVICE — DRAPE C-ARMOR EQUIPMENT COVER

## (undated) DEVICE — GUIDEWIRE ZIPWIRE .035 D 150CM

## (undated) DEVICE — SPONGE LAP 18X18 PREWASHED

## (undated) DEVICE — COVER HD BACK TABLE 6FT

## (undated) DEVICE — SUT VICRYL PLUS 0 CT1 18IN

## (undated) DEVICE — BLADE SURG CARBON STEEL #10

## (undated) DEVICE — INSERT CUSHIONPRONE VIEW LARGE

## (undated) DEVICE — SUT ETHILON 2 BLK MONO TP-1

## (undated) DEVICE — SUT SILK 0 SH 30IN BLK BR

## (undated) DEVICE — SEE MEDLINE ITEM 157149

## (undated) DEVICE — TRAY MINOR ORTHO OMC

## (undated) DEVICE — GLOVE RADIATION SIZE 8

## (undated) DEVICE — CATH ANGIO NONBRD KUMPE 5F

## (undated) DEVICE — KIT PREVENA PLUS

## (undated) DEVICE — STYLET RENAL DILATION 8F 70CM.

## (undated) DEVICE — GUIDEWIRE ZIPWIRE .035 150CM L

## (undated) DEVICE — STAPLER SKIN REGULAR

## (undated) DEVICE — DRAPE TOP 53X102IN

## (undated) DEVICE — BIT DRILL CALIB QC 2.5X230MM

## (undated) DEVICE — STAPLER SKIN ROTATING HEAD

## (undated) DEVICE — SPONGE COTTON TRAY 4X4IN

## (undated) DEVICE — SEE MEDLINE ITEM 152487

## (undated) DEVICE — DRAPE STERI LONG